# Patient Record
Sex: FEMALE | Race: BLACK OR AFRICAN AMERICAN | Employment: OTHER | URBAN - NONMETROPOLITAN AREA
[De-identification: names, ages, dates, MRNs, and addresses within clinical notes are randomized per-mention and may not be internally consistent; named-entity substitution may affect disease eponyms.]

---

## 2017-02-09 ENCOUNTER — OFFICE VISIT (OUTPATIENT)
Dept: NEPHROLOGY | Age: 62
End: 2017-02-09

## 2017-02-09 VITALS
HEART RATE: 71 BPM | DIASTOLIC BLOOD PRESSURE: 72 MMHG | WEIGHT: 215 LBS | BODY MASS INDEX: 35.82 KG/M2 | SYSTOLIC BLOOD PRESSURE: 158 MMHG | OXYGEN SATURATION: 93 %

## 2017-02-09 DIAGNOSIS — E11.29 PERSISTENT PROTEINURIA ASSOCIATED WITH TYPE 2 DIABETES MELLITUS (HCC): ICD-10-CM

## 2017-02-09 DIAGNOSIS — E11.22 TYPE 2 DIABETES MELLITUS WITH STAGE 3 CHRONIC KIDNEY DISEASE, WITH LONG-TERM CURRENT USE OF INSULIN (HCC): ICD-10-CM

## 2017-02-09 DIAGNOSIS — I12.9 HYPERTENSIVE NEPHROPATHY, STAGE 1-4 OR UNSPECIFIED CHRONIC KIDNEY DISEASE: ICD-10-CM

## 2017-02-09 DIAGNOSIS — D63.8 ANEMIA, CHRONIC DISEASE: ICD-10-CM

## 2017-02-09 DIAGNOSIS — N18.30 CKD (CHRONIC KIDNEY DISEASE), STAGE III (HCC): Primary | ICD-10-CM

## 2017-02-09 DIAGNOSIS — N18.30 TYPE 2 DIABETES MELLITUS WITH STAGE 3 CHRONIC KIDNEY DISEASE, WITH LONG-TERM CURRENT USE OF INSULIN (HCC): ICD-10-CM

## 2017-02-09 DIAGNOSIS — Z79.4 TYPE 2 DIABETES MELLITUS WITH STAGE 3 CHRONIC KIDNEY DISEASE, WITH LONG-TERM CURRENT USE OF INSULIN (HCC): ICD-10-CM

## 2017-02-09 DIAGNOSIS — E55.9 VITAMIN D DEFICIENCY: ICD-10-CM

## 2017-02-09 DIAGNOSIS — N25.81 SECONDARY HYPERPARATHYROIDISM OF RENAL ORIGIN (HCC): ICD-10-CM

## 2017-02-09 DIAGNOSIS — R80.9 PERSISTENT PROTEINURIA ASSOCIATED WITH TYPE 2 DIABETES MELLITUS (HCC): ICD-10-CM

## 2017-02-09 PROCEDURE — G8598 ASA/ANTIPLAT THER USED: HCPCS | Performed by: NURSE PRACTITIONER

## 2017-02-09 PROCEDURE — G8417 CALC BMI ABV UP PARAM F/U: HCPCS | Performed by: NURSE PRACTITIONER

## 2017-02-09 PROCEDURE — G8428 CUR MEDS NOT DOCUMENT: HCPCS | Performed by: NURSE PRACTITIONER

## 2017-02-09 PROCEDURE — 1036F TOBACCO NON-USER: CPT | Performed by: NURSE PRACTITIONER

## 2017-02-09 PROCEDURE — 99213 OFFICE O/P EST LOW 20 MIN: CPT | Performed by: NURSE PRACTITIONER

## 2017-02-09 PROCEDURE — 3044F HG A1C LEVEL LT 7.0%: CPT | Performed by: NURSE PRACTITIONER

## 2017-02-09 PROCEDURE — 3017F COLORECTAL CA SCREEN DOC REV: CPT | Performed by: NURSE PRACTITIONER

## 2017-02-09 PROCEDURE — G8484 FLU IMMUNIZE NO ADMIN: HCPCS | Performed by: NURSE PRACTITIONER

## 2017-02-09 PROCEDURE — 3014F SCREEN MAMMO DOC REV: CPT | Performed by: NURSE PRACTITIONER

## 2017-02-15 ENCOUNTER — OFFICE VISIT (OUTPATIENT)
Dept: INTERNAL MEDICINE | Age: 62
End: 2017-02-15

## 2017-02-15 VITALS
HEART RATE: 72 BPM | WEIGHT: 214.6 LBS | DIASTOLIC BLOOD PRESSURE: 62 MMHG | SYSTOLIC BLOOD PRESSURE: 150 MMHG | HEIGHT: 65 IN | BODY MASS INDEX: 35.75 KG/M2

## 2017-02-15 DIAGNOSIS — E11.9 TYPE 2 DIABETES MELLITUS WITHOUT COMPLICATION, WITHOUT LONG-TERM CURRENT USE OF INSULIN (HCC): Primary | ICD-10-CM

## 2017-02-15 DIAGNOSIS — N18.3 CKD (CHRONIC KIDNEY DISEASE), STAGE 3 (MODERATE): ICD-10-CM

## 2017-02-15 DIAGNOSIS — G47.33 OBSTRUCTIVE SLEEP APNEA ON CPAP: ICD-10-CM

## 2017-02-15 DIAGNOSIS — Z99.89 OBSTRUCTIVE SLEEP APNEA ON CPAP: ICD-10-CM

## 2017-02-15 DIAGNOSIS — E78.5 HYPERLIPIDEMIA, UNSPECIFIED HYPERLIPIDEMIA TYPE: ICD-10-CM

## 2017-02-15 DIAGNOSIS — N25.81 SECONDARY HYPERPARATHYROIDISM OF RENAL ORIGIN (HCC): ICD-10-CM

## 2017-02-15 DIAGNOSIS — I10 ESSENTIAL HYPERTENSION: ICD-10-CM

## 2017-02-15 DIAGNOSIS — J44.9 CHRONIC OBSTRUCTIVE PULMONARY DISEASE, UNSPECIFIED COPD TYPE (HCC): ICD-10-CM

## 2017-02-15 DIAGNOSIS — E66.9 OBESITY (BMI 30-39.9): ICD-10-CM

## 2017-02-15 DIAGNOSIS — F41.9 ANXIETY: ICD-10-CM

## 2017-02-15 LAB — HBA1C MFR BLD: 6 %

## 2017-02-15 PROCEDURE — 83036 HEMOGLOBIN GLYCOSYLATED A1C: CPT | Performed by: INTERNAL MEDICINE

## 2017-02-15 PROCEDURE — 3017F COLORECTAL CA SCREEN DOC REV: CPT | Performed by: INTERNAL MEDICINE

## 2017-02-15 PROCEDURE — G8926 SPIRO NO PERF OR DOC: HCPCS | Performed by: INTERNAL MEDICINE

## 2017-02-15 PROCEDURE — 93000 ELECTROCARDIOGRAM COMPLETE: CPT | Performed by: INTERNAL MEDICINE

## 2017-02-15 PROCEDURE — 99214 OFFICE O/P EST MOD 30 MIN: CPT | Performed by: INTERNAL MEDICINE

## 2017-02-15 PROCEDURE — G8427 DOCREV CUR MEDS BY ELIG CLIN: HCPCS | Performed by: INTERNAL MEDICINE

## 2017-02-15 PROCEDURE — G8417 CALC BMI ABV UP PARAM F/U: HCPCS | Performed by: INTERNAL MEDICINE

## 2017-02-15 PROCEDURE — 3044F HG A1C LEVEL LT 7.0%: CPT | Performed by: INTERNAL MEDICINE

## 2017-02-15 PROCEDURE — G8484 FLU IMMUNIZE NO ADMIN: HCPCS | Performed by: INTERNAL MEDICINE

## 2017-02-15 PROCEDURE — 3023F SPIROM DOC REV: CPT | Performed by: INTERNAL MEDICINE

## 2017-02-15 PROCEDURE — 1036F TOBACCO NON-USER: CPT | Performed by: INTERNAL MEDICINE

## 2017-02-15 PROCEDURE — 3014F SCREEN MAMMO DOC REV: CPT | Performed by: INTERNAL MEDICINE

## 2017-02-15 PROCEDURE — G8598 ASA/ANTIPLAT THER USED: HCPCS | Performed by: INTERNAL MEDICINE

## 2017-02-15 RX ORDER — CARVEDILOL 25 MG/1
TABLET ORAL
Qty: 180 TABLET | Refills: 3 | Status: SHIPPED | OUTPATIENT
Start: 2017-02-15 | End: 2018-01-31 | Stop reason: SDUPTHER

## 2017-02-15 RX ORDER — ALLOPURINOL 100 MG/1
TABLET ORAL
Qty: 180 TABLET | Refills: 3 | Status: SHIPPED | OUTPATIENT
Start: 2017-02-15 | End: 2017-08-02 | Stop reason: SDUPTHER

## 2017-02-15 RX ORDER — ALPRAZOLAM 1 MG/1
TABLET ORAL
Qty: 40 TABLET | Refills: 5 | Status: SHIPPED | OUTPATIENT
Start: 2017-02-15 | End: 2017-08-02 | Stop reason: SDUPTHER

## 2017-02-15 RX ORDER — NITROGLYCERIN 0.4 MG/1
0.4 TABLET SUBLINGUAL EVERY 5 MIN PRN
Qty: 25 TABLET | Refills: 5 | Status: SHIPPED | OUTPATIENT
Start: 2017-02-15 | End: 2018-01-31 | Stop reason: SDUPTHER

## 2017-02-16 ENCOUNTER — OFFICE VISIT (OUTPATIENT)
Dept: PHYSICAL MEDICINE AND REHAB | Age: 62
End: 2017-02-16

## 2017-02-16 VITALS
WEIGHT: 213 LBS | HEIGHT: 65 IN | HEART RATE: 71 BPM | DIASTOLIC BLOOD PRESSURE: 55 MMHG | SYSTOLIC BLOOD PRESSURE: 112 MMHG | BODY MASS INDEX: 35.49 KG/M2

## 2017-02-16 DIAGNOSIS — G89.29 CHRONIC PAIN OF RIGHT ANKLE: ICD-10-CM

## 2017-02-16 DIAGNOSIS — Z79.4 TYPE 2 DIABETES MELLITUS WITHOUT COMPLICATION, WITH LONG-TERM CURRENT USE OF INSULIN (HCC): ICD-10-CM

## 2017-02-16 DIAGNOSIS — E11.42 DIABETIC POLYNEUROPATHY ASSOCIATED WITH TYPE 2 DIABETES MELLITUS (HCC): Primary | ICD-10-CM

## 2017-02-16 DIAGNOSIS — E11.9 TYPE 2 DIABETES MELLITUS WITHOUT COMPLICATION, WITH LONG-TERM CURRENT USE OF INSULIN (HCC): ICD-10-CM

## 2017-02-16 DIAGNOSIS — M25.571 CHRONIC PAIN OF RIGHT ANKLE: ICD-10-CM

## 2017-02-16 PROCEDURE — G8484 FLU IMMUNIZE NO ADMIN: HCPCS | Performed by: PHYSICAL MEDICINE & REHABILITATION

## 2017-02-16 PROCEDURE — G8427 DOCREV CUR MEDS BY ELIG CLIN: HCPCS | Performed by: PHYSICAL MEDICINE & REHABILITATION

## 2017-02-16 PROCEDURE — G8417 CALC BMI ABV UP PARAM F/U: HCPCS | Performed by: PHYSICAL MEDICINE & REHABILITATION

## 2017-02-16 PROCEDURE — 3014F SCREEN MAMMO DOC REV: CPT | Performed by: PHYSICAL MEDICINE & REHABILITATION

## 2017-02-16 PROCEDURE — 1036F TOBACCO NON-USER: CPT | Performed by: PHYSICAL MEDICINE & REHABILITATION

## 2017-02-16 PROCEDURE — 3017F COLORECTAL CA SCREEN DOC REV: CPT | Performed by: PHYSICAL MEDICINE & REHABILITATION

## 2017-02-16 PROCEDURE — G8598 ASA/ANTIPLAT THER USED: HCPCS | Performed by: PHYSICAL MEDICINE & REHABILITATION

## 2017-02-16 PROCEDURE — 3044F HG A1C LEVEL LT 7.0%: CPT | Performed by: PHYSICAL MEDICINE & REHABILITATION

## 2017-02-16 PROCEDURE — 99213 OFFICE O/P EST LOW 20 MIN: CPT | Performed by: PHYSICAL MEDICINE & REHABILITATION

## 2017-02-16 RX ORDER — HYDROCODONE BITARTRATE AND ACETAMINOPHEN 5; 325 MG/1; MG/1
1 TABLET ORAL EVERY 8 HOURS PRN
Qty: 90 TABLET | Refills: 0 | Status: SHIPPED | OUTPATIENT
Start: 2017-02-16 | End: 2017-02-23

## 2017-02-16 RX ORDER — HYDROCODONE BITARTRATE AND ACETAMINOPHEN 5; 325 MG/1; MG/1
1 TABLET ORAL EVERY 8 HOURS PRN
Qty: 90 TABLET | Refills: 0 | Status: SHIPPED | OUTPATIENT
Start: 2017-02-16 | End: 2017-05-15 | Stop reason: SDUPTHER

## 2017-03-03 PROBLEM — E11.628 CELLULITIS IN DIABETIC FOOT (HCC): Status: ACTIVE | Noted: 2017-03-03

## 2017-03-03 PROBLEM — L03.119 CELLULITIS IN DIABETIC FOOT (HCC): Status: ACTIVE | Noted: 2017-03-03

## 2017-04-26 ENCOUNTER — OFFICE VISIT (OUTPATIENT)
Dept: ONCOLOGY | Age: 62
End: 2017-04-26

## 2017-04-26 VITALS
DIASTOLIC BLOOD PRESSURE: 61 MMHG | WEIGHT: 205.6 LBS | OXYGEN SATURATION: 93 % | TEMPERATURE: 97.1 F | RESPIRATION RATE: 18 BRPM | BODY MASS INDEX: 34.26 KG/M2 | HEART RATE: 66 BPM | HEIGHT: 65 IN | SYSTOLIC BLOOD PRESSURE: 120 MMHG

## 2017-04-26 DIAGNOSIS — N18.30 CKD (CHRONIC KIDNEY DISEASE) STAGE 3, GFR 30-59 ML/MIN (HCC): ICD-10-CM

## 2017-04-26 DIAGNOSIS — D64.9 NORMOCYTIC ANEMIA: Primary | ICD-10-CM

## 2017-04-26 PROCEDURE — 3014F SCREEN MAMMO DOC REV: CPT | Performed by: INTERNAL MEDICINE

## 2017-04-26 PROCEDURE — G8427 DOCREV CUR MEDS BY ELIG CLIN: HCPCS | Performed by: INTERNAL MEDICINE

## 2017-04-26 PROCEDURE — 1036F TOBACCO NON-USER: CPT | Performed by: INTERNAL MEDICINE

## 2017-04-26 PROCEDURE — G8417 CALC BMI ABV UP PARAM F/U: HCPCS | Performed by: INTERNAL MEDICINE

## 2017-04-26 PROCEDURE — G8598 ASA/ANTIPLAT THER USED: HCPCS | Performed by: INTERNAL MEDICINE

## 2017-04-26 PROCEDURE — 99203 OFFICE O/P NEW LOW 30 MIN: CPT | Performed by: INTERNAL MEDICINE

## 2017-04-26 PROCEDURE — 3017F COLORECTAL CA SCREEN DOC REV: CPT | Performed by: INTERNAL MEDICINE

## 2017-05-05 ENCOUNTER — TELEPHONE (OUTPATIENT)
Dept: INTERNAL MEDICINE | Age: 62
End: 2017-05-05

## 2017-05-08 ENCOUNTER — OFFICE VISIT (OUTPATIENT)
Dept: ONCOLOGY | Age: 62
End: 2017-05-08

## 2017-05-08 VITALS
HEART RATE: 72 BPM | TEMPERATURE: 97.9 F | BODY MASS INDEX: 34.75 KG/M2 | OXYGEN SATURATION: 98 % | RESPIRATION RATE: 16 BRPM | HEIGHT: 65 IN | WEIGHT: 208.6 LBS | DIASTOLIC BLOOD PRESSURE: 81 MMHG | SYSTOLIC BLOOD PRESSURE: 180 MMHG

## 2017-05-08 DIAGNOSIS — D63.8 ANEMIA, CHRONIC DISEASE: ICD-10-CM

## 2017-05-08 DIAGNOSIS — E11.9 TYPE 2 DIABETES MELLITUS WITHOUT COMPLICATION, WITH LONG-TERM CURRENT USE OF INSULIN (HCC): ICD-10-CM

## 2017-05-08 DIAGNOSIS — D64.9 NORMOCYTIC ANEMIA: Primary | ICD-10-CM

## 2017-05-08 DIAGNOSIS — N18.30 CKD (CHRONIC KIDNEY DISEASE) STAGE 3, GFR 30-59 ML/MIN (HCC): ICD-10-CM

## 2017-05-08 DIAGNOSIS — Z79.4 TYPE 2 DIABETES MELLITUS WITHOUT COMPLICATION, WITH LONG-TERM CURRENT USE OF INSULIN (HCC): ICD-10-CM

## 2017-05-08 PROCEDURE — G8598 ASA/ANTIPLAT THER USED: HCPCS | Performed by: INTERNAL MEDICINE

## 2017-05-08 PROCEDURE — G8417 CALC BMI ABV UP PARAM F/U: HCPCS | Performed by: INTERNAL MEDICINE

## 2017-05-08 PROCEDURE — 1036F TOBACCO NON-USER: CPT | Performed by: INTERNAL MEDICINE

## 2017-05-08 PROCEDURE — 99213 OFFICE O/P EST LOW 20 MIN: CPT | Performed by: INTERNAL MEDICINE

## 2017-05-08 PROCEDURE — G8427 DOCREV CUR MEDS BY ELIG CLIN: HCPCS | Performed by: INTERNAL MEDICINE

## 2017-05-08 PROCEDURE — 3017F COLORECTAL CA SCREEN DOC REV: CPT | Performed by: INTERNAL MEDICINE

## 2017-05-08 PROCEDURE — 3014F SCREEN MAMMO DOC REV: CPT | Performed by: INTERNAL MEDICINE

## 2017-05-08 ASSESSMENT — ENCOUNTER SYMPTOMS
COLOR CHANGE: 0
TROUBLE SWALLOWING: 0
RECTAL PAIN: 0
EYE DISCHARGE: 0
BLOOD IN STOOL: 0
ABDOMINAL DISTENTION: 0
BLOOD IN STOOL: 0
BACK PAIN: 0
SORE THROAT: 0
VOMITING: 0
SHORTNESS OF BREATH: 0
WHEEZING: 0
VOMITING: 0
CHEST TIGHTNESS: 0
ABDOMINAL PAIN: 0
FACIAL SWELLING: 0
CHEST TIGHTNESS: 0
NAUSEA: 0
WHEEZING: 0
SHORTNESS OF BREATH: 0
TROUBLE SWALLOWING: 0
CONSTIPATION: 0
ABDOMINAL DISTENTION: 0
ABDOMINAL PAIN: 0
EYE DISCHARGE: 0
COLOR CHANGE: 0
NAUSEA: 0
CONSTIPATION: 0
RECTAL PAIN: 0
DIARRHEA: 0
BACK PAIN: 0
COUGH: 0
DIARRHEA: 0
FACIAL SWELLING: 0
COUGH: 0
SORE THROAT: 0

## 2017-05-15 ENCOUNTER — OFFICE VISIT (OUTPATIENT)
Dept: PHYSICAL MEDICINE AND REHAB | Age: 62
End: 2017-05-15

## 2017-05-15 VITALS
BODY MASS INDEX: 35.17 KG/M2 | SYSTOLIC BLOOD PRESSURE: 142 MMHG | HEIGHT: 64 IN | WEIGHT: 206 LBS | DIASTOLIC BLOOD PRESSURE: 68 MMHG | HEART RATE: 69 BPM

## 2017-05-15 DIAGNOSIS — E11.42 DIABETIC POLYNEUROPATHY ASSOCIATED WITH TYPE 2 DIABETES MELLITUS (HCC): Primary | ICD-10-CM

## 2017-05-15 DIAGNOSIS — G89.29 CHRONIC PAIN OF RIGHT ANKLE: ICD-10-CM

## 2017-05-15 DIAGNOSIS — M25.571 CHRONIC PAIN OF RIGHT ANKLE: ICD-10-CM

## 2017-05-15 PROCEDURE — G8598 ASA/ANTIPLAT THER USED: HCPCS | Performed by: NURSE PRACTITIONER

## 2017-05-15 PROCEDURE — G8427 DOCREV CUR MEDS BY ELIG CLIN: HCPCS | Performed by: NURSE PRACTITIONER

## 2017-05-15 PROCEDURE — 1036F TOBACCO NON-USER: CPT | Performed by: NURSE PRACTITIONER

## 2017-05-15 PROCEDURE — G8417 CALC BMI ABV UP PARAM F/U: HCPCS | Performed by: NURSE PRACTITIONER

## 2017-05-15 PROCEDURE — 3014F SCREEN MAMMO DOC REV: CPT | Performed by: NURSE PRACTITIONER

## 2017-05-15 PROCEDURE — 3017F COLORECTAL CA SCREEN DOC REV: CPT | Performed by: NURSE PRACTITIONER

## 2017-05-15 PROCEDURE — 3044F HG A1C LEVEL LT 7.0%: CPT | Performed by: NURSE PRACTITIONER

## 2017-05-15 PROCEDURE — 99213 OFFICE O/P EST LOW 20 MIN: CPT | Performed by: NURSE PRACTITIONER

## 2017-05-15 RX ORDER — HYDROCODONE BITARTRATE AND ACETAMINOPHEN 5; 325 MG/1; MG/1
1 TABLET ORAL EVERY 8 HOURS PRN
Qty: 90 TABLET | Refills: 0 | Status: SHIPPED | OUTPATIENT
Start: 2017-05-15 | End: 2017-08-21 | Stop reason: SDUPTHER

## 2017-05-25 DIAGNOSIS — I10 ESSENTIAL HYPERTENSION: ICD-10-CM

## 2017-05-26 RX ORDER — CYANOCOBALAMIN/FOLIC AC/VIT B6 1-2.5-25MG
TABLET ORAL
Qty: 30 TABLET | Refills: 5 | Status: SHIPPED | OUTPATIENT
Start: 2017-05-26 | End: 2017-11-24 | Stop reason: SDUPTHER

## 2017-06-28 ENCOUNTER — OFFICE VISIT (OUTPATIENT)
Dept: PULMONOLOGY | Age: 62
End: 2017-06-28

## 2017-06-28 VITALS
BODY MASS INDEX: 34.59 KG/M2 | HEIGHT: 65 IN | WEIGHT: 207.6 LBS | DIASTOLIC BLOOD PRESSURE: 64 MMHG | SYSTOLIC BLOOD PRESSURE: 148 MMHG | OXYGEN SATURATION: 96 % | HEART RATE: 72 BPM

## 2017-06-28 DIAGNOSIS — G47.33 OBSTRUCTIVE SLEEP APNEA ON CPAP: Primary | ICD-10-CM

## 2017-06-28 DIAGNOSIS — Z99.89 OBSTRUCTIVE SLEEP APNEA ON CPAP: Primary | ICD-10-CM

## 2017-06-28 PROCEDURE — G8598 ASA/ANTIPLAT THER USED: HCPCS | Performed by: PHYSICIAN ASSISTANT

## 2017-06-28 PROCEDURE — 1036F TOBACCO NON-USER: CPT | Performed by: PHYSICIAN ASSISTANT

## 2017-06-28 PROCEDURE — 3017F COLORECTAL CA SCREEN DOC REV: CPT | Performed by: PHYSICIAN ASSISTANT

## 2017-06-28 PROCEDURE — G8427 DOCREV CUR MEDS BY ELIG CLIN: HCPCS | Performed by: PHYSICIAN ASSISTANT

## 2017-06-28 PROCEDURE — 99213 OFFICE O/P EST LOW 20 MIN: CPT | Performed by: PHYSICIAN ASSISTANT

## 2017-06-28 PROCEDURE — 3014F SCREEN MAMMO DOC REV: CPT | Performed by: PHYSICIAN ASSISTANT

## 2017-06-28 PROCEDURE — G8417 CALC BMI ABV UP PARAM F/U: HCPCS | Performed by: PHYSICIAN ASSISTANT

## 2017-07-20 ENCOUNTER — HOSPITAL ENCOUNTER (OUTPATIENT)
Dept: ENDOCRINOLOGY | Age: 62
Discharge: HOME | End: 2017-07-20
Payer: MEDICARE

## 2017-07-20 VITALS
BODY MASS INDEX: 33.78 KG/M2 | SYSTOLIC BLOOD PRESSURE: 140 MMHG | RESPIRATION RATE: 16 BRPM | TEMPERATURE: 98.3 F | DIASTOLIC BLOOD PRESSURE: 60 MMHG | HEART RATE: 80 BPM | WEIGHT: 203 LBS

## 2017-07-20 DIAGNOSIS — N18.30 CHRONIC KIDNEY DISEASE, STAGE III (MODERATE) (HCC): ICD-10-CM

## 2017-07-20 DIAGNOSIS — N18.30 ANEMIA OF CHRONIC RENAL FAILURE, STAGE 3 (MODERATE) (HCC): Primary | ICD-10-CM

## 2017-07-20 DIAGNOSIS — D63.1 ANEMIA OF CHRONIC RENAL FAILURE, STAGE 3 (MODERATE) (HCC): Primary | ICD-10-CM

## 2017-07-20 PROCEDURE — 6360000002 HC RX W HCPCS: Performed by: NURSE PRACTITIONER

## 2017-07-20 PROCEDURE — 96372 THER/PROPH/DIAG INJ SC/IM: CPT

## 2017-07-20 RX ADMIN — DARBEPOETIN ALFA 40 MCG: 40 INJECTION, SOLUTION INTRAVENOUS; SUBCUTANEOUS at 12:10

## 2017-07-20 ASSESSMENT — PAIN DESCRIPTION - DESCRIPTORS: DESCRIPTORS: ACHING

## 2017-07-20 ASSESSMENT — PAIN DESCRIPTION - PAIN TYPE: TYPE: CHRONIC PAIN

## 2017-07-20 ASSESSMENT — PAIN DESCRIPTION - FREQUENCY: FREQUENCY: CONTINUOUS

## 2017-07-20 ASSESSMENT — PAIN DESCRIPTION - LOCATION: LOCATION: BACK;LEG

## 2017-07-20 ASSESSMENT — PAIN SCALES - GENERAL: PAINLEVEL_OUTOF10: 6

## 2017-08-01 ENCOUNTER — HOSPITAL ENCOUNTER (OUTPATIENT)
Age: 62
Discharge: HOME | End: 2017-08-01
Payer: MEDICARE

## 2017-08-01 LAB
ALBUMIN SERPL-MCNC: 3.7 G/DL (ref 3.5–5.1)
CALCIUM SERPL-MCNC: 9.6 MG/DL (ref 8.5–10.5)
HCT VFR BLD CALC: 29 % (ref 37–47)
HEMOGLOBIN: 9.5 GM/DL (ref 12–16)
PHOSPHORUS: 3.6 MG/DL (ref 2.4–4.7)

## 2017-08-01 PROCEDURE — 82040 ASSAY OF SERUM ALBUMIN: CPT

## 2017-08-01 PROCEDURE — 36415 COLL VENOUS BLD VENIPUNCTURE: CPT

## 2017-08-01 PROCEDURE — 85018 HEMOGLOBIN: CPT

## 2017-08-01 PROCEDURE — 85014 HEMATOCRIT: CPT

## 2017-08-01 PROCEDURE — 82310 ASSAY OF CALCIUM: CPT

## 2017-08-01 PROCEDURE — 84100 ASSAY OF PHOSPHORUS: CPT

## 2017-08-02 ENCOUNTER — OFFICE VISIT (OUTPATIENT)
Dept: INTERNAL MEDICINE CLINIC | Age: 62
End: 2017-08-02
Payer: MEDICARE

## 2017-08-02 VITALS
WEIGHT: 201 LBS | HEART RATE: 64 BPM | BODY MASS INDEX: 33.49 KG/M2 | DIASTOLIC BLOOD PRESSURE: 90 MMHG | SYSTOLIC BLOOD PRESSURE: 180 MMHG | HEIGHT: 65 IN

## 2017-08-02 DIAGNOSIS — E66.09 NON MORBID OBESITY DUE TO EXCESS CALORIES: ICD-10-CM

## 2017-08-02 DIAGNOSIS — I10 ESSENTIAL HYPERTENSION: Primary | ICD-10-CM

## 2017-08-02 DIAGNOSIS — E01.0 THYROMEGALY: ICD-10-CM

## 2017-08-02 DIAGNOSIS — F41.9 ANXIETY: ICD-10-CM

## 2017-08-02 DIAGNOSIS — N18.3 CKD (CHRONIC KIDNEY DISEASE), STAGE 3 (MODERATE): ICD-10-CM

## 2017-08-02 DIAGNOSIS — Z79.4 TYPE 2 DIABETES MELLITUS WITHOUT COMPLICATION, WITH LONG-TERM CURRENT USE OF INSULIN (HCC): ICD-10-CM

## 2017-08-02 DIAGNOSIS — G47.33 OBSTRUCTIVE SLEEP APNEA ON CPAP: ICD-10-CM

## 2017-08-02 DIAGNOSIS — E78.5 HYPERLIPIDEMIA, UNSPECIFIED HYPERLIPIDEMIA TYPE: ICD-10-CM

## 2017-08-02 DIAGNOSIS — Z99.89 OBSTRUCTIVE SLEEP APNEA ON CPAP: ICD-10-CM

## 2017-08-02 DIAGNOSIS — E11.9 TYPE 2 DIABETES MELLITUS WITHOUT COMPLICATION, WITH LONG-TERM CURRENT USE OF INSULIN (HCC): ICD-10-CM

## 2017-08-02 PROCEDURE — G8598 ASA/ANTIPLAT THER USED: HCPCS | Performed by: INTERNAL MEDICINE

## 2017-08-02 PROCEDURE — 3014F SCREEN MAMMO DOC REV: CPT | Performed by: INTERNAL MEDICINE

## 2017-08-02 PROCEDURE — 1036F TOBACCO NON-USER: CPT | Performed by: INTERNAL MEDICINE

## 2017-08-02 PROCEDURE — 3046F HEMOGLOBIN A1C LEVEL >9.0%: CPT | Performed by: INTERNAL MEDICINE

## 2017-08-02 PROCEDURE — 99214 OFFICE O/P EST MOD 30 MIN: CPT | Performed by: INTERNAL MEDICINE

## 2017-08-02 PROCEDURE — G8427 DOCREV CUR MEDS BY ELIG CLIN: HCPCS | Performed by: INTERNAL MEDICINE

## 2017-08-02 PROCEDURE — 3017F COLORECTAL CA SCREEN DOC REV: CPT | Performed by: INTERNAL MEDICINE

## 2017-08-02 PROCEDURE — G8417 CALC BMI ABV UP PARAM F/U: HCPCS | Performed by: INTERNAL MEDICINE

## 2017-08-02 PROCEDURE — 93000 ELECTROCARDIOGRAM COMPLETE: CPT | Performed by: INTERNAL MEDICINE

## 2017-08-02 RX ORDER — VALSARTAN AND HYDROCHLOROTHIAZIDE 320; 12.5 MG/1; MG/1
1 TABLET, FILM COATED ORAL DAILY
Qty: 90 TABLET | Refills: 3 | Status: SHIPPED | OUTPATIENT
Start: 2017-08-02 | End: 2017-11-06 | Stop reason: ALTCHOICE

## 2017-08-02 RX ORDER — CLONIDINE HYDROCHLORIDE 0.3 MG/1
0.3 TABLET ORAL EVERY 8 HOURS
Qty: 270 TABLET | Refills: 4 | Status: SHIPPED | OUTPATIENT
Start: 2017-08-02 | End: 2018-08-08 | Stop reason: SDUPTHER

## 2017-08-02 RX ORDER — ALLOPURINOL 100 MG/1
TABLET ORAL
Qty: 180 TABLET | Refills: 3 | Status: SHIPPED | OUTPATIENT
Start: 2017-08-02 | End: 2018-02-09 | Stop reason: SDUPTHER

## 2017-08-02 RX ORDER — ALPRAZOLAM 1 MG/1
TABLET ORAL
Qty: 40 TABLET | Refills: 5 | Status: SHIPPED | OUTPATIENT
Start: 2017-08-02 | End: 2018-01-31 | Stop reason: SDUPTHER

## 2017-08-02 RX ORDER — AMLODIPINE BESYLATE AND ATORVASTATIN CALCIUM 10; 80 MG/1; MG/1
TABLET, FILM COATED ORAL
Qty: 90 TABLET | Refills: 3 | Status: ON HOLD | OUTPATIENT
Start: 2017-08-02 | End: 2018-08-01 | Stop reason: HOSPADM

## 2017-08-02 ASSESSMENT — PATIENT HEALTH QUESTIONNAIRE - PHQ9
SUM OF ALL RESPONSES TO PHQ9 QUESTIONS 1 & 2: 0
1. LITTLE INTEREST OR PLEASURE IN DOING THINGS: 0
2. FEELING DOWN, DEPRESSED OR HOPELESS: 0
SUM OF ALL RESPONSES TO PHQ QUESTIONS 1-9: 0

## 2017-08-03 ENCOUNTER — HOSPITAL ENCOUNTER (OUTPATIENT)
Dept: ENDOCRINOLOGY | Age: 62
Discharge: HOME | End: 2017-08-03
Payer: MEDICARE

## 2017-08-03 VITALS
TEMPERATURE: 98.2 F | SYSTOLIC BLOOD PRESSURE: 160 MMHG | WEIGHT: 201.2 LBS | DIASTOLIC BLOOD PRESSURE: 60 MMHG | BODY MASS INDEX: 33.52 KG/M2 | RESPIRATION RATE: 16 BRPM | HEART RATE: 60 BPM

## 2017-08-03 DIAGNOSIS — D63.1 ANEMIA OF CHRONIC RENAL FAILURE, STAGE 3 (MODERATE) (HCC): Primary | ICD-10-CM

## 2017-08-03 DIAGNOSIS — N18.30 ANEMIA OF CHRONIC RENAL FAILURE, STAGE 3 (MODERATE) (HCC): Primary | ICD-10-CM

## 2017-08-03 DIAGNOSIS — N18.30 CKD (CHRONIC KIDNEY DISEASE) STAGE 3, GFR 30-59 ML/MIN (HCC): ICD-10-CM

## 2017-08-03 DIAGNOSIS — N18.30 CHRONIC KIDNEY DISEASE, STAGE III (MODERATE) (HCC): ICD-10-CM

## 2017-08-03 PROCEDURE — 96372 THER/PROPH/DIAG INJ SC/IM: CPT

## 2017-08-03 PROCEDURE — 6360000002 HC RX W HCPCS: Performed by: NURSE PRACTITIONER

## 2017-08-03 RX ADMIN — DARBEPOETIN ALFA 60 MCG: 60 INJECTION, SOLUTION INTRAVENOUS; SUBCUTANEOUS at 13:40

## 2017-08-08 ENCOUNTER — HOSPITAL ENCOUNTER (OUTPATIENT)
Age: 62
Discharge: HOME | End: 2017-08-08
Payer: MEDICARE

## 2017-08-08 ENCOUNTER — HOSPITAL ENCOUNTER (OUTPATIENT)
Dept: ULTRASOUND IMAGING | Age: 62
Discharge: HOME | End: 2017-08-08
Payer: MEDICARE

## 2017-08-08 DIAGNOSIS — Z79.4 TYPE 2 DIABETES MELLITUS WITHOUT COMPLICATION, WITH LONG-TERM CURRENT USE OF INSULIN (HCC): ICD-10-CM

## 2017-08-08 DIAGNOSIS — E11.9 TYPE 2 DIABETES MELLITUS WITHOUT COMPLICATION, WITH LONG-TERM CURRENT USE OF INSULIN (HCC): ICD-10-CM

## 2017-08-08 DIAGNOSIS — E01.0 THYROMEGALY: ICD-10-CM

## 2017-08-08 LAB
AVERAGE GLUCOSE: 120 MG/DL (ref 70–126)
HBA1C MFR BLD: 6 % (ref 4.4–6.4)

## 2017-08-08 PROCEDURE — 36415 COLL VENOUS BLD VENIPUNCTURE: CPT

## 2017-08-08 PROCEDURE — 83036 HEMOGLOBIN GLYCOSYLATED A1C: CPT

## 2017-08-08 PROCEDURE — 76536 US EXAM OF HEAD AND NECK: CPT

## 2017-08-10 ENCOUNTER — OFFICE VISIT (OUTPATIENT)
Dept: NEPHROLOGY | Age: 62
End: 2017-08-10
Payer: MEDICARE

## 2017-08-10 VITALS
BODY MASS INDEX: 33.49 KG/M2 | DIASTOLIC BLOOD PRESSURE: 82 MMHG | SYSTOLIC BLOOD PRESSURE: 142 MMHG | WEIGHT: 201 LBS | HEART RATE: 68 BPM | RESPIRATION RATE: 18 BRPM

## 2017-08-10 DIAGNOSIS — N18.4 CKD (CHRONIC KIDNEY DISEASE), STAGE IV (HCC): Primary | ICD-10-CM

## 2017-08-10 DIAGNOSIS — E11.22 TYPE 2 DIABETES MELLITUS WITH STAGE 4 CHRONIC KIDNEY DISEASE, UNSPECIFIED LONG TERM INSULIN USE STATUS: ICD-10-CM

## 2017-08-10 DIAGNOSIS — I10 BENIGN ESSENTIAL HTN: ICD-10-CM

## 2017-08-10 DIAGNOSIS — N18.4 TYPE 2 DIABETES MELLITUS WITH STAGE 4 CHRONIC KIDNEY DISEASE, UNSPECIFIED LONG TERM INSULIN USE STATUS: ICD-10-CM

## 2017-08-10 PROCEDURE — 3046F HEMOGLOBIN A1C LEVEL >9.0%: CPT | Performed by: INTERNAL MEDICINE

## 2017-08-10 PROCEDURE — 99214 OFFICE O/P EST MOD 30 MIN: CPT | Performed by: INTERNAL MEDICINE

## 2017-08-10 PROCEDURE — G8417 CALC BMI ABV UP PARAM F/U: HCPCS | Performed by: INTERNAL MEDICINE

## 2017-08-10 PROCEDURE — 3014F SCREEN MAMMO DOC REV: CPT | Performed by: INTERNAL MEDICINE

## 2017-08-10 PROCEDURE — G8427 DOCREV CUR MEDS BY ELIG CLIN: HCPCS | Performed by: INTERNAL MEDICINE

## 2017-08-10 PROCEDURE — G8598 ASA/ANTIPLAT THER USED: HCPCS | Performed by: INTERNAL MEDICINE

## 2017-08-10 PROCEDURE — 1036F TOBACCO NON-USER: CPT | Performed by: INTERNAL MEDICINE

## 2017-08-10 PROCEDURE — 3017F COLORECTAL CA SCREEN DOC REV: CPT | Performed by: INTERNAL MEDICINE

## 2017-08-16 DIAGNOSIS — E11.9 TYPE 2 DIABETES MELLITUS WITHOUT COMPLICATION, WITH LONG-TERM CURRENT USE OF INSULIN (HCC): ICD-10-CM

## 2017-08-16 DIAGNOSIS — Z79.4 TYPE 2 DIABETES MELLITUS WITHOUT COMPLICATION, WITH LONG-TERM CURRENT USE OF INSULIN (HCC): ICD-10-CM

## 2017-08-17 ENCOUNTER — HOSPITAL ENCOUNTER (OUTPATIENT)
Dept: ENDOCRINOLOGY | Age: 62
Discharge: HOME | End: 2017-08-17
Payer: MEDICARE

## 2017-08-17 ENCOUNTER — TELEPHONE (OUTPATIENT)
Dept: ENDOCRINOLOGY | Age: 62
End: 2017-08-17

## 2017-08-17 VITALS
TEMPERATURE: 98.3 F | DIASTOLIC BLOOD PRESSURE: 60 MMHG | HEART RATE: 80 BPM | SYSTOLIC BLOOD PRESSURE: 160 MMHG | BODY MASS INDEX: 33.85 KG/M2 | WEIGHT: 203.2 LBS | RESPIRATION RATE: 16 BRPM

## 2017-08-17 DIAGNOSIS — N18.30 ANEMIA OF CHRONIC RENAL FAILURE, STAGE 3 (MODERATE) (HCC): ICD-10-CM

## 2017-08-17 DIAGNOSIS — D63.1 ANEMIA OF CHRONIC RENAL FAILURE, STAGE 3 (MODERATE) (HCC): ICD-10-CM

## 2017-08-17 DIAGNOSIS — N18.30 CHRONIC KIDNEY DISEASE, STAGE III (MODERATE) (HCC): Primary | ICD-10-CM

## 2017-08-17 PROCEDURE — 99211 OFF/OP EST MAY X REQ PHY/QHP: CPT

## 2017-08-17 RX ORDER — HYDRALAZINE HYDROCHLORIDE 50 MG/1
50 TABLET, FILM COATED ORAL 3 TIMES DAILY
Qty: 90 TABLET | Refills: 3 | Status: SHIPPED | OUTPATIENT
Start: 2017-08-17 | End: 2017-10-05 | Stop reason: ALTCHOICE

## 2017-08-21 ENCOUNTER — OFFICE VISIT (OUTPATIENT)
Dept: PHYSICAL MEDICINE AND REHAB | Age: 62
End: 2017-08-21
Payer: MEDICARE

## 2017-08-21 VITALS
BODY MASS INDEX: 33.49 KG/M2 | SYSTOLIC BLOOD PRESSURE: 121 MMHG | DIASTOLIC BLOOD PRESSURE: 61 MMHG | HEART RATE: 66 BPM | HEIGHT: 65 IN | WEIGHT: 201 LBS

## 2017-08-21 DIAGNOSIS — Z79.4 TYPE 2 DIABETES MELLITUS WITHOUT COMPLICATION, WITH LONG-TERM CURRENT USE OF INSULIN (HCC): ICD-10-CM

## 2017-08-21 DIAGNOSIS — E11.42 DIABETIC POLYNEUROPATHY ASSOCIATED WITH TYPE 2 DIABETES MELLITUS (HCC): Primary | ICD-10-CM

## 2017-08-21 DIAGNOSIS — G89.29 CHRONIC PAIN OF RIGHT ANKLE: ICD-10-CM

## 2017-08-21 DIAGNOSIS — E11.9 TYPE 2 DIABETES MELLITUS WITHOUT COMPLICATION, WITH LONG-TERM CURRENT USE OF INSULIN (HCC): ICD-10-CM

## 2017-08-21 DIAGNOSIS — M25.571 CHRONIC PAIN OF RIGHT ANKLE: ICD-10-CM

## 2017-08-21 DIAGNOSIS — M47.816 OSTEOARTHRITIS OF LUMBAR SPINE, UNSPECIFIED SPINAL OSTEOARTHRITIS COMPLICATION STATUS: ICD-10-CM

## 2017-08-21 PROCEDURE — G8427 DOCREV CUR MEDS BY ELIG CLIN: HCPCS | Performed by: PHYSICAL MEDICINE & REHABILITATION

## 2017-08-21 PROCEDURE — G8417 CALC BMI ABV UP PARAM F/U: HCPCS | Performed by: PHYSICAL MEDICINE & REHABILITATION

## 2017-08-21 PROCEDURE — 99213 OFFICE O/P EST LOW 20 MIN: CPT | Performed by: PHYSICAL MEDICINE & REHABILITATION

## 2017-08-21 PROCEDURE — 1036F TOBACCO NON-USER: CPT | Performed by: PHYSICAL MEDICINE & REHABILITATION

## 2017-08-21 PROCEDURE — G8598 ASA/ANTIPLAT THER USED: HCPCS | Performed by: PHYSICAL MEDICINE & REHABILITATION

## 2017-08-21 PROCEDURE — 3046F HEMOGLOBIN A1C LEVEL >9.0%: CPT | Performed by: PHYSICAL MEDICINE & REHABILITATION

## 2017-08-21 PROCEDURE — 3014F SCREEN MAMMO DOC REV: CPT | Performed by: PHYSICAL MEDICINE & REHABILITATION

## 2017-08-21 PROCEDURE — 3017F COLORECTAL CA SCREEN DOC REV: CPT | Performed by: PHYSICAL MEDICINE & REHABILITATION

## 2017-08-21 RX ORDER — HYDROCODONE BITARTRATE AND ACETAMINOPHEN 5; 325 MG/1; MG/1
1 TABLET ORAL EVERY 8 HOURS PRN
Qty: 90 TABLET | Refills: 0 | Status: SHIPPED | OUTPATIENT
Start: 2017-08-21 | End: 2017-11-22 | Stop reason: SDUPTHER

## 2017-08-21 RX ORDER — HYDROCODONE BITARTRATE AND ACETAMINOPHEN 5; 325 MG/1; MG/1
1 TABLET ORAL EVERY 8 HOURS PRN
Qty: 90 TABLET | Refills: 0 | Status: SHIPPED | OUTPATIENT
Start: 2017-08-21 | End: 2017-11-06 | Stop reason: SDUPTHER

## 2017-08-24 ENCOUNTER — HOSPITAL ENCOUNTER (OUTPATIENT)
Dept: ENDOCRINOLOGY | Age: 62
Discharge: HOME | End: 2017-08-24
Payer: MEDICARE

## 2017-08-24 VITALS
BODY MASS INDEX: 33.78 KG/M2 | HEART RATE: 80 BPM | RESPIRATION RATE: 16 BRPM | DIASTOLIC BLOOD PRESSURE: 80 MMHG | WEIGHT: 203 LBS | SYSTOLIC BLOOD PRESSURE: 180 MMHG | TEMPERATURE: 98 F

## 2017-08-24 DIAGNOSIS — D63.1 ANEMIA OF CHRONIC RENAL FAILURE, STAGE 3 (MODERATE) (HCC): ICD-10-CM

## 2017-08-24 DIAGNOSIS — N18.30 CHRONIC KIDNEY DISEASE, STAGE III (MODERATE) (HCC): Primary | ICD-10-CM

## 2017-08-24 DIAGNOSIS — N18.30 ANEMIA OF CHRONIC RENAL FAILURE, STAGE 3 (MODERATE) (HCC): ICD-10-CM

## 2017-08-24 PROCEDURE — 6360000002 HC RX W HCPCS: Performed by: NURSE PRACTITIONER

## 2017-08-24 PROCEDURE — 96372 THER/PROPH/DIAG INJ SC/IM: CPT

## 2017-08-24 RX ORDER — HYDRALAZINE HYDROCHLORIDE 100 MG/1
100 TABLET, FILM COATED ORAL 3 TIMES DAILY
Qty: 90 TABLET | Refills: 0 | Status: ON HOLD | OUTPATIENT
Start: 2017-08-24 | End: 2018-08-01 | Stop reason: HOSPADM

## 2017-08-24 RX ADMIN — DARBEPOETIN ALFA 60 MCG: 60 INJECTION, SOLUTION INTRAVENOUS; SUBCUTANEOUS at 12:01

## 2017-08-24 ASSESSMENT — PAIN DESCRIPTION - PROGRESSION: CLINICAL_PROGRESSION: NOT CHANGED

## 2017-08-24 ASSESSMENT — PAIN DESCRIPTION - FREQUENCY: FREQUENCY: CONTINUOUS

## 2017-08-24 ASSESSMENT — PAIN DESCRIPTION - DESCRIPTORS: DESCRIPTORS: ACHING

## 2017-08-24 ASSESSMENT — PAIN DESCRIPTION - LOCATION: LOCATION: LEG

## 2017-08-24 ASSESSMENT — PAIN DESCRIPTION - PAIN TYPE: TYPE: CHRONIC PAIN

## 2017-08-24 ASSESSMENT — PAIN SCALES - GENERAL: PAINLEVEL_OUTOF10: 4

## 2017-09-06 ENCOUNTER — HOSPITAL ENCOUNTER (OUTPATIENT)
Age: 62
Discharge: HOME | End: 2017-09-06
Payer: MEDICARE

## 2017-09-06 LAB
ALBUMIN SERPL-MCNC: 4 G/DL (ref 3.5–5.1)
CALCIUM SERPL-MCNC: 10.3 MG/DL (ref 8.5–10.5)
HCT VFR BLD CALC: 28.3 % (ref 37–47)
HEMOGLOBIN: 9.3 GM/DL (ref 12–16)
PHOSPHORUS: 3.7 MG/DL (ref 2.4–4.7)

## 2017-09-06 PROCEDURE — 85014 HEMATOCRIT: CPT

## 2017-09-06 PROCEDURE — 82040 ASSAY OF SERUM ALBUMIN: CPT

## 2017-09-06 PROCEDURE — 85018 HEMOGLOBIN: CPT

## 2017-09-06 PROCEDURE — 84100 ASSAY OF PHOSPHORUS: CPT

## 2017-09-06 PROCEDURE — 82310 ASSAY OF CALCIUM: CPT

## 2017-09-06 PROCEDURE — 36415 COLL VENOUS BLD VENIPUNCTURE: CPT

## 2017-09-07 ENCOUNTER — HOSPITAL ENCOUNTER (OUTPATIENT)
Dept: ENDOCRINOLOGY | Age: 62
Discharge: HOME | End: 2017-09-07
Payer: MEDICARE

## 2017-09-07 VITALS
SYSTOLIC BLOOD PRESSURE: 162 MMHG | WEIGHT: 203.4 LBS | TEMPERATURE: 97.7 F | RESPIRATION RATE: 16 BRPM | BODY MASS INDEX: 33.85 KG/M2 | HEART RATE: 80 BPM | DIASTOLIC BLOOD PRESSURE: 64 MMHG

## 2017-09-07 DIAGNOSIS — N18.30 ANEMIA OF CHRONIC RENAL FAILURE, STAGE 3 (MODERATE) (HCC): ICD-10-CM

## 2017-09-07 DIAGNOSIS — D63.1 ANEMIA OF CHRONIC RENAL FAILURE, STAGE 3 (MODERATE) (HCC): ICD-10-CM

## 2017-09-07 DIAGNOSIS — N18.30 CHRONIC KIDNEY DISEASE, STAGE III (MODERATE) (HCC): Primary | ICD-10-CM

## 2017-09-07 PROCEDURE — 96372 THER/PROPH/DIAG INJ SC/IM: CPT

## 2017-09-07 PROCEDURE — 6360000002 HC RX W HCPCS: Performed by: NURSE PRACTITIONER

## 2017-09-07 RX ADMIN — DARBEPOETIN ALFA 100 MCG: 100 INJECTION, SOLUTION INTRAVENOUS; SUBCUTANEOUS at 14:14

## 2017-09-07 ASSESSMENT — PAIN DESCRIPTION - LOCATION: LOCATION: HIP

## 2017-09-07 ASSESSMENT — PAIN DESCRIPTION - DESCRIPTORS: DESCRIPTORS: ACHING

## 2017-09-07 ASSESSMENT — PAIN DESCRIPTION - PROGRESSION: CLINICAL_PROGRESSION: NOT CHANGED

## 2017-09-07 ASSESSMENT — PAIN DESCRIPTION - FREQUENCY: FREQUENCY: CONTINUOUS

## 2017-09-07 ASSESSMENT — PAIN SCALES - GENERAL: PAINLEVEL_OUTOF10: 7

## 2017-09-07 ASSESSMENT — PAIN DESCRIPTION - PAIN TYPE: TYPE: CHRONIC PAIN

## 2017-09-20 RX ORDER — BISACODYL 5 MG
TABLET, DELAYED RELEASE (ENTERIC COATED) ORAL
Qty: 30 TABLET | Refills: 4 | Status: ON HOLD | OUTPATIENT
Start: 2017-09-20 | End: 2018-08-28 | Stop reason: HOSPADM

## 2017-09-22 ENCOUNTER — HOSPITAL ENCOUNTER (OUTPATIENT)
Dept: ENDOCRINOLOGY | Age: 62
Discharge: HOME | End: 2017-09-22
Payer: MEDICARE

## 2017-09-22 VITALS
WEIGHT: 201.2 LBS | SYSTOLIC BLOOD PRESSURE: 160 MMHG | TEMPERATURE: 97.6 F | RESPIRATION RATE: 16 BRPM | HEART RATE: 60 BPM | DIASTOLIC BLOOD PRESSURE: 60 MMHG | BODY MASS INDEX: 33.48 KG/M2

## 2017-09-22 DIAGNOSIS — N18.30 ANEMIA OF CHRONIC RENAL FAILURE, STAGE 3 (MODERATE) (HCC): ICD-10-CM

## 2017-09-22 DIAGNOSIS — D63.1 ANEMIA OF CHRONIC RENAL FAILURE, STAGE 3 (MODERATE) (HCC): ICD-10-CM

## 2017-09-22 DIAGNOSIS — N18.30 CHRONIC KIDNEY DISEASE, STAGE III (MODERATE) (HCC): Primary | ICD-10-CM

## 2017-09-22 PROCEDURE — 6360000002 HC RX W HCPCS: Performed by: NURSE PRACTITIONER

## 2017-09-22 PROCEDURE — 96372 THER/PROPH/DIAG INJ SC/IM: CPT

## 2017-09-22 RX ADMIN — DARBEPOETIN ALFA 100 MCG: 100 INJECTION, SOLUTION INTRAVENOUS; SUBCUTANEOUS at 11:30

## 2017-09-22 ASSESSMENT — PAIN DESCRIPTION - PAIN TYPE: TYPE: CHRONIC PAIN

## 2017-09-22 ASSESSMENT — PAIN DESCRIPTION - LOCATION: LOCATION: LEG

## 2017-09-22 ASSESSMENT — PAIN DESCRIPTION - FREQUENCY: FREQUENCY: CONTINUOUS

## 2017-09-22 ASSESSMENT — PAIN DESCRIPTION - DESCRIPTORS: DESCRIPTORS: ACHING

## 2017-09-22 ASSESSMENT — PAIN SCALES - GENERAL: PAINLEVEL_OUTOF10: 5

## 2017-10-02 ENCOUNTER — HOSPITAL ENCOUNTER (OUTPATIENT)
Dept: PULMONOLOGY | Age: 62
Discharge: HOME | End: 2017-10-02
Payer: MEDICARE

## 2017-10-02 ENCOUNTER — HOSPITAL ENCOUNTER (OUTPATIENT)
Dept: GENERAL RADIOLOGY | Age: 62
Discharge: HOME | End: 2017-10-02
Payer: MEDICARE

## 2017-10-02 ENCOUNTER — HOSPITAL ENCOUNTER (OUTPATIENT)
Age: 62
Discharge: HOME | End: 2017-10-02
Payer: MEDICARE

## 2017-10-02 DIAGNOSIS — R93.89 ABNORMAL CHEST X-RAY: ICD-10-CM

## 2017-10-02 DIAGNOSIS — J30.9 ALLERGIC RHINITIS, UNSPECIFIED: ICD-10-CM

## 2017-10-02 DIAGNOSIS — J44.9 COPD, MILD (HCC): ICD-10-CM

## 2017-10-02 LAB
ALBUMIN SERPL-MCNC: 4 G/DL (ref 3.5–5.1)
AVERAGE GLUCOSE: 120 MG/DL (ref 70–126)
CALCIUM SERPL-MCNC: 9.7 MG/DL (ref 8.5–10.5)
FERRITIN: 911 NG/ML (ref 10–291)
HBA1C MFR BLD: 6 % (ref 4.4–6.4)
HCT VFR BLD CALC: 26.5 % (ref 37–47)
HEMOGLOBIN: 8.8 GM/DL (ref 12–16)
IRON SATURATION: 25 % (ref 20–50)
IRON: 45 UG/DL (ref 50–170)
PHOSPHORUS: 3.5 MG/DL (ref 2.4–4.7)
PTH INTACT: 180.4 PG/ML (ref 15–65)
RETICULOCYTE ABSOLUTE COUNT: 2.5 % (ref 0.5–2)
TOTAL IRON BINDING CAPACITY: 182 UG/DL (ref 171–450)
VITAMIN D 25-HYDROXY: 33 NG/ML (ref 30–100)

## 2017-10-02 PROCEDURE — 85045 AUTOMATED RETICULOCYTE COUNT: CPT

## 2017-10-02 PROCEDURE — 82040 ASSAY OF SERUM ALBUMIN: CPT

## 2017-10-02 PROCEDURE — 84100 ASSAY OF PHOSPHORUS: CPT

## 2017-10-02 PROCEDURE — 82310 ASSAY OF CALCIUM: CPT

## 2017-10-02 PROCEDURE — 36415 COLL VENOUS BLD VENIPUNCTURE: CPT

## 2017-10-02 PROCEDURE — 71020 XR CHEST STANDARD TWO VW: CPT

## 2017-10-02 PROCEDURE — 82306 VITAMIN D 25 HYDROXY: CPT

## 2017-10-02 PROCEDURE — 82728 ASSAY OF FERRITIN: CPT

## 2017-10-02 PROCEDURE — 83970 ASSAY OF PARATHORMONE: CPT

## 2017-10-02 PROCEDURE — 85018 HEMOGLOBIN: CPT

## 2017-10-02 PROCEDURE — 83036 HEMOGLOBIN GLYCOSYLATED A1C: CPT

## 2017-10-02 PROCEDURE — 83540 ASSAY OF IRON: CPT

## 2017-10-02 PROCEDURE — 85014 HEMATOCRIT: CPT

## 2017-10-02 PROCEDURE — 83550 IRON BINDING TEST: CPT

## 2017-10-02 PROCEDURE — 94060 EVALUATION OF WHEEZING: CPT

## 2017-10-04 ENCOUNTER — TELEPHONE (OUTPATIENT)
Dept: ENDOCRINOLOGY | Age: 62
End: 2017-10-04

## 2017-10-04 DIAGNOSIS — N18.30 ANEMIA OF CHRONIC RENAL FAILURE, STAGE 3 (MODERATE) (HCC): ICD-10-CM

## 2017-10-04 DIAGNOSIS — D63.1 ANEMIA OF CHRONIC RENAL FAILURE, STAGE 3 (MODERATE) (HCC): ICD-10-CM

## 2017-10-04 DIAGNOSIS — N18.30 CHRONIC KIDNEY DISEASE, STAGE III (MODERATE) (HCC): Primary | ICD-10-CM

## 2017-10-05 ENCOUNTER — HOSPITAL ENCOUNTER (OUTPATIENT)
Dept: ENDOCRINOLOGY | Age: 62
Discharge: HOME | End: 2017-10-05
Payer: MEDICARE

## 2017-10-05 VITALS
RESPIRATION RATE: 16 BRPM | WEIGHT: 206.2 LBS | SYSTOLIC BLOOD PRESSURE: 160 MMHG | HEART RATE: 80 BPM | TEMPERATURE: 98 F | DIASTOLIC BLOOD PRESSURE: 80 MMHG | BODY MASS INDEX: 34.31 KG/M2

## 2017-10-05 DIAGNOSIS — N18.30 ANEMIA OF CHRONIC RENAL FAILURE, STAGE 3 (MODERATE) (HCC): ICD-10-CM

## 2017-10-05 DIAGNOSIS — N18.30 CHRONIC KIDNEY DISEASE, STAGE III (MODERATE) (HCC): Primary | ICD-10-CM

## 2017-10-05 DIAGNOSIS — D63.1 ANEMIA OF CHRONIC RENAL FAILURE, STAGE 3 (MODERATE) (HCC): ICD-10-CM

## 2017-10-05 PROCEDURE — 96372 THER/PROPH/DIAG INJ SC/IM: CPT

## 2017-10-05 PROCEDURE — 6360000002 HC RX W HCPCS: Performed by: NURSE PRACTITIONER

## 2017-10-05 RX ADMIN — DARBEPOETIN ALFA 150 MCG: 150 INJECTION, SOLUTION INTRAVENOUS; SUBCUTANEOUS at 14:01

## 2017-10-05 ASSESSMENT — PAIN DESCRIPTION - PAIN TYPE: TYPE: CHRONIC PAIN

## 2017-10-05 ASSESSMENT — PAIN DESCRIPTION - DESCRIPTORS: DESCRIPTORS: ACHING

## 2017-10-05 ASSESSMENT — PAIN DESCRIPTION - FREQUENCY: FREQUENCY: CONTINUOUS

## 2017-10-05 ASSESSMENT — PAIN DESCRIPTION - LOCATION: LOCATION: LEG

## 2017-10-05 ASSESSMENT — PAIN SCALES - GENERAL: PAINLEVEL_OUTOF10: 7

## 2017-10-05 NOTE — IP AVS SNAPSHOT
After Visit Summary  (Discharge Instructions)    Medication List for Home    Based on the information you provided to us as well as any changes during this visit, the following is your updated medication list.  Compare this with your prescription bottles at home. If you have any questions or concerns, contact your primary care physician's office. Daily Medication List (This medication list can be shared with any healthcare provider who is helping you manage your medications)      ASK your doctor about these medications if you have questions        Last Dose    Next Dose Due AM NOON PM NIGHT    albuterol sulfate  (90 Base) MCG/ACT inhaler   Commonly known as:  PROAIR HFA   Inhale 2 puffs into the lungs every 6 hours as needed for Wheezing                                         allopurinol 100 MG tablet   Commonly known as:  ZYLOPRIM   TAKE 2 TABLETS DAILY                                         ALPRAZolam 1 MG tablet   Commonly known as:  [place]   1/2 tab every 8 hours as needed                                         amLODIPine-atorvastatatin 10-80 MG per tablet   Commonly known as:  CADUET   TAKE 1 TABLET nightly                                         aspirin 81 MG EC tablet   Take 81 mg by mouth daily.                                          bumetanide 1 MG tablet   Commonly known as:  BUMEX   Take 1 tablet by mouth daily                                         calcitRIOL 0.25 MCG capsule   Commonly known as:  ROCALTROL   Take 1 capsule by mouth Twice a Week Monday, Wednesday and Friday                                         carvedilol 25 MG tablet   Commonly known as:  COREG   TAKE 1 TABLET TWICE A DAY                                         cloNIDine 0.3 MG tablet   Commonly known as:  CATAPRES   Take 1 tablet by mouth every 8 hours One tablet three times per day                                         [employer] GENTLE LAXATIVE 5 MG EC tablet   Generic drug:  bisacodyl metolazone 5 MG tablet   Commonly known as:  ZAROXOLYN   Take 1 tablet by mouth as needed (3 lbs wt gain, increased edema, Shortness of breath) for 3 lbs wt gain, increased edema and/or Shortness of breath                                         MIRALAX PO   Take  by mouth as needed.                                          nitroGLYCERIN 0.4 MG SL tablet   Commonly known as:  NITROSTAT   Place 1 tablet under the tongue every 5 minutes as needed for Chest pain                                         potassium chloride 10 MEQ extended release tablet   Commonly known as:  KLOR-CON M   Take 1 tablet by mouth daily (takes only with Bumex)                                         valsartan-hydrochlorothiazide 320-12.5 MG per tablet   Commonly known as:  DIOVAN-HCT   Take 1 tablet by mouth daily                                         vitamin D 5000 units Caps capsule   Commonly known as:  CHOLECALCIFEROL   Take 5,000 Units by mouth daily noon                                                 Allergies as of 10/5/2017        Reactions    Actos [Pioglitazone Hydrochloride] Swelling    Cymbalta [Duloxetine Hcl] Other (See Comments)    Anxiety and lethargic    Gabapentin Anxiety      Immunizations as of 10/5/2017  Reviewed on 10/5/2017    Name Date Dose VIS Date Route    Influenza Vaccine, unspecified formulation 11/10/2016 -- -- --    External: Patient reported    Influenza Vaccine, unspecified formulation 11/4/2016 -- -- --    Comment: [facility]    Influenza Virus Vaccine 12/9/2015 -- -- --    Influenza Virus Vaccine 12/21/2014 -- -- --    Comment: [facility]    Influenza Virus Vaccine 10/15/2012 0.5 mL 7/2/2012 Intramuscular    Influenza Virus Vaccine 10/19/2011 0.5 mL 7/26/2011 Intramuscular    Influenza Whole 11/10/2010 -- -- --    Pneumococcal Conjugate 7-valent 1/1/2010 -- -- --    Tdap (Boostrix, Adacel) 5/21/2012 0.5 mL 1/24/2012 Intramuscular      Last Vitals          Most Recent Value Temp  98 °F (36.7 °C)    Pulse  80    Resp  16    BP  (!)  160/80         After Visit Summary    This summary was created for you. Thank you for entrusting your care to us. The following information includes details about your hospital/visit stay along with steps you should take to help with your recovery once you leave the hospital.  In this packet, you will find information about the topics listed below:    · Instructions about your medications including a list of your home medications  · A summary of your hospital visit  · Follow-up appointments once you have left the hospital  · Your care plan at home      You may receive a survey regarding the care you received during your stay. Your input is valuable to us. We encourage you to complete and return your survey in the envelope provided. We hope you will choose us in the future for your healthcare needs. Patient Information     Patient Name MICHAEL Palma 1955      Care Provided at:     Name Address Phone       [address] [phone]            Your Visit    Here you will find information about your visit, including the reason for your visit. Please take this sheet with you when you visit your doctor or other health care provider in the future. It will help determine the best possible medical care for you at that time. If you have any questions once you leave the hospital, please call the department phone number listed below. Why you were here     Your primary diagnosis was:  Not on File      Visit Information     Date & Time Department Dept. Phone    10/5/2017 [facility] [phone]       Follow-up Appointments    Below is a list of your follow-up and future appointments. This may not be a complete list as you may have made appointments directly with providers that we are not aware of or your providers may have made some for you. Hepatitis C screening is recommended for all adults regardless of risk factors born between [facility] at least once (lifetime) who have never been tested. 1955    Diabetic Foot Exam 12/13/1963    Eye Exam By An Eye Doctor 12/13/1963    HIV screening is recommended for all people regardless of risk factors  aged 15-65 years at least once (lifetime) who have never been HIV tested. 12/13/1968    Pneumococcal Vaccines (two) for adults aged 19-64  years who are immunocompromised or whose spleen is missing or not working  (1 of 3 - PCV13) 12/13/1972    Pap Smear 12/13/1974    Zoster Vaccine 12/13/2013    Colonoscopy 4/30/2017    Yearly Flu Vaccine (1) 9/1/2017    Cholesterol Screening 12/28/2017    Hemoglobin A1C (Test For Long-Term Glucose Control) 10/2/2018    Mammograms are recommended every 2 years for low/average risk patients aged 48 - 69, and every year for high risk patients per updated national guidelines. However these guidelines can be individualized by your provider. 12/29/2018    Tetanus Combination Vaccine (2 - Td) 5/21/2022                 Care Plan Once You Return Home    This section includes instructions you will need to follow once you leave the hospital.  Your care team will discuss these with you, so you and those caring for you know how to best care for your health needs at home. This section may also include educational information about certain health topics that may be of help to you. Discharge Instructions       PRE RENAL CLINIC DISCHARGE INSTRUCTIONS      Medication Changes: none        Next Lab Draw Scheduled:  Not due        Additional Instructions or Appointments:  10/19/2017 @ 11:30am and 11/2/2017 @ 11:30 am with [address] [place] [address] [phone]      Important information for a smoker       SMOKING: QUIT SMOKING. THIS IS THE MOST IMPORTANT ACTION YOU CAN TAKE TO IMPROVE YOUR CURRENT AND FUTURE HEALTH.

## 2017-10-10 ENCOUNTER — OFFICE VISIT (OUTPATIENT)
Dept: PULMONOLOGY | Age: 62
End: 2017-10-10
Payer: MEDICARE

## 2017-10-10 VITALS
SYSTOLIC BLOOD PRESSURE: 152 MMHG | BODY MASS INDEX: 34.49 KG/M2 | TEMPERATURE: 98.3 F | DIASTOLIC BLOOD PRESSURE: 86 MMHG | HEART RATE: 71 BPM | HEIGHT: 65 IN | OXYGEN SATURATION: 98 % | WEIGHT: 207 LBS

## 2017-10-10 DIAGNOSIS — J44.9 STAGE 1 MILD COPD BY GOLD CLASSIFICATION (HCC): Primary | ICD-10-CM

## 2017-10-10 PROCEDURE — 3023F SPIROM DOC REV: CPT | Performed by: INTERNAL MEDICINE

## 2017-10-10 PROCEDURE — G8598 ASA/ANTIPLAT THER USED: HCPCS | Performed by: INTERNAL MEDICINE

## 2017-10-10 PROCEDURE — 1036F TOBACCO NON-USER: CPT | Performed by: INTERNAL MEDICINE

## 2017-10-10 PROCEDURE — 99214 OFFICE O/P EST MOD 30 MIN: CPT | Performed by: INTERNAL MEDICINE

## 2017-10-10 PROCEDURE — G8484 FLU IMMUNIZE NO ADMIN: HCPCS | Performed by: INTERNAL MEDICINE

## 2017-10-10 PROCEDURE — 3014F SCREEN MAMMO DOC REV: CPT | Performed by: INTERNAL MEDICINE

## 2017-10-10 PROCEDURE — G8926 SPIRO NO PERF OR DOC: HCPCS | Performed by: INTERNAL MEDICINE

## 2017-10-10 PROCEDURE — G8427 DOCREV CUR MEDS BY ELIG CLIN: HCPCS | Performed by: INTERNAL MEDICINE

## 2017-10-10 PROCEDURE — 3017F COLORECTAL CA SCREEN DOC REV: CPT | Performed by: INTERNAL MEDICINE

## 2017-10-10 PROCEDURE — G8417 CALC BMI ABV UP PARAM F/U: HCPCS | Performed by: INTERNAL MEDICINE

## 2017-10-10 NOTE — PATIENT INSTRUCTIONS
Health Maintenance Due   Topic Date Due    Hepatitis C screen  1955    Diabetic foot exam  12/13/1963    Diabetic retinal exam  12/13/1963    HIV screen  12/13/1968    Pneumococcal highest risk (1 of 3 - PCV13) 12/13/1972    Cervical cancer screen  12/13/1974    Zostavax vaccine  12/13/2013    Colon cancer screen colonoscopy  04/30/2017    Flu vaccine (1) 09/01/2017       Plan:   -Continue current Albuterol HFA 2puffs  Q6Hprn.  -She was instructed call her family physician or Nephrologist as soon as possible for optimization of her anti HTN medications. She verbalizes understanding.  -Continue Flonase 50mcg 2 sprays daily. Rosalba Valenzuela Refills given on Flonase for 1year in mail in format.  -At the request of patient, his inhalers were refilled for 3 months with 3 refills for 1year supply to pharmacy ( Print). -She was advised to go to ER for further evaluation left leg and hip pain. - Patient educated to update his pneumococcal vaccine with family physician and take influenza vaccine in coming season with out fail. Patient verbalizes understanding.  -She was advised to keep appt with [facility] sleep clinic as scheduled. - Schedule patient for follow up with my clinic in 1year with Spirometry. Patient advised to make early appointment if needed.  -Emily Newton verbalizes understanding and agrees with the plan of care.

## 2017-10-10 NOTE — PROGRESS NOTES
EC tablet TAKE 1 TABLET BY MOUTH DAILY AS NEEDED FOR CONSTIPATION 30 tablet 4    hydrALAZINE (APRESOLINE) 100 MG tablet Take 1 tablet by mouth 3 times daily 90 tablet 0    HYDROcodone-acetaminophen (NORCO) 5-325 MG per tablet Take 1 tablet by mouth every 8 hours as needed for Pain  Fill on/after 8/21/17. 90 tablet 0    HYDROcodone-acetaminophen (NORCO) 5-325 MG per tablet Take 1 tablet by mouth every 8 hours as needed for Pain  Fill on/after 10/21/17. 90 tablet 0    HYDROcodone-acetaminophen (NORCO) 5-325 MG per tablet Take 1 tablet by mouth every 8 hours as needed for Pain  Fill on/after 9/21/2017. 90 tablet 0    insulin aspart (NOVOLOG FLEXPEN) 100 UNIT/ML injection pen 12 to 24 units 3 times daily per sliding scale  Dx:E11.9  Z79.4 20 Pen 3    insulin glargine (LANTUS SOLOSTAR) 100 UNIT/ML injection pen Inject 35 Units into the skin 2 times daily 25 Pen 5    ALPRAZolam (XANAX) 1 MG tablet 1/2 tab every 8 hours as needed 40 tablet 5    amLODIPine-atorvastatatin (CADUET) 10-80 MG per tablet TAKE 1 TABLET nightly 90 tablet 3    allopurinol (ZYLOPRIM) 100 MG tablet TAKE 2 TABLETS DAILY 180 tablet 3    cloNIDine (CATAPRES) 0.3 MG tablet Take 1 tablet by mouth every 8 hours One tablet three times per day 270 tablet 4    valsartan-hydrochlorothiazide (DIOVAN-HCT) 320-12.5 MG per tablet Take 1 tablet by mouth daily (Patient taking differently: Take 1 tablet by mouth daily Takes at noon) 90 tablet 3    FOLBEE 2.5-25-1 MG TABS tablet TAKE 1 TABLET BY MOUTH DAILY (Patient taking differently: TAKE 1 TABLET BY MOUTH DAILY  noon) 30 tablet 5    metolazone (ZAROXOLYN) 5 MG tablet Take 1 tablet by mouth as needed (3 lbs wt gain, increased edema, Shortness of breath) for 3 lbs wt gain, increased edema and/or Shortness of breath 30 tablet 1    Insulin Pen Needle (B-D ULTRAFINE III SHORT PEN) 31G X 8 MM MISC Use three times daily Diagnosis Code E11.9 200 each 3    carvedilol (COREG) 25 MG tablet TAKE 1 TABLET TWICE A  tablet 3    nitroGLYCERIN (NITROSTAT) 0.4 MG SL tablet Place 1 tablet under the tongue every 5 minutes as needed for Chest pain 25 tablet 5    bumetanide (BUMEX) 1 MG tablet Take 1 tablet by mouth daily (Patient taking differently: Take 1 mg by mouth daily noon) 30 tablet 11    potassium chloride (KLOR-CON M) 10 MEQ extended release tablet Take 1 tablet by mouth daily (takes only with Bumex) (Patient taking differently: Take 10 mEq by mouth daily (takes only with Bumex) noon) 30 tablet 11    lidocaine (XYLOCAINE) 5 % ointment Apply topically as needed to low back and lower limbs three times a day as needed. 3 Tube 5    vitamin D (CHOLECALCIFEROL) 5000 UNITS CAPS capsule Take 5,000 Units by mouth daily noon      calcitRIOL (ROCALTROL) 0.25 MCG capsule Take 1 capsule by mouth Twice a Week Monday, Wednesday and Friday (Patient taking differently: Take 0.25 mcg by mouth once a week Monday) 30 capsule 2    Insulin Pen Needle 31G X 8 MM MISC 1 each by Does not apply route daily.  Polyethylene Glycol 3350 (MIRALAX PO) Take  by mouth as needed.  aspirin 81 MG EC tablet Take 81 mg by mouth daily.  fluticasone (FLONASE) 50 MCG/ACT nasal spray 2 sprays by Nasal route daily 3 Bottle 3    albuterol sulfate HFA (PROAIR HFA) 108 (90 BASE) MCG/ACT inhaler Inhale 2 puffs into the lungs every 6 hours as needed for Wheezing 3 Inhaler 3     No current facility-administered medications for this visit.         Family History   Problem Relation Age of Onset    Diabetes Sister     Diabetes Brother     Diabetes Sister     Diabetes Sister     Cancer Sister     Early Death Sister     Heart Disease Sister     Diabetes Sister     Heart Disease Sister     Diabetes Sister     Diabetes Brother     Arthritis Mother     Heart Disease Mother     High Blood Pressure Mother     Kidney Disease Mother     Mental Illness Mother     Stroke Mother     Heart Disease Father     Diabetes Father     Obesity make early appointment if needed.  -Stefania Call verbalizes understanding and agrees with the plan of care. - She was requested to see an Ear, Nose and Throat specialist consultation for further evaluation for chronic nasal stuffiness to R/o nasal obstruciton- She refused. She verbalizes understanding of the consequences of her decision.  -Patient was offered low dose CT chest screening as recommended by the  U.S. Preventive Services Task Force and the NCCN for lung Cancer Screening. She refused to go for low dose CT chest screening. She verbalizes understanding consequences of her decision including increased risk for lung cancer and missing lung cancer diagnosis.

## 2017-10-11 ENCOUNTER — APPOINTMENT (OUTPATIENT)
Dept: GENERAL RADIOLOGY | Age: 62
End: 2017-10-11
Payer: MEDICARE

## 2017-10-11 ENCOUNTER — HOSPITAL ENCOUNTER (EMERGENCY)
Age: 62
Discharge: HOME | End: 2017-10-11
Attending: EMERGENCY MEDICINE
Payer: MEDICARE

## 2017-10-11 VITALS
WEIGHT: 207 LBS | HEART RATE: 80 BPM | RESPIRATION RATE: 18 BRPM | TEMPERATURE: 98.6 F | HEIGHT: 65 IN | OXYGEN SATURATION: 100 % | DIASTOLIC BLOOD PRESSURE: 80 MMHG | SYSTOLIC BLOOD PRESSURE: 179 MMHG | BODY MASS INDEX: 34.49 KG/M2

## 2017-10-11 DIAGNOSIS — M79.605 LEFT LEG PAIN: ICD-10-CM

## 2017-10-11 DIAGNOSIS — M54.32 SCIATICA OF LEFT SIDE: Primary | ICD-10-CM

## 2017-10-11 PROCEDURE — 6360000002 HC RX W HCPCS: Performed by: EMERGENCY MEDICINE

## 2017-10-11 PROCEDURE — 99283 EMERGENCY DEPT VISIT LOW MDM: CPT

## 2017-10-11 PROCEDURE — 72100 X-RAY EXAM L-S SPINE 2/3 VWS: CPT

## 2017-10-11 PROCEDURE — 73502 X-RAY EXAM HIP UNI 2-3 VIEWS: CPT

## 2017-10-11 PROCEDURE — 96372 THER/PROPH/DIAG INJ SC/IM: CPT

## 2017-10-11 RX ORDER — PREDNISONE 10 MG/1
TABLET ORAL
Qty: 6 TABLET | Refills: 0 | Status: SHIPPED | OUTPATIENT
Start: 2017-10-11 | End: 2017-10-21

## 2017-10-11 RX ORDER — CYCLOBENZAPRINE HCL 10 MG
10 TABLET ORAL 3 TIMES DAILY PRN
Qty: 15 TABLET | Refills: 0 | Status: SHIPPED | OUTPATIENT
Start: 2017-10-11 | End: 2017-10-21

## 2017-10-11 RX ORDER — ORPHENADRINE CITRATE 30 MG/ML
60 INJECTION INTRAMUSCULAR; INTRAVENOUS ONCE
Status: COMPLETED | OUTPATIENT
Start: 2017-10-11 | End: 2017-10-11

## 2017-10-11 RX ORDER — MORPHINE SULFATE 4 MG/ML
4 INJECTION, SOLUTION INTRAMUSCULAR; INTRAVENOUS ONCE
Status: COMPLETED | OUTPATIENT
Start: 2017-10-11 | End: 2017-10-11

## 2017-10-11 RX ADMIN — ORPHENADRINE CITRATE 60 MG: 30 INJECTION INTRAMUSCULAR; INTRAVENOUS at 13:50

## 2017-10-11 RX ADMIN — MORPHINE SULFATE 4 MG: 4 INJECTION INTRAVENOUS at 13:49

## 2017-10-11 ASSESSMENT — ENCOUNTER SYMPTOMS
EYE REDNESS: 0
EYE DISCHARGE: 0
VOMITING: 0
PHOTOPHOBIA: 0
FACIAL SWELLING: 0
COUGH: 0
SHORTNESS OF BREATH: 0
NAUSEA: 0
STRIDOR: 0
RHINORRHEA: 0
ABDOMINAL DISTENTION: 0
DIARRHEA: 0
COLOR CHANGE: 0

## 2017-10-11 ASSESSMENT — PAIN DESCRIPTION - FREQUENCY: FREQUENCY: CONTINUOUS

## 2017-10-11 ASSESSMENT — PAIN DESCRIPTION - PAIN TYPE: TYPE: ACUTE PAIN

## 2017-10-11 ASSESSMENT — PAIN DESCRIPTION - ORIENTATION: ORIENTATION: LEFT

## 2017-10-11 ASSESSMENT — PAIN DESCRIPTION - LOCATION: LOCATION: LEG

## 2017-10-11 ASSESSMENT — PAIN SCALES - GENERAL
PAINLEVEL_OUTOF10: 7
PAINLEVEL_OUTOF10: 9

## 2017-10-11 ASSESSMENT — PAIN DESCRIPTION - ONSET: ONSET: ON-GOING

## 2017-10-11 ASSESSMENT — PAIN DESCRIPTION - PROGRESSION: CLINICAL_PROGRESSION: GRADUALLY WORSENING

## 2017-10-11 ASSESSMENT — PAIN DESCRIPTION - DESCRIPTORS: DESCRIPTORS: SHARP;OTHER (COMMENT)

## 2017-10-11 NOTE — ED PROVIDER NOTES
[facility]  eMERGENCY dEPARTMENT eNCOUnter          CHIEF COMPLAINT       Chief Complaint   Patient presents with    Leg Pain     Left (Concerned for Clot)       Nurses Notes reviewed and I agree except as noted in the HPI. HISTORY OF PRESENT ILLNESS    Deniz Pichardo is a 59 y.o. female who presents to the Emergency Department for the evaluation of left hip and leg pain. The patient states she developed this pain two days ago. She rates it as a 9/10 in severity and describes it as sharp in character. The pain seems to radiate down the back of the leg to the knee, as well as having pain that radiates down the side of the thigh on the left. Worse with range of motion and walking. The patient states this pain has been constant and has not been relieved with Norco. The patient denies any weakness. She reports history of lower extremity edema, but states the right leg is a little worse. The patient denies shortness of breath, chest pain, nausea or vomiting. She denies any injury. The patient denies history of DVT. The patient has a history of stage 3 kidney disease. The HPI was provided by the patient. REVIEW OF SYSTEMS     Review of Systems   Constitutional: Negative for chills, diaphoresis and fever. HENT: Negative for ear discharge, facial swelling and rhinorrhea. Eyes: Negative for photophobia, discharge and redness. Respiratory: Negative for cough, shortness of breath and stridor. Cardiovascular: Negative for palpitations and leg swelling. Gastrointestinal: Negative for abdominal distention, diarrhea, nausea and vomiting. Musculoskeletal: Positive for arthralgias and myalgias. Negative for gait problem and joint swelling. Skin: Negative for color change and rash (on exposed body surfaces). Neurological: Negative for tremors and syncope. Psychiatric/Behavioral: Negative for agitation and confusion. The patient is not nervous/anxious.         PAST MEDICAL HISTORY FLUTICASONE (FLONASE) 50 MCG/ACT NASAL SPRAY    2 sprays by Nasal route daily    FOLBEE 2.5-25-1 MG TABS TABLET    TAKE 1 TABLET BY MOUTH DAILY    HYDRALAZINE (APRESOLINE) 100 MG TABLET    Take 1 tablet by mouth 3 times daily    HYDROCODONE-ACETAMINOPHEN (NORCO) 5-325 MG PER TABLET    Take 1 tablet by mouth every 8 hours as needed for Pain  Fill on/after 8/21/17. HYDROCODONE-ACETAMINOPHEN (NORCO) 5-325 MG PER TABLET    Take 1 tablet by mouth every 8 hours as needed for Pain  Fill on/after 10/21/17. HYDROCODONE-ACETAMINOPHEN (NORCO) 5-325 MG PER TABLET    Take 1 tablet by mouth every 8 hours as needed for Pain  Fill on/after 9/21/2017. INSULIN ASPART (NOVOLOG FLEXPEN) 100 UNIT/ML INJECTION PEN    12 to 24 units 3 times daily per sliding scale  Dx:E11.9  Z79.4    INSULIN GLARGINE (LANTUS SOLOSTAR) 100 UNIT/ML INJECTION PEN    Inject 35 Units into the skin 2 times daily    INSULIN PEN NEEDLE (B-D ULTRAFINE III SHORT PEN) 31G X 8 MM MISC    Use three times daily Diagnosis Code E11.9    INSULIN PEN NEEDLE 31G X 8 MM MISC    1 each by Does not apply route daily. LIDOCAINE (XYLOCAINE) 5 % OINTMENT    Apply topically as needed to low back and lower limbs three times a day as needed. METOLAZONE (ZAROXOLYN) 5 MG TABLET    Take 1 tablet by mouth as needed (3 lbs wt gain, increased edema, Shortness of breath) for 3 lbs wt gain, increased edema and/or Shortness of breath    NITROGLYCERIN (NITROSTAT) 0.4 MG SL TABLET    Place 1 tablet under the tongue every 5 minutes as needed for Chest pain    POLYETHYLENE GLYCOL 3350 (MIRALAX PO)    Take  by mouth as needed.     POTASSIUM CHLORIDE (KLOR-CON M) 10 MEQ EXTENDED RELEASE TABLET    Take 1 tablet by mouth daily (takes only with Bumex)    VALSARTAN-HYDROCHLOROTHIAZIDE (DIOVAN-HCT) 320-12.5 MG PER TABLET    Take 1 tablet by mouth daily    VITAMIN D (CHOLECALCIFEROL) 5000 UNITS CAPS CAPSULE    Take 5,000 Units by mouth daily noon       ALLERGIES     is allergic to actos [pioglitazone hydrochloride]; cymbalta [duloxetine hcl]; and gabapentin. FAMILY HISTORY     indicated that her mother is . She indicated that her father is . She indicated that two of her five sisters are alive. She indicated that all of her four brothers are alive. She indicated that her maternal grandmother is . She indicated that her maternal grandfather is . She indicated that her paternal grandmother is . She indicated that her paternal grandfather is . family history includes Alcohol Abuse in her father; Arthritis in her mother; Cancer in her sister; Diabetes in her brother, brother, father, sister, sister, sister, sister, and sister; Early Death in her sister; Heart Disease in her father, mother, sister, and sister; High Blood Pressure in her mother; Kidney Disease in her mother; Mental Illness in her mother; Obesity in her father; Stroke in her mother. SOCIAL HISTORY      reports that she quit smoking about 8 years ago. Her smoking use included Cigarettes. She started smoking about 35 years ago. She has a 45.00 pack-year smoking history. She has never used smokeless tobacco. She reports that she does not drink alcohol or use drugs. PHYSICAL EXAM     INITIAL VITALS:  height is 5' 5\" (1.651 m) and weight is 207 lb (93.9 kg). Her oral temperature is 98.6 °F (37 °C). Her blood pressure is 182/79 (abnormal) and her pulse is 81. Her respiration is 18 and oxygen saturation is 100%. Physical Exam   Constitutional: She is oriented to person, place, and time. She appears well-developed and well-nourished. HENT:   Head: Normocephalic and atraumatic. Eyes: Conjunctivae are normal. Right eye exhibits no discharge. Left eye exhibits no discharge. No scleral icterus. Neck: Normal range of motion. No JVD present. Cardiovascular: Normal rate, regular rhythm and normal heart sounds. Exam reveals no gallop and no friction rub.     No murmur heard.  Pulmonary/Chest: Effort normal and breath sounds normal. No stridor. She has no decreased breath sounds. She has no wheezes. She has no rhonchi. She has no rales. Abdominal: Soft. There is no tenderness. There is no rebound and no guarding. Musculoskeletal: Normal range of motion. She exhibits edema. Pain with adduction and flexion of the left leg, tenderness to the left greater trochanter, tenderness to the left sacroiliac joint    Neurological: She is alert and oriented to person, place, and time. GCS eye subscore is 4. GCS verbal subscore is 5. GCS motor subscore is 6. Skin: Skin is warm and dry. No rash (on exposed body surfaces) noted. She is not diaphoretic. Psychiatric: She has a normal mood and affect. Her behavior is normal. Thought content normal.       DIFFERENTIAL DIAGNOSIS:   Sciatica, strain, musculoskeletal pain, bursitis    DIAGNOSTIC RESULTS     RADIOLOGY: non-plain film images(s) such as CT, Ultrasound and MRI are read by the radiologist.    XR HIP 2-3 VW W PELVIS LEFT   Final Result    IMPRESSION:   1. On the AP pelvis there is mild the degenerative change in the right left hip joints. This is shown to to better advantage on the AP and lateral views of the left hip joint. 2.  There is no acute fracture and there are no dislocations. 3.  There are no suspicious bone lesions. 4.  There are dense vascular calcifications. **This report has been created using voice recognition software. It may contain minor errors which are inherent in voice recognition technology. **      Final report electronically signed by Dr. Aramis Lange on 10/11/2017 1:59 PM      XR LUMBAR SPINE (2-3 VIEWS)   Final Result   1. No acute fracture or acute subluxation. 2.  The disc space heights are unchanged since prior study and are well maintained. **This report has been created using voice recognition software.  It may contain minor errors which are inherent in voice

## 2017-10-11 NOTE — ED TRIAGE NOTES
Arrived to  ER for the evaluation of possible clot in the left leg. Started having pain on Monday morning. Today had a long car ride back and [facility] [place] [facility] [place] [facility] Does not recall an injury to the left leg. Denies history of clots. Was instructed by Dr Lillian Chinchilla to be seen yesterday because of the leg pain and had to lift left leg in to the car. Denies SOB, chest pain, Nausea, vomiting, headache, diarrhea, numbness and tingling to extremities. + for constipation with last BM being 4 days ago. Has been ambulating with cane since Monday due to left leg pain. BP elevated at this time but states is due to take her medication. Waiting provider to assess for further orders.

## 2017-10-13 ENCOUNTER — CARE COORDINATION (OUTPATIENT)
Dept: FAMILY MEDICINE CLINIC | Age: 62
End: 2017-10-13

## 2017-10-13 NOTE — CARE COORDINATION
2/8/2018 2:40 PM Sarahi Quevedo, CNP [facility] [place]   10/10/2018 2:30 PM STR PULMONARY FUNCTION ROOM 1 STRZ PFT None   10/16/2018 11:40 AM Jr Ruiz MD Pulm Med [facility] Maintenance Due   Topic Date Due    Hepatitis C screen  1955    Diabetic foot exam  12/13/1963    Diabetic retinal exam  12/13/1963    HIV screen  12/13/1968    Pneumococcal highest risk (1 of 3 - PCV13) 12/13/1972    Cervical cancer screen  12/13/1974    Zostavax vaccine  12/13/2013    Colon cancer screen colonoscopy  04/30/2017    Flu vaccine (1) 09/01/2017     Patient advised to contact PCP office to have HM items/records faxed to PCP Office directly?   yes

## 2017-10-16 ENCOUNTER — HOSPITAL ENCOUNTER (OUTPATIENT)
Age: 62
Discharge: HOME | End: 2017-10-16
Payer: MEDICARE

## 2017-10-16 LAB — HEMOCCULT STL QL: NEGATIVE

## 2017-10-16 PROCEDURE — 82272 OCCULT BLD FECES 1-3 TESTS: CPT

## 2017-10-17 ENCOUNTER — TELEPHONE (OUTPATIENT)
Dept: NEPHROLOGY | Age: 62
End: 2017-10-17

## 2017-10-19 ENCOUNTER — HOSPITAL ENCOUNTER (OUTPATIENT)
Dept: ENDOCRINOLOGY | Age: 62
Discharge: HOME | End: 2017-10-19
Payer: MEDICARE

## 2017-10-19 ENCOUNTER — TELEPHONE (OUTPATIENT)
Dept: NEPHROLOGY | Age: 62
End: 2017-10-19

## 2017-10-19 VITALS
BODY MASS INDEX: 34.11 KG/M2 | OXYGEN SATURATION: 100 % | SYSTOLIC BLOOD PRESSURE: 162 MMHG | TEMPERATURE: 98.2 F | HEART RATE: 77 BPM | DIASTOLIC BLOOD PRESSURE: 80 MMHG | RESPIRATION RATE: 16 BRPM | WEIGHT: 205 LBS

## 2017-10-19 DIAGNOSIS — N18.4 CKD (CHRONIC KIDNEY DISEASE), STAGE IV (HCC): ICD-10-CM

## 2017-10-19 DIAGNOSIS — D63.8 ANEMIA, CHRONIC DISEASE: Primary | ICD-10-CM

## 2017-10-19 PROCEDURE — 6360000002 HC RX W HCPCS: Performed by: NURSE PRACTITIONER

## 2017-10-19 PROCEDURE — 96372 THER/PROPH/DIAG INJ SC/IM: CPT

## 2017-10-19 RX ADMIN — DARBEPOETIN ALFA 150 MCG: 150 INJECTION, SOLUTION INTRAVENOUS; SUBCUTANEOUS at 12:10

## 2017-10-19 NOTE — PROGRESS NOTES
1150 pt arrives to [facility] for Aranesp injection. Pt denies questions and needs at this time  [facility] PT TO READ.   12:08 PM   ___m_ Safety:       (Environmental)   [place] to environment   Ensure ID band is correct and in place/ allergy band as needed   Assess for fall risk   Initiate fall precautions as applicable (fall band, side rails, etc.)   Call light within reach   Bed in low position/ wheels locked    __m__ Pain:        Assess pain level and characteristics   Administer analgesics as ordered   Assess effectiveness of pain management and report to MD as needed    _m___ Knowledge Deficit:   Assess baseline knowledge   Provide teaching at level of understanding   Provide teaching via preferred learning method   Evaluate teaching effectiveness    __m__ Hemodynamic/Respiratory Status:       (Pre and Post Procedure Monitoring)   Assess/Monitor vital signs and LOC   Assess Baseline SpO2 prior to any sedation   Obtain weight/height   Assess vital signs/ LOC until patient meets discharge criteria   Monitor procedure site and notify MD of any issues    1210 WRITTEN DISCHARGE INSTRUCTIONS GIVEN TO PT-VERBALIZES UNDERSTANDING    12:13 PM PT DISCHARGED AMBULATORY IN SATISFACTORY CONDITION

## 2017-10-24 ENCOUNTER — HOSPITAL ENCOUNTER (OUTPATIENT)
Age: 62
Discharge: HOME | End: 2017-10-24
Payer: MEDICARE

## 2017-10-24 PROCEDURE — 82272 OCCULT BLD FECES 1-3 TESTS: CPT

## 2017-10-24 NOTE — TELEPHONE ENCOUNTER
I printed the signed lab order and mailed it to the patient today. I wrote on the top of the lab order to have it done every 4 weeks and hi-lighted it in yellow.

## 2017-10-25 DIAGNOSIS — D63.1 ANEMIA OF CHRONIC RENAL FAILURE, STAGE 3 (MODERATE) (HCC): ICD-10-CM

## 2017-10-25 DIAGNOSIS — N18.30 ANEMIA OF CHRONIC RENAL FAILURE, STAGE 3 (MODERATE) (HCC): ICD-10-CM

## 2017-10-25 DIAGNOSIS — N18.30 CHRONIC KIDNEY DISEASE, STAGE III (MODERATE) (HCC): ICD-10-CM

## 2017-10-25 LAB — HEMOCCULT STL QL: NEGATIVE

## 2017-11-02 ENCOUNTER — TELEPHONE (OUTPATIENT)
Dept: NEPHROLOGY | Age: 62
End: 2017-11-02

## 2017-11-02 ENCOUNTER — HOSPITAL ENCOUNTER (OUTPATIENT)
Dept: NURSING | Age: 62
Discharge: HOME | End: 2017-11-02
Payer: MEDICARE

## 2017-11-02 ENCOUNTER — HOSPITAL ENCOUNTER (OUTPATIENT)
Age: 62
Discharge: HOME | End: 2017-11-02
Payer: MEDICARE

## 2017-11-02 VITALS
TEMPERATURE: 98.3 F | DIASTOLIC BLOOD PRESSURE: 80 MMHG | HEART RATE: 65 BPM | RESPIRATION RATE: 16 BRPM | SYSTOLIC BLOOD PRESSURE: 176 MMHG

## 2017-11-02 LAB
ALBUMIN SERPL-MCNC: 4 G/DL (ref 3.5–5.1)
CALCIUM SERPL-MCNC: 9.6 MG/DL (ref 8.5–10.5)
HCT VFR BLD CALC: 28.1 % (ref 37–47)
HEMOGLOBIN: 9.3 GM/DL (ref 12–16)
PHOSPHORUS: 3.3 MG/DL (ref 2.4–4.7)

## 2017-11-02 PROCEDURE — 36415 COLL VENOUS BLD VENIPUNCTURE: CPT

## 2017-11-02 PROCEDURE — 82040 ASSAY OF SERUM ALBUMIN: CPT

## 2017-11-02 PROCEDURE — 84100 ASSAY OF PHOSPHORUS: CPT

## 2017-11-02 PROCEDURE — 6360000002 HC RX W HCPCS: Performed by: NURSE PRACTITIONER

## 2017-11-02 PROCEDURE — 85018 HEMOGLOBIN: CPT

## 2017-11-02 PROCEDURE — 96372 THER/PROPH/DIAG INJ SC/IM: CPT

## 2017-11-02 PROCEDURE — 82310 ASSAY OF CALCIUM: CPT

## 2017-11-02 PROCEDURE — 85014 HEMATOCRIT: CPT

## 2017-11-02 RX ADMIN — DARBEPOETIN ALFA 150 MCG: 150 INJECTION, SOLUTION INTRAVENOUS; SUBCUTANEOUS at 12:53

## 2017-11-02 ASSESSMENT — PAIN - FUNCTIONAL ASSESSMENT: PAIN_FUNCTIONAL_ASSESSMENT: 0-10

## 2017-11-02 ASSESSMENT — PAIN DESCRIPTION - DESCRIPTORS: DESCRIPTORS: ACHING;CONSTANT;BURNING

## 2017-11-02 NOTE — PROGRESS NOTES
1145 Pt arrives ambulatory for aranesp injection. Pt informed that Maty Uribe will not be in office to sign order until 12:30. Pt verbalized understanding and stated she was going to get lab work done and come back.

## 2017-11-02 NOTE — PROGRESS NOTES
12:46 PM PT ADMITTED TO [facility] FOR ARANESP INJECTION  12:47 PM PATIENT RIGHTS AND RESPONSIBILITIES SHEET OFFERED TO PT TO READ. 12:57 PM PT TOLERATED INJECTION WELL. 12:58 PM WRITTEN DISCHARGE INSTRUCTIONS GIVEN TO PT-VERBALIZES UNDERSTANDING. 12:58 PM PT DISCHARGED AMBULATORY IN SATISFACTORY CONDITION.     _M___ Safety:       (Environmental)   [place] to environment   Ensure ID band is correct and in place/ allergy band as needed   Assess for fall risk   Initiate fall precautions as applicable (fall band, side rails, etc.)   Call light within reach   Bed in low position/ wheels locked    M____ Pain:        Assess pain level and characteristics   Administer analgesics as ordered   Assess effectiveness of pain management and report to MD as needed    _M___ Knowledge Deficit:   Assess baseline knowledge   Provide teaching at level of understanding   Provide teaching via preferred learning method   Evaluate teaching effectiveness

## 2017-11-06 ENCOUNTER — OFFICE VISIT (OUTPATIENT)
Dept: NEPHROLOGY | Age: 62
End: 2017-11-06
Payer: MEDICARE

## 2017-11-06 VITALS
SYSTOLIC BLOOD PRESSURE: 164 MMHG | OXYGEN SATURATION: 99 % | DIASTOLIC BLOOD PRESSURE: 92 MMHG | HEART RATE: 67 BPM | WEIGHT: 205.5 LBS | BODY MASS INDEX: 34.2 KG/M2

## 2017-11-06 DIAGNOSIS — N25.81 SECONDARY HYPERPARATHYROIDISM OF RENAL ORIGIN (HCC): ICD-10-CM

## 2017-11-06 DIAGNOSIS — I12.9 HYPERTENSIVE NEPHROPATHY: ICD-10-CM

## 2017-11-06 DIAGNOSIS — D63.8 ANEMIA, CHRONIC DISEASE: Primary | ICD-10-CM

## 2017-11-06 DIAGNOSIS — N18.4 CKD (CHRONIC KIDNEY DISEASE), STAGE IV (HCC): ICD-10-CM

## 2017-11-06 PROCEDURE — G8484 FLU IMMUNIZE NO ADMIN: HCPCS | Performed by: NURSE PRACTITIONER

## 2017-11-06 PROCEDURE — G8598 ASA/ANTIPLAT THER USED: HCPCS | Performed by: NURSE PRACTITIONER

## 2017-11-06 PROCEDURE — 1036F TOBACCO NON-USER: CPT | Performed by: NURSE PRACTITIONER

## 2017-11-06 PROCEDURE — 3017F COLORECTAL CA SCREEN DOC REV: CPT | Performed by: NURSE PRACTITIONER

## 2017-11-06 PROCEDURE — G8417 CALC BMI ABV UP PARAM F/U: HCPCS | Performed by: NURSE PRACTITIONER

## 2017-11-06 PROCEDURE — 3014F SCREEN MAMMO DOC REV: CPT | Performed by: NURSE PRACTITIONER

## 2017-11-06 PROCEDURE — 99213 OFFICE O/P EST LOW 20 MIN: CPT | Performed by: NURSE PRACTITIONER

## 2017-11-06 PROCEDURE — G8427 DOCREV CUR MEDS BY ELIG CLIN: HCPCS | Performed by: NURSE PRACTITIONER

## 2017-11-06 RX ORDER — CALCITRIOL 0.25 UG/1
0.25 CAPSULE, LIQUID FILLED ORAL WEEKLY
Qty: 30 CAPSULE | Refills: 1 | Status: ON HOLD | OUTPATIENT
Start: 2017-11-06 | End: 2018-08-01 | Stop reason: HOSPADM

## 2017-11-06 RX ORDER — VALSARTAN 320 MG/1
320 TABLET ORAL DAILY
Qty: 90 TABLET | Refills: 2 | Status: SHIPPED | OUTPATIENT
Start: 2017-11-06 | End: 2017-11-07 | Stop reason: SDUPTHER

## 2017-11-06 ASSESSMENT — ENCOUNTER SYMPTOMS
RESPIRATORY NEGATIVE: 1
GASTROINTESTINAL NEGATIVE: 1

## 2017-11-06 NOTE — PATIENT INSTRUCTIONS
Assessment:   1. Anemia, chronic disease   Aranesp 150 mcg every other week, repeat Hgb in one month   2. CKD (chronic kidney disease), stage IV ([facility] [address]     3. Hypertensive nephropathy   Change Valsartan/HCTZ to Valsartan 320 mg daily   4. Secondary hyperparathyroidism of renal origin ([facility] [address]   Renew Rocaltrol once weekly       Discussed with the patient and questioned answered. Patient advised to make early appointment if needed and to call office for questions, concerns, persistent, changing or worsening symptoms. Next appointment in this department:  Follow Up at [facility] in 6months    DAVE Edward    Chronic Kidney Disease: What you Need to Know    Know your kidney numbers. Your kidney numbers including GFR (glomerular filtration rate). GFR is a measure of kidney function and is performed through a blood test. Your GFR will determine what stage of kidney disease you have - there are 5 stages. Know your stage. ACR is a urine test to see how much albumin (a type of protein) is in your urine. Too much albumin in your urine is an early sign of kidney damage. Get your blood pressure checked. Blood pressure checks are important since high blood pressure can damage the kidneys. Know what numbers are considered acceptable for your condition and work with your health care professionals to take steps that will keep you in that range. Talk to your doctor about medication dosage and imaging tests. Many prescription and over-the-counter medications are filtered by the kidneys. This means that normal kidneys remove medications from the body. When your kidneys aren't working properly, medications can build up and cause you harm. Its important to let ALL your healthcare providers know if you have kidney disease. You doctors should review all medications your taking - both over the counter and ones prescribed by a doctor.  Your healthcare providers may need to make changes to your medications to make sure they are safe for your kidneys and prevent further kidney damage. Exposure to intravenous contrast dyes used in imaging (MRI, CT or angiograms) can cause kidney damage, so be sure to speak with your doctor before scheduling one of these tests. Talk to a dietitian. Eating a proper diet is essential for those with any stage of kidney disease. Across the board, cutting down on sodium is an important recommendation, but the kidney diet is very individualized. Be sure to make an appointment with a renal dietitian (one who specializes in kidney disease) immediately to receive your personalized diet plan. Medicare covers dietitian services for those with eGFR less than 50 as well as for those with diabetes. Plan to see the kidney specialist.   Most experts agree that you should see a kidney specialist, called a nephrologist, when your eGFR (estimated glomerular filtration rate--a measure of kidney function) is less than 30. Understand the kidney-heart connection. The kidney and heart are connected as kidney disease is a risk factor for heart disease and vice versa. Once you have a kidney disease diagnosis, ask the clinician thats treating you what you can do to lower your risk of heart disease or treat it if you already have it. 7. Have your blood cholesterol levels checked regularly. Stop smoking. In addition to causing lung cancer and lung disease, smoking is also associated with kidney disease, kidney cancer and bladder cancer. Smoking slows the blood flow to vital organs like the kidneys, causing damage. Think of smoking as stepping on the accelerator for any disease that you may have. So if you have kidney disease, smoking can make it even worse. Exercise. Exercise helps you maintain a healthy weight and stay active. So keep moving! Do what you are able to do and talk with your doctor about any health-related limitations you may have.      If you are diagnosed with stage 4 CKD then its important to also:   Learn about treatment options for kidney failure. If youre in the late stages of kidney disease (stage 4 or 5), ask your kidney doctor or advanced practitioner about the different types of treatment options for kidney failure so you can choose the one that best suits your health and lifestyle. Options include: transplant, dialysis, and no treatment. There are different types of dialysis treatments to learn about as well including, in-center hemodialysis, peritoneal dialysis, home hemodialysis, and nocturnal dialysis. Create an access to your bloodstream.   If youre planning to start hemodialysis, youll need to prepare by having surgery to create an access to your veins. Be sure to avoid needle sticks in that arm. Once you have your access placed there will be additional precautions you should take to keep your access working well. Do you have more questions? Call [employer] our patient information help line toll free at [phone]. iWatt ([phone]) or email [email or web address]. We can also send you a free copy of any of our patient brochures on these topics and more. HEMODIALYSIS    Healthy kidneys clean your blood and remove extra fluid in the form of urine. They also make substances that keep your body healthy. Dialysis replaces some of these functions when your kidneys no longer work. There are two different types of dialysis - hemodialysis and peritoneal dialysis. The following is about hemodialysis. When is dialysis needed? You need dialysis if your kidneys no longer remove enough wastes and fluid from your blood to keep you healthy. This usually happens when you have only 10 to 15 percent of your kidney function left. You may have symptoms such as nausea, vomiting, swelling and fatigue. However, even if you don't have these symptoms yet, you can still have a high level of wastes in your blood that may be toxic to your body. medication to help prevent low blood pressure during dialysis. You can help yourself by following your diet and fluid allowances. The need to remove too much fluid during dialysis is one of the things that may make you feel uncomfortable during your treatment. How will I pay for my dialysis? Dialysis is expensive. However, the federal government's Medicare program pays 80 percent of all dialysis costs for most patients. Private health insurance or state medical aid may also help with the costs. I have heard I might have to reuse my dialyzer each treatment. Is this safe? Before you reuse your dialyzer, your dialysis center cleans it according to careful guidelines. If done properly, reuse is generally safe. Before each treatment, your dialyzer must be tested to make sure it is still working well. If your dialyzer no longer works well, it should be discarded and you should be given a new one. Ask your dialysis care team if they have tested your dialyzer and if it still works well. If you do not wish to reuse your dialyzer, your center may be willing to provide you with a new dialyzer for each treatment. Ask about the center's policy on reuse. Can dialysis patients travel? Yes. Dialysis centers are located in every part of the [place] and in many foreign countries. Before you travel, you must make an appointment for dialysis treatments at another center. The staff at your center may be able to help you arrange this appointment. Can dialysis patients continue to work? Yes. Many dialysis patients continue to work or return to work after they have gotten used to dialysis. If your job has a lot of physical labor (heavy lifting, digging, etc.), you may need to change your duties. PERITONEAL DIALYSIS    Before dialysis was available, total kidney failure meant death. Today, people with kidney failure can live because of treatments such as dialysis and kidney transplant.     What is dialysis? Dialysis is a way of cleaning your blood when your kidneys can no longer do the job. It gets rid of your body's wastes, extra salt and water, and helps to control your blood pressure. Are there different types of dialysis? There are two kinds of dialysis. In hemodialysis, blood is pumped out of your body to an artificial kidney machine, and returned to your body by tubes that connect you to the machine. In peritoneal dialysis, the inside lining of your own belly acts as a natural filter. Wastes are taken out by means of a cleansing fluid called dialysate, which is washed in and out of your belly in cycles. How does peritoneal dialysis work? A soft plastic tube (catheter) is placed in your belly by surgery. A sterile cleansing fluid is put into your belly through this catheter. After the filtering process is finished, the fluid leaves your body through the catheter. There are two kinds of peritoneal dialysis:  Continuous Ambulatory Peritoneal Dialysis (CAPD)  Automated Peritoneal Dialysis (APD)    The basic treatment is the same for each. However, the number of treatments and the way the treatments are done make each method different. CAPD is \"continuous,\" machine-free and done while you go about your normal activities such as work or school. You do the treatment by placing about two quarts of cleansing fluid into your belly and later draining it. This is done by hooking up a plastic bag of cleansing fluid to the tube in your belly. Raising the plastic bag to shoulder level causes gravity to pull the fluid into your belly. When empty, the plastic bag is removed and thrown away. When an exchange (putting in and taking out the fluid) is finished, the fluid (which now has wastes removed from your blood) is drained from your belly and thrown away. This process usually is done three, four or five times in a 24-hour period while you are awake during normal activities.  Each exchange takes about 30 to pre-emptive or early transplant, with little or no time spent on dialysis, can lead to better long-term health. It may also allow you to keep working, save time and money, and have a better quality of life. Who can get a kidney transplant? Kidney patients of all ages--from children to seniors--can get a transplant. You must be healthy enough to have the operation. You must also be free from cancer and infection. Every person being considered for transplant will get a full medical and psychosocial evaluation to make sure they are a good candidate for transplant. The evaluation helps find any problems, so they can be corrected before transplant. For most people, getting a transplant can be a good treatment choice. What if Im older or have other health problems? In many cases, people who are older or have other health conditions like diabetes can still have successful kidney transplants. Careful evaluation is needed to understand and deal with any special risks. You may be asked to do some things that can lessen certain risks and improve the chances of a successful transplant. For example, you may be asked to lose weight or quit smoking. If you have diabetes, you may also be able to have a pancreas transplant. Ask your healthcare professional about getting a pancreas transplant along with a kidney transplant. How will I pay for a transplant? Medicare covers about 80% of the costs associated with an evaluation, transplant operation, follow-up care, and anti-rejection medicines. Private insurers and state programs may cover some costs as well. However, your post-transplant expenses may only be covered for a limited number of years. Its important to discuss coverage with your , who can answer your questions or direct you to others who can help. Getting a Transplant    How do I start the process of getting a kidney transplant?   Ask your healthcare provider to refer you to a transplant new kidney in your abdomen also makes it easier to take care of any problems that might come up. The operation takes about four hours. Youll be sore at first, but you should be out of bed in a day or so, and home within a week. If the kidney came from a living donor, it should start to work very quickly. A kidney from a  donor can take longer to start working--two to four weeks or more. If that happens, you may need dialysis until the kidney begins to work. After surgery, youll be taught about the medicines youll have to take and their side effects. [place] also learn about diet. If youve been on dialysis, youll find that there are fewer restrictions on what you can eat and drink, which is one of the benefits of a transplant. What are anti-rejection medicines? Normally, your body fights off anything that isnt part of itself, like germs and viruses. That system of protection is called your immune system. To stop your body from attacking or rejecting the donated kidney, you will have to take medicines to keep your immune system less active (called anti-rejection medicines or immunosuppressant medicines). Youll need to take them as long as your new kidney is working. Without them, your immune system would see the donated kidney as foreign, and would attack and destroy it. Anti-rejection medicines can have some side effects. It is important to talk to your healthcare provider about them, so that you know what to expect. Fortunately, for most people, side effects are usually manageable. Changing the dose or type of medicine can often ease some of the side effects. Besides the immunosuppressive medicines, you will take other medicines as well. You will take medicines to protect you from infection, too. Most people find taking medicines a small trade for the freedom and quality of life that a successful transplant can provide. After Your Transplant    What happens after I go home?   Once you return to work? How soon you can return to work depends on your recovery, the kind of work you do, and your other medical conditions. Many people can return to work eight weeks or more after their transplant. Your transplant team will help you decide when you can go back to work. Will my sex life be affected? People who have not had satisfactory sexual relations due to kidney disease may notice an improvement as they begin to feel better. In addition, fertility (the ability to conceive children) tends to increase. Men who have had a kidney transplant have fathered healthy children, and women with kidney transplants have had successful pregnancies. Its best to talk to your healthcare practitioner when considering having a child. Women should avoid becoming pregnant too soon after a transplant. Most centers want women to wait a year or more. All pregnancies must be planned. Certain medications that can harm a developing baby must be stopped six weeks before trying to get pregnant. Birth control counseling may be helpful. Its important to protect yourself against sexually transmitted diseases (STDs). Be sure to use protection during sexual activity. Will I need to follow a special diet? In general, transplant recipients should eat a heart-healthy diet (low fat, low salt) and drink plenty of fluids. If you have diabetes or other health problems, you may still have some dietary restrictions. A dietitian can help you plan meals that are right for you. Finding a Kidney    Where do donated kidneys come from? A donated kidney may come from someone who  and donated a healthy kidney. A person who has  and donated a kidney is called a  donor. Donated kidneys also can come from a living donor. This person may be a blood relative (like a brother or sister) or non-blood relative (like a  or wife). They can also come from a friend or even a stranger.    When a kidney is donated by a measures to make you more comfortable and lessen your anxiety. The fluid build-up can make it more difficult for you to breathe. Your doctor can prescribe medication or a treatment called ultrafiltration to remove fluid and make breathing easier for you. Your doctor may also recommend that you limit your intake of salt and fluids to reduce fluid weight gain. Is deciding not to start dialysis considered suicide? Many religions believe people have the right to refuse medical treatment, including dialysis, if they feel it will not help them and will be burdensome. You may wish to speak with your Gnosticism or  if you have concerns about this. What type of food and drink could I have? Typically, there is no reason for you to continue to follow your kidney diet once you choose end-of-life care. In fact, favorite foods and beverages are usually encouraged. Your doctor, nurse, and dietitian can answer other specific questions you may have about food and fluid intake. If I make this decision, will my doctor continue to help me? Yes. Your doctor should be available to you and your family to discuss your concerns and advise you about the type of care you might need. Your doctor should work with members of your healthcare team to arrange the necessary referrals or consultations for your circumstances. Other professionals, such as the  or nurse who has been part of your healthcare team should also remain available to you. Do I have a choice of where I die? Your wishes about where you want to die will be honored as much as possible. Many people choose to die at home, where they feel more comfortable in familiar surroundings. If you choose this option, a hospice or home health agency may assist you and your loved ones in making any special arrangements for your care at home. A nursing home may be an option for some people.  Admission to the hospital may not be a consideration, depending on your insurance coverage and overall medical condition. Could I get hospice care? If you choose not to start dialysis, you require end-of-life care and you are eligible for hospice services. The type of hospice care available may be either a home hospice program or a hospice facility. A , a visiting nurse service or home care agency may be able to help you and your loved ones make arrangements for hospice care. Hospice services usually include some visits from a nurse, a home health aide, and a . For more information about hospice programs in your community, speak with your doctor, nurse, or . You may also be able to get information from your [employer] community, or from friends and neighbors who have had experience with hospice care. If you are a senior citizen, you can contact a local senior center or the local or state Office on Aging. If I choose to die at home, can I get a home healthcare worker to help my family care for me? The types of services covered at home will depend on your insurance. If you are in a home hospice program, a home health aide may be available to assist. If your insurance does not cover a home health aide, and you and your loved ones wish to pay privately for these services, you can do so. There may also be government programs to help with costs. Ask your doctor, , and community or government agencies about available and affordable home services. Will I still be covered by Medicare and/or my private medical insurance if I stop treatment? Your Medicare coverage will not end, even if you decide to stop dialysis. It is important that you and your family speak with your doctor about the type of care you will need. Once this is decided, you can check on whether or not Medicare and/or private insurance will cover this care. If I change my mind, can I go back on dialysis?   You may start or return to dialysis if

## 2017-11-06 NOTE — PROGRESS NOTES
Chronic Care Management Initial Visit    Patient Consent for Chronic Care Management Signed: Date: 11/6/2017    Primary Nephrologist: Dr Katherine German is a 59 y. o.oldfemale came for follow up for Chronic Care Management for kidney disease,   Marlenishira Prasad most recent   Lab Results   Component Value Date    CREATININE 2.3 07/05/2017    LABGLOM 21 07/05/2017   . VITALS:  BP (!) 164/92 (Site: Left Arm, Position: Sitting, Cuff Size: Medium Adult)   Pulse 67   Wt 205 lb 8 oz (93.2 kg)   SpO2 99%   BMI 34.20 kg/m²   Body mass index is 34.2 kg/m². REVIEW OF SYSTEMS:   Review of Systems   Constitutional: Negative for weight loss. HENT: Negative. Respiratory: Negative. Cardiovascular: Negative. Gastrointestinal: Negative. Genitourinary: Negative. Musculoskeletal: Positive for myalgias. Skin: Negative. Neurological: Negative. Psychiatric/Behavioral: Negative. Physical Exam:  Physical Exam   Constitutional: She is oriented to person, place, and time. She appears well-developed and well-nourished. HENT:   Head: Normocephalic and atraumatic. Eyes: EOM are normal.   Neck: Neck supple. Cardiovascular: Normal rate. Pulmonary/Chest: Effort normal and breath sounds normal.   Abdominal: Soft. Musculoskeletal: She exhibits no edema or tenderness. Neurological: She is alert and oriented to person, place, and time. Skin: Skin is warm and dry. Psychiatric: She has a normal mood and affect. Home Medication reviewed    Diagnostic Data:    Labs reviewed and discussed with pt  BMP:  Lab Results   Component Value Date     07/05/2017    K 3.6 07/05/2017    CL 99 07/05/2017    CO2 25 07/05/2017    BUN 46 07/05/2017    CREATININE 2.3 07/05/2017    LABGLOM 21 07/05/2017    GLUCOSE 103 01/18/2017    GLUCOSE 252 03/14/2012       Anemia: Goal hgb 10 g/dL  Education provided regarding the mechanism behind anemia associated with Chronic Kidney Disease.    Lab symptoms.     Next appointment in this department:  Follow Up [facility] in 6 months    Randolph Cloud, Caridad Lyle.

## 2017-11-07 RX ORDER — VALSARTAN 320 MG/1
320 TABLET ORAL DAILY
Qty: 90 TABLET | Refills: 2 | Status: SHIPPED | OUTPATIENT
Start: 2017-11-07 | End: 2018-02-08 | Stop reason: SINTOL

## 2017-11-07 NOTE — TELEPHONE ENCOUNTER
Kiera Luciano called stating that this medication needs to [facility] to [facility] and that she cancelled the one that was sent to [facility] on 11/06/2017  Please approve or refuse Rx request:  Requested Prescriptions     Pending Prescriptions Disp Refills    valsartan (DIOVAN) 320 MG tablet 90 tablet 2     Sig: Take 1 tablet by mouth daily       Next appointment:  2/8/2018

## 2017-11-16 ENCOUNTER — HOSPITAL ENCOUNTER (OUTPATIENT)
Dept: NURSING | Age: 62
Discharge: HOME | End: 2017-11-16
Payer: MEDICARE

## 2017-11-16 VITALS
SYSTOLIC BLOOD PRESSURE: 154 MMHG | HEART RATE: 72 BPM | DIASTOLIC BLOOD PRESSURE: 80 MMHG | RESPIRATION RATE: 16 BRPM | TEMPERATURE: 97.2 F

## 2017-11-16 PROCEDURE — 96372 THER/PROPH/DIAG INJ SC/IM: CPT

## 2017-11-16 PROCEDURE — 6360000002 HC RX W HCPCS: Performed by: NURSE PRACTITIONER

## 2017-11-16 RX ADMIN — DARBEPOETIN ALFA 150 MCG: 150 INJECTION, SOLUTION INTRAVENOUS; SUBCUTANEOUS at 11:36

## 2017-11-16 ASSESSMENT — PAIN DESCRIPTION - DESCRIPTORS: DESCRIPTORS: ACHING;CONSTANT

## 2017-11-16 ASSESSMENT — PAIN - FUNCTIONAL ASSESSMENT: PAIN_FUNCTIONAL_ASSESSMENT: 0-10

## 2017-11-16 NOTE — PROGRESS NOTES
11:17 AM PT ADMITTED TO [facility] FOR ARANESP INJECTION  11:18 AM PATIENT RIGHTS AND RESPONSIBILITIES SHEET OFFERED TO PT TO READ.  11:38 AM PT TOLERATED INJECTION WELL  11:38 AM WRITTEN DISCHARGE INSTRUCTIONS GIVEN TO PT-VERBALIZES UNDERSTANDING  11:38 AM PT DISCHARGED AMBULATORY IN SATISFACTORY CONDITION.    __M__ Safety:       (Environmental)   [place] to environment   Ensure ID band is correct and in place/ allergy band as needed   Assess for fall risk   Initiate fall precautions as applicable (fall band, side rails, etc.)   Call light within reach   Bed in low position/ wheels locked    _M___ Pain:        Assess pain level and characteristics   Administer analgesics as ordered   Assess effectiveness of pain management and report to MD as needed    _M___ Knowledge Deficit:   Assess baseline knowledge   Provide teaching at level of understanding   Provide teaching via preferred learning method   Evaluate teaching effectiveness

## 2017-11-22 ENCOUNTER — OFFICE VISIT (OUTPATIENT)
Dept: PHYSICAL MEDICINE AND REHAB | Age: 62
End: 2017-11-22
Payer: MEDICARE

## 2017-11-22 VITALS
HEART RATE: 67 BPM | BODY MASS INDEX: 34.49 KG/M2 | HEIGHT: 65 IN | DIASTOLIC BLOOD PRESSURE: 77 MMHG | WEIGHT: 207 LBS | SYSTOLIC BLOOD PRESSURE: 154 MMHG

## 2017-11-22 DIAGNOSIS — E11.42 DIABETIC POLYNEUROPATHY ASSOCIATED WITH TYPE 2 DIABETES MELLITUS (HCC): Primary | ICD-10-CM

## 2017-11-22 DIAGNOSIS — M47.816 OSTEOARTHRITIS OF LUMBAR SPINE, UNSPECIFIED SPINAL OSTEOARTHRITIS COMPLICATION STATUS: ICD-10-CM

## 2017-11-22 DIAGNOSIS — M25.571 CHRONIC PAIN OF RIGHT ANKLE: ICD-10-CM

## 2017-11-22 DIAGNOSIS — G89.29 CHRONIC PAIN OF RIGHT ANKLE: ICD-10-CM

## 2017-11-22 PROCEDURE — 3014F SCREEN MAMMO DOC REV: CPT | Performed by: NURSE PRACTITIONER

## 2017-11-22 PROCEDURE — 99213 OFFICE O/P EST LOW 20 MIN: CPT | Performed by: NURSE PRACTITIONER

## 2017-11-22 PROCEDURE — G8598 ASA/ANTIPLAT THER USED: HCPCS | Performed by: NURSE PRACTITIONER

## 2017-11-22 PROCEDURE — 1036F TOBACCO NON-USER: CPT | Performed by: NURSE PRACTITIONER

## 2017-11-22 PROCEDURE — 3017F COLORECTAL CA SCREEN DOC REV: CPT | Performed by: NURSE PRACTITIONER

## 2017-11-22 PROCEDURE — 3044F HG A1C LEVEL LT 7.0%: CPT | Performed by: NURSE PRACTITIONER

## 2017-11-22 PROCEDURE — G8417 CALC BMI ABV UP PARAM F/U: HCPCS | Performed by: NURSE PRACTITIONER

## 2017-11-22 PROCEDURE — G8427 DOCREV CUR MEDS BY ELIG CLIN: HCPCS | Performed by: NURSE PRACTITIONER

## 2017-11-22 PROCEDURE — G8484 FLU IMMUNIZE NO ADMIN: HCPCS | Performed by: NURSE PRACTITIONER

## 2017-11-22 RX ORDER — HYDROCODONE BITARTRATE AND ACETAMINOPHEN 5; 325 MG/1; MG/1
1 TABLET ORAL EVERY 8 HOURS PRN
Qty: 90 TABLET | Refills: 0 | Status: SHIPPED | OUTPATIENT
Start: 2017-11-22 | End: 2018-01-31 | Stop reason: SDUPTHER

## 2017-11-22 RX ORDER — HYDROCODONE BITARTRATE AND ACETAMINOPHEN 5; 325 MG/1; MG/1
1 TABLET ORAL EVERY 8 HOURS PRN
Qty: 90 TABLET | Refills: 0 | Status: SHIPPED | OUTPATIENT
Start: 2017-11-22 | End: 2018-02-22 | Stop reason: SDUPTHER

## 2017-11-24 DIAGNOSIS — I10 ESSENTIAL HYPERTENSION: ICD-10-CM

## 2017-11-29 RX ORDER — CYANOCOBALAMIN/FOLIC AC/VIT B6 1-2.5-25MG
TABLET ORAL
Qty: 30 TABLET | Refills: 5 | Status: SHIPPED | OUTPATIENT
Start: 2017-11-29 | End: 2018-05-27 | Stop reason: SDUPTHER

## 2017-12-02 ENCOUNTER — HOSPITAL ENCOUNTER (OUTPATIENT)
Age: 62
Discharge: HOME | End: 2017-12-02
Payer: MEDICARE

## 2017-12-02 LAB
HCT VFR BLD CALC: 29.8 % (ref 37–47)
HEMOGLOBIN: 9.4 GM/DL (ref 12–16)

## 2017-12-02 PROCEDURE — 85014 HEMATOCRIT: CPT

## 2017-12-02 PROCEDURE — 36415 COLL VENOUS BLD VENIPUNCTURE: CPT

## 2017-12-02 PROCEDURE — 85018 HEMOGLOBIN: CPT

## 2017-12-03 ENCOUNTER — TELEPHONE (OUTPATIENT)
Dept: NEPHROLOGY | Age: 62
End: 2017-12-03

## 2017-12-03 DIAGNOSIS — D63.8 ANEMIA, CHRONIC DISEASE: Primary | ICD-10-CM

## 2017-12-03 DIAGNOSIS — D64.9 ANEMIA, UNSPECIFIED TYPE: ICD-10-CM

## 2017-12-03 DIAGNOSIS — N18.30 CKD (CHRONIC KIDNEY DISEASE), STAGE III (HCC): ICD-10-CM

## 2017-12-05 ENCOUNTER — HOSPITAL ENCOUNTER (OUTPATIENT)
Dept: NURSING | Age: 62
Discharge: HOME | End: 2017-12-05
Payer: MEDICARE

## 2017-12-05 VITALS
HEART RATE: 63 BPM | DIASTOLIC BLOOD PRESSURE: 84 MMHG | BODY MASS INDEX: 34.61 KG/M2 | RESPIRATION RATE: 16 BRPM | OXYGEN SATURATION: 99 % | TEMPERATURE: 97.9 F | SYSTOLIC BLOOD PRESSURE: 179 MMHG | WEIGHT: 208 LBS

## 2017-12-05 PROCEDURE — 96372 THER/PROPH/DIAG INJ SC/IM: CPT

## 2017-12-05 PROCEDURE — 6360000002 HC RX W HCPCS: Performed by: NURSE PRACTITIONER

## 2017-12-05 RX ADMIN — DARBEPOETIN ALFA 150 MCG: 150 INJECTION, SOLUTION INTRAVENOUS; SUBCUTANEOUS at 10:43

## 2017-12-05 NOTE — PROGRESS NOTES
[id number] Alert female admitted for aranesp injection procedure reviewed with pt verbalized understanding. Pt rights and responsibilities offered to pt to read. 1045 Tolerated injection well. Home instructions to pt verbalized understanding. Gait steady. 1050 Discharged ambulatory stable home.         ___met_ Safety:       (Environmental)   [place] to environment   Ensure ID band is correct and in place/ allergy band as needed   Assess for fall risk   Initiate fall precautions as applicable (fall band, side rails, etc.)   Call light within reach   Bed in low position/ wheels locked    _met___ Pain:        Assess pain level and characteristics   Administer analgesics as ordered   Assess effectiveness of pain management and report to MD as needed    ___met_ Knowledge Deficit:   Assess baseline knowledge   Provide teaching at level of understanding   Provide teaching via preferred learning method   Evaluate teaching effectiveness    __

## 2017-12-19 ENCOUNTER — HOSPITAL ENCOUNTER (OUTPATIENT)
Dept: NURSING | Age: 62
Discharge: HOME | End: 2017-12-19
Payer: MEDICARE

## 2017-12-19 VITALS
OXYGEN SATURATION: 100 % | DIASTOLIC BLOOD PRESSURE: 84 MMHG | SYSTOLIC BLOOD PRESSURE: 174 MMHG | HEART RATE: 65 BPM | TEMPERATURE: 97.6 F | RESPIRATION RATE: 20 BRPM

## 2017-12-19 PROCEDURE — 6360000002 HC RX W HCPCS: Performed by: NURSE PRACTITIONER

## 2017-12-19 PROCEDURE — 96372 THER/PROPH/DIAG INJ SC/IM: CPT

## 2017-12-19 RX ADMIN — DARBEPOETIN ALFA 150 MCG: 150 INJECTION, SOLUTION INTRAVENOUS; SUBCUTANEOUS at 11:53

## 2017-12-19 NOTE — PROGRESS NOTES
1140:  ARRIVES TO [facility] FOR ARANESP INJECTION. PROCESS REVIEWED AND PT RIGHTS AND RESPONSIBILITIES OFFERED TO PT.  1155:  PT TOLERATED INJECTION WELL.   PT DISCHARGED AMBULATORY WITH INSTRUCTIONS.    _M___ Safety:       (Environmental)   [place] to environment   Ensure ID band is correct and in place/ allergy band as needed   Assess for fall risk   Initiate fall precautions as applicable (fall band, side rails, etc.)   Call light within reach   Bed in low position/ wheels locked    _M___ Pain:        Assess pain level and characteristics   Administer analgesics as ordered   Assess effectiveness of pain management and report to MD as needed    _M___ Knowledge Deficit:   Assess baseline knowledge   Provide teaching at level of understanding   Provide teaching via preferred learning method   Evaluate teaching effectiveness    M____ Hemodynamic/Respiratory Status:       (Pre and Post Procedure Monitoring)   Assess/Monitor vital signs and LOC   Assess Baseline SpO2 prior to any sedation   Obtain weight/height   Assess vital signs/ LOC until patient meets discharge criteria   Monitor procedure site and notify MD of any issues    _

## 2017-12-27 ENCOUNTER — OFFICE VISIT (OUTPATIENT)
Dept: PULMONOLOGY | Age: 62
End: 2017-12-27
Payer: MEDICARE

## 2017-12-27 VITALS
SYSTOLIC BLOOD PRESSURE: 138 MMHG | WEIGHT: 202 LBS | OXYGEN SATURATION: 95 % | HEART RATE: 67 BPM | RESPIRATION RATE: 14 BRPM | HEIGHT: 65 IN | DIASTOLIC BLOOD PRESSURE: 76 MMHG | BODY MASS INDEX: 33.66 KG/M2

## 2017-12-27 DIAGNOSIS — G47.33 OBSTRUCTIVE SLEEP APNEA ON CPAP: Primary | ICD-10-CM

## 2017-12-27 DIAGNOSIS — R93.89 ABNORMAL CHEST X-RAY: ICD-10-CM

## 2017-12-27 DIAGNOSIS — J30.89 OTHER ALLERGIC RHINITIS: ICD-10-CM

## 2017-12-27 DIAGNOSIS — J44.9 COPD, MILD (HCC): ICD-10-CM

## 2017-12-27 DIAGNOSIS — Z99.89 OBSTRUCTIVE SLEEP APNEA ON CPAP: Primary | ICD-10-CM

## 2017-12-27 PROCEDURE — 99213 OFFICE O/P EST LOW 20 MIN: CPT | Performed by: PHYSICIAN ASSISTANT

## 2017-12-27 PROCEDURE — G8427 DOCREV CUR MEDS BY ELIG CLIN: HCPCS | Performed by: PHYSICIAN ASSISTANT

## 2017-12-27 PROCEDURE — 3014F SCREEN MAMMO DOC REV: CPT | Performed by: PHYSICIAN ASSISTANT

## 2017-12-27 PROCEDURE — 1036F TOBACCO NON-USER: CPT | Performed by: PHYSICIAN ASSISTANT

## 2017-12-27 PROCEDURE — G8926 SPIRO NO PERF OR DOC: HCPCS | Performed by: PHYSICIAN ASSISTANT

## 2017-12-27 PROCEDURE — G8598 ASA/ANTIPLAT THER USED: HCPCS | Performed by: PHYSICIAN ASSISTANT

## 2017-12-27 PROCEDURE — 3017F COLORECTAL CA SCREEN DOC REV: CPT | Performed by: PHYSICIAN ASSISTANT

## 2017-12-27 PROCEDURE — 3023F SPIROM DOC REV: CPT | Performed by: PHYSICIAN ASSISTANT

## 2017-12-27 PROCEDURE — G8417 CALC BMI ABV UP PARAM F/U: HCPCS | Performed by: PHYSICIAN ASSISTANT

## 2017-12-27 PROCEDURE — G8484 FLU IMMUNIZE NO ADMIN: HCPCS | Performed by: PHYSICIAN ASSISTANT

## 2017-12-27 RX ORDER — ALBUTEROL SULFATE 90 UG/1
2 AEROSOL, METERED RESPIRATORY (INHALATION) EVERY 6 HOURS PRN
Qty: 3 INHALER | Refills: 3 | Status: SHIPPED | OUTPATIENT
Start: 2017-12-27 | End: 2019-08-14

## 2017-12-27 NOTE — PROGRESS NOTES
[facility]      Jaylan Sri                                         [id number]  12/27/2017   Chief Complaint   Patient presents with    Sleep Apnea     CONTRERAS on CPAP- 18 mth f/u with D/L. Needs refill on Albuterol HFA, script was never sent at last office visit. Pt of Dr. Luz Clancy    PAP Download:   Original or initial  AHI: 6.0     Date of initial study: 9/28/09    [] Compliant  36.7%   [x]  Noncompliant 63.3 %     PAP Type CPAP   Level  7.0 cmH2O   Avg Hrs/Day 4:43:5  AHI: 0.6   Recorded compliance dates , 11/27/17 to 12/26/17  Machine/Mfg: Respironics Interface: FFM    Provider:  [x]-NAS  []Cecilio  []Ladarius  []Guerrero         [][facility] []Other:   Neck Size: 16.25  Mallampati 4  ESS:  9    Here is a scan of the most recent download:            Presentation:   Derrek Duran presents for sleep medicine follow up for obstructive sleep apnea  Since the last visit, Derrek Duran is doing reasonably well with their sleep machine. Other comments: She tends to fall asleep in the recliner and gets to bed only for a few hours and does not get her 4 hours on PAP. She is having increased dry mouth. She is also having LE edema    Equipment issues: The pressure is  acceptable, the mask is acceptable and she  is  using the humidity. Sleep issues:  Do you feel better? No  More rested? No   Better concentration? no    Progress History:   Since last visit any new medical issues? Yes CHF, CKD  New ER or hospitlal visits? No  Any new or changes in medicines? No  Any new sleep medicines?  No        Past Medical History:   Diagnosis Date    Anemia associated with chronic renal failure     Aranesp 150 micrograms every other week given at [facility]    Anxiety     Arthritis     Backache     Blood transfusion     CAD (coronary artery disease)     Cellulitis in diabetic foot ([facility] [address] 03/03/2017    4th and 5th toes right foot    Chest pain     History of    CHF (FOLBEE) 2.5-25-1 MG TABS tablet Take 1 tablet by mouth daily 90 tablet 1    HYDROcodone-acetaminophen (NORCO) 5-325 MG per tablet Take 1 tablet by mouth every 8 hours as needed for Pain .  90 tablet 0    HYDROcodone-acetaminophen (NORCO) 5-325 MG per tablet Take 1 tablet by mouth every 8 hours as needed for Pain  Fill on/after 12/22/2017. 90 tablet 0    HYDROcodone-acetaminophen (NORCO) 5-325 MG per tablet Take 1 tablet by mouth every 8 hours as needed for Pain  Fill on/after 1/21/2017. 90 tablet 0    valsartan (DIOVAN) 320 MG tablet Take 1 tablet by mouth daily 90 tablet 2    calcitRIOL (ROCALTROL) 0.25 MCG capsule Take 1 capsule by mouth once a week Monday 30 capsule 1    [facility] GENTLE LAXATIVE 5 MG EC tablet TAKE 1 TABLET BY MOUTH DAILY AS NEEDED FOR CONSTIPATION 30 tablet 4    hydrALAZINE (APRESOLINE) 100 MG tablet Take 1 tablet by mouth 3 times daily 90 tablet 0    insulin aspart (NOVOLOG FLEXPEN) 100 UNIT/ML injection pen 12 to 24 units 3 times daily per sliding scale  Dx:E11.9  Z79.4 20 Pen 3    insulin glargine (LANTUS SOLOSTAR) 100 UNIT/ML injection pen Inject 35 Units into the skin 2 times daily 25 Pen 5    ALPRAZolam (XANAX) 1 MG tablet 1/2 tab every 8 hours as needed 40 tablet 5    amLODIPine-atorvastatatin (CADUET) 10-80 MG per tablet TAKE 1 TABLET nightly 90 tablet 3    allopurinol (ZYLOPRIM) 100 MG tablet TAKE 2 TABLETS DAILY 180 tablet 3    cloNIDine (CATAPRES) 0.3 MG tablet Take 1 tablet by mouth every 8 hours One tablet three times per day 270 tablet 4    metolazone (ZAROXOLYN) 5 MG tablet Take 1 tablet by mouth as needed (3 lbs wt gain, increased edema, Shortness of breath) for 3 lbs wt gain, increased edema and/or Shortness of breath 30 tablet 1    Insulin Pen Needle (B-D ULTRAFINE III SHORT PEN) 31G X 8 MM MISC Use three times daily Diagnosis Code E11.9 200 each 3    carvedilol (COREG) 25 MG tablet TAKE 1 TABLET TWICE A  tablet 3    nitroGLYCERIN (NITROSTAT) 0.4 MG SL tablet Place 1 tablet under the tongue every 5 minutes as needed for Chest pain 25 tablet 5    bumetanide (BUMEX) 1 MG tablet Take 1 tablet by mouth daily (Patient taking differently: Take 1 mg by mouth daily noon) 30 tablet 11    potassium chloride (KLOR-CON M) 10 MEQ extended release tablet Take 1 tablet by mouth daily (takes only with Bumex) (Patient taking differently: Take 10 mEq by mouth daily (takes only with Bumex) noon) 30 tablet 11    lidocaine (XYLOCAINE) 5 % ointment Apply topically as needed to low back and lower limbs three times a day as needed. 3 Tube 5    vitamin D (CHOLECALCIFEROL) 5000 UNITS CAPS capsule Take 5,000 Units by mouth daily noon      fluticasone (FLONASE) 50 MCG/ACT nasal spray 2 sprays by Nasal route daily 3 Bottle 3    albuterol sulfate HFA (PROAIR HFA) 108 (90 BASE) MCG/ACT inhaler Inhale 2 puffs into the lungs every 6 hours as needed for Wheezing 3 Inhaler 3    Insulin Pen Needle 31G X 8 MM MISC 1 each by Does not apply route daily.  Polyethylene Glycol 3350 (MIRALAX PO) Take  by mouth as needed.  aspirin 81 MG EC tablet Take 81 mg by mouth daily. No current facility-administered medications for this visit.         Family History   Problem Relation Age of Onset    Diabetes Sister     Diabetes Brother     Diabetes Sister     Diabetes Sister     Cancer Sister     Early Death Sister     Heart Disease Sister     Diabetes Sister     Heart Disease Sister     Diabetes Sister     Diabetes Brother     Arthritis Mother     Heart Disease Mother     High Blood Pressure Mother     Kidney Disease Mother     Mental Illness Mother     Stroke Mother     Heart Disease Father     Diabetes Father     Obesity Father     Alcohol Abuse Father         Review of Systems -   General ROS: lost 5 lbs over 1 month  ENT ROS: negative for - nasal congestion, oral lesions or sore throat  Hematological and Lymphatic ROS: negative  Endocrine ROS: negative  Respiratory ROS: no

## 2018-01-02 RX ORDER — HYDRALAZINE HYDROCHLORIDE 100 MG/1
TABLET, FILM COATED ORAL
Qty: 270 TABLET | Refills: 3 | Status: SHIPPED | OUTPATIENT
Start: 2018-01-02 | End: 2018-01-31 | Stop reason: SDUPTHER

## 2018-01-04 ENCOUNTER — HOSPITAL ENCOUNTER (OUTPATIENT)
Age: 63
Discharge: HOME | End: 2018-01-04
Payer: MEDICARE

## 2018-01-04 ENCOUNTER — TELEPHONE (OUTPATIENT)
Dept: NEPHROLOGY | Age: 63
End: 2018-01-04
Payer: MEDICARE

## 2018-01-04 DIAGNOSIS — D63.8 ANEMIA, CHRONIC DISEASE: Primary | ICD-10-CM

## 2018-01-04 DIAGNOSIS — D50.8 IRON DEFICIENCY ANEMIA SECONDARY TO INADEQUATE DIETARY IRON INTAKE: ICD-10-CM

## 2018-01-04 LAB
ALBUMIN SERPL-MCNC: 3.9 G/DL (ref 3.5–5.1)
ANION GAP SERPL CALCULATED.3IONS-SCNC: 15 MEQ/L (ref 8–16)
AVERAGE GLUCOSE: 129 MG/DL (ref 70–126)
BUN BLDV-MCNC: 58 MG/DL (ref 7–22)
CALCIUM SERPL-MCNC: 9.7 MG/DL (ref 8.5–10.5)
CHLORIDE BLD-SCNC: 99 MEQ/L (ref 98–111)
CHOLESTEROL, TOTAL: 124 MG/DL (ref 100–199)
CO2: 29 MEQ/L (ref 23–33)
CREAT SERPL-MCNC: 3.4 MG/DL (ref 0.4–1.2)
CREATININE, URINE: 79.3 MG/DL
FERRITIN: 861 NG/ML (ref 10–291)
GFR SERPL CREATININE-BSD FRML MDRD: 16 ML/MIN/1.73M2
GLUCOSE BLD-MCNC: 182 MG/DL (ref 70–108)
HBA1C MFR BLD: 6.3 % (ref 4.4–6.4)
HCT VFR BLD CALC: 30.7 % (ref 37–47)
HDLC SERPL-MCNC: 38 MG/DL
HEMOGLOBIN: 9.9 GM/DL (ref 12–16)
IRON SATURATION: 19 % (ref 20–50)
IRON: 31 UG/DL (ref 50–170)
LDL CHOLESTEROL CALCULATED: 53 MG/DL
MICROALBUMIN UR-MCNC: 20.95 MG/DL
MICROALBUMIN/CREAT UR-RTO: 264 MG/G (ref 0–30)
PHOSPHORUS: 3.2 MG/DL (ref 2.4–4.7)
POTASSIUM SERPL-SCNC: 3.3 MEQ/L (ref 3.5–5.2)
PROTEIN, URINE: 463.3 MG/DL
PTH INTACT: 272.5 PG/ML (ref 15–65)
RETICULOCYTE ABSOLUTE COUNT: 1.3 % (ref 0.5–2)
SODIUM BLD-SCNC: 143 MEQ/L (ref 135–145)
TOTAL IRON BINDING CAPACITY: 164 UG/DL (ref 171–450)
TRIGL SERPL-MCNC: 164 MG/DL (ref 0–199)
VITAMIN D 25-HYDROXY: 26 NG/ML (ref 30–100)

## 2018-01-04 PROCEDURE — 83036 HEMOGLOBIN GLYCOSYLATED A1C: CPT

## 2018-01-04 PROCEDURE — 82306 VITAMIN D 25 HYDROXY: CPT

## 2018-01-04 PROCEDURE — 36415 COLL VENOUS BLD VENIPUNCTURE: CPT

## 2018-01-04 PROCEDURE — 99490 CHRNC CARE MGMT STAFF 1ST 20: CPT | Performed by: NURSE PRACTITIONER

## 2018-01-04 PROCEDURE — 84156 ASSAY OF PROTEIN URINE: CPT

## 2018-01-04 PROCEDURE — 82043 UR ALBUMIN QUANTITATIVE: CPT

## 2018-01-04 PROCEDURE — 80048 BASIC METABOLIC PNL TOTAL CA: CPT

## 2018-01-04 PROCEDURE — 85018 HEMOGLOBIN: CPT

## 2018-01-04 PROCEDURE — 83970 ASSAY OF PARATHORMONE: CPT

## 2018-01-04 PROCEDURE — 80061 LIPID PANEL: CPT

## 2018-01-04 PROCEDURE — 85045 AUTOMATED RETICULOCYTE COUNT: CPT

## 2018-01-04 PROCEDURE — 82728 ASSAY OF FERRITIN: CPT

## 2018-01-04 PROCEDURE — 83540 ASSAY OF IRON: CPT

## 2018-01-04 PROCEDURE — 82040 ASSAY OF SERUM ALBUMIN: CPT

## 2018-01-04 PROCEDURE — 84100 ASSAY OF PHOSPHORUS: CPT

## 2018-01-04 PROCEDURE — 83550 IRON BINDING TEST: CPT

## 2018-01-04 PROCEDURE — 85014 HEMATOCRIT: CPT

## 2018-01-04 RX ORDER — LANOLIN ALCOHOL/MO/W.PET/CERES
325 CREAM (GRAM) TOPICAL
Qty: 90 TABLET | Refills: 3 | Status: SHIPPED | OUTPATIENT
Start: 2018-01-04 | End: 2019-02-13 | Stop reason: ALTCHOICE

## 2018-01-05 NOTE — TELEPHONE ENCOUNTER
Patient scheduled for Aranesp 100 mcg at [facility] for Tuesday J1/9/18 at 11:30 AM.  Order faxed to [facility]. LM on patient VM to call office to verify that this message was received.     Total time spent 8 minutes

## 2018-01-09 ENCOUNTER — HOSPITAL ENCOUNTER (OUTPATIENT)
Dept: WOMENS IMAGING | Age: 63
Discharge: HOME | End: 2018-01-09
Payer: MEDICARE

## 2018-01-09 ENCOUNTER — HOSPITAL ENCOUNTER (OUTPATIENT)
Dept: NURSING | Age: 63
Discharge: HOME | End: 2018-01-09
Payer: MEDICARE

## 2018-01-09 VITALS
HEART RATE: 64 BPM | DIASTOLIC BLOOD PRESSURE: 77 MMHG | WEIGHT: 199.6 LBS | BODY MASS INDEX: 33.22 KG/M2 | RESPIRATION RATE: 18 BRPM | TEMPERATURE: 98.3 F | SYSTOLIC BLOOD PRESSURE: 166 MMHG | OXYGEN SATURATION: 94 %

## 2018-01-09 DIAGNOSIS — Z12.31 VISIT FOR SCREENING MAMMOGRAM: ICD-10-CM

## 2018-01-09 PROCEDURE — 96372 THER/PROPH/DIAG INJ SC/IM: CPT

## 2018-01-09 PROCEDURE — 6360000002 HC RX W HCPCS: Performed by: NURSE PRACTITIONER

## 2018-01-09 PROCEDURE — 77063 BREAST TOMOSYNTHESIS BI: CPT

## 2018-01-09 RX ADMIN — DARBEPOETIN ALFA 150 MCG: 150 INJECTION, SOLUTION INTRAVENOUS; SUBCUTANEOUS at 12:13

## 2018-01-09 ASSESSMENT — PAIN DESCRIPTION - DESCRIPTORS: DESCRIPTORS: NUMBNESS;TINGLING

## 2018-01-09 ASSESSMENT — PAIN - FUNCTIONAL ASSESSMENT: PAIN_FUNCTIONAL_ASSESSMENT: 0-10

## 2018-01-09 NOTE — PROGRESS NOTES
1140 Patient arrived ambulatory for aranesp injeciton. PT RIGHTS AND RESPONSIBILITIES OFFERED TO PT.  __m__ Safety:       (Environmental)   [place] to environment   Ensure ID band is correct and in place/ allergy band as needed   Assess for fall risk   Initiate fall precautions as applicable (fall band, side rails, etc.)   Call light within reach   Bed in low position/ wheels locked    __m__ Pain:        Assess pain level and characteristics   Administer analgesics as ordered   Assess effectiveness of pain management and report to MD as needed    __m__ Knowledge Deficit:   Assess baseline knowledge   Provide teaching at level of understanding   Provide teaching via preferred learning method   Evaluate teaching effectiveness    ___m_ Hemodynamic/Respiratory Status:       (Pre and Post Procedure Monitoring)   Assess/Monitor vital signs and LOC   Assess Baseline SpO2 prior to any sedation   Obtain weight/height   Assess vital signs/ LOC until patient meets discharge criteria   Monitor procedure site and notify MD of any issues    1215 Patient tolerated injection without difficulty discharged ambulatory.

## 2018-01-23 ENCOUNTER — TELEPHONE (OUTPATIENT)
Dept: PHARMACY | Facility: CLINIC | Age: 63
End: 2018-01-23

## 2018-01-24 RX ORDER — POTASSIUM CHLORIDE 750 MG/1
TABLET, FILM COATED, EXTENDED RELEASE ORAL
Qty: 30 TABLET | Refills: 11 | Status: ON HOLD | OUTPATIENT
Start: 2018-01-24 | End: 2018-08-01 | Stop reason: HOSPADM

## 2018-01-24 RX ORDER — BUMETANIDE 1 MG/1
1 TABLET ORAL DAILY
Qty: 30 TABLET | Refills: 11 | Status: ON HOLD | OUTPATIENT
Start: 2018-01-24 | End: 2018-08-01 | Stop reason: HOSPADM

## 2018-01-31 ENCOUNTER — OFFICE VISIT (OUTPATIENT)
Dept: INTERNAL MEDICINE CLINIC | Age: 63
End: 2018-01-31
Payer: MEDICARE

## 2018-01-31 VITALS
WEIGHT: 203.4 LBS | DIASTOLIC BLOOD PRESSURE: 60 MMHG | HEART RATE: 62 BPM | SYSTOLIC BLOOD PRESSURE: 200 MMHG | HEIGHT: 65 IN | BODY MASS INDEX: 33.89 KG/M2

## 2018-01-31 DIAGNOSIS — E66.9 OBESITY (BMI 30-39.9): ICD-10-CM

## 2018-01-31 DIAGNOSIS — I10 ESSENTIAL HYPERTENSION: ICD-10-CM

## 2018-01-31 DIAGNOSIS — F41.9 ANXIETY: ICD-10-CM

## 2018-01-31 DIAGNOSIS — N18.30 CKD (CHRONIC KIDNEY DISEASE) STAGE 3, GFR 30-59 ML/MIN (HCC): ICD-10-CM

## 2018-01-31 DIAGNOSIS — E04.1 THYROID NODULE: ICD-10-CM

## 2018-01-31 DIAGNOSIS — Z79.4 TYPE 2 DIABETES MELLITUS WITHOUT COMPLICATION, WITH LONG-TERM CURRENT USE OF INSULIN (HCC): Primary | ICD-10-CM

## 2018-01-31 DIAGNOSIS — E11.9 TYPE 2 DIABETES MELLITUS WITHOUT COMPLICATION, WITH LONG-TERM CURRENT USE OF INSULIN (HCC): Primary | ICD-10-CM

## 2018-01-31 DIAGNOSIS — I10 WHITE COAT SYNDROME WITH DIAGNOSIS OF HYPERTENSION: ICD-10-CM

## 2018-01-31 PROCEDURE — 1036F TOBACCO NON-USER: CPT | Performed by: INTERNAL MEDICINE

## 2018-01-31 PROCEDURE — G8417 CALC BMI ABV UP PARAM F/U: HCPCS | Performed by: INTERNAL MEDICINE

## 2018-01-31 PROCEDURE — G8427 DOCREV CUR MEDS BY ELIG CLIN: HCPCS | Performed by: INTERNAL MEDICINE

## 2018-01-31 PROCEDURE — 3017F COLORECTAL CA SCREEN DOC REV: CPT | Performed by: INTERNAL MEDICINE

## 2018-01-31 PROCEDURE — G8598 ASA/ANTIPLAT THER USED: HCPCS | Performed by: INTERNAL MEDICINE

## 2018-01-31 PROCEDURE — G8482 FLU IMMUNIZE ORDER/ADMIN: HCPCS | Performed by: INTERNAL MEDICINE

## 2018-01-31 PROCEDURE — 99214 OFFICE O/P EST MOD 30 MIN: CPT | Performed by: INTERNAL MEDICINE

## 2018-01-31 PROCEDURE — 3044F HG A1C LEVEL LT 7.0%: CPT | Performed by: INTERNAL MEDICINE

## 2018-01-31 PROCEDURE — 3014F SCREEN MAMMO DOC REV: CPT | Performed by: INTERNAL MEDICINE

## 2018-01-31 PROCEDURE — 93000 ELECTROCARDIOGRAM COMPLETE: CPT | Performed by: INTERNAL MEDICINE

## 2018-01-31 RX ORDER — ALPRAZOLAM 1 MG/1
TABLET ORAL
Qty: 40 TABLET | Refills: 5 | Status: SHIPPED | OUTPATIENT
Start: 2018-01-31 | End: 2018-02-28

## 2018-01-31 RX ORDER — CARVEDILOL 25 MG/1
TABLET ORAL
Qty: 180 TABLET | Refills: 3 | Status: ON HOLD | OUTPATIENT
Start: 2018-01-31 | End: 2018-08-01

## 2018-01-31 RX ORDER — NITROGLYCERIN 0.4 MG/1
0.4 TABLET SUBLINGUAL EVERY 5 MIN PRN
Qty: 25 TABLET | Refills: 5 | Status: SHIPPED | OUTPATIENT
Start: 2018-01-31 | End: 2019-02-13 | Stop reason: SDUPTHER

## 2018-01-31 RX ORDER — FLUTICASONE PROPIONATE 50 MCG
2 SPRAY, SUSPENSION (ML) NASAL DAILY PRN
COMMUNITY
End: 2020-03-09 | Stop reason: SDUPTHER

## 2018-02-03 ENCOUNTER — HOSPITAL ENCOUNTER (OUTPATIENT)
Age: 63
Discharge: HOME | End: 2018-02-03
Payer: MEDICARE

## 2018-02-03 DIAGNOSIS — N18.4 CKD (CHRONIC KIDNEY DISEASE), STAGE IV (HCC): ICD-10-CM

## 2018-02-03 LAB
ANION GAP SERPL CALCULATED.3IONS-SCNC: 16 MEQ/L (ref 8–16)
BUN BLDV-MCNC: 53 MG/DL (ref 7–22)
CALCIUM SERPL-MCNC: 9.5 MG/DL (ref 8.5–10.5)
CHLORIDE BLD-SCNC: 100 MEQ/L (ref 98–111)
CO2: 26 MEQ/L (ref 23–33)
CREAT SERPL-MCNC: 3.2 MG/DL (ref 0.4–1.2)
GFR SERPL CREATININE-BSD FRML MDRD: 18 ML/MIN/1.73M2
GLUCOSE BLD-MCNC: 175 MG/DL (ref 70–108)
PHOSPHORUS: 3.9 MG/DL (ref 2.4–4.7)
POTASSIUM SERPL-SCNC: 3.6 MEQ/L (ref 3.5–5.2)
PTH INTACT: 183.7 PG/ML (ref 15–65)
SODIUM BLD-SCNC: 142 MEQ/L (ref 135–145)
VITAMIN D 25-HYDROXY: 27 NG/ML (ref 30–100)

## 2018-02-03 PROCEDURE — 36415 COLL VENOUS BLD VENIPUNCTURE: CPT

## 2018-02-03 PROCEDURE — 80048 BASIC METABOLIC PNL TOTAL CA: CPT

## 2018-02-03 PROCEDURE — 84100 ASSAY OF PHOSPHORUS: CPT

## 2018-02-03 PROCEDURE — 82306 VITAMIN D 25 HYDROXY: CPT

## 2018-02-03 PROCEDURE — 83970 ASSAY OF PARATHORMONE: CPT

## 2018-02-05 ENCOUNTER — TELEPHONE (OUTPATIENT)
Dept: NEPHROLOGY | Age: 63
End: 2018-02-05

## 2018-02-05 NOTE — TELEPHONE ENCOUNTER
----- Message from Kian Brewer CNP sent at 2/5/2018  3:04 PM EST -----  Pt got BMP drawn in Feb, but not H&H??   Please investigate

## 2018-02-08 ENCOUNTER — HOSPITAL ENCOUNTER (OUTPATIENT)
Age: 63
Discharge: HOME | End: 2018-02-08
Payer: MEDICARE

## 2018-02-08 ENCOUNTER — OFFICE VISIT (OUTPATIENT)
Dept: NEPHROLOGY | Age: 63
End: 2018-02-08
Payer: MEDICARE

## 2018-02-08 ENCOUNTER — TELEPHONE (OUTPATIENT)
Dept: NEPHROLOGY | Age: 63
End: 2018-02-08

## 2018-02-08 VITALS
HEART RATE: 64 BPM | WEIGHT: 198 LBS | OXYGEN SATURATION: 96 % | DIASTOLIC BLOOD PRESSURE: 78 MMHG | SYSTOLIC BLOOD PRESSURE: 144 MMHG | BODY MASS INDEX: 32.95 KG/M2

## 2018-02-08 DIAGNOSIS — N25.81 SECONDARY HYPERPARATHYROIDISM OF RENAL ORIGIN (HCC): ICD-10-CM

## 2018-02-08 DIAGNOSIS — E11.29 PERSISTENT PROTEINURIA ASSOCIATED WITH TYPE 2 DIABETES MELLITUS (HCC): ICD-10-CM

## 2018-02-08 DIAGNOSIS — E11.22 TYPE 2 DIABETES MELLITUS WITH STAGE 3 CHRONIC KIDNEY DISEASE, WITH LONG-TERM CURRENT USE OF INSULIN (HCC): ICD-10-CM

## 2018-02-08 DIAGNOSIS — I12.9 HYPERTENSIVE NEPHROPATHY: ICD-10-CM

## 2018-02-08 DIAGNOSIS — D50.8 IRON DEFICIENCY ANEMIA DUE TO DIETARY CAUSES: ICD-10-CM

## 2018-02-08 DIAGNOSIS — Z79.4 TYPE 2 DIABETES MELLITUS WITH STAGE 3 CHRONIC KIDNEY DISEASE, WITH LONG-TERM CURRENT USE OF INSULIN (HCC): ICD-10-CM

## 2018-02-08 DIAGNOSIS — N18.4 CHRONIC KIDNEY DISEASE (CKD), STAGE IV (SEVERE) (HCC): ICD-10-CM

## 2018-02-08 DIAGNOSIS — D63.8 ANEMIA, CHRONIC DISEASE: ICD-10-CM

## 2018-02-08 DIAGNOSIS — N18.30 CKD (CHRONIC KIDNEY DISEASE), STAGE III (HCC): Primary | ICD-10-CM

## 2018-02-08 DIAGNOSIS — R80.9 PERSISTENT PROTEINURIA ASSOCIATED WITH TYPE 2 DIABETES MELLITUS (HCC): ICD-10-CM

## 2018-02-08 DIAGNOSIS — E55.9 VITAMIN D DEFICIENCY: ICD-10-CM

## 2018-02-08 DIAGNOSIS — N18.30 TYPE 2 DIABETES MELLITUS WITH STAGE 3 CHRONIC KIDNEY DISEASE, WITH LONG-TERM CURRENT USE OF INSULIN (HCC): ICD-10-CM

## 2018-02-08 LAB
HCT VFR BLD CALC: 26.5 % (ref 37–47)
HEMOGLOBIN: 8.4 GM/DL (ref 12–16)

## 2018-02-08 PROCEDURE — 99213 OFFICE O/P EST LOW 20 MIN: CPT | Performed by: NURSE PRACTITIONER

## 2018-02-08 PROCEDURE — 3017F COLORECTAL CA SCREEN DOC REV: CPT | Performed by: NURSE PRACTITIONER

## 2018-02-08 PROCEDURE — 3044F HG A1C LEVEL LT 7.0%: CPT | Performed by: NURSE PRACTITIONER

## 2018-02-08 PROCEDURE — 85014 HEMATOCRIT: CPT

## 2018-02-08 PROCEDURE — 1036F TOBACCO NON-USER: CPT | Performed by: NURSE PRACTITIONER

## 2018-02-08 PROCEDURE — 3014F SCREEN MAMMO DOC REV: CPT | Performed by: NURSE PRACTITIONER

## 2018-02-08 PROCEDURE — 36415 COLL VENOUS BLD VENIPUNCTURE: CPT

## 2018-02-08 PROCEDURE — G8417 CALC BMI ABV UP PARAM F/U: HCPCS | Performed by: NURSE PRACTITIONER

## 2018-02-08 PROCEDURE — 85018 HEMOGLOBIN: CPT

## 2018-02-08 PROCEDURE — G8598 ASA/ANTIPLAT THER USED: HCPCS | Performed by: NURSE PRACTITIONER

## 2018-02-08 PROCEDURE — G8482 FLU IMMUNIZE ORDER/ADMIN: HCPCS | Performed by: NURSE PRACTITIONER

## 2018-02-08 PROCEDURE — G8428 CUR MEDS NOT DOCUMENT: HCPCS | Performed by: NURSE PRACTITIONER

## 2018-02-08 RX ORDER — DOXAZOSIN 2 MG/1
2 TABLET ORAL NIGHTLY
Qty: 30 TABLET | Refills: 3 | Status: SHIPPED | OUTPATIENT
Start: 2018-02-08 | End: 2018-05-24 | Stop reason: SDUPTHER

## 2018-02-09 ENCOUNTER — TELEPHONE (OUTPATIENT)
Dept: INTERNAL MEDICINE CLINIC | Age: 63
End: 2018-02-09

## 2018-02-09 DIAGNOSIS — I10 ESSENTIAL HYPERTENSION: ICD-10-CM

## 2018-02-09 RX ORDER — ALLOPURINOL 100 MG/1
TABLET ORAL
Qty: 180 TABLET | Refills: 3 | Status: ON HOLD | OUTPATIENT
Start: 2018-02-09 | End: 2018-08-01 | Stop reason: HOSPADM

## 2018-02-09 NOTE — IP AVS SNAPSHOT
Patient Information     Patient Name MICHAEL Sumner 1955         This is your updated medication list to keep with you all times      ASK your doctor about these medications     albuterol sulfate  (90 Base) MCG/ACT inhaler   Commonly known as:  PROAIR HFA   Inhale 2 puffs into the lungs every 6 hours as needed for Wheezing       allopurinol 100 MG tablet   Commonly known as:  ZYLOPRIM   TAKE 2 TABLETS DAILY       ALPRAZolam 1 MG tablet   Commonly known as:  Joon Poke   1/2 tab every 8 hours as needed       amLODIPine-atorvastatatin 10-80 MG per tablet   Commonly known as:  CADUET   TAKE 1 TABLET nightly       aspirin 81 MG EC tablet       bumetanide 1 MG tablet   Commonly known as:  BUMEX   Take 1 tablet by mouth daily       calcitRIOL 0.25 MCG capsule   Commonly known as:  ROCALTROL   Take 1 capsule by mouth Twice a Week Monday, Wednesday and Friday       carvedilol 25 MG tablet   Commonly known as:  COREG   TAKE 1 TABLET TWICE A DAY       cloNIDine 0.3 MG tablet   Commonly known as:  CATAPRES   Take 1 tablet by mouth every 8 hours One tablet three times per day       [facility] GENTLE LAXATIVE 5 MG EC tablet   Generic drug:  bisacodyl   TAKE 1 TABLET BY MOUTH DAILY AS NEEDED FOR CONSTIPATION       fluticasone 50 MCG/ACT nasal spray   Commonly known as:  FLONASE   2 sprays by Nasal route daily       FOLBEE 2.5-25-1 MG Tabs tablet   Generic drug:  folic acid-pyridoxine-cyanocobalamine   TAKE 1 TABLET BY MOUTH DAILY       hydrALAZINE 100 MG tablet   Commonly known as:  APRESOLINE   Take 1 tablet by mouth 3 times daily       * HYDROcodone-acetaminophen 5-325 MG per tablet   Commonly known as:  NORCO   Take 1 tablet by mouth every 8 hours as needed for Pain  Fill on/after 17.       * HYDROcodone-acetaminophen 5-325 MG per tablet   Commonly known as:  NORCO   Take 1 tablet by mouth every 8 hours as needed for Pain  Fill on/after 10/21/17.       * HYDROcodone-acetaminophen 5-325 MG per tablet - - -

## 2018-02-09 NOTE — TELEPHONE ENCOUNTER
Pt called in saying [facility] Dr. Vonda Ren sent in a new script but pt states when she was here on 1/31/18 she was not told of a new script. She states it is called foltex 2.2 mg.  I advised pt I see no scripts sent in from Dr. Vonda Ren for that. On her visit he refilled some of her maintenance meds. She states she is not going to take it.   She thinks the pharmacy made a mistake

## 2018-02-13 ENCOUNTER — HOSPITAL ENCOUNTER (OUTPATIENT)
Dept: NURSING | Age: 63
Discharge: HOME | End: 2018-02-13
Payer: MEDICARE

## 2018-02-13 VITALS
TEMPERATURE: 98.6 F | HEART RATE: 80 BPM | DIASTOLIC BLOOD PRESSURE: 88 MMHG | SYSTOLIC BLOOD PRESSURE: 148 MMHG | RESPIRATION RATE: 18 BRPM

## 2018-02-13 PROCEDURE — 6360000002 HC RX W HCPCS: Performed by: NURSE PRACTITIONER

## 2018-02-13 PROCEDURE — 96372 THER/PROPH/DIAG INJ SC/IM: CPT

## 2018-02-13 RX ADMIN — DARBEPOETIN ALFA 150 MCG: 150 INJECTION, SOLUTION INTRAVENOUS; SUBCUTANEOUS at 12:20

## 2018-02-13 NOTE — PROGRESS NOTES
1210 pt arrives to OPN for aranesp injection. All questions and concerns addressed.   1211PATIENT RIGHTS AND RESPONSIBILITIES SHEET OFFERED TO PT TO READ.  9446 __m__ Safety:       (Environmental)   [place] to environment   Ensure ID band is correct and in place/ allergy band as needed   Assess for fall risk   Initiate fall precautions as applicable (fall band, side rails, etc.)   Call light within reach   Bed in low position/ wheels locked    __m__ Pain:        Assess pain level and characteristics   Administer analgesics as ordered   Assess effectiveness of pain management and report to MD as needed    __m__ Knowledge Deficit:   Assess baseline knowledge   Provide teaching at level of understanding   Provide teaching via preferred learning method   Evaluate teaching effectiveness    _m___ Hemodynamic/Respiratory Status:       (Pre and Post Procedure Monitoring)   Assess/Monitor vital signs and LOC   Assess Baseline SpO2 prior to any sedation   Obtain weight/height   Assess vital signs/ LOC until patient meets discharge criteria   Monitor procedure site and notify MD of any issues      1228 WRITTEN DISCHARGE INSTRUCTIONS GIVEN TO PT-VERBALIZES UNDERSTANDING    1230 PT DISCHARGED AMBULATORY IN SATISFACTORY CONDITION

## 2018-02-22 ENCOUNTER — OFFICE VISIT (OUTPATIENT)
Dept: PHYSICAL MEDICINE AND REHAB | Age: 63
End: 2018-02-22
Payer: MEDICARE

## 2018-02-22 VITALS
HEIGHT: 65 IN | WEIGHT: 204.6 LBS | DIASTOLIC BLOOD PRESSURE: 68 MMHG | SYSTOLIC BLOOD PRESSURE: 146 MMHG | HEART RATE: 78 BPM | BODY MASS INDEX: 34.09 KG/M2

## 2018-02-22 DIAGNOSIS — M47.816 OSTEOARTHRITIS OF LUMBAR SPINE, UNSPECIFIED SPINAL OSTEOARTHRITIS COMPLICATION STATUS: ICD-10-CM

## 2018-02-22 DIAGNOSIS — G89.29 CHRONIC PAIN OF RIGHT ANKLE: ICD-10-CM

## 2018-02-22 DIAGNOSIS — M25.571 CHRONIC PAIN OF RIGHT ANKLE: ICD-10-CM

## 2018-02-22 DIAGNOSIS — E11.42 DIABETIC POLYNEUROPATHY ASSOCIATED WITH TYPE 2 DIABETES MELLITUS (HCC): Primary | ICD-10-CM

## 2018-02-22 PROCEDURE — 3044F HG A1C LEVEL LT 7.0%: CPT | Performed by: NURSE PRACTITIONER

## 2018-02-22 PROCEDURE — G8598 ASA/ANTIPLAT THER USED: HCPCS | Performed by: NURSE PRACTITIONER

## 2018-02-22 PROCEDURE — G8482 FLU IMMUNIZE ORDER/ADMIN: HCPCS | Performed by: NURSE PRACTITIONER

## 2018-02-22 PROCEDURE — 3017F COLORECTAL CA SCREEN DOC REV: CPT | Performed by: NURSE PRACTITIONER

## 2018-02-22 PROCEDURE — G8427 DOCREV CUR MEDS BY ELIG CLIN: HCPCS | Performed by: NURSE PRACTITIONER

## 2018-02-22 PROCEDURE — 3014F SCREEN MAMMO DOC REV: CPT | Performed by: NURSE PRACTITIONER

## 2018-02-22 PROCEDURE — G8417 CALC BMI ABV UP PARAM F/U: HCPCS | Performed by: NURSE PRACTITIONER

## 2018-02-22 PROCEDURE — 99213 OFFICE O/P EST LOW 20 MIN: CPT | Performed by: NURSE PRACTITIONER

## 2018-02-22 PROCEDURE — 1036F TOBACCO NON-USER: CPT | Performed by: NURSE PRACTITIONER

## 2018-02-22 RX ORDER — HYDROCODONE BITARTRATE AND ACETAMINOPHEN 5; 325 MG/1; MG/1
1 TABLET ORAL EVERY 8 HOURS PRN
Qty: 90 TABLET | Refills: 0 | Status: SHIPPED | OUTPATIENT
Start: 2018-02-22 | End: 2018-03-24

## 2018-02-22 RX ORDER — LIDOCAINE 50 MG/G
OINTMENT TOPICAL
Qty: 3 TUBE | Refills: 0 | Status: SHIPPED | OUTPATIENT
Start: 2018-02-22 | End: 2018-04-26 | Stop reason: ALTCHOICE

## 2018-02-22 NOTE — PROGRESS NOTES
[address] [facility]  Outpatient progress note    Chief Complaint:   Chief Complaint   Patient presents with    3 Month Follow-Up     Diabetic polyneuropathy associated with type 2 diabetes mellitus (        Subjective: Arsenio Gilmore is a 57 y.o. female who returns to the office today for further follow up. She continues to have low back pain and tenderness. Feels that he low back pain which is referred into bilateral legs is getting a little worse. She reports she had another episode of sciatica recently, was not as bad as first time. She took some prednisone which helped. Patient with multiple questions about lower back pain as well as sciatica. Discussed possibly completing some PT for low back pain as she has not done any PT since 2015. Discussed pain management referral for possible injections- patient would not like to pursue this at this time. Her right ankle pain is stable, wearing brace. Continues to take Norco PRN and lidocaine gel for back and ankle. Review of Systems:  CONSTITUTIONAL: negative  RESPIRATORY: negative  CARDIOVASCULAR: positive for peripheral edema  GASTROINTESTINAL: negative  GENITOURINARY: negative  MUSCULOSKELETAL: positive for pain  NEUROLOGICAL: positive for numbness/tingling  BEHAVIOR/PSYCH: negative  10 point system review otherwise negative    Physical Exam:  BP (!) 146/68 (Site: Left Arm, Position: Sitting)   Pulse 78   Ht 5' 5\" (1.651 m)   Wt 204 lb 9.6 oz (92.8 kg)   BMI 34.05 kg/m²     awake  Orientation:   person, place, time  Mood: within normal limits  Affect: calm  General appearance: Patient is well nourished, well developed, well groomed and in no acute distress    ROM:  abnormal - limited in right ankle and lumbar spine. Tenderness with palpation around right ankle.  Tenderness of low back right paraspinals referred into right gluteal area  Motor Exam:  Motor exam is symmetrical 5 out of 5 all extremities bilaterally  Tone:  normal  Muscle bulk: within normal limits  Sensory:  Diminished to light touch bilateral lower extremities  Coordination:   normal    Skin: warm and dry, no rash or erythema  Peripheral vascular: Pulses: Normal upper and lower extremity pulses; Edema: 2+ right ankle, 1+ left ankle      Impression:  · Right ankle fracture with ongoing ankle pain  · Peripheral neuropathy with neuralgia and numbness/tingling related to diabetes  · Lumbar spondylosis with worsening low back pain  · Gait disturbance  · Sciatica    Plan:  · Ankle stabilizing orthosis right ankle, continue as needed  · Lidocaine ointment as needed- refills given  · Continue with Norco as needed for pain- refills given  · Patient to call us if she would like to do PT for her lower back  · Discussed possible injections for low back pain- patient would like to hold off at this time    Will continue to monitor any benefits vs side effects of the medications as prescribed. The patient has been warned about the risk of operating machinery including driving if impaired in any way by these medications. The patient also accepts the risks of tolerance, dependency, or addiction related to the prescribed medications. All questions were answered. Reevaluation as planned, or sooner if requested. Controlled Substances Monitoring: Attestation: The Prescription Monitoring Report for this patient was reviewed today. Jemma Lorenzana NP)  Documentation: Possible medication side effects, risk of tolerance/dependence & alternative treatments discussed., No signs of potential drug abuse or diversion identified. Jemma Lorenzana NP)    Return in about 3 months (around 5/22/2018). It was my pleasure to evaluate Arsenio Gilmore today. Please call with any concerns or questions.   15 minutes spent in evaluation efforts    Jemma Lorenzana NP

## 2018-02-26 ENCOUNTER — TELEPHONE (OUTPATIENT)
Dept: PHYSICAL MEDICINE AND REHAB | Age: 63
End: 2018-02-26

## 2018-03-03 ENCOUNTER — HOSPITAL ENCOUNTER (OUTPATIENT)
Age: 63
Discharge: HOME | End: 2018-03-03
Payer: MEDICARE

## 2018-03-03 DIAGNOSIS — D63.8 ANEMIA, CHRONIC DISEASE: ICD-10-CM

## 2018-03-03 DIAGNOSIS — N18.30 CKD (CHRONIC KIDNEY DISEASE), STAGE III (HCC): ICD-10-CM

## 2018-03-03 LAB
ANION GAP SERPL CALCULATED.3IONS-SCNC: 12 MEQ/L (ref 8–16)
BUN BLDV-MCNC: 52 MG/DL (ref 7–22)
CALCIUM SERPL-MCNC: 9.6 MG/DL (ref 8.5–10.5)
CHLORIDE BLD-SCNC: 102 MEQ/L (ref 98–111)
CO2: 30 MEQ/L (ref 23–33)
CREAT SERPL-MCNC: 3.7 MG/DL (ref 0.4–1.2)
GFR SERPL CREATININE-BSD FRML MDRD: 15 ML/MIN/1.73M2
GLUCOSE BLD-MCNC: 123 MG/DL (ref 70–108)
HCT VFR BLD CALC: 26.7 % (ref 37–47)
HEMOGLOBIN: 8.7 GM/DL (ref 12–16)
POTASSIUM SERPL-SCNC: 4 MEQ/L (ref 3.5–5.2)
SODIUM BLD-SCNC: 144 MEQ/L (ref 135–145)

## 2018-03-03 PROCEDURE — 36415 COLL VENOUS BLD VENIPUNCTURE: CPT

## 2018-03-03 PROCEDURE — 80048 BASIC METABOLIC PNL TOTAL CA: CPT

## 2018-03-03 PROCEDURE — 85018 HEMOGLOBIN: CPT

## 2018-03-03 PROCEDURE — 85014 HEMATOCRIT: CPT

## 2018-03-05 ENCOUNTER — HOSPITAL ENCOUNTER (OUTPATIENT)
Dept: NURSING | Age: 63
Discharge: HOME | End: 2018-03-05
Payer: MEDICARE

## 2018-03-05 ENCOUNTER — TELEPHONE (OUTPATIENT)
Dept: NEPHROLOGY | Age: 63
End: 2018-03-05

## 2018-03-05 VITALS
SYSTOLIC BLOOD PRESSURE: 169 MMHG | HEART RATE: 63 BPM | TEMPERATURE: 98 F | RESPIRATION RATE: 20 BRPM | DIASTOLIC BLOOD PRESSURE: 78 MMHG

## 2018-03-05 DIAGNOSIS — N18.4 CKD (CHRONIC KIDNEY DISEASE), STAGE IV (HCC): ICD-10-CM

## 2018-03-05 DIAGNOSIS — D63.8 ANEMIA, CHRONIC DISEASE: Primary | ICD-10-CM

## 2018-03-05 DIAGNOSIS — D50.8 IRON DEFICIENCY ANEMIA DUE TO DIETARY CAUSES: ICD-10-CM

## 2018-03-05 PROCEDURE — 96372 THER/PROPH/DIAG INJ SC/IM: CPT

## 2018-03-05 PROCEDURE — 6360000002 HC RX W HCPCS: Performed by: NURSE PRACTITIONER

## 2018-03-05 RX ADMIN — DARBEPOETIN ALFA 150 MCG: 150 INJECTION, SOLUTION INTRAVENOUS; SUBCUTANEOUS at 12:37

## 2018-03-05 NOTE — PROGRESS NOTES
1155:  ARRIVES AMBULATORY FOR ARANESP. PROCESS REVIEWED AND PT RIGHTS AND RESPONSIBILITIES OFFERED TO PT.  1200:  PT RESTING IN CHAIR. 1240:  PT TOLERATED INJECTION WELL.   DISCHARGED AMBULATORY WITH INSTRUCTIONS.    _M___ Safety:       (Environmental)   [place] to environment   Ensure ID band is correct and in place/ allergy band as needed   Assess for fall risk   Initiate fall precautions as applicable (fall band, side rails, etc.)   Call light within reach   Bed in low position/ wheels locked    _M___ Pain:        Assess pain level and characteristics   Administer analgesics as ordered   Assess effectiveness of pain management and report to MD as needed    _M___ Knowledge Deficit:   Assess baseline knowledge   Provide teaching at level of understanding   Provide teaching via preferred learning method   Evaluate teaching effectiveness    _M___ Hemodynamic/Respiratory Status:       (Pre and Post Procedure Monitoring)   Assess/Monitor vital signs and LOC   Assess Baseline SpO2 prior to any sedation   Obtain weight/height   Assess vital signs/ LOC until patient meets discharge criteria   Monitor procedure site and notify MD of any issues

## 2018-03-05 NOTE — TELEPHONE ENCOUNTER
Irvin Messer'  Chronic Kidney Disease Chronic Care Management: Anemia    Lab Results   Component Value Date    CREATININE 3.7 03/03/2018    LABGLOM 15 03/03/2018   . Hgb and Hct trending reviewed  Lab Results   Component Value Date    HGB 8.7 (L) 03/03/2018    HGB 8.4 (L) 02/08/2018    HGB 9.9 (L) 01/04/2018    HCT 26.7 (L) 03/03/2018    HCT 26.5 (L) 02/08/2018    HCT 30.7 (L) 01/04/2018     Lab Results   Component Value Date    FERRITIN 861 01/04/2018    IRON 31 01/04/2018    LABIRON 19 01/04/2018    PGOBCBOP05 > 2000 04/26/2017    FOLATE > 20.0 04/26/2017     Lab Results   Component Value Date    RETICABS 1.3 01/04/2018        10/2/17 11/2/17  12/2/17 1/4/18 2/8/18 3/3/18     Hgb 8.8 9.3  9.4 9.9 8.4 8.7     Aranesp 150 mcg 10/19 150 mcg 11/2 150 mcg 11/16  150 mcg 1/9/18 150 mcg 2/13/18                  Retic Count 2.5    1.3       T sat 25    19       Iron 45    31       Ferritan 911    861       Venofer - -          PO Iron - -   325 mg daily. Pt intolerant  Due to constipation, Pt does not want to take PO iron. Will do IV iron in future                       1. Anemia, chronic disease   Increase Aranesp 150 mcg every 2 weeks x 2 doses, Repeat Hgb in one month (pt has order for Labs in one month)   2. Anemia, unspecified type  Intolerant to PO iron, Consider IV iron in future           3.  CKD (chronic kidney disease), stage IV      Goal Hgb 10 KDOQI guidelines  WIll adjust per pt response    Time spent- Non face to face: [address] Guadalupe Arroyo, DAVE APRN

## 2018-03-19 ENCOUNTER — HOSPITAL ENCOUNTER (OUTPATIENT)
Dept: NURSING | Age: 63
Discharge: HOME | End: 2018-03-19
Payer: MEDICARE

## 2018-03-19 VITALS
HEART RATE: 65 BPM | OXYGEN SATURATION: 96 % | TEMPERATURE: 96.8 F | DIASTOLIC BLOOD PRESSURE: 84 MMHG | SYSTOLIC BLOOD PRESSURE: 175 MMHG | RESPIRATION RATE: 18 BRPM

## 2018-03-19 PROCEDURE — 96372 THER/PROPH/DIAG INJ SC/IM: CPT

## 2018-03-19 PROCEDURE — 6360000002 HC RX W HCPCS: Performed by: NURSE PRACTITIONER

## 2018-03-19 RX ADMIN — DARBEPOETIN ALFA 150 MCG: 150 INJECTION, SOLUTION INTRAVENOUS; SUBCUTANEOUS at 12:17

## 2018-03-19 ASSESSMENT — PAIN - FUNCTIONAL ASSESSMENT: PAIN_FUNCTIONAL_ASSESSMENT: 0-10

## 2018-03-19 ASSESSMENT — PAIN DESCRIPTION - DESCRIPTORS: DESCRIPTORS: ACHING

## 2018-03-19 NOTE — PROGRESS NOTES
1205:  ARRIVES AMBULATORY FOR INJECTION. PT FAMILIAR WITH PROCESS AND PT RIGHTS AND RESPONSIBILITIES OFFERED TO PT.  1220:  TOLERATED INJECTION WELL. BAND AID TO SITE.   PT DISCHARGED AMBULATORY WITH INSTRUCTIONS.      _M___ Safety:       (Environmental)   [place] to environment   Ensure ID band is correct and in place/ allergy band as needed   Assess for fall risk   Initiate fall precautions as applicable (fall band, side rails, etc.)   Call light within reach   Bed in low position/ wheels locked    _M___ Pain:        Assess pain level and characteristics   Administer analgesics as ordered   Assess effectiveness of pain management and report to MD as needed    _M___ Knowledge Deficit:   Assess baseline knowledge   Provide teaching at level of understanding   Provide teaching via preferred learning method   Evaluate teaching effectiveness    _M___ Hemodynamic/Respiratory Status:       (Pre and Post Procedure Monitoring)   Assess/Monitor vital signs and LOC   Assess Baseline SpO2 prior to any sedation   Obtain weight/height   Assess vital signs/ LOC until patient meets discharge criteria   Monitor procedure site and notify MD of any issues    ___

## 2018-04-02 ENCOUNTER — HOSPITAL ENCOUNTER (OUTPATIENT)
Dept: NURSING | Age: 63
Discharge: HOME | End: 2018-04-02
Payer: MEDICARE

## 2018-04-02 VITALS
SYSTOLIC BLOOD PRESSURE: 179 MMHG | RESPIRATION RATE: 16 BRPM | DIASTOLIC BLOOD PRESSURE: 78 MMHG | TEMPERATURE: 97.8 F | HEART RATE: 65 BPM

## 2018-04-02 PROCEDURE — 6360000002 HC RX W HCPCS: Performed by: NURSE PRACTITIONER

## 2018-04-02 PROCEDURE — 96372 THER/PROPH/DIAG INJ SC/IM: CPT

## 2018-04-02 RX ADMIN — DARBEPOETIN ALFA 150 MCG: 150 INJECTION, SOLUTION INTRAVENOUS; SUBCUTANEOUS at 12:34

## 2018-04-02 ASSESSMENT — PAIN DESCRIPTION - DESCRIPTORS: DESCRIPTORS: ACHING

## 2018-04-02 ASSESSMENT — PAIN - FUNCTIONAL ASSESSMENT: PAIN_FUNCTIONAL_ASSESSMENT: 0-10

## 2018-04-02 NOTE — PROGRESS NOTES
Pt here for injection. Rights and responsibilities reviewed with patient    [phone]: Injection given    1240: Discharge instructions given. Pt verbalizes understanding. Pt discharged ambulatory in stable condition.

## 2018-04-02 NOTE — PROGRESS NOTES
_M___ Safety:       (Environmental)   [place] to environment   Ensure ID band is correct and in place/ allergy band as needed   Assess for fall risk   Initiate fall precautions as applicable (fall band, side rails, etc.)   Call light within reach   Bed in low position/ wheels locked    __M__ Pain:        Assess pain level and characteristics   Administer analgesics as ordered   Assess effectiveness of pain management and report to MD as needed    _M___ Knowledge Deficit:   Assess baseline knowledge   Provide teaching at level of understanding   Provide teaching via preferred learning method   Evaluate teaching effectiveness    _M___ Hemodynamic/Respiratory Status:      Assess/Monitor vital signs and LOC             notify MD of any issues    

## 2018-04-07 ENCOUNTER — HOSPITAL ENCOUNTER (OUTPATIENT)
Age: 63
Discharge: HOME | End: 2018-04-07
Payer: MEDICARE

## 2018-04-07 LAB
ANION GAP SERPL CALCULATED.3IONS-SCNC: 14 MEQ/L (ref 8–16)
BUN BLDV-MCNC: 65 MG/DL (ref 7–22)
CALCIUM SERPL-MCNC: 9.7 MG/DL (ref 8.5–10.5)
CHLORIDE BLD-SCNC: 101 MEQ/L (ref 98–111)
CO2: 27 MEQ/L (ref 23–33)
CREAT SERPL-MCNC: 4.9 MG/DL (ref 0.4–1.2)
GFR SERPL CREATININE-BSD FRML MDRD: 11 ML/MIN/1.73M2
GLUCOSE BLD-MCNC: 126 MG/DL (ref 70–108)
HCT VFR BLD CALC: 27.2 % (ref 37–47)
HEMOGLOBIN: 8.6 GM/DL (ref 12–16)
POTASSIUM SERPL-SCNC: 3.5 MEQ/L (ref 3.5–5.2)
SODIUM BLD-SCNC: 142 MEQ/L (ref 135–145)

## 2018-04-07 PROCEDURE — 80048 BASIC METABOLIC PNL TOTAL CA: CPT

## 2018-04-07 PROCEDURE — 36415 COLL VENOUS BLD VENIPUNCTURE: CPT

## 2018-04-07 PROCEDURE — 85018 HEMOGLOBIN: CPT

## 2018-04-07 PROCEDURE — 85014 HEMATOCRIT: CPT

## 2018-04-09 ENCOUNTER — TELEPHONE (OUTPATIENT)
Dept: NEPHROLOGY | Age: 63
End: 2018-04-09
Payer: MEDICARE

## 2018-04-09 DIAGNOSIS — D50.8 IRON DEFICIENCY ANEMIA DUE TO DIETARY CAUSES: ICD-10-CM

## 2018-04-09 DIAGNOSIS — N18.4 CKD (CHRONIC KIDNEY DISEASE), STAGE IV (HCC): ICD-10-CM

## 2018-04-09 DIAGNOSIS — N17.9 AKI (ACUTE KIDNEY INJURY) (HCC): Primary | ICD-10-CM

## 2018-04-09 DIAGNOSIS — D63.8 ANEMIA, CHRONIC DISEASE: ICD-10-CM

## 2018-04-09 PROCEDURE — 99490 CHRNC CARE MGMT STAFF 1ST 20: CPT | Performed by: NURSE PRACTITIONER

## 2018-04-11 ENCOUNTER — HOSPITAL ENCOUNTER (OUTPATIENT)
Dept: NURSING | Age: 63
Discharge: HOME | End: 2018-04-11
Payer: MEDICARE

## 2018-04-11 VITALS
HEART RATE: 68 BPM | OXYGEN SATURATION: 100 % | TEMPERATURE: 95.8 F | SYSTOLIC BLOOD PRESSURE: 163 MMHG | DIASTOLIC BLOOD PRESSURE: 79 MMHG

## 2018-04-11 PROCEDURE — 6360000002 HC RX W HCPCS: Performed by: NURSE PRACTITIONER

## 2018-04-11 PROCEDURE — 96372 THER/PROPH/DIAG INJ SC/IM: CPT

## 2018-04-11 RX ADMIN — DARBEPOETIN ALFA 150 MCG: 150 INJECTION, SOLUTION INTRAVENOUS; SUBCUTANEOUS at 13:30

## 2018-04-11 NOTE — PROGRESS NOTES
_M___ Safety:       (Environmental)   [place] to environment   Ensure ID band is correct and in place/ allergy band as needed   Assess for fall risk   Initiate fall precautions as applicable (fall band, side rails, etc.)   Call light within reach   Bed in low position/ wheels locked    __M__ Pain:        Assess pain level and characteristics   Administer analgesics as ordered   Assess effectiveness of pain management and report to MD as needed    _M___ Knowledge Deficit:   Assess baseline knowledge   Provide teaching at level of understanding   Provide teaching via preferred learning method   Evaluate teaching effectiveness    _M___ Hemodynamic/Respiratory Status:       (Pre and Post Procedure Monitoring)   Assess/Monitor vital signs and LOC   Assess Baseline SpO2 prior to any sedation       notify MD of any issues     _\

## 2018-04-11 NOTE — PROGRESS NOTES
1315: Pt here for injection. Rights and responsibilities reviewed with patient    [phone]: Injection given. 1335: Discharge instructions given. Pt verbalizes understanding. Pt discharged ambulatory in stable condition.

## 2018-04-12 ENCOUNTER — OFFICE VISIT (OUTPATIENT)
Dept: NEPHROLOGY | Age: 63
End: 2018-04-12
Payer: MEDICARE

## 2018-04-12 VITALS — WEIGHT: 197.4 LBS | DIASTOLIC BLOOD PRESSURE: 84 MMHG | SYSTOLIC BLOOD PRESSURE: 162 MMHG | BODY MASS INDEX: 32.85 KG/M2

## 2018-04-12 DIAGNOSIS — E11.29 PERSISTENT PROTEINURIA ASSOCIATED WITH TYPE 2 DIABETES MELLITUS (HCC): ICD-10-CM

## 2018-04-12 DIAGNOSIS — N18.3 TYPE 2 DIABETES MELLITUS WITH STAGE 3 CHRONIC KIDNEY DISEASE, UNSPECIFIED LONG TERM INSULIN USE STATUS: ICD-10-CM

## 2018-04-12 DIAGNOSIS — R80.1 PERSISTENT PROTEINURIA: ICD-10-CM

## 2018-04-12 DIAGNOSIS — N18.30 CKD (CHRONIC KIDNEY DISEASE) STAGE 3, GFR 30-59 ML/MIN (HCC): Primary | ICD-10-CM

## 2018-04-12 DIAGNOSIS — R80.9 PERSISTENT PROTEINURIA ASSOCIATED WITH TYPE 2 DIABETES MELLITUS (HCC): ICD-10-CM

## 2018-04-12 DIAGNOSIS — N17.9 AKI (ACUTE KIDNEY INJURY) (HCC): ICD-10-CM

## 2018-04-12 DIAGNOSIS — I10 BENIGN ESSENTIAL HTN: ICD-10-CM

## 2018-04-12 DIAGNOSIS — E11.22 TYPE 2 DIABETES MELLITUS WITH STAGE 3 CHRONIC KIDNEY DISEASE, UNSPECIFIED LONG TERM INSULIN USE STATUS: ICD-10-CM

## 2018-04-12 DIAGNOSIS — E03.9 ACQUIRED HYPOTHYROIDISM: ICD-10-CM

## 2018-04-12 PROCEDURE — 99214 OFFICE O/P EST MOD 30 MIN: CPT | Performed by: INTERNAL MEDICINE

## 2018-04-12 PROCEDURE — 2022F DILAT RTA XM EVC RTNOPTHY: CPT | Performed by: INTERNAL MEDICINE

## 2018-04-12 PROCEDURE — 3014F SCREEN MAMMO DOC REV: CPT | Performed by: INTERNAL MEDICINE

## 2018-04-12 PROCEDURE — 3044F HG A1C LEVEL LT 7.0%: CPT | Performed by: INTERNAL MEDICINE

## 2018-04-12 PROCEDURE — 1036F TOBACCO NON-USER: CPT | Performed by: INTERNAL MEDICINE

## 2018-04-12 PROCEDURE — G8417 CALC BMI ABV UP PARAM F/U: HCPCS | Performed by: INTERNAL MEDICINE

## 2018-04-12 PROCEDURE — G8598 ASA/ANTIPLAT THER USED: HCPCS | Performed by: INTERNAL MEDICINE

## 2018-04-12 PROCEDURE — 3017F COLORECTAL CA SCREEN DOC REV: CPT | Performed by: INTERNAL MEDICINE

## 2018-04-12 PROCEDURE — G8427 DOCREV CUR MEDS BY ELIG CLIN: HCPCS | Performed by: INTERNAL MEDICINE

## 2018-04-18 ENCOUNTER — HOSPITAL ENCOUNTER (OUTPATIENT)
Dept: NURSING | Age: 63
Discharge: HOME | End: 2018-04-18
Payer: MEDICARE

## 2018-04-18 VITALS
RESPIRATION RATE: 18 BRPM | SYSTOLIC BLOOD PRESSURE: 175 MMHG | OXYGEN SATURATION: 99 % | DIASTOLIC BLOOD PRESSURE: 78 MMHG | HEART RATE: 65 BPM | TEMPERATURE: 98 F

## 2018-04-18 PROCEDURE — 96372 THER/PROPH/DIAG INJ SC/IM: CPT

## 2018-04-18 PROCEDURE — 6360000002 HC RX W HCPCS: Performed by: NURSE PRACTITIONER

## 2018-04-18 RX ADMIN — DARBEPOETIN ALFA 150 MCG: 150 INJECTION, SOLUTION INTRAVENOUS; SUBCUTANEOUS at 13:26

## 2018-04-18 ASSESSMENT — PAIN DESCRIPTION - DESCRIPTORS: DESCRIPTORS: CRAMPING

## 2018-04-18 ASSESSMENT — PAIN - FUNCTIONAL ASSESSMENT: PAIN_FUNCTIONAL_ASSESSMENT: 0-10

## 2018-04-18 NOTE — PROGRESS NOTES
1305:  ARRIVES AMBULATORY FOR ARANESP INJECTION. PROCESS REVIEWED AND PT RIGHTS AND RESPONSIBILITIES OFFERED TO PT.  1330:  TOLERATED INJECTION WELL.   PT DISCHARGED AMBULATORY WITH INSTRUCTIONS.    __M__ Safety:       (Environmental)   [place] to environment   Ensure ID band is correct and in place/ allergy band as needed   Assess for fall risk   Initiate fall precautions as applicable (fall band, side rails, etc.)   Call light within reach   Bed in low position/ wheels locked    _M___ Pain:        Assess pain level and characteristics   Administer analgesics as ordered   Assess effectiveness of pain management and report to MD as needed    _M___ Knowledge Deficit:   Assess baseline knowledge   Provide teaching at level of understanding   Provide teaching via preferred learning method   Evaluate teaching effectiveness    __M__ Hemodynamic/Respiratory Status:       (Pre and Post Procedure Monitoring)   Assess/Monitor vital signs and LOC   Assess Baseline SpO2 prior to any sedation   Obtain weight/height   Assess vital signs/ LOC until patient meets discharge criteria   Monitor procedure site and notify MD of any issues    ___

## 2018-04-19 ENCOUNTER — HOSPITAL ENCOUNTER (OUTPATIENT)
Age: 63
Discharge: HOME | End: 2018-04-19
Payer: MEDICARE

## 2018-04-19 DIAGNOSIS — R80.1 PERSISTENT PROTEINURIA: ICD-10-CM

## 2018-04-19 DIAGNOSIS — E03.9 ACQUIRED HYPOTHYROIDISM: ICD-10-CM

## 2018-04-19 DIAGNOSIS — N17.9 AKI (ACUTE KIDNEY INJURY) (HCC): ICD-10-CM

## 2018-04-19 LAB
ANION GAP SERPL CALCULATED.3IONS-SCNC: 16 MEQ/L (ref 8–16)
BUN BLDV-MCNC: 51 MG/DL (ref 7–22)
CALCIUM SERPL-MCNC: 9.6 MG/DL (ref 8.5–10.5)
CHLORIDE BLD-SCNC: 103 MEQ/L (ref 98–111)
CO2: 25 MEQ/L (ref 23–33)
CREAT SERPL-MCNC: 4.6 MG/DL (ref 0.4–1.2)
CREATININE URINE: 64.4 MG/DL
GFR SERPL CREATININE-BSD FRML MDRD: 12 ML/MIN/1.73M2
GLUCOSE BLD-MCNC: 108 MG/DL (ref 70–108)
POTASSIUM SERPL-SCNC: 3.5 MEQ/L (ref 3.5–5.2)
PROT/CREAT RATIO, UR: 6.65
PROTEIN, URINE: 428.1 MG/DL
SODIUM BLD-SCNC: 144 MEQ/L (ref 135–145)
TSH SERPL DL<=0.05 MIU/L-ACNC: 2.27 UIU/ML (ref 0.4–4.2)

## 2018-04-19 PROCEDURE — 84443 ASSAY THYROID STIM HORMONE: CPT

## 2018-04-19 PROCEDURE — 36415 COLL VENOUS BLD VENIPUNCTURE: CPT

## 2018-04-19 PROCEDURE — 84156 ASSAY OF PROTEIN URINE: CPT

## 2018-04-19 PROCEDURE — 83883 ASSAY NEPHELOMETRY NOT SPEC: CPT

## 2018-04-19 PROCEDURE — 80048 BASIC METABOLIC PNL TOTAL CA: CPT

## 2018-04-19 PROCEDURE — 82570 ASSAY OF URINE CREATININE: CPT

## 2018-04-19 PROCEDURE — 86038 ANTINUCLEAR ANTIBODIES: CPT

## 2018-04-19 PROCEDURE — 86160 COMPLEMENT ANTIGEN: CPT

## 2018-04-20 ENCOUNTER — HOSPITAL ENCOUNTER (OUTPATIENT)
Dept: ULTRASOUND IMAGING | Age: 63
Discharge: HOME | End: 2018-04-20
Payer: MEDICARE

## 2018-04-20 DIAGNOSIS — N17.9 AKI (ACUTE KIDNEY INJURY) (HCC): ICD-10-CM

## 2018-04-20 PROCEDURE — 76770 US EXAM ABDO BACK WALL COMP: CPT

## 2018-04-22 LAB
ANA SCREEN: NORMAL
C3 COMPLEMENT: 118 MG/DL (ref 88–201)
C4 COMPLEMENT: 36 MG/DL (ref 10–40)
KAPPA/LAMBDA FREE LIGHT CHAINS: NORMAL

## 2018-04-23 ENCOUNTER — TELEPHONE (OUTPATIENT)
Dept: NEPHROLOGY | Age: 63
End: 2018-04-23

## 2018-04-25 ENCOUNTER — HOSPITAL ENCOUNTER (OUTPATIENT)
Dept: NURSING | Age: 63
Discharge: HOME | End: 2018-04-25
Payer: MEDICARE

## 2018-04-25 VITALS
DIASTOLIC BLOOD PRESSURE: 76 MMHG | RESPIRATION RATE: 20 BRPM | SYSTOLIC BLOOD PRESSURE: 164 MMHG | TEMPERATURE: 97.5 F | OXYGEN SATURATION: 96 % | HEART RATE: 67 BPM

## 2018-04-25 PROCEDURE — 6360000002 HC RX W HCPCS: Performed by: NURSE PRACTITIONER

## 2018-04-25 PROCEDURE — 96372 THER/PROPH/DIAG INJ SC/IM: CPT

## 2018-04-25 RX ADMIN — DARBEPOETIN ALFA 150 MCG: 150 INJECTION, SOLUTION INTRAVENOUS; SUBCUTANEOUS at 13:27

## 2018-04-25 ASSESSMENT — PAIN - FUNCTIONAL ASSESSMENT: PAIN_FUNCTIONAL_ASSESSMENT: 0-10

## 2018-04-25 ASSESSMENT — PAIN DESCRIPTION - DESCRIPTORS: DESCRIPTORS: ACHING

## 2018-04-25 NOTE — PROGRESS NOTES
1315:  ARRIVES AMBULATORY FOR ARANESP INJECTION.   PROCESS REVIEWED AND PT RIGHTS AND RESPONSIBILITIES OFFERED TO PT.  1330:  PT TOLERATED INJECTION AND DISCHARGED AMBULATORY WITH INSTRUCTIONS.      _M___ Safety:       (Environmental)   [place] to environment   Ensure ID band is correct and in place/ allergy band as needed   Assess for fall risk   Initiate fall precautions as applicable (fall band, side rails, etc.)   Call light within reach   Bed in low position/ wheels locked    _M___ Pain:        Assess pain level and characteristics   Administer analgesics as ordered   Assess effectiveness of pain management and report to MD as needed    _M___ Knowledge Deficit:   Assess baseline knowledge   Provide teaching at level of understanding   Provide teaching via preferred learning method   Evaluate teaching effectiveness    __M__ Hemodynamic/Respiratory Status:       (Pre and Post Procedure Monitoring)   Assess/Monitor vital signs and LOC   Assess Baseline SpO2 prior to any sedation   Obtain weight/height   Assess vital signs/ LOC until patient meets discharge criteria   Monitor procedure site and notify MD of any issues

## 2018-04-26 ENCOUNTER — OFFICE VISIT (OUTPATIENT)
Dept: NEPHROLOGY | Age: 63
End: 2018-04-26
Payer: MEDICARE

## 2018-04-26 VITALS
BODY MASS INDEX: 34.18 KG/M2 | SYSTOLIC BLOOD PRESSURE: 178 MMHG | WEIGHT: 205.4 LBS | DIASTOLIC BLOOD PRESSURE: 84 MMHG | HEART RATE: 70 BPM | OXYGEN SATURATION: 99 %

## 2018-04-26 DIAGNOSIS — N25.81 SECONDARY HYPERPARATHYROIDISM OF RENAL ORIGIN (HCC): ICD-10-CM

## 2018-04-26 DIAGNOSIS — I10 ESSENTIAL HYPERTENSION: ICD-10-CM

## 2018-04-26 DIAGNOSIS — N18.4 CKD (CHRONIC KIDNEY DISEASE), STAGE IV (HCC): ICD-10-CM

## 2018-04-26 DIAGNOSIS — N18.4 TYPE 2 DIABETES MELLITUS WITH STAGE 4 CHRONIC KIDNEY DISEASE, UNSPECIFIED LONG TERM INSULIN USE STATUS: ICD-10-CM

## 2018-04-26 DIAGNOSIS — E11.22 TYPE 2 DIABETES MELLITUS WITH STAGE 4 CHRONIC KIDNEY DISEASE, UNSPECIFIED LONG TERM INSULIN USE STATUS: ICD-10-CM

## 2018-04-26 DIAGNOSIS — N04.9 NEPHROTIC SYNDROME: ICD-10-CM

## 2018-04-26 DIAGNOSIS — N17.9 AKI (ACUTE KIDNEY INJURY) (HCC): Primary | ICD-10-CM

## 2018-04-26 PROBLEM — E11.9 TYPE 2 DIABETES MELLITUS (HCC): Status: ACTIVE | Noted: 2018-04-26

## 2018-04-26 PROBLEM — E11.9 TYPE 2 DIABETES MELLITUS (HCC): Status: ACTIVE | Noted: 2017-02-09

## 2018-04-26 PROCEDURE — 1036F TOBACCO NON-USER: CPT | Performed by: INTERNAL MEDICINE

## 2018-04-26 PROCEDURE — 99214 OFFICE O/P EST MOD 30 MIN: CPT | Performed by: INTERNAL MEDICINE

## 2018-04-26 PROCEDURE — G8598 ASA/ANTIPLAT THER USED: HCPCS | Performed by: INTERNAL MEDICINE

## 2018-04-26 PROCEDURE — 2022F DILAT RTA XM EVC RTNOPTHY: CPT | Performed by: INTERNAL MEDICINE

## 2018-04-26 PROCEDURE — 3044F HG A1C LEVEL LT 7.0%: CPT | Performed by: INTERNAL MEDICINE

## 2018-04-26 PROCEDURE — G8427 DOCREV CUR MEDS BY ELIG CLIN: HCPCS | Performed by: INTERNAL MEDICINE

## 2018-04-26 PROCEDURE — G8417 CALC BMI ABV UP PARAM F/U: HCPCS | Performed by: INTERNAL MEDICINE

## 2018-04-26 PROCEDURE — 3017F COLORECTAL CA SCREEN DOC REV: CPT | Performed by: INTERNAL MEDICINE

## 2018-04-26 RX ORDER — ALPRAZOLAM 1 MG/1
TABLET ORAL
Refills: 5 | COMMUNITY
Start: 2018-04-21 | End: 2018-08-08 | Stop reason: SDUPTHER

## 2018-04-26 RX ORDER — DOXAZOSIN 2 MG/1
2 TABLET ORAL DAILY
Qty: 90 TABLET | Refills: 3 | Status: SHIPPED | OUTPATIENT
Start: 2018-04-26 | End: 2018-05-24 | Stop reason: SDUPTHER

## 2018-04-26 RX ORDER — HYDROCODONE BITARTRATE AND ACETAMINOPHEN 5; 325 MG/1; MG/1
TABLET ORAL
Refills: 0 | COMMUNITY
Start: 2018-04-22 | End: 2018-08-21 | Stop reason: SDUPTHER

## 2018-05-01 RX ORDER — DOXAZOSIN 2 MG/1
2 TABLET ORAL DAILY
Qty: 90 TABLET | Refills: 3 | Status: SHIPPED | OUTPATIENT
Start: 2018-05-01 | End: 2018-05-24 | Stop reason: DRUGHIGH

## 2018-05-02 ENCOUNTER — HOSPITAL ENCOUNTER (OUTPATIENT)
Dept: NURSING | Age: 63
Discharge: HOME | End: 2018-05-02
Payer: MEDICARE

## 2018-05-02 VITALS
RESPIRATION RATE: 16 BRPM | DIASTOLIC BLOOD PRESSURE: 66 MMHG | TEMPERATURE: 97.7 F | SYSTOLIC BLOOD PRESSURE: 167 MMHG | OXYGEN SATURATION: 99 % | HEART RATE: 78 BPM

## 2018-05-02 PROCEDURE — 6360000002 HC RX W HCPCS: Performed by: NURSE PRACTITIONER

## 2018-05-02 PROCEDURE — 96372 THER/PROPH/DIAG INJ SC/IM: CPT

## 2018-05-02 RX ADMIN — DARBEPOETIN ALFA 150 MCG: 150 INJECTION, SOLUTION INTRAVENOUS; SUBCUTANEOUS at 13:34

## 2018-05-02 NOTE — PROGRESS NOTES
1:19 PM pt arrives to OPN for Aranesp injection. All questions and concerns addressed  1320 PATIENT RIGHTS AND RESPONSIBILITIES SHEET OFFERED TO PT TO READ.   1330 _m___ Safety:       (Environmental)   [place] to environment   Ensure ID band is correct and in place/ allergy band as needed   Assess for fall risk   Initiate fall precautions as applicable (fall band, side rails, etc.)   Call light within reach   Bed in low position/ wheels locked    __m__ Pain:        Assess pain level and characteristics   Administer analgesics as ordered   Assess effectiveness of pain management and report to MD as needed    _m___ Knowledge Deficit:   Assess baseline knowledge   Provide teaching at level of understanding   Provide teaching via preferred learning method   Evaluate teaching effectiveness    ___m_ Hemodynamic/Respiratory Status:       (Pre and Post Procedure Monitoring)   Assess/Monitor vital signs and LOC   Assess Baseline SpO2 prior to any sedation   Obtain weight/height   Assess vital signs/ LOC until patient meets discharge criteria   Monitor procedure site and notify MD of any issues     1340 WRITTEN DISCHARGE INSTRUCTIONS GIVEN TO PT-VERBALIZES UNDERSTANDING    1345 PT DISCHARGED AMBULATORY IN SATISFACTORY CONDITION

## 2018-05-19 ENCOUNTER — HOSPITAL ENCOUNTER (OUTPATIENT)
Age: 63
Discharge: HOME | End: 2018-05-19
Payer: MEDICARE

## 2018-05-19 DIAGNOSIS — N17.9 AKI (ACUTE KIDNEY INJURY) (HCC): ICD-10-CM

## 2018-05-19 DIAGNOSIS — N25.81 SECONDARY HYPERPARATHYROIDISM OF RENAL ORIGIN (HCC): ICD-10-CM

## 2018-05-19 DIAGNOSIS — N18.4 CKD (CHRONIC KIDNEY DISEASE), STAGE IV (HCC): ICD-10-CM

## 2018-05-19 LAB
ANION GAP SERPL CALCULATED.3IONS-SCNC: 12 MEQ/L (ref 8–16)
BUN BLDV-MCNC: 79 MG/DL (ref 7–22)
CALCIUM SERPL-MCNC: 9.1 MG/DL (ref 8.5–10.5)
CHLORIDE BLD-SCNC: 106 MEQ/L (ref 98–111)
CO2: 26 MEQ/L (ref 23–33)
CREAT SERPL-MCNC: 4.5 MG/DL (ref 0.4–1.2)
GFR SERPL CREATININE-BSD FRML MDRD: 12 ML/MIN/1.73M2
GLUCOSE BLD-MCNC: 119 MG/DL (ref 70–108)
PHOSPHORUS: 3.9 MG/DL (ref 2.4–4.7)
POTASSIUM SERPL-SCNC: 3.6 MEQ/L (ref 3.5–5.2)
SODIUM BLD-SCNC: 144 MEQ/L (ref 135–145)

## 2018-05-19 PROCEDURE — 36415 COLL VENOUS BLD VENIPUNCTURE: CPT

## 2018-05-19 PROCEDURE — 84100 ASSAY OF PHOSPHORUS: CPT

## 2018-05-19 PROCEDURE — 80048 BASIC METABOLIC PNL TOTAL CA: CPT

## 2018-05-22 ENCOUNTER — OFFICE VISIT (OUTPATIENT)
Dept: PHYSICAL MEDICINE AND REHAB | Age: 63
End: 2018-05-22
Payer: MEDICARE

## 2018-05-22 VITALS — SYSTOLIC BLOOD PRESSURE: 190 MMHG | DIASTOLIC BLOOD PRESSURE: 79 MMHG | HEIGHT: 65 IN | HEART RATE: 67 BPM

## 2018-05-22 DIAGNOSIS — M54.32 SCIATICA OF LEFT SIDE: Primary | ICD-10-CM

## 2018-05-22 DIAGNOSIS — M25.571 CHRONIC PAIN OF RIGHT ANKLE: ICD-10-CM

## 2018-05-22 DIAGNOSIS — G89.29 CHRONIC PAIN OF RIGHT ANKLE: ICD-10-CM

## 2018-05-22 DIAGNOSIS — E11.42 DIABETIC POLYNEUROPATHY ASSOCIATED WITH TYPE 2 DIABETES MELLITUS (HCC): ICD-10-CM

## 2018-05-22 PROCEDURE — G8417 CALC BMI ABV UP PARAM F/U: HCPCS | Performed by: NURSE PRACTITIONER

## 2018-05-22 PROCEDURE — 3044F HG A1C LEVEL LT 7.0%: CPT | Performed by: NURSE PRACTITIONER

## 2018-05-22 PROCEDURE — 99213 OFFICE O/P EST LOW 20 MIN: CPT | Performed by: NURSE PRACTITIONER

## 2018-05-22 PROCEDURE — 2022F DILAT RTA XM EVC RTNOPTHY: CPT | Performed by: NURSE PRACTITIONER

## 2018-05-22 PROCEDURE — 1036F TOBACCO NON-USER: CPT | Performed by: NURSE PRACTITIONER

## 2018-05-22 PROCEDURE — 3017F COLORECTAL CA SCREEN DOC REV: CPT | Performed by: NURSE PRACTITIONER

## 2018-05-22 PROCEDURE — G8598 ASA/ANTIPLAT THER USED: HCPCS | Performed by: NURSE PRACTITIONER

## 2018-05-22 PROCEDURE — G8427 DOCREV CUR MEDS BY ELIG CLIN: HCPCS | Performed by: NURSE PRACTITIONER

## 2018-05-22 RX ORDER — HYDROCODONE BITARTRATE AND ACETAMINOPHEN 5; 325 MG/1; MG/1
1 TABLET ORAL EVERY 8 HOURS PRN
Qty: 90 TABLET | Refills: 0 | Status: SHIPPED | OUTPATIENT
Start: 2018-05-22 | End: 2018-05-24 | Stop reason: SDUPTHER

## 2018-05-22 RX ORDER — PREDNISONE 10 MG/1
TABLET ORAL
Qty: 6 TABLET | Refills: 0 | Status: SHIPPED | OUTPATIENT
Start: 2018-05-22 | End: 2018-07-24

## 2018-05-24 ENCOUNTER — HOSPITAL ENCOUNTER (OUTPATIENT)
Age: 63
Discharge: HOME | End: 2018-05-24
Payer: MEDICARE

## 2018-05-24 ENCOUNTER — OFFICE VISIT (OUTPATIENT)
Dept: NEPHROLOGY | Age: 63
End: 2018-05-24
Payer: MEDICARE

## 2018-05-24 VITALS
HEART RATE: 69 BPM | BODY MASS INDEX: 32.92 KG/M2 | DIASTOLIC BLOOD PRESSURE: 77 MMHG | WEIGHT: 197.8 LBS | SYSTOLIC BLOOD PRESSURE: 169 MMHG | OXYGEN SATURATION: 99 %

## 2018-05-24 DIAGNOSIS — N18.4 CKD (CHRONIC KIDNEY DISEASE), STAGE IV (HCC): Primary | ICD-10-CM

## 2018-05-24 DIAGNOSIS — D50.8 IRON DEFICIENCY ANEMIA DUE TO DIETARY CAUSES: ICD-10-CM

## 2018-05-24 DIAGNOSIS — N18.4 CKD (CHRONIC KIDNEY DISEASE), STAGE IV (HCC): ICD-10-CM

## 2018-05-24 LAB
FERRITIN: 754 NG/ML (ref 10–291)
HCT VFR BLD CALC: 28 % (ref 37–47)
HEMOGLOBIN: 9 GM/DL (ref 12–16)
IRON SATURATION: 27 % (ref 20–50)
IRON: 43 UG/DL (ref 50–170)
TOTAL IRON BINDING CAPACITY: 159 UG/DL (ref 171–450)

## 2018-05-24 PROCEDURE — 36415 COLL VENOUS BLD VENIPUNCTURE: CPT

## 2018-05-24 PROCEDURE — 85018 HEMOGLOBIN: CPT

## 2018-05-24 PROCEDURE — 99213 OFFICE O/P EST LOW 20 MIN: CPT | Performed by: NURSE PRACTITIONER

## 2018-05-24 PROCEDURE — 1036F TOBACCO NON-USER: CPT | Performed by: NURSE PRACTITIONER

## 2018-05-24 PROCEDURE — 85014 HEMATOCRIT: CPT

## 2018-05-24 PROCEDURE — G8427 DOCREV CUR MEDS BY ELIG CLIN: HCPCS | Performed by: NURSE PRACTITIONER

## 2018-05-24 PROCEDURE — 83540 ASSAY OF IRON: CPT

## 2018-05-24 PROCEDURE — 82728 ASSAY OF FERRITIN: CPT

## 2018-05-24 PROCEDURE — 83550 IRON BINDING TEST: CPT

## 2018-05-24 PROCEDURE — G8598 ASA/ANTIPLAT THER USED: HCPCS | Performed by: NURSE PRACTITIONER

## 2018-05-24 PROCEDURE — 3017F COLORECTAL CA SCREEN DOC REV: CPT | Performed by: NURSE PRACTITIONER

## 2018-05-24 PROCEDURE — G8417 CALC BMI ABV UP PARAM F/U: HCPCS | Performed by: NURSE PRACTITIONER

## 2018-05-24 RX ORDER — DOXAZOSIN 2 MG/1
4 TABLET ORAL DAILY
Qty: 90 TABLET | Refills: 3 | Status: SHIPPED | OUTPATIENT
Start: 2018-05-24 | End: 2018-06-21 | Stop reason: SDUPTHER

## 2018-05-25 ENCOUNTER — TELEPHONE (OUTPATIENT)
Dept: NEPHROLOGY | Age: 63
End: 2018-05-25

## 2018-05-27 DIAGNOSIS — I10 ESSENTIAL HYPERTENSION: ICD-10-CM

## 2018-05-29 RX ORDER — CYANOCOBALAMIN/FOLIC AC/VIT B6 1-2.5-25MG
TABLET ORAL
Qty: 30 TABLET | Refills: 5 | Status: SHIPPED | OUTPATIENT
Start: 2018-05-29 | End: 2018-11-28 | Stop reason: SDUPTHER

## 2018-05-30 ENCOUNTER — HOSPITAL ENCOUNTER (OUTPATIENT)
Dept: NURSING | Age: 63
Discharge: HOME | End: 2018-05-30
Payer: MEDICARE

## 2018-05-30 ENCOUNTER — TELEPHONE (OUTPATIENT)
Dept: NEPHROLOGY | Age: 63
End: 2018-05-30

## 2018-05-30 VITALS
TEMPERATURE: 98.5 F | SYSTOLIC BLOOD PRESSURE: 176 MMHG | DIASTOLIC BLOOD PRESSURE: 89 MMHG | OXYGEN SATURATION: 97 % | HEART RATE: 67 BPM | RESPIRATION RATE: 18 BRPM

## 2018-05-30 DIAGNOSIS — N18.4 CKD (CHRONIC KIDNEY DISEASE), STAGE IV (HCC): ICD-10-CM

## 2018-05-30 DIAGNOSIS — D63.8 ANEMIA, CHRONIC DISEASE: ICD-10-CM

## 2018-05-30 PROCEDURE — 6360000002 HC RX W HCPCS: Performed by: NURSE PRACTITIONER

## 2018-05-30 PROCEDURE — 96372 THER/PROPH/DIAG INJ SC/IM: CPT

## 2018-05-30 RX ADMIN — DARBEPOETIN ALFA 150 MCG: 150 INJECTION, SOLUTION INTRAVENOUS; SUBCUTANEOUS at 14:05

## 2018-05-30 ASSESSMENT — PAIN - FUNCTIONAL ASSESSMENT: PAIN_FUNCTIONAL_ASSESSMENT: 0-10

## 2018-05-30 ASSESSMENT — PAIN DESCRIPTION - DESCRIPTORS: DESCRIPTORS: ACHING;TINGLING;NUMBNESS

## 2018-05-30 NOTE — PROGRESS NOTES
1303 Patient arrived to [facility] ambulatory for aranesp injection. PT RIGHTS AND RESPONSIBILITIES OFFERED TO PT.  1320 Patient instructed on delay due to medication order verbalized understanding  [id number] discharge instructions given to patient verbalized understanding.   [id number] Patient discharged to home in stable condition ambulatory    _m___ Safety:       (Environmental)  [place] to environment   Ensure ID band is correct and in place/ allergy band as needed   Assess for fall risk   Initiate fall precautions as applicable (fall band, side rails, etc.)   Call light within reach   Bed in low position/ wheels locked    m____ Pain:        Assess pain level and characteristics   Administer analgesics as ordered   Assess effectiveness of pain management and report to MD as needed    m___ Knowledge Deficit:   Assess baseline knowledge   Provide teaching at level of understanding   Provide teaching via preferred learning method   Evaluate teaching effectiveness    __m__ Hemodynamic/Respiratory Status:       (Pre and Post Procedure Monitoring)   Assess/Monitor vital signs and LOC   Assess Baseline SpO2 prior to any sedation   Obtain weight/height   Assess vital signs/ LOC until patient meets discharge criteria   Monitor procedure site and notify MD of any issues

## 2018-05-31 ENCOUNTER — HOSPITAL ENCOUNTER (OUTPATIENT)
Dept: PHYSICAL THERAPY | Age: 63
Setting detail: THERAPIES SERIES
Discharge: HOME | End: 2018-05-31
Payer: MEDICARE

## 2018-05-31 PROCEDURE — G8978 MOBILITY CURRENT STATUS: HCPCS

## 2018-05-31 PROCEDURE — G8979 MOBILITY GOAL STATUS: HCPCS

## 2018-05-31 PROCEDURE — 97162 PT EVAL MOD COMPLEX 30 MIN: CPT

## 2018-05-31 PROCEDURE — 97110 THERAPEUTIC EXERCISES: CPT

## 2018-05-31 ASSESSMENT — PAIN DESCRIPTION - ORIENTATION: ORIENTATION: LEFT;LOWER

## 2018-05-31 ASSESSMENT — PAIN SCALES - GENERAL: PAINLEVEL_OUTOF10: 5

## 2018-05-31 ASSESSMENT — PAIN DESCRIPTION - LOCATION: LOCATION: BACK;LEG

## 2018-05-31 ASSESSMENT — PAIN DESCRIPTION - PAIN TYPE: TYPE: CHRONIC PAIN

## 2018-06-04 ENCOUNTER — HOSPITAL ENCOUNTER (OUTPATIENT)
Dept: PHYSICAL THERAPY | Age: 63
Setting detail: THERAPIES SERIES
Discharge: HOME | End: 2018-06-04
Payer: MEDICARE

## 2018-06-04 PROCEDURE — 97110 THERAPEUTIC EXERCISES: CPT

## 2018-06-04 ASSESSMENT — PAIN SCALES - GENERAL: PAINLEVEL_OUTOF10: 7

## 2018-06-06 ENCOUNTER — HOSPITAL ENCOUNTER (OUTPATIENT)
Dept: NURSING | Age: 63
Discharge: HOME | End: 2018-06-06
Payer: MEDICARE

## 2018-06-06 VITALS
BODY MASS INDEX: 32.65 KG/M2 | HEART RATE: 65 BPM | SYSTOLIC BLOOD PRESSURE: 174 MMHG | TEMPERATURE: 96.4 F | OXYGEN SATURATION: 98 % | DIASTOLIC BLOOD PRESSURE: 84 MMHG | RESPIRATION RATE: 18 BRPM | HEIGHT: 65 IN | WEIGHT: 196 LBS

## 2018-06-06 DIAGNOSIS — D63.8 ANEMIA, CHRONIC DISEASE: ICD-10-CM

## 2018-06-06 DIAGNOSIS — N18.4 CKD (CHRONIC KIDNEY DISEASE), STAGE IV (HCC): ICD-10-CM

## 2018-06-06 PROCEDURE — 96372 THER/PROPH/DIAG INJ SC/IM: CPT

## 2018-06-06 PROCEDURE — 6360000002 HC RX W HCPCS: Performed by: NURSE PRACTITIONER

## 2018-06-06 RX ADMIN — DARBEPOETIN ALFA 150 MCG: 150 INJECTION, SOLUTION INTRAVENOUS; SUBCUTANEOUS at 13:26

## 2018-06-07 ENCOUNTER — HOSPITAL ENCOUNTER (OUTPATIENT)
Dept: PHYSICAL THERAPY | Age: 63
Setting detail: THERAPIES SERIES
Discharge: HOME | End: 2018-06-07
Payer: MEDICARE

## 2018-06-07 PROCEDURE — 97110 THERAPEUTIC EXERCISES: CPT

## 2018-06-07 ASSESSMENT — PAIN DESCRIPTION - PAIN TYPE: TYPE: CHRONIC PAIN

## 2018-06-07 ASSESSMENT — PAIN DESCRIPTION - ORIENTATION: ORIENTATION: LEFT

## 2018-06-07 ASSESSMENT — PAIN DESCRIPTION - LOCATION: LOCATION: HIP;LEG

## 2018-06-07 ASSESSMENT — PAIN SCALES - GENERAL: PAINLEVEL_OUTOF10: 3

## 2018-06-11 ENCOUNTER — HOSPITAL ENCOUNTER (OUTPATIENT)
Dept: PHYSICAL THERAPY | Age: 63
Setting detail: THERAPIES SERIES
Discharge: HOME | End: 2018-06-11
Payer: MEDICARE

## 2018-06-11 PROCEDURE — 97110 THERAPEUTIC EXERCISES: CPT

## 2018-06-11 ASSESSMENT — PAIN DESCRIPTION - ORIENTATION: ORIENTATION: LEFT

## 2018-06-11 ASSESSMENT — PAIN SCALES - GENERAL: PAINLEVEL_OUTOF10: 5

## 2018-06-11 ASSESSMENT — PAIN DESCRIPTION - PAIN TYPE: TYPE: CHRONIC PAIN

## 2018-06-11 ASSESSMENT — PAIN DESCRIPTION - LOCATION: LOCATION: HIP;LEG

## 2018-06-13 ENCOUNTER — HOSPITAL ENCOUNTER (OUTPATIENT)
Dept: NURSING | Age: 63
Discharge: HOME | End: 2018-06-13
Payer: MEDICARE

## 2018-06-13 VITALS
OXYGEN SATURATION: 99 % | TEMPERATURE: 98.4 F | RESPIRATION RATE: 18 BRPM | SYSTOLIC BLOOD PRESSURE: 176 MMHG | DIASTOLIC BLOOD PRESSURE: 80 MMHG | HEART RATE: 78 BPM

## 2018-06-13 DIAGNOSIS — D63.8 ANEMIA, CHRONIC DISEASE: ICD-10-CM

## 2018-06-13 DIAGNOSIS — N18.4 CKD (CHRONIC KIDNEY DISEASE), STAGE IV (HCC): ICD-10-CM

## 2018-06-13 PROCEDURE — 6360000002 HC RX W HCPCS: Performed by: NURSE PRACTITIONER

## 2018-06-13 PROCEDURE — 96372 THER/PROPH/DIAG INJ SC/IM: CPT

## 2018-06-13 RX ADMIN — DARBEPOETIN ALFA 150 MCG: 150 INJECTION, SOLUTION INTRAVENOUS; SUBCUTANEOUS at 13:17

## 2018-06-13 NOTE — PROGRESS NOTES
1:14 PM pt arrives to OPN for Aranesp injection  1315 PATIENT RIGHTS AND RESPONSIBILITIES SHEET OFFERED TO PT TO READ.   1320 __m__ Safety:       (Environmental)   [place] to environment   Ensure ID band is correct and in place/ allergy band as needed   Assess for fall risk   Initiate fall precautions as applicable (fall band, side rails, etc.)   Call light within reach   Bed in low position/ wheels locked    __m__ Pain:        Assess pain level and characteristics   Administer analgesics as ordered   Assess effectiveness of pain management and report to MD as needed    _m___ Knowledge Deficit:   Assess baseline knowledge   Provide teaching at level of understanding   Provide teaching via preferred learning method   Evaluate teaching effectiveness    __m__ Hemodynamic/Respiratory Status:       (Pre and Post Procedure Monitoring)   Assess/Monitor vital signs and LOC   Assess Baseline SpO2 prior to any sedation   Obtain weight/height   Assess vital signs/ LOC until patient meets discharge criteria   Monitor procedure site and notify MD of any issues       1325 WRITTEN DISCHARGE INSTRUCTIONS GIVEN TO PT-VERBALIZES UNDERSTANDING    1330 PT DISCHARGED AMBULATORY IN SATISFACTORY CONDITION

## 2018-06-14 ENCOUNTER — HOSPITAL ENCOUNTER (OUTPATIENT)
Dept: PHYSICAL THERAPY | Age: 63
Setting detail: THERAPIES SERIES
Discharge: HOME | End: 2018-06-14
Payer: MEDICARE

## 2018-06-14 PROCEDURE — 97110 THERAPEUTIC EXERCISES: CPT

## 2018-06-14 ASSESSMENT — PAIN DESCRIPTION - PAIN TYPE: TYPE: ACUTE PAIN

## 2018-06-14 ASSESSMENT — PAIN DESCRIPTION - LOCATION: LOCATION: GENERALIZED

## 2018-06-14 ASSESSMENT — PAIN SCALES - GENERAL: PAINLEVEL_OUTOF10: 8

## 2018-06-14 ASSESSMENT — PAIN DESCRIPTION - ORIENTATION: ORIENTATION: RIGHT;LEFT

## 2018-06-18 ENCOUNTER — HOSPITAL ENCOUNTER (OUTPATIENT)
Dept: PHYSICAL THERAPY | Age: 63
Setting detail: THERAPIES SERIES
Discharge: HOME | End: 2018-06-18
Payer: MEDICARE

## 2018-06-18 PROCEDURE — 97012 MECHANICAL TRACTION THERAPY: CPT

## 2018-06-18 PROCEDURE — 97110 THERAPEUTIC EXERCISES: CPT

## 2018-06-18 ASSESSMENT — PAIN SCALES - GENERAL: PAINLEVEL_OUTOF10: 4

## 2018-06-18 ASSESSMENT — PAIN DESCRIPTION - PAIN TYPE: TYPE: CHRONIC PAIN

## 2018-06-20 ENCOUNTER — HOSPITAL ENCOUNTER (OUTPATIENT)
Dept: NURSING | Age: 63
Discharge: HOME | End: 2018-06-20
Payer: MEDICARE

## 2018-06-20 ENCOUNTER — HOSPITAL ENCOUNTER (OUTPATIENT)
Age: 63
Discharge: HOME | End: 2018-06-20
Payer: MEDICARE

## 2018-06-20 VITALS
TEMPERATURE: 97.8 F | RESPIRATION RATE: 20 BRPM | OXYGEN SATURATION: 98 % | DIASTOLIC BLOOD PRESSURE: 84 MMHG | SYSTOLIC BLOOD PRESSURE: 168 MMHG | HEART RATE: 80 BPM

## 2018-06-20 DIAGNOSIS — N18.4 CKD (CHRONIC KIDNEY DISEASE), STAGE IV (HCC): ICD-10-CM

## 2018-06-20 DIAGNOSIS — D63.8 ANEMIA, CHRONIC DISEASE: ICD-10-CM

## 2018-06-20 LAB
ANION GAP SERPL CALCULATED.3IONS-SCNC: 16 MEQ/L (ref 8–16)
BUN BLDV-MCNC: 55 MG/DL (ref 7–22)
CALCIUM SERPL-MCNC: 9.4 MG/DL (ref 8.5–10.5)
CHLORIDE BLD-SCNC: 106 MEQ/L (ref 98–111)
CO2: 23 MEQ/L (ref 23–33)
CREAT SERPL-MCNC: 4.7 MG/DL (ref 0.4–1.2)
GFR SERPL CREATININE-BSD FRML MDRD: 11 ML/MIN/1.73M2
GLUCOSE BLD-MCNC: 69 MG/DL (ref 70–108)
HCT VFR BLD CALC: 28.1 % (ref 37–47)
HEMOGLOBIN: 8.9 GM/DL (ref 12–16)
PHOSPHORUS: 4 MG/DL (ref 2.4–4.7)
POTASSIUM SERPL-SCNC: 3.7 MEQ/L (ref 3.5–5.2)
PTH INTACT: 344 PG/ML (ref 15–65)
SODIUM BLD-SCNC: 145 MEQ/L (ref 135–145)
VITAMIN D 25-HYDROXY: 26 NG/ML (ref 30–100)

## 2018-06-20 PROCEDURE — 6360000002 HC RX W HCPCS: Performed by: NURSE PRACTITIONER

## 2018-06-20 PROCEDURE — 84100 ASSAY OF PHOSPHORUS: CPT

## 2018-06-20 PROCEDURE — 80048 BASIC METABOLIC PNL TOTAL CA: CPT

## 2018-06-20 PROCEDURE — 96372 THER/PROPH/DIAG INJ SC/IM: CPT

## 2018-06-20 PROCEDURE — 85018 HEMOGLOBIN: CPT

## 2018-06-20 PROCEDURE — 36415 COLL VENOUS BLD VENIPUNCTURE: CPT

## 2018-06-20 PROCEDURE — 85014 HEMATOCRIT: CPT

## 2018-06-20 PROCEDURE — 82306 VITAMIN D 25 HYDROXY: CPT

## 2018-06-20 PROCEDURE — 83970 ASSAY OF PARATHORMONE: CPT

## 2018-06-20 PROCEDURE — 82652 VIT D 1 25-DIHYDROXY: CPT

## 2018-06-20 RX ADMIN — DARBEPOETIN ALFA 150 MCG: 150 INJECTION, SOLUTION INTRAVENOUS; SUBCUTANEOUS at 13:42

## 2018-06-20 ASSESSMENT — PAIN DESCRIPTION - DESCRIPTORS: DESCRIPTORS: ACHING

## 2018-06-20 ASSESSMENT — PAIN - FUNCTIONAL ASSESSMENT: PAIN_FUNCTIONAL_ASSESSMENT: 0-10

## 2018-06-20 NOTE — PROGRESS NOTES
1320:  ARRIVES AMBULATORY FOR INJECTION. PROCESS REVIEWED AND PT RIGHTS AND RESPONSIBILITIES OFFERED TO PT.    5721:  PT TOLERATED INJECTION WELL.   DISCHARGED AMBULATORY WITH INSTRUCTIONS.    M____ Safety:       (Environmental)   [place] to environment   Ensure ID band is correct and in place/ allergy band as needed   Assess for fall risk   Initiate fall precautions as applicable (fall band, side rails, etc.)   Call light within reach   Bed in low position/ wheels locked    _M___ Pain:        Assess pain level and characteristics   Administer analgesics as ordered   Assess effectiveness of pain management and report to MD as needed    __M__ Knowledge Deficit:   Assess baseline knowledge   Provide teaching at level of understanding   Provide teaching via preferred learning method   Evaluate teaching effectiveness    __M__ Hemodynamic/Respiratory Status:       (Pre and Post Procedure Monitoring)   Assess/Monitor vital signs and LOC   Assess Baseline SpO2 prior to any sedation   Obtain weight/height   Assess vital signs/ LOC until patient meets discharge criteria   Monitor procedure site and notify MD of any issues

## 2018-06-21 ENCOUNTER — HOSPITAL ENCOUNTER (OUTPATIENT)
Dept: PHYSICAL THERAPY | Age: 63
Setting detail: THERAPIES SERIES
End: 2018-06-21
Payer: MEDICARE

## 2018-06-21 ENCOUNTER — OFFICE VISIT (OUTPATIENT)
Dept: NEPHROLOGY | Age: 63
End: 2018-06-21
Payer: MEDICARE

## 2018-06-21 ENCOUNTER — TELEPHONE (OUTPATIENT)
Dept: NEPHROLOGY | Age: 63
End: 2018-06-21

## 2018-06-21 VITALS
RESPIRATION RATE: 18 BRPM | HEART RATE: 60 BPM | WEIGHT: 204 LBS | DIASTOLIC BLOOD PRESSURE: 90 MMHG | SYSTOLIC BLOOD PRESSURE: 192 MMHG | BODY MASS INDEX: 33.95 KG/M2

## 2018-06-21 DIAGNOSIS — N18.5 STAGE 5 CHRONIC KIDNEY DISEASE NOT ON CHRONIC DIALYSIS (HCC): Primary | ICD-10-CM

## 2018-06-21 DIAGNOSIS — E11.22 TYPE 2 DIABETES MELLITUS WITH STAGE 5 CHRONIC KIDNEY DISEASE NOT ON CHRONIC DIALYSIS, WITHOUT LONG-TERM CURRENT USE OF INSULIN (HCC): ICD-10-CM

## 2018-06-21 DIAGNOSIS — D50.8 IRON DEFICIENCY ANEMIA DUE TO DIETARY CAUSES: Primary | Chronic | ICD-10-CM

## 2018-06-21 DIAGNOSIS — E55.9 VITAMIN D DEFICIENCY: ICD-10-CM

## 2018-06-21 DIAGNOSIS — N04.9 NEPHROTIC SYNDROME: ICD-10-CM

## 2018-06-21 DIAGNOSIS — N18.5 TYPE 2 DIABETES MELLITUS WITH STAGE 5 CHRONIC KIDNEY DISEASE NOT ON CHRONIC DIALYSIS, WITHOUT LONG-TERM CURRENT USE OF INSULIN (HCC): ICD-10-CM

## 2018-06-21 DIAGNOSIS — N25.81 SECONDARY HYPERPARATHYROIDISM OF RENAL ORIGIN (HCC): ICD-10-CM

## 2018-06-21 DIAGNOSIS — I10 BENIGN ESSENTIAL HTN: ICD-10-CM

## 2018-06-21 DIAGNOSIS — R80.1 PERSISTENT PROTEINURIA: ICD-10-CM

## 2018-06-21 DIAGNOSIS — D63.8 ANEMIA OF CHRONIC DISEASE: ICD-10-CM

## 2018-06-21 PROBLEM — E11.9 TYPE 2 DIABETES MELLITUS (HCC): Status: ACTIVE | Noted: 2018-06-21

## 2018-06-21 PROCEDURE — 3044F HG A1C LEVEL LT 7.0%: CPT | Performed by: INTERNAL MEDICINE

## 2018-06-21 PROCEDURE — 99213 OFFICE O/P EST LOW 20 MIN: CPT | Performed by: INTERNAL MEDICINE

## 2018-06-21 PROCEDURE — 1036F TOBACCO NON-USER: CPT | Performed by: INTERNAL MEDICINE

## 2018-06-21 PROCEDURE — 3017F COLORECTAL CA SCREEN DOC REV: CPT | Performed by: INTERNAL MEDICINE

## 2018-06-21 PROCEDURE — G8427 DOCREV CUR MEDS BY ELIG CLIN: HCPCS | Performed by: INTERNAL MEDICINE

## 2018-06-21 PROCEDURE — G8417 CALC BMI ABV UP PARAM F/U: HCPCS | Performed by: INTERNAL MEDICINE

## 2018-06-21 PROCEDURE — 2022F DILAT RTA XM EVC RTNOPTHY: CPT | Performed by: INTERNAL MEDICINE

## 2018-06-21 PROCEDURE — G8598 ASA/ANTIPLAT THER USED: HCPCS | Performed by: INTERNAL MEDICINE

## 2018-06-21 RX ORDER — HEPARIN SODIUM (PORCINE) LOCK FLUSH IV SOLN 100 UNIT/ML 100 UNIT/ML
500 SOLUTION INTRAVENOUS PRN
Status: CANCELLED | OUTPATIENT
Start: 2018-06-22

## 2018-06-21 RX ORDER — SODIUM CHLORIDE 9 MG/ML
INJECTION, SOLUTION INTRAVENOUS CONTINUOUS
Status: CANCELLED | OUTPATIENT
Start: 2018-06-21

## 2018-06-21 RX ORDER — HEPARIN SODIUM (PORCINE) LOCK FLUSH IV SOLN 100 UNIT/ML 100 UNIT/ML
500 SOLUTION INTRAVENOUS PRN
Status: CANCELLED | OUTPATIENT
Start: 2018-06-21

## 2018-06-21 RX ORDER — SODIUM CHLORIDE 9 MG/ML
INJECTION, SOLUTION INTRAVENOUS ONCE
Status: CANCELLED | OUTPATIENT
Start: 2018-06-21 | End: 2018-06-21

## 2018-06-21 RX ORDER — SODIUM CHLORIDE 0.9 % (FLUSH) 0.9 %
5 SYRINGE (ML) INJECTION PRN
Status: CANCELLED | OUTPATIENT
Start: 2018-06-21

## 2018-06-21 RX ORDER — SODIUM CHLORIDE 0.9 % (FLUSH) 0.9 %
10 SYRINGE (ML) INJECTION PRN
Status: CANCELLED | OUTPATIENT
Start: 2018-06-21

## 2018-06-21 RX ORDER — DOXAZOSIN 2 MG/1
4 TABLET ORAL DAILY
Qty: 90 TABLET | Refills: 3 | Status: SHIPPED | OUTPATIENT
Start: 2018-06-21 | End: 2018-06-27 | Stop reason: SDUPTHER

## 2018-06-21 RX ORDER — DIPHENHYDRAMINE HYDROCHLORIDE 50 MG/ML
50 INJECTION INTRAMUSCULAR; INTRAVENOUS ONCE
Status: CANCELLED | OUTPATIENT
Start: 2018-06-22 | End: 2018-06-22

## 2018-06-21 RX ORDER — 0.9 % SODIUM CHLORIDE 0.9 %
10 VIAL (ML) INJECTION ONCE
Status: CANCELLED | OUTPATIENT
Start: 2018-06-21 | End: 2018-06-21

## 2018-06-21 RX ORDER — SODIUM CHLORIDE 9 MG/ML
INJECTION, SOLUTION INTRAVENOUS ONCE
Status: CANCELLED | OUTPATIENT
Start: 2018-06-22 | End: 2018-06-22

## 2018-06-21 RX ORDER — METHYLPREDNISOLONE SODIUM SUCCINATE 125 MG/2ML
125 INJECTION, POWDER, LYOPHILIZED, FOR SOLUTION INTRAMUSCULAR; INTRAVENOUS ONCE
Status: CANCELLED | OUTPATIENT
Start: 2018-06-22 | End: 2018-06-22

## 2018-06-21 RX ORDER — METHYLPREDNISOLONE SODIUM SUCCINATE 125 MG/2ML
125 INJECTION, POWDER, LYOPHILIZED, FOR SOLUTION INTRAMUSCULAR; INTRAVENOUS ONCE
Status: CANCELLED | OUTPATIENT
Start: 2018-06-21 | End: 2018-06-21

## 2018-06-21 RX ORDER — 0.9 % SODIUM CHLORIDE 0.9 %
10 VIAL (ML) INJECTION ONCE
Status: CANCELLED | OUTPATIENT
Start: 2018-06-22 | End: 2018-06-22

## 2018-06-21 RX ORDER — SODIUM CHLORIDE 0.9 % (FLUSH) 0.9 %
5 SYRINGE (ML) INJECTION PRN
Status: CANCELLED | OUTPATIENT
Start: 2018-06-22

## 2018-06-21 RX ORDER — SODIUM CHLORIDE 0.9 % (FLUSH) 0.9 %
10 SYRINGE (ML) INJECTION PRN
Status: CANCELLED | OUTPATIENT
Start: 2018-06-22

## 2018-06-21 RX ORDER — SODIUM CHLORIDE 9 MG/ML
INJECTION, SOLUTION INTRAVENOUS CONTINUOUS
Status: CANCELLED | OUTPATIENT
Start: 2018-06-22

## 2018-06-21 RX ORDER — CALCITRIOL 0.25 UG/1
0.25 CAPSULE, LIQUID FILLED ORAL DAILY
Qty: 90 CAPSULE | Refills: 3 | Status: SHIPPED | OUTPATIENT
Start: 2018-06-21 | End: 2018-08-09 | Stop reason: DRUGHIGH

## 2018-06-21 RX ORDER — DIPHENHYDRAMINE HYDROCHLORIDE 50 MG/ML
50 INJECTION INTRAMUSCULAR; INTRAVENOUS ONCE
Status: CANCELLED | OUTPATIENT
Start: 2018-06-21 | End: 2018-06-21

## 2018-06-23 LAB — VITAMIN D 1,25-DIHYDROXY: 27.2 PG/ML (ref 19.9–79.3)

## 2018-06-25 ENCOUNTER — HOSPITAL ENCOUNTER (OUTPATIENT)
Dept: PHYSICAL THERAPY | Age: 63
Setting detail: THERAPIES SERIES
Discharge: HOME | End: 2018-06-25
Payer: MEDICARE

## 2018-06-25 PROCEDURE — 97110 THERAPEUTIC EXERCISES: CPT

## 2018-06-25 ASSESSMENT — PAIN DESCRIPTION - PAIN TYPE: TYPE: CHRONIC PAIN

## 2018-06-25 ASSESSMENT — PAIN DESCRIPTION - ORIENTATION: ORIENTATION: LEFT

## 2018-06-25 ASSESSMENT — PAIN DESCRIPTION - LOCATION: LOCATION: BACK

## 2018-06-25 ASSESSMENT — PAIN SCALES - GENERAL: PAINLEVEL_OUTOF10: 6

## 2018-06-27 ENCOUNTER — HOSPITAL ENCOUNTER (OUTPATIENT)
Dept: NURSING | Age: 63
Discharge: HOME | End: 2018-06-27
Payer: MEDICARE

## 2018-06-27 VITALS
SYSTOLIC BLOOD PRESSURE: 148 MMHG | RESPIRATION RATE: 16 BRPM | HEIGHT: 65 IN | HEART RATE: 66 BPM | OXYGEN SATURATION: 99 % | TEMPERATURE: 98.5 F | WEIGHT: 204 LBS | BODY MASS INDEX: 33.99 KG/M2 | DIASTOLIC BLOOD PRESSURE: 62 MMHG

## 2018-06-27 DIAGNOSIS — D63.8 ANEMIA, CHRONIC DISEASE: ICD-10-CM

## 2018-06-27 DIAGNOSIS — N18.4 CKD (CHRONIC KIDNEY DISEASE), STAGE IV (HCC): ICD-10-CM

## 2018-06-27 DIAGNOSIS — D50.8 IRON DEFICIENCY ANEMIA DUE TO DIETARY CAUSES: ICD-10-CM

## 2018-06-27 PROCEDURE — 2580000003 HC RX 258: Performed by: NURSE PRACTITIONER

## 2018-06-27 PROCEDURE — 6360000002 HC RX W HCPCS: Performed by: NURSE PRACTITIONER

## 2018-06-27 PROCEDURE — 96365 THER/PROPH/DIAG IV INF INIT: CPT

## 2018-06-27 PROCEDURE — 96372 THER/PROPH/DIAG INJ SC/IM: CPT

## 2018-06-27 RX ORDER — SODIUM CHLORIDE 9 MG/ML
INJECTION, SOLUTION INTRAVENOUS CONTINUOUS
Status: CANCELLED | OUTPATIENT
Start: 2018-06-27

## 2018-06-27 RX ORDER — HEPARIN SODIUM (PORCINE) LOCK FLUSH IV SOLN 100 UNIT/ML 100 UNIT/ML
500 SOLUTION INTRAVENOUS PRN
Status: CANCELLED | OUTPATIENT
Start: 2018-06-27

## 2018-06-27 RX ORDER — EPINEPHRINE 1 MG/ML
0.3 INJECTION, SOLUTION, CONCENTRATE INTRAVENOUS PRN
Status: CANCELLED | OUTPATIENT
Start: 2018-06-27

## 2018-06-27 RX ORDER — SODIUM CHLORIDE 0.9 % (FLUSH) 0.9 %
10 SYRINGE (ML) INJECTION PRN
Status: DISCONTINUED | OUTPATIENT
Start: 2018-06-27 | End: 2018-06-28 | Stop reason: HOSPADM

## 2018-06-27 RX ORDER — SODIUM CHLORIDE 9 MG/ML
INJECTION, SOLUTION INTRAVENOUS ONCE
Status: CANCELLED | OUTPATIENT
Start: 2018-06-27 | End: 2018-06-27

## 2018-06-27 RX ORDER — SODIUM CHLORIDE 9 MG/ML
INJECTION, SOLUTION INTRAVENOUS ONCE
Status: DISCONTINUED | OUTPATIENT
Start: 2018-06-27 | End: 2018-06-28 | Stop reason: HOSPADM

## 2018-06-27 RX ORDER — SODIUM CHLORIDE 0.9 % (FLUSH) 0.9 %
10 SYRINGE (ML) INJECTION PRN
Status: CANCELLED | OUTPATIENT
Start: 2018-06-27

## 2018-06-27 RX ORDER — DIPHENHYDRAMINE HYDROCHLORIDE 50 MG/ML
50 INJECTION INTRAMUSCULAR; INTRAVENOUS ONCE
Status: CANCELLED | OUTPATIENT
Start: 2018-06-27 | End: 2018-06-27

## 2018-06-27 RX ORDER — SODIUM CHLORIDE 0.9 % (FLUSH) 0.9 %
5 SYRINGE (ML) INJECTION PRN
Status: CANCELLED | OUTPATIENT
Start: 2018-06-27

## 2018-06-27 RX ORDER — 0.9 % SODIUM CHLORIDE 0.9 %
10 VIAL (ML) INJECTION ONCE
Status: CANCELLED | OUTPATIENT
Start: 2018-06-27 | End: 2018-06-27

## 2018-06-27 RX ORDER — DOXAZOSIN MESYLATE 4 MG/1
4 TABLET ORAL DAILY
Qty: 90 TABLET | Refills: 3 | Status: SHIPPED | OUTPATIENT
Start: 2018-06-27 | End: 2018-07-02 | Stop reason: SDUPTHER

## 2018-06-27 RX ORDER — METHYLPREDNISOLONE SODIUM SUCCINATE 125 MG/2ML
125 INJECTION, POWDER, LYOPHILIZED, FOR SOLUTION INTRAMUSCULAR; INTRAVENOUS ONCE
Status: CANCELLED | OUTPATIENT
Start: 2018-06-27 | End: 2018-06-27

## 2018-06-27 RX ADMIN — DARBEPOETIN ALFA 150 MCG: 150 INJECTION, SOLUTION INTRAVENOUS; SUBCUTANEOUS at 13:41

## 2018-06-27 RX ADMIN — FERRIC CARBOXYMALTOSE INJECTION 750 MG: 50 INJECTION, SOLUTION INTRAVENOUS at 13:45

## 2018-06-27 NOTE — PROGRESS NOTES
1330 pt arrives to [facility] for IV Injectofer and Aranesp injection. All questions and concerns addressed  1335 PATIENT RIGHTS AND RESPONSIBILITIES SHEET OFFERED TO PT TO READ.   5 denies needs, refreshments given  1400 __m__ Safety:       (Environmental)   [place] to environment   Ensure ID band is correct and in place/ allergy band as needed   Assess for fall risk   Initiate fall precautions as applicable (fall band, side rails, etc.)   Call light within reach   Bed in low position/ wheels locked    _m___ Pain:        Assess pain level and characteristics   Administer analgesics as ordered   Assess effectiveness of pain management and report to MD as needed    _m___ Knowledge Deficit:   Assess baseline knowledge   Provide teaching at level of understanding   Provide teaching via preferred learning method   Evaluate teaching effectiveness    __m__ Hemodynamic/Respiratory Status:       (Pre and Post Procedure Monitoring)   Assess/Monitor vital signs and LOC   Assess Baseline SpO2 prior to any sedation   Obtain weight/height   Assess vital signs/ LOC until patient meets discharge criteria   Monitor procedure site and notify MD of any issues         1420 WRITTEN DISCHARGE INSTRUCTIONS GIVEN TO PT-VERBALIZES UNDERSTANDING    1430 PT DISCHARGED AMBULATORY IN SATISFACTORY CONDITION

## 2018-06-28 ENCOUNTER — HOSPITAL ENCOUNTER (OUTPATIENT)
Dept: PHYSICAL THERAPY | Age: 63
Setting detail: THERAPIES SERIES
Discharge: HOME | End: 2018-06-28
Payer: MEDICARE

## 2018-06-28 PROCEDURE — G8979 MOBILITY GOAL STATUS: HCPCS

## 2018-06-28 PROCEDURE — G8978 MOBILITY CURRENT STATUS: HCPCS

## 2018-06-28 PROCEDURE — 97110 THERAPEUTIC EXERCISES: CPT

## 2018-06-28 ASSESSMENT — PAIN DESCRIPTION - LOCATION: LOCATION: BACK;LEG

## 2018-06-28 ASSESSMENT — PAIN DESCRIPTION - ORIENTATION: ORIENTATION: LOWER;LEFT;RIGHT

## 2018-06-28 ASSESSMENT — PAIN DESCRIPTION - PAIN TYPE: TYPE: CHRONIC PAIN

## 2018-06-28 ASSESSMENT — PAIN SCALES - GENERAL: PAINLEVEL_OUTOF10: 7

## 2018-07-02 RX ORDER — DOXAZOSIN MESYLATE 4 MG/1
4 TABLET ORAL DAILY
Qty: 90 TABLET | Refills: 3 | Status: ON HOLD | OUTPATIENT
Start: 2018-07-02 | End: 2018-08-01

## 2018-07-03 ENCOUNTER — HOSPITAL ENCOUNTER (OUTPATIENT)
Dept: NURSING | Age: 63
Discharge: HOME | End: 2018-07-03
Payer: MEDICARE

## 2018-07-03 VITALS
TEMPERATURE: 98 F | BODY MASS INDEX: 34.38 KG/M2 | WEIGHT: 206.6 LBS | SYSTOLIC BLOOD PRESSURE: 176 MMHG | HEART RATE: 63 BPM | DIASTOLIC BLOOD PRESSURE: 80 MMHG | OXYGEN SATURATION: 95 %

## 2018-07-03 DIAGNOSIS — D50.8 IRON DEFICIENCY ANEMIA DUE TO DIETARY CAUSES: ICD-10-CM

## 2018-07-03 DIAGNOSIS — D63.8 ANEMIA, CHRONIC DISEASE: ICD-10-CM

## 2018-07-03 DIAGNOSIS — N18.4 CKD (CHRONIC KIDNEY DISEASE), STAGE IV (HCC): ICD-10-CM

## 2018-07-03 PROCEDURE — 96372 THER/PROPH/DIAG INJ SC/IM: CPT

## 2018-07-03 PROCEDURE — 2580000003 HC RX 258: Performed by: NURSE PRACTITIONER

## 2018-07-03 PROCEDURE — 96365 THER/PROPH/DIAG IV INF INIT: CPT

## 2018-07-03 PROCEDURE — 6360000002 HC RX W HCPCS: Performed by: NURSE PRACTITIONER

## 2018-07-03 RX ORDER — HEPARIN SODIUM (PORCINE) LOCK FLUSH IV SOLN 100 UNIT/ML 100 UNIT/ML
500 SOLUTION INTRAVENOUS PRN
Status: CANCELLED | OUTPATIENT
Start: 2018-07-04

## 2018-07-03 RX ORDER — EPINEPHRINE 1 MG/ML
0.3 INJECTION, SOLUTION, CONCENTRATE INTRAVENOUS PRN
Status: CANCELLED | OUTPATIENT
Start: 2018-07-04

## 2018-07-03 RX ORDER — SODIUM CHLORIDE 0.9 % (FLUSH) 0.9 %
5 SYRINGE (ML) INJECTION PRN
Status: CANCELLED | OUTPATIENT
Start: 2018-07-04

## 2018-07-03 RX ORDER — DIPHENHYDRAMINE HYDROCHLORIDE 50 MG/ML
50 INJECTION INTRAMUSCULAR; INTRAVENOUS ONCE
Status: CANCELLED | OUTPATIENT
Start: 2018-07-04 | End: 2018-07-04

## 2018-07-03 RX ORDER — SODIUM CHLORIDE 0.9 % (FLUSH) 0.9 %
10 SYRINGE (ML) INJECTION PRN
Status: DISCONTINUED | OUTPATIENT
Start: 2018-07-03 | End: 2018-07-04 | Stop reason: HOSPADM

## 2018-07-03 RX ORDER — SODIUM CHLORIDE 0.9 % (FLUSH) 0.9 %
10 SYRINGE (ML) INJECTION PRN
Status: CANCELLED | OUTPATIENT
Start: 2018-07-04

## 2018-07-03 RX ORDER — 0.9 % SODIUM CHLORIDE 0.9 %
10 VIAL (ML) INJECTION ONCE
Status: CANCELLED | OUTPATIENT
Start: 2018-07-04 | End: 2018-07-04

## 2018-07-03 RX ORDER — SODIUM CHLORIDE 9 MG/ML
INJECTION, SOLUTION INTRAVENOUS CONTINUOUS
Status: CANCELLED | OUTPATIENT
Start: 2018-07-04

## 2018-07-03 RX ORDER — SODIUM CHLORIDE 9 MG/ML
INJECTION, SOLUTION INTRAVENOUS ONCE
Status: CANCELLED | OUTPATIENT
Start: 2018-07-04 | End: 2018-07-04

## 2018-07-03 RX ORDER — METHYLPREDNISOLONE SODIUM SUCCINATE 125 MG/2ML
125 INJECTION, POWDER, LYOPHILIZED, FOR SOLUTION INTRAMUSCULAR; INTRAVENOUS ONCE
Status: CANCELLED | OUTPATIENT
Start: 2018-07-04 | End: 2018-07-04

## 2018-07-03 RX ADMIN — FERRIC CARBOXYMALTOSE INJECTION 750 MG: 50 INJECTION, SOLUTION INTRAVENOUS at 13:40

## 2018-07-03 RX ADMIN — DARBEPOETIN ALFA 150 MCG: 150 INJECTION, SOLUTION INTRAVENOUS; SUBCUTANEOUS at 13:37

## 2018-07-03 ASSESSMENT — PAIN - FUNCTIONAL ASSESSMENT: PAIN_FUNCTIONAL_ASSESSMENT: 0-10

## 2018-07-03 ASSESSMENT — PAIN DESCRIPTION - DESCRIPTORS: DESCRIPTORS: ACHING

## 2018-07-03 NOTE — PROGRESS NOTES
[id number] Alert female admitted for iv iron and arenesp procedure reviewed pt verbalized understanding. Pt rights and responsibilities offered to pt to read. 1406 Tolerated injection well. Home instructions to pt verbalized understanding. Gait steady. 1407 Discharged ambulatory stable home.     ___met_ Safety:       (Environmental)   [place] to environment   Ensure ID band is correct and in place/ allergy band as needed   Assess for fall risk   Initiate fall precautions as applicable (fall band, side rails, etc.)   Call light within reach   Bed in low position/ wheels locked    ___nm_ Pain:        Assess pain level and characteristics   Administer analgesics as ordered   Assess effectiveness of pain management and report to MD as needed    __met__ Knowledge Deficit:   Assess baseline knowledge   Provide teaching at level of understanding   Provide teaching via preferred learning method   Evaluate teaching effectiveness    _

## 2018-07-11 ENCOUNTER — HOSPITAL ENCOUNTER (OUTPATIENT)
Dept: NURSING | Age: 63
Discharge: HOME | End: 2018-07-11
Payer: MEDICARE

## 2018-07-11 VITALS
OXYGEN SATURATION: 99 % | TEMPERATURE: 98.9 F | DIASTOLIC BLOOD PRESSURE: 79 MMHG | HEART RATE: 68 BPM | SYSTOLIC BLOOD PRESSURE: 165 MMHG | RESPIRATION RATE: 16 BRPM

## 2018-07-11 DIAGNOSIS — N18.4 CKD (CHRONIC KIDNEY DISEASE), STAGE IV (HCC): ICD-10-CM

## 2018-07-11 DIAGNOSIS — D63.8 ANEMIA, CHRONIC DISEASE: ICD-10-CM

## 2018-07-11 DIAGNOSIS — D50.8 IRON DEFICIENCY ANEMIA DUE TO DIETARY CAUSES: ICD-10-CM

## 2018-07-11 PROCEDURE — 6360000002 HC RX W HCPCS: Performed by: NURSE PRACTITIONER

## 2018-07-11 PROCEDURE — 96372 THER/PROPH/DIAG INJ SC/IM: CPT

## 2018-07-11 RX ORDER — EPINEPHRINE 1 MG/ML
0.3 INJECTION, SOLUTION, CONCENTRATE INTRAVENOUS PRN
Status: CANCELLED | OUTPATIENT
Start: 2018-07-11

## 2018-07-11 RX ORDER — SODIUM CHLORIDE 0.9 % (FLUSH) 0.9 %
10 SYRINGE (ML) INJECTION PRN
Status: CANCELLED | OUTPATIENT
Start: 2018-07-11

## 2018-07-11 RX ORDER — METHYLPREDNISOLONE SODIUM SUCCINATE 125 MG/2ML
125 INJECTION, POWDER, LYOPHILIZED, FOR SOLUTION INTRAMUSCULAR; INTRAVENOUS ONCE
Status: CANCELLED | OUTPATIENT
Start: 2018-07-11 | End: 2018-07-11

## 2018-07-11 RX ORDER — HEPARIN SODIUM (PORCINE) LOCK FLUSH IV SOLN 100 UNIT/ML 100 UNIT/ML
500 SOLUTION INTRAVENOUS PRN
Status: CANCELLED | OUTPATIENT
Start: 2018-07-11

## 2018-07-11 RX ORDER — SODIUM CHLORIDE 0.9 % (FLUSH) 0.9 %
5 SYRINGE (ML) INJECTION PRN
Status: CANCELLED | OUTPATIENT
Start: 2018-07-11

## 2018-07-11 RX ORDER — SODIUM CHLORIDE 9 MG/ML
INJECTION, SOLUTION INTRAVENOUS CONTINUOUS
Status: CANCELLED | OUTPATIENT
Start: 2018-07-11

## 2018-07-11 RX ORDER — DIPHENHYDRAMINE HYDROCHLORIDE 50 MG/ML
50 INJECTION INTRAMUSCULAR; INTRAVENOUS ONCE
Status: CANCELLED | OUTPATIENT
Start: 2018-07-11 | End: 2018-07-11

## 2018-07-11 RX ORDER — SODIUM CHLORIDE 9 MG/ML
INJECTION, SOLUTION INTRAVENOUS ONCE
Status: CANCELLED | OUTPATIENT
Start: 2018-07-11 | End: 2018-07-11

## 2018-07-11 RX ORDER — 0.9 % SODIUM CHLORIDE 0.9 %
10 VIAL (ML) INJECTION ONCE
Status: CANCELLED | OUTPATIENT
Start: 2018-07-11 | End: 2018-07-11

## 2018-07-11 RX ADMIN — DARBEPOETIN ALFA 150 MCG: 150 INJECTION, SOLUTION INTRAVENOUS; SUBCUTANEOUS at 13:15

## 2018-07-11 NOTE — PROGRESS NOTES
1304 Pt arrives to OPN for Aranesp injection. All questions and concerns addressed  1305 PATIENT RIGHTS AND RESPONSIBILITIES SHEET OFFERED TO PT TO READ. 1310 __m__ Safety:       (Environmental)   [place] to environment   Ensure ID band is correct and in place/ allergy band as needed   Assess for fall risk   Initiate fall precautions as applicable (fall band, side rails, etc.)   Call light within reach   Bed in low position/ wheels locked    _m___ Pain:        Assess pain level and characteristics   Administer analgesics as ordered   Assess effectiveness of pain management and report to MD as needed    __m__ Knowledge Deficit:   Assess baseline knowledge   Provide teaching at level of understanding   Provide teaching via preferred learning method   Evaluate teaching effectiveness    __m__ Hemodynamic/Respiratory Status:       (Pre and Post Procedure Monitoring)   Assess/Monitor vital signs and LOC   Assess Baseline SpO2 prior to any sedation   Obtain weight/height   Assess vital signs/ LOC until patient meets discharge criteria   Monitor procedure site and notify MD of any issues         1316 WRITTEN DISCHARGE INSTRUCTIONS GIVEN TO PT-VERBALIZES UNDERSTANDING  1320 pt shows area of concern on left breast to RN. On breast around 1:00 there is a 1.5 cm x 1.0 cm blister and a round blister under that area. Pt denies injury or bug bites. Advised to follow up with PCP or report to urgent care.   [address] SATISFACTORY CONDITION

## 2018-07-18 ENCOUNTER — HOSPITAL ENCOUNTER (OUTPATIENT)
Dept: NURSING | Age: 63
Discharge: HOME | End: 2018-07-18
Payer: MEDICARE

## 2018-07-18 VITALS
DIASTOLIC BLOOD PRESSURE: 82 MMHG | OXYGEN SATURATION: 96 % | RESPIRATION RATE: 18 BRPM | SYSTOLIC BLOOD PRESSURE: 173 MMHG | HEART RATE: 66 BPM

## 2018-07-18 DIAGNOSIS — D63.8 ANEMIA, CHRONIC DISEASE: ICD-10-CM

## 2018-07-18 DIAGNOSIS — N18.4 CKD (CHRONIC KIDNEY DISEASE), STAGE IV (HCC): ICD-10-CM

## 2018-07-18 DIAGNOSIS — D50.8 IRON DEFICIENCY ANEMIA DUE TO DIETARY CAUSES: ICD-10-CM

## 2018-07-18 PROCEDURE — 6360000002 HC RX W HCPCS: Performed by: NURSE PRACTITIONER

## 2018-07-18 PROCEDURE — 96372 THER/PROPH/DIAG INJ SC/IM: CPT

## 2018-07-18 RX ORDER — SODIUM CHLORIDE 0.9 % (FLUSH) 0.9 %
5 SYRINGE (ML) INJECTION PRN
Status: CANCELLED | OUTPATIENT
Start: 2018-07-18

## 2018-07-18 RX ORDER — SODIUM CHLORIDE 9 MG/ML
INJECTION, SOLUTION INTRAVENOUS CONTINUOUS
Status: CANCELLED | OUTPATIENT
Start: 2018-07-18

## 2018-07-18 RX ORDER — 0.9 % SODIUM CHLORIDE 0.9 %
10 VIAL (ML) INJECTION ONCE
Status: CANCELLED | OUTPATIENT
Start: 2018-07-18 | End: 2018-07-18

## 2018-07-18 RX ORDER — SODIUM CHLORIDE 9 MG/ML
INJECTION, SOLUTION INTRAVENOUS ONCE
Status: CANCELLED | OUTPATIENT
Start: 2018-07-18 | End: 2018-07-18

## 2018-07-18 RX ORDER — SODIUM CHLORIDE 0.9 % (FLUSH) 0.9 %
10 SYRINGE (ML) INJECTION PRN
Status: CANCELLED | OUTPATIENT
Start: 2018-07-18

## 2018-07-18 RX ORDER — EPINEPHRINE 1 MG/ML
0.3 INJECTION, SOLUTION, CONCENTRATE INTRAVENOUS PRN
Status: CANCELLED | OUTPATIENT
Start: 2018-07-18

## 2018-07-18 RX ORDER — HEPARIN SODIUM (PORCINE) LOCK FLUSH IV SOLN 100 UNIT/ML 100 UNIT/ML
500 SOLUTION INTRAVENOUS PRN
Status: CANCELLED | OUTPATIENT
Start: 2018-07-18

## 2018-07-18 RX ORDER — DIPHENHYDRAMINE HYDROCHLORIDE 50 MG/ML
50 INJECTION INTRAMUSCULAR; INTRAVENOUS ONCE
Status: CANCELLED | OUTPATIENT
Start: 2018-07-18 | End: 2018-07-18

## 2018-07-18 RX ORDER — METHYLPREDNISOLONE SODIUM SUCCINATE 125 MG/2ML
125 INJECTION, POWDER, LYOPHILIZED, FOR SOLUTION INTRAMUSCULAR; INTRAVENOUS ONCE
Status: CANCELLED | OUTPATIENT
Start: 2018-07-18 | End: 2018-07-18

## 2018-07-18 RX ADMIN — DARBEPOETIN ALFA 150 MCG: 150 INJECTION, SOLUTION INTRAVENOUS; SUBCUTANEOUS at 13:32

## 2018-07-18 ASSESSMENT — PAIN - FUNCTIONAL ASSESSMENT: PAIN_FUNCTIONAL_ASSESSMENT: 0-10

## 2018-07-18 ASSESSMENT — PAIN DESCRIPTION - DESCRIPTORS: DESCRIPTORS: ACHING;HEAVINESS

## 2018-07-18 NOTE — PROGRESS NOTES
1310:  Arrives ambulatory for aranesp.   Process reviewed and PT RIGHTS AND RESPONSIBILITIES OFFERED TO PT.    1345:  PT TOLERATED INJECTION WELL AND DISCHARGED AMBULATORY WITH INSTRUCTIONS.    _M___ Safety:       (Environmental)   [place] to environment   Ensure ID band is correct and in place/ allergy band as needed   Assess for fall risk   Initiate fall precautions as applicable (fall band, side rails, etc.)   Call light within reach   Bed in low position/ wheels locked    _M___ Pain:        Assess pain level and characteristics   Administer analgesics as ordered   Assess effectiveness of pain management and report to MD as needed    _M___ Knowledge Deficit:   Assess baseline knowledge   Provide teaching at level of understanding   Provide teaching via preferred learning method   Evaluate teaching effectiveness    __M__ Hemodynamic/Respiratory Status:       (Pre and Post Procedure Monitoring)   Assess/Monitor vital signs and LOC   Assess Baseline SpO2 prior to any sedation   Obtain weight/height   Assess vital signs/ LOC until patient meets discharge criteria   Monitor procedure site and notify MD of any issues

## 2018-07-19 ENCOUNTER — HOSPITAL ENCOUNTER (OUTPATIENT)
Dept: PHYSICAL THERAPY | Age: 63
Setting detail: THERAPIES SERIES
Discharge: HOME | End: 2018-07-19
Payer: MEDICARE

## 2018-07-19 PROCEDURE — G8979 MOBILITY GOAL STATUS: HCPCS

## 2018-07-19 PROCEDURE — G8980 MOBILITY D/C STATUS: HCPCS

## 2018-07-19 PROCEDURE — 97110 THERAPEUTIC EXERCISES: CPT

## 2018-07-19 ASSESSMENT — PAIN DESCRIPTION - PAIN TYPE: TYPE: CHRONIC PAIN

## 2018-07-19 ASSESSMENT — PAIN SCALES - GENERAL: PAINLEVEL_OUTOF10: 7

## 2018-07-19 ASSESSMENT — PAIN DESCRIPTION - ORIENTATION: ORIENTATION: LEFT;RIGHT

## 2018-07-19 ASSESSMENT — PAIN DESCRIPTION - LOCATION: LOCATION: LEG

## 2018-07-19 NOTE — PROGRESS NOTES
bracing 4 directions  x10  Exercise 15: Discussed HEP and educated on what to continue with - has all pictures needed for home. Activity Tolerance:  Activity Tolerance: Patient Tolerated treatment well    Assessment:  Assessment: Patient has met all goals for therapy for her sciatic nerve pain. Patient having swelling in bilateral legs that is causing her skin to be very taut/tight. Patient needs to follow up with doctor about the swelling. No more therapy warranted and patient can be independent with home program.       Patient Education:  Patient Education: Continue with [facility] for strengthening. Follow up with doctor about dialysis. Plan:  Plan Comment: Discharge to [facility] for strengthening    Goals:  Patient goals : To have left leg work again and stop her pain. Short term goals  Time Frame for Short term goals: 4 weeks  Short term goal 1: See LTG  Short term goal 2:    Short term goal 3:    Short term goal 4:    Short term goal 5:      Long term goals  Time Frame for Long term goals : 4 weeks  Long term goal 1: Patient to be independent with home program to be able to perform all daily activity with minimal to no pain. - MET  Long term goal 2: Patient to be able to consistently decrease left leg symptoms to tolerate sitting and standing for longer periods of time - MET  Long term goal 3: Patient to ambulate with normal gait pattern to do all shopping - MET Patient not having the sciatic nerve pain causing gait problems but is having trouble walking due to the swelling in her legs. Long term goal 4: Patient to demonstrate 4+-5/5 strength in left leg for ease with squatting to sit and pick things up - MET Weakest motion is hip flexion. PT G-Codes  Functional Assessment Tool Used: Patient pain scale  Score: 0/10  Functional Limitation: Mobility: Walking and moving around  Mobility: Walking and Moving Around Goal Status ():  At least 80 percent but less than 100 percent impaired, limited or restricted  Mobility: Walking and Moving Around Discharge Status (): 0 percent impaired, limited or restricted    [address], [address]

## 2018-07-24 ENCOUNTER — TELEPHONE (OUTPATIENT)
Dept: NEPHROLOGY | Age: 63
End: 2018-07-24

## 2018-07-24 ENCOUNTER — APPOINTMENT (OUTPATIENT)
Dept: GENERAL RADIOLOGY | Age: 63
DRG: 189 | End: 2018-07-24
Payer: MEDICARE

## 2018-07-24 ENCOUNTER — HOSPITAL ENCOUNTER (INPATIENT)
Age: 63
LOS: 7 days | Discharge: HOME HEALTH | DRG: 189 | End: 2018-08-01
Attending: EMERGENCY MEDICINE | Admitting: HOSPITALIST
Payer: MEDICARE

## 2018-07-24 DIAGNOSIS — N18.5 CKD (CHRONIC KIDNEY DISEASE) STAGE 5, GFR LESS THAN 15 ML/MIN (HCC): ICD-10-CM

## 2018-07-24 DIAGNOSIS — E87.70 HYPERVOLEMIA, UNSPECIFIED HYPERVOLEMIA TYPE: ICD-10-CM

## 2018-07-24 DIAGNOSIS — E11.9 TYPE 2 DIABETES MELLITUS WITHOUT COMPLICATION, WITH LONG-TERM CURRENT USE OF INSULIN (HCC): ICD-10-CM

## 2018-07-24 DIAGNOSIS — N18.9 CHRONIC KIDNEY DISEASE, UNSPECIFIED CKD STAGE: Primary | ICD-10-CM

## 2018-07-24 DIAGNOSIS — I10 ESSENTIAL HYPERTENSION: ICD-10-CM

## 2018-07-24 DIAGNOSIS — Z79.4 TYPE 2 DIABETES MELLITUS WITHOUT COMPLICATION, WITH LONG-TERM CURRENT USE OF INSULIN (HCC): ICD-10-CM

## 2018-07-24 PROBLEM — N18.6 ESRD (END STAGE RENAL DISEASE) (HCC): Status: ACTIVE | Noted: 2018-07-24

## 2018-07-24 LAB
ALBUMIN SERPL-MCNC: 3.7 G/DL (ref 3.5–5.1)
ALP BLD-CCNC: 103 U/L (ref 38–126)
ALT SERPL-CCNC: 18 U/L (ref 11–66)
ANION GAP SERPL CALCULATED.3IONS-SCNC: 18 MEQ/L (ref 8–16)
AST SERPL-CCNC: 21 U/L (ref 5–40)
BASOPHILS # BLD: 0.2 %
BASOPHILS ABSOLUTE: 0 THOU/MM3 (ref 0–0.1)
BILIRUB SERPL-MCNC: 0.3 MG/DL (ref 0.3–1.2)
BUN BLDV-MCNC: 48 MG/DL (ref 7–22)
CALCIUM SERPL-MCNC: 10.2 MG/DL (ref 8.5–10.5)
CHLORIDE BLD-SCNC: 105 MEQ/L (ref 98–111)
CO2: 24 MEQ/L (ref 23–33)
CREAT SERPL-MCNC: 5 MG/DL (ref 0.4–1.2)
EOSINOPHIL # BLD: 2.2 %
EOSINOPHILS ABSOLUTE: 0.2 THOU/MM3 (ref 0–0.4)
ERYTHROCYTE [DISTWIDTH] IN BLOOD BY AUTOMATED COUNT: 19.5 % (ref 11.5–14.5)
ERYTHROCYTE [DISTWIDTH] IN BLOOD BY AUTOMATED COUNT: 57.7 FL (ref 35–45)
GFR SERPL CREATININE-BSD FRML MDRD: 11 ML/MIN/1.73M2
GLUCOSE BLD-MCNC: 89 MG/DL (ref 70–108)
HCT VFR BLD CALC: 37.1 % (ref 37–47)
HEMOGLOBIN: 11 GM/DL (ref 12–16)
IMMATURE GRANS (ABS): 0.03 THOU/MM3 (ref 0–0.07)
IMMATURE GRANULOCYTES: 0.3 %
LYMPHOCYTES # BLD: 10.8 %
LYMPHOCYTES ABSOLUTE: 1 THOU/MM3 (ref 1–4.8)
MCH RBC QN AUTO: 24.4 PG (ref 26–33)
MCHC RBC AUTO-ENTMCNC: 29.6 GM/DL (ref 32.2–35.5)
MCV RBC AUTO: 82.4 FL (ref 81–99)
MONOCYTES # BLD: 7.2 %
MONOCYTES ABSOLUTE: 0.7 THOU/MM3 (ref 0.4–1.3)
NUCLEATED RED BLOOD CELLS: 0 /100 WBC
OSMOLALITY CALCULATION: 304.5 MOSMOL/KG (ref 275–300)
PLATELET # BLD: 265 THOU/MM3 (ref 130–400)
PMV BLD AUTO: 9.7 FL (ref 9.4–12.4)
POTASSIUM SERPL-SCNC: 3.6 MEQ/L (ref 3.5–5.2)
PRO-BNP: 5571 PG/ML (ref 0–900)
RBC # BLD: 4.5 MILL/MM3 (ref 4.2–5.4)
SEG NEUTROPHILS: 79.3 %
SEGMENTED NEUTROPHILS ABSOLUTE COUNT: 7.5 THOU/MM3 (ref 1.8–7.7)
SODIUM BLD-SCNC: 147 MEQ/L (ref 135–145)
TOTAL PROTEIN: 6.4 G/DL (ref 6.1–8)
TROPONIN T: 0.09 NG/ML
WBC # BLD: 9.4 THOU/MM3 (ref 4.8–10.8)

## 2018-07-24 PROCEDURE — 99220 PR INITIAL OBSERVATION CARE/DAY 70 MINUTES: CPT | Performed by: HOSPITALIST

## 2018-07-24 PROCEDURE — 6370000000 HC RX 637 (ALT 250 FOR IP): Performed by: HOSPITALIST

## 2018-07-24 PROCEDURE — 99285 EMERGENCY DEPT VISIT HI MDM: CPT

## 2018-07-24 PROCEDURE — 85025 COMPLETE CBC W/AUTO DIFF WBC: CPT

## 2018-07-24 PROCEDURE — 80053 COMPREHEN METABOLIC PANEL: CPT

## 2018-07-24 PROCEDURE — 2500000003 HC RX 250 WO HCPCS: Performed by: EMERGENCY MEDICINE

## 2018-07-24 PROCEDURE — 96374 THER/PROPH/DIAG INJ IV PUSH: CPT

## 2018-07-24 PROCEDURE — 36415 COLL VENOUS BLD VENIPUNCTURE: CPT

## 2018-07-24 PROCEDURE — G0378 HOSPITAL OBSERVATION PER HR: HCPCS

## 2018-07-24 PROCEDURE — 71045 X-RAY EXAM CHEST 1 VIEW: CPT

## 2018-07-24 PROCEDURE — 6360000002 HC RX W HCPCS: Performed by: EMERGENCY MEDICINE

## 2018-07-24 PROCEDURE — 2709999900 HC NON-CHARGEABLE SUPPLY

## 2018-07-24 PROCEDURE — 96375 TX/PRO/DX INJ NEW DRUG ADDON: CPT

## 2018-07-24 PROCEDURE — 83880 ASSAY OF NATRIURETIC PEPTIDE: CPT

## 2018-07-24 PROCEDURE — 84484 ASSAY OF TROPONIN QUANT: CPT

## 2018-07-24 RX ORDER — CALCITRIOL 0.25 UG/1
0.25 CAPSULE, LIQUID FILLED ORAL DAILY
Status: DISCONTINUED | OUTPATIENT
Start: 2018-07-25 | End: 2018-08-01 | Stop reason: HOSPADM

## 2018-07-24 RX ORDER — ALBUTEROL SULFATE 90 UG/1
2 AEROSOL, METERED RESPIRATORY (INHALATION) EVERY 6 HOURS PRN
Status: DISCONTINUED | OUTPATIENT
Start: 2018-07-24 | End: 2018-08-01 | Stop reason: HOSPADM

## 2018-07-24 RX ORDER — FERROUS SULFATE 325(65) MG
325 TABLET ORAL
Status: DISCONTINUED | OUTPATIENT
Start: 2018-07-25 | End: 2018-08-01 | Stop reason: HOSPADM

## 2018-07-24 RX ORDER — POTASSIUM CHLORIDE 7.45 MG/ML
10 INJECTION INTRAVENOUS PRN
Status: DISCONTINUED | OUTPATIENT
Start: 2018-07-24 | End: 2018-08-01 | Stop reason: HOSPADM

## 2018-07-24 RX ORDER — ONDANSETRON 2 MG/ML
4 INJECTION INTRAMUSCULAR; INTRAVENOUS EVERY 6 HOURS PRN
Status: DISCONTINUED | OUTPATIENT
Start: 2018-07-24 | End: 2018-07-25 | Stop reason: RX

## 2018-07-24 RX ORDER — AMLODIPINE BESYLATE 10 MG/1
10 TABLET ORAL DAILY
Status: DISCONTINUED | OUTPATIENT
Start: 2018-07-25 | End: 2018-07-25

## 2018-07-24 RX ORDER — POTASSIUM CHLORIDE 20MEQ/15ML
40 LIQUID (ML) ORAL PRN
Status: DISCONTINUED | OUTPATIENT
Start: 2018-07-24 | End: 2018-08-01 | Stop reason: HOSPADM

## 2018-07-24 RX ORDER — FLUTICASONE PROPIONATE 50 MCG
1 SPRAY, SUSPENSION (ML) NASAL DAILY
Status: DISCONTINUED | OUTPATIENT
Start: 2018-07-25 | End: 2018-08-01 | Stop reason: HOSPADM

## 2018-07-24 RX ORDER — POLYETHYLENE GLYCOL 3350 17 G/17G
17 POWDER, FOR SOLUTION ORAL DAILY PRN
Status: DISCONTINUED | OUTPATIENT
Start: 2018-07-24 | End: 2018-08-01 | Stop reason: HOSPADM

## 2018-07-24 RX ORDER — AMLODIPINE BESYLATE AND ATORVASTATIN CALCIUM 10; 80 MG/1; MG/1
1 TABLET, FILM COATED ORAL DAILY
Status: DISCONTINUED | OUTPATIENT
Start: 2018-07-25 | End: 2018-07-24 | Stop reason: CLARIF

## 2018-07-24 RX ORDER — SODIUM CHLORIDE 0.9 % (FLUSH) 0.9 %
10 SYRINGE (ML) INJECTION PRN
Status: DISCONTINUED | OUTPATIENT
Start: 2018-07-24 | End: 2018-08-01 | Stop reason: HOSPADM

## 2018-07-24 RX ORDER — INSULIN GLARGINE 100 [IU]/ML
25 INJECTION, SOLUTION SUBCUTANEOUS 2 TIMES DAILY
Status: DISCONTINUED | OUTPATIENT
Start: 2018-07-25 | End: 2018-07-25

## 2018-07-24 RX ORDER — CALCITRIOL 0.25 UG/1
0.25 CAPSULE, LIQUID FILLED ORAL WEEKLY
Status: DISCONTINUED | OUTPATIENT
Start: 2018-07-30 | End: 2018-07-27

## 2018-07-24 RX ORDER — HYDRALAZINE HYDROCHLORIDE 50 MG/1
100 TABLET, FILM COATED ORAL 3 TIMES DAILY
Status: DISCONTINUED | OUTPATIENT
Start: 2018-07-25 | End: 2018-07-28

## 2018-07-24 RX ORDER — ALPRAZOLAM 0.5 MG/1
0.5 TABLET ORAL 3 TIMES DAILY PRN
Status: DISCONTINUED | OUTPATIENT
Start: 2018-07-24 | End: 2018-07-30

## 2018-07-24 RX ORDER — CARVEDILOL 25 MG/1
25 TABLET ORAL 2 TIMES DAILY WITH MEALS
Status: DISCONTINUED | OUTPATIENT
Start: 2018-07-25 | End: 2018-07-27

## 2018-07-24 RX ORDER — ALLOPURINOL 100 MG/1
200 TABLET ORAL DAILY
Status: DISCONTINUED | OUTPATIENT
Start: 2018-07-25 | End: 2018-07-25

## 2018-07-24 RX ORDER — ATORVASTATIN CALCIUM 80 MG/1
80 TABLET, FILM COATED ORAL DAILY
Status: DISCONTINUED | OUTPATIENT
Start: 2018-07-25 | End: 2018-07-25

## 2018-07-24 RX ORDER — POTASSIUM CHLORIDE 20 MEQ/1
40 TABLET, EXTENDED RELEASE ORAL PRN
Status: DISCONTINUED | OUTPATIENT
Start: 2018-07-24 | End: 2018-08-01 | Stop reason: HOSPADM

## 2018-07-24 RX ORDER — FOLIC ACID/VIT B COMPLEX AND C 5 MG
1 TABLET ORAL DAILY
Status: DISCONTINUED | OUTPATIENT
Start: 2018-07-25 | End: 2018-08-01 | Stop reason: HOSPADM

## 2018-07-24 RX ORDER — NITROGLYCERIN 0.4 MG/1
0.4 TABLET SUBLINGUAL EVERY 5 MIN PRN
Status: DISCONTINUED | OUTPATIENT
Start: 2018-07-24 | End: 2018-08-01 | Stop reason: HOSPADM

## 2018-07-24 RX ORDER — HYDROCODONE BITARTRATE AND ACETAMINOPHEN 5; 325 MG/1; MG/1
1 TABLET ORAL EVERY 6 HOURS PRN
Status: DISCONTINUED | OUTPATIENT
Start: 2018-07-24 | End: 2018-08-01 | Stop reason: HOSPADM

## 2018-07-24 RX ORDER — HEPARIN SODIUM 5000 [USP'U]/ML
5000 INJECTION, SOLUTION INTRAVENOUS; SUBCUTANEOUS EVERY 8 HOURS SCHEDULED
Status: DISCONTINUED | OUTPATIENT
Start: 2018-07-24 | End: 2018-08-01 | Stop reason: HOSPADM

## 2018-07-24 RX ORDER — ASPIRIN 81 MG/1
81 TABLET ORAL DAILY
Status: DISCONTINUED | OUTPATIENT
Start: 2018-07-25 | End: 2018-08-01 | Stop reason: HOSPADM

## 2018-07-24 RX ORDER — BUMETANIDE 0.25 MG/ML
3 INJECTION, SOLUTION INTRAMUSCULAR; INTRAVENOUS ONCE
Status: COMPLETED | OUTPATIENT
Start: 2018-07-24 | End: 2018-07-24

## 2018-07-24 RX ORDER — SODIUM CHLORIDE 0.9 % (FLUSH) 0.9 %
10 SYRINGE (ML) INJECTION EVERY 12 HOURS SCHEDULED
Status: DISCONTINUED | OUTPATIENT
Start: 2018-07-25 | End: 2018-08-01 | Stop reason: HOSPADM

## 2018-07-24 RX ORDER — HYDRALAZINE HYDROCHLORIDE 20 MG/ML
10 INJECTION INTRAMUSCULAR; INTRAVENOUS ONCE
Status: COMPLETED | OUTPATIENT
Start: 2018-07-24 | End: 2018-07-24

## 2018-07-24 RX ORDER — DOXAZOSIN MESYLATE 4 MG/1
4 TABLET ORAL DAILY
Status: DISCONTINUED | OUTPATIENT
Start: 2018-07-25 | End: 2018-07-26

## 2018-07-24 RX ORDER — BUMETANIDE 1 MG/1
2 TABLET ORAL DAILY
Status: DISCONTINUED | OUTPATIENT
Start: 2018-07-25 | End: 2018-07-25

## 2018-07-24 RX ADMIN — BUMETANIDE 3 MG: 0.25 INJECTION INTRAMUSCULAR; INTRAVENOUS at 22:54

## 2018-07-24 RX ADMIN — HYDRALAZINE HYDROCHLORIDE 10 MG: 20 INJECTION INTRAMUSCULAR; INTRAVENOUS at 22:55

## 2018-07-24 ASSESSMENT — PAIN DESCRIPTION - FREQUENCY: FREQUENCY: CONTINUOUS

## 2018-07-24 ASSESSMENT — ENCOUNTER SYMPTOMS
WHEEZING: 0
SORE THROAT: 0
ABDOMINAL PAIN: 0
SHORTNESS OF BREATH: 0
VOMITING: 0
BLOOD IN STOOL: 0
BACK PAIN: 0
NAUSEA: 0
DIARRHEA: 0
COUGH: 0

## 2018-07-24 ASSESSMENT — PAIN DESCRIPTION - DESCRIPTORS: DESCRIPTORS: ACHING;HEAVINESS;DISCOMFORT

## 2018-07-24 ASSESSMENT — PAIN DESCRIPTION - LOCATION
LOCATION: LEG
LOCATION: LEG

## 2018-07-24 ASSESSMENT — PAIN DESCRIPTION - ORIENTATION
ORIENTATION: RIGHT;LEFT
ORIENTATION: LEFT;RIGHT

## 2018-07-24 ASSESSMENT — PAIN SCALES - GENERAL
PAINLEVEL_OUTOF10: 7

## 2018-07-24 ASSESSMENT — PAIN DESCRIPTION - ONSET: ONSET: ON-GOING

## 2018-07-24 ASSESSMENT — PAIN DESCRIPTION - PAIN TYPE: TYPE: CHRONIC PAIN

## 2018-07-24 ASSESSMENT — PAIN DESCRIPTION - PROGRESSION: CLINICAL_PROGRESSION: NOT CHANGED

## 2018-07-24 NOTE — Clinical Note
Patient Class: Observation [104]   REQUIRED: Diagnosis: Fluid overload [025417]   Estimated Length of Stay: Estimated stay of less than 2 midnights   Future Attending Provider: Cesar Read [[id number]]   Admitting Provider: Cesar Read [[id number]]   Telemetry Bed Required?: Yes

## 2018-07-25 PROBLEM — J81.1 PULMONARY EDEMA, NONCARDIAC: Status: ACTIVE | Noted: 2018-07-25

## 2018-07-25 LAB
ANION GAP SERPL CALCULATED.3IONS-SCNC: 15 MEQ/L (ref 8–16)
BUN BLDV-MCNC: 45 MG/DL (ref 7–22)
CALCIUM SERPL-MCNC: 9.6 MG/DL (ref 8.5–10.5)
CHLORIDE BLD-SCNC: 103 MEQ/L (ref 98–111)
CO2: 25 MEQ/L (ref 23–33)
CREAT SERPL-MCNC: 4.7 MG/DL (ref 0.4–1.2)
ERYTHROCYTE [DISTWIDTH] IN BLOOD BY AUTOMATED COUNT: 19.6 % (ref 11.5–14.5)
ERYTHROCYTE [DISTWIDTH] IN BLOOD BY AUTOMATED COUNT: 60.5 FL (ref 35–45)
GFR SERPL CREATININE-BSD FRML MDRD: 11 ML/MIN/1.73M2
GLUCOSE BLD-MCNC: 112 MG/DL (ref 70–108)
GLUCOSE BLD-MCNC: 114 MG/DL (ref 70–108)
GLUCOSE BLD-MCNC: 124 MG/DL (ref 70–108)
GLUCOSE BLD-MCNC: 128 MG/DL (ref 70–108)
GLUCOSE BLD-MCNC: 137 MG/DL (ref 70–108)
GLUCOSE BLD-MCNC: 55 MG/DL (ref 70–108)
GLUCOSE BLD-MCNC: 82 MG/DL (ref 70–108)
HCT VFR BLD CALC: 34.9 % (ref 37–47)
HEMOGLOBIN: 10.2 GM/DL (ref 12–16)
LACTIC ACID: 0.9 MMOL/L (ref 0.5–2.2)
LV EF: 50 %
LVEF MODALITY: NORMAL
MAGNESIUM: 1.9 MG/DL (ref 1.6–2.4)
MCH RBC QN AUTO: 24.8 PG (ref 26–33)
MCHC RBC AUTO-ENTMCNC: 29.2 GM/DL (ref 32.2–35.5)
MCV RBC AUTO: 84.9 FL (ref 81–99)
PLATELET # BLD: 240 THOU/MM3 (ref 130–400)
PMV BLD AUTO: 10 FL (ref 9.4–12.4)
POTASSIUM SERPL-SCNC: 3.3 MEQ/L (ref 3.5–5.2)
RBC # BLD: 4.11 MILL/MM3 (ref 4.2–5.4)
SODIUM BLD-SCNC: 143 MEQ/L (ref 135–145)
WBC # BLD: 8.6 THOU/MM3 (ref 4.8–10.8)

## 2018-07-25 PROCEDURE — 2709999900 HC NON-CHARGEABLE SUPPLY

## 2018-07-25 PROCEDURE — 93306 TTE W/DOPPLER COMPLETE: CPT

## 2018-07-25 PROCEDURE — 1200000003 HC TELEMETRY R&B

## 2018-07-25 PROCEDURE — 99233 SBSQ HOSP IP/OBS HIGH 50: CPT | Performed by: INTERNAL MEDICINE

## 2018-07-25 PROCEDURE — 6370000000 HC RX 637 (ALT 250 FOR IP): Performed by: HOSPITALIST

## 2018-07-25 PROCEDURE — 6360000002 HC RX W HCPCS: Performed by: HOSPITALIST

## 2018-07-25 PROCEDURE — 6370000000 HC RX 637 (ALT 250 FOR IP): Performed by: INTERNAL MEDICINE

## 2018-07-25 PROCEDURE — 96372 THER/PROPH/DIAG INJ SC/IM: CPT

## 2018-07-25 PROCEDURE — 85027 COMPLETE CBC AUTOMATED: CPT

## 2018-07-25 PROCEDURE — 99221 1ST HOSP IP/OBS SF/LOW 40: CPT | Performed by: INTERNAL MEDICINE

## 2018-07-25 PROCEDURE — 51702 INSERT TEMP BLADDER CATH: CPT

## 2018-07-25 PROCEDURE — 83735 ASSAY OF MAGNESIUM: CPT

## 2018-07-25 PROCEDURE — 2580000003 HC RX 258: Performed by: INTERNAL MEDICINE

## 2018-07-25 PROCEDURE — 2580000003 HC RX 258: Performed by: HOSPITALIST

## 2018-07-25 PROCEDURE — 82948 REAGENT STRIP/BLOOD GLUCOSE: CPT

## 2018-07-25 PROCEDURE — 6360000002 HC RX W HCPCS: Performed by: INTERNAL MEDICINE

## 2018-07-25 PROCEDURE — 36415 COLL VENOUS BLD VENIPUNCTURE: CPT

## 2018-07-25 PROCEDURE — 83605 ASSAY OF LACTIC ACID: CPT

## 2018-07-25 PROCEDURE — 80048 BASIC METABOLIC PNL TOTAL CA: CPT

## 2018-07-25 RX ORDER — NICOTINE POLACRILEX 4 MG
15 LOZENGE BUCCAL PRN
Status: DISCONTINUED | OUTPATIENT
Start: 2018-07-25 | End: 2018-08-01 | Stop reason: HOSPADM

## 2018-07-25 RX ORDER — POTASSIUM CHLORIDE 20 MEQ/1
40 TABLET, EXTENDED RELEASE ORAL ONCE
Status: COMPLETED | OUTPATIENT
Start: 2018-07-25 | End: 2018-07-25

## 2018-07-25 RX ORDER — DEXTROSE MONOHYDRATE 25 G/50ML
12.5 INJECTION, SOLUTION INTRAVENOUS PRN
Status: DISCONTINUED | OUTPATIENT
Start: 2018-07-25 | End: 2018-08-01 | Stop reason: HOSPADM

## 2018-07-25 RX ORDER — AMLODIPINE BESYLATE 10 MG/1
10 TABLET ORAL DAILY
Status: DISCONTINUED | OUTPATIENT
Start: 2018-07-26 | End: 2018-08-01 | Stop reason: HOSPADM

## 2018-07-25 RX ORDER — ONDANSETRON 4 MG/1
4 TABLET, FILM COATED ORAL EVERY 6 HOURS PRN
Status: DISCONTINUED | OUTPATIENT
Start: 2018-07-25 | End: 2018-08-01 | Stop reason: HOSPADM

## 2018-07-25 RX ORDER — ATORVASTATIN CALCIUM 40 MG/1
40 TABLET, FILM COATED ORAL DAILY
Status: DISCONTINUED | OUTPATIENT
Start: 2018-07-26 | End: 2018-08-01 | Stop reason: HOSPADM

## 2018-07-25 RX ORDER — INSULIN GLARGINE 100 [IU]/ML
25 INJECTION, SOLUTION SUBCUTANEOUS EVERY MORNING
Status: DISCONTINUED | OUTPATIENT
Start: 2018-07-26 | End: 2018-08-01 | Stop reason: HOSPADM

## 2018-07-25 RX ORDER — DEXTROSE MONOHYDRATE 50 MG/ML
100 INJECTION, SOLUTION INTRAVENOUS PRN
Status: DISCONTINUED | OUTPATIENT
Start: 2018-07-25 | End: 2018-08-01 | Stop reason: HOSPADM

## 2018-07-25 RX ORDER — POTASSIUM CHLORIDE 20 MEQ/1
20 TABLET, EXTENDED RELEASE ORAL 2 TIMES DAILY WITH MEALS
Status: DISCONTINUED | OUTPATIENT
Start: 2018-07-26 | End: 2018-08-01 | Stop reason: HOSPADM

## 2018-07-25 RX ORDER — ALLOPURINOL 100 MG/1
100 TABLET ORAL DAILY
Status: DISCONTINUED | OUTPATIENT
Start: 2018-07-25 | End: 2018-08-01 | Stop reason: HOSPADM

## 2018-07-25 RX ADMIN — Medication 10 ML: at 08:46

## 2018-07-25 RX ADMIN — DOXAZOSIN MESYLATE 4 MG: 4 TABLET ORAL at 20:57

## 2018-07-25 RX ADMIN — FERROUS SULFATE TAB 325 MG (65 MG ELEMENTAL FE) 325 MG: 325 (65 FE) TAB at 08:45

## 2018-07-25 RX ADMIN — CARVEDILOL 25 MG: 25 TABLET, FILM COATED ORAL at 16:22

## 2018-07-25 RX ADMIN — CLONIDINE HYDROCHLORIDE 0.3 MG: 0.2 TABLET ORAL at 23:48

## 2018-07-25 RX ADMIN — Medication 10 ML: at 21:06

## 2018-07-25 RX ADMIN — Medication 1 TABLET: at 08:45

## 2018-07-25 RX ADMIN — HYDRALAZINE HYDROCHLORIDE 100 MG: 50 TABLET ORAL at 16:22

## 2018-07-25 RX ADMIN — ALLOPURINOL 100 MG: 100 TABLET ORAL at 08:43

## 2018-07-25 RX ADMIN — CARVEDILOL 25 MG: 25 TABLET, FILM COATED ORAL at 08:44

## 2018-07-25 RX ADMIN — HYDRALAZINE HYDROCHLORIDE 100 MG: 50 TABLET ORAL at 20:58

## 2018-07-25 RX ADMIN — FLUTICASONE PROPIONATE 1 SPRAY: 50 SPRAY, METERED NASAL at 08:45

## 2018-07-25 RX ADMIN — CLONIDINE HYDROCHLORIDE 0.3 MG: 0.2 TABLET ORAL at 16:22

## 2018-07-25 RX ADMIN — HYDRALAZINE HYDROCHLORIDE 100 MG: 50 TABLET ORAL at 08:45

## 2018-07-25 RX ADMIN — FUROSEMIDE 10 MG/HR: 10 INJECTION, SOLUTION INTRAMUSCULAR; INTRAVENOUS at 10:26

## 2018-07-25 RX ADMIN — POTASSIUM CHLORIDE 40 MEQ: 1500 TABLET, EXTENDED RELEASE ORAL at 21:05

## 2018-07-25 RX ADMIN — NITROGLYCERIN 1 INCH: 20 OINTMENT TOPICAL at 22:25

## 2018-07-25 RX ADMIN — HEPARIN SODIUM 5000 UNITS: 5000 INJECTION, SOLUTION INTRAVENOUS; SUBCUTANEOUS at 02:55

## 2018-07-25 RX ADMIN — DOXAZOSIN MESYLATE 4 MG: 4 TABLET ORAL at 08:46

## 2018-07-25 RX ADMIN — CHLOROTHIAZIDE SODIUM 500 MG: 500 INJECTION, POWDER, LYOPHILIZED, FOR SOLUTION INTRAVENOUS at 10:26

## 2018-07-25 RX ADMIN — CLONIDINE HYDROCHLORIDE 0.3 MG: 0.2 TABLET ORAL at 08:44

## 2018-07-25 RX ADMIN — INSULIN GLARGINE 25 UNITS: 100 INJECTION, SOLUTION SUBCUTANEOUS at 09:26

## 2018-07-25 RX ADMIN — VITAMIN D, TAB 1000IU (100/BT) 5000 UNITS: 25 TAB at 08:46

## 2018-07-25 RX ADMIN — HEPARIN SODIUM 5000 UNITS: 5000 INJECTION, SOLUTION INTRAVENOUS; SUBCUTANEOUS at 16:20

## 2018-07-25 RX ADMIN — HEPARIN SODIUM 5000 UNITS: 5000 INJECTION, SOLUTION INTRAVENOUS; SUBCUTANEOUS at 22:28

## 2018-07-25 RX ADMIN — CALCITRIOL 0.25 MCG: 0.25 CAPSULE ORAL at 08:44

## 2018-07-25 RX ADMIN — HYDROCODONE BITARTRATE AND ACETAMINOPHEN 1 TABLET: 5; 325 TABLET ORAL at 21:04

## 2018-07-25 RX ADMIN — ASPIRIN 81 MG: 81 TABLET ORAL at 08:44

## 2018-07-25 ASSESSMENT — PAIN DESCRIPTION - PAIN TYPE
TYPE: CHRONIC PAIN
TYPE: CHRONIC PAIN

## 2018-07-25 ASSESSMENT — PAIN DESCRIPTION - LOCATION
LOCATION: LEG
LOCATION: LEG

## 2018-07-25 ASSESSMENT — PAIN DESCRIPTION - FREQUENCY
FREQUENCY: CONTINUOUS
FREQUENCY: CONTINUOUS

## 2018-07-25 ASSESSMENT — PAIN DESCRIPTION - ORIENTATION
ORIENTATION: RIGHT;LEFT
ORIENTATION: RIGHT;LEFT

## 2018-07-25 ASSESSMENT — PAIN SCALES - GENERAL
PAINLEVEL_OUTOF10: 6
PAINLEVEL_OUTOF10: 6
PAINLEVEL_OUTOF10: 0
PAINLEVEL_OUTOF10: 6

## 2018-07-25 ASSESSMENT — PAIN DESCRIPTION - DESCRIPTORS
DESCRIPTORS: ACHING
DESCRIPTORS: ACHING

## 2018-07-25 ASSESSMENT — PAIN DESCRIPTION - ONSET: ONSET: ON-GOING

## 2018-07-25 ASSESSMENT — PAIN DESCRIPTION - PROGRESSION: CLINICAL_PROGRESSION: NOT CHANGED

## 2018-07-25 NOTE — ED PROVIDER NOTES
[facility]  eMERGENCY dEPARTMENT eNCOUnter          [address] [facility]       Chief Complaint   Patient presents with    Leg Swelling     bilateral       Nurses Notes reviewed and I agree except as noted in the HPI. HISTORY OF PRESENT ILLNESS    Rayray Del Castillo is a 57 y.o. female who presents to the Emergency Department for the evaluation of leg swelling. Patient has a history of CKD, COPD, CAD, Diabetes, Hypertension, CHF. Patient states leg swelling, tightness, and pain began 2 weeks ago and gradually has gotten worse. She denies any chest pain or shortness of breath. She states her legs feel heavy. She describes pain as being constant and aching and rates it a 7/10 in severity. She states she took a water pill 4 days ago. She states her nephrologist is Dr. Suzi Leger. Patient states she sleeps with her legs elevated. Patient has no other complaints at this time. The HPI was provided by the patient. REVIEW OF SYSTEMS     Review of Systems   Constitutional: Negative for chills, fever and unexpected weight change. HENT: Negative for congestion, ear pain and sore throat. Eyes: Negative for visual disturbance. Respiratory: Negative for cough, shortness of breath and wheezing. Cardiovascular: Positive for leg swelling. Negative for chest pain and palpitations. Gastrointestinal: Negative for abdominal pain, blood in stool, diarrhea, nausea and vomiting. Genitourinary: Negative for dysuria and hematuria. Musculoskeletal: Positive for arthralgias (leg pain and tightness). Negative for back pain. Skin: Negative for rash. Allergic/Immunologic: Negative for environmental allergies. Neurological: Negative for dizziness, syncope, weakness, numbness and headaches. Hematological: Does not bruise/bleed easily. Psychiatric/Behavioral: Negative for dysphoric mood. The patient is not nervous/anxious. All other systems reviewed and are negative.       PAST MEDICAL HISTORY    has a past medical history of Anemia associated with chronic renal failure; Anxiety; Arthritis; Backache; Blood circulation, collateral; Blood transfusion; CAD (coronary artery disease); Cellulitis in diabetic foot ([facility] Chest pain; CHF (congestive heart failure) ([address] Chronic anemia; Chronic kidney disease; Chronic kidney disease, stage III (moderate); Chronic renal insufficiency; COPD (chronic obstructive pulmonary disease) ([facility] Coronary disease; Depression; Diabetes mellitus, type 2 ([facility] Disease of blood and blood forming organ; GERD (gastroesophageal reflux disease); Hemoglobin disease ([address] History of granulomatous disease ([facility] [address] HTN (hypertension); Hyperlipemia; Iron deficiency anemia due to dietary causes; Kidney stones; Kidney trouble; MRSA infection; Neuromuscular disorder ([address] Neuropathy ([facility] Obesity; CONTRERAS on CPAP; Pneumonia; and Seizures ([facility] SURGICAL HISTORY      has a past surgical history that includes eye surgery (March 30th, 2012); Hysterectomy (1980 and 1985); Carpal tunnel release (1988); Foot surgery (1990); Colonoscopy (2009); Endoscopy, colon, diagnostic (2007); Appendectomy (1980s); back surgery (1990's); Cosmetic surgery (3/30/2012); Ankle surgery (Right, 02-10-14); liver biopsy (6/2015); Lung biopsy; joint replacement; fracture surgery; and Cardiac surgery.     CURRENT MEDICATIONS       Current Discharge Medication List      CONTINUE these medications which have NOT CHANGED    Details   doxazosin (CARDURA) 4 MG tablet Take 1 tablet by mouth daily  Qty: 90 tablet, Refills: 3      !! calcitRIOL (ROCALTROL) 0.25 MCG capsule Take 1 capsule by mouth daily  Qty: 90 capsule, Refills: 3      FOLBEE 2.5-25-1 MG TABS tablet TAKE 1 TABLET BY MOUTH DAILY  Qty: 30 tablet, Refills: 5    Associated Diagnoses: Essential hypertension      ALPRAZolam (XANAX) 1 MG tablet TAKE 1/2 TABLET BY MOUTH EVERY 8 HOURS AS NEEDED  Refills: 5      HYDROcodone-acetaminophen (NORCO) 5-325 MG per tablet TAKE 1 the services described in the documentation, reviewed and edited the documentation which was dictated to the scribe in my presence, and it accurately records my words and actions.     Bryan Varela,  7/24/18 7:04 AM        Bryan Varela,   07/27/18 [id number]

## 2018-07-25 NOTE — H&P
History & Physical        Patient:  Magi Lucas  YOB: 1955    MRN: [id number]     Acct: [de-identified]    PCP: Juan Luis Floyd MD    Date of Admission: 7/24/2018    Date of Service: Pt seen/examined on 7/24/2018 and Placed in Observation. Chief Complaint:  swollen legs      History Of Present Illness:      57 y.o. female presented to [address] [place] [address] with swollen legs. Patient states that her legs is painful due to too much swelling. Patient has CKD and has been discussion possible dialysis in future with her Nephrologist.    Patient usually urinates some amount in morning but not much more after that for most days. Patient is not sure about the amount. Patient has no other acute symptom besides leg swelling and pain. Patient's BP was in 200's for SBP. Patient is asymptomatic for blood pressure. Patient also states that her BP has been running high lately. Patient was given IV Bumex and IV hydralazine in ER.        Past Medical History:          Diagnosis Date    Anemia associated with chronic renal failure     Aranesp 150 micrograms every other week given at [facility] [address] [facility]    Anxiety     Arthritis     Backache     Blood circulation, collateral     Blood transfusion     CAD (coronary artery disease)     Cellulitis in diabetic foot ([address] 03/03/2017    4th and 5th toes right foot    Chest pain     History of    CHF (congestive heart failure) ([address] 1998    Dx'ed by Dr. Jasmina Linares Chronic anemia     Chronic kidney disease     Chronic kidney disease, stage III (moderate)     Chronic renal insufficiency 2010    COPD (chronic obstructive pulmonary disease) ([address] 2012    Dr. 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 Mingo Chino HTN (hypertension) 1970's    Hyperlipemia 1998    Iron deficiency anemia due to dietary causes 6/21/2018    Kidney stones 3/2014    Kidney trouble          MRSA infection 03/2017    right foot-Dr. Alonso Caldwell (podiatrist)    [facility]rom[facility]     Neuropathy ([facility] 1989    diabetic neuropathy    Obesity since childhoood    CONTRERAS on CPAP 2010    Dr. Fulton Pals Pneumonia     Seizures [facility])        Past Surgical History:          Procedure Laterality Date    ANKLE SURGERY Right 02-10-14    Dr. Morales Score at [address]  1990's    removal of benign tumor   [address]  2009    2 polyps, not precanceorus    COSMETIC SURGERY  3/30/2012    eye lid lift    ENDOSCOPY, COLON, DIAGNOSTIC  2007   [facility] EYE SURGERY  March 30th, 2012    left sided ptosis    FOOT SURGERY  1990    Tarsal tunnel surgery    FRACTURE SURGERY      HYSTERECTOMY  1980 and 1985    first partial in 1980's, then total in [address]  6/2015    LUNG BIOPSY         Medications Prior to Admission:      Prior to Admission medications    Medication Sig Start Date End Date Taking?  Authorizing Provider   doxazosin (CARDURA) 4 MG tablet Take 1 tablet by mouth daily 7/2/18  Yes Zayda Estrada MD   calcitRIOL (ROCALTROL) 0.25 MCG capsule Take 1 capsule by mouth daily 6/21/18  Yes Zayda Estrada MD   FOLBEE 2.5-25-1 MG TABS tablet TAKE 1 TABLET BY MOUTH DAILY 5/29/18  Yes Desiree Cardenas MD   ALPRAZolam [employer]) 1 MG tablet TAKE 1/2 TABLET BY MOUTH EVERY 8 HOURS AS NEEDED 4/21/18  Yes Historical Provider, MD   HYDROcodone-acetaminophen (NORCO) 5-325 MG per tablet TAKE 1 TABLET BY MOUTH EVERY 8 HOURS AS NEEDED FOR PAIN FOR UP TO 30 DAYS FILL ON/AFTER 4/22/2018. 4/22/18  Yes Historical Provider, MD   allopurinol (ZYLOPRIM) 100 MG tablet TAKE 2 TABLETS DAILY 2/9/18  Yes Desiree Cardenas MD fluticasone (FLONASE) 50 MCG/ACT nasal spray 1 spray by Nasal route daily   Yes Historical Provider, MD   insulin glargine (LANTUS SOLOSTAR) 100 UNIT/ML injection pen Inject 35 Units into the skin 2 times daily 1/31/18  Yes Rosio Cooper MD   insulin aspart (NOVOLOG FLEXPEN) 100 UNIT/ML injection pen 12 to 24 units 3 times daily per sliding scale  Dx:E11.9  Z79.4 1/31/18  Yes Rosio Cooper MD   Insulin Pen Needle (B-D ULTRAFINE III SHORT PEN) 31G X 8 MM MISC Use three times daily Diagnosis Code E11.9 1/31/18  Yes Rosio Cooper MD   carvedilol (COREG) 25 MG tablet TAKE 1 TABLET TWICE A DAY 1/31/18  Yes Rosio Cooper MD   nitroGLYCERIN (NITROSTAT) 0.4 MG SL tablet Place 1 tablet under the tongue every 5 minutes as needed for Chest pain 1/31/18  Yes Rosio Cooper MD   bumetanide (BUMEX) 1 MG tablet TAKE 1 TABLET BY MOUTH DAILY  Patient taking differently: Take 2 mg by mouth daily  1/24/18  Yes DONOVAN Reyes CNP   KLOR-CON 10 10 MEQ extended release tablet TAKE 1 TABLET BY MOUTH ONCE DAILY, ONLY WITH BUMEX 1/24/18  Yes DONOVAN Reyes CNP   darbepoetin quintin-polysorbate (ARANESP) 150 MCG/0.3ML SOSY injection Inject 150 mcg as directed once   Yes Historical Provider, MD   ferrous sulfate (FE TABS) 325 (65 Fe) MG EC tablet Take 1 tablet by mouth daily (with breakfast) 1/4/18  Yes DONOVAN Reyes CNP   albuterol sulfate HFA (PROAIR HFA) 108 (90 Base) MCG/ACT inhaler Inhale 2 puffs into the lungs every 6 hours as needed for Wheezing 12/27/17 12/27/18 Yes Kaitlin Gant PA-C   calcitRIOL (ROCALTROL) 0.25 MCG capsule Take 1 capsule by mouth once a week Monday 11/6/17  Yes DONOVAN Reyes CNP   [employer] GENTLE LAXATIVE 5 MG EC tablet TAKE 1 TABLET BY MOUTH DAILY AS NEEDED FOR CONSTIPATION 9/20/17  Yes Reese Thompson MD   hydrALAZINE (APRESOLINE) 100 MG tablet Take 1 tablet by mouth 3 times daily 8/24/17  Yes Reese Thompson MD   amLODIPine-atorvastatatin (CADUET) 10-80 MG per tablet interpretation: pulmonary vascular congestion noted. EKG:  I have reviewed the EKG with the following interpretation: NSR. No acute ischemic changes    XR CHEST PORTABLE   Final Result      Cardiomegaly with pulmonary edema consistent with mild to moderate CHF, less likely viral pneumonia. Mid left lung atelectasis. Small right pleural effusion. **This report has been created using voice recognition software. It may contain minor errors which are inherent in voice recognition technology. **      Final report electronically signed by Dr. Cheyanne Young on 7/24/2018 11:29 PM               DVT prophylaxis: [] Lovenox                                 [] SCDs                                 [x] SQ Heparin                                 [] Encourage ambulation           [] Already on Anticoagulation    Code Status: Full Code      Disposition:    [x] Home       [] TCU       [] Rehab       [] Psych       [] SNF       [] [place]       [] Other-    ASSESSMENT:    Active Hospital Problems    Diagnosis Date Noted    Obesity (BMI 30-39. 9) [E66.9] 05/21/2012     Priority: Medium    ESRD (end stage renal disease) ([facility] [N18.6] 07/24/2018    Fluid overload [E87.70] 07/24/2018    Gout [M10.9] 12/19/2013    COPD, mild ([facility] [J44.9] 10/01/2012    History of tobacco use [Z87.891] 10/01/2012    Uncontrolled hypertension [I10]     Diabetes mellitus, type 2 ([facility] [E11.9]     Hyperlipemia [E78.5]        PLAN:    1. Fluid overload - IV bumex given. Re-assess in AM.  Consult nephrology for further planning. No need for emergent dialysis at this time. 2. Uncontrolled hypertension - secondary to fluid overload. Was given hydralazine in ER. Will order BP meds as needed. 3. Obesity  4. COPD - stable  5. Thank you Kaylan Polanco MD for the opportunity to be involved in this patient's care.     Electronically signed by Galnia Smith DO on 7/25/2018 at 7:34 AM

## 2018-07-25 NOTE — ED TRIAGE NOTES
Pt comes to the ED with c/o bilateral leg swelling that started two weeks ago. Pt states that she had blisters that popped and that's why she decided to be seen today.

## 2018-07-25 NOTE — CONSULTS
shortness of breath or chest pain    Past Medical History:   Diagnosis Date    Anemia associated with chronic renal failure     Aranesp 150 micrograms every other week given at [facility]    Anxiety     Arthritis     Backache     Blood circulation, collateral     Blood transfusion     CAD (coronary artery disease)     Cellulitis in diabetic foot ([address] 03/03/2017    4th and 5th toes right foot    Chest pain     History of    CHF (congestive heart failure) ([address] 1998    Dx'ed by Dr. Menchaca Friday Chronic anemia     Chronic kidney disease     Chronic kidney disease, stage III (moderate)     Chronic renal insufficiency 2010    COPD (chronic obstructive pulmonary disease) ([address] 2012    Dr. 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 Lakhwinder Hernández    HTN (hypertension) 1970's    Hyperlipemia 1998    Iron deficiency anemia due to dietary causes 6/21/2018    Kidney stones 3/2014    Kidney trouble          MRSA infection 03/2017    right foot-Dr. Mihir Parikh (podiatrist)    Neuromuscular disorder ([address]     Neuropathy ([address] 1989    diabetic neuropathy    Obesity since childhoood    CONTRERAS on CPAP 2010    Dr. Booker Kirkpatrick    Pneumonia     Seizures [facility])        Past Surgical History:   Procedure Laterality Date    ANKLE SURGERY Right 02-10-14    Dr. Degroot Senior at [place] [address]  1990's    removal of benign tumor   [address]  2009    2 polyps, not precanceorus    COSMETIC SURGERY  3/30/2012    eye lid lift    ENDOSCOPY, COLON, DIAGNOSTIC  2007   [employer] EYE SURGERY  March 30th, 2012    left sided ptosis    FOOT SURGERY  1990    Tarsal tunnel surgery    FRACTURE folbee plus tablet 1 tablet Daily   hydrALAZINE (APRESOLINE) tablet 100 mg TID   HYDROcodone-acetaminophen (NORCO) 5-325 MG per tablet 1 tablet Q6H PRN   insulin glargine (LANTUS) injection vial 25 Units BID   nitroGLYCERIN (NITROSTAT) SL tablet 0.4 mg Q5 Min PRN   polyethylene glycol (GLYCOLAX) packet 17 g Daily PRN   vitamin D (CHOLECALCIFEROL) tablet 5,000 Units Daily   sodium chloride flush 0.9 % injection 10 mL 2 times per day   sodium chloride flush 0.9 % injection 10 mL PRN   magnesium hydroxide (MILK OF MAGNESIA) 400 MG/5ML suspension 30 mL Daily PRN   potassium chloride (KLOR-CON M) extended release tablet 40 mEq PRN   Or    potassium chloride 20 MEQ/15ML (10%) oral solution 40 mEq PRN   Or    potassium chloride 10 mEq/100 mL IVPB (Peripheral Line) PRN   magnesium sulfate 1 g in dextrose 5 % 100 mL IVPB PRN   heparin (porcine) injection 5,000 Units 3 times per day   amLODIPine (NORVASC) tablet 10 mg Daily   And    atorvastatin (LIPITOR) tablet 80 mg Daily       Review of Systems:   Pertinent positives stated above in HPI. All other systems were reviewed and were negative. ROS:Constitutional: negative  Eyes: negative  Ears, nose, mouth, throat, and face: negative  Respiratory: negative  Cardiovascular: positive for lower extremity edema  Gastrointestinal: negative  Genitourinary:negative  Integument/breast: negative  Hematologic/lymphatic: negative  Musculoskeletal:positive for arthralgias and stiff joints  Neurological: positive for coordination problems and gait problems  Behavioral/Psych: negative  Endocrine: negative  Allergic/Immunologic: negative    Physical exam:   Constitutional:  Well-developed middle-aged lady in no distress. Vitals:   Vitals:    07/25/18 1314   BP: (!) 188/86   Pulse: 68   Resp:    Temp: 97.8 °F (36.6 °C)   SpO2:        Skin: no rash, turgor wnl  Heent:  Pupils are reactive to light and accommodation. Throat is clear.   Oral mucosa is moist.  Neck: no bruits or jvd

## 2018-07-25 NOTE — FLOWSHEET NOTE
07/25/18 5565   Provider Notification   Reason for Communication Evaluate   Provider Name Dr. Makenzie Parks   Provider Notification Physician   Method of Communication Call   Response No new orders   Notification Time [phone]   Will see patient today

## 2018-07-26 LAB
ANION GAP SERPL CALCULATED.3IONS-SCNC: 15 MEQ/L (ref 8–16)
BUN BLDV-MCNC: 47 MG/DL (ref 7–22)
CALCIUM SERPL-MCNC: 9.7 MG/DL (ref 8.5–10.5)
CHLORIDE BLD-SCNC: 101 MEQ/L (ref 98–111)
CO2: 27 MEQ/L (ref 23–33)
CREAT SERPL-MCNC: 5.1 MG/DL (ref 0.4–1.2)
GFR SERPL CREATININE-BSD FRML MDRD: 10 ML/MIN/1.73M2
GLUCOSE BLD-MCNC: 102 MG/DL (ref 70–108)
GLUCOSE BLD-MCNC: 111 MG/DL (ref 70–108)
GLUCOSE BLD-MCNC: 138 MG/DL (ref 70–108)
GLUCOSE BLD-MCNC: 187 MG/DL (ref 70–108)
MAGNESIUM: 1.9 MG/DL (ref 1.6–2.4)
PHOSPHORUS: 4.3 MG/DL (ref 2.4–4.7)
POTASSIUM SERPL-SCNC: 3.4 MEQ/L (ref 3.5–5.2)
SODIUM BLD-SCNC: 143 MEQ/L (ref 135–145)

## 2018-07-26 PROCEDURE — 6370000000 HC RX 637 (ALT 250 FOR IP): Performed by: HOSPITALIST

## 2018-07-26 PROCEDURE — 1200000003 HC TELEMETRY R&B

## 2018-07-26 PROCEDURE — 6370000000 HC RX 637 (ALT 250 FOR IP): Performed by: INTERNAL MEDICINE

## 2018-07-26 PROCEDURE — 99232 SBSQ HOSP IP/OBS MODERATE 35: CPT | Performed by: INTERNAL MEDICINE

## 2018-07-26 PROCEDURE — 82948 REAGENT STRIP/BLOOD GLUCOSE: CPT

## 2018-07-26 PROCEDURE — 83735 ASSAY OF MAGNESIUM: CPT

## 2018-07-26 PROCEDURE — 84100 ASSAY OF PHOSPHORUS: CPT

## 2018-07-26 PROCEDURE — 6360000002 HC RX W HCPCS: Performed by: HOSPITALIST

## 2018-07-26 PROCEDURE — 2709999900 HC NON-CHARGEABLE SUPPLY

## 2018-07-26 PROCEDURE — 36415 COLL VENOUS BLD VENIPUNCTURE: CPT

## 2018-07-26 PROCEDURE — 80048 BASIC METABOLIC PNL TOTAL CA: CPT

## 2018-07-26 PROCEDURE — 2580000003 HC RX 258: Performed by: HOSPITALIST

## 2018-07-26 RX ORDER — POTASSIUM CHLORIDE 20 MEQ/1
20 TABLET, EXTENDED RELEASE ORAL 2 TIMES DAILY WITH MEALS
Status: DISCONTINUED | OUTPATIENT
Start: 2018-07-27 | End: 2018-07-27

## 2018-07-26 RX ORDER — POTASSIUM CHLORIDE 20 MEQ/1
40 TABLET, EXTENDED RELEASE ORAL ONCE
Status: COMPLETED | OUTPATIENT
Start: 2018-07-26 | End: 2018-07-26

## 2018-07-26 RX ORDER — DOXAZOSIN MESYLATE 4 MG/1
8 TABLET ORAL DAILY
Status: DISCONTINUED | OUTPATIENT
Start: 2018-07-26 | End: 2018-07-29

## 2018-07-26 RX ADMIN — ASPIRIN 81 MG: 81 TABLET ORAL at 08:32

## 2018-07-26 RX ADMIN — FLUTICASONE PROPIONATE 1 SPRAY: 50 SPRAY, METERED NASAL at 08:32

## 2018-07-26 RX ADMIN — INSULIN LISPRO 1 UNITS: 100 INJECTION, SOLUTION INTRAVENOUS; SUBCUTANEOUS at 13:16

## 2018-07-26 RX ADMIN — POTASSIUM CHLORIDE 40 MEQ: 1500 TABLET, EXTENDED RELEASE ORAL at 08:31

## 2018-07-26 RX ADMIN — Medication 10 ML: at 08:35

## 2018-07-26 RX ADMIN — CLONIDINE HYDROCHLORIDE 0.3 MG: 0.2 TABLET ORAL at 17:29

## 2018-07-26 RX ADMIN — POTASSIUM CHLORIDE 20 MEQ: 20 TABLET, EXTENDED RELEASE ORAL at 08:33

## 2018-07-26 RX ADMIN — Medication 1 TABLET: at 08:32

## 2018-07-26 RX ADMIN — HYDRALAZINE HYDROCHLORIDE 100 MG: 50 TABLET ORAL at 20:27

## 2018-07-26 RX ADMIN — VITAMIN D, TAB 1000IU (100/BT) 5000 UNITS: 25 TAB at 08:32

## 2018-07-26 RX ADMIN — ATORVASTATIN CALCIUM 40 MG: 80 TABLET, FILM COATED ORAL at 20:27

## 2018-07-26 RX ADMIN — FERROUS SULFATE TAB 325 MG (65 MG ELEMENTAL FE) 325 MG: 325 (65 FE) TAB at 08:32

## 2018-07-26 RX ADMIN — HYDRALAZINE HYDROCHLORIDE 100 MG: 50 TABLET ORAL at 08:32

## 2018-07-26 RX ADMIN — CARVEDILOL 25 MG: 25 TABLET, FILM COATED ORAL at 08:32

## 2018-07-26 RX ADMIN — POTASSIUM CHLORIDE 20 MEQ: 20 TABLET, EXTENDED RELEASE ORAL at 17:30

## 2018-07-26 RX ADMIN — AMLODIPINE BESYLATE 10 MG: 10 TABLET ORAL at 08:32

## 2018-07-26 RX ADMIN — CLONIDINE HYDROCHLORIDE 0.3 MG: 0.2 TABLET ORAL at 08:32

## 2018-07-26 RX ADMIN — HEPARIN SODIUM 5000 UNITS: 5000 INJECTION, SOLUTION INTRAVENOUS; SUBCUTANEOUS at 06:16

## 2018-07-26 RX ADMIN — DOXAZOSIN 8 MG: 4 TABLET ORAL at 20:27

## 2018-07-26 RX ADMIN — ALLOPURINOL 100 MG: 100 TABLET ORAL at 08:32

## 2018-07-26 RX ADMIN — INSULIN GLARGINE 25 UNITS: 100 INJECTION, SOLUTION SUBCUTANEOUS at 09:15

## 2018-07-26 RX ADMIN — HYDRALAZINE HYDROCHLORIDE 100 MG: 50 TABLET ORAL at 14:33

## 2018-07-26 RX ADMIN — HEPARIN SODIUM 5000 UNITS: 5000 INJECTION, SOLUTION INTRAVENOUS; SUBCUTANEOUS at 14:33

## 2018-07-26 RX ADMIN — CALCITRIOL 0.25 MCG: 0.25 CAPSULE ORAL at 08:32

## 2018-07-26 RX ADMIN — CARVEDILOL 25 MG: 25 TABLET, FILM COATED ORAL at 17:29

## 2018-07-26 RX ADMIN — HEPARIN SODIUM 5000 UNITS: 5000 INJECTION, SOLUTION INTRAVENOUS; SUBCUTANEOUS at 22:26

## 2018-07-26 ASSESSMENT — PAIN SCALES - GENERAL
PAINLEVEL_OUTOF10: 5
PAINLEVEL_OUTOF10: 0
PAINLEVEL_OUTOF10: 2

## 2018-07-26 ASSESSMENT — PAIN DESCRIPTION - LOCATION: LOCATION: BACK;LEG

## 2018-07-26 ASSESSMENT — PAIN DESCRIPTION - ORIENTATION: ORIENTATION: LOWER;RIGHT;LEFT

## 2018-07-26 ASSESSMENT — PAIN DESCRIPTION - PAIN TYPE: TYPE: ACUTE PAIN

## 2018-07-26 ASSESSMENT — PAIN DESCRIPTION - DESCRIPTORS: DESCRIPTORS: ACHING

## 2018-07-26 NOTE — PLAN OF CARE
Problem: Falls - Risk of:  Goal: Will remain free from falls  Will remain free from falls   Outcome: Ongoing  Patient free from falls this shift. Up as needed, gait steady. Goal: Absence of physical injury  Absence of physical injury   Outcome: Completed Date Met: 07/26/18  Patient free from injuries. No falls noted. Problem: Fluid Volume:  Goal: Hemodynamic stability will improve  Hemodynamic stability will improve   Outcome: Ongoing  Vital signs stable this AM. Telemetry monitoring continues. Peripheral pulses present. Goal: Ability to maintain a balanced intake and output will improve  Ability to maintain a balanced intake and output will improve   Outcome: Ongoing  Patient with increased output due to Lasix drip. Problem: Tissue Perfusion - Renal, Altered:  Goal: Electrolytes within specified parameters  Electrolytes within specified parameters   Outcome: Ongoing  Potassium level low this AM. 40 meq given per order from Dr. Vinny Bradshaw, Repeat labs in AM.   Goal: Urine creatinine clearance will be within specified parameters  Urine creatinine clearance will be within specified parameters   Outcome: Ongoing  CrCl decreased. Nephrology on case and following. Goal: Serum creatinine will be within specified parameters  Serum creatinine will be within specified parameters   Outcome: Ongoing  BUN and creatinine elevated this AM. Nephrology on case and following. Goal: Ability to achieve a balanced intake and output will improve  Ability to achieve a balanced intake and output will improve   Outcome: Completed Date Met: 23/70/66  duplicate    Problem: Pain:  Goal: Pain level will decrease  Pain level will decrease   Outcome: Met This Shift  Patient denies pain when asked. Goal: Control of acute pain  Control of acute pain   Outcome: Completed Date Met: 07/26/18  Patient denies pain.    Goal: Control of chronic pain  Control of chronic pain   Outcome: Completed Date Met: 07/26/18  Patient denies chronic pain.    Problem: DISCHARGE BARRIERS  Goal: Patient's continuum of care needs are met  Outcome: Ongoing  Patient planning to be discharged home when medically ready. Patient denies need for further discharge planning. Problem: Risk for Impaired Skin Integrity  Goal: Tissue integrity - skin and mucous membranes  Structural intactness and normal physiological function of skin and  mucous membranes. Outcome: Met This Shift  Skin assessed every 4 hours. No issues noted. Patient able to turn and reposition for pressure ulcer prevention. Comments: Care plan reviewed with patient. Patient verbalizes understanding of the plan of care and contributes to goal setting.

## 2018-07-26 NOTE — PROGRESS NOTES
Hospitalist Progress Note    Patient:  Flores Frias      Unit/Bed:8B-27/027-A    YOB: 1955    MRN: [id number]       Acct: [de-identified]     PCP: Africa Tabor MD    Date of Admission: 7/24/2018    Chief Complaint:  Swelling in legs with heaviness since last 2 weeks    Patient presented to the emergency room as she is been feeling heaviness in the legs and have been hurting along with swelling that has been worsening since the last 2 weeks. Patient also admits to having some dyspnea on exertion. patient denies any chest pain. Cough or fever. Patient usually urinates some amount in the morning but not much after that for most days. States that she's been following with a nephrologist for worsening renal failure. Her blood pressure has been running high lately. Apparently blood sugars at home were running low and was not needing any pre-meal insulin  On Bumex and Zaroxolyn at home   -multiple blood pressure medications at home,cardiogram, Coreg, hydralazine, clonidine, Norvasc    Her pressure on admission 203/91 was given IV Bumex 3 mg along with IV hydralazine in the ER. Chest x-ray showed cardiomegaly with pulmonary edema consistent with mild to moderate CHF and small right pleural effusion    Hospital Course:   Admitted to the hospital and nephrology was consulted. Patient started on Lasix drip , chlorothiazide 500 mg IV 1 dose and her Bumex was discontinued. Home blood pressure medications were resumed.   -change to Lantus to once daily, stop fixed dose pre-meal insulin and placed on sliding scale. - 2-D echo shows EF on the low side of 50% but pericardial effusion present moderate no tamponade, most likely from uremia.     7/26- negative balance of 3 L, creatinine stable at 5.1 today ,  mild improvement in edema, feeling better with breathing, still on Bumex drip, blood pressure high, Cardura increased to 8 mg, potassium being replaced as it is 3.4, C. diff ordered for diarrhea      Subjective:  Patient had watery diarrhea yesterday, overall feeling a lot better with the breathing and edema in the legs improved, denies any chest pain      Medications:  Reviewed    Infusion Medications    dextrose      furosemide (LASIX) 5mg/ml infusion 10 mg/hr (07/25/18 1026)     Scheduled Medications    doxazosin  8 mg Oral Daily    [START ON 7/27/2018] potassium chloride  20 mEq Oral BID WC    allopurinol  100 mg Oral Daily    insulin lispro  0-6 Units Subcutaneous TID WC    insulin glargine  25 Units Subcutaneous QAM    atorvastatin  40 mg Oral Daily    And    amLODIPine  10 mg Oral Daily    potassium chloride  20 mEq Oral BID WC    aspirin  81 mg Oral Daily    [START ON 7/30/2018] calcitRIOL  0.25 mcg Oral Weekly    calcitRIOL  0.25 mcg Oral Daily    carvedilol  25 mg Oral BID WC    cloNIDine  0.3 mg Oral Q8H    ferrous sulfate  325 mg Oral Daily with breakfast    fluticasone  1 spray Nasal Daily    folbee plus  1 tablet Oral Daily    hydrALAZINE  100 mg Oral TID    vitamin D  5,000 Units Oral Daily    sodium chloride flush  10 mL Intravenous 2 times per day    heparin (porcine)  5,000 Units Subcutaneous 3 times per day     PRN Meds: ondansetron, glucose, dextrose, glucagon (rDNA), dextrose, albuterol sulfate HFA, ALPRAZolam, HYDROcodone-acetaminophen, nitroGLYCERIN, polyethylene glycol, sodium chloride flush, magnesium hydroxide, potassium chloride **OR** potassium chloride **OR** potassium chloride, magnesium sulfate      Intake/Output Summary (Last 24 hours) at 07/26/18 1010  Last data filed at 07/26/18 0835   Gross per 24 hour   Intake           834.23 ml   Output             3400 ml   Net         -2565.77 ml       Diet:  DIET RENAL; Carb Control: 4 carb choices (60 gms)/meal; No Added Salt (3-4 GM);  Daily Fluid Restriction: 1500 ml    Exam:  BP (!) 192/87   Pulse 62   Temp 98.4 °F (36.9 °C) (Oral)   Resp 16   Ht 5' 5\" (1.651 m)   Wt 208 lb 6 oz (94.5 kg) SpO2 95%   BMI 34.68 kg/m²     Physical Examination:   General appearance - alert, awake  and in no distress at rest, obese  HEENT: Normocephalic, Atraumatic, pupils reactive, mucous membranes moist  Chest - moving equally to respiration,Decreased air entry, clear to auscultation  Heart - normal rate, regular rhythm, normal S1, S2, no murmurs,  Abdomen - soft, nontender, distended, no masses or organomegaly, BS+  Neurological - alert, oriented, normal speech, sensations intact and able to move all extremities   Extremities - peripheral pulses normal, 3+ b/l pedal edema,  Capillary refill less than 3 sec  Skin - normal coloration and turgor, no rashes      Labs:   Recent Labs      07/24/18 2045 07/25/18   0616   WBC  9.4  8.6   HGB  11.0*  10.2*   HCT  37.1  34.9*   PLT  265  240     Recent Labs      07/24/18 2045 07/25/18   1548  07/26/18   0644   NA  147*  143  143   K  3.6  3.3*  3.4*   CL  105  103  101   CO2  24  25  27   BUN  48*  45*  47*   CREATININE  5.0*  4.7*  5.1*   CALCIUM  10.2  9.6  9.7   PHOS   --    --   4.3     Recent Labs      07/24/18 2045   AST  21   ALT  18   BILITOT  0.3   ALKPHOS  103     No results for input(s): INR in the last 72 hours. No results for input(s): Russo Nichols in the last 72 hours. Urinalysis:      Lab Results   Component Value Date    NITRU NEGATIVE 03/17/2014    WBCUA 5-10 03/17/2014    BACTERIA MANY 03/17/2014    RBCUA >200 03/17/2014    BLOODU Moderate 03/19/2014    BLOODU LARGE 03/17/2014    SPECGRAV 1.015 03/04/2013    GLUCOSEU NEGATIVE 03/17/2014       Radiology:  XR CHEST PORTABLE   Final Result      Cardiomegaly with pulmonary edema consistent with mild to moderate CHF, less likely viral pneumonia. Mid left lung atelectasis. Small right pleural effusion. **This report has been created using voice recognition software. It may contain minor errors which are inherent in voice recognition technology. **      Final report electronically signed

## 2018-07-26 NOTE — PROGRESS NOTES
Iron deficiency anemia due to dietary causes     Anemia of chronic disease     Stage 5 chronic kidney disease not on chronic dialysis (HCC)     Type 2 diabetes mellitus (HCC)     ESRD (end stage renal disease) ([facility])     Fluid overload     Pulmonary edema, noncardiac     CKD (chronic kidney disease) stage 5, GFR less than 15 ml/min ([facility])      Subjective:   Admit Date: 7/24/2018    Interval History:   Seeing for acute kidney injury and fluid overload  Awake and alert   Feels better   Has no complaint except developed watery diarrhea ,non bloody this morning   Blood pressure remains high  Very good urine output   Updated by the staff        Medications:   Scheduled Meds:   doxazosin  8 mg Oral Daily    allopurinol  100 mg Oral Daily    insulin lispro  0-6 Units Subcutaneous TID WC    insulin glargine  25 Units Subcutaneous QAM    atorvastatin  40 mg Oral Daily    And    amLODIPine  10 mg Oral Daily    potassium chloride  20 mEq Oral BID WC    aspirin  81 mg Oral Daily    [START ON 7/30/2018] calcitRIOL  0.25 mcg Oral Weekly    calcitRIOL  0.25 mcg Oral Daily    carvedilol  25 mg Oral BID WC    cloNIDine  0.3 mg Oral Q8H    ferrous sulfate  325 mg Oral Daily with breakfast    fluticasone  1 spray Nasal Daily    folbee plus  1 tablet Oral Daily    hydrALAZINE  100 mg Oral TID    vitamin D  5,000 Units Oral Daily    sodium chloride flush  10 mL Intravenous 2 times per day    heparin (porcine)  5,000 Units Subcutaneous 3 times per day     Continuous Infusions:   dextrose      furosemide (LASIX) 5mg/ml infusion 10 mg/hr (07/25/18 1026)       CBC:   Recent Labs      07/24/18   2045  07/25/18   0616   WBC  9.4  8.6   HGB  11.0*  10.2*   PLT  265  240     CMP:  Recent Labs      07/24/18   2045  07/25/18   1548  07/26/18   0644   NA  147*  143  143   K  3.6  3.3*  3.4*   CL  105  103  101   CO2  24  25  27   BUN  48*  45*  47*   CREATININE  5.0*  4.7*  5.1*   GLUCOSE  89  128*  111*   CALCIUM  10.2  9.6 9. 7   LABGLOM  11*  11*  10*     Troponin: No results for input(s): TROPONINI in the last 72 hours. BNP: No results for input(s): BNP in the last 72 hours. INR: No results for input(s): INR in the last 72 hours. Lipids: No results for input(s): CHOL, LDLDIRECT, TRIG, HDL, AMYLASE, LIPASE in the last 72 hours. Liver: Recent Labs      07/24/18 2045   AST  21   ALT  18   ALKPHOS  103   PROT  6.4   LABALBU  3.7   BILITOT  0.3     Iron:  No results for input(s): IRONS, FERRITIN in the last 72 hours. Invalid input(s): LABIRONS    Objective:   Vitals: BP (!) 181/82   Pulse 63   Temp 98.1 °F (36.7 °C) (Oral)   Resp 16   Ht 5' 5\" (1.651 m)   Wt 208 lb 6 oz (94.5 kg)   SpO2 94%   BMI 34.68 kg/m²    Wt Readings from Last 3 Encounters:   07/26/18 208 lb 6 oz (94.5 kg)   07/03/18 206 lb 9.6 oz (93.7 kg)   06/27/18 204 lb (92.5 kg)      24HR INTAKE/OUTPUT:    Intake/Output Summary (Last 24 hours) at 07/26/18 0747  Last data filed at 07/26/18 0440   Gross per 24 hour   Intake           824.23 ml   Output             3400 ml   Net         -2575.77 ml       Constitutional:  Alert, awake, no apparent distress   Skin:normal   HEENT:Pupils are reactive . Throat is clear   Neck:supple with no thyromegaly  Cardiovascular:  S1, S2 without murmur   Respiratory: Bibasilar crackles   Abdomen: +bs, soft, non tender   Ext: +++ LE edema  Musculoskeletal:Intact  Neuro:Alert ,awake and oriented with no deficit      Electronically signed by Sergo Edwards MD on 7/26/2018 at 7:47 AM

## 2018-07-27 LAB
ANION GAP SERPL CALCULATED.3IONS-SCNC: 15 MEQ/L (ref 8–16)
BUN BLDV-MCNC: 49 MG/DL (ref 7–22)
CALCIUM SERPL-MCNC: 9.8 MG/DL (ref 8.5–10.5)
CHLORIDE BLD-SCNC: 100 MEQ/L (ref 98–111)
CLOSTRIDIUM DIFFICILE, PCR: NEGATIVE
CO2: 26 MEQ/L (ref 23–33)
CREAT SERPL-MCNC: 5.2 MG/DL (ref 0.4–1.2)
GFR SERPL CREATININE-BSD FRML MDRD: 10 ML/MIN/1.73M2
GLUCOSE BLD-MCNC: 108 MG/DL (ref 70–108)
GLUCOSE BLD-MCNC: 109 MG/DL (ref 70–108)
GLUCOSE BLD-MCNC: 141 MG/DL (ref 70–108)
GLUCOSE BLD-MCNC: 161 MG/DL (ref 70–108)
GLUCOSE BLD-MCNC: 236 MG/DL (ref 70–108)
GLUCOSE BLD-MCNC: 252 MG/DL (ref 70–108)
POTASSIUM SERPL-SCNC: 3.5 MEQ/L (ref 3.5–5.2)
SODIUM BLD-SCNC: 141 MEQ/L (ref 135–145)

## 2018-07-27 PROCEDURE — 6370000000 HC RX 637 (ALT 250 FOR IP): Performed by: HOSPITALIST

## 2018-07-27 PROCEDURE — 6360000002 HC RX W HCPCS: Performed by: HOSPITALIST

## 2018-07-27 PROCEDURE — 99232 SBSQ HOSP IP/OBS MODERATE 35: CPT | Performed by: INTERNAL MEDICINE

## 2018-07-27 PROCEDURE — 2580000003 HC RX 258: Performed by: HOSPITALIST

## 2018-07-27 PROCEDURE — 2500000003 HC RX 250 WO HCPCS: Performed by: INTERNAL MEDICINE

## 2018-07-27 PROCEDURE — 87493 C DIFF AMPLIFIED PROBE: CPT

## 2018-07-27 PROCEDURE — 6370000000 HC RX 637 (ALT 250 FOR IP): Performed by: INTERNAL MEDICINE

## 2018-07-27 PROCEDURE — 80048 BASIC METABOLIC PNL TOTAL CA: CPT

## 2018-07-27 PROCEDURE — 36415 COLL VENOUS BLD VENIPUNCTURE: CPT

## 2018-07-27 PROCEDURE — 1200000003 HC TELEMETRY R&B

## 2018-07-27 PROCEDURE — 82948 REAGENT STRIP/BLOOD GLUCOSE: CPT

## 2018-07-27 PROCEDURE — 87081 CULTURE SCREEN ONLY: CPT

## 2018-07-27 RX ORDER — BUMETANIDE 0.25 MG/ML
2 INJECTION, SOLUTION INTRAMUSCULAR; INTRAVENOUS EVERY 8 HOURS
Status: DISCONTINUED | OUTPATIENT
Start: 2018-07-27 | End: 2018-07-30

## 2018-07-27 RX ADMIN — HYDRALAZINE HYDROCHLORIDE 100 MG: 50 TABLET ORAL at 08:30

## 2018-07-27 RX ADMIN — POTASSIUM CHLORIDE 20 MEQ: 20 TABLET, EXTENDED RELEASE ORAL at 18:02

## 2018-07-27 RX ADMIN — FERROUS SULFATE TAB 325 MG (65 MG ELEMENTAL FE) 325 MG: 325 (65 FE) TAB at 08:30

## 2018-07-27 RX ADMIN — Medication 1 TABLET: at 08:30

## 2018-07-27 RX ADMIN — ASPIRIN 81 MG: 81 TABLET ORAL at 08:30

## 2018-07-27 RX ADMIN — BUMETANIDE 2 MG: 0.25 INJECTION INTRAMUSCULAR; INTRAVENOUS at 16:15

## 2018-07-27 RX ADMIN — CLONIDINE HYDROCHLORIDE 0.3 MG: 0.2 TABLET ORAL at 08:30

## 2018-07-27 RX ADMIN — HEPARIN SODIUM 5000 UNITS: 5000 INJECTION, SOLUTION INTRAVENOUS; SUBCUTANEOUS at 13:25

## 2018-07-27 RX ADMIN — AMLODIPINE BESYLATE 10 MG: 10 TABLET ORAL at 08:30

## 2018-07-27 RX ADMIN — CARVEDILOL 25 MG: 25 TABLET, FILM COATED ORAL at 08:30

## 2018-07-27 RX ADMIN — ATORVASTATIN CALCIUM 40 MG: 80 TABLET, FILM COATED ORAL at 23:03

## 2018-07-27 RX ADMIN — HYDRALAZINE HYDROCHLORIDE 100 MG: 50 TABLET ORAL at 13:24

## 2018-07-27 RX ADMIN — CARVEDILOL 37.5 MG: 25 TABLET, FILM COATED ORAL at 18:02

## 2018-07-27 RX ADMIN — INSULIN LISPRO 2 UNITS: 100 INJECTION, SOLUTION INTRAVENOUS; SUBCUTANEOUS at 23:25

## 2018-07-27 RX ADMIN — HYDRALAZINE HYDROCHLORIDE 100 MG: 50 TABLET ORAL at 23:03

## 2018-07-27 RX ADMIN — VITAMIN D, TAB 1000IU (100/BT) 5000 UNITS: 25 TAB at 08:30

## 2018-07-27 RX ADMIN — HEPARIN SODIUM 5000 UNITS: 5000 INJECTION, SOLUTION INTRAVENOUS; SUBCUTANEOUS at 23:24

## 2018-07-27 RX ADMIN — CLONIDINE HYDROCHLORIDE 0.3 MG: 0.2 TABLET ORAL at 00:23

## 2018-07-27 RX ADMIN — CLONIDINE HYDROCHLORIDE 0.3 MG: 0.2 TABLET ORAL at 23:02

## 2018-07-27 RX ADMIN — INSULIN GLARGINE 25 UNITS: 100 INJECTION, SOLUTION SUBCUTANEOUS at 09:23

## 2018-07-27 RX ADMIN — CALCITRIOL 0.25 MCG: 0.25 CAPSULE ORAL at 08:30

## 2018-07-27 RX ADMIN — INSULIN LISPRO 1 UNITS: 100 INJECTION, SOLUTION INTRAVENOUS; SUBCUTANEOUS at 18:03

## 2018-07-27 RX ADMIN — HEPARIN SODIUM 5000 UNITS: 5000 INJECTION, SOLUTION INTRAVENOUS; SUBCUTANEOUS at 06:07

## 2018-07-27 RX ADMIN — DOXAZOSIN 8 MG: 4 TABLET ORAL at 23:03

## 2018-07-27 RX ADMIN — CLONIDINE HYDROCHLORIDE 0.3 MG: 0.2 TABLET ORAL at 16:16

## 2018-07-27 RX ADMIN — Medication 10 ML: at 08:30

## 2018-07-27 RX ADMIN — FLUTICASONE PROPIONATE 1 SPRAY: 50 SPRAY, METERED NASAL at 08:27

## 2018-07-27 RX ADMIN — ALLOPURINOL 100 MG: 100 TABLET ORAL at 08:30

## 2018-07-27 RX ADMIN — Medication 10 ML: at 23:08

## 2018-07-27 RX ADMIN — INSULIN LISPRO 1 UNITS: 100 INJECTION, SOLUTION INTRAVENOUS; SUBCUTANEOUS at 13:25

## 2018-07-27 RX ADMIN — BUMETANIDE 2 MG: 0.25 INJECTION INTRAMUSCULAR; INTRAVENOUS at 08:27

## 2018-07-27 RX ADMIN — HYDROCODONE BITARTRATE AND ACETAMINOPHEN 1 TABLET: 5; 325 TABLET ORAL at 00:23

## 2018-07-27 RX ADMIN — HYDROCODONE BITARTRATE AND ACETAMINOPHEN 1 TABLET: 5; 325 TABLET ORAL at 23:37

## 2018-07-27 RX ADMIN — POTASSIUM CHLORIDE 20 MEQ: 20 TABLET, EXTENDED RELEASE ORAL at 08:30

## 2018-07-27 ASSESSMENT — PAIN SCALES - GENERAL
PAINLEVEL_OUTOF10: 0
PAINLEVEL_OUTOF10: 0
PAINLEVEL_OUTOF10: 6
PAINLEVEL_OUTOF10: 7

## 2018-07-27 NOTE — PLAN OF CARE
Problem: Falls - Risk of:  Goal: Will remain free from falls  Will remain free from falls   Outcome: Met This Shift  Patient utilizes call light. Bed alarm REFUSED and nonskid socks on. Personal belongings in reach. Hourly rounding. Problem: Fluid Volume:  Goal: Hemodynamic stability will improve  Hemodynamic stability will improve   Outcome: Met This Shift    Goal: Ability to maintain a balanced intake and output will improve  Ability to maintain a balanced intake and output will improve   Outcome: Met This Shift      Problem: Tissue Perfusion - Renal, Altered:  Goal: Electrolytes within specified parameters  Electrolytes within specified parameters   Outcome: Ongoing      Problem: Pain:  Goal: Pain level will decrease  Pain level will decrease   Outcome: Met This Shift  Denies pain    Problem: DISCHARGE BARRIERS  Goal: Patient's continuum of care needs are met  Outcome: Ongoing  Patient able to verbalize needs    Problem: Risk for Impaired Skin Integrity  Goal: Tissue integrity - skin and mucous membranes  Structural intactness and normal physiological function of skin and  mucous membranes.    Outcome: Met This Shift

## 2018-07-27 NOTE — PROGRESS NOTES
Renal Progress Note    Assessment and Plan: 1. Acute kidney injury stable for the most part  2. Stage 4 chronic kidney disease   3. Hypertension relatively uncontrolled   4. Hypokalemia better  5. DM 2  6. Volume overload much improved  7. Diarrhea resolving  8. Moderate pericardial effusion without tamponade. PLAN:  Reviewed labs   Serum creatinine is stable  Reviewed medications   Potasium supplement 20 mEq twice a day  Discontinue  furosemide infusion  Bumetanide 2 mg IV every 8 hours  Increase carvedilol from 25 mg twice a day to 37.5 mg twice a day  AM labs   Discussed with staff  Will follow     Patient Active Problem List:     Diabetes mellitus, type 2 (HCC)     Uncontrolled hypertension     Chronic anemia     Coronary disease     Hyperlipemia     Arthritis     Neuropathy, diabetic (HCC)     CKD (chronic kidney disease), stage III     Leg pain     Obesity (BMI 30-39. 9)     Low HDL (under 40)     Need for prophylactic vaccination against diphtheria-tetanus-pertussis (DTP)     Well adult exam     Screening for breast cancer     History of tobacco use     Allergic rhinitis     COPD, mild ([address]     Lung mass     Anemia, chronic disease     Gout     Urolithiasis     Ankle fracture, right     Secondary hyperparathyroidism of renal origin ([facility] [address]     Diabetes mellitus type 2, uncomplicated (HCC)     Obstructive sleep apnea on CPAP     Vitamin D deficiency     CKD (chronic kidney disease) stage 3, GFR 30-59 ml/min     Type 2 diabetes mellitus with stage 3 chronic kidney disease (HCC)     Hypertensive nephropathy     Benign essential HTN     Type 2 diabetes mellitus (HCC)     Hypertensive nephropathy     Persistent proteinuria associated with type 2 diabetes mellitus (HCC)     Cellulitis in diabetic foot (HCC)     CKD (chronic kidney disease), stage IV (HCC)     VIDA (acute kidney injury) ([address]     Persistent proteinuria     Acquired hypothyroidism     CKD (chronic kidney disease), stage IV (HCC)     Nephrotic syndrome

## 2018-07-27 NOTE — PROGRESS NOTES
Daily Fluid Restriction: 1500 ml    Exam:  BP (!) 186/89   Pulse 67   Temp 98.8 °F (37.1 °C) (Oral)   Resp 16   Ht 5' 5\" (1.651 m)   Wt 208 lb 6 oz (94.5 kg)   SpO2 94%   BMI 34.68 kg/m²     Physical Examination:   General appearance - alert, awake  and in no distress at rest, obese  HEENT: Normocephalic, Atraumatic, pupils reactive, mucous membranes moist  Chest - moving equally to respiration,Decreased air entry, clear to auscultation  Heart - normal rate, regular rhythm, normal S1, S2, no murmurs,  Abdomen - soft, nontender, distended, no masses or organomegaly, BS+  Neurological - alert, oriented, normal speech, sensations intact and able to move all extremities   Extremities - peripheral pulses normal, 2-3+ b/l pedal edema,  Capillary refill less than 3 sec  Skin - normal coloration and turgor, no rashes      Labs:   Recent Labs      07/24/18 2045 07/25/18   0616   WBC  9.4  8.6   HGB  11.0*  10.2*   HCT  37.1  34.9*   PLT  265  240     Recent Labs      07/25/18   1548  07/26/18   0644  07/27/18   0711   NA  143  143  141   K  3.3*  3.4*  3.5   CL  103  101  100   CO2  25  27  26   BUN  45*  47*  49*   CREATININE  4.7*  5.1*  5.2*   CALCIUM  9.6  9.7  9.8   PHOS   --   4.3   --      Recent Labs      07/24/18   2045   AST  21   ALT  18   BILITOT  0.3   ALKPHOS  103     No results for input(s): INR in the last 72 hours. No results for input(s): Paola Sneddon in the last 72 hours. Urinalysis:      Lab Results   Component Value Date    NITRU NEGATIVE 03/17/2014    WBCUA 5-10 03/17/2014    BACTERIA MANY 03/17/2014    RBCUA >200 03/17/2014    BLOODU Moderate 03/19/2014    BLOODU LARGE 03/17/2014    SPECGRAV 1.015 03/04/2013    GLUCOSEU NEGATIVE 03/17/2014       Radiology:  XR CHEST PORTABLE   Final Result      Cardiomegaly with pulmonary edema consistent with mild to moderate CHF, less likely viral pneumonia. Mid left lung atelectasis. Small right pleural effusion.          **This report has been created using voice recognition software. It may contain minor errors which are inherent in voice recognition technology. **      Final report electronically signed by Dr. Gricelda Patel on 7/24/2018 11:29 PM          Diet: DIET RENAL; Carb Control: 4 carb choices (60 gms)/meal; No Added Salt (3-4 GM); Daily Fluid Restriction: 1500 ml    DVT prophylaxis: [] Lovenox                                 [] SCDs                                 [x] SQ Heparin                                 [] Encourage ambulation           [] Already on Anticoagulation     Disposition:    [x] Home       [] TCU       [] Rehab       [] Psych       [] SNF       [] [place]       [] Other-    Code Status: Full Code    PT/OT Eval Status: none    Assessment/Plan:    Anticipated Discharge in : 4-5 days     Progressive CKD stage 5 secondary to diabetes mellitus type 2-   Creatinine 5 BUN 48, currently on Lasix drip, Diuril 500 mg IV 1 dose   Lasix drip discontinued on 7/27 and started on Bumex 2 mg IV every 8 hourly  -Negative balance of 5 L, creatinine 5.2,    Hypokalemia replaced    Pericardial effusion- most likely from renal failure, no tamponade noted, monitor     Fluid overload -IV Lasix     Hypertensive emergency - resume home medications along with IV hydralazine when necessary, will hopefully improve with improvement of the hypovolemia, meds being adjusted     Pulmonary edema - noncardaic etiology from renal failure- underlying    Diabetes mellitus type 2, insulin-dependent with chronic kidney disease 4, diabetic neuropathy:  Lab Results   Component Value Date    LABA1C 6.3 01/04/2018     No results found for: EAG  decrease Lantus dose to 25 units daily, sliding scale insulin    Hyperparathyroidism renal origin- calcitriol     Hyperlipidemia-statin     COPD without exacerbation-bronchodilators     Hx of gout    Depression      Obesity Body mass index is 34.68 kg/m². History of renal stones  ?  History of seizures

## 2018-07-28 LAB
ANION GAP SERPL CALCULATED.3IONS-SCNC: 12 MEQ/L (ref 8–16)
AVERAGE GLUCOSE: 96 MG/DL (ref 70–126)
BUN BLDV-MCNC: 50 MG/DL (ref 7–22)
CALCIUM SERPL-MCNC: 10 MG/DL (ref 8.5–10.5)
CHLORIDE BLD-SCNC: 100 MEQ/L (ref 98–111)
CO2: 27 MEQ/L (ref 23–33)
CREAT SERPL-MCNC: 5.1 MG/DL (ref 0.4–1.2)
GFR SERPL CREATININE-BSD FRML MDRD: 10 ML/MIN/1.73M2
GLUCOSE BLD-MCNC: 130 MG/DL (ref 70–108)
GLUCOSE BLD-MCNC: 136 MG/DL (ref 70–108)
GLUCOSE BLD-MCNC: 137 MG/DL (ref 70–108)
GLUCOSE BLD-MCNC: 140 MG/DL (ref 70–108)
HBA1C MFR BLD: 5.2 % (ref 4.4–6.4)
MAGNESIUM: 1.9 MG/DL (ref 1.6–2.4)
POTASSIUM SERPL-SCNC: 3.6 MEQ/L (ref 3.5–5.2)
SODIUM BLD-SCNC: 139 MEQ/L (ref 135–145)

## 2018-07-28 PROCEDURE — 36415 COLL VENOUS BLD VENIPUNCTURE: CPT

## 2018-07-28 PROCEDURE — 6370000000 HC RX 637 (ALT 250 FOR IP): Performed by: NURSE PRACTITIONER

## 2018-07-28 PROCEDURE — 82948 REAGENT STRIP/BLOOD GLUCOSE: CPT

## 2018-07-28 PROCEDURE — 99232 SBSQ HOSP IP/OBS MODERATE 35: CPT | Performed by: INTERNAL MEDICINE

## 2018-07-28 PROCEDURE — 6370000000 HC RX 637 (ALT 250 FOR IP): Performed by: INTERNAL MEDICINE

## 2018-07-28 PROCEDURE — 99232 SBSQ HOSP IP/OBS MODERATE 35: CPT | Performed by: NURSE PRACTITIONER

## 2018-07-28 PROCEDURE — 6360000002 HC RX W HCPCS: Performed by: HOSPITALIST

## 2018-07-28 PROCEDURE — 80048 BASIC METABOLIC PNL TOTAL CA: CPT

## 2018-07-28 PROCEDURE — 83735 ASSAY OF MAGNESIUM: CPT

## 2018-07-28 PROCEDURE — 2709999900 HC NON-CHARGEABLE SUPPLY

## 2018-07-28 PROCEDURE — 83036 HEMOGLOBIN GLYCOSYLATED A1C: CPT

## 2018-07-28 PROCEDURE — 2580000003 HC RX 258: Performed by: HOSPITALIST

## 2018-07-28 PROCEDURE — 1200000003 HC TELEMETRY R&B

## 2018-07-28 PROCEDURE — 6370000000 HC RX 637 (ALT 250 FOR IP): Performed by: HOSPITALIST

## 2018-07-28 PROCEDURE — 2500000003 HC RX 250 WO HCPCS: Performed by: INTERNAL MEDICINE

## 2018-07-28 RX ORDER — HYDRALAZINE HYDROCHLORIDE 50 MG/1
100 TABLET, FILM COATED ORAL EVERY 8 HOURS SCHEDULED
Status: DISCONTINUED | OUTPATIENT
Start: 2018-07-28 | End: 2018-07-30

## 2018-07-28 RX ADMIN — HYDRALAZINE HYDROCHLORIDE 100 MG: 50 TABLET ORAL at 23:49

## 2018-07-28 RX ADMIN — INSULIN LISPRO 1 UNITS: 100 INJECTION, SOLUTION INTRAVENOUS; SUBCUTANEOUS at 13:07

## 2018-07-28 RX ADMIN — DOXAZOSIN 8 MG: 4 TABLET ORAL at 23:52

## 2018-07-28 RX ADMIN — Medication 10 ML: at 01:21

## 2018-07-28 RX ADMIN — CARVEDILOL 37.5 MG: 25 TABLET, FILM COATED ORAL at 17:01

## 2018-07-28 RX ADMIN — FERROUS SULFATE TAB 325 MG (65 MG ELEMENTAL FE) 325 MG: 325 (65 FE) TAB at 09:14

## 2018-07-28 RX ADMIN — INSULIN GLARGINE 25 UNITS: 100 INJECTION, SOLUTION SUBCUTANEOUS at 09:16

## 2018-07-28 RX ADMIN — CLONIDINE HYDROCHLORIDE 0.3 MG: 0.2 TABLET ORAL at 09:09

## 2018-07-28 RX ADMIN — POTASSIUM CHLORIDE 20 MEQ: 20 TABLET, EXTENDED RELEASE ORAL at 15:44

## 2018-07-28 RX ADMIN — HYDROCODONE BITARTRATE AND ACETAMINOPHEN 1 TABLET: 5; 325 TABLET ORAL at 23:58

## 2018-07-28 RX ADMIN — ASPIRIN 81 MG: 81 TABLET ORAL at 09:08

## 2018-07-28 RX ADMIN — HYDRALAZINE HYDROCHLORIDE 100 MG: 50 TABLET ORAL at 13:12

## 2018-07-28 RX ADMIN — Medication 10 ML: at 23:52

## 2018-07-28 RX ADMIN — HEPARIN SODIUM 5000 UNITS: 5000 INJECTION, SOLUTION INTRAVENOUS; SUBCUTANEOUS at 13:10

## 2018-07-28 RX ADMIN — CLONIDINE HYDROCHLORIDE 0.3 MG: 0.2 TABLET ORAL at 23:50

## 2018-07-28 RX ADMIN — CARVEDILOL 37.5 MG: 25 TABLET, FILM COATED ORAL at 09:12

## 2018-07-28 RX ADMIN — HYDROCODONE BITARTRATE AND ACETAMINOPHEN 1 TABLET: 5; 325 TABLET ORAL at 09:19

## 2018-07-28 RX ADMIN — FLUTICASONE PROPIONATE 1 SPRAY: 50 SPRAY, METERED NASAL at 09:13

## 2018-07-28 RX ADMIN — CALCITRIOL 0.25 MCG: 0.25 CAPSULE ORAL at 09:10

## 2018-07-28 RX ADMIN — BUMETANIDE 2 MG: 0.25 INJECTION INTRAMUSCULAR; INTRAVENOUS at 09:11

## 2018-07-28 RX ADMIN — ATORVASTATIN CALCIUM 40 MG: 80 TABLET, FILM COATED ORAL at 23:49

## 2018-07-28 RX ADMIN — HEPARIN SODIUM 5000 UNITS: 5000 INJECTION, SOLUTION INTRAVENOUS; SUBCUTANEOUS at 09:15

## 2018-07-28 RX ADMIN — ALLOPURINOL 100 MG: 100 TABLET ORAL at 09:10

## 2018-07-28 RX ADMIN — VITAMIN D, TAB 1000IU (100/BT) 5000 UNITS: 25 TAB at 09:10

## 2018-07-28 RX ADMIN — BUMETANIDE 2 MG: 0.25 INJECTION INTRAMUSCULAR; INTRAVENOUS at 15:44

## 2018-07-28 RX ADMIN — HYDRALAZINE HYDROCHLORIDE 100 MG: 50 TABLET ORAL at 09:09

## 2018-07-28 RX ADMIN — BUMETANIDE 2 MG: 0.25 INJECTION INTRAMUSCULAR; INTRAVENOUS at 01:18

## 2018-07-28 RX ADMIN — HEPARIN SODIUM 5000 UNITS: 5000 INJECTION, SOLUTION INTRAVENOUS; SUBCUTANEOUS at 23:58

## 2018-07-28 RX ADMIN — POTASSIUM CHLORIDE 20 MEQ: 20 TABLET, EXTENDED RELEASE ORAL at 09:08

## 2018-07-28 RX ADMIN — Medication 10 ML: at 09:13

## 2018-07-28 RX ADMIN — AMLODIPINE BESYLATE 10 MG: 10 TABLET ORAL at 09:10

## 2018-07-28 RX ADMIN — CLONIDINE HYDROCHLORIDE 0.3 MG: 0.2 TABLET ORAL at 15:44

## 2018-07-28 RX ADMIN — Medication 1 TABLET: at 09:14

## 2018-07-28 ASSESSMENT — PAIN DESCRIPTION - FREQUENCY
FREQUENCY: CONTINUOUS
FREQUENCY: CONTINUOUS

## 2018-07-28 ASSESSMENT — PAIN DESCRIPTION - PROGRESSION
CLINICAL_PROGRESSION: NOT CHANGED

## 2018-07-28 ASSESSMENT — PAIN DESCRIPTION - PAIN TYPE
TYPE: ACUTE PAIN
TYPE: ACUTE PAIN

## 2018-07-28 ASSESSMENT — PAIN SCALES - GENERAL
PAINLEVEL_OUTOF10: 7
PAINLEVEL_OUTOF10: 8
PAINLEVEL_OUTOF10: 8

## 2018-07-28 ASSESSMENT — PAIN DESCRIPTION - ONSET
ONSET: ON-GOING
ONSET: ON-GOING

## 2018-07-28 ASSESSMENT — PAIN DESCRIPTION - LOCATION
LOCATION: LEG;FOOT
LOCATION: LEG;FOOT;BACK

## 2018-07-28 ASSESSMENT — PAIN DESCRIPTION - DESCRIPTORS
DESCRIPTORS: PINS AND NEEDLES;TINGLING
DESCRIPTORS: ACHING

## 2018-07-28 ASSESSMENT — PAIN DESCRIPTION - ORIENTATION: ORIENTATION: LOWER

## 2018-07-28 NOTE — PROGRESS NOTES
Hospitalist Progress Note    Patient:  Angelic Roblero      Unit/Bed:8B-27/027-A    YOB: 1955    MRN: [id number]       Acct: [de-identified]     PCP: Leonor Echeverria MD    Date of Admission: 7/24/2018    Chief Complaint:  Swelling in legs with heaviness since last 2 weeks    Patient presented to the emergency room as she is been feeling heaviness in the legs and have been hurting along with swelling that has been worsening since the last 2 weeks. Patient also admits to having some dyspnea on exertion. patient denies any chest pain. Cough or fever. Patient usually urinates some amount in the morning but not much after that for most days. States that she's been following with a nephrologist for worsening renal failure. Her blood pressure has been running high lately. Apparently blood sugars at home were running low and was not needing any pre-meal insulin  On Bumex and Zaroxolyn at home   -multiple blood pressure medications at home,cardiogram, Coreg, hydralazine, clonidine, Norvasc    Her pressure on admission 203/91 was given IV Bumex 3 mg along with IV hydralazine in the ER. Chest x-ray showed cardiomegaly with pulmonary edema consistent with mild to moderate CHF and small right pleural effusion    Hospital Course:   Admitted to the hospital and nephrology was consulted. Patient started on Lasix drip , chlorothiazide 500 mg IV 1 dose and her Bumex was discontinued. Home blood pressure medications were resumed.   -change to Lantus to once daily, stop fixed dose pre-meal insulin and placed on sliding scale. - 2-D echo shows EF on the low side of 50% but pericardial effusion present moderate no tamponade, most likely from uremia.     7/26- negative balance of 3 L, creatinine stable at 5.1 today ,  mild improvement in edema, feeling better with breathing, still on Bumex drip, blood pressure high, Cardura increased to 8 mg, potassium being replaced as it is 3.4, C. diff ordered for diarrhea    7/27- C. carb choices (60 gms)/meal; No Added Salt (3-4 GM); Daily Fluid Restriction: 1500 ml    Exam:  BP (!) 194/79   Pulse 65   Temp 97.5 °F (36.4 °C) (Axillary)   Resp 18   Ht 5' 5\" (1.651 m)   Wt 207 lb 8 oz (94.1 kg)   SpO2 100%   BMI 34.53 kg/m²     Physical Examination:   General appearance - alert, awake  and in no distress at rest, obese  HEENT: Normocephalic, Atraumatic, pupils reactive, mucous membranes moist  Chest - moving equally to respiration,Decreased air entry, clear to auscultation  Heart - normal rate, regular rhythm, normal S1, S2, no murmurs,  Abdomen - soft, nontender, distended, no masses or organomegaly, BS+  Neurological - alert, oriented, normal speech, sensations intact and able to move all extremities   Extremities - peripheral pulses normal, 2-3+ b/l pedal edema,  Capillary refill less than 3 sec  Skin - normal coloration and turgor, no rashes      Labs:   No results for input(s): WBC, HGB, HCT, PLT in the last 72 hours. Recent Labs      07/25/18   1548  07/26/18   0644  07/27/18   0711   NA  143  143  141   K  3.3*  3.4*  3.5   CL  103  101  100   CO2  25  27  26   BUN  45*  47*  49*   CREATININE  4.7*  5.1*  5.2*   CALCIUM  9.6  9.7  9.8   PHOS   --   4.3   --      No results for input(s): AST, ALT, BILIDIR, BILITOT, ALKPHOS in the last 72 hours. No results for input(s): INR in the last 72 hours. No results for input(s): Rubina Golds in the last 72 hours. Urinalysis:      Lab Results   Component Value Date    NITRU NEGATIVE 03/17/2014    WBCUA 5-10 03/17/2014    BACTERIA MANY 03/17/2014    RBCUA >200 03/17/2014    BLOODU Moderate 03/19/2014    BLOODU LARGE 03/17/2014    SPECGRAV 1.015 03/04/2013    GLUCOSEU NEGATIVE 03/17/2014       Radiology:  XR CHEST PORTABLE   Final Result      Cardiomegaly with pulmonary edema consistent with mild to moderate CHF, less likely viral pneumonia. Mid left lung atelectasis. Small right pleural effusion.          **This report has been of smoking        Electronically signed by Luis Vergara MD on 7/28/2018 at 7:21 AM

## 2018-07-28 NOTE — PROGRESS NOTES
[facility]         Renal Inpatient Follow-Up note         7/28/2018 2:45 PM    Pt Name:   German Johnson  YOB: 1955  Attending:   Carl Mason MD    Chief Complaint : German Johnson is a 57 y.o. female being followed by nephrology for VIDA/CKD    Interval History :   Patient seen and examined by me. No distress  She states she does not have SOB, but is fatigued and did have a dizzy episode when standing up from toilet today. She is on Bumex 2 mg every 8 hours and KCL 20 meq BID. -195  Wt down 1# from yesterday. UOP 2900 yesterday and 900 so far today.       Scheduled Medications :    bumetanide  2 mg Intravenous Q8H    carvedilol  37.5 mg Oral BID WC    doxazosin  8 mg Oral Daily    allopurinol  100 mg Oral Daily    insulin lispro  0-6 Units Subcutaneous TID WC    insulin glargine  25 Units Subcutaneous QAM    atorvastatin  40 mg Oral Daily    And    amLODIPine  10 mg Oral Daily    potassium chloride  20 mEq Oral BID WC    aspirin  81 mg Oral Daily    calcitRIOL  0.25 mcg Oral Daily    cloNIDine  0.3 mg Oral Q8H    ferrous sulfate  325 mg Oral Daily with breakfast    fluticasone  1 spray Nasal Daily    folbee plus  1 tablet Oral Daily    hydrALAZINE  100 mg Oral TID    vitamin D  5,000 Units Oral Daily    sodium chloride flush  10 mL Intravenous 2 times per day    heparin (porcine)  5,000 Units Subcutaneous 3 times per day      dextrose         Vitals :  BP (!) 166/79   Pulse 64   Temp 97.4 °F (36.3 °C) (Oral)   Resp 18   Ht 5' 5\" (1.651 m)   Wt 207 lb 8 oz (94.1 kg)   SpO2 96%   BMI 34.53 kg/m²     24HR INTAKE/OUTPUT:      Intake/Output Summary (Last 24 hours) at 07/28/18 1445  Last data filed at 07/28/18 1420   Gross per 24 hour   Intake             1190 ml   Output             2450 ml   Net            -1260 ml       Last 3 Weights  Wt Readings from Last 3 Encounters:   07/28/18 207 lb 8 oz (94.1 kg)   07/03/18 206 lb 9.6 oz (93.7

## 2018-07-29 LAB
ANION GAP SERPL CALCULATED.3IONS-SCNC: 13 MEQ/L (ref 8–16)
BUN BLDV-MCNC: 53 MG/DL (ref 7–22)
CALCIUM SERPL-MCNC: 10 MG/DL (ref 8.5–10.5)
CHLORIDE BLD-SCNC: 99 MEQ/L (ref 98–111)
CO2: 29 MEQ/L (ref 23–33)
CREAT SERPL-MCNC: 5.2 MG/DL (ref 0.4–1.2)
GFR SERPL CREATININE-BSD FRML MDRD: 10 ML/MIN/1.73M2
GLUCOSE BLD-MCNC: 108 MG/DL (ref 70–108)
GLUCOSE BLD-MCNC: 134 MG/DL (ref 70–108)
GLUCOSE BLD-MCNC: 137 MG/DL (ref 70–108)
GLUCOSE BLD-MCNC: 138 MG/DL (ref 70–108)
GLUCOSE BLD-MCNC: 154 MG/DL (ref 70–108)
GLUCOSE BLD-MCNC: 164 MG/DL (ref 70–108)
POTASSIUM SERPL-SCNC: 4.5 MEQ/L (ref 3.5–5.2)
SODIUM BLD-SCNC: 141 MEQ/L (ref 135–145)
VRE CULTURE: NORMAL

## 2018-07-29 PROCEDURE — 6370000000 HC RX 637 (ALT 250 FOR IP): Performed by: INTERNAL MEDICINE

## 2018-07-29 PROCEDURE — 6370000000 HC RX 637 (ALT 250 FOR IP): Performed by: NURSE PRACTITIONER

## 2018-07-29 PROCEDURE — 6370000000 HC RX 637 (ALT 250 FOR IP): Performed by: HOSPITALIST

## 2018-07-29 PROCEDURE — 2580000003 HC RX 258: Performed by: HOSPITALIST

## 2018-07-29 PROCEDURE — 99232 SBSQ HOSP IP/OBS MODERATE 35: CPT | Performed by: NURSE PRACTITIONER

## 2018-07-29 PROCEDURE — 2500000003 HC RX 250 WO HCPCS: Performed by: INTERNAL MEDICINE

## 2018-07-29 PROCEDURE — 1200000003 HC TELEMETRY R&B

## 2018-07-29 PROCEDURE — 36415 COLL VENOUS BLD VENIPUNCTURE: CPT

## 2018-07-29 PROCEDURE — 80048 BASIC METABOLIC PNL TOTAL CA: CPT

## 2018-07-29 PROCEDURE — 6360000002 HC RX W HCPCS: Performed by: HOSPITALIST

## 2018-07-29 PROCEDURE — 99232 SBSQ HOSP IP/OBS MODERATE 35: CPT | Performed by: INTERNAL MEDICINE

## 2018-07-29 PROCEDURE — 82948 REAGENT STRIP/BLOOD GLUCOSE: CPT

## 2018-07-29 RX ORDER — DOXAZOSIN MESYLATE 4 MG/1
12 TABLET ORAL DAILY
Status: DISCONTINUED | OUTPATIENT
Start: 2018-07-29 | End: 2018-08-01 | Stop reason: HOSPADM

## 2018-07-29 RX ORDER — BUMETANIDE 0.25 MG/ML
2 INJECTION, SOLUTION INTRAMUSCULAR; INTRAVENOUS 2 TIMES DAILY
Status: CANCELLED | OUTPATIENT
Start: 2018-07-29

## 2018-07-29 RX ADMIN — INSULIN LISPRO 1 UNITS: 100 INJECTION, SOLUTION INTRAVENOUS; SUBCUTANEOUS at 18:03

## 2018-07-29 RX ADMIN — CLONIDINE HYDROCHLORIDE 0.3 MG: 0.2 TABLET ORAL at 22:30

## 2018-07-29 RX ADMIN — DOXAZOSIN 12 MG: 4 TABLET ORAL at 22:30

## 2018-07-29 RX ADMIN — Medication 10 ML: at 21:08

## 2018-07-29 RX ADMIN — HEPARIN SODIUM 5000 UNITS: 5000 INJECTION, SOLUTION INTRAVENOUS; SUBCUTANEOUS at 14:26

## 2018-07-29 RX ADMIN — CARVEDILOL 37.5 MG: 25 TABLET, FILM COATED ORAL at 17:54

## 2018-07-29 RX ADMIN — CLONIDINE HYDROCHLORIDE 0.3 MG: 0.2 TABLET ORAL at 16:35

## 2018-07-29 RX ADMIN — CLONIDINE HYDROCHLORIDE 0.3 MG: 0.2 TABLET ORAL at 08:16

## 2018-07-29 RX ADMIN — POTASSIUM CHLORIDE 20 MEQ: 20 TABLET, EXTENDED RELEASE ORAL at 08:16

## 2018-07-29 RX ADMIN — POTASSIUM CHLORIDE 20 MEQ: 20 TABLET, EXTENDED RELEASE ORAL at 18:01

## 2018-07-29 RX ADMIN — ALLOPURINOL 100 MG: 100 TABLET ORAL at 08:17

## 2018-07-29 RX ADMIN — Medication 10 ML: at 09:14

## 2018-07-29 RX ADMIN — HYDRALAZINE HYDROCHLORIDE 100 MG: 50 TABLET ORAL at 21:07

## 2018-07-29 RX ADMIN — HYDRALAZINE HYDROCHLORIDE 100 MG: 50 TABLET ORAL at 05:30

## 2018-07-29 RX ADMIN — ASPIRIN 81 MG: 81 TABLET ORAL at 08:16

## 2018-07-29 RX ADMIN — CARVEDILOL 37.5 MG: 25 TABLET, FILM COATED ORAL at 08:16

## 2018-07-29 RX ADMIN — Medication 1 TABLET: at 08:16

## 2018-07-29 RX ADMIN — BUMETANIDE 2 MG: 0.25 INJECTION INTRAMUSCULAR; INTRAVENOUS at 17:54

## 2018-07-29 RX ADMIN — AMLODIPINE BESYLATE 10 MG: 10 TABLET ORAL at 08:17

## 2018-07-29 RX ADMIN — INSULIN GLARGINE 25 UNITS: 100 INJECTION, SOLUTION SUBCUTANEOUS at 09:07

## 2018-07-29 RX ADMIN — HEPARIN SODIUM 5000 UNITS: 5000 INJECTION, SOLUTION INTRAVENOUS; SUBCUTANEOUS at 05:30

## 2018-07-29 RX ADMIN — HEPARIN SODIUM 5000 UNITS: 5000 INJECTION, SOLUTION INTRAVENOUS; SUBCUTANEOUS at 21:08

## 2018-07-29 RX ADMIN — VITAMIN D, TAB 1000IU (100/BT) 5000 UNITS: 25 TAB at 08:17

## 2018-07-29 RX ADMIN — ATORVASTATIN CALCIUM 40 MG: 80 TABLET, FILM COATED ORAL at 21:07

## 2018-07-29 RX ADMIN — BUMETANIDE 2 MG: 0.25 INJECTION INTRAMUSCULAR; INTRAVENOUS at 09:07

## 2018-07-29 RX ADMIN — HYDRALAZINE HYDROCHLORIDE 100 MG: 50 TABLET ORAL at 14:26

## 2018-07-29 RX ADMIN — BUMETANIDE 2 MG: 0.25 INJECTION INTRAMUSCULAR; INTRAVENOUS at 00:01

## 2018-07-29 RX ADMIN — FLUTICASONE PROPIONATE 1 SPRAY: 50 SPRAY, METERED NASAL at 08:17

## 2018-07-29 RX ADMIN — Medication 10 ML: at 00:02

## 2018-07-29 RX ADMIN — FERROUS SULFATE TAB 325 MG (65 MG ELEMENTAL FE) 325 MG: 325 (65 FE) TAB at 08:16

## 2018-07-29 RX ADMIN — BUMETANIDE 2 MG: 0.25 INJECTION INTRAMUSCULAR; INTRAVENOUS at 22:50

## 2018-07-29 RX ADMIN — CALCITRIOL 0.25 MCG: 0.25 CAPSULE ORAL at 08:16

## 2018-07-29 RX ADMIN — HYDROCODONE BITARTRATE AND ACETAMINOPHEN 1 TABLET: 5; 325 TABLET ORAL at 21:07

## 2018-07-29 ASSESSMENT — PAIN DESCRIPTION - FREQUENCY: FREQUENCY: CONTINUOUS

## 2018-07-29 ASSESSMENT — PAIN SCALES - GENERAL
PAINLEVEL_OUTOF10: 0
PAINLEVEL_OUTOF10: 7
PAINLEVEL_OUTOF10: 0

## 2018-07-29 ASSESSMENT — PAIN DESCRIPTION - LOCATION: LOCATION: LEG

## 2018-07-29 ASSESSMENT — PAIN DESCRIPTION - DESCRIPTORS: DESCRIPTORS: PINS AND NEEDLES;ACHING

## 2018-07-29 ASSESSMENT — PAIN DESCRIPTION - ORIENTATION: ORIENTATION: RIGHT;LEFT

## 2018-07-29 ASSESSMENT — PAIN DESCRIPTION - PAIN TYPE: TYPE: CHRONIC PAIN

## 2018-07-29 NOTE — PLAN OF CARE
Problem: Falls - Risk of:  Goal: Will remain free from falls  Will remain free from falls   Outcome: Ongoing  Patient fall risk. Patient had no falls this shift. RN educated on fall precautions/preventions. Patient's call light and possessions with in reach. Refusing heating pad/ pain pils. Problem: Fluid Volume:  Goal: Hemodynamic stability will improve  Hemodynamic stability will improve   Outcome: Ongoing  Vitals within normal limits and assessed every four hours and as needed. Adequate I&Os performed. Labs being monitored. Will continue to monitor. Goal: Ability to maintain a balanced intake and output will improve  Ability to maintain a balanced intake and output will improve   Outcome: Ongoing  Patient having adequate output at this time. Output being measured by hat. Daily weights being performed. Standing weight obtained when applicable. Problem: Tissue Perfusion - Renal, Altered:  Goal: Electrolytes within specified parameters  Electrolytes within specified parameters   Outcome: Ongoing  Labs being monitored  Goal: Urine creatinine clearance will be within specified parameters  Urine creatinine clearance will be within specified parameters   Outcome: Ongoing  Labs being monitored  Goal: Serum creatinine will be within specified parameters  Serum creatinine will be within specified parameters   Outcome: Ongoing   Labs being monitored    Problem: Pain:  Goal: Pain level will decrease  Pain level will decrease   Outcome: Ongoing  Patient rating pain 7. Patient's pain goal is 4. Pain medications given per order and pain is reassessed within 1 hour of administration. Patient tolerating pain medication well at this time. Pain assessed as needed and during hourly rounding. Education given on purpose and side effects of pain medications. Education provided on handout and whiteboard.     Problem: DISCHARGE BARRIERS  Goal: Patient's continuum of care needs are met  Outcome: Ongoing  Patient plans to go home at discharge. Plan for discharge monday. Problem: Risk for Impaired Skin Integrity  Goal: Tissue integrity - skin and mucous membranes  Structural intactness and normal physiological function of skin and  mucous membranes. Outcome: Ongoing  Patient shows no signs/symtpoms of new skin breakdown. Patient encouraged to change positions frequently. Foot of the bed elevated. Comments: Care plan reviewed with patient. Patient verbalize understanding of the plan of care and contribute to goal setting.

## 2018-07-29 NOTE — PROGRESS NOTES
[facility]         Renal Inpatient Follow-Up note         7/29/2018 12:38 PM    Pt Name:   Diana Blandon  YOB: 1955  Attending:   Renee Robles MD    Chief Complaint : Diana Blandon is a 57 y.o. female being followed by nephrology for VIDA/CKD    Interval History :   Patient seen and examined by me. No distress  She states her energy level is improved and she does not have SOB, NVD. She is on Bumex 2 mg every 8 hours and KCL 20 meq TID  SBP continues to run high, 167-187. Meds adjustments noted. Wt Not completed today. UOP 2300 yesterday and 450 so far today.       Scheduled Medications :    doxazosin  12 mg Oral Daily    hydrALAZINE  100 mg Oral 3 times per day    bumetanide  2 mg Intravenous Q8H    carvedilol  37.5 mg Oral BID WC    allopurinol  100 mg Oral Daily    insulin lispro  0-6 Units Subcutaneous TID WC    insulin glargine  25 Units Subcutaneous QAM    atorvastatin  40 mg Oral Daily    And    amLODIPine  10 mg Oral Daily    potassium chloride  20 mEq Oral BID WC    aspirin  81 mg Oral Daily    calcitRIOL  0.25 mcg Oral Daily    cloNIDine  0.3 mg Oral Q8H    ferrous sulfate  325 mg Oral Daily with breakfast    fluticasone  1 spray Nasal Daily    folbee plus  1 tablet Oral Daily    vitamin D  5,000 Units Oral Daily    sodium chloride flush  10 mL Intravenous 2 times per day    heparin (porcine)  5,000 Units Subcutaneous 3 times per day      dextrose         Vitals :  BP (!) 179/86   Pulse 70   Temp 98.3 °F (36.8 °C) (Oral)   Resp 16   Ht 5' 5\" (1.651 m)   Wt 207 lb 8 oz (94.1 kg)   SpO2 94%   BMI 34.53 kg/m²     24HR INTAKE/OUTPUT:      Intake/Output Summary (Last 24 hours) at 07/29/18 1238  Last data filed at 07/29/18 1045   Gross per 24 hour   Intake             1328 ml   Output             2750 ml   Net            -1422 ml       Last 3 Weights  Wt Readings from Last 3 Encounters:   07/28/18 207 lb 8 oz (94.1 kg)   07/03/18 206 lb

## 2018-07-29 NOTE — PROGRESS NOTES
Hospitalist Progress Note    Patient:  Sylvie Santos      Unit/Bed:8B-27/027-A    YOB: 1955    MRN: [id number]       Acct: [de-identified]     PCP: Luis Cornejo MD    Date of Admission: 7/24/2018    Chief Complaint:  Swelling in legs with heaviness since last 2 weeks    Patient presented to the emergency room as she is been feeling heaviness in the legs and have been hurting along with swelling that has been worsening since the last 2 weeks. Patient also admits to having some dyspnea on exertion. patient denies any chest pain. Cough or fever. Patient usually urinates some amount in the morning but not much after that for most days. States that she's been following with a nephrologist for worsening renal failure. Her blood pressure has been running high lately. Apparently blood sugars at home were running low and was not needing any pre-meal insulin  On Bumex and Zaroxolyn at home   -multiple blood pressure medications at home,cardiogram, Coreg, hydralazine, clonidine, Norvasc    Her pressure on admission 203/91 was given IV Bumex 3 mg along with IV hydralazine in the ER. Chest x-ray showed cardiomegaly with pulmonary edema consistent with mild to moderate CHF and small right pleural effusion    Hospital Course:   Admitted to the hospital and nephrology was consulted. Patient started on Lasix drip , chlorothiazide 500 mg IV 1 dose and her Bumex was discontinued. Home blood pressure medications were resumed.   -change to Lantus to once daily, stop fixed dose pre-meal insulin and placed on sliding scale. - 2-D echo shows EF on the low side of 50% but pericardial effusion present moderate no tamponade, most likely from uremia.     7/26- negative balance of 3 L, creatinine stable at 5.1 today ,  mild improvement in edema, feeling better with breathing, still on Bumex drip, blood pressure high, Cardura increased to 8 mg, potassium being replaced as it is 3.4, C. diff ordered for diarrhea    7/27- C. exacerbation-bronchodilators     Hx of gout    Depression      Obesity Body mass index is 34.53 kg/m². History of renal stones  ?  History of seizures     hx of smoking        Electronically signed by Darline Maier MD on 7/29/2018 at 7:13 AM

## 2018-07-30 ENCOUNTER — APPOINTMENT (OUTPATIENT)
Dept: INTERVENTIONAL RADIOLOGY/VASCULAR | Age: 63
DRG: 189 | End: 2018-07-30
Payer: MEDICARE

## 2018-07-30 LAB
ANION GAP SERPL CALCULATED.3IONS-SCNC: 13 MEQ/L (ref 8–16)
BUN BLDV-MCNC: 57 MG/DL (ref 7–22)
CALCIUM SERPL-MCNC: 9.9 MG/DL (ref 8.5–10.5)
CHLORIDE BLD-SCNC: 99 MEQ/L (ref 98–111)
CO2: 28 MEQ/L (ref 23–33)
CREAT SERPL-MCNC: 5.3 MG/DL (ref 0.4–1.2)
GFR SERPL CREATININE-BSD FRML MDRD: 10 ML/MIN/1.73M2
GLUCOSE BLD-MCNC: 112 MG/DL (ref 70–108)
GLUCOSE BLD-MCNC: 127 MG/DL (ref 70–108)
GLUCOSE BLD-MCNC: 128 MG/DL (ref 70–108)
GLUCOSE BLD-MCNC: 156 MG/DL (ref 70–108)
GLUCOSE BLD-MCNC: 157 MG/DL (ref 70–108)
MAGNESIUM: 1.9 MG/DL (ref 1.6–2.4)
POTASSIUM SERPL-SCNC: 4 MEQ/L (ref 3.5–5.2)
SODIUM BLD-SCNC: 140 MEQ/L (ref 135–145)

## 2018-07-30 PROCEDURE — 1200000003 HC TELEMETRY R&B

## 2018-07-30 PROCEDURE — 82948 REAGENT STRIP/BLOOD GLUCOSE: CPT

## 2018-07-30 PROCEDURE — 2580000003 HC RX 258: Performed by: HOSPITALIST

## 2018-07-30 PROCEDURE — 6370000000 HC RX 637 (ALT 250 FOR IP): Performed by: INTERNAL MEDICINE

## 2018-07-30 PROCEDURE — 83735 ASSAY OF MAGNESIUM: CPT

## 2018-07-30 PROCEDURE — 99232 SBSQ HOSP IP/OBS MODERATE 35: CPT | Performed by: INTERNAL MEDICINE

## 2018-07-30 PROCEDURE — 93970 EXTREMITY STUDY: CPT

## 2018-07-30 PROCEDURE — 6360000002 HC RX W HCPCS: Performed by: HOSPITALIST

## 2018-07-30 PROCEDURE — 80048 BASIC METABOLIC PNL TOTAL CA: CPT

## 2018-07-30 PROCEDURE — 6370000000 HC RX 637 (ALT 250 FOR IP): Performed by: HOSPITALIST

## 2018-07-30 PROCEDURE — 36415 COLL VENOUS BLD VENIPUNCTURE: CPT

## 2018-07-30 PROCEDURE — 2500000003 HC RX 250 WO HCPCS: Performed by: INTERNAL MEDICINE

## 2018-07-30 RX ORDER — DOCUSATE SODIUM 100 MG/1
100 CAPSULE, LIQUID FILLED ORAL 2 TIMES DAILY
Status: DISCONTINUED | OUTPATIENT
Start: 2018-07-30 | End: 2018-08-01 | Stop reason: HOSPADM

## 2018-07-30 RX ORDER — ALPRAZOLAM 0.25 MG/1
0.25 TABLET ORAL 3 TIMES DAILY PRN
Status: DISCONTINUED | OUTPATIENT
Start: 2018-07-30 | End: 2018-08-01 | Stop reason: HOSPADM

## 2018-07-30 RX ORDER — SENNA PLUS 8.6 MG/1
1 TABLET ORAL NIGHTLY
Status: DISCONTINUED | OUTPATIENT
Start: 2018-07-30 | End: 2018-08-01 | Stop reason: HOSPADM

## 2018-07-30 RX ORDER — BUMETANIDE 1 MG/1
2 TABLET ORAL 2 TIMES DAILY
Status: DISCONTINUED | OUTPATIENT
Start: 2018-07-30 | End: 2018-07-31

## 2018-07-30 RX ORDER — MINOXIDIL 2.5 MG/1
5 TABLET ORAL 2 TIMES DAILY
Status: DISCONTINUED | OUTPATIENT
Start: 2018-07-30 | End: 2018-07-31

## 2018-07-30 RX ADMIN — Medication 10 ML: at 08:49

## 2018-07-30 RX ADMIN — POTASSIUM CHLORIDE 20 MEQ: 20 TABLET, EXTENDED RELEASE ORAL at 17:26

## 2018-07-30 RX ADMIN — CARVEDILOL 37.5 MG: 25 TABLET, FILM COATED ORAL at 17:23

## 2018-07-30 RX ADMIN — INSULIN GLARGINE 25 UNITS: 100 INJECTION, SOLUTION SUBCUTANEOUS at 08:49

## 2018-07-30 RX ADMIN — DOCUSATE SODIUM 100 MG: 100 CAPSULE, LIQUID FILLED ORAL at 13:15

## 2018-07-30 RX ADMIN — CLONIDINE HYDROCHLORIDE 0.3 MG: 0.2 TABLET ORAL at 08:45

## 2018-07-30 RX ADMIN — HEPARIN SODIUM 5000 UNITS: 5000 INJECTION, SOLUTION INTRAVENOUS; SUBCUTANEOUS at 04:35

## 2018-07-30 RX ADMIN — CALCITRIOL 0.25 MCG: 0.25 CAPSULE ORAL at 08:45

## 2018-07-30 RX ADMIN — CLONIDINE HYDROCHLORIDE 0.3 MG: 0.2 TABLET ORAL at 17:23

## 2018-07-30 RX ADMIN — FLUTICASONE PROPIONATE 1 SPRAY: 50 SPRAY, METERED NASAL at 08:48

## 2018-07-30 RX ADMIN — MINOXIDIL 5 MG: 2.5 TABLET ORAL at 11:10

## 2018-07-30 RX ADMIN — HYDRALAZINE HYDROCHLORIDE 100 MG: 50 TABLET ORAL at 04:35

## 2018-07-30 RX ADMIN — ATORVASTATIN CALCIUM 40 MG: 80 TABLET, FILM COATED ORAL at 20:58

## 2018-07-30 RX ADMIN — POTASSIUM CHLORIDE 20 MEQ: 20 TABLET, EXTENDED RELEASE ORAL at 08:46

## 2018-07-30 RX ADMIN — Medication 1 TABLET: at 08:45

## 2018-07-30 RX ADMIN — ALLOPURINOL 100 MG: 100 TABLET ORAL at 08:45

## 2018-07-30 RX ADMIN — MINOXIDIL 5 MG: 2.5 TABLET ORAL at 20:58

## 2018-07-30 RX ADMIN — VITAMIN D, TAB 1000IU (100/BT) 5000 UNITS: 25 TAB at 08:46

## 2018-07-30 RX ADMIN — INSULIN LISPRO 1 UNITS: 100 INJECTION, SOLUTION INTRAVENOUS; SUBCUTANEOUS at 13:37

## 2018-07-30 RX ADMIN — DOXAZOSIN 12 MG: 4 TABLET ORAL at 20:57

## 2018-07-30 RX ADMIN — HEPARIN SODIUM 5000 UNITS: 5000 INJECTION, SOLUTION INTRAVENOUS; SUBCUTANEOUS at 13:37

## 2018-07-30 RX ADMIN — BUMETANIDE 2 MG: 1 TABLET ORAL at 20:57

## 2018-07-30 RX ADMIN — AMLODIPINE BESYLATE 10 MG: 10 TABLET ORAL at 08:45

## 2018-07-30 RX ADMIN — BUMETANIDE 2 MG: 0.25 INJECTION INTRAMUSCULAR; INTRAVENOUS at 10:00

## 2018-07-30 RX ADMIN — DOCUSATE SODIUM 100 MG: 100 CAPSULE, LIQUID FILLED ORAL at 20:58

## 2018-07-30 RX ADMIN — HEPARIN SODIUM 5000 UNITS: 5000 INJECTION, SOLUTION INTRAVENOUS; SUBCUTANEOUS at 21:01

## 2018-07-30 RX ADMIN — CARVEDILOL 37.5 MG: 25 TABLET, FILM COATED ORAL at 08:45

## 2018-07-30 RX ADMIN — SENNOSIDES 8.6 MG: 8.6 TABLET, FILM COATED ORAL at 20:58

## 2018-07-30 RX ADMIN — Medication 10 ML: at 21:00

## 2018-07-30 RX ADMIN — ALPRAZOLAM 0.25 MG: 0.25 TABLET ORAL at 20:58

## 2018-07-30 RX ADMIN — FERROUS SULFATE TAB 325 MG (65 MG ELEMENTAL FE) 325 MG: 325 (65 FE) TAB at 08:45

## 2018-07-30 RX ADMIN — ASPIRIN 81 MG: 81 TABLET ORAL at 08:45

## 2018-07-30 RX ADMIN — HYDROCODONE BITARTRATE AND ACETAMINOPHEN 1 TABLET: 5; 325 TABLET ORAL at 08:46

## 2018-07-30 ASSESSMENT — PAIN DESCRIPTION - LOCATION: LOCATION: LEG

## 2018-07-30 ASSESSMENT — PAIN SCALES - GENERAL
PAINLEVEL_OUTOF10: 0
PAINLEVEL_OUTOF10: 6
PAINLEVEL_OUTOF10: 0

## 2018-07-30 ASSESSMENT — PAIN DESCRIPTION - DESCRIPTORS: DESCRIPTORS: PINS AND NEEDLES

## 2018-07-30 ASSESSMENT — PAIN DESCRIPTION - PAIN TYPE: TYPE: CHRONIC PAIN

## 2018-07-30 ASSESSMENT — PAIN DESCRIPTION - ORIENTATION: ORIENTATION: RIGHT

## 2018-07-30 ASSESSMENT — PAIN DESCRIPTION - FREQUENCY: FREQUENCY: INTERMITTENT

## 2018-07-30 NOTE — PROGRESS NOTES
Hospitalist Progress Note    Patient:  Daisy Clemons      Unit/Bed:8B-27/027-A    YOB: 1955    MRN: [id number]       Acct: [de-identified]     PCP: Luci Zhong MD    Date of Admission: 7/24/2018    Chief Complaint:  Swelling in legs with heaviness since last 2 weeks    Patient presented to the emergency room as she is been feeling heaviness in the legs and have been hurting along with swelling that has been worsening since the last 2 weeks. Patient also admits to having some dyspnea on exertion. patient denies any chest pain. Cough or fever. Patient usually urinates some amount in the morning but not much after that for most days. States that she's been following with a nephrologist for worsening renal failure. Her blood pressure has been running high lately. Apparently blood sugars at home were running low and was not needing any pre-meal insulin  On Bumex and Zaroxolyn at home   -multiple blood pressure medications at home,cardiogram, Coreg, hydralazine, clonidine, Norvasc    Her pressure on admission 203/91 was given IV Bumex 3 mg along with IV hydralazine in the ER. Chest x-ray showed cardiomegaly with pulmonary edema consistent with mild to moderate CHF and small right pleural effusion    Hospital Course:   Admitted to the hospital and nephrology was consulted. Patient started on Lasix drip , chlorothiazide 500 mg IV 1 dose and her Bumex was discontinued. Home blood pressure medications were resumed.   -change to Lantus to once daily, stop fixed dose pre-meal insulin and placed on sliding scale. - 2-D echo shows EF on the low side of 50% but pericardial effusion present moderate no tamponade, most likely from uremia.     7/26- negative balance of 3 L, creatinine stable at 5.1 today ,  mild improvement in edema, feeling better with breathing, still on Bumex drip, blood pressure high, Cardura increased to 8 mg, potassium being replaced as it is 3.4, C. diff ordered for diarrhea    7/27- C. Diff negative, no more diarrhea, total negative balance of  5 L so far, blood pressure still high Coreg dose increased to 37.5 mg, Lasix drip stopped and changed to Bumex 2 mg IV every 8 hourly, blood glucose GERD, continue current regimen, creatinine 5.2    7/28- negative balance of 6.7 L, blood pressure trending down to 160s, still has edema, continue Bumex, follow with nephrology  7/29- negative balance of 8.1 L, Cardura increased to 12 mg, creatinine stable at 5.2  7/30-negative balance of 8.5 L, minoxidil 5 mg twice a day added as blood pressure is still in 180s, right leg more edematous than the left, venous Dopplers negative for DVT, stool softeners added, nephrology talking to patient about dialysis and patient does not want as of now    Subjective:      Patient is still having edema in the legs but better, right leg is more swollen than the left, history of ankle fracture on the right, passing gas but no bowel movements since the last 3 days, takes stool softeners at home    Medications:  Reviewed    Infusion Medications    dextrose       Scheduled Medications    minoxidil  5 mg Oral BID    doxazosin  12 mg Oral Daily    bumetanide  2 mg Intravenous Q8H    carvedilol  37.5 mg Oral BID WC    allopurinol  100 mg Oral Daily    insulin lispro  0-6 Units Subcutaneous TID WC    insulin glargine  25 Units Subcutaneous QAM    atorvastatin  40 mg Oral Daily    And    amLODIPine  10 mg Oral Daily    potassium chloride  20 mEq Oral BID WC    aspirin  81 mg Oral Daily    calcitRIOL  0.25 mcg Oral Daily    cloNIDine  0.3 mg Oral Q8H    ferrous sulfate  325 mg Oral Daily with breakfast    fluticasone  1 spray Nasal Daily    folbee plus  1 tablet Oral Daily    vitamin D  5,000 Units Oral Daily    sodium chloride flush  10 mL Intravenous 2 times per day    heparin (porcine)  5,000 Units Subcutaneous 3 times per day     PRN Meds: ALPRAZolam, ondansetron, glucose, dextrose, glucagon (rDNA), dextrose, underlying    Diabetes mellitus type 2, insulin-dependent with chronic kidney disease 4, diabetic neuropathy:  Lab Results   Component Value Date    LABA1C 5.2 07/28/2018     No results found for: EAG  decrease Lantus dose to 25 units daily, sliding scale insulin    Hyperparathyroidism renal origin- calcitriol     Hyperlipidemia-statin     COPD without exacerbation-bronchodilators     Hx of gout    Depression      Obesity Body mass index is 34.53 kg/m². History of renal stones  ?  History of seizures     hx of smoking        Electronically signed by Carl Mason MD on 7/30/2018 at 8:24 AM

## 2018-07-30 NOTE — PROGRESS NOTES
Discussed at length options for renal replacement therapy, Hemodialysis/ Peritoneal dialysis. Pt undecided which options is appropriate for her. Wants to think about it overnight. Pt education pamphlet provided. [facility]

## 2018-07-30 NOTE — PLAN OF CARE
Problem: Falls - Risk of:  Goal: Will remain free from falls  Will remain free from falls   Outcome: Ongoing  Patient on fall precautions. Falling star magnet on door. Bed in lowest position. Fall band intact. Call light within reach at all times. Patient educated to not get up per self to reduce falls. Problem: Fluid Volume:  Goal: Hemodynamic stability will improve  Hemodynamic stability will improve   Outcome: Not Met This Shift  Blood pressure within normal limits this shift. Patient displays no bleeding abnormalities. Monitoring labs frequently for any abnormalities. Goal: Ability to maintain a balanced intake and output will improve  Ability to maintain a balanced intake and output will improve   Outcome: Ongoing  Patient free of signs and symptoms of imbalanced fluid throughout shift. Intake and output equal.      Problem: Tissue Perfusion - Renal, Altered:  Goal: Electrolytes within specified parameters  Electrolytes within specified parameters   Outcome: Ongoing  Monitoring electrolytes every shift and checking labs frequently using replacement protocols per orders. Goal: Serum creatinine will be within specified parameters  Serum creatinine will be within specified parameters   Outcome: Ongoing  Monitoring serum creatinine daily for procedures ordered. Problem: Pain:  Goal: Pain level will decrease  Pain level will decrease   Outcome: Ongoing  Patient complaining of pain in legs. Monitoring pain level frequently. Pain medication given as needed. Problem: DISCHARGE BARRIERS  Goal: Patient's continuum of care needs are met  Outcome: Ongoing  Ensuring patient's continuum of care needs are met throughout shift. Problem: Risk for Impaired Skin Integrity  Goal: Tissue integrity - skin and mucous membranes  Structural intactness and normal physiological function of skin and  mucous membranes. Outcome: Ongoing  Skin integrity intact.  Patient turns frequently to reduce pressure sores. Comments: Care plan reviewed with patient and family. Patient and family verbalize understanding of the plan of care and contribute to goal setting.

## 2018-07-31 ENCOUNTER — APPOINTMENT (OUTPATIENT)
Dept: INTERVENTIONAL RADIOLOGY/VASCULAR | Age: 63
DRG: 189 | End: 2018-07-31
Payer: MEDICARE

## 2018-07-31 LAB
ANION GAP SERPL CALCULATED.3IONS-SCNC: 15 MEQ/L (ref 8–16)
BUN BLDV-MCNC: 62 MG/DL (ref 7–22)
CALCIUM SERPL-MCNC: 9.9 MG/DL (ref 8.5–10.5)
CHLORIDE BLD-SCNC: 98 MEQ/L (ref 98–111)
CO2: 26 MEQ/L (ref 23–33)
CREAT SERPL-MCNC: 6 MG/DL (ref 0.4–1.2)
GFR SERPL CREATININE-BSD FRML MDRD: 9 ML/MIN/1.73M2
GLUCOSE BLD-MCNC: 161 MG/DL (ref 70–108)
GLUCOSE BLD-MCNC: 191 MG/DL (ref 70–108)
GLUCOSE BLD-MCNC: 225 MG/DL (ref 70–108)
GLUCOSE BLD-MCNC: 350 MG/DL (ref 70–108)
GLUCOSE BLD-MCNC: 99 MG/DL (ref 70–108)
POTASSIUM SERPL-SCNC: 4 MEQ/L (ref 3.5–5.2)
SODIUM BLD-SCNC: 139 MEQ/L (ref 135–145)

## 2018-07-31 PROCEDURE — 6370000000 HC RX 637 (ALT 250 FOR IP): Performed by: HOSPITALIST

## 2018-07-31 PROCEDURE — 6370000000 HC RX 637 (ALT 250 FOR IP): Performed by: INTERNAL MEDICINE

## 2018-07-31 PROCEDURE — 2580000003 HC RX 258: Performed by: HOSPITALIST

## 2018-07-31 PROCEDURE — 6360000002 HC RX W HCPCS: Performed by: HOSPITALIST

## 2018-07-31 PROCEDURE — 82948 REAGENT STRIP/BLOOD GLUCOSE: CPT

## 2018-07-31 PROCEDURE — 36415 COLL VENOUS BLD VENIPUNCTURE: CPT

## 2018-07-31 PROCEDURE — 99232 SBSQ HOSP IP/OBS MODERATE 35: CPT | Performed by: INTERNAL MEDICINE

## 2018-07-31 PROCEDURE — 93971 EXTREMITY STUDY: CPT

## 2018-07-31 PROCEDURE — 1200000003 HC TELEMETRY R&B

## 2018-07-31 PROCEDURE — 80048 BASIC METABOLIC PNL TOTAL CA: CPT

## 2018-07-31 RX ORDER — BUMETANIDE 1 MG/1
1 TABLET ORAL 2 TIMES DAILY
Status: DISCONTINUED | OUTPATIENT
Start: 2018-07-31 | End: 2018-08-01 | Stop reason: HOSPADM

## 2018-07-31 RX ORDER — POLYETHYLENE GLYCOL 3350 17 G/17G
17 POWDER, FOR SOLUTION ORAL ONCE
Status: COMPLETED | OUTPATIENT
Start: 2018-07-31 | End: 2018-07-31

## 2018-07-31 RX ORDER — MINOXIDIL 2.5 MG/1
5 TABLET ORAL
Status: DISCONTINUED | OUTPATIENT
Start: 2018-07-31 | End: 2018-08-01

## 2018-07-31 RX ADMIN — POTASSIUM CHLORIDE 20 MEQ: 20 TABLET, EXTENDED RELEASE ORAL at 16:52

## 2018-07-31 RX ADMIN — CARVEDILOL 31.25 MG: 25 TABLET, FILM COATED ORAL at 16:52

## 2018-07-31 RX ADMIN — FERROUS SULFATE TAB 325 MG (65 MG ELEMENTAL FE) 325 MG: 325 (65 FE) TAB at 08:29

## 2018-07-31 RX ADMIN — HEPARIN SODIUM 5000 UNITS: 5000 INJECTION, SOLUTION INTRAVENOUS; SUBCUTANEOUS at 13:31

## 2018-07-31 RX ADMIN — POLYETHYLENE GLYCOL 3350 17 G: 17 POWDER, FOR SOLUTION ORAL at 13:39

## 2018-07-31 RX ADMIN — CALCITRIOL 0.25 MCG: 0.25 CAPSULE ORAL at 08:29

## 2018-07-31 RX ADMIN — HEPARIN SODIUM 5000 UNITS: 5000 INJECTION, SOLUTION INTRAVENOUS; SUBCUTANEOUS at 06:08

## 2018-07-31 RX ADMIN — ALLOPURINOL 100 MG: 100 TABLET ORAL at 08:29

## 2018-07-31 RX ADMIN — SENNOSIDES 8.6 MG: 8.6 TABLET, FILM COATED ORAL at 21:32

## 2018-07-31 RX ADMIN — HEPARIN SODIUM 5000 UNITS: 5000 INJECTION, SOLUTION INTRAVENOUS; SUBCUTANEOUS at 21:32

## 2018-07-31 RX ADMIN — CLONIDINE HYDROCHLORIDE 0.3 MG: 0.2 TABLET ORAL at 16:52

## 2018-07-31 RX ADMIN — MINOXIDIL 5 MG: 2.5 TABLET ORAL at 13:31

## 2018-07-31 RX ADMIN — ATORVASTATIN CALCIUM 40 MG: 80 TABLET, FILM COATED ORAL at 21:32

## 2018-07-31 RX ADMIN — POTASSIUM CHLORIDE 20 MEQ: 20 TABLET, EXTENDED RELEASE ORAL at 08:29

## 2018-07-31 RX ADMIN — CLONIDINE HYDROCHLORIDE 0.3 MG: 0.2 TABLET ORAL at 23:55

## 2018-07-31 RX ADMIN — Medication 10 ML: at 21:31

## 2018-07-31 RX ADMIN — Medication 1 TABLET: at 08:29

## 2018-07-31 RX ADMIN — INSULIN LISPRO 1 UNITS: 100 INJECTION, SOLUTION INTRAVENOUS; SUBCUTANEOUS at 08:30

## 2018-07-31 RX ADMIN — CARVEDILOL 37.5 MG: 25 TABLET, FILM COATED ORAL at 08:29

## 2018-07-31 RX ADMIN — DOXAZOSIN 12 MG: 4 TABLET ORAL at 21:31

## 2018-07-31 RX ADMIN — BUMETANIDE 1 MG: 1 TABLET ORAL at 08:29

## 2018-07-31 RX ADMIN — INSULIN GLARGINE 25 UNITS: 100 INJECTION, SOLUTION SUBCUTANEOUS at 08:33

## 2018-07-31 RX ADMIN — ASPIRIN 81 MG: 81 TABLET ORAL at 08:29

## 2018-07-31 RX ADMIN — HYDROCODONE BITARTRATE AND ACETAMINOPHEN 1 TABLET: 5; 325 TABLET ORAL at 23:55

## 2018-07-31 RX ADMIN — INSULIN LISPRO 2 UNITS: 100 INJECTION, SOLUTION INTRAVENOUS; SUBCUTANEOUS at 13:31

## 2018-07-31 RX ADMIN — CLONIDINE HYDROCHLORIDE 0.3 MG: 0.2 TABLET ORAL at 00:44

## 2018-07-31 RX ADMIN — DOCUSATE SODIUM 100 MG: 100 CAPSULE, LIQUID FILLED ORAL at 08:29

## 2018-07-31 RX ADMIN — Medication 10 ML: at 08:35

## 2018-07-31 RX ADMIN — FLUTICASONE PROPIONATE 1 SPRAY: 50 SPRAY, METERED NASAL at 08:28

## 2018-07-31 RX ADMIN — CLONIDINE HYDROCHLORIDE 0.3 MG: 0.2 TABLET ORAL at 08:29

## 2018-07-31 RX ADMIN — VITAMIN D, TAB 1000IU (100/BT) 5000 UNITS: 25 TAB at 08:28

## 2018-07-31 RX ADMIN — MINOXIDIL 5 MG: 2.5 TABLET ORAL at 08:28

## 2018-07-31 RX ADMIN — AMLODIPINE BESYLATE 10 MG: 10 TABLET ORAL at 08:29

## 2018-07-31 RX ADMIN — BUMETANIDE 1 MG: 1 TABLET ORAL at 16:54

## 2018-07-31 RX ADMIN — DOCUSATE SODIUM 100 MG: 100 CAPSULE, LIQUID FILLED ORAL at 21:32

## 2018-07-31 ASSESSMENT — PAIN SCALES - GENERAL
PAINLEVEL_OUTOF10: 0
PAINLEVEL_OUTOF10: 4
PAINLEVEL_OUTOF10: 7
PAINLEVEL_OUTOF10: 0
PAINLEVEL_OUTOF10: 7

## 2018-07-31 ASSESSMENT — PAIN DESCRIPTION - DESCRIPTORS
DESCRIPTORS: ACHING
DESCRIPTORS: ACHING

## 2018-07-31 ASSESSMENT — PAIN DESCRIPTION - ORIENTATION
ORIENTATION: RIGHT;LEFT
ORIENTATION: RIGHT;LEFT

## 2018-07-31 ASSESSMENT — PAIN DESCRIPTION - LOCATION
LOCATION: LEG
LOCATION: LEG

## 2018-07-31 ASSESSMENT — PAIN DESCRIPTION - PAIN TYPE
TYPE: CHRONIC PAIN
TYPE: CHRONIC PAIN

## 2018-07-31 NOTE — PROGRESS NOTES
Hospitalist Progress Note    Patient:  Ifeoma Gupta      Unit/Bed:8B-27/027-A    YOB: 1955    MRN: [id number]       Acct: [de-identified]     PCP: Walter Jones MD    Date of Admission: 7/24/2018    Chief Complaint:  Swelling in legs with heaviness since last 2 weeks    Patient presented to the emergency room as she is been feeling heaviness in the legs and have been hurting along with swelling that has been worsening since the last 2 weeks. Patient also admits to having some dyspnea on exertion. patient denies any chest pain. Cough or fever. Patient usually urinates some amount in the morning but not much after that for most days. States that she's been following with a nephrologist for worsening renal failure. Her blood pressure has been running high lately. Apparently blood sugars at home were running low and was not needing any pre-meal insulin  On Bumex and Zaroxolyn at home   -multiple blood pressure medications at home,cardiogram, Coreg, hydralazine, clonidine, Norvasc    Her pressure on admission 203/91 was given IV Bumex 3 mg along with IV hydralazine in the ER. Chest x-ray showed cardiomegaly with pulmonary edema consistent with mild to moderate CHF and small right pleural effusion    Hospital Course:   Admitted to the hospital and nephrology was consulted. Patient started on Lasix drip , chlorothiazide 500 mg IV 1 dose and her Bumex was discontinued. Home blood pressure medications were resumed.   -change to Lantus to once daily, stop fixed dose pre-meal insulin and placed on sliding scale. - 2-D echo shows EF on the low side of 50% but pericardial effusion present moderate no tamponade, most likely from uremia.     7/26- negative balance of 3 L, creatinine stable at 5.1 today ,  mild improvement in edema, feeling better with breathing, still on Bumex drip, blood pressure high, Cardura increased to 8 mg, potassium being replaced as it is 3.4, C. diff ordered for diarrhea    7/27- C. chloride flush  10 mL Intravenous 2 times per day    heparin (porcine)  5,000 Units Subcutaneous 3 times per day     PRN Meds: ALPRAZolam, ondansetron, glucose, dextrose, glucagon (rDNA), dextrose, albuterol sulfate HFA, HYDROcodone-acetaminophen, nitroGLYCERIN, polyethylene glycol, sodium chloride flush, magnesium hydroxide, potassium chloride **OR** potassium chloride **OR** potassium chloride, magnesium sulfate      Intake/Output Summary (Last 24 hours) at 07/31/18 1152  Last data filed at 07/31/18 1006   Gross per 24 hour   Intake             1410 ml   Output             1800 ml   Net             -390 ml       Diet:  DIET RENAL; Carb Control: 4 carb choices (60 gms)/meal; No Added Salt (3-4 GM); Daily Fluid Restriction: 1500 ml    Exam:  BP (!) 171/77   Pulse 68   Temp 98.2 °F (36.8 °C) (Oral)   Resp 18   Ht 5' 5\" (1.651 m)   Wt 206 lb 14.4 oz (93.8 kg)   SpO2 95%   BMI 34.43 kg/m²     Physical Examination:   General appearance - alert, awake  and in no distress at rest, obese  HEENT: Normocephalic, Atraumatic, pupils reactive, mucous membranes moist  Chest - moving equally to respiration,Decreased air entry, clear on auscultation today  Heart - normal rate, regular rhythm, normal S1, S2, no murmurs,  Abdomen - soft, nontender, distended, no masses or organomegaly, BS+  Neurological - alert, oriented, normal speech, sensations intact and able to move all extremities   Extremities - peripheral pulses normal, 2+ b/l pedal edema,more on the right- improving  Capillary refill less than 3 sec  Skin - normal coloration and turgor, no rashes      Labs:   No results for input(s): WBC, HGB, HCT, PLT in the last 72 hours.   Recent Labs      07/29/18   0613  07/30/18   0637  07/31/18   0448   NA  141  140  139   K  4.5  4.0  4.0   CL  99  99  98   CO2  29  28  26   BUN  53*  57*  62*   CREATININE  5.2*  5.3*  6.0*   CALCIUM  10.0  9.9  9.9     No results for input(s): AST, ALT, BILIDIR, BILITOT, ALKPHOS in the last

## 2018-07-31 NOTE — PROGRESS NOTES
disease     Stage 5 chronic kidney disease not on chronic dialysis (HCC)     Type 2 diabetes mellitus (HCC)     ESRD (end stage renal disease) ([facility])     Fluid overload     Pulmonary edema, noncardiac     CKD (chronic kidney disease) stage 5, GFR less than 15 ml/min ([facility])     Chronic progressive renal failure, stage 5 ([facility]     Hypertensive emergency, no CHF     Diabetic peripheral neuropathy associated with type 2 diabetes mellitus (HCC)     Pericardial effusion     Hypokalemia      Subjective:   Admit Date: 7/24/2018    Interval History:   Seeing for acute kidney injury on chronic kidney disease and fluid overload. Very sleepy this morning. Updated by the staff  Blood pressure is better. Urine output is good. Medications:   Scheduled Meds:   minoxidil  5 mg Oral TID WC    bumetanide  1 mg Oral BID    docusate sodium  100 mg Oral BID    senna  1 tablet Oral Nightly    doxazosin  12 mg Oral Daily    carvedilol  37.5 mg Oral BID WC    allopurinol  100 mg Oral Daily    insulin lispro  0-6 Units Subcutaneous TID WC    insulin glargine  25 Units Subcutaneous QAM    atorvastatin  40 mg Oral Daily    And    amLODIPine  10 mg Oral Daily    potassium chloride  20 mEq Oral BID WC    aspirin  81 mg Oral Daily    calcitRIOL  0.25 mcg Oral Daily    cloNIDine  0.3 mg Oral Q8H    ferrous sulfate  325 mg Oral Daily with breakfast    fluticasone  1 spray Nasal Daily    folbee plus  1 tablet Oral Daily    vitamin D  5,000 Units Oral Daily    sodium chloride flush  10 mL Intravenous 2 times per day    heparin (porcine)  5,000 Units Subcutaneous 3 times per day     Continuous Infusions:   dextrose         CBC: No results for input(s): WBC, HGB, PLT in the last 72 hours.   CMP:  Recent Labs      07/29/18   0613  07/30/18   0637  07/31/18   0448   NA  141  140  139   K  4.5  4.0  4.0   CL  99  99  98   CO2  29  28  26   BUN  53*  57*  62*   CREATININE  5.2*  5.3*  6.0*   GLUCOSE  154*  156*  191*   CALCIUM 10.0  9.9  9.9   LABGLOM  10*  10*  9*     Troponin: No results for input(s): TROPONINI in the last 72 hours. BNP: No results for input(s): BNP in the last 72 hours. INR: No results for input(s): INR in the last 72 hours. Lipids: No results for input(s): CHOL, LDLDIRECT, TRIG, HDL, AMYLASE, LIPASE in the last 72 hours. Liver: No results for input(s): AST, ALT, ALKPHOS, PROT, LABALBU, BILITOT in the last 72 hours. Invalid input(s): BILDIR  Iron:  No results for input(s): IRONS, FERRITIN in the last 72 hours. Invalid input(s): LABIRONS    Objective:   Vitals: BP (!) 173/72   Pulse 77   Temp 98.1 °F (36.7 °C) (Oral)   Resp 18   Ht 5' 5\" (1.651 m)   Wt 206 lb 14.4 oz (93.8 kg)   SpO2 95%   BMI 34.43 kg/m²    Wt Readings from Last 3 Encounters:   07/31/18 206 lb 14.4 oz (93.8 kg)   07/03/18 206 lb 9.6 oz (93.7 kg)   06/27/18 204 lb (92.5 kg)      24HR INTAKE/OUTPUT:    Intake/Output Summary (Last 24 hours) at 07/31/18 0933  Last data filed at 07/31/18 0447   Gross per 24 hour   Intake             1410 ml   Output             1800 ml   Net             -390 ml       Constitutional:  Sleeping comfortably  Skin:normal   HEENT:Pupils are reactive . Throat is clear   Neck:supple with no thyromegaly  Cardiovascular:  S1, S2 without murmur  Respiratory:  Clear to auscultation in the anterior  Abdomen: +bs, soft,   Ext: Trace LE edema  Musculoskeletal:Intact  Neuro: Deferred       Electronically signed by Zayda Estrada MD on 7/31/2018 at 9:33 AM

## 2018-07-31 NOTE — FLOWSHEET NOTE
07/31/18 1314   Provider Notification   Reason for Communication Review case   Provider Name Dr. Kati Pineda   Provider Notification Physician   Method of Communication Page   Response Waiting for response   Notification Time [phone]       RN called the office and left a message with the . Dr. Kati Pineda to call back.

## 2018-07-31 NOTE — CARE COORDINATION
7/27/18, 10:08 AM    DISCHARGE BARRIERS      Spoke with patient regarding HH at discharge. Patient agreed to have [facility], declined [facility] provider list has used [facility] in past.  Patient agreed to [facility] for one week, does not want to be home bound longer than that. Spoke with Jay Soares at [facility], referral made for [facility] services, nursing. Advised patient stated she only want [facility] for one week, possibly education on signs to look for with edema.
7/27/18, 9:10 AM      Max Bo day: 2  Location: [facility]27/027 Reason for admit: Fluid overload [E87.70]  Pulmonary edema, noncardiac [J81.1]   Procedure: No  Treatment Plan of Care: IV Bumex 2 mg q 8/hrs. Nephrology continues to follow. Monitoring labs closely, GFR 10, BUN 49, Creat 5.2 today. Diarrhea noted, C-diff ordered, resulted negative. Hypertensive, hydralazine prn. PCP: Walter Jones MD  Readmission Risk Score: 26%  Discharge Plan: Home alone. Will consider HH. IMM updated today.
7/30/18, 9:41 AM      Kilo James day: 5  Location: [facility] Reason for admit: Fluid overload [E87.70]  Pulmonary edema, noncardiac [J81.1]      Treatment Plan of Care:   Monitor B/P 172/83, adjusting meds, creatinine 5.3, daily weights 207 (down 1#), Bumex IV conts every 8 hour now oral 2x a day, strict I/O    PCP: Leonor Echeverria MD  Readmission Risk Score: 22%  Discharge Plan: Plans home with family support and new SR-HH. SW following.    7/30/18, 9:42 AM    Discharge plan discussed by  and . Discharge plan reviewed with patient/ family. Patient/ family verbalize understanding of discharge plan and are in agreement with plan. Understanding was demonstrated using the teach back method.        IMM Letter  IMM Letter given to Patient/Family/Significant other/Guardian/POA/by[de-identified] CM  IMM Letter date given[de-identified] 07/30/18  IMM Letter time given[de-identified] 1297
[facility] consult placed for vein mapping for AV fistula placement for hemodialysis. Dr. Vinny Bradshaw following. Probable transfer to . BUN 62, Creatinine is 6.0 today.   Electronically signed by Dallas Sesay RN on 7/31/2018 at 11:54 AM
DISCHARGE BARRIERS  7/25/18, 11:28 AM    Reason for Referral: \"advanced directives\"  Mental Status: Patient is alert and oriented  Decision Making: Patient makes own decisions  Family/Social/Home Environment:  Spoke with patient, assessment completed. Patient lives alone. Patient states she is independent with ADL's, however, she has had difficulty getting in and out of the tub. Patient has a tub shower with not seat/bench, no grab bars and a standard toilet. Patient will have family assist with shower if they are at the house. Patient does her own laundry and drives limited, she uses tickets from [employer] on Aging to eat, cooks simple breakfasts or her kids bring food. Discussed HH needs, patient will think about HH. SW will follow up closer to discharge. Current Services: none  Current Equipment:cane, has a walker but does not use. Payment Source:Medicare and [facility]  Concerns or Barriers to Discharge: Thinking about HH at discharge  Collabrative List of ECF/HH were provided: n/a    Teach Back Method used with patient regarding care plan and discharge planning. Patient  verbalize understanding of the plan of care and contribute to goal setting. Anticipated Needs/Discharge Plan: Patient plans to return home alone, will think about [place] at discharge. Will follow up at discharge.     Electronically signed by DEBBY Garay on 7/25/2018 at 11:28 AM
heparin (porcine)  5,000 Units Subcutaneous 3 times per day    amLODIPine  10 mg Oral Daily    And    atorvastatin  80 mg Oral Daily     Continuous Infusions:   dextrose        Pertinent Info/Orders/Treatment Plan:  Creatinine 5.0, BUN 48, Troponin 0.094, BNP 5571.0, Nephrology consult, telemetry, diabetes management. Diet: DIET RENAL; Carb Control: 4 carb choices (60 gms)/meal; No Added Salt (3-4 GM); Daily Fluid Restriction: 1500 ml   DVT Prophylaxis: Heparin  Smoking status:  reports that she quit smoking about 9 years ago. Her smoking use included Cigarettes. She started smoking about 36 years ago. She quit after 30.00 years of use. She has never used smokeless tobacco.   Influenza Vaccination Screening Completed: yes  Pneumonia Vaccination Screening Completed: yes  PCP: Guerda Mercado MD  Readmission: no  Readmission Risk Score: 11%    Discharge Planning  Current Residence:  Private Residence  Living Arrangements:  Alone   Support Systems:  Family Members, Children  Current Services PTA:     Potential Assistance Needed:  N/A  Potential Assistance Purchasing Medications:  No  Does patient want to participate in local refill/ meds to beds program?  No  Type of Home Care Services:  None  Patient expects to be discharged to:  home  Expected Discharge date:  07/26/18  Follow Up Appointment: Best Day/ Time: Monday AM    Discharge Plan: Met with Kev Rashid. She currently lives at home alone. States she has grandchildren visit frequently. Plan is to return home at discharge. Denies need for DME or HH.      Evaluation: no

## 2018-08-01 ENCOUNTER — TELEPHONE (OUTPATIENT)
Dept: FAMILY MEDICINE CLINIC | Age: 63
End: 2018-08-01

## 2018-08-01 VITALS
WEIGHT: 193 LBS | DIASTOLIC BLOOD PRESSURE: 67 MMHG | BODY MASS INDEX: 32.15 KG/M2 | TEMPERATURE: 98.5 F | HEART RATE: 72 BPM | SYSTOLIC BLOOD PRESSURE: 141 MMHG | OXYGEN SATURATION: 93 % | RESPIRATION RATE: 17 BRPM | HEIGHT: 65 IN

## 2018-08-01 LAB
ANION GAP SERPL CALCULATED.3IONS-SCNC: 14 MEQ/L (ref 8–16)
BUN BLDV-MCNC: 68 MG/DL (ref 7–22)
CALCIUM SERPL-MCNC: 9.7 MG/DL (ref 8.5–10.5)
CHLORIDE BLD-SCNC: 100 MEQ/L (ref 98–111)
CO2: 26 MEQ/L (ref 23–33)
CREAT SERPL-MCNC: 6 MG/DL (ref 0.4–1.2)
GFR SERPL CREATININE-BSD FRML MDRD: 9 ML/MIN/1.73M2
GLUCOSE BLD-MCNC: 122 MG/DL (ref 70–108)
GLUCOSE BLD-MCNC: 123 MG/DL (ref 70–108)
GLUCOSE BLD-MCNC: 198 MG/DL (ref 70–108)
GLUCOSE BLD-MCNC: 209 MG/DL (ref 70–108)
POTASSIUM SERPL-SCNC: 4.4 MEQ/L (ref 3.5–5.2)
SODIUM BLD-SCNC: 140 MEQ/L (ref 135–145)

## 2018-08-01 PROCEDURE — 36415 COLL VENOUS BLD VENIPUNCTURE: CPT

## 2018-08-01 PROCEDURE — 80048 BASIC METABOLIC PNL TOTAL CA: CPT

## 2018-08-01 PROCEDURE — 99239 HOSP IP/OBS DSCHRG MGMT >30: CPT | Performed by: INTERNAL MEDICINE

## 2018-08-01 PROCEDURE — 82948 REAGENT STRIP/BLOOD GLUCOSE: CPT

## 2018-08-01 PROCEDURE — 6370000000 HC RX 637 (ALT 250 FOR IP): Performed by: INTERNAL MEDICINE

## 2018-08-01 PROCEDURE — 6360000002 HC RX W HCPCS: Performed by: HOSPITALIST

## 2018-08-01 PROCEDURE — 6370000000 HC RX 637 (ALT 250 FOR IP): Performed by: HOSPITALIST

## 2018-08-01 PROCEDURE — 99999 PR OFFICE/OUTPT VISIT,PROCEDURE ONLY: CPT | Performed by: NURSE PRACTITIONER

## 2018-08-01 PROCEDURE — 2580000003 HC RX 258: Performed by: HOSPITALIST

## 2018-08-01 PROCEDURE — 99231 SBSQ HOSP IP/OBS SF/LOW 25: CPT | Performed by: INTERNAL MEDICINE

## 2018-08-01 RX ORDER — MINOXIDIL 2.5 MG/1
2.5 TABLET ORAL 2 TIMES DAILY
Status: DISCONTINUED | OUTPATIENT
Start: 2018-08-01 | End: 2018-08-01 | Stop reason: HOSPADM

## 2018-08-01 RX ORDER — MINOXIDIL 2.5 MG/1
2.5 TABLET ORAL 2 TIMES DAILY
Qty: 60 TABLET | Refills: 3 | Status: SHIPPED | OUTPATIENT
Start: 2018-08-01 | End: 2018-08-09 | Stop reason: SDUPTHER

## 2018-08-01 RX ORDER — DOXAZOSIN MESYLATE 4 MG/1
12 TABLET ORAL DAILY
Qty: 90 TABLET | Refills: 3 | Status: SHIPPED | OUTPATIENT
Start: 2018-08-01 | End: 2020-04-27

## 2018-08-01 RX ORDER — AMLODIPINE BESYLATE 10 MG/1
10 TABLET ORAL DAILY
Qty: 30 TABLET | Refills: 3 | Status: SHIPPED | OUTPATIENT
Start: 2018-08-02 | End: 2018-12-04 | Stop reason: SDUPTHER

## 2018-08-01 RX ORDER — ALLOPURINOL 100 MG/1
100 TABLET ORAL DAILY
Qty: 30 TABLET | Refills: 3 | Status: SHIPPED | OUTPATIENT
Start: 2018-08-02 | End: 2018-08-08 | Stop reason: SDUPTHER

## 2018-08-01 RX ORDER — POTASSIUM CHLORIDE 20 MEQ/1
20 TABLET, EXTENDED RELEASE ORAL 2 TIMES DAILY WITH MEALS
Qty: 60 TABLET | Refills: 3 | Status: ON HOLD | OUTPATIENT
Start: 2018-08-01 | End: 2018-08-28

## 2018-08-01 RX ORDER — MINOXIDIL 2.5 MG/1
2.5 TABLET ORAL
Status: DISCONTINUED | OUTPATIENT
Start: 2018-08-01 | End: 2018-08-01

## 2018-08-01 RX ORDER — BUMETANIDE 1 MG/1
1 TABLET ORAL 2 TIMES DAILY
Qty: 60 TABLET | Refills: 3 | Status: ON HOLD | OUTPATIENT
Start: 2018-08-01 | End: 2018-11-02 | Stop reason: ALTCHOICE

## 2018-08-01 RX ORDER — ATORVASTATIN CALCIUM 40 MG/1
40 TABLET, FILM COATED ORAL DAILY
Qty: 30 TABLET | Refills: 3 | Status: SHIPPED | OUTPATIENT
Start: 2018-08-01 | End: 2018-12-04 | Stop reason: SDUPTHER

## 2018-08-01 RX ORDER — CARVEDILOL 25 MG/1
37.5 TABLET ORAL 2 TIMES DAILY
Qty: 180 TABLET | Refills: 3 | Status: SHIPPED | OUTPATIENT
Start: 2018-08-01 | End: 2018-08-08 | Stop reason: SDUPTHER

## 2018-08-01 RX ADMIN — INSULIN GLARGINE 25 UNITS: 100 INJECTION, SOLUTION SUBCUTANEOUS at 08:23

## 2018-08-01 RX ADMIN — Medication 1 TABLET: at 08:17

## 2018-08-01 RX ADMIN — CLONIDINE HYDROCHLORIDE 0.3 MG: 0.2 TABLET ORAL at 16:39

## 2018-08-01 RX ADMIN — BUMETANIDE 1 MG: 1 TABLET ORAL at 16:39

## 2018-08-01 RX ADMIN — MINOXIDIL 5 MG: 2.5 TABLET ORAL at 08:17

## 2018-08-01 RX ADMIN — BUMETANIDE 1 MG: 1 TABLET ORAL at 08:17

## 2018-08-01 RX ADMIN — POTASSIUM CHLORIDE 20 MEQ: 20 TABLET, EXTENDED RELEASE ORAL at 08:17

## 2018-08-01 RX ADMIN — POTASSIUM CHLORIDE 20 MEQ: 20 TABLET, EXTENDED RELEASE ORAL at 16:39

## 2018-08-01 RX ADMIN — CARVEDILOL 31.25 MG: 25 TABLET, FILM COATED ORAL at 08:17

## 2018-08-01 RX ADMIN — ALLOPURINOL 100 MG: 100 TABLET ORAL at 08:18

## 2018-08-01 RX ADMIN — INSULIN LISPRO 2 UNITS: 100 INJECTION, SOLUTION INTRAVENOUS; SUBCUTANEOUS at 16:39

## 2018-08-01 RX ADMIN — CARVEDILOL 31.25 MG: 25 TABLET, FILM COATED ORAL at 16:39

## 2018-08-01 RX ADMIN — CLONIDINE HYDROCHLORIDE 0.3 MG: 0.2 TABLET ORAL at 08:17

## 2018-08-01 RX ADMIN — MINOXIDIL 2.5 MG: 2.5 TABLET ORAL at 12:09

## 2018-08-01 RX ADMIN — ASPIRIN 81 MG: 81 TABLET ORAL at 08:18

## 2018-08-01 RX ADMIN — HEPARIN SODIUM 5000 UNITS: 5000 INJECTION, SOLUTION INTRAVENOUS; SUBCUTANEOUS at 05:48

## 2018-08-01 RX ADMIN — DOCUSATE SODIUM 100 MG: 100 CAPSULE, LIQUID FILLED ORAL at 08:17

## 2018-08-01 RX ADMIN — Medication 10 ML: at 08:16

## 2018-08-01 RX ADMIN — INSULIN LISPRO 1 UNITS: 100 INJECTION, SOLUTION INTRAVENOUS; SUBCUTANEOUS at 12:09

## 2018-08-01 RX ADMIN — HYDROCODONE BITARTRATE AND ACETAMINOPHEN 1 TABLET: 5; 325 TABLET ORAL at 08:17

## 2018-08-01 RX ADMIN — AMLODIPINE BESYLATE 10 MG: 10 TABLET ORAL at 08:18

## 2018-08-01 RX ADMIN — VITAMIN D, TAB 1000IU (100/BT) 5000 UNITS: 25 TAB at 08:17

## 2018-08-01 RX ADMIN — FERROUS SULFATE TAB 325 MG (65 MG ELEMENTAL FE) 325 MG: 325 (65 FE) TAB at 08:17

## 2018-08-01 RX ADMIN — CALCITRIOL 0.25 MCG: 0.25 CAPSULE ORAL at 08:18

## 2018-08-01 RX ADMIN — FLUTICASONE PROPIONATE 1 SPRAY: 50 SPRAY, METERED NASAL at 08:16

## 2018-08-01 ASSESSMENT — PAIN SCALES - GENERAL
PAINLEVEL_OUTOF10: 8
PAINLEVEL_OUTOF10: 0

## 2018-08-01 ASSESSMENT — ENCOUNTER SYMPTOMS
HEMOPTYSIS: 0
BLURRED VISION: 0
SORE THROAT: 0
NAUSEA: 0
PHOTOPHOBIA: 0
SINUS PAIN: 0
HEARTBURN: 0
SHORTNESS OF BREATH: 0
BLOOD IN STOOL: 0
ABDOMINAL PAIN: 0
EYE DISCHARGE: 0
SPUTUM PRODUCTION: 0
CONSTIPATION: 0
STRIDOR: 0
WHEEZING: 0
EYE PAIN: 0
DOUBLE VISION: 0
EYE REDNESS: 0
DIARRHEA: 0
BACK PAIN: 0
ORTHOPNEA: 0
VOMITING: 0
COUGH: 0

## 2018-08-01 ASSESSMENT — PAIN DESCRIPTION - FREQUENCY: FREQUENCY: CONTINUOUS

## 2018-08-01 ASSESSMENT — PAIN DESCRIPTION - DESCRIPTORS: DESCRIPTORS: ACHING

## 2018-08-01 ASSESSMENT — PAIN DESCRIPTION - PAIN TYPE: TYPE: CHRONIC PAIN

## 2018-08-01 ASSESSMENT — PAIN DESCRIPTION - ORIENTATION: ORIENTATION: RIGHT;LEFT

## 2018-08-01 ASSESSMENT — PAIN DESCRIPTION - LOCATION: LOCATION: LEG

## 2018-08-01 NOTE — PROGRESS NOTES
Discharge teaching and instructions for diagnosis/procedure of fluid overload/ pulm edema completed with patient using teachback method. AVS reviewed. Printed prescriptions given to patient. Patient voiced understanding regarding prescriptions, follow up appointments, and care of self at home.  Discharged in a wheelchair to  home with support per family

## 2018-08-01 NOTE — PROGRESS NOTES
Nutrition Assessment    Type and Reason for Visit: Initial (LOS)    Nutrition Recommendations: Recommend outpt follow up with renal RD. Recommend regular weight checks. Malnutrition Assessment:  · Malnutrition Status: No malnutrition    Nutrition Diagnosis:   · Problem: Inadequate oral intake  · Etiology: related to Insufficient energy/nutrient consumption     Signs and symptoms:  as evidenced by  (per pt decreased po before fluid taken off)    Nutrition Assessment:  · Subjective Assessment: Pt with hx DM, CKD admitted with fluid overload. Pt seen & reports good intake now that they got the fluid off, because before that her appetite was down & nothing tasted right. Per pt they are planning to put her fistula in in the next 2 weeks. Renal noted pt OK with hemodialysis. Pt reports has been DB for 15 years & has been limiting sodium. Discussed basics of renal diet including CHO control & recommend outpt follow up by renal RD. Hgb 10.2, BUN 68, Cr 6, glucose 123  · Wound Type: None (none reported)  · Current Nutrition Therapies:  · Oral Diet Orders: Carb Control 4 Carbs/Meal, Renal (1500 ml FR)   · Oral Diet intake:  (per pt po improved now with fluid off)  · Anthropometric Measures:  · Ht: 5' 5\" (165.1 cm)   · Current Body Wt: 192 lb 14.4 oz (87.5 kg) (8/1 + 2 + 3 edema)  · Admission Body Wt: 206 lb (93.4 kg) (7/24 edema)  · Usual Body Wt: 208 lb (94.3 kg) (12/5/17 from EMR)  · BMI Classification: BMI 30.0 - 34.9 Obese Class I (32.2)  · Comparative Standards (Estimated Nutrition Needs):  · Estimated Daily Total Kcal: ~1705 kcals  · Estimated Daily Protein (g): ~68 grams. adjust as needed when dialysis started    Estimated Intake vs Estimated Needs: Intake Improving    Nutrition Risk Level: Moderate    Nutrition Interventions:    (diet per Dr)  Continued Inpatient Monitoring, Education Initiated [facility] Renal CHO control 1500 ml diet.  Recommend outpt follow up. )    Nutrition Evaluation:   · Evaluation:

## 2018-08-01 NOTE — TELEPHONE ENCOUNTER
Hospital is calling for 1 week silver appt for patient admitted with fluid overload/pulmonary edema, rr 23%. She is being discharged to home today. Please contact patient with appt. Thanks!

## 2018-08-01 NOTE — CONSULTS
---->9.5 mm    BASILIC WRIST ----------------->6.9 mm    CEPHALIC PROX --------------->4.2 mm    CEPHALIC MID ------------------>5.1 mm    CEPHALIC DISTAL ------------->9.5 mm    CEPHALIC ABV ELB -----------> 7.0 mm   CEPHALIC BEL ELB ----------->2.2 mm    CEPHALIC MID FOREARM --->8.4 mm   CEPHALIC DIST FOREARM -> 1.9 mm   CEPHALIC WRIST -------------->1.7 mm    MEDIAL ANTECUBITAL------->7.3 mm    BRACHIAL PROX VN ---------->3.8 mm    BRACHIAL MID VN ------------->2.7 mm    BRACHIAL DISTAL VN -------->3.7 mm            BRACHIAL PROX ART --------> 5.0 mm   BRACHIAL MID ART -----------> 5.0 mm   BRACHIAL DIST ART ----------> 5.3 mm   RADIAL A -------------------------->2.6 mm    ULNAR A -------------------------->3.3 mm                Intake/Output Summary (Last 24 hours) at 08/01/18 1526  Last data filed at 08/01/18 0816   Gross per 24 hour   Intake              770 ml   Output             1450 ml   Net             -680 ml       Scheduled Meds:    minoxidil  2.5 mg Oral BID    bumetanide  1 mg Oral BID    docusate sodium  100 mg Oral BID    senna  1 tablet Oral Nightly    doxazosin  12 mg Oral Daily    carvedilol  37.5 mg Oral BID WC    allopurinol  100 mg Oral Daily    insulin lispro  0-6 Units Subcutaneous TID     insulin glargine  25 Units Subcutaneous QAM    atorvastatin  40 mg Oral Daily    And    amLODIPine  10 mg Oral Daily    potassium chloride  20 mEq Oral BID WC    aspirin  81 mg Oral Daily    calcitRIOL  0.25 mcg Oral Daily    cloNIDine  0.3 mg Oral Q8H    ferrous sulfate  325 mg Oral Daily with breakfast    fluticasone  1 spray Nasal Daily    folbee plus  1 tablet Oral Daily    vitamin D  5,000 Units Oral Daily    sodium chloride flush  10 mL Intravenous 2 times per day    heparin (porcine)  5,000 Units Subcutaneous 3 times per day       Review of Systems   Constitutional: Negative for chills, diaphoresis, fever, malaise/fatigue and weight loss.    HENT: Negative for congestion, ear discharge, ear pain, hearing loss, nosebleeds, sinus pain, sore throat and tinnitus. Eyes: Negative for blurred vision, double vision, photophobia, pain, discharge and redness. Respiratory: Negative for cough, hemoptysis, sputum production, shortness of breath, wheezing and stridor. Cardiovascular: Negative for chest pain, palpitations, orthopnea, claudication, leg swelling and PND. Gastrointestinal: Negative for abdominal pain, blood in stool, constipation, diarrhea, heartburn, melena, nausea and vomiting. Genitourinary: Negative for dysuria, flank pain, frequency, hematuria and urgency. Musculoskeletal: Negative for back pain, falls, joint pain, myalgias and neck pain. Skin: Negative for itching and rash. Neurological: Negative for dizziness, tingling, tremors, sensory change, speech change, focal weakness, seizures, loss of consciousness, weakness and headaches. Endo/Heme/Allergies: Negative for environmental allergies and polydipsia. Does not bruise/bleed easily. Psychiatric/Behavioral: Negative for depression, hallucinations, memory loss, substance abuse and suicidal ideas. The patient is not nervous/anxious and does not have insomnia. Exam:   General Appearance: alert ,conversing, in no acute distress  Cardiovascular: normal rate, regular rhythm, normal S1 and S2, no murmurs, rubs, clicks, or gallops  Pulmonary/Chest: clear to auscultation bilaterally- no wheezes, rales or rhonchi, normal air movement, no respiratory distress  Abdomen: soft, non-tender, non-distended, normal bowel sounds, no bruits. Extremities: Trace edema. Pulses: Radial, DP, and PT pulses intact.   Skin: warm and dry, no rash or erythema  Head: normocephalic and atraumatic  Eyes: pupils equal, round, and reactive to light  Neck: supple and non-tender without mass, no thyromegaly, no JVD   Musculoskeletal: normal range of motion, no joint swelling, deformity or tenderness  Neurological: alert, oriented,

## 2018-08-01 NOTE — PROGRESS NOTES
Renal Progress Note    Patient - Grant Garcia   MRN -  [id number]   Acct # - [de-identified]      - 1955    57 y.o. Admit Date: 2018  Hospital Day: 7  Location: [address] [place] [address]  Date of evaluation -  2018    Subjective:   CC: edema  Denies sob or cough. BP improved  Feels good  Ok with decision for Hemodialysis when appropriate  Good appetite    Objective:   VITALS:  /65   Pulse 65   Temp 98 °F (36.7 °C) (Oral)   Resp 18   Ht 5' 5\" (1.651 m)   Wt 193 lb (87.5 kg)   SpO2 96%   BMI 32.12 kg/m²    Patient Vitals for the past 24 hrs:   BP Temp Temp src Pulse Resp SpO2 Weight   18 0809 136/65 98 °F (36.7 °C) Oral 65 18 96 % -   18 0358 (!) 153/75 98.1 °F (36.7 °C) Axillary 64 18 95 % 193 lb (87.5 kg)   18 2350 (!) 158/75 - - 71 18 91 % -   18 2124 126/68 98.9 °F (37.2 °C) Oral 73 18 94 % -   18 1542 124/65 - Oral 64 18 96 % -   18 1140 (!) 171/77 98.2 °F (36.8 °C) Oral 68 18 95 % -        Patient Vitals for the past 96 hrs (Last 3 readings):   Weight   18 0358 193 lb (87.5 kg)   18 0447 206 lb 14.4 oz (93.8 kg)       Intake/Output Summary (Last 24 hours) at 18 0822  Last data filed at 18 0816   Gross per 24 hour   Intake             1010 ml   Output             1450 ml   Net             -440 ml       EXAM:  CONSTITUTIONAL:  No acute distress. Lying comfortable in bed. HEENT:  Head is normocephalic, Extraocular movement intact. Neck is supple. Voice is clear. CARDIOVASCULAR:  S1, S2  regular rate and rhythm. RESPIRATORY: Clear to ausculation bilaterally. Equal breath sounds. No wheezes. No shortness of breath noted at rest.  ABDOMEN: soft, non tender  NEUROLOGICAL: Patient is alert and oriented to person, place, and time. Recent and remote memory intact. Thought is coherant. SKIN: no rash, No significant bruises on exposed surfaces  MUSCULOSKELETAL: Movement is coordinated.  Moves all extremities   EXTREMITIES: Distal lower extremity temp is warm, trace lower extremity edema. PSYCHIATRIC: mood and affect appropriate. Medications:   Med reviewed  Scheduled Meds:   minoxidil  5 mg Oral TID WC    bumetanide  1 mg Oral BID    docusate sodium  100 mg Oral BID    senna  1 tablet Oral Nightly    doxazosin  12 mg Oral Daily    carvedilol  37.5 mg Oral BID WC    allopurinol  100 mg Oral Daily    insulin lispro  0-6 Units Subcutaneous TID WC    insulin glargine  25 Units Subcutaneous QAM    atorvastatin  40 mg Oral Daily    And    amLODIPine  10 mg Oral Daily    potassium chloride  20 mEq Oral BID WC    aspirin  81 mg Oral Daily    calcitRIOL  0.25 mcg Oral Daily    cloNIDine  0.3 mg Oral Q8H    ferrous sulfate  325 mg Oral Daily with breakfast    fluticasone  1 spray Nasal Daily    folbee plus  1 tablet Oral Daily    vitamin D  5,000 Units Oral Daily    sodium chloride flush  10 mL Intravenous 2 times per day    heparin (porcine)  5,000 Units Subcutaneous 3 times per day     Continuous Infusions:   dextrose       Labs:   Labs reviewed  Recent Labs      07/30/18   0637  07/31/18   0448  08/01/18   0553   NA  140  139  140   K  4.0  4.0  4.4   CL  99  98  100   CO2  28  26  26   BUN  57*  62*  68*   CREATININE  5.3*  6.0*  6.0*   LABGLOM  10*  9*  9*   GLUCOSE  156*  191*  123*   MG  1.9   --    --    CALCIUM  9.9  9.9  9.7     Summary 7/25/18   Ejection fraction is visually estimated at 50%.   There was mild global hypokinesis of the left ventricle.   Mildly dilated left atrium.   Aortic valve appears tricuspid.   Aortic valve leaflets are somewhat thickened.   Aortic valve leaflets are Mildly calcified.   Moderate circumferential pericardial effusion with no tamponade   physiology. ASSESSMENT:  1. Acute Kidney Injury   2. Chronic Kidney Disease Stage IV. Vein mapping completed. [facility] consult pending. Hopefully AV fistula can be placed during this admission. If not, ok for discharge from renal aspect.  FU in 10 days

## 2018-08-01 NOTE — DISCHARGE INSTR - DIET
 Good nutrition is important when healing from an illness, injury, or surgery. Follow any nutrition recommendations given to you during your hospital stay.  If you were given an oral nutrition supplement while in the hospital, continue to take this supplement at home. You can take it with meals, in-between meals, and/or before bedtime. These supplements can be purchased at most local grocery stores, pharmacies, and chain super-stores.  If you have any questions about your diet or nutrition, call the hospital and ask for the dietitian. You are recommended to follow a renal (kidney) diet    What foods are good to eat? Eat food rich in calories and protein. Meats and proteins like:  Beef  Fish  Poultry  Pork  Eggs  Breads and grains like:  Cereals  Bread  Milk products like: Yogurt  Cream cheese  Ricotta cheese  Butter  Margarine  Heavy cream  Sherbet  Fruits like:  Apples  Berries  Cherries  Grapes  Peaches  Pears  Pineapples  Plums  Veggies like:  Broccoli  Cabbage  Carrots  Cauliflower  Celery  Cucumber  Eggplant  Lettuce  Peppers  Radish  Zucchini  Yellow squash  Talk to your doctor about these foods if you need to control your blood sugar. What foods should be limited or avoided? Stay away from food high in water, potassium, phosphorus, and sodium or salt. If potassium builds up in your blood, it may cause heart problems. Phosphorus, in large amount in your blood, can take away the calcium in your blood. This may weaken your bones. Salt or sodium can trap fluids in your body and cause high blood pressure.  Limit or stay away from eating these foods:  Breads and grains like:  Whole wheat bread  Brown rice  Fruits like:  Oranges  Kiwis  Prunes  Raisins  Bananas  Melons  Veggies like:  Potatoes  Tomatoes  Winter squash  Pumpkin  Asparagus  Avocados  Beets  Spinach  Parsnips  Seasonings like:  Soy sauce  Teriyaki sauce  Other foods and drinks like:  Canned foods  Chips  Restaurant foods  Coffee  Tea  Soda      You are being placed on a diabetic carb counting diet. Eating healthy is the first step in controlling diabetes    Here's how to get started. ... Eat 3 meals a day. Eat your meals at the same time each day and do not skip meals. Eat about the same amount of food each day. Limit sugar and sweets. Eat less candy, desserts, pastries and jelly. Limit intake of regular pop, sugary beverages and fruit juice. Drink sugar free beverages such as diet pop, water, Crystal Light, and unsweetened tea instead. Use Equal or Sweet-n-Low in place of sugar. Lose weight if you are overweight. Even a small amount of weight loss may help improve your blood sugar control. To help lose weight, reduce your portion sizes. Control your intake of carbohydrates. Carbohydrate is the main  nutrient that affects blood sugar levels. All the carbohydrate you eat is turned  into sugar by your body. Therefore, it is important to control  the amount  of carbohydrate that you eat a day. You should eat about 60-75  grams of  carbohydrate at each meal.      Common sources of carbohydrates:     ? Fruit and fruit juice  ? Bread, cereal, pasta, rice  ? Starchy vegetables (potatoes, corn, peas)  ? Milk, yogurt, pudding  ? Beans and lentils  ? Desserts, candy, ice cream, doughnuts and pastries    Eat more fiber. Fiber can help slow down the rise in blood sugar following a meal.  To get more fiber in your diet, eat at least 5 servings of fruits and vegetables a day, choose whole grain bread/cereal and eat more beans or legumes. Reduce your intake of high fat foods. Cutting back on your intake of high fat food can help reduce body weight and cholesterol levels. Reduce intake of fried food, parker, sausage, luncheon meat, gravy, sour cream, cheese, egg yolks and margarine/butter. Limit your intake of alcohol. Drink alcohol only with permission of your doctor.   Never drink alcohol on an empty

## 2018-08-01 NOTE — DISCHARGE SUMMARY
Hospital Medicine Discharge Summary      Patient Identification:   Sixto Dahl   : 1955  MRN: [id number]   Account: [de-identified]      Patient's PCP: Patricia Chaparro MD    Admit Date: 2018     Discharge Date:   2018     Admitting Physician: Daysi Perez DO     Discharge Physician: Dorris Buerger, MD     Discharge Diagnoses:  Progressive chronic kidney disease stage IV secondary to diabetes mellitus type 2  Hypokalemia  Pericardial effusion from renal failure, no tamponade  Fluid overload  Hypertensive emergency  Pulmonary edema, noncardiac etiology from renal failure  Diabetes mellitus type 2 insulin-dependent with chronic kidney disease stage IV, diabetic neuropathy  Secondary hyperparathyroidism of renal origin  COPD without exacerbation  History of gout  Depression  Obesity  History of renal stones  Seizure disorder  History of smoking    The patient was seen and examined on day of discharge and this discharge summary is in conjunction with any daily progress note from day of discharge. Hospital Course:   Sixto Dahl is a 57 y.o. female admitted to [facility] on 2018 for Swelling in legs with heaviness since last 2 weeks     Patient presented to the emergency room as she is been feeling heaviness in the legs and have been hurting along with swelling that has been worsening since the last 2 weeks. Patient also admits to having some dyspnea on exertion. patient denies any chest pain. Cough or fever. Patient usually urinates some amount in the morning but not much after that for most days. States that she's been following with a nephrologist for worsening renal failure. Her blood pressure has been running high lately.  Apparently blood sugars at home were running low and was not needing any pre-meal insulin  On Bumex and Zaroxolyn at home   -multiple blood pressure medications at home,cardiogram, Coreg, hydralazine, clonidine, Norvasc     Her pressure on admission

## 2018-08-02 ENCOUNTER — CARE COORDINATION (OUTPATIENT)
Dept: OTHER | Facility: CLINIC | Age: 63
End: 2018-08-02

## 2018-08-02 DIAGNOSIS — E87.70 HYPERVOLEMIA, UNSPECIFIED HYPERVOLEMIA TYPE: Primary | ICD-10-CM

## 2018-08-02 PROCEDURE — 1111F DSCHRG MED/CURRENT MED MERGE: CPT | Performed by: INTERNAL MEDICINE

## 2018-08-02 NOTE — CARE COORDINATION
Spoke with Dagoberto Gifford at [facility] who didn;t know patient was discharged from hospital. Confirmed receitp of referral and Dagoberto Gifford advises she will call patient to set up first visit to open case. CTC will call patient back to update.

## 2018-08-02 NOTE — CARE COORDINATION
[facility] Transitions Initial Follow Up Call    Call within 2 business days of discharge: Yes    Patient: Lotus Carney Patient : 1955   MRN: [id number]  Reason for Admission: There are no discharge diagnoses documented for the most recent discharge. Discharge Date: 18 RARS: Readmission Risk Score: 23 CMRS: 7.0     Spoke with: Ninoska GRIER Pascualmason Goyoclaudia (patient)    Facility: [facility]    Non-face-to-face services provided:  Scheduled appointment with PCP-Radha confiremd with patient she is scheduled to follow up with Dr. Elton Maynard (PCP) on 18 to discuss clearnace for fistula placement which was previously scheduled before hospital admission. Scheduled appointment with Specialist-RADHA confirmed with patient she is scheduled to follow up with Camila Higgins at [facility] on 18 and is awaiting a call from Dr. Nhan Schwarz ([facility] sx) regarding date for AV fistula placement  Obtained and reviewed discharge summary and/or continuity of care documents  Communication with home health agencies or other community services the patient is currently using-CTC spoke with [facility] who verbalizes not knowing patient was discharged. Confirmed with [place] she has referral and will be contacting patient to set up visit to open case. Assessment and support for treatment adherence and medication management-Confirmed with patient she has print prescription for Minoxidil which she will be mailing to [facility]. States she obtained other new medicaitons ordered on dsicharge such as Amlodipine, Lipitor, and Potassium Chloride (Klor Con). Also confiremd with patient she is to stop taking Caduet (Amlodipine- Atorvastatine) as she is taking these two medication seperaelley chloe she acknoweldges. Advised to monitor blood sugar three times daily d/t taking Novolog via s/s before meals as she admits only checking BS twice daily. .    Care Transitions 24 Hour Call    Do you have any ongoing symptoms?:  Yes  Patient-reported scheduled to follow up with Katherine Zafar at [facility] on 8/9/18. States she is awaiting a call from Dr. Karma Nassar ([facility] s) regarding scheduled date to have fistula placement. States she hasn't heard from [facility] regarding first visit. CTC advised she will call [facility] to confirm receipt of referral and call her back. Denies having any transportation issues as she verbalizes she was independent in driving before going into hospital but will have one of her grandkids take her to follow up appointments. Denies any other complaints or concerns at this time. Patient is receptive for [facility] to continue following for Care Transition.      Follow Up  Future Appointments  Date Time Provider Franklin Chin   8/8/2018 11:15 AM Jamal Claudio MD [facility] Physic [address]   8/9/2018 3:00 PM DONOVAN Ruiz CNP [facility] [place]   8/21/2018 12:30 PM DONOVAN De Oliveira CNP [facility] Pain [facility] [place]   9/27/2018 2:40 PM DONOVAN Ruiz CNP KIDNEY [facility] [place]   10/4/2018 2:45 PM Irwin Wiseman PA-C Pulm Med [address]   10/10/2018 2:30 PM STR PULMONARY FUNCTION ROOM 1 [facility] PFT None   10/16/2018 11:40 AM Socorro Freeman MD Pulm Med P - [place], APRN

## 2018-08-02 NOTE — TELEPHONE ENCOUNTER
Called and spoke with patient. Patient has an appt with Dr. Jha Prior on 8-8-18. Patient declined hospital follow up call and says she is fine, she was sleeping.

## 2018-08-06 ENCOUNTER — CARE COORDINATION (OUTPATIENT)
Dept: CASE MANAGEMENT | Age: 63
End: 2018-08-06

## 2018-08-06 NOTE — CARE COORDINATION
[facility] @ [phone].

## 2018-08-07 ENCOUNTER — HOSPITAL ENCOUNTER (OUTPATIENT)
Age: 63
Setting detail: SPECIMEN
Discharge: HOME | End: 2018-08-07
Payer: MEDICARE

## 2018-08-07 ENCOUNTER — CARE COORDINATION (OUTPATIENT)
Dept: CASE MANAGEMENT | Age: 63
End: 2018-08-07

## 2018-08-07 DIAGNOSIS — N18.5 CKD (CHRONIC KIDNEY DISEASE) STAGE 5, GFR LESS THAN 15 ML/MIN (HCC): ICD-10-CM

## 2018-08-07 LAB
ANION GAP SERPL CALCULATED.3IONS-SCNC: 17 MEQ/L (ref 8–16)
BUN BLDV-MCNC: 79 MG/DL (ref 7–22)
CALCIUM SERPL-MCNC: 9.7 MG/DL (ref 8.5–10.5)
CHLORIDE BLD-SCNC: 102 MEQ/L (ref 98–111)
CO2: 22 MEQ/L (ref 23–33)
CREAT SERPL-MCNC: 5.9 MG/DL (ref 0.4–1.2)
GFR SERPL CREATININE-BSD FRML MDRD: 9 ML/MIN/1.73M2
GLUCOSE BLD-MCNC: 58 MG/DL (ref 70–108)
HCT VFR BLD CALC: 35.7 % (ref 37–47)
HEMOGLOBIN: 10.9 GM/DL (ref 12–16)
POTASSIUM SERPL-SCNC: 4.2 MEQ/L (ref 3.5–5.2)
PTH INTACT: 283.8 PG/ML (ref 15–65)
SODIUM BLD-SCNC: 141 MEQ/L (ref 135–145)
VITAMIN D 25-HYDROXY: 34 NG/ML (ref 30–100)

## 2018-08-07 PROCEDURE — 80048 BASIC METABOLIC PNL TOTAL CA: CPT

## 2018-08-07 PROCEDURE — 82306 VITAMIN D 25 HYDROXY: CPT

## 2018-08-07 PROCEDURE — 83970 ASSAY OF PARATHORMONE: CPT

## 2018-08-07 PROCEDURE — 85018 HEMOGLOBIN: CPT

## 2018-08-07 PROCEDURE — 85014 HEMATOCRIT: CPT

## 2018-08-08 ENCOUNTER — OFFICE VISIT (OUTPATIENT)
Dept: INTERNAL MEDICINE CLINIC | Age: 63
End: 2018-08-08
Payer: MEDICARE

## 2018-08-08 VITALS
HEART RATE: 63 BPM | RESPIRATION RATE: 18 BRPM | OXYGEN SATURATION: 98 % | SYSTOLIC BLOOD PRESSURE: 162 MMHG | DIASTOLIC BLOOD PRESSURE: 80 MMHG | WEIGHT: 188 LBS | BODY MASS INDEX: 30.22 KG/M2 | HEIGHT: 66 IN

## 2018-08-08 DIAGNOSIS — I31.39 PERICARDIAL EFFUSION: ICD-10-CM

## 2018-08-08 DIAGNOSIS — N17.9 AKI (ACUTE KIDNEY INJURY) (HCC): Primary | ICD-10-CM

## 2018-08-08 DIAGNOSIS — E78.5 HYPERLIPIDEMIA, UNSPECIFIED HYPERLIPIDEMIA TYPE: ICD-10-CM

## 2018-08-08 DIAGNOSIS — E11.9 TYPE 2 DIABETES MELLITUS WITHOUT COMPLICATION, UNSPECIFIED WHETHER LONG TERM INSULIN USE (HCC): ICD-10-CM

## 2018-08-08 DIAGNOSIS — N18.4 STAGE 4 CHRONIC KIDNEY DISEASE (HCC): ICD-10-CM

## 2018-08-08 DIAGNOSIS — N25.81 SECONDARY HYPERPARATHYROIDISM OF RENAL ORIGIN (HCC): ICD-10-CM

## 2018-08-08 DIAGNOSIS — Z79.4 TYPE 2 DIABETES MELLITUS WITHOUT COMPLICATION, WITH LONG-TERM CURRENT USE OF INSULIN (HCC): ICD-10-CM

## 2018-08-08 DIAGNOSIS — E11.9 TYPE 2 DIABETES MELLITUS WITHOUT COMPLICATION, WITH LONG-TERM CURRENT USE OF INSULIN (HCC): ICD-10-CM

## 2018-08-08 DIAGNOSIS — I10 BENIGN ESSENTIAL HTN: ICD-10-CM

## 2018-08-08 DIAGNOSIS — I10 ESSENTIAL HYPERTENSION: ICD-10-CM

## 2018-08-08 DIAGNOSIS — F41.9 ANXIETY: ICD-10-CM

## 2018-08-08 PROCEDURE — 93000 ELECTROCARDIOGRAM COMPLETE: CPT | Performed by: INTERNAL MEDICINE

## 2018-08-08 PROCEDURE — 99496 TRANSJ CARE MGMT HIGH F2F 7D: CPT | Performed by: INTERNAL MEDICINE

## 2018-08-08 RX ORDER — CLONIDINE HYDROCHLORIDE 0.3 MG/1
0.3 TABLET ORAL EVERY 8 HOURS
Qty: 270 TABLET | Refills: 4 | Status: SHIPPED | OUTPATIENT
Start: 2018-08-08 | End: 2019-10-09 | Stop reason: SDUPTHER

## 2018-08-08 RX ORDER — ALPRAZOLAM 1 MG/1
TABLET ORAL
Qty: 45 TABLET | Refills: 0 | Status: SHIPPED | OUTPATIENT
Start: 2018-08-08 | End: 2018-09-08

## 2018-08-08 RX ORDER — CARVEDILOL 25 MG/1
37.5 TABLET ORAL 2 TIMES DAILY
Qty: 270 TABLET | Refills: 3 | Status: SHIPPED | OUTPATIENT
Start: 2018-08-08 | End: 2019-08-14 | Stop reason: SDUPTHER

## 2018-08-08 RX ORDER — ALLOPURINOL 100 MG/1
100 TABLET ORAL DAILY
Qty: 90 TABLET | Refills: 3 | Status: ON HOLD | OUTPATIENT
Start: 2018-08-08 | End: 2018-08-28 | Stop reason: HOSPADM

## 2018-08-08 ASSESSMENT — PATIENT HEALTH QUESTIONNAIRE - PHQ9
SUM OF ALL RESPONSES TO PHQ9 QUESTIONS 1 & 2: 2
1. LITTLE INTEREST OR PLEASURE IN DOING THINGS: 1
2. FEELING DOWN, DEPRESSED OR HOPELESS: 1
SUM OF ALL RESPONSES TO PHQ QUESTIONS 1-9: 2
SUM OF ALL RESPONSES TO PHQ QUESTIONS 1-9: 2

## 2018-08-09 ENCOUNTER — OFFICE VISIT (OUTPATIENT)
Dept: NEPHROLOGY | Age: 63
End: 2018-08-09
Payer: MEDICARE

## 2018-08-09 ENCOUNTER — TELEPHONE (OUTPATIENT)
Dept: CARDIOTHORACIC SURGERY | Age: 63
End: 2018-08-09

## 2018-08-09 VITALS
HEART RATE: 67 BPM | DIASTOLIC BLOOD PRESSURE: 83 MMHG | BODY MASS INDEX: 30.67 KG/M2 | SYSTOLIC BLOOD PRESSURE: 180 MMHG | WEIGHT: 188.6 LBS | OXYGEN SATURATION: 96 %

## 2018-08-09 DIAGNOSIS — I50.32 CHRONIC DIASTOLIC (CONGESTIVE) HEART FAILURE (HCC): ICD-10-CM

## 2018-08-09 DIAGNOSIS — N18.5 CKD (CHRONIC KIDNEY DISEASE) STAGE 5, GFR LESS THAN 15 ML/MIN (HCC): Primary | ICD-10-CM

## 2018-08-09 DIAGNOSIS — R80.9 PERSISTENT PROTEINURIA ASSOCIATED WITH TYPE 2 DIABETES MELLITUS (HCC): ICD-10-CM

## 2018-08-09 DIAGNOSIS — E11.29 PERSISTENT PROTEINURIA ASSOCIATED WITH TYPE 2 DIABETES MELLITUS (HCC): ICD-10-CM

## 2018-08-09 DIAGNOSIS — E55.9 VITAMIN D DEFICIENCY: ICD-10-CM

## 2018-08-09 DIAGNOSIS — N18.6 ESRD (END STAGE RENAL DISEASE) (HCC): ICD-10-CM

## 2018-08-09 DIAGNOSIS — D63.8 ANEMIA, CHRONIC DISEASE: ICD-10-CM

## 2018-08-09 DIAGNOSIS — N18.5: Primary | ICD-10-CM

## 2018-08-09 DIAGNOSIS — E11.22 TYPE II DIABETES MELLITUS WITH STAGE 5 CHRONIC KIDNEY DISEASE (HCC): ICD-10-CM

## 2018-08-09 DIAGNOSIS — I12.9 HYPERTENSIVE NEPHROPATHY: ICD-10-CM

## 2018-08-09 DIAGNOSIS — D50.8 IRON DEFICIENCY ANEMIA DUE TO DIETARY CAUSES: ICD-10-CM

## 2018-08-09 DIAGNOSIS — N18.5 TYPE II DIABETES MELLITUS WITH STAGE 5 CHRONIC KIDNEY DISEASE (HCC): ICD-10-CM

## 2018-08-09 PROCEDURE — G8417 CALC BMI ABV UP PARAM F/U: HCPCS | Performed by: NURSE PRACTITIONER

## 2018-08-09 PROCEDURE — 99214 OFFICE O/P EST MOD 30 MIN: CPT | Performed by: NURSE PRACTITIONER

## 2018-08-09 PROCEDURE — 2022F DILAT RTA XM EVC RTNOPTHY: CPT | Performed by: NURSE PRACTITIONER

## 2018-08-09 PROCEDURE — 1036F TOBACCO NON-USER: CPT | Performed by: NURSE PRACTITIONER

## 2018-08-09 PROCEDURE — 3017F COLORECTAL CA SCREEN DOC REV: CPT | Performed by: NURSE PRACTITIONER

## 2018-08-09 PROCEDURE — 1111F DSCHRG MED/CURRENT MED MERGE: CPT | Performed by: NURSE PRACTITIONER

## 2018-08-09 PROCEDURE — G8598 ASA/ANTIPLAT THER USED: HCPCS | Performed by: NURSE PRACTITIONER

## 2018-08-09 PROCEDURE — 3044F HG A1C LEVEL LT 7.0%: CPT | Performed by: NURSE PRACTITIONER

## 2018-08-09 PROCEDURE — G8427 DOCREV CUR MEDS BY ELIG CLIN: HCPCS | Performed by: NURSE PRACTITIONER

## 2018-08-09 RX ORDER — MINOXIDIL 2.5 MG/1
5 TABLET ORAL 2 TIMES DAILY
Qty: 120 TABLET | Refills: 3 | Status: ON HOLD | OUTPATIENT
Start: 2018-08-09 | End: 2018-11-02 | Stop reason: ALTCHOICE

## 2018-08-09 RX ORDER — CALCITRIOL 0.25 UG/1
0.25 CAPSULE, LIQUID FILLED ORAL
Qty: 90 CAPSULE | Refills: 3 | Status: SHIPPED
Start: 2018-08-10 | End: 2019-01-08 | Stop reason: ALTCHOICE

## 2018-08-09 NOTE — PROGRESS NOTES
Angelic Roblero is a 57 y. o.oldfemale came for follow up regarding her gradually progressive chronic kidney disease, Stage V. She was seen by [facility] for AV fistula placement. Caryl Harris most recent creatinine is 5.9 which is likely her baseline. . States doing well. State compliance with meds and denies adverse effects. The pt does check home BP with home health. Her BP was 160/80 at home this morning. BP was 162/80 at Dr Angely Jo office. Denies dysuria. Reports much less edema. Wt loss approx 18 lbs since hospital admission for CHF 2 weeks ago. Discharge 8//1. Denies new issues with Diabetes. The pt denies use of NSAIDs. Has a good appetite and is remaining active.     PAST MEDICAL HISTORY:       Diagnosis Date    Anemia associated with chronic renal failure     Aranesp 150 micrograms every other week given at [facility]. [facility]    Anxiety     Arthritis     Backache     Blood circulation, collateral     Blood transfusion     CAD (coronary artery disease)     Cellulitis in diabetic foot ([address] 03/03/2017    4th and 5th toes right foot    Chest pain     History of    CHF (congestive heart failure) ([address] 1998    Dx'ed by Dr. Juliette Elliott Chronic anemia     Chronic kidney disease     Chronic kidney disease, stage III (moderate)     Chronic renal insufficiency 2010    COPD (chronic obstructive pulmonary disease) ([facility] 2012    Dr. 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 Lyle Grimm    HTN (hypertension) 1970's    Hyperlipemia 1998    Iron deficiency anemia due to dietary causes 6/21/2018    Kidney stones 3/2014    Kidney trouble          MRSA infection 03/2017    right foot-Dr. Alonso Caldwell (podiatrist)    Neuromuscular disorder ([address] FOLATE > 20.0 04/26/2017     Lab Results   Component Value Date    RETICABS 1.3 01/04/2018     Meds reviewed and discussed with pt  Current Outpatient Prescriptions   Medication Sig Dispense Refill    ALPRAZolam (XANAX) 1 MG tablet TAKE 1/2 TABLET BY MOUTH EVERY 8 HOURS AS NEEDED. 45 tablet 0    cloNIDine (CATAPRES) 0.3 MG tablet Take 1 tablet by mouth every 8 hours One tablet three times per day 270 tablet 4    carvedilol (COREG) 25 MG tablet Take 1.5 tablets by mouth 2 times daily . hold if SBP less than 100 mm hg or HR less than 60 270 tablet 3    insulin glargine (LANTUS SOLOSTAR) 100 UNIT/ML injection pen Inject 25 Units into the skin nightly 10 pen 3    allopurinol (ZYLOPRIM) 100 MG tablet Take 1 tablet by mouth daily 90 tablet 3    insulin aspart (NOVOLOG FLEXPEN) 100 UNIT/ML injection pen . Sliding scale insulin coverage  Glucose:Dose: If Less [id number] =No Insulin/ 140-199= 2 Units/ 200-249=4 Units/ 250-299= 6 Units/  300-349=8 Units/  350-400=10 Units/ Above 400 = 12 Units 20 pen 3    doxazosin (CARDURA) 4 MG tablet Take 3 tablets by mouth daily . hold if SBP less than 100 mm hg 90 tablet 3    minoxidil (LONITEN) 2.5 MG tablet Take 1 tablet by mouth 2 times daily . hold if SBP less than 100 mm hg 60 tablet 3    amLODIPine (NORVASC) 10 MG tablet Take 1 tablet by mouth daily 30 tablet 3    atorvastatin (LIPITOR) 40 MG tablet Take 1 tablet by mouth daily 30 tablet 3    bumetanide (BUMEX) 1 MG tablet Take 1 tablet by mouth 2 times daily 60 tablet 3    potassium chloride (KLOR-CON M) 20 MEQ extended release tablet Take 1 tablet by mouth 2 times daily (with meals) 60 tablet 3    calcitRIOL (ROCALTROL) 0.25 MCG capsule Take 1 capsule by mouth daily 90 capsule 3    FOLBEE 2.5-25-1 MG TABS tablet TAKE 1 TABLET BY MOUTH DAILY 30 tablet 5    HYDROcodone-acetaminophen (NORCO) 5-325 MG per tablet TAKE 1 TABLET BY MOUTH EVERY 8 HOURS AS NEEDED FOR PAIN FOR UP TO 30 DAYS FILL ON/AFTER 4/22/2018.  0    fluticasone (FLONASE) 50 MCG/ACT nasal spray 1 spray by Nasal route daily      Insulin Pen Needle (B-D ULTRAFINE III SHORT PEN) 31G X 8 MM MISC Use three times daily Diagnosis Code E11.9 200 each 3    nitroGLYCERIN (NITROSTAT) 0.4 MG SL tablet Place 1 tablet under the tongue every 5 minutes as needed for Chest pain 25 tablet 5    darbepoetin quintin-polysorbate (ARANESP) 150 MCG/0.3ML SOSY injection Inject 150 mcg as directed once      ferrous sulfate (FE TABS) 325 (65 Fe) MG EC tablet Take 1 tablet by mouth daily (with breakfast) 90 tablet 3    albuterol sulfate HFA (PROAIR HFA) 108 (90 Base) MCG/ACT inhaler Inhale 2 puffs into the lungs every 6 hours as needed for Wheezing 3 Inhaler 3    [facility] GENTLE LAXATIVE 5 MG EC tablet TAKE 1 TABLET BY MOUTH DAILY AS NEEDED FOR CONSTIPATION 30 tablet 4    vitamin D (CHOLECALCIFEROL) 5000 UNITS CAPS capsule Take 5,000 Units by mouth daily noon      Insulin Pen Needle 31G X 8 MM MISC 1 each by Does not apply route daily.  Polyethylene Glycol 3350 (MIRALAX PO) Take  by mouth as needed.  aspirin 81 MG EC tablet Take 81 mg by mouth daily. No current facility-administered medications for this visit. Assessment:    Landy Lopez was seen today for follow-up from hospital and chronic kidney disease. Diagnoses and all orders for this visit:    CKD (chronic kidney disease) stage 5, GFR less than 15 ml/min ([facility] Appt with Dr Jaime Jarrell for cardiac clearance 8/8/18. Will ask [facility] to place AV fistula within next week. -     Basic Metabolic Panel; Future  -     Hemoglobin and hematocrit, blood;  Future    Type II diabetes mellitus with stage 5 chronic kidney disease (HCC)    Vitamin D deficiency, Continue Vit D supplement    Persistent proteinuria associated with type 2 diabetes mellitus (HCC)    Anemia, chronic disease, Hold Aranesp at hgb at goal    Iron deficiency anemia due to dietary causes, continue iron supplement    Chronic diastolic (congestive) heart

## 2018-08-10 ENCOUNTER — CARE COORDINATION (OUTPATIENT)
Dept: CASE MANAGEMENT | Age: 63
End: 2018-08-10

## 2018-08-10 NOTE — CARE COORDINATION
[facility] Transitions Follow Up Call    8/10/2018    Patient: Lebron Gerardo  Patient : 1955   MRN: [id number]  Reason for Admission: There are no discharge diagnoses documented for the most recent discharge. Discharge Date: 18 RARS: Readmission Risk Score: 21       Spoke with: [facility] Transitions Subsequent and Final Call    Subsequent and Final Calls  Do you have any ongoing symptoms?:  Yes  Onset of Patient-reported symptoms:  Other  Have your medications changed?:  Yes  Patient Reports:  xanax was decreased to 0.5 tab, minoxidil 2.5 increased to 5 mg BID , calcitriol was changed to 3 x week  Do you have any questions related to your medications?:  No  Do you currently have any active services?:  Yes  Are you currently active with any services?:  Home Health  Do you have any needs or concerns that I can assist you with?:  No  Identified Barriers:  None  Care Transitions Interventions  No Identified Needs  Other Interventions:        Called pt for the sub transition call. Pt stated the leg swelling is about the same as the last call. Pt denied any chest pain, SOB, or cough. Pt stated her weight today was 188#, she understands to call the PCP for gain of 3 #/day or 5#/ week . Pt stated her FBS 71 & she felt shaky. Pt stated she drank orange juice & felt better. Pt stated she thinks her BP today was 168/78, the nurse was out today to check. She says she is scheduled to see the cardiovascular surgeon in several weeks to get a fistula for chronic dialysis  Pt denied any needs or concerns. CTC will continue to follow.     Follow Up  Future Appointments  Date Time Provider Franklin Chin   2018 10:00 AM STR NUC MED HC  INJECT  RM2 STRZ STRESS None   2018 10:30 AM STR NUCLEAR MEDICINE HEART CENTER ROOM 1 STRZ STRESS None   2018 11:00 AM STR NM STRESS RM1 STRZ STRESS None   2018 11:30 AM STR NUCLEAR MEDICINE HEART CENTER ROOM 1 STRZ STRESS None   2018 12:30 PM Rosa Elena Rod, APRN - CNP SRPX Pain [facility] [place]   9/6/2018 3:00 PM [facility] Ruth [facility]  [place]   10/4/2018 2:45 PM OSCAR Posada   10/10/2018 2:30 PM STR PULMONARY FUNCTION ROOM 1 [facility] PFT None   10/16/2018 11:40 AM Liz Rojo MD Pulm Med [facility] - Breanna Roach RN  Care Transition Coordinator  [phone]

## 2018-08-13 ENCOUNTER — HOSPITAL ENCOUNTER (OUTPATIENT)
Dept: NON INVASIVE DIAGNOSTICS | Age: 63
Discharge: HOME | End: 2018-08-13
Payer: MEDICARE

## 2018-08-13 VITALS — WEIGHT: 188 LBS | HEIGHT: 65 IN | BODY MASS INDEX: 31.32 KG/M2

## 2018-08-13 DIAGNOSIS — N18.5: ICD-10-CM

## 2018-08-13 DIAGNOSIS — I12.9 HYPERTENSIVE NEPHROPATHY: ICD-10-CM

## 2018-08-13 DIAGNOSIS — N18.6 ESRD (END STAGE RENAL DISEASE) (HCC): ICD-10-CM

## 2018-08-13 PROCEDURE — 6360000002 HC RX W HCPCS

## 2018-08-13 PROCEDURE — 2709999900 HC NON-CHARGEABLE SUPPLY

## 2018-08-13 PROCEDURE — 93017 CV STRESS TEST TRACING ONLY: CPT

## 2018-08-13 PROCEDURE — 3430000000 HC RX DIAGNOSTIC RADIOPHARMACEUTICAL: Performed by: NURSE PRACTITIONER

## 2018-08-13 PROCEDURE — A9500 TC99M SESTAMIBI: HCPCS | Performed by: NURSE PRACTITIONER

## 2018-08-13 PROCEDURE — 78452 HT MUSCLE IMAGE SPECT MULT: CPT

## 2018-08-13 RX ADMIN — Medication 10 MILLICURIE: at 10:45

## 2018-08-13 RX ADMIN — Medication 32.4 MILLICURIE: at 11:40

## 2018-08-14 ENCOUNTER — PREP FOR PROCEDURE (OUTPATIENT)
Dept: CARDIOTHORACIC SURGERY | Age: 63
End: 2018-08-14

## 2018-08-14 RX ORDER — SODIUM CHLORIDE 0.9 % (FLUSH) 0.9 %
10 SYRINGE (ML) INJECTION EVERY 12 HOURS SCHEDULED
Status: CANCELLED | OUTPATIENT
Start: 2018-08-14 | End: 2019-08-14

## 2018-08-14 RX ORDER — SODIUM CHLORIDE 0.9 % (FLUSH) 0.9 %
10 SYRINGE (ML) INJECTION PRN
Status: CANCELLED | OUTPATIENT
Start: 2018-08-14 | End: 2019-08-14

## 2018-08-16 ENCOUNTER — TELEPHONE (OUTPATIENT)
Dept: NEPHROLOGY | Age: 63
End: 2018-08-16

## 2018-08-16 ENCOUNTER — CARE COORDINATION (OUTPATIENT)
Dept: CASE MANAGEMENT | Age: 63
End: 2018-08-16

## 2018-08-16 NOTE — CARE COORDINATION
[facility] Transitions Follow Up Call    2018    Patient: Kiet Hopkins  Patient : 1955   MRN: [id number]  Reason for Admission: There are no discharge diagnoses documented for the most recent discharge. Discharge Date: 18 RARS: Readmission Risk Score: 21       Spoke with: [facility] Transitions Subsequent and Final Call    Subsequent and Final Calls  Do you have any ongoing symptoms?:  Yes  Onset of Patient-reported symptoms:  Today  Patient-reported symptoms:  Weight Gain  Interventions for patient-reported symptoms:  Other  Have your medications changed?:  No  Do you have any questions related to your medications?:  No  Do you currently have any active services?:  Yes  Are you currently active with any services?:  Home Health  Do you have any needs or concerns that I can assist you with?:  No  Identified Barriers:  None  Care Transitions Interventions  No Identified Needs  Other Interventions:        Called pt for the final transition call. Pt stated she gained 3 # today, she called the [facility] nurse. [facility] nurse was out to check on the pt. Pt stated he nurse said the leg swelling \"was not too bad, she has a call to Dr Anna Guadarrama"  Pt stated the [facility] nurse will call pt back today after the [facility] nurse hears back from Dr Ainsley Kayser. Pt denied she has any chest pain, or palpitations. Pt stated she will have the surgery for the fistula next week 18  Pt informed this is the final transition of care call. Instructed to call the primary care provider for any concerns. Please call during the regular office hours if possible, for urgent problems,  there is a provider on call. Call the office & follow the prompts. Call 911 for emergencies.      Follow Up  Future Appointments  Date Time Provider Franklin Chin   2018 12:30 PM DONOVAN De Oliveira - CNP SRPX Pain P - [place]   2018 3:00 PM Cailin Killian MD [facility] [place]   10/4/2018 2:45 PM Bell Santamaria PA-C Pulm Med [facility]   10/10/2018 2:30 PM STR PULMONARY FUNCTION ROOM 1 STRZ PFT None   10/16/2018 11:40 AM Dany Morales MD Pulm Med P - [address] [facility] Transition Coordinator  [phone]

## 2018-08-16 NOTE — TELEPHONE ENCOUNTER
Kylie Jane from [address] called stating that they received a call from the patient with concerns of swelling in both legs more right then left but no more then when she was in the hospital  Patient weighed 188 on 8.14.2018 and weighs 192.5 today    Please advise

## 2018-08-21 ENCOUNTER — OFFICE VISIT (OUTPATIENT)
Dept: PHYSICAL MEDICINE AND REHAB | Age: 63
End: 2018-08-21
Payer: MEDICARE

## 2018-08-21 VITALS
WEIGHT: 188 LBS | HEIGHT: 65 IN | DIASTOLIC BLOOD PRESSURE: 67 MMHG | BODY MASS INDEX: 31.32 KG/M2 | SYSTOLIC BLOOD PRESSURE: 138 MMHG | HEART RATE: 63 BPM

## 2018-08-21 DIAGNOSIS — E11.42 DIABETIC POLYNEUROPATHY ASSOCIATED WITH TYPE 2 DIABETES MELLITUS (HCC): Primary | ICD-10-CM

## 2018-08-21 DIAGNOSIS — G89.29 OTHER CHRONIC PAIN: ICD-10-CM

## 2018-08-21 PROCEDURE — 3044F HG A1C LEVEL LT 7.0%: CPT | Performed by: NURSE PRACTITIONER

## 2018-08-21 PROCEDURE — G8598 ASA/ANTIPLAT THER USED: HCPCS | Performed by: NURSE PRACTITIONER

## 2018-08-21 PROCEDURE — G8417 CALC BMI ABV UP PARAM F/U: HCPCS | Performed by: NURSE PRACTITIONER

## 2018-08-21 PROCEDURE — 1036F TOBACCO NON-USER: CPT | Performed by: NURSE PRACTITIONER

## 2018-08-21 PROCEDURE — G8427 DOCREV CUR MEDS BY ELIG CLIN: HCPCS | Performed by: NURSE PRACTITIONER

## 2018-08-21 PROCEDURE — 99213 OFFICE O/P EST LOW 20 MIN: CPT | Performed by: NURSE PRACTITIONER

## 2018-08-21 PROCEDURE — 2022F DILAT RTA XM EVC RTNOPTHY: CPT | Performed by: NURSE PRACTITIONER

## 2018-08-21 PROCEDURE — 3017F COLORECTAL CA SCREEN DOC REV: CPT | Performed by: NURSE PRACTITIONER

## 2018-08-21 PROCEDURE — 1111F DSCHRG MED/CURRENT MED MERGE: CPT | Performed by: NURSE PRACTITIONER

## 2018-08-21 RX ORDER — HYDROCODONE BITARTRATE AND ACETAMINOPHEN 5; 325 MG/1; MG/1
1 TABLET ORAL EVERY 8 HOURS PRN
Qty: 90 TABLET | Refills: 0 | Status: SHIPPED | OUTPATIENT
Start: 2018-08-21 | End: 2018-09-20

## 2018-08-21 RX ORDER — HYDROCODONE BITARTRATE AND ACETAMINOPHEN 5; 325 MG/1; MG/1
1 TABLET ORAL EVERY 8 HOURS PRN
Qty: 90 TABLET | Refills: 0 | Status: ON HOLD | OUTPATIENT
Start: 2018-08-21 | End: 2018-08-23 | Stop reason: SDUPTHER

## 2018-08-21 NOTE — PROGRESS NOTES
[address] [facility]  Outpatient progress note    Chief Complaint:   Chief Complaint   Patient presents with    Follow-up     Sciatica of left side         Subjective: Peyton Lopez is a 57 y.o. female who returns to the office today for further follow up. She continues to have low back pain and tenderness. Symptoms are stable. Her episode of sciatica has resolved. [place] that the PT helped, she has a couple more sessions with them. Most recently she was in the hospital for fluid overload due to chronic kidney disease. Is having fistula placed for possible dialysis needs in the future. Her right ankle pain is stable, wearing brace. Continues to take Norco PRN and lidocaine gel for back and ankle. Review of Systems:  CONSTITUTIONAL: negative  RESPIRATORY: negative  CARDIOVASCULAR: positive for peripheral edema  GASTROINTESTINAL: negative  GENITOURINARY: negative  MUSCULOSKELETAL: positive for pain  NEUROLOGICAL: positive for numbness/tingling  BEHAVIOR/PSYCH: negative  10 point system review otherwise negative    Physical Exam:  /67   Pulse 63   Ht 5' 5\" (1.651 m)   Wt 188 lb (85.3 kg)   BMI 31.28 kg/m²     awake  Orientation:   person, place, time  Mood: within normal limits  Affect: calm  General appearance: Patient is well nourished, well developed, well groomed and in no acute distress    ROM:  abnormal - limited in right ankle and lumbar spine. Tenderness with palpation around right ankle.  Tenderness of low back right paraspinals referred into right gluteal area  Motor Exam:  Motor exam is symmetrical 5 out of 5 all extremities bilaterally  Tone:  normal  Muscle bulk: within normal limits  Sensory:  Diminished to light touch bilateral lower extremities  Coordination:   normal    Skin: warm and dry, no rash or erythema  Peripheral vascular: Pulses: Normal upper and lower extremity pulses; Edema: 2+ right ankle, 1+ left

## 2018-08-23 ENCOUNTER — ANESTHESIA EVENT (OUTPATIENT)
Dept: OPERATING ROOM | Age: 63
DRG: 981 | End: 2018-08-23
Payer: MEDICARE

## 2018-08-23 ENCOUNTER — ANESTHESIA (OUTPATIENT)
Dept: OPERATING ROOM | Age: 63
DRG: 981 | End: 2018-08-23
Payer: MEDICARE

## 2018-08-23 ENCOUNTER — HOSPITAL ENCOUNTER (INPATIENT)
Age: 63
LOS: 3 days | Discharge: HOME | DRG: 981 | End: 2018-08-28
Attending: THORACIC SURGERY (CARDIOTHORACIC VASCULAR SURGERY) | Admitting: INTERNAL MEDICINE
Payer: MEDICARE

## 2018-08-23 VITALS
RESPIRATION RATE: 2 BRPM | TEMPERATURE: 96.6 F | OXYGEN SATURATION: 99 % | SYSTOLIC BLOOD PRESSURE: 127 MMHG | DIASTOLIC BLOOD PRESSURE: 64 MMHG

## 2018-08-23 PROBLEM — N18.6 END STAGE RENAL DISEASE DUE TO BENIGN HYPERTENSION (HCC): Status: ACTIVE | Noted: 2018-08-23

## 2018-08-23 PROBLEM — N18.6 END STAGE RENAL DISEASE (HCC): Status: ACTIVE | Noted: 2018-08-23

## 2018-08-23 PROBLEM — I12.0 END STAGE RENAL DISEASE DUE TO BENIGN HYPERTENSION (HCC): Status: ACTIVE | Noted: 2018-08-23

## 2018-08-23 LAB
ABO: NORMAL
ANION GAP SERPL CALCULATED.3IONS-SCNC: 15 MEQ/L (ref 8–16)
ANTIBODY SCREEN: NORMAL
APTT: 36.3 SECONDS (ref 22–38)
BUN BLDV-MCNC: 76 MG/DL (ref 7–22)
CALCIUM SERPL-MCNC: 9.8 MG/DL (ref 8.5–10.5)
CHLORIDE BLD-SCNC: 104 MEQ/L (ref 98–111)
CO2: 22 MEQ/L (ref 23–33)
CREAT SERPL-MCNC: 6.3 MG/DL (ref 0.4–1.2)
ERYTHROCYTE [DISTWIDTH] IN BLOOD BY AUTOMATED COUNT: 19.6 % (ref 11.5–14.5)
ERYTHROCYTE [DISTWIDTH] IN BLOOD BY AUTOMATED COUNT: 57.1 FL (ref 35–45)
GFR SERPL CREATININE-BSD FRML MDRD: 8 ML/MIN/1.73M2
GLUCOSE BLD-MCNC: 123 MG/DL (ref 70–108)
GLUCOSE BLD-MCNC: 125 MG/DL (ref 70–108)
GLUCOSE BLD-MCNC: 126 MG/DL (ref 70–108)
GLUCOSE BLD-MCNC: 133 MG/DL (ref 70–108)
GLUCOSE BLD-MCNC: 150 MG/DL (ref 70–108)
HCT VFR BLD CALC: 31.8 % (ref 37–47)
HEMOGLOBIN: 10.1 GM/DL (ref 12–16)
INR BLD: 1.13 (ref 0.85–1.13)
MCH RBC QN AUTO: 25.4 PG (ref 26–33)
MCHC RBC AUTO-ENTMCNC: 31.8 GM/DL (ref 32.2–35.5)
MCV RBC AUTO: 80.1 FL (ref 81–99)
PLATELET # BLD: 180 THOU/MM3 (ref 130–400)
PMV BLD AUTO: 10.1 FL (ref 9.4–12.4)
POTASSIUM REFLEX MAGNESIUM: 4.9 MEQ/L (ref 3.5–5.2)
RBC # BLD: 3.97 MILL/MM3 (ref 4.2–5.4)
RH FACTOR: NORMAL
SODIUM BLD-SCNC: 141 MEQ/L (ref 135–145)
WBC # BLD: 6.5 THOU/MM3 (ref 4.8–10.8)

## 2018-08-23 PROCEDURE — 82948 REAGENT STRIP/BLOOD GLUCOSE: CPT

## 2018-08-23 PROCEDURE — 3700000001 HC ADD 15 MINUTES (ANESTHESIA): Performed by: THORACIC SURGERY (CARDIOTHORACIC VASCULAR SURGERY)

## 2018-08-23 PROCEDURE — 36415 COLL VENOUS BLD VENIPUNCTURE: CPT

## 2018-08-23 PROCEDURE — 2580000003 HC RX 258: Performed by: THORACIC SURGERY (CARDIOTHORACIC VASCULAR SURGERY)

## 2018-08-23 PROCEDURE — 7100000010 HC PHASE II RECOVERY - FIRST 15 MIN: Performed by: THORACIC SURGERY (CARDIOTHORACIC VASCULAR SURGERY)

## 2018-08-23 PROCEDURE — 7100000000 HC PACU RECOVERY - FIRST 15 MIN: Performed by: THORACIC SURGERY (CARDIOTHORACIC VASCULAR SURGERY)

## 2018-08-23 PROCEDURE — 6360000002 HC RX W HCPCS: Performed by: NURSE PRACTITIONER

## 2018-08-23 PROCEDURE — 7100000011 HC PHASE II RECOVERY - ADDTL 15 MIN: Performed by: THORACIC SURGERY (CARDIOTHORACIC VASCULAR SURGERY)

## 2018-08-23 PROCEDURE — 80048 BASIC METABOLIC PNL TOTAL CA: CPT

## 2018-08-23 PROCEDURE — 6370000000 HC RX 637 (ALT 250 FOR IP): Performed by: NURSE PRACTITIONER

## 2018-08-23 PROCEDURE — 86901 BLOOD TYPING SEROLOGIC RH(D): CPT

## 2018-08-23 PROCEDURE — 85730 THROMBOPLASTIN TIME PARTIAL: CPT

## 2018-08-23 PROCEDURE — 3600000003 HC SURGERY LEVEL 3 BASE: Performed by: THORACIC SURGERY (CARDIOTHORACIC VASCULAR SURGERY)

## 2018-08-23 PROCEDURE — 86850 RBC ANTIBODY SCREEN: CPT

## 2018-08-23 PROCEDURE — 2500000003 HC RX 250 WO HCPCS: Performed by: NURSE ANESTHETIST, CERTIFIED REGISTERED

## 2018-08-23 PROCEDURE — 6360000002 HC RX W HCPCS: Performed by: NURSE ANESTHETIST, CERTIFIED REGISTERED

## 2018-08-23 PROCEDURE — 85027 COMPLETE CBC AUTOMATED: CPT

## 2018-08-23 PROCEDURE — 7100000001 HC PACU RECOVERY - ADDTL 15 MIN: Performed by: THORACIC SURGERY (CARDIOTHORACIC VASCULAR SURGERY)

## 2018-08-23 PROCEDURE — 85610 PROTHROMBIN TIME: CPT

## 2018-08-23 PROCEDURE — 6360000002 HC RX W HCPCS: Performed by: THORACIC SURGERY (CARDIOTHORACIC VASCULAR SURGERY)

## 2018-08-23 PROCEDURE — 2709999900 HC NON-CHARGEABLE SUPPLY: Performed by: THORACIC SURGERY (CARDIOTHORACIC VASCULAR SURGERY)

## 2018-08-23 PROCEDURE — 2580000003 HC RX 258: Performed by: NURSE PRACTITIONER

## 2018-08-23 PROCEDURE — 2500000003 HC RX 250 WO HCPCS: Performed by: THORACIC SURGERY (CARDIOTHORACIC VASCULAR SURGERY)

## 2018-08-23 PROCEDURE — 6360000002 HC RX W HCPCS

## 2018-08-23 PROCEDURE — 031809D BYPASS LEFT BRACHIAL ARTERY TO UPPER ARM VEIN WITH AUTOLOGOUS VENOUS TISSUE, OPEN APPROACH: ICD-10-PCS | Performed by: THORACIC SURGERY (CARDIOTHORACIC VASCULAR SURGERY)

## 2018-08-23 PROCEDURE — 3600000013 HC SURGERY LEVEL 3 ADDTL 15MIN: Performed by: THORACIC SURGERY (CARDIOTHORACIC VASCULAR SURGERY)

## 2018-08-23 PROCEDURE — 86900 BLOOD TYPING SEROLOGIC ABO: CPT

## 2018-08-23 PROCEDURE — 2720000010 HC SURG SUPPLY STERILE: Performed by: THORACIC SURGERY (CARDIOTHORACIC VASCULAR SURGERY)

## 2018-08-23 PROCEDURE — 05BF0ZZ EXCISION OF LEFT CEPHALIC VEIN, OPEN APPROACH: ICD-10-PCS | Performed by: THORACIC SURGERY (CARDIOTHORACIC VASCULAR SURGERY)

## 2018-08-23 PROCEDURE — 3700000000 HC ANESTHESIA ATTENDED CARE: Performed by: THORACIC SURGERY (CARDIOTHORACIC VASCULAR SURGERY)

## 2018-08-23 PROCEDURE — 6370000000 HC RX 637 (ALT 250 FOR IP): Performed by: THORACIC SURGERY (CARDIOTHORACIC VASCULAR SURGERY)

## 2018-08-23 RX ORDER — MINOXIDIL 2.5 MG/1
5 TABLET ORAL 2 TIMES DAILY
Status: DISCONTINUED | OUTPATIENT
Start: 2018-08-23 | End: 2018-08-28 | Stop reason: HOSPADM

## 2018-08-23 RX ORDER — POTASSIUM CHLORIDE 20 MEQ/1
20 TABLET, EXTENDED RELEASE ORAL 2 TIMES DAILY WITH MEALS
Status: DISCONTINUED | OUTPATIENT
Start: 2018-08-23 | End: 2018-08-24

## 2018-08-23 RX ORDER — INSULIN GLARGINE 100 [IU]/ML
25 INJECTION, SOLUTION SUBCUTANEOUS NIGHTLY
Status: DISCONTINUED | OUTPATIENT
Start: 2018-08-23 | End: 2018-08-28 | Stop reason: HOSPADM

## 2018-08-23 RX ORDER — ACETAMINOPHEN 325 MG/1
650 TABLET ORAL EVERY 4 HOURS PRN
Status: DISCONTINUED | OUTPATIENT
Start: 2018-08-23 | End: 2018-08-28 | Stop reason: HOSPADM

## 2018-08-23 RX ORDER — ASPIRIN 81 MG/1
81 TABLET ORAL DAILY
Status: DISCONTINUED | OUTPATIENT
Start: 2018-08-24 | End: 2018-08-28 | Stop reason: HOSPADM

## 2018-08-23 RX ORDER — OXYCODONE HYDROCHLORIDE AND ACETAMINOPHEN 5; 325 MG/1; MG/1
1 TABLET ORAL EVERY 6 HOURS PRN
Status: DISCONTINUED | OUTPATIENT
Start: 2018-08-24 | End: 2018-08-28 | Stop reason: HOSPADM

## 2018-08-23 RX ORDER — GLYCOPYRROLATE 1 MG/5 ML
SYRINGE (ML) INTRAVENOUS PRN
Status: DISCONTINUED | OUTPATIENT
Start: 2018-08-23 | End: 2018-08-23 | Stop reason: SDUPTHER

## 2018-08-23 RX ORDER — ONDANSETRON 2 MG/ML
4 INJECTION INTRAMUSCULAR; INTRAVENOUS EVERY 6 HOURS PRN
Status: DISCONTINUED | OUTPATIENT
Start: 2018-08-23 | End: 2018-08-28 | Stop reason: HOSPADM

## 2018-08-23 RX ORDER — HEPARIN SODIUM 5000 [USP'U]/ML
5000 INJECTION, SOLUTION INTRAVENOUS; SUBCUTANEOUS 2 TIMES DAILY
Status: DISCONTINUED | OUTPATIENT
Start: 2018-08-23 | End: 2018-08-28 | Stop reason: HOSPADM

## 2018-08-23 RX ORDER — DEXTROSE MONOHYDRATE 50 MG/ML
100 INJECTION, SOLUTION INTRAVENOUS PRN
Status: DISCONTINUED | OUTPATIENT
Start: 2018-08-23 | End: 2018-08-28 | Stop reason: HOSPADM

## 2018-08-23 RX ORDER — HEPARIN SODIUM 1000 [USP'U]/ML
INJECTION, SOLUTION INTRAVENOUS; SUBCUTANEOUS PRN
Status: DISCONTINUED | OUTPATIENT
Start: 2018-08-23 | End: 2018-08-23 | Stop reason: SDUPTHER

## 2018-08-23 RX ORDER — FERROUS SULFATE 325(65) MG
325 TABLET ORAL
Status: DISCONTINUED | OUTPATIENT
Start: 2018-08-24 | End: 2018-08-28 | Stop reason: HOSPADM

## 2018-08-23 RX ORDER — DOXAZOSIN MESYLATE 4 MG/1
12 TABLET ORAL DAILY
Status: DISCONTINUED | OUTPATIENT
Start: 2018-08-23 | End: 2018-08-28 | Stop reason: HOSPADM

## 2018-08-23 RX ORDER — MORPHINE SULFATE 2 MG/ML
2 INJECTION, SOLUTION INTRAMUSCULAR; INTRAVENOUS ONCE
Status: COMPLETED | OUTPATIENT
Start: 2018-08-23 | End: 2018-08-23

## 2018-08-23 RX ORDER — SODIUM CHLORIDE 0.9 % (FLUSH) 0.9 %
10 SYRINGE (ML) INJECTION EVERY 12 HOURS SCHEDULED
Status: DISCONTINUED | OUTPATIENT
Start: 2018-08-23 | End: 2018-08-23 | Stop reason: SDUPTHER

## 2018-08-23 RX ORDER — NEOSTIGMINE METHYLSULFATE 1 MG/ML
INJECTION, SOLUTION INTRAVENOUS PRN
Status: DISCONTINUED | OUTPATIENT
Start: 2018-08-23 | End: 2018-08-23 | Stop reason: SDUPTHER

## 2018-08-23 RX ORDER — ALBUTEROL SULFATE 90 UG/1
2 AEROSOL, METERED RESPIRATORY (INHALATION) EVERY 6 HOURS PRN
Status: DISCONTINUED | OUTPATIENT
Start: 2018-08-23 | End: 2018-08-28 | Stop reason: HOSPADM

## 2018-08-23 RX ORDER — DEXTROSE MONOHYDRATE 25 G/50ML
12.5 INJECTION, SOLUTION INTRAVENOUS PRN
Status: CANCELLED | OUTPATIENT
Start: 2018-08-23

## 2018-08-23 RX ORDER — HYDRALAZINE HYDROCHLORIDE 20 MG/ML
10 INJECTION INTRAMUSCULAR; INTRAVENOUS
Status: DISCONTINUED | OUTPATIENT
Start: 2018-08-23 | End: 2018-08-23 | Stop reason: HOSPADM

## 2018-08-23 RX ORDER — ALLOPURINOL 100 MG/1
100 TABLET ORAL DAILY
Status: DISCONTINUED | OUTPATIENT
Start: 2018-08-24 | End: 2018-08-27

## 2018-08-23 RX ORDER — HYDRALAZINE HYDROCHLORIDE 20 MG/ML
INJECTION INTRAMUSCULAR; INTRAVENOUS
Status: COMPLETED
Start: 2018-08-23 | End: 2018-08-23

## 2018-08-23 RX ORDER — SODIUM CHLORIDE 0.9 % (FLUSH) 0.9 %
10 SYRINGE (ML) INJECTION EVERY 12 HOURS SCHEDULED
Status: DISCONTINUED | OUTPATIENT
Start: 2018-08-23 | End: 2018-08-23 | Stop reason: HOSPADM

## 2018-08-23 RX ORDER — DEXAMETHASONE SODIUM PHOSPHATE 4 MG/ML
INJECTION, SOLUTION INTRA-ARTICULAR; INTRALESIONAL; INTRAMUSCULAR; INTRAVENOUS; SOFT TISSUE PRN
Status: DISCONTINUED | OUTPATIENT
Start: 2018-08-23 | End: 2018-08-23 | Stop reason: SDUPTHER

## 2018-08-23 RX ORDER — FLUTICASONE PROPIONATE 50 MCG
1 SPRAY, SUSPENSION (ML) NASAL DAILY
Status: DISCONTINUED | OUTPATIENT
Start: 2018-08-23 | End: 2018-08-28 | Stop reason: HOSPADM

## 2018-08-23 RX ORDER — DEXTROSE MONOHYDRATE 50 MG/ML
100 INJECTION, SOLUTION INTRAVENOUS PRN
Status: CANCELLED | OUTPATIENT
Start: 2018-08-23

## 2018-08-23 RX ORDER — DEXTROSE MONOHYDRATE 50 MG/ML
INJECTION, SOLUTION INTRAVENOUS
Status: DISPENSED
Start: 2018-08-23 | End: 2018-08-24

## 2018-08-23 RX ORDER — NITROGLYCERIN 0.4 MG/1
0.4 TABLET SUBLINGUAL EVERY 5 MIN PRN
Status: DISCONTINUED | OUTPATIENT
Start: 2018-08-23 | End: 2018-08-28 | Stop reason: HOSPADM

## 2018-08-23 RX ORDER — DEXTROSE MONOHYDRATE 25 G/50ML
12.5 INJECTION, SOLUTION INTRAVENOUS PRN
Status: DISCONTINUED | OUTPATIENT
Start: 2018-08-23 | End: 2018-08-28 | Stop reason: HOSPADM

## 2018-08-23 RX ORDER — SODIUM CHLORIDE 0.9 % (FLUSH) 0.9 %
10 SYRINGE (ML) INJECTION EVERY 12 HOURS SCHEDULED
Status: DISCONTINUED | OUTPATIENT
Start: 2018-08-23 | End: 2018-08-28 | Stop reason: HOSPADM

## 2018-08-23 RX ORDER — NICOTINE POLACRILEX 4 MG
15 LOZENGE BUCCAL PRN
Status: CANCELLED | OUTPATIENT
Start: 2018-08-23

## 2018-08-23 RX ORDER — FENTANYL CITRATE 50 UG/ML
INJECTION, SOLUTION INTRAMUSCULAR; INTRAVENOUS PRN
Status: DISCONTINUED | OUTPATIENT
Start: 2018-08-23 | End: 2018-08-23 | Stop reason: SDUPTHER

## 2018-08-23 RX ORDER — NICOTINE POLACRILEX 4 MG
15 LOZENGE BUCCAL PRN
Status: DISCONTINUED | OUTPATIENT
Start: 2018-08-23 | End: 2018-08-28 | Stop reason: HOSPADM

## 2018-08-23 RX ORDER — SODIUM CHLORIDE 0.9 % (FLUSH) 0.9 %
10 SYRINGE (ML) INJECTION PRN
Status: DISCONTINUED | OUTPATIENT
Start: 2018-08-23 | End: 2018-08-23 | Stop reason: HOSPADM

## 2018-08-23 RX ORDER — SODIUM CHLORIDE 0.9 % (FLUSH) 0.9 %
10 SYRINGE (ML) INJECTION PRN
Status: DISCONTINUED | OUTPATIENT
Start: 2018-08-23 | End: 2018-08-23 | Stop reason: SDUPTHER

## 2018-08-23 RX ORDER — SODIUM CHLORIDE 0.9 % (FLUSH) 0.9 %
10 SYRINGE (ML) INJECTION PRN
Status: DISCONTINUED | OUTPATIENT
Start: 2018-08-23 | End: 2018-08-28 | Stop reason: HOSPADM

## 2018-08-23 RX ORDER — PROPOFOL 10 MG/ML
INJECTION, EMULSION INTRAVENOUS PRN
Status: DISCONTINUED | OUTPATIENT
Start: 2018-08-23 | End: 2018-08-23 | Stop reason: SDUPTHER

## 2018-08-23 RX ORDER — ATORVASTATIN CALCIUM 40 MG/1
40 TABLET, FILM COATED ORAL DAILY
Status: DISCONTINUED | OUTPATIENT
Start: 2018-08-23 | End: 2018-08-28 | Stop reason: HOSPADM

## 2018-08-23 RX ORDER — LIDOCAINE HYDROCHLORIDE 20 MG/ML
INJECTION, SOLUTION INFILTRATION; PERINEURAL PRN
Status: DISCONTINUED | OUTPATIENT
Start: 2018-08-23 | End: 2018-08-23 | Stop reason: SDUPTHER

## 2018-08-23 RX ORDER — AMLODIPINE BESYLATE 10 MG/1
10 TABLET ORAL DAILY
Status: DISCONTINUED | OUTPATIENT
Start: 2018-08-24 | End: 2018-08-28 | Stop reason: HOSPADM

## 2018-08-23 RX ORDER — PAPAVERINE HYDROCHLORIDE 30 MG/ML
INJECTION INTRAMUSCULAR; INTRAVENOUS PRN
Status: DISCONTINUED | OUTPATIENT
Start: 2018-08-23 | End: 2018-08-23 | Stop reason: HOSPADM

## 2018-08-23 RX ORDER — ROCURONIUM BROMIDE 10 MG/ML
INJECTION, SOLUTION INTRAVENOUS PRN
Status: DISCONTINUED | OUTPATIENT
Start: 2018-08-23 | End: 2018-08-23 | Stop reason: SDUPTHER

## 2018-08-23 RX ORDER — SODIUM CHLORIDE 9 MG/ML
INJECTION, SOLUTION INTRAVENOUS CONTINUOUS
Status: DISCONTINUED | OUTPATIENT
Start: 2018-08-23 | End: 2018-08-23

## 2018-08-23 RX ORDER — MORPHINE SULFATE 2 MG/ML
INJECTION, SOLUTION INTRAMUSCULAR; INTRAVENOUS
Status: COMPLETED
Start: 2018-08-23 | End: 2018-08-23

## 2018-08-23 RX ADMIN — HEPARIN SODIUM 9000 UNITS: 1000 INJECTION, SOLUTION INTRAVENOUS; SUBCUTANEOUS at 14:16

## 2018-08-23 RX ADMIN — DEXAMETHASONE SODIUM PHOSPHATE 8 MG: 4 INJECTION, SOLUTION INTRAMUSCULAR; INTRAVENOUS at 14:56

## 2018-08-23 RX ADMIN — SODIUM CHLORIDE: 9 INJECTION, SOLUTION INTRAVENOUS at 13:47

## 2018-08-23 RX ADMIN — ATORVASTATIN CALCIUM 40 MG: 40 TABLET, FILM COATED ORAL at 21:04

## 2018-08-23 RX ADMIN — MORPHINE SULFATE 2 MG: 2 INJECTION, SOLUTION INTRAMUSCULAR; INTRAVENOUS at 17:36

## 2018-08-23 RX ADMIN — ROCURONIUM BROMIDE 10 MG: 10 INJECTION INTRAVENOUS at 14:38

## 2018-08-23 RX ADMIN — PROPOFOL 150 MG: 10 INJECTION, EMULSION INTRAVENOUS at 13:53

## 2018-08-23 RX ADMIN — ONDANSETRON 4 MG: 2 INJECTION INTRAMUSCULAR; INTRAVENOUS at 19:52

## 2018-08-23 RX ADMIN — POTASSIUM CHLORIDE 20 MEQ: 20 TABLET, EXTENDED RELEASE ORAL at 21:02

## 2018-08-23 RX ADMIN — Medication 0.6 MG: at 15:51

## 2018-08-23 RX ADMIN — CLONIDINE HYDROCHLORIDE 0.3 MG: 0.2 TABLET ORAL at 21:05

## 2018-08-23 RX ADMIN — LIDOCAINE HYDROCHLORIDE 60 MG: 20 INJECTION, SOLUTION INFILTRATION; PERINEURAL at 13:53

## 2018-08-23 RX ADMIN — OXYCODONE HYDROCHLORIDE AND ACETAMINOPHEN 1 TABLET: 5; 325 TABLET ORAL at 22:19

## 2018-08-23 RX ADMIN — ROCURONIUM BROMIDE 50 MG: 10 INJECTION INTRAVENOUS at 13:53

## 2018-08-23 RX ADMIN — FENTANYL CITRATE 50 MCG: 50 INJECTION INTRAMUSCULAR; INTRAVENOUS at 13:48

## 2018-08-23 RX ADMIN — HYDRALAZINE HYDROCHLORIDE 10 MG: 20 INJECTION INTRAMUSCULAR; INTRAVENOUS at 16:20

## 2018-08-23 RX ADMIN — SODIUM CHLORIDE: 9 INJECTION, SOLUTION INTRAVENOUS at 13:26

## 2018-08-23 RX ADMIN — NEOSTIGMINE METHYLSULFATE 4 MG: 1 INJECTION, SOLUTION INTRAVENOUS at 15:50

## 2018-08-23 RX ADMIN — MINOXIDIL 5 MG: 2.5 TABLET ORAL at 21:06

## 2018-08-23 RX ADMIN — FENTANYL CITRATE 50 MCG: 50 INJECTION INTRAMUSCULAR; INTRAVENOUS at 14:55

## 2018-08-23 RX ADMIN — DOXAZOSIN MESYLATE 12 MG: 4 TABLET ORAL at 21:04

## 2018-08-23 RX ADMIN — CEFAZOLIN 2 G: 1 INJECTION, POWDER, FOR SOLUTION INTRAMUSCULAR; INTRAVENOUS; PARENTERAL at 13:56

## 2018-08-23 ASSESSMENT — PULMONARY FUNCTION TESTS
PIF_VALUE: 25
PIF_VALUE: 26
PIF_VALUE: 25
PIF_VALUE: 27
PIF_VALUE: 28
PIF_VALUE: 25
PIF_VALUE: 26
PIF_VALUE: 25
PIF_VALUE: 27
PIF_VALUE: 25
PIF_VALUE: 30
PIF_VALUE: 29
PIF_VALUE: 25
PIF_VALUE: 27
PIF_VALUE: 19
PIF_VALUE: 26
PIF_VALUE: 29
PIF_VALUE: 28
PIF_VALUE: 25
PIF_VALUE: 28
PIF_VALUE: 26
PIF_VALUE: 26
PIF_VALUE: 28
PIF_VALUE: 31
PIF_VALUE: 25
PIF_VALUE: 25
PIF_VALUE: 29
PIF_VALUE: 29
PIF_VALUE: 19
PIF_VALUE: 2
PIF_VALUE: 30
PIF_VALUE: 26
PIF_VALUE: 28
PIF_VALUE: 27
PIF_VALUE: 26
PIF_VALUE: 27
PIF_VALUE: 25
PIF_VALUE: 2
PIF_VALUE: 25
PIF_VALUE: 25
PIF_VALUE: 29
PIF_VALUE: 26
PIF_VALUE: 25
PIF_VALUE: 26
PIF_VALUE: 26
PIF_VALUE: 20
PIF_VALUE: 27
PIF_VALUE: 26
PIF_VALUE: 30
PIF_VALUE: 3
PIF_VALUE: 25
PIF_VALUE: 29
PIF_VALUE: 28
PIF_VALUE: 26
PIF_VALUE: 29
PIF_VALUE: 26
PIF_VALUE: 25
PIF_VALUE: 25
PIF_VALUE: 27
PIF_VALUE: 12
PIF_VALUE: 27
PIF_VALUE: 25
PIF_VALUE: 26
PIF_VALUE: 27
PIF_VALUE: 26
PIF_VALUE: 2
PIF_VALUE: 25
PIF_VALUE: 4
PIF_VALUE: 1
PIF_VALUE: 31
PIF_VALUE: 28
PIF_VALUE: 16
PIF_VALUE: 26
PIF_VALUE: 30
PIF_VALUE: 28
PIF_VALUE: 28
PIF_VALUE: 27
PIF_VALUE: 30
PIF_VALUE: 26
PIF_VALUE: 21
PIF_VALUE: 25
PIF_VALUE: 26
PIF_VALUE: 31
PIF_VALUE: 26
PIF_VALUE: 28
PIF_VALUE: 27
PIF_VALUE: 26
PIF_VALUE: 2
PIF_VALUE: 28
PIF_VALUE: 29
PIF_VALUE: 27
PIF_VALUE: 26
PIF_VALUE: 27
PIF_VALUE: 27
PIF_VALUE: 28
PIF_VALUE: 27
PIF_VALUE: 25
PIF_VALUE: 26
PIF_VALUE: 28
PIF_VALUE: 25
PIF_VALUE: 28
PIF_VALUE: 27
PIF_VALUE: 29
PIF_VALUE: 26
PIF_VALUE: 27
PIF_VALUE: 13
PIF_VALUE: 25
PIF_VALUE: 25
PIF_VALUE: 28
PIF_VALUE: 27
PIF_VALUE: 26
PIF_VALUE: 26
PIF_VALUE: 28
PIF_VALUE: 31
PIF_VALUE: 28
PIF_VALUE: 28
PIF_VALUE: 25
PIF_VALUE: 26
PIF_VALUE: 2
PIF_VALUE: 28
PIF_VALUE: 26
PIF_VALUE: 28
PIF_VALUE: 26
PIF_VALUE: 25
PIF_VALUE: 1
PIF_VALUE: 26

## 2018-08-23 ASSESSMENT — PAIN SCALES - GENERAL
PAINLEVEL_OUTOF10: 0
PAINLEVEL_OUTOF10: 6
PAINLEVEL_OUTOF10: 7
PAINLEVEL_OUTOF10: 8
PAINLEVEL_OUTOF10: 8
PAINLEVEL_OUTOF10: 6
PAINLEVEL_OUTOF10: 4
PAINLEVEL_OUTOF10: 8

## 2018-08-23 ASSESSMENT — PAIN DESCRIPTION - PAIN TYPE
TYPE: SURGICAL PAIN
TYPE: SURGICAL PAIN

## 2018-08-23 ASSESSMENT — PAIN DESCRIPTION - FREQUENCY: FREQUENCY: CONTINUOUS

## 2018-08-23 ASSESSMENT — PAIN DESCRIPTION - DESCRIPTORS
DESCRIPTORS: ACHING;NUMBNESS
DESCRIPTORS: ACHING
DESCRIPTORS: ACHING

## 2018-08-23 ASSESSMENT — PAIN DESCRIPTION - ORIENTATION
ORIENTATION: LEFT
ORIENTATION: LEFT

## 2018-08-23 ASSESSMENT — PAIN DESCRIPTION - LOCATION
LOCATION: ARM
LOCATION: ARM

## 2018-08-23 ASSESSMENT — PAIN - FUNCTIONAL ASSESSMENT: PAIN_FUNCTIONAL_ASSESSMENT: 0-10

## 2018-08-23 NOTE — H&P
1. Inpatient consult to Cardiothoracic Surgery [[id number]] ordered by DONOVAN Blood CNP at 07/31/18 1136   Attestation signed by Samira Melendrez MD at 8/1/2018 7:48 PM   Vein mapping reviewed. Has a suitable left cephalic vein on arm, patient is right handed. Plan on creation of a left cephalic vein to brachial artery transposition primary AV fistula as an outpatient. Mrs Elizabeth Wheeler was instructed about the advisability for her to undergo attempt at creation of a left upper extremity arteriovenous fistula for her future hemodialysis. Potential risks, benefits and alternatives to the procedure were explained. Mrs Elizabeth Wheeler very well appeared to understand, all of her questions were answered to her satisfaction and she is agreeable. Expand All Collapse All    []Manual[]Template  []Copied    CT/CV Surgery Progress Note     8/1/2018 3:23 PM  Surgeon:  Dr. Walton Canavan      Reason for consult: Fistula placement     HPI:  Ms Elizabeth Wheeler is a 56 yo female with an extensive PMH including seizures, pneumonia, iron deficiency anemia, HN, DMII, CAD, CHF, and CKD. The pt follows Dr. Kalia Alamo for chronic kidney disease stage IV due to combination of diabetic nephropathy and hypertensive nephrosclerosis The pt presented to the ED on 07/24/18 for evaluation of leg swelling. Patient stated leg swelling, tightness, and pain began 2 weeks ago and had gradually worsened. Baseline serum creatinine is around 4 mg/dL. Cr climbing (up to 6 mg/dL). We were then consulted for AV fistula placement in anticipation for hemodialysis.     Subjective:  Ms. Elizabeth Wheeler  Is resting comfortably in bed on RA, alert, and in no acute distress. Bounding radial pulse. Vein mapping ordered and reviewed by Dr. Walton Canavan. The pt is right hand dominant.  Pt denies chest pressure, SOB, fever, chills, N/V/D.      Vital Signs: BP (!) 141/67   Pulse 72   Temp 98.5 °F (36.9 °C) (Oral)   Resp 17   Ht 5' 5\" (1.651 m)   Wt 193 lb (87.5 kg)   SpO2 93%   BMI 32.12 Nightly    doxazosin  12 mg Oral Daily    carvedilol  37.5 mg Oral BID     allopurinol  100 mg Oral Daily    insulin lispro  0-6 Units Subcutaneous TID     insulin glargine  25 Units Subcutaneous QAM    atorvastatin  40 mg Oral Daily     And    amLODIPine  10 mg Oral Daily    potassium chloride  20 mEq Oral BID     aspirin  81 mg Oral Daily    calcitRIOL  0.25 mcg Oral Daily    cloNIDine  0.3 mg Oral Q8H    ferrous sulfate  325 mg Oral Daily with breakfast    fluticasone  1 spray Nasal Daily    folbee plus  1 tablet Oral Daily    vitamin D  5,000 Units Oral Daily    sodium chloride flush  10 mL Intravenous 2 times per day    heparin (porcine)  5,000 Units Subcutaneous 3 times per day            Review of Systems   Constitutional: Negative for chills, diaphoresis, fever, malaise/fatigue and weight loss. HENT: Negative for congestion, ear discharge, ear pain, hearing loss, nosebleeds, sinus pain, sore throat and tinnitus. Eyes: Negative for blurred vision, double vision, photophobia, pain, discharge and redness. Respiratory: Negative for cough, hemoptysis, sputum production, shortness of breath, wheezing and stridor. Cardiovascular: Negative for chest pain, palpitations, orthopnea, claudication, leg swelling and PND. Gastrointestinal: Negative for abdominal pain, blood in stool, constipation, diarrhea, heartburn, melena, nausea and vomiting. Genitourinary: Negative for dysuria, flank pain, frequency, hematuria and urgency. Musculoskeletal: Negative for back pain, falls, joint pain, myalgias and neck pain. Skin: Negative for itching and rash. Neurological: Negative for dizziness, tingling, tremors, sensory change, speech change, focal weakness, seizures, loss of consciousness, weakness and headaches. Endo/Heme/Allergies: Negative for environmental allergies and polydipsia. Does not bruise/bleed easily.    Psychiatric/Behavioral: Negative for depression, hallucinations, memory

## 2018-08-23 NOTE — FLOWSHEET NOTE
08/23/18 1844   Provider Notification   Reason for Communication Review case   Provider Name Dr Fitz Bryson   Provider Notification Physician   Method of Communication Secure Message   Notification Time 1844   Dr Fitz Bryson covering for Dr Violeta Jacob, he will review patient chart regarding if Bumex and Zaroloxyn.

## 2018-08-23 NOTE — TELEPHONE ENCOUNTER
LM for Bryn Vidales has been informed and understands and states that she already has some metolazone that she can take  Lorena Cortez states that she is down a pound today and she is having surgery today as well

## 2018-08-23 NOTE — ANESTHESIA PRE PROCEDURE
ULTRAFINE III SHORT PEN) 31G X 8 MM MISC Use three times daily Diagnosis Code E11.9 1/31/18  Yes Rosio Cooper MD   ferrous sulfate (FE TABS) 325 (65 Fe) MG EC tablet Take 1 tablet by mouth daily (with breakfast) 1/4/18  Yes DONOVAN Reyes CNP   [employer] GENTLE LAXATIVE 5 MG EC tablet TAKE 1 TABLET BY MOUTH DAILY AS NEEDED FOR CONSTIPATION 9/20/17  Yes Reese Thompson MD   vitamin D (CHOLECALCIFEROL) 5000 UNITS CAPS capsule Take 5,000 Units by mouth daily noon   Yes Historical Provider, MD   Insulin Pen Needle 31G X 8 MM MISC 1 each by Does not apply route daily. Yes Historical Provider, MD   Polyethylene Glycol 3350 (MIRALAX PO) Take  by mouth as needed. Yes Historical Provider, MD   aspirin 81 MG EC tablet Take 81 mg by mouth daily. Yes Historical Provider, MD   HYDROcodone-acetaminophen (NORCO) 5-325 MG per tablet Take 1 tablet by mouth every 8 hours as needed for Pain for up to 30 days. Fill on/after 9/20/2018. 8/21/18 9/20/18  DONOVAN Elliott CNP   HYDROcodone-acetaminophen (NORCO) 5-325 MG per tablet Take 1 tablet by mouth every 8 hours as needed for Pain for up to 30 days. Fill on/after 10/20/2018. 8/21/18 9/20/18  DONOVAN Elliott CNP   ALPRAZolam Marcia Sanders) 1 MG tablet TAKE 1/2 TABLET BY MOUTH EVERY 8 HOURS AS NEEDED. 8/8/18 9/8/18  Rosio Cooper MD   insulin aspart (NOVOLOG FLEXPEN) 100 UNIT/ML injection pen . Sliding scale insulin coverage  Glucose:Dose: If Less [id number] =No Insulin/ 140-199= 2 Units/ 200-249=4 Units/ 250-299= 6 Units/  300-349=8 Units/  350-400=10 Units/ Above 400 = 12 Units 8/8/18   Rosio Cooper MD   fluticasone [facility]) 50 MCG/ACT nasal spray 1 spray by Nasal route daily    Historical Provider, MD   nitroGLYCERIN (NITROSTAT) 0.4 MG SL tablet Place 1 tablet under the tongue every 5 minutes as needed for Chest pain 1/31/18   Rosio Cooper MD   darbepoetin quintin-polysorbate (ARANESP) 150 MCG/0.3ML SOSY injection Inject 150 mcg as directed once Historical Provider, MD   albuterol sulfate HFA (PROAIR HFA) 108 (90 Base) MCG/ACT inhaler Inhale 2 puffs into the lungs every 6 hours as needed for Wheezing 12/27/17 12/27/18  Wood Rogel PA-C       Current medications:    Current Facility-Administered Medications   Medication Dose Route Frequency Provider Last Rate Last Dose    ceFAZolin (ANCEF) 2 g in dextrose 5 % 50 mL IVPB  2 g Intravenous On Call to [address], APRN - CNP        sodium chloride flush 0.9 % injection 10 mL  10 mL Intravenous 2 times per day DONOVAN Paul CNP        sodium chloride flush 0.9 % injection 10 mL  10 mL Intravenous PRN DONOVAN Paul CNP        0.9 % sodium chloride infusion   Intravenous Continuous Feliciano Dejesus  mL/hr at 08/23/18 1326      dextrose 5 % solution                Allergies: Allergies   Allergen Reactions    Actos [Pioglitazone Hydrochloride] Swelling    Cymbalta [Duloxetine Hcl] Other (See Comments)     Anxiety and lethargic    Gabapentin Anxiety       Problem List:    Patient Active Problem List   Diagnosis Code    Diabetes mellitus, type 2 ([facility] [address] E11.9    Uncontrolled hypertension I10    Chronic anemia D64.9    Coronary disease I25.10    Hyperlipemia E78.5    Arthritis M19.90    Neuropathy, diabetic ([facility] [address] E11.40    CKD (chronic kidney disease), stage III N18.3    Leg pain M79.606    Obesity (BMI 30-39. 9) E66.9    Low HDL (under 40) E78.6    Need for prophylactic vaccination against diphtheria-tetanus-pertussis (DTP) Z22    Well adult exam Z00.00    Screening for breast cancer Z12.31    History of tobacco use Z87.891    Allergic rhinitis J30.9    COPD, mild (HCC) J44.9    Lung mass R91.8    Anemia, chronic disease D63.8    Gout M10.9    Urolithiasis N20.9    Ankle fracture, right S82.891A    Secondary hyperparathyroidism of renal origin ([facility] [address] N25.81    Diabetes mellitus type 2, uncomplicated ([facility] [address] E29.4    Obstructive sleep apnea on CPAP G47.33, Z99.89    Vitamin D deficiency E55.9    CKD (chronic kidney disease) stage 3, GFR 30-59 ml/min N18.3    Type 2 diabetes mellitus with stage 3 chronic kidney disease (HCC) E11.22, N18.3    Hypertensive nephropathy I12.9    Benign essential HTN I10    Type 2 diabetes mellitus (HCC) E11.9    Hypertensive nephropathy I12.9    Persistent proteinuria associated with type 2 diabetes mellitus (HCC) E11.29, R80.9    Cellulitis in diabetic foot (HCC) E11.628, L03.119    CKD (chronic kidney disease), stage IV (HCC) N18.4    VIDA (acute kidney injury) ([facility] [address] N17.9    Persistent proteinuria R80.1    Acquired hypothyroidism E03.9    CKD (chronic kidney disease), stage IV (HCC) N18.4    Nephrotic syndrome N04.9    Type 2 diabetes mellitus (HCC) E11.9    Iron deficiency anemia due to dietary causes D50.8    Anemia of chronic disease D63.8    Stage 5 chronic kidney disease not on chronic dialysis (HCC) N18.5    Type 2 diabetes mellitus (HCC) E11.9    ESRD (end stage renal disease) (HCC) N18.6    Fluid overload E87.70    Pulmonary edema, noncardiac J81.1    CKD (chronic kidney disease) stage 5, GFR less than 15 ml/min (HCC) N18.5    Chronic progressive renal failure, stage 5 (HCC) N18.5    Hypertensive emergency, no CHF I16.1    Diabetic peripheral neuropathy associated with type 2 diabetes mellitus (HCC) E11.42    Pericardial effusion I31.3    Hypokalemia E87.6    Type II diabetes mellitus with stage 5 chronic kidney disease (HCC) E11.22, N18.5    Chronic diastolic (congestive) heart failure (HCC) I50.32       Past Medical History:        Diagnosis Date    Anemia associated with chronic renal failure     Aranesp 150 micrograms every other week [facility] at [facility]    Anxiety     Arthritis     Backache     Blood circulation, collateral     Blood transfusion     CAD (coronary artery disease)     Cellulitis in diabetic foot ([facility] [address] 03/03/2017    4th and 5th toes right foot    Chest pain     History of    CHF (congestive heart failure) ([facility] [address] 1998    Dx'ed by Dr. Neha Hernandez Chronic anemia     Chronic kidney disease     Chronic kidney disease, stage III (moderate)     Chronic renal insufficiency 2010    COPD (chronic obstructive pulmonary disease) ([facility] [address] 2012    Dr. 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 Debi Klein    HTN (hypertension) 1970's    Hyperlipemia 1998    Iron deficiency anemia due to dietary causes 6/21/2018    Kidney stones 3/2014    Kidney trouble          MRSA infection 03/2017    right foot-Dr. Jackson Shoulder (podiatrist)    Neuromuscular disorder ([facility] [address]     Neuropathy 1989    diabetic neuropathy    Obesity since childhoood    CONTRERAS on CPAP 2010    Dr. Shamika Huber    Pneumonia     PONV (postoperative nausea and vomiting)     Seizures ([facility] [address]        Past Surgical History:        Procedure Laterality Date    ANKLE SURGERY Right 02-10-14    Dr. Toby Silva at [facility]  1990's    removal of benign tumor   [address]  2008   [address]    COLONOSCOPY  2009    2 polyps, not precanceorus    COSMETIC SURGERY  3/30/2012    eye lid lift    ENDOSCOPY, COLON, DIAGNOSTIC  2007   [facility] EYE SURGERY  March 30th, 2012    left sided ptosis    FOOT SURGERY  1990    Tarsal tunnel surgery    FRACTURE SURGERY  2015   [address] and [address]    first partial in 1980's, then total in [address] [facility]  2015   [facility] Red LIVER BIOPSY  6/2015    LUNG BIOPSY  2009       Social History:    Social History   Substance Use Topics    Smoking status: Former Smoker     Years: 30.00     Types: Cigarettes     Start date: 12/28/1981     Quit date: 3/13/2009    Smokeless tobacco: Never Used      Comment: quit 2009    Alcohol use No                                Counseling given: Not Answered      Vital Signs (Current):   Vitals:    08/23/18 1248   BP: (!) 164/75   Pulse: 67   Resp: 16   Temp: 99.6 °F (37.6 °C)   TempSrc: Temporal   SpO2: 97%   Weight: 196 lb (88.9 kg)   Height: 5' 5\" (1.651 m)                                              BP Readings from Last 3 Encounters:   08/23/18 (!) 164/75   08/23/18 (!) 191/88   08/21/18 138/67       NPO Status: Time of last liquid consumption: 1030 (water with meds)                        Time of last solid consumption: 2030                        Date of last liquid consumption: 08/23/18                        Date of last solid food consumption: 08/22/18    BMI:   Wt Readings from Last 3 Encounters:   08/23/18 196 lb (88.9 kg)   08/21/18 188 lb (85.3 kg)   08/13/18 188 lb (85.3 kg)     Body mass index is 32.62 kg/m².     CBC:   Lab Results   Component Value Date    WBC 6.5 08/23/2018    RBC 3.97 08/23/2018    RBC 3-5 09/06/2011    HGB 10.1 08/23/2018    HCT 31.8 08/23/2018    MCV 80.1 08/23/2018    RDW 15.2 04/26/2017     08/23/2018       CMP:   Lab Results   Component Value Date     08/23/2018    K 4.9 08/23/2018     08/23/2018    CO2 22 08/23/2018    BUN 76 08/23/2018    CREATININE 6.3 08/23/2018    LABGLOM 8 08/23/2018    GLUCOSE 133 08/23/2018    GLUCOSE 252 03/14/2012    PROT 6.4 07/24/2018    CALCIUM 9.8 08/23/2018    BILITOT 0.3 07/24/2018    ALKPHOS 103 07/24/2018    AST 21 07/24/2018    ALT 18 07/24/2018       POC Tests:   Recent Labs      08/23/18   1326   POCGLU  125*       Coags:   Lab Results   Component Value Date    INR 1.13 08/23/2018    APTT 36.3 08/23/2018       HCG (If Applicable): No results found for: PREGTESTUR, PREGSERUM, HCG, HCGQUANT     ABGs: No results found for: PHART, PO2ART, RBE7KVO, BQX7IVF, BEART, A3KSQZMF     Type & Screen (If Applicable):  No results found for: LABABO,

## 2018-08-24 ENCOUNTER — APPOINTMENT (OUTPATIENT)
Dept: GENERAL RADIOLOGY | Age: 63
DRG: 981 | End: 2018-08-24
Attending: THORACIC SURGERY (CARDIOTHORACIC VASCULAR SURGERY)
Payer: MEDICARE

## 2018-08-24 ENCOUNTER — TELEPHONE (OUTPATIENT)
Dept: INTERNAL MEDICINE | Age: 63
End: 2018-08-24

## 2018-08-24 LAB
ANION GAP SERPL CALCULATED.3IONS-SCNC: 18 MEQ/L (ref 8–16)
BUN BLDV-MCNC: 83 MG/DL (ref 7–22)
CALCIUM SERPL-MCNC: 9.4 MG/DL (ref 8.5–10.5)
CHLORIDE BLD-SCNC: 103 MEQ/L (ref 98–111)
CO2: 18 MEQ/L (ref 23–33)
CREAT SERPL-MCNC: 6.7 MG/DL (ref 0.4–1.2)
ERYTHROCYTE [DISTWIDTH] IN BLOOD BY AUTOMATED COUNT: 20 % (ref 11.5–14.5)
ERYTHROCYTE [DISTWIDTH] IN BLOOD BY AUTOMATED COUNT: 60.1 FL (ref 35–45)
GFR SERPL CREATININE-BSD FRML MDRD: 8 ML/MIN/1.73M2
GLUCOSE BLD-MCNC: 134 MG/DL (ref 70–108)
GLUCOSE BLD-MCNC: 139 MG/DL (ref 70–108)
GLUCOSE BLD-MCNC: 140 MG/DL (ref 70–108)
GLUCOSE BLD-MCNC: 157 MG/DL (ref 70–108)
GLUCOSE BLD-MCNC: 165 MG/DL (ref 70–108)
HCT VFR BLD CALC: 32.5 % (ref 37–47)
HEMOGLOBIN: 9.8 GM/DL (ref 12–16)
MCH RBC QN AUTO: 24.9 PG (ref 26–33)
MCHC RBC AUTO-ENTMCNC: 30.2 GM/DL (ref 32.2–35.5)
MCV RBC AUTO: 82.5 FL (ref 81–99)
PLATELET # BLD: 180 THOU/MM3 (ref 130–400)
PMV BLD AUTO: 10.8 FL (ref 9.4–12.4)
POTASSIUM REFLEX MAGNESIUM: 5.4 MEQ/L (ref 3.5–5.2)
RBC # BLD: 3.94 MILL/MM3 (ref 4.2–5.4)
SODIUM BLD-SCNC: 139 MEQ/L (ref 135–145)
WBC # BLD: 8.3 THOU/MM3 (ref 4.8–10.8)

## 2018-08-24 PROCEDURE — 6370000000 HC RX 637 (ALT 250 FOR IP): Performed by: FAMILY MEDICINE

## 2018-08-24 PROCEDURE — 6370000000 HC RX 637 (ALT 250 FOR IP): Performed by: THORACIC SURGERY (CARDIOTHORACIC VASCULAR SURGERY)

## 2018-08-24 PROCEDURE — 36415 COLL VENOUS BLD VENIPUNCTURE: CPT

## 2018-08-24 PROCEDURE — 96374 THER/PROPH/DIAG INJ IV PUSH: CPT

## 2018-08-24 PROCEDURE — 99222 1ST HOSP IP/OBS MODERATE 55: CPT | Performed by: INTERNAL MEDICINE

## 2018-08-24 PROCEDURE — 2580000003 HC RX 258: Performed by: NURSE PRACTITIONER

## 2018-08-24 PROCEDURE — 6360000002 HC RX W HCPCS: Performed by: NURSE PRACTITIONER

## 2018-08-24 PROCEDURE — 6370000000 HC RX 637 (ALT 250 FOR IP): Performed by: NURSE PRACTITIONER

## 2018-08-24 PROCEDURE — 74018 RADEX ABDOMEN 1 VIEW: CPT

## 2018-08-24 PROCEDURE — 82948 REAGENT STRIP/BLOOD GLUCOSE: CPT

## 2018-08-24 PROCEDURE — 80048 BASIC METABOLIC PNL TOTAL CA: CPT

## 2018-08-24 PROCEDURE — 85027 COMPLETE CBC AUTOMATED: CPT

## 2018-08-24 PROCEDURE — 96376 TX/PRO/DX INJ SAME DRUG ADON: CPT

## 2018-08-24 PROCEDURE — 2500000003 HC RX 250 WO HCPCS: Performed by: INTERNAL MEDICINE

## 2018-08-24 PROCEDURE — 6360000002 HC RX W HCPCS: Performed by: PHYSICIAN ASSISTANT

## 2018-08-24 PROCEDURE — 6360000002 HC RX W HCPCS: Performed by: INTERNAL MEDICINE

## 2018-08-24 PROCEDURE — S0028 INJECTION, FAMOTIDINE, 20 MG: HCPCS | Performed by: INTERNAL MEDICINE

## 2018-08-24 PROCEDURE — 2709999900 HC NON-CHARGEABLE SUPPLY

## 2018-08-24 PROCEDURE — 6370000000 HC RX 637 (ALT 250 FOR IP): Performed by: INTERNAL MEDICINE

## 2018-08-24 RX ORDER — LACTULOSE 10 G/15ML
20 SOLUTION ORAL ONCE
Status: DISCONTINUED | OUTPATIENT
Start: 2018-08-24 | End: 2018-08-24

## 2018-08-24 RX ORDER — BUMETANIDE 0.25 MG/ML
2 INJECTION, SOLUTION INTRAMUSCULAR; INTRAVENOUS ONCE
Status: DISCONTINUED | OUTPATIENT
Start: 2018-08-24 | End: 2018-08-24 | Stop reason: SDUPTHER

## 2018-08-24 RX ORDER — SODIUM BICARBONATE 650 MG/1
1300 TABLET ORAL 2 TIMES DAILY
Qty: 120 TABLET | Refills: 3 | Status: ON HOLD | OUTPATIENT
Start: 2018-08-24 | End: 2018-11-20 | Stop reason: HOSPADM

## 2018-08-24 RX ORDER — ACETAMINOPHEN 325 MG/1
650 TABLET ORAL EVERY 6 HOURS PRN
Qty: 120 TABLET | Refills: 3
Start: 2018-08-24 | End: 2020-01-10 | Stop reason: ALTCHOICE

## 2018-08-24 RX ORDER — BUMETANIDE 0.25 MG/ML
2 INJECTION, SOLUTION INTRAMUSCULAR; INTRAVENOUS EVERY 8 HOURS
Status: DISCONTINUED | OUTPATIENT
Start: 2018-08-24 | End: 2018-08-27

## 2018-08-24 RX ORDER — POLYETHYLENE GLYCOL 3350 17 G/17G
17 POWDER, FOR SOLUTION ORAL DAILY
Status: DISCONTINUED | OUTPATIENT
Start: 2018-08-24 | End: 2018-08-24

## 2018-08-24 RX ORDER — DEXTROSE MONOHYDRATE 100 MG/ML
INJECTION, SOLUTION INTRAVENOUS CONTINUOUS PRN
Status: DISCONTINUED | OUTPATIENT
Start: 2018-08-24 | End: 2018-08-28 | Stop reason: HOSPADM

## 2018-08-24 RX ORDER — CALCIUM POLYCARBOPHIL 625 MG 625 MG/1
625 TABLET ORAL DAILY
Status: DISCONTINUED | OUTPATIENT
Start: 2018-08-24 | End: 2018-08-24

## 2018-08-24 RX ORDER — HYDRALAZINE HYDROCHLORIDE 20 MG/ML
10 INJECTION INTRAMUSCULAR; INTRAVENOUS EVERY 6 HOURS PRN
Status: DISCONTINUED | OUTPATIENT
Start: 2018-08-24 | End: 2018-08-28 | Stop reason: HOSPADM

## 2018-08-24 RX ORDER — PROMETHAZINE HYDROCHLORIDE 25 MG/ML
12.5 INJECTION, SOLUTION INTRAMUSCULAR; INTRAVENOUS ONCE
Status: COMPLETED | OUTPATIENT
Start: 2018-08-24 | End: 2018-08-24

## 2018-08-24 RX ORDER — CALCIUM POLYCARBOPHIL 625 MG 625 MG/1
625 TABLET ORAL DAILY
Status: DISCONTINUED | OUTPATIENT
Start: 2018-08-25 | End: 2018-08-28 | Stop reason: HOSPADM

## 2018-08-24 RX ORDER — METOLAZONE 5 MG/1
5 TABLET ORAL ONCE
Status: DISCONTINUED | OUTPATIENT
Start: 2018-08-24 | End: 2018-08-28 | Stop reason: HOSPADM

## 2018-08-24 RX ORDER — BISACODYL 10 MG
10 SUPPOSITORY, RECTAL RECTAL DAILY PRN
Status: DISCONTINUED | OUTPATIENT
Start: 2018-08-24 | End: 2018-08-25

## 2018-08-24 RX ORDER — METOCLOPRAMIDE HYDROCHLORIDE 5 MG/ML
5 INJECTION INTRAMUSCULAR; INTRAVENOUS EVERY 6 HOURS
Status: DISCONTINUED | OUTPATIENT
Start: 2018-08-24 | End: 2018-08-28 | Stop reason: HOSPADM

## 2018-08-24 RX ORDER — SODIUM BICARBONATE 650 MG/1
1300 TABLET ORAL 2 TIMES DAILY
Status: DISCONTINUED | OUTPATIENT
Start: 2018-08-24 | End: 2018-08-28 | Stop reason: HOSPADM

## 2018-08-24 RX ORDER — PROMETHAZINE HYDROCHLORIDE 25 MG/ML
12.5 INJECTION, SOLUTION INTRAMUSCULAR; INTRAVENOUS EVERY 6 HOURS PRN
Status: DISCONTINUED | OUTPATIENT
Start: 2018-08-24 | End: 2018-08-28 | Stop reason: HOSPADM

## 2018-08-24 RX ORDER — POLYETHYLENE GLYCOL 3350 17 G/17G
17 POWDER, FOR SOLUTION ORAL DAILY
Status: DISCONTINUED | OUTPATIENT
Start: 2018-08-25 | End: 2018-08-28 | Stop reason: HOSPADM

## 2018-08-24 RX ADMIN — BISACODYL 10 MG: 10 SUPPOSITORY RECTAL at 21:52

## 2018-08-24 RX ADMIN — Medication 10 ML: at 19:36

## 2018-08-24 RX ADMIN — PROMETHAZINE HYDROCHLORIDE 12.5 MG: 25 INJECTION INTRAMUSCULAR; INTRAVENOUS at 23:36

## 2018-08-24 RX ADMIN — Medication 10 ML: at 14:03

## 2018-08-24 RX ADMIN — Medication 10 ML: at 19:48

## 2018-08-24 RX ADMIN — Medication 10 ML: at 23:36

## 2018-08-24 RX ADMIN — BUMETANIDE 2 MG: 0.25 INJECTION INTRAMUSCULAR; INTRAVENOUS at 23:36

## 2018-08-24 RX ADMIN — ONDANSETRON 4 MG: 2 INJECTION INTRAMUSCULAR; INTRAVENOUS at 15:20

## 2018-08-24 RX ADMIN — Medication 10 ML: at 16:12

## 2018-08-24 RX ADMIN — ONDANSETRON 4 MG: 2 INJECTION INTRAMUSCULAR; INTRAVENOUS at 21:40

## 2018-08-24 RX ADMIN — ONDANSETRON 4 MG: 2 INJECTION INTRAMUSCULAR; INTRAVENOUS at 09:12

## 2018-08-24 RX ADMIN — METOCLOPRAMIDE 5 MG: 5 INJECTION, SOLUTION INTRAMUSCULAR; INTRAVENOUS at 14:02

## 2018-08-24 RX ADMIN — Medication 10 ML: at 10:22

## 2018-08-24 RX ADMIN — HYDRALAZINE HYDROCHLORIDE 10 MG: 20 INJECTION INTRAMUSCULAR; INTRAVENOUS at 19:48

## 2018-08-24 RX ADMIN — Medication 10 ML: at 15:20

## 2018-08-24 RX ADMIN — Medication 10 ML: at 09:14

## 2018-08-24 RX ADMIN — PROMETHAZINE HYDROCHLORIDE 12.5 MG: 25 INJECTION INTRAMUSCULAR; INTRAVENOUS at 18:23

## 2018-08-24 RX ADMIN — Medication 10 ML: at 21:40

## 2018-08-24 RX ADMIN — BUMETANIDE 2 MG: 0.25 INJECTION INTRAMUSCULAR; INTRAVENOUS at 10:22

## 2018-08-24 RX ADMIN — METOCLOPRAMIDE 5 MG: 5 INJECTION, SOLUTION INTRAMUSCULAR; INTRAVENOUS at 19:35

## 2018-08-24 RX ADMIN — BUMETANIDE 2 MG: 0.25 INJECTION INTRAMUSCULAR; INTRAVENOUS at 16:26

## 2018-08-24 RX ADMIN — Medication 10 ML: at 16:25

## 2018-08-24 RX ADMIN — Medication 10 ML: at 23:45

## 2018-08-24 RX ADMIN — Medication 10 ML: at 16:27

## 2018-08-24 RX ADMIN — FAMOTIDINE 20 MG: 10 INJECTION, SOLUTION INTRAVENOUS at 16:11

## 2018-08-24 RX ADMIN — PROMETHAZINE HYDROCHLORIDE 12.5 MG: 25 INJECTION INTRAMUSCULAR; INTRAVENOUS at 16:15

## 2018-08-24 RX ADMIN — OXYCODONE HYDROCHLORIDE AND ACETAMINOPHEN 1 TABLET: 5; 325 TABLET ORAL at 05:20

## 2018-08-24 RX ADMIN — Medication 10 ML: at 16:16

## 2018-08-24 ASSESSMENT — PAIN DESCRIPTION - PAIN TYPE
TYPE: SURGICAL PAIN
TYPE: ACUTE PAIN
TYPE: ACUTE PAIN

## 2018-08-24 ASSESSMENT — PAIN DESCRIPTION - DESCRIPTORS
DESCRIPTORS: SHARP
DESCRIPTORS: CRAMPING
DESCRIPTORS: ACHING

## 2018-08-24 ASSESSMENT — PAIN DESCRIPTION - LOCATION
LOCATION: ARM
LOCATION: ABDOMEN
LOCATION: ABDOMEN

## 2018-08-24 ASSESSMENT — PAIN SCALES - GENERAL
PAINLEVEL_OUTOF10: 10
PAINLEVEL_OUTOF10: 8
PAINLEVEL_OUTOF10: 0
PAINLEVEL_OUTOF10: 8
PAINLEVEL_OUTOF10: 7
PAINLEVEL_OUTOF10: 0

## 2018-08-24 ASSESSMENT — PAIN DESCRIPTION - FREQUENCY
FREQUENCY: CONTINUOUS

## 2018-08-24 ASSESSMENT — ENCOUNTER SYMPTOMS
EYE PAIN: 0
BACK PAIN: 0
ABDOMINAL PAIN: 0
HEMOPTYSIS: 0
SORE THROAT: 0
VOMITING: 0
NAUSEA: 0
HEARTBURN: 0
SHORTNESS OF BREATH: 1
COUGH: 0

## 2018-08-24 ASSESSMENT — PAIN DESCRIPTION - PROGRESSION
CLINICAL_PROGRESSION: NOT CHANGED

## 2018-08-24 ASSESSMENT — PAIN DESCRIPTION - ONSET
ONSET: ON-GOING

## 2018-08-24 ASSESSMENT — PAIN DESCRIPTION - ORIENTATION
ORIENTATION: RIGHT
ORIENTATION: RIGHT;UPPER
ORIENTATION: LEFT;UPPER

## 2018-08-24 NOTE — PROGRESS NOTES
Pharmacy Renal Adjustment Consult    Corine Mason is a 57 y.o. female. Pharmacy has been consulted to renally adjust the following medications: metoclopramide    Recent Labs      08/23/18   1311  08/24/18   0543   BUN  76*  83*       Recent Labs      08/23/18   1311  08/24/18   0543   CREATININE  6.3*  6.7*       Estimated Creatinine Clearance: 9 mL/min (A) (based on SCr of 6.7 mg/dL [facility])).       Height:   Ht Readings from Last 1 Encounters:   08/23/18 5' 5\" (1.651 m)     Weight:  Wt Readings from Last 1 Encounters:   08/24/18 195 lb 8 oz (88.7 kg)     Plan: Adjustments based on renal function:          Decrease metoclopramide IV 10 mg to metoclopramide IV 5 mg    Ava Cline, PharmD, BCPS, [facility] 8/24/2018 1:28 PM

## 2018-08-24 NOTE — CONSULTS
(congestive heart failure) ([facility] 1998    Dx'ed by Dr. Mishel Ren Chronic anemia     Chronic kidney disease     Chronic kidney disease, stage III (moderate)     Chronic renal insufficiency 2010    COPD (chronic obstructive pulmonary disease) ([facility] 2012    Dr. Kenzie Mcpherson    Coronary disease     Moderate    Depression     Diabetes mellitus, type 2 ([facility] 1988    Disease of blood and blood forming organ     GERD (gastroesophageal reflux disease)     Hemoglobin disease ([facility]     hemoglobin hope    History of granulomatous disease ([facility] 2009    followed by Dr. Hubert Witt    HTN (hypertension) 1970's    Hyperlipemia 1998    Iron deficiency anemia due to dietary causes 6/21/2018    Kidney stones 3/2014    Kidney trouble          MRSA infection 03/2017    right foot-Dr. Alonso Signs (podiatrist)    Neuromuscular disorder ([facility]     Neuropathy 1989    diabetic neuropathy    Obesity since childhoood    CONTRERAS on CPAP 2010    Dr. Kenzie Mcpherson    Pneumonia     PONV (postoperative nausea and vomiting)     Seizures ([facility]      Past Surgical History:   Procedure Laterality Date    ANKLE SURGERY Right 02-10-14    Dr. Jose Rafael Akbar at [facility] 15  1990's    removal of benign tumor   [address]  2008   [address]    COLONOSCOPY  2009    2 polyps, not precanceorus    COSMETIC SURGERY  3/30/2012    eye lid lift    ENDOSCOPY, COLON, DIAGNOSTIC  2007   [place] [facility] EYE SURGERY  March 30th, 2012    left sided ptosis    FOOT SURGERY  1990    Tarsal tunnel surgery    FRACTURE SURGERY  2015   [address] and [address]    first partial in 1980's, then total in [address] [facility]  2015   [place] Bence LIVER BIOPSY  6/2015    LUNG BIOPSY  2009     Social History     Social History    Marital status:      Spouse name: N/A    Number of children: 1    Years of education: 10     Occupational History    Not on file.      Social History Main Topics    Smoking status: Former Smoker

## 2018-08-24 NOTE — OP NOTE
[address] [place] [address]                                 OPERATIVE REPORT    PATIENT NAME: Denis RING                   :        1955  MED REC NO:   [id number]                           ROOM:       0010  ACCOUNT NO:   [de-identified]                           ADMIT DATE: 2018  PROVIDER:     Juan A Appiah MD    DATE OF PROCEDURE:  2018    PREOPERATIVE DIAGNOSIS:  Stage 5 chronic kidney disease. POSTOPERATIVE DIAGNOSIS:  Stage 5 chronic kidney disease. OPERATION PERFORMED:  Creation of left cephalic vein to brachial artery  arteriovenous fistula. SURGEON:  Juan A Appiah MD.    COMPLICATIONS:  None were noted. ANESTHESIA:  General.    ESTIMATED BLOOD LOSS:  Approximately 15 mL. FINDINGS:  The cephalic vein in the antecubital fossa admitted a 5-mm  probe. The vein had strong back flow suggesting venous reflux in the left  cephalic vein. The left brachial artery admitted at 3-mm probe. There was  palpable thrill postoperatively on the AV fistula. Because of her swollen  legs, Dr. Marlo Siemens recommended overnight stay for postanesthesia recovery. I  talked to Dr. Faye Arana who started her on Zaroxolyn yesterday and she is also  on Bumex and he is waiting to see if those medications have any effect. OPERATIVE NOTE:  After adequate monitoring lines were placed, the patient  supine, general endotracheal anesthesia was accomplished. Time-out  procedures were completed. The left upper extremity was then prepped and  draped in a standard fashion for creation of left upper extremity AV  fistula. Venous duplex imaging of the cephalic vein was done and appeared  to be of suitable size. Transverse incision was done in the antecubital  fossa and dissection was carried out along the cephalic vein, cephalad and  distally.   The distal cephalic vein in the upper forearm was controlled  with a hemoclip and 6-0

## 2018-08-24 NOTE — FLOWSHEET NOTE
08/24/18 1748   Provider Notification   Reason for Communication Review case   Provider Name [facility]   Provider Notification Physician   Method of Communication Secure Message   Response See orders   Notification Time [phone]   Sent message I gave new medication ordered at [phone], patient c/o abdominal pain, dry heaves, and small amount of emesis. He ordered phenergan 12.5 mg IM.

## 2018-08-24 NOTE — FLOWSHEET NOTE
08/24/18 1603   Provider Notification   Reason for Communication Review case   Provider Name [facility]   Provider Notification Physician   Method of Communication Secure Message   Response No new orders   Notification Time 1604   Dr Mays sent message regarding patient will be staying due to nausea, vomiting and abdominal pain. Patient unable to take oral medications today. Dr Mays called floor, he does not want IV fluids unless for low glucose level. He ordered prn hydralazine for SBP > 160.

## 2018-08-24 NOTE — FLOWSHEET NOTE
08/24/18 1220   Provider Notification   Reason for Communication Review case   Provider Name [facility]   Provider Notification Physician Assistant   Method of Communication Call   Response See orders   Notification Time [phone]   I called [facility] regarding patient continues to have nausea and vomiting, no narcotics given, Zofran not helping, already has 700 ml emesis. He will speak with Dr Eugene Yarbrough.

## 2018-08-25 PROBLEM — R11.2 NAUSEA & VOMITING: Status: ACTIVE | Noted: 2018-08-25

## 2018-08-25 PROBLEM — Z98.890 POST-OPERATIVE NAUSEA AND VOMITING: Status: ACTIVE | Noted: 2018-08-25

## 2018-08-25 PROBLEM — N28.9 HYPERVOLEMIA ASSOCIATED WITH RENAL INSUFFICIENCY: Status: ACTIVE | Noted: 2018-07-24

## 2018-08-25 LAB
ANION GAP SERPL CALCULATED.3IONS-SCNC: 20 MEQ/L (ref 8–16)
BUN BLDV-MCNC: 84 MG/DL (ref 7–22)
CALCIUM SERPL-MCNC: 10 MG/DL (ref 8.5–10.5)
CHLORIDE BLD-SCNC: 105 MEQ/L (ref 98–111)
CO2: 20 MEQ/L (ref 23–33)
CREAT SERPL-MCNC: 6.6 MG/DL (ref 0.4–1.2)
GFR SERPL CREATININE-BSD FRML MDRD: 8 ML/MIN/1.73M2
GLUCOSE BLD-MCNC: 113 MG/DL (ref 70–108)
GLUCOSE BLD-MCNC: 117 MG/DL (ref 70–108)
GLUCOSE BLD-MCNC: 118 MG/DL (ref 70–108)
GLUCOSE BLD-MCNC: 133 MG/DL (ref 70–108)
GLUCOSE BLD-MCNC: 138 MG/DL (ref 70–108)
GLUCOSE BLD-MCNC: 142 MG/DL (ref 70–108)
MAGNESIUM: 2.1 MG/DL (ref 1.6–2.4)
POTASSIUM SERPL-SCNC: 4.6 MEQ/L (ref 3.5–5.2)
SODIUM BLD-SCNC: 145 MEQ/L (ref 135–145)

## 2018-08-25 PROCEDURE — 83735 ASSAY OF MAGNESIUM: CPT

## 2018-08-25 PROCEDURE — 6360000002 HC RX W HCPCS: Performed by: PHYSICIAN ASSISTANT

## 2018-08-25 PROCEDURE — 96376 TX/PRO/DX INJ SAME DRUG ADON: CPT

## 2018-08-25 PROCEDURE — S0028 INJECTION, FAMOTIDINE, 20 MG: HCPCS | Performed by: INTERNAL MEDICINE

## 2018-08-25 PROCEDURE — 6370000000 HC RX 637 (ALT 250 FOR IP): Performed by: INTERNAL MEDICINE

## 2018-08-25 PROCEDURE — 6360000002 HC RX W HCPCS: Performed by: NURSE PRACTITIONER

## 2018-08-25 PROCEDURE — 96374 THER/PROPH/DIAG INJ IV PUSH: CPT

## 2018-08-25 PROCEDURE — 80048 BASIC METABOLIC PNL TOTAL CA: CPT

## 2018-08-25 PROCEDURE — 2500000003 HC RX 250 WO HCPCS: Performed by: INTERNAL MEDICINE

## 2018-08-25 PROCEDURE — 2709999900 HC NON-CHARGEABLE SUPPLY

## 2018-08-25 PROCEDURE — 94640 AIRWAY INHALATION TREATMENT: CPT

## 2018-08-25 PROCEDURE — 82948 REAGENT STRIP/BLOOD GLUCOSE: CPT

## 2018-08-25 PROCEDURE — 99232 SBSQ HOSP IP/OBS MODERATE 35: CPT | Performed by: INTERNAL MEDICINE

## 2018-08-25 PROCEDURE — 6370000000 HC RX 637 (ALT 250 FOR IP): Performed by: NURSE PRACTITIONER

## 2018-08-25 PROCEDURE — 2580000003 HC RX 258: Performed by: NURSE PRACTITIONER

## 2018-08-25 PROCEDURE — 36415 COLL VENOUS BLD VENIPUNCTURE: CPT

## 2018-08-25 PROCEDURE — 2060000000 HC ICU INTERMEDIATE R&B

## 2018-08-25 PROCEDURE — 6360000002 HC RX W HCPCS: Performed by: INTERNAL MEDICINE

## 2018-08-25 RX ORDER — SENNA PLUS 8.6 MG/1
2 TABLET ORAL NIGHTLY
Status: DISCONTINUED | OUTPATIENT
Start: 2018-08-25 | End: 2018-08-28 | Stop reason: HOSPADM

## 2018-08-25 RX ORDER — BISACODYL 10 MG
10 SUPPOSITORY, RECTAL RECTAL DAILY PRN
Status: DISCONTINUED | OUTPATIENT
Start: 2018-08-25 | End: 2018-08-28 | Stop reason: HOSPADM

## 2018-08-25 RX ORDER — DOCUSATE SODIUM 100 MG/1
100 CAPSULE, LIQUID FILLED ORAL 2 TIMES DAILY
Status: DISCONTINUED | OUTPATIENT
Start: 2018-08-25 | End: 2018-08-28 | Stop reason: HOSPADM

## 2018-08-25 RX ADMIN — Medication 10 ML: at 03:17

## 2018-08-25 RX ADMIN — FLUTICASONE PROPIONATE 1 SPRAY: 50 SPRAY, METERED NASAL at 12:47

## 2018-08-25 RX ADMIN — BUMETANIDE 2 MG: 0.25 INJECTION INTRAMUSCULAR; INTRAVENOUS at 14:58

## 2018-08-25 RX ADMIN — HYDRALAZINE HYDROCHLORIDE 10 MG: 20 INJECTION INTRAMUSCULAR; INTRAVENOUS at 14:58

## 2018-08-25 RX ADMIN — Medication 10 ML: at 04:26

## 2018-08-25 RX ADMIN — FAMOTIDINE 20 MG: 10 INJECTION, SOLUTION INTRAVENOUS at 12:39

## 2018-08-25 RX ADMIN — MINOXIDIL 5 MG: 2.5 TABLET ORAL at 20:22

## 2018-08-25 RX ADMIN — MAGESIUM CITRATE 296 ML: 1.75 LIQUID ORAL at 13:51

## 2018-08-25 RX ADMIN — Medication 10 ML: at 20:22

## 2018-08-25 RX ADMIN — BUMETANIDE 2 MG: 0.25 INJECTION INTRAMUSCULAR; INTRAVENOUS at 23:17

## 2018-08-25 RX ADMIN — METOCLOPRAMIDE 5 MG: 5 INJECTION, SOLUTION INTRAMUSCULAR; INTRAVENOUS at 03:17

## 2018-08-25 RX ADMIN — ACETAMINOPHEN 650 MG: 325 TABLET ORAL at 20:24

## 2018-08-25 RX ADMIN — SODIUM BICARBONATE 1300 MG: 650 TABLET ORAL at 20:22

## 2018-08-25 RX ADMIN — ONDANSETRON 4 MG: 2 INJECTION INTRAMUSCULAR; INTRAVENOUS at 04:26

## 2018-08-25 RX ADMIN — ONDANSETRON 4 MG: 2 INJECTION INTRAMUSCULAR; INTRAVENOUS at 14:58

## 2018-08-25 RX ADMIN — Medication 2 PUFF: at 07:31

## 2018-08-25 RX ADMIN — DOCUSATE SODIUM 100 MG: 100 CAPSULE, LIQUID FILLED ORAL at 13:53

## 2018-08-25 RX ADMIN — METOCLOPRAMIDE 5 MG: 5 INJECTION, SOLUTION INTRAMUSCULAR; INTRAVENOUS at 20:23

## 2018-08-25 RX ADMIN — CLONIDINE HYDROCHLORIDE 0.3 MG: 0.2 TABLET ORAL at 16:31

## 2018-08-25 RX ADMIN — Medication 10 ML: at 12:46

## 2018-08-25 ASSESSMENT — PAIN SCALES - GENERAL
PAINLEVEL_OUTOF10: 7
PAINLEVEL_OUTOF10: 7
PAINLEVEL_OUTOF10: 0
PAINLEVEL_OUTOF10: 7

## 2018-08-25 ASSESSMENT — PAIN DESCRIPTION - ONSET: ONSET: ON-GOING

## 2018-08-25 ASSESSMENT — PAIN DESCRIPTION - ORIENTATION: ORIENTATION: RIGHT

## 2018-08-25 ASSESSMENT — PAIN DESCRIPTION - LOCATION: LOCATION: ABDOMEN

## 2018-08-25 ASSESSMENT — PAIN DESCRIPTION - PAIN TYPE: TYPE: ACUTE PAIN

## 2018-08-25 ASSESSMENT — PAIN DESCRIPTION - PROGRESSION: CLINICAL_PROGRESSION: NOT CHANGED

## 2018-08-25 ASSESSMENT — PAIN DESCRIPTION - FREQUENCY: FREQUENCY: CONTINUOUS

## 2018-08-25 NOTE — PROGRESS NOTES
0.3 mg Oral Q8H    doxazosin  12 mg Oral Daily    ferrous sulfate  325 mg Oral Daily with breakfast    fluticasone  1 spray Nasal Daily    vitamin D  5,000 Units Oral Daily    minoxidil  5 mg Oral BID    insulin glargine  25 Units Subcutaneous Nightly     PRN Meds: bisacodyl, promethazine, hydrALAZINE, dextrose, sodium chloride flush, acetaminophen, magnesium hydroxide, ondansetron, albuterol sulfate HFA, nitroGLYCERIN, oxyCODONE-acetaminophen, glucose, dextrose, glucagon (rDNA), dextrose      Intake/Output Summary (Last 24 hours) at 08/25/18 1424  Last data filed at 08/25/18 1242   Gross per 24 hour   Intake              370 ml   Output             2275 ml   Net            -1905 ml       Diet:  DIET CARB CONTROL; Carb Control: 4 carb choices (60 gms)/meal    Exam:  BP (!) 178/78   Pulse 89   Temp 98.5 °F (36.9 °C) (Oral)   Resp 18   Ht 5' 5\" (1.651 m)   Wt 191 lb 6.4 oz (86.8 kg)   SpO2 90%   BMI 31.85 kg/m²     Physical Examination:   General appearance - alert, awake  and in mild discomfort form nausea  HEENT: Normocephalic, Atraumatic, pupils reactive, mucous membranes moist  Chest - moving equally to respiration,symmetric air entry, clear to auscultation  Heart - normal rate, regular rhythm, normal S1, S2, no murmurs,  Abdomen - soft, nontender, distended, no masses or organomegaly, BS+ sluggish  Neurological - alert, oriented, normal speech, sensations intact and able to move all extremities   Extremities - peripheral pulses normal,trace  pedal edema,  Capillary refill less than 3 sec  Skin - normal coloration and turgor, no rashes   left arm AV fistula done- ecchymosis+      Labs:   Recent Labs      08/23/18   1311  08/24/18   0543   WBC  6.5  8.3   HGB  10.1*  9.8*   HCT  31.8*  32.5*   PLT  180  180     Recent Labs      08/23/18   1311  08/24/18   0543  08/25/18   0614   NA  141  139  145   K  4.9  5.4*  4.6   CL  104  103  105   CO2  22*  18*  20*   BUN  76*  83*  84*   CREATININE  6.3*  6.7* 6.6*   CALCIUM  9.8  9.4  10.0     No results for input(s): AST, ALT, BILIDIR, BILITOT, ALKPHOS in the last 72 hours. Recent Labs      08/23/18   1311   INR  1.13     No results for input(s): Moisemelisa Mccoy in the last 72 hours. Urinalysis:    Lab Results   Component Value Date    NITRU NEGATIVE 03/17/2014    WBCUA 5-10 03/17/2014    BACTERIA MANY 03/17/2014    RBCUA >200 03/17/2014    BLOODU Moderate 03/19/2014    BLOODU LARGE 03/17/2014    SPECGRAV 1.015 03/04/2013    GLUCOSEU NEGATIVE 03/17/2014       Radiology:  XR ABDOMEN (KUB) (SINGLE AP VIEW)   Final Result   1. There is gas and a moderate to moderately large amount of stool within the nondistended colon and gas within several nondistended loops of small bowel. The bowel gas pattern is nonobstructive. **This report has been created using voice recognition software. It may contain minor errors which are inherent in voice recognition technology. **      Final report electronically signed by Dr. Rochelle Sprague on 8/24/2018 2:00 PM          Diet: DIET CARB CONTROL; Carb Control: 4 carb choices (60 gms)/meal    DVT prophylaxis: [] Lovenox                                 [x] SCDs                                 [] SQ Heparin                                 [] Encourage ambulation           [] Already on Anticoagulation     Disposition:    [x] Home       [] TCU       [] Rehab       [] Psych       [] SNF       [facility]       [] Other-    Code Status: Full Code    PT/OT Eval Status: none    Assessment/Plan:    Anticipated Discharge in : tomorrow    Hypervolemia from renal failure on IV diuretics 2 mg Bumex every 8 hrly    progressive CKD stage 5- AV fistula, on IV diuretics currently     Post operative nausea and vomiting - symptomatic treatment, not improving with Zofran, on phenergan, not throwing up today, advance diet     constipation - solace and senna and mag citrate one dose.       Essential hypertension uncontrolled- Continue

## 2018-08-25 NOTE — PROGRESS NOTES
eGFR    Admit eGFR    Current eGFR          Objective:    Diet: renal diabetic    VITALS:  BP (!) 178/78   Pulse 89   Temp 98.5 °F (36.9 °C) (Oral)   Resp 18   Ht 5' 5\" (1.651 m)   Wt 191 lb 6.4 oz (86.8 kg)   SpO2 90%   BMI 31.85 kg/m²   24HR INTAKE/OUTPUT:    Intake/Output Summary (Last 24 hours) at 08/25/18 1204  Last data filed at 08/25/18 1022   Gross per 24 hour   Intake              200 ml   Output             2775 ml   Net            -2575 ml     Admission weight: 196 lb (88.9 kg)  Wt Readings from Last 3 Encounters:   08/25/18 191 lb 6.4 oz (86.8 kg)   08/21/18 188 lb (85.3 kg)   08/13/18 188 lb (85.3 kg)     Body mass index is 31.85 kg/m². Physical examination    General:  Alert and cooperative with exam  HEENT:  Head: Normocephalic, no lesions, without obvious abnormality. Neck:   no adenopathy, no carotid bruit and no JVD  Lungs:  clear to auscultation bilaterally  Heart:  regular rate and rhythm, S1, S2 normal, no murmur, click, rub or gallop  Abdomen:  soft, non-tender; bowel sounds normal; no masses,  no organomegaly  Extremities:  extremities normal, atraumatic, no cyanosis or edema  Neurologic:  Mental status: Alert, oriented, thought content appropriate  Psy:                 No evidence of depression. Mood is stable. Skin:                Warm and dry. No lesions or rashes noted. Muscles:         Hand  and leg strength are equal and strong bilaterally. Lab Data  CBC:   Recent Labs      08/23/18   1311  08/24/18   0543   WBC  6.5  8.3   HGB  10.1*  9.8*   PLT  180  180     BMP:  Recent Labs      08/23/18   1311  08/24/18   0543  08/25/18   0614   NA  141  139  145   K  4.9  5.4*  4.6   CL  104  103  105   CO2  22*  18*  20*   BUN  76*  83*  84*   CREATININE  6.3*  6.7*  6.6*   GLUCOSE  133*  165*  142*   CALCIUM  9.8  9.4  10.0   MG   --    --   2.1     TSH: No results for input(s): TSH in the last 72 hours.   HgBa1c: No results for input(s): LABA1C in the last 72

## 2018-08-25 NOTE — PLAN OF CARE
Problem: Pain:  Goal: Pain level will decrease  Pain level will decrease   Outcome: Ongoing  Patient having pain of 7/10. Patient having nausea and vomiting this shift so not wanting to take oral pain medicine. Patient offered to reposition and given warm blankets throughout shift. Problem: Cardiovascular  Goal: No DVT, peripheral vascular complications  Outcome: Met This Shift  No complications this shift. Will continue to monitor. Patient heparin being held due to bleeding during surgery. Problem: Respiratory  Goal: O2 Sat > 90%  Outcome: Met This Shift  Patient oxygen saturation greater than 90% this shift. Problem: GI  Goal: No bowel complications  Outcome: Ongoing  Patient given mag citrate to help with bowel movement. Problem: Skin Integrity/Risk  Goal: No skin breakdown during hospitalization  Outcome: Met This Shift  No new skin breakdown this shift. Patient skin clean, dry, intact. Patient incision open to air and tender to touch. No signs of infection. Problem: Discharge Planning:  Goal: Discharged to appropriate level of care  Discharged to appropriate level of care   Outcome: Ongoing  Patient plans to return home at discharge. Problem: Falls - Risk of:  Goal: Will remain free from falls  Will remain free from falls   Outcome: Met This Shift  No falls this shift. Alarms on patient. Problem: Nausea/Vomiting:  Goal: Absence of nausea/vomiting  Absence of nausea/vomiting   Outcome: Ongoing  Patient still having nausea and vomited twice during shift. Will continue to monitor. Comments: Care plan reviewed with patient. Patient verbalize understanding of the plan of care and contribute to goal setting.

## 2018-08-26 LAB
ANION GAP SERPL CALCULATED.3IONS-SCNC: 20 MEQ/L (ref 8–16)
BACTERIA: ABNORMAL /HPF
BILIRUBIN URINE: NEGATIVE
BLOOD, URINE: ABNORMAL
BUN BLDV-MCNC: 88 MG/DL (ref 7–22)
CALCIUM SERPL-MCNC: 9.9 MG/DL (ref 8.5–10.5)
CASTS UA: ABNORMAL /LPF
CHARACTER, URINE: ABNORMAL
CHLORIDE BLD-SCNC: 106 MEQ/L (ref 98–111)
CO2: 20 MEQ/L (ref 23–33)
COLOR: YELLOW
CREAT SERPL-MCNC: 6.9 MG/DL (ref 0.4–1.2)
CRYSTALS, UA: ABNORMAL
EPITHELIAL CELLS, UA: ABNORMAL /HPF
GFR SERPL CREATININE-BSD FRML MDRD: 7 ML/MIN/1.73M2
GLUCOSE BLD-MCNC: 109 MG/DL (ref 70–108)
GLUCOSE BLD-MCNC: 118 MG/DL (ref 70–108)
GLUCOSE BLD-MCNC: 120 MG/DL (ref 70–108)
GLUCOSE BLD-MCNC: 120 MG/DL (ref 70–108)
GLUCOSE BLD-MCNC: 96 MG/DL (ref 70–108)
GLUCOSE URINE: NEGATIVE MG/DL
KETONES, URINE: NEGATIVE
LEUKOCYTE ESTERASE, URINE: NEGATIVE
NITRITE, URINE: NEGATIVE
PH UA: 5
POTASSIUM SERPL-SCNC: 4 MEQ/L (ref 3.5–5.2)
PROTEIN UA: 300
RBC URINE: ABNORMAL /HPF
RENAL EPITHELIAL, UA: ABNORMAL
SODIUM BLD-SCNC: 146 MEQ/L (ref 135–145)
SPECIFIC GRAVITY, URINE: 1.01 (ref 1–1.03)
UROBILINOGEN, URINE: 0.2 EU/DL
WBC UA: ABNORMAL /HPF
YEAST: ABNORMAL

## 2018-08-26 PROCEDURE — 6370000000 HC RX 637 (ALT 250 FOR IP): Performed by: INTERNAL MEDICINE

## 2018-08-26 PROCEDURE — 6370000000 HC RX 637 (ALT 250 FOR IP): Performed by: NURSE PRACTITIONER

## 2018-08-26 PROCEDURE — 80048 BASIC METABOLIC PNL TOTAL CA: CPT

## 2018-08-26 PROCEDURE — 87106 FUNGI IDENTIFICATION YEAST: CPT

## 2018-08-26 PROCEDURE — 87086 URINE CULTURE/COLONY COUNT: CPT

## 2018-08-26 PROCEDURE — 99232 SBSQ HOSP IP/OBS MODERATE 35: CPT | Performed by: INTERNAL MEDICINE

## 2018-08-26 PROCEDURE — 6360000002 HC RX W HCPCS: Performed by: INTERNAL MEDICINE

## 2018-08-26 PROCEDURE — S0028 INJECTION, FAMOTIDINE, 20 MG: HCPCS | Performed by: INTERNAL MEDICINE

## 2018-08-26 PROCEDURE — 6370000000 HC RX 637 (ALT 250 FOR IP): Performed by: FAMILY MEDICINE

## 2018-08-26 PROCEDURE — 36415 COLL VENOUS BLD VENIPUNCTURE: CPT

## 2018-08-26 PROCEDURE — 2500000003 HC RX 250 WO HCPCS: Performed by: INTERNAL MEDICINE

## 2018-08-26 PROCEDURE — 6360000002 HC RX W HCPCS: Performed by: NURSE PRACTITIONER

## 2018-08-26 PROCEDURE — 2580000003 HC RX 258: Performed by: NURSE PRACTITIONER

## 2018-08-26 PROCEDURE — 82948 REAGENT STRIP/BLOOD GLUCOSE: CPT

## 2018-08-26 PROCEDURE — 2060000000 HC ICU INTERMEDIATE R&B

## 2018-08-26 PROCEDURE — 81001 URINALYSIS AUTO W/SCOPE: CPT

## 2018-08-26 RX ORDER — LACTULOSE 10 G/15ML
20 SOLUTION ORAL 3 TIMES DAILY
Status: DISCONTINUED | OUTPATIENT
Start: 2018-08-26 | End: 2018-08-28 | Stop reason: HOSPADM

## 2018-08-26 RX ORDER — BISACODYL 10 MG
10 SUPPOSITORY, RECTAL RECTAL DAILY
Status: DISCONTINUED | OUTPATIENT
Start: 2018-08-26 | End: 2018-08-28 | Stop reason: HOSPADM

## 2018-08-26 RX ORDER — FLUCONAZOLE 100 MG/1
100 TABLET ORAL DAILY
Status: DISCONTINUED | OUTPATIENT
Start: 2018-08-27 | End: 2018-08-28 | Stop reason: HOSPADM

## 2018-08-26 RX ORDER — FLUCONAZOLE 200 MG/1
200 TABLET ORAL ONCE
Status: COMPLETED | OUTPATIENT
Start: 2018-08-26 | End: 2018-08-26

## 2018-08-26 RX ADMIN — CLONIDINE HYDROCHLORIDE 0.3 MG: 0.2 TABLET ORAL at 15:34

## 2018-08-26 RX ADMIN — Medication 10 ML: at 09:13

## 2018-08-26 RX ADMIN — LACTULOSE 20 G: 20 SOLUTION ORAL at 15:31

## 2018-08-26 RX ADMIN — BUMETANIDE 2 MG: 0.25 INJECTION INTRAMUSCULAR; INTRAVENOUS at 08:59

## 2018-08-26 RX ADMIN — HYDRALAZINE HYDROCHLORIDE 10 MG: 20 INJECTION INTRAMUSCULAR; INTRAVENOUS at 03:27

## 2018-08-26 RX ADMIN — ACETAMINOPHEN 650 MG: 325 TABLET ORAL at 03:27

## 2018-08-26 RX ADMIN — FAMOTIDINE 20 MG: 10 INJECTION, SOLUTION INTRAVENOUS at 08:58

## 2018-08-26 RX ADMIN — FLUCONAZOLE 200 MG: 200 TABLET ORAL at 13:15

## 2018-08-26 RX ADMIN — ACETAMINOPHEN 650 MG: 325 TABLET ORAL at 20:56

## 2018-08-26 RX ADMIN — ONDANSETRON 4 MG: 2 INJECTION INTRAMUSCULAR; INTRAVENOUS at 03:27

## 2018-08-26 RX ADMIN — POLYETHYLENE GLYCOL 3350 17 G: 17 POWDER, FOR SOLUTION ORAL at 08:58

## 2018-08-26 RX ADMIN — BISACODYL 10 MG: 10 SUPPOSITORY RECTAL at 10:57

## 2018-08-26 RX ADMIN — DOCUSATE SODIUM 100 MG: 100 CAPSULE, LIQUID FILLED ORAL at 20:57

## 2018-08-26 RX ADMIN — Medication 10 ML: at 15:35

## 2018-08-26 RX ADMIN — PROMETHAZINE HYDROCHLORIDE 12.5 MG: 25 INJECTION INTRAMUSCULAR; INTRAVENOUS at 22:47

## 2018-08-26 RX ADMIN — SENNOSIDES 17.2 MG: 8.6 TABLET, FILM COATED ORAL at 20:56

## 2018-08-26 RX ADMIN — FLUTICASONE PROPIONATE 1 SPRAY: 50 SPRAY, METERED NASAL at 08:59

## 2018-08-26 RX ADMIN — MINOXIDIL 5 MG: 2.5 TABLET ORAL at 20:56

## 2018-08-26 RX ADMIN — CLONIDINE HYDROCHLORIDE 0.3 MG: 0.2 TABLET ORAL at 23:23

## 2018-08-26 RX ADMIN — LACTULOSE 20 G: 20 SOLUTION ORAL at 10:57

## 2018-08-26 RX ADMIN — LIDOCAINE HYDROCHLORIDE: 20 SOLUTION ORAL; TOPICAL at 10:56

## 2018-08-26 RX ADMIN — PROMETHAZINE HYDROCHLORIDE 12.5 MG: 25 INJECTION INTRAMUSCULAR; INTRAVENOUS at 03:57

## 2018-08-26 RX ADMIN — LACTULOSE 20 G: 20 SOLUTION ORAL at 20:57

## 2018-08-26 RX ADMIN — BUMETANIDE 2 MG: 0.25 INJECTION INTRAMUSCULAR; INTRAVENOUS at 15:34

## 2018-08-26 RX ADMIN — BUMETANIDE 2 MG: 0.25 INJECTION INTRAMUSCULAR; INTRAVENOUS at 23:22

## 2018-08-26 RX ADMIN — HYDRALAZINE HYDROCHLORIDE 10 MG: 20 INJECTION INTRAMUSCULAR; INTRAVENOUS at 20:56

## 2018-08-26 RX ADMIN — Medication 10 ML: at 09:19

## 2018-08-26 RX ADMIN — SODIUM BICARBONATE 1300 MG: 650 TABLET ORAL at 20:57

## 2018-08-26 RX ADMIN — Medication 10 ML: at 20:57

## 2018-08-26 ASSESSMENT — PAIN DESCRIPTION - PAIN TYPE
TYPE: ACUTE PAIN
TYPE: ACUTE PAIN

## 2018-08-26 ASSESSMENT — PAIN DESCRIPTION - DESCRIPTORS
DESCRIPTORS: STABBING
DESCRIPTORS: STABBING

## 2018-08-26 ASSESSMENT — PAIN DESCRIPTION - LOCATION
LOCATION: ABDOMEN
LOCATION: ABDOMEN

## 2018-08-26 ASSESSMENT — PAIN DESCRIPTION - FREQUENCY: FREQUENCY: CONTINUOUS

## 2018-08-26 ASSESSMENT — PAIN SCALES - GENERAL
PAINLEVEL_OUTOF10: 6
PAINLEVEL_OUTOF10: 7
PAINLEVEL_OUTOF10: 8
PAINLEVEL_OUTOF10: 7

## 2018-08-26 ASSESSMENT — PAIN DESCRIPTION - ORIENTATION
ORIENTATION: RIGHT;LOWER
ORIENTATION: LOWER

## 2018-08-26 ASSESSMENT — PAIN DESCRIPTION - ONSET: ONSET: ON-GOING

## 2018-08-26 NOTE — PLAN OF CARE
Problem: Pain:  Goal: Pain level will decrease  Pain level will decrease   Outcome: Ongoing  Patient having a pain of 8/10. Patient declined intervention. Patient pain is stabbing pain in the stomach. Problem: Cardiovascular  Goal: No DVT, peripheral vascular complications  Outcome: Met This Shift  No complications this shift. Patient has SCDs on while in bed. Problem: Respiratory  Goal: O2 Sat > 90%  Outcome: Met This Shift  Patient oxygen saturation has been greater than 92% this shift. Problem: GI  Goal: No bowel complications  Outcome: Ongoing  Patient feeling constipated this morning. Given GI cocktail and lactulose. Patient tiny bit of BM this afternoon but still feeling constipated. Will continue to monitor. Problem: Nutrition  Goal: Optimal nutrition therapy  Outcome: Ongoing  Patient not eating much for meals. Patient has much nausea. Patient drinking sips throughout the day. Problem: Skin Integrity/Risk  Goal: No skin breakdown during hospitalization  Outcome: Met This Shift  No new skin breakdown this shift. Patient has fistula incision in the left upper arm. Incision is clean, dry, intact, Thrill and bruit present. Problem: Discharge Planning:  Goal: Discharged to appropriate level of care  Discharged to appropriate level of care   Outcome: Ongoing  Patient plans to return home with family support at discharge. Problem: Falls - Risk of:  Goal: Will remain free from falls  Will remain free from falls   Outcome: Met This Shift  No falls this shift. Telesitter for patient. Patient impulsive at times. Alarms on patient. Teaching done with patient about safety and calling for help when getting up. Problem: Nausea/Vomiting:  Goal: Absence of nausea/vomiting  Absence of nausea/vomiting   Outcome: Ongoing  Patient still having nausea. Refuses the reglan. Zofran and phenergan on board patient did not want this shift. No vomits this shift. Comments: Care plan reviewed with patient.

## 2018-08-27 LAB
ALBUMIN SERPL-MCNC: 3.8 G/DL (ref 3.5–5.1)
ALP BLD-CCNC: 62 U/L (ref 38–126)
ALT SERPL-CCNC: 6 U/L (ref 11–66)
ANION GAP SERPL CALCULATED.3IONS-SCNC: 20 MEQ/L (ref 8–16)
AST SERPL-CCNC: 34 U/L (ref 5–40)
BILIRUB SERPL-MCNC: 0.5 MG/DL (ref 0.3–1.2)
BUN BLDV-MCNC: 91 MG/DL (ref 7–22)
CALCIUM SERPL-MCNC: 9.6 MG/DL (ref 8.5–10.5)
CHLORIDE BLD-SCNC: 105 MEQ/L (ref 98–111)
CO2: 20 MEQ/L (ref 23–33)
CREAT SERPL-MCNC: 7.1 MG/DL (ref 0.4–1.2)
ERYTHROCYTE [DISTWIDTH] IN BLOOD BY AUTOMATED COUNT: 20.8 % (ref 11.5–14.5)
ERYTHROCYTE [DISTWIDTH] IN BLOOD BY AUTOMATED COUNT: 62.9 FL (ref 35–45)
GFR SERPL CREATININE-BSD FRML MDRD: 7 ML/MIN/1.73M2
GLUCOSE BLD-MCNC: 152 MG/DL (ref 70–108)
GLUCOSE BLD-MCNC: 161 MG/DL (ref 70–108)
GLUCOSE BLD-MCNC: 174 MG/DL (ref 70–108)
GLUCOSE BLD-MCNC: 175 MG/DL (ref 70–108)
GLUCOSE BLD-MCNC: 244 MG/DL (ref 70–108)
GLUCOSE BLD-MCNC: 298 MG/DL (ref 70–108)
HCT VFR BLD CALC: 32.5 % (ref 37–47)
HEMOGLOBIN: 9.9 GM/DL (ref 12–16)
MCH RBC QN AUTO: 25.4 PG (ref 26–33)
MCHC RBC AUTO-ENTMCNC: 30.5 GM/DL (ref 32.2–35.5)
MCV RBC AUTO: 83.3 FL (ref 81–99)
PLATELET # BLD: 173 THOU/MM3 (ref 130–400)
PMV BLD AUTO: 11.1 FL (ref 9.4–12.4)
POTASSIUM SERPL-SCNC: 3.9 MEQ/L (ref 3.5–5.2)
RBC # BLD: 3.9 MILL/MM3 (ref 4.2–5.4)
SODIUM BLD-SCNC: 145 MEQ/L (ref 135–145)
TOTAL PROTEIN: 6.5 G/DL (ref 6.1–8)
WBC # BLD: 11.4 THOU/MM3 (ref 4.8–10.8)

## 2018-08-27 PROCEDURE — 2500000003 HC RX 250 WO HCPCS: Performed by: INTERNAL MEDICINE

## 2018-08-27 PROCEDURE — 80053 COMPREHEN METABOLIC PANEL: CPT

## 2018-08-27 PROCEDURE — 36415 COLL VENOUS BLD VENIPUNCTURE: CPT

## 2018-08-27 PROCEDURE — 6370000000 HC RX 637 (ALT 250 FOR IP): Performed by: INTERNAL MEDICINE

## 2018-08-27 PROCEDURE — 6360000002 HC RX W HCPCS: Performed by: NURSE PRACTITIONER

## 2018-08-27 PROCEDURE — 6360000002 HC RX W HCPCS: Performed by: INTERNAL MEDICINE

## 2018-08-27 PROCEDURE — 2709999900 HC NON-CHARGEABLE SUPPLY

## 2018-08-27 PROCEDURE — 82948 REAGENT STRIP/BLOOD GLUCOSE: CPT

## 2018-08-27 PROCEDURE — S0028 INJECTION, FAMOTIDINE, 20 MG: HCPCS | Performed by: INTERNAL MEDICINE

## 2018-08-27 PROCEDURE — 99232 SBSQ HOSP IP/OBS MODERATE 35: CPT | Performed by: INTERNAL MEDICINE

## 2018-08-27 PROCEDURE — 6370000000 HC RX 637 (ALT 250 FOR IP): Performed by: NURSE PRACTITIONER

## 2018-08-27 PROCEDURE — 6370000000 HC RX 637 (ALT 250 FOR IP): Performed by: FAMILY MEDICINE

## 2018-08-27 PROCEDURE — 85027 COMPLETE CBC AUTOMATED: CPT

## 2018-08-27 PROCEDURE — 2060000000 HC ICU INTERMEDIATE R&B

## 2018-08-27 PROCEDURE — 2580000003 HC RX 258: Performed by: NURSE PRACTITIONER

## 2018-08-27 RX ADMIN — SODIUM BICARBONATE 1300 MG: 650 TABLET ORAL at 10:04

## 2018-08-27 RX ADMIN — INSULIN GLARGINE 25 UNITS: 100 INJECTION, SOLUTION SUBCUTANEOUS at 22:16

## 2018-08-27 RX ADMIN — SENNOSIDES 17.2 MG: 8.6 TABLET, FILM COATED ORAL at 22:13

## 2018-08-27 RX ADMIN — FLUCONAZOLE 100 MG: 100 TABLET ORAL at 10:06

## 2018-08-27 RX ADMIN — Medication 10 ML: at 10:15

## 2018-08-27 RX ADMIN — AMLODIPINE BESYLATE 10 MG: 10 TABLET ORAL at 10:10

## 2018-08-27 RX ADMIN — ATORVASTATIN CALCIUM 40 MG: 40 TABLET, FILM COATED ORAL at 22:19

## 2018-08-27 RX ADMIN — POLYETHYLENE GLYCOL 3350 17 G: 17 POWDER, FOR SOLUTION ORAL at 10:04

## 2018-08-27 RX ADMIN — CALCIUM POLYCARBOPHIL 625 MG: 625 TABLET, FILM COATED ORAL at 10:06

## 2018-08-27 RX ADMIN — ASPIRIN 81 MG: 81 TABLET ORAL at 10:11

## 2018-08-27 RX ADMIN — CLONIDINE HYDROCHLORIDE 0.3 MG: 0.2 TABLET ORAL at 10:05

## 2018-08-27 RX ADMIN — Medication 10 ML: at 22:21

## 2018-08-27 RX ADMIN — DOCUSATE SODIUM 100 MG: 100 CAPSULE, LIQUID FILLED ORAL at 10:04

## 2018-08-27 RX ADMIN — ACETAMINOPHEN 650 MG: 325 TABLET ORAL at 22:13

## 2018-08-27 RX ADMIN — Medication 2 UNITS: at 18:35

## 2018-08-27 RX ADMIN — MINOXIDIL 5 MG: 2.5 TABLET ORAL at 10:05

## 2018-08-27 RX ADMIN — CLONIDINE HYDROCHLORIDE 0.3 MG: 0.2 TABLET ORAL at 17:06

## 2018-08-27 RX ADMIN — HEPARIN SODIUM 5000 UNITS: 5000 INJECTION INTRAVENOUS; SUBCUTANEOUS at 22:13

## 2018-08-27 RX ADMIN — FLUTICASONE PROPIONATE 1 SPRAY: 50 SPRAY, METERED NASAL at 10:15

## 2018-08-27 RX ADMIN — LACTULOSE 20 G: 20 SOLUTION ORAL at 22:11

## 2018-08-27 RX ADMIN — HEPARIN SODIUM 5000 UNITS: 5000 INJECTION INTRAVENOUS; SUBCUTANEOUS at 10:14

## 2018-08-27 RX ADMIN — LACTULOSE 20 G: 20 SOLUTION ORAL at 15:55

## 2018-08-27 RX ADMIN — HYDRALAZINE HYDROCHLORIDE 10 MG: 20 INJECTION INTRAMUSCULAR; INTRAVENOUS at 04:25

## 2018-08-27 RX ADMIN — DOCUSATE SODIUM 100 MG: 100 CAPSULE, LIQUID FILLED ORAL at 22:12

## 2018-08-27 RX ADMIN — FAMOTIDINE 20 MG: 10 INJECTION, SOLUTION INTRAVENOUS at 10:04

## 2018-08-27 RX ADMIN — SODIUM BICARBONATE 1300 MG: 650 TABLET ORAL at 22:12

## 2018-08-27 ASSESSMENT — PAIN SCALES - GENERAL
PAINLEVEL_OUTOF10: 6
PAINLEVEL_OUTOF10: 6
PAINLEVEL_OUTOF10: 4
PAINLEVEL_OUTOF10: 0

## 2018-08-27 ASSESSMENT — PAIN DESCRIPTION - DESCRIPTORS
DESCRIPTORS: STABBING
DESCRIPTORS: ACHING

## 2018-08-27 ASSESSMENT — PAIN DESCRIPTION - PROGRESSION
CLINICAL_PROGRESSION: NOT CHANGED
CLINICAL_PROGRESSION: NOT CHANGED

## 2018-08-27 ASSESSMENT — PAIN DESCRIPTION - LOCATION
LOCATION: ABDOMEN
LOCATION: BACK

## 2018-08-27 ASSESSMENT — PAIN DESCRIPTION - ONSET
ONSET: GRADUAL
ONSET: ON-GOING

## 2018-08-27 ASSESSMENT — PAIN DESCRIPTION - FREQUENCY
FREQUENCY: INTERMITTENT
FREQUENCY: INTERMITTENT

## 2018-08-27 ASSESSMENT — PAIN DESCRIPTION - ORIENTATION
ORIENTATION: LOWER
ORIENTATION: LOWER;RIGHT;LEFT

## 2018-08-27 ASSESSMENT — PAIN DESCRIPTION - PAIN TYPE
TYPE: ACUTE PAIN
TYPE: ACUTE PAIN

## 2018-08-27 NOTE — PROGRESS NOTES
her, does not feel good to go home, still has nausea but better, no vomiting, Mild discomfort in the lower abdomen      Medications:  Reviewed    Infusion Medications    dextrose      dextrose       Scheduled Medications    cloNIDine  1 patch Transdermal Weekly    lactulose  20 g Oral TID    bisacodyl  10 mg Rectal Daily    fluconazole  100 mg Oral Daily    docusate sodium  100 mg Oral BID    senna  2 tablet Oral Nightly    metolazone  5 mg Oral Once    sodium bicarbonate  1,300 mg Oral BID    metoclopramide  5 mg Intravenous Q6H    famotidine (PEPCID) injection  20 mg Intravenous Daily    polyethylene glycol  17 g Oral Daily    polycarbophil  625 mg Oral Daily    sodium chloride flush  10 mL Intravenous 2 times per day    heparin (porcine)  5,000 Units Subcutaneous BID    amLODIPine  10 mg Oral Daily    aspirin  81 mg Oral Daily    atorvastatin  40 mg Oral Daily    carvedilol  37.5 mg Oral BID WC    doxazosin  12 mg Oral Daily    ferrous sulfate  325 mg Oral Daily with breakfast    fluticasone  1 spray Nasal Daily    vitamin D  5,000 Units Oral Daily    minoxidil  5 mg Oral BID    insulin glargine  25 Units Subcutaneous Nightly     PRN Meds: bisacodyl, promethazine, hydrALAZINE, dextrose, sodium chloride flush, acetaminophen, magnesium hydroxide, ondansetron, albuterol sulfate HFA, nitroGLYCERIN, oxyCODONE-acetaminophen, glucose, dextrose, glucagon (rDNA), dextrose      Intake/Output Summary (Last 24 hours) at 08/27/18 0718  Last data filed at 08/27/18 0403   Gross per 24 hour   Intake              570 ml   Output             1500 ml   Net             -930 ml       Diet:  DIET CARB CONTROL; Carb Control: 4 carb choices (60 gms)/meal    Exam:  BP (!) 169/77   Pulse 90   Temp 99.1 °F (37.3 °C) (Oral)   Resp 16   Ht 5' 5\" (1.651 m)   Wt 185 lb 14.4 oz (84.3 kg)   SpO2 93%   BMI 30.94 kg/m²     Physical Examination:   General appearance - alert, awake  and in No distress at rest  HEENT: Normocephalic, Atraumatic, pupils reactive, mucous membranes moist  Chest - moving equally to respiration,symmetric air entry,fine rales at base on left on auscultation  Heart - normal rate, regular rhythm, normal S1, S2, no murmurs,  Abdomen - soft, nontender, distended, no masses or organomegaly, BS+ Present  Neurological - alert, oriented, normal speech, sensations intact and able to move all extremities   Extremities - peripheral pulses normal, no pedal edema,  Capillary refill less than 3 sec  Skin - normal coloration and turgor, no rashes   left arm AV fistula done- ecchymosis+      Labs:   Recent Labs      08/27/18   0632   WBC  11.4*   HGB  9.9*   HCT  32.5*   PLT  173     Recent Labs      08/25/18   0614  08/26/18   0538   NA  145  146*   K  4.6  4.0   CL  105  106   CO2  20*  20*   BUN  84*  88*   CREATININE  6.6*  6.9*   CALCIUM  10.0  9.9     No results for input(s): AST, ALT, BILIDIR, BILITOT, ALKPHOS in the last 72 hours. No results for input(s): INR in the last 72 hours. No results for input(s): Lord Keke in the last 72 hours. Urinalysis:      Lab Results   Component Value Date    NITRU NEGATIVE 08/26/2018    WBCUA 15-25 08/26/2018    BACTERIA NONE 08/26/2018    RBCUA 3-5 08/26/2018    BLOODU MODERATE 08/26/2018    SPECGRAV 1.015 03/04/2013    GLUCOSEU NEGATIVE 08/26/2018       Radiology:  XR ABDOMEN (KUB) (SINGLE AP VIEW)   Final Result   1. There is gas and a moderate to moderately large amount of stool within the nondistended colon and gas within several nondistended loops of small bowel. The bowel gas pattern is nonobstructive. **This report has been created using voice recognition software. It may contain minor errors which are inherent in voice recognition technology. **      Final report electronically signed by Dr. Rabia Ceja on 8/24/2018 2:00 PM          Diet: DIET CARB CONTROL; Carb Control: 4 carb choices (60 gms)/meal    DVT prophylaxis: [] Lovenox

## 2018-08-28 VITALS
TEMPERATURE: 98.4 F | HEIGHT: 65 IN | SYSTOLIC BLOOD PRESSURE: 159 MMHG | OXYGEN SATURATION: 96 % | HEART RATE: 73 BPM | WEIGHT: 192 LBS | RESPIRATION RATE: 16 BRPM | BODY MASS INDEX: 31.99 KG/M2 | DIASTOLIC BLOOD PRESSURE: 77 MMHG

## 2018-08-28 LAB
ANION GAP SERPL CALCULATED.3IONS-SCNC: 18 MEQ/L (ref 8–16)
BUN BLDV-MCNC: 88 MG/DL (ref 7–22)
CALCIUM SERPL-MCNC: 8.8 MG/DL (ref 8.5–10.5)
CHLORIDE BLD-SCNC: 102 MEQ/L (ref 98–111)
CO2: 23 MEQ/L (ref 23–33)
CREAT SERPL-MCNC: 6.6 MG/DL (ref 0.4–1.2)
GFR SERPL CREATININE-BSD FRML MDRD: 8 ML/MIN/1.73M2
GLUCOSE BLD-MCNC: 135 MG/DL (ref 70–108)
GLUCOSE BLD-MCNC: 140 MG/DL (ref 70–108)
GLUCOSE BLD-MCNC: 160 MG/DL (ref 70–108)
GLUCOSE BLD-MCNC: 161 MG/DL (ref 70–108)
POTASSIUM SERPL-SCNC: 3.7 MEQ/L (ref 3.5–5.2)
SODIUM BLD-SCNC: 143 MEQ/L (ref 135–145)

## 2018-08-28 PROCEDURE — 6370000000 HC RX 637 (ALT 250 FOR IP): Performed by: NURSE PRACTITIONER

## 2018-08-28 PROCEDURE — 6370000000 HC RX 637 (ALT 250 FOR IP): Performed by: INTERNAL MEDICINE

## 2018-08-28 PROCEDURE — 80048 BASIC METABOLIC PNL TOTAL CA: CPT

## 2018-08-28 PROCEDURE — 6370000000 HC RX 637 (ALT 250 FOR IP): Performed by: FAMILY MEDICINE

## 2018-08-28 PROCEDURE — 2709999900 HC NON-CHARGEABLE SUPPLY

## 2018-08-28 PROCEDURE — S0028 INJECTION, FAMOTIDINE, 20 MG: HCPCS | Performed by: INTERNAL MEDICINE

## 2018-08-28 PROCEDURE — 99232 SBSQ HOSP IP/OBS MODERATE 35: CPT | Performed by: INTERNAL MEDICINE

## 2018-08-28 PROCEDURE — 82948 REAGENT STRIP/BLOOD GLUCOSE: CPT

## 2018-08-28 PROCEDURE — 99239 HOSP IP/OBS DSCHRG MGMT >30: CPT | Performed by: INTERNAL MEDICINE

## 2018-08-28 PROCEDURE — 2500000003 HC RX 250 WO HCPCS: Performed by: INTERNAL MEDICINE

## 2018-08-28 PROCEDURE — 36415 COLL VENOUS BLD VENIPUNCTURE: CPT

## 2018-08-28 PROCEDURE — 2580000003 HC RX 258: Performed by: NURSE PRACTITIONER

## 2018-08-28 RX ORDER — LACTULOSE 10 G/15ML
20 SOLUTION ORAL 2 TIMES DAILY PRN
Qty: 473 ML | Refills: 1 | Status: SHIPPED | OUTPATIENT
Start: 2018-08-28 | End: 2019-01-08 | Stop reason: ALTCHOICE

## 2018-08-28 RX ORDER — PSEUDOEPHEDRINE HCL 30 MG
100 TABLET ORAL 2 TIMES DAILY
Qty: 30 CAPSULE | Refills: 0 | Status: SHIPPED | OUTPATIENT
Start: 2018-08-28 | End: 2019-01-08 | Stop reason: ALTCHOICE

## 2018-08-28 RX ORDER — SENNA PLUS 8.6 MG/1
2 TABLET ORAL NIGHTLY
Qty: 60 TABLET | Refills: 0 | Status: SHIPPED | OUTPATIENT
Start: 2018-08-28 | End: 2018-09-27

## 2018-08-28 RX ORDER — FLUCONAZOLE 100 MG/1
100 TABLET ORAL DAILY
Qty: 5 TABLET | Refills: 0 | Status: SHIPPED | OUTPATIENT
Start: 2018-08-29 | End: 2018-09-03

## 2018-08-28 RX ORDER — CALCIUM POLYCARBOPHIL 625 MG 625 MG/1
625 TABLET ORAL DAILY
Qty: 30 TABLET | Refills: 0 | Status: SHIPPED | OUTPATIENT
Start: 2018-08-29 | End: 2018-09-28

## 2018-08-28 RX ORDER — POTASSIUM CHLORIDE 20 MEQ/1
20 TABLET, EXTENDED RELEASE ORAL DAILY
Qty: 60 TABLET | Refills: 3 | Status: ON HOLD
Start: 2018-08-28 | End: 2018-11-20 | Stop reason: HOSPADM

## 2018-08-28 RX ADMIN — Medication 10 ML: at 09:49

## 2018-08-28 RX ADMIN — SODIUM BICARBONATE 1300 MG: 650 TABLET ORAL at 09:50

## 2018-08-28 RX ADMIN — MINOXIDIL 5 MG: 2.5 TABLET ORAL at 09:49

## 2018-08-28 RX ADMIN — CLONIDINE HYDROCHLORIDE 0.3 MG: 0.2 TABLET ORAL at 09:49

## 2018-08-28 RX ADMIN — FLUTICASONE PROPIONATE 1 SPRAY: 50 SPRAY, METERED NASAL at 09:51

## 2018-08-28 RX ADMIN — Medication 1 UNITS: at 09:57

## 2018-08-28 RX ADMIN — FAMOTIDINE 20 MG: 10 INJECTION, SOLUTION INTRAVENOUS at 09:49

## 2018-08-28 RX ADMIN — ASPIRIN 81 MG: 81 TABLET ORAL at 09:54

## 2018-08-28 RX ADMIN — FLUCONAZOLE 100 MG: 100 TABLET ORAL at 09:48

## 2018-08-28 RX ADMIN — CALCIUM POLYCARBOPHIL 625 MG: 625 TABLET, FILM COATED ORAL at 09:52

## 2018-08-28 RX ADMIN — AMLODIPINE BESYLATE 10 MG: 10 TABLET ORAL at 14:19

## 2018-08-28 RX ADMIN — DOCUSATE SODIUM 100 MG: 100 CAPSULE, LIQUID FILLED ORAL at 09:48

## 2018-08-28 ASSESSMENT — PAIN SCALES - GENERAL
PAINLEVEL_OUTOF10: 0
PAINLEVEL_OUTOF10: 7
PAINLEVEL_OUTOF10: 4
PAINLEVEL_OUTOF10: 0
PAINLEVEL_OUTOF10: 0

## 2018-08-28 ASSESSMENT — PAIN DESCRIPTION - ORIENTATION
ORIENTATION: RIGHT;LEFT;LOWER
ORIENTATION: LEFT;RIGHT;LOWER

## 2018-08-28 ASSESSMENT — PAIN DESCRIPTION - ONSET
ONSET: ON-GOING
ONSET: ON-GOING

## 2018-08-28 ASSESSMENT — PAIN DESCRIPTION - LOCATION
LOCATION: BACK
LOCATION: BACK

## 2018-08-28 ASSESSMENT — PAIN DESCRIPTION - FREQUENCY
FREQUENCY: CONTINUOUS
FREQUENCY: CONTINUOUS

## 2018-08-28 ASSESSMENT — PAIN DESCRIPTION - PAIN TYPE
TYPE: CHRONIC PAIN
TYPE: CHRONIC PAIN

## 2018-08-28 ASSESSMENT — PAIN DESCRIPTION - DESCRIPTORS
DESCRIPTORS: ACHING
DESCRIPTORS: ACHING

## 2018-08-28 ASSESSMENT — PAIN DESCRIPTION - PROGRESSION
CLINICAL_PROGRESSION: NOT CHANGED
CLINICAL_PROGRESSION: NOT CHANGED

## 2018-08-28 NOTE — PROGRESS NOTES
minoxidil  5 mg Oral BID    insulin glargine  25 Units Subcutaneous Nightly     Continuous Infusions:   dextrose      dextrose         CBC:   Recent Labs      08/27/18   0632   WBC  11.4*   HGB  9.9*   PLT  173     CMP:  Recent Labs      08/26/18   0538  08/27/18   0632  08/28/18   0509   NA  146*  145  143   K  4.0  3.9  3.7   CL  106  105  102   CO2  20*  20*  23   BUN  88*  91*  88*   CREATININE  6.9*  7.1*  6.6*   GLUCOSE  120*  152*  160*   CALCIUM  9.9  9.6  8.8   LABGLOM  7*  7*  8*     Troponin: No results for input(s): TROPONINI in the last 72 hours. BNP: No results for input(s): BNP in the last 72 hours. INR: No results for input(s): INR in the last 72 hours. Lipids: No results for input(s): CHOL, LDLDIRECT, TRIG, HDL, AMYLASE, LIPASE in the last 72 hours. Liver: Recent Labs      08/27/18   0632   AST  34   ALT  6*   ALKPHOS  62   PROT  6.5   LABALBU  3.8   BILITOT  0.5     Iron:  No results for input(s): IRONS, FERRITIN in the last 72 hours. Invalid input(s): LABIRONS    Objective:   Vitals: BP (!) 163/72   Pulse 72   Temp 98.1 °F (36.7 °C) (Oral)   Resp 18   Ht 5' 5\" (1.651 m)   Wt 185 lb 14.4 oz (84.3 kg)   SpO2 92%   BMI 30.94 kg/m²    Wt Readings from Last 3 Encounters:   08/27/18 185 lb 14.4 oz (84.3 kg)   08/21/18 188 lb (85.3 kg)   08/13/18 188 lb (85.3 kg)      24HR INTAKE/OUTPUT:    Intake/Output Summary (Last 24 hours) at 08/28/18 1001  Last data filed at 08/28/18 0231   Gross per 24 hour   Intake             1470 ml   Output              600 ml   Net              870 ml       Constitutional:  Alert, awake, no apparent distress   Skin:normal   HEENT:Pupils are reactive . Throat is clear   Neck:supple with no thyromegaly  Cardiovascular:  S1, S2 without murmur  Respiratory:  Clear to auscultation. Abdomen: +bs, soft, nontender.   Ext: No LE edema  Musculoskeletal:Intact  Neuro:Alert and oriented with no deficit      Electronically signed by Serenity Mcconnell MD on 8/28/2018 at 10:01 AM

## 2018-08-28 NOTE — PROGRESS NOTES
CLINICAL PHARMACY: DISCHARGE MED RECONCILIATION/REVIEW    [address] [place] Patient?: Yes  Total # of Interventions Recommended: 0   - Updated Order #: 2  Total # Interventions Accepted: 1  Intervention Severity:   - Level 1 Intervention Present?: No   - Level 2 #: 0   - Level 3 #: 0   Time Spent (min): 45    Additional Documentation:  Patient to be discharged to home today. Explained that she is to decrease her potassium to daily and continue the Bumex BID. Patient has follow-up and home health that keep track of her labs. Discussed her new orders for laxative and told her to hold them if diarrhea occurs. She also is to take fluconazole x 5 days only and sodium bicarb - 2 BID (script sent to [facility]). Gave her scripts for other new meds. Gave the patient the medication pages of the AVS with next doses highlighted and information sheets on each new medication. Placed the above AVS pages on the chart.     Harriet Mcdowell [facility], BCPS, BCGP  8/28/2018     4:53 PM

## 2018-08-28 NOTE — DISCHARGE SUMMARY
Hospital Medicine Discharge Summary      Patient Identification:   Nena Sommers   : 1955  MRN: [id number]   Account: [de-identified]      Patient's PCP: Karin Bhakta MD    Admit Date: 2018     Discharge Date:   2018     Admitting Physician: Fred Pereira DO     Discharge Physician: Oswaldo Carey MD     Discharge Diagnoses:  Postoperative nausea and vomiting Status post AV fistula  Hypervolemia from renal failure  Progressive chronic kidney disease stage V not on dialysis  Constipation  Essential hypertension uncontrolled  Diabetes mellitus type 2, insulin-dependent with chronic kidney disease stage V, diabetic neuropathy  Secondary hyperparathyroidism of renal origin  COPD without exacerbation  History of gout  Depression   Obesity Body mass index is 31.95 kg/m². History of renal stones  History of smoking      The patient was seen and examined on day of discharge and this discharge summary is in conjunction with any daily progress note from day of discharge. Hospital Course:   Nena Sommers is a 57 y.o. female admitted to [address] [place] [address] on 2018 for Came in for Elective AV fistula and post surgery had nausea and vomiting persistent and bleeding from AV fistula site. Patient had AV fistula done on the left cephalic vein to brachial artery on  And subsequently had nausea and vomiting  And There is some bleeding at the fistula site. Patient was initially being planned for discharge but her nausea and vomiting would not get better and was admitted. Patient was also having mild shortness of breath and nephrology was consulted. GI cocktail did not help. Patient was noticed to be mildly volume overloaded and had crackles in the lungs along with edema and was started on Bumex 2 mg IV every 8 hourly and diuresed and the shortness of breath improved. Patient's nausea and vomiting persisted for a good 2 days in spite of being given Phenergan.   It could be from the anesthesia itself but she is also had constipation with a lot of stool on the abdominal x-ray and I'm not sure if there is some contribution from that. She was started on aggressive stool softeners and bowel regimen with MiraLAX and still did not have a bowel movement and subsequently a dose of mag citrate was given and there was no bowel movement yet. Lactulose was started and she had a lot of soft bowel movements and had stool all over. She is currently being discharged home and apparently ran out of some medications and were filled at the bedside. She was complaining of mild discomfort on micturition and urine analysis suggested Candida in the urine and was empirically started on fluconazole. Symptoms improved. A nice thrill is present and recommended her to exercise and she needs to be reminded about exercise for the development of the AV fistula.         Exam:     Vitals:  Vitals:    08/28/18 0231 08/28/18 0810 08/28/18 0942 08/28/18 1030   BP: (!) 113/59 (!) 154/71 (!) 163/72    Pulse: 70 70 72    Resp: 16 18     Temp: 98.1 °F (36.7 °C) 98.1 °F (36.7 °C)     TempSrc: Oral Oral     SpO2: 96% 92%     Weight:    192 lb (87.1 kg)   Height:         Weight: Weight: 192 lb (87.1 kg)     24 hour intake/output:    Intake/Output Summary (Last 24 hours) at 08/28/18 1054  Last data filed at 08/28/18 1019   Gross per 24 hour   Intake             1830 ml   Output              600 ml   Net             1230 ml       Physical exam: please see progress notes      Labs:  For convenience and continuity at follow-up the following most recent labs are provided:      CBC:    Lab Results   Component Value Date    WBC 11.4 08/27/2018    HGB 9.9 08/27/2018    HCT 32.5 08/27/2018     08/27/2018       Renal:    Lab Results   Component Value Date     08/28/2018    K 3.7 08/28/2018    K 5.4 08/24/2018     08/28/2018    CO2 23 08/28/2018    BUN 88 08/28/2018    CREATININE 6.6 08/28/2018    CALCIUM 8.8 08/28/2018    PHOS 4.3 07/26/2018         Significant Diagnostic Studies    Radiology:   XR ABDOMEN (KUB) (SINGLE AP VIEW)   Final Result   1. There is gas and a moderate to moderately large amount of stool within the nondistended colon and gas within several nondistended loops of small bowel. The bowel gas pattern is nonobstructive. **This report has been created using voice recognition software. It may contain minor errors which are inherent in voice recognition technology. **      Final report electronically signed by Dr. Tena Venegas on 8/24/2018 2:00 PM             Consults:     Hollie Asencio 761 TO HOSPITALIST  IP CONSULT TO SOCIAL WORK    Disposition:    [] Home       [] TCU       [] Rehab       [] Psych       [] SNF       [] [place]       [] Other-    Condition at Discharge: Stable    Code Status:  Full Code     Patient Instructions:    Discharge lab work:    Activity: activity as tolerated  Diet: DIET CARB CONTROL; Carb Control: 4 carb choices (60 gms)/meal      Follow-up visits:   Lalo Benitez MD  [address]  [phone]    On 9/13/2018  at 10:00 am with Julia Holbrook MD  [place] [address]    On 8/29/2018  at 2:15 pm     Alton Allen MD  [facility], [address] [facility]  [facility]  [phone]               Discharge Medications:      Alla Cui   Home Medication Instructions [id number]    Printed on:08/28/18 1054   Medication Information                      acetaminophen (TYLENOL) 325 MG tablet  Take 2 tablets by mouth every 6 hours as needed for Pain             albuterol sulfate HFA (PROAIR HFA) 108 (90 Base) MCG/ACT inhaler  Inhale 2 puffs into the lungs every 6 hours as needed for Wheezing             ALPRAZolam (XANAX) 1 MG tablet  TAKE 1/2 TABLET BY MOUTH EVERY 8 HOURS AS NEEDED.             amLODIPine (NORVASC) 10 MG tablet  Take 1 tablet by mouth daily             aspirin 81 MG EC tablet  Take 81 mg by mouth daily. atorvastatin (LIPITOR) 40 MG tablet  Take 1 tablet by mouth daily             bumetanide (BUMEX) 1 MG tablet  Take 1 tablet by mouth 2 times daily             calcitRIOL (ROCALTROL) 0.25 MCG capsule  Take 1 capsule by mouth three times a week             carvedilol (COREG) 25 MG tablet  Take 1.5 tablets by mouth 2 times daily . hold if SBP less than 100 mm hg or HR less than 60             cloNIDine (CATAPRES) 0.3 MG tablet  Take 1 tablet by mouth every 8 hours One tablet three times per day             darbepoetin quintin-polysorbate (ARANESP) 150 MCG/0.3ML SOSY injection  Inject 150 mcg as directed once             docusate sodium (COLACE, DULCOLAX) 100 MG CAPS  Take 100 mg by mouth 2 times daily             doxazosin (CARDURA) 4 MG tablet  Take 3 tablets by mouth daily . hold if SBP less than 100 mm hg             ferrous sulfate (FE TABS) 325 (65 Fe) MG EC tablet  Take 1 tablet by mouth daily (with breakfast)             fluconazole (DIFLUCAN) 100 MG tablet  Take 1 tablet by mouth daily for 5 days             fluticasone (FLONASE) 50 MCG/ACT nasal spray  1 spray by Nasal route daily             FOLBEE 2.5-25-1 MG TABS tablet  TAKE 1 TABLET BY MOUTH DAILY             HYDROcodone-acetaminophen (NORCO) 5-325 MG per tablet  Take 1 tablet by mouth every 8 hours as needed for Pain for up to 30 days. Fill on/after 9/20/2018. HYDROcodone-acetaminophen (NORCO) 5-325 MG per tablet  Take 1 tablet by mouth every 8 hours as needed for Pain for up to 30 days. Fill on/after 10/20/2018. insulin aspart (NOVOLOG FLEXPEN) 100 UNIT/ML injection pen  . Sliding scale insulin coverage  Glucose:Dose: If Less [id number] =No Insulin/ 140-199= 2 Units/ 200-249=4 Units/ 250-299= 6 Units/  300-349=8 Units/  350-400=10 Units/ Above 400 = 12 Units insulin glargine (LANTUS SOLOSTAR) 100 UNIT/ML injection pen  Inject 25 Units into the skin nightly             Insulin Pen Needle (B-D ULTRAFINE III SHORT PEN) 31G X 8 MM MISC  Use three times daily Diagnosis Code E11.9             Insulin Pen Needle 31G X 8 MM MISC  1 each by Does not apply route daily. lactulose (CHRONULAC) 10 GM/15ML solution  Take 30 mLs by mouth 2 times daily as needed (constipation, aif not having BM with other stool softners)             minoxidil (LONITEN) 2.5 MG tablet  Take 2 tablets by mouth 2 times daily . hold if SBP less than 100 mm hg             nitroGLYCERIN (NITROSTAT) 0.4 MG SL tablet  Place 1 tablet under the tongue every 5 minutes as needed for Chest pain             polycarbophil (FIBERCON) 625 MG tablet  Take 1 tablet by mouth daily             Polyethylene Glycol 3350 (MIRALAX PO)  Take  by mouth as needed. potassium chloride (KLOR-CON M) 20 MEQ extended release tablet  Take 1 tablet by mouth daily             senna (SENOKOT) 8.6 MG tablet  Take 2 tablets by mouth nightly             sodium bicarbonate 650 MG tablet  Take 2 tablets by mouth 2 times daily             vitamin D (CHOLECALCIFEROL) 5000 UNITS CAPS capsule  Take 5,000 Units by mouth daily noon                 Time Spent on discharge is more than 35 min in the examination, evaluation, counseling and review of medications and discharge plan. Signed: Thank you Sharita Swanson MD for the opportunity to be involved in this patient's care.     Electronically signed by Serafin Gaspar MD on 8/28/2018 at 10:54 AM

## 2018-08-28 NOTE — PROGRESS NOTES
Hospitalist Progress Note    Patient:  Paz Mantilla      Unit/Bed:4B-10/010-A    YOB: 1955    MRN: [id number]       Acct: [de-identified]     PCP: Joie Brittle, MD    Date of Admission: 8/23/2018    Chief Complaint:  Came in for Elective AV fistula and post surgery had nausea and vomiting persitent    Hospital Course:   Patient had AV fistula done on the left cephalic vein to brachial artery on 8/24 And subsequently had nausea and vomiting  And There is some bleeding at the fistula site. Patient was also having mild shortness of breath and nephrology was consulted. Patient was started on Bumex 2 g IV every 8 hourly And is diuresing well and shortness of breath resolved. has negative balance of 2.3 L. Symptomatically being managed for nausea and vomiting, was not improving with Zofran and Phenergan was added. Did not have bowel movement since 4 days and abdominal x-ray showed constipation.   Started on stool softeners    Blood pressure little high, has bruising at the AV fistula site, hold heparin    8/26 - still nauseous and throwing up, does not fee like taking pills, afraid of throwing up, Discussed with nephrology- Dr. David Olvera, if persistent nausea and vomiting could be from uremia, and he felt that her BUN has always been in the 80s and doubt if it is and recommended to be aggressive with management of constipation and see how she does,, she did not have a bowel movement still, start lactulose 20 g 3 times daily, Dulcolax rectal suppository, continue Colace and senna, blood pressure still high but in distress, monitor, will give her trial of GI cocktail, Sodium little high, continue diuretics and monitor    8/27 - candida in urine, treat for fungal UTI, nausea better, no vomiting, tolerating food, had 2 small bowel movements, continue lactulose, Most likely discharge tomorrow, Diuretics have been held  Creatinine trended up to 7.1, BUN 91, Sodium stable 145    8/28- Had 2-3 bowel

## 2018-08-29 ENCOUNTER — CARE COORDINATION (OUTPATIENT)
Dept: OTHER | Facility: CLINIC | Age: 63
End: 2018-08-29

## 2018-08-29 DIAGNOSIS — N18.6 END STAGE RENAL DISEASE (HCC): Primary | ICD-10-CM

## 2018-08-29 PROCEDURE — 1111F DSCHRG MED/CURRENT MED MERGE: CPT | Performed by: FAMILY MEDICINE

## 2018-08-29 NOTE — CARE COORDINATION
[facility] Transitions Initial Follow Up Call    Call within 2 business days of discharge: Yes    Patient: Yu Memory Patient : 1955   MRN: [id number]  Reason for Admission: There are no discharge diagnoses documented for the most recent discharge. Discharge Date: 18 RARS: Readmission Risk Score: 27 CMRS: 8.0     Spoke with: Emeka Peraza (patient)    Facility: [facility]    Non-face-to-face services provided:  Scheduled appointment with PCP-CTC confirmed with patient she cancelled her [place] visit with PCP that was scheduled for today as she verbalizes \" I was just there and I don;t want to go out today\". CTC emphasized the importacne to f/u with PCP within 7 days after hosp discharge whcih she acknowledges. Declines CTC assistance in rescheduling at this time. Scheduled appointment with Specialist-CTC confirmed with patient she is scheduled to follow up with Dr. Franklin Cali (nephrology) on 18 and with Harpreet MAN with cardiothoracic and vascular surgery on 18. Obtained and reviewed discharge summary and/or continuity of care documents  Assessment and support for treatment adherence and medication management-CTC confirmed with patient she is resuming servcies with [facility] whom she was active with PTA. Establishment or re-establishment of referrals-CTC emphasized to take Lantus at night as directed instead of in morning which she admits she was doing. Explained that she is on Novolog insulin three times daily for meal coverage and taking Lantus in am in addition to Novolog can lead to low blood sugar which she verbalizes understanding.      Care Transitions 24 Hour Call    Schedule Follow Up Appointment with PCP:  Declined  Do you have any ongoing symptoms?:  Yes  Patient-reported symptoms:  Weakness, Nausea, Other, Abdominal Pain (Comment: poor appetite)  Do you have all of your prescriptions and are they filled?:  No  Have you been contacted by a [facility] Pharmacist?:  No  Have you scheduled your follow up appointment?:  Yes  How are you going to get to your appointment?:  Car - drive self  Were you discharged with any Home Care or Post Acute Services:  Yes  Post Acute Services:  Home Health (Comment: resuming services with [facility])  Do you feel like you have everything you need to keep you well at home?:  No  Care Transitions Interventions       Spoke with patient today 8/29/18 for initial TCM/hospital discharge for s/p elective AV fistula and post op persistent nausea/vomtiing and bleeding from AV fistula site. Confirmed with patient she had AV fistula to left arm on 8/24/18. States having residual \"soreness\" to left arm and admits having clear dressing covering fistula site that is dry. Denies any more nausea or vomiting but admits to having poor appetite. States she ate some salad last night which she seemed to tolerate. States having pain to \"lower half\" of abdomen that comes and goes but denies any at this time. Describes abdominal pain as \"stabbing\" and \"burning\" in nature. States she is passing gas and moving bowels. States having 2-3 bowel movements thus far today. Denies any shortness of breath, chest pain, nausea, vomiting, chills or fever. Reviewed COPD/HF zones and knowing when to seek medical attention. States she didn't check weight as she is just getting up. Denies any LE swelling and admits to being compliant with taking Bumex. Advised to monitor for 3 lb/day or 5 lb/week and call doctor if she notices rapid weight gain. States having all over weakness but verbalizes she is walking independently. States she has both a cane and walker to use if needed. States she is monitoring BS three times daily and took BS while this CTC is on phone. States FBS today is 159. As discussed above, CTC emphasized to take Lantus at night as she was taking it in am. CTC explained potential risk for low blood sugra if she takes Lantus with Novolog in am which she verbalizes understanding. Confirmed with patient she lives alone but has assistance from family including grandson. States she independent in driving and denies any transportation issues. Confirmed follow up appointments with nephrology and vascualr surgery as noted above. Patient cancelled OV with PCP scheduled for today as noted and declines CTC assistance in rescheduling despite being advised to follow up with PCP within 7 days after hosp discharge. Denies any other complaints or concerns at this time. Patient is receptive for CTC to follow for Care Transition.      Follow Up  Future Appointments  Date Time Provider Franklin Chin   9/6/2018 3:00 PM Brenda Lucero MD [facility]   9/13/2018 10:00 AM DONOVAN Castro - CNP SRPX CT/CV [facility]   10/4/2018 2:45 PM OSCAR Martinez   10/10/2018 2:30 PM STR PULMONARY FUNCTION ROOM 1 STRZ PFT None   10/16/2018 11:40 AM Chau Anna MD Pulm Med P - [address]   11/27/2018 11:00 AM DONOVAN Iverson - CNP SRPX Pain P - DONOVAN Sanchez

## 2018-08-30 LAB
ORGANISM: ABNORMAL
URINE CULTURE REFLEX: ABNORMAL

## 2018-08-31 ENCOUNTER — CARE COORDINATION (OUTPATIENT)
Dept: CASE MANAGEMENT | Age: 63
End: 2018-08-31

## 2018-08-31 NOTE — CARE COORDINATION
[facility] Transitions Follow Up Call    2018    Patient: Barrie Sheets  Patient : 1955   MRN: [id number]  Reason for Admission: There are no discharge diagnoses documented for the most recent discharge. Discharge Date: 18 RARS: Readmission Risk Score: 32       Spoke with: [facility] Transitions Subsequent and Final Call    Subsequent and Final Calls  Do you have any ongoing symptoms?:  Yes  Onset of Patient-reported symptoms:  Other  Patient-reported symptoms:  Other  Have your medications changed?:  No  Do you have any questions related to your medications?:  No  Do you currently have any active services?:  Yes  Are you currently active with any services?:  Home Health  Do you have any needs or concerns that I can assist you with?:  No  Identified Barriers:  None  Care Transitions Interventions  No Identified Needs  Other Interventions:        Called pt for the sub transition call. Pt stated she still has the dressing on the fistula, pt stated she thinks she can feel the \"vibration\"  Pt stated the leg swelling is about the same, \"not much better\"  Pt is not C/O SOB. Pt stated she has appt with Dr Rosette Hugo on  & would like the [facility] nurse to draw the blood a few days before appt    Pt denied she has an order for all the blood work, [facility] called Dr Rosette Hugo office to verify he wants all the lab drawn as the pt recently had the Vit D, BMP, H&H. Spoke with Hui at the office, she verified the pt is to have the lab work that was ordered on 18.   [facility] called [place] to verify they will draw the lab on Mon    Follow Up  Future Appointments  Date Time Provider Franklin Chin   2018 3:00 PM MD HERBIE Pizarro KIDNEY P - [address]   2018 10:00 AM DONOVAN Castro - CNP SRPX CT/CV [facility]   10/4/2018 2:45 PM Ghanshyam Carey PA-C Pulm Med [address]   10/10/2018 2:30 PM STR PULMONARY FUNCTION ROOM 1 STRZ PFT None   10/16/2018 11:40 AM Chau Anna MD Pulm Med [facility]

## 2018-08-31 NOTE — CARE COORDINATION
Spoke with Jay Soares @ [facility], they will draw the lab on 9/4/18. Called pt back & informed HH will see her & draw her blood work on [employer].

## 2018-09-04 ENCOUNTER — HOSPITAL ENCOUNTER (OUTPATIENT)
Age: 63
Setting detail: SPECIMEN
Discharge: HOME | End: 2018-09-04
Payer: MEDICARE

## 2018-09-04 LAB
ANION GAP SERPL CALCULATED.3IONS-SCNC: 14 MEQ/L (ref 8–16)
BUN BLDV-MCNC: 81 MG/DL (ref 7–22)
CALCIUM SERPL-MCNC: 9.4 MG/DL (ref 8.5–10.5)
CHLORIDE BLD-SCNC: 99 MEQ/L (ref 98–111)
CO2: 27 MEQ/L (ref 23–33)
CREAT SERPL-MCNC: 5.9 MG/DL (ref 0.4–1.2)
FERRITIN: 1673 NG/ML (ref 10–291)
GLUCOSE BLD-MCNC: 146 MG/DL (ref 70–108)
HCT VFR BLD CALC: 26.9 % (ref 37–47)
HEMOGLOBIN: 8.2 GM/DL (ref 12–16)
IRON SATURATION: 42 % (ref 20–50)
IRON: 63 UG/DL (ref 50–170)
PHOSPHORUS: 4.4 MG/DL (ref 2.4–4.7)
POTASSIUM SERPL-SCNC: 4.7 MEQ/L (ref 3.5–5.2)
PTH INTACT: 351.2 PG/ML (ref 15–65)
SODIUM BLD-SCNC: 140 MEQ/L (ref 135–145)
TOTAL IRON BINDING CAPACITY: 151 UG/DL (ref 171–450)

## 2018-09-04 PROCEDURE — 85014 HEMATOCRIT: CPT

## 2018-09-04 PROCEDURE — 82652 VIT D 1 25-DIHYDROXY: CPT

## 2018-09-04 PROCEDURE — 83540 ASSAY OF IRON: CPT

## 2018-09-04 PROCEDURE — 83970 ASSAY OF PARATHORMONE: CPT

## 2018-09-04 PROCEDURE — 84100 ASSAY OF PHOSPHORUS: CPT

## 2018-09-04 PROCEDURE — 80048 BASIC METABOLIC PNL TOTAL CA: CPT

## 2018-09-04 PROCEDURE — 85018 HEMOGLOBIN: CPT

## 2018-09-04 PROCEDURE — 82728 ASSAY OF FERRITIN: CPT

## 2018-09-04 PROCEDURE — 83550 IRON BINDING TEST: CPT

## 2018-09-05 ENCOUNTER — CARE COORDINATION (OUTPATIENT)
Dept: CASE MANAGEMENT | Age: 63
End: 2018-09-05

## 2018-09-06 ENCOUNTER — OFFICE VISIT (OUTPATIENT)
Dept: NEPHROLOGY | Age: 63
End: 2018-09-06
Payer: MEDICARE

## 2018-09-06 VITALS
SYSTOLIC BLOOD PRESSURE: 162 MMHG | HEART RATE: 68 BPM | BODY MASS INDEX: 33.08 KG/M2 | WEIGHT: 198.8 LBS | RESPIRATION RATE: 18 BRPM | DIASTOLIC BLOOD PRESSURE: 62 MMHG

## 2018-09-06 DIAGNOSIS — N25.81 SECONDARY HYPERPARATHYROIDISM OF RENAL ORIGIN (HCC): ICD-10-CM

## 2018-09-06 DIAGNOSIS — E11.29 PERSISTENT PROTEINURIA ASSOCIATED WITH TYPE 2 DIABETES MELLITUS (HCC): ICD-10-CM

## 2018-09-06 DIAGNOSIS — R80.9 PERSISTENT PROTEINURIA ASSOCIATED WITH TYPE 2 DIABETES MELLITUS (HCC): ICD-10-CM

## 2018-09-06 DIAGNOSIS — N18.5 TYPE 2 DIABETES MELLITUS WITH STAGE 5 CHRONIC KIDNEY DISEASE NOT ON CHRONIC DIALYSIS, UNSPECIFIED WHETHER LONG TERM INSULIN USE (HCC): ICD-10-CM

## 2018-09-06 DIAGNOSIS — I10 BENIGN ESSENTIAL HTN: ICD-10-CM

## 2018-09-06 DIAGNOSIS — D63.8 ANEMIA OF CHRONIC DISEASE: ICD-10-CM

## 2018-09-06 DIAGNOSIS — E11.22 TYPE 2 DIABETES MELLITUS WITH STAGE 5 CHRONIC KIDNEY DISEASE NOT ON CHRONIC DIALYSIS, UNSPECIFIED WHETHER LONG TERM INSULIN USE (HCC): ICD-10-CM

## 2018-09-06 DIAGNOSIS — N18.5 STAGE 5 CHRONIC KIDNEY DISEASE NOT ON CHRONIC DIALYSIS (HCC): Primary | ICD-10-CM

## 2018-09-06 LAB
GFR SERPL CREATININE-BSD FRML MDRD: 7 ML/MIN/1.73M2
VITAMIN D 1,25-DIHYDROXY: 15.2 PG/ML (ref 19.9–79.3)

## 2018-09-06 PROCEDURE — 3044F HG A1C LEVEL LT 7.0%: CPT | Performed by: INTERNAL MEDICINE

## 2018-09-06 PROCEDURE — 1111F DSCHRG MED/CURRENT MED MERGE: CPT | Performed by: INTERNAL MEDICINE

## 2018-09-06 PROCEDURE — 3017F COLORECTAL CA SCREEN DOC REV: CPT | Performed by: INTERNAL MEDICINE

## 2018-09-06 PROCEDURE — 99214 OFFICE O/P EST MOD 30 MIN: CPT | Performed by: INTERNAL MEDICINE

## 2018-09-06 PROCEDURE — 2022F DILAT RTA XM EVC RTNOPTHY: CPT | Performed by: INTERNAL MEDICINE

## 2018-09-06 PROCEDURE — G8598 ASA/ANTIPLAT THER USED: HCPCS | Performed by: INTERNAL MEDICINE

## 2018-09-06 PROCEDURE — 1036F TOBACCO NON-USER: CPT | Performed by: INTERNAL MEDICINE

## 2018-09-06 PROCEDURE — G8417 CALC BMI ABV UP PARAM F/U: HCPCS | Performed by: INTERNAL MEDICINE

## 2018-09-06 PROCEDURE — G8427 DOCREV CUR MEDS BY ELIG CLIN: HCPCS | Performed by: INTERNAL MEDICINE

## 2018-09-06 NOTE — PROGRESS NOTES
Renal Progress Note    Assessment and Plan:      Diagnosis Orders   1. Stage 5 chronic kidney disease not on chronic dialysis ([facility]     2. Type 2 diabetes mellitus with stage 5 chronic kidney disease not on chronic dialysis, unspecified whether long term insulin use ([facility]     3. Anemia of chronic disease     4. Secondary hyperparathyroidism of renal origin ([facility] [address] [facility]     5. Benign essential HTN     6. Persistent proteinuria associated with type 2 diabetes mellitus (HCC)       PLAN:  Reviewed labs With the patient and she understood. Serum creatinine is decreased to 5.9 mg/dL from a peak of 7.1 mg per hospital.  Medications reviewed. Increase bumetanide from 2 mg twice a day to 3 times a day for 3 days only. Thereafter she will go back to twice a day. Printed instruction given to the patient. We'll refer her to [facility] for transplant evaluation   She spoke to [place] in the past but now wants [place]   Return visit in 4 weeks with labs      Patient Active Problem List   Diagnosis    Diabetes mellitus, type 2 ([facility] [facility]    Uncontrolled hypertension    Chronic anemia    Coronary disease    Hyperlipemia    Arthritis    Neuropathy, diabetic ([facility]    CKD (chronic kidney disease), stage III    Leg pain    Obesity (BMI 30-39. 9)    Low HDL (under 40)    Need for prophylactic vaccination against diphtheria-tetanus-pertussis (DTP)    Well adult exam    Screening for breast cancer    History of tobacco use    Allergic rhinitis    COPD, mild (HCC)    Lung mass    Anemia, chronic disease    Gout    Urolithiasis    Ankle fracture, right    Secondary hyperparathyroidism of renal origin ([facility]    Diabetes mellitus type 2, uncomplicated ([facility]    Obstructive sleep apnea on CPAP    Vitamin D deficiency    CKD (chronic kidney disease) stage 3, GFR 30-59 ml/min    Type 2 diabetes mellitus with stage 3 chronic kidney disease (HCC)    Hypertensive nephropathy    Benign essential HTN    Type 2 diabetes mellitus ([facility]    Hypertensive nephropathy    Persistent proteinuria associated with type 2 diabetes mellitus (HCC)    Cellulitis in diabetic foot (HCC)    CKD (chronic kidney disease), stage IV (HCC)    VIDA (acute kidney injury) ([address]    Persistent proteinuria    Acquired hypothyroidism    CKD (chronic kidney disease), stage IV (HCC)    Nephrotic syndrome    Type 2 diabetes mellitus (HCC)    Iron deficiency anemia due to dietary causes    Anemia of chronic disease    Stage 5 chronic kidney disease not on chronic dialysis (HCC)    Type 2 diabetes mellitus (HCC)    ESRD (end stage renal disease) ([facility] [facility]    Hypervolemia associated with renal insufficiency    Pulmonary edema, noncardiac    CKD (chronic kidney disease), stage V (HCC)    Chronic progressive renal failure, stage 5 ([address]    Hypertensive emergency, no CHF    Diabetic peripheral neuropathy associated with type 2 diabetes mellitus (HCC)    Pericardial effusion    Hypokalemia    Type II diabetes mellitus with stage 5 chronic kidney disease (HCC)    Chronic diastolic (congestive) heart failure (HCC)    End stage renal disease due to benign hypertension (HCC)    End stage renal disease (HCC)    Post-operative nausea and vomiting    Constipation    Candida UTI       Subjective:   Chief complaint:  Chief Complaint   Patient presents with    1 Month Follow-Up    Chronic Kidney Disease     Stage 3      HPI:This is a follow up visit for Mrs Bran Trimble who is here today for follow-up appointment. I see her for chronic kidney disease. She was last seen in June 2018. Serum creatinine was 4.7 mg/dL. She had a fistula placement done about 2 weeks ago. She had a postoperative bleed with hospitalization at the [address] [place] [address]. She also had volume overload, given intravenous diuretics. Serum creatinine peaked at 7.1 mg per deciliter. Her postoperative course was also complicated by nausea and vomiting. She is doing well now with no complaint. ROS:Constitutional: negative  Eyes: negative  Ears, nose, mouth, throat, and face: negative  Respiratory: negative  Cardiovascular: negative  Gastrointestinal: negative  Genitourinary:negative  Integument/breast: negative  Hematologic/lymphatic: negative  Musculoskeletal:negative  Neurological: negative  Behavioral/Psych: negative  Endocrine: negative  Allergic/Immunologic: negative  Medications:     Current Outpatient Prescriptions   Medication Sig Dispense Refill    lactulose (CHRONULAC) 10 GM/15ML solution Take 30 mLs by mouth 2 times daily as needed (constipation, aif not having BM with other stool softners) 473 mL 1    polycarbophil (FIBERCON) 625 MG tablet Take 1 tablet by mouth daily 30 tablet 0    senna (SENOKOT) 8.6 MG tablet Take 2 tablets by mouth nightly 60 tablet 0    docusate sodium (COLACE, DULCOLAX) 100 MG CAPS Take 100 mg by mouth 2 times daily 30 capsule 0    potassium chloride (KLOR-CON M) 20 MEQ extended release tablet Take 1 tablet by mouth daily 60 tablet 3    acetaminophen (TYLENOL) 325 MG tablet Take 2 tablets by mouth every 6 hours as needed for Pain 120 tablet 3    sodium bicarbonate 650 MG tablet Take 2 tablets by mouth 2 times daily 120 tablet 3    HYDROcodone-acetaminophen (NORCO) 5-325 MG per tablet Take 1 tablet by mouth every 8 hours as needed for Pain for up to 30 days. Fill on/after 9/20/2018. 90 tablet 0    HYDROcodone-acetaminophen (NORCO) 5-325 MG per tablet Take 1 tablet by mouth every 8 hours as needed for Pain for up to 30 days. Fill on/after 10/20/2018. 90 tablet 0    minoxidil (LONITEN) 2.5 MG tablet Take 2 tablets by mouth 2 times daily . hold if SBP less than 100 mm hg 120 tablet 3    calcitRIOL (ROCALTROL) 0.25 MCG capsule Take 1 capsule by mouth three times a week 90 capsule 3    ALPRAZolam (XANAX) 1 MG tablet TAKE 1/2 TABLET BY MOUTH EVERY 8 HOURS AS NEEDED.  45 tablet 0    cloNIDine (CATAPRES) 0.3 MG tablet Take 1 tablet by

## 2018-09-11 ENCOUNTER — CARE COORDINATION (OUTPATIENT)
Dept: CASE MANAGEMENT | Age: 63
End: 2018-09-11

## 2018-09-17 ENCOUNTER — TELEPHONE (OUTPATIENT)
Dept: NEPHROLOGY | Age: 63
End: 2018-09-17
Payer: MEDICARE

## 2018-09-17 DIAGNOSIS — D63.8 ANEMIA, CHRONIC DISEASE: Primary | ICD-10-CM

## 2018-09-17 DIAGNOSIS — N18.5 CKD (CHRONIC KIDNEY DISEASE) STAGE 5, GFR LESS THAN 15 ML/MIN (HCC): ICD-10-CM

## 2018-09-17 PROCEDURE — 99490 CHRNC CARE MGMT STAFF 1ST 20: CPT | Performed by: NURSE PRACTITIONER

## 2018-09-17 NOTE — TELEPHONE ENCOUNTER
5  MINUTES    Patient has been informed and understands  Injections scheduled for 9.19.2018, 9.26.2018, 10.3.2018 & 10.10.2018 @ 1300

## 2018-09-17 NOTE — TELEPHONE ENCOUNTER
Sixto DahlGuero  Chronic Kidney Disease Chronic Care Management: Anemia    Lab Results   Component Value Date    CREATININE 5.9 09/04/2018    LABGLOM 7 09/04/2018   . Hgb and Hct trending reviewed    Lab Results   Component Value Date    HGB 8.2 (L) 09/04/2018    HCT 26.9 (L) 09/04/2018     Lab Results   Component Value Date    FERRITIN 1,673 09/04/2018    IRON 63 09/04/2018    LABIRON 42 09/04/2018    [id number] > 2000 04/26/2017    FOLATE > 20.0 04/26/2017        10/2/17 11/2/17  12/2/17 1/4/18 2/8/18 3/3/18 4/7/18 5/24/18   Hgb 8.8 9.3  9.4 9.9 8.4 8.7 8.6 9.0   Aranesp 150 mcg 10/19 150 mcg 11/2 150 mcg 11/16  150 mcg 1/9/18 150 mcg 2/13/18 150 mcg 3/19 and 4/2/18 150 mcg weekly. 4/11,4/18, 4/25, 5/2 150 mcg weekly x 4               Retic Count 2.5    1.3       T sat 25    19    27   Iron 45    31    43   Ferritan 911    861    754   Venofer - -          PO Iron - -   325 mg daily. Pt intolerant  Due to constipation, Pt does not want to take PO iron. Will do IV iron in future                            6/20/18 8/7/18 8/27/18 9/4/18        Hgb 8.9 10.9 9.9 8.2        Aranesp                        Retic Count            T sat    42        Iron    63        Ferritan    1673        Venofer            PO Iron                                 1. Anemia, chronic disease  Resume Aranesp 150 mcg weekly x 4 doses. Repeat Hgb in one month   2. Anemia, iron deficiency S/P Injectafer           3.  CKD (chronic kidney disease), stage V        Goal Hgb 10 KDOQI guidelines  WIll adjust per pt response    Time spent- Non face to face: 49 Jose Guadalupe Arroyo, APRN - CNP APRN

## 2018-09-18 ENCOUNTER — TELEPHONE (OUTPATIENT)
Dept: NEPHROLOGY | Age: 63
End: 2018-09-18

## 2018-09-19 ENCOUNTER — HOSPITAL ENCOUNTER (OUTPATIENT)
Dept: NURSING | Age: 63
Discharge: HOME | End: 2018-09-19
Payer: MEDICARE

## 2018-09-19 VITALS
DIASTOLIC BLOOD PRESSURE: 68 MMHG | TEMPERATURE: 97.5 F | SYSTOLIC BLOOD PRESSURE: 170 MMHG | RESPIRATION RATE: 16 BRPM | HEART RATE: 73 BPM

## 2018-09-19 DIAGNOSIS — N18.4 CKD (CHRONIC KIDNEY DISEASE), STAGE IV (HCC): ICD-10-CM

## 2018-09-19 DIAGNOSIS — D63.8 ANEMIA, CHRONIC DISEASE: ICD-10-CM

## 2018-09-19 DIAGNOSIS — D63.8 ANEMIA OF CHRONIC DISEASE: ICD-10-CM

## 2018-09-19 PROCEDURE — 6360000002 HC RX W HCPCS: Performed by: NURSE PRACTITIONER

## 2018-09-19 PROCEDURE — 96372 THER/PROPH/DIAG INJ SC/IM: CPT

## 2018-09-19 RX ADMIN — DARBEPOETIN ALFA 150 MCG: 150 INJECTION, SOLUTION INTRAVENOUS; SUBCUTANEOUS at 13:32

## 2018-09-19 NOTE — PROGRESS NOTES
1300 Patient arrived to OPN for aranesp injection.   PT RIGHTS AND RESPONSIBILITIES OFFERED TO PT.  1331 Discharge instructions reviewed with patient verbalized understanding  [phone] Patient discharged to home in stable condition    ___m_ Safety:       (Environmental)  [place] to environment   Ensure ID band is correct and in place/ allergy band as needed   Assess for fall risk   Initiate fall precautions as applicable (fall band, side rails, etc.)   Call light within reach   Bed in low position/ wheels locked    _m___ Pain:        Assess pain level and characteristics   Administer analgesics as ordered   Assess effectiveness of pain management and report to MD as needed    _m__ Knowledge Deficit:   Assess baseline knowledge   Provide teaching at level of understanding   Provide teaching via preferred learning method   Evaluate teaching effectiveness    _m___ Hemodynamic/Respiratory Status:       (Pre and Post Procedure Monitoring)   Assess/Monitor vital signs and LOC   Assess Baseline SpO2 prior to any sedation   Obtain weight/height   Assess vital signs/ LOC until patient meets discharge criteria   Monitor procedure site and notify MD of any issues

## 2018-09-20 ENCOUNTER — OFFICE VISIT (OUTPATIENT)
Dept: CARDIOTHORACIC SURGERY | Age: 63
End: 2018-09-20
Payer: MEDICARE

## 2018-09-20 VITALS
DIASTOLIC BLOOD PRESSURE: 71 MMHG | HEIGHT: 65 IN | HEART RATE: 65 BPM | SYSTOLIC BLOOD PRESSURE: 154 MMHG | WEIGHT: 199.8 LBS | BODY MASS INDEX: 33.29 KG/M2

## 2018-09-20 DIAGNOSIS — N18.5 STAGE 5 CHRONIC KIDNEY DISEASE NOT ON CHRONIC DIALYSIS (HCC): Primary | ICD-10-CM

## 2018-09-20 PROCEDURE — G8417 CALC BMI ABV UP PARAM F/U: HCPCS | Performed by: NURSE PRACTITIONER

## 2018-09-20 PROCEDURE — 99212 OFFICE O/P EST SF 10 MIN: CPT | Performed by: NURSE PRACTITIONER

## 2018-09-20 PROCEDURE — 1036F TOBACCO NON-USER: CPT | Performed by: NURSE PRACTITIONER

## 2018-09-20 PROCEDURE — 1111F DSCHRG MED/CURRENT MED MERGE: CPT | Performed by: NURSE PRACTITIONER

## 2018-09-20 PROCEDURE — G8427 DOCREV CUR MEDS BY ELIG CLIN: HCPCS | Performed by: NURSE PRACTITIONER

## 2018-09-20 PROCEDURE — G8598 ASA/ANTIPLAT THER USED: HCPCS | Performed by: NURSE PRACTITIONER

## 2018-09-20 PROCEDURE — 3017F COLORECTAL CA SCREEN DOC REV: CPT | Performed by: NURSE PRACTITIONER

## 2018-09-20 NOTE — PROGRESS NOTES
(ROCALTROL) 0.25 MCG capsule, Take 1 capsule by mouth three times a week, Disp: 90 capsule, Rfl: 3    cloNIDine (CATAPRES) 0.3 MG tablet, Take 1 tablet by mouth every 8 hours One tablet three times per day, Disp: 270 tablet, Rfl: 4    carvedilol (COREG) 25 MG tablet, Take 1.5 tablets by mouth 2 times daily . hold if SBP less than 100 mm hg or HR less than 60, Disp: 270 tablet, Rfl: 3    insulin glargine (LANTUS SOLOSTAR) 100 UNIT/ML injection pen, Inject 25 Units into the skin nightly, Disp: 10 pen, Rfl: 3    insulin aspart (NOVOLOG FLEXPEN) 100 UNIT/ML injection pen, . Sliding scale insulin coverage Glucose:Dose: If Less [id number] =No Insulin/ 140-199= 2 Units/ 200-249=4 Units/ 250-299= 6 Units/  300-349=8 Units/  350-400=10 Units/ Above 400 = 12 Units, Disp: 20 pen, Rfl: 3    doxazosin (CARDURA) 4 MG tablet, Take 3 tablets by mouth daily . hold if SBP less than 100 mm hg, Disp: 90 tablet, Rfl: 3    amLODIPine (NORVASC) 10 MG tablet, Take 1 tablet by mouth daily, Disp: 30 tablet, Rfl: 3    atorvastatin (LIPITOR) 40 MG tablet, Take 1 tablet by mouth daily, Disp: 30 tablet, Rfl: 3    bumetanide (BUMEX) 1 MG tablet, Take 1 tablet by mouth 2 times daily, Disp: 60 tablet, Rfl: 3    FOLBEE 2.5-25-1 MG TABS tablet, TAKE 1 TABLET BY MOUTH DAILY, Disp: 30 tablet, Rfl: 5    fluticasone (FLONASE) 50 MCG/ACT nasal spray, 1 spray by Nasal route daily, Disp: , Rfl:     Insulin Pen Needle (B-D ULTRAFINE III SHORT PEN) 31G X 8 MM MISC, Use three times daily Diagnosis Code E11.9, Disp: 200 each, Rfl: 3    nitroGLYCERIN (NITROSTAT) 0.4 MG SL tablet, Place 1 tablet under the tongue every 5 minutes as needed for Chest pain, Disp: 25 tablet, Rfl: 5    darbepoetin quintin-polysorbate (ARANESP) 150 MCG/0.3ML SOSY injection, Inject 150 mcg as directed once, Disp: , Rfl:     ferrous sulfate (FE TABS) 325 (65 Fe) MG EC tablet, Take 1 tablet by mouth daily (with breakfast), Disp: 90 tablet, Rfl: 3    albuterol sulfate HFA (PROAIR HFA) 108 (90 Base) MCG/ACT inhaler, Inhale 2 puffs into the lungs every 6 hours as needed for Wheezing, Disp: 3 Inhaler, Rfl: 3    vitamin D (CHOLECALCIFEROL) 5000 UNITS CAPS capsule, Take 5,000 Units by mouth daily noon, Disp: , Rfl:     Insulin Pen Needle 31G X 8 MM MISC, 1 each by Does not apply route daily. , Disp: , Rfl:     Polyethylene Glycol 3350 (MIRALAX PO), Take  by mouth as needed. , Disp: , Rfl:     aspirin 81 MG EC tablet, Take 81 mg by mouth daily. , Disp: , Rfl:     Family History: This patient's family history includes Alcohol Abuse in her father; Arthritis in her mother; Cancer in her sister; Diabetes in her brother, brother, father, sister, sister, sister, sister, and sister; Early Death in her sister; Heart Disease in her father, mother, sister, and sister; High Blood Pressure in her mother; Kidney Disease in her mother; Mental Illness in her mother; Obesity in her father; Stroke in her mother. Social History:  Patricia  reports that she quit smoking about 9 years ago. Her smoking use included Cigarettes. She started smoking about 36 years ago. She quit after 30.00 years of use. She has never used smokeless tobacco. She reports that she does not drink alcohol or use drugs.     ROS:   ROS    Physical Exam:  BP (!) 154/71 (Site: Right Upper Arm, Position: Sitting, Cuff Size: Medium Adult)   Pulse 65   Ht 5' 5\" (1.651 m)   Wt 199 lb 12.8 oz (90.6 kg)   BMI 33.25 kg/m²     Labs:      CBC:   Lab Results   Component Value Date    WBC 11.4 08/27/2018    HGB 8.2 09/04/2018    HCT 26.9 09/04/2018    MCV 83.3 08/27/2018     08/27/2018     BMP:   Lab Results   Component Value Date     09/04/2018    K 4.7 09/04/2018    K 5.4 08/24/2018    CL 99 09/04/2018    CO2 27 09/04/2018    PHOS 4.4 09/04/2018    BUN 81 09/04/2018    CREATININE 5.9 09/04/2018    MG 2.1 08/25/2018     PT/INR:   Lab Results   Component Value Date    INR 1.13 08/23/2018       Physical Exam        Plan 9/20/18:  Her

## 2018-09-24 NOTE — TELEPHONE ENCOUNTER
Pt called in w/BP readings:  153/73  119/60  162/79  141/67  170/67  132/66  164/64  154/70  160/84  153/73  133/67  160/73    What do you want to do?

## 2018-09-26 ENCOUNTER — OFFICE VISIT (OUTPATIENT)
Dept: INTERNAL MEDICINE CLINIC | Age: 63
End: 2018-09-26
Payer: MEDICARE

## 2018-09-26 ENCOUNTER — HOSPITAL ENCOUNTER (OUTPATIENT)
Dept: GENERAL RADIOLOGY | Age: 63
Discharge: HOME | End: 2018-09-26
Payer: MEDICARE

## 2018-09-26 ENCOUNTER — HOSPITAL ENCOUNTER (OUTPATIENT)
Dept: NURSING | Age: 63
Discharge: HOME | End: 2018-09-26
Payer: MEDICARE

## 2018-09-26 ENCOUNTER — HOSPITAL ENCOUNTER (OUTPATIENT)
Age: 63
Discharge: HOME | End: 2018-09-26
Payer: MEDICARE

## 2018-09-26 ENCOUNTER — TELEPHONE (OUTPATIENT)
Dept: NEPHROLOGY | Age: 63
End: 2018-09-26

## 2018-09-26 VITALS
RESPIRATION RATE: 18 BRPM | BODY MASS INDEX: 33.2 KG/M2 | HEART RATE: 69 BPM | OXYGEN SATURATION: 93 % | SYSTOLIC BLOOD PRESSURE: 134 MMHG | WEIGHT: 199.5 LBS | DIASTOLIC BLOOD PRESSURE: 63 MMHG | TEMPERATURE: 98.5 F

## 2018-09-26 VITALS — DIASTOLIC BLOOD PRESSURE: 77 MMHG | RESPIRATION RATE: 18 BRPM | HEART RATE: 75 BPM | SYSTOLIC BLOOD PRESSURE: 165 MMHG

## 2018-09-26 DIAGNOSIS — R06.09 DOE (DYSPNEA ON EXERTION): Primary | ICD-10-CM

## 2018-09-26 DIAGNOSIS — I50.32 CHRONIC DIASTOLIC (CONGESTIVE) HEART FAILURE (HCC): ICD-10-CM

## 2018-09-26 DIAGNOSIS — M79.89 LEG SWELLING: ICD-10-CM

## 2018-09-26 DIAGNOSIS — R06.09 DOE (DYSPNEA ON EXERTION): ICD-10-CM

## 2018-09-26 DIAGNOSIS — R53.83 FATIGUE, UNSPECIFIED TYPE: ICD-10-CM

## 2018-09-26 DIAGNOSIS — D63.8 ANEMIA OF CHRONIC DISEASE: ICD-10-CM

## 2018-09-26 DIAGNOSIS — N18.4 CKD (CHRONIC KIDNEY DISEASE), STAGE IV (HCC): ICD-10-CM

## 2018-09-26 DIAGNOSIS — D63.8 ANEMIA, CHRONIC DISEASE: ICD-10-CM

## 2018-09-26 DIAGNOSIS — N18.5 CKD (CHRONIC KIDNEY DISEASE), STAGE V (HCC): ICD-10-CM

## 2018-09-26 DIAGNOSIS — J44.9 COPD, MILD (HCC): ICD-10-CM

## 2018-09-26 LAB
ANION GAP SERPL CALCULATED.3IONS-SCNC: 14 MEQ/L (ref 8–16)
BASOPHILS # BLD: 0.2 %
BASOPHILS ABSOLUTE: 0 THOU/MM3 (ref 0–0.1)
BUN BLDV-MCNC: 65 MG/DL (ref 7–22)
CALCIUM SERPL-MCNC: 9.7 MG/DL (ref 8.5–10.5)
CHLORIDE BLD-SCNC: 101 MEQ/L (ref 98–111)
CO2: 31 MEQ/L (ref 23–33)
CREAT SERPL-MCNC: 6.1 MG/DL (ref 0.4–1.2)
EOSINOPHIL # BLD: 1.2 %
EOSINOPHILS ABSOLUTE: 0.1 THOU/MM3 (ref 0–0.4)
ERYTHROCYTE [DISTWIDTH] IN BLOOD BY AUTOMATED COUNT: 19.9 % (ref 11.5–14.5)
ERYTHROCYTE [DISTWIDTH] IN BLOOD BY AUTOMATED COUNT: 61.7 FL (ref 35–45)
GFR SERPL CREATININE-BSD FRML MDRD: 8 ML/MIN/1.73M2
GLUCOSE BLD-MCNC: 139 MG/DL (ref 70–108)
HCT VFR BLD CALC: 25.7 % (ref 37–47)
HEMOGLOBIN: 7.9 GM/DL (ref 12–16)
IMMATURE GRANS (ABS): 0.03 THOU/MM3 (ref 0–0.07)
IMMATURE GRANULOCYTES: 0.5 %
LYMPHOCYTES # BLD: 13.4 %
LYMPHOCYTES ABSOLUTE: 0.9 THOU/MM3 (ref 1–4.8)
MCH RBC QN AUTO: 26 PG (ref 26–33)
MCHC RBC AUTO-ENTMCNC: 30.7 GM/DL (ref 32.2–35.5)
MCV RBC AUTO: 84.5 FL (ref 81–99)
MONOCYTES # BLD: 6 %
MONOCYTES ABSOLUTE: 0.4 THOU/MM3 (ref 0.4–1.3)
NUCLEATED RED BLOOD CELLS: 0 /100 WBC
PLATELET # BLD: 201 THOU/MM3 (ref 130–400)
PMV BLD AUTO: 10.6 FL (ref 9.4–12.4)
POTASSIUM SERPL-SCNC: 3.8 MEQ/L (ref 3.5–5.2)
PRO-BNP: 5913 PG/ML (ref 0–900)
RBC # BLD: 3.04 MILL/MM3 (ref 4.2–5.4)
SEG NEUTROPHILS: 78.7 %
SEGMENTED NEUTROPHILS ABSOLUTE COUNT: 5.1 THOU/MM3 (ref 1.8–7.7)
SODIUM BLD-SCNC: 146 MEQ/L (ref 135–145)
TSH SERPL DL<=0.05 MIU/L-ACNC: 2.64 UIU/ML (ref 0.4–4.2)
WBC # BLD: 6.5 THOU/MM3 (ref 4.8–10.8)

## 2018-09-26 PROCEDURE — 36415 COLL VENOUS BLD VENIPUNCTURE: CPT

## 2018-09-26 PROCEDURE — 83880 ASSAY OF NATRIURETIC PEPTIDE: CPT

## 2018-09-26 PROCEDURE — 99214 OFFICE O/P EST MOD 30 MIN: CPT | Performed by: NURSE PRACTITIONER

## 2018-09-26 PROCEDURE — 80048 BASIC METABOLIC PNL TOTAL CA: CPT

## 2018-09-26 PROCEDURE — 1036F TOBACCO NON-USER: CPT | Performed by: NURSE PRACTITIONER

## 2018-09-26 PROCEDURE — 3017F COLORECTAL CA SCREEN DOC REV: CPT | Performed by: NURSE PRACTITIONER

## 2018-09-26 PROCEDURE — 84443 ASSAY THYROID STIM HORMONE: CPT

## 2018-09-26 PROCEDURE — 96372 THER/PROPH/DIAG INJ SC/IM: CPT

## 2018-09-26 PROCEDURE — 85025 COMPLETE CBC W/AUTO DIFF WBC: CPT

## 2018-09-26 PROCEDURE — G8427 DOCREV CUR MEDS BY ELIG CLIN: HCPCS | Performed by: NURSE PRACTITIONER

## 2018-09-26 PROCEDURE — 6360000002 HC RX W HCPCS: Performed by: NURSE PRACTITIONER

## 2018-09-26 PROCEDURE — G8417 CALC BMI ABV UP PARAM F/U: HCPCS | Performed by: NURSE PRACTITIONER

## 2018-09-26 PROCEDURE — 71046 X-RAY EXAM CHEST 2 VIEWS: CPT

## 2018-09-26 PROCEDURE — 1111F DSCHRG MED/CURRENT MED MERGE: CPT | Performed by: NURSE PRACTITIONER

## 2018-09-26 PROCEDURE — G8598 ASA/ANTIPLAT THER USED: HCPCS | Performed by: NURSE PRACTITIONER

## 2018-09-26 RX ADMIN — DARBEPOETIN ALFA 150 MCG: 150 INJECTION, SOLUTION INTRAVENOUS; SUBCUTANEOUS at 13:22

## 2018-09-26 ASSESSMENT — ENCOUNTER SYMPTOMS
SORE THROAT: 0
CONSTIPATION: 0
EYE PAIN: 0
COLOR CHANGE: 0
ABDOMINAL PAIN: 0
NAUSEA: 0
ABDOMINAL DISTENTION: 0
SHORTNESS OF BREATH: 1
DIARRHEA: 0
COUGH: 0
BACK PAIN: 0
PHOTOPHOBIA: 0
SINUS PRESSURE: 0
CHEST TIGHTNESS: 0
BLOOD IN STOOL: 0
VOMITING: 0

## 2018-09-26 NOTE — PROGRESS NOTES
1305 pt arrives ambulatory for aranesp injection. Injection explained and questions answered. PT RIGHTS AND RESPONSIBILITIES OFFERED TO PT.  1322 injection given. Pt tolerated it well with no complaints. 1325 pt discharged ambulatory with instructions with no complaints.                    _m___ Safety:       (Environmental)   [place] to environment   Ensure ID band is correct and in place/ allergy band as needed   Assess for fall risk   Initiate fall precautions as applicable (fall band, side rails, etc.)   Call light within reach   Bed in low position/ wheels locked    _m___ Pain:        Assess pain level and characteristics   Administer analgesics as ordered   Assess effectiveness of pain management and report to MD as needed    _m___ Knowledge Deficit:   Assess baseline knowledge   Provide teaching at level of understanding   Provide teaching via preferred learning method   Evaluate teaching effectiveness    _m___ Hemodynamic/Respiratory Status:       (Pre and Post Procedure Monitoring)   Assess/Monitor vital signs and LOC   Assess Baseline SpO2 prior to any sedation   Obtain weight/height   Assess vital signs/ LOC until patient meets discharge criteria   Monitor procedure site and notify MD of any issues

## 2018-09-26 NOTE — PROGRESS NOTES
[address] [facility] INTERNAL MEDICINE  [address] [place] [address]  [address]  [address]  Dept: [phone]  Dept Fax: [phone] : [phone]    Visit Date: 9/26/2018    Ifeoma Gupta is a 57 y.o. female who presents today for:  Chief Complaint   Patient presents with   [facility] nurse called requesting appt for possible pneumonia    Other     c/o SOB       Assessment/Plan:   1. BANUELOS (dyspnea on exertion)    Symptoms likely due to progressive CKD V.  Cr 6.1, BUN 65  Hgb 7.9, Hct 25.7 - she received Aranesp today. proBNP elevated 5913. WBC and TSH WNL. CXR showing marked cardiomegaly, mildly prominent interstitial markings. Small right pleural effusion and minimal haziness within the lung bases suggesting atelectasis, edema or pleural fluid. Dr. Abhishek Hernández contacted, and he recommended patient f/u next week with him as scheduled. I do anticipate she'll need to start dialysis in near future. Patient was advised to go to ED if she develops new or worsening symptoms. 2. Leg swelling    Wear compression stockings during daytime. Elevate feet. Low salt diet. Continue daily weights. fluid restriction of 2 Liters per day, limit sodium in diet to less than 2 grams per day. 3. Fatigue, unspecified type    4. CKD (chronic kidney disease), stage V, not on dialysis [facility])    Patient has appt with Dr. Abhishek Hernández in 8 days. 5. Chronic diastolic (congestive) heart failure ([facility]    Recommend annual Influenza vaccine. Patient prefers to go to pharmacy for this.     Future Appointments  Date Time Provider Franklin Chin   10/3/2018 1:00 PM STR EXAM ROOM 3 [facility] OP NURS None   10/4/2018 10:00 AM DONOVAN Blanco - CNP SRPX FM RES [facility] - [place] [address]   10/4/2018 3:00 PM MD HERBIE Mars KIDNEY [facility] - [place] [address]   10/10/2018 1:00 PM STR EXAM ROOM 1 [facility] OP NURS None   10/10/2018 2:30 PM STR PULMONARY FUNCTION ROOM 1 [facility] PFT None   10/16/2018 11:40 AM Neo Lee, labs:  Component      Latest Ref Rng & Units 9/4/2018           3:37 PM   Sodium      135 - 145 meq/L 140   Potassium      3.5 - 5.2 meq/L 4.7   Chloride      98 - 111 meq/L 99   CO2      23 - 33 meq/L 27   Glucose      70 - 108 mg/dL 146 (H)   BUN      7 - 22 mg/dL 81 (H)   Creatinine      0.4 - 1.2 mg/dL 5.9 (HH)   Calcium      8.5 - 10.5 mg/dL 9.4   eGFR 7    Component      Latest Ref Rng & Units 9/4/2018           3:37 PM   Hemoglobin Quant      12.0 - 16.0 gm/dl 8.2 (L)   Hematocrit      37.0 - 47.0 % 26.9 (L)       Component      Latest Ref Rng & Units 8/27/2018           6:32 AM   WBC      4.8 - 10.8 thou/mm3 11.4 (H)   RBC      4.20 - 5.40 mill/mm3 3.90 (L)   Hemoglobin Quant      12.0 - 16.0 gm/dl 9.9 (L)   Hematocrit      37.0 - 47.0 % 32.5 (L)   MCV      81.0 - 99.0 fL 83.3   MCH      26.0 - 33.0 pg 25.4 (L)   MCHC      32.2 - 35.5 gm/dl 30.5 (L)   RDW-CV      11.5 - 14.5 % 20.8 (H)   RDW-SD      35.0 - 45.0 fL 62.9 (H)   Platelet Count      057 - 400 thou/mm3 173   MPV      9.4 - 12.4 fL 11.1     7/24/18 proBNP 5571    Last ECHO 7/25/18  Summary   Ejection fraction is visually estimated at 50%.   There was mild global hypokinesis of the left ventricle.   Mildly dilated left atrium.   Aortic valve appears tricuspid.   Aortic valve leaflets are somewhat thickened.   Aortic valve leaflets are Mildly calcified.   Moderate circumferential pericardial effusion with no tamponade   physiology.      Signature      ----------------------------------------------------------------   Electronically signed by Domitila Johnson MD (Interpreting   physician) on 07/25/2018 at 05:30 PM    Past Medical History:   Diagnosis Date    Anemia associated with chronic renal failure     Aranesp 150 micrograms every other week [facility] at [facility]. [facility]    Anxiety     Arthritis     Backache     Blood circulation, collateral     Blood transfusion     CAD (coronary artery disease)     Cellulitis in diabetic foot ([facility] [address]  aspirin 81 MG EC tablet Take 81 mg by mouth daily. No current facility-administered medications for this visit. Allergies   Allergen Reactions    Actos [Pioglitazone Hydrochloride] Swelling    Cymbalta [Duloxetine Hcl] Other (See Comments)     Anxiety and lethargic    Gabapentin Anxiety       Subjective:      Review of Systems   Constitutional: Positive for fatigue (with activity). Negative for activity change, appetite change, chills, fever and unexpected weight change. HENT: Negative for ear pain, sinus pressure and sore throat. Eyes: Negative for photophobia, pain and visual disturbance. Respiratory: Positive for shortness of breath (BANUELOS). Negative for cough and chest tightness. Cardiovascular: Positive for palpitations (chronic, unchanged) and leg swelling. Negative for chest pain. Gastrointestinal: Negative for abdominal distention, abdominal pain, blood in stool, constipation, diarrhea, nausea and vomiting. Abdominal bloating   Endocrine: Positive for cold intolerance (\"for months\"). Negative for heat intolerance, polydipsia, polyphagia and polyuria. Genitourinary: Negative for dysuria and hematuria. Musculoskeletal: Negative for arthralgias, back pain, joint swelling and myalgias. Skin: Negative for color change and rash. Neurological: Positive for dizziness (intermittent, on rare occasions, not currently). Negative for weakness and headaches. Hematological: Negative for adenopathy. Psychiatric/Behavioral: Negative for agitation and confusion. The patient is not nervous/anxious. Objective:     Vitals:    09/26/18 1059 09/26/18 1130   BP: 134/63    Pulse: 69    Resp: 18    Temp: 98.5 °F (36.9 °C)    SpO2: (!) 89% 93%   Weight: 199 lb 8 oz (90.5 kg)      Body mass index is 33.2 kg/m².   Wt Readings from Last 3 Encounters:   09/26/18 199 lb 8 oz (90.5 kg)   09/20/18 199 lb 12.8 oz (90.6 kg)   09/13/18 199 lb 12.8 oz (90.6 kg)     Physical Exam

## 2018-09-28 NOTE — TELEPHONE ENCOUNTER
Pt called w/BP readings:  151/72  143/70  131/64  133/63  179/80  152/76  134/63  163/72  145/72  146/68  140/67  131/64  139/68  128/56    I told pt to keep track of her readings. She has appt next week to see you. She will call if her BP is continuously above 140.

## 2018-10-02 ENCOUNTER — TELEPHONE (OUTPATIENT)
Dept: NEPHROLOGY | Age: 63
End: 2018-10-02
Payer: MEDICARE

## 2018-10-02 ENCOUNTER — CARE COORDINATION (OUTPATIENT)
Dept: CARE COORDINATION | Age: 63
End: 2018-10-02

## 2018-10-02 DIAGNOSIS — D50.8 IRON DEFICIENCY ANEMIA DUE TO DIETARY CAUSES: ICD-10-CM

## 2018-10-02 DIAGNOSIS — D63.8 ANEMIA, CHRONIC DISEASE: Primary | ICD-10-CM

## 2018-10-02 DIAGNOSIS — N18.5 CKD (CHRONIC KIDNEY DISEASE) STAGE 5, GFR LESS THAN 15 ML/MIN (HCC): ICD-10-CM

## 2018-10-02 PROCEDURE — 99490 CHRNC CARE MGMT STAFF 1ST 20: CPT | Performed by: NURSE PRACTITIONER

## 2018-10-03 ENCOUNTER — HOSPITAL ENCOUNTER (OUTPATIENT)
Dept: NURSING | Age: 63
Discharge: HOME | End: 2018-10-03
Payer: MEDICARE

## 2018-10-03 VITALS
RESPIRATION RATE: 16 BRPM | SYSTOLIC BLOOD PRESSURE: 167 MMHG | HEART RATE: 88 BPM | DIASTOLIC BLOOD PRESSURE: 77 MMHG | TEMPERATURE: 98.6 F | OXYGEN SATURATION: 97 %

## 2018-10-03 DIAGNOSIS — D63.8 ANEMIA, CHRONIC DISEASE: ICD-10-CM

## 2018-10-03 DIAGNOSIS — D50.8 IRON DEFICIENCY ANEMIA DUE TO DIETARY CAUSES: ICD-10-CM

## 2018-10-03 DIAGNOSIS — N18.4 CKD (CHRONIC KIDNEY DISEASE), STAGE IV (HCC): ICD-10-CM

## 2018-10-03 PROCEDURE — 6360000002 HC RX W HCPCS: Performed by: NURSE PRACTITIONER

## 2018-10-03 PROCEDURE — 96372 THER/PROPH/DIAG INJ SC/IM: CPT

## 2018-10-03 RX ADMIN — DARBEPOETIN ALFA 200 MCG: 200 INJECTION, SOLUTION INTRAVENOUS; SUBCUTANEOUS at 13:36

## 2018-10-03 NOTE — PROGRESS NOTES
1311 pt arrives to opn for aranesp injection. Pt in wheelchair due to increased shortness of breath. Pedal edema noted bilateral. All questions and concerns addressed  1315 PATIENT RIGHTS AND RESPONSIBILITIES SHEET OFFERED TO PT TO READ.   Thomas Pizarro RN

## 2018-10-03 NOTE — PROGRESS NOTES
1340 Tolerated injection well. Home instructions to pt verbalized understanding. [phone] Discharged per wheelchair stable home with family.       ___met_ Safety:       (Environmental)   [place] to environment   Ensure ID band is correct and in place/ allergy band as needed   Assess for fall risk   Initiate fall precautions as applicable (fall band, side rails, etc.)   Call light within reach   Bed in low position/ wheels locked    _met___ Pain:        Assess pain level and characteristics   Administer analgesics as ordered   Assess effectiveness of pain management and report to MD as needed    _met___ Knowledge Deficit:   Assess baseline knowledge   Provide teaching at level of understanding   Provide teaching via preferred learning method   Evaluate teaching effectiveness    __

## 2018-10-04 ENCOUNTER — OFFICE VISIT (OUTPATIENT)
Dept: NEPHROLOGY | Age: 63
End: 2018-10-04
Payer: MEDICARE

## 2018-10-04 ENCOUNTER — OFFICE VISIT (OUTPATIENT)
Dept: FAMILY MEDICINE CLINIC | Age: 63
End: 2018-10-04
Payer: MEDICARE

## 2018-10-04 VITALS
BODY MASS INDEX: 33.95 KG/M2 | HEART RATE: 64 BPM | DIASTOLIC BLOOD PRESSURE: 42 MMHG | SYSTOLIC BLOOD PRESSURE: 138 MMHG | RESPIRATION RATE: 18 BRPM | WEIGHT: 204 LBS

## 2018-10-04 VITALS
HEART RATE: 70 BPM | TEMPERATURE: 98.5 F | HEIGHT: 65 IN | RESPIRATION RATE: 10 BRPM | DIASTOLIC BLOOD PRESSURE: 90 MMHG | WEIGHT: 199.4 LBS | SYSTOLIC BLOOD PRESSURE: 142 MMHG | OXYGEN SATURATION: 92 % | BODY MASS INDEX: 33.22 KG/M2

## 2018-10-04 DIAGNOSIS — I51.7 CARDIOMEGALY: ICD-10-CM

## 2018-10-04 DIAGNOSIS — N18.5 STAGE 5 CHRONIC KIDNEY DISEASE NOT ON CHRONIC DIALYSIS (HCC): Primary | ICD-10-CM

## 2018-10-04 DIAGNOSIS — R60.0 BILATERAL LEG EDEMA: ICD-10-CM

## 2018-10-04 DIAGNOSIS — N18.4 CKD (CHRONIC KIDNEY DISEASE), STAGE IV (HCC): ICD-10-CM

## 2018-10-04 DIAGNOSIS — N18.5 TYPE II DIABETES MELLITUS WITH STAGE 5 CHRONIC KIDNEY DISEASE (HCC): ICD-10-CM

## 2018-10-04 DIAGNOSIS — Z12.11 SCREEN FOR COLON CANCER: Primary | ICD-10-CM

## 2018-10-04 DIAGNOSIS — E55.9 VITAMIN D DEFICIENCY: ICD-10-CM

## 2018-10-04 DIAGNOSIS — N25.81 SECONDARY HYPERPARATHYROIDISM OF RENAL ORIGIN (HCC): ICD-10-CM

## 2018-10-04 DIAGNOSIS — G47.33 OBSTRUCTIVE SLEEP APNEA SYNDROME: ICD-10-CM

## 2018-10-04 DIAGNOSIS — R06.02 SHORTNESS OF BREATH: ICD-10-CM

## 2018-10-04 DIAGNOSIS — N04.9 NEPHROTIC SYNDROME: ICD-10-CM

## 2018-10-04 DIAGNOSIS — Z11.4 SCREENING FOR HIV (HUMAN IMMUNODEFICIENCY VIRUS): ICD-10-CM

## 2018-10-04 DIAGNOSIS — I10 BENIGN ESSENTIAL HTN: ICD-10-CM

## 2018-10-04 DIAGNOSIS — J44.9 COPD, MILD (HCC): ICD-10-CM

## 2018-10-04 DIAGNOSIS — Z23 NEED FOR SHINGLES VACCINE: ICD-10-CM

## 2018-10-04 DIAGNOSIS — E11.22 TYPE II DIABETES MELLITUS WITH STAGE 5 CHRONIC KIDNEY DISEASE (HCC): ICD-10-CM

## 2018-10-04 DIAGNOSIS — E11.22 TYPE 2 DIABETES MELLITUS WITH STAGE 4 CHRONIC KIDNEY DISEASE, UNSPECIFIED WHETHER LONG TERM INSULIN USE (HCC): ICD-10-CM

## 2018-10-04 DIAGNOSIS — D63.8 ANEMIA, CHRONIC DISEASE: ICD-10-CM

## 2018-10-04 DIAGNOSIS — N18.4 TYPE 2 DIABETES MELLITUS WITH STAGE 4 CHRONIC KIDNEY DISEASE, UNSPECIFIED WHETHER LONG TERM INSULIN USE (HCC): ICD-10-CM

## 2018-10-04 DIAGNOSIS — Z11.59 ENCOUNTER FOR HEPATITIS C SCREENING TEST FOR LOW RISK PATIENT: ICD-10-CM

## 2018-10-04 DIAGNOSIS — Z23 NEED FOR VACCINATION WITH 13-POLYVALENT PNEUMOCOCCAL CONJUGATE VACCINE: ICD-10-CM

## 2018-10-04 PROCEDURE — 99214 OFFICE O/P EST MOD 30 MIN: CPT | Performed by: NURSE PRACTITIONER

## 2018-10-04 PROCEDURE — G8417 CALC BMI ABV UP PARAM F/U: HCPCS | Performed by: INTERNAL MEDICINE

## 2018-10-04 PROCEDURE — G8598 ASA/ANTIPLAT THER USED: HCPCS | Performed by: INTERNAL MEDICINE

## 2018-10-04 PROCEDURE — 3017F COLORECTAL CA SCREEN DOC REV: CPT | Performed by: INTERNAL MEDICINE

## 2018-10-04 PROCEDURE — 3023F SPIROM DOC REV: CPT | Performed by: NURSE PRACTITIONER

## 2018-10-04 PROCEDURE — 3044F HG A1C LEVEL LT 7.0%: CPT | Performed by: INTERNAL MEDICINE

## 2018-10-04 PROCEDURE — G8484 FLU IMMUNIZE NO ADMIN: HCPCS | Performed by: NURSE PRACTITIONER

## 2018-10-04 PROCEDURE — G8427 DOCREV CUR MEDS BY ELIG CLIN: HCPCS | Performed by: NURSE PRACTITIONER

## 2018-10-04 PROCEDURE — G8427 DOCREV CUR MEDS BY ELIG CLIN: HCPCS | Performed by: INTERNAL MEDICINE

## 2018-10-04 PROCEDURE — 1036F TOBACCO NON-USER: CPT | Performed by: NURSE PRACTITIONER

## 2018-10-04 PROCEDURE — G8417 CALC BMI ABV UP PARAM F/U: HCPCS | Performed by: NURSE PRACTITIONER

## 2018-10-04 PROCEDURE — 3044F HG A1C LEVEL LT 7.0%: CPT | Performed by: NURSE PRACTITIONER

## 2018-10-04 PROCEDURE — 2022F DILAT RTA XM EVC RTNOPTHY: CPT | Performed by: INTERNAL MEDICINE

## 2018-10-04 PROCEDURE — 1036F TOBACCO NON-USER: CPT | Performed by: INTERNAL MEDICINE

## 2018-10-04 PROCEDURE — 2022F DILAT RTA XM EVC RTNOPTHY: CPT | Performed by: NURSE PRACTITIONER

## 2018-10-04 PROCEDURE — 99214 OFFICE O/P EST MOD 30 MIN: CPT | Performed by: INTERNAL MEDICINE

## 2018-10-04 PROCEDURE — G8598 ASA/ANTIPLAT THER USED: HCPCS | Performed by: NURSE PRACTITIONER

## 2018-10-04 PROCEDURE — G8926 SPIRO NO PERF OR DOC: HCPCS | Performed by: NURSE PRACTITIONER

## 2018-10-04 PROCEDURE — 3017F COLORECTAL CA SCREEN DOC REV: CPT | Performed by: NURSE PRACTITIONER

## 2018-10-04 PROCEDURE — G8484 FLU IMMUNIZE NO ADMIN: HCPCS | Performed by: INTERNAL MEDICINE

## 2018-10-04 RX ORDER — ERGOCALCIFEROL 1.25 MG/1
50000 CAPSULE ORAL WEEKLY
Qty: 4 CAPSULE | Refills: 5 | Status: SHIPPED | OUTPATIENT
Start: 2018-10-04 | End: 2019-03-24 | Stop reason: SDUPTHER

## 2018-10-04 ASSESSMENT — ENCOUNTER SYMPTOMS
ANAL BLEEDING: 0
SORE THROAT: 0
BLOOD IN STOOL: 0
COLOR CHANGE: 0
NAUSEA: 0
SHORTNESS OF BREATH: 1
CONSTIPATION: 0
ABDOMINAL PAIN: 0
EYE DISCHARGE: 0
RHINORRHEA: 0
DIARRHEA: 0
COUGH: 1
EYE REDNESS: 0
ABDOMINAL DISTENTION: 0

## 2018-10-04 NOTE — PROGRESS NOTES
mouth, throat, and face: negative  Respiratory: positive for dyspnea on exertion and shortness of breath  Cardiovascular: positive for lower extremity edema  Gastrointestinal: negative  Genitourinary:negative  Integument/breast: negative  Hematologic/lymphatic: negative  Musculoskeletal:positive for arthralgias and stiff joints  Neurological: positive for coordination problems  Behavioral/Psych: negative  Endocrine: negative  Allergic/Immunologic: negative  Medications:     Current Outpatient Prescriptions   Medication Sig Dispense Refill    vitamin D (ERGOCALCIFEROL) 45273 units CAPS capsule Take 1 capsule by mouth once a week 4 capsule 5    lactulose (CHRONULAC) 10 GM/15ML solution Take 30 mLs by mouth 2 times daily as needed (constipation, aif not having BM with other stool softners) 473 mL 1    docusate sodium (COLACE, DULCOLAX) 100 MG CAPS Take 100 mg by mouth 2 times daily 30 capsule 0    potassium chloride (KLOR-CON M) 20 MEQ extended release tablet Take 1 tablet by mouth daily 60 tablet 3    acetaminophen (TYLENOL) 325 MG tablet Take 2 tablets by mouth every 6 hours as needed for Pain 120 tablet 3    sodium bicarbonate 650 MG tablet Take 2 tablets by mouth 2 times daily 120 tablet 3    minoxidil (LONITEN) 2.5 MG tablet Take 2 tablets by mouth 2 times daily . hold if SBP less than 100 mm hg (Patient taking differently: Take 5 mg by mouth 3 times daily . hold if SBP less than 100 mm hg) 120 tablet 3    calcitRIOL (ROCALTROL) 0.25 MCG capsule Take 1 capsule by mouth three times a week 90 capsule 3    cloNIDine (CATAPRES) 0.3 MG tablet Take 1 tablet by mouth every 8 hours One tablet three times per day 270 tablet 4    carvedilol (COREG) 25 MG tablet Take 1.5 tablets by mouth 2 times daily . hold if SBP less than 100 mm hg or HR less than 60 270 tablet 3    insulin glargine (LANTUS SOLOSTAR) 100 UNIT/ML injection pen Inject 25 Units into the skin nightly 10 pen 3    insulin aspart (NOVOLOG FLEXPEN) 100

## 2018-10-04 NOTE — PROGRESS NOTES
vaccine (2 - Td) 05/21/2022       Past Medical History:   Diagnosis Date    Anemia associated with chronic renal failure     Aranesp 150 micrograms every other week given at [facility]    Anxiety     Arthritis     Backache     Blood circulation, collateral     Blood transfusion     CAD (coronary artery disease)     Cellulitis in diabetic foot ([address] 03/03/2017    4th and 5th toes right foot    Chest pain     History of    CHF (congestive heart failure) ([address] 1998    Dx'ed by Dr. John Garnett Chronic anemia     Chronic kidney disease     Chronic kidney disease, stage III (moderate) ([address]     Chronic renal insufficiency 2010    COPD (chronic obstructive pulmonary disease) ([address] 2012    Dr. 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 Taylor Floyd    HTN (hypertension) 1970's    Hyperlipemia 1998    Iron deficiency anemia due to dietary causes 6/21/2018    Kidney stones 3/2014    Kidney trouble          MRSA infection 03/2017    right foot-Dr. Parish Lange (podiatrist)    Neuromuscular disorder ([facility]     Neuropathy 1989    diabetic neuropathy    Obesity since childhoood    CONTRERAS on CPAP 2010    Dr. Alexia Triana    Pneumonia     PONV (postoperative nausea and vomiting)     Seizures ([address]       Past Surgical History:   Procedure Laterality Date    ANKLE SURGERY Right 02-10-14    Dr. Ana Luisa Hurt at [facility]  1990's    removal of benign tumor   [address]  2008    CARPAL TUNNEL RELEASE  1988    COLONOSCOPY  2009    2 polyps, not precanceorus    COSMETIC SURGERY  3/30/2012    eye lid lift    ENDOSCOPY, COLON, DIAGNOSTIC  2007   [facility] EYE SURGERY  March 30th, 2012    left sided ptosis    FOOT SURGERY [facility]) MCG/ACT inhaler Inhale 2 puffs into the lungs every 6 hours as needed for Wheezing 3 Inhaler 3    Polyethylene Glycol 3350 (MIRALAX PO) Take  by mouth as needed.  aspirin 81 MG EC tablet Take 81 mg by mouth daily. No current facility-administered medications for this visit. Allergies   Allergen Reactions    Actos [Pioglitazone Hydrochloride] Swelling    Cymbalta [Duloxetine Hcl] Other (See Comments)     Anxiety and lethargic    Gabapentin Anxiety       Subjective:    Review of Systems   Constitutional: Positive for fatigue (with activity). Negative for chills and fever. HENT: Negative for congestion, ear pain, postnasal drip, rhinorrhea and sore throat. Eyes: Negative for discharge and redness. Respiratory: Positive for cough and shortness of breath. Cardiovascular: Positive for leg swelling. Negative for chest pain. Gastrointestinal: Negative for abdominal distention, abdominal pain, anal bleeding, blood in stool, constipation, diarrhea and nausea. Skin: Negative for color change and rash. Neurological: Negative for facial asymmetry, speech difficulty and weakness. Hematological: Does not bruise/bleed easily. Psychiatric/Behavioral: Negative for agitation and confusion. Objective:     Vitals:    10/04/18 1024 10/04/18 1136   BP: (!) 144/90 (!) 142/90   Site: Right Upper Arm Right Upper Arm   Position: Sitting Sitting   Cuff Size: Large Adult Large Adult   Pulse: 70    Resp: 10    Temp: 98.5 °F (36.9 °C)    TempSrc: Oral    SpO2: 92%    Weight: 199 lb 6.4 oz (90.4 kg)    Height: 5' 5\" (1.651 m)      Body mass index is 33.18 kg/m². Wt Readings from Last 3 Encounters:   10/04/18 199 lb 6.4 oz (90.4 kg)   09/26/18 199 lb 8 oz (90.5 kg)   09/20/18 199 lb 12.8 oz (90.6 kg)     BP Readings from Last 3 Encounters:   10/04/18 (!) 142/90   10/03/18 (!) 167/77   09/26/18 (!) 165/77       Physical Exam   Constitutional: She is oriented to person, place, and time.  She appears well-developed and well-nourished. No distress. HENT:   Head: Normocephalic and atraumatic. Right Ear: Hearing and external ear normal. No swelling. Left Ear: Hearing and external ear normal. No swelling. Nose: No mucosal edema or rhinorrhea. Right sinus exhibits no maxillary sinus tenderness and no frontal sinus tenderness. Left sinus exhibits no maxillary sinus tenderness and no frontal sinus tenderness. Mouth/Throat: Oropharynx is clear and moist. No oropharyngeal exudate or posterior oropharyngeal erythema. Eyes: Pupils are equal, round, and reactive to light. Conjunctivae are normal. Right eye exhibits no discharge. Left eye exhibits no discharge. Neck: Normal range of motion. Cardiovascular:   Bilateral lower leg edema    Pulmonary/Chest: Effort normal. No respiratory distress. She has decreased breath sounds (lower fields). Musculoskeletal: She exhibits no tenderness or deformity. Full ROM of upper and lower extremities    Lymphadenopathy:     She has no cervical adenopathy. Neurological: She is alert and oriented to person, place, and time. Coordination and gait normal.   Skin: Skin is warm and dry. No rash noted. She is not diaphoretic. No cyanosis. Nails show no clubbing. Psychiatric: She has a normal mood and affect.  Her speech is normal and behavior is normal. Judgment and thought content normal. Cognition and memory are normal.       Lab Results   Component Value Date    WBC 6.5 09/26/2018    HGB 7.9 (L) 09/26/2018    HCT 25.7 (L) 09/26/2018     09/26/2018    CHOL 124 01/04/2018    TRIG 164 01/04/2018    HDL 38 01/04/2018    LDLDIRECT 69.68 12/28/2016    LDLCALC 53 01/04/2018    AST 34 08/27/2018     (H) 09/26/2018    K 3.8 09/26/2018     09/26/2018    CREATININE 6.1 (HH) 09/26/2018    BUN 65 (H) 09/26/2018    CO2 31 09/26/2018    TSH 2.640 09/26/2018    INR 1.13 08/23/2018    LABA1C 5.2 07/28/2018    LABMICR 20.95 01/04/2018    LABGLOM 8 (A) 09/26/2018

## 2018-10-04 NOTE — PATIENT INSTRUCTIONS
not just when you have symptoms. Controller medicine is usually an inhaled corticosteroid. The goal is to prevent problems before they occur. Do not use your controller medicine to try to treat an attack that has already started. It does not work fast enough to help. ¨ If your doctor prescribed corticosteroid pills to use during an attack, take them as directed. They may take hours to work, but they may shorten the attack and help you breathe better. ¨ Keep your quick-relief medicine with you at all times. · Talk to your doctor before using other medicines. Some medicines, such as aspirin, can cause asthma attacks in some people. · Check yourself for asthma symptoms to know which step to follow in your action plan. Watch for things like being short of breath, having chest tightness, coughing, and wheezing. Also notice if symptoms wake you up at night or if you get tired quickly when you exercise. · If you have a peak flow meter, use it to check how well you are breathing. This can help you predict when an asthma attack is going to occur. Then you can take medicine to prevent the asthma attack or make it less severe. · See your doctor regularly. These visits will help you learn more about asthma and what you can do to control it. Your doctor will monitor your treatment to make sure the medicine is helping you. · Keep track of your asthma attacks and your treatment. After you have had an attack, write down what triggered it, what helped end it, and any concerns you have about your asthma action plan. Take your diary when you see your doctor. You can then review your asthma action plan and decide if it is working. · Do not smoke or allow others to smoke around you. Avoid smoky places. Smoking makes asthma worse. If you need help quitting, talk to your doctor about stop-smoking programs and medicines. These can increase your chances of quitting for good.   · Learn what triggers an asthma attack for you, and avoid the triggers when you can. Common triggers include colds, smoke, air pollution, dust, pollen, mold, pets, cockroaches, stress, and cold air. · Avoid colds and the flu. Get a pneumococcal vaccine shot. If you have had one before, ask your doctor whether you need a second dose. Get a flu vaccine every fall. If you must be around people with colds or the flu, wash your hands often. When should you call for help? Call 911 anytime you think you may need emergency care. For example, call if:    · You have severe trouble breathing.    Call your doctor now or seek immediate medical care if:    · Your symptoms do not get better after you have followed your asthma action plan.     · You cough up yellow, dark brown, or bloody mucus (sputum).    Watch closely for changes in your health, and be sure to contact your doctor if:    · Your coughing and wheezing get worse.     · You need to use quick-relief medicine on more than 2 days a week (unless it is just for exercise).     · You need help figuring out what is triggering your asthma attacks. Where can you learn more? Go to https://[employer].MonoLibre. org and sign in to your [email or web address] account. Enter P597 in the Satiety box to learn more about \"Asthma in Adults: Care Instructions. \"     If you do not have an account, please click on the \"Sign Up Now\" link. Current as of: December 6, 2017  Content Version: 11.7  © 7690-5439 [employer]. Care instructions adapted under license by [facility] [address] If you have questions about a medical condition or this instruction, always ask your healthcare professional. [place] [address] any warranty or liability for your use of this information.        Patient Education        Chronic Obstructive Pulmonary Disease (COPD): Care Instructions  Your Care Instructions    Chronic obstructive pulmonary disease (COPD) is a general term for a group of lung diseases, including emphysema and chronic inflamed and make a lot of mucus. This can narrow or block the airways, making it hard for you to breathe. In emphysema, the air sacs in your lungs are damaged and lose their stretch. Less air gets in and out of your lungs, which makes you feel short of breath. What can you expect when you have COPD? COPD gets worse over time. You cannot undo the damage to your lungs. Over time, you may find that:  · You get short of breath even when you do simple things like get dressed or fix a meal.  · It is hard to eat or exercise. · You lose weight and feel weaker. But there are things you can do to prevent more damage and feel better. What are the symptoms? The main symptoms are:  · A cough that will not go away. · Mucus that comes up when you cough. · Shortness of breath that gets worse with activity. At times, your symptoms may suddenly flare up and get much worse. This is a called a COPD exacerbation (say \"egg-ROOPA--[facility]\"). When this happens, your usual symptoms quickly get worse and stay bad. This can be dangerous. You may have to go to the hospital.  How can you keep COPD from getting worse? Don't smoke. That is the best way to keep COPD from getting worse. If you already smoke, it is never too late to stop. If you need help quitting, talk to your doctor about stop-smoking programs and medicines. These can increase your chances of quitting for good. You can do other things to keep COPD from getting worse:  · Avoid bad air. Air pollution, chemical fumes, and dust also can make COPD worse. · Get a flu shot every year. A shot may keep the flu from turning into something more serious, like pneumonia. A flu shot also may lower your chances of having a COPD flare-up. · Get a pneumococcal vaccine shot. If you have had one before, ask your doctor if you need another dose. How is COPD treated? COPD is treated with medicines and oxygen.  You also can take steps at home to stay healthy and keep your COPD from sessions. You have a better chance of a longer and healthier life by getting your full treatment. · Your doctor or health care team will show you the steps you need to go through each day before, during, and after dialysis. Be sure to follow these steps. If you do not understand a step, talk to your team.  · Your doctor and dietitian will help you design menus that follow your diet. Be sure to follow your diet guidelines. ¨ You will need to limit fluids and certain foods that contain salt (sodium), potassium, and phosphorus. ¨ You may need to follow a heart-healthy diet to keep the fat (cholesterol) in your blood under control. ¨ You may need higher levels of protein in your diet. · Your doctor may recommend certain vitamins. But do not take any other medicine, including over-the-counter medicines, vitamins, and herbal products, without talking to your doctor first.  · Do not smoke. Smoking raises your risk of many health problems, including more kidney damage. If you need help quitting, talk to your doctor about stop-smoking programs and medicines. These can increase your chances of quitting for good. · Do not take ibuprofen (Advil, Motrin), naproxen (Aleve), or similar medicines, unless your doctor tells you to. These medicines may make kidney problems worse. When should you call for help? Call your doctor now or seek immediate medical care if:    · You have a fever.     · You are dizzy or lightheaded, or you feel like you may faint.     · You are confused or cannot think clearly.     · You have new or worse nausea or vomiting.     · You have new or more blood in your urine.     · You have new swelling.    Watch closely for changes in your health, and be sure to contact your doctor if:    · You do not get better as expected. Where can you learn more? Go to https://chrosa.healthRevionics. org and sign in to your Brandtree account.  Enter E361 in the Wedivite box to learn more about failure, infection, blood clots, or liver or kidney disease. Follow-up care is a key part of your treatment and safety. Be sure to make and go to all appointments, and call your doctor if you are having problems. It's also a good idea to know your test results and keep a list of the medicines you take. How can you care for yourself at home? · If your doctor gave you medicine, take it as prescribed. Call your doctor if you think you are having a problem with your medicine. · Whenever you are resting, raise your legs up. Try to keep the swollen area higher than the level of your heart. · Take breaks from standing or sitting in one position. ¨ Walk around to increase the blood flow in your lower legs. ¨ Move your feet and ankles often while you stand, or tighten and relax your leg muscles. · Wear support stockings. Put them on in the morning, before swelling gets worse. · Eat a balanced diet. Lose weight if you need to. · Limit the amount of salt (sodium) in your diet. Salt holds fluid in the body and may increase swelling. When should you call for help? Call 911 anytime you think you may need emergency care. For example, call if:    · You have symptoms of a blood clot in your lung (called a pulmonary embolism). These may include:  ¨ Sudden chest pain. ¨ Trouble breathing. ¨ Coughing up blood.    Call your doctor now or seek immediate medical care if:    · You have signs of a blood clot, such as:  ¨ Pain in your calf, back of the knee, thigh, or groin. ¨ Redness and swelling in your leg or groin.     · You have symptoms of infection, such as:  ¨ Increased pain, swelling, warmth, or redness. ¨ Red streaks or pus. ¨ A fever.    Watch closely for changes in your health, and be sure to contact your doctor if:    · Your swelling is getting worse.     · You have new or worsening pain in your legs.     · You do not get better as expected. Where can you learn more?   Go to https://chpepiceweb.healthEdsby. org and sign in to your InstantQuestt account. Enter O374 in the [place] box to learn more about \"Leg and Ankle Edema: Care Instructions. \"     If you do not have an account, please click on the \"Sign Up Now\" link. Current as of: November 20, 2017  Content Version: 11.7  © 7914-2238 [employer]. Care instructions adapted under license by [facility] ([facility]). If you have questions about a medical condition or this instruction, always ask your healthcare professional. [address] any warranty or liability for your use of this information.

## 2018-10-10 ENCOUNTER — HOSPITAL ENCOUNTER (OUTPATIENT)
Dept: PULMONOLOGY | Age: 63
Discharge: HOME | End: 2018-10-10
Payer: MEDICARE

## 2018-10-10 ENCOUNTER — HOSPITAL ENCOUNTER (OUTPATIENT)
Dept: NURSING | Age: 63
Discharge: HOME | End: 2018-10-10
Payer: MEDICARE

## 2018-10-10 VITALS
SYSTOLIC BLOOD PRESSURE: 160 MMHG | OXYGEN SATURATION: 94 % | DIASTOLIC BLOOD PRESSURE: 72 MMHG | HEART RATE: 66 BPM | RESPIRATION RATE: 16 BRPM | TEMPERATURE: 98.3 F

## 2018-10-10 DIAGNOSIS — J44.9 STAGE 1 MILD COPD BY GOLD CLASSIFICATION (HCC): ICD-10-CM

## 2018-10-10 PROCEDURE — 6360000002 HC RX W HCPCS: Performed by: INTERNAL MEDICINE

## 2018-10-10 PROCEDURE — 96372 THER/PROPH/DIAG INJ SC/IM: CPT

## 2018-10-10 PROCEDURE — 2709999900 HC NON-CHARGEABLE SUPPLY

## 2018-10-10 PROCEDURE — 94060 EVALUATION OF WHEEZING: CPT

## 2018-10-10 RX ADMIN — DARBEPOETIN ALFA 200 MCG: 200 INJECTION, SOLUTION INTRAVENOUS; SUBCUTANEOUS at 13:48

## 2018-10-10 NOTE — PROGRESS NOTES
micrograms every other week given at [facility]    Anxiety     Arthritis     Backache     Blood circulation, collateral     Blood transfusion     CAD (coronary artery disease)     Cellulitis in diabetic foot ([address] 03/03/2017    4th and 5th toes right foot    Chest pain     History of    CHF (congestive heart failure) ([address] 1998    Dx'ed by Dr. Tamara Douglass Chronic anemia     Chronic kidney disease     Chronic kidney disease, stage III (moderate) ([address]     Chronic renal insufficiency 2010    COPD (chronic obstructive pulmonary disease) ([address] 2012    Dr. 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 Christopher Molina    HTN (hypertension) 1970's    Hyperlipemia 1998    Iron deficiency anemia due to dietary causes 6/21/2018    Kidney stones 3/2014    Kidney trouble          MRSA infection 03/2017    right foot-Dr. Bridget Muse (podiatrist)    Neuromuscular disorder ([address]     Neuropathy 1989    diabetic neuropathy    Obesity since childhoood    CONTRERAS on CPAP 2010    Dr. Manjinder Cisneros    Pneumonia     PONV (postoperative nausea and vomiting)     Seizures ([address]        Past Surgical History:   Procedure Laterality Date    ANKLE SURGERY Right 02-10-14    Dr. Colonel Sweeney at [facility]  1990's    removal of benign tumor   [address]  2008   [address]    COLONOSCOPY  2009    2 polyps, not precanceorus    COSMETIC SURGERY  3/30/2012    eye lid lift    ENDOSCOPY, COLON, DIAGNOSTIC  2007   [facility]GERY  March 30th, 2012    left sided ptosis    FOOT SURGERY  1990    Tarsal tunnel surgery    FRACTURE SURGERY  2015    HYSTERECTOMY  1980 and 1985    first partial in 1980's, then total in 1985

## 2018-10-10 NOTE — PROGRESS NOTES
1322 pt arrives to [facility] for aranesp injection. All questions and concerns addressed. 12 PATIENT RIGHTS AND RESPONSIBILITIES SHEET OFFERED TO PT TO READ. 1340 unable to release therapy plan orders due to not being signed by provider. Charu García office notified, CNP out of town.  Dr Abelardo Mccarthy on call and paged  [phone] Telephone order received from Dr Abelardo Mccarthy  [phone] __m_ Safety:       (Environmental)   [place] to environment   Ensure ID band is correct and in place/ allergy band as needed   Assess for fall risk   Initiate fall precautions as applicable (fall band, side rails, etc.)   Call light within reach   Bed in low position/ wheels locked    _m__ Pain:        Assess pain level and characteristics   Administer analgesics as ordered   Assess effectiveness of pain management and report to MD as needed    _m__ Knowledge Deficit:   Assess baseline knowledge   Provide teaching at level of understanding   Provide teaching via preferred learning method   Evaluate teaching effectiveness    _m_ Hemodynamic/Respiratory Status:       (Pre and Post Procedure Monitoring)   Assess/Monitor vital signs and LOC   Assess Baseline SpO2 prior to any sedation   Obtain weight/height   Assess vital signs/ LOC until patient meets discharge criteria   Monitor procedure site and notify MD of any issues      1350 WRITTEN DISCHARGE INSTRUCTIONS GIVEN TO PT-VERBALIZES UNDERSTANDING    1355   PT DISCHARGED PER [address]

## 2018-10-16 ENCOUNTER — OFFICE VISIT (OUTPATIENT)
Dept: PULMONOLOGY | Age: 63
End: 2018-10-16
Payer: MEDICARE

## 2018-10-16 VITALS
OXYGEN SATURATION: 94 % | SYSTOLIC BLOOD PRESSURE: 136 MMHG | DIASTOLIC BLOOD PRESSURE: 70 MMHG | HEART RATE: 67 BPM | WEIGHT: 207 LBS | BODY MASS INDEX: 34.45 KG/M2 | TEMPERATURE: 97.7 F

## 2018-10-16 DIAGNOSIS — Z87.891 PERSONAL HISTORY OF TOBACCO USE: Primary | ICD-10-CM

## 2018-10-16 DIAGNOSIS — J44.9 STAGE 3 SEVERE COPD BY GOLD CLASSIFICATION (HCC): ICD-10-CM

## 2018-10-16 PROCEDURE — G8427 DOCREV CUR MEDS BY ELIG CLIN: HCPCS | Performed by: INTERNAL MEDICINE

## 2018-10-16 PROCEDURE — 3023F SPIROM DOC REV: CPT | Performed by: INTERNAL MEDICINE

## 2018-10-16 PROCEDURE — 3017F COLORECTAL CA SCREEN DOC REV: CPT | Performed by: INTERNAL MEDICINE

## 2018-10-16 PROCEDURE — G0296 VISIT TO DETERM LDCT ELIG: HCPCS | Performed by: INTERNAL MEDICINE

## 2018-10-16 PROCEDURE — G8598 ASA/ANTIPLAT THER USED: HCPCS | Performed by: INTERNAL MEDICINE

## 2018-10-16 PROCEDURE — G8484 FLU IMMUNIZE NO ADMIN: HCPCS | Performed by: INTERNAL MEDICINE

## 2018-10-16 PROCEDURE — 1036F TOBACCO NON-USER: CPT | Performed by: INTERNAL MEDICINE

## 2018-10-16 PROCEDURE — G8926 SPIRO NO PERF OR DOC: HCPCS | Performed by: INTERNAL MEDICINE

## 2018-10-16 PROCEDURE — 99214 OFFICE O/P EST MOD 30 MIN: CPT | Performed by: INTERNAL MEDICINE

## 2018-10-16 PROCEDURE — G8417 CALC BMI ABV UP PARAM F/U: HCPCS | Performed by: INTERNAL MEDICINE

## 2018-10-16 NOTE — PATIENT INSTRUCTIONS
What is lung cancer screening? Lung cancer screening is a way in which doctors check the lungs for early signs of cancer in people who have no symptoms of lung cancer. A low-dose CT scan uses much less radiation than a normal CT scan and shows a more detailed image of the lungs than a standard X-ray. The goal of lung cancer screening is to find cancer early, before it has a chance to grow, spread, or cause problems. One large study found that smokers who were screened with low-dose CT scans were less likely to die of lung cancer than those who were screened with standard X-ray. Below is a summary of the things you need to know regarding screening for lung cancer with low-dose computed tomography (LDCT). This is a screening program that involves routine annual screening with LDCT studies of the lung. The LDCTs are done using low-dose radiation that is not thought to increase your cancer risk. If you have other serious medical conditions (other cancers, congestive heart failure) that limit your life expectancy to less than 10 years, you should not undergo lung cancer screening with LDCT. The chance is 20%-60% that the LDCT result will show abnormalities. This would require additional testing which could include repeat imaging or even invasive procedures. Most (about 95%) of \"abnormal\" LDCT results are false in the sense that no lung cancer is ultimately found. Additionally, some (about 10%) of the cancers found would not affect your life expectancy, even if undetected and untreated. If you are still smoking, the single most important thing that you can do to reduce your risk of dying of lung cancer is to quit. For this screening to be covered by Medicare and most other insurers, strict criteria must be met. If you do not meet these criteria, but still wish to undergo LDCT testing, you will be required to sign a waiver indicating your willingness to pay for the scan.     Plan:   -Continue current Albuterol HFA 2puffs  Q6Hprn. - Start patient on Spiriva 18mcg/Cap DPI. 1Cap via inhalation daily. She  was informed about adverse effects of Spiriva. She verbalizes understanding.  -Continue Flonase 50mcg 2 sprays daily. Ivan Carlos Refills given on Flonase for 1year in mail in format.  -Patient educated to update his pneumococcal vaccine with family physician and take influenza vaccine in coming season with out fail. Patient verbalizes understanding.  -She was advised to keep appt with [facility] as scheduled.  -Schedule patient for low dose CT scan of chest with out IV contrast as recommended by the  U.S. Preventive Services Task Force and the NCCN for lung Cancer Screening in 1 to 2months   -Corine Mason was advised and instructed to come for follow up with my clinic in 1 to 2 months with low dose CT chest to go over the above test results and management.  -Corine Mason verbalizes understanding and agrees with the plan of care. -Patient advised to continue current inhalers and keep good compliance. Patient verbalizes understanding.

## 2018-10-17 ENCOUNTER — HOSPITAL ENCOUNTER (OUTPATIENT)
Dept: NURSING | Age: 63
Discharge: HOME | End: 2018-10-17
Payer: MEDICARE

## 2018-10-17 VITALS
DIASTOLIC BLOOD PRESSURE: 72 MMHG | WEIGHT: 206 LBS | RESPIRATION RATE: 16 BRPM | HEART RATE: 88 BPM | SYSTOLIC BLOOD PRESSURE: 172 MMHG | BODY MASS INDEX: 34.28 KG/M2 | TEMPERATURE: 97.8 F | OXYGEN SATURATION: 98 %

## 2018-10-17 DIAGNOSIS — N18.4 CKD (CHRONIC KIDNEY DISEASE), STAGE IV (HCC): ICD-10-CM

## 2018-10-17 DIAGNOSIS — D63.8 ANEMIA, CHRONIC DISEASE: ICD-10-CM

## 2018-10-17 DIAGNOSIS — D50.8 IRON DEFICIENCY ANEMIA DUE TO DIETARY CAUSES: ICD-10-CM

## 2018-10-17 PROCEDURE — 6360000002 HC RX W HCPCS: Performed by: NURSE PRACTITIONER

## 2018-10-17 PROCEDURE — 96372 THER/PROPH/DIAG INJ SC/IM: CPT

## 2018-10-17 RX ORDER — METOLAZONE 5 MG/1
5 TABLET ORAL DAILY
Qty: 3 TABLET | Refills: 0 | Status: ON HOLD | OUTPATIENT
Start: 2018-10-17 | End: 2018-11-02 | Stop reason: ALTCHOICE

## 2018-10-17 RX ADMIN — DARBEPOETIN ALFA 200 MCG: 200 INJECTION, SOLUTION INTRAVENOUS; SUBCUTANEOUS at 13:59

## 2018-10-17 NOTE — PROGRESS NOTES
1350 pt arrives to opn for Aranesp injection. All questions and concerns addressed  1352 PATIENT RIGHTS AND RESPONSIBILITIES SHEET OFFERED TO PT TO READ.   1355 __m__ Safety:       (Environmental)   [place] to environment   Ensure ID band is correct and in place/ allergy band as needed   Assess for fall risk   Initiate fall precautions as applicable (fall band, side rails, etc.)   Call light within reach   Bed in low position/ wheels locked    __m__ Pain:        Assess pain level and characteristics   Administer analgesics as ordered   Assess effectiveness of pain management and report to MD as needed    _m___ Knowledge Deficit:   Assess baseline knowledge   Provide teaching at level of understanding   Provide teaching via preferred learning method   Evaluate teaching effectiveness    __m__ Hemodynamic/Respiratory Status:       (Pre and Post Procedure Monitoring)   Assess/Monitor vital signs and LOC   Assess Baseline SpO2 prior to any sedation   Obtain weight/height   Assess vital signs/ LOC until patient meets discharge criteria   Monitor procedure site and notify MD of any issues       1400 WRITTEN DISCHARGE INSTRUCTIONS GIVEN TO PT-VERBALIZES UNDERSTANDING  1410   PT DISCHARGED PER WHEEL CHAIR IN SATISFACTORY CONDITION

## 2018-10-24 ENCOUNTER — HOSPITAL ENCOUNTER (OUTPATIENT)
Dept: NURSING | Age: 63
Discharge: HOME | End: 2018-10-24
Payer: MEDICARE

## 2018-10-24 VITALS
RESPIRATION RATE: 16 BRPM | SYSTOLIC BLOOD PRESSURE: 158 MMHG | HEART RATE: 70 BPM | DIASTOLIC BLOOD PRESSURE: 75 MMHG | TEMPERATURE: 96 F

## 2018-10-24 PROCEDURE — 6360000002 HC RX W HCPCS: Performed by: INTERNAL MEDICINE

## 2018-10-24 PROCEDURE — 96372 THER/PROPH/DIAG INJ SC/IM: CPT

## 2018-10-24 RX ADMIN — DARBEPOETIN ALFA 200 MCG: 200 INJECTION, SOLUTION INTRAVENOUS; SUBCUTANEOUS at 14:35

## 2018-10-24 ASSESSMENT — PAIN - FUNCTIONAL ASSESSMENT: PAIN_FUNCTIONAL_ASSESSMENT: 0-10

## 2018-10-24 ASSESSMENT — PAIN DESCRIPTION - DESCRIPTORS: DESCRIPTORS: ACHING;HEAVINESS

## 2018-10-24 NOTE — PROGRESS NOTES
1430: Pt her for injection. Rights and responsibilities reviewed with patient    : injection given. 1500: Discharge instructions given. Pt verbalizes understanding. Pt assisted to wheelchair. Pt taken to car per wheelchair in stable condition.

## 2018-10-24 NOTE — PROGRESS NOTES
_M___ Safety:       (Environmental)   [place] to environment   Ensure ID band is correct and in place/ allergy band as needed   Assess for fall risk   Initiate fall precautions as applicable (fall band, side rails, etc.)   Call light within reach   Bed in low position/ wheels locked    _M___ Pain:        Assess pain level and characteristics   Administer analgesics as ordered   Assess effectiveness of pain management and report to MD as needed    _M___ Knowledge Deficit:   Assess baseline knowledge   Provide teaching at level of understanding   Provide teaching via preferred learning method   Evaluate teaching effectiveness    _M___ Hemodynamic/Respiratory Status:       (Pre and Post Procedure Monitoring)   Assess/Monitor vital signs and LOC       notify MD of any issues  

## 2018-10-30 ENCOUNTER — HOSPITAL ENCOUNTER (OUTPATIENT)
Age: 63
Setting detail: SPECIMEN
Discharge: HOME | DRG: 682 | End: 2018-10-30
Payer: MEDICARE

## 2018-10-30 ENCOUNTER — TELEPHONE (OUTPATIENT)
Dept: NEPHROLOGY | Age: 63
End: 2018-10-30

## 2018-10-30 ENCOUNTER — TELEPHONE (OUTPATIENT)
Dept: NEPHROLOGY | Age: 63
End: 2018-10-30
Payer: MEDICARE

## 2018-10-30 DIAGNOSIS — D50.8 IRON DEFICIENCY ANEMIA DUE TO DIETARY CAUSES: ICD-10-CM

## 2018-10-30 DIAGNOSIS — N18.5 CKD (CHRONIC KIDNEY DISEASE) STAGE 5, GFR LESS THAN 15 ML/MIN (HCC): Primary | ICD-10-CM

## 2018-10-30 DIAGNOSIS — D63.8 ANEMIA, CHRONIC DISEASE: ICD-10-CM

## 2018-10-30 LAB
HCT VFR BLD CALC: 31 % (ref 37–47)
HEMOGLOBIN: 9.5 GM/DL (ref 12–16)

## 2018-10-30 PROCEDURE — 99490 CHRNC CARE MGMT STAFF 1ST 20: CPT | Performed by: NURSE PRACTITIONER

## 2018-10-30 PROCEDURE — 85014 HEMATOCRIT: CPT

## 2018-10-30 PROCEDURE — 85018 HEMOGLOBIN: CPT

## 2018-10-30 NOTE — TELEPHONE ENCOUNTER
Increase bumetanide to 2 mg 2 times a day from 1 mg twice a day. May need to go to the emergency room if she gets worse.

## 2018-10-30 NOTE — TELEPHONE ENCOUNTER
Kiesha HurleyGuero  Chronic Kidney Disease Chronic Care Management: Anemia    Lab Results   Component Value Date    CREATININE 6.1 09/26/2018    LABGLOM 8 09/26/2018   . Hgb and Hct trending reviewed    Lab Results   Component Value Date    HGB 9.5 (L) 10/30/2018    HCT 31.0 (L) 10/30/2018     Lab Results   Component Value Date    FERRITIN 1,673 09/04/2018    IRON 63 09/04/2018    LABIRON 42 09/04/2018    UTHKPEIG30 > 2000 04/26/2017    FOLATE > 20.0 04/26/2017        10/2/17 11/2/17  12/2/17 1/4/18 2/8/18 3/3/18 4/7/18 5/24/18   Hgb 8.8 9.3  9.4 9.9 8.4 8.7 8.6 9.0   Aranesp 150 mcg 10/19 150 mcg 11/2 150 mcg 11/16  150 mcg 1/9/18 150 mcg 2/13/18 150 mcg 3/19 and 4/2/18 150 mcg weekly. 4/11,4/18, 4/25, 5/2 150 mcg weekly x 4               Retic Count 2.5    1.3       T sat 25    19    27   Iron 45    31    43   Ferritan 911    861    754   Venofer - -          PO Iron - -   325 mg daily. Pt intolerant  Due to constipation, Pt does not want to take PO iron. Will do IV iron in future                            6/20/18 8/7/18 8/27/18 9/4/18 9/26/18 10/30/18      Hgb 8.9 10.9 9.9 8.2 7.9 9.5      Aranesp    150 mcg weekly 200 mcg weekly x 4 dose                   Retic Count            T sat    42        Iron    63        Ferritan    1673        Venofer            PO Iron                               1. Anemia, chronic disease  Decrease aranesp to 200 mcg [id number]   2. Anemia, iron deficiency S/P Injectafer 6/27/18 and 7/3/18    H&H in one month       3.  CKD (chronic kidney disease), stage V   Schedule pt with me for Pre ESRD appt prior to appt with Dr Nanette Omalley, Can schedule on same day as appt for Aranesp injection  Keep FU appt with Dr Nanette Omalley 11/15/18     Goal Hgb 10 KDOQI guidelines  WIll adjust per pt response    Time spent- Non face to face: 49 Jose Guadalupe Arroyo, APRN - CNP APRN

## 2018-11-01 ENCOUNTER — APPOINTMENT (OUTPATIENT)
Dept: GENERAL RADIOLOGY | Age: 63
DRG: 682 | End: 2018-11-01
Payer: MEDICARE

## 2018-11-01 ENCOUNTER — HOSPITAL ENCOUNTER (INPATIENT)
Age: 63
LOS: 8 days | Discharge: NURSING, REHAB OR LONG-TERM CARE | DRG: 682 | End: 2018-11-09
Attending: HOSPITALIST | Admitting: HOSPITALIST
Payer: MEDICARE

## 2018-11-01 DIAGNOSIS — N18.5 STAGE 5 CHRONIC KIDNEY DISEASE NOT ON CHRONIC DIALYSIS (HCC): ICD-10-CM

## 2018-11-01 DIAGNOSIS — I50.33 ACUTE ON CHRONIC DIASTOLIC CONGESTIVE HEART FAILURE (HCC): Primary | ICD-10-CM

## 2018-11-01 DIAGNOSIS — J90 PLEURAL EFFUSION: ICD-10-CM

## 2018-11-01 PROBLEM — E87.70 FLUID OVERLOAD: Status: ACTIVE | Noted: 2018-11-01

## 2018-11-01 LAB
ALBUMIN SERPL-MCNC: 4 G/DL (ref 3.5–5.1)
ALP BLD-CCNC: 64 U/L (ref 38–126)
ALT SERPL-CCNC: 12 U/L (ref 11–66)
ANION GAP SERPL CALCULATED.3IONS-SCNC: 18 MEQ/L (ref 8–16)
AST SERPL-CCNC: 32 U/L (ref 5–40)
BASOPHILIA: ABNORMAL
BASOPHILS # BLD: 0.3 %
BASOPHILS ABSOLUTE: 0 THOU/MM3 (ref 0–0.1)
BILIRUB SERPL-MCNC: 0.5 MG/DL (ref 0.3–1.2)
BILIRUBIN DIRECT: < 0.2 MG/DL (ref 0–0.3)
BUN BLDV-MCNC: 70 MG/DL (ref 7–22)
CALCIUM SERPL-MCNC: 9.8 MG/DL (ref 8.5–10.5)
CHLORIDE BLD-SCNC: 99 MEQ/L (ref 98–111)
CO2: 29 MEQ/L (ref 23–33)
CREAT SERPL-MCNC: 6.6 MG/DL (ref 0.4–1.2)
EOSINOPHIL # BLD: 1.3 %
EOSINOPHILS ABSOLUTE: 0.1 THOU/MM3 (ref 0–0.4)
ERYTHROCYTE [DISTWIDTH] IN BLOOD BY AUTOMATED COUNT: 19.9 % (ref 11.5–14.5)
ERYTHROCYTE [DISTWIDTH] IN BLOOD BY AUTOMATED COUNT: 59.5 FL (ref 35–45)
GFR SERPL CREATININE-BSD FRML MDRD: 8 ML/MIN/1.73M2
GLUCOSE BLD-MCNC: 101 MG/DL (ref 70–108)
GLUCOSE BLD-MCNC: 92 MG/DL (ref 70–108)
HCT VFR BLD CALC: 30.3 % (ref 37–47)
HEMOGLOBIN: 9.4 GM/DL (ref 12–16)
IMMATURE GRANS (ABS): 0.01 THOU/MM3 (ref 0–0.07)
IMMATURE GRANULOCYTES: 0.1 %
LYMPHOCYTES # BLD: 12.7 %
LYMPHOCYTES ABSOLUTE: 0.9 THOU/MM3 (ref 1–4.8)
MAGNESIUM: 2.3 MG/DL (ref 1.6–2.4)
MCH RBC QN AUTO: 25.5 PG (ref 26–33)
MCHC RBC AUTO-ENTMCNC: 31 GM/DL (ref 32.2–35.5)
MCV RBC AUTO: 82.3 FL (ref 81–99)
MONOCYTES # BLD: 7.5 %
MONOCYTES ABSOLUTE: 0.5 THOU/MM3 (ref 0.4–1.3)
NUCLEATED RED BLOOD CELLS: 0 /100 WBC
OSMOLALITY CALCULATION: 310.7 MOSMOL/KG (ref 275–300)
PLATELET # BLD: 171 THOU/MM3 (ref 130–400)
PLATELET ESTIMATE: ADEQUATE
PMV BLD AUTO: 9.8 FL (ref 9.4–12.4)
POIKILOCYTES: ABNORMAL
POTASSIUM SERPL-SCNC: 4.5 MEQ/L (ref 3.5–5.2)
PRO-BNP: 5695 PG/ML (ref 0–900)
RBC # BLD: 3.68 MILL/MM3 (ref 4.2–5.4)
SCAN OF BLOOD SMEAR: NORMAL
SEG NEUTROPHILS: 78.1 %
SEGMENTED NEUTROPHILS ABSOLUTE COUNT: 5.2 THOU/MM3 (ref 1.8–7.7)
SODIUM BLD-SCNC: 146 MEQ/L (ref 135–145)
TOTAL PROTEIN: 6.8 G/DL (ref 6.1–8)
TROPONIN T: 0.2 NG/ML
WBC # BLD: 6.7 THOU/MM3 (ref 4.8–10.8)

## 2018-11-01 PROCEDURE — 85025 COMPLETE CBC W/AUTO DIFF WBC: CPT

## 2018-11-01 PROCEDURE — 36415 COLL VENOUS BLD VENIPUNCTURE: CPT

## 2018-11-01 PROCEDURE — 82248 BILIRUBIN DIRECT: CPT

## 2018-11-01 PROCEDURE — 71046 X-RAY EXAM CHEST 2 VIEWS: CPT

## 2018-11-01 PROCEDURE — 84484 ASSAY OF TROPONIN QUANT: CPT

## 2018-11-01 PROCEDURE — 99223 1ST HOSP IP/OBS HIGH 75: CPT | Performed by: HOSPITALIST

## 2018-11-01 PROCEDURE — 83735 ASSAY OF MAGNESIUM: CPT

## 2018-11-01 PROCEDURE — 6370000000 HC RX 637 (ALT 250 FOR IP): Performed by: HOSPITALIST

## 2018-11-01 PROCEDURE — 93005 ELECTROCARDIOGRAM TRACING: CPT | Performed by: NURSE PRACTITIONER

## 2018-11-01 PROCEDURE — 99285 EMERGENCY DEPT VISIT HI MDM: CPT

## 2018-11-01 PROCEDURE — 83880 ASSAY OF NATRIURETIC PEPTIDE: CPT

## 2018-11-01 PROCEDURE — 80053 COMPREHEN METABOLIC PANEL: CPT

## 2018-11-01 PROCEDURE — 82948 REAGENT STRIP/BLOOD GLUCOSE: CPT

## 2018-11-01 PROCEDURE — 1200000003 HC TELEMETRY R&B

## 2018-11-01 RX ORDER — NITROGLYCERIN 0.4 MG/1
0.4 TABLET SUBLINGUAL EVERY 5 MIN PRN
Status: DISCONTINUED | OUTPATIENT
Start: 2018-11-01 | End: 2018-11-09 | Stop reason: HOSPADM

## 2018-11-01 RX ORDER — ATORVASTATIN CALCIUM 40 MG/1
40 TABLET, FILM COATED ORAL DAILY
Status: DISCONTINUED | OUTPATIENT
Start: 2018-11-02 | End: 2018-11-09 | Stop reason: HOSPADM

## 2018-11-01 RX ORDER — ONDANSETRON 2 MG/ML
4 INJECTION INTRAMUSCULAR; INTRAVENOUS EVERY 6 HOURS PRN
Status: DISCONTINUED | OUTPATIENT
Start: 2018-11-01 | End: 2018-11-09 | Stop reason: HOSPADM

## 2018-11-01 RX ORDER — CALCITRIOL 0.25 UG/1
0.25 CAPSULE, LIQUID FILLED ORAL
Status: DISCONTINUED | OUTPATIENT
Start: 2018-11-02 | End: 2018-11-09 | Stop reason: HOSPADM

## 2018-11-01 RX ORDER — FLUTICASONE PROPIONATE 50 MCG
1 SPRAY, SUSPENSION (ML) NASAL DAILY
Status: DISCONTINUED | OUTPATIENT
Start: 2018-11-02 | End: 2018-11-09 | Stop reason: HOSPADM

## 2018-11-01 RX ORDER — ACETAMINOPHEN 325 MG/1
650 TABLET ORAL EVERY 6 HOURS PRN
Status: DISCONTINUED | OUTPATIENT
Start: 2018-11-01 | End: 2018-11-09 | Stop reason: HOSPADM

## 2018-11-01 RX ORDER — INSULIN GLARGINE 100 [IU]/ML
15 INJECTION, SOLUTION SUBCUTANEOUS NIGHTLY
Status: DISCONTINUED | OUTPATIENT
Start: 2018-11-02 | End: 2018-11-04

## 2018-11-01 RX ORDER — DOCUSATE SODIUM 100 MG/1
100 CAPSULE, LIQUID FILLED ORAL 2 TIMES DAILY
Status: DISCONTINUED | OUTPATIENT
Start: 2018-11-01 | End: 2018-11-09 | Stop reason: HOSPADM

## 2018-11-01 RX ORDER — SODIUM CHLORIDE 0.9 % (FLUSH) 0.9 %
10 SYRINGE (ML) INJECTION EVERY 12 HOURS SCHEDULED
Status: DISCONTINUED | OUTPATIENT
Start: 2018-11-01 | End: 2018-11-09 | Stop reason: HOSPADM

## 2018-11-01 RX ORDER — ALBUTEROL SULFATE 90 UG/1
2 AEROSOL, METERED RESPIRATORY (INHALATION) EVERY 6 HOURS PRN
Status: DISCONTINUED | OUTPATIENT
Start: 2018-11-01 | End: 2018-11-09 | Stop reason: HOSPADM

## 2018-11-01 RX ORDER — DOXAZOSIN MESYLATE 4 MG/1
12 TABLET ORAL DAILY
Status: DISCONTINUED | OUTPATIENT
Start: 2018-11-02 | End: 2018-11-09 | Stop reason: HOSPADM

## 2018-11-01 RX ORDER — AMLODIPINE BESYLATE 10 MG/1
10 TABLET ORAL DAILY
Status: DISCONTINUED | OUTPATIENT
Start: 2018-11-02 | End: 2018-11-09 | Stop reason: HOSPADM

## 2018-11-01 RX ORDER — BUMETANIDE 1 MG/1
2 TABLET ORAL 2 TIMES DAILY
Status: DISCONTINUED | OUTPATIENT
Start: 2018-11-01 | End: 2018-11-09 | Stop reason: HOSPADM

## 2018-11-01 RX ORDER — MINOXIDIL 2.5 MG/1
5 TABLET ORAL 3 TIMES DAILY
Status: DISCONTINUED | OUTPATIENT
Start: 2018-11-01 | End: 2018-11-09 | Stop reason: HOSPADM

## 2018-11-01 RX ORDER — METOLAZONE 5 MG/1
5 TABLET ORAL DAILY
Status: DISCONTINUED | OUTPATIENT
Start: 2018-11-02 | End: 2018-11-02

## 2018-11-01 RX ORDER — LACTULOSE 10 G/15ML
20 SOLUTION ORAL 2 TIMES DAILY PRN
Status: DISCONTINUED | OUTPATIENT
Start: 2018-11-01 | End: 2018-11-09 | Stop reason: HOSPADM

## 2018-11-01 RX ORDER — FERROUS SULFATE 325(65) MG
325 TABLET ORAL
Status: DISCONTINUED | OUTPATIENT
Start: 2018-11-02 | End: 2018-11-09 | Stop reason: HOSPADM

## 2018-11-01 RX ORDER — SODIUM BICARBONATE 650 MG/1
1300 TABLET ORAL 2 TIMES DAILY
Status: DISCONTINUED | OUTPATIENT
Start: 2018-11-01 | End: 2018-11-05

## 2018-11-01 RX ORDER — IPRATROPIUM BROMIDE AND ALBUTEROL SULFATE 2.5; .5 MG/3ML; MG/3ML
1 SOLUTION RESPIRATORY (INHALATION) EVERY 4 HOURS PRN
Status: DISCONTINUED | OUTPATIENT
Start: 2018-11-01 | End: 2018-11-08

## 2018-11-01 RX ORDER — ASPIRIN 81 MG/1
81 TABLET ORAL DAILY
Status: DISCONTINUED | OUTPATIENT
Start: 2018-11-02 | End: 2018-11-09 | Stop reason: HOSPADM

## 2018-11-01 RX ORDER — FOLIC ACID/VIT B COMPLEX AND C 5 MG
1 TABLET ORAL DAILY
Status: DISCONTINUED | OUTPATIENT
Start: 2018-11-02 | End: 2018-11-09 | Stop reason: HOSPADM

## 2018-11-01 RX ORDER — SODIUM CHLORIDE 0.9 % (FLUSH) 0.9 %
10 SYRINGE (ML) INJECTION PRN
Status: DISCONTINUED | OUTPATIENT
Start: 2018-11-01 | End: 2018-11-09 | Stop reason: HOSPADM

## 2018-11-01 ASSESSMENT — PAIN DESCRIPTION - FREQUENCY: FREQUENCY: CONTINUOUS

## 2018-11-01 ASSESSMENT — PAIN SCALES - GENERAL
PAINLEVEL_OUTOF10: 6
PAINLEVEL_OUTOF10: 0

## 2018-11-01 ASSESSMENT — PAIN DESCRIPTION - DESCRIPTORS: DESCRIPTORS: DISCOMFORT

## 2018-11-01 ASSESSMENT — PAIN DESCRIPTION - PAIN TYPE: TYPE: ACUTE PAIN

## 2018-11-01 ASSESSMENT — PAIN DESCRIPTION - LOCATION: LOCATION: BACK

## 2018-11-01 ASSESSMENT — PAIN DESCRIPTION - ORIENTATION: ORIENTATION: LOWER

## 2018-11-01 NOTE — ED NOTES
Pt arrived to ED room 30 c/c of being SOB with activity for x2 days. Pt has a hx of renal failure and heart failure. Pt denies chest pain and states she has had a decrease in appetite. Pt is trying to stay hydrated with fluids. Pt states her urine and BM's for her normal. Pt was on room air at saturation was at 88%, put her on oxygen per protocol. Pt on nasal canula 3.5 L and oxygen saturation came up to 96%. Amber Steiner NP at bedside to assess.        Reyna Lezama RN  11/01/18 6448

## 2018-11-02 LAB
ALBUMIN SERPL-MCNC: 3.8 G/DL (ref 3.5–5.1)
ALP BLD-CCNC: 62 U/L (ref 38–126)
ALT SERPL-CCNC: 9 U/L (ref 11–66)
ANION GAP SERPL CALCULATED.3IONS-SCNC: 15 MEQ/L (ref 8–16)
AST SERPL-CCNC: 15 U/L (ref 5–40)
BILIRUB SERPL-MCNC: 0.4 MG/DL (ref 0.3–1.2)
BUN BLDV-MCNC: 68 MG/DL (ref 7–22)
CALCIUM SERPL-MCNC: 9.3 MG/DL (ref 8.5–10.5)
CHLORIDE BLD-SCNC: 99 MEQ/L (ref 98–111)
CO2: 29 MEQ/L (ref 23–33)
CREAT SERPL-MCNC: 6.6 MG/DL (ref 0.4–1.2)
EKG ATRIAL RATE: 71 BPM
EKG P AXIS: 62 DEGREES
EKG P-R INTERVAL: 154 MS
EKG Q-T INTERVAL: 410 MS
EKG QRS DURATION: 68 MS
EKG QTC CALCULATION (BAZETT): 445 MS
EKG R AXIS: 28 DEGREES
EKG T AXIS: 30 DEGREES
EKG VENTRICULAR RATE: 71 BPM
ERYTHROCYTE [DISTWIDTH] IN BLOOD BY AUTOMATED COUNT: 19.6 % (ref 11.5–14.5)
ERYTHROCYTE [DISTWIDTH] IN BLOOD BY AUTOMATED COUNT: 59.1 FL (ref 35–45)
GFR SERPL CREATININE-BSD FRML MDRD: 8 ML/MIN/1.73M2
GLUCOSE BLD-MCNC: 124 MG/DL (ref 70–108)
GLUCOSE BLD-MCNC: 126 MG/DL (ref 70–108)
GLUCOSE BLD-MCNC: 130 MG/DL (ref 70–108)
GLUCOSE BLD-MCNC: 140 MG/DL (ref 70–108)
GLUCOSE BLD-MCNC: 143 MG/DL (ref 70–108)
HBV SURFACE AB TITR SER: NEGATIVE {TITER}
HCT VFR BLD CALC: 28.2 % (ref 37–47)
HEMOGLOBIN: 8.6 GM/DL (ref 12–16)
HEPATITIS B CORE IGM ANTIBODY: NEGATIVE
HEPATITIS B SURFACE ANTIGEN: NEGATIVE
MAGNESIUM: 2.2 MG/DL (ref 1.6–2.4)
MCH RBC QN AUTO: 25.3 PG (ref 26–33)
MCHC RBC AUTO-ENTMCNC: 30.5 GM/DL (ref 32.2–35.5)
MCV RBC AUTO: 82.9 FL (ref 81–99)
PLATELET # BLD: 208 THOU/MM3 (ref 130–400)
PMV BLD AUTO: 10.2 FL (ref 9.4–12.4)
POTASSIUM REFLEX MAGNESIUM: 3.4 MEQ/L (ref 3.5–5.2)
RBC # BLD: 3.4 MILL/MM3 (ref 4.2–5.4)
SODIUM BLD-SCNC: 143 MEQ/L (ref 135–145)
TOTAL PROTEIN: 6.1 G/DL (ref 6.1–8)
WBC # BLD: 6 THOU/MM3 (ref 4.8–10.8)

## 2018-11-02 PROCEDURE — 36415 COLL VENOUS BLD VENIPUNCTURE: CPT

## 2018-11-02 PROCEDURE — 83735 ASSAY OF MAGNESIUM: CPT

## 2018-11-02 PROCEDURE — 90935 HEMODIALYSIS ONE EVALUATION: CPT

## 2018-11-02 PROCEDURE — 2580000003 HC RX 258: Performed by: HOSPITALIST

## 2018-11-02 PROCEDURE — 94640 AIRWAY INHALATION TREATMENT: CPT

## 2018-11-02 PROCEDURE — 6370000000 HC RX 637 (ALT 250 FOR IP): Performed by: HOSPITALIST

## 2018-11-02 PROCEDURE — 6360000002 HC RX W HCPCS: Performed by: INTERNAL MEDICINE

## 2018-11-02 PROCEDURE — 93010 ELECTROCARDIOGRAM REPORT: CPT | Performed by: INTERNAL MEDICINE

## 2018-11-02 PROCEDURE — 85027 COMPLETE CBC AUTOMATED: CPT

## 2018-11-02 PROCEDURE — 1200000003 HC TELEMETRY R&B

## 2018-11-02 PROCEDURE — 86706 HEP B SURFACE ANTIBODY: CPT

## 2018-11-02 PROCEDURE — 6360000002 HC RX W HCPCS: Performed by: HOSPITALIST

## 2018-11-02 PROCEDURE — 99221 1ST HOSP IP/OBS SF/LOW 40: CPT | Performed by: INTERNAL MEDICINE

## 2018-11-02 PROCEDURE — 2580000003 HC RX 258: Performed by: INTERNAL MEDICINE

## 2018-11-02 PROCEDURE — 5A1D70Z PERFORMANCE OF URINARY FILTRATION, INTERMITTENT, LESS THAN 6 HOURS PER DAY: ICD-10-PCS | Performed by: INTERNAL MEDICINE

## 2018-11-02 PROCEDURE — 82948 REAGENT STRIP/BLOOD GLUCOSE: CPT

## 2018-11-02 PROCEDURE — 6360000002 HC RX W HCPCS: Performed by: NURSE PRACTITIONER

## 2018-11-02 PROCEDURE — 87340 HEPATITIS B SURFACE AG IA: CPT

## 2018-11-02 PROCEDURE — 99233 SBSQ HOSP IP/OBS HIGH 50: CPT | Performed by: FAMILY MEDICINE

## 2018-11-02 PROCEDURE — 86705 HEP B CORE ANTIBODY IGM: CPT

## 2018-11-02 PROCEDURE — 94761 N-INVAS EAR/PLS OXIMETRY MLT: CPT

## 2018-11-02 PROCEDURE — 80053 COMPREHEN METABOLIC PANEL: CPT

## 2018-11-02 PROCEDURE — 6370000000 HC RX 637 (ALT 250 FOR IP): Performed by: INTERNAL MEDICINE

## 2018-11-02 PROCEDURE — 2700000000 HC OXYGEN THERAPY PER DAY

## 2018-11-02 RX ORDER — SENNA PLUS 8.6 MG/1
1 TABLET ORAL DAILY
COMMUNITY
End: 2019-01-08 | Stop reason: ALTCHOICE

## 2018-11-02 RX ORDER — POTASSIUM CHLORIDE 20 MEQ/1
40 TABLET, EXTENDED RELEASE ORAL ONCE
Status: COMPLETED | OUTPATIENT
Start: 2018-11-02 | End: 2018-11-02

## 2018-11-02 RX ORDER — HEPARIN SODIUM 5000 [USP'U]/ML
5000 INJECTION, SOLUTION INTRAVENOUS; SUBCUTANEOUS EVERY 8 HOURS SCHEDULED
Status: DISCONTINUED | OUTPATIENT
Start: 2018-11-02 | End: 2018-11-09 | Stop reason: HOSPADM

## 2018-11-02 RX ORDER — MINOXIDIL 2.5 MG/1
5 TABLET ORAL 3 TIMES DAILY
COMMUNITY
End: 2020-04-27

## 2018-11-02 RX ORDER — BUMETANIDE 1 MG/1
2 TABLET ORAL DAILY
COMMUNITY
End: 2020-01-10

## 2018-11-02 RX ADMIN — POTASSIUM CHLORIDE 40 MEQ: 20 TABLET, EXTENDED RELEASE ORAL at 16:52

## 2018-11-02 RX ADMIN — CARVEDILOL 37.5 MG: 25 TABLET, FILM COATED ORAL at 16:52

## 2018-11-02 RX ADMIN — Medication 2 UNITS: at 13:16

## 2018-11-02 RX ADMIN — TIOTROPIUM BROMIDE 18 MCG: 18 CAPSULE ORAL; RESPIRATORY (INHALATION) at 08:11

## 2018-11-02 RX ADMIN — HEPARIN SODIUM 5000 UNITS: 5000 INJECTION INTRAVENOUS; SUBCUTANEOUS at 06:09

## 2018-11-02 RX ADMIN — SODIUM BICARBONATE 1300 MG: 650 TABLET ORAL at 10:38

## 2018-11-02 RX ADMIN — FERROUS SULFATE TAB 325 MG (65 MG ELEMENTAL FE) 325 MG: 325 (65 FE) TAB at 10:38

## 2018-11-02 RX ADMIN — ATORVASTATIN CALCIUM 40 MG: 40 TABLET, FILM COATED ORAL at 21:18

## 2018-11-02 RX ADMIN — Medication 10 ML: at 21:18

## 2018-11-02 RX ADMIN — ASPIRIN 81 MG: 81 TABLET, COATED ORAL at 10:41

## 2018-11-02 RX ADMIN — DOCUSATE SODIUM 100 MG: 100 CAPSULE, LIQUID FILLED ORAL at 10:41

## 2018-11-02 RX ADMIN — BUMETANIDE 2 MG: 1 TABLET ORAL at 21:18

## 2018-11-02 RX ADMIN — SODIUM BICARBONATE 1300 MG: 650 TABLET ORAL at 01:21

## 2018-11-02 RX ADMIN — Medication 10 ML: at 16:54

## 2018-11-02 RX ADMIN — CLONIDINE HYDROCHLORIDE 0.3 MG: 0.2 TABLET ORAL at 22:32

## 2018-11-02 RX ADMIN — FLUTICASONE PROPIONATE 1 SPRAY: 50 SPRAY, METERED NASAL at 10:39

## 2018-11-02 RX ADMIN — BUMETANIDE 2 MG: 1 TABLET ORAL at 01:20

## 2018-11-02 RX ADMIN — MINOXIDIL 5 MG: 2.5 TABLET ORAL at 10:38

## 2018-11-02 RX ADMIN — CLONIDINE HYDROCHLORIDE 0.3 MG: 0.2 TABLET ORAL at 01:20

## 2018-11-02 RX ADMIN — DARBEPOETIN ALFA 200 MCG: 200 INJECTION, SOLUTION INTRAVENOUS; SUBCUTANEOUS at 22:30

## 2018-11-02 RX ADMIN — HEPARIN SODIUM 5000 UNITS: 5000 INJECTION INTRAVENOUS; SUBCUTANEOUS at 16:54

## 2018-11-02 RX ADMIN — AMLODIPINE BESYLATE 10 MG: 10 TABLET ORAL at 13:16

## 2018-11-02 RX ADMIN — SODIUM BICARBONATE 1300 MG: 650 TABLET ORAL at 21:18

## 2018-11-02 RX ADMIN — CALCITRIOL 0.25 MCG: 0.25 CAPSULE ORAL at 10:38

## 2018-11-02 RX ADMIN — DOXAZOSIN 12 MG: 4 TABLET ORAL at 13:16

## 2018-11-02 RX ADMIN — CARVEDILOL 37.5 MG: 25 TABLET, FILM COATED ORAL at 10:38

## 2018-11-02 RX ADMIN — Medication 10 ML: at 01:21

## 2018-11-02 RX ADMIN — ACETAMINOPHEN 650 MG: 325 TABLET ORAL at 21:20

## 2018-11-02 RX ADMIN — MINOXIDIL 5 MG: 2.5 TABLET ORAL at 21:18

## 2018-11-02 RX ADMIN — MINOXIDIL 5 MG: 2.5 TABLET ORAL at 16:52

## 2018-11-02 RX ADMIN — MINOXIDIL 5 MG: 2.5 TABLET ORAL at 01:21

## 2018-11-02 RX ADMIN — BUMETANIDE 2 MG: 1 TABLET ORAL at 10:39

## 2018-11-02 RX ADMIN — CHLOROTHIAZIDE SODIUM 500 MG: 500 INJECTION, POWDER, LYOPHILIZED, FOR SOLUTION INTRAVENOUS at 13:16

## 2018-11-02 RX ADMIN — HEPARIN SODIUM 5000 UNITS: 5000 INJECTION INTRAVENOUS; SUBCUTANEOUS at 22:28

## 2018-11-02 RX ADMIN — CLONIDINE HYDROCHLORIDE 0.3 MG: 0.2 TABLET ORAL at 10:38

## 2018-11-02 RX ADMIN — DOCUSATE SODIUM 100 MG: 100 CAPSULE, LIQUID FILLED ORAL at 01:20

## 2018-11-02 RX ADMIN — CLONIDINE HYDROCHLORIDE 0.3 MG: 0.2 TABLET ORAL at 16:52

## 2018-11-02 ASSESSMENT — PAIN DESCRIPTION - LOCATION
LOCATION: ANKLE
LOCATION: ANKLE

## 2018-11-02 ASSESSMENT — PAIN SCALES - GENERAL
PAINLEVEL_OUTOF10: 6
PAINLEVEL_OUTOF10: 4
PAINLEVEL_OUTOF10: 4
PAINLEVEL_OUTOF10: 6

## 2018-11-02 ASSESSMENT — PAIN DESCRIPTION - PAIN TYPE
TYPE: ACUTE PAIN
TYPE: ACUTE PAIN

## 2018-11-02 ASSESSMENT — PAIN DESCRIPTION - ORIENTATION
ORIENTATION: LEFT
ORIENTATION: LEFT

## 2018-11-02 ASSESSMENT — PAIN DESCRIPTION - FREQUENCY: FREQUENCY: INTERMITTENT

## 2018-11-02 NOTE — H&P
History & Physical        Patient:  Boo Ledezma  YOB: 1955    MRN: [id number]     Acct: [de-identified]    PCP: DONOVAN Herr CNP    Date of Admission: 11/1/2018    Date of Service: Pt seen/examined on 11/02/18  and Admitted to Inpatient with expected LOS greater than two midnights due to medical therapy. Chief Complaint:  Shortness of breath      History Of Present Illness:      57 y.o. female presented to [address] [place] [address] with shortness of breath. Patient was hypoxic on arrival with 88% saturation on room air. Patient has history of ESRD and fluid overload. Last Echo showed EF of 50%. Patient's O2 saturation improved with nasal canula. Patient was given a dose of Bumex in ER. Patient not making much urine. Patient recently had AV fistula placement (2.5 months ago). Patient was instructed to come to hospital if she becomes short of breath. Patient feels improved with oxygen supplement and has no other acute complaint.       Past Medical History:          Diagnosis Date    Anemia associated with chronic renal failure     Aranesp 150 micrograms every other week given at [facility]. [facility]    Anxiety     Arthritis     Backache     Blood circulation, collateral     Blood transfusion     CAD (coronary artery disease)     Cellulitis in diabetic foot ([address] 03/03/2017    4th and 5th toes right foot    Chest pain     History of    CHF (congestive heart failure) ([address] 1998    Dx'ed by Dr. Chastity Willis Chronic anemia     Chronic kidney disease     Chronic kidney disease, stage III (moderate) (HCC)     Chronic renal insufficiency 2010    COPD (chronic obstructive pulmonary disease) ([address] 2012    Dr. Alena Amaya    Coronary disease     Moderate    Depression     Diabetes mellitus, type 2 ([address] 1988    Disease of blood and blood forming organ     GERD (gastroesophageal reflux disease)     Hemoglobin disease (HCC)     hemoglobin hope    History of granulomatous disease ([facility] 2009    followed by Dr. Radha Deras    HTN (hypertension) 1970's    Hyperlipemia 1998    Iron deficiency anemia due to dietary causes 6/21/2018    Kidney stones 3/2014    Kidney trouble          MRSA infection 03/2017    right foot-Dr. Pradeep Vanegas (podiatrist)    Neuromuscular disorder ([facility]     Neuropathy 1989    diabetic neuropathy    Obesity since childhoood    CONTRERAS on CPAP 2010    Dr. Ruben Browne    Pneumonia     PONV (postoperative nausea and vomiting)     Seizures ([facility]        Past Surgical History:          Procedure Laterality Date    ANKLE SURGERY Right 02-10-14    Dr. Shima Mathis at [facility] 15  1990's    removal of benign tumor   [address]  2008   [address]  2009    2 polyps, not precanceorus    COSMETIC SURGERY  3/30/2012    eye lid lift    ENDOSCOPY, COLON, DIAGNOSTIC  2007   [facility] EYE SURGERY  March 30th, 2012    left sided ptosis    FOOT SURGERY  1990    Tarsal tunnel surgery    FRACTURE SURGERY  2015   [address] and [address]    first partial in 1980's, then total in [address] [facility]  2015   Marylu Hamman LIVER BIOPSY  6/2015    LUNG BIOPSY  2009    PA OFFICE/OUTPT VISIT,PROCEDURE ONLY Left 8/23/2018    LEFT UPPER EXTREMENTY AV FISTULA CREATION performed by Daisy Richard MD at [facility]       Medications Prior to Admission:      Prior to Admission medications    Medication Sig Start Date End Date Taking? Authorizing Provider   metolazone (ZAROXOLYN) 5 MG tablet Take 1 tablet by mouth daily for 3 days 10/17/18 10/20/18  Betty Ang MD   darbepoetin quintin-polysorbate (ARANESP) 200 MCG/0.4ML SOSY injection Inject 200 mcg into the skin once    Historical Provider, MD   tiotropium (SPIRIVA HANDIHALER) 18 MCG inhalation capsule Inhale 1 capsule into the lungs daily 1 capsule via inhalation daily.  10/16/18 10/16/19  Kapil Loya MD   vitamin D (ERGOCALCIFEROL) 17799 units CAPS

## 2018-11-02 NOTE — ED NOTES
Updated pt on her room placement, just waiting on it to be cleaned and then we will get her transported to the floor. Pt denies pain at this time. Pt is on 3 L of oxygen, at sats are 99%. Respirations are easy and unlabored and pt does not show signs of SOB unless she moves around.       Héctor Buckley RN  11/01/18 3060

## 2018-11-02 NOTE — PROGRESS NOTES
Spoke with dialysis staff. State to hold giving any oral BP meds prior to leaving the floor, as this morning will be the patient's first hemodialysis run. Ask for this nurse to send her BP meds along to her treatment and they will administer during hemodialysis if needed.

## 2018-11-02 NOTE — FLOWSHEET NOTE
11/02/18 0011   Provider Notification   Reason for Communication Evaluate   Provider Name Dr. Christine Barber   Provider Notification Physician   Method of Communication Secure Message   Response Waiting for response   Notification Time 0111     12:10 am  [phone] From: [facility] [address] RE: Chelseyleanne Taveras [id number]. New admit. Pt states she takes 25 units of lantus at night. Would you like the order changed from 15 units to 25 units? Pt also states that she took metolazone for 3 days last week and med was then [facility]. Would you like to DC this medication? Thank you.   Unread

## 2018-11-02 NOTE — FLOWSHEET NOTE
11/02/18 0930 11/02/18 0955   Vital Signs   BP (!) 175/80 (!) 154/77   Temp 97.8 °F (36.6 °C) --    Pulse 78 72   Weight 206 lb 9.1 oz (93.7 kg) 206 lb 9.1 oz (93.7 kg)   Weight Method Bed scale Bed scale   Percent Weight Change -1.26 0   Post-Hemodialysis Assessment   Post-Treatment Procedures --  Blood returned; Access bleeding time < 10 minutes   Machine Disinfection Process --  Bleach; Exterior Machine Disinfection   Rinseback Volume (ml) --  400 ml   Total Liters Processed (l/min) --  3.9 l/min   Dialyzer Clearance --  Clear   Duration of Treatment (minutes) --  1 minutes   Heparin amount administered during treatment (units) --  0 units   Hemodialysis Intake (ml) --  400 ml   Hemodialysis Output (ml) --  0 ml   NET Removed (ml) --  0 ml   unable to do treatment today. Positive bruit and thrill, cannulated venous as soon as tourniquet removed there was no push or pull, asked for assistance Meseret CRAIG RN came and could not access fistula, needle pulled, clot noted. Meseret CRAIG Rn then cannulated venous and arterial with good push and pull as soon as treatment was initiated. High pressures and above arterial needle showed signs of infiltration, patient denied pain, and there was still push and pull of venous and arterial needles but the access was clearly infiltrated with edema over arterial needle. Dr. Mindi Starks notified of this and recommends letting arm rest today and try again tomorrow.

## 2018-11-02 NOTE — FLOWSHEET NOTE
11/02/18 0012   Provider Notification   Reason for Communication Evaluate   Provider Name Dr. Bear Olivier   Provider Notification Physician   Method of Communication Call   Response See orders   Notification Time 0012     Dr. Bear Olivier gave orders to DC metolazone and keep lantus at 15 units because \"she will eat much better here. \"

## 2018-11-02 NOTE — CONSULTS
Renal Clinic    Anxiety     Arthritis     Backache     Blood circulation, collateral     Blood transfusion     CAD (coronary artery disease)     Cellulitis in diabetic foot ([address] 03/03/2017    4th and 5th toes right foot    Chest pain     History of    CHF (congestive heart failure) ([address] 1998    Dx'ed by Dr. Yoel Singh Chronic anemia     Chronic kidney disease     Chronic kidney disease, stage III (moderate) ([address]     Chronic renal insufficiency 2010    COPD (chronic obstructive pulmonary disease) ([address] 2012    Dr. Fredi Green    Coronary disease     Moderate    Depression     Diabetes mellitus, type 2 ([address] 1988    Disease of blood and blood forming organ     GERD (gastroesophageal reflux disease)     Hemoglobin disease ([address]     hemoglobin hope    History of granulomatous disease ([address] 2009    followed by Dr. Crystal Harp    HTN (hypertension) 1970's    Hyperlipemia 1998    Iron deficiency anemia due to dietary causes 6/21/2018    Kidney stones 3/2014    Kidney trouble          MRSA infection 03/2017    right foot-Dr. Krystal Gutierrez (podiatrist)    Neuromuscular disorder ([address]     Neuropathy 1989    diabetic neuropathy    Obesity since childhoood    CONTRERAS on CPAP 2010    Dr. Fredi Green    Pneumonia     PONV (postoperative nausea and vomiting)     Seizures ([address]        Past Surgical History:   Procedure Laterality Date    ANKLE SURGERY Right 02-10-14    [facility]. [facility]ia Carrizales at [facility] 15  1990's    removal of benign tumor   [address]  2008   [address]    COLONOSCOPY  2009    2 polyps, not precanceorus    COSMETIC SURGERY  3/30/2012    eye lid lift    ENDOSCOPY, COLON, DIAGNOSTIC  2007   [facility] EYE SURGERY  March 30th, 2012    left sided ptosis    FOOT SURGERY  1990    Tarsal tunnel surgery    FRACTURE SURGERY  2015    HYSTERECTOMY  1980 and 1985    first partial in 1980's, then total in [address] [facility]  2015    LIVER BIOPSY tablet Daily   fluticasone (FLONASE) 50 MCG/ACT nasal spray 1 spray Daily   insulin glargine (LANTUS) injection vial 15 Units Nightly   lactulose (CHRONULAC) 10 GM/15ML solution 20 g BID PRN   minoxidil (LONITEN) tablet 5 mg TID   nitroGLYCERIN (NITROSTAT) SL tablet 0.4 mg Q5 Min PRN   sodium bicarbonate tablet 1,300 mg BID   tiotropium (SPIRIVA) inhalation capsule 18 mcg Daily   sodium chloride flush 0.9 % injection 10 mL 2 times per day   sodium chloride flush 0.9 % injection 10 mL PRN   ondansetron (ZOFRAN) injection 4 mg Q6H PRN   ipratropium-albuterol (DUONEB) nebulizer solution 1 ampule Q4H PRN   insulin lispro (HUMALOG) injection vial 0-12 Units TID WC   insulin lispro (HUMALOG) injection vial 0-6 Units Nightly       Review of Systems:   Pertinent positives stated above in HPI. All other systems were reviewed and were negative. ROS:Constitutional: negative  Eyes: negative  Ears, nose, mouth, throat, and face: negative  Respiratory: positive for dyspnea on exertion and shortness of breath  Cardiovascular: positive for dyspnea and lower extremity edema  Gastrointestinal: negative  Genitourinary:negative  Integument/breast: negative  Hematologic/lymphatic: negative  Musculoskeletal:positive for arthralgias and stiff joints  Neurological: negative  Behavioral/Psych: negative  Endocrine: negative  Allergic/Immunologic: negative    Physical exam:   Constitutional:  Well-developed middle-aged lady in no distress. Vitals:   Vitals:    11/02/18 1650   BP: (!) 151/67   Pulse: 72   Resp: 18   Temp: 98.6 °F (37 °C)   SpO2: 94%       Skin: no rash, turgor normal  Heent:  Pupils are reactive to light and accommodation. Neck: no bruits or jvd noted  Cardiovascular:  S1, S2 without murmur or rubs  Respiratory: Bibasilar crackles  Abdomen:  Soft. Good bowel sounds. No palpable mass. No tenderness with palpation. Ext: + lower extremity edema. Left brachiocephalic fistula is noted.   It has  good bruit and good

## 2018-11-03 PROBLEM — E11.9 TYPE 2 DIABETES MELLITUS WITH INSULIN THERAPY (HCC): Status: ACTIVE | Noted: 2018-08-09

## 2018-11-03 PROBLEM — Z79.4 TYPE 2 DIABETES MELLITUS WITH INSULIN THERAPY (HCC): Status: ACTIVE | Noted: 2018-08-09

## 2018-11-03 LAB
GLUCOSE BLD-MCNC: 111 MG/DL (ref 70–108)
GLUCOSE BLD-MCNC: 140 MG/DL (ref 70–108)
GLUCOSE BLD-MCNC: 162 MG/DL (ref 70–108)

## 2018-11-03 PROCEDURE — 82948 REAGENT STRIP/BLOOD GLUCOSE: CPT

## 2018-11-03 PROCEDURE — 2700000000 HC OXYGEN THERAPY PER DAY

## 2018-11-03 PROCEDURE — 90935 HEMODIALYSIS ONE EVALUATION: CPT | Performed by: INTERNAL MEDICINE

## 2018-11-03 PROCEDURE — 99233 SBSQ HOSP IP/OBS HIGH 50: CPT | Performed by: FAMILY MEDICINE

## 2018-11-03 PROCEDURE — 94640 AIRWAY INHALATION TREATMENT: CPT

## 2018-11-03 PROCEDURE — 94760 N-INVAS EAR/PLS OXIMETRY 1: CPT

## 2018-11-03 PROCEDURE — 90935 HEMODIALYSIS ONE EVALUATION: CPT

## 2018-11-03 PROCEDURE — 6370000000 HC RX 637 (ALT 250 FOR IP): Performed by: HOSPITALIST

## 2018-11-03 PROCEDURE — 6360000002 HC RX W HCPCS: Performed by: HOSPITALIST

## 2018-11-03 PROCEDURE — 1200000003 HC TELEMETRY R&B

## 2018-11-03 PROCEDURE — 2580000003 HC RX 258: Performed by: HOSPITALIST

## 2018-11-03 RX ADMIN — HEPARIN SODIUM 5000 UNITS: 5000 INJECTION INTRAVENOUS; SUBCUTANEOUS at 16:05

## 2018-11-03 RX ADMIN — Medication 1 TABLET: at 15:59

## 2018-11-03 RX ADMIN — CLONIDINE HYDROCHLORIDE 0.3 MG: 0.2 TABLET ORAL at 23:41

## 2018-11-03 RX ADMIN — BUMETANIDE 2 MG: 1 TABLET ORAL at 20:56

## 2018-11-03 RX ADMIN — FERROUS SULFATE TAB 325 MG (65 MG ELEMENTAL FE) 325 MG: 325 (65 FE) TAB at 15:59

## 2018-11-03 RX ADMIN — AMLODIPINE BESYLATE 10 MG: 10 TABLET ORAL at 15:57

## 2018-11-03 RX ADMIN — CLONIDINE HYDROCHLORIDE 0.3 MG: 0.2 TABLET ORAL at 15:58

## 2018-11-03 RX ADMIN — Medication 10 ML: at 16:04

## 2018-11-03 RX ADMIN — HEPARIN SODIUM 5000 UNITS: 5000 INJECTION INTRAVENOUS; SUBCUTANEOUS at 21:30

## 2018-11-03 RX ADMIN — BUMETANIDE 2 MG: 1 TABLET ORAL at 15:59

## 2018-11-03 RX ADMIN — INSULIN GLARGINE 15 UNITS: 100 INJECTION, SOLUTION SUBCUTANEOUS at 20:55

## 2018-11-03 RX ADMIN — ATORVASTATIN CALCIUM 40 MG: 40 TABLET, FILM COATED ORAL at 20:56

## 2018-11-03 RX ADMIN — HEPARIN SODIUM 5000 UNITS: 5000 INJECTION INTRAVENOUS; SUBCUTANEOUS at 06:37

## 2018-11-03 RX ADMIN — Medication 10 ML: at 20:56

## 2018-11-03 RX ADMIN — SODIUM BICARBONATE 1300 MG: 650 TABLET ORAL at 20:56

## 2018-11-03 RX ADMIN — ASPIRIN 81 MG: 81 TABLET, COATED ORAL at 16:03

## 2018-11-03 RX ADMIN — TIOTROPIUM BROMIDE 18 MCG: 18 CAPSULE ORAL; RESPIRATORY (INHALATION) at 09:10

## 2018-11-03 RX ADMIN — FLUTICASONE PROPIONATE 1 SPRAY: 50 SPRAY, METERED NASAL at 16:03

## 2018-11-03 RX ADMIN — SODIUM BICARBONATE 1300 MG: 650 TABLET ORAL at 15:59

## 2018-11-03 RX ADMIN — MINOXIDIL 5 MG: 2.5 TABLET ORAL at 20:56

## 2018-11-03 RX ADMIN — DOXAZOSIN 12 MG: 4 TABLET ORAL at 15:59

## 2018-11-03 RX ADMIN — MINOXIDIL 5 MG: 2.5 TABLET ORAL at 15:57

## 2018-11-03 RX ADMIN — CARVEDILOL 37.5 MG: 25 TABLET, FILM COATED ORAL at 15:57

## 2018-11-03 RX ADMIN — DOCUSATE SODIUM 100 MG: 100 CAPSULE, LIQUID FILLED ORAL at 16:02

## 2018-11-03 ASSESSMENT — PAIN SCALES - GENERAL
PAINLEVEL_OUTOF10: 7
PAINLEVEL_OUTOF10: 5

## 2018-11-03 ASSESSMENT — PAIN DESCRIPTION - PAIN TYPE
TYPE: CHRONIC PAIN
TYPE: CHRONIC PAIN

## 2018-11-03 ASSESSMENT — PAIN DESCRIPTION - LOCATION
LOCATION: LEG
LOCATION: LEG

## 2018-11-03 ASSESSMENT — PAIN DESCRIPTION - ORIENTATION
ORIENTATION: RIGHT;LEFT
ORIENTATION: RIGHT;LEFT

## 2018-11-03 NOTE — PROGRESS NOTES
[facility]         Renal Inpatient Follow-Up note         11/3/2018 12:28 PM    Pt Name:   Gayle Fairbanks  YOB: 1955  Attending:   Hiral Douglass MD    Chief Complaint : Gayle Fairbanks is a 57 y.o. female being followed by nephrology for End-stage renal disease    Interval History :   Patient seen and examined by me.  No distress  No significant chest pain or shortness of breath at this time  Complains of lower extremity edema     Scheduled Medications :    heparin (porcine)  5,000 Units Subcutaneous 3 times per day    influenza virus vaccine  0.5 mL Intramuscular Once    amLODIPine  10 mg Oral Daily    aspirin  81 mg Oral Daily    atorvastatin  40 mg Oral Daily    bumetanide  2 mg Oral BID    calcitRIOL  0.25 mcg Oral Once per day on Mon Wed Fri    carvedilol  37.5 mg Oral BID    cloNIDine  0.3 mg Oral Q8H    docusate sodium  100 mg Oral BID    doxazosin  12 mg Oral Daily    ferrous sulfate  325 mg Oral Daily with breakfast    folbee plus  1 tablet Oral Daily    fluticasone  1 spray Nasal Daily    insulin glargine  15 Units Subcutaneous Nightly    minoxidil  5 mg Oral TID    sodium bicarbonate  1,300 mg Oral BID    tiotropium  18 mcg Inhalation Daily    sodium chloride flush  10 mL Intravenous 2 times per day    insulin lispro  0-12 Units Subcutaneous TID WC    insulin lispro  0-6 Units Subcutaneous Nightly         Vitals :  BP (!) 188/80   Pulse 66   Temp 97.9 °F (36.6 °C)   Resp 20   Ht 5' 5\" (1.651 m)   Wt 212 lb 1.3 oz (96.2 kg)   SpO2 93%   BMI 35.29 kg/m²     24HR INTAKE/OUTPUT:      Intake/Output Summary (Last 24 hours) at 11/03/18 1228  Last data filed at 11/03/18 0943   Gross per 24 hour   Intake              640 ml   Output             1850 ml   Net            -1210 ml     Last 3 weights  Wt Readings from Last 3 Encounters:   11/03/18 212 lb 1.3 oz (96.2 kg)   10/17/18 206 lb (93.4 kg)   10/16/18 207 lb (93.9 kg)           Physical Exam :  General Appearance:  Well developed. No distress  Mouth/Throat:  Oral mucosa moist  Neck:  Supple, no JVD  Lungs:  Breath sounds: clear  Heart[de-identified]  S1,S2 heard  Abdomen:  Soft, non - tender  Musculoskeletal:  Edema - Bilateral edema noted with chronic skin changes         Last 3 CBC   Recent Labs      11/01/18 1940 11/02/18   0544   WBC  6.7  6.0   RBC  3.68*  3.40*   HGB  9.4*  8.6*   HCT  30.3*  28.2*   PLT  171  208     Last 3 CMP  Recent Labs      11/01/18 1940 11/02/18   0544   NA  146*  143   K  4.5  3.4*   CL  99  99   CO2  29  29   BUN  70*  68*   CREATININE  6.6*  6.6*   CALCIUM  9.8  9.3   LABALBU  4.0  3.8   BILITOT  0.5  0.4             Assessment / Plan   Renal - ESRD on hemodialysis, volume status appears to be high  - Seen during hemodialysis tolerated okay blood flow rate of 250 UF 3.4 L and blood pressure 188    Mild hypokalemia mostly secondary to diuretic currently being dialyzed on a 3K bath we S/P replacement yesterday    Fluid overload continue diuretics being aimed for almost 3 L of ultrafiltration  Anemia of end-stage renal disease  Essential hypertension fluctuating running slightly higher in the ultrafiltration should help follow  Diabetes mellitus  D/W patient, nurse. meds reviewed    MIKE Macdonald D.  [facility].

## 2018-11-03 NOTE — PROGRESS NOTES
Hospitalist Progress Note      Patient:  Bipin Montemayor      Unit/Bed:6K-19/019-A    YOB: 1955    MRN: [id number]       Acct: [de-identified]     PCP: DONOVAN Garcia CNP    Date of Admission: 11/1/2018    Assessment/Plan:    1. Volume overload/hypervolemia  -- not CHF - due to renal dysfunction -- fluid mgmt per renal -- to start HD 11/2 but unable due to fistula issues thus to retry 11/3  -- bumex 2 mg po bid resumed on admission -??cont or change to IV -> await renal recs - home zaroxolyn held 11/2 per renal  -- chlorothiazide ordered x 1 11/2 per renal with ++u/o   -- BNP has been chronically elevated in 5000's since 7/2018  -- echo 7/25/18 = EF 50%, mild global hypokinesis of LV, mildly dilated LA, moderate pericardial effusion without tamponade --??repeat echo  -- repeat CXR 11/4  2. CKD stage V progressed to ESRD -- per renal -- has LUE fistula placed 8/23/18 -- creat at baseline but unable to get rid of all her fluid -- was to start HD 11/2 but unable due to fistula dysfunction -- to retry HD 11/3 -- still with +u/o  3. Acute hypoxic respiratory failure -- POA -- sat in ER 88% on RA - O2 and wean as able -- due to pulm edema and right pleural effusion -- ??need to repeat echo with hx of pericardial effusion  -- ?need R/o PE if no improvement after fluid removed -> check BLE dopplers 11/4 -- ?need cardiac eval with elevated trop  4.  Elevated troponin -- doubt MI --  up to 0.199 on admission 11/1 -- had been 0.094 7/24/18 -- no cp but c/o sob -- ??up due to volume overload and renal dysfunction --  ??need cardiac eval with 40-60% diffuse dz per cath in [address] [place] [address] but had (-) stress 8/2018  -- cont ASA, statin, coreg  -- stress 8/2018 (-) ischemia  -- echo 7/25/18 = EF 50%, mild global hypokinesis of LV, mildly dilated LA, moderate pericardial effusion without tamponade  -- per cath in 1999 with 50% proximal RCA, 60% distal RCA, nonobstructive long LM, 40% proximal left circumflex, 60% mid diuretics. -- CXR with pulm edema, sat 88% on RA in ER --> started on O2 and admitted for further tx - renal consulted and plan to start HD 11/2 however has fistula LUE and unable to run 11/2 due to fistula issues - to try again 11/3  -- trop elevated 0.199 on admission --> ?due to ESRD and fluid overload - last stress 8/2018 (-). Subjective:   -- 11/3/2018  --> pt waiting to go to HD -  Still c/o sob - still on 3L O2. Still with leg swelling. Not much improvement in either. Denies cp. Denies abd pain, n/v.  Denies f/c.  Kelly po. Afebrile. Ambulating without difficulty - moving slow. Last BM - none since admission. +1.6L u/o last 24 hrs.         Medications:  Reviewed    Infusion Medications   Scheduled Medications    heparin (porcine)  5,000 Units Subcutaneous 3 times per day    influenza virus vaccine  0.5 mL Intramuscular Once    amLODIPine  10 mg Oral Daily    aspirin  81 mg Oral Daily    atorvastatin  40 mg Oral Daily    bumetanide  2 mg Oral BID    calcitRIOL  0.25 mcg Oral Once per day on Mon Wed Fri    carvedilol  37.5 mg Oral BID    cloNIDine  0.3 mg Oral Q8H    docusate sodium  100 mg Oral BID    doxazosin  12 mg Oral Daily    ferrous sulfate  325 mg Oral Daily with breakfast    folbee plus  1 tablet Oral Daily    fluticasone  1 spray Nasal Daily    insulin glargine  15 Units Subcutaneous Nightly    minoxidil  5 mg Oral TID    sodium bicarbonate  1,300 mg Oral BID    tiotropium  18 mcg Inhalation Daily    sodium chloride flush  10 mL Intravenous 2 times per day    insulin lispro  0-12 Units Subcutaneous TID WC    insulin lispro  0-6 Units Subcutaneous Nightly     PRN Meds: acetaminophen, albuterol sulfate HFA, lactulose, nitroGLYCERIN, sodium chloride flush, ondansetron, ipratropium-albuterol      Intake/Output Summary (Last 24 hours) at 11/03/18 1050  Last data filed at 11/03/18 0943   Gross per 24 hour   Intake              760 ml   Output             1850 ml   Net

## 2018-11-04 ENCOUNTER — APPOINTMENT (OUTPATIENT)
Dept: INTERVENTIONAL RADIOLOGY/VASCULAR | Age: 63
DRG: 682 | End: 2018-11-04
Payer: MEDICARE

## 2018-11-04 ENCOUNTER — APPOINTMENT (OUTPATIENT)
Dept: GENERAL RADIOLOGY | Age: 63
DRG: 682 | End: 2018-11-04
Payer: MEDICARE

## 2018-11-04 LAB
ANION GAP SERPL CALCULATED.3IONS-SCNC: 11 MEQ/L (ref 8–16)
BUN BLDV-MCNC: 46 MG/DL (ref 7–22)
CALCIUM SERPL-MCNC: 9.2 MG/DL (ref 8.5–10.5)
CHLORIDE BLD-SCNC: 99 MEQ/L (ref 98–111)
CO2: 31 MEQ/L (ref 23–33)
CREAT SERPL-MCNC: 5.3 MG/DL (ref 0.4–1.2)
EKG ATRIAL RATE: 62 BPM
EKG P AXIS: 30 DEGREES
EKG P-R INTERVAL: 166 MS
EKG Q-T INTERVAL: 442 MS
EKG QRS DURATION: 80 MS
EKG QTC CALCULATION (BAZETT): 448 MS
EKG R AXIS: 47 DEGREES
EKG T AXIS: 32 DEGREES
EKG VENTRICULAR RATE: 62 BPM
ERYTHROCYTE [DISTWIDTH] IN BLOOD BY AUTOMATED COUNT: 18.8 % (ref 11.5–14.5)
ERYTHROCYTE [DISTWIDTH] IN BLOOD BY AUTOMATED COUNT: 56.2 FL (ref 35–45)
GLUCOSE BLD-MCNC: 120 MG/DL (ref 70–108)
GLUCOSE BLD-MCNC: 142 MG/DL (ref 70–108)
GLUCOSE BLD-MCNC: 166 MG/DL (ref 70–108)
GLUCOSE BLD-MCNC: 49 MG/DL (ref 70–108)
GLUCOSE BLD-MCNC: 70 MG/DL (ref 70–108)
GLUCOSE BLD-MCNC: 82 MG/DL (ref 70–108)
GLUCOSE BLD-MCNC: 95 MG/DL (ref 70–108)
HCT VFR BLD CALC: 25.9 % (ref 37–47)
HEMOGLOBIN: 8 GM/DL (ref 12–16)
MCH RBC QN AUTO: 25.4 PG (ref 26–33)
MCHC RBC AUTO-ENTMCNC: 30.9 GM/DL (ref 32.2–35.5)
MCV RBC AUTO: 82.2 FL (ref 81–99)
PLATELET # BLD: 182 THOU/MM3 (ref 130–400)
PMV BLD AUTO: 10 FL (ref 9.4–12.4)
POTASSIUM SERPL-SCNC: 3.1 MEQ/L (ref 3.5–5.2)
RBC # BLD: 3.15 MILL/MM3 (ref 4.2–5.4)
SODIUM BLD-SCNC: 141 MEQ/L (ref 135–145)
TROPONIN T: 0.16 NG/ML
WBC # BLD: 5.7 THOU/MM3 (ref 4.8–10.8)

## 2018-11-04 PROCEDURE — 99232 SBSQ HOSP IP/OBS MODERATE 35: CPT | Performed by: INTERNAL MEDICINE

## 2018-11-04 PROCEDURE — 93010 ELECTROCARDIOGRAM REPORT: CPT | Performed by: INTERNAL MEDICINE

## 2018-11-04 PROCEDURE — 2580000003 HC RX 258: Performed by: HOSPITALIST

## 2018-11-04 PROCEDURE — 85027 COMPLETE CBC AUTOMATED: CPT

## 2018-11-04 PROCEDURE — 1200000003 HC TELEMETRY R&B

## 2018-11-04 PROCEDURE — 6360000002 HC RX W HCPCS: Performed by: HOSPITALIST

## 2018-11-04 PROCEDURE — 94640 AIRWAY INHALATION TREATMENT: CPT

## 2018-11-04 PROCEDURE — 90686 IIV4 VACC NO PRSV 0.5 ML IM: CPT | Performed by: HOSPITALIST

## 2018-11-04 PROCEDURE — 82948 REAGENT STRIP/BLOOD GLUCOSE: CPT

## 2018-11-04 PROCEDURE — 6370000000 HC RX 637 (ALT 250 FOR IP): Performed by: HOSPITALIST

## 2018-11-04 PROCEDURE — 71046 X-RAY EXAM CHEST 2 VIEWS: CPT

## 2018-11-04 PROCEDURE — 93005 ELECTROCARDIOGRAM TRACING: CPT | Performed by: FAMILY MEDICINE

## 2018-11-04 PROCEDURE — 94760 N-INVAS EAR/PLS OXIMETRY 1: CPT

## 2018-11-04 PROCEDURE — 2700000000 HC OXYGEN THERAPY PER DAY

## 2018-11-04 PROCEDURE — 93970 EXTREMITY STUDY: CPT

## 2018-11-04 PROCEDURE — 36415 COLL VENOUS BLD VENIPUNCTURE: CPT

## 2018-11-04 PROCEDURE — 99232 SBSQ HOSP IP/OBS MODERATE 35: CPT | Performed by: FAMILY MEDICINE

## 2018-11-04 PROCEDURE — 2580000003 HC RX 258

## 2018-11-04 PROCEDURE — 84484 ASSAY OF TROPONIN QUANT: CPT

## 2018-11-04 PROCEDURE — 6370000000 HC RX 637 (ALT 250 FOR IP): Performed by: FAMILY MEDICINE

## 2018-11-04 PROCEDURE — 80048 BASIC METABOLIC PNL TOTAL CA: CPT

## 2018-11-04 PROCEDURE — G0008 ADMIN INFLUENZA VIRUS VAC: HCPCS | Performed by: HOSPITALIST

## 2018-11-04 RX ORDER — INSULIN GLARGINE 100 [IU]/ML
8 INJECTION, SOLUTION SUBCUTANEOUS NIGHTLY
Status: DISCONTINUED | OUTPATIENT
Start: 2018-11-04 | End: 2018-11-09

## 2018-11-04 RX ORDER — DEXTROSE MONOHYDRATE 25 G/50ML
25 INJECTION, SOLUTION INTRAVENOUS PRN
Status: DISCONTINUED | OUTPATIENT
Start: 2018-11-04 | End: 2018-11-04 | Stop reason: DRUGHIGH

## 2018-11-04 RX ORDER — NICOTINE POLACRILEX 4 MG
15 LOZENGE BUCCAL PRN
Status: DISCONTINUED | OUTPATIENT
Start: 2018-11-04 | End: 2018-11-09 | Stop reason: HOSPADM

## 2018-11-04 RX ORDER — DEXTROSE MONOHYDRATE 25 G/50ML
INJECTION, SOLUTION INTRAVENOUS
Status: COMPLETED
Start: 2018-11-04 | End: 2018-11-04

## 2018-11-04 RX ORDER — DEXTROSE MONOHYDRATE 25 G/50ML
12.5 INJECTION, SOLUTION INTRAVENOUS PRN
Status: DISCONTINUED | OUTPATIENT
Start: 2018-11-04 | End: 2018-11-09 | Stop reason: HOSPADM

## 2018-11-04 RX ORDER — DEXTROSE MONOHYDRATE 50 MG/ML
100 INJECTION, SOLUTION INTRAVENOUS PRN
Status: DISCONTINUED | OUTPATIENT
Start: 2018-11-04 | End: 2018-11-09 | Stop reason: HOSPADM

## 2018-11-04 RX ORDER — POTASSIUM CHLORIDE 20 MEQ/1
40 TABLET, EXTENDED RELEASE ORAL ONCE
Status: COMPLETED | OUTPATIENT
Start: 2018-11-04 | End: 2018-11-04

## 2018-11-04 RX ADMIN — CARVEDILOL 37.5 MG: 25 TABLET, FILM COATED ORAL at 16:31

## 2018-11-04 RX ADMIN — DEXTROSE MONOHYDRATE 25 G: 500 INJECTION PARENTERAL at 04:01

## 2018-11-04 RX ADMIN — DOCUSATE SODIUM 100 MG: 100 CAPSULE, LIQUID FILLED ORAL at 08:07

## 2018-11-04 RX ADMIN — DEXTROSE MONOHYDRATE 25 G: 25 INJECTION, SOLUTION INTRAVENOUS at 04:01

## 2018-11-04 RX ADMIN — FERROUS SULFATE TAB 325 MG (65 MG ELEMENTAL FE) 325 MG: 325 (65 FE) TAB at 07:34

## 2018-11-04 RX ADMIN — INFLUENZA A VIRUS A/MICHIGAN/45/2015 X-275 (H1N1) ANTIGEN (FORMALDEHYDE INACTIVATED), INFLUENZA A VIRUS A/SINGAPORE/INFIMH-16-0019/2016 IVR-186 (H3N2) ANTIGEN (FORMALDEHYDE INACTIVATED), INFLUENZA B VIRUS B/PHUKET/3073/2013 ANTIGEN (FORMALDEHYDE INACTIVATED), AND INFLUENZA B VIRUS B/MARYLAND/15/2016 BX-69A ANTIGEN (FORMALDEHYDE INACTIVATED) 0.5 ML: 15; 15; 15; 15 INJECTION, SUSPENSION INTRAMUSCULAR at 10:58

## 2018-11-04 RX ADMIN — SODIUM BICARBONATE 1300 MG: 650 TABLET ORAL at 07:35

## 2018-11-04 RX ADMIN — FLUTICASONE PROPIONATE 1 SPRAY: 50 SPRAY, METERED NASAL at 07:33

## 2018-11-04 RX ADMIN — Medication 1 TABLET: at 07:38

## 2018-11-04 RX ADMIN — TIOTROPIUM BROMIDE 18 MCG: 18 CAPSULE ORAL; RESPIRATORY (INHALATION) at 08:58

## 2018-11-04 RX ADMIN — LACTULOSE 20 G: 20 SOLUTION ORAL at 11:12

## 2018-11-04 RX ADMIN — Medication 10 ML: at 21:16

## 2018-11-04 RX ADMIN — AMLODIPINE BESYLATE 10 MG: 10 TABLET ORAL at 07:34

## 2018-11-04 RX ADMIN — CLONIDINE HYDROCHLORIDE 0.3 MG: 0.2 TABLET ORAL at 23:13

## 2018-11-04 RX ADMIN — CARVEDILOL 37.5 MG: 25 TABLET, FILM COATED ORAL at 07:34

## 2018-11-04 RX ADMIN — Medication 10 ML: at 11:00

## 2018-11-04 RX ADMIN — MINOXIDIL 5 MG: 2.5 TABLET ORAL at 14:03

## 2018-11-04 RX ADMIN — POTASSIUM CHLORIDE 40 MEQ: 20 TABLET, EXTENDED RELEASE ORAL at 14:03

## 2018-11-04 RX ADMIN — SODIUM BICARBONATE 1300 MG: 650 TABLET ORAL at 21:11

## 2018-11-04 RX ADMIN — HEPARIN SODIUM 5000 UNITS: 5000 INJECTION INTRAVENOUS; SUBCUTANEOUS at 14:03

## 2018-11-04 RX ADMIN — BUMETANIDE 2 MG: 1 TABLET ORAL at 07:33

## 2018-11-04 RX ADMIN — ASPIRIN 81 MG: 81 TABLET, COATED ORAL at 07:41

## 2018-11-04 RX ADMIN — HEPARIN SODIUM 5000 UNITS: 5000 INJECTION INTRAVENOUS; SUBCUTANEOUS at 06:05

## 2018-11-04 RX ADMIN — HEPARIN SODIUM 5000 UNITS: 5000 INJECTION INTRAVENOUS; SUBCUTANEOUS at 21:11

## 2018-11-04 RX ADMIN — DOXAZOSIN 12 MG: 4 TABLET ORAL at 07:34

## 2018-11-04 RX ADMIN — ATORVASTATIN CALCIUM 40 MG: 40 TABLET, FILM COATED ORAL at 21:11

## 2018-11-04 RX ADMIN — CLONIDINE HYDROCHLORIDE 0.3 MG: 0.2 TABLET ORAL at 16:31

## 2018-11-04 RX ADMIN — BUMETANIDE 2 MG: 1 TABLET ORAL at 21:11

## 2018-11-04 RX ADMIN — MINOXIDIL 5 MG: 2.5 TABLET ORAL at 21:11

## 2018-11-04 ASSESSMENT — PAIN DESCRIPTION - ORIENTATION: ORIENTATION: RIGHT;LEFT

## 2018-11-04 ASSESSMENT — PAIN DESCRIPTION - PAIN TYPE: TYPE: CHRONIC PAIN

## 2018-11-04 ASSESSMENT — PAIN DESCRIPTION - LOCATION: LOCATION: LEG

## 2018-11-04 ASSESSMENT — PAIN SCALES - GENERAL: PAINLEVEL_OUTOF10: 4

## 2018-11-04 ASSESSMENT — PAIN DESCRIPTION - DESCRIPTORS: DESCRIPTORS: DISCOMFORT

## 2018-11-04 ASSESSMENT — PAIN DESCRIPTION - FREQUENCY: FREQUENCY: INTERMITTENT

## 2018-11-04 NOTE — PLAN OF CARE
Problem: Discharge Planning:  Goal: Participates in care planning  Participates in care planning   Outcome: Ongoing  Pt is active in plan of care, continue to update as needed. Goal: Discharged to appropriate level of care  Discharged to appropriate level of care   Outcome: Ongoing  Pt plans to return home upon discharge. Problem: Cardiac Output - Decreased:  Goal: Hemodynamic stability will improve  Hemodynamic stability will improve   Outcome: Ongoing  Pt remains hemodynamically stable at this time, BP was slightly elevated, medication given per [facility] Problem: Fluid Volume - Imbalance:  Goal: Absence of imbalanced fluid volume signs and symptoms  Absence of imbalanced fluid volume signs and symptoms   Outcome: Ongoing  Pt had 3L fluid pulled from HD today, lung sounds clear throughout. Will continue to assess for increased fluid imbalance. Problem: Pain:  Goal: Pain level will decrease  Pain level will decrease    Outcome: Ongoing  Pt continues to have chronic pain in legs, will continue to assess using 0-10 pain scale. Problem: Skin Integrity - Impaired:  Goal: Will show no infection signs and symptoms  Will show no infection signs and symptoms   Outcome: Ongoing  No increased signs of infection at this time, will continue to assess. Problem: Falls - Risk of:  Goal: Will remain free from falls  Will remain free from falls   Outcome: Ongoing  Pt remains free from falls at this time, call light and bedside table within reach. Bed in lowest position and alarm on, nonskid socks on bilaterally. Problem: Pain:  Goal: Pain level will decrease  Pain level will decrease    Outcome: Ongoing  Pt continues to have chronic pain in legs, will continue to assess using 0-10 pain scale. Comments: Care plan reviewed with patient and family. Patient and family verbalize understanding of the plan of care and contribute to goal setting.

## 2018-11-04 NOTE — PROGRESS NOTES
Hospitalist Progress Note      Patient:  Lata Tamayo      Unit/Bed:6K-19/019-A    YOB: 1955    MRN: [id number]       Acct: [de-identified]     PCP: DONOVAN Magana CNP    Date of Admission: 11/1/2018    Assessment/Plan:    1. Volume overload/hypervolemia  -- not CHF - due to renal dysfunction -- fluid mgmt per renal -- to start HD 11/2 but unable due to fistula issues -> able to do 11/3 with 3L removed - plan again 11/5  -- bumex 2 mg po bid resumed on admission -??cont or change to IV -> await renal recs - home zaroxolyn held 11/2 per renal  -- chlorothiazide ordered x 1 11/2 per renal with ++u/o   -- BNP has been chronically elevated in 5000's since 7/2018  -- echo 7/25/18 = EF 50%, mild global hypokinesis of LV, mildly dilated LA, moderate pericardial effusion without tamponade --??repeat echo  -- repeat CXR 11/4 cont with effusions R>L -> await another HD run and then ?need to tap  2. CKD stage V progressed to ESRD -- per renal -- has LUE fistula placed 8/23/18 -- creat at baseline but unable to get rid of all her fluid -- was to start HD 11/2 but unable due to fistula dysfunction but able to run 11/3 with 3L off --> plan again 11/5 and further schedule per renal  3. Acute hypoxic respiratory failure -- POA -- sat in ER 88% on RA - O2 and wean as able -- due to pulm edema and right pleural effusion  -- repeat CXR 11/4 s/p 3L removed 11/3 still with mod right effusion --> ?tap if still persistent after HD 11/5    -- ??need to repeat echo with hx of pericardial effusion    -- doubt PE with (-) BLE dopplers 11/4   -- ?need cardiac eval with elevated trop  4.  Elevated troponin -- doubt MI --  up to 0.199 on admission 11/1 -- had been 0.094 7/24/18 -- no cp but c/o sob -- likely up due to volume overload and renal dysfunction --  ??need cardiac eval with 40-60% diffuse dz per cath in [address] but had (-) stress 8/2018  -- cont ASA, statin, coreg  -- stress 8/2018 (-) ischemia  -- echo 7/25/18 working correctly and to retry tomorrow per Dr. Zuleyka Olson. Chlorothiazide to be given today per renal - ?cont po bumex. Cont monitor resp status - re-eval effusion after able to remove some fluid and see if needs thoracentesis if cont to have sob and no improvement with HD. Cont monitor BP as up and down as HD may alter BP as well. Cont monitor BS. ??need to repeat trop - ?need cardio eval given hx of CAD in 1999 per cath. ?need to r/o PE if no improvement in O2/dyspnea with HD - ?check BLE dopplers. Chief Complaint: sob, leg swelling    Hospital Course: Freda Garcia is a 57 y.o. female with CKD stage V/ESRD, COPD, CAD, chronic anemia, DM2, HTN, GERD, chronic right ankle swelling due to prior fracture/surgery presenting with persistent sob and leg swelling despite outpt diuretics. -- CXR with pulm edema, sat 88% on RA in ER --> started on O2 and admitted for further tx - renal consulted and plan to start HD 11/2 however has fistula LUE and unable to run 11/2 due to fistula issues - able to remove 3L 11/3 with HD  -- trop elevated 0.199 on admission --> ?due to ESRD and fluid overload - last stress 8/2018 (-). Subjective:   -- 11/4/2018  --> Pt w/o new c/o -  Still c/o sob - still on 2L O2. Still with leg swelling but improving after HD 11/3. Denies cp. Denies abd pain, n/v.  Denies f/c.  Kelly po. Afebrile. Ambulating without difficulty - moving slow. Last BM - none since admission. 750 u/o last 24 hrs. 3.0 L removed with HD 11/3.         Medications:  Reviewed    Infusion Medications    dextrose       Scheduled Medications    heparin (porcine)  5,000 Units Subcutaneous 3 times per day    influenza virus vaccine  0.5 mL Intramuscular Once    amLODIPine  10 mg Oral Daily    aspirin  81 mg Oral Daily    atorvastatin  40 mg Oral Daily    bumetanide  2 mg Oral BID    calcitRIOL  0.25 mcg Oral Once per day on Mon Wed Fri    carvedilol  37.5 mg Oral BID    cloNIDine  0.3 mg Oral Q8H

## 2018-11-05 ENCOUNTER — APPOINTMENT (OUTPATIENT)
Dept: INTERVENTIONAL RADIOLOGY/VASCULAR | Age: 63
DRG: 682 | End: 2018-11-05
Payer: MEDICARE

## 2018-11-05 LAB
ANION GAP SERPL CALCULATED.3IONS-SCNC: 13 MEQ/L (ref 8–16)
BUN BLDV-MCNC: 47 MG/DL (ref 7–22)
CALCIUM SERPL-MCNC: 9.5 MG/DL (ref 8.5–10.5)
CHLORIDE BLD-SCNC: 97 MEQ/L (ref 98–111)
CO2: 31 MEQ/L (ref 23–33)
CREAT SERPL-MCNC: 5.3 MG/DL (ref 0.4–1.2)
GLUCOSE BLD-MCNC: 133 MG/DL (ref 70–108)
GLUCOSE BLD-MCNC: 146 MG/DL (ref 70–108)
GLUCOSE BLD-MCNC: 158 MG/DL (ref 70–108)
GLUCOSE BLD-MCNC: 96 MG/DL (ref 70–108)
HCT VFR BLD CALC: 28.6 % (ref 37–47)
HEMOGLOBIN: 8.6 GM/DL (ref 12–16)
MAGNESIUM: 2.1 MG/DL (ref 1.6–2.4)
POTASSIUM SERPL-SCNC: 3.6 MEQ/L (ref 3.5–5.2)
SODIUM BLD-SCNC: 141 MEQ/L (ref 135–145)

## 2018-11-05 PROCEDURE — 2709999900 HC NON-CHARGEABLE SUPPLY

## 2018-11-05 PROCEDURE — 97165 OT EVAL LOW COMPLEX 30 MIN: CPT

## 2018-11-05 PROCEDURE — 85014 HEMATOCRIT: CPT

## 2018-11-05 PROCEDURE — G8988 SELF CARE GOAL STATUS: HCPCS

## 2018-11-05 PROCEDURE — 2700000000 HC OXYGEN THERAPY PER DAY

## 2018-11-05 PROCEDURE — 6370000000 HC RX 637 (ALT 250 FOR IP): Performed by: FAMILY MEDICINE

## 2018-11-05 PROCEDURE — 90935 HEMODIALYSIS ONE EVALUATION: CPT | Performed by: NURSE PRACTITIONER

## 2018-11-05 PROCEDURE — B50W1ZZ PLAIN RADIOGRAPHY OF DIALYSIS SHUNT/FISTULA USING LOW OSMOLAR CONTRAST: ICD-10-PCS | Performed by: RADIOLOGY

## 2018-11-05 PROCEDURE — C1769 GUIDE WIRE: HCPCS

## 2018-11-05 PROCEDURE — 99233 SBSQ HOSP IP/OBS HIGH 50: CPT | Performed by: FAMILY MEDICINE

## 2018-11-05 PROCEDURE — 83735 ASSAY OF MAGNESIUM: CPT

## 2018-11-05 PROCEDURE — 6360000004 HC RX CONTRAST MEDICATION: Performed by: RADIOLOGY

## 2018-11-05 PROCEDURE — 6360000002 HC RX W HCPCS

## 2018-11-05 PROCEDURE — 94760 N-INVAS EAR/PLS OXIMETRY 1: CPT

## 2018-11-05 PROCEDURE — 6370000000 HC RX 637 (ALT 250 FOR IP): Performed by: HOSPITALIST

## 2018-11-05 PROCEDURE — G8978 MOBILITY CURRENT STATUS: HCPCS

## 2018-11-05 PROCEDURE — G8987 SELF CARE CURRENT STATUS: HCPCS

## 2018-11-05 PROCEDURE — G8979 MOBILITY GOAL STATUS: HCPCS

## 2018-11-05 PROCEDURE — 80048 BASIC METABOLIC PNL TOTAL CA: CPT

## 2018-11-05 PROCEDURE — 36415 COLL VENOUS BLD VENIPUNCTURE: CPT

## 2018-11-05 PROCEDURE — 6360000002 HC RX W HCPCS: Performed by: HOSPITALIST

## 2018-11-05 PROCEDURE — C1894 INTRO/SHEATH, NON-LASER: HCPCS

## 2018-11-05 PROCEDURE — 36901 INTRO CATH DIALYSIS CIRCUIT: CPT | Performed by: RADIOLOGY

## 2018-11-05 PROCEDURE — 85018 HEMOGLOBIN: CPT

## 2018-11-05 PROCEDURE — 97530 THERAPEUTIC ACTIVITIES: CPT

## 2018-11-05 PROCEDURE — 2580000003 HC RX 258: Performed by: HOSPITALIST

## 2018-11-05 PROCEDURE — 1200000003 HC TELEMETRY R&B

## 2018-11-05 PROCEDURE — 82948 REAGENT STRIP/BLOOD GLUCOSE: CPT

## 2018-11-05 PROCEDURE — 2500000003 HC RX 250 WO HCPCS

## 2018-11-05 PROCEDURE — 6360000002 HC RX W HCPCS: Performed by: RADIOLOGY

## 2018-11-05 PROCEDURE — 97161 PT EVAL LOW COMPLEX 20 MIN: CPT

## 2018-11-05 PROCEDURE — 94640 AIRWAY INHALATION TREATMENT: CPT

## 2018-11-05 RX ORDER — HEPARIN SODIUM (PORCINE) LOCK FLUSH IV SOLN 100 UNIT/ML 100 UNIT/ML
300 SOLUTION INTRAVENOUS ONCE
Status: COMPLETED | OUTPATIENT
Start: 2018-11-05 | End: 2018-11-05

## 2018-11-05 RX ORDER — HEPARIN SODIUM 1000 [USP'U]/ML
2000 INJECTION, SOLUTION INTRAVENOUS; SUBCUTANEOUS ONCE
Status: COMPLETED | OUTPATIENT
Start: 2018-11-05 | End: 2018-11-05

## 2018-11-05 RX ORDER — MIDAZOLAM HYDROCHLORIDE 1 MG/ML
0.5 INJECTION INTRAMUSCULAR; INTRAVENOUS ONCE
Status: COMPLETED | OUTPATIENT
Start: 2018-11-05 | End: 2018-11-05

## 2018-11-05 RX ADMIN — ASPIRIN 81 MG: 81 TABLET, COATED ORAL at 16:38

## 2018-11-05 RX ADMIN — HEPARIN SODIUM 5000 UNITS: 5000 INJECTION INTRAVENOUS; SUBCUTANEOUS at 16:39

## 2018-11-05 RX ADMIN — ATORVASTATIN CALCIUM 40 MG: 40 TABLET, FILM COATED ORAL at 21:01

## 2018-11-05 RX ADMIN — HYDROMORPHONE HYDROCHLORIDE 0.5 MG: 1 INJECTION, SOLUTION INTRAMUSCULAR; INTRAVENOUS; SUBCUTANEOUS at 10:31

## 2018-11-05 RX ADMIN — HEPARIN SODIUM 5000 UNITS: 5000 INJECTION INTRAVENOUS; SUBCUTANEOUS at 21:01

## 2018-11-05 RX ADMIN — INSULIN GLARGINE 8 UNITS: 100 INJECTION, SOLUTION SUBCUTANEOUS at 21:01

## 2018-11-05 RX ADMIN — CARVEDILOL 37.5 MG: 25 TABLET, FILM COATED ORAL at 16:38

## 2018-11-05 RX ADMIN — CLONIDINE HYDROCHLORIDE 0.3 MG: 0.2 TABLET ORAL at 23:41

## 2018-11-05 RX ADMIN — MINOXIDIL 5 MG: 2.5 TABLET ORAL at 16:37

## 2018-11-05 RX ADMIN — FLUTICASONE PROPIONATE 1 SPRAY: 50 SPRAY, METERED NASAL at 16:39

## 2018-11-05 RX ADMIN — DOXAZOSIN 12 MG: 4 TABLET ORAL at 16:36

## 2018-11-05 RX ADMIN — DOCUSATE SODIUM 100 MG: 100 CAPSULE, LIQUID FILLED ORAL at 16:38

## 2018-11-05 RX ADMIN — Medication 1 TABLET: at 16:36

## 2018-11-05 RX ADMIN — INSULIN LISPRO 1 UNITS: 100 INJECTION, SOLUTION INTRAVENOUS; SUBCUTANEOUS at 21:02

## 2018-11-05 RX ADMIN — BUMETANIDE 2 MG: 1 TABLET ORAL at 16:37

## 2018-11-05 RX ADMIN — Medication 10 ML: at 23:42

## 2018-11-05 RX ADMIN — TIOTROPIUM BROMIDE 18 MCG: 18 CAPSULE ORAL; RESPIRATORY (INHALATION) at 07:58

## 2018-11-05 RX ADMIN — HEPARIN 300 UNITS: 100 SYRINGE at 11:07

## 2018-11-05 RX ADMIN — IOPAMIDOL 37 ML: 510 INJECTION, SOLUTION INTRAVASCULAR at 11:08

## 2018-11-05 RX ADMIN — HEPARIN SODIUM 2000 UNITS: 1000 INJECTION INTRAVENOUS; SUBCUTANEOUS at 10:51

## 2018-11-05 RX ADMIN — BUMETANIDE 2 MG: 1 TABLET ORAL at 21:01

## 2018-11-05 RX ADMIN — AMLODIPINE BESYLATE 10 MG: 10 TABLET ORAL at 16:38

## 2018-11-05 RX ADMIN — DOCUSATE SODIUM 100 MG: 100 CAPSULE, LIQUID FILLED ORAL at 21:02

## 2018-11-05 RX ADMIN — MIDAZOLAM HYDROCHLORIDE 0.5 MG: 2 INJECTION, SOLUTION INTRAMUSCULAR; INTRAVENOUS at 10:31

## 2018-11-05 RX ADMIN — CALCITRIOL 0.25 MCG: 0.25 CAPSULE ORAL at 16:37

## 2018-11-05 RX ADMIN — HEPARIN 300 UNITS: 100 SYRINGE at 11:06

## 2018-11-05 RX ADMIN — FERROUS SULFATE TAB 325 MG (65 MG ELEMENTAL FE) 325 MG: 325 (65 FE) TAB at 16:37

## 2018-11-05 RX ADMIN — CLONIDINE HYDROCHLORIDE 0.3 MG: 0.2 TABLET ORAL at 16:38

## 2018-11-05 RX ADMIN — MINOXIDIL 5 MG: 2.5 TABLET ORAL at 21:01

## 2018-11-05 RX ADMIN — HEPARIN SODIUM 5000 UNITS: 5000 INJECTION INTRAVENOUS; SUBCUTANEOUS at 06:26

## 2018-11-05 RX ADMIN — Medication 10 ML: at 16:39

## 2018-11-05 ASSESSMENT — PAIN DESCRIPTION - FREQUENCY: FREQUENCY: CONTINUOUS

## 2018-11-05 ASSESSMENT — PAIN SCALES - WONG BAKER: WONGBAKER_NUMERICALRESPONSE: 4

## 2018-11-05 ASSESSMENT — PAIN DESCRIPTION - DESCRIPTORS: DESCRIPTORS: ACHING

## 2018-11-05 ASSESSMENT — PAIN SCALES - GENERAL
PAINLEVEL_OUTOF10: 0

## 2018-11-05 ASSESSMENT — PAIN DESCRIPTION - PAIN TYPE: TYPE: CHRONIC PAIN

## 2018-11-05 ASSESSMENT — PAIN DESCRIPTION - PROGRESSION: CLINICAL_PROGRESSION: NOT CHANGED

## 2018-11-05 ASSESSMENT — PAIN DESCRIPTION - LOCATION: LOCATION: FOOT

## 2018-11-05 ASSESSMENT — PAIN DESCRIPTION - ORIENTATION: ORIENTATION: RIGHT;LEFT

## 2018-11-05 NOTE — PROGRESS NOTES
[facility]  INPATIENT PHYSICAL THERAPY  EVALUATION  STR RENAL TELEMETRY 6K - 8A-53/050-N    Time In: 0803  Time Out: 0014  Timed Code Treatment Minutes: 0 Minutes  Minutes: 8          Date: 2018  Patient Name: Narinder Beckham,  Gender:  female        MRN: [id number]  : 1955  (57 y.o.)  Referral Date : 18   Referring Practitioner: Radhika Sofia MD  Diagnosis: Fluid overload  Additional Pertinent Hx: 57 y.o. female presented to [facility] with shortness of breath. Patient was hypoxic on arrival with 88% saturation on room air. She has a history of ESRD with fluid overload.      Past Medical History:   Diagnosis Date    Anemia associated with chronic renal failure     Aranesp 150 micrograms every other week given at [facility]    Anxiety     Arthritis     Backache     Blood circulation, collateral     Blood transfusion     CAD (coronary artery disease)     Cellulitis in diabetic foot ([address] 2017    4th and 5th toes right foot    Chest pain     History of    CHF (congestive heart failure) ([address] 1998    Dx'ed by Dr. Edith Knight Chronic anemia     Chronic kidney disease     Chronic kidney disease, stage III (moderate) (HCC)     Chronic renal insufficiency     COPD (chronic obstructive pulmonary disease) ([address]     Dr. 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 Regla Villanueva    HTN (hypertension)     Hyperlipemia     Iron deficiency anemia due to dietary causes 2018    Kidney stones 3/2014    Kidney trouble          MRSA infection 2017    right foot-Dr. Margie Mendoza (podiatrist)    Neuromuscular disorder ([address]     Neuropathy     diabetic neuropathy    Obesity since childhoood    CONTRERAS on CPAP 2010    Dr. Coleen Somers    Pneumonia     PONV (postoperative nausea and vomiting)     Seizures ([facility]      Past Surgical History:   Procedure Laterality Date    ANKLE SURGERY Right 02-10-14    Dr. Desi Means at [facility] 15  1990's    removal of benign tumor   [address]  2008   [address]    COLONOSCOPY  2009    2 polyps, not precanceorus    COSMETIC SURGERY  3/30/2012    eye lid lift    ENDOSCOPY, COLON, DIAGNOSTIC  2007   [facility] EYE SURGERY  March 30th, 2012    left sided ptosis    FOOT SURGERY  1990    Tarsal tunnel surgery    FRACTURE SURGERY  2015   [address]    first partial in 1980's, then total in [address]  2015   [facility] LIVER BIOPSY  6/2015    LUNG BIOPSY  2009    OR OFFICE/OUTPT VISIT,PROCEDURE ONLY Left 8/23/2018    LEFT UPPER EXTREMENTY AV FISTULA CREATION performed by Lalo Benitez MD at [facility]       Restrictions/Precautions:  Fall Risk                            Subjective:  Chart Reviewed: Yes  Patient assessed for rehabilitation services?: Yes  Family / Caregiver Present: No  Subjective: RN approved PT session. Patient sitting EOB upon PT arrival, agreeable to therapy. She states she is supposed to be going to dialysis soon. General:  Overall Orientation Status: Within Normal Limits  Follows Commands: Within Functional Limits    Vision: Impaired  Vision Exceptions: Wears glasses at all times    Hearing: Within functional limits         Pain:  Denies.           Social/Functional History:    Lives With: Alone (sometimes she states her grandson is there, but otherwise mainly she is by herself)  Type of Home:  (condo)  Home Layout: One level  Home Access: Stairs to enter without rails  Entrance Stairs - Number of Steps: 1  Home Equipment: [employer] (she only uses it depending on the swelling in her legs)     Bathroom Shower/Tub: Tub/Shower unit  Bathroom Equipment: analysis of data from a problem focused assessment, a consideration of a limited number of treatment options, no significant comorbidities affecting the POC and no modifications or assistance needed to complete the eval.    Decision Making: Low Complexitybased on patient assessment and decision making process of determining plan of care and establishing reasonable expectations for measurable functional outcomes    REQUIRES PT FOLLOW UP: Yes    Discharge Recommendations:  Discharge Recommendations: Continue to assess pending progress, Home with assist PRN    Patient Education:  Patient Education: Role of PT, POC    Equipment Recommendations:  Equipment Needed: No    Safety:  Type of devices: Left in bed (with transportation being taken off the floor to dialysis)    Plan:  Times per week: 3-5x GM  Times per day: Daily  Current Treatment Recommendations: Strengthening, Balance Training, Functional Mobility Training, Transfer Training, Gait Training, Equipment Evaluation, Education, & procurement, Endurance Training, Patient/Caregiver Education & Training, Safety Education & Training    Goals:  Patient goals : Go home  Short term goals  Time Frame for Short term goals: 2 weeks  Short term goal 1: Patient will transfer supine <--> sit with mod I in order to get into/out of bed. Short term goal 2: Patient will transfer sit <--> stand with mod I in order to get up to ambulate. Short term goal 3: Patient will ambulate 76' mod I using cane for household navigation. Long term goals  Time Frame for Long term goals : No LTGs due to short ELOS    Evaluation Complexity: Based on the findings of patient history, examination, clinical presentation, and decision making during this evaluation, the evaluation of Gertrude Moya  is of low complexity. PT G-Codes  Functional Limitation: Mobility: Walking and moving around  Mobility: Walking and Moving Around Current Status ():  At least 40 percent but less than 60 percent

## 2018-11-05 NOTE — PROGRESS NOTES
dietary causes 6/21/2018    Kidney stones 3/2014    Kidney trouble          MRSA infection 03/2017    right foot-Dr. Yusuf Wallace (podiatrist)    Neuromuscular disorder ([facility]     Neuropathy 1989    diabetic neuropathy    Obesity since childhoood    CONTRERAS on CPAP 2010    Dr. Ray Room    Pneumonia     PONV (postoperative nausea and vomiting)     Seizures ([facility]      Past Surgical History:   Procedure Laterality Date    ANKLE SURGERY Right 02-10-14    Dr. Mack Jiang at [facility] 15  1990's    removal of benign tumor   [address]  2008   [address]  2009    2 polyps, not precanceorus    COSMETIC SURGERY  3/30/2012    eye lid lift    ENDOSCOPY, COLON, DIAGNOSTIC  2007   [facility]  March 30th, 2012    left sided ptosis   [facility]    Tarsal tunnel surgery    FRACTURE SURGERY  2015   [address]    first partial in 1980's, then total in [facility]  2015   [facility] LIVER BIOPSY  6/2015    LUNG BIOPSY  2009    [facility] OFFICE/OUTPT VISIT,PROCEDURE ONLY Left 8/23/2018    LEFT UPPER EXTREMENTY AV FISTULA CREATION performed by Samira Melendrez MD at [facility] 8  Chart Reviewed: Yes (Internal medicine note; PT evaluation; order review)  Patient assessed for rehabilitation services?: Yes  Response to previous treatment: Patient with no complaints from previous session  Family / Caregiver Present: No    Subjective: Pleasant and cooperative  Comments: RN approved session. Pt agreed to go for a walk. She had dialysis earlier today. Pt reported having achiness in her feet from neuropathy.      General:  Overall Orientation Status: Within Normal Limits    Vision: Impaired  Vision Exceptions: Wears glasses at all times    Hearing: Within functional limits         Pain:  Pain Assessment  Patient Currently in Pain: Yes  Pain Assessment: Faces  Juarez-Baker Pain Rating: Hurts little more  Pain Type: Chronic required to identify errors made, Assistance required to correct errors made  Insights: Decreased awareness of deficits  Cognition Comment: Cues needed to locate her room after having walked in the hallway. Cues needed for having help with getting into bed or for new learning. Sensation  Overall Sensation Status: Impaired (numbness and tingling in her feet due to neuropathy)  Light Touch: Partial deficits in the RLE, Partial deficits in the LLE    Posture: Good  Observation: Pt has a dialysis fistula in her LUE and a long scar on her posterior LUE proximal to her elbow from an old injury. Observation/Palpation  Posture: Good  Observation: Pt has a dialysis fistula in her LUE and a long scar on her posterior LUE proximal to her elbow from an old injury. Hand Dominance: Right    LUE PROM (degrees)  LUE PROM: WNL       LUE AROM (degrees)  LUE AROM : WNL  Left Hand PROM (degrees)  Left Hand PROM: WNL  Left Hand AROM (degrees)  Left Hand AROM: WNL    RUE PROM (degrees)  RUE PROM: WNL  RUE AROM (degrees)  RUE AROM : WNL  Right Hand PROM (degrees)  Right Hand PROM: WNL  Right Hand AROM (degrees)  Right Hand AROM: WNL    LUE Strength  L Hand Grasp: 4/5  LUE Strength Comment: 3+/5 deltoid and pectoral; 4/5 biceps/triceps                RUE Strength  R Hand Grasp: 4/5  RUE Strength Comment: 3+/5 deltoid; 4-/5 pectoral; 4/5 biceps/triceps                             Movements Are Fluid And Coordinated: Yes     Fine Motor Skills  Fine Motor Comment: No difficulty noted with doing any of her self care such as closing her housecoat or finishing eating a cookie from her supper tray using B hands.           ADL  UE Dressing: Minimal assistance (don/doffing her housecoat)     Bed mobility  Rolling to Right: Stand by assistance (using a bedrail)  Supine to Sit: Contact guard assistance (using the bedrail with the head of bed elevated)    Transfers  Sit to stand: Contact guard assistance (from the edge of bed)  Stand to Pt will compete sinkside grooming with Supervision while in standing or over 4 minute duration to increase her endurance for ease of cooking or dressing. Short term goal 4: Pt will complete BUE ROM/light resistance exercises while following demonstration with verbal cues for pursed lip breathing to increase her endurance for ease of doing light homemaking. Long term goals  Time Frame for Long term goals : None secondary to short estimated length of stay. Evaluation Complexity: Based on the findings of patient history, examination, clinical presentation, and decision making during this evaluation, this patient is of low complexity. OT G-codes  Functional Limitation: Self care  Self Care Current Status ():  At least 1 percent but less than 20 percent impaired, limited or restricted  Self Care Goal Status (): 0 percent impaired, limited or restricted  AM-PAC Inpatient Daily Activity Raw Score: 18  AM-PAC Inpatient ADL T-Scale Score : 38.66  ADL Inpatient CMS 0-100% Score: 46.65  ADL Inpatient CMS G-Code Modifier : CK

## 2018-11-05 NOTE — PROGRESS NOTES
1000 Pt arrives to IR for fistulogram. Pt on oxygen at 2L/min NC.  1002 Pt identified, procedure explained, consent signed. 1015 Pt transferred to procedure table and attached to monitor. 1 Dr. Willi Magdaleno in  1034 Procedure started  9896 Venous runoff cannulated with 6Fr. Sheath  1047 Arterial outflow cannulated with 6Fr. Sheath  1100 Procedure completed. Dressings applied to secure sheaths. Each sheath locked with 300 units heparin in 3ml. 1105 Pt transferred back into bed  1108 Pt transported to inpatient dialysis per bed.

## 2018-11-05 NOTE — PROGRESS NOTES
Nephrology Progress Note    Patient - aCtherine Jaffe   MRN -  [id number]   Acct # - [de-identified]      - 1955    57 y.o. Admit Date: 2018  Hospital Day: 4  [facility]  Date of evaluation -  2018    Subjective:   CC: edema  Denies sob. 2 lpm  BP ok  Eating well  Pt seen during hemodialysis, Hemodynamically stable, 3 K bath, Difficult with cannulation of AV fistula. Objective:   VITALS:  BP (!) 158/71   Pulse 72   Temp 98.6 °F (37 °C) (Oral)   Resp 16   Ht 5' 5\" (1.651 m)   Wt 202 lb 9.6 oz (91.9 kg)   SpO2 99%   BMI 33.71 kg/m²    Patient Vitals for the past 24 hrs:   BP Temp Temp src Pulse Resp SpO2 Weight   18 0758 - - - - - 99 % -   18 0601 - - - - - - 202 lb 9.6 oz (91.9 kg)   18 0354 (!) 158/71 98.6 °F (37 °C) Oral 72 16 95 % -   18 2105 134/60 98.8 °F (37.1 °C) Oral 72 16 93 % -   18 1551 (!) 161/70 98.5 °F (36.9 °C) Oral 67 16 96 % -   18 1407 (!) 182/78 - - 68 - - -   18 1056 (!) 141/65 97.8 °F (36.6 °C) Oral 78 18 92 % -   18 0858 - - - - - 95 % -     2 L/min (decreased to 1lpm)  Patient Vitals for the past 96 hrs (Last 3 readings):   Weight   18 0601 202 lb 9.6 oz (91.9 kg)   18 1529 205 lb 7.5 oz (93.2 kg)   18 1138 212 lb 1.3 oz (96.2 kg)       Intake/Output Summary (Last 24 hours) at 18 0806  Last data filed at 18 0354   Gross per 24 hour   Intake              740 ml   Output              600 ml   Net              140 ml       EXAM:  CONSTITUTIONAL:  No acute distress. Lying comfortable in bed. Pleasant  HEENT:  Head is normocephalic, Extraocular movement intact. Neck is supple. Voice is clear. CARDIOVASCULAR:  S1, S2  regular rate and rhythm. RESPIRATORY: Clear to ausculation bilaterally. Equal breath sounds. No wheezes. No shortness of breath noted at rest.  ABDOMEN: soft, non tender  NEUROLOGICAL: Patient is alert and oriented to person, place, and time.  Recent and remote memory intact. Thought is coherant. SKIN: no rash, No significant bruises on exposed surfaces  MUSCULOSKELETAL: Movement is coordinated. Moves all extremities   EXTREMITIES: Distal lower extremity temp is warm, 2+ tight lower extremity edema. Upper extremity AV Fistula   PSYCHIATRIC: mood and affect appropriate. Medications:   Med reviewed  Scheduled Meds:   insulin glargine  8 Units Subcutaneous Nightly    heparin (porcine)  5,000 Units Subcutaneous 3 times per day    amLODIPine  10 mg Oral Daily    aspirin  81 mg Oral Daily    atorvastatin  40 mg Oral Daily    bumetanide  2 mg Oral BID    calcitRIOL  0.25 mcg Oral Once per day on Mon Wed Fri    carvedilol  37.5 mg Oral BID    cloNIDine  0.3 mg Oral Q8H    docusate sodium  100 mg Oral BID    doxazosin  12 mg Oral Daily    ferrous sulfate  325 mg Oral Daily with breakfast    folbee plus  1 tablet Oral Daily    fluticasone  1 spray Nasal Daily    minoxidil  5 mg Oral TID    sodium bicarbonate  1,300 mg Oral BID    tiotropium  18 mcg Inhalation Daily    sodium chloride flush  10 mL Intravenous 2 times per day    insulin lispro  0-12 Units Subcutaneous TID WC    insulin lispro  0-6 Units Subcutaneous Nightly     Continuous Infusions:   dextrose       Labs:   Labs reviewed  Recent Labs      11/04/18   0556  11/05/18   0632   NA  141  141   K  3.1*  3.6   CL  99  97*   CO2  31  31   BUN  46*  47*   CREATININE  5.3*  5.3*   LABGLOM  10*  10*   GLUCOSE  70  133*   CALCIUM  9.2  9.5     Recent Labs      11/04/18   0556  11/05/18   0632   WBC  5.7   --    RBC  3.15*   --    HGB  8.0*  8.6*   HCT  25.9*  28.6*   PLT  182   --         ASSESSMENT:  1. End Stage Renal Disease 2nd to diabetic and hypertensive nephrosclerosis on Hemodialysis, AV fistula, with volume overload. /24h repeat Hemodialysis today with Ultrafiltration. Planned out pt Hemodialysis at [facility]. Schedule fistulagram due to difficulty accessing AV fistula  2.  Essential Hypertension with

## 2018-11-05 NOTE — PROGRESS NOTES
fracture and surgery of right ankle -- ?add compression wraps  10. Hypernatremia -- slight on admission 146 11/1 -- per renal with #1,2 -- improving 11/2 -- monitor  11. Hypokalemia -- replaced per renal and monitor  12. Hx Metabolic acidosis -- due to renal dysfxn -- cont sodium bicarb tabs -- per renal  13. Essential HTN -- cont norvasc 10 mg daily, coreg 37.5 bid, catapres 0.3 tid, cardura 12 mg daily, minoxidil 5 mg tid, bumex 2 mg bid   -- HD hopefully to help BP -- cont monitor closely and adjust prn -- ?add prn hydralazine  14. CAD -- per cath in 1999 with 50% proximal RCA, 60% distal RCA, nonobstructive long LM, 40% proximal left circumflex, 60% mid LAD after first diagonal, 60% ostial stenosis of first diagonal branch, moderate pulmonary hypertension   -- c/s cardio 11/5 as new NSVT 11/5 and with ++ CAD  -- last stress 8/2018 (-) ischemia  -- cont ASA, coreg, statin  15. Hx moderate pericardial effusion -- noted per echo 7/2018 -- ?repeat echo - no tamponade then and no signs now - monitor  16. Type 2 DM -- cont lantus, SSI -- decreased lantus to 8 units from 15 on 11/4 as fasting am BS trending down with HD -- ?need pre-meal bolusing -- cont monitor and adjust prn -- last A1C 7/2018 = 5.2  17. COPD without exac -- follows with Dr. Osmar Coreas -- cont BD tx's -- O2 and wean as able. 18. Anemia chronic disease -- aranesp per renal - cont iron -- stable 8-9's which baseline 9-10's in past -- iron WNL 9/4/18  19. Secondary hyperparathyroidism -- per renal -- cont calcitriol  20. Hx gout  21. Hx seizures -- no current tx  22. Depression/anxiety  -- no current tx  23. GERD -- asx - no current tx  24. CONTRERAS -- cont cpap  25. HLP -- cont statin  26. Chronic constipation -- cont bowel meds - monitor stools  27. 3.1 cm LN in left groin -- noted per doppler 11/4/18 -- ??due to edema - ??need CT -- ?surgical eval for removal/bx   28. Hx renal cysts -- per renal u/s 4/2018  29.  Bilateral thyroid nodules -- per thyroid u/s

## 2018-11-05 NOTE — CONSULTS
[facility]    Patient's Name/Date of Birth: Kandis Miller / 1955 (66 y.o.)    Date: November 5, 2018     Referring Provider: Dameon Mc MD    CHIEF COMPLAINT:  NSVT, pericardial effusion      HPI: This is a pleasant 57 y.o. female presents with volume overload in the setting of ESRD on dialysis. Also with sob and hypoxia. Mild trop elevation that is chronic. 1999 with 50% proximal RCA, 60% distal RCA, nonobstructive long LM, 40% proximal left circumflex, 60% mid LAD after first diagonal, 60% ostial stenosis of first diagonal branch, moderate pulmonary hypertension. 8/2018 Lexiscan negative for ischemia. EF 50% with moderate pericardial effusion without tamponade physiology. She had a run of NSVT x 14 beats without hemodynamic instability prior to dialysis. K 3.6, Mg 2.1  Trop 0.199--0.163. She has had issues with dialysis access thus she is getting a fistulogram and then to undergo dialysis. Last dialysis session 2 days ago. ECG with SR, PACs, anterior infarct, low voltage.        Echo:   Results for orders placed during the hospital encounter of 07/24/18   ECHO Complete 2D W Doppler W Color    Narrative Transthoracic Echocardiography Report (TTE)     Demographics      Patient Name    Timi Enrique  Gender               Female                   A      MR #            [id number]       Race                 Black                                      Ethnicity      Account #       [de-identified]       Room Number          8380      Accession       [id number]       Date of Study        07/25/2018   Number      Date of Birth   1955      Referring Physician  Prateek Marion      Age             57 year(s)      Sonographer          Hola Canales RDCS                                      Interpreting         Echo reader of the                                   Physician            week normal systolic function and   wall thickness. Pericardial Effusion   Moderate circumferential pericardial effusion with no tamponade   physiology. Pleural Effusion   No evidence of pleural effusion. Aorta / Great Vessels   -Aortic root dimension within normal limits.   -The Pulmonary artery is within normal limits. -IVC size is within normal limits with normal respiratory phasic changes.      M-Mode/2D Measurements & Calculations      LV Diastolic   LV Systolic Dimension:    AV Cusp Separation: 1.6 cmLA   Dimension: 5.3 3.9 cm                    Dimension: 4.1 cmAO Root   cm             LV Volume Diastolic: 233  Dimension: 2.6 cmLA Area: 23.8   LV FS:26.4 %   ml                        cm^2   LV PW          LV Volume Systolic: 03.2   Diastolic: 1.3 ml   cm             LV EDV/LV EDV Index: 135   Septum         ml/67 m^2LV ESV/LV ESV    RV Diastolic Dimension: 2.9 cm   Diastolic: 1.3 Index: 81.3 ml/33 m^2   cm             EF Calculated: 51.2 %     LA/Aorta: 1.58                                               LA volume/Index: 79.6 ml /40m^2     Doppler Measurements & Calculations      MV Peak E-Wave: 123 cm/s  AV Peak Velocity: 169  LVOT Peak Velocity: 103   MV Peak A-Wave: 123 cm/s  cm/s                   cm/s   MV E/A Ratio: 1           AV Peak Gradient:      LVOT Peak Gradient: 4   MV Peak Gradient: 6.05    11.42 mmHg             mmHg   mmHg                                                    TV Peak E-Wave: 60.6 cm/s   MV Deceleration Time: 215                        TV Peak A-Wave: 47.7 cm/s   msec                             IVRT: 60 msec          TV Peak Gradient: 1.47                                                    mmHg   MV E' Septal Velocity:                           TR Velocity:204 cm/s   7.2 cm/s                  AV DVI (Vmax):0.61     TR Gradient:16.65 mmHg   MV A' Septal Velocity:                           PV Peak Velocity: 78.1   7.7 cm/s                                         cm/s Units at 11/02/18 1316    insulin lispro (HUMALOG) injection vial 0-6 Units  0-6 Units Subcutaneous Nightly Josef Cortez, DO         Prior to Admission medications    Medication Sig Start Date End Date Taking? Authorizing Provider   bumetanide (BUMEX) 1 MG tablet Take 2 mg by mouth 2 times daily   Yes Historical Provider, MD   minoxidil (LONITEN) 2.5 MG tablet Take 5 mg by mouth 3 times daily   Yes Historical Provider, MD   senna (SENOKOT) 8.6 MG tablet Take 1 tablet by mouth daily   Yes Historical Provider, MD   darbepoetin quintin-polysorbate (ARANESP) 200 MCG/0.4ML SOSY injection Inject 200 mcg into the skin once a week    Yes Historical Provider, MD   tiotropium (SPIRIVA HANDIHALER) 18 MCG inhalation capsule Inhale 1 capsule into the lungs daily 1 capsule via inhalation daily.  10/16/18 10/16/19 Yes Germain Barber MD   vitamin D (ERGOCALCIFEROL) 23228 units CAPS capsule Take 1 capsule by mouth once a week 10/4/18  Yes DONOVAN Montes CNP   lactulose (CHRONULAC) 10 GM/15ML solution Take 30 mLs by mouth 2 times daily as needed (constipation, aif not having BM with other stool softners) 8/28/18  Yes Oswaldo Carey MD   docusate sodium (COLACE, DULCOLAX) 100 MG CAPS Take 100 mg by mouth 2 times daily 8/28/18  Yes Oswaldo Carey MD   potassium chloride (KLOR-CON M) 20 MEQ extended release tablet Take 1 tablet by mouth daily 8/28/18  Yes Oswaldo Carey MD   acetaminophen (TYLENOL) 325 MG tablet Take 2 tablets by mouth every 6 hours as needed for Pain 8/24/18  Yes DONOVAN Kramer CNP   sodium bicarbonate 650 MG tablet Take 2 tablets by mouth 2 times daily 8/24/18  Yes DONOVAN Kramer CNP   calcitRIOL (ROCALTROL) 0.25 MCG capsule Take 1 capsule by mouth three times a week 8/10/18  Yes DONOVAN Carranza CNP   cloNIDine (CATAPRES) 0.3 MG tablet Take 1 tablet by mouth every 8 hours One tablet three times per day 8/8/18  Yes Agustin Lainez MD   carvedilol (COREG) 25 MG tablet 1.50     Years: 30.00     Types: Cigarettes     Start date: 12/28/1981     Quit date: 3/13/2009    Smokeless tobacco: Never Used      Comment: quit 2009    Alcohol use No    Drug use: No    Sexual activity: Yes     Partners: Male     Other Topics Concern    Not on file     Social History Narrative    No narrative on file     ROS:   Constitutional: Denies any recent wt change. Eyes:  Denies any blurring or double vision, no glaucoma  Ears/Nose/Mouth/Throat:  Denies any chronic sinus/rhinitis, bleeding gums  Cardiovascular:  As described above. Respiratory:  Denies any frequent cough, wheezing or coughing up blood  Genitourinary:  Denies difficulty with urination and kidney stones  Gastrointestinal:  Denies any chronic problems with abdominal pain, nausea, vomiting or diarrhea  Musculoskeletal:  Denies any joint pain, back pain, or difficulty walking  Integumentary:  Denies any rash  Neurological:  No numbness or tingling  Endocrine:  Denies any polydipsia. Hematologic/Lymphatic:  Denies any hemorrhage or lymphatic drainage problems. Labs:  CBC: Recent Labs      11/04/18   0556  11/05/18   0632   WBC  5.7   --    HGB  8.0*  8.6*   HCT  25.9*  28.6*   MCV  82.2   --    PLT  182   --      BMP: Recent Labs      11/04/18   0556  11/05/18   0632   NA  141  141   K  3.1*  3.6   CL  99  97*   CO2  31  31   BUN  46*  47*   CREATININE  5.3*  5.3*     Accucheck Glucoses:   Recent Labs      11/04/18   0606  11/04/18   1114  11/04/18   1620  11/04/18   2114  11/05/18   0626   POCGLU  82  142*  120*  166*  158*     Cardiac Enzymes: No results for input(s): CKTOTAL, CKMB, CKMBINDEX, TROPONINI in the last 72 hours. PT/INR: No results for input(s): PROTIME, INR in the last 72 hours. APTT: No results for input(s): APTT in the last 72 hours.   Liver Profile:  Lab Results   Component Value Date    AST 15 11/02/2018    ALT 9 11/02/2018    BILIDIR <0.2 11/01/2018    BILITOT 0.4 11/02/2018    ALKPHOS 62 11/02/2018     Lab and pericardial effusion assessment  4. Renal following for dialysis  5. Add Hydralazine/Imdur   6. Can uptitrate BB as tolerated to suppress NSVT  7. K>4, Mg>2  8. No indication for pericardiocentesis  9. Further recommendations based on results and clinical course    Thank you for allowing us to participate in the care of this patient. Please do not hesitate to call us with questions.     Electronically signed by Cierra Bundy MD on 11/5/2018 at 10:18 AM    Interventional Cardiology - [facility]

## 2018-11-06 PROBLEM — Z99.2 ESRD (END STAGE RENAL DISEASE) ON DIALYSIS (HCC): Status: ACTIVE | Noted: 2018-08-23

## 2018-11-06 PROBLEM — I50.33 ACUTE ON CHRONIC DIASTOLIC CONGESTIVE HEART FAILURE (HCC): Status: ACTIVE | Noted: 2018-08-09

## 2018-11-06 LAB
ANION GAP SERPL CALCULATED.3IONS-SCNC: 13 MEQ/L (ref 8–16)
BUN BLDV-MCNC: 33 MG/DL (ref 7–22)
CALCIUM SERPL-MCNC: 9.4 MG/DL (ref 8.5–10.5)
CHLORIDE BLD-SCNC: 98 MEQ/L (ref 98–111)
CO2: 30 MEQ/L (ref 23–33)
CREAT SERPL-MCNC: 4.5 MG/DL (ref 0.4–1.2)
GLUCOSE BLD-MCNC: 112 MG/DL (ref 70–108)
GLUCOSE BLD-MCNC: 154 MG/DL (ref 70–108)
GLUCOSE BLD-MCNC: 158 MG/DL (ref 70–108)
GLUCOSE BLD-MCNC: 164 MG/DL (ref 70–108)
GLUCOSE BLD-MCNC: 208 MG/DL (ref 70–108)
MAGNESIUM: 1.9 MG/DL (ref 1.6–2.4)
POTASSIUM SERPL-SCNC: 3.7 MEQ/L (ref 3.5–5.2)
SODIUM BLD-SCNC: 141 MEQ/L (ref 135–145)

## 2018-11-06 PROCEDURE — 99223 1ST HOSP IP/OBS HIGH 75: CPT | Performed by: INTERNAL MEDICINE

## 2018-11-06 PROCEDURE — 6370000000 HC RX 637 (ALT 250 FOR IP): Performed by: HOSPITALIST

## 2018-11-06 PROCEDURE — 80048 BASIC METABOLIC PNL TOTAL CA: CPT

## 2018-11-06 PROCEDURE — 2580000003 HC RX 258: Performed by: HOSPITALIST

## 2018-11-06 PROCEDURE — 6370000000 HC RX 637 (ALT 250 FOR IP): Performed by: FAMILY MEDICINE

## 2018-11-06 PROCEDURE — 99999 PR OFFICE/OUTPT VISIT,PROCEDURE ONLY: CPT | Performed by: NURSE PRACTITIONER

## 2018-11-06 PROCEDURE — 2580000003 HC RX 258: Performed by: INTERNAL MEDICINE

## 2018-11-06 PROCEDURE — 93307 TTE W/O DOPPLER COMPLETE: CPT

## 2018-11-06 PROCEDURE — 36415 COLL VENOUS BLD VENIPUNCTURE: CPT

## 2018-11-06 PROCEDURE — 90935 HEMODIALYSIS ONE EVALUATION: CPT

## 2018-11-06 PROCEDURE — 6360000002 HC RX W HCPCS: Performed by: INTERNAL MEDICINE

## 2018-11-06 PROCEDURE — 1200000003 HC TELEMETRY R&B

## 2018-11-06 PROCEDURE — 82948 REAGENT STRIP/BLOOD GLUCOSE: CPT

## 2018-11-06 PROCEDURE — 6360000002 HC RX W HCPCS: Performed by: HOSPITALIST

## 2018-11-06 PROCEDURE — APPSS30 APP SPLIT SHARED TIME 16-30 MINUTES: Performed by: NURSE PRACTITIONER

## 2018-11-06 PROCEDURE — 99232 SBSQ HOSP IP/OBS MODERATE 35: CPT | Performed by: INTERNAL MEDICINE

## 2018-11-06 PROCEDURE — 83735 ASSAY OF MAGNESIUM: CPT

## 2018-11-06 RX ADMIN — Medication 4 UNITS: at 17:09

## 2018-11-06 RX ADMIN — ATORVASTATIN CALCIUM 40 MG: 40 TABLET, FILM COATED ORAL at 20:57

## 2018-11-06 RX ADMIN — Medication 10 ML: at 21:23

## 2018-11-06 RX ADMIN — HEPARIN SODIUM 5000 UNITS: 5000 INJECTION INTRAVENOUS; SUBCUTANEOUS at 21:24

## 2018-11-06 RX ADMIN — AMLODIPINE BESYLATE 10 MG: 10 TABLET ORAL at 08:08

## 2018-11-06 RX ADMIN — HEPARIN SODIUM 5000 UNITS: 5000 INJECTION INTRAVENOUS; SUBCUTANEOUS at 13:21

## 2018-11-06 RX ADMIN — CLONIDINE HYDROCHLORIDE 0.3 MG: 0.2 TABLET ORAL at 15:28

## 2018-11-06 RX ADMIN — ACETAMINOPHEN 650 MG: 325 TABLET ORAL at 14:59

## 2018-11-06 RX ADMIN — CLONIDINE HYDROCHLORIDE 0.3 MG: 0.2 TABLET ORAL at 23:34

## 2018-11-06 RX ADMIN — INSULIN LISPRO 1 UNITS: 100 INJECTION, SOLUTION INTRAVENOUS; SUBCUTANEOUS at 21:23

## 2018-11-06 RX ADMIN — HEPARIN SODIUM 5000 UNITS: 5000 INJECTION INTRAVENOUS; SUBCUTANEOUS at 05:59

## 2018-11-06 RX ADMIN — BUMETANIDE 2 MG: 1 TABLET ORAL at 08:08

## 2018-11-06 RX ADMIN — Medication 10 ML: at 18:07

## 2018-11-06 RX ADMIN — MINOXIDIL 5 MG: 2.5 TABLET ORAL at 20:57

## 2018-11-06 RX ADMIN — DOXAZOSIN 12 MG: 4 TABLET ORAL at 08:08

## 2018-11-06 RX ADMIN — Medication 10 ML: at 08:09

## 2018-11-06 RX ADMIN — ASPIRIN 81 MG: 81 TABLET, COATED ORAL at 08:08

## 2018-11-06 RX ADMIN — MINOXIDIL 5 MG: 2.5 TABLET ORAL at 13:27

## 2018-11-06 RX ADMIN — DOCUSATE SODIUM 100 MG: 100 CAPSULE, LIQUID FILLED ORAL at 08:08

## 2018-11-06 RX ADMIN — FERROUS SULFATE TAB 325 MG (65 MG ELEMENTAL FE) 325 MG: 325 (65 FE) TAB at 08:08

## 2018-11-06 RX ADMIN — CARVEDILOL 37.5 MG: 25 TABLET, FILM COATED ORAL at 17:05

## 2018-11-06 RX ADMIN — MINOXIDIL 5 MG: 2.5 TABLET ORAL at 08:08

## 2018-11-06 RX ADMIN — BUMETANIDE 2 MG: 1 TABLET ORAL at 20:57

## 2018-11-06 RX ADMIN — ACETAMINOPHEN 650 MG: 325 TABLET ORAL at 21:55

## 2018-11-06 RX ADMIN — Medication 2 UNITS: at 08:09

## 2018-11-06 RX ADMIN — FLUTICASONE PROPIONATE 1 SPRAY: 50 SPRAY, METERED NASAL at 08:09

## 2018-11-06 RX ADMIN — CARVEDILOL 37.5 MG: 25 TABLET, FILM COATED ORAL at 08:08

## 2018-11-06 RX ADMIN — CLONIDINE HYDROCHLORIDE 0.3 MG: 0.2 TABLET ORAL at 08:09

## 2018-11-06 RX ADMIN — INSULIN GLARGINE 8 UNITS: 100 INJECTION, SOLUTION SUBCUTANEOUS at 21:22

## 2018-11-06 RX ADMIN — DOCUSATE SODIUM 100 MG: 100 CAPSULE, LIQUID FILLED ORAL at 20:57

## 2018-11-06 RX ADMIN — Medication 1 TABLET: at 08:08

## 2018-11-06 RX ADMIN — CHLOROTHIAZIDE SODIUM 500 MG: 500 INJECTION, POWDER, LYOPHILIZED, FOR SOLUTION INTRAVENOUS at 18:06

## 2018-11-06 ASSESSMENT — PAIN SCALES - GENERAL
PAINLEVEL_OUTOF10: 8
PAINLEVEL_OUTOF10: 4
PAINLEVEL_OUTOF10: 0
PAINLEVEL_OUTOF10: 5

## 2018-11-06 ASSESSMENT — PAIN DESCRIPTION - LOCATION: LOCATION: FOOT

## 2018-11-06 NOTE — PROGRESS NOTES
was rounding in the unit during his shift. Patient was talking on her cell phone when rounding at her room (15:30). Patient was sleeping when rounding at her room later in the shift. Chaplains remain available for spiritual care when rounding in the unit, or referred for emotional and spiritual support as needed.

## 2018-11-06 NOTE — FLOWSHEET NOTE
11/06/18 1517   Provider Notification   Reason for Communication Evaluate   Provider Name [address]   Provider Notification Physician   Method of Communication Secure Message   Response Waiting for response   Notification Time 1517     Informed Dr. Lizett Hutchins of Plan for patient , Urology stand point.

## 2018-11-06 NOTE — PROGRESS NOTES
breakfast    folbee plus  1 tablet Oral Daily    fluticasone  1 spray Nasal Daily    minoxidil  5 mg Oral TID    tiotropium  18 mcg Inhalation Daily    sodium chloride flush  10 mL Intravenous 2 times per day    insulin lispro  0-12 Units Subcutaneous TID WC    insulin lispro  0-6 Units Subcutaneous Nightly     PRN Meds: glucose, dextrose, glucagon (rDNA), dextrose, acetaminophen, albuterol sulfate HFA, lactulose, nitroGLYCERIN, sodium chloride flush, ondansetron, ipratropium-albuterol      Intake/Output Summary (Last 24 hours) at 11/06/18 1326  Last data filed at 11/06/18 1245   Gross per 24 hour   Intake             2262 ml   Output             5500 ml   Net            -3238 ml       Diet:  DIET GENERAL; Low Sodium (2 GM); Daily Fluid Restriction: 1500 ml    Exam:  /60   Pulse 88   Temp 98.6 °F (37 °C) (Oral)   Resp 16   Ht 5' 5\" (1.651 m)   Wt 195 lb 8.8 oz (88.7 kg)   SpO2 94%   BMI 32.54 kg/m²     General appearance: No apparent distress, appears stated age and cooperative. HEENT: Pupils equal, round, and reactive to light. Conjunctivae/corneas clear. Neck: Supple, with full range of motion. No jugular venous distention. Trachea midline. Respiratory:  Normal respiratory effort. (+) crackles, left lower lung field> rt lower lung field, also (+) fine crackles on rt mid lung field. Cardiovascular: Regular rate and rhythm with normal S1/S2 without murmurs, rubs or gallops. Abdomen: Soft, non-tender, non-distended with normal bowel sounds. Musculoskeletal: passive and active ROM x 4 extremities. Psychiatric: Alert and oriented, thought content appropriate, normal insight  Exam of extremities: peripheral pulses normal, (+) grade 2+ bipedal pitting edema, (+) grade 3 pitting edema on both legs, no clubbing or cyanosis. (+) fistula on left arm.        Labs:   Recent Labs      11/04/18   0556  11/05/18   0632   WBC  5.7   --    HGB  8.0*  8.6*   HCT  25.9*  28.6*   PLT  182   --      Recent

## 2018-11-06 NOTE — PLAN OF CARE
Problem: Discharge Planning:  Goal: Participates in care planning  Participates in care planning   Outcome: Ongoing  Pt participates in care planning to the best of ability. Will continue to include in care planning. Goal: Discharged to appropriate level of care  Discharged to appropriate level of care   Outcome: Ongoing  Pt plans to be discharged to home with family. Will continue to monitor for further discharge needs. Problem: Cardiac Output - Decreased:  Goal: Hemodynamic stability will improve  Hemodynamic stability will improve   Outcome: Ongoing  Vitals:    11/05/18 2049   BP: (!) 172/74   Pulse: 74   Resp: 18   Temp: 98.4 °F (36.9 °C)   SpO2: 90%     Pt is on clonidine for BP. Will continue to monitor. Problem: Fluid Volume - Imbalance:  Goal: Absence of imbalanced fluid volume signs and symptoms  Absence of imbalanced fluid volume signs and symptoms   Outcome: Ongoing  Pt has lower extremity edema. Will continue to monitor. Problem: Pain:  Goal: Recognizes and communicates pain  Recognizes and communicates pain   Outcome: Ongoing  Pt rated chronic pain 5 on scale 0-10 this shift. Will continue to monitor and manage pain appropriately. Problem: Skin Integrity - Impaired:  Goal: Will show no infection signs and symptoms  Will show no infection signs and symptoms   Outcome: Ongoing  No signs or symptoms of infection noted this shift. Will continue to monitor. Goal: Absence of new skin breakdown  Absence of new skin breakdown   Outcome: Ongoing  No new signs of skin breakdown noted this shift. Will continue to monitor. Problem: Falls - Risk of:  Goal: Will remain free from falls  Will remain free from falls   Outcome: Ongoing  Pt free from falls this shift. Bed alarm on, 2/4 side rails up, call light in reach, fall band on, nonskid socks on, clear pathway, bed in locked and lowest position. Will continue to monitor.   Goal: Absence of physical injury  Absence of physical injury   Outcome:

## 2018-11-07 LAB
GLUCOSE BLD-MCNC: 114 MG/DL (ref 70–108)
GLUCOSE BLD-MCNC: 140 MG/DL (ref 70–108)
GLUCOSE BLD-MCNC: 158 MG/DL (ref 70–108)
GLUCOSE BLD-MCNC: 99 MG/DL (ref 70–108)

## 2018-11-07 PROCEDURE — 97116 GAIT TRAINING THERAPY: CPT

## 2018-11-07 PROCEDURE — 82948 REAGENT STRIP/BLOOD GLUCOSE: CPT

## 2018-11-07 PROCEDURE — 97110 THERAPEUTIC EXERCISES: CPT

## 2018-11-07 PROCEDURE — 6360000002 HC RX W HCPCS: Performed by: HOSPITALIST

## 2018-11-07 PROCEDURE — 1200000003 HC TELEMETRY R&B

## 2018-11-07 PROCEDURE — 99233 SBSQ HOSP IP/OBS HIGH 50: CPT | Performed by: INTERNAL MEDICINE

## 2018-11-07 PROCEDURE — 90935 HEMODIALYSIS ONE EVALUATION: CPT

## 2018-11-07 PROCEDURE — 6370000000 HC RX 637 (ALT 250 FOR IP): Performed by: FAMILY MEDICINE

## 2018-11-07 PROCEDURE — 6370000000 HC RX 637 (ALT 250 FOR IP): Performed by: HOSPITALIST

## 2018-11-07 PROCEDURE — 2580000003 HC RX 258: Performed by: HOSPITALIST

## 2018-11-07 PROCEDURE — 90935 HEMODIALYSIS ONE EVALUATION: CPT | Performed by: NURSE PRACTITIONER

## 2018-11-07 PROCEDURE — 6370000000 HC RX 637 (ALT 250 FOR IP): Performed by: NURSE PRACTITIONER

## 2018-11-07 RX ADMIN — DOXAZOSIN 12 MG: 4 TABLET ORAL at 06:32

## 2018-11-07 RX ADMIN — BUMETANIDE 2 MG: 1 TABLET ORAL at 22:32

## 2018-11-07 RX ADMIN — ATORVASTATIN CALCIUM 40 MG: 40 TABLET, FILM COATED ORAL at 22:33

## 2018-11-07 RX ADMIN — LACTULOSE 20 G: 20 SOLUTION ORAL at 22:33

## 2018-11-07 RX ADMIN — INSULIN GLARGINE 8 UNITS: 100 INJECTION, SOLUTION SUBCUTANEOUS at 22:33

## 2018-11-07 RX ADMIN — Medication 2 UNITS: at 16:59

## 2018-11-07 RX ADMIN — HEPARIN SODIUM 5000 UNITS: 5000 INJECTION INTRAVENOUS; SUBCUTANEOUS at 22:33

## 2018-11-07 RX ADMIN — Medication 10 ML: at 13:30

## 2018-11-07 RX ADMIN — FERROUS SULFATE TAB 325 MG (65 MG ELEMENTAL FE) 325 MG: 325 (65 FE) TAB at 13:29

## 2018-11-07 RX ADMIN — CLONIDINE HYDROCHLORIDE 0.3 MG: 0.2 TABLET ORAL at 14:05

## 2018-11-07 RX ADMIN — CARVEDILOL 37.5 MG: 25 TABLET, FILM COATED ORAL at 06:33

## 2018-11-07 RX ADMIN — MINOXIDIL 5 MG: 2.5 TABLET ORAL at 06:32

## 2018-11-07 RX ADMIN — FLUTICASONE PROPIONATE 1 SPRAY: 50 SPRAY, METERED NASAL at 06:31

## 2018-11-07 RX ADMIN — CLONIDINE HYDROCHLORIDE 0.3 MG: 0.2 TABLET ORAL at 06:32

## 2018-11-07 RX ADMIN — CARVEDILOL 37.5 MG: 25 TABLET, FILM COATED ORAL at 16:59

## 2018-11-07 RX ADMIN — AMLODIPINE BESYLATE 10 MG: 10 TABLET ORAL at 06:32

## 2018-11-07 RX ADMIN — CALCITRIOL 0.25 MCG: 0.25 CAPSULE ORAL at 13:29

## 2018-11-07 RX ADMIN — HEPARIN SODIUM 5000 UNITS: 5000 INJECTION INTRAVENOUS; SUBCUTANEOUS at 14:06

## 2018-11-07 RX ADMIN — ASPIRIN 81 MG: 81 TABLET, COATED ORAL at 13:30

## 2018-11-07 RX ADMIN — Medication 10 ML: at 22:33

## 2018-11-07 RX ADMIN — Medication 1 TABLET: at 13:29

## 2018-11-07 RX ADMIN — MINOXIDIL 5 MG: 2.5 TABLET ORAL at 14:05

## 2018-11-07 RX ADMIN — HEPARIN SODIUM 5000 UNITS: 5000 INJECTION INTRAVENOUS; SUBCUTANEOUS at 06:32

## 2018-11-07 RX ADMIN — BUMETANIDE 2 MG: 1 TABLET ORAL at 06:32

## 2018-11-07 RX ADMIN — DOCUSATE SODIUM 100 MG: 100 CAPSULE, LIQUID FILLED ORAL at 13:30

## 2018-11-07 ASSESSMENT — PAIN SCALES - GENERAL
PAINLEVEL_OUTOF10: 0
PAINLEVEL_OUTOF10: 0
PAINLEVEL_OUTOF10: 6

## 2018-11-07 NOTE — CARE COORDINATION
11/7/18, 3:04 PM      Vern Fleischer day: 6  Location: [facility]19/019- Reason for admit: Fluid overload [E87.70]   Procedure:hemo today  Treatment Plan of Care: PT and OT sees, patient feels she needs more therapy, oxygen at 1L/min, no issues today with AV fistula durig hemodialysis  PCP: DONOVAN Bundy CNP  Readmission Risk Score: 28%  Discharge Plan: TCU consult placed; called and notified Miquel Whelan at ext. [phone].

## 2018-11-07 NOTE — PROGRESS NOTES
with no evidence large   circumferential pericardial effusion (~2.0 cm).   The pericardial effusion does not appear to be hemodynamically significant   and does not meet tamponade physiology criteria; please clinically   correlate.   Respiratory mitral E-wave variation ~ 10%.   There is no RA or RV collapse. No interventricular septal dependence   noted.   Aortic root dimension = 3.2 cm.  IVC size dilated with normal respiratory phasic changes (CVP~11-15 mmHg).  Recommend serial echocardiography to monitor pericardial effusion.   Left pleural effusion.       ASSESSMENT:  1. End Stage Renal Disease 2nd to diabetic and hypertensive nephrosclerosis on Hemodialysis, AV fistula, with volume overload. Planned out pt Hemodialysis T,T,S at [facility]  2. Essential Hypertension with nephrosclerosis, expect improvement with improved volume status, BP near goal. Hold Loniten if SBP < 130/  3. Metabolic acidosis 2nd to ESRD. Resolved with Hemodialysis   4. Coronary artery disease   5. Diabetes Mellitus Type II with nephrosclerosis with long term use of insulin   6. Anemia of chronic disease, Aranesp 200 mcg weekly. 7. Secondary hyperparathyroidism, calcitriol  8.  Deconditioning, PT/OT eval and treat    Principal Problem:    Hypervolemia associated with renal insufficiency  Active Problems:    Uncontrolled hypertension    History of tobacco use    COPD, mild (HCC)    Acquired hypothyroidism    CKD (chronic kidney disease), stage V (HCC)    Diabetic peripheral neuropathy associated with type 2 diabetes mellitus (HCC)    Type II diabetes mellitus with stage 5 chronic kidney disease (HCC)    Fluid overload    Pleural effusion    Acute respiratory failure with hypoxia (HCC)    Elevated troponin    Dyspnea    Bilateral leg edema    Hypernatremia    Metabolic acidosis    Generalized weakness    Lymphadenopathy, inguinal    Atelectasis of left lung    Thyroid nodule    ESRD needing dialysis ([address]  Resolved Problems:    * No resolved hospital problems.  DONOVAN Ho - CNP 8:29 AM 11/7/2018

## 2018-11-08 LAB
ERYTHROCYTE [DISTWIDTH] IN BLOOD BY AUTOMATED COUNT: 18.6 % (ref 11.5–14.5)
ERYTHROCYTE [DISTWIDTH] IN BLOOD BY AUTOMATED COUNT: 55.7 FL (ref 35–45)
GLUCOSE BLD-MCNC: 135 MG/DL (ref 70–108)
GLUCOSE BLD-MCNC: 159 MG/DL (ref 70–108)
GLUCOSE BLD-MCNC: 164 MG/DL (ref 70–108)
GLUCOSE BLD-MCNC: 167 MG/DL (ref 70–108)
HCT VFR BLD CALC: 27 % (ref 37–47)
HEMOGLOBIN: 8.3 GM/DL (ref 12–16)
MCH RBC QN AUTO: 25.5 PG (ref 26–33)
MCHC RBC AUTO-ENTMCNC: 30.7 GM/DL (ref 32.2–35.5)
MCV RBC AUTO: 82.8 FL (ref 81–99)
PLATELET # BLD: 182 THOU/MM3 (ref 130–400)
PMV BLD AUTO: 10.2 FL (ref 9.4–12.4)
RBC # BLD: 3.26 MILL/MM3 (ref 4.2–5.4)
WBC # BLD: 6.1 THOU/MM3 (ref 4.8–10.8)

## 2018-11-08 PROCEDURE — 6370000000 HC RX 637 (ALT 250 FOR IP): Performed by: FAMILY MEDICINE

## 2018-11-08 PROCEDURE — 85027 COMPLETE CBC AUTOMATED: CPT

## 2018-11-08 PROCEDURE — 82948 REAGENT STRIP/BLOOD GLUCOSE: CPT

## 2018-11-08 PROCEDURE — 94640 AIRWAY INHALATION TREATMENT: CPT

## 2018-11-08 PROCEDURE — 94760 N-INVAS EAR/PLS OXIMETRY 1: CPT

## 2018-11-08 PROCEDURE — 6370000000 HC RX 637 (ALT 250 FOR IP): Performed by: NURSE PRACTITIONER

## 2018-11-08 PROCEDURE — 97116 GAIT TRAINING THERAPY: CPT

## 2018-11-08 PROCEDURE — 6370000000 HC RX 637 (ALT 250 FOR IP): Performed by: HOSPITALIST

## 2018-11-08 PROCEDURE — 99231 SBSQ HOSP IP/OBS SF/LOW 25: CPT | Performed by: INTERNAL MEDICINE

## 2018-11-08 PROCEDURE — 36415 COLL VENOUS BLD VENIPUNCTURE: CPT

## 2018-11-08 PROCEDURE — 6360000002 HC RX W HCPCS: Performed by: HOSPITALIST

## 2018-11-08 PROCEDURE — 97110 THERAPEUTIC EXERCISES: CPT

## 2018-11-08 PROCEDURE — 99232 SBSQ HOSP IP/OBS MODERATE 35: CPT | Performed by: FAMILY MEDICINE

## 2018-11-08 PROCEDURE — 97530 THERAPEUTIC ACTIVITIES: CPT

## 2018-11-08 PROCEDURE — 2580000003 HC RX 258: Performed by: HOSPITALIST

## 2018-11-08 PROCEDURE — 1200000003 HC TELEMETRY R&B

## 2018-11-08 RX ORDER — ALBUTEROL SULFATE 2.5 MG/3ML
2.5 SOLUTION RESPIRATORY (INHALATION) EVERY 6 HOURS PRN
Status: DISCONTINUED | OUTPATIENT
Start: 2018-11-08 | End: 2018-11-09 | Stop reason: HOSPADM

## 2018-11-08 RX ORDER — BISACODYL 10 MG
10 SUPPOSITORY, RECTAL RECTAL DAILY PRN
Status: DISCONTINUED | OUTPATIENT
Start: 2018-11-08 | End: 2018-11-09 | Stop reason: HOSPADM

## 2018-11-08 RX ADMIN — TIOTROPIUM BROMIDE 18 MCG: 18 CAPSULE ORAL; RESPIRATORY (INHALATION) at 10:44

## 2018-11-08 RX ADMIN — Medication 2 UNITS: at 08:11

## 2018-11-08 RX ADMIN — AMLODIPINE BESYLATE 10 MG: 10 TABLET ORAL at 09:30

## 2018-11-08 RX ADMIN — CLONIDINE HYDROCHLORIDE 0.3 MG: 0.2 TABLET ORAL at 00:10

## 2018-11-08 RX ADMIN — MINOXIDIL 5 MG: 2.5 TABLET ORAL at 14:53

## 2018-11-08 RX ADMIN — HEPARIN SODIUM 5000 UNITS: 5000 INJECTION INTRAVENOUS; SUBCUTANEOUS at 14:32

## 2018-11-08 RX ADMIN — DOCUSATE SODIUM 100 MG: 100 CAPSULE, LIQUID FILLED ORAL at 09:30

## 2018-11-08 RX ADMIN — MINOXIDIL 5 MG: 2.5 TABLET ORAL at 21:20

## 2018-11-08 RX ADMIN — CARVEDILOL 37.5 MG: 25 TABLET, FILM COATED ORAL at 09:30

## 2018-11-08 RX ADMIN — CLONIDINE HYDROCHLORIDE 0.3 MG: 0.2 TABLET ORAL at 17:01

## 2018-11-08 RX ADMIN — ATORVASTATIN CALCIUM 40 MG: 40 TABLET, FILM COATED ORAL at 21:20

## 2018-11-08 RX ADMIN — Medication 10 ML: at 09:30

## 2018-11-08 RX ADMIN — HEPARIN SODIUM 5000 UNITS: 5000 INJECTION INTRAVENOUS; SUBCUTANEOUS at 06:20

## 2018-11-08 RX ADMIN — HEPARIN SODIUM 5000 UNITS: 5000 INJECTION INTRAVENOUS; SUBCUTANEOUS at 21:20

## 2018-11-08 RX ADMIN — CLONIDINE HYDROCHLORIDE 0.3 MG: 0.2 TABLET ORAL at 09:30

## 2018-11-08 RX ADMIN — MINOXIDIL 5 MG: 2.5 TABLET ORAL at 09:30

## 2018-11-08 RX ADMIN — BUMETANIDE 2 MG: 1 TABLET ORAL at 09:30

## 2018-11-08 RX ADMIN — ASPIRIN 81 MG: 81 TABLET, COATED ORAL at 09:30

## 2018-11-08 RX ADMIN — LACTULOSE 20 G: 20 SOLUTION ORAL at 21:22

## 2018-11-08 RX ADMIN — Medication 10 ML: at 21:27

## 2018-11-08 RX ADMIN — ACETAMINOPHEN 650 MG: 325 TABLET ORAL at 00:39

## 2018-11-08 RX ADMIN — BUMETANIDE 2 MG: 1 TABLET ORAL at 21:20

## 2018-11-08 RX ADMIN — INSULIN LISPRO 3 UNITS: 100 INJECTION, SOLUTION INTRAVENOUS; SUBCUTANEOUS at 16:56

## 2018-11-08 RX ADMIN — DOCUSATE SODIUM 100 MG: 100 CAPSULE, LIQUID FILLED ORAL at 21:22

## 2018-11-08 RX ADMIN — DOXAZOSIN 12 MG: 4 TABLET ORAL at 09:30

## 2018-11-08 RX ADMIN — Medication 1 TABLET: at 09:30

## 2018-11-08 RX ADMIN — Medication 2 UNITS: at 12:04

## 2018-11-08 RX ADMIN — CARVEDILOL 37.5 MG: 25 TABLET, FILM COATED ORAL at 18:02

## 2018-11-08 RX ADMIN — FERROUS SULFATE TAB 325 MG (65 MG ELEMENTAL FE) 325 MG: 325 (65 FE) TAB at 09:30

## 2018-11-08 ASSESSMENT — PAIN DESCRIPTION - PAIN TYPE: TYPE: ACUTE PAIN

## 2018-11-08 ASSESSMENT — PAIN SCALES - GENERAL
PAINLEVEL_OUTOF10: 0
PAINLEVEL_OUTOF10: 0
PAINLEVEL_OUTOF10: 8
PAINLEVEL_OUTOF10: 0
PAINLEVEL_OUTOF10: 0
PAINLEVEL_OUTOF10: 8
PAINLEVEL_OUTOF10: 0

## 2018-11-08 ASSESSMENT — PAIN DESCRIPTION - LOCATION: LOCATION: OTHER (COMMENT)

## 2018-11-08 NOTE — PROGRESS NOTES
to stand: Contact guard assistance  Stand to sit: Stand by assistance     Balance  Sitting Balance: Supervision  Standing Balance: Contact guard assistance     Time: 45 seconds  Activity: preparing to walk     Functional Mobility  Functional - Mobility Device: Cane  Activity: Other  Assist Level: Contact guard assistance  Functional Mobility Comments: Pt ambulated in hallway with cane, using hand rail to rest on x1 trial d/t fatigue, no LOB and slow pace. Fatigue noted               Type of ROM/Therapeutic Exercise: AROM  Comment: Vended pt yellow theraband and demo's and vc's to complete exercises in all joints/planes x12 reps x1 set while sitting in bedside chair. Pt completed exercsies to increase UB strength and activity tolerance     Activity Tolerance:  Activity Tolerance: Patient limited by fatigue;Patient Tolerated treatment well    Assessment:     Performance deficits / Impairments: Decreased endurance, Decreased functional mobility , Decreased ADL status, Decreased safe awareness  Prognosis: Good    Discharge Recommendations:  Discharge Recommendations: Continue to assess pending progress, Patient would benefit from continued therapy after discharge    Patient Education:  Patient Education: OT vs PT, TCU, role of therapy     Equipment Recommendations:  Equipment Needed: Yes  Mobility Devices: ADL Assistive Devices  ADL Assistive Devices: Sock-Aid Soft, Reacher, Long-handled Shoe Horn, Long-handled Sponge    Safety:  Safety Devices in place: Yes  Type of devices: All fall risk precautions in place, Call light within reach, Chair alarm in place, Left in chair, Patient at risk for falls, Gait belt    Plan:  Times per week: 3-5x  Current Treatment Recommendations: Endurance Training, Functional Mobility Training, Self-Care / ADL, Safety Education & Training  Plan Comment: Pt would benefit from continued OT when medically stable and discharged from Acute.   Specific instructions for Next Treatment: Misha Ramirez

## 2018-11-08 NOTE — PROGRESS NOTES
into long sitting and then used UEs to bring LEs over edge of bed )  Sit to Supine: Minimal assistance (she did try to use UE to assist LEs into bed but had difficulty and needed assist )    Transfers  Sit to Stand: Contact guard assistance (from bed and wide base of support )  Stand to sit: Contact guard assistance       Ambulation 1  Device: Single point cane (and hand rail )  Assistance: Contact guard assistance  Quality of Gait: slow paulette and reached for rail for support noted wide base of support she did c/o of fatigue at end of walk   Distance: 90x1       Exercises:  Exercises  Comments: pt completed ankle pumps, glut sets, quad sets, heelslides, hip abd/add, short arc quads, hip abd/add, straight leg raises with min to mod assist, seated long arc qauds, hip flexion x 10 rpes each        Activity Tolerance:  Activity Tolerance: Patient limited by fatigue;Patient limited by endurance    Assessment:   Body structures, Functions, Activity limitations: Decreased functional mobility , Decreased strength, Decreased endurance, Decreased balance  Assessment: pt tolerated session fair, she did better than previous day however still demonstrated generalized weakness in LEs needing assist to get into bed and still fatigued easy and slgihtly unsteady with limited walking distance she would benefit from cont skilled therapy to work on strength, balance, endurance and increased independence with functional mobility prior to discharge home   Prognosis: Good     REQUIRES PT FOLLOW UP: Yes    Discharge Recommendations:  Discharge Recommendations:  (pending progress, pt may benefit from an inpatient therapy stay prior to home alone to work on strength, balance, endurance and independence with functional mobility )    Patient Education:  Patient Education: plan of care, ther ex to work on during the day, safety concerns with home going alone as she needed assist for activity this date    Equipment Recommendations:  Equipment

## 2018-11-08 NOTE — PLAN OF CARE
Problem: Discharge Planning:  Goal: Participates in care planning  Participates in care planning   Outcome: Ongoing  Pt participates in care planning to the best of ability. Will continue to include in care planning. Goal: Discharged to appropriate level of care  Discharged to appropriate level of care   Outcome: Ongoing  Pt plans to be discharged to home. Will continue to monitor for further discharge needs. Problem: Cardiac Output - Decreased:  Goal: Hemodynamic stability will improve  Hemodynamic stability will improve   Outcome: Ongoing  Vitals:    11/08/18 0009   BP: (!) 126/59   Pulse: 70   Resp:    Temp:    SpO2:      VSS this shift. Will continue to monitor and manage appropriately. Problem: Fluid Volume - Imbalance:  Goal: Absence of imbalanced fluid volume signs and symptoms  Absence of imbalanced fluid volume signs and symptoms   Outcome: Ongoing  Pt has some edema in lower extremities and has decreased since hemodialysis. Will continue to monitor. Problem: Pain:  Goal: Pain level will decrease  Pain level will decrease    Outcome: Ongoing  Pt rated acute pain in feet 8 on scale 0-10. Will continue to monitor and manage appropriately. Problem: Skin Integrity - Impaired:  Goal: Will show no infection signs and symptoms  Will show no infection signs and symptoms   Outcome: Ongoing  No signs or symptoms of infection noted this shift. Will continue to monitor. Goal: Absence of new skin breakdown  Absence of new skin breakdown   Outcome: Ongoing  No new signs of skin breakdown noted this shift. Will continue to monitor and encourage turning from side to side in bed. Problem: Falls - Risk of:  Goal: Will remain free from falls  Will remain free from falls   Outcome: Ongoing  Pt free from falls this shift. Bed alarm on, 2/4 side rails up, call light in reach, fall band on, nonskid socks on, clear pathway, bed in locked and lowest position. Will continue to monitor.   Goal: Absence of physical

## 2018-11-08 NOTE — CARE COORDINATION
11/8/18, 3:18 PM      Alvin Syed day: 7  Location: [facility]19/019-A Reason for admit: Fluid overload [E87.70]      Treatment Plan of Care: nephrology following, monitor labs, plans IP hemodialysis Friday, dietitian following with ONS, PT and OT following    PCP: DONOVAN Sultana CNP  Readmission Risk Score: 29%  Discharge Plan:  Planning TCU Friday/[place]

## 2018-11-09 ENCOUNTER — HOSPITAL ENCOUNTER (INPATIENT)
Age: 63
LOS: 12 days | Discharge: HOME | DRG: 291 | End: 2018-11-21
Attending: FAMILY MEDICINE | Admitting: FAMILY MEDICINE
Payer: MEDICARE

## 2018-11-09 VITALS
OXYGEN SATURATION: 91 % | TEMPERATURE: 98.5 F | WEIGHT: 178.57 LBS | HEART RATE: 72 BPM | RESPIRATION RATE: 18 BRPM | SYSTOLIC BLOOD PRESSURE: 161 MMHG | HEIGHT: 65 IN | DIASTOLIC BLOOD PRESSURE: 74 MMHG | BODY MASS INDEX: 29.75 KG/M2

## 2018-11-09 DIAGNOSIS — Z79.4 TYPE 2 DIABETES MELLITUS WITHOUT COMPLICATION, WITH LONG-TERM CURRENT USE OF INSULIN (HCC): ICD-10-CM

## 2018-11-09 DIAGNOSIS — Z99.2 ESRD NEEDING DIALYSIS (HCC): Primary | ICD-10-CM

## 2018-11-09 DIAGNOSIS — J96.01 ACUTE RESPIRATORY FAILURE WITH HYPOXIA (HCC): ICD-10-CM

## 2018-11-09 DIAGNOSIS — N18.6 ESRD NEEDING DIALYSIS (HCC): Primary | ICD-10-CM

## 2018-11-09 DIAGNOSIS — E11.9 TYPE 2 DIABETES MELLITUS WITHOUT COMPLICATION, WITH LONG-TERM CURRENT USE OF INSULIN (HCC): ICD-10-CM

## 2018-11-09 PROBLEM — R53.81 DEBILITY: Status: ACTIVE | Noted: 2018-11-09

## 2018-11-09 LAB
ERYTHROCYTE [DISTWIDTH] IN BLOOD BY AUTOMATED COUNT: 18.9 % (ref 11.5–14.5)
ERYTHROCYTE [DISTWIDTH] IN BLOOD BY AUTOMATED COUNT: 57.3 FL (ref 35–45)
GLUCOSE BLD-MCNC: 108 MG/DL (ref 70–108)
GLUCOSE BLD-MCNC: 122 MG/DL (ref 70–108)
GLUCOSE BLD-MCNC: 155 MG/DL (ref 70–108)
GLUCOSE BLD-MCNC: 167 MG/DL (ref 70–108)
HCT VFR BLD CALC: 27.2 % (ref 37–47)
HEMOGLOBIN: 8.2 GM/DL (ref 12–16)
MCH RBC QN AUTO: 25.5 PG (ref 26–33)
MCHC RBC AUTO-ENTMCNC: 30.1 GM/DL (ref 32.2–35.5)
MCV RBC AUTO: 84.5 FL (ref 81–99)
PLATELET # BLD: 208 THOU/MM3 (ref 130–400)
PMV BLD AUTO: 11 FL (ref 9.4–12.4)
RBC # BLD: 3.22 MILL/MM3 (ref 4.2–5.4)
WBC # BLD: 6.7 THOU/MM3 (ref 4.8–10.8)

## 2018-11-09 PROCEDURE — 90935 HEMODIALYSIS ONE EVALUATION: CPT

## 2018-11-09 PROCEDURE — 1290000000 HC SEMI PRIVATE OTHER R&B

## 2018-11-09 PROCEDURE — 90935 HEMODIALYSIS ONE EVALUATION: CPT | Performed by: NURSE PRACTITIONER

## 2018-11-09 PROCEDURE — 6370000000 HC RX 637 (ALT 250 FOR IP): Performed by: FAMILY MEDICINE

## 2018-11-09 PROCEDURE — 36415 COLL VENOUS BLD VENIPUNCTURE: CPT

## 2018-11-09 PROCEDURE — 82948 REAGENT STRIP/BLOOD GLUCOSE: CPT

## 2018-11-09 PROCEDURE — 0220000000 HC SKILLED NURSING FACILITY

## 2018-11-09 PROCEDURE — 99232 SBSQ HOSP IP/OBS MODERATE 35: CPT | Performed by: FAMILY MEDICINE

## 2018-11-09 PROCEDURE — 6360000002 HC RX W HCPCS: Performed by: NURSE PRACTITIONER

## 2018-11-09 PROCEDURE — 6370000000 HC RX 637 (ALT 250 FOR IP): Performed by: HOSPITALIST

## 2018-11-09 PROCEDURE — 85027 COMPLETE CBC AUTOMATED: CPT

## 2018-11-09 PROCEDURE — 99999 PR OFFICE/OUTPT VISIT,PROCEDURE ONLY: CPT | Performed by: NURSE PRACTITIONER

## 2018-11-09 PROCEDURE — 6360000002 HC RX W HCPCS: Performed by: HOSPITALIST

## 2018-11-09 RX ORDER — BUMETANIDE 1 MG/1
2 TABLET ORAL 2 TIMES DAILY
Status: CANCELLED | OUTPATIENT
Start: 2018-11-09

## 2018-11-09 RX ORDER — ATORVASTATIN CALCIUM 40 MG/1
40 TABLET, FILM COATED ORAL DAILY
Status: CANCELLED | OUTPATIENT
Start: 2018-11-09

## 2018-11-09 RX ORDER — ALBUTEROL SULFATE 90 UG/1
2 AEROSOL, METERED RESPIRATORY (INHALATION) EVERY 6 HOURS PRN
Status: CANCELLED | OUTPATIENT
Start: 2018-11-09

## 2018-11-09 RX ORDER — DEXTROSE MONOHYDRATE 50 MG/ML
100 INJECTION, SOLUTION INTRAVENOUS PRN
Status: DISCONTINUED | OUTPATIENT
Start: 2018-11-09 | End: 2018-11-21 | Stop reason: HOSPADM

## 2018-11-09 RX ORDER — ATORVASTATIN CALCIUM 40 MG/1
40 TABLET, FILM COATED ORAL DAILY
Status: DISCONTINUED | OUTPATIENT
Start: 2018-11-09 | End: 2018-11-21 | Stop reason: HOSPADM

## 2018-11-09 RX ORDER — ASPIRIN 81 MG/1
81 TABLET ORAL DAILY
Status: DISCONTINUED | OUTPATIENT
Start: 2018-11-10 | End: 2018-11-21 | Stop reason: HOSPADM

## 2018-11-09 RX ORDER — ASPIRIN 81 MG/1
81 TABLET ORAL DAILY
Status: CANCELLED | OUTPATIENT
Start: 2018-11-10

## 2018-11-09 RX ORDER — FOLIC ACID/VIT B COMPLEX AND C 5 MG
1 TABLET ORAL DAILY
Status: CANCELLED | OUTPATIENT
Start: 2018-11-10

## 2018-11-09 RX ORDER — LACTULOSE 10 G/15ML
20 SOLUTION ORAL 2 TIMES DAILY PRN
Status: DISCONTINUED | OUTPATIENT
Start: 2018-11-09 | End: 2018-11-21 | Stop reason: HOSPADM

## 2018-11-09 RX ORDER — FOLIC ACID/VIT B COMPLEX AND C 5 MG
1 TABLET ORAL DAILY
Status: DISCONTINUED | OUTPATIENT
Start: 2018-11-10 | End: 2018-11-21 | Stop reason: HOSPADM

## 2018-11-09 RX ORDER — ALBUTEROL SULFATE 2.5 MG/3ML
2.5 SOLUTION RESPIRATORY (INHALATION) EVERY 6 HOURS PRN
Status: DISCONTINUED | OUTPATIENT
Start: 2018-11-09 | End: 2018-11-21 | Stop reason: HOSPADM

## 2018-11-09 RX ORDER — LACTULOSE 10 G/15ML
20 SOLUTION ORAL 2 TIMES DAILY PRN
Status: CANCELLED | OUTPATIENT
Start: 2018-11-09

## 2018-11-09 RX ORDER — ACETAMINOPHEN 325 MG/1
650 TABLET ORAL EVERY 6 HOURS PRN
Status: DISCONTINUED | OUTPATIENT
Start: 2018-11-09 | End: 2018-11-21 | Stop reason: HOSPADM

## 2018-11-09 RX ORDER — MINOXIDIL 2.5 MG/1
5 TABLET ORAL 3 TIMES DAILY
Status: DISCONTINUED | OUTPATIENT
Start: 2018-11-09 | End: 2018-11-21 | Stop reason: HOSPADM

## 2018-11-09 RX ORDER — NITROGLYCERIN 0.4 MG/1
0.4 TABLET SUBLINGUAL EVERY 5 MIN PRN
Status: DISCONTINUED | OUTPATIENT
Start: 2018-11-09 | End: 2018-11-21 | Stop reason: HOSPADM

## 2018-11-09 RX ORDER — DOCUSATE SODIUM 100 MG/1
100 CAPSULE, LIQUID FILLED ORAL 2 TIMES DAILY
Status: DISCONTINUED | OUTPATIENT
Start: 2018-11-09 | End: 2018-11-21 | Stop reason: HOSPADM

## 2018-11-09 RX ORDER — DEXTROSE MONOHYDRATE 50 MG/ML
100 INJECTION, SOLUTION INTRAVENOUS PRN
Status: CANCELLED | OUTPATIENT
Start: 2018-11-09

## 2018-11-09 RX ORDER — DEXTROSE MONOHYDRATE 25 G/50ML
12.5 INJECTION, SOLUTION INTRAVENOUS PRN
Status: CANCELLED | OUTPATIENT
Start: 2018-11-09

## 2018-11-09 RX ORDER — FERROUS SULFATE 325(65) MG
325 TABLET ORAL
Status: CANCELLED | OUTPATIENT
Start: 2018-11-10

## 2018-11-09 RX ORDER — INSULIN GLARGINE 100 [IU]/ML
10 INJECTION, SOLUTION SUBCUTANEOUS NIGHTLY
Status: CANCELLED | OUTPATIENT
Start: 2018-11-09

## 2018-11-09 RX ORDER — DOXAZOSIN MESYLATE 4 MG/1
12 TABLET ORAL DAILY
Status: CANCELLED | OUTPATIENT
Start: 2018-11-10

## 2018-11-09 RX ORDER — NICOTINE POLACRILEX 4 MG
15 LOZENGE BUCCAL PRN
Status: CANCELLED | OUTPATIENT
Start: 2018-11-09

## 2018-11-09 RX ORDER — DEXTROSE MONOHYDRATE 25 G/50ML
12.5 INJECTION, SOLUTION INTRAVENOUS PRN
Status: DISCONTINUED | OUTPATIENT
Start: 2018-11-09 | End: 2018-11-21 | Stop reason: HOSPADM

## 2018-11-09 RX ORDER — BUMETANIDE 1 MG/1
2 TABLET ORAL 2 TIMES DAILY
Status: DISCONTINUED | OUTPATIENT
Start: 2018-11-09 | End: 2018-11-21 | Stop reason: HOSPADM

## 2018-11-09 RX ORDER — FERROUS SULFATE 325(65) MG
325 TABLET ORAL
Status: DISCONTINUED | OUTPATIENT
Start: 2018-11-10 | End: 2018-11-21 | Stop reason: HOSPADM

## 2018-11-09 RX ORDER — ACETAMINOPHEN 325 MG/1
650 TABLET ORAL EVERY 6 HOURS PRN
Status: CANCELLED | OUTPATIENT
Start: 2018-11-09

## 2018-11-09 RX ORDER — DOCUSATE SODIUM 100 MG/1
100 CAPSULE, LIQUID FILLED ORAL 2 TIMES DAILY
Status: CANCELLED | OUTPATIENT
Start: 2018-11-09

## 2018-11-09 RX ORDER — ALBUTEROL SULFATE 90 UG/1
2 AEROSOL, METERED RESPIRATORY (INHALATION) EVERY 6 HOURS PRN
Status: DISCONTINUED | OUTPATIENT
Start: 2018-11-09 | End: 2018-11-21 | Stop reason: HOSPADM

## 2018-11-09 RX ORDER — CALCITRIOL 0.25 UG/1
0.25 CAPSULE, LIQUID FILLED ORAL
Status: DISCONTINUED | OUTPATIENT
Start: 2018-11-12 | End: 2018-11-21 | Stop reason: HOSPADM

## 2018-11-09 RX ORDER — INSULIN GLARGINE 100 [IU]/ML
10 INJECTION, SOLUTION SUBCUTANEOUS NIGHTLY
Status: DISCONTINUED | OUTPATIENT
Start: 2018-11-09 | End: 2018-11-09 | Stop reason: HOSPADM

## 2018-11-09 RX ORDER — MINOXIDIL 2.5 MG/1
5 TABLET ORAL 3 TIMES DAILY
Status: CANCELLED | OUTPATIENT
Start: 2018-11-09

## 2018-11-09 RX ORDER — BISACODYL 10 MG
10 SUPPOSITORY, RECTAL RECTAL DAILY PRN
Status: CANCELLED | OUTPATIENT
Start: 2018-11-09

## 2018-11-09 RX ORDER — FLUTICASONE PROPIONATE 50 MCG
1 SPRAY, SUSPENSION (ML) NASAL DAILY
Status: DISCONTINUED | OUTPATIENT
Start: 2018-11-10 | End: 2018-11-21 | Stop reason: HOSPADM

## 2018-11-09 RX ORDER — AMLODIPINE BESYLATE 10 MG/1
10 TABLET ORAL DAILY
Status: CANCELLED | OUTPATIENT
Start: 2018-11-10

## 2018-11-09 RX ORDER — ALBUTEROL SULFATE 2.5 MG/3ML
2.5 SOLUTION RESPIRATORY (INHALATION) EVERY 6 HOURS PRN
Status: CANCELLED | OUTPATIENT
Start: 2018-11-09

## 2018-11-09 RX ORDER — CALCITRIOL 0.25 UG/1
0.25 CAPSULE, LIQUID FILLED ORAL
Status: CANCELLED | OUTPATIENT
Start: 2018-11-12

## 2018-11-09 RX ORDER — NICOTINE POLACRILEX 4 MG
15 LOZENGE BUCCAL PRN
Status: DISCONTINUED | OUTPATIENT
Start: 2018-11-09 | End: 2018-11-21 | Stop reason: HOSPADM

## 2018-11-09 RX ORDER — DOXAZOSIN MESYLATE 4 MG/1
12 TABLET ORAL DAILY
Status: DISCONTINUED | OUTPATIENT
Start: 2018-11-10 | End: 2018-11-21 | Stop reason: HOSPADM

## 2018-11-09 RX ORDER — BISACODYL 10 MG
10 SUPPOSITORY, RECTAL RECTAL DAILY PRN
Status: DISCONTINUED | OUTPATIENT
Start: 2018-11-09 | End: 2018-11-21 | Stop reason: HOSPADM

## 2018-11-09 RX ORDER — FLUTICASONE PROPIONATE 50 MCG
1 SPRAY, SUSPENSION (ML) NASAL DAILY
Status: CANCELLED | OUTPATIENT
Start: 2018-11-10

## 2018-11-09 RX ORDER — NITROGLYCERIN 0.4 MG/1
0.4 TABLET SUBLINGUAL EVERY 5 MIN PRN
Status: CANCELLED | OUTPATIENT
Start: 2018-11-09

## 2018-11-09 RX ORDER — AMLODIPINE BESYLATE 10 MG/1
10 TABLET ORAL DAILY
Status: DISCONTINUED | OUTPATIENT
Start: 2018-11-10 | End: 2018-11-21 | Stop reason: HOSPADM

## 2018-11-09 RX ORDER — INSULIN GLARGINE 100 [IU]/ML
10 INJECTION, SOLUTION SUBCUTANEOUS NIGHTLY
Status: DISCONTINUED | OUTPATIENT
Start: 2018-11-09 | End: 2018-11-17

## 2018-11-09 RX ADMIN — HEPARIN SODIUM 5000 UNITS: 5000 INJECTION INTRAVENOUS; SUBCUTANEOUS at 13:14

## 2018-11-09 RX ADMIN — DARBEPOETIN ALFA 200 MCG: 200 INJECTION, SOLUTION INTRAVENOUS; SUBCUTANEOUS at 13:18

## 2018-11-09 RX ADMIN — ASPIRIN 81 MG: 81 TABLET, COATED ORAL at 13:13

## 2018-11-09 RX ADMIN — BUMETANIDE 2 MG: 1 TABLET ORAL at 13:13

## 2018-11-09 RX ADMIN — DOCUSATE SODIUM 100 MG: 100 CAPSULE, LIQUID FILLED ORAL at 13:13

## 2018-11-09 RX ADMIN — CALCITRIOL 0.25 MCG: 0.25 CAPSULE ORAL at 13:13

## 2018-11-09 RX ADMIN — Medication 1 TABLET: at 13:13

## 2018-11-09 RX ADMIN — BUMETANIDE 2 MG: 1 TABLET ORAL at 21:41

## 2018-11-09 RX ADMIN — INSULIN LISPRO 3 UNITS: 100 INJECTION, SOLUTION INTRAVENOUS; SUBCUTANEOUS at 06:55

## 2018-11-09 RX ADMIN — MINOXIDIL 5 MG: 2.5 TABLET ORAL at 21:41

## 2018-11-09 RX ADMIN — BISACODYL 10 MG: 10 SUPPOSITORY RECTAL at 13:13

## 2018-11-09 RX ADMIN — Medication 2 UNITS: at 21:50

## 2018-11-09 RX ADMIN — DOCUSATE SODIUM 100 MG: 100 CAPSULE, LIQUID FILLED ORAL at 21:41

## 2018-11-09 RX ADMIN — ATORVASTATIN CALCIUM 40 MG: 40 TABLET, FILM COATED ORAL at 21:41

## 2018-11-09 RX ADMIN — INSULIN GLARGINE 10 UNITS: 100 INJECTION, SOLUTION SUBCUTANEOUS at 21:49

## 2018-11-09 RX ADMIN — CLONIDINE HYDROCHLORIDE 0.3 MG: 0.2 TABLET ORAL at 13:14

## 2018-11-09 RX ADMIN — HEPARIN SODIUM 5000 UNITS: 5000 INJECTION INTRAVENOUS; SUBCUTANEOUS at 06:41

## 2018-11-09 RX ADMIN — CLONIDINE HYDROCHLORIDE 0.3 MG: 0.2 TABLET ORAL at 16:41

## 2018-11-09 RX ADMIN — CLONIDINE HYDROCHLORIDE 0.3 MG: 0.2 TABLET ORAL at 00:00

## 2018-11-09 RX ADMIN — FERROUS SULFATE TAB 325 MG (65 MG ELEMENTAL FE) 325 MG: 325 (65 FE) TAB at 13:13

## 2018-11-09 RX ADMIN — CARVEDILOL 37.5 MG: 25 TABLET, FILM COATED ORAL at 16:42

## 2018-11-09 RX ADMIN — CARVEDILOL 37.5 MG: 25 TABLET, FILM COATED ORAL at 13:14

## 2018-11-09 ASSESSMENT — PAIN SCALES - GENERAL
PAINLEVEL_OUTOF10: 5
PAINLEVEL_OUTOF10: 5

## 2018-11-09 ASSESSMENT — PAIN DESCRIPTION - ONSET: ONSET: ON-GOING

## 2018-11-09 ASSESSMENT — PAIN DESCRIPTION - FREQUENCY: FREQUENCY: CONTINUOUS

## 2018-11-09 ASSESSMENT — PAIN DESCRIPTION - LOCATION
LOCATION: FOOT
LOCATION: ANKLE;FOOT;LEG

## 2018-11-09 ASSESSMENT — PAIN DESCRIPTION - ORIENTATION
ORIENTATION: RIGHT;LEFT
ORIENTATION: RIGHT;LEFT

## 2018-11-09 ASSESSMENT — PAIN DESCRIPTION - PAIN TYPE: TYPE: NEUROPATHIC PAIN

## 2018-11-09 ASSESSMENT — PAIN DESCRIPTION - DESCRIPTORS: DESCRIPTORS: ACHING

## 2018-11-09 ASSESSMENT — PAIN DESCRIPTION - PROGRESSION: CLINICAL_PROGRESSION: NOT CHANGED

## 2018-11-09 ASSESSMENT — ACTIVITIES OF DAILY LIVING (ADL): EFFECT OF PAIN ON DAILY ACTIVITIES: DECREASED ACTIVITY

## 2018-11-09 NOTE — PROGRESS NOTES
Patient sitting in chair, breathing unlabored, call light within reach.    Electronically signed by Liam Pineda on 11/8/18 at 7:13 PM

## 2018-11-09 NOTE — DISCHARGE SUMMARY
Hospital Medicine Discharge Summary      Patient Identification:   Annemarie Guerrero   : 1955  MRN: [id number]   Account: [de-identified]      Patient's PCP: DONOVAN Uribe CNP    Admit Date: 2018     Discharge Date:   2018    Admitting Physician: Caro Galeazzi, DO     Discharge Physician: Chon Singh MD     Discharge Diagnoses:    1. Volume overload due to renal dysfunction, improving  2. ESRD secondary to diabetic and hypertensive nephrosclerosis on Hemodialysis, AV fistula, with improved volume status. 3. Acute hypoxic respiratory failure, likely secondary to pulmonary edema, improved  4. Elevated Trop  5. Peripheral edema, improving  6. HTN, fairly controlled  7. DM type 2, fairly controlled  8. COPD, stable  9. Anemia of chronic disease, due to ESRD  10. Hyperlipidemia  11. Constipation, resolved   12. Hypernatremia, resolved  13. Hypokalemia, resolved           The patient was seen and examined on day of discharge and this discharge summary is in conjunction with any daily progress note from day of discharge. Hospital Course:     Please see H/P for details. In summary, this is a 57 y.o. Female, with CKD stage V/ESRD, COPD, CAD, chronic anemia, DM2, HTN, GERD, chronic right ankle swelling due to prior fracture/surgery presenting with persistent sob and leg swelling despite outpatient diuretics. Patient was admitted to [address] [place] [address] on 2018. CXR in ER 18 showed pulmonary edema, pleural effusion, oxygen saturation 88% on RA in ER. Pt was started on O2 and admitted for further treatment. Nephrology consulted and plan to start HD 18 however has fistula LUE and unable to run 18 due to fistula issues. Able to remove 3L 11/3/18 with HD and plan HD 18. Trop elevated 0.199 on admission, repeat Troponin on 18 was 0.163, pt also had elevated troponin on 18, it was 0.094.  Patient was seen by Cardiologist, Dr. Isac Mac, who recommend (TYLENOL) 325 MG tablet  Take 2 tablets by mouth every 6 hours as needed for Pain             albuterol sulfate HFA (PROAIR HFA) 108 (90 Base) MCG/ACT inhaler  Inhale 2 puffs into the lungs every 6 hours as needed for Wheezing             amLODIPine (NORVASC) 10 MG tablet  Take 1 tablet by mouth daily             aspirin 81 MG EC tablet  Take 81 mg by mouth daily. atorvastatin (LIPITOR) 40 MG tablet  Take 1 tablet by mouth daily             bumetanide (BUMEX) 1 MG tablet  Take 2 mg by mouth 2 times daily             calcitRIOL (ROCALTROL) 0.25 MCG capsule  Take 1 capsule by mouth three times a week             carvedilol (COREG) 25 MG tablet  Take 1.5 tablets by mouth 2 times daily . hold if SBP less than 100 mm hg or HR less than 60             cloNIDine (CATAPRES) 0.3 MG tablet  Take 1 tablet by mouth every 8 hours One tablet three times per day             darbepoetin quintin-polysorbate (ARANESP) 200 MCG/0.4ML SOSY injection  Inject 200 mcg into the skin once a week              docusate sodium (COLACE, DULCOLAX) 100 MG CAPS  Take 100 mg by mouth 2 times daily             doxazosin (CARDURA) 4 MG tablet  Take 3 tablets by mouth daily . hold if SBP less than 100 mm hg             ferrous sulfate (FE TABS) 325 (65 Fe) MG EC tablet  Take 1 tablet by mouth daily (with breakfast)             fluticasone (FLONASE) 50 MCG/ACT nasal spray  2 sprays by Nasal route daily as needed              FOLBEE 2.5-25-1 MG TABS tablet  TAKE 1 TABLET BY MOUTH DAILY             insulin aspart (NOVOLOG FLEXPEN) 100 UNIT/ML injection pen  . Sliding scale insulin coverage  Glucose:Dose: If Less [id number] =No Insulin/ 140-199= 2 Units/ 200-249=4 Units/ 250-299= 6 Units/  300-349=8 Units/  350-400=10 Units/ Above 400 = 12 Units             insulin glargine (LANTUS SOLOSTAR) 100 UNIT/ML injection pen  Inject 25 Units into the skin nightly             Insulin Pen Needle (B-D ULTRAFINE III SHORT PEN) 31G X 8 MM MISC  Use three times daily Diagnosis Code E11.9             lactulose (CHRONULAC) 10 GM/15ML solution  Take 30 mLs by mouth 2 times daily as needed (constipation, aif not having BM with other stool softners)             minoxidil (LONITEN) 2.5 MG tablet  Take 5 mg by mouth 3 times daily             nitroGLYCERIN (NITROSTAT) 0.4 MG SL tablet  Place 1 tablet under the tongue every 5 minutes as needed for Chest pain             Polyethylene Glycol 3350 (MIRALAX PO)  Take  by mouth as needed. potassium chloride (KLOR-CON M) 20 MEQ extended release tablet  Take 1 tablet by mouth daily             senna (SENOKOT) 8.6 MG tablet  Take 1 tablet by mouth daily             sodium bicarbonate 650 MG tablet  Take 2 tablets by mouth 2 times daily             tiotropium (SPIRIVA HANDIHALER) 18 MCG inhalation capsule  Inhale 1 capsule into the lungs daily 1 capsule via inhalation daily. vitamin D (ERGOCALCIFEROL) 01997 units CAPS capsule  Take 1 capsule by mouth once a week                 Time Spent on discharge is more than 30 minutes in the examination, evaluation, counseling and review of medications and discharge plan. Signed: Thank you DONOVAN Morales - DAVE for the opportunity to be involved in this patient's care.     Electronically signed by Nigel Flynn MD on 11/9/2018 at 3:33 PM

## 2018-11-09 NOTE — PROGRESS NOTES
Hospitalist Progress Note    Patient:  Nallely Leung      Unit/Bed:6K-19/019-A    YOB: 1955    MRN: [id number]       Acct: [de-identified]     PCP: DONOVAN Cordova CNP    Date of Admission: 11/1/2018    Chief Complaint: sob and leg swelling    Hospital Course:   Please see H/P for details. In summary, this is a 57 y.o. Female, with CKD stage V/ESRD, COPD, CAD, chronic anemia, DM2, HTN, GERD, chronic right ankle swelling due to prior fracture/surgery presenting with persistent sob and leg swelling despite outpt diuretics.  CXR in ER 11/1/18 showed pulm edema, pleural effusion, sat 88% on RA in ER. Pt was started on O2 and admitted for further treatment. Nephrology consulted and plan to start HD 11/2/18 however has fistula LUE and unable to run 11/2/18 due to fistula issues. Able to remove 3L 11/3/18 with HD and plan HD 11/5/18. trop elevated 0.199 on admission --> ?due to ESRD and fluid overload - last stress 8/2018 (-). Pt had limited echo on 11/6/18, which showed EF 60-65%, pericardium normal, pericardial effusion does not appear to be hemodynamically significant and does not meet tamponade physiology criteria. Repeat CXR 11/4/18 still with mod right pleural effusion.  Pt had HD 11/6/18, removed <300 cc per nurse. Pt had HD on 11/7/18, per pt, removed 3L. Pt had HD 11/9/2018. Subjective:     Pt seen and examined. Pt denies chest pain, SOB, fever, chills, abd pain, N/V. Last BM 4 days ago. Passing flatus. Leg swelling improving per pt.         Medications:  Reviewed    Infusion Medications    dextrose       Scheduled Medications    insulin glargine  10 Units Subcutaneous Nightly    insulin lispro  0-18 Units Subcutaneous TID WC    insulin lispro  0-9 Units Subcutaneous Nightly    darbepoetin quintin-polysorbate  200 mcg Subcutaneous Q7 Days    heparin (porcine)  5,000 Units Subcutaneous 3 times per day    amLODIPine  10 mg Oral Daily    aspirin  81 mg Oral Daily    atorvastatin

## 2018-11-09 NOTE — DISCHARGE INSTR - COC
LIVER BIOPSY  6/2015    LUNG BIOPSY  2009    [place] OFFICE/OUTPT VISIT,PROCEDURE ONLY Left 8/23/2018    LEFT UPPER EXTREMENTY AV FISTULA CREATION performed by Aamnda Avila MD at [place] [facility]       Immunization History:   Immunization History   Administered Date(s) Administered    Influenza Vaccine, unspecified formulation 11/04/2016, 11/10/2016    Influenza Virus Vaccine 10/19/2011, 10/15/2012, 12/21/2014, 12/09/2015, 11/16/2017    Influenza Whole 11/10/2010    Influenza, Quadv, 6 mo and older, IM, PF (Flulaval, Fluarix) 11/04/2018    Pneumococcal Conjugate 7-valent 01/01/2010    Tdap (Boostrix, Adacel) 05/21/2012       Active Problems:  Patient Active Problem List   Diagnosis Code    Diabetes mellitus, type 2 ([facility] E11.9    Uncontrolled hypertension I10    Chronic anemia D64.9    Coronary disease I25.10    Hyperlipemia E78.5    Arthritis M19.90    Neuropathy, diabetic ([facility] E11.40    CKD (chronic kidney disease), stage III ([facility] N18.3    Leg pain M79.606    Obesity (BMI 30-39. 9) E66.9    Low HDL (under 40) E78.6    Need for prophylactic vaccination against diphtheria-tetanus-pertussis (DTP) Z23    History of tobacco use Z87.891    Allergic rhinitis J30.9    COPD, mild (HCC) J44.9    Lung mass R91.8    Anemia, chronic disease D63.8    Gout M10.9    Urolithiasis N20.9    Ankle fracture, right S82.891A    Secondary hyperparathyroidism of renal origin ([facility] N25.81    Diabetes mellitus type 2, uncomplicated ([facility] C66.7    Obstructive sleep apnea on CPAP G47.33, Z99.89    Vitamin D deficiency E55.9    CKD (chronic kidney disease) stage 3, GFR 30-59 ml/min (HCC) N18.3    Type 2 diabetes mellitus with stage 3 chronic kidney disease (HCC) E11.22, N18.3    Hypertensive nephropathy I12.9    Benign essential HTN I10    Type 2 diabetes mellitus (HCC) E11.9    Hypertensive nephropathy I12.9    Persistent proteinuria associated with type 2 diabetes mellitus (HCC) E11.29, R80.9    Cellulitis in

## 2018-11-09 NOTE — PROGRESS NOTES
rash, No significant bruises on exposed surfaces  MUSCULOSKELETAL: Movement is coordinated. Moves all extremities   EXTREMITIES: Distal lower extremity temp is warm, trace lower extremity edema. Upper extremity AV Fistula   PSYCHIATRIC: mood and affect appropriate. Medications:   Med reviewed  Scheduled Meds:   insulin glargine  10 Units Subcutaneous Nightly    insulin lispro  0-18 Units Subcutaneous TID WC    insulin lispro  0-9 Units Subcutaneous Nightly    darbepoetin quintin-polysorbate  200 mcg Subcutaneous Q7 Days    heparin (porcine)  5,000 Units Subcutaneous 3 times per day    amLODIPine  10 mg Oral Daily    aspirin  81 mg Oral Daily    atorvastatin  40 mg Oral Daily    bumetanide  2 mg Oral BID    calcitRIOL  0.25 mcg Oral Once per day on Mon Wed Fri    carvedilol  37.5 mg Oral BID    cloNIDine  0.3 mg Oral Q8H    docusate sodium  100 mg Oral BID    doxazosin  12 mg Oral Daily    ferrous sulfate  325 mg Oral Daily with breakfast    folbee plus  1 tablet Oral Daily    fluticasone  1 spray Nasal Daily    minoxidil  5 mg Oral TID    tiotropium  18 mcg Inhalation Daily    sodium chloride flush  10 mL Intravenous 2 times per day     Labs:   Labs reviewed  No results for input(s): NA, K, CL, CO2, BUN, CREATININE, LABGLOM, GLUCOSE, MG, CALCIUM, CAION, MG in the last 72 hours. Recent Labs      11/08/18   0854  11/09/18   0520   WBC  6.1  6.7   RBC  3.26*  3.22*   HGB  8.3*  8.2*   HCT  27.0*  27.2*   PLT  182  208      ASSESSMENT:  1. End Stage Renal Disease 2nd to diabetic and hypertensive nephrosclerosis on Hemodialysis, AV fistula, with improved volume status. Planned out pt Hemodialysis T,T,S at [facility], starting Tues. Continue bumex. 2. Essential Hypertension with nephrosclerosis,   3. Coronary artery disease   4. Diabetes Mellitus Type II with nephrosclerosis with long term use of insulin   5. Anemia of chronic disease, Iron stores ok. Aranesp 200 mcg weekly.   6. Secondary

## 2018-11-10 LAB
GLUCOSE BLD-MCNC: 129 MG/DL (ref 70–108)
GLUCOSE BLD-MCNC: 148 MG/DL (ref 70–108)
GLUCOSE BLD-MCNC: 165 MG/DL (ref 70–108)
GLUCOSE BLD-MCNC: 175 MG/DL (ref 70–108)

## 2018-11-10 PROCEDURE — 2709999900 HC NON-CHARGEABLE SUPPLY

## 2018-11-10 PROCEDURE — 6370000000 HC RX 637 (ALT 250 FOR IP): Performed by: FAMILY MEDICINE

## 2018-11-10 PROCEDURE — 94760 N-INVAS EAR/PLS OXIMETRY 1: CPT

## 2018-11-10 PROCEDURE — 1290000000 HC SEMI PRIVATE OTHER R&B

## 2018-11-10 PROCEDURE — 82948 REAGENT STRIP/BLOOD GLUCOSE: CPT

## 2018-11-10 PROCEDURE — 94640 AIRWAY INHALATION TREATMENT: CPT

## 2018-11-10 PROCEDURE — 97161 PT EVAL LOW COMPLEX 20 MIN: CPT

## 2018-11-10 PROCEDURE — 97530 THERAPEUTIC ACTIVITIES: CPT

## 2018-11-10 RX ORDER — FAMOTIDINE 20 MG/1
20 TABLET, FILM COATED ORAL DAILY
Status: DISCONTINUED | OUTPATIENT
Start: 2018-11-10 | End: 2018-11-21 | Stop reason: HOSPADM

## 2018-11-10 RX ADMIN — DOXAZOSIN 12 MG: 4 TABLET ORAL at 08:43

## 2018-11-10 RX ADMIN — Medication 1 TABLET: at 08:43

## 2018-11-10 RX ADMIN — AMLODIPINE BESYLATE 10 MG: 10 TABLET ORAL at 08:44

## 2018-11-10 RX ADMIN — MINOXIDIL 5 MG: 2.5 TABLET ORAL at 20:58

## 2018-11-10 RX ADMIN — TIOTROPIUM BROMIDE 18 MCG: 18 CAPSULE ORAL; RESPIRATORY (INHALATION) at 05:40

## 2018-11-10 RX ADMIN — MINOXIDIL 5 MG: 2.5 TABLET ORAL at 13:33

## 2018-11-10 RX ADMIN — DOCUSATE SODIUM 100 MG: 100 CAPSULE, LIQUID FILLED ORAL at 20:58

## 2018-11-10 RX ADMIN — INSULIN LISPRO 3 UNITS: 100 INJECTION, SOLUTION INTRAVENOUS; SUBCUTANEOUS at 17:30

## 2018-11-10 RX ADMIN — FERROUS SULFATE TAB 325 MG (65 MG ELEMENTAL FE) 325 MG: 325 (65 FE) TAB at 08:43

## 2018-11-10 RX ADMIN — ATORVASTATIN CALCIUM 40 MG: 40 TABLET, FILM COATED ORAL at 20:58

## 2018-11-10 RX ADMIN — CLONIDINE HYDROCHLORIDE 0.3 MG: 0.2 TABLET ORAL at 01:36

## 2018-11-10 RX ADMIN — BUMETANIDE 2 MG: 1 TABLET ORAL at 08:43

## 2018-11-10 RX ADMIN — INSULIN LISPRO 3 UNITS: 100 INJECTION, SOLUTION INTRAVENOUS; SUBCUTANEOUS at 08:48

## 2018-11-10 RX ADMIN — CLONIDINE HYDROCHLORIDE 0.3 MG: 0.2 TABLET ORAL at 08:45

## 2018-11-10 RX ADMIN — FAMOTIDINE 20 MG: 20 TABLET ORAL at 17:30

## 2018-11-10 RX ADMIN — ASPIRIN 81 MG: 81 TABLET, COATED ORAL at 08:44

## 2018-11-10 RX ADMIN — DOCUSATE SODIUM 100 MG: 100 CAPSULE, LIQUID FILLED ORAL at 08:43

## 2018-11-10 RX ADMIN — CARVEDILOL 37.5 MG: 25 TABLET, FILM COATED ORAL at 08:43

## 2018-11-10 RX ADMIN — CLONIDINE HYDROCHLORIDE 0.3 MG: 0.2 TABLET ORAL at 17:30

## 2018-11-10 RX ADMIN — INSULIN LISPRO 3 UNITS: 100 INJECTION, SOLUTION INTRAVENOUS; SUBCUTANEOUS at 13:32

## 2018-11-10 RX ADMIN — FLUTICASONE PROPIONATE 1 SPRAY: 50 SPRAY, METERED NASAL at 08:43

## 2018-11-10 RX ADMIN — BUMETANIDE 2 MG: 1 TABLET ORAL at 20:58

## 2018-11-10 RX ADMIN — INSULIN GLARGINE 10 UNITS: 100 INJECTION, SOLUTION SUBCUTANEOUS at 20:59

## 2018-11-10 RX ADMIN — MINOXIDIL 5 MG: 2.5 TABLET ORAL at 08:43

## 2018-11-10 RX ADMIN — CARVEDILOL 37.5 MG: 25 TABLET, FILM COATED ORAL at 17:30

## 2018-11-10 ASSESSMENT — PAIN SCALES - GENERAL
PAINLEVEL_OUTOF10: 0
PAINLEVEL_OUTOF10: 0

## 2018-11-10 NOTE — FLOWSHEET NOTE
11/09/18 2048   Encounter Summary   Services provided to: Patient and family together   Referral/Consult From: Nurse   Support System Children;Family members   Place of [id number]  [place] Completed   Continue Visiting Yes  (11/9/18 continue support)   Complexity of Encounter Moderate   Length of Encounter 15 minutes   Spiritual/Yazidism   Type Spiritual support     S: Edgar Oliveros is a 57year old  female. She is admitted for kidney issues and to remove fluid on the heart. During this visit Edgar Oliveros' grand daughter was in the room providing support. Edgar Oliveros told this  that she has been in the hospital for few days to receive help in removing fluid from her lung and to work on helping swollen in her leg to go down. She is a member of [employer] in [facility]. Giovanni Hinton is engaged in conversation, approachable and calm. She is hopeful.  provided emotional and spiritual support and active listening. SC will follow up with continue emotional support as needed.

## 2018-11-10 NOTE — PROGRESS NOTES
Patient admitted to [address] 18 02 19 from 10E69, Granddaughter at bedside. Oriented to call light, room, and staff. Bed functions explained and demonstrated for patient. Patient requesting to have 2 siderails at the head of bed in the upright position to facilitate use of bed functions. Admission packet and patient rights provided, questions answered. No needs expressed at this time. Admitting medication orders compared with acute stay medications; home medication list reviewed with patient/family.   Medication issues identified: NO

## 2018-11-11 LAB
GLUCOSE BLD-MCNC: 124 MG/DL (ref 70–108)
GLUCOSE BLD-MCNC: 147 MG/DL (ref 70–108)
GLUCOSE BLD-MCNC: 169 MG/DL (ref 70–108)
GLUCOSE BLD-MCNC: 193 MG/DL (ref 70–108)

## 2018-11-11 PROCEDURE — 6370000000 HC RX 637 (ALT 250 FOR IP): Performed by: FAMILY MEDICINE

## 2018-11-11 PROCEDURE — 97535 SELF CARE MNGMENT TRAINING: CPT

## 2018-11-11 PROCEDURE — 1290000000 HC SEMI PRIVATE OTHER R&B

## 2018-11-11 PROCEDURE — 97166 OT EVAL MOD COMPLEX 45 MIN: CPT

## 2018-11-11 PROCEDURE — 94640 AIRWAY INHALATION TREATMENT: CPT

## 2018-11-11 PROCEDURE — 82948 REAGENT STRIP/BLOOD GLUCOSE: CPT

## 2018-11-11 RX ORDER — HYDROCODONE BITARTRATE AND ACETAMINOPHEN 5; 325 MG/1; MG/1
1 TABLET ORAL EVERY 4 HOURS PRN
Status: DISCONTINUED | OUTPATIENT
Start: 2018-11-11 | End: 2018-11-21 | Stop reason: HOSPADM

## 2018-11-11 RX ADMIN — ASPIRIN 81 MG: 81 TABLET, COATED ORAL at 08:43

## 2018-11-11 RX ADMIN — ATORVASTATIN CALCIUM 40 MG: 40 TABLET, FILM COATED ORAL at 21:32

## 2018-11-11 RX ADMIN — FERROUS SULFATE TAB 325 MG (65 MG ELEMENTAL FE) 325 MG: 325 (65 FE) TAB at 08:43

## 2018-11-11 RX ADMIN — DOXAZOSIN 12 MG: 4 TABLET ORAL at 08:43

## 2018-11-11 RX ADMIN — HYDROCODONE BITARTRATE AND ACETAMINOPHEN 1 TABLET: 5; 325 TABLET ORAL at 17:56

## 2018-11-11 RX ADMIN — MINOXIDIL 5 MG: 2.5 TABLET ORAL at 21:32

## 2018-11-11 RX ADMIN — Medication 1 TABLET: at 08:42

## 2018-11-11 RX ADMIN — ACETAMINOPHEN 650 MG: 325 TABLET ORAL at 08:42

## 2018-11-11 RX ADMIN — CLONIDINE HYDROCHLORIDE 0.3 MG: 0.2 TABLET ORAL at 01:59

## 2018-11-11 RX ADMIN — AMLODIPINE BESYLATE 10 MG: 10 TABLET ORAL at 08:43

## 2018-11-11 RX ADMIN — DOCUSATE SODIUM 100 MG: 100 CAPSULE, LIQUID FILLED ORAL at 21:32

## 2018-11-11 RX ADMIN — FAMOTIDINE 20 MG: 20 TABLET ORAL at 08:42

## 2018-11-11 RX ADMIN — Medication 2 UNITS: at 21:33

## 2018-11-11 RX ADMIN — BUMETANIDE 2 MG: 1 TABLET ORAL at 08:43

## 2018-11-11 RX ADMIN — LACTULOSE 20 G: 20 SOLUTION ORAL at 17:55

## 2018-11-11 RX ADMIN — TIOTROPIUM BROMIDE 18 MCG: 18 CAPSULE ORAL; RESPIRATORY (INHALATION) at 05:49

## 2018-11-11 RX ADMIN — MINOXIDIL 5 MG: 2.5 TABLET ORAL at 12:53

## 2018-11-11 RX ADMIN — MINOXIDIL 5 MG: 2.5 TABLET ORAL at 08:42

## 2018-11-11 RX ADMIN — CARVEDILOL 37.5 MG: 25 TABLET, FILM COATED ORAL at 17:42

## 2018-11-11 RX ADMIN — DOCUSATE SODIUM 100 MG: 100 CAPSULE, LIQUID FILLED ORAL at 08:43

## 2018-11-11 RX ADMIN — INSULIN LISPRO 3 UNITS: 100 INJECTION, SOLUTION INTRAVENOUS; SUBCUTANEOUS at 17:42

## 2018-11-11 RX ADMIN — BUMETANIDE 2 MG: 1 TABLET ORAL at 21:32

## 2018-11-11 RX ADMIN — FLUTICASONE PROPIONATE 1 SPRAY: 50 SPRAY, METERED NASAL at 08:43

## 2018-11-11 RX ADMIN — HYDROCODONE BITARTRATE AND ACETAMINOPHEN 1 TABLET: 5; 325 TABLET ORAL at 12:50

## 2018-11-11 RX ADMIN — CARVEDILOL 37.5 MG: 25 TABLET, FILM COATED ORAL at 08:43

## 2018-11-11 RX ADMIN — INSULIN LISPRO 3 UNITS: 100 INJECTION, SOLUTION INTRAVENOUS; SUBCUTANEOUS at 12:51

## 2018-11-11 RX ADMIN — CLONIDINE HYDROCHLORIDE 0.3 MG: 0.2 TABLET ORAL at 08:42

## 2018-11-11 RX ADMIN — CLONIDINE HYDROCHLORIDE 0.3 MG: 0.2 TABLET ORAL at 17:42

## 2018-11-11 RX ADMIN — INSULIN GLARGINE 10 UNITS: 100 INJECTION, SOLUTION SUBCUTANEOUS at 21:33

## 2018-11-11 ASSESSMENT — PAIN DESCRIPTION - LOCATION: LOCATION: LEG

## 2018-11-11 ASSESSMENT — PAIN SCALES - GENERAL
PAINLEVEL_OUTOF10: 3
PAINLEVEL_OUTOF10: 2
PAINLEVEL_OUTOF10: 3
PAINLEVEL_OUTOF10: 5
PAINLEVEL_OUTOF10: 7

## 2018-11-11 ASSESSMENT — PAIN DESCRIPTION - ONSET: ONSET: GRADUAL

## 2018-11-11 ASSESSMENT — PAIN DESCRIPTION - FREQUENCY: FREQUENCY: INTERMITTENT

## 2018-11-11 ASSESSMENT — PAIN DESCRIPTION - PROGRESSION: CLINICAL_PROGRESSION: NOT CHANGED

## 2018-11-11 ASSESSMENT — PAIN DESCRIPTION - PAIN TYPE: TYPE: NEUROPATHIC PAIN

## 2018-11-11 ASSESSMENT — PAIN DESCRIPTION - DESCRIPTORS: DESCRIPTORS: ACHING;DISCOMFORT

## 2018-11-11 ASSESSMENT — PAIN DESCRIPTION - ORIENTATION: ORIENTATION: RIGHT;LEFT;LOWER

## 2018-11-12 LAB
GLUCOSE BLD-MCNC: 100 MG/DL (ref 70–108)
GLUCOSE BLD-MCNC: 140 MG/DL (ref 70–108)
GLUCOSE BLD-MCNC: 155 MG/DL (ref 70–108)
GLUCOSE BLD-MCNC: 78 MG/DL (ref 70–108)

## 2018-11-12 PROCEDURE — 1290000000 HC SEMI PRIVATE OTHER R&B

## 2018-11-12 PROCEDURE — 97530 THERAPEUTIC ACTIVITIES: CPT

## 2018-11-12 PROCEDURE — 97110 THERAPEUTIC EXERCISES: CPT

## 2018-11-12 PROCEDURE — 97116 GAIT TRAINING THERAPY: CPT

## 2018-11-12 PROCEDURE — 99232 SBSQ HOSP IP/OBS MODERATE 35: CPT | Performed by: NURSE PRACTITIONER

## 2018-11-12 PROCEDURE — 82948 REAGENT STRIP/BLOOD GLUCOSE: CPT

## 2018-11-12 PROCEDURE — 6370000000 HC RX 637 (ALT 250 FOR IP): Performed by: FAMILY MEDICINE

## 2018-11-12 PROCEDURE — 94640 AIRWAY INHALATION TREATMENT: CPT

## 2018-11-12 PROCEDURE — 2709999900 HC NON-CHARGEABLE SUPPLY

## 2018-11-12 PROCEDURE — 97535 SELF CARE MNGMENT TRAINING: CPT

## 2018-11-12 RX ADMIN — DOCUSATE SODIUM 100 MG: 100 CAPSULE, LIQUID FILLED ORAL at 08:05

## 2018-11-12 RX ADMIN — BUMETANIDE 2 MG: 1 TABLET ORAL at 21:11

## 2018-11-12 RX ADMIN — DOXAZOSIN 12 MG: 4 TABLET ORAL at 08:05

## 2018-11-12 RX ADMIN — TIOTROPIUM BROMIDE 18 MCG: 18 CAPSULE ORAL; RESPIRATORY (INHALATION) at 06:49

## 2018-11-12 RX ADMIN — CLONIDINE HYDROCHLORIDE 0.3 MG: 0.2 TABLET ORAL at 08:05

## 2018-11-12 RX ADMIN — FAMOTIDINE 20 MG: 20 TABLET ORAL at 08:06

## 2018-11-12 RX ADMIN — INSULIN LISPRO 3 UNITS: 100 INJECTION, SOLUTION INTRAVENOUS; SUBCUTANEOUS at 17:36

## 2018-11-12 RX ADMIN — ATORVASTATIN CALCIUM 40 MG: 40 TABLET, FILM COATED ORAL at 21:11

## 2018-11-12 RX ADMIN — ASPIRIN 81 MG: 81 TABLET, COATED ORAL at 08:06

## 2018-11-12 RX ADMIN — FERROUS SULFATE TAB 325 MG (65 MG ELEMENTAL FE) 325 MG: 325 (65 FE) TAB at 08:05

## 2018-11-12 RX ADMIN — CALCITRIOL 0.25 MCG: 0.25 CAPSULE ORAL at 08:04

## 2018-11-12 RX ADMIN — CLONIDINE HYDROCHLORIDE 0.3 MG: 0.2 TABLET ORAL at 02:04

## 2018-11-12 RX ADMIN — INSULIN LISPRO 3 UNITS: 100 INJECTION, SOLUTION INTRAVENOUS; SUBCUTANEOUS at 12:32

## 2018-11-12 RX ADMIN — AMLODIPINE BESYLATE 10 MG: 10 TABLET ORAL at 08:06

## 2018-11-12 RX ADMIN — BUMETANIDE 2 MG: 1 TABLET ORAL at 08:06

## 2018-11-12 RX ADMIN — DOCUSATE SODIUM 100 MG: 100 CAPSULE, LIQUID FILLED ORAL at 21:10

## 2018-11-12 RX ADMIN — MINOXIDIL 5 MG: 2.5 TABLET ORAL at 21:11

## 2018-11-12 RX ADMIN — MINOXIDIL 5 MG: 2.5 TABLET ORAL at 08:04

## 2018-11-12 RX ADMIN — CLONIDINE HYDROCHLORIDE 0.3 MG: 0.2 TABLET ORAL at 17:35

## 2018-11-12 RX ADMIN — MINOXIDIL 5 MG: 2.5 TABLET ORAL at 14:54

## 2018-11-12 RX ADMIN — CARVEDILOL 37.5 MG: 25 TABLET, FILM COATED ORAL at 17:35

## 2018-11-12 RX ADMIN — CARVEDILOL 37.5 MG: 25 TABLET, FILM COATED ORAL at 08:06

## 2018-11-12 RX ADMIN — HYDROCODONE BITARTRATE AND ACETAMINOPHEN 1 TABLET: 5; 325 TABLET ORAL at 21:16

## 2018-11-12 RX ADMIN — Medication 1 TABLET: at 08:08

## 2018-11-12 ASSESSMENT — PAIN SCALES - GENERAL
PAINLEVEL_OUTOF10: 8
PAINLEVEL_OUTOF10: 8
PAINLEVEL_OUTOF10: 0

## 2018-11-12 NOTE — PROGRESS NOTES
[facility]  INPATIENT PHYSICAL THERAPY  DAILY NOTE  [facility] T[facility] 8E - 8E-72/072-A    Time In: 1000  Time Out: 1055  Timed Code Treatment Minutes: 54 Minutes  Minutes: 55          Date: 2018  Patient Name: Corine Mason,  Gender:  female        MRN: [id number]  : 1955  (57 y.o.)     Referring Practitioner: Dr. Ariana Chen (ordering),   Dr. Garrison Lewis (attending)  Diagnosis: Debility  Treatment Diagnosis: Debility  Additional Pertinent Hx: 57 y.o. female presented to [facility] with shortness of breath. Patient was hypoxic on arrival with 88% saturation on room air. She has a history of ESRD with fluid overload. Currently undergoing dialysis 3x/ wk.      Past Medical History:   Diagnosis Date    Anemia associated with chronic renal failure     Aranesp 150 micrograms every other week [facility] at [facility] [address] [facility]    Anxiety     Arthritis     Backache     Blood circulation, collateral     Blood transfusion     CAD (coronary artery disease)     Cellulitis in diabetic foot ([address] 2017    4th and 5th toes right foot    Chest pain     History of    CHF (congestive heart failure) ([address]     Dx'ed by Dr. Kayren Fabry Chronic anemia     Chronic kidney disease     Chronic kidney disease, stage III (moderate) (HCC)     Chronic renal insufficiency     COPD (chronic obstructive pulmonary disease) ([address]     Dr. 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 Hakeem Loyd    HTN (hypertension)     Hyperlipemia     Iron deficiency anemia due to dietary causes 2018    Kidney stones 3/2014    Kidney trouble          MRSA infection 2017    right foot-Dr. Jenna Bansal (podiatrist)   [facility] Neuromuscular disorder ([facility]     Neuropathy 1989    diabetic neuropathy    Obesity since childhoood    CONTRERAS on CPAP 2010    Dr. Nabila Miller    Pneumonia     PONV (postoperative nausea and vomiting)     Seizures ([facility]      Past Surgical History:   Procedure Laterality Date    ANKLE SURGERY Right 02-10-14    Dr. Porfirio Quiroz at [facility]  1990's    removal of benign tumor   [address]  2008   [address] [place] [address]  2009    2 polyps, not precanceorus    COSMETIC SURGERY  3/30/2012    eye lid lift    ENDOSCOPY, COLON, DIAGNOSTIC  2007   [facility] EYE SURGERY  March 30th, 2012    left sided ptosis    FOOT SURGERY  1990    Tarsal tunnel surgery    FRACTURE SURGERY  2015   [address] and [address]    first partial in 1980's, then total in [address] [facility]  2015   [facility] LIVER BIOPSY  6/2015    LUNG BIOPSY  2009    [place] OFFICE/OUTPT VISIT,PROCEDURE ONLY Left 8/23/2018    LEFT UPPER EXTREMENTY AV FISTULA CREATION performed by Tani Mirza MD at [place] [facility]       Restrictions/Precautions:  Fall Risk, General Precautions        Other position/activity restrictions: fistula in LUE placed 8/2018 but still uncomfortable       Prior Level of Function:  ADL Assistance: Independent  Homemaking Assistance: Independent  Ambulation Assistance: Independent  Transfer Assistance: Independent  Additional Comments: Pt uses a cane (couple weeks prior to admission) when she feels her legs are swollen. She also had a 4 wheeled walker which she will use when she has to go very far-just obtained. Subjective:     Subjective: Patient sitting in bedside chair upon arrival. Patient agreeable to therapy. Pain:  Denies.           Social/Functional:  Lives With: Alone  Type of Home:  (condo)  Home Layout: One level  Home Access: Stairs to enter without rails  Entrance Stairs - Number of Steps: 1 step through garage, level entry at front door  Home Equipment: 4 wheeled

## 2018-11-12 NOTE — PROGRESS NOTES
to stand: Stand by assistance (bedside chair and arm chair)  Stand to sit: Stand by assistance       Balance  Standing Balance: Stand by assistance     Time: greater than 2 minutes with 0 UE support     Functional Mobility  Functional - Mobility Device: Cane  Assist Level: Stand by assistance  Functional Mobility Comments: to/from therapy gym         Comment: Completed B UE exercise with #2 weight 1 set x 10 repetitions all joints in all planes sitting in chair. Rest break between each section of exercise. Exercises completed to increase endurance for ADLs        Activity Tolerance:  Activity Tolerance: Patient limited by fatigue    Assessment:     Performance deficits / Impairments: Decreased functional mobility , Decreased high-level IADLs, Decreased ADL status, Decreased endurance, Decreased balance  Prognosis: Good    Discharge Recommendations:  Discharge Recommendations: Continue to assess pending progress, Home with Home health OT    Patient Education:  Patient Education: Building endurance and UE exercise  Barriers to Learning: none    Equipment Recommendations:  Equipment Needed: Yes  ADL Assistive Devices: Sock-Aid Hard, Reacher    Safety:  Safety Devices in place: Yes  Type of devices:  All fall risk precautions in place, Gait belt, Call light within reach, Chair alarm in place, Left in chair, Nurse notified    Plan:  Times per week: 6x  Current Treatment Recommendations: Balance Training, Functional Mobility Training, Endurance Training, Self-Care / ADL, Equipment Evaluation, Education, & procurement    Goals:  Patient goals : \"get my legs stronger\"    Short term goals  Time Frame for Short term goals: 1 week  Short term goal 1: Pt will complete various t/fs including toilet with mod I & 0 vcs for safety  Short term goal 2: Pt will complete LE dressing with min A & LH AE prn  Short term goal 3: Pt will complete 8-10 min dynamic standing task in kitchen with S for increased ease of meal prep tasks  Short

## 2018-11-12 NOTE — PROGRESS NOTES
Certification for TCU Admission    Name:  Narinder Beckham    MR#:    [id number]  Alexa: [de-identified]      :   1955    Long Term Care Facility Type: Transitional Care Unit     Dx:   ESRD     Patient Active Problem List   Diagnosis    Diabetes mellitus, type 2 [facility]    Uncontrolled hypertension    Chronic anemia    Coronary disease    Hyperlipemia    Arthritis    Neuropathy, diabetic ([address]    CKD (chronic kidney disease), stage III (HCC)    Leg pain    Obesity (BMI 30-39. 9)    Low HDL (under 40)    Need for prophylactic vaccination against diphtheria-tetanus-pertussis (DTP)    History of tobacco use    Allergic rhinitis    COPD, mild (HCC)    Lung mass    Anemia, chronic disease    Gout    Urolithiasis    Ankle fracture, right    Secondary hyperparathyroidism of renal origin ([facility]    Diabetes mellitus type 2, uncomplicated (HCC)    Obstructive sleep apnea on CPAP    Vitamin D deficiency    CKD (chronic kidney disease) stage 3, GFR 30-59 ml/min (HCC)    Type 2 diabetes mellitus with stage 3 chronic kidney disease (HCC)    Hypertensive nephropathy    Benign essential HTN    Type 2 diabetes mellitus (HCC)    Hypertensive nephropathy    Persistent proteinuria associated with type 2 diabetes mellitus (HCC)    Cellulitis in diabetic foot (HCC)    CKD (chronic kidney disease), stage IV ([address]    VIDA (acute kidney injury) ([address]    Persistent proteinuria    Acquired hypothyroidism    CKD (chronic kidney disease), stage IV (HCC)    Nephrotic syndrome    Type 2 diabetes mellitus (HCC)    Iron deficiency anemia due to dietary causes    Anemia of chronic disease    Stage 5 chronic kidney disease not on chronic dialysis (HCC)    Type 2 diabetes mellitus (HCC)    ESRD (end stage renal disease) ([address]    Hypervolemia associated with renal insufficiency    Pulmonary edema, noncardiac    CKD (chronic kidney disease), stage V (HCC)    Chronic progressive renal failure, stage 5 (HCC)

## 2018-11-12 NOTE — PROGRESS NOTES
[facility]  Physical Medicine Case Management Assessment    [] Inpatient Rehabilitation Unit  [x] Transitional Care Unit    Patient Name: Corine Mason        MRN: [id number]    : 1955  (57 y.o.)  Gender: female   Date of Admission: 2018  6:07 PM    Family/Social/Home Environment: Social/Functional History: The patient is a  [facility]. She reports being independent in all areas and doing well until recent hospitalization for shortness of breath and bilateral leg edema. Nephrology was consulted and informed patient of the need to begin hemodialysis due to ESRD. The patient reports that there is a history of kidney failure in her family, thus, she was aware of, and not surprised by this finding. She has been admitted to TCU to regain strength before returning home, where she will continue with outpt dialysis. Her support system consists of her granddaughter who is local, and a sister with whom she is close. Patient's son is currently in custodial, and the granddaughter is attending school, employed, and raising 5 children. She states that it is important for her to be as independent as possible as her family cannot provide much help. An overview of the TCU program, team meeting process, and role of the  was provided. The patient expressed understanding and is motivated to improve.       Lives With: Alone  Type of Home:  (condo)  Home Layout: One level  Home Access: Stairs to enter without rails  Entrance Stairs - Number of Steps: 1 step through garage, level entry at front door  Bathroom Shower/Tub: Tub/Shower unit  Bathroom Toilet: Standard  Bathroom Equipment: Shower chair, Grab bars in [address]: 4 wheeled walker, Cane  ADL Assistance: Independent  Homemaking Assistance: Independent  Ambulation Assistance: Independent  Transfer Assistance: Independent  Active : Yes  Mode of Transportation: Car  Occupation: Retired  Type of occupation: [employer]  Additional Comments: Pt uses a cane (couple weeks prior to admission) when she feels her legs are swollen. She also had a 4 wheeled walker which she will use when she has to go very far-just obtained. Contact/Guardian Information:  Ellsworth Harada, granddaughter  [phone]    Community Resources Utilized: [address] [place] [address] for nursing visits and therapies PTA         Anticipated Needs/Discharge Plans: The patient plans to return home alone upon discharge from [address] [place] [address]. She will need to secure transportation to get to and from outpt dialysis treatments. She would also like to resume home health services from [address] [place] [address]. Will monitor progress, maintain contact with patient regarding length of stay and recommendations, and assist with discharge plans, including referrals to helping services as identified.           Discharge Planning  Living Arrangements: Alone, Family Members  Support Systems: Children, Family Members  Potential Assistance Needed: Home Care  Potential Assistance Purchasing Medications: No  Meds-to-Beds: Does the patient want to have any new prescriptions delivered to bedside prior to discharge?: No  Type of Home Care Services: [facility], Nursing Services  Patient expects to be discharged to[de-identified] Home  Expected Discharge Date: 11/24/18  Follow Up Appointment: Best Day/Time : Monday AM (Mondays Wednesdays and Fridays preferably)      Daysi Snell 11/12/2018 1:21 PM

## 2018-11-12 NOTE — PROGRESS NOTES
predicted outcomes: Family support, Motivated, Cooperative and Pleasant  Barriers to the achievement of predicted outcomes: Decreased endurance, Upper extremity weakness and Lower extremity weakness    Readmission Risk              Risk of Unplanned Readmission:        23         High (20-31)         Team Members Present at Conference:  Physician: Dr. Oksana Sears MD  Nurse: Elba Cadena RN  :  DEBBY Cardoso  Occupational Therapist:  GEORGE Abraham  Physical Therapist:   Brayan Fermin, PT  Speech Therapist: Speech Therapist: N/A. All team members have reviewed and updated as necessary and appropriate the established interdisciplinary plan of care within the medical record of Yosef Bee. Pt's team conference attended (see above). Pt seen on rounds with LSW, to review the results with patient and family. Discussed length of stay as above. Pt's team conference attended (see above.)  Pt seen on rounds with LSW, to review the results with patient and family. Discussed length of stay as above.       Assessment:  Progressing in full rehabilitation program (see above)    Plan:  Continue Rehab            Continue current medications            Spent 36 minutes today in patient management and care  Electronically signed by Sara Slade MD on 11/13/2018 at 9:43 AM

## 2018-11-13 ENCOUNTER — TELEPHONE (OUTPATIENT)
Dept: FAMILY MEDICINE CLINIC | Age: 63
End: 2018-11-13

## 2018-11-13 LAB
GLUCOSE BLD-MCNC: 103 MG/DL (ref 70–108)
GLUCOSE BLD-MCNC: 151 MG/DL (ref 70–108)
GLUCOSE BLD-MCNC: 154 MG/DL (ref 70–108)
GLUCOSE BLD-MCNC: 210 MG/DL (ref 70–108)

## 2018-11-13 PROCEDURE — 82948 REAGENT STRIP/BLOOD GLUCOSE: CPT

## 2018-11-13 PROCEDURE — 6370000000 HC RX 637 (ALT 250 FOR IP): Performed by: FAMILY MEDICINE

## 2018-11-13 PROCEDURE — 94640 AIRWAY INHALATION TREATMENT: CPT

## 2018-11-13 PROCEDURE — 6360000002 HC RX W HCPCS: Performed by: INTERNAL MEDICINE

## 2018-11-13 PROCEDURE — 97110 THERAPEUTIC EXERCISES: CPT

## 2018-11-13 PROCEDURE — 2709999900 HC NON-CHARGEABLE SUPPLY

## 2018-11-13 PROCEDURE — 97116 GAIT TRAINING THERAPY: CPT

## 2018-11-13 PROCEDURE — 1290000000 HC SEMI PRIVATE OTHER R&B

## 2018-11-13 PROCEDURE — 97530 THERAPEUTIC ACTIVITIES: CPT

## 2018-11-13 PROCEDURE — 99232 SBSQ HOSP IP/OBS MODERATE 35: CPT | Performed by: INTERNAL MEDICINE

## 2018-11-13 RX ORDER — HEPARIN SODIUM 5000 [USP'U]/ML
5000 INJECTION, SOLUTION INTRAVENOUS; SUBCUTANEOUS EVERY 8 HOURS SCHEDULED
Status: DISCONTINUED | OUTPATIENT
Start: 2018-11-13 | End: 2018-11-21 | Stop reason: HOSPADM

## 2018-11-13 RX ADMIN — HYDROCODONE BITARTRATE AND ACETAMINOPHEN 1 TABLET: 5; 325 TABLET ORAL at 18:26

## 2018-11-13 RX ADMIN — BUMETANIDE 2 MG: 1 TABLET ORAL at 08:38

## 2018-11-13 RX ADMIN — CARVEDILOL 37.5 MG: 25 TABLET, FILM COATED ORAL at 08:38

## 2018-11-13 RX ADMIN — CLONIDINE HYDROCHLORIDE 0.3 MG: 0.2 TABLET ORAL at 05:54

## 2018-11-13 RX ADMIN — BUMETANIDE 2 MG: 1 TABLET ORAL at 21:43

## 2018-11-13 RX ADMIN — INSULIN LISPRO 3 UNITS: 100 INJECTION, SOLUTION INTRAVENOUS; SUBCUTANEOUS at 11:07

## 2018-11-13 RX ADMIN — MINOXIDIL 5 MG: 2.5 TABLET ORAL at 21:44

## 2018-11-13 RX ADMIN — CLONIDINE HYDROCHLORIDE 0.3 MG: 0.2 TABLET ORAL at 21:43

## 2018-11-13 RX ADMIN — DOCUSATE SODIUM 100 MG: 100 CAPSULE, LIQUID FILLED ORAL at 21:44

## 2018-11-13 RX ADMIN — ASPIRIN 81 MG: 81 TABLET, COATED ORAL at 18:25

## 2018-11-13 RX ADMIN — TIOTROPIUM BROMIDE 18 MCG: 18 CAPSULE ORAL; RESPIRATORY (INHALATION) at 05:59

## 2018-11-13 RX ADMIN — CARVEDILOL 37.5 MG: 25 TABLET, FILM COATED ORAL at 19:52

## 2018-11-13 RX ADMIN — LACTULOSE 20 G: 20 SOLUTION ORAL at 21:43

## 2018-11-13 RX ADMIN — DOCUSATE SODIUM 100 MG: 100 CAPSULE, LIQUID FILLED ORAL at 11:05

## 2018-11-13 RX ADMIN — AMLODIPINE BESYLATE 10 MG: 10 TABLET ORAL at 08:38

## 2018-11-13 RX ADMIN — INSULIN LISPRO 6 UNITS: 100 INJECTION, SOLUTION INTRAVENOUS; SUBCUTANEOUS at 08:25

## 2018-11-13 RX ADMIN — Medication 2 UNITS: at 21:49

## 2018-11-13 RX ADMIN — HEPARIN SODIUM 5000 UNITS: 5000 INJECTION INTRAVENOUS; SUBCUTANEOUS at 21:43

## 2018-11-13 RX ADMIN — FLUTICASONE PROPIONATE 1 SPRAY: 50 SPRAY, METERED NASAL at 08:40

## 2018-11-13 RX ADMIN — FAMOTIDINE 20 MG: 20 TABLET ORAL at 11:05

## 2018-11-13 RX ADMIN — DOXAZOSIN 12 MG: 4 TABLET ORAL at 08:38

## 2018-11-13 RX ADMIN — INSULIN GLARGINE 10 UNITS: 100 INJECTION, SOLUTION SUBCUTANEOUS at 21:48

## 2018-11-13 RX ADMIN — ATORVASTATIN CALCIUM 40 MG: 40 TABLET, FILM COATED ORAL at 21:43

## 2018-11-13 RX ADMIN — FERROUS SULFATE TAB 325 MG (65 MG ELEMENTAL FE) 325 MG: 325 (65 FE) TAB at 08:38

## 2018-11-13 RX ADMIN — MINOXIDIL 5 MG: 2.5 TABLET ORAL at 08:38

## 2018-11-13 RX ADMIN — Medication 1 TABLET: at 08:38

## 2018-11-13 ASSESSMENT — PAIN SCALES - GENERAL
PAINLEVEL_OUTOF10: 4
PAINLEVEL_OUTOF10: 3
PAINLEVEL_OUTOF10: 5
PAINLEVEL_OUTOF10: 0

## 2018-11-13 NOTE — PROGRESS NOTES
through garage, level entry at front door  Home Equipment: 4 wheeled walker, Cane     Objective:  Supine to Sit: Modified independent (mat table, elevated to home bed ~height)  Sit to Supine: Supervision (mat table)  Scooting: Modified independent    Transfers  Sit to Stand: Stand by assistance  Stand to sit: Stand by assistance       Ambulation 1  Surface: level tile  Device: Single point cane  Assistance: Stand by assistance  Quality of Gait: decreased paulette, decreased step length, lateral sway, forward flexed posture  Distance: 70ft x1, 170ft x1, 60ft x1  Comments: short standing rest break after long distance, occasional short distances without AD in gym         Exercises:  Exercises  Comments: Performed supine BLE exercises: hip abd/add, straight leg raises, heel slides, short arc quads, glute sets, quad sets, bridges with bolster, hooklying hip add ball squeezes x10 reps, seated marches, long arc quads, yellow tband hamstring curls x10 reps, NuStep level 2 ~5 min using BUE and BLE, all to increase strength for improved functional mobility. Activity Tolerance:  Activity Tolerance: Patient limited by endurance; Patient limited by fatigue;Patient Tolerated treatment well    Assessment: Body structures, Functions, Activity limitations: Decreased functional mobility , Decreased strength, Decreased endurance, Decreased balance  Assessment: Patient tolerated session well. Patient able increase ambulation distance this session. Patient would benefit from continued skilled physical therapy to improve functional mobility and independence. Prognosis: Good          Discharge Recommendations:  Discharge Recommendations: Continue to assess pending progress, Patient would benefit from continued therapy after discharge    Patient Education:  Patient Education: therex, bed mobility, transfers, gait    Equipment Recommendations:  Equipment Needed: No (Pt has 4WW and cane. )    Safety:  Type of devices:  All fall

## 2018-11-13 NOTE — PROGRESS NOTES
stand: Stand by assistance (bedside chair and arm chair)  Stand to sit: Stand by assistance       Balance  Standing Balance: Stand by assistance     Time: x 12 minutes while washing dishes     Functional Mobility  Functional - Mobility Device: Cane  Assist Level: Stand by assistance  Functional Mobility Comments: to/from therapy gym        Comment: Completed B UE exercise with #2 weight 1 set x 10 repetitions all joints in all planes sitting in chair. Rest break between each section of exercise. Exercises completed to increase endurance for ADLs     Activity Tolerance:  Activity Tolerance: Patient limited by fatigue    Assessment:     Performance deficits / Impairments: Decreased functional mobility , Decreased high-level IADLs, Decreased ADL status, Decreased endurance, Decreased balance  Prognosis: Good    Discharge Recommendations:  Discharge Recommendations: Home with Home health OT    Patient Education:  Patient Education: Standing endurance and UE exercise  Barriers to Learning: none    Equipment Recommendations:  Equipment Needed: Yes  ADL Assistive Devices: Sock-Aid Hard, Reacher    Safety:  Safety Devices in place: Yes  Type of devices:  All fall risk precautions in place, Gait belt, Call light within reach, Chair alarm in place, Left in chair, Nurse notified    Plan:  Times per week: 6x  Current Treatment Recommendations: Balance Training, Functional Mobility Training, Endurance Training, Self-Care / ADL, Equipment Evaluation, Education, & procurement    Goals:  Patient goals : \"get my legs stronger\"    Short term goals  Time Frame for Short term goals: 1 week  Short term goal 1: Pt will complete various t/fs including toilet with mod I & 0 vcs for safety  Short term goal 2: Pt will complete LE dressing with min A & LH AE prn  Short term goal 3: Pt will complete 8-10 min dynamic standing task in kitchen with S for increased ease of meal prep tasks  Short term goal 4: Pt will complete BADL in shower with

## 2018-11-13 NOTE — PROGRESS NOTES
Renal Progress Note    Assessment and Plan: 1. End stage kidney disease on hemodialysis. 2.  Primary hypertension reasonably controlled. 3.  Diabetes mellitus type 2 with renal manifestations. 4.  Volume overload much improved. 5.  Deconditioning. 6.  Anemia of chronic disease. PLAN:   Recent labs reviewed. Medications reviewed. No changes. Hemodialysis today. Discussed with the patient. We'll follow. Patient Active Problem List:     Diabetes mellitus, type 2 ([address]     Uncontrolled hypertension     Chronic anemia     Coronary disease     Hyperlipemia     Arthritis     Neuropathy, diabetic (HCC)     CKD (chronic kidney disease), stage III (HCC)     Leg pain     Obesity (BMI 30-39. 9)     Low HDL (under 40)     Need for prophylactic vaccination against diphtheria-tetanus-pertussis (DTP)     History of tobacco use     Allergic rhinitis     COPD, mild (HCC)     Lung mass     Anemia, chronic disease     Gout     Urolithiasis     Ankle fracture, right     Secondary hyperparathyroidism of renal origin ([address]     Diabetes mellitus type 2, uncomplicated (HCC)     Obstructive sleep apnea on CPAP     Vitamin D deficiency     CKD (chronic kidney disease) stage 3, GFR 30-59 ml/min (HCC)     Type 2 diabetes mellitus with stage 3 chronic kidney disease (HCC)     Hypertensive nephropathy     Benign essential HTN     Type 2 diabetes mellitus (HCC)     Hypertensive nephropathy     Persistent proteinuria associated with type 2 diabetes mellitus (HCC)     Cellulitis in diabetic foot (HCC)     CKD (chronic kidney disease), stage IV (HCC)     VIDA (acute kidney injury) ([address]     Persistent proteinuria     Acquired hypothyroidism     CKD (chronic kidney disease), stage IV (HCC)     Nephrotic syndrome     Type 2 diabetes mellitus (HCC)     Iron deficiency anemia due to dietary causes     Anemia of chronic disease     Stage 5 chronic kidney disease not on chronic dialysis ([address]     Type 2 diabetes mellitus ([address]     ESRD (end

## 2018-11-14 LAB
GLUCOSE BLD-MCNC: 113 MG/DL (ref 70–108)
GLUCOSE BLD-MCNC: 124 MG/DL (ref 70–108)
GLUCOSE BLD-MCNC: 190 MG/DL (ref 70–108)
GLUCOSE BLD-MCNC: 190 MG/DL (ref 70–108)

## 2018-11-14 PROCEDURE — 6370000000 HC RX 637 (ALT 250 FOR IP): Performed by: FAMILY MEDICINE

## 2018-11-14 PROCEDURE — 94640 AIRWAY INHALATION TREATMENT: CPT

## 2018-11-14 PROCEDURE — 97110 THERAPEUTIC EXERCISES: CPT

## 2018-11-14 PROCEDURE — 97535 SELF CARE MNGMENT TRAINING: CPT

## 2018-11-14 PROCEDURE — 82948 REAGENT STRIP/BLOOD GLUCOSE: CPT

## 2018-11-14 PROCEDURE — 97116 GAIT TRAINING THERAPY: CPT

## 2018-11-14 PROCEDURE — 97530 THERAPEUTIC ACTIVITIES: CPT

## 2018-11-14 PROCEDURE — 1290000000 HC SEMI PRIVATE OTHER R&B

## 2018-11-14 PROCEDURE — 6360000002 HC RX W HCPCS: Performed by: INTERNAL MEDICINE

## 2018-11-14 RX ADMIN — DOCUSATE SODIUM 100 MG: 100 CAPSULE, LIQUID FILLED ORAL at 08:49

## 2018-11-14 RX ADMIN — AMLODIPINE BESYLATE 10 MG: 10 TABLET ORAL at 08:49

## 2018-11-14 RX ADMIN — TIOTROPIUM BROMIDE 18 MCG: 18 CAPSULE ORAL; RESPIRATORY (INHALATION) at 05:07

## 2018-11-14 RX ADMIN — INSULIN LISPRO 3 UNITS: 100 INJECTION, SOLUTION INTRAVENOUS; SUBCUTANEOUS at 12:35

## 2018-11-14 RX ADMIN — CLONIDINE HYDROCHLORIDE 0.3 MG: 0.2 TABLET ORAL at 21:43

## 2018-11-14 RX ADMIN — ATORVASTATIN CALCIUM 40 MG: 40 TABLET, FILM COATED ORAL at 21:43

## 2018-11-14 RX ADMIN — BUMETANIDE 2 MG: 1 TABLET ORAL at 21:43

## 2018-11-14 RX ADMIN — Medication 1 TABLET: at 08:50

## 2018-11-14 RX ADMIN — MINOXIDIL 5 MG: 2.5 TABLET ORAL at 21:43

## 2018-11-14 RX ADMIN — MINOXIDIL 5 MG: 2.5 TABLET ORAL at 08:50

## 2018-11-14 RX ADMIN — CALCITRIOL 0.25 MCG: 0.25 CAPSULE ORAL at 08:50

## 2018-11-14 RX ADMIN — HYDROCODONE BITARTRATE AND ACETAMINOPHEN 1 TABLET: 5; 325 TABLET ORAL at 08:48

## 2018-11-14 RX ADMIN — CLONIDINE HYDROCHLORIDE 0.3 MG: 0.2 TABLET ORAL at 06:03

## 2018-11-14 RX ADMIN — DOXAZOSIN 12 MG: 4 TABLET ORAL at 08:50

## 2018-11-14 RX ADMIN — BUMETANIDE 2 MG: 1 TABLET ORAL at 08:49

## 2018-11-14 RX ADMIN — Medication 2 UNITS: at 21:43

## 2018-11-14 RX ADMIN — INSULIN GLARGINE 10 UNITS: 100 INJECTION, SOLUTION SUBCUTANEOUS at 21:43

## 2018-11-14 RX ADMIN — ASPIRIN 81 MG: 81 TABLET, COATED ORAL at 08:49

## 2018-11-14 RX ADMIN — CARVEDILOL 37.5 MG: 25 TABLET, FILM COATED ORAL at 16:47

## 2018-11-14 RX ADMIN — FERROUS SULFATE TAB 325 MG (65 MG ELEMENTAL FE) 325 MG: 325 (65 FE) TAB at 08:52

## 2018-11-14 RX ADMIN — HEPARIN SODIUM 5000 UNITS: 5000 INJECTION INTRAVENOUS; SUBCUTANEOUS at 21:44

## 2018-11-14 RX ADMIN — FLUTICASONE PROPIONATE 1 SPRAY: 50 SPRAY, METERED NASAL at 08:52

## 2018-11-14 RX ADMIN — HEPARIN SODIUM 5000 UNITS: 5000 INJECTION INTRAVENOUS; SUBCUTANEOUS at 06:06

## 2018-11-14 RX ADMIN — DOCUSATE SODIUM 100 MG: 100 CAPSULE, LIQUID FILLED ORAL at 21:44

## 2018-11-14 RX ADMIN — HYDROCODONE BITARTRATE AND ACETAMINOPHEN 1 TABLET: 5; 325 TABLET ORAL at 02:01

## 2018-11-14 RX ADMIN — CARVEDILOL 37.5 MG: 25 TABLET, FILM COATED ORAL at 08:49

## 2018-11-14 RX ADMIN — FAMOTIDINE 20 MG: 20 TABLET ORAL at 08:49

## 2018-11-14 RX ADMIN — CLONIDINE HYDROCHLORIDE 0.3 MG: 0.2 TABLET ORAL at 13:24

## 2018-11-14 RX ADMIN — HEPARIN SODIUM 5000 UNITS: 5000 INJECTION INTRAVENOUS; SUBCUTANEOUS at 13:24

## 2018-11-14 ASSESSMENT — PAIN SCALES - GENERAL
PAINLEVEL_OUTOF10: 6
PAINLEVEL_OUTOF10: 6
PAINLEVEL_OUTOF10: 0
PAINLEVEL_OUTOF10: 5
PAINLEVEL_OUTOF10: 7
PAINLEVEL_OUTOF10: 5

## 2018-11-14 NOTE — PROGRESS NOTES
Physical Therapy   [address] - [id number]    Time In: 0700  Time Out: 9332  Timed Code Treatment Minutes: 40 Minutes  Minutes: 44          Date: 2018  Patient Name: Charlie Rodriguez,  Gender:  female        MRN: [id number]  : 1955  (57 y.o.)     Referring Practitioner: Dr. Ana Rosa Sahu (ordering),   Dr. Judy Fuller (attending)  Diagnosis: Debility  Treatment Diagnosis: Debility  Additional Pertinent Hx: 57 y.o. female presented to [facility] with shortness of breath. Patient was hypoxic on arrival with 88% saturation on room air. She has a history of ESRD with fluid overload. Currently undergoing dialysis 3x/ wk.      Past Medical History:   Diagnosis Date    Anemia associated with chronic renal failure     Aranesp 150 micrograms every other week given at [facility]. [facility]    Anxiety     Arthritis     Backache     Blood circulation, collateral     Blood transfusion     CAD (coronary artery disease)     Cellulitis in diabetic foot ([address] 2017    4th and 5th toes right foot    Chest pain     History of    CHF (congestive heart failure) ([address]     Dx'ed by Dr. Bj Anders Chronic anemia     Chronic kidney disease     Chronic kidney disease, stage III (moderate) (HCC)     Chronic renal insufficiency     COPD (chronic obstructive pulmonary disease) ([address]     Dr. 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 Maninder Belcher    HTN (hypertension)     Hyperlipemia     Iron deficiency anemia due to dietary causes 2018    Kidney stones 3/2014    Kidney trouble          MRSA infection 2017    right foot-Dr. Yusuf Wallace

## 2018-11-14 NOTE — PROGRESS NOTES
(podiatrist)    Neuromuscular disorder ([facility]     Neuropathy 1989    diabetic neuropathy    Obesity since childhoood    CONTRERAS on CPAP 2010    Dr. Kenzie Mcpherson    Pneumonia     PONV (postoperative nausea and vomiting)     Seizures ([facility]      Past Surgical History:   Procedure Laterality Date    ANKLE SURGERY Right 02-10-14    Dr. Jose Rafael Akbar at [facility]  1990's    removal of benign tumor   [address]  2008   [address] [place] [address]  2009    2 polyps, not precanceorus    COSMETIC SURGERY  3/30/2012    eye lid lift    ENDOSCOPY, COLON, DIAGNOSTIC  2007   [facility] EYE SURGERY  March 30th, 2012    left sided ptosis    FOOT SURGERY  1990    Tarsal tunnel surgery    FRACTURE SURGERY  2015   [address] and [address]    first partial in 1980's, then total in [facility] [facility]  2015   [facility] LIVER BIOPSY  6/2015    LUNG BIOPSY  2009    NM OFFICE/OUTPT VISIT,PROCEDURE ONLY Left 8/23/2018    LEFT UPPER EXTREMENTY AV FISTULA CREATION performed by Salomón Wolf MD at [facility]       Restrictions/Precautions:  Fall Risk, General Precautions                    Other position/activity restrictions: fistula in LUE placed 8/2018 but still uncomfortable       Prior Level of Function:  ADL Assistance: Independent  Homemaking Assistance: Independent  Ambulation Assistance: Independent  Transfer Assistance: Independent  Additional Comments: Pt uses a cane (couple weeks prior to admission) when she feels her legs are swollen. She also had a 4 wheeled walker which she will use when she has to go very far-just obtained. Subjective       Subjective:  Inchair asleep upon OT arrival. pleasant and cooperative     Overall Orientation Status: Within Functional Limits         Pain:   denies       Objective  Overall Cognitive Status: WFL            ADL  LE Dressing: Minimal assistance (to don/doff sock with use of reacher and sock aide, needs reinforced )  Additional Comments: Educated and demonstrated on use of LHAE, pt completed needing min A and expressed interest in purchasing but will let therapist know before discharge. Transfers  Sit to stand: Stand by assistance  Stand to sit: Stand by assistance       Balance  Sitting Balance: Stand by assistance  Standing Balance: Stand by assistance     Time: X 11 minutes during leisure task  Activity: task incorporated 1 UE -2 UE release no LOB. but lengthy seated rest break needed after   Comment: multiple transfers completed this date      Functional Mobility  Functional - Mobility Device: Cane  Activity: Other  Assist Level: Stand by assistance  Functional Mobility Comments: around 8 E unit, min SOB noted, seated rest break needed after. vc for breathing technique with good return     Exercise:   Comment: Completed B UE exercise with #2 weight 1 set x 15 repetitions all joints in all planes sitting in chair. Rest break between each section of exercise. Exercises completed to increase endurance for ADLs         Activity Tolerance:  Activity Tolerance: Patient Tolerated treatment well    Assessment:     Performance deficits / Impairments: Decreased functional mobility , Decreased high-level IADLs, Decreased ADL status, Decreased endurance, Decreased balance  Prognosis: Good    Discharge Recommendations:  Discharge Recommendations: Home with Home health OT    Patient Education:  Patient Education: Standing endurance and UE exercise  Barriers to Learning: none    Equipment Recommendations:  Equipment Needed: Yes  ADL Assistive Devices: Sock-Aid Hard, Reacher    Safety:  Safety Devices in place: Yes  Type of devices:  All fall risk precautions in place, Gait belt, Call light within reach, Chair alarm in place, Left in chair, Nurse notified    Plan:  Times per week: 6x  Current Treatment Recommendations: Balance Training, Functional Mobility Training, Endurance Training, Self-Care / ADL, Equipment Evaluation, Education, & procurement    Goals:  Patient goals : \"get my legs stronger\"    Short term goals  Time Frame for Short term goals: 1 week  Short term goal 1: Pt will complete various t/fs including toilet with mod I & 0 vcs for safety  Short term goal 2: Pt will complete LE dressing with min A & LH AE prn  Short term goal 3: Pt will complete 8-10 min dynamic standing task in kitchen with S for increased ease of meal prep tasks  Short term goal 4: Pt will complete BADL in shower with min A & min vcs for safety  Long term goals  Time Frame for Long term goals : 2 weeks  Long term goal 1: Pt will complete BADL routine in shower with mod I & 0 vcs for safety  Long term goal 2: Pt will complete simple meal prep task with mod I & 0 vcs for safety

## 2018-11-15 LAB
GLUCOSE BLD-MCNC: 136 MG/DL (ref 70–108)
GLUCOSE BLD-MCNC: 138 MG/DL (ref 70–108)
GLUCOSE BLD-MCNC: 139 MG/DL (ref 70–108)
GLUCOSE BLD-MCNC: 151 MG/DL (ref 70–108)

## 2018-11-15 PROCEDURE — 82948 REAGENT STRIP/BLOOD GLUCOSE: CPT

## 2018-11-15 PROCEDURE — 6370000000 HC RX 637 (ALT 250 FOR IP): Performed by: FAMILY MEDICINE

## 2018-11-15 PROCEDURE — 6360000002 HC RX W HCPCS: Performed by: INTERNAL MEDICINE

## 2018-11-15 PROCEDURE — 97530 THERAPEUTIC ACTIVITIES: CPT

## 2018-11-15 PROCEDURE — 97110 THERAPEUTIC EXERCISES: CPT

## 2018-11-15 PROCEDURE — 94640 AIRWAY INHALATION TREATMENT: CPT

## 2018-11-15 PROCEDURE — 97116 GAIT TRAINING THERAPY: CPT

## 2018-11-15 PROCEDURE — 1290000000 HC SEMI PRIVATE OTHER R&B

## 2018-11-15 PROCEDURE — 2709999900 HC NON-CHARGEABLE SUPPLY

## 2018-11-15 RX ADMIN — BUMETANIDE 2 MG: 1 TABLET ORAL at 21:55

## 2018-11-15 RX ADMIN — TIOTROPIUM BROMIDE 18 MCG: 18 CAPSULE ORAL; RESPIRATORY (INHALATION) at 05:18

## 2018-11-15 RX ADMIN — HEPARIN SODIUM 5000 UNITS: 5000 INJECTION INTRAVENOUS; SUBCUTANEOUS at 21:55

## 2018-11-15 RX ADMIN — MINOXIDIL 5 MG: 2.5 TABLET ORAL at 21:55

## 2018-11-15 RX ADMIN — FAMOTIDINE 20 MG: 20 TABLET ORAL at 08:47

## 2018-11-15 RX ADMIN — DOCUSATE SODIUM 100 MG: 100 CAPSULE, LIQUID FILLED ORAL at 21:55

## 2018-11-15 RX ADMIN — CARVEDILOL 37.5 MG: 25 TABLET, FILM COATED ORAL at 08:48

## 2018-11-15 RX ADMIN — Medication 2 UNITS: at 21:56

## 2018-11-15 RX ADMIN — FERROUS SULFATE TAB 325 MG (65 MG ELEMENTAL FE) 325 MG: 325 (65 FE) TAB at 08:50

## 2018-11-15 RX ADMIN — MINOXIDIL 5 MG: 2.5 TABLET ORAL at 08:50

## 2018-11-15 RX ADMIN — DOCUSATE SODIUM 100 MG: 100 CAPSULE, LIQUID FILLED ORAL at 08:47

## 2018-11-15 RX ADMIN — INSULIN GLARGINE 10 UNITS: 100 INJECTION, SOLUTION SUBCUTANEOUS at 21:56

## 2018-11-15 RX ADMIN — FLUTICASONE PROPIONATE 1 SPRAY: 50 SPRAY, METERED NASAL at 08:51

## 2018-11-15 RX ADMIN — CLONIDINE HYDROCHLORIDE 0.3 MG: 0.2 TABLET ORAL at 21:55

## 2018-11-15 RX ADMIN — AMLODIPINE BESYLATE 10 MG: 10 TABLET ORAL at 08:50

## 2018-11-15 RX ADMIN — BUMETANIDE 2 MG: 1 TABLET ORAL at 08:49

## 2018-11-15 RX ADMIN — ATORVASTATIN CALCIUM 40 MG: 40 TABLET, FILM COATED ORAL at 21:55

## 2018-11-15 RX ADMIN — Medication 1 TABLET: at 08:48

## 2018-11-15 RX ADMIN — CLONIDINE HYDROCHLORIDE 0.3 MG: 0.2 TABLET ORAL at 05:58

## 2018-11-15 RX ADMIN — CARVEDILOL 37.5 MG: 25 TABLET, FILM COATED ORAL at 17:07

## 2018-11-15 RX ADMIN — HEPARIN SODIUM 5000 UNITS: 5000 INJECTION INTRAVENOUS; SUBCUTANEOUS at 05:58

## 2018-11-15 RX ADMIN — DOXAZOSIN 12 MG: 4 TABLET ORAL at 08:50

## 2018-11-15 RX ADMIN — ASPIRIN 81 MG: 81 TABLET, COATED ORAL at 08:51

## 2018-11-15 RX ADMIN — CLONIDINE HYDROCHLORIDE 0.3 MG: 0.2 TABLET ORAL at 17:08

## 2018-11-15 RX ADMIN — MINOXIDIL 5 MG: 2.5 TABLET ORAL at 17:09

## 2018-11-15 RX ADMIN — HEPARIN SODIUM 5000 UNITS: 5000 INJECTION INTRAVENOUS; SUBCUTANEOUS at 17:09

## 2018-11-15 ASSESSMENT — PAIN SCALES - GENERAL
PAINLEVEL_OUTOF10: 4
PAINLEVEL_OUTOF10: 4
PAINLEVEL_OUTOF10: 0
PAINLEVEL_OUTOF10: 4
PAINLEVEL_OUTOF10: 4
PAINLEVEL_OUTOF10: 2
PAINLEVEL_OUTOF10: 4

## 2018-11-15 ASSESSMENT — PAIN DESCRIPTION - LOCATION
LOCATION: FOOT
LOCATION: FOOT

## 2018-11-15 ASSESSMENT — PAIN DESCRIPTION - DESCRIPTORS
DESCRIPTORS: NUMBNESS;TINGLING
DESCRIPTORS: NUMBNESS;TINGLING

## 2018-11-15 ASSESSMENT — PAIN DESCRIPTION - ORIENTATION
ORIENTATION: RIGHT;LEFT
ORIENTATION: RIGHT;LEFT

## 2018-11-15 NOTE — PROGRESS NOTES
rails  Entrance Stairs - Number of Steps: 1 step through garage, level entry at front door  Home Equipment: 4 wheeled walker, Cane     Objective:       Transfers  Sit to Stand: Stand by assistance  Stand to sit: Stand by assistance       Ambulation 1  Surface: level tile  Device: Single point cane  Assistance: Stand by assistance  Quality of Gait: decreased paulette, decreased step length, forward flexed posture, mild lateral sway, slight LOB x1 patient able to self correct, patient notes due to her feet \"feeling different\"  Distance: ~180ft X1, 60ft x1       Exercises:  Exercises  Comments: Performed standing BLE exercises in parallel bars on green foam with light BUE support: heel/toe raises, marches, hip flex/abd kicks, mini squats, hamstring curls x10 reps to increase strength and balance. Seated rest breaks provided as needed. Patient fatigues easily. Seated calf stretch with strap 15sec hold x5 reps to increase flexibility          Activity Tolerance:  Activity Tolerance: Patient limited by endurance; Patient limited by fatigue;Patient Tolerated treatment well    Assessment: Body structures, Functions, Activity limitations: Decreased functional mobility , Decreased strength, Decreased endurance, Decreased balance  Assessment: Patient tolerated session well. Patient requires rest breaks due to fatigue. Patient would benefit from continued skilled physical therapy to improve functional mobility and independence. Prognosis: Good          Discharge Recommendations:  Discharge Recommendations: Continue to assess pending progress, Patient would benefit from continued therapy after discharge    Patient Education:  Patient Education: therex, transfers, gait    Equipment Recommendations:  Equipment Needed: No (Pt has 4WW and cane. )    Safety:  Type of devices:  All fall risk precautions in place, Call light within reach, Chair alarm in place, Gait belt, Patient at risk for falls, Left in chair    Plan:  Times per

## 2018-11-15 NOTE — PROGRESS NOTES
Currently out of room for Dialysis. All surfaces in room and restroom wiped down with Clorox Bleach wipes.

## 2018-11-15 NOTE — PROGRESS NOTES
TCU BALANCE TEST:    Balance during to/from sit to stand PT: Stand by assistance   OT: Stand by assistance    Balance during walking PT: Stand by assistance   OT: Stand by assistance    Balance during turn-around and while walking PT: Stand by assistance   OT: Stand by assistance    Balance moving on and off toilet Stand by assistance    Balance during surface to/from surface transfers     Independent = 0  Modified Independent, Supervision, SBA = 1  CGA, Min Assist, Mod Assist, Max Assist, Dependent = 2  Not Tested/No Response = 8

## 2018-11-15 NOTE — PROGRESS NOTES
Training, Functional Mobility Training, Endurance Training, Self-Care / ADL, Equipment Evaluation, Education, & procurement    Goals:  Patient goals : \"get my legs stronger\"    Short term goals  Time Frame for Short term goals: 1 week  Short term goal 1: Pt will complete various t/fs including toilet with mod I & 0 vcs for safety  Short term goal 2: Pt will complete LE dressing with min A & LH AE prn  Short term goal 3: Pt will complete 8-10 min dynamic standing task in kitchen with S for increased ease of meal prep tasks  Short term goal 4: Pt will complete BADL in shower with min A & min vcs for safety  Long term goals  Time Frame for Long term goals : 2 weeks  Long term goal 1: Pt will complete BADL routine in shower with mod I & 0 vcs for safety  Long term goal 2: Pt will complete simple meal prep task with mod I & 0 vcs for safety

## 2018-11-16 LAB
GLUCOSE BLD-MCNC: 125 MG/DL (ref 70–108)
GLUCOSE BLD-MCNC: 145 MG/DL (ref 70–108)
GLUCOSE BLD-MCNC: 147 MG/DL (ref 70–108)
GLUCOSE BLD-MCNC: 157 MG/DL (ref 70–108)

## 2018-11-16 PROCEDURE — 82948 REAGENT STRIP/BLOOD GLUCOSE: CPT

## 2018-11-16 PROCEDURE — 1290000000 HC SEMI PRIVATE OTHER R&B

## 2018-11-16 PROCEDURE — 97110 THERAPEUTIC EXERCISES: CPT

## 2018-11-16 PROCEDURE — 6360000002 HC RX W HCPCS: Performed by: INTERNAL MEDICINE

## 2018-11-16 PROCEDURE — 99232 SBSQ HOSP IP/OBS MODERATE 35: CPT | Performed by: NURSE PRACTITIONER

## 2018-11-16 PROCEDURE — 97116 GAIT TRAINING THERAPY: CPT

## 2018-11-16 PROCEDURE — 6370000000 HC RX 637 (ALT 250 FOR IP): Performed by: FAMILY MEDICINE

## 2018-11-16 PROCEDURE — 2709999900 HC NON-CHARGEABLE SUPPLY

## 2018-11-16 PROCEDURE — 0220000000 HC SKILLED NURSING FACILITY

## 2018-11-16 PROCEDURE — 94640 AIRWAY INHALATION TREATMENT: CPT

## 2018-11-16 PROCEDURE — 97530 THERAPEUTIC ACTIVITIES: CPT

## 2018-11-16 RX ADMIN — INSULIN LISPRO 3 UNITS: 100 INJECTION, SOLUTION INTRAVENOUS; SUBCUTANEOUS at 12:41

## 2018-11-16 RX ADMIN — CLONIDINE HYDROCHLORIDE 0.3 MG: 0.2 TABLET ORAL at 14:01

## 2018-11-16 RX ADMIN — ATORVASTATIN CALCIUM 40 MG: 40 TABLET, FILM COATED ORAL at 22:42

## 2018-11-16 RX ADMIN — CLONIDINE HYDROCHLORIDE 0.3 MG: 0.2 TABLET ORAL at 22:43

## 2018-11-16 RX ADMIN — HEPARIN SODIUM 5000 UNITS: 5000 INJECTION INTRAVENOUS; SUBCUTANEOUS at 14:02

## 2018-11-16 RX ADMIN — ASPIRIN 81 MG: 81 TABLET, COATED ORAL at 09:37

## 2018-11-16 RX ADMIN — CLONIDINE HYDROCHLORIDE 0.3 MG: 0.2 TABLET ORAL at 05:50

## 2018-11-16 RX ADMIN — TIOTROPIUM BROMIDE 18 MCG: 18 CAPSULE ORAL; RESPIRATORY (INHALATION) at 06:16

## 2018-11-16 RX ADMIN — Medication 1 TABLET: at 09:36

## 2018-11-16 RX ADMIN — AMLODIPINE BESYLATE 10 MG: 10 TABLET ORAL at 09:36

## 2018-11-16 RX ADMIN — DOCUSATE SODIUM 100 MG: 100 CAPSULE, LIQUID FILLED ORAL at 22:36

## 2018-11-16 RX ADMIN — CALCITRIOL 0.25 MCG: 0.25 CAPSULE ORAL at 09:36

## 2018-11-16 RX ADMIN — HEPARIN SODIUM 5000 UNITS: 5000 INJECTION INTRAVENOUS; SUBCUTANEOUS at 22:39

## 2018-11-16 RX ADMIN — BUMETANIDE 2 MG: 1 TABLET ORAL at 09:36

## 2018-11-16 RX ADMIN — MINOXIDIL 5 MG: 2.5 TABLET ORAL at 14:01

## 2018-11-16 RX ADMIN — CARVEDILOL 37.5 MG: 25 TABLET, FILM COATED ORAL at 09:36

## 2018-11-16 RX ADMIN — CARVEDILOL 37.5 MG: 25 TABLET, FILM COATED ORAL at 18:20

## 2018-11-16 RX ADMIN — DOXAZOSIN 12 MG: 4 TABLET ORAL at 09:36

## 2018-11-16 RX ADMIN — FAMOTIDINE 20 MG: 20 TABLET ORAL at 09:37

## 2018-11-16 RX ADMIN — HYDROCODONE BITARTRATE AND ACETAMINOPHEN 1 TABLET: 5; 325 TABLET ORAL at 02:47

## 2018-11-16 RX ADMIN — FLUTICASONE PROPIONATE 1 SPRAY: 50 SPRAY, METERED NASAL at 09:36

## 2018-11-16 RX ADMIN — HEPARIN SODIUM 5000 UNITS: 5000 INJECTION INTRAVENOUS; SUBCUTANEOUS at 05:50

## 2018-11-16 RX ADMIN — MINOXIDIL 5 MG: 2.5 TABLET ORAL at 09:35

## 2018-11-16 RX ADMIN — HYDROCODONE BITARTRATE AND ACETAMINOPHEN 1 TABLET: 5; 325 TABLET ORAL at 22:37

## 2018-11-16 RX ADMIN — INSULIN LISPRO 3 UNITS: 100 INJECTION, SOLUTION INTRAVENOUS; SUBCUTANEOUS at 18:21

## 2018-11-16 RX ADMIN — DOCUSATE SODIUM 100 MG: 100 CAPSULE, LIQUID FILLED ORAL at 09:37

## 2018-11-16 RX ADMIN — BUMETANIDE 2 MG: 1 TABLET ORAL at 22:44

## 2018-11-16 RX ADMIN — FERROUS SULFATE TAB 325 MG (65 MG ELEMENTAL FE) 325 MG: 325 (65 FE) TAB at 09:36

## 2018-11-16 RX ADMIN — INSULIN GLARGINE 10 UNITS: 100 INJECTION, SOLUTION SUBCUTANEOUS at 22:48

## 2018-11-16 RX ADMIN — INSULIN LISPRO 3 UNITS: 100 INJECTION, SOLUTION INTRAVENOUS; SUBCUTANEOUS at 09:32

## 2018-11-16 ASSESSMENT — PAIN SCALES - GENERAL
PAINLEVEL_OUTOF10: 4
PAINLEVEL_OUTOF10: 5
PAINLEVEL_OUTOF10: 0
PAINLEVEL_OUTOF10: 0
PAINLEVEL_OUTOF10: 4
PAINLEVEL_OUTOF10: 0
PAINLEVEL_OUTOF10: 4
PAINLEVEL_OUTOF10: 7
PAINLEVEL_OUTOF10: 0
PAINLEVEL_OUTOF10: 0

## 2018-11-16 ASSESSMENT — PAIN DESCRIPTION - LOCATION
LOCATION: FOOT
LOCATION: FOOT;LEG

## 2018-11-16 ASSESSMENT — PAIN DESCRIPTION - DESCRIPTORS
DESCRIPTORS: ACHING
DESCRIPTORS: CRAMPING;NUMBNESS;TINGLING
DESCRIPTORS: CRAMPING;NUMBNESS;TINGLING

## 2018-11-16 ASSESSMENT — PAIN DESCRIPTION - PAIN TYPE
TYPE: NEUROPATHIC PAIN
TYPE: NEUROPATHIC PAIN

## 2018-11-16 ASSESSMENT — PAIN DESCRIPTION - ORIENTATION
ORIENTATION: RIGHT;LEFT

## 2018-11-16 NOTE — PROGRESS NOTES
In chair, feet elevated. Fistula bruit and thrill positive. Request for left over food to be warmed.

## 2018-11-16 NOTE — PROGRESS NOTES
Alert to person, place and time. Calm and participating in student data collection. Assessment of left eye not competed due to cloudiness. Right eye approximately 2mm, non - reactive. Sclera white. Mucous membranes pink and moist. Skin warm and dry. Denies difficulty swallowing. Anterior left lung clear. Mid - right anterior lung crackles heard on inspiration. Posterior bilateral lung crackles heard at bases on inspiration. Respirations unlabored and shallow. No cough noted. Pulse ox 89%. Corrected to 96% with instruction to take deep breaths. Denies shortness of breath. Bowel sounds active in all four quadrants. Abdomen round, soft and non - tender to palpation. States passing gas. Two bowel movements 11-15-18. Radial pulses strong bilateral. Hand grasp strong bilateral. Arm drift negative bilateral. Fistula bruit and thrill positive. Capillary refill less than 3 seconds. Skin turgor brisk. Edema in lower extremities bilateral. Pedal pulses weak bilateral. Pedal push and pull strong bilateral. Kary's sign negative bilateral. Leg lift strong bilateral. States numbness and tingling in both feet. Resting in chair, wheels locked. Call light in reach. Request fresh water at this time.

## 2018-11-16 NOTE — PROGRESS NOTES
therex, transfers, gait    Equipment Recommendations:  Equipment Needed: No    Safety:  Type of devices: All fall risk precautions in place, Patient at risk for falls, Call light within reach, Left in chair, Chair alarm in place, Gait belt (nursing student present)    Plan:  Times per week: 6x/wk  Specific instructions for Next Treatment: therex, bed mobility, transfers, gait, steps, car transfer  Current Treatment Recommendations: Strengthening, Balance Training, Functional Mobility Training, Transfer Training, Gait Training, Equipment Evaluation, Education, & procurement, Endurance Training, Patient/Caregiver Education & Training, Safety Education & Training, Home Exercise Program, Stair training    Goals:  Patient goals : I need to get stronger    Short term goals  Time Frame for Short term goals: 10 days  Short term goal 1: Pt to go supine <->sit, Mod I, to get in/out of bed   Short term goal 2: Pt will get up/down from various seated surfaces, SBA, to get up to walk. Short term goal 3: Pt will walk with cane, >= 100 ft, SBA to progress to home and community mobility  Short term goal 4: Pt will ascend/descend 1 step with cane support, for home entry through the garage, SBA  Short term goal 5: Pt will get in/out of car, SBA for transportation needs. Long term goals  Time Frame for Long term goals : 3 wks  Long term goal 1: Pt will get up/down from various seated surfaces, Mod I, to get up to walk. Long term goal 2: Pt will walk with cane, >= 150 ft, Mod I for home and community mobility  Long term goal 3: Pt will ascend/descend 1 step with cane support, for home entry through the garage, Mod I.  Ascend/descend 4 steps with rail and cane, MOd I for community mobility   Long term goal 4: Pt will improved Tinetti score to >= 24   for improved functional balance and gait

## 2018-11-17 LAB
GLUCOSE BLD-MCNC: 138 MG/DL (ref 70–108)
GLUCOSE BLD-MCNC: 166 MG/DL (ref 70–108)
GLUCOSE BLD-MCNC: 196 MG/DL (ref 70–108)

## 2018-11-17 PROCEDURE — 2500000003 HC RX 250 WO HCPCS: Performed by: FAMILY MEDICINE

## 2018-11-17 PROCEDURE — 6370000000 HC RX 637 (ALT 250 FOR IP): Performed by: FAMILY MEDICINE

## 2018-11-17 PROCEDURE — 6360000002 HC RX W HCPCS: Performed by: INTERNAL MEDICINE

## 2018-11-17 PROCEDURE — 1290000000 HC SEMI PRIVATE OTHER R&B

## 2018-11-17 PROCEDURE — 2709999900 HC NON-CHARGEABLE SUPPLY

## 2018-11-17 PROCEDURE — 82948 REAGENT STRIP/BLOOD GLUCOSE: CPT

## 2018-11-17 PROCEDURE — 94640 AIRWAY INHALATION TREATMENT: CPT

## 2018-11-17 RX ORDER — INSULIN GLARGINE 100 [IU]/ML
15 INJECTION, SOLUTION SUBCUTANEOUS NIGHTLY
Status: DISCONTINUED | OUTPATIENT
Start: 2018-11-17 | End: 2018-11-21 | Stop reason: HOSPADM

## 2018-11-17 RX ADMIN — BUMETANIDE 2 MG: 1 TABLET ORAL at 09:23

## 2018-11-17 RX ADMIN — AMLODIPINE BESYLATE 10 MG: 10 TABLET ORAL at 09:23

## 2018-11-17 RX ADMIN — CARVEDILOL 37.5 MG: 25 TABLET, FILM COATED ORAL at 16:48

## 2018-11-17 RX ADMIN — DOCUSATE SODIUM 100 MG: 100 CAPSULE, LIQUID FILLED ORAL at 21:09

## 2018-11-17 RX ADMIN — FAMOTIDINE 20 MG: 20 TABLET ORAL at 16:49

## 2018-11-17 RX ADMIN — HEPARIN SODIUM 5000 UNITS: 5000 INJECTION INTRAVENOUS; SUBCUTANEOUS at 21:13

## 2018-11-17 RX ADMIN — ATORVASTATIN CALCIUM 40 MG: 40 TABLET, FILM COATED ORAL at 21:09

## 2018-11-17 RX ADMIN — Medication 5 UNITS: at 16:49

## 2018-11-17 RX ADMIN — DOXAZOSIN 12 MG: 4 TABLET ORAL at 09:23

## 2018-11-17 RX ADMIN — CLONIDINE HYDROCHLORIDE 0.3 MG: 0.2 TABLET ORAL at 16:48

## 2018-11-17 RX ADMIN — MINOXIDIL 5 MG: 2.5 TABLET ORAL at 09:22

## 2018-11-17 RX ADMIN — ASPIRIN 81 MG: 81 TABLET, COATED ORAL at 16:49

## 2018-11-17 RX ADMIN — FERROUS SULFATE TAB 325 MG (65 MG ELEMENTAL FE) 325 MG: 325 (65 FE) TAB at 16:48

## 2018-11-17 RX ADMIN — INSULIN LISPRO 3 UNITS: 100 INJECTION, SOLUTION INTRAVENOUS; SUBCUTANEOUS at 06:10

## 2018-11-17 RX ADMIN — TIOTROPIUM BROMIDE 18 MCG: 18 CAPSULE ORAL; RESPIRATORY (INHALATION) at 06:00

## 2018-11-17 RX ADMIN — CARVEDILOL 37.5 MG: 25 TABLET, FILM COATED ORAL at 09:23

## 2018-11-17 RX ADMIN — CLONIDINE HYDROCHLORIDE 0.3 MG: 0.2 TABLET ORAL at 21:09

## 2018-11-17 RX ADMIN — BUMETANIDE 2 MG: 1 TABLET ORAL at 21:09

## 2018-11-17 RX ADMIN — INSULIN GLARGINE 15 UNITS: 100 INJECTION, SOLUTION SUBCUTANEOUS at 21:15

## 2018-11-17 RX ADMIN — Medication 1 TABLET: at 16:49

## 2018-11-17 RX ADMIN — CLONIDINE HYDROCHLORIDE 0.3 MG: 0.2 TABLET ORAL at 06:26

## 2018-11-17 RX ADMIN — HEPARIN SODIUM 5000 UNITS: 5000 INJECTION INTRAVENOUS; SUBCUTANEOUS at 06:14

## 2018-11-17 ASSESSMENT — PAIN SCALES - GENERAL: PAINLEVEL_OUTOF10: 0

## 2018-11-18 LAB
BASOPHILS # BLD: 0.3 %
BASOPHILS ABSOLUTE: 0 THOU/MM3 (ref 0–0.1)
EOSINOPHIL # BLD: 2.9 %
EOSINOPHILS ABSOLUTE: 0.2 THOU/MM3 (ref 0–0.4)
ERYTHROCYTE [DISTWIDTH] IN BLOOD BY AUTOMATED COUNT: 18.5 % (ref 11.5–14.5)
ERYTHROCYTE [DISTWIDTH] IN BLOOD BY AUTOMATED COUNT: 55.9 FL (ref 35–45)
GLUCOSE BLD-MCNC: 142 MG/DL (ref 70–108)
HCT VFR BLD CALC: 28.9 % (ref 37–47)
HEMOGLOBIN: 8.9 GM/DL (ref 12–16)
IMMATURE GRANS (ABS): 0.01 THOU/MM3 (ref 0–0.07)
IMMATURE GRANULOCYTES: 0.2 %
LYMPHOCYTES # BLD: 14.4 %
LYMPHOCYTES ABSOLUTE: 0.9 THOU/MM3 (ref 1–4.8)
MCH RBC QN AUTO: 25.6 PG (ref 26–33)
MCHC RBC AUTO-ENTMCNC: 30.8 GM/DL (ref 32.2–35.5)
MCV RBC AUTO: 83.3 FL (ref 81–99)
MONOCYTES # BLD: 10.5 %
MONOCYTES ABSOLUTE: 0.7 THOU/MM3 (ref 0.4–1.3)
NUCLEATED RED BLOOD CELLS: 0 /100 WBC
PLATELET # BLD: 287 THOU/MM3 (ref 130–400)
PMV BLD AUTO: 9.3 FL (ref 9.4–12.4)
RBC # BLD: 3.47 MILL/MM3 (ref 4.2–5.4)
SEG NEUTROPHILS: 71.7 %
SEGMENTED NEUTROPHILS ABSOLUTE COUNT: 4.5 THOU/MM3 (ref 1.8–7.7)
WBC # BLD: 6.3 THOU/MM3 (ref 4.8–10.8)

## 2018-11-18 PROCEDURE — 82948 REAGENT STRIP/BLOOD GLUCOSE: CPT

## 2018-11-18 PROCEDURE — 97110 THERAPEUTIC EXERCISES: CPT

## 2018-11-18 PROCEDURE — 6360000002 HC RX W HCPCS: Performed by: INTERNAL MEDICINE

## 2018-11-18 PROCEDURE — 36415 COLL VENOUS BLD VENIPUNCTURE: CPT

## 2018-11-18 PROCEDURE — 85025 COMPLETE CBC W/AUTO DIFF WBC: CPT

## 2018-11-18 PROCEDURE — 97116 GAIT TRAINING THERAPY: CPT

## 2018-11-18 PROCEDURE — 97530 THERAPEUTIC ACTIVITIES: CPT

## 2018-11-18 PROCEDURE — 94640 AIRWAY INHALATION TREATMENT: CPT

## 2018-11-18 PROCEDURE — 6370000000 HC RX 637 (ALT 250 FOR IP): Performed by: FAMILY MEDICINE

## 2018-11-18 PROCEDURE — 1290000000 HC SEMI PRIVATE OTHER R&B

## 2018-11-18 RX ADMIN — CLONIDINE HYDROCHLORIDE 0.3 MG: 0.2 TABLET ORAL at 21:15

## 2018-11-18 RX ADMIN — CLONIDINE HYDROCHLORIDE 0.3 MG: 0.2 TABLET ORAL at 06:10

## 2018-11-18 RX ADMIN — ASPIRIN 81 MG: 81 TABLET, COATED ORAL at 08:51

## 2018-11-18 RX ADMIN — TIOTROPIUM BROMIDE 18 MCG: 18 CAPSULE ORAL; RESPIRATORY (INHALATION) at 05:15

## 2018-11-18 RX ADMIN — MINOXIDIL 5 MG: 2.5 TABLET ORAL at 13:28

## 2018-11-18 RX ADMIN — INSULIN GLARGINE 15 UNITS: 100 INJECTION, SOLUTION SUBCUTANEOUS at 21:16

## 2018-11-18 RX ADMIN — ATORVASTATIN CALCIUM 40 MG: 40 TABLET, FILM COATED ORAL at 21:16

## 2018-11-18 RX ADMIN — HEPARIN SODIUM 5000 UNITS: 5000 INJECTION INTRAVENOUS; SUBCUTANEOUS at 13:29

## 2018-11-18 RX ADMIN — AMLODIPINE BESYLATE 10 MG: 10 TABLET ORAL at 08:28

## 2018-11-18 RX ADMIN — HEPARIN SODIUM 5000 UNITS: 5000 INJECTION INTRAVENOUS; SUBCUTANEOUS at 06:10

## 2018-11-18 RX ADMIN — FAMOTIDINE 20 MG: 20 TABLET ORAL at 08:51

## 2018-11-18 RX ADMIN — BUMETANIDE 2 MG: 1 TABLET ORAL at 08:28

## 2018-11-18 RX ADMIN — HYDROCODONE BITARTRATE AND ACETAMINOPHEN 1 TABLET: 5; 325 TABLET ORAL at 21:17

## 2018-11-18 RX ADMIN — DOCUSATE SODIUM 100 MG: 100 CAPSULE, LIQUID FILLED ORAL at 21:20

## 2018-11-18 RX ADMIN — CLONIDINE HYDROCHLORIDE 0.3 MG: 0.2 TABLET ORAL at 13:28

## 2018-11-18 RX ADMIN — CARVEDILOL 37.5 MG: 25 TABLET, FILM COATED ORAL at 17:01

## 2018-11-18 RX ADMIN — HEPARIN SODIUM 5000 UNITS: 5000 INJECTION INTRAVENOUS; SUBCUTANEOUS at 21:16

## 2018-11-18 RX ADMIN — MINOXIDIL 5 MG: 2.5 TABLET ORAL at 08:28

## 2018-11-18 RX ADMIN — DOCUSATE SODIUM 100 MG: 100 CAPSULE, LIQUID FILLED ORAL at 08:51

## 2018-11-18 RX ADMIN — BUMETANIDE 2 MG: 1 TABLET ORAL at 17:02

## 2018-11-18 RX ADMIN — MINOXIDIL 5 MG: 2.5 TABLET ORAL at 21:15

## 2018-11-18 RX ADMIN — FERROUS SULFATE TAB 325 MG (65 MG ELEMENTAL FE) 325 MG: 325 (65 FE) TAB at 08:28

## 2018-11-18 RX ADMIN — DOXAZOSIN 12 MG: 4 TABLET ORAL at 08:28

## 2018-11-18 RX ADMIN — Medication 1 TABLET: at 08:28

## 2018-11-18 RX ADMIN — CARVEDILOL 37.5 MG: 25 TABLET, FILM COATED ORAL at 08:28

## 2018-11-18 ASSESSMENT — PAIN SCALES - GENERAL
PAINLEVEL_OUTOF10: 4
PAINLEVEL_OUTOF10: 3
PAINLEVEL_OUTOF10: 6

## 2018-11-18 ASSESSMENT — PAIN DESCRIPTION - LOCATION: LOCATION: LEG

## 2018-11-18 ASSESSMENT — PAIN DESCRIPTION - ORIENTATION: ORIENTATION: RIGHT;LEFT

## 2018-11-18 NOTE — PROGRESS NOTES
(podiatrist)    [facility]rom[facility]     Neuropathy 1989    diabetic neuropathy    Obesity since childhoood    CONTRERAS on CPAP 2010    Dr. Ruben Browne    Pneumonia     PONV (postoperative nausea and vomiting)     Seizures ([facility]      Past Surgical History:   Procedure Laterality Date    ANKLE SURGERY Right 02-10-14    Dr. Shima Mathis at [facility]  1990's    removal of benign tumor   [address] 41  2008   [address]  2009    2 polyps, not precanceorus    COSMETIC SURGERY  3/30/2012    eye lid lift    ENDOSCOPY, COLON, DIAGNOSTIC  2007   [facility] EYE SURGERY  March 30th, 2012    left sided ptosis    FOOT SURGERY  1990    Tarsal tunnel surgery    FRACTURE SURGERY  2015   [address] and [address]    first partial in 1980's, then total in [address] [facility]  2015   Marylu Hamman LIVER BIOPSY  6/2015    LUNG BIOPSY  2009    NE OFFICE/OUTPT VISIT,PROCEDURE ONLY Left 8/23/2018    LEFT UPPER EXTREMENTY AV FISTULA CREATION performed by Daisy Richard MD at [facility]       Restrictions/Precautions:  Fall Risk, General Precautions        Other position/activity restrictions: fistula in LUE placed 8/2018 but still uncomfortable       Prior Level of Function:  ADL Assistance: Independent  Homemaking Assistance: Independent  Ambulation Assistance: Independent  Transfer Assistance: Independent  Additional Comments: Pt uses a cane (couple weeks prior to admission) when she feels her legs are swollen. She also had a 4 wheeled walker which she will use when she has to go very far-just obtained. Subjective:  Chart Reviewed: Yes  Response To Previous Treatment: Patient with no complaints from previous session. Family / Caregiver Present: No  Subjective: RN approved session, pt resting in bedside chair upon arrival, pleasant and agreeable to therapy. Pain:  Denies.           Social/Functional:  Lives With: Alone  Type of Home:  (condo)  Home Layout: One level  Home Access: Stairs to enter without rails  Entrance Stairs - Number of Steps: 1 step through garage, level entry at front door  Home Equipment: 4 wheeled walker, Cane     Objective:       Transfers  Sit to Stand: Supervision  Stand to sit: Supervision       Ambulation 1  Surface: level tile  Device: Single point cane  Assistance: Stand by assistance  Quality of Gait: Slightly decreased paulette. Decreased B step lengths. Min forward flexed posture. Steady with no LOB  Distance: 150 feet x1 and 100 feet x1        Balance  Comments: Pt completed dynamic reaching activity while standing on green foam pad to further challange balance. Pt was able to reach outside THANH to both R and L sides and up above head all without UE support. Contact Guard to Stand by Assist provided by therapist for safety. Activity completed for improved stability and decreased fall risk at home. Exercises:  Exercises  Comments: Pt completed nustep bike x5 min L5 and then standing there ex including BLE heel raises, toe raises, hip flex, hip abd, HS curl, marches, mini squats all x10 reps. All completed to increase strength and endurance for improved functional mobility. Activity Tolerance:  Activity Tolerance: Patient limited by endurance; Patient Tolerated treatment well    Assessment: Body structures, Functions, Activity limitations: Decreased functional mobility , Decreased strength, Decreased endurance, Decreased balance  Assessment: Pt tolerated session well. Limited by decreased endurance and decreased strength. Would benefit from continued therapy at this time. Prognosis: Good          Discharge Recommendations:  Discharge Recommendations: Continue to assess pending progress    Patient Education:  Patient Education: POC    Equipment Recommendations:  Equipment Needed: No    Safety:  Type of devices:  All fall risk precautions in place, Gait belt, Nurse notified, Call light within reach (Pt left in bathroom with

## 2018-11-18 NOTE — PROGRESS NOTES
independent    Transfers  Sit to stand: Stand by assistance (from various surfaces)  Stand to sit: Stand by assistance     Balance  Sitting Balance: Supervision  Standing Balance: Stand by assistance     Time: x 2 mins     Functional Mobility  Functional - Mobility Device: Cane  Assist Level: Stand by assistance  Functional Mobility Comments: Pt ambualted to/from therapy gym at slow pace with no LOB. Pt fatigues easily with mobility and required sitting restbreak upon entering gym and room     Additional Activities: Discussed home environment and educated pt on ECTs for improved tolerance of activities upon return home due to pt's fatigue. Pt did report increased ease with completing LB ADLs  Since starting dialysis    Comment: Completed BUE exercises x20 reps x1 set in all joints/planes using yellow tband seated in chair. Required rest breaks after each exercise. Completed to increase strength and activity tolerance required for ADLs and toilet transfers.      Activity Tolerance:  Activity Tolerance: Patient Tolerated treatment well;Patient limited by fatigue  Activity Tolerance: Pt requires frequent rest breaks during exs    Assessment:     Performance deficits / Impairments: Decreased functional mobility , Decreased high-level IADLs, Decreased ADL status, Decreased endurance, Decreased balance  Prognosis: Good    Discharge Recommendations:  Discharge Recommendations: Home with Home health OT    Patient Education:  Patient Education: safety with transfers and ambulation, HEP, role of OT, ECTs  Barriers to Learning: none    Equipment Recommendations:  Equipment Needed: Yes  ADL Assistive Devices: Sock-Aid Hard, Reacher    Safety:  Safety Devices in place: Yes  Type of devices: Call light within reach, Chair alarm in place, Gait belt, Left in chair, Nurse notified, All fall risk precautions in place    Plan:  Times per week: 6x  Current Treatment Recommendations: Balance Training, Functional Mobility Training,

## 2018-11-19 LAB
GLUCOSE BLD-MCNC: 119 MG/DL (ref 70–108)
GLUCOSE BLD-MCNC: 120 MG/DL (ref 70–108)
GLUCOSE BLD-MCNC: 130 MG/DL (ref 70–108)

## 2018-11-19 PROCEDURE — 6370000000 HC RX 637 (ALT 250 FOR IP): Performed by: FAMILY MEDICINE

## 2018-11-19 PROCEDURE — 82948 REAGENT STRIP/BLOOD GLUCOSE: CPT

## 2018-11-19 PROCEDURE — 6360000002 HC RX W HCPCS: Performed by: INTERNAL MEDICINE

## 2018-11-19 PROCEDURE — 99232 SBSQ HOSP IP/OBS MODERATE 35: CPT | Performed by: INTERNAL MEDICINE

## 2018-11-19 PROCEDURE — 1290000000 HC SEMI PRIVATE OTHER R&B

## 2018-11-19 PROCEDURE — 97110 THERAPEUTIC EXERCISES: CPT

## 2018-11-19 PROCEDURE — 2709999900 HC NON-CHARGEABLE SUPPLY

## 2018-11-19 PROCEDURE — 97530 THERAPEUTIC ACTIVITIES: CPT

## 2018-11-19 PROCEDURE — 94640 AIRWAY INHALATION TREATMENT: CPT

## 2018-11-19 RX ADMIN — CARVEDILOL 37.5 MG: 25 TABLET, FILM COATED ORAL at 09:28

## 2018-11-19 RX ADMIN — HYDROCODONE BITARTRATE AND ACETAMINOPHEN 1 TABLET: 5; 325 TABLET ORAL at 22:11

## 2018-11-19 RX ADMIN — Medication 1 TABLET: at 17:18

## 2018-11-19 RX ADMIN — MINOXIDIL 5 MG: 2.5 TABLET ORAL at 22:10

## 2018-11-19 RX ADMIN — DOCUSATE SODIUM 100 MG: 100 CAPSULE, LIQUID FILLED ORAL at 17:19

## 2018-11-19 RX ADMIN — ASPIRIN 81 MG: 81 TABLET, COATED ORAL at 17:19

## 2018-11-19 RX ADMIN — CLONIDINE HYDROCHLORIDE 0.3 MG: 0.2 TABLET ORAL at 05:26

## 2018-11-19 RX ADMIN — CARVEDILOL 37.5 MG: 25 TABLET, FILM COATED ORAL at 17:17

## 2018-11-19 RX ADMIN — HEPARIN SODIUM 5000 UNITS: 5000 INJECTION INTRAVENOUS; SUBCUTANEOUS at 05:25

## 2018-11-19 RX ADMIN — ATORVASTATIN CALCIUM 40 MG: 40 TABLET, FILM COATED ORAL at 22:10

## 2018-11-19 RX ADMIN — BUMETANIDE 2 MG: 1 TABLET ORAL at 09:28

## 2018-11-19 RX ADMIN — HEPARIN SODIUM 5000 UNITS: 5000 INJECTION INTRAVENOUS; SUBCUTANEOUS at 17:22

## 2018-11-19 RX ADMIN — CLONIDINE HYDROCHLORIDE 0.3 MG: 0.2 TABLET ORAL at 22:10

## 2018-11-19 RX ADMIN — DOXAZOSIN 12 MG: 4 TABLET ORAL at 09:28

## 2018-11-19 RX ADMIN — CALCITRIOL 0.25 MCG: 0.25 CAPSULE ORAL at 17:18

## 2018-11-19 RX ADMIN — FERROUS SULFATE TAB 325 MG (65 MG ELEMENTAL FE) 325 MG: 325 (65 FE) TAB at 17:18

## 2018-11-19 RX ADMIN — INSULIN GLARGINE 15 UNITS: 100 INJECTION, SOLUTION SUBCUTANEOUS at 22:10

## 2018-11-19 RX ADMIN — MINOXIDIL 5 MG: 2.5 TABLET ORAL at 17:18

## 2018-11-19 RX ADMIN — CLONIDINE HYDROCHLORIDE 0.3 MG: 0.2 TABLET ORAL at 17:19

## 2018-11-19 RX ADMIN — TIOTROPIUM BROMIDE 18 MCG: 18 CAPSULE ORAL; RESPIRATORY (INHALATION) at 05:51

## 2018-11-19 RX ADMIN — BUMETANIDE 2 MG: 1 TABLET ORAL at 17:18

## 2018-11-19 RX ADMIN — AMLODIPINE BESYLATE 10 MG: 10 TABLET ORAL at 09:28

## 2018-11-19 RX ADMIN — DOCUSATE SODIUM 100 MG: 100 CAPSULE, LIQUID FILLED ORAL at 22:10

## 2018-11-19 RX ADMIN — FAMOTIDINE 20 MG: 20 TABLET ORAL at 17:18

## 2018-11-19 RX ADMIN — HEPARIN SODIUM 5000 UNITS: 5000 INJECTION INTRAVENOUS; SUBCUTANEOUS at 22:10

## 2018-11-19 ASSESSMENT — PAIN SCALES - GENERAL
PAINLEVEL_OUTOF10: 4
PAINLEVEL_OUTOF10: 6

## 2018-11-19 NOTE — PROGRESS NOTES
Stand by assistance  Quality of Gait: Slightly decreased paulette. Decreased B step lengths. Min forward flexed posture. Steady with no LOB  Distance: 150 feet x1 and 100 feet x1   Comments: pt. reported fatigue upon approaching to sit after 180' distance and then reported dizziness after sitting-> (blood sugar check-> 150's)  Stairs  # Steps : 2  Stairs Height: 6\"  Rails: Left ascending  Curbs: 2\"  Device: No Device  Assistance: Contact guard assistance  Comment: good technique, slight unsteady, completed platform step x2 trials  PT Equipment Recommendations  Equipment Needed: No    Occupational  Therapy:          Speech Therapy:       Nutrition:    Weight: 181 lb 3 oz (82.2 kg) / Body mass index is 30.15 kg/m². Please see nutrition note for details. Family Education: No family available for education  Fall Risk:  Falling star program initiated    Goal Achievement:   Patient is meeting goals    Discharge Plan   Estimated Length of Stay: 11/21/2018  Destination: home health  Services at Discharge: [address] [place], [place] [address], Occupational Therapy and aide 3x week  Equipment at Discharge: Has all necessary equipment  Factors facilitating achievement of predicted outcomes: Family support, Motivated, Cooperative, Pleasant and Good insight into deficits  Barriers to the achievement of predicted outcomes: Decreased endurance, Upper extremity weakness and Lower extremity weakness    Readmission Risk              Risk of Unplanned Readmission:        29         High (20-31)     Moderate (10-19)     Low (0-9)    Team Members Present at Conference:  Physician:  Nurse: Sukhdev Rangel RN, Kaleigh RN  :  Abdirashid [place]  Occupational Therapist:  GEORGE Pearl  Physical Therapist:   Good Nevarez PT  Speech Therapist: Speech Therapist: N/A.     All team members have reviewed and updated as necessary and appropriate the established interdisciplinary plan of care within the medical record of Tessdennis Sharmaine Ceballos

## 2018-11-19 NOTE — PROGRESS NOTES
Renal Progress Note    Assessment and Plan:    1. ESRD on hemodialysis   2. Hypertension primary  3. Anemia of chronic disease  4. Deconditioning  5. DM 2  PLAN:  Reviewed recent labs   Reviewed medications   Darbepoetin 60 µg subcu once  today  Hemodialysis today  We'll follow    Patient Active Problem List:     Diabetes mellitus, type 2 ([address]     Uncontrolled hypertension     Chronic anemia     Coronary disease     Hyperlipemia     Arthritis     Neuropathy, diabetic ([address]     CKD (chronic kidney disease), stage III (HCC)     Leg pain     Obesity (BMI 30-39. 9)     Low HDL (under 40)     Need for prophylactic vaccination against diphtheria-tetanus-pertussis (DTP)     History of tobacco use     Allergic rhinitis     COPD, mild (HCC)     Lung mass     Anemia, chronic disease     Gout     Urolithiasis     Ankle fracture, right     Secondary hyperparathyroidism of renal origin ([address]     Diabetes mellitus type 2, uncomplicated (HCC)     Obstructive sleep apnea on CPAP     Vitamin D deficiency     CKD (chronic kidney disease) stage 3, GFR 30-59 ml/min (HCC)     Type 2 diabetes mellitus with stage 3 chronic kidney disease (HCC)     Hypertensive nephropathy     Benign essential HTN     Type 2 diabetes mellitus (HCC)     Hypertensive nephropathy     Persistent proteinuria associated with type 2 diabetes mellitus (HCC)     Cellulitis in diabetic foot (HCC)     CKD (chronic kidney disease), stage IV (HCC)     VIDA (acute kidney injury) ([address]     Persistent proteinuria     Acquired hypothyroidism     CKD (chronic kidney disease), stage IV (HCC)     Nephrotic syndrome     Type 2 diabetes mellitus (HCC)     Iron deficiency anemia due to dietary causes     Anemia of chronic disease     Stage 5 chronic kidney disease not on chronic dialysis ([address]     Type 2 diabetes mellitus ([address]     ESRD (end stage renal disease) ([address]     Hypervolemia associated with renal insufficiency     Pulmonary edema, noncardiac     CKD (chronic kidney

## 2018-11-20 ENCOUNTER — TELEPHONE (OUTPATIENT)
Dept: FAMILY MEDICINE CLINIC | Age: 63
End: 2018-11-20

## 2018-11-20 LAB
GLUCOSE BLD-MCNC: 128 MG/DL (ref 70–108)
GLUCOSE BLD-MCNC: 96 MG/DL (ref 70–108)

## 2018-11-20 PROCEDURE — 97110 THERAPEUTIC EXERCISES: CPT

## 2018-11-20 PROCEDURE — 97530 THERAPEUTIC ACTIVITIES: CPT

## 2018-11-20 PROCEDURE — 6360000002 HC RX W HCPCS: Performed by: INTERNAL MEDICINE

## 2018-11-20 PROCEDURE — 1290000000 HC SEMI PRIVATE OTHER R&B

## 2018-11-20 PROCEDURE — 6370000000 HC RX 637 (ALT 250 FOR IP): Performed by: FAMILY MEDICINE

## 2018-11-20 PROCEDURE — 94640 AIRWAY INHALATION TREATMENT: CPT

## 2018-11-20 PROCEDURE — 82948 REAGENT STRIP/BLOOD GLUCOSE: CPT

## 2018-11-20 RX ORDER — INSULIN GLARGINE 100 [IU]/ML
15 INJECTION, SOLUTION SUBCUTANEOUS NIGHTLY
COMMUNITY
Start: 2018-11-21 | End: 2018-11-21 | Stop reason: HOSPADM

## 2018-11-20 RX ADMIN — CLONIDINE HYDROCHLORIDE 0.3 MG: 0.2 TABLET ORAL at 06:08

## 2018-11-20 RX ADMIN — CLONIDINE HYDROCHLORIDE 0.3 MG: 0.2 TABLET ORAL at 21:35

## 2018-11-20 RX ADMIN — MINOXIDIL 5 MG: 2.5 TABLET ORAL at 20:16

## 2018-11-20 RX ADMIN — HEPARIN SODIUM 5000 UNITS: 5000 INJECTION INTRAVENOUS; SUBCUTANEOUS at 06:09

## 2018-11-20 RX ADMIN — BUMETANIDE 2 MG: 1 TABLET ORAL at 17:29

## 2018-11-20 RX ADMIN — TIOTROPIUM BROMIDE 18 MCG: 18 CAPSULE ORAL; RESPIRATORY (INHALATION) at 06:09

## 2018-11-20 RX ADMIN — HEPARIN SODIUM 5000 UNITS: 5000 INJECTION INTRAVENOUS; SUBCUTANEOUS at 21:36

## 2018-11-20 RX ADMIN — CARVEDILOL 37.5 MG: 25 TABLET, FILM COATED ORAL at 17:29

## 2018-11-20 RX ADMIN — AMLODIPINE BESYLATE 10 MG: 10 TABLET ORAL at 09:35

## 2018-11-20 RX ADMIN — Medication 1 TABLET: at 09:35

## 2018-11-20 RX ADMIN — FERROUS SULFATE TAB 325 MG (65 MG ELEMENTAL FE) 325 MG: 325 (65 FE) TAB at 09:35

## 2018-11-20 RX ADMIN — BUMETANIDE 2 MG: 1 TABLET ORAL at 09:35

## 2018-11-20 RX ADMIN — ASPIRIN 81 MG: 81 TABLET, COATED ORAL at 09:35

## 2018-11-20 RX ADMIN — DOXAZOSIN 12 MG: 4 TABLET ORAL at 09:35

## 2018-11-20 RX ADMIN — MINOXIDIL 5 MG: 2.5 TABLET ORAL at 14:08

## 2018-11-20 RX ADMIN — FLUTICASONE PROPIONATE 1 SPRAY: 50 SPRAY, METERED NASAL at 09:37

## 2018-11-20 RX ADMIN — ATORVASTATIN CALCIUM 40 MG: 40 TABLET, FILM COATED ORAL at 20:16

## 2018-11-20 RX ADMIN — DOCUSATE SODIUM 100 MG: 100 CAPSULE, LIQUID FILLED ORAL at 09:35

## 2018-11-20 RX ADMIN — CLONIDINE HYDROCHLORIDE 0.3 MG: 0.2 TABLET ORAL at 14:08

## 2018-11-20 RX ADMIN — CARVEDILOL 37.5 MG: 25 TABLET, FILM COATED ORAL at 09:36

## 2018-11-20 RX ADMIN — LACTULOSE 20 G: 20 SOLUTION ORAL at 14:18

## 2018-11-20 RX ADMIN — HEPARIN SODIUM 5000 UNITS: 5000 INJECTION INTRAVENOUS; SUBCUTANEOUS at 14:08

## 2018-11-20 RX ADMIN — MINOXIDIL 5 MG: 2.5 TABLET ORAL at 09:35

## 2018-11-20 RX ADMIN — INSULIN GLARGINE 15 UNITS: 100 INJECTION, SOLUTION SUBCUTANEOUS at 21:28

## 2018-11-20 RX ADMIN — FAMOTIDINE 20 MG: 20 TABLET ORAL at 09:35

## 2018-11-20 ASSESSMENT — PAIN DESCRIPTION - FREQUENCY: FREQUENCY: CONTINUOUS

## 2018-11-20 ASSESSMENT — PAIN DESCRIPTION - ORIENTATION: ORIENTATION: RIGHT;LEFT

## 2018-11-20 ASSESSMENT — PAIN DESCRIPTION - DESCRIPTORS: DESCRIPTORS: ACHING

## 2018-11-20 ASSESSMENT — PAIN DESCRIPTION - LOCATION: LOCATION: FOOT

## 2018-11-20 ASSESSMENT — PAIN SCALES - GENERAL
PAINLEVEL_OUTOF10: 4
PAINLEVEL_OUTOF10: 4

## 2018-11-20 ASSESSMENT — PAIN DESCRIPTION - PAIN TYPE: TYPE: CHRONIC PAIN

## 2018-11-20 NOTE — PLAN OF CARE
General Blood  [id number]    This document includes baseline careplan information as well as current active orders for this admission to Transitional Care Unit (8E). A copy was given to the patient and/or patient representative after team conference, 11/13/2018. Current Active Orders:  Orders Placed This Encounter   Procedures    DIET GENERAL; Carb Control: 3 carb choices (45 gms)/meal; Low Sodium (2 GM);  Daily Fluid Restriction: 1500 ml; Renal    Notify physician    Up with assistance    Vital signs per unit routine    Daily weights    HYPOGLYCEMIA TREATMENT: blood glucose less than 50 mg/dL and patient  ALERT and TOLERATING PO    HYPOGLYCEMIA TREATMENT: blood glucose less than 70 mg/dL and patient ALERT and TOLERATING PO    HYPOGLYCEMIA TREATMENT: blood glucose less than 70 mg/dL and patient NOT ALERT or NPO    Incentive spirometry nursing    Place PPD    Waffle cushion in chair when up    Full Code    Consult to Recreation Therapy    Consult to Social Work    Inpatient consult to Dietitian    Dietary Nutrition Supplements: Diabetic Oral Supplement    OT eval and treat    PT eval and treat    HHN Treatment    MDI Treatment    POCT Glucose       Current Medications:  Current Facility-Administered Medications   Medication Dose Route Frequency Provider Last Rate Last Dose    HYDROcodone-acetaminophen (NORCO) 5-325 MG per tablet 1 tablet  1 tablet Oral Q4H PRN Violeta Martinez MD   1 tablet at 11/12/18 2116    famotidine (PEPCID) tablet 20 mg  20 mg Oral Daily Violeta Martinez MD   20 mg at 11/12/18 0806    acetaminophen (TYLENOL) tablet 650 mg  650 mg Oral Q6H PRN Cherry Ac MD   650 mg at 11/11/18 0842    albuterol (PROVENTIL) nebulizer solution 2.5 mg  2.5 mg Nebulization Q6H PRN Cherry Ac MD        albuterol sulfate  (90 Base) MCG/ACT inhaler 2 puff  2 puff Inhalation Q6H PRN Cherry Ac MD        amLODIPine (NORVASC) tablet 10 mg  10 mg Oral Daily
Problem: Nutrition  Goal: Optimal nutrition therapy  Outcome: Ongoing  Nutrition Problem: Increased nutrient needs  Intervention: Food and/or Nutrient Delivery: Continue current diet, Continue current ONS  Nutritional Goals: Pt will consume 75% or greater of most meals and ONS during LOS
Problem: Nutrition  Goal: Optimal nutrition therapy  Outcome: Ongoing  Nutrition Problem: Increased nutrient needs  Intervention: Food and/or Nutrient Delivery: Continue current diet, Continue current ONS  Nutritional Goals: Pt will consume 75% or greater of most meals and ONS during LOS
Problem: Pain:  Goal: Pain level will decrease  Pain level will decrease   Outcome: Ongoing  Pt always has pins and needles feeling in her BL feet. She does not take any prn medication    Problem: Bleeding:  Goal: Will show no signs and symptoms of excessive bleeding  Will show no signs and symptoms of excessive bleeding   Outcome: Ongoing  Pt is on ASA. Excessive bleeding and bruising monitored by pt and staff. Problem: Activity:  Goal: Fatigue will decrease  Fatigue will decrease   Outcome: Ongoing  Pt walks with therapy with no device. Does have fatigue after her dialysis    Problem: Fluid Volume:  Goal: Will show no signs or symptoms of fluid imbalance  Will show no signs or symptoms of fluid imbalance   Outcome: Ongoing  Pt is on a fluid restriction. She does not have a high intake of fluid    Problem: Falls - Risk of:  Goal: Will remain free from falls  Will remain free from falls   Outcome: Ongoing  She uses her call light appropriately. Gait belt with ambulation    Problem: Skin Integrity:  Goal: Will show no infection signs and symptoms  Will show no infection signs and symptoms   Outcome: Ongoing  No fever. No signs of infection. Will continue to monitor    Problem: Serum Glucose Level - Abnormal:  Goal: Ability to maintain appropriate glucose levels will improve  Ability to maintain appropriate glucose levels will improve   Outcome: Ongoing  She checks her BS every morning.     Problem: DISCHARGE BARRIERS  Goal: Patient's continuum of care needs are met  Pt will be DC on Wednesday
glucose levels improving since admission. Problem: Respiratory:  Goal: Respiratory status will improve  Respiratory status will improve   Outcome: Ongoing  Respiratory rate has improved since admission. Problem: Nutrition  Goal: Optimal nutrition therapy  Outcome: Ongoing  Tolerating current diet and po fluids well.

## 2018-11-20 NOTE — PROGRESS NOTES
items. Patient was able to tolerate standing with 2 seated rest breaks during task, standing a total of 10+ minutes, demo'ing good balance with BUE release off of RW and no LOB noted. Transfers  Sit to stand: Modified independent  Stand to sit: Modified independent       Balance  Sitting Balance: Modified independent   Standing Balance: Modified independent      Time: x10 min x 2 trials  Activity: IADL task and leisure task   Pt completed dynamic standing task this date standing x10 min while requiring Timi for balance - task was graded to involve reaching outside THANH. Completed in order to challenge dynamic standing balance and standing endurance for the completion of sinkside ADLs and various homemaking tasks. Functional - Mobility Device: Cane  Activity: Other  Assist Level: Modified independent   Functional Mobility Comments: Pt ambulated to/from therapy gym and within rehab therapy kitchen at slow pace with no LOB. RB required after mobility trials                                   Type of ROM/Therapeutic Exercise: AROM  Comment: Completed BUE exercises x20 reps x1 set in all joints/planes using yellow tband seated in chair. Required rest breaks after each exercise. Completed to increase strength and activity tolerance required for ADLs and toilet transfers. Activity Tolerance:  Activity Tolerance: Patient Tolerated treatment well;Patient limited by fatigue  Activity Tolerance: Multiple RBs, but overall good performance. Assessment:  Performance deficits / Impairments: Decreased functional mobility , Decreased high-level IADLs, Decreased ADL status, Decreased endurance, Decreased balance  Assessment: Pt has made good progress towards goals. Pt has met 3/4 STGs and 1/2 LTGs. Pt has exhibited improvement in all skill areas with pt demonstrating Timi this date. Pt not meeting goals for ADLs secondary to pt not demonstrating with OT.  Pt reports she has been able to complete on own. Pt continues to exhibit decreased endurance causing barriers to meeting pt's goals. Pt continues to require skilled [facility] OT intervention to improve ADL/IADL performance required for pt to return [facility].   Prognosis: Good  Discharge Recommendations: Home with Home health OT    Patient Education:  Patient Education: home safety  Barriers to Learning: none    Equipment Recommendations:  Equipment Needed: Yes  ADL Assistive Devices: Sock-Aid Hard, Reacher    Safety:  Safety Devices in place: Yes  Type of devices: Call light within reach, Chair alarm in place, Left in chair, Patient at risk for falls    Plan:  Times per week: discharge 11/21  Current Treatment Recommendations: Balance Training, Functional Mobility Training, Endurance Training, Self-Care / ADL, Equipment Evaluation, Education, & procurement    Goals:  Patient goals : \"get my legs stronger\"    Short term goals  Time Frame for Short term goals: 1 week  Short term goal 1: Pt will complete various t/fs including toilet with mod I & 0 vcs for safety MET  Short term goal 2: Pt will complete LE dressing with min A & LH AE prn MET  Short term goal 3: Pt will complete 8-10 min dynamic standing task in kitchen with S for increased ease of meal prep tasksMET  Short term goal 4: Pt will complete BADL in shower with min A & min vcs for safety NOT MET  Long term goals  Time Frame for Long term goals : 2 weeks  Long term goal 1: Pt will complete BADL routine in shower with mod I & 0 vcs for safety NOT MET  Long term goal 2: Pt will complete simple meal prep task with mod I & 0 vcs for safety MET

## 2018-11-20 NOTE — PROGRESS NOTES
Overweight    Nutrition Interventions:   Continue current diet, Continue current ONS  Continued Inpatient Monitoring, Education Completed (renal diet education completed (11/7/18), Pt. declines carb controlled low sodium  1500ml fluid restriction diet review 11/20)    Nutrition Evaluation:   · Evaluation: Progressing toward goals   · Goals: Pt will consume 75% or greater of most meals and ONS during LOS    · Monitoring: Nutrition Progression, Meal Intake, Supplement Intake, Diet Tolerance, Skin Integrity, I&O, Weight, Pertinent Labs, Monitor Bowel Function      Electronically signed by Zelda Arevalo RD, LD on 11/20/18 at 3:29 PM    Contact [phone]

## 2018-11-21 VITALS
WEIGHT: 179.1 LBS | BODY MASS INDEX: 29.84 KG/M2 | DIASTOLIC BLOOD PRESSURE: 61 MMHG | HEART RATE: 68 BPM | RESPIRATION RATE: 18 BRPM | SYSTOLIC BLOOD PRESSURE: 133 MMHG | TEMPERATURE: 98 F | OXYGEN SATURATION: 92 % | HEIGHT: 65 IN

## 2018-11-21 LAB — GLUCOSE BLD-MCNC: 169 MG/DL (ref 70–108)

## 2018-11-21 PROCEDURE — 82948 REAGENT STRIP/BLOOD GLUCOSE: CPT

## 2018-11-21 PROCEDURE — 6360000002 HC RX W HCPCS: Performed by: INTERNAL MEDICINE

## 2018-11-21 PROCEDURE — 6370000000 HC RX 637 (ALT 250 FOR IP): Performed by: FAMILY MEDICINE

## 2018-11-21 PROCEDURE — 94640 AIRWAY INHALATION TREATMENT: CPT

## 2018-11-21 PROCEDURE — 99232 SBSQ HOSP IP/OBS MODERATE 35: CPT | Performed by: NURSE PRACTITIONER

## 2018-11-21 RX ADMIN — TIOTROPIUM BROMIDE 18 MCG: 18 CAPSULE ORAL; RESPIRATORY (INHALATION) at 05:42

## 2018-11-21 RX ADMIN — CLONIDINE HYDROCHLORIDE 0.3 MG: 0.2 TABLET ORAL at 06:19

## 2018-11-21 RX ADMIN — AMLODIPINE BESYLATE 10 MG: 10 TABLET ORAL at 08:27

## 2018-11-21 RX ADMIN — HYDROCODONE BITARTRATE AND ACETAMINOPHEN 1 TABLET: 5; 325 TABLET ORAL at 08:27

## 2018-11-21 RX ADMIN — CARVEDILOL 37.5 MG: 25 TABLET, FILM COATED ORAL at 08:27

## 2018-11-21 RX ADMIN — DOXAZOSIN 12 MG: 4 TABLET ORAL at 08:27

## 2018-11-21 RX ADMIN — MINOXIDIL 5 MG: 2.5 TABLET ORAL at 08:26

## 2018-11-21 RX ADMIN — HEPARIN SODIUM 5000 UNITS: 5000 INJECTION INTRAVENOUS; SUBCUTANEOUS at 06:15

## 2018-11-21 ASSESSMENT — PAIN DESCRIPTION - DESCRIPTORS: DESCRIPTORS: ACHING

## 2018-11-21 ASSESSMENT — PAIN DESCRIPTION - ONSET: ONSET: ON-GOING

## 2018-11-21 ASSESSMENT — PAIN DESCRIPTION - PROGRESSION
CLINICAL_PROGRESSION: NOT CHANGED

## 2018-11-21 ASSESSMENT — PAIN SCALES - GENERAL
PAINLEVEL_OUTOF10: 5
PAINLEVEL_OUTOF10: 2
PAINLEVEL_OUTOF10: 5

## 2018-11-21 ASSESSMENT — PAIN DESCRIPTION - FREQUENCY: FREQUENCY: CONTINUOUS

## 2018-11-21 ASSESSMENT — PAIN DESCRIPTION - ORIENTATION: ORIENTATION: RIGHT;LEFT

## 2018-11-21 ASSESSMENT — PAIN DESCRIPTION - PAIN TYPE: TYPE: CHRONIC PAIN

## 2018-11-21 ASSESSMENT — PAIN DESCRIPTION - LOCATION: LOCATION: LEG

## 2018-11-21 NOTE — PROGRESS NOTES
Nephrology Progress Note    Patient - Belgica Mckeon   MRN -  [id number]   Acct # - [de-identified]      - 1955    57 y.o. Admit Date: 2018  Hospital Day: 12  Location: [address] [place] [address]  Date of evaluation -  2018    Subjective:   CC: edema  Denies sob. Room air   BP varied, but ok  Eating well  Feels good  Objective:   VITALS:  BP (!) 140/66   Pulse 69   Temp 97.8 °F (36.6 °C) (Oral)   Resp 16   Ht 5' 5\" (1.651 m)   Wt 179 lb 1.6 oz (81.2 kg)   SpO2 96%   BMI 29.80 kg/m²    Patient Vitals for the past 24 hrs:   BP Temp Temp src Pulse Resp SpO2 Weight   18 0616 (!) 140/66 - - 69 - - -   18 0600 - - - - - - 179 lb 1.6 oz (81.2 kg)   18 2004 (!) 181/77 97.8 °F (36.6 °C) Oral 69 16 96 % -   18 1729 (!) 156/70 - - 68 - - -   18 1406 (!) 151/67 - - 70 - - -   18 0826 (!) 156/58 98.2 °F (36.8 °C) Oral 66 17 96 % -     0 L/min  Patient Vitals for the past 96 hrs (Last 3 readings):   Weight   18 0600 179 lb 1.6 oz (81.2 kg)   18 0608 178 lb 6.4 oz (80.9 kg)   18 0200 181 lb 3 oz (82.2 kg)       Intake/Output Summary (Last 24 hours) at 18 0811  Last data filed at 18 0600   Gross per 24 hour   Intake             1075 ml   Output                0 ml   Net             1075 ml       EXAM:  CONSTITUTIONAL:  No acute distress. Lying comfortable in bed. Pleasant  HEENT:  Head is normocephalic, Extraocular movement intact. Neck is supple. Voice is clear. CARDIOVASCULAR:  S1, S2  regular rate and rhythm. RESPIRATORY: Clear to ausculation bilaterally. Equal breath sounds. No wheezes. No shortness of breath noted at rest.  ABDOMEN: soft, non tender  NEUROLOGICAL: Patient is alert and oriented to person, place, and time. Recent and remote memory intact. Thought is coherant. SKIN: no rash, No significant bruises on exposed surfaces  MUSCULOSKELETAL: Movement is coordinated.  Moves all extremities   EXTREMITIES: Distal lower extremity temp is warm, No

## 2018-11-28 DIAGNOSIS — I10 ESSENTIAL HYPERTENSION: ICD-10-CM

## 2018-11-28 RX ORDER — CYANOCOBALAMIN/FOLIC AC/VIT B6 1-2.5-25MG
TABLET ORAL
Qty: 30 TABLET | Refills: 5 | Status: SHIPPED | OUTPATIENT
Start: 2018-11-28 | End: 2019-05-24 | Stop reason: SDUPTHER

## 2018-11-30 ENCOUNTER — HOSPITAL ENCOUNTER (OUTPATIENT)
Dept: CT IMAGING | Age: 63
Discharge: HOME | End: 2018-11-30
Payer: MEDICARE

## 2018-11-30 DIAGNOSIS — Z87.891 PERSONAL HISTORY OF TOBACCO USE: ICD-10-CM

## 2018-11-30 DIAGNOSIS — J44.9 STAGE 3 SEVERE COPD BY GOLD CLASSIFICATION (HCC): ICD-10-CM

## 2018-11-30 PROCEDURE — G0297 LDCT FOR LUNG CA SCREEN: HCPCS

## 2018-12-03 ENCOUNTER — TELEPHONE (OUTPATIENT)
Dept: FAMILY MEDICINE CLINIC | Age: 63
End: 2018-12-03

## 2018-12-03 DIAGNOSIS — R30.9 URINARY PAIN: Primary | ICD-10-CM

## 2018-12-03 DIAGNOSIS — N89.8 VAGINAL ITCHING: ICD-10-CM

## 2018-12-03 NOTE — TELEPHONE ENCOUNTER
Received a call from Aditi Palafox that was there to see Virgilio Day and she said the Virgilio Day has been complaining of dizzy spells, lower BP and not feeling well over all. She is having some itching and burning with urination. Pended urinalysis with micro and a urine culture. Please approve or deny.

## 2018-12-04 ENCOUNTER — CARE COORDINATION (OUTPATIENT)
Dept: CASE MANAGEMENT | Age: 63
End: 2018-12-04

## 2018-12-04 ENCOUNTER — OFFICE VISIT (OUTPATIENT)
Dept: FAMILY MEDICINE CLINIC | Age: 63
End: 2018-12-04
Payer: MEDICARE

## 2018-12-04 VITALS
OXYGEN SATURATION: 99 % | WEIGHT: 174.2 LBS | TEMPERATURE: 97.9 F | SYSTOLIC BLOOD PRESSURE: 110 MMHG | HEIGHT: 65 IN | DIASTOLIC BLOOD PRESSURE: 62 MMHG | HEART RATE: 96 BPM | BODY MASS INDEX: 29.02 KG/M2

## 2018-12-04 DIAGNOSIS — I95.9 HYPOTENSION, UNSPECIFIED HYPOTENSION TYPE: ICD-10-CM

## 2018-12-04 DIAGNOSIS — I10 HYPERTENSION, UNSPECIFIED TYPE: ICD-10-CM

## 2018-12-04 DIAGNOSIS — E78.5 HYPERLIPIDEMIA, UNSPECIFIED HYPERLIPIDEMIA TYPE: ICD-10-CM

## 2018-12-04 DIAGNOSIS — N39.0 URINARY TRACT INFECTION IN FEMALE: Primary | ICD-10-CM

## 2018-12-04 DIAGNOSIS — B37.49 CANDIDA UTI: Primary | ICD-10-CM

## 2018-12-04 DIAGNOSIS — R42 DIZZINESS: ICD-10-CM

## 2018-12-04 LAB
BILIRUBIN URINE: NEGATIVE
BLOOD URINE, POC: ABNORMAL
CHARACTER, URINE: ABNORMAL
COLOR, URINE: ABNORMAL
GFR SERPL CREATININE-BSD FRML MDRD: 10 ML/MIN/1.73M2
GFR SERPL CREATININE-BSD FRML MDRD: 8 ML/MIN/1.73M2
GFR SERPL CREATININE-BSD FRML MDRD: 8 ML/MIN/1.73M2
GLUCOSE URINE: NEGATIVE MG/DL
KETONES, URINE: ABNORMAL
LEUKOCYTE CLUMPS, URINE: ABNORMAL
NITRITE, URINE: NEGATIVE
PH, URINE: 5.5
PROTEIN, URINE: >= 300 MG/DL
SPECIFIC GRAVITY, URINE: 1.02 (ref 1–1.03)
UROBILINOGEN, URINE: 0.2 EU/DL

## 2018-12-04 PROCEDURE — 99214 OFFICE O/P EST MOD 30 MIN: CPT | Performed by: NURSE PRACTITIONER

## 2018-12-04 PROCEDURE — 1111F DSCHRG MED/CURRENT MED MERGE: CPT | Performed by: NURSE PRACTITIONER

## 2018-12-04 RX ORDER — ATORVASTATIN CALCIUM 20 MG/1
20 TABLET, FILM COATED ORAL DAILY
Qty: 60 TABLET | Refills: 0 | Status: SHIPPED | OUTPATIENT
Start: 2018-12-04 | End: 2019-01-30 | Stop reason: SDUPTHER

## 2018-12-04 RX ORDER — AMLODIPINE BESYLATE 10 MG/1
10 TABLET ORAL DAILY
Qty: 30 TABLET | Refills: 3 | Status: SHIPPED | OUTPATIENT
Start: 2018-12-04 | End: 2019-04-14 | Stop reason: SDUPTHER

## 2018-12-04 RX ORDER — CIPROFLOXACIN 250 MG/1
250 TABLET, FILM COATED ORAL DAILY
Qty: 3 TABLET | Refills: 0 | Status: SHIPPED | OUTPATIENT
Start: 2018-12-04 | End: 2019-03-13 | Stop reason: SDUPTHER

## 2018-12-04 ASSESSMENT — ENCOUNTER SYMPTOMS
ABDOMINAL PAIN: 0
SHORTNESS OF BREATH: 0
PHOTOPHOBIA: 0

## 2018-12-06 ENCOUNTER — HOSPITAL ENCOUNTER (OUTPATIENT)
Age: 63
Discharge: HOME | End: 2018-12-06
Payer: MEDICARE

## 2018-12-06 LAB
CHOLESTEROL, TOTAL: 115 MG/DL (ref 100–199)
HDLC SERPL-MCNC: 60 MG/DL
LDL CHOLESTEROL CALCULATED: 36 MG/DL
TRIGL SERPL-MCNC: 96 MG/DL (ref 0–199)

## 2018-12-06 PROCEDURE — 36415 COLL VENOUS BLD VENIPUNCTURE: CPT

## 2018-12-06 PROCEDURE — 80061 LIPID PANEL: CPT

## 2018-12-07 ENCOUNTER — TELEPHONE (OUTPATIENT)
Dept: FAMILY MEDICINE CLINIC | Age: 63
End: 2018-12-07

## 2018-12-07 LAB
ORGANISM: ABNORMAL
URINE CULTURE, ROUTINE: ABNORMAL

## 2018-12-13 ENCOUNTER — HOSPITAL ENCOUNTER (OUTPATIENT)
Age: 63
Discharge: HOME | End: 2018-12-13
Payer: MEDICARE

## 2018-12-13 ENCOUNTER — TELEPHONE (OUTPATIENT)
Dept: FAMILY MEDICINE CLINIC | Age: 63
End: 2018-12-13

## 2018-12-13 DIAGNOSIS — N39.0 URINARY TRACT INFECTION IN FEMALE: ICD-10-CM

## 2018-12-13 DIAGNOSIS — R31.9 HEMATURIA, UNSPECIFIED TYPE: Primary | ICD-10-CM

## 2018-12-13 LAB
BACTERIA: ABNORMAL
BILIRUBIN URINE: NEGATIVE
BLOOD, URINE: ABNORMAL
CASTS: ABNORMAL /LPF
CASTS: ABNORMAL /LPF
CHARACTER, URINE: ABNORMAL
COLOR: YELLOW
CRYSTALS: ABNORMAL
EPITHELIAL CELLS, UA: ABNORMAL /HPF
GLUCOSE, URINE: NEGATIVE MG/DL
KETONES, URINE: NEGATIVE
LEUKOCYTE ESTERASE, URINE: NEGATIVE
MISCELLANEOUS LAB TEST RESULT: ABNORMAL
NITRITE, URINE: NEGATIVE
PH UA: 7.5
PROTEIN UA: 100 MG/DL
RBC URINE: ABNORMAL /HPF
RENAL EPITHELIAL, UA: ABNORMAL
SPECIFIC GRAVITY UA: 1.01 (ref 1–1.03)
UROBILINOGEN, URINE: 0.2 EU/DL
WBC UA: ABNORMAL /HPF
YEAST: ABNORMAL

## 2018-12-13 PROCEDURE — 81001 URINALYSIS AUTO W/SCOPE: CPT

## 2018-12-14 ENCOUNTER — TELEPHONE (OUTPATIENT)
Dept: FAMILY MEDICINE CLINIC | Age: 63
End: 2018-12-14

## 2018-12-14 LAB — URINE CULTURE, ROUTINE: NORMAL

## 2018-12-17 ENCOUNTER — OFFICE VISIT (OUTPATIENT)
Dept: PHYSICAL MEDICINE AND REHAB | Age: 63
End: 2018-12-17
Payer: MEDICARE

## 2018-12-17 VITALS
WEIGHT: 174 LBS | HEIGHT: 65 IN | DIASTOLIC BLOOD PRESSURE: 74 MMHG | BODY MASS INDEX: 28.99 KG/M2 | HEART RATE: 60 BPM | SYSTOLIC BLOOD PRESSURE: 139 MMHG

## 2018-12-17 DIAGNOSIS — G89.29 OTHER CHRONIC PAIN: ICD-10-CM

## 2018-12-17 DIAGNOSIS — E11.42 DIABETIC POLYNEUROPATHY ASSOCIATED WITH TYPE 2 DIABETES MELLITUS (HCC): Primary | ICD-10-CM

## 2018-12-17 PROCEDURE — G8598 ASA/ANTIPLAT THER USED: HCPCS | Performed by: NURSE PRACTITIONER

## 2018-12-17 PROCEDURE — 1123F ACP DISCUSS/DSCN MKR DOCD: CPT | Performed by: NURSE PRACTITIONER

## 2018-12-17 PROCEDURE — G8482 FLU IMMUNIZE ORDER/ADMIN: HCPCS | Performed by: NURSE PRACTITIONER

## 2018-12-17 PROCEDURE — 3044F HG A1C LEVEL LT 7.0%: CPT | Performed by: NURSE PRACTITIONER

## 2018-12-17 PROCEDURE — G8400 PT W/DXA NO RESULTS DOC: HCPCS | Performed by: NURSE PRACTITIONER

## 2018-12-17 PROCEDURE — G8427 DOCREV CUR MEDS BY ELIG CLIN: HCPCS | Performed by: NURSE PRACTITIONER

## 2018-12-17 PROCEDURE — 99213 OFFICE O/P EST LOW 20 MIN: CPT | Performed by: NURSE PRACTITIONER

## 2018-12-17 PROCEDURE — G8417 CALC BMI ABV UP PARAM F/U: HCPCS | Performed by: NURSE PRACTITIONER

## 2018-12-17 PROCEDURE — 1101F PT FALLS ASSESS-DOCD LE1/YR: CPT | Performed by: NURSE PRACTITIONER

## 2018-12-17 PROCEDURE — 1090F PRES/ABSN URINE INCON ASSESS: CPT | Performed by: NURSE PRACTITIONER

## 2018-12-17 PROCEDURE — 1036F TOBACCO NON-USER: CPT | Performed by: NURSE PRACTITIONER

## 2018-12-17 PROCEDURE — 4040F PNEUMOC VAC/ADMIN/RCVD: CPT | Performed by: NURSE PRACTITIONER

## 2018-12-17 PROCEDURE — 2022F DILAT RTA XM EVC RTNOPTHY: CPT | Performed by: NURSE PRACTITIONER

## 2018-12-17 PROCEDURE — 3017F COLORECTAL CA SCREEN DOC REV: CPT | Performed by: NURSE PRACTITIONER

## 2018-12-17 PROCEDURE — 1111F DSCHRG MED/CURRENT MED MERGE: CPT | Performed by: NURSE PRACTITIONER

## 2018-12-17 RX ORDER — HYDROCODONE BITARTRATE AND ACETAMINOPHEN 5; 325 MG/1; MG/1
1 TABLET ORAL EVERY 8 HOURS PRN
Qty: 90 TABLET | Refills: 0 | Status: SHIPPED | OUTPATIENT
Start: 2018-12-17 | End: 2019-01-16

## 2018-12-17 RX ORDER — HYDROCODONE BITARTRATE AND ACETAMINOPHEN 5; 325 MG/1; MG/1
1 TABLET ORAL EVERY 8 HOURS PRN
Qty: 90 TABLET | Refills: 0 | Status: SHIPPED | OUTPATIENT
Start: 2018-12-17 | End: 2019-01-08 | Stop reason: SDUPTHER

## 2018-12-17 NOTE — PROGRESS NOTES
Edema: 2+ right ankle, 1+ left ankle      Impression:  · Right ankle fracture with ongoing ankle pain  · Peripheral neuropathy with neuralgia and numbness/tingling related to diabetes  · Lumbar spondylosis with worsening low back pain  · Gait disturbance  · Sciatica    Plan:  · Ankle stabilizing orthosis right ankle, continue as needed  · Lidocaine ointment as needed- refills given  · Continue with Norco as needed for pain- refills given    Will continue to monitor any benefits vs side effects of the medications as prescribed. The patient has been warned about the risk of operating machinery including driving if impaired in any way by these medications. The patient also accepts the risks of tolerance, dependency, or addiction related to the prescribed medications. All questions were answered. Reevaluation as planned, or sooner if requested. Controlled Substances Monitoring: Attestation: The Prescription Monitoring Report for this patient was reviewed today. DONOVAN Vega - DAVE)  Documentation: Possible medication side effects, risk of tolerance/dependence & alternative treatments discussed., No signs of potential drug abuse or diversion identified. DONOVAN Vega - DAVE)    Return in about 3 months (around 3/17/2019). It was my pleasure to evaluate Mullen Pleas today. Please call with any concerns or questions.   15 minutes spent in evaluation efforts    DONOVAN Griffith - DAVE

## 2018-12-21 ENCOUNTER — OFFICE VISIT (OUTPATIENT)
Dept: PULMONOLOGY | Age: 63
End: 2018-12-21
Payer: MEDICARE

## 2018-12-21 VITALS
SYSTOLIC BLOOD PRESSURE: 130 MMHG | TEMPERATURE: 97.4 F | OXYGEN SATURATION: 98 % | DIASTOLIC BLOOD PRESSURE: 82 MMHG | HEART RATE: 70 BPM | BODY MASS INDEX: 29.49 KG/M2 | RESPIRATION RATE: 18 BRPM | HEIGHT: 65 IN | WEIGHT: 177 LBS

## 2018-12-21 DIAGNOSIS — J44.9 STAGE 3 SEVERE COPD BY GOLD CLASSIFICATION (HCC): ICD-10-CM

## 2018-12-21 DIAGNOSIS — J90 PLEURAL EFFUSION, RIGHT: Primary | ICD-10-CM

## 2018-12-21 DIAGNOSIS — Z87.891 PERSONAL HISTORY OF TOBACCO USE, PRESENTING HAZARDS TO HEALTH: ICD-10-CM

## 2018-12-21 PROCEDURE — 1101F PT FALLS ASSESS-DOCD LE1/YR: CPT | Performed by: INTERNAL MEDICINE

## 2018-12-21 PROCEDURE — 1036F TOBACCO NON-USER: CPT | Performed by: INTERNAL MEDICINE

## 2018-12-21 PROCEDURE — 3023F SPIROM DOC REV: CPT | Performed by: INTERNAL MEDICINE

## 2018-12-21 PROCEDURE — 1123F ACP DISCUSS/DSCN MKR DOCD: CPT | Performed by: INTERNAL MEDICINE

## 2018-12-21 PROCEDURE — G8400 PT W/DXA NO RESULTS DOC: HCPCS | Performed by: INTERNAL MEDICINE

## 2018-12-21 PROCEDURE — G8417 CALC BMI ABV UP PARAM F/U: HCPCS | Performed by: INTERNAL MEDICINE

## 2018-12-21 PROCEDURE — 3017F COLORECTAL CA SCREEN DOC REV: CPT | Performed by: INTERNAL MEDICINE

## 2018-12-21 PROCEDURE — 99214 OFFICE O/P EST MOD 30 MIN: CPT | Performed by: INTERNAL MEDICINE

## 2018-12-21 PROCEDURE — G8598 ASA/ANTIPLAT THER USED: HCPCS | Performed by: INTERNAL MEDICINE

## 2018-12-21 PROCEDURE — 1090F PRES/ABSN URINE INCON ASSESS: CPT | Performed by: INTERNAL MEDICINE

## 2018-12-21 PROCEDURE — 4040F PNEUMOC VAC/ADMIN/RCVD: CPT | Performed by: INTERNAL MEDICINE

## 2018-12-21 PROCEDURE — 1111F DSCHRG MED/CURRENT MED MERGE: CPT | Performed by: INTERNAL MEDICINE

## 2018-12-21 PROCEDURE — G8926 SPIRO NO PERF OR DOC: HCPCS | Performed by: INTERNAL MEDICINE

## 2018-12-21 PROCEDURE — G8482 FLU IMMUNIZE ORDER/ADMIN: HCPCS | Performed by: INTERNAL MEDICINE

## 2018-12-21 PROCEDURE — G8427 DOCREV CUR MEDS BY ELIG CLIN: HCPCS | Performed by: INTERNAL MEDICINE

## 2018-12-21 NOTE — PATIENT INSTRUCTIONS
-Continue current Albuterol HFA 2puffs  Q6Hprn.  -Continue Spiriva 18mcg/Cap DPI. 1Cap via inhalation daily.  -Continue Flonase 50mcg 2 sprays daily. [place] Refills given on Flonase for 1year in mail in format.  -Patient educated to update his pneumococcal vaccine with family physician and take influenza vaccine in coming season with out fail. Patient verbalizes understanding.  -Schedule patient for chest X-ray PA Lateral and right decubiti views in 2 to 3 months to follow abnormal chest CT with right side pleural effusion . Chest Xray need to be done 2days before clinic visit.  -Please schedule follow up appointment with Dr. Chris Shields cardiology to follow and manage pericardial effusion noted on the previous Echocardiogram and latest CT chest.  -She was advised to keep appt with [facility] sleep clinic as scheduled.  -Schedule patient for low dose CT scan of chest with out IV contrast as recommended by the  U.S. Preventive Services Task Force and the NCCN for lung Cancer Screening in 1year.   -Freda Garcia verbalizes understanding and agrees with the plan of care. - Schedule patient for follow up with my clinic in 3months with chest Xray and 1 year with low dose CT chest along with spirometry before clinic visit Patient advised to make early appointment if needed.  -Patient advised to continue current inhalers and keep good compliance. Patient verbalizes understanding.

## 2019-01-08 ENCOUNTER — OFFICE VISIT (OUTPATIENT)
Dept: FAMILY MEDICINE CLINIC | Age: 64
End: 2019-01-08
Payer: MEDICARE

## 2019-01-08 VITALS
SYSTOLIC BLOOD PRESSURE: 150 MMHG | HEART RATE: 66 BPM | OXYGEN SATURATION: 100 % | DIASTOLIC BLOOD PRESSURE: 50 MMHG | RESPIRATION RATE: 12 BRPM | HEIGHT: 65 IN | TEMPERATURE: 98.4 F | BODY MASS INDEX: 28.69 KG/M2 | WEIGHT: 172.2 LBS

## 2019-01-08 DIAGNOSIS — E11.42 DIABETIC POLYNEUROPATHY ASSOCIATED WITH TYPE 2 DIABETES MELLITUS (HCC): ICD-10-CM

## 2019-01-08 DIAGNOSIS — N18.5 TYPE II DIABETES MELLITUS WITH STAGE 5 CHRONIC KIDNEY DISEASE (HCC): ICD-10-CM

## 2019-01-08 DIAGNOSIS — Z78.0 POST-MENOPAUSAL: Primary | ICD-10-CM

## 2019-01-08 DIAGNOSIS — Z12.11 SCREEN FOR COLON CANCER: ICD-10-CM

## 2019-01-08 DIAGNOSIS — E11.22 TYPE II DIABETES MELLITUS WITH STAGE 5 CHRONIC KIDNEY DISEASE (HCC): ICD-10-CM

## 2019-01-08 DIAGNOSIS — J44.9 COPD, MILD (HCC): ICD-10-CM

## 2019-01-08 PROCEDURE — G8417 CALC BMI ABV UP PARAM F/U: HCPCS | Performed by: FAMILY MEDICINE

## 2019-01-08 PROCEDURE — 1036F TOBACCO NON-USER: CPT | Performed by: FAMILY MEDICINE

## 2019-01-08 PROCEDURE — G8598 ASA/ANTIPLAT THER USED: HCPCS | Performed by: FAMILY MEDICINE

## 2019-01-08 PROCEDURE — 2022F DILAT RTA XM EVC RTNOPTHY: CPT | Performed by: FAMILY MEDICINE

## 2019-01-08 PROCEDURE — 99214 OFFICE O/P EST MOD 30 MIN: CPT | Performed by: FAMILY MEDICINE

## 2019-01-08 PROCEDURE — 1090F PRES/ABSN URINE INCON ASSESS: CPT | Performed by: FAMILY MEDICINE

## 2019-01-08 PROCEDURE — 3046F HEMOGLOBIN A1C LEVEL >9.0%: CPT | Performed by: FAMILY MEDICINE

## 2019-01-08 PROCEDURE — 3017F COLORECTAL CA SCREEN DOC REV: CPT | Performed by: FAMILY MEDICINE

## 2019-01-08 PROCEDURE — G8428 CUR MEDS NOT DOCUMENT: HCPCS | Performed by: FAMILY MEDICINE

## 2019-01-08 PROCEDURE — 1123F ACP DISCUSS/DSCN MKR DOCD: CPT | Performed by: FAMILY MEDICINE

## 2019-01-08 PROCEDURE — 4040F PNEUMOC VAC/ADMIN/RCVD: CPT | Performed by: FAMILY MEDICINE

## 2019-01-08 PROCEDURE — 1101F PT FALLS ASSESS-DOCD LE1/YR: CPT | Performed by: FAMILY MEDICINE

## 2019-01-08 PROCEDURE — G8926 SPIRO NO PERF OR DOC: HCPCS | Performed by: FAMILY MEDICINE

## 2019-01-08 PROCEDURE — G8482 FLU IMMUNIZE ORDER/ADMIN: HCPCS | Performed by: FAMILY MEDICINE

## 2019-01-08 PROCEDURE — 3023F SPIROM DOC REV: CPT | Performed by: FAMILY MEDICINE

## 2019-01-08 PROCEDURE — G8400 PT W/DXA NO RESULTS DOC: HCPCS | Performed by: FAMILY MEDICINE

## 2019-01-08 ASSESSMENT — PATIENT HEALTH QUESTIONNAIRE - PHQ9
2. FEELING DOWN, DEPRESSED OR HOPELESS: 1
SUM OF ALL RESPONSES TO PHQ QUESTIONS 1-9: 2
1. LITTLE INTEREST OR PLEASURE IN DOING THINGS: 1
SUM OF ALL RESPONSES TO PHQ QUESTIONS 1-9: 2
SUM OF ALL RESPONSES TO PHQ9 QUESTIONS 1 & 2: 2

## 2019-01-08 ASSESSMENT — ENCOUNTER SYMPTOMS
BLOOD IN STOOL: 0
DIARRHEA: 0
SHORTNESS OF BREATH: 0
ABDOMINAL PAIN: 0
TROUBLE SWALLOWING: 0
EYE PAIN: 0
CONSTIPATION: 0
VOMITING: 0
COUGH: 0
NAUSEA: 0

## 2019-01-09 ENCOUNTER — OFFICE VISIT (OUTPATIENT)
Dept: PULMONOLOGY | Age: 64
End: 2019-01-09
Payer: MEDICARE

## 2019-01-09 VITALS
BODY MASS INDEX: 29.59 KG/M2 | SYSTOLIC BLOOD PRESSURE: 126 MMHG | WEIGHT: 177.6 LBS | HEIGHT: 65 IN | OXYGEN SATURATION: 94 % | HEART RATE: 67 BPM | DIASTOLIC BLOOD PRESSURE: 72 MMHG

## 2019-01-09 DIAGNOSIS — N18.5 CKD (CHRONIC KIDNEY DISEASE), STAGE V (HCC): ICD-10-CM

## 2019-01-09 DIAGNOSIS — G47.33 OBSTRUCTIVE SLEEP APNEA ON CPAP: Primary | ICD-10-CM

## 2019-01-09 DIAGNOSIS — J44.9 COPD, MILD (HCC): ICD-10-CM

## 2019-01-09 DIAGNOSIS — Z99.89 OBSTRUCTIVE SLEEP APNEA ON CPAP: Primary | ICD-10-CM

## 2019-01-09 PROBLEM — E11.9 TYPE 2 DIABETES MELLITUS (HCC): Status: RESOLVED | Noted: 2018-06-21 | Resolved: 2019-01-09

## 2019-01-09 PROBLEM — N18.4 CKD (CHRONIC KIDNEY DISEASE), STAGE IV (HCC): Status: RESOLVED | Noted: 2017-08-10 | Resolved: 2019-01-09

## 2019-01-09 PROBLEM — E11.9 TYPE 2 DIABETES MELLITUS (HCC): Status: RESOLVED | Noted: 2017-02-09 | Resolved: 2019-01-09

## 2019-01-09 PROCEDURE — 1036F TOBACCO NON-USER: CPT | Performed by: PHYSICIAN ASSISTANT

## 2019-01-09 PROCEDURE — G8400 PT W/DXA NO RESULTS DOC: HCPCS | Performed by: PHYSICIAN ASSISTANT

## 2019-01-09 PROCEDURE — G8482 FLU IMMUNIZE ORDER/ADMIN: HCPCS | Performed by: PHYSICIAN ASSISTANT

## 2019-01-09 PROCEDURE — G8427 DOCREV CUR MEDS BY ELIG CLIN: HCPCS | Performed by: PHYSICIAN ASSISTANT

## 2019-01-09 PROCEDURE — 4040F PNEUMOC VAC/ADMIN/RCVD: CPT | Performed by: PHYSICIAN ASSISTANT

## 2019-01-09 PROCEDURE — 3023F SPIROM DOC REV: CPT | Performed by: PHYSICIAN ASSISTANT

## 2019-01-09 PROCEDURE — 1123F ACP DISCUSS/DSCN MKR DOCD: CPT | Performed by: PHYSICIAN ASSISTANT

## 2019-01-09 PROCEDURE — 1090F PRES/ABSN URINE INCON ASSESS: CPT | Performed by: PHYSICIAN ASSISTANT

## 2019-01-09 PROCEDURE — 3017F COLORECTAL CA SCREEN DOC REV: CPT | Performed by: PHYSICIAN ASSISTANT

## 2019-01-09 PROCEDURE — 99214 OFFICE O/P EST MOD 30 MIN: CPT | Performed by: PHYSICIAN ASSISTANT

## 2019-01-09 PROCEDURE — G8598 ASA/ANTIPLAT THER USED: HCPCS | Performed by: PHYSICIAN ASSISTANT

## 2019-01-09 PROCEDURE — 1101F PT FALLS ASSESS-DOCD LE1/YR: CPT | Performed by: PHYSICIAN ASSISTANT

## 2019-01-09 PROCEDURE — G8417 CALC BMI ABV UP PARAM F/U: HCPCS | Performed by: PHYSICIAN ASSISTANT

## 2019-01-09 PROCEDURE — G8926 SPIRO NO PERF OR DOC: HCPCS | Performed by: PHYSICIAN ASSISTANT

## 2019-01-09 ASSESSMENT — ENCOUNTER SYMPTOMS
NAUSEA: 0
DIARRHEA: 0
CHEST TIGHTNESS: 0
ALLERGIC/IMMUNOLOGIC NEGATIVE: 1
WHEEZING: 0
STRIDOR: 0
SHORTNESS OF BREATH: 0
BACK PAIN: 0
EYES NEGATIVE: 1
COUGH: 0

## 2019-01-16 ENCOUNTER — HOSPITAL ENCOUNTER (OUTPATIENT)
Dept: WOMENS IMAGING | Age: 64
Discharge: HOME | End: 2019-01-16
Payer: MEDICARE

## 2019-01-16 DIAGNOSIS — Z78.0 POST-MENOPAUSAL: ICD-10-CM

## 2019-01-16 DIAGNOSIS — Z12.31 VISIT FOR SCREENING MAMMOGRAM: ICD-10-CM

## 2019-01-16 PROCEDURE — 77080 DXA BONE DENSITY AXIAL: CPT

## 2019-01-16 PROCEDURE — 77063 BREAST TOMOSYNTHESIS BI: CPT

## 2019-01-17 ENCOUNTER — TELEPHONE (OUTPATIENT)
Dept: FAMILY MEDICINE CLINIC | Age: 64
End: 2019-01-17

## 2019-01-28 ENCOUNTER — TELEPHONE (OUTPATIENT)
Dept: FAMILY MEDICINE CLINIC | Age: 64
End: 2019-01-28

## 2019-01-28 DIAGNOSIS — Z12.11 SCREEN FOR COLON CANCER: ICD-10-CM

## 2019-01-28 LAB
CONTROL: NORMAL
HEMOCCULT STL QL: NORMAL

## 2019-01-28 PROCEDURE — 82274 ASSAY TEST FOR BLOOD FECAL: CPT | Performed by: FAMILY MEDICINE

## 2019-01-30 RX ORDER — ATORVASTATIN CALCIUM 20 MG/1
TABLET, FILM COATED ORAL
Qty: 60 TABLET | Refills: 1 | Status: SHIPPED | OUTPATIENT
Start: 2019-01-30 | End: 2019-07-29 | Stop reason: SDUPTHER

## 2019-02-13 ENCOUNTER — OFFICE VISIT (OUTPATIENT)
Dept: INTERNAL MEDICINE CLINIC | Age: 64
End: 2019-02-13
Payer: MEDICARE

## 2019-02-13 VITALS
RESPIRATION RATE: 16 BRPM | WEIGHT: 174 LBS | HEART RATE: 70 BPM | DIASTOLIC BLOOD PRESSURE: 70 MMHG | HEIGHT: 65 IN | SYSTOLIC BLOOD PRESSURE: 120 MMHG | BODY MASS INDEX: 28.99 KG/M2

## 2019-02-13 DIAGNOSIS — E11.9 TYPE 2 DIABETES MELLITUS WITHOUT COMPLICATION, WITH LONG-TERM CURRENT USE OF INSULIN (HCC): ICD-10-CM

## 2019-02-13 DIAGNOSIS — I10 ESSENTIAL HYPERTENSION: ICD-10-CM

## 2019-02-13 DIAGNOSIS — Z79.4 TYPE 2 DIABETES MELLITUS WITHOUT COMPLICATION, WITH LONG-TERM CURRENT USE OF INSULIN (HCC): ICD-10-CM

## 2019-02-13 LAB — HBA1C MFR BLD: 6.4 % (ref 4.3–5.7)

## 2019-02-13 PROCEDURE — 83036 HEMOGLOBIN GLYCOSYLATED A1C: CPT | Performed by: NURSE PRACTITIONER

## 2019-02-13 PROCEDURE — 99213 OFFICE O/P EST LOW 20 MIN: CPT | Performed by: NURSE PRACTITIONER

## 2019-02-13 RX ORDER — NITROGLYCERIN 0.4 MG/1
0.4 TABLET SUBLINGUAL EVERY 5 MIN PRN
Qty: 25 TABLET | Refills: 5 | Status: SHIPPED | OUTPATIENT
Start: 2019-02-13 | End: 2021-02-03 | Stop reason: SDUPTHER

## 2019-03-01 ENCOUNTER — CARE COORDINATION (OUTPATIENT)
Dept: CARE COORDINATION | Age: 64
End: 2019-03-01

## 2019-03-13 ENCOUNTER — NURSE ONLY (OUTPATIENT)
Dept: FAMILY MEDICINE CLINIC | Age: 64
End: 2019-03-13

## 2019-03-13 DIAGNOSIS — R30.9 URINARY PAIN: ICD-10-CM

## 2019-03-13 DIAGNOSIS — N30.90 CYSTITIS: Primary | ICD-10-CM

## 2019-03-13 DIAGNOSIS — N39.0 URINARY TRACT INFECTION IN FEMALE: Primary | ICD-10-CM

## 2019-03-13 DIAGNOSIS — R31.9 HEMATURIA, UNSPECIFIED TYPE: ICD-10-CM

## 2019-03-13 LAB
BILIRUBIN URINE: NEGATIVE
BLOOD URINE, POC: ABNORMAL
CHARACTER, URINE: ABNORMAL
COLOR, URINE: YELLOW
GLUCOSE URINE: NEGATIVE MG/DL
KETONES, URINE: NEGATIVE
LEUKOCYTE CLUMPS, URINE: ABNORMAL
NITRITE, URINE: NEGATIVE
PH, URINE: 7 (ref 5–9)
PROTEIN, URINE: >= 300 MG/DL
SPECIFIC GRAVITY, URINE: 1.02 (ref 1–1.03)
UROBILINOGEN, URINE: 0.2 EU/DL (ref 0–1)

## 2019-03-13 RX ORDER — CIPROFLOXACIN 250 MG/1
250 TABLET, FILM COATED ORAL 2 TIMES DAILY
Qty: 10 TABLET | Refills: 0 | Status: SHIPPED | OUTPATIENT
Start: 2019-03-13 | End: 2019-03-18

## 2019-03-13 RX ORDER — LACTULOSE 10 G/15ML
SOLUTION ORAL
Qty: 473 ML | Refills: 0 | Status: SHIPPED | OUTPATIENT
Start: 2019-03-13 | End: 2019-04-10 | Stop reason: SDUPTHER

## 2019-03-15 ENCOUNTER — HOSPITAL ENCOUNTER (OUTPATIENT)
Age: 64
Discharge: HOME | End: 2019-03-15
Payer: MEDICARE

## 2019-03-15 ENCOUNTER — HOSPITAL ENCOUNTER (OUTPATIENT)
Dept: GENERAL RADIOLOGY | Age: 64
Discharge: HOME | End: 2019-03-15
Payer: MEDICARE

## 2019-03-15 ENCOUNTER — TELEPHONE (OUTPATIENT)
Dept: FAMILY MEDICINE CLINIC | Age: 64
End: 2019-03-15

## 2019-03-15 DIAGNOSIS — J90 PLEURAL EFFUSION, RIGHT: ICD-10-CM

## 2019-03-15 DIAGNOSIS — Z87.891 PERSONAL HISTORY OF TOBACCO USE, PRESENTING HAZARDS TO HEALTH: ICD-10-CM

## 2019-03-15 DIAGNOSIS — J44.9 STAGE 3 SEVERE COPD BY GOLD CLASSIFICATION (HCC): ICD-10-CM

## 2019-03-15 LAB
ORGANISM: ABNORMAL
URINE CULTURE, ROUTINE: ABNORMAL

## 2019-03-15 PROCEDURE — 71045 X-RAY EXAM CHEST 1 VIEW: CPT

## 2019-03-15 PROCEDURE — 71046 X-RAY EXAM CHEST 2 VIEWS: CPT

## 2019-03-18 ENCOUNTER — OFFICE VISIT (OUTPATIENT)
Dept: PHYSICAL MEDICINE AND REHAB | Age: 64
End: 2019-03-18
Payer: MEDICARE

## 2019-03-18 VITALS
HEIGHT: 65 IN | DIASTOLIC BLOOD PRESSURE: 71 MMHG | BODY MASS INDEX: 28.98 KG/M2 | WEIGHT: 173.94 LBS | HEART RATE: 64 BPM | SYSTOLIC BLOOD PRESSURE: 178 MMHG

## 2019-03-18 DIAGNOSIS — G89.29 OTHER CHRONIC PAIN: ICD-10-CM

## 2019-03-18 DIAGNOSIS — E11.42 DIABETIC POLYNEUROPATHY ASSOCIATED WITH TYPE 2 DIABETES MELLITUS (HCC): Primary | ICD-10-CM

## 2019-03-18 PROCEDURE — 3044F HG A1C LEVEL LT 7.0%: CPT | Performed by: NURSE PRACTITIONER

## 2019-03-18 PROCEDURE — 3017F COLORECTAL CA SCREEN DOC REV: CPT | Performed by: NURSE PRACTITIONER

## 2019-03-18 PROCEDURE — 2022F DILAT RTA XM EVC RTNOPTHY: CPT | Performed by: NURSE PRACTITIONER

## 2019-03-18 PROCEDURE — 4040F PNEUMOC VAC/ADMIN/RCVD: CPT | Performed by: NURSE PRACTITIONER

## 2019-03-18 PROCEDURE — G8598 ASA/ANTIPLAT THER USED: HCPCS | Performed by: NURSE PRACTITIONER

## 2019-03-18 PROCEDURE — 1123F ACP DISCUSS/DSCN MKR DOCD: CPT | Performed by: NURSE PRACTITIONER

## 2019-03-18 PROCEDURE — G8417 CALC BMI ABV UP PARAM F/U: HCPCS | Performed by: NURSE PRACTITIONER

## 2019-03-18 PROCEDURE — G8399 PT W/DXA RESULTS DOCUMENT: HCPCS | Performed by: NURSE PRACTITIONER

## 2019-03-18 PROCEDURE — G8427 DOCREV CUR MEDS BY ELIG CLIN: HCPCS | Performed by: NURSE PRACTITIONER

## 2019-03-18 PROCEDURE — G8482 FLU IMMUNIZE ORDER/ADMIN: HCPCS | Performed by: NURSE PRACTITIONER

## 2019-03-18 PROCEDURE — 1090F PRES/ABSN URINE INCON ASSESS: CPT | Performed by: NURSE PRACTITIONER

## 2019-03-18 PROCEDURE — 1036F TOBACCO NON-USER: CPT | Performed by: NURSE PRACTITIONER

## 2019-03-18 PROCEDURE — 99213 OFFICE O/P EST LOW 20 MIN: CPT | Performed by: NURSE PRACTITIONER

## 2019-03-18 PROCEDURE — 1101F PT FALLS ASSESS-DOCD LE1/YR: CPT | Performed by: NURSE PRACTITIONER

## 2019-03-18 RX ORDER — HYDROCODONE BITARTRATE AND ACETAMINOPHEN 5; 325 MG/1; MG/1
1 TABLET ORAL EVERY 8 HOURS PRN
Qty: 90 TABLET | Refills: 0 | Status: SHIPPED | OUTPATIENT
Start: 2019-03-18 | End: 2019-04-17

## 2019-03-20 ENCOUNTER — OFFICE VISIT (OUTPATIENT)
Dept: CARDIOLOGY CLINIC | Age: 64
End: 2019-03-20
Payer: MEDICARE

## 2019-03-20 VITALS
SYSTOLIC BLOOD PRESSURE: 130 MMHG | BODY MASS INDEX: 29.49 KG/M2 | HEART RATE: 66 BPM | WEIGHT: 177 LBS | HEIGHT: 65 IN | DIASTOLIC BLOOD PRESSURE: 60 MMHG

## 2019-03-20 DIAGNOSIS — I25.10 CORONARY ARTERY DISEASE INVOLVING NATIVE CORONARY ARTERY OF NATIVE HEART WITHOUT ANGINA PECTORIS: ICD-10-CM

## 2019-03-20 DIAGNOSIS — I31.39 PERICARDIAL EFFUSION: Primary | ICD-10-CM

## 2019-03-20 PROCEDURE — 1036F TOBACCO NON-USER: CPT | Performed by: INTERNAL MEDICINE

## 2019-03-20 PROCEDURE — 3017F COLORECTAL CA SCREEN DOC REV: CPT | Performed by: INTERNAL MEDICINE

## 2019-03-20 PROCEDURE — 1101F PT FALLS ASSESS-DOCD LE1/YR: CPT | Performed by: INTERNAL MEDICINE

## 2019-03-20 PROCEDURE — 1123F ACP DISCUSS/DSCN MKR DOCD: CPT | Performed by: INTERNAL MEDICINE

## 2019-03-20 PROCEDURE — G8417 CALC BMI ABV UP PARAM F/U: HCPCS | Performed by: INTERNAL MEDICINE

## 2019-03-20 PROCEDURE — 4040F PNEUMOC VAC/ADMIN/RCVD: CPT | Performed by: INTERNAL MEDICINE

## 2019-03-20 PROCEDURE — G8427 DOCREV CUR MEDS BY ELIG CLIN: HCPCS | Performed by: INTERNAL MEDICINE

## 2019-03-20 PROCEDURE — G8598 ASA/ANTIPLAT THER USED: HCPCS | Performed by: INTERNAL MEDICINE

## 2019-03-20 PROCEDURE — G8399 PT W/DXA RESULTS DOCUMENT: HCPCS | Performed by: INTERNAL MEDICINE

## 2019-03-20 PROCEDURE — 99214 OFFICE O/P EST MOD 30 MIN: CPT | Performed by: INTERNAL MEDICINE

## 2019-03-20 PROCEDURE — 1090F PRES/ABSN URINE INCON ASSESS: CPT | Performed by: INTERNAL MEDICINE

## 2019-03-20 PROCEDURE — G8482 FLU IMMUNIZE ORDER/ADMIN: HCPCS | Performed by: INTERNAL MEDICINE

## 2019-03-24 DIAGNOSIS — E55.9 VITAMIN D DEFICIENCY: ICD-10-CM

## 2019-03-25 ENCOUNTER — OFFICE VISIT (OUTPATIENT)
Dept: PULMONOLOGY | Age: 64
End: 2019-03-25
Payer: MEDICARE

## 2019-03-25 VITALS
BODY MASS INDEX: 28.96 KG/M2 | HEART RATE: 67 BPM | OXYGEN SATURATION: 98 % | HEIGHT: 65 IN | WEIGHT: 173.8 LBS | RESPIRATION RATE: 18 BRPM | TEMPERATURE: 98.5 F | SYSTOLIC BLOOD PRESSURE: 128 MMHG | DIASTOLIC BLOOD PRESSURE: 78 MMHG

## 2019-03-25 DIAGNOSIS — J44.9 STAGE 3 SEVERE COPD BY GOLD CLASSIFICATION (HCC): ICD-10-CM

## 2019-03-25 DIAGNOSIS — R93.89 ABNORMAL CXR: ICD-10-CM

## 2019-03-25 DIAGNOSIS — J90 PLEURAL EFFUSION: Primary | ICD-10-CM

## 2019-03-25 PROCEDURE — 1123F ACP DISCUSS/DSCN MKR DOCD: CPT | Performed by: INTERNAL MEDICINE

## 2019-03-25 PROCEDURE — G8598 ASA/ANTIPLAT THER USED: HCPCS | Performed by: INTERNAL MEDICINE

## 2019-03-25 PROCEDURE — G8417 CALC BMI ABV UP PARAM F/U: HCPCS | Performed by: INTERNAL MEDICINE

## 2019-03-25 PROCEDURE — 1101F PT FALLS ASSESS-DOCD LE1/YR: CPT | Performed by: INTERNAL MEDICINE

## 2019-03-25 PROCEDURE — 4040F PNEUMOC VAC/ADMIN/RCVD: CPT | Performed by: INTERNAL MEDICINE

## 2019-03-25 PROCEDURE — 3023F SPIROM DOC REV: CPT | Performed by: INTERNAL MEDICINE

## 2019-03-25 PROCEDURE — 3017F COLORECTAL CA SCREEN DOC REV: CPT | Performed by: INTERNAL MEDICINE

## 2019-03-25 PROCEDURE — 1090F PRES/ABSN URINE INCON ASSESS: CPT | Performed by: INTERNAL MEDICINE

## 2019-03-25 PROCEDURE — 99214 OFFICE O/P EST MOD 30 MIN: CPT | Performed by: INTERNAL MEDICINE

## 2019-03-25 PROCEDURE — G8482 FLU IMMUNIZE ORDER/ADMIN: HCPCS | Performed by: INTERNAL MEDICINE

## 2019-03-25 PROCEDURE — G8926 SPIRO NO PERF OR DOC: HCPCS | Performed by: INTERNAL MEDICINE

## 2019-03-25 PROCEDURE — G8399 PT W/DXA RESULTS DOCUMENT: HCPCS | Performed by: INTERNAL MEDICINE

## 2019-03-25 PROCEDURE — 1036F TOBACCO NON-USER: CPT | Performed by: INTERNAL MEDICINE

## 2019-03-25 PROCEDURE — G8427 DOCREV CUR MEDS BY ELIG CLIN: HCPCS | Performed by: INTERNAL MEDICINE

## 2019-03-25 RX ORDER — ERGOCALCIFEROL 1.25 MG/1
CAPSULE ORAL
Qty: 4 CAPSULE | Refills: 5 | Status: SHIPPED | OUTPATIENT
Start: 2019-03-25 | End: 2019-12-30

## 2019-03-27 ENCOUNTER — HOSPITAL ENCOUNTER (OUTPATIENT)
Dept: NON INVASIVE DIAGNOSTICS | Age: 64
Discharge: HOME | End: 2019-03-27
Payer: MEDICARE

## 2019-03-27 DIAGNOSIS — I31.39 PERICARDIAL EFFUSION: ICD-10-CM

## 2019-03-27 PROCEDURE — 93307 TTE W/O DOPPLER COMPLETE: CPT

## 2019-04-10 RX ORDER — LACTULOSE 10 G/15ML
SOLUTION ORAL
Qty: 473 ML | Refills: 0 | Status: SHIPPED | OUTPATIENT
Start: 2019-04-10 | End: 2019-09-13 | Stop reason: SDUPTHER

## 2019-04-15 NOTE — TELEPHONE ENCOUNTER
Last visit- 1/8/2019  Next visit- 5/8/2019    Requested Prescriptions     Pending Prescriptions Disp Refills    amLODIPine (NORVASC) 10 MG tablet [Pharmacy Med Name: AMLODIPINE BESYLATE 10 MG TAB] 30 tablet 3     Sig: TAKE 1 TABLET BY MOUTH EVERY DAY

## 2019-04-16 RX ORDER — AMLODIPINE BESYLATE 10 MG/1
TABLET ORAL
Qty: 30 TABLET | Refills: 3 | Status: SHIPPED | OUTPATIENT
Start: 2019-04-16 | End: 2019-08-28 | Stop reason: SDUPTHER

## 2019-04-24 ENCOUNTER — TELEPHONE (OUTPATIENT)
Dept: FAMILY MEDICINE CLINIC | Age: 64
End: 2019-04-24

## 2019-04-24 NOTE — TELEPHONE ENCOUNTER
Shara Hobbs requesting fit test to be fax to her Dr. Rosy Dandy [phone] fax. Patient states no future questions or concerns at this time.

## 2019-05-08 ENCOUNTER — OFFICE VISIT (OUTPATIENT)
Dept: FAMILY MEDICINE CLINIC | Age: 64
End: 2019-05-08
Payer: MEDICARE

## 2019-05-08 VITALS
BODY MASS INDEX: 28.69 KG/M2 | HEART RATE: 52 BPM | SYSTOLIC BLOOD PRESSURE: 158 MMHG | HEIGHT: 65 IN | RESPIRATION RATE: 16 BRPM | OXYGEN SATURATION: 99 % | WEIGHT: 172.2 LBS | DIASTOLIC BLOOD PRESSURE: 80 MMHG

## 2019-05-08 DIAGNOSIS — J44.9 COPD, MILD (HCC): ICD-10-CM

## 2019-05-08 DIAGNOSIS — N18.6 END STAGE RENAL DISEASE DUE TO BENIGN HYPERTENSION (HCC): ICD-10-CM

## 2019-05-08 DIAGNOSIS — Z99.2 ESRD NEEDING DIALYSIS (HCC): ICD-10-CM

## 2019-05-08 DIAGNOSIS — N18.5 CKD (CHRONIC KIDNEY DISEASE), STAGE V (HCC): ICD-10-CM

## 2019-05-08 DIAGNOSIS — N18.6 ESRD NEEDING DIALYSIS (HCC): ICD-10-CM

## 2019-05-08 DIAGNOSIS — E11.42 DIABETIC POLYNEUROPATHY ASSOCIATED WITH TYPE 2 DIABETES MELLITUS (HCC): Primary | ICD-10-CM

## 2019-05-08 DIAGNOSIS — N25.81 SECONDARY HYPERPARATHYROIDISM OF RENAL ORIGIN (HCC): ICD-10-CM

## 2019-05-08 DIAGNOSIS — I12.0 END STAGE RENAL DISEASE DUE TO BENIGN HYPERTENSION (HCC): ICD-10-CM

## 2019-05-08 PROCEDURE — 1090F PRES/ABSN URINE INCON ASSESS: CPT | Performed by: NURSE PRACTITIONER

## 2019-05-08 PROCEDURE — G8598 ASA/ANTIPLAT THER USED: HCPCS | Performed by: NURSE PRACTITIONER

## 2019-05-08 PROCEDURE — 1036F TOBACCO NON-USER: CPT | Performed by: NURSE PRACTITIONER

## 2019-05-08 PROCEDURE — 4040F PNEUMOC VAC/ADMIN/RCVD: CPT | Performed by: NURSE PRACTITIONER

## 2019-05-08 PROCEDURE — G8427 DOCREV CUR MEDS BY ELIG CLIN: HCPCS | Performed by: NURSE PRACTITIONER

## 2019-05-08 PROCEDURE — G8399 PT W/DXA RESULTS DOCUMENT: HCPCS | Performed by: NURSE PRACTITIONER

## 2019-05-08 PROCEDURE — 3017F COLORECTAL CA SCREEN DOC REV: CPT | Performed by: NURSE PRACTITIONER

## 2019-05-08 PROCEDURE — 99213 OFFICE O/P EST LOW 20 MIN: CPT | Performed by: NURSE PRACTITIONER

## 2019-05-08 PROCEDURE — G8926 SPIRO NO PERF OR DOC: HCPCS | Performed by: NURSE PRACTITIONER

## 2019-05-08 PROCEDURE — G8417 CALC BMI ABV UP PARAM F/U: HCPCS | Performed by: NURSE PRACTITIONER

## 2019-05-08 PROCEDURE — 3044F HG A1C LEVEL LT 7.0%: CPT | Performed by: NURSE PRACTITIONER

## 2019-05-08 PROCEDURE — 3023F SPIROM DOC REV: CPT | Performed by: NURSE PRACTITIONER

## 2019-05-08 PROCEDURE — 2022F DILAT RTA XM EVC RTNOPTHY: CPT | Performed by: NURSE PRACTITIONER

## 2019-05-08 PROCEDURE — 1123F ACP DISCUSS/DSCN MKR DOCD: CPT | Performed by: NURSE PRACTITIONER

## 2019-05-08 RX ORDER — CINACALCET 90 MG/1
90 TABLET, FILM COATED ORAL DAILY
COMMUNITY
End: 2019-09-04 | Stop reason: ALTCHOICE

## 2019-05-08 ASSESSMENT — ENCOUNTER SYMPTOMS
NAUSEA: 0
EYE REDNESS: 0
EYE DISCHARGE: 0
CONSTIPATION: 0
SHORTNESS OF BREATH: 0
COLOR CHANGE: 0
ABDOMINAL PAIN: 0
RHINORRHEA: 0
SORE THROAT: 0
DIARRHEA: 0
COUGH: 0
ABDOMINAL DISTENTION: 0
BLOOD IN STOOL: 0
ANAL BLEEDING: 0

## 2019-05-08 NOTE — PROGRESS NOTES
[address] [facility]. [address] [facility]  [address]  Dept: [phone]  Dept Fax: [phone]: [phone]    Visit Date: 5/8/2019    Kerrie La is a 70 y.o. female who presents today for:  Chief Complaint   Patient presents with    Follow-up     4 Month Follow-up Diabetes and COPD     HPI:     States everything is basically the same as last visit     She is trying to get on the kidney transplant list - has to complete testing and a colonoscopy before determining if she is able to get on the list. Has to have cardiac CT angiogram with coronary arteries and PFT at [facility] - having done 5/31    Has appointment with Dr. Augustina Myrick next Wednesday to discuss colonoscopy     Gets heparin at dialysis - T -TH - Sa - those days are really hard - very fatigued after    No more UTI symptoms today    [facility] - for sleep apnea and Dr. Taye Sandhu 12/30/19    Really has no complaints today.      Last diabetic eye exam has been a long time ago - she has not had time     Goes to Podiatrist every 3 months - Dr. Luis Bustamante     Does not get paps due to total hyster    HPI  Health Maintenance   Topic Date Due    Hepatitis C screen  1955    Diabetic foot exam  12/13/1963    Diabetic retinal exam  12/13/1963    HIV screen  12/13/1968    Hepatitis B Vaccine (1 of 3 - Risk Recombivax 3-dose series) 12/13/1972    Cervical cancer screen  12/13/1974    Shingles Vaccine (1 of 2) 12/13/2003    Pneumococcal 65+ years Vaccine (1 of 2 - PCV13) 12/13/2018    TSH testing  09/26/2019    Potassium monitoring  11/06/2019    Creatinine monitoring  11/06/2019    Low dose CT lung screening  11/30/2019    Lipid screen  12/06/2019    Colon Cancer Screen FIT/FOBT  01/28/2020    A1C test (Diabetic or Prediabetic)  02/13/2020    Breast cancer screen  01/16/2021    DTaP/Tdap/Td vaccine (2 - Td) 05/21/2022    DEXA (modify frequency per FRAX score)  Completed    Flu vaccine  Completed    HPV vaccine  Aged Out       Past Medical History:   Diagnosis Date    Anemia associated with chronic renal failure     Aranesp 150 micrograms every other week given at [facility]    Anxiety     Arthritis     Backache     Blood circulation, collateral     Blood transfusion     CAD (coronary artery disease)     Cellulitis in diabetic foot ([address] 03/03/2017    4th and 5th toes right foot    Chest pain     History of    CHF (congestive heart failure) ([address] 1998    Dx'ed by Dr. Hira Vasquez Chronic anemia     Chronic kidney disease     Chronic kidney disease, stage III (moderate) ([address]     Chronic renal insufficiency 2010    COPD (chronic obstructive pulmonary disease) ([address] 2012    Dr. 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 Ivory Hurtado    HTN (hypertension) 1970's    Hyperlipemia 1998    Iron deficiency anemia due to dietary causes 6/21/2018    Kidney stones 3/2014    Kidney trouble          MRSA infection 03/2017    right foot-Dr. Jeffery Rangel (podiatrist)    Neuromuscular disorder ([address]     Neuropathy 1989    diabetic neuropathy    Obesity since childhoood    CONTRERAS on CPAP 2010    Dr. Isabella Shaw    Pneumonia     PONV (postoperative nausea and vomiting)     Seizures ([address]       Past Surgical History:   Procedure Laterality Date    ANKLE SURGERY Right 02-10-14    Dr. Nica Dior at [place] [address]  1990's    removal of benign tumor   [address]  [address]   [address]    COLONOSCOPY  2009    2 polyps, not precanceorus    COSMETIC SURGERY  3/30/2012    eye lid lift    ENDOSCOPY, COLON, DIAGNOSTIC  2007   Shirley Lighter EYE SURGERY  March 30th, 2012    left sided ptosis    FOOT SURGERY  1990    Tarsal tunnel surgery    FRACTURE SURGERY  2015    HYSTERECTOMY  1980 and 1985    [facility] 1980's[facility]    LIVER BIOPSY  6/2015    LUNG BIOPSY  2009    NC OFFICE/OUTPT VISIT,PROCEDURE ONLY Left 8/23/2018    LEFT UPPER EXTREMENTY AV FISTULA CREATION performed by Jasmina Allen MD at [address] [facility] History   Problem Relation Age of Onset    Diabetes Sister     Diabetes Brother     Diabetes Sister     Diabetes Sister     Cancer Sister     Early Death Sister     Heart Disease Sister     Diabetes Sister     Heart Disease Sister     Diabetes Sister     Diabetes Brother     Arthritis Mother     Heart Disease Mother     High Blood Pressure Mother     Kidney Disease Mother     Mental Illness Mother     Stroke Mother     Heart Disease Father     Diabetes Father     Obesity Father     Alcohol Abuse Father      Social History     Tobacco Use    Smoking status: Former Smoker     Packs/day: 1.50     Years: 30.00     Pack years: 45.00     Types: Cigarettes     Start date: 12/28/1981     Last attempt to quit: 3/13/2009     Years since quitting: 10.1    Smokeless tobacco: Never Used    Tobacco comment: quit 2009   Substance Use Topics    Alcohol use: No     Alcohol/week: 0.0 oz      Current Outpatient Medications   Medication Sig Dispense Refill    cinacalcet (SENSIPAR) 90 MG tablet Take 90 mg by mouth daily Tuesday, Thursday, and Saturday after dialysis      Lanthanum Carbonate 1000 MG PACK Take by mouth 2 times daily      amLODIPine (NORVASC) 10 MG tablet TAKE 1 TABLET BY MOUTH EVERY DAY 30 tablet 3    lactulose (CHRONULAC) 10 GM/15ML solution TAKE 30ML BY MOUTH 2 TIMES DAILY AS NEEDED CONSTIPATION.  USE IF NO BM WITH OTHER SOFTNERS 473 mL 0    vitamin D (ERGOCALCIFEROL) 96740 units CAPS capsule TAKE ONE CAPSULE BY MOUTH ONE TIME PER WEEK 4 capsule 5    Sucroferric Oxyhydroxide (VELPHORO) 500 MG CHEW Take 500 mg by mouth Take with meals      insulin aspart (NOVOLOG FLEXPEN) 100 UNIT/ML injection pen Inject into the skin as needed Sliding scale insulin coverage  Glucose:Dose: If Less [id number] =No Insulin/ 140-199= 2 Units/ 200-249=4 Units/ 250-299= 6 Units/  300-349=8 Units/  350-400=10 Units/ Above 400 = 12 Units      Insulin Pen Needle (B-D ULTRAFINE III SHORT PEN) 31G X 8 MM MISC Use three times daily Diagnosis Code E11.9 200 each 3    nitroGLYCERIN (NITROSTAT) 0.4 MG SL tablet Place 1 tablet under the tongue every 5 minutes as needed for Chest pain 25 tablet 5    atorvastatin (LIPITOR) 20 MG tablet TAKE 1 TABLET BY MOUTH EVERY DAY 60 tablet 1    FOLBEE 2.5-25-1 MG TABS tablet TAKE 1 TABLET BY MOUTH DAILY 30 tablet 5    insulin glargine (LANTUS SOLOSTAR) 100 UNIT/ML injection pen Inject 15 Units into the skin nightly 10 pen 3    bumetanide (BUMEX) 1 MG tablet Take 2 mg by mouth 2 times daily      minoxidil (LONITEN) 2.5 MG tablet Take 5 mg by mouth 3 times daily      tiotropium (SPIRIVA HANDIHALER) 18 MCG inhalation capsule Inhale 1 capsule into the lungs daily 1 capsule via inhalation daily. 90 capsule 3    acetaminophen (TYLENOL) 325 MG tablet Take 2 tablets by mouth every 6 hours as needed for Pain 120 tablet 3    cloNIDine (CATAPRES) 0.3 MG tablet Take 1 tablet by mouth every 8 hours One tablet three times per day 270 tablet 4    carvedilol (COREG) 25 MG tablet Take 1.5 tablets by mouth 2 times daily . hold if SBP less than 100 mm hg or HR less than 60 270 tablet 3    doxazosin (CARDURA) 4 MG tablet Take 3 tablets by mouth daily . hold if SBP less than 100 mm hg (Patient taking differently: Take 8 mg by mouth daily . hold if SBP less than 100 mm hg) 90 tablet 3    fluticasone (FLONASE) 50 MCG/ACT nasal spray 2 sprays by Nasal route daily as needed       albuterol sulfate HFA (PROAIR HFA) 108 (90 Base) MCG/ACT inhaler Inhale 2 puffs into the lungs every 6 hours as needed for Wheezing 3 Inhaler 3    aspirin 81 MG EC tablet Take 81 mg by mouth daily. No current facility-administered medications for this visit. Allergies   Allergen Reactions    Actos [Pioglitazone Hydrochloride] Swelling    Cymbalta [Duloxetine Hcl] Other (See Comments)     Anxiety and lethargic    Gabapentin Anxiety       Subjective:    Review of Systems   Constitutional: Negative for chills, fatigue and fever. HENT: Negative for congestion, ear pain, postnasal drip, rhinorrhea and sore throat. Eyes: Negative for discharge and redness. Respiratory: Negative for cough and shortness of breath. Cardiovascular: Negative for chest pain and leg swelling. Gastrointestinal: Negative for abdominal distention, abdominal pain, anal bleeding, blood in stool, constipation, diarrhea and nausea. Genitourinary: Positive for dysuria. Skin: Negative for color change and rash. Neurological: Negative for facial asymmetry, speech difficulty and weakness. Hematological: Does not bruise/bleed easily. Psychiatric/Behavioral: Negative for agitation and confusion. Objective:      Vitals:    05/08/19 1338 05/08/19 1348   BP: (!) 164/72 (!) 158/80   Site: Right Upper Arm Right Upper Arm   Position: Sitting Sitting   Cuff Size: Medium Adult Medium Adult   Pulse: 52    Resp: 16    SpO2: 99%    Weight: 172 lb 3.2 oz (78.1 kg)    Height: 5' 5\" (1.651 m)        Body mass index is 28.66 kg/m². Wt Readings from Last 3 Encounters:   05/08/19 172 lb 3.2 oz (78.1 kg)   03/25/19 173 lb 12.8 oz (78.8 kg)   03/20/19 177 lb (80.3 kg)     BP Readings from Last 3 Encounters:   05/08/19 (!) 158/80   03/25/19 128/78   03/20/19 130/60       Physical Exam   Constitutional: Vital signs are normal. She appears well-developed and well-nourished. She does not appear ill. No distress. HENT:   Head: Normocephalic and atraumatic. Right Ear: Hearing and external ear normal. No decreased hearing is noted.    Left Ear: Hearing and external ear normal. No decreased hearing is noted. Nose: Nose normal. No nasal deformity. Eyes: Conjunctivae are normal. Right eye exhibits no discharge. Left eye exhibits no discharge. Neck: Normal range of motion. Neck supple. Cardiovascular: Normal rate. Pulmonary/Chest: Effort normal. No respiratory distress. She has no wheezes. Abdominal: She exhibits no distension. There is no guarding. Musculoskeletal: Normal range of motion. She exhibits no tenderness or deformity. Neurological: She is alert. Gait normal.   Skin: No rash (On exposed areas) noted. She is not diaphoretic. No erythema. No pallor. Psychiatric: She has a normal mood and affect. Her speech is normal and behavior is normal. Judgment and thought content normal. Cognition and memory are normal.       Lab Results   Component Value Date    WBC 6.3 11/18/2018    HGB 8.9 (L) 11/18/2018    HCT 28.9 (L) 11/18/2018     11/18/2018    CHOL 115 12/06/2018    TRIG 96 12/06/2018    HDL 60 12/06/2018    LDLDIRECT 69.68 12/28/2016    LDLCALC 36 12/06/2018    AST 15 11/02/2018     11/06/2018    K 3.7 11/06/2018    CL 98 11/06/2018    CREATININE 4.5 (HH) 11/06/2018    BUN 33 (H) 11/06/2018    CO2 30 11/06/2018    TSH 2.640 09/26/2018    INR 1.13 08/23/2018    LABA1C 6.4 (H) 02/13/2019    LABMICR 20.95 01/04/2018    LABGLOM 10 (A) 11/06/2018    MG 1.9 11/06/2018    CALCIUM 9.4 11/06/2018    VITD25 34 08/07/2018       Assessment:       Diagnosis Orders   1. Diabetic polyneuropathy associated with type 2 diabetes mellitus ([address]     2. ESRD needing dialysis ([address]     3. End stage renal disease due to benign hypertension ([address]     4. CKD (chronic kidney disease), stage V ([address]     5.  COPD, mild ([address]         Plan:   · Will get the testing for the transplant  · Will see your specialist as scheduled  · Please let us know if you need anything from us  · Will see you back in 6 months for a wellness    Return in about 6 months

## 2019-05-08 NOTE — PATIENT INSTRUCTIONS
· Will get the testing for the transplant  · Will see your specialist as scheduled  · Please let us know if you need anything from us  · Will see you back in 6 months for a wellness      Patient Education        Anemia: Care Instructions  Your Care Instructions    Anemia is a low level of red blood cells, which carry oxygen throughout your body. Many things can cause anemia. Lack of iron is one of the most common causes. Your body needs iron to make hemoglobin, a substance in red blood cells that carries oxygen from the lungs to your body's cells. Without enough iron, the body produces fewer and smaller red blood cells. As a result, your body's cells do not get enough oxygen, and you feel tired and weak. And you may have trouble concentrating. Bleeding is the most common cause of a lack of iron. You may have heavy menstrual bleeding or bleeding caused by conditions such as ulcers, hemorrhoids, or cancer. Regular use of aspirin or other anti-inflammatory medicines (such as ibuprofen) also can cause bleeding in some people. A lack of iron in your diet also can cause anemia, especially at times when the body needs more iron, such as during pregnancy, infancy, and the teen years. Your doctor may have prescribed iron pills. It may take several months of treatment for your iron levels to return to normal. Your doctor also may suggest that you eat foods that are rich in iron, such as meat and beans. There are many other causes of anemia. It is not always due to a lack of iron. Finding the specific cause of your anemia will help your doctor find the right treatment for you. Follow-up care is a key part of your treatment and safety. Be sure to make and go to all appointments, and call your doctor if you are having problems. It's also a good idea to know your test results and keep a list of the medicines you take. How can you care for yourself at home? · Take your medicines exactly as prescribed.  Call your doctor if you think you are having a problem with your medicine. · If your doctor recommends iron pills, take them as directed:  ? Try to take the pills on an empty stomach about 1 hour before or 2 hours after meals. But you may need to take iron with food to avoid an upset stomach. ? Do not take antacids or drink milk or caffeine drinks (such as coffee, tea, or cola) at the same time or within 2 hours of the time that you take your iron. They can make it hard for your body to absorb the iron. ? Vitamin C (from food or supplements) helps your body absorb iron. Try taking iron pills with a glass of orange juice or some other food that is high in vitamin C, such as citrus fruits. ? Iron pills may cause stomach problems, such as heartburn, nausea, diarrhea, constipation, and cramps. Be sure to drink plenty of fluids, and include fruits, vegetables, and fiber in your diet each day. Iron pills often make your bowel movements dark or green. ? If you forget to take an iron pill, do not take a double dose of iron the next time you take a pill. ? Keep iron pills out of the reach of small children. An overdose of iron can be very dangerous. · Follow your doctor's advice about eating iron-rich foods. These include red meat, shellfish, poultry, eggs, beans, raisins, whole-grain bread, and leafy green vegetables. · Steam vegetables to help them keep their iron content. When should you call for help? Call 911 anytime you think you may need emergency care. For example, call if:    · You have symptoms of a heart attack. These may include:  ? Chest pain or pressure, or a strange feeling in the chest.  ? Sweating. ? Shortness of breath. ? Nausea or vomiting. ? Pain, pressure, or a strange feeling in the back, neck, jaw, or upper belly or in one or both shoulders or arms. ? Lightheadedness or sudden weakness. ? A fast or irregular heartbeat.   After you call 911, the  may tell you to chew 1 adult-strength or 2 to 4 low-dose aspirin. Wait for an ambulance. Do not try to drive yourself.     · You passed out (lost consciousness).    Call your doctor now or seek immediate medical care if:    · You have new or increased shortness of breath.     · You are dizzy or lightheaded, or you feel like you may faint.     · Your fatigue and weakness continue or get worse.     · You have any abnormal bleeding, such as:  ? Nosebleeds. ? Vaginal bleeding that is different (heavier, more frequent, at a different time of the month) than what you are used to.  ? Bloody or black stools, or rectal bleeding. ? Bloody or pink urine.    Watch closely for changes in your health, and be sure to contact your doctor if:    · You do not get better as expected. Where can you learn more? Go to https://[facility].NanoLumens. org and sign in to your Proteros biostructures account. Enter R301 in the [facility] box to learn more about \"Anemia: Care Instructions. \"     If you do not have an account, please click on the \"Sign Up Now\" link. Current as of: May 6, 2018  Content Version: 12.0  © 4067-5627 Marketecture. Care instructions adapted under license by [facility] ([facility]). If you have questions about a medical condition or this instruction, always ask your healthcare professional. [place] [address] any warranty or liability for your use of this information. Patient Education        Arthritis: Care Instructions  Your Care Instructions  Arthritis, also called osteoarthritis, is a breakdown of the cartilage that cushions your joints. When the cartilage wears down, your bones rub against each other. This causes pain and stiffness. Many people have some arthritis as they age. Arthritis most often affects the joints of the spine, hands, hips, knees, or feet. You can take simple measures to protect your joints, ease your pain, and help you stay active. Follow-up care is a key part of your treatment and safety.  Be sure to make and go to seek immediate medical care if:    · You have sudden swelling, warmth, or pain in any joint.     · You have joint pain and a fever or rash.     · You have such bad pain that you cannot use a joint.    Watch closely for changes in your health, and be sure to contact your doctor if:    · You have mild joint symptoms that continue even with more than 6 weeks of care at home.     · You have stomach pain or other problems with your medicine. Where can you learn more? Go to https://InSphero.Adatao. org and sign in to your Smashrun account. Enter A488 in the Jibestream box to learn more about \"Arthritis: Care Instructions. \"     If you do not have an account, please click on the \"Sign Up Now\" link. Current as of: Rosalina 10, 2018  Content Version: 12.0  © 5517-7299 Healthwise, Incorporated. Care instructions adapted under license by [facility] ([facility]). If you have questions about a medical condition or this instruction, always ask your healthcare professional. [place] [address] any warranty or liability for your use of this information. Patient Education        Constipation: Care Instructions  Your Care Instructions    Constipation means that you have a hard time passing stools (bowel movements). People pass stools from 3 times a day to once every 3 days. What is normal for you may be different. Constipation may occur with pain in the rectum and cramping. The pain may get worse when you try to pass stools. Sometimes there are small amounts of bright red blood on toilet paper or the surface of stools. This is because of enlarged veins near the rectum (hemorrhoids). A few changes in your diet and lifestyle may help you avoid ongoing constipation. Your doctor may also prescribe medicine to help loosen your stool. Some medicines can cause constipation. These include pain medicines and antidepressants. Tell your doctor about all the medicines you take.  Your doctor may want to make a Karen account. Enter 21  in the [facility] box to learn more about \"Constipation: Care Instructions. \"     If you do not have an account, please click on the \"Sign Up Now\" link. Current as of: September 23, 2018  Content Version: 12.0  © 7947-1652 Applimation. Care instructions adapted under license by [facility] ([facility]). If you have questions about a medical condition or this instruction, always ask your healthcare professional. [place] [address] any warranty or liability for your use of this information. Patient Education        Learning About Diabetes Food Guidelines  Your Care Instructions    Meal planning is important to manage diabetes. It helps keep your blood sugar at a target level (which you set with your doctor). You don't have to eat special foods. You can eat what your family eats, including sweets once in a while. But you do have to pay attention to how often you eat and how much you eat of certain foods. You may want to work with a dietitian or a certified diabetes educator (CDE) to help you plan meals and snacks. A dietitian or CDE can also help you lose weight if that is one of your goals. What should you know about eating carbs? Managing the amount of carbohydrate (carbs) you eat is an important part of healthy meals when you have diabetes. Carbohydrate is found in many foods. · Learn which foods have carbs. And learn the amounts of carbs in different foods. ? Bread, cereal, pasta, and rice have about 15 grams of carbs in a serving. A serving is 1 slice of bread (1 ounce), ½ cup of cooked cereal, or 1/3 cup of cooked pasta or rice. ? Fruits have 15 grams of carbs in a serving. A serving is 1 small fresh fruit, such as an apple or orange; ½ of a banana; ½ cup of cooked or canned fruit; ½ cup of fruit juice; 1 cup of melon or raspberries; or 2 tablespoons of dried fruit. ? Milk and no-sugar-added yogurt have 15 grams of carbs in a serving.  A serving is 1 cup of milk or 2/3 cup of no-sugar-added yogurt. ? Starchy vegetables have 15 grams of carbs in a serving. A serving is ½ cup of mashed potatoes or sweet potato; 1 cup winter squash; ½ of a small baked potato; ½ cup of cooked beans; or ½ cup cooked corn or green peas. · Learn how much carbs to eat each day and at each meal. A dietitian or CDE can teach you how to keep track of the amount of carbs you eat. This is called carbohydrate counting. · If you are not sure how to count carbohydrate grams, use the Plate Method to plan meals. It is a good, quick way to make sure that you have a balanced meal. It also helps you spread carbs throughout the day. ? Divide your plate by types of foods. Put non-starchy vegetables on half the plate, meat or other protein food on one-quarter of the plate, and a grain or starchy vegetable in the final quarter of the plate. To this you can add a small piece of fruit and 1 cup of milk or yogurt, depending on how many carbs you are supposed to eat at a meal.  · Try to eat about the same amount of carbs at each meal. Do not \"save up\" your daily allowance of carbs to eat at one meal.  · Proteins have very little or no carbs per serving. Examples of proteins are beef, chicken, turkey, fish, eggs, tofu, cheese, cottage cheese, and peanut butter. A serving size of meat is 3 ounces, which is about the size of a deck of cards. Examples of meat substitute serving sizes (equal to 1 ounce of meat) are 1/4 cup of cottage cheese, 1 egg, 1 tablespoon of peanut butter, and ½ cup of tofu. How can you eat out and still eat healthy? · Learn to estimate the serving sizes of foods that have carbohydrate. If you measure food at home, it will be easier to estimate the amount in a serving of restaurant food. · If the meal you order has too much carbohydrate (such as potatoes, corn, or baked beans), ask to have a low-carbohydrate food instead. Ask for a salad or green vegetables.   · If you use insulin, check your blood sugar before and after eating out to help you plan how much to eat in the future. · If you eat more carbohydrate at a meal than you had planned, take a walk or do other exercise. This will help lower your blood sugar. What else should you know? · Limit saturated fat, such as the fat from meat and dairy products. This is a healthy choice because people who have diabetes are at higher risk of heart disease. So choose lean cuts of meat and nonfat or low-fat dairy products. Use olive or canola oil instead of butter or shortening when cooking. · Don't skip meals. Your blood sugar may drop too low if you skip meals and take insulin or certain medicines for diabetes. · Check with your doctor before you drink alcohol. Alcohol can cause your blood sugar to drop too low. Alcohol can also cause a bad reaction if you take certain diabetes medicines. Follow-up care is a key part of your treatment and safety. Be sure to make and go to all appointments, and call your doctor if you are having problems. It's also a good idea to know your test results and keep a list of the medicines you take. Where can you learn more? Go to https://FlyClippepiceweb.AMDL. org and sign in to your Share Some Style account. Enter L506 in the Spark Authors box to learn more about \"Learning About Diabetes Food Guidelines. \"     If you do not have an account, please click on the \"Sign Up Now\" link. Current as of: July 25, 2018  Content Version: 12.0  © 5351-6165 Healthwise, Incorporated. Care instructions adapted under license by [facility] ([facility]). If you have questions about a medical condition or this instruction, always ask your healthcare professional. [place] [address] any warranty or liability for your use of this information. Patient Education        High Cholesterol: Care Instructions  Your Care Instructions    Cholesterol is a type of fat in your blood.  It is needed for many body functions, such as making new cells. Cholesterol is made by your body. It also comes from food you eat. High cholesterol means that you have too much of the fat in your blood. This raises your risk of a heart attack and stroke. LDL and HDL are part of your total cholesterol. LDL is the \"bad\" cholesterol. High LDL can raise your risk for heart disease, heart attack, and stroke. HDL is the \"good\" cholesterol. It helps clear bad cholesterol from the body. High HDL is linked with a lower risk of heart disease, heart attack, and stroke. Your cholesterol levels help your doctor find out your risk for having a heart attack or stroke. You and your doctor can talk about whether you need to lower your risk and what treatment is best for you. A heart-healthy lifestyle along with medicines can help lower your cholesterol and your risk. The way you choose to lower your risk will depend on how high your risk is for heart attack and stroke. It will also depend on how you feel about taking medicines. Follow-up care is a key part of your treatment and safety. Be sure to make and go to all appointments, and call your doctor if you are having problems. It's also a good idea to know your test results and keep a list of the medicines you take. How can you care for yourself at home? · Eat a variety of foods every day. Good choices include fruits, vegetables, whole grains (like oatmeal), dried beans and peas, nuts and seeds, soy products (like tofu), and fat-free or low-fat dairy products. · Replace butter, margarine, and hydrogenated or partially hydrogenated oils with olive and canola oils. (Canola oil margarine without trans fat is fine.)  · Replace red meat with fish, poultry, and soy protein (like tofu). · Limit processed and packaged foods like chips, crackers, and cookies. · Bake, broil, or steam foods. Don't somers them. · Be physically active. Get at least 30 minutes of exercise on most days of the week.  Walking is a good choice. You also may want to do other activities, such as running, swimming, cycling, or playing tennis or team sports. · Stay at a healthy weight or lose weight by making the changes in eating and physical activity listed above. Losing just a small amount of weight, even 5 to 10 pounds, can reduce your risk for having a heart attack or stroke. · Do not smoke. When should you call for help? Watch closely for changes in your health, and be sure to contact your doctor if:    · You need help making lifestyle changes.     · You have questions about your medicine. Where can you learn more? Go to https://Arigami Semicon[employer] Systems Privatepepiceweb.Shots. org and sign in to your Shoozy account. Enter A199 in the feedPack box to learn more about \"High Cholesterol: Care Instructions. \"     If you do not have an account, please click on the \"Sign Up Now\" link. Current as of: July 22, 2018  Content Version: 12.0  © 8596-0282 BidRazor. Care instructions adapted under license by [facility] ([facility]). If you have questions about a medical condition or this instruction, always ask your healthcare professional. [place] [address] any warranty or liability for your use of this information. Patient Education        Learning About High Blood Pressure  What is high blood pressure? Blood pressure is a measure of how hard the blood pushes against the walls of your arteries. It's normal for blood pressure to go up and down throughout the day, but if it stays up, you have high blood pressure. Another name for high blood pressure is hypertension. Two numbers tell you your blood pressure. The first number is the systolic pressure. It shows how hard the blood pushes when your heart is pumping. The second number is the diastolic pressure. It shows how hard the blood pushes between heartbeats, when your heart is relaxed and filling with blood. Your doctor will give you a goal for your blood pressure.  Your and 1 drink a day for women. · Eat plenty of fruits, vegetables, and low-fat dairy products. Eat less saturated and total fats. How is high blood pressure treated? · Your doctor will suggest making lifestyle changes to help your heart. For example, your doctor may ask you to eat healthy foods, quit smoking, lose extra weight, and be more active. · If lifestyle changes don't help enough, your doctor may recommend that you take medicine. · When blood pressure is very high, medicines are needed to lower it. Follow-up care is a key part of your treatment and safety. Be sure to make and go to all appointments, and call your doctor if you are having problems. It's also a good idea to know your test results and keep a list of the medicines you take. Where can you learn more? Go to https://NeRRe Therapeuticsjagdisheb.Onfan. org and sign in to your UXArmy account. Enter P501 in the Emotify box to learn more about \"Learning About High Blood Pressure. \"     If you do not have an account, please click on the \"Sign Up Now\" link. Current as of: July 22, 2018  Content Version: 12.0  © 6082-7301 Bluefin Labs. Care instructions adapted under license by [facility] ([facility]). If you have questions about a medical condition or this instruction, always ask your healthcare professional. [address] any warranty or liability for your use of this information. Patient Education        Iron-Rich Diet: Care Instructions  Your Care Instructions    Your body needs iron to make hemoglobin. Hemoglobin is a substance in red blood cells that carries oxygen from the lungs to cells all through your body. If you do not get enough iron, your body makes fewer and smaller red blood cells. As a result, your body's cells may not get enough oxygen. Adult men need 8 milligrams of iron a day; adult women need 18 milligrams of iron a day. After menopause, women need 8 milligrams of iron a day.  A pregnant woman needs 27 milligrams of iron a day. Infants and young children have higher iron needs relative to their size than other age groups. People who have lost blood because of ulcers or heavy menstrual periods may become very low in iron and may develop anemia. Most people can get the iron their bodies need by eating enough of certain iron-rich foods. Your doctor may recommend that you take an iron supplement along with eating an iron-rich diet. Follow-up care is a key part of your treatment and safety. Be sure to make and go to all appointments, and call your doctor if you are having problems. It's also a good idea to know your test results and keep a list of the medicines you take. How can you care for yourself at home? · Make iron-rich foods a part of your daily diet. Iron-rich foods include:  ? All meats, such as chicken, beef, lamb, pork, fish, and shellfish. Liver is especially high in iron. ? Leafy green vegetables. ? Raisins, peas, beans, lentils, barley, and eggs. ? Iron-fortified breakfast cereals. · Eat foods with vitamin C along with iron-rich foods. Vitamin C helps you absorb more iron from food. Drink a glass of orange juice or another citrus juice with your food. · Eat meat and vegetables or grains together. The iron in meat helps your body absorb the iron in other foods. Where can you learn more? Go to https://chjagdisheb.healthOrthoFi. org and sign in to your Bioclones account. Enter [id number] in the [facility] box to learn more about \"Iron-Rich Diet: Care Instructions. \"     If you do not have an account, please click on the \"Sign Up Now\" link. Current as of: November 7, 2018  Content Version: 12.0  © 9508-6784 Healthwise, Incorporated. Care instructions adapted under license by [facility] ([facility]).  If you have questions about a medical condition or this instruction, always ask your healthcare professional. [place] any warranty or liability for your use of this information.

## 2019-05-09 NOTE — TELEPHONE ENCOUNTER
Last visit- 5/8/2019  Next visit- 11/13/2019    Requested Prescriptions     Pending Prescriptions Disp Refills    lactulose (theScore) 10 GM/15ML solution [Pharmacy Med Name: LACTULOSE 10 GM/15 ML SOLUTION] 473 mL 0     Sig: TAKE 30ML BY MOUTH 2 TIMES DAILY AS NEEDED CONSTIPATION.  USE IF NO BM WITH OTHER SOFTNERS

## 2019-05-10 RX ORDER — LACTULOSE 10 G/15ML
SOLUTION ORAL
Qty: 473 ML | Refills: 0 | OUTPATIENT
Start: 2019-05-10

## 2019-05-24 DIAGNOSIS — I10 ESSENTIAL HYPERTENSION: ICD-10-CM

## 2019-05-29 ENCOUNTER — OFFICE VISIT (OUTPATIENT)
Dept: PULMONOLOGY | Age: 64
End: 2019-05-29
Payer: MEDICARE

## 2019-05-29 VITALS
SYSTOLIC BLOOD PRESSURE: 130 MMHG | BODY MASS INDEX: 29.29 KG/M2 | HEIGHT: 65 IN | HEART RATE: 90 BPM | OXYGEN SATURATION: 97 % | DIASTOLIC BLOOD PRESSURE: 62 MMHG | WEIGHT: 175.8 LBS

## 2019-05-29 DIAGNOSIS — J44.9 COPD, MILD (HCC): ICD-10-CM

## 2019-05-29 DIAGNOSIS — Z99.89 OBSTRUCTIVE SLEEP APNEA ON CPAP: Primary | ICD-10-CM

## 2019-05-29 DIAGNOSIS — G47.33 OBSTRUCTIVE SLEEP APNEA ON CPAP: Primary | ICD-10-CM

## 2019-05-29 PROCEDURE — 99214 OFFICE O/P EST MOD 30 MIN: CPT | Performed by: PHYSICIAN ASSISTANT

## 2019-05-29 PROCEDURE — G8399 PT W/DXA RESULTS DOCUMENT: HCPCS | Performed by: PHYSICIAN ASSISTANT

## 2019-05-29 PROCEDURE — G8926 SPIRO NO PERF OR DOC: HCPCS | Performed by: PHYSICIAN ASSISTANT

## 2019-05-29 PROCEDURE — 1123F ACP DISCUSS/DSCN MKR DOCD: CPT | Performed by: PHYSICIAN ASSISTANT

## 2019-05-29 PROCEDURE — 4040F PNEUMOC VAC/ADMIN/RCVD: CPT | Performed by: PHYSICIAN ASSISTANT

## 2019-05-29 PROCEDURE — 3023F SPIROM DOC REV: CPT | Performed by: PHYSICIAN ASSISTANT

## 2019-05-29 PROCEDURE — G8417 CALC BMI ABV UP PARAM F/U: HCPCS | Performed by: PHYSICIAN ASSISTANT

## 2019-05-29 PROCEDURE — 1036F TOBACCO NON-USER: CPT | Performed by: PHYSICIAN ASSISTANT

## 2019-05-29 PROCEDURE — G8427 DOCREV CUR MEDS BY ELIG CLIN: HCPCS | Performed by: PHYSICIAN ASSISTANT

## 2019-05-29 PROCEDURE — 3017F COLORECTAL CA SCREEN DOC REV: CPT | Performed by: PHYSICIAN ASSISTANT

## 2019-05-29 PROCEDURE — 1090F PRES/ABSN URINE INCON ASSESS: CPT | Performed by: PHYSICIAN ASSISTANT

## 2019-05-29 PROCEDURE — G8598 ASA/ANTIPLAT THER USED: HCPCS | Performed by: PHYSICIAN ASSISTANT

## 2019-05-29 RX ORDER — CYANOCOBALAMIN/FOLIC AC/VIT B6 1-2.5-25MG
TABLET ORAL
Qty: 30 TABLET | Refills: 5 | Status: SHIPPED | OUTPATIENT
Start: 2019-05-29 | End: 2019-08-14 | Stop reason: SDUPTHER

## 2019-05-29 ASSESSMENT — ENCOUNTER SYMPTOMS
EYES NEGATIVE: 1
ALLERGIC/IMMUNOLOGIC NEGATIVE: 1
SHORTNESS OF BREATH: 0
BACK PAIN: 0
STRIDOR: 0
DIARRHEA: 0
CHEST TIGHTNESS: 0
WHEEZING: 0
NAUSEA: 0
COUGH: 0

## 2019-05-29 NOTE — PROGRESS NOTES
[facility]      Ibrahima Wall         [id number]  5/29/2019   Chief Complaint   Patient presents with    Follow-up     CONTRERAS 3 month follow up with a download. Pt of Dr. Isabella Shaw     PAP Download:   Original or initialAHI: 6     Date of initial study: 9/28/09      Compliant  36.7%     Noncompliant 63.3 %     PAP Type CPAP Level  7 cmH20   Avg Hrs/Day 4:34  AHI: 0.3   Recorded compliance dates , 4/21/19  to 5/20/19   Machine/Mfg: [employer] Interface: FFM    Provider:    Aniceto Mahoney        __ [facility]    __ [address]            __[facility]   __[place]:       __Other    Neck Size: 16.25  Mallampati Mallampati 4      Here is a scan of the most recent download:              Presentation:   Da Wisdom presents for sleep medicine follow up for obstructive sleep apnea  Since the last visit, Da Wisdom is doing better with PAP but still not wearing it every night. She falls asleep without PAP on sometimes secondary to playing candy crush. She feels better when wearing it. She is getting evaluated for kidney transplant. Equipment issues: The pressure is  acceptable, the mask is acceptable and she  is  using the humidity. Sleep issues:  Do you feel better? Yes and No  More rested? Yes   Better concentration? yes    Progress History:   Since last visit any new medical issues? No  New ER or hospitlal visits? No  Any new or changes in medicines? No  Any new sleep medicines?  No        Past Medical History:   Diagnosis Date    Anemia associated with chronic renal failure     Aranesp 150 micrograms every other week given at [address] [place] [address]. [facility]    Anxiety     Arthritis     Backache     Blood circulation, collateral     Blood transfusion     CAD (coronary artery disease)     Cellulitis in diabetic foot ([facility] 03/03/2017    4th and 5th toes right foot    Chest pain     History of    CHF (congestive heart failure) ([facility] 1998    Dx'ed by Dr. Trip Kerr  Chronic anemia     Chronic kidney disease     Chronic kidney disease, stage III (moderate) (HCC)     Chronic renal insufficiency 2010    COPD (chronic obstructive pulmonary disease) ([facility] 2012    Dr. 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 Brissa Farley    HTN (hypertension) 1970's    Hyperlipemia 1998    Iron deficiency anemia due to dietary causes 6/21/2018    Kidney stones 3/2014    Kidney trouble          MRSA infection 03/2017    right foot-Dr. Jeovanny Watts (podiatrist)    Neuromuscular disorder ([facility]     Neuropathy 1989    diabetic neuropathy    Obesity since childhoood    CONTRERAS on CPAP 2010    Dr. Noris Wing    Pneumonia     PONV (postoperative nausea and vomiting)     Seizures ([facility] [address] [facility]        Past Surgical History:   Procedure Laterality Date    ANKLE SURGERY Right 02-10-14    Dr. Pierre Beckett at [place] [address]  1990's    removal of benign tumor   [address]  2008   [address]  2009    2 polyps, not precanceorus    COSMETIC SURGERY  3/30/2012    eye lid lift    ENDOSCOPY, COLON, DIAGNOSTIC  2007   [facility] EYE SURGERY  March 30th, 2012    left sided ptosis    FOOT SURGERY  1990    Tarsal tunnel surgery    FRACTURE SURGERY  2015   [address] and [address]    first partial in 1980's, then total in [facility]  2015   [facility] LIVER BIOPSY  6/2015    LUNG BIOPSY  2009    MT OFFICE/OUTPT VISIT,PROCEDURE ONLY Left 8/23/2018    LEFT UPPER EXTREMENTY AV FISTULA CREATION performed by Dallin Silverman MD at [facility] History     Tobacco Use    Smoking status: Former Smoker     Packs/day: 1.50     Years: 30.00     Pack years: 45.00     Types: Cigarettes chills and fever. HENT: Negative for congestion and postnasal drip. Eyes: Negative. Respiratory: Negative for cough, chest tightness, shortness of breath, wheezing and stridor. Cardiovascular: Negative for chest pain and leg swelling. Gastrointestinal: Negative for diarrhea and nausea. Endocrine: Negative. Genitourinary: Negative. Musculoskeletal: Negative. Negative for arthralgias and back pain. Skin: Negative. Allergic/Immunologic: Negative. Neurological: Negative. Negative for dizziness and light-headedness. Psychiatric/Behavioral: Negative. All other systems reviewed and are negative. Physical Exam:    BMI:  Body mass index is 29.25 kg/m². Wt Readings from Last 3 Encounters:   05/29/19 175 lb 12.8 oz (79.7 kg)   05/08/19 172 lb 3.2 oz (78.1 kg)   03/25/19 173 lb 12.8 oz (78.8 kg)     Weight stable / unchanged  Vitals: /62 (Site: Right Upper Arm, Position: Sitting, Cuff Size: Medium Adult)   Pulse 90   Ht 5' 5\" (1.651 m)   Wt 175 lb 12.8 oz (79.7 kg)   SpO2 97% Comment: on RA  BMI 29.25 kg/m²       Physical Exam   Constitutional: She is oriented to person, place, and time. She appears well-developed and well-nourished. HENT:   Head: Normocephalic and atraumatic. Right Ear: External ear normal.   Left Ear: External ear normal.   Mouth/Throat: Oropharynx is clear and moist.   Eyes: Pupils are equal, round, and reactive to light. Conjunctivae and EOM are normal.   Neck: Normal range of motion. Neck supple. Cardiovascular: Normal rate, regular rhythm and normal heart sounds. Pulmonary/Chest: Effort normal and breath sounds normal.   Abdominal: Soft. Musculoskeletal: Normal range of motion. Neurological: She is alert and oriented to person, place, and time. Skin: Skin is warm and dry. Psychiatric: She has a normal mood and affect. Her behavior is normal. Judgment and thought content normal.         ASSESSMENT/DIAGNOSIS     Diagnosis Orders   1. Obstructive sleep apnea on CPAP     2. COPD, mild ([address]              Plan   Do you need any equipment today? No  - Discussed ways to improve compliance  - She is more tired when not using PAP, needs to wear more  - She  was advised to continue current positive airway pressure therapy with above described pressure. - She  advised to keep goodcompliance with current recommended pressure to get optimal results and clinical improvement  - Recommend 7-9 hours of sleep with PAP  - She was advised to call [employer] company regarding supplies if needed.   -She call my office for earlier appointment if needed for worsening of sleep symptoms.   - She was instructed on weight loss  - Amy Stock was educated about my impression and plan. Patient verbalizesunderstanding.   We will see Heidy Nagy back in: 4 months with download    Information added by my medical assistant/LPN was reviewed today         Mika Faulkner PA-C, MPAS  5/29/2019

## 2019-06-10 ENCOUNTER — HOSPITAL ENCOUNTER (OUTPATIENT)
Age: 64
Setting detail: OUTPATIENT SURGERY
Discharge: HOME | End: 2019-06-10
Attending: INTERNAL MEDICINE | Admitting: INTERNAL MEDICINE
Payer: MEDICARE

## 2019-06-10 ENCOUNTER — ANESTHESIA (OUTPATIENT)
Dept: ENDOSCOPY | Age: 64
End: 2019-06-10
Payer: MEDICARE

## 2019-06-10 ENCOUNTER — ANESTHESIA EVENT (OUTPATIENT)
Dept: ENDOSCOPY | Age: 64
End: 2019-06-10
Payer: MEDICARE

## 2019-06-10 VITALS
SYSTOLIC BLOOD PRESSURE: 151 MMHG | DIASTOLIC BLOOD PRESSURE: 66 MMHG | OXYGEN SATURATION: 100 % | RESPIRATION RATE: 14 BRPM

## 2019-06-10 VITALS
TEMPERATURE: 97.2 F | RESPIRATION RATE: 16 BRPM | SYSTOLIC BLOOD PRESSURE: 149 MMHG | BODY MASS INDEX: 29.12 KG/M2 | HEIGHT: 65 IN | DIASTOLIC BLOOD PRESSURE: 67 MMHG | OXYGEN SATURATION: 98 % | WEIGHT: 174.8 LBS | HEART RATE: 63 BPM

## 2019-06-10 LAB — GLUCOSE BLD-MCNC: 118 MG/DL (ref 70–108)

## 2019-06-10 PROCEDURE — 7100000001 HC PACU RECOVERY - ADDTL 15 MIN: Performed by: INTERNAL MEDICINE

## 2019-06-10 PROCEDURE — 3609009500 HC COLONOSCOPY DIAGNOSTIC OR SCREENING: Performed by: INTERNAL MEDICINE

## 2019-06-10 PROCEDURE — 2500000003 HC RX 250 WO HCPCS: Performed by: NURSE ANESTHETIST, CERTIFIED REGISTERED

## 2019-06-10 PROCEDURE — 3700000000 HC ANESTHESIA ATTENDED CARE: Performed by: INTERNAL MEDICINE

## 2019-06-10 PROCEDURE — 2709999900 HC NON-CHARGEABLE SUPPLY: Performed by: INTERNAL MEDICINE

## 2019-06-10 PROCEDURE — 2580000003 HC RX 258: Performed by: INTERNAL MEDICINE

## 2019-06-10 PROCEDURE — 7100000000 HC PACU RECOVERY - FIRST 15 MIN: Performed by: INTERNAL MEDICINE

## 2019-06-10 PROCEDURE — 6360000002 HC RX W HCPCS: Performed by: NURSE ANESTHETIST, CERTIFIED REGISTERED

## 2019-06-10 PROCEDURE — 3700000001 HC ADD 15 MINUTES (ANESTHESIA): Performed by: INTERNAL MEDICINE

## 2019-06-10 PROCEDURE — 82948 REAGENT STRIP/BLOOD GLUCOSE: CPT

## 2019-06-10 RX ORDER — PROPOFOL 10 MG/ML
INJECTION, EMULSION INTRAVENOUS PRN
Status: DISCONTINUED | OUTPATIENT
Start: 2019-06-10 | End: 2019-06-10 | Stop reason: SDUPTHER

## 2019-06-10 RX ORDER — LIDOCAINE HYDROCHLORIDE 20 MG/ML
INJECTION, SOLUTION INFILTRATION; PERINEURAL PRN
Status: DISCONTINUED | OUTPATIENT
Start: 2019-06-10 | End: 2019-06-10 | Stop reason: SDUPTHER

## 2019-06-10 RX ORDER — SODIUM CHLORIDE 450 MG/100ML
INJECTION, SOLUTION INTRAVENOUS CONTINUOUS
Status: DISCONTINUED | OUTPATIENT
Start: 2019-06-10 | End: 2019-06-10 | Stop reason: HOSPADM

## 2019-06-10 RX ADMIN — PROPOFOL 50 MG: 10 INJECTION, EMULSION INTRAVENOUS at 08:44

## 2019-06-10 RX ADMIN — PROPOFOL 50 MG: 10 INJECTION, EMULSION INTRAVENOUS at 08:52

## 2019-06-10 RX ADMIN — PROPOFOL 50 MG: 10 INJECTION, EMULSION INTRAVENOUS at 08:47

## 2019-06-10 RX ADMIN — PROPOFOL 50 MG: 10 INJECTION, EMULSION INTRAVENOUS at 08:58

## 2019-06-10 RX ADMIN — SODIUM CHLORIDE: 4.5 INJECTION, SOLUTION INTRAVENOUS at 08:11

## 2019-06-10 RX ADMIN — LIDOCAINE HYDROCHLORIDE 20 MG: 20 INJECTION, SOLUTION INFILTRATION; PERINEURAL at 08:44

## 2019-06-10 RX ADMIN — PROPOFOL 50 MG: 10 INJECTION, EMULSION INTRAVENOUS at 09:05

## 2019-06-10 ASSESSMENT — PAIN SCALES - GENERAL
PAINLEVEL_OUTOF10: 0

## 2019-06-10 ASSESSMENT — ENCOUNTER SYMPTOMS: SHORTNESS OF BREATH: 1

## 2019-06-10 ASSESSMENT — PAIN - FUNCTIONAL ASSESSMENT: PAIN_FUNCTIONAL_ASSESSMENT: 0-10

## 2019-06-10 NOTE — BRIEF OP NOTE
Brief Postoperative Note  ______________________________________________________________    Patient: Dariel Mustafa  YOB: 1955  MRN: [id number]  Date of Procedure: 6/10/2019    Pre-Op Diagnosis: CONSTIPATION    Post-Op Diagnosis: Same       Procedure(s):  COLONOSCOPY DIAGNOSTIC    Anesthesia: Monitor Anesthesia Care    Surgeon(s):  Kandace Neal MD    Assistant: none    Estimated Blood Loss (mL): less than 50     Complications: None    Specimens:   * No specimens in log *    Implants:  * No implants in log *      Drains: * No LDAs found *    Findings: normal    Kandace Neal MD  Date: 6/10/2019  Time: 9:21 AM

## 2019-06-10 NOTE — PROCEDURES
[address] [place] [address]                                 PROCEDURE NOTE    PATIENT NAME: Layla Casey                   :        1955  MED REC NO:   [id number]                           ROOM:  ACCOUNT NO:   [de-identified]                           ADMIT DATE: 06/10/2019  PROVIDER:     Estela Burkett. Esperanza Osborn M.D.    Malika Peralta:  06/10/2019    HISTORY:  This 68-year-old black female who presents today for  colonoscopy because of history of constipation as well as a prerenal  transplant evaluation. The patient has chronic kidney disease, attributed diabetes and  hypertension. She has been on dialysis since 2018. She is being  evaluated by [facility] for renal transplant and was told she  needs a colonoscopy as part of that evaluation. The patient has a history of constipation. She now goes three days  without a bowel movement. She had a small sigmoid polyp on 1991  by Dr. Leopoldo Lao. She had hyperplastic polyps on colonoscopy by Dr. Leopoldo Lao on 1992 and hyperplastic polyp on colonoscopy that I  performed on 2007. The patient notes that she does not have rectal bleeding. She does not  have painful bowel movements and there has been no change in her bowel  habits. She had been taking Pepcid for heartburn and notes that she is  unable to tolerate spicy food. I placed patient on Linzess 145 mcg daily. This has resulted in modest  improvement in the constipation. Before the exam today, the patient states that for the last several  weeks, she has been experiencing burning epigastric pain, especially at  dialysis. It is sometimes made better by eating solid foods. She  wondered if she should have further evaluation for this. SEDATION:  Please see the sedation record from the Anesthesiology  Department.     DESCRIPTION OF PROCEDURE:  The patient was placed in the left lateral  decubitus position, and the Olympus CHF-190AL adjustable video  colonoscope was introduced. The instrument was advanced to the cecum  with marked difficulty. Difficulty was encountered because of very  angulated and redundant colon. Quality of preparation was fair, made  good by extensive irrigation. The examination of the colon on  withdrawal was entirely within normal limits. In particular, there were  no neoplastic, vascular, or inflammatory lesion seen. Retroflexion view  of the rectum on withdrawal was within normal limits. Cecal withdrawal  time was 12 minutes and 45 seconds. ASSESSMENT:  1. Colonoscopy to cecum. 2.  Normal examination. COMMENT AND RECOMMENDATION:  A follow on colonoscopy is planned in 10  years. This should be done on or after 06/10/2029. The patient may  continue on Linzess 145 mcg daily. Because of the complaints of burning epigastric pain while taking the  Pepcid, I will schedule the patient for an EGD. Because she is on  dialysis, this will need to be done at the hospital.        Jonny Perez M.D.    D: 06/10/2019 9:20:47       T: 06/10/2019 9:52:13     [email or web address]  Job#: [id number]     Doc#: [id number]    CC:  CARLINE Bedoya.

## 2019-06-10 NOTE — ANESTHESIA POSTPROCEDURE EVALUATION
Department of Anesthesiology  Postprocedure Note    Patient: Altamease Kanner  MRN: [id number]  YOB: 1955  Date of evaluation: 6/10/2019  Time:  9:14 AM     Procedure Summary     Date:  06/10/19 Room / Location:  [address] ENDO 11 / [address] Endoscopy    Anesthesia Start:  0827 Anesthesia Stop:  2890    Procedure:  COLONOSCOPY DIAGNOSTIC (Left ) Diagnosis:  (CONSTIPATION)    Surgeon:  Yolanda Almanzar MD Responsible Provider:  Cherre Ormond, MD    Anesthesia Type:  MAC ASA Status:  4          Anesthesia Type: MAC    Shelly Phase I: Shelly Score: 10    Shelly Phase II:      Last vitals: Reviewed and per EMR flowsheets.        Anesthesia Post Evaluation    Patient location during evaluation: bedside  Patient participation: complete - patient participated  Level of consciousness: awake and alert  Airway patency: patent  Nausea & Vomiting: no nausea and no vomiting  Complications: no  Cardiovascular status: hemodynamically stable  Respiratory status: acceptable, spontaneous ventilation and room air  Hydration status: euvolemic

## 2019-06-10 NOTE — ANESTHESIA PRE PROCEDURE
Department of Anesthesiology  Preprocedure Note       Name:  Margarita Anderson   Age:  70 y.o.  :  1955                                          MRN:  [id number]         Date:  6/10/2019      Surgeon: Ne Riojas):  Carl Cline MD    Procedure: COLONOSCOPY DIAGNOSTIC (Left )    Medications prior to admission:   Prior to Admission medications    Medication Sig Start Date End Date Taking? Authorizing Provider   FOLBEE 2.5-25-1 MG TABS tablet TAKE 1 TABLET BY MOUTH DAILY 19   DONOVAN Mustafa - CNP   linaclotide [facility]) 145 MCG capsule Take 145 mcg by mouth every morning (before breakfast)    Historical Provider, MD   cinacalcet (SENSIPAR) 90 MG tablet Take 90 mg by mouth daily Tuesday, Thursday, and Saturday after dialysis    Historical Provider, MD   Lanthanum Carbonate 1000 MG PACK Take by mouth 2 times daily    Historical Provider, MD   amLODIPine (NORVASC) 10 MG tablet TAKE 1 TABLET BY MOUTH EVERY DAY 19   Neda Solomon, DO   lactulose (CHRONULAC) 10 GM/15ML solution TAKE 30ML BY MOUTH 2 TIMES DAILY AS NEEDED CONSTIPATION.  USE IF NO BM WITH OTHER SOFTNERS 4/10/19   DONOVAN Odonnell - CNP   vitamin D (ERGOCALCIFEROL) 39617 units CAPS capsule TAKE ONE CAPSULE BY MOUTH ONE TIME PER WEEK 3/25/19   Neda Solomon, DO   Sucroferric Oxyhydroxide (VELPHORO) 500 MG CHEW Take 500 mg by mouth Take with meals    Historical Provider, MD   insulin aspart (NOVOLOG FLEXPEN) 100 UNIT/ML injection pen Inject into the skin as needed Sliding scale insulin coverage  Glucose:Dose: If Less [id number] =No Insulin/ 140-199= 2 Units/ 200-249=4 Units/ 250-299= 6 Units/  300-349=8 Units/  350-400=10 Units/ Above 400 = 12 Units    Historical Provider, MD   Insulin Pen Needle (B-D ULTRAFINE III SHORT PEN) 31G X 8 MM MISC Use three times daily Diagnosis Code E11.9 19   DONOVAN Mustafa - CNP   nitroGLYCERIN (NITROSTAT) 0.4 MG SL tablet Place 1 tablet under the tongue every 5 minutes as needed for Chest pain 2/13/19   DONOVAN Lozano CNP   atorvastatin (LIPITOR) 20 MG tablet TAKE 1 TABLET BY MOUTH EVERY DAY 1/30/19   Phyllis Mathew DO   insulin glargine (LANTUS SOLOSTAR) 100 UNIT/ML injection pen Inject 15 Units into the skin nightly 11/21/18   Yahaira Pastrana MD   bumetanide (BUMEX) 1 MG tablet Take 2 mg by mouth 2 times daily    Historical Provider, MD   minoxidil (LONITEN) 2.5 MG tablet Take 5 mg by mouth 3 times daily    Historical Provider, MD   tiotropium (SPIRIVA HANDIHALER) 18 MCG inhalation capsule Inhale 1 capsule into the lungs daily 1 capsule via inhalation daily. 10/16/18 10/16/19  iNi Silva MD   acetaminophen (TYLENOL) 325 MG tablet Take 2 tablets by mouth every 6 hours as needed for Pain 8/24/18   DONOVAN See CNP   cloNIDine (CATAPRES) 0.3 MG tablet Take 1 tablet by mouth every 8 hours One tablet three times per day 8/8/18   Alma Spaulding MD   carvedilol (COREG) 25 MG tablet Take 1.5 tablets by mouth 2 times daily . hold if SBP less than 100 mm hg or HR less than 60 8/8/18   Alma Spaulding MD   doxazosin (CARDURA) 4 MG tablet Take 3 tablets by mouth daily . hold if SBP less than 100 mm hg  Patient taking differently: Take 8 mg by mouth daily . hold if SBP less than 100 mm hg 8/1/18   Zachary Joy MD   fluticasone (FLONASE) 50 MCG/ACT nasal spray 2 sprays by Nasal route daily as needed     Historical Provider, MD   albuterol sulfate HFA (PROAIR HFA) 108 (90 Base) MCG/ACT inhaler Inhale 2 puffs into the lungs every 6 hours as needed for Wheezing 12/27/17 5/8/19  Charles Olson PA-C   aspirin 81 MG EC tablet Take 81 mg by mouth daily. Historical Provider, MD       Current medications:    No current facility-administered medications for this encounter. Allergies:     Allergies   Allergen Reactions    Actos [Pioglitazone Hydrochloride] Swelling    Cymbalta [Duloxetine Hcl] Other (See Comments)     Anxiety and lethargic    Gabapentin Anxiety       Problem List:    Patient Active Problem List   Diagnosis Code    Diabetes mellitus, type 2 ([facility] E11.9    Uncontrolled hypertension I10    Chronic anemia D64.9    Coronary disease I25.10    Hyperlipemia E78.5    Arthritis M19.90    Neuropathy, diabetic ([facility] E11.40    CKD (chronic kidney disease), stage III ([facility] N18.3    Leg pain M79.606    Obesity (BMI 30-39. 9) E66.9    Low HDL (under 40) E78.6    Need for prophylactic vaccination against diphtheria-tetanus-pertussis (DTP) Z23    History of tobacco use Z87.891    Allergic rhinitis J30.9    COPD, mild ([facility]) J44.9    Lung mass R91.8    Anemia, chronic disease D63.8    Gout M10.9    Urolithiasis N20.9    Ankle fracture, right S82.891A    Secondary hyperparathyroidism of renal origin ([facility] N25.81    Obstructive sleep apnea on CPAP G47.33, Z99.89    Vitamin D deficiency E55.9    CKD (chronic kidney disease) stage 3, GFR 30-59 ml/min ([facility]) N18.3    Hypertensive nephropathy I12.9    Benign essential HTN I10    Hypertensive nephropathy I12.9    Persistent proteinuria associated with type 2 diabetes mellitus ([facility]) E11.29, R80.9    Cellulitis in diabetic foot ([facility]) E11.628, L03.119    VIDA (acute kidney injury) ([facility] N17.9    Persistent proteinuria R80.1    Acquired hypothyroidism E03.9    CKD (chronic kidney disease), stage IV ([facility]) N18.4    Nephrotic syndrome N04.9    Type 2 diabetes mellitus (HCC) E11.9    Iron deficiency anemia due to dietary causes D50.8    Anemia of chronic disease D63.8    Stage 5 chronic kidney disease not on chronic dialysis (HCC) N18.5    ESRD (end stage renal disease) ([facility]) N18.6    Hypervolemia associated with renal insufficiency E87.70, N28.9    Pulmonary edema, noncardiac J81.1    CKD (chronic kidney disease), stage V ([facility]) N18.5    Chronic progressive renal failure, stage 5 ([facility]) N18.5    Hypertensive emergency, no CHF I16.1    Diabetic peripheral neuropathy associated with type 2 diabetes mellitus (HCC) E11.42    Pericardial effusion I31.3    Hypokalemia E87.6    Type II diabetes mellitus with stage 5 chronic kidney disease (HCC) E11.22, N18.5    Acute on chronic diastolic congestive heart failure ([facility]) I50.33    End stage renal disease due to benign hypertension ([facility]) I12.0, N18.6    ESRD (end stage renal disease) on dialysis ([facility]) N18.6, Z99.2    Post-operative nausea and vomiting R11.2, Z98.890    Chronic constipation K59.09    Candida UTI B37.49    Fluid overload E87.70    Pleural effusion J90    Acute respiratory failure with hypoxia ([facility]) J96.01    Dyspnea R06.00    Bilateral leg edema R60.0    Hypernatremia L67.8    Metabolic acidosis A05.4    Generalized weakness R53.1    Lymphadenopathy, inguinal R59.0    Atelectasis of left lung J98.11    Thyroid nodule E04.1    ESRD needing dialysis ([facility] N18.6, Z99.2    Debility R53.81       Past Medical History:        Diagnosis Date    Anemia associated with chronic renal failure     Aranesp 150 micrograms every other week [facility]    Anxiety     Arthritis     Backache     Blood circulation, collateral     Blood transfusion     CAD (coronary artery disease)     Cellulitis in diabetic foot ([address] 03/03/2017    4th and 5th toes right foot    Chest pain     History of    CHF (congestive heart failure) ([facility] 1998    Dx'ed by Dr. Humphrey Vicente Chronic anemia     Chronic kidney disease     Chronic kidney disease, stage III (moderate) ([facility])     Chronic renal insufficiency 2010    COPD (chronic obstructive pulmonary disease) ([facility]) 2012    Dr. Leeann Novak    Coronary disease     Moderate    Depression     Diabetes mellitus, type 2 ([address] 1988    Disease of blood and blood forming organ     GERD (gastroesophageal reflux disease)     Hemoglobin disease ([facility])     hemoglobin hope    History of granulomatous disease ([facility] 2009    followed by Dr. Agapito Medellin    HTN (hypertension) 1970's    Hyperlipemia 1998    Iron deficiency anemia due to dietary causes 6/21/2018    Kidney stones 3/2014    Kidney trouble          MRSA infection 03/2017    right foot-Dr. Nugent Class (podiatrist)    Neuromuscular disorder ([facility]     Neuropathy 1989    diabetic neuropathy    Obesity since childhoood    CONTRERAS on CPAP 2010    Dr. Calixto Poag    Pneumonia     PONV (postoperative nausea and vomiting)     Seizures ([facility]        Past Surgical History:        Procedure Laterality Date    ANKLE SURGERY Right 02-10-14    Dr. Dmitry Jolly at [facility]  1990's    removal of benign tumor   [address]  2008   [address]  2009    2 polyps, not precanceorus    COSMETIC SURGERY  3/30/2012    eye lid lift    ENDOSCOPY, COLON, DIAGNOSTIC  2007 [facility] EYE SURGERY  March 30th, 2012    left sided ptosis    FOOT SURGERY  1990    Tarsal tunnel surgery    FRACTURE SURGERY  2015   [address] and [address]    first partial in 1980's, then total in [address] [facility]  2015   [facility] LIVER BIOPSY  6/2015    LUNG BIOPSY  2009    [place] OFFICE/OUTPT VISIT,PROCEDURE ONLY Left 8/23/2018    LEFT UPPER EXTREMENTY AV FISTULA CREATION performed by Gabrielle Cain MD at [address] [place] [address] History:    Social History     Tobacco Use    Smoking status: Former Smoker     Packs/day: 1.50     Years: 30.00     Pack years: 45.00     Types: Cigarettes     Start date: 12/28/1981     Last attempt to quit: 3/13/2009     Years since quitting: 10.2    Smokeless tobacco: Never Used    Tobacco comment: quit 2009   Substance Use Topics    Alcohol use: No     Alcohol/week: 0.0 oz                                Counseling given: Not Answered  Comment: quit 2009      Vital Signs (Current): There were no vitals filed for this visit.                                            BP Readings from Last 3 Encounters:   05/29/19 130/62   05/08/19 (!) 158/80   03/25/19 128/78       NPO Status: BMI:   Wt Readings from Last 3 Encounters:   05/29/19 175 lb 12.8 oz (79.7 kg)   05/08/19 172 lb 3.2 oz (78.1 kg)   03/25/19 173 lb 12.8 oz (78.8 kg)     There is no height or weight on file to calculate BMI.    CBC:   Lab Results   Component Value Date    WBC 6.3 11/18/2018    RBC 3.47 11/18/2018    RBC 3-5 09/06/2011    HGB 8.9 11/18/2018    HCT 28.9 11/18/2018    MCV 83.3 11/18/2018    RDW 15.2 04/26/2017     11/18/2018       CMP:   Lab Results   Component Value Date     11/06/2018    K 3.7 11/06/2018    K 3.4 11/02/2018    CL 98 11/06/2018    CO2 30 11/06/2018    BUN 33 11/06/2018    CREATININE 4.5 11/06/2018    LABGLOM 10 11/06/2018    GLUCOSE 158 11/06/2018    GLUCOSE 252 03/14/2012    PROT 6.1 11/02/2018    CALCIUM 9.4 11/06/2018    BILITOT 0.4 11/02/2018    ALKPHOS 62 11/02/2018    AST 15 11/02/2018    ALT 9 11/02/2018       POC Tests: No results for input(s): POCGLU, POCNA, POCK, POCCL, POCBUN, POCHEMO, POCHCT in the last 72 hours. Coags:   Lab Results   Component Value Date    INR 1.13 08/23/2018    APTT 36.3 08/23/2018       HCG (If Applicable): No results found for: PREGTESTUR, PREGSERUM, HCG, HCGQUANT     ABGs: No results found for: PHART, PO2ART, CQW8EWL, EWI4VKB, BEART, E8LEAAEC     Type & Screen (If Applicable):  Lab Results   Component Value Date    [address] POS 08/23/2018       Anesthesia Evaluation  Patient summary reviewed and Nursing notes reviewed   history of anesthetic complications: PONV.   Airway: Mallampati: II  TM distance: >3 FB   Neck ROM: full  Mouth opening: > = 3 FB Dental:    (+) upper dentures      Pulmonary:normal exam  breath sounds clear to auscultation  (+) COPD:  shortness of breath:  sleep apnea:                             Cardiovascular:  Exercise tolerance: poor (<4 METS),   (+) hypertension:, CAD:, CHF:,       ECG reviewed  Rhythm: regular  Rate: normal  Echocardiogram reviewed Neuro/Psych:   (+) psychiatric history:depression/anxiety             GI/Hepatic/Renal:   (+) GERD:, renal disease: ESRD,           Endo/Other:    (+) DiabetesType II DM, , hypothyroidism::., .                 Abdominal:       Abdomen: soft. Vascular:                                      Anesthesia Plan      MAC     ASA 4       Induction: intravenous. Anesthetic plan and risks discussed with patient. Plan discussed with attending.                   DONOVAN Campos CRNA   6/10/2019

## 2019-06-14 ENCOUNTER — HOSPITAL ENCOUNTER (OUTPATIENT)
Age: 64
Setting detail: OUTPATIENT SURGERY
Discharge: HOME | End: 2019-06-14
Attending: INTERNAL MEDICINE | Admitting: INTERNAL MEDICINE
Payer: MEDICARE

## 2019-06-14 ENCOUNTER — ANESTHESIA (OUTPATIENT)
Dept: ENDOSCOPY | Age: 64
End: 2019-06-14
Payer: MEDICARE

## 2019-06-14 ENCOUNTER — ANESTHESIA EVENT (OUTPATIENT)
Dept: ENDOSCOPY | Age: 64
End: 2019-06-14
Payer: MEDICARE

## 2019-06-14 VITALS
BODY MASS INDEX: 29.02 KG/M2 | SYSTOLIC BLOOD PRESSURE: 191 MMHG | TEMPERATURE: 98.9 F | WEIGHT: 174.2 LBS | HEIGHT: 65 IN | HEART RATE: 66 BPM | DIASTOLIC BLOOD PRESSURE: 86 MMHG | RESPIRATION RATE: 16 BRPM | OXYGEN SATURATION: 97 %

## 2019-06-14 VITALS
OXYGEN SATURATION: 98 % | RESPIRATION RATE: 13 BRPM | DIASTOLIC BLOOD PRESSURE: 93 MMHG | SYSTOLIC BLOOD PRESSURE: 208 MMHG

## 2019-06-14 PROCEDURE — 7100000001 HC PACU RECOVERY - ADDTL 15 MIN: Performed by: INTERNAL MEDICINE

## 2019-06-14 PROCEDURE — 2500000003 HC RX 250 WO HCPCS: Performed by: REGISTERED NURSE

## 2019-06-14 PROCEDURE — 6360000002 HC RX W HCPCS: Performed by: REGISTERED NURSE

## 2019-06-14 PROCEDURE — 2709999900 HC NON-CHARGEABLE SUPPLY: Performed by: INTERNAL MEDICINE

## 2019-06-14 PROCEDURE — 3609012400 HC EGD TRANSORAL BIOPSY SINGLE/MULTIPLE: Performed by: INTERNAL MEDICINE

## 2019-06-14 PROCEDURE — 7100000000 HC PACU RECOVERY - FIRST 15 MIN: Performed by: INTERNAL MEDICINE

## 2019-06-14 PROCEDURE — 3700000000 HC ANESTHESIA ATTENDED CARE: Performed by: INTERNAL MEDICINE

## 2019-06-14 PROCEDURE — 88312 SPECIAL STAINS GROUP 1: CPT

## 2019-06-14 PROCEDURE — 2580000003 HC RX 258: Performed by: INTERNAL MEDICINE

## 2019-06-14 PROCEDURE — 3700000001 HC ADD 15 MINUTES (ANESTHESIA): Performed by: INTERNAL MEDICINE

## 2019-06-14 PROCEDURE — 88305 TISSUE EXAM BY PATHOLOGIST: CPT

## 2019-06-14 RX ORDER — SODIUM CHLORIDE 450 MG/100ML
INJECTION, SOLUTION INTRAVENOUS CONTINUOUS
Status: DISCONTINUED | OUTPATIENT
Start: 2019-06-14 | End: 2019-06-14 | Stop reason: HOSPADM

## 2019-06-14 RX ORDER — LIDOCAINE HYDROCHLORIDE 20 MG/ML
INJECTION, SOLUTION EPIDURAL; INFILTRATION; INTRACAUDAL; PERINEURAL PRN
Status: DISCONTINUED | OUTPATIENT
Start: 2019-06-14 | End: 2019-06-14 | Stop reason: SDUPTHER

## 2019-06-14 RX ORDER — PROPOFOL 10 MG/ML
INJECTION, EMULSION INTRAVENOUS PRN
Status: DISCONTINUED | OUTPATIENT
Start: 2019-06-14 | End: 2019-06-14 | Stop reason: SDUPTHER

## 2019-06-14 RX ADMIN — LIDOCAINE HYDROCHLORIDE 100 MG: 20 INJECTION, SOLUTION EPIDURAL; INFILTRATION; INTRACAUDAL; PERINEURAL at 12:24

## 2019-06-14 RX ADMIN — PROPOFOL 140 MG: 10 INJECTION, EMULSION INTRAVENOUS at 12:24

## 2019-06-14 RX ADMIN — SODIUM CHLORIDE: 4.5 INJECTION, SOLUTION INTRAVENOUS at 11:13

## 2019-06-14 ASSESSMENT — PAIN - FUNCTIONAL ASSESSMENT: PAIN_FUNCTIONAL_ASSESSMENT: 0-10

## 2019-06-14 ASSESSMENT — PAIN SCALES - GENERAL
PAINLEVEL_OUTOF10: 0
PAINLEVEL_OUTOF10: 0

## 2019-06-14 ASSESSMENT — ENCOUNTER SYMPTOMS: SHORTNESS OF BREATH: 1

## 2019-06-14 NOTE — PROGRESS NOTES
Drowsy, arouses to verbal stimuli. Dr Quinn Boot in to speak with pt and friend regarding findings and plan of care.

## 2019-06-14 NOTE — ANESTHESIA POSTPROCEDURE EVALUATION
Department of Anesthesiology  Postprocedure Note    Patient: Margarita Anderson  MRN: [id number]  YOB: 1955  Date of evaluation: 6/14/2019  Time:  12:31 PM     Procedure Summary     Date:  06/14/19 Room / Location:  [facility] / [facility]    Anesthesia Start:  1218 Anesthesia Stop:  1231    Procedure:  EGD BIOPSY (Left ) Diagnosis:  (EPI PAIN)    Surgeon:  Carl Cline MD Responsible Provider:  Micaela Delatorre MD    Anesthesia Type:  MAC ASA Status:  4          Anesthesia Type: MAC    Shelly Phase I: Shelly Score: 10    Shelly Phase II:      Last vitals: Reviewed and per EMR flowsheets.        Anesthesia Post Evaluation    Patient location during evaluation: bedside  Patient participation: complete - patient participated  Level of consciousness: sleepy but conscious  Airway patency: patent  Nausea & Vomiting: no nausea and no vomiting  Complications: no  Cardiovascular status: hemodynamically stable  Respiratory status: acceptable, spontaneous ventilation and nasal cannula  Hydration status: stable

## 2019-06-14 NOTE — PROGRESS NOTES
EGD complete. Photos taken. Biopsies taken by cold forceps sent to lab in 1 specimen jar. Patient tolerated well.

## 2019-06-14 NOTE — PROCEDURES
[address] [place] [address]                                 PROCEDURE NOTE    PATIENT NAME: Bhavesh Pichardo                   :        1955  MED REC NO:   [id number]                           ROOM:  ACCOUNT NO:   [de-identified]                           ADMIT DATE: 2019  PROVIDER:     Becky Gleason. Mary Patricia M.D.    Nikos Lee:  2019    EGD NOTE    HISTORY:  A 25-year-old black female who presents today for EGD because  of burning epigastric pain. The patient has chronic kidney disease attributed to diabetes and  hypertension. She has been on dialysis since 2018. She is being  evaluated by [facility] for renal [facility] and underwent a  colonoscopy on 06/10/2019 as part of the evaluation of this problem. That examination was normal, and a repeat colonoscopy was planned in 10  years. Because the patient complained of constipation, she was started  on Linzess 145 mcg. The patient complained of burning epigastric pain present for the last  several weeks especially following dialysis. It was made better by  eating solid foods. The patient did have an unremarkable EGD when she  was having similar symptoms on 2017. She has been taking Pepcid  without apparent benefit. SEDATION:  Please see the sedation record of the Anesthesiology  Department. PROCEDURE IN DETAIL:  The patient was placed in the left lateral  decubitus position and the Olympus GIF-H190 video endoscope was  introduced. The esophagus was normal.  The esophagogastric junction was  seen at 40 cm. It was examined with standard light narrow band imaging. It was considered normal.  The proximal stomach was normal.  The gastric  body was normal.  The gastric antrum showed mild-to-moderate changes of  diffuse erosion. The pylorus was normal.  The duodenal bulb showed  moderate changes of erosion as well.   This involved most of the

## 2019-06-14 NOTE — BRIEF OP NOTE
Brief Postoperative Note  ______________________________________________________________    Patient: Willian Stewart  YOB: 1955  MRN: [id number]  Date of Procedure: 6/14/2019    Pre-Op Diagnosis: EPI PAIN    Post-Op Diagnosis: Same       Procedure(s):  EGD BIOPSY    Anesthesia: Monitor Anesthesia Care    Surgeon(s):  Micah Lobato MD    Assistant: none    Estimated Blood Loss (mL): less than 50     Complications: None    Specimens:   ID Type Source Tests Collected by Time Destination   A : GASTRITIS, DUODENITIS.   Tissue Stomach SURGICAL PATHOLOGY Micah Lobato MD 6/14/2019 1228        Implants:  * No implants in log *      Drains: * No LDAs found *    Findings: erosive antritis and duodenitis    Micah Lobato MD  Date: 6/14/2019  Time: 12:37 PM

## 2019-06-14 NOTE — ANESTHESIA PRE PROCEDURE
at 06/14/19 1113         Allergies: Allergies   Allergen Reactions    Actos [Pioglitazone Hydrochloride] Swelling    Cymbalta [Duloxetine Hcl] Other (See Comments)     Anxiety and lethargic    Gabapentin Anxiety       Problem List:    Patient Active Problem List   Diagnosis Code    Diabetes mellitus, type 2 ([facility] E11.9    Uncontrolled hypertension I10    Chronic anemia D64.9    Coronary disease I25.10    Hyperlipemia E78.5    Arthritis M19.90    Neuropathy, diabetic ([facility] E11.40    CKD (chronic kidney disease), stage III ([facility] N18.3    Leg pain M79.606    Obesity (BMI 30-39. 9) E66.9    Low HDL (under 40) E78.6    Need for prophylactic vaccination against diphtheria-tetanus-pertussis (DTP) Z23    History of tobacco use Z87.891    Allergic rhinitis J30.9    COPD, mild ([facility]) J44.9    Lung mass R91.8    Anemia, chronic disease D63.8    Gout M10.9    Urolithiasis N20.9    Ankle fracture, right S82.891A    Secondary hyperparathyroidism of renal origin ([facility] N25.81    Obstructive sleep apnea on CPAP G47.33, Z99.89    Vitamin D deficiency E55.9    CKD (chronic kidney disease) stage 3, GFR 30-59 ml/min ([facility]) N18.3    Hypertensive nephropathy I12.9    Benign essential HTN I10    Hypertensive nephropathy I12.9    Persistent proteinuria associated with type 2 diabetes mellitus ([facility]) E11.29, R80.9    Cellulitis in diabetic foot ([facility]) E11.628, L03.119    VIDA (acute kidney injury) ([facility] N17.9    Persistent proteinuria R80.1    Acquired hypothyroidism E03.9    CKD (chronic kidney disease), stage IV ([facility]) N18.4    Nephrotic syndrome N04.9    Type 2 diabetes mellitus ([facility]) E11.9    Iron deficiency anemia due to dietary causes D50.8    Anemia of chronic disease D63.8    Stage 5 chronic kidney disease not on chronic dialysis ([facility]) N18.5    ESRD (end stage renal disease) ([facility]) N18.6    Hypervolemia associated with renal insufficiency E87.70, N28.9    Pulmonary edema, noncardiac J81.1    CKD (chronic kidney disease), stage V ([facility]) N18.5    Chronic progressive renal failure, stage 5 ([facility]) N18.5    Hypertensive emergency, no CHF I16.1    Diabetic peripheral neuropathy associated with type 2 diabetes mellitus (HCC) E11.42    Pericardial effusion I31.3    Hypokalemia E87.6    Type II diabetes mellitus with stage 5 chronic kidney disease (HCC) E11.22, N18.5    Acute on chronic diastolic congestive heart failure ([facility]) I50.33    End stage renal disease due to benign hypertension (HCC) I12.0, N18.6    ESRD (end stage renal disease) on dialysis ([facility]) N18.6, Z99.2    Post-operative nausea and vomiting R11.2, Z98.890    Chronic constipation K59.09    Candida UTI B37.49    Fluid overload E87.70    Pleural effusion J90    Acute respiratory failure with hypoxia ([facility]) J96.01    Dyspnea R06.00    Bilateral leg edema R60.0    Hypernatremia W92.4    Metabolic acidosis O65.9    Generalized weakness R53.1    Lymphadenopathy, inguinal R59.0    Atelectasis of left lung J98.11    Thyroid nodule E04.1    ESRD needing dialysis ([facility] N18.6, Z99.2    Debility R53.81       Past Medical History:        Diagnosis Date    Anemia associated with chronic renal failure     Aranesp 150 micrograms every other week [facility] at [facility]    Anxiety     Arthritis     Backache     Blood circulation, collateral     Blood transfusion     CAD (coronary artery disease)     Cellulitis in diabetic foot ([facility] 03/03/2017    4th and 5th toes right foot    Chest pain     History of    CHF (congestive heart failure) ([facility] 1998    Dx'ed by Dr. Perez Failing Chronic anemia     Chronic kidney disease     Chronic kidney disease, stage III (moderate) ([facility])     Chronic renal insufficiency 2010    COPD (chronic obstructive pulmonary disease) ([facility]) 2012    Dr. Rievr Gardner    Coronary disease     Moderate    Depression     Diabetes mellitus, type 2 ([facility] 1988    Disease of blood and blood forming organ     GERD (gastroesophageal reflux disease)     Hemoglobin disease (HCC)     hemoglobin hope    History of granulomatous disease ([facility] 2009    followed by Dr. Younger Heart    HTN (hypertension) 1970's    Hyperlipemia 1998    Iron deficiency anemia due to dietary causes 6/21/2018    Kidney stones 3/2014    Kidney trouble          MRSA infection 03/2017    right foot-Dr. Murriel Spatz (podiatrist)    Neuromuscular disorder ([facility]     Neuropathy 1989    diabetic neuropathy    Obesity since childhoood    CONTRERAS on CPAP 2010    Dr. Maude Urrutia    Pneumonia     PONV (postoperative nausea and vomiting)     Seizures ([facility]        Past Surgical History:        Procedure Laterality Date    ANKLE SURGERY Right 02-10-14    [facility]    removal of benign tumor   [address]  2008   [address]  2009    2 polyps, not precanceorus    COLONOSCOPY Left 6/10/2019    COLONOSCOPY DIAGNOSTIC performed by Kevin Persaud MD at [id number] [place] [address]  3/30/2012    eye lid lift    ENDOSCOPY, COLON, DIAGNOSTIC  2007   Hiral Mckeon EYE SURGERY  March 30th, 2012    left sided ptosis    FOOT SURGERY  1990    Tarsal tunnel surgery    FRACTURE SURGERY  2015   [address] and [address]    first partial in 1980's, then total in [address]  2015   [facility] LIVER BIOPSY  6/2015    LUNG BIOPSY  2009    MD OFFICE/OUTPT VISIT,PROCEDURE ONLY Left 8/23/2018    LEFT UPPER EXTREMENTY AV FISTULA CREATION performed by Danuta Prieto MD at [address] [place] [address] History:    Social History     Tobacco Use    Smoking status: Former Smoker     Packs/day: 1.50     Years: 30.00     Pack years: 45.00     Types: Cigarettes     Start date: 12/28/1981     Last attempt to quit: 3/13/2009     Years since quitting: 10.2    Smokeless tobacco: Never Used    Tobacco comment: quit 2009   Substance Use Topics    Alcohol use: No     Alcohol/week: 0.0 oz Counseling given: Not Answered  Comment: quit 2009      Vital Signs (Current):   Vitals:    06/14/19 1105   BP: (!) 203/98   Pulse: 66   Resp: 18   Temp: 37.4 °C (99.4 °F)   TempSrc: Temporal   SpO2: 100%   Weight: 174 lb 3.2 oz (79 kg)   Height: 5' 5\" (1.651 m)                                              BP Readings from Last 3 Encounters:   06/14/19 (!) 203/98   06/10/19 (!) 149/67   06/10/19 (!) 151/66       NPO Status: Time of last liquid consumption: 2200                        Time of last solid consumption: 2230                        Date of last liquid consumption: 06/13/19                        Date of last solid food consumption: 06/13/19    BMI:   Wt Readings from Last 3 Encounters:   06/14/19 174 lb 3.2 oz (79 kg)   06/10/19 174 lb 12.8 oz (79.3 kg)   05/29/19 175 lb 12.8 oz (79.7 kg)     Body mass index is 28.99 kg/m². CBC:   Lab Results   Component Value Date    WBC 6.3 11/18/2018    RBC 3.47 11/18/2018    RBC 3-5 09/06/2011    HGB 8.9 11/18/2018    HCT 28.9 11/18/2018    MCV 83.3 11/18/2018    RDW 15.2 04/26/2017     11/18/2018       CMP:   Lab Results   Component Value Date     11/06/2018    K 3.7 11/06/2018    K 3.4 11/02/2018    CL 98 11/06/2018    CO2 30 11/06/2018    BUN 33 11/06/2018    CREATININE 4.5 11/06/2018    LABGLOM 10 11/06/2018    GLUCOSE 158 11/06/2018    GLUCOSE 252 03/14/2012    PROT 6.1 11/02/2018    CALCIUM 9.4 11/06/2018    BILITOT 0.4 11/02/2018    ALKPHOS 62 11/02/2018    AST 15 11/02/2018    ALT 9 11/02/2018       POC Tests: No results for input(s): POCGLU, POCNA, POCK, POCCL, POCBUN, POCHEMO, POCHCT in the last 72 hours.     Coags:   Lab Results   Component Value Date    INR 1.13 08/23/2018    APTT 36.3 08/23/2018       HCG (If Applicable): No results found for: PREGTESTUR, PREGSERUM, HCG, HCGQUANT     ABGs: No results found for: PHART, PO2ART, LZT3THX, AWR1AQV, BEART, E0PYOUQR     Type & Screen (If Applicable):  Lab Results   Component Value Date    [address] POS 08/23/2018       Anesthesia Evaluation  Patient summary reviewed  Airway: Mallampati: III       Mouth opening: > = 3 FB Dental:    (+) upper dentures and lower dentures      Pulmonary:   (+) pneumonia:  COPD:  shortness of breath:  sleep apnea: on CPAP,                             Cardiovascular:    (+) hypertension:, CAD:, CHF:,                   Neuro/Psych:   (+) neuromuscular disease:, psychiatric history:            GI/Hepatic/Renal:   (+) GERD:,           Endo/Other:    (+) Diabetes, hypothyroidism::., .                 Abdominal:   (+) obese,         Vascular:                                        Anesthesia Plan      MAC     ASA 4       Induction: intravenous. Anesthetic plan and risks discussed with patient. Plan discussed with attending.                   DONOVAN Verde - CRNA   6/14/2019

## 2019-06-24 ENCOUNTER — OFFICE VISIT (OUTPATIENT)
Dept: PHYSICAL MEDICINE AND REHAB | Age: 64
End: 2019-06-24
Payer: MEDICARE

## 2019-06-24 VITALS
WEIGHT: 174.16 LBS | DIASTOLIC BLOOD PRESSURE: 67 MMHG | HEART RATE: 64 BPM | HEIGHT: 65 IN | SYSTOLIC BLOOD PRESSURE: 137 MMHG | BODY MASS INDEX: 29.02 KG/M2

## 2019-06-24 DIAGNOSIS — E11.42 DIABETIC POLYNEUROPATHY ASSOCIATED WITH TYPE 2 DIABETES MELLITUS (HCC): Primary | ICD-10-CM

## 2019-06-24 DIAGNOSIS — M25.571 CHRONIC PAIN OF RIGHT ANKLE: ICD-10-CM

## 2019-06-24 DIAGNOSIS — G89.29 CHRONIC PAIN OF RIGHT ANKLE: ICD-10-CM

## 2019-06-24 DIAGNOSIS — G89.29 OTHER CHRONIC PAIN: ICD-10-CM

## 2019-06-24 PROCEDURE — 1036F TOBACCO NON-USER: CPT | Performed by: NURSE PRACTITIONER

## 2019-06-24 PROCEDURE — G8417 CALC BMI ABV UP PARAM F/U: HCPCS | Performed by: NURSE PRACTITIONER

## 2019-06-24 PROCEDURE — 1123F ACP DISCUSS/DSCN MKR DOCD: CPT | Performed by: NURSE PRACTITIONER

## 2019-06-24 PROCEDURE — G8399 PT W/DXA RESULTS DOCUMENT: HCPCS | Performed by: NURSE PRACTITIONER

## 2019-06-24 PROCEDURE — 4040F PNEUMOC VAC/ADMIN/RCVD: CPT | Performed by: NURSE PRACTITIONER

## 2019-06-24 PROCEDURE — 3044F HG A1C LEVEL LT 7.0%: CPT | Performed by: NURSE PRACTITIONER

## 2019-06-24 PROCEDURE — 2022F DILAT RTA XM EVC RTNOPTHY: CPT | Performed by: NURSE PRACTITIONER

## 2019-06-24 PROCEDURE — 99213 OFFICE O/P EST LOW 20 MIN: CPT | Performed by: NURSE PRACTITIONER

## 2019-06-24 PROCEDURE — G8598 ASA/ANTIPLAT THER USED: HCPCS | Performed by: NURSE PRACTITIONER

## 2019-06-24 PROCEDURE — 3017F COLORECTAL CA SCREEN DOC REV: CPT | Performed by: NURSE PRACTITIONER

## 2019-06-24 PROCEDURE — G8427 DOCREV CUR MEDS BY ELIG CLIN: HCPCS | Performed by: NURSE PRACTITIONER

## 2019-06-24 PROCEDURE — 1090F PRES/ABSN URINE INCON ASSESS: CPT | Performed by: NURSE PRACTITIONER

## 2019-06-24 RX ORDER — LIDOCAINE AND PRILOCAINE 25; 25 MG/G; MG/G
CREAM TOPICAL
Refills: 11 | Status: ON HOLD | COMMUNITY
Start: 2019-05-29 | End: 2020-08-03

## 2019-06-24 RX ORDER — HYDROCODONE BITARTRATE AND ACETAMINOPHEN 5; 325 MG/1; MG/1
TABLET ORAL
Refills: 0 | COMMUNITY
Start: 2019-05-20 | End: 2019-06-24 | Stop reason: SDUPTHER

## 2019-06-24 RX ORDER — HYDROCODONE BITARTRATE AND ACETAMINOPHEN 5; 325 MG/1; MG/1
1 TABLET ORAL EVERY 8 HOURS PRN
Qty: 90 TABLET | Refills: 0 | Status: SHIPPED | OUTPATIENT
Start: 2019-06-24 | End: 2019-07-24

## 2019-06-24 RX ORDER — OMEPRAZOLE 20 MG/1
CAPSULE, DELAYED RELEASE ORAL
Refills: 0 | COMMUNITY
Start: 2019-06-14 | End: 2020-04-27

## 2019-06-24 NOTE — PROGRESS NOTES
[address] [facility]  Outpatient progress note    Chief Complaint:   Chief Complaint   Patient presents with    Follow-up     3 month f/up         Subjective: Ross Gabriel is a 70 y.o. female who returns to the office today for further follow up. Low back pain and neuropathy symptoms are stable. Remains on Fo[place]land with good benefits. No recent hospitalizations. Has had a couple episodes where she felt like her sciatic pain was coming back, however she was able to use a heating pad during these times and got relief. She is awaiting a kidney transplant. Going through testing to make sure she is a candidate. Edwin Pineda will be donor if testing comes out okay. Medications reviewed. Patient denies side effects with medications. Patient states she is taking medications as prescribed. She denies receiving pain medications from other sources. She denies any ER visits since last visit. Pain scale with out pain medications or at its worst is 6/10. Pain scale with pain medications or at its best is 2/10. Last dose of Foosland was today  Drug screen reviewed from last visit and was appropriate  Patient does not have naloxone available at home. Patient has not required use of naloxone at home since last office visit.      Review of Systems:  CONSTITUTIONAL: negative  RESPIRATORY: negative  CARDIOVASCULAR: positive for peripheral edema  GASTROINTESTINAL: negative  GENITOURINARY: negative  MUSCULOSKELETAL: positive for pain  NEUROLOGICAL: positive for numbness/tingling  BEHAVIOR/PSYCH: negative  10 point system review otherwise negative    Physical Exam:  /67 (Site: Right Upper Arm, Position: Sitting, Cuff Size: Medium Adult)   Pulse 64   Ht 5' 5\" (1.651 m)   Wt 174 lb 2.6 oz (79 kg)   BMI 28.98 kg/m²     awake  Orientation:   person, place, time  Mood: within normal limits  Affect: calm  General appearance: Patient is well nourished, well developed, well groomed and in no acute distress    ROM:  abnormal - limited in right ankle and lumbar spine. Tenderness with palpation around right ankle. Tenderness of low back right paraspinals referred into right gluteal area  Motor Exam:  Motor exam is symmetrical 5 out of 5 all extremities bilaterally  Tone:  normal  Muscle bulk: within normal limits  Sensory:  Diminished to light touch bilateral lower extremities  Coordination:   normal    Skin: warm and dry, no rash or erythema  Peripheral vascular: Pulses: Normal upper and lower extremity pulses; Edema: 2+ right ankle, 1+ left ankle      Impression:  · Right ankle fracture with ongoing ankle pain  · Peripheral neuropathy with neuralgia and numbness/tingling related to diabetes  · Lumbar spondylosis low back pain  · Gait disturbance  · Sciatica    Plan:  · Ankle stabilizing orthosis right ankle, continue as needed  · Continue with Norco as needed for pain- refills given  · OARRS reviewed. Current MED: 15  · Patient was not offered naloxone for home. · Discussed long term side effects of medications, tolerance, dependency and addiction. · Previous UDS reviewed  · UDS preformed today for compliance. · Patient told can not receive any pain medications from any other source. · No evidence of abuse, diversion or aberrant behavior.  Medications and/or procedures to improve function and quality of life- patient understanding with this and that may not be pain free   Discussed with patient about safe storage of medications at home   Discussed possible weaning of medication dosing dependent on treatment/procedure results. Will continue to monitor any benefits vs side effects of the medications as prescribed. The patient has been warned about the risk of operating machinery including driving if impaired in any way by these medications. The patient also accepts the risks of tolerance, dependency, or addiction related to the prescribed medications.   All

## 2019-07-03 ENCOUNTER — OFFICE VISIT (OUTPATIENT)
Dept: FAMILY MEDICINE CLINIC | Age: 64
End: 2019-07-03
Payer: MEDICARE

## 2019-07-03 VITALS
HEART RATE: 65 BPM | BODY MASS INDEX: 28.69 KG/M2 | DIASTOLIC BLOOD PRESSURE: 81 MMHG | SYSTOLIC BLOOD PRESSURE: 178 MMHG | OXYGEN SATURATION: 100 % | WEIGHT: 172.4 LBS | TEMPERATURE: 98.5 F

## 2019-07-03 DIAGNOSIS — N18.5 TYPE II DIABETES MELLITUS WITH STAGE 5 CHRONIC KIDNEY DISEASE (HCC): ICD-10-CM

## 2019-07-03 DIAGNOSIS — N39.0 URINARY TRACT INFECTION IN FEMALE: Primary | ICD-10-CM

## 2019-07-03 DIAGNOSIS — E11.22 TYPE II DIABETES MELLITUS WITH STAGE 5 CHRONIC KIDNEY DISEASE (HCC): ICD-10-CM

## 2019-07-03 LAB
BILIRUBIN URINE: NEGATIVE
BLOOD URINE, POC: ABNORMAL
CHARACTER, URINE: ABNORMAL
COLOR, URINE: YELLOW
GLUCOSE URINE: NEGATIVE MG/DL
KETONES, URINE: NEGATIVE
LEUKOCYTE CLUMPS, URINE: ABNORMAL
NITRITE, URINE: NEGATIVE
PH, URINE: 6 (ref 5–9)
PROTEIN, URINE: >= 300 MG/DL
SPECIFIC GRAVITY, URINE: 1.02 (ref 1–1.03)
UROBILINOGEN, URINE: 0.2 EU/DL (ref 0–1)

## 2019-07-03 PROCEDURE — 1123F ACP DISCUSS/DSCN MKR DOCD: CPT | Performed by: NURSE PRACTITIONER

## 2019-07-03 PROCEDURE — 3044F HG A1C LEVEL LT 7.0%: CPT | Performed by: NURSE PRACTITIONER

## 2019-07-03 PROCEDURE — G8417 CALC BMI ABV UP PARAM F/U: HCPCS | Performed by: NURSE PRACTITIONER

## 2019-07-03 PROCEDURE — 3017F COLORECTAL CA SCREEN DOC REV: CPT | Performed by: NURSE PRACTITIONER

## 2019-07-03 PROCEDURE — G8427 DOCREV CUR MEDS BY ELIG CLIN: HCPCS | Performed by: NURSE PRACTITIONER

## 2019-07-03 PROCEDURE — 99214 OFFICE O/P EST MOD 30 MIN: CPT | Performed by: NURSE PRACTITIONER

## 2019-07-03 PROCEDURE — 4040F PNEUMOC VAC/ADMIN/RCVD: CPT | Performed by: NURSE PRACTITIONER

## 2019-07-03 PROCEDURE — 1090F PRES/ABSN URINE INCON ASSESS: CPT | Performed by: NURSE PRACTITIONER

## 2019-07-03 PROCEDURE — 81003 URINALYSIS AUTO W/O SCOPE: CPT | Performed by: NURSE PRACTITIONER

## 2019-07-03 PROCEDURE — G8598 ASA/ANTIPLAT THER USED: HCPCS | Performed by: NURSE PRACTITIONER

## 2019-07-03 PROCEDURE — G8399 PT W/DXA RESULTS DOCUMENT: HCPCS | Performed by: NURSE PRACTITIONER

## 2019-07-03 PROCEDURE — 2022F DILAT RTA XM EVC RTNOPTHY: CPT | Performed by: NURSE PRACTITIONER

## 2019-07-03 PROCEDURE — 1036F TOBACCO NON-USER: CPT | Performed by: NURSE PRACTITIONER

## 2019-07-03 RX ORDER — CIPROFLOXACIN 250 MG/1
250 TABLET, FILM COATED ORAL DAILY
Qty: 3 TABLET | Refills: 0 | Status: SHIPPED | OUTPATIENT
Start: 2019-07-03 | End: 2019-07-06

## 2019-07-03 ASSESSMENT — ENCOUNTER SYMPTOMS
VOMITING: 0
CONSTIPATION: 0
RHINORRHEA: 0
SHORTNESS OF BREATH: 0
DIARRHEA: 0
COUGH: 0
SORE THROAT: 0
TROUBLE SWALLOWING: 0
ABDOMINAL PAIN: 0

## 2019-07-03 NOTE — PROGRESS NOTES
[address] [facility] [place] [address]  Dept: [phone]  Dept Fax: [phone]  Loc: [address] [facility]       Chief Complaint   Patient presents with    Urinary Tract Infection       Nurses Notes reviewed and I agree except as noted in the HPI. HISTORY OF PRESENT ILLNESS   Jessika Estrella is a 70 y.o. female who presents for possible UTI. History of recurrent UTI. States Big stone gap 3.\"  Onset of s/s 1 week ago. C/o dysuria and pruritis. No discharge. No better with OTC treatment. PMH of DM w/ insulin. States levels have been good. ESRD on HD T, R, S. No increased in swelling. No chest pain/SOB. Going through transplant testing at [address] [place] [address] for possible transplant. No additional complaints. REVIEW OF SYSTEMS     Review of Systems   Constitutional: Negative for chills, diaphoresis, fatigue and fever. HENT: Negative for congestion, ear pain, rhinorrhea, sore throat and trouble swallowing. Eyes: Negative for visual disturbance. Respiratory: Negative for cough and shortness of breath. Cardiovascular: Negative for chest pain, palpitations and leg swelling. Gastrointestinal: Negative for abdominal pain, constipation, diarrhea and vomiting. Genitourinary: Positive for dysuria. Negative for difficulty urinating, flank pain, frequency, genital sores, hematuria, menstrual problem, pelvic pain, urgency, vaginal bleeding, vaginal discharge and vaginal pain. Musculoskeletal: Negative for neck pain and neck stiffness. Skin: Negative for rash. Neurological: Negative for dizziness and headaches. Hematological: Negative for adenopathy. Psychiatric/Behavioral: Negative for sleep disturbance.        PAST MEDICAL HISTORY         Diagnosis Date    Anemia associated with chronic renal failure     Aranesp 150 micrograms every other week given at [facility]    Anxiety     Use three times daily Diagnosis Code E11.9 200 each 3    nitroGLYCERIN (NITROSTAT) 0.4 MG SL tablet Place 1 tablet under the tongue every 5 minutes as needed for Chest pain 25 tablet 5    atorvastatin (LIPITOR) 20 MG tablet TAKE 1 TABLET BY MOUTH EVERY DAY 60 tablet 1    insulin glargine (LANTUS SOLOSTAR) 100 UNIT/ML injection pen Inject 15 Units into the skin nightly 10 pen 3    bumetanide (BUMEX) 1 MG tablet Take 2 mg by mouth 2 times daily      minoxidil (LONITEN) 2.5 MG tablet Take 5 mg by mouth 3 times daily      tiotropium (SPIRIVA HANDIHALER) 18 MCG inhalation capsule Inhale 1 capsule into the lungs daily 1 capsule via inhalation daily. 90 capsule 3    acetaminophen (TYLENOL) 325 MG tablet Take 2 tablets by mouth every 6 hours as needed for Pain 120 tablet 3    cloNIDine (CATAPRES) 0.3 MG tablet Take 1 tablet by mouth every 8 hours One tablet three times per day 270 tablet 4    carvedilol (COREG) 25 MG tablet Take 1.5 tablets by mouth 2 times daily . hold if SBP less than 100 mm hg or HR less than 60 270 tablet 3    doxazosin (CARDURA) 4 MG tablet Take 3 tablets by mouth daily . hold if SBP less than 100 mm hg (Patient taking differently: Take 8 mg by mouth daily . hold if SBP less than 100 mm hg) 90 tablet 3    fluticasone (FLONASE) 50 MCG/ACT nasal spray 2 sprays by Nasal route daily as needed       albuterol sulfate HFA (PROAIR HFA) 108 (90 Base) MCG/ACT inhaler Inhale 2 puffs into the lungs every 6 hours as needed for Wheezing 3 Inhaler 3    aspirin 81 MG EC tablet Take 81 mg by mouth daily. No facility-administered medications prior to visit. ALLERGIES     Patient is is allergic to pioglitazone; actos [pioglitazone hydrochloride]; cymbalta [duloxetine hcl]; and gabapentin.     FAMILY HISTORY     Patient's family history includes Alcohol Abuse in her father; Arthritis in her mother; Cancer in her sister; Diabetes in her brother, brother, father, sister, sister, sister, sister, and sister; Early Death in her sister; Heart Disease in her father, mother, sister, and sister; High Blood Pressure in her mother; Kidney Disease in her mother; Mental Illness in her mother; Obesity in her father; Stroke in her mother. SOCIAL HISTORY     Patient  reports that she quit smoking about 10 years ago. Her smoking use included cigarettes. She started smoking about 37 years ago. She has a 45.00 pack-year smoking history. She has never used smokeless tobacco. She reports that she does not drink alcohol or use drugs. PHYSICAL EXAM     VITALS  BP: (!) 178/81, Temp: 98.5 °F (36.9 °C), Pulse: 65,  , SpO2: 100 %  Physical Exam   Constitutional: She is oriented to person, place, and time. She appears well-developed and well-nourished. She is cooperative. Non-toxic appearance. She does not have a sickly appearance. She does not appear ill. No distress. HENT:   Head: Normocephalic and atraumatic. Right Ear: Hearing, tympanic membrane, external ear and ear canal normal.   Left Ear: Hearing, tympanic membrane, external ear and ear canal normal.   Nose: Nose normal. No mucosal edema or rhinorrhea. Right sinus exhibits no maxillary sinus tenderness and no frontal sinus tenderness. Left sinus exhibits no maxillary sinus tenderness and no frontal sinus tenderness. Mouth/Throat: Uvula is midline, oropharynx is clear and moist and mucous membranes are normal. No trismus in the jaw. No uvula swelling. No oropharyngeal exudate, posterior oropharyngeal edema, posterior oropharyngeal erythema or tonsillar abscesses. Eyes: Pupils are equal, round, and reactive to light. Conjunctivae, EOM and lids are normal. Right conjunctiva is not injected. Right conjunctiva has no hemorrhage. Left conjunctiva is not injected. Left conjunctiva has no hemorrhage. No scleral icterus. Neck: Trachea normal and normal range of motion. Neck supple. No thyromegaly present.    Cardiovascular: Normal rate, regular rhythm and normal heart

## 2019-07-03 NOTE — PROGRESS NOTES
Health Maintenance Due   Topic Date Due    Hepatitis C screen  1955 ordered on 10/04/18     Diabetic foot exam  12/13/1963    Clemente Soliz DPM, [facility]   Address: [facility] [address], [place] [address], [address] [facility]   Phone: [phone]      Diabetic retinal exam  12/13/1963  Has not been seen in years  Dialysis has been keeping her busy     HIV screen  12/13/1968 ordered on 10/04/18     Hepatitis B Vaccine (1 of 3 - Risk Recombivax 3-dose series) 12/13/1972    [facility] [address] [address]   Address: [place] [address] [address], [place] [address], [address]   Phone: [phone]      Cervical cancer screen  12/13/1974    Shingles Vaccine (1 of 2) 12/13/2003    Annual Wellness Visit (AWV)  12/13/2016    Pneumococcal 65+ years Vaccine (1 of 2 - PCV13) 12/13/2018

## 2019-07-06 LAB
ORGANISM: ABNORMAL
ORGANISM: ABNORMAL
URINE CULTURE, ROUTINE: ABNORMAL
URINE CULTURE, ROUTINE: ABNORMAL

## 2019-07-08 ENCOUNTER — TELEPHONE (OUTPATIENT)
Dept: FAMILY MEDICINE CLINIC | Age: 64
End: 2019-07-08

## 2019-07-08 NOTE — TELEPHONE ENCOUNTER
----- Message from DONOVAN Dickey CNP sent at 7/8/2019  8:58 AM EDT -----  Cipro sensitive, how is she doing?

## 2019-07-29 RX ORDER — ATORVASTATIN CALCIUM 20 MG/1
TABLET, FILM COATED ORAL
Qty: 60 TABLET | Refills: 1 | Status: SHIPPED | OUTPATIENT
Start: 2019-07-29 | End: 2020-01-10

## 2019-08-14 ENCOUNTER — OFFICE VISIT (OUTPATIENT)
Dept: INTERNAL MEDICINE CLINIC | Age: 64
End: 2019-08-14
Payer: MEDICARE

## 2019-08-14 VITALS
DIASTOLIC BLOOD PRESSURE: 69 MMHG | HEART RATE: 70 BPM | HEIGHT: 65 IN | BODY MASS INDEX: 29.49 KG/M2 | WEIGHT: 177 LBS | SYSTOLIC BLOOD PRESSURE: 156 MMHG

## 2019-08-14 DIAGNOSIS — E11.9 TYPE 2 DIABETES MELLITUS WITHOUT COMPLICATION, WITH LONG-TERM CURRENT USE OF INSULIN (HCC): Primary | ICD-10-CM

## 2019-08-14 DIAGNOSIS — I10 ESSENTIAL HYPERTENSION: ICD-10-CM

## 2019-08-14 DIAGNOSIS — Z79.4 TYPE 2 DIABETES MELLITUS WITHOUT COMPLICATION, WITH LONG-TERM CURRENT USE OF INSULIN (HCC): Primary | ICD-10-CM

## 2019-08-14 LAB — HBA1C MFR BLD: 5.7 % (ref 4.3–5.7)

## 2019-08-14 PROCEDURE — 1123F ACP DISCUSS/DSCN MKR DOCD: CPT | Performed by: NURSE PRACTITIONER

## 2019-08-14 PROCEDURE — 1036F TOBACCO NON-USER: CPT | Performed by: NURSE PRACTITIONER

## 2019-08-14 PROCEDURE — 83036 HEMOGLOBIN GLYCOSYLATED A1C: CPT | Performed by: NURSE PRACTITIONER

## 2019-08-14 PROCEDURE — G8417 CALC BMI ABV UP PARAM F/U: HCPCS | Performed by: NURSE PRACTITIONER

## 2019-08-14 PROCEDURE — G8399 PT W/DXA RESULTS DOCUMENT: HCPCS | Performed by: NURSE PRACTITIONER

## 2019-08-14 PROCEDURE — 2022F DILAT RTA XM EVC RTNOPTHY: CPT | Performed by: NURSE PRACTITIONER

## 2019-08-14 PROCEDURE — G8598 ASA/ANTIPLAT THER USED: HCPCS | Performed by: NURSE PRACTITIONER

## 2019-08-14 PROCEDURE — 4040F PNEUMOC VAC/ADMIN/RCVD: CPT | Performed by: NURSE PRACTITIONER

## 2019-08-14 PROCEDURE — 3017F COLORECTAL CA SCREEN DOC REV: CPT | Performed by: NURSE PRACTITIONER

## 2019-08-14 PROCEDURE — 99214 OFFICE O/P EST MOD 30 MIN: CPT | Performed by: NURSE PRACTITIONER

## 2019-08-14 PROCEDURE — 3044F HG A1C LEVEL LT 7.0%: CPT | Performed by: NURSE PRACTITIONER

## 2019-08-14 PROCEDURE — G8427 DOCREV CUR MEDS BY ELIG CLIN: HCPCS | Performed by: NURSE PRACTITIONER

## 2019-08-14 PROCEDURE — 1090F PRES/ABSN URINE INCON ASSESS: CPT | Performed by: NURSE PRACTITIONER

## 2019-08-14 RX ORDER — CARVEDILOL 25 MG/1
37.5 TABLET ORAL 2 TIMES DAILY
Qty: 270 TABLET | Refills: 3 | Status: ON HOLD | OUTPATIENT
Start: 2019-08-14 | End: 2020-08-03

## 2019-08-30 RX ORDER — AMLODIPINE BESYLATE 10 MG/1
TABLET ORAL
Qty: 30 TABLET | Refills: 5 | Status: SHIPPED | OUTPATIENT
Start: 2019-08-30 | End: 2020-04-16 | Stop reason: SDUPTHER

## 2019-09-04 ENCOUNTER — CARE COORDINATION (OUTPATIENT)
Dept: CARE COORDINATION | Age: 64
End: 2019-09-04

## 2019-09-04 NOTE — CARE COORDINATION
Take 1 mg by mouth daily    Yes Historical Provider, MD   amLODIPine (NORVASC) 10 MG tablet TAKE 1 TABLET BY MOUTH EVERY DAY 8/30/19   DONOVAN Lamb - CNP   folic acid-pyridoxine-cyanocobalamine (FOLBEE) 2.5-25-1 MG TABS tablet TAKE 1 TABLET BY MOUTH DAILY 8/14/19   DONOVAN Wooten CNP   Insulin Pen Needle 31G X 8 MM MISC 1 each by Does not apply route 3 times daily 8/14/19   DONOVAN Wooten CNP   carvedilol (COREG) 25 MG tablet Take 1.5 tablets by mouth 2 times daily . hold if SBP less than 100 mm hg or HR less than 60 8/14/19   DONOVAN Wooten CNP   insulin glargine (LANTUS SOLOSTAR) 100 UNIT/ML injection pen Inject 15 Units into the skin nightly 8/14/19   DONOVAN Wooten CNP   insulin aspart (NOVOLOG FLEXPEN) 100 UNIT/ML injection pen Sliding scale insulin coverage  Glucose:Dose: If Less [id number] =No Insulin/ 140-199= 2 Units/ 200-249=4 Units/ 250-299= 6 Units/  300-349=8 Units/  350-400=10 Units/ Above 400 = 12 Units 8/14/19   DONOVAN Wooten CNP   atorvastatin (LIPITOR) 20 MG tablet TAKE 1 TABLET BY MOUTH EVERY DAY 7/29/19   Karan Nagy DO   omeprazole (PRILOSEC) 20 MG delayed release capsule TAKE 1 CAPSULE BY MOUTH DAILY 30 MINUTES PRIOR TO MEAL. 6/14/19   Historical Provider, MD   lidocaine-prilocaine (EMLA) 2.5-2.5 % cream APPLY SMALL AMOUNT TO ACCESS SITE (AVF) 1 TO 2 HOURS BEFORE DIALYSIS. COVER WITH OCCLUSIVE DRESSING (SARAN WRAP) 5/29/19   Historical Provider, MD   linaclotide (LINZESS) 145 MCG capsule Take 145 mcg by mouth every morning (before breakfast)    Historical Provider, MD   Lanthanum Carbonate 1000 MG PACK Take by mouth 2 times daily    Historical Provider, MD   lactulose (CHRONULAC) 10 GM/15ML solution TAKE 30ML BY MOUTH 2 TIMES DAILY AS NEEDED CONSTIPATION.  USE IF NO BM WITH OTHER SOFTNERS 4/10/19   Houston Rahman APRN - CNP   vitamin D (ERGOCALCIFEROL) 06048 units CAPS capsule TAKE ONE CAPSULE BY MOUTH ONE TIME PER WEEK you test your blood sugar?:  Meals   Do you have barriers with adherence to non-pharmacologic self-management interventions?  (Nutrition/Exercise/Self-Monitoring):  No   Have you ever had to go to the ED for symptoms of low blood sugar?:  No       No patient-reported symptoms   Do you have hyperglycemia symptoms?:  No   Do you have hypoglycemia symptoms?:  No   Blood Sugar Monitoring Regimen:  Before Meals, At Bedtime, Morning Fasting   Blood Sugar Trends:  No Change      ,   Congestive Heart Failure Assessment    Do you understand a low sodium diet?:  Yes  Do you understand how to read food labels?:  Yes  How many restaurant meals do you eat per week?:  1-2  Do you salt your food before tasting it?:  No     No patient-reported symptoms      Symptoms:   None:  Yes      Symptom course:  stable  Weight trend:  stable  Salt intake watch compared to last visit:  stable      and   COPD Assessment    Is the patient able to verbalize Rescue vs. Long Acting medications?:  Yes  Does the patient have a nebulizer?:  Yes     No patient-reported symptoms         Symptoms:   None:  Yes      Symptom course:  stable  Breathlessness:  none  Increase use of rapid acting/rescue inhaled medications?:  No  Change in chronic cough?:  No/At Baseline  Change in sputum?:  No/At Baseline  Have you had a recent diagnosis of pneumonia either by PCP or at a hospital?:  No

## 2019-09-12 ENCOUNTER — CARE COORDINATION (OUTPATIENT)
Dept: CARE COORDINATION | Age: 64
End: 2019-09-12

## 2019-09-12 NOTE — CARE COORDINATION
Left message to return phone call to ambulatory care manager at [phone]. Ambulatory Care Coordination Note  9/13/2019  CM Risk Score: 12  Charlson 10 Year Mortality Risk Score: 100%   ACC: Lynnette Thomas, RN    Summary Note: Spoke with Marialuisa Arreola. Appointment this week at [facility]. Had cardiac CT scan. Showed calcification. Scheduled for Cardiac Cath 10/7/19 at [facility]. Feeling little depressed because she was hoping to be officially on the transplant list. Encouraged this a bump in the road. No other changes. Upcoming appointments with pain management and pulmonology. Needs refill of lactulose. Sent to PCP in separate encounter. Breathing is baseline and denies leg swelling or weight gain. Plan:  Early symptom recognition and reporting for CHF and COPD. Report any changes in breathing, weight gain, leg swelling, bloating  Pain management and pulm appointments this month  Cardiac cath 10/7/19 at [facility]. Use same day appointments instead of ED when appropriate        Care Coordination Interventions    Program Enrollment:  Complex Care  Referral from Primary Care Provider:  No  Suggested Interventions and Community Resources  Diabetes Education: In Process  Disease Specific Clinic:  Completed (Comment: [facility])  Specialty Services Referral:  Completed (Comment: [facility] kidney transplant)  Transportation Support:  Completed  Zone Management Tools: In Process  Other Services or Interventions:  Pain mgmt         Goals Addressed                 This Visit's Progress     Conditions and Symptoms   Improving     I will schedule office visits, as directed by my provider. I will keep my appointment or reschedule if I have to cancel. I will notify my provider of any barriers to my plan of care. I will follow my Zone Management tool to seek urgent or emergent care. I will notify my provider of any symptoms that indicate a worsening of my condition.     Barriers: impairment:  physical: lactulose (CHRONULAC) 10 GM/15ML solution TAKE 30ML BY MOUTH 2 TIMES DAILY AS NEEDED CONSTIPATION. USE IF NO BM WITH OTHER SOFTNERS 4/10/19   DONOVAN Lord CNP   vitamin D (ERGOCALCIFEROL) 22469 units CAPS capsule TAKE ONE CAPSULE BY MOUTH ONE TIME PER WEEK 3/25/19   Laurence Barbosa DO   Insulin Pen Needle (B-D ULTRAFINE III SHORT PEN) 31G X 8 MM MISC Use three times daily Diagnosis Code E11.9 2/13/19   DONOVAN Oconnor CNP   nitroGLYCERIN (NITROSTAT) 0.4 MG SL tablet Place 1 tablet under the tongue every 5 minutes as needed for Chest pain 2/13/19   DONOVAN Oconnor CNP   bumetanide (BUMEX) 1 MG tablet Take 1 mg by mouth daily     Historical Provider, MD   minoxidil (LONITEN) 2.5 MG tablet Take 5 mg by mouth 3 times daily    Historical Provider, MD   tiotropium (SPIRIVA HANDIHALER) 18 MCG inhalation capsule Inhale 1 capsule into the lungs daily 1 capsule via inhalation daily. 10/16/18 10/16/19  Colin Davenport MD   acetaminophen (TYLENOL) 325 MG tablet Take 2 tablets by mouth every 6 hours as needed for Pain 8/24/18   Everette Gitelman, APRN - CNP   cloNIDine (CATAPRES) 0.3 MG tablet Take 1 tablet by mouth every 8 hours One tablet three times per day 8/8/18   Ximena Zavaleta MD   doxazosin (CARDURA) 4 MG tablet Take 3 tablets by mouth daily . hold if SBP less than 100 mm hg  Patient taking differently: Take 8 mg by mouth daily . hold if SBP less than 100 mm hg 8/1/18   Palomo Dowd MD   fluticasone (FLONASE) 50 MCG/ACT nasal spray 2 sprays by Nasal route daily as needed     Historical Provider, MD   aspirin 81 MG EC tablet Take 81 mg by mouth daily.       Historical Provider, MD       Future Appointments   Date Time Provider Franklin Chin   9/18/2019  1:00 PM Mj Chavez MD SRPX Pain [facility] - [address] [facility]   9/30/2019  2:30 PM Narinder Robbins PA-C Pul Med [address]   11/13/2019  1:40 PM DONOVAN Lord CNP SRPX FM RES [facility] - [address] [facility]   12/16/2019 10:00 AM STR PULMONARY FUNCTION ROOM 1 STRZ PFT [facility] [place]   12/16/2019 11:00 AM STR CT IMAGING RM1  [facility] STRZ OUT EXP STR Radiolog   12/30/2019 11:40 AM Brianna Sauceda MD Pulm Med [facility]   2/12/2020  1:00 PM DONOVAN Aguirre - CNP SRPX Physic [facility]     ,   Diabetes Assessment    Meal Planning:  Avoidance of concentrated sweets   How often do you test your blood sugar?:  Meals   Do you have barriers with adherence to non-pharmacologic self-management interventions?  (Nutrition/Exercise/Self-Monitoring):  No   Have you ever had to go to the ED for symptoms of low blood sugar?:  No       No patient-reported symptoms   Do you have hyperglycemia symptoms?:  No   Do you have hypoglycemia symptoms?:  No   Blood Sugar Monitoring Regimen:  Before Meals   Blood Sugar Trends:  No Change      ,   Congestive Heart Failure Assessment    Do you understand a low sodium diet?:  Yes  Do you understand how to read food labels?:  Yes  How many restaurant meals do you eat per week?:  1-2  Do you salt your food before tasting it?:  No     No patient-reported symptoms      Symptoms:   None:  Yes      Symptom course:  stable  Weight trend:  stable  Salt intake watch compared to last visit:  stable      and   COPD Assessment    Is the patient able to verbalize Rescue vs. Long Acting medications?:  Yes  Does the patient have a nebulizer?:  Yes     No patient-reported symptoms         Symptoms:   None:  Yes      Symptom course:  stable  Breathlessness:  none  Increase use of rapid acting/rescue inhaled medications?:  No  Change in chronic cough?:  No/At Baseline  Change in sputum?:  No/At Baseline  Self Monitoring - SaO2:  No  Have you had a recent diagnosis of pneumonia either by PCP or at a hospital?:  No

## 2019-09-13 RX ORDER — LACTULOSE 10 G/15ML
SOLUTION ORAL
Qty: 473 ML | Refills: 0 | Status: SHIPPED | OUTPATIENT
Start: 2019-09-13 | End: 2019-10-12 | Stop reason: SDUPTHER

## 2019-09-13 ASSESSMENT — ENCOUNTER SYMPTOMS
SINUS PRESSURE: 0
EYE PAIN: 0
PHOTOPHOBIA: 0
TROUBLE SWALLOWING: 0
SHORTNESS OF BREATH: 0
CONSTIPATION: 0
ABDOMINAL DISTENTION: 0
BLOOD IN STOOL: 0
SINUS PAIN: 0
ABDOMINAL PAIN: 0
DIARRHEA: 0
NAUSEA: 0
VOMITING: 0
WHEEZING: 0
BACK PAIN: 0
COUGH: 0
SORE THROAT: 0

## 2019-09-18 ENCOUNTER — OFFICE VISIT (OUTPATIENT)
Dept: PHYSICAL MEDICINE AND REHAB | Age: 64
End: 2019-09-18
Payer: MEDICARE

## 2019-09-18 VITALS
WEIGHT: 177.03 LBS | DIASTOLIC BLOOD PRESSURE: 70 MMHG | BODY MASS INDEX: 29.49 KG/M2 | OXYGEN SATURATION: 95 % | HEART RATE: 65 BPM | SYSTOLIC BLOOD PRESSURE: 140 MMHG | HEIGHT: 65 IN

## 2019-09-18 DIAGNOSIS — E11.42 DIABETIC POLYNEUROPATHY ASSOCIATED WITH TYPE 2 DIABETES MELLITUS (HCC): Primary | ICD-10-CM

## 2019-09-18 DIAGNOSIS — M25.571 CHRONIC PAIN OF RIGHT ANKLE: ICD-10-CM

## 2019-09-18 DIAGNOSIS — G89.29 CHRONIC PAIN OF RIGHT ANKLE: ICD-10-CM

## 2019-09-18 PROCEDURE — G8417 CALC BMI ABV UP PARAM F/U: HCPCS | Performed by: PAIN MEDICINE

## 2019-09-18 PROCEDURE — G8598 ASA/ANTIPLAT THER USED: HCPCS | Performed by: PAIN MEDICINE

## 2019-09-18 PROCEDURE — G8427 DOCREV CUR MEDS BY ELIG CLIN: HCPCS | Performed by: PAIN MEDICINE

## 2019-09-18 PROCEDURE — G8399 PT W/DXA RESULTS DOCUMENT: HCPCS | Performed by: PAIN MEDICINE

## 2019-09-18 PROCEDURE — 2022F DILAT RTA XM EVC RTNOPTHY: CPT | Performed by: PAIN MEDICINE

## 2019-09-18 PROCEDURE — 3017F COLORECTAL CA SCREEN DOC REV: CPT | Performed by: PAIN MEDICINE

## 2019-09-18 PROCEDURE — 1036F TOBACCO NON-USER: CPT | Performed by: PAIN MEDICINE

## 2019-09-18 PROCEDURE — 3044F HG A1C LEVEL LT 7.0%: CPT | Performed by: PAIN MEDICINE

## 2019-09-18 PROCEDURE — 1090F PRES/ABSN URINE INCON ASSESS: CPT | Performed by: PAIN MEDICINE

## 2019-09-18 PROCEDURE — 1123F ACP DISCUSS/DSCN MKR DOCD: CPT | Performed by: PAIN MEDICINE

## 2019-09-18 PROCEDURE — 4040F PNEUMOC VAC/ADMIN/RCVD: CPT | Performed by: PAIN MEDICINE

## 2019-09-18 PROCEDURE — 99213 OFFICE O/P EST LOW 20 MIN: CPT | Performed by: PAIN MEDICINE

## 2019-09-18 RX ORDER — NALOXONE HYDROCHLORIDE 4 MG/.1ML
1 SPRAY NASAL PRN
Qty: 1 EACH | Refills: 5 | Status: SHIPPED | OUTPATIENT
Start: 2019-09-18 | End: 2019-10-30 | Stop reason: ALTCHOICE

## 2019-09-18 RX ORDER — HYDROCODONE BITARTRATE AND ACETAMINOPHEN 5; 325 MG/1; MG/1
1 TABLET ORAL EVERY 8 HOURS PRN
Qty: 90 TABLET | Refills: 0 | Status: SHIPPED | OUTPATIENT
Start: 2019-09-18 | End: 2019-10-18

## 2019-09-18 RX ORDER — HYDROCODONE BITARTRATE AND ACETAMINOPHEN 5; 325 MG/1; MG/1
TABLET ORAL
Refills: 0 | COMMUNITY
Start: 2019-08-24 | End: 2019-10-22 | Stop reason: SDUPTHER

## 2019-09-18 RX ORDER — CEPHALEXIN 500 MG/1
CAPSULE ORAL
Refills: 0 | COMMUNITY
Start: 2019-09-12 | End: 2019-10-30 | Stop reason: ALTCHOICE

## 2019-09-18 ASSESSMENT — ENCOUNTER SYMPTOMS
EYE PAIN: 0
CONSTIPATION: 1
VOMITING: 0
PHOTOPHOBIA: 0
COUGH: 0
RHINORRHEA: 0
BACK PAIN: 1
WHEEZING: 0
DIARRHEA: 0
ABDOMINAL PAIN: 0
SORE THROAT: 0
NAUSEA: 0
SHORTNESS OF BREATH: 0
SINUS PRESSURE: 0
COLOR CHANGE: 0
CHEST TIGHTNESS: 0

## 2019-09-26 ENCOUNTER — CARE COORDINATION (OUTPATIENT)
Dept: CARE COORDINATION | Age: 64
End: 2019-09-26

## 2019-09-26 NOTE — CARE COORDINATION
Left message to return phone call to ambulatory care manager at [phone]. Ambulatory Care Coordination Note  9/27/2019  CM Risk Score: 12  Charlson 10 Year Mortality Risk Score: 100%     ACC: Ambika Norris, RN    Summary Note: Call back from Vicente Brand. States she is taking abx for UTI. Increased by Dr. Narda Mcdonald. Worried its not going away. Scheduled appointment with [facility] Monday. BS are good. 123 during call. Denies highs or lows. Breathing is baseline. Denies leg swelling. HD 3 times weekly. Has cardiac cath 10/7/19 at [address] [place] [address]. Last step before getting on transplant list.      Plan:  Early symptom recognition and reporting  Appointment with [facility] for UTI  [facility] cardiac cath 10/7/19  Report any changes in breathing, leg swelling, weight gain  Low salt diet          Care Coordination Interventions    Program Enrollment:  Complex Care  Referral from Primary Care Provider:  No  Suggested Interventions and Community Resources  Diabetes Education: In Process  Disease Specific Clinic:  Completed (Comment: [facility] Mcdonald)  Specialty Services Referral:  Completed (Comment: [facility] kidney transplant)  Transportation Support:  Completed  Zone Management Tools: In Process  Other Services or Interventions:  Pain mgmt         Goals Addressed                 This Visit's Progress     Conditions and Symptoms   Improving     I will schedule office visits, as directed by my provider. I will keep my appointment or reschedule if I have to cancel. I will notify my provider of any barriers to my plan of care. I will follow my Zone Management tool to seek urgent or emergent care. I will notify my provider of any symptoms that indicate a worsening of my condition.     Barriers: impairment:  physical: Hemodialysis, lack of support and overwhelmed by complexity of regimen  Plan for overcoming my barriers: ACM support, chronic disease education, HD T-TH-S  Confidence: 8/10  Anticipated Goal Completion Date: 12/4/19              Prior to Admission medications    Medication Sig Start Date End Date Taking? Authorizing Provider   cephALEXin (KEFLEX) 500 MG capsule 1 day left 09/18/2019 9/12/19   Historical Provider, MD   HYDROcodone-acetaminophen (NORCO) 5-325 MG per tablet TAKE 1 TABLET BY MOUTH EVERY 8 HOURS AS NEEDED FOR PAIN FOR UP TO 30 DAYS. FILL ON/AFTER 8/23/2019 8/24/19   Historical Provider, MD   HYDROcodone-acetaminophen (NORCO) 5-325 MG per tablet Take 1 tablet by mouth every 8 hours as needed for Pain for up to 30 days. Fill date 09/22/2019 9/18/19 10/18/19  Nemesio Soares MD   naloxone 4 MG/0.1ML LIQD nasal spray 1 spray by Nasal route as needed for Opioid Reversal 9/18/19   Nemesio Soares MD   lactulose (CHRONULAC) 10 GM/15ML solution TAKE 30ML BY MOUTH 2 TIMES DAILY AS NEEDED CONSTIPATION. USE IF NO BM WITH OTHER SOFTNERS 9/13/19   Merritt Bilberry, DO   amLODIPine (NORVASC) 10 MG tablet TAKE 1 TABLET BY MOUTH EVERY DAY 8/30/19   DONOVAN Child CNP   folic acid-pyridoxine-cyanocobalamine (FOLBEE) 2.5-25-1 MG TABS tablet TAKE 1 TABLET BY MOUTH DAILY 8/14/19   DONOVAN Anguiano CNP   Insulin Pen Needle 31G X 8 MM MISC 1 each by Does not apply route 3 times daily 8/14/19   DONOVAN Anguiano CNP   carvedilol (COREG) 25 MG tablet Take 1.5 tablets by mouth 2 times daily . hold if SBP less than 100 mm hg or HR less than 60 8/14/19   DONOVAN Anguiano CNP   insulin glargine (LANTUS SOLOSTAR) 100 UNIT/ML injection pen Inject 15 Units into the skin nightly 8/14/19   DONOVAN Anguiano CNP   insulin aspart (NOVOLOG FLEXPEN) 100 UNIT/ML injection pen Sliding scale insulin coverage  Glucose:Dose: If Less [id number] =No Insulin/ 140-199= 2 Units/ 200-249=4 Units/ 250-299= 6 Units/  300-349=8 Units/  350-400=10 Units/ Above 400 = 12 Units 8/14/19   Roetta Jump, APRN - CNP   atorvastatin (LIPITOR) 20 MG tablet TAKE 1 TABLET BY MOUTH EVERY DAY 7/29/19   Merritt Michelle,

## 2019-09-30 ENCOUNTER — OFFICE VISIT (OUTPATIENT)
Dept: PULMONOLOGY | Age: 64
End: 2019-09-30
Payer: MEDICARE

## 2019-09-30 ENCOUNTER — OFFICE VISIT (OUTPATIENT)
Dept: FAMILY MEDICINE CLINIC | Age: 64
End: 2019-09-30
Payer: MEDICARE

## 2019-09-30 VITALS
OXYGEN SATURATION: 95 % | HEIGHT: 65 IN | HEART RATE: 67 BPM | WEIGHT: 183.5 LBS | BODY MASS INDEX: 30.57 KG/M2 | RESPIRATION RATE: 18 BRPM | SYSTOLIC BLOOD PRESSURE: 144 MMHG | DIASTOLIC BLOOD PRESSURE: 70 MMHG

## 2019-09-30 VITALS
OXYGEN SATURATION: 99 % | HEART RATE: 69 BPM | WEIGHT: 183 LBS | DIASTOLIC BLOOD PRESSURE: 72 MMHG | SYSTOLIC BLOOD PRESSURE: 120 MMHG | BODY MASS INDEX: 30.49 KG/M2 | RESPIRATION RATE: 12 BRPM | HEIGHT: 65 IN

## 2019-09-30 DIAGNOSIS — E66.9 OBESITY (BMI 30-39.9): ICD-10-CM

## 2019-09-30 DIAGNOSIS — N18.6 ESRD (END STAGE RENAL DISEASE) ON DIALYSIS (HCC): ICD-10-CM

## 2019-09-30 DIAGNOSIS — G47.33 OBSTRUCTIVE SLEEP APNEA ON CPAP: Primary | ICD-10-CM

## 2019-09-30 DIAGNOSIS — N18.5 TYPE II DIABETES MELLITUS WITH STAGE 5 CHRONIC KIDNEY DISEASE (HCC): ICD-10-CM

## 2019-09-30 DIAGNOSIS — N39.0 URINARY TRACT INFECTION IN FEMALE: Primary | ICD-10-CM

## 2019-09-30 DIAGNOSIS — I10 UNCONTROLLED HYPERTENSION: ICD-10-CM

## 2019-09-30 DIAGNOSIS — Z99.2 ESRD (END STAGE RENAL DISEASE) ON DIALYSIS (HCC): ICD-10-CM

## 2019-09-30 DIAGNOSIS — Z99.89 OBSTRUCTIVE SLEEP APNEA ON CPAP: Primary | ICD-10-CM

## 2019-09-30 DIAGNOSIS — E11.22 TYPE II DIABETES MELLITUS WITH STAGE 5 CHRONIC KIDNEY DISEASE (HCC): ICD-10-CM

## 2019-09-30 DIAGNOSIS — G47.8 POOR SLEEP PATTERN: ICD-10-CM

## 2019-09-30 LAB
BILIRUBIN URINE: NEGATIVE
BLOOD URINE, POC: ABNORMAL
CHARACTER, URINE: ABNORMAL
COLOR, URINE: ABNORMAL
GLUCOSE URINE: NEGATIVE MG/DL
KETONES, URINE: NEGATIVE
LEUKOCYTE CLUMPS, URINE: ABNORMAL
NITRITE, URINE: NEGATIVE
PH, URINE: 6.5 (ref 5–9)
PROTEIN, URINE: >= 300 MG/DL
SPECIFIC GRAVITY, URINE: 1.02 (ref 1–1.03)
UROBILINOGEN, URINE: 0.2 EU/DL (ref 0–1)

## 2019-09-30 PROCEDURE — 99214 OFFICE O/P EST MOD 30 MIN: CPT | Performed by: PHYSICIAN ASSISTANT

## 2019-09-30 PROCEDURE — 4040F PNEUMOC VAC/ADMIN/RCVD: CPT | Performed by: NURSE PRACTITIONER

## 2019-09-30 PROCEDURE — G8417 CALC BMI ABV UP PARAM F/U: HCPCS | Performed by: PHYSICIAN ASSISTANT

## 2019-09-30 PROCEDURE — G8427 DOCREV CUR MEDS BY ELIG CLIN: HCPCS | Performed by: NURSE PRACTITIONER

## 2019-09-30 PROCEDURE — 99214 OFFICE O/P EST MOD 30 MIN: CPT | Performed by: NURSE PRACTITIONER

## 2019-09-30 PROCEDURE — G8598 ASA/ANTIPLAT THER USED: HCPCS | Performed by: NURSE PRACTITIONER

## 2019-09-30 PROCEDURE — 1036F TOBACCO NON-USER: CPT | Performed by: NURSE PRACTITIONER

## 2019-09-30 PROCEDURE — G8417 CALC BMI ABV UP PARAM F/U: HCPCS | Performed by: NURSE PRACTITIONER

## 2019-09-30 PROCEDURE — 3044F HG A1C LEVEL LT 7.0%: CPT | Performed by: NURSE PRACTITIONER

## 2019-09-30 PROCEDURE — 4040F PNEUMOC VAC/ADMIN/RCVD: CPT | Performed by: PHYSICIAN ASSISTANT

## 2019-09-30 PROCEDURE — G8427 DOCREV CUR MEDS BY ELIG CLIN: HCPCS | Performed by: PHYSICIAN ASSISTANT

## 2019-09-30 PROCEDURE — 1090F PRES/ABSN URINE INCON ASSESS: CPT | Performed by: NURSE PRACTITIONER

## 2019-09-30 PROCEDURE — 1090F PRES/ABSN URINE INCON ASSESS: CPT | Performed by: PHYSICIAN ASSISTANT

## 2019-09-30 PROCEDURE — 1123F ACP DISCUSS/DSCN MKR DOCD: CPT | Performed by: PHYSICIAN ASSISTANT

## 2019-09-30 PROCEDURE — 81003 URINALYSIS AUTO W/O SCOPE: CPT | Performed by: NURSE PRACTITIONER

## 2019-09-30 PROCEDURE — 3017F COLORECTAL CA SCREEN DOC REV: CPT | Performed by: PHYSICIAN ASSISTANT

## 2019-09-30 PROCEDURE — G8399 PT W/DXA RESULTS DOCUMENT: HCPCS | Performed by: PHYSICIAN ASSISTANT

## 2019-09-30 PROCEDURE — 1036F TOBACCO NON-USER: CPT | Performed by: PHYSICIAN ASSISTANT

## 2019-09-30 PROCEDURE — 1123F ACP DISCUSS/DSCN MKR DOCD: CPT | Performed by: NURSE PRACTITIONER

## 2019-09-30 PROCEDURE — 3017F COLORECTAL CA SCREEN DOC REV: CPT | Performed by: NURSE PRACTITIONER

## 2019-09-30 PROCEDURE — G8598 ASA/ANTIPLAT THER USED: HCPCS | Performed by: PHYSICIAN ASSISTANT

## 2019-09-30 PROCEDURE — 2022F DILAT RTA XM EVC RTNOPTHY: CPT | Performed by: NURSE PRACTITIONER

## 2019-09-30 PROCEDURE — G8399 PT W/DXA RESULTS DOCUMENT: HCPCS | Performed by: NURSE PRACTITIONER

## 2019-09-30 RX ORDER — CIPROFLOXACIN 250 MG/1
250 TABLET, FILM COATED ORAL DAILY
Qty: 3 TABLET | Refills: 0 | Status: SHIPPED | OUTPATIENT
Start: 2019-09-30 | End: 2019-10-03

## 2019-09-30 ASSESSMENT — ENCOUNTER SYMPTOMS
ALLERGIC/IMMUNOLOGIC NEGATIVE: 1
NAUSEA: 0
DIARRHEA: 0
SHORTNESS OF BREATH: 0
STRIDOR: 0
COUGH: 0
WHEEZING: 0
TROUBLE SWALLOWING: 0
EYES NEGATIVE: 1
SHORTNESS OF BREATH: 0
BACK PAIN: 0
ABDOMINAL PAIN: 0
CHEST TIGHTNESS: 0

## 2019-09-30 NOTE — PROGRESS NOTES
Ernesto Adams MD at [facility]       Social History     Tobacco Use    Smoking status: Former Smoker     Packs/day: 1.50     Years: 30.00     Pack years: 45.00     Types: Cigarettes     Start date: 12/28/1981     Last attempt to quit: 3/13/2009     Years since quitting: 10.5    Smokeless tobacco: Never Used    Tobacco comment: quit 2009   Substance Use Topics    Alcohol use: No     Alcohol/week: 0.0 standard drinks    Drug use: No       Allergies   Allergen Reactions    Pioglitazone Swelling    Actos [Pioglitazone Hydrochloride] Swelling    Cymbalta [Duloxetine Hcl] Other (See Comments)     Anxiety and lethargic    Gabapentin Anxiety       Current Outpatient Medications   Medication Sig Dispense Refill    ciprofloxacin (CIPRO) 250 MG tablet Take 1 tablet by mouth daily for 3 days 3 tablet 0    lactulose (CHRONULAC) 10 GM/15ML solution TAKE 30ML BY MOUTH 2 TIMES DAILY AS NEEDED CONSTIPATION. USE IF NO BM WITH OTHER SOFTNERS 473 mL 0    amLODIPine (NORVASC) 10 MG tablet TAKE 1 TABLET BY MOUTH EVERY DAY 30 tablet 5    carvedilol (COREG) 25 MG tablet Take 1.5 tablets by mouth 2 times daily . hold if SBP less than 100 mm hg or HR less than 60 270 tablet 3    insulin glargine (LANTUS SOLOSTAR) 100 UNIT/ML injection pen Inject 15 Units into the skin nightly 15 pen 5    insulin aspart (NOVOLOG FLEXPEN) 100 UNIT/ML injection pen Sliding scale insulin coverage  Glucose:Dose: If Less [id number] =No Insulin/ 140-199= 2 Units/ 200-249=4 Units/ 250-299= 6 Units/  300-349=8 Units/  350-400=10 Units/ Above 400 = 12 Units 15 pen 5    atorvastatin (LIPITOR) 20 MG tablet TAKE 1 TABLET BY MOUTH EVERY DAY 60 tablet 1    omeprazole (PRILOSEC) 20 MG delayed release capsule TAKE 1 CAPSULE BY MOUTH DAILY 30 MINUTES PRIOR TO MEAL. 0    lidocaine-prilocaine (EMLA) 2.5-2.5 % cream APPLY SMALL AMOUNT TO ACCESS SITE (AVF) 1 TO 2 HOURS BEFORE DIALYSIS.  COVER WITH OCCLUSIVE DRESSING (SARAN WRAP)  11    linaclotide Lequita Nightingale) is oriented to person, place, and time. She appears well-developed and well-nourished. HENT:   Head: Normocephalic and atraumatic. Right Ear: External ear normal.   Left Ear: External ear normal.   Mouth/Throat: Oropharynx is clear and moist.   Eyes: Pupils are equal, round, and reactive to light. Conjunctivae and EOM are normal.   Neck: Normal range of motion. Neck supple. Cardiovascular: Normal rate, regular rhythm and normal heart sounds. Pulmonary/Chest: Effort normal and breath sounds normal.   Abdominal: Soft. Musculoskeletal: Normal range of motion. Neurological: She is alert and oriented to person, place, and time. Skin: Skin is warm and dry. Psychiatric: She has a normal mood and affect. Her behavior is normal. Judgment and thought content normal.         ASSESSMENT/DIAGNOSIS     Diagnosis Orders   1. Obstructive sleep apnea on CPAP     2. Obesity (BMI 30-39.9)     3. ESRD (end stage renal disease) on dialysis ([facility] [address]     4. Uncontrolled hypertension              Plan   Do you need any equipment today? No  - Discussed the importance of improving compliance  - Suspect once she a kidney transplant and is off dialysis she will do better. - She  was advised to continue current positive airway pressure therapy with above described pressure. - She  advised to keep goodcompliance with current recommended pressure to get optimal results and clinical improvement  - Recommend 7-9 hours of sleep with PAP  - She was advised to call [employer] regarding supplies if needed.   -She call my office for earlier appointment if needed for worsening of sleep symptoms.   - She was instructed on weight loss  - Tristen Kirkpatrick was educated about my impression and plan. Patient verbalizesunderstanding.   We will see Aurora Hugo back in: 4 months with download    Information added by my medical assistant/LPN was reviewed today         Trip Galan PA-C, [facility]  9/30/2019

## 2019-09-30 NOTE — PROGRESS NOTES
use included cigarettes. She started smoking about 37 years ago. She has a 45.00 pack-year smoking history. She has never used smokeless tobacco. She reports that she does not drink alcohol or use drugs. PHYSICAL EXAM     VITALS  BP: 120/72,  , Pulse: 69, Resp: 12, SpO2: 99 %  Physical Exam   Constitutional: She is oriented to person, place, and time. Vital signs are normal. She appears well-developed and well-nourished. She is cooperative. Non-toxic appearance. She does not have a sickly appearance. She does not appear ill. No distress. HENT:   Head: Normocephalic and atraumatic. Right Ear: Hearing, tympanic membrane, external ear and ear canal normal.   Left Ear: Hearing, tympanic membrane, external ear and ear canal normal.   Nose: Nose normal. No mucosal edema or rhinorrhea. Right sinus exhibits no maxillary sinus tenderness and no frontal sinus tenderness. Left sinus exhibits no maxillary sinus tenderness and no frontal sinus tenderness. Mouth/Throat: Uvula is midline, oropharynx is clear and moist and mucous membranes are normal. No trismus in the jaw. No uvula swelling. No oropharyngeal exudate, posterior oropharyngeal edema, posterior oropharyngeal erythema or tonsillar abscesses. Eyes: Pupils are equal, round, and reactive to light. Conjunctivae, EOM and lids are normal. Right conjunctiva is not injected. Right conjunctiva has no hemorrhage. Left conjunctiva is not injected. Left conjunctiva has no hemorrhage. No scleral icterus. Neck: Trachea normal and normal range of motion. Neck supple. No thyromegaly present. Cardiovascular: Normal rate, regular rhythm and normal heart sounds. No extrasystoles are present. PMI is not displaced. Exam reveals no gallop and no friction rub. No murmur heard. Pulses:       Radial pulses are 2+ on the right side, and 2+ on the left side. Posterior tibial pulses are 2+ on the right side, and 2+ on the left side.    Pulmonary/Chest: Effort normal and breath sounds normal. No respiratory distress. Musculoskeletal:        Right lower leg: She exhibits swelling and edema (2+). Left lower leg: She exhibits swelling and edema (2+). Lymphadenopathy:        Head (right side): No submental, no submandibular, no tonsillar, no preauricular, no posterior auricular and no occipital adenopathy present. Head (left side): No submental, no submandibular, no tonsillar, no preauricular, no posterior auricular and no occipital adenopathy present. She has no cervical adenopathy. Right: No supraclavicular adenopathy present. Left: No supraclavicular adenopathy present. Neurological: She is alert and oriented to person, place, and time. She is not disoriented. Skin: Skin is warm, dry and intact. No rash noted. She is not diaphoretic. No pallor. Skin warm and dry to touch, no rashes noted on exposed surfaces. Psychiatric: She has a normal mood and affect. Her speech is normal.   Nursing note and vitals reviewed. DIAGNOSTIC RESULTS   Labs:  Results for orders placed or performed in visit on 09/30/19   POCT Urinalysis No Micro (Auto)   Result Value Ref Range    Glucose, Ur Negative NEGATIVE mg/dl    Bilirubin Urine Negative     Ketones, Urine Negative NEGATIVE    Specific Gravity, Urine 1.020 1.002 - 1.03    Blood, UA POC Moderate (A) NEGATIVE    pH, Urine 6.50 5.0 - 9.0    Protein, Urine >= 300 (A) NEGATIVE mg/dl    Urobilinogen, Urine 0.20 0.0 - 1.0 eu/dl    Nitrite, Urine Negative NEGATIVE    Leukocyte Clumps, Urine Large (A) NEGATIVE    Color, Urine Other YELLOW-STR    Character, Urine Cloudy (A) [id number]       IMAGING:  No orders to display       No images are attached to the encounter or orders placed in the encounter. CLINICAL COURSE COURSE:     Vitals:    09/30/19 1355   BP: 120/72   Pulse: 69   Resp: 12   SpO2: 99%   Weight: 183 lb (83 kg)   Height: 5' 5\" (1.651 m)         PROCEDURES:  None  FINAL IMPRESSION      1.  Urinary

## 2019-10-02 LAB
ORGANISM: ABNORMAL
URINE CULTURE, ROUTINE: ABNORMAL

## 2019-10-09 ENCOUNTER — CARE COORDINATION (OUTPATIENT)
Dept: CARE COORDINATION | Age: 64
End: 2019-10-09

## 2019-10-09 DIAGNOSIS — I10 ESSENTIAL HYPERTENSION: ICD-10-CM

## 2019-10-10 ENCOUNTER — TELEPHONE (OUTPATIENT)
Dept: FAMILY MEDICINE CLINIC | Age: 64
End: 2019-10-10

## 2019-10-11 RX ORDER — CLONIDINE HYDROCHLORIDE 0.3 MG/1
TABLET ORAL
Qty: 270 TABLET | Refills: 4 | Status: SHIPPED | OUTPATIENT
Start: 2019-10-11 | End: 2020-02-12 | Stop reason: SDUPTHER

## 2019-10-17 RX ORDER — LACTULOSE 10 G/15ML
SOLUTION ORAL
Qty: 473 ML | Refills: 0 | Status: SHIPPED | OUTPATIENT
Start: 2019-10-17 | End: 2020-01-07

## 2019-10-22 DIAGNOSIS — G89.29 OTHER CHRONIC PAIN: Primary | ICD-10-CM

## 2019-10-23 RX ORDER — HYDROCODONE BITARTRATE AND ACETAMINOPHEN 5; 325 MG/1; MG/1
1 TABLET ORAL EVERY 8 HOURS PRN
Qty: 90 TABLET | Refills: 0 | Status: SHIPPED | OUTPATIENT
Start: 2019-10-23 | End: 2019-10-30 | Stop reason: SDUPTHER

## 2019-10-23 RX ORDER — HYDROCODONE BITARTRATE AND ACETAMINOPHEN 5; 325 MG/1; MG/1
1 TABLET ORAL EVERY 8 HOURS PRN
Qty: 90 TABLET | Refills: 0 | Status: SHIPPED | OUTPATIENT
Start: 2019-10-23 | End: 2019-11-22

## 2019-10-24 ENCOUNTER — CARE COORDINATION (OUTPATIENT)
Dept: CARE COORDINATION | Age: 64
End: 2019-10-24

## 2019-10-30 ENCOUNTER — OFFICE VISIT (OUTPATIENT)
Dept: CARDIOLOGY CLINIC | Age: 64
End: 2019-10-30
Payer: MEDICARE

## 2019-10-30 VITALS
HEART RATE: 67 BPM | DIASTOLIC BLOOD PRESSURE: 69 MMHG | SYSTOLIC BLOOD PRESSURE: 145 MMHG | BODY MASS INDEX: 30.12 KG/M2 | HEIGHT: 65 IN | WEIGHT: 180.8 LBS

## 2019-10-30 DIAGNOSIS — I31.39 PERICARDIAL EFFUSION: ICD-10-CM

## 2019-10-30 DIAGNOSIS — I25.10 CORONARY ARTERY DISEASE INVOLVING NATIVE CORONARY ARTERY OF NATIVE HEART WITHOUT ANGINA PECTORIS: Primary | ICD-10-CM

## 2019-10-30 PROCEDURE — 99212 OFFICE O/P EST SF 10 MIN: CPT | Performed by: INTERNAL MEDICINE

## 2019-10-30 PROCEDURE — 4040F PNEUMOC VAC/ADMIN/RCVD: CPT | Performed by: INTERNAL MEDICINE

## 2019-10-30 PROCEDURE — G8598 ASA/ANTIPLAT THER USED: HCPCS | Performed by: INTERNAL MEDICINE

## 2019-10-30 PROCEDURE — G8399 PT W/DXA RESULTS DOCUMENT: HCPCS | Performed by: INTERNAL MEDICINE

## 2019-10-30 PROCEDURE — G8417 CALC BMI ABV UP PARAM F/U: HCPCS | Performed by: INTERNAL MEDICINE

## 2019-10-30 PROCEDURE — 93000 ELECTROCARDIOGRAM COMPLETE: CPT | Performed by: INTERNAL MEDICINE

## 2019-10-30 PROCEDURE — G8427 DOCREV CUR MEDS BY ELIG CLIN: HCPCS | Performed by: INTERNAL MEDICINE

## 2019-10-30 PROCEDURE — 1123F ACP DISCUSS/DSCN MKR DOCD: CPT | Performed by: INTERNAL MEDICINE

## 2019-10-30 PROCEDURE — 1090F PRES/ABSN URINE INCON ASSESS: CPT | Performed by: INTERNAL MEDICINE

## 2019-10-30 PROCEDURE — 3017F COLORECTAL CA SCREEN DOC REV: CPT | Performed by: INTERNAL MEDICINE

## 2019-10-30 PROCEDURE — G8484 FLU IMMUNIZE NO ADMIN: HCPCS | Performed by: INTERNAL MEDICINE

## 2019-10-30 PROCEDURE — 1036F TOBACCO NON-USER: CPT | Performed by: INTERNAL MEDICINE

## 2019-11-04 ENCOUNTER — CARE COORDINATION (OUTPATIENT)
Dept: CARE COORDINATION | Age: 64
End: 2019-11-04

## 2019-11-11 ENCOUNTER — CARE COORDINATION (OUTPATIENT)
Dept: CARE COORDINATION | Age: 64
End: 2019-11-11

## 2019-11-13 ENCOUNTER — OFFICE VISIT (OUTPATIENT)
Dept: FAMILY MEDICINE CLINIC | Age: 64
End: 2019-11-13
Payer: MEDICARE

## 2019-11-13 ENCOUNTER — NURSE ONLY (OUTPATIENT)
Dept: LAB | Age: 64
End: 2019-11-13

## 2019-11-13 VITALS
OXYGEN SATURATION: 99 % | RESPIRATION RATE: 12 BRPM | DIASTOLIC BLOOD PRESSURE: 68 MMHG | TEMPERATURE: 97.6 F | HEIGHT: 65 IN | WEIGHT: 180 LBS | HEART RATE: 63 BPM | BODY MASS INDEX: 29.99 KG/M2 | SYSTOLIC BLOOD PRESSURE: 138 MMHG

## 2019-11-13 DIAGNOSIS — Z00.00 ENCOUNTER FOR MEDICARE ANNUAL WELLNESS EXAM: ICD-10-CM

## 2019-11-13 DIAGNOSIS — E55.9 VITAMIN D DEFICIENCY: ICD-10-CM

## 2019-11-13 DIAGNOSIS — Z00.00 ROUTINE GENERAL MEDICAL EXAMINATION AT A HEALTH CARE FACILITY: Primary | ICD-10-CM

## 2019-11-13 DIAGNOSIS — Z00.00 ENCOUNTER FOR BLOOD TEST FOR ROUTINE GENERAL PHYSICAL EXAMINATION: ICD-10-CM

## 2019-11-13 LAB
ALBUMIN SERPL-MCNC: 4.4 G/DL (ref 3.5–5.1)
ALP BLD-CCNC: 127 U/L (ref 38–126)
ALT SERPL-CCNC: 21 U/L (ref 11–66)
ANION GAP SERPL CALCULATED.3IONS-SCNC: 16 MEQ/L (ref 8–16)
AST SERPL-CCNC: 21 U/L (ref 5–40)
BASOPHILS # BLD: 0.3 %
BASOPHILS ABSOLUTE: 0 THOU/MM3 (ref 0–0.1)
BILIRUB SERPL-MCNC: 0.3 MG/DL (ref 0.3–1.2)
BILIRUBIN DIRECT: < 0.2 MG/DL (ref 0–0.3)
BUN BLDV-MCNC: 40 MG/DL (ref 7–22)
CALCIUM SERPL-MCNC: 9.2 MG/DL (ref 8.5–10.5)
CHLORIDE BLD-SCNC: 96 MEQ/L (ref 98–111)
CHOLESTEROL, TOTAL: 135 MG/DL (ref 100–199)
CO2: 29 MEQ/L (ref 23–33)
CREAT SERPL-MCNC: 5.4 MG/DL (ref 0.4–1.2)
EOSINOPHIL # BLD: 1.7 %
EOSINOPHILS ABSOLUTE: 0.1 THOU/MM3 (ref 0–0.4)
ERYTHROCYTE [DISTWIDTH] IN BLOOD BY AUTOMATED COUNT: 16.1 % (ref 11.5–14.5)
ERYTHROCYTE [DISTWIDTH] IN BLOOD BY AUTOMATED COUNT: 50.8 FL (ref 35–45)
GFR SERPL CREATININE-BSD FRML MDRD: 8 ML/MIN/1.73M2
GLUCOSE BLD-MCNC: 140 MG/DL (ref 70–108)
HCT VFR BLD CALC: 38.6 % (ref 37–47)
HDLC SERPL-MCNC: 48 MG/DL
HEMOGLOBIN: 12.5 GM/DL (ref 12–16)
HEPATITIS C ANTIBODY: NEGATIVE
IMMATURE GRANS (ABS): 0.02 THOU/MM3 (ref 0–0.07)
IMMATURE GRANULOCYTES: 0.3 %
LDL CHOLESTEROL CALCULATED: 72 MG/DL
LYMPHOCYTES # BLD: 21.8 %
LYMPHOCYTES ABSOLUTE: 1.4 THOU/MM3 (ref 1–4.8)
MAGNESIUM: 2.2 MG/DL (ref 1.6–2.4)
MCH RBC QN AUTO: 28 PG (ref 26–33)
MCHC RBC AUTO-ENTMCNC: 32.4 GM/DL (ref 32.2–35.5)
MCV RBC AUTO: 86.4 FL (ref 81–99)
MONOCYTES # BLD: 7.8 %
MONOCYTES ABSOLUTE: 0.5 THOU/MM3 (ref 0.4–1.3)
NUCLEATED RED BLOOD CELLS: 0 /100 WBC
PLATELET # BLD: 175 THOU/MM3 (ref 130–400)
PMV BLD AUTO: 10.7 FL (ref 9.4–12.4)
POTASSIUM SERPL-SCNC: 4.3 MEQ/L (ref 3.5–5.2)
RBC # BLD: 4.47 MILL/MM3 (ref 4.2–5.4)
SEG NEUTROPHILS: 68.1 %
SEGMENTED NEUTROPHILS ABSOLUTE COUNT: 4.4 THOU/MM3 (ref 1.8–7.7)
SODIUM BLD-SCNC: 141 MEQ/L (ref 135–145)
TOTAL PROTEIN: 7.3 G/DL (ref 6.1–8)
TRIGL SERPL-MCNC: 77 MG/DL (ref 0–199)
VITAMIN D 25-HYDROXY: 48 NG/ML (ref 30–100)
WBC # BLD: 6.5 THOU/MM3 (ref 4.8–10.8)

## 2019-11-13 PROCEDURE — 1123F ACP DISCUSS/DSCN MKR DOCD: CPT | Performed by: NURSE PRACTITIONER

## 2019-11-13 PROCEDURE — G0438 PPPS, INITIAL VISIT: HCPCS | Performed by: NURSE PRACTITIONER

## 2019-11-13 PROCEDURE — G8484 FLU IMMUNIZE NO ADMIN: HCPCS | Performed by: NURSE PRACTITIONER

## 2019-11-13 PROCEDURE — 4040F PNEUMOC VAC/ADMIN/RCVD: CPT | Performed by: NURSE PRACTITIONER

## 2019-11-13 PROCEDURE — G8598 ASA/ANTIPLAT THER USED: HCPCS | Performed by: NURSE PRACTITIONER

## 2019-11-13 PROCEDURE — 3017F COLORECTAL CA SCREEN DOC REV: CPT | Performed by: NURSE PRACTITIONER

## 2019-11-13 ASSESSMENT — ENCOUNTER SYMPTOMS
DIARRHEA: 0
BACK PAIN: 1
NAUSEA: 0
CONSTIPATION: 0
SORE THROAT: 0
SHORTNESS OF BREATH: 0
ABDOMINAL PAIN: 0
COUGH: 0

## 2019-11-13 ASSESSMENT — LIFESTYLE VARIABLES: HOW OFTEN DO YOU HAVE A DRINK CONTAINING ALCOHOL: 0

## 2019-11-13 ASSESSMENT — PATIENT HEALTH QUESTIONNAIRE - PHQ9
SUM OF ALL RESPONSES TO PHQ QUESTIONS 1-9: 2
SUM OF ALL RESPONSES TO PHQ QUESTIONS 1-9: 2

## 2019-11-15 LAB — HIV-2 AB: NEGATIVE

## 2019-11-18 ENCOUNTER — TELEPHONE (OUTPATIENT)
Dept: FAMILY MEDICINE CLINIC | Age: 64
End: 2019-11-18

## 2019-11-19 ENCOUNTER — TELEPHONE (OUTPATIENT)
Dept: FAMILY MEDICINE CLINIC | Age: 64
End: 2019-11-19

## 2019-12-04 ENCOUNTER — CARE COORDINATION (OUTPATIENT)
Dept: CARE COORDINATION | Age: 64
End: 2019-12-04

## 2019-12-16 ENCOUNTER — CARE COORDINATION (OUTPATIENT)
Dept: CARE COORDINATION | Age: 64
End: 2019-12-16

## 2019-12-16 ENCOUNTER — HOSPITAL ENCOUNTER (OUTPATIENT)
Dept: PULMONOLOGY | Age: 64
Discharge: HOME | End: 2019-12-16
Payer: MEDICARE

## 2019-12-16 ENCOUNTER — HOSPITAL ENCOUNTER (OUTPATIENT)
Dept: CT IMAGING | Age: 64
Discharge: HOME | End: 2019-12-16
Payer: MEDICARE

## 2019-12-16 DIAGNOSIS — J90 PLEURAL EFFUSION, RIGHT: ICD-10-CM

## 2019-12-16 DIAGNOSIS — J44.9 STAGE 3 SEVERE COPD BY GOLD CLASSIFICATION (HCC): ICD-10-CM

## 2019-12-16 DIAGNOSIS — Z87.891 PERSONAL HISTORY OF TOBACCO USE, PRESENTING HAZARDS TO HEALTH: ICD-10-CM

## 2019-12-16 PROCEDURE — 94060 EVALUATION OF WHEEZING: CPT

## 2019-12-16 PROCEDURE — G0297 LDCT FOR LUNG CA SCREEN: HCPCS

## 2019-12-23 ENCOUNTER — OFFICE VISIT (OUTPATIENT)
Dept: PHYSICAL MEDICINE AND REHAB | Age: 64
End: 2019-12-23
Payer: MEDICARE

## 2019-12-23 VITALS
OXYGEN SATURATION: 93 % | HEIGHT: 65 IN | BODY MASS INDEX: 29.75 KG/M2 | HEART RATE: 80 BPM | WEIGHT: 178.57 LBS | SYSTOLIC BLOOD PRESSURE: 128 MMHG | RESPIRATION RATE: 16 BRPM | DIASTOLIC BLOOD PRESSURE: 64 MMHG

## 2019-12-23 DIAGNOSIS — E11.42 DIABETIC POLYNEUROPATHY ASSOCIATED WITH TYPE 2 DIABETES MELLITUS (HCC): Primary | ICD-10-CM

## 2019-12-23 DIAGNOSIS — G89.29 OTHER CHRONIC PAIN: ICD-10-CM

## 2019-12-23 DIAGNOSIS — G89.29 CHRONIC PAIN OF RIGHT ANKLE: ICD-10-CM

## 2019-12-23 DIAGNOSIS — M25.571 CHRONIC PAIN OF RIGHT ANKLE: ICD-10-CM

## 2019-12-23 DIAGNOSIS — M47.816 LUMBAR SPONDYLOSIS: ICD-10-CM

## 2019-12-23 PROCEDURE — G8427 DOCREV CUR MEDS BY ELIG CLIN: HCPCS | Performed by: NURSE PRACTITIONER

## 2019-12-23 PROCEDURE — 1036F TOBACCO NON-USER: CPT | Performed by: NURSE PRACTITIONER

## 2019-12-23 PROCEDURE — G8399 PT W/DXA RESULTS DOCUMENT: HCPCS | Performed by: NURSE PRACTITIONER

## 2019-12-23 PROCEDURE — 2022F DILAT RTA XM EVC RTNOPTHY: CPT | Performed by: NURSE PRACTITIONER

## 2019-12-23 PROCEDURE — G8484 FLU IMMUNIZE NO ADMIN: HCPCS | Performed by: NURSE PRACTITIONER

## 2019-12-23 PROCEDURE — 99213 OFFICE O/P EST LOW 20 MIN: CPT | Performed by: NURSE PRACTITIONER

## 2019-12-23 PROCEDURE — 3044F HG A1C LEVEL LT 7.0%: CPT | Performed by: NURSE PRACTITIONER

## 2019-12-23 PROCEDURE — 3017F COLORECTAL CA SCREEN DOC REV: CPT | Performed by: NURSE PRACTITIONER

## 2019-12-23 PROCEDURE — G8598 ASA/ANTIPLAT THER USED: HCPCS | Performed by: NURSE PRACTITIONER

## 2019-12-23 PROCEDURE — G8417 CALC BMI ABV UP PARAM F/U: HCPCS | Performed by: NURSE PRACTITIONER

## 2019-12-23 PROCEDURE — 1123F ACP DISCUSS/DSCN MKR DOCD: CPT | Performed by: NURSE PRACTITIONER

## 2019-12-23 PROCEDURE — 4040F PNEUMOC VAC/ADMIN/RCVD: CPT | Performed by: NURSE PRACTITIONER

## 2019-12-23 PROCEDURE — 1090F PRES/ABSN URINE INCON ASSESS: CPT | Performed by: NURSE PRACTITIONER

## 2019-12-23 RX ORDER — HYDROCODONE BITARTRATE AND ACETAMINOPHEN 5; 325 MG/1; MG/1
1 TABLET ORAL EVERY 8 HOURS PRN
Qty: 90 TABLET | Refills: 0 | Status: SHIPPED | OUTPATIENT
Start: 2019-12-23 | End: 2020-01-22

## 2019-12-28 NOTE — PROGRESS NOTES
 atorvastatin  40 mg Oral Daily    And    amLODIPine  10 mg Oral Daily    potassium chloride  20 mEq Oral BID WC    aspirin  81 mg Oral Daily    calcitRIOL  0.25 mcg Oral Daily    cloNIDine  0.3 mg Oral Q8H    ferrous sulfate  325 mg Oral Daily with breakfast    fluticasone  1 spray Nasal Daily    folbee plus  1 tablet Oral Daily    vitamin D  5,000 Units Oral Daily    sodium chloride flush  10 mL Intravenous 2 times per day    heparin (porcine)  5,000 Units Subcutaneous 3 times per day     PRN Meds: ALPRAZolam, ondansetron, glucose, dextrose, glucagon (rDNA), dextrose, albuterol sulfate HFA, HYDROcodone-acetaminophen, nitroGLYCERIN, polyethylene glycol, sodium chloride flush, magnesium hydroxide, potassium chloride **OR** potassium chloride **OR** potassium chloride, magnesium sulfate      Intake/Output Summary (Last 24 hours) at 08/01/18 1006  Last data filed at 08/01/18 0816   Gross per 24 hour   Intake              770 ml   Output             1450 ml   Net             -680 ml       Diet:  DIET RENAL; Carb Control: 4 carb choices (60 gms)/meal; No Added Salt (3-4 GM);  Daily Fluid Restriction: 1500 ml    Exam:  /65   Pulse 65   Temp 98 °F (36.7 °C) (Oral)   Resp 18   Ht 5' 5\" (1.651 m)   Wt 193 lb (87.5 kg)   SpO2 96%   BMI 32.12 kg/m²     Physical Examination:   General appearance - alert, awake  and in no distress at rest, obese  HEENT: Normocephalic, Atraumatic, pupils reactive, mucous membranes moist  Chest - moving equally to respiration,Decreased air entry, clear on auscultation today  Heart - normal rate, regular rhythm, normal S1, S2, no murmurs,  Abdomen - soft, nontender, distended, no masses or organomegaly, BS+  Neurological - alert, oriented, normal speech, sensations intact and able to move all extremities   Extremities - peripheral pulses normal, 2+ b/l pedal edema,more on the right- improving  Capillary refill less than 3 sec  Skin - normal coloration and turgor, no smoking        Electronically signed by Deepak Reich MD on 8/1/2018 at 10:06 AM None

## 2019-12-29 DIAGNOSIS — E55.9 VITAMIN D DEFICIENCY: ICD-10-CM

## 2019-12-30 ENCOUNTER — HOSPITAL ENCOUNTER (EMERGENCY)
Age: 64
Discharge: HOME | End: 2019-12-30
Payer: MEDICARE

## 2019-12-30 ENCOUNTER — APPOINTMENT (OUTPATIENT)
Dept: GENERAL RADIOLOGY | Age: 64
End: 2019-12-30
Payer: MEDICARE

## 2019-12-30 VITALS
DIASTOLIC BLOOD PRESSURE: 50 MMHG | RESPIRATION RATE: 16 BRPM | BODY MASS INDEX: 29.62 KG/M2 | TEMPERATURE: 98.9 F | WEIGHT: 178 LBS | SYSTOLIC BLOOD PRESSURE: 104 MMHG | HEART RATE: 74 BPM | OXYGEN SATURATION: 95 %

## 2019-12-30 LAB
FLU A ANTIGEN: NEGATIVE
FLU B ANTIGEN: NEGATIVE
GROUP A STREP CULTURE, REFLEX: NEGATIVE
REFLEX THROAT C + S: NORMAL

## 2019-12-30 PROCEDURE — 99283 EMERGENCY DEPT VISIT LOW MDM: CPT

## 2019-12-30 PROCEDURE — 87880 STREP A ASSAY W/OPTIC: CPT

## 2019-12-30 PROCEDURE — 71046 X-RAY EXAM CHEST 2 VIEWS: CPT

## 2019-12-30 PROCEDURE — 87804 INFLUENZA ASSAY W/OPTIC: CPT

## 2019-12-30 PROCEDURE — 87070 CULTURE OTHR SPECIMN AEROBIC: CPT

## 2019-12-30 RX ORDER — AMOXICILLIN 250 MG/1
250 CAPSULE ORAL 2 TIMES DAILY
Qty: 14 CAPSULE | Refills: 0 | Status: SHIPPED | OUTPATIENT
Start: 2019-12-30 | End: 2020-01-06

## 2019-12-30 RX ORDER — ERGOCALCIFEROL 1.25 MG/1
CAPSULE ORAL
Qty: 4 CAPSULE | Refills: 5 | Status: SHIPPED | OUTPATIENT
Start: 2019-12-30 | End: 2020-08-19

## 2019-12-30 ASSESSMENT — PAIN DESCRIPTION - LOCATION: LOCATION: THROAT

## 2019-12-30 ASSESSMENT — ENCOUNTER SYMPTOMS
RHINORRHEA: 0
SHORTNESS OF BREATH: 0
ABDOMINAL PAIN: 0
DIARRHEA: 0
COUGH: 1
EYE PAIN: 0
WHEEZING: 0
BACK PAIN: 0
EYE DISCHARGE: 0
VOMITING: 0
NAUSEA: 1
SORE THROAT: 1

## 2019-12-30 ASSESSMENT — PAIN DESCRIPTION - PAIN TYPE: TYPE: ACUTE PAIN

## 2019-12-30 ASSESSMENT — PAIN SCALES - GENERAL: PAINLEVEL_OUTOF10: 8

## 2019-12-30 NOTE — ED TRIAGE NOTES
Pt presents to the ED with a sore throat and cough/congestion with yellow mucous production since Sunday. States she was supposed to go to dialysis today but had to miss d/t feeling ill. Reports loss of appetite as well. Stats this morning her temp was 102 PO, did not take tylenol too.

## 2019-12-30 NOTE — ED PROVIDER NOTES
[facility]  eMERGENCY dEPARTMENT eNCOUnter          CHIEF COMPLAINT       Chief Complaint   Patient presents with    Pharyngitis    Nasal Congestion       Nurses Notes reviewed and I agree except as noted in the HPI. HISTORY OF PRESENT ILLNESS    Izabel Bridges is a 75 y.o. female who presents to the Emergency Department for the evaluation of sore throat, nasal congestion. Patient states she woke up this morning with a sore throat. Patient states she has been coughing up yellow mucous. Patient called her dialysis nurse because she had a temperature of 100.2 and she told her to come to the ER for evaluation. Patient states she was scheduled to have dialysis today. Patient has a sore throat that is worse with coughing. Patient states she has a runny nose and used nasal spray earlier today. Patient denies worsening SOB, chest pain, headache, vomiting, leg pain or swelling, or other complaints at this time. REVIEW OF SYSTEMS     Review of Systems   Constitutional: Positive for fever. Negative for appetite change, chills and fatigue. HENT: Positive for congestion and sore throat. Negative for ear pain and rhinorrhea. Eyes: Negative for pain, discharge and visual disturbance. Respiratory: Positive for cough. Negative for shortness of breath and wheezing. Cardiovascular: Negative for chest pain, palpitations and leg swelling. Gastrointestinal: Positive for nausea. Negative for abdominal pain, diarrhea and vomiting. Genitourinary: Negative for difficulty urinating, dysuria, hematuria and vaginal discharge. Musculoskeletal: Negative for arthralgias, back pain, joint swelling and neck pain. Skin: Negative for pallor and rash. Neurological: Negative for dizziness, syncope, weakness, light-headedness, numbness and headaches. Hematological: Negative for adenopathy. Psychiatric/Behavioral: Negative for confusion and suicidal ideas. The patient is not nervous/anxious. HYDROcodone-acetaminophen (NORCO) 5-325 MG per tablet Take 1 tablet by mouth every 8 hours as needed for Pain for up to 30 days. Fill on/after 1/22/2020, Disp-90 tablet, R-0Normal      !! HYDROcodone-acetaminophen (NORCO) 5-325 MG per tablet Take 1 tablet by mouth every 8 hours as needed for Pain for up to 30 days. , Disp-90 tablet, R-0Normal      lactulose (CHRONULAC) 10 GM/15ML solution TAKE 30ML BY MOUTH 2 TIMES DAILY AS NEEDED CONSTIPATION. USE IF NO BM WITH OTHER SOFTNERS, Disp-473 mL, R-0Normal      cloNIDine (CATAPRES) 0.3 MG tablet TAKE 1 TABLET EVERY 8 HOURS (THREE TIMES A DAY), Disp-270 tablet, R-4Normal      amLODIPine (NORVASC) 10 MG tablet TAKE 1 TABLET BY MOUTH EVERY DAY, Disp-30 tablet, [id number]      folic acid-pyridoxine-cyanocobalamine (FOLBEE) 2.5-25-1 MG TABS tablet TAKE 1 TABLET BY MOUTH DAILY, Disp-30 tablet, R-5Print      !! Insulin Pen Needle 31G X 8 MM MISC 3 TIMES DAILY Starting Wed 8/14/2019, Disp-300 each, R-5, Print      carvedilol (COREG) 25 MG tablet Take 1.5 tablets by mouth 2 times daily . hold if SBP less than 100 mm hg or HR less than 60, Disp-270 tablet, R-3Print      insulin glargine (LANTUS SOLOSTAR) 100 UNIT/ML injection pen Inject 15 Units into the skin nightly, Disp-15 pen, R-5Print      insulin aspart (NOVOLOG FLEXPEN) 100 UNIT/ML injection pen Sliding scale insulin coverage  Glucose:Dose: If Less [id number] =No Insulin/ 140-199= 2 Units/ 200-249=4 Units/ 250-299= 6 Units/  300-349=8 Units/  350-400=10 Units/ Above 400 = 12 Units, Disp-15 pen, R-5Print      atorvastatin (LIPITOR) 20 MG tablet TAKE 1 TABLET BY MOUTH EVERY DAY, Disp-60 tablet, R-1Normal      omeprazole (PRILOSEC) 20 MG delayed release capsule TAKE 1 CAPSULE BY MOUTH DAILY 30 MINUTES PRIOR TO MEAL., R-0Historical Med      lidocaine-prilocaine (EMLA) 2.5-2.5 % cream APPLY SMALL AMOUNT TO ACCESS SITE (AVF) 1 TO 2 HOURS BEFORE DIALYSIS.  COVER WITH OCCLUSIVE DRESSING (SARAN WRAP), R-11, Historical Med      linaclotide in her sister; Diabetes in her brother, brother, father, sister, sister, sister, sister, and sister; Early Death in her sister; Heart Disease in her father, mother, sister, and sister; High Blood Pressure in her mother; Kidney Disease in her mother; Mental Illness in her mother; Obesity in her father; Stroke in her mother. SOCIAL HISTORY      reports that she quit smoking about 10 years ago. Her smoking use included cigarettes. She started smoking about 38 years ago. She has a 45.00 pack-year smoking history. She has never used smokeless tobacco. She reports that she does not drink alcohol or use drugs. PHYSICAL EXAM     INITIAL VITALS:  weight is 178 lb (80.7 kg). Her oral temperature is 98.9 °F (37.2 °C). Her blood pressure is 104/50 (abnormal) and her pulse is 74. Her respiration is 16 and oxygen saturation is 95%. Physical Exam  Vitals signs and nursing note reviewed. Constitutional:       Appearance: She is well-developed. She is not toxic-appearing or diaphoretic. HENT:      Head: Normocephalic and atraumatic. Right Ear: Tympanic membrane and external ear normal.      Left Ear: Tympanic membrane and external ear normal.      Nose: Nose normal.      Mouth/Throat:      Pharynx: No oropharyngeal exudate or posterior oropharyngeal erythema. Comments: Thick oral mucosa   Eyes:      Conjunctiva/sclera: Conjunctivae normal.   Neck:      Musculoskeletal: Normal range of motion and neck supple. Vascular: No JVD. Cardiovascular:      Rate and Rhythm: Normal rate and regular rhythm. Pulses: Normal pulses. Heart sounds: Normal heart sounds. No murmur. No friction rub. No gallop. Pulmonary:      Effort: Pulmonary effort is normal. No respiratory distress. Breath sounds: Normal breath sounds. No decreased breath sounds, wheezing, rhonchi or rales. Abdominal:      General: Bowel sounds are normal. There is no distension. Palpations: Abdomen is soft.       Tenderness: There is no tenderness. There is no guarding or rebound. Musculoskeletal: Normal range of motion. Skin:     General: Skin is warm and dry. Findings: No rash. Neurological:      Mental Status: She is alert and oriented to person, place, and time. Motor: No abnormal muscle tone. Coordination: Coordination normal.           DIFFERENTIAL DIAGNOSIS:   Included but not limited to: influenza, URI    DIAGNOSTIC RESULTS     EKG: All EKG's are interpreted by the Emergency Department Physician who either signs or Co-signs this chart in the absence of a cardiologist.    none    RADIOLOGY: non-plain film images(s) such as CT, Ultrasound and MRI are readby theradiologist.    XR CHEST STANDARD (2 VW)   Final Result      No acute cardiopulmonary disease. Stable mild cardiomegaly. **This report has been created using voice recognition software. It may contain minor errors which are inherent in voice recognition technology. **      Final report electronically signed by Dr. Huang Yu on 12/30/2019 4:54 PM            LABS:   Labs Reviewed   RAPID INFLUENZA A/B ANTIGENS   THROAT CULTURE    Narrative:     Source: Specimen not received       Site:           Current Antibiotics:   GROUP A STREP, REFLEX       EMERGENCYDEPARTMENTCOURSE:   Vitals:    Vitals:    12/30/19 1558   BP: (!) 104/50   Pulse: 74   Resp: 16   Temp: 98.9 °F (37.2 °C)   TempSrc: Oral   SpO2: 95%   Weight: 178 lb (80.7 kg)     Patient was seen history physical exam was performed. See disposition below    MDM:  Flu and strep are negative, CXR shows stable cardiomegaly with no acute cardiopulmonary disease. Vitals are stable. She is afebrile. Discussed patient discharge with ED attending who agrees with plan of care. Anticipatory guidance given and patient is comfortable with plan of care. They will follow up with PCP for follow up or further evaluation if symptoms continue.  They will return to the ED with worsening of symptoms and we

## 2019-12-30 NOTE — PATIENT INSTRUCTIONS
disease and have to limit fluids, talk with your doctor before you increase your fluid intake.)  · Urinate when you need to. · Urinate right after you have sex. · Change sanitary pads often. · Avoid douches, bubble baths, feminine hygiene sprays, and other feminine hygiene products that have deodorants. · After going to the bathroom, wipe from front to back. When should you call for help? Call your doctor now or seek immediate medical care if:    · Symptoms such as fever, chills, nausea, or vomiting get worse or appear for the first time.     · You have new pain in your back just below your rib cage. This is called flank pain.     · There is new blood or pus in your urine.     · You have any problems with your antibiotic medicine.    Watch closely for changes in your health, and be sure to contact your doctor if:    · You are not getting better after taking an antibiotic for 2 days.     · Your symptoms go away but then come back. Where can you learn more? Go to [email or web address]. org and sign in to your [employer] account. Enter B452 in the mention box to learn more about \"Urinary Tract Infection in Women: Care Instructions. \"     If you do not have an account, please click on the \"Sign Up Now\" link. Current as of: March 21, 2018  Content Version: 11.8  © 1762-9858 Healthwise, [employer]. Care instructions adapted under license by [facility] ([facility]). If you have questions about a medical condition or this instruction, always ask your healthcare professional. [place] [address] any warranty or liability for your use of this information. Patient Education        Low Blood Pressure: Care Instructions  Your Care Instructions    Blood pressure is a measurement of the force of the blood against the walls of the blood vessels during and after each beat of the heart. Low blood pressure is also called hypotension.  It means that your blood pressure is much lower than
Attending Attestation (For Attendings USE Only)...

## 2019-12-31 ENCOUNTER — TELEPHONE (OUTPATIENT)
Dept: FAMILY MEDICINE CLINIC | Age: 64
End: 2019-12-31

## 2020-01-01 LAB — THROAT/NOSE CULTURE: NORMAL

## 2020-01-02 ENCOUNTER — CARE COORDINATION (OUTPATIENT)
Dept: CARE COORDINATION | Age: 65
End: 2020-01-02

## 2020-01-07 ENCOUNTER — CARE COORDINATION (OUTPATIENT)
Dept: CARE COORDINATION | Age: 65
End: 2020-01-07

## 2020-01-07 RX ORDER — LACTULOSE 10 G/15ML
SOLUTION ORAL
Qty: 473 ML | Refills: 0 | Status: ON HOLD | OUTPATIENT
Start: 2020-01-07 | End: 2020-08-03

## 2020-01-08 NOTE — CARE COORDINATION
APRN - CNP   atorvastatin (LIPITOR) 20 MG tablet TAKE 1 TABLET BY MOUTH EVERY DAY  Patient taking differently: 40 mg  7/29/19   Mary Jane Cook DO   omeprazole (PRILOSEC) 20 MG delayed release capsule TAKE 1 CAPSULE BY MOUTH DAILY 30 MINUTES PRIOR TO MEAL. 6/14/19   Historical Provider, MD   lidocaine-prilocaine (EMLA) 2.5-2.5 % cream APPLY SMALL AMOUNT TO ACCESS SITE (AVF) 1 TO 2 HOURS BEFORE DIALYSIS. COVER WITH OCCLUSIVE DRESSING (SARAN WRAP) 5/29/19   Historical Provider, MD   linaclotide (LINZESS) 145 MCG capsule Take 145 mcg by mouth every morning (before breakfast)    Historical Provider, MD   Lanthanum Carbonate 1000 MG PACK Take by mouth 2 times daily    Historical Provider, MD   Insulin Pen Needle (B-D ULTRAFINE III SHORT PEN) 31G X 8 MM MISC Use three times daily Diagnosis Code E11.9 2/13/19   Lisy Russ APRN - CNP   nitroGLYCERIN (NITROSTAT) 0.4 MG SL tablet Place 1 tablet under the tongue every 5 minutes as needed for Chest pain 2/13/19   Lisy Russ APRN - CNP   bumetanide (BUMEX) 1 MG tablet Take 2 mg by mouth daily     Historical Provider, MD   minoxidil (LONITEN) 2.5 MG tablet Take 5 mg by mouth 3 times daily    Historical Provider, MD   tiotropium (SPIRIVA HANDIHALER) 18 MCG inhalation capsule Inhale 1 capsule into the lungs daily 1 capsule via inhalation daily. 10/16/18 12/23/19  Rodger Jimenez MD   acetaminophen (TYLENOL) 325 MG tablet Take 2 tablets by mouth every 6 hours as needed for Pain 8/24/18   Krysta Cisneros APRN - CNP   doxazosin (CARDURA) 4 MG tablet Take 3 tablets by mouth daily . hold if SBP less than 100 mm hg  Patient taking differently: Take 4 mg by mouth daily  8/1/18   Serenity Mendieta MD   fluticasone (FLONASE) 50 MCG/ACT nasal spray 2 sprays by Nasal route daily as needed     Historical Provider, MD   aspirin 81 MG EC tablet Take 81 mg by mouth daily.       Historical Provider, MD       Future Appointments   Date Time Provider Franklin Chin

## 2020-01-10 ENCOUNTER — OFFICE VISIT (OUTPATIENT)
Dept: FAMILY MEDICINE CLINIC | Age: 65
End: 2020-01-10
Payer: MEDICARE

## 2020-01-10 VITALS
WEIGHT: 175.2 LBS | SYSTOLIC BLOOD PRESSURE: 136 MMHG | HEIGHT: 65 IN | RESPIRATION RATE: 20 BRPM | DIASTOLIC BLOOD PRESSURE: 62 MMHG | HEART RATE: 68 BPM | BODY MASS INDEX: 29.19 KG/M2 | TEMPERATURE: 98.1 F | OXYGEN SATURATION: 97 %

## 2020-01-10 PROCEDURE — G8417 CALC BMI ABV UP PARAM F/U: HCPCS | Performed by: NURSE PRACTITIONER

## 2020-01-10 PROCEDURE — 3017F COLORECTAL CA SCREEN DOC REV: CPT | Performed by: NURSE PRACTITIONER

## 2020-01-10 PROCEDURE — 1036F TOBACCO NON-USER: CPT | Performed by: NURSE PRACTITIONER

## 2020-01-10 PROCEDURE — 1090F PRES/ABSN URINE INCON ASSESS: CPT | Performed by: NURSE PRACTITIONER

## 2020-01-10 PROCEDURE — 99213 OFFICE O/P EST LOW 20 MIN: CPT | Performed by: NURSE PRACTITIONER

## 2020-01-10 PROCEDURE — G8484 FLU IMMUNIZE NO ADMIN: HCPCS | Performed by: NURSE PRACTITIONER

## 2020-01-10 PROCEDURE — 1123F ACP DISCUSS/DSCN MKR DOCD: CPT | Performed by: NURSE PRACTITIONER

## 2020-01-10 PROCEDURE — 4040F PNEUMOC VAC/ADMIN/RCVD: CPT | Performed by: NURSE PRACTITIONER

## 2020-01-10 PROCEDURE — G8427 DOCREV CUR MEDS BY ELIG CLIN: HCPCS | Performed by: NURSE PRACTITIONER

## 2020-01-10 PROCEDURE — G8399 PT W/DXA RESULTS DOCUMENT: HCPCS | Performed by: NURSE PRACTITIONER

## 2020-01-10 RX ORDER — ATORVASTATIN CALCIUM 40 MG/1
TABLET, FILM COATED ORAL DAILY
COMMUNITY
Start: 2020-01-03 | End: 2020-06-17 | Stop reason: SDUPTHER

## 2020-01-10 RX ORDER — BUMETANIDE 2 MG/1
TABLET ORAL DAILY
COMMUNITY
Start: 2020-01-08 | End: 2020-04-27

## 2020-01-10 ASSESSMENT — ENCOUNTER SYMPTOMS
RHINORRHEA: 0
ANAL BLEEDING: 0
COLOR CHANGE: 0
BLOOD IN STOOL: 0
DIARRHEA: 0
NAUSEA: 0
COUGH: 0
EYE REDNESS: 0
SHORTNESS OF BREATH: 0
ABDOMINAL PAIN: 0
SORE THROAT: 0
ABDOMINAL DISTENTION: 0
EYE DISCHARGE: 0
CONSTIPATION: 0

## 2020-01-10 ASSESSMENT — PATIENT HEALTH QUESTIONNAIRE - PHQ9
SUM OF ALL RESPONSES TO PHQ QUESTIONS 1-9: 2
SUM OF ALL RESPONSES TO PHQ QUESTIONS 1-9: 2
2. FEELING DOWN, DEPRESSED OR HOPELESS: 1
1. LITTLE INTEREST OR PLEASURE IN DOING THINGS: 1
SUM OF ALL RESPONSES TO PHQ9 QUESTIONS 1 & 2: 2

## 2020-01-10 NOTE — PROGRESS NOTES
[address] [facility]  [phone] (phone)  [phone] (fax)    Visit Date: 1/10/2020    Izabel Bridges is a 75 y.o. female who presents today for:  Chief Complaint   Patient presents with   [facility] ED Follow-up     [facility] ED 12/30/2019 Follow-up cough,wheezing and crackles in lungs started about 1 week ago     HPI:     Went to the ER on 12/30/2019 with c/o of sore throat, nasal congestion, fever - tested negative for flu and strep. Chest Xray:  Narrative   PROCEDURE: XR CHEST (2 VW)       CLINICAL INFORMATION: cough.       COMPARISON: Chest x-ray dated 3/15/2019       TECHNIQUE: PA and lateral views of the chest.       FINDINGS:   Lungs/pleura: No pneumonia, pulmonary edema, or obvious mass. No change in the nearly 1 cm medial left upper lobe calcified granuloma. No pleural effusion. No pneumothorax. Heart: There is stable mild cardiomegaly. Mediastinum/emmie: No obvious mass or adenopathy. Skeleton: No acute/significant bone or joint abnormality. Upper abdomen: Radiopaque densities are noted in the left upper quadrant likely representing colon contents.           Impression       No acute cardiopulmonary disease. Stable mild cardiomegaly. Started Amoxicillin last Monday and took the full 7 days. She is feeling better - more energy - cough is better - just started back up eating yesterday.      HPI  Health Maintenance   Topic Date Due    Diabetic foot exam  12/13/1963    Diabetic retinal exam  12/13/1963    Hepatitis B vaccine (1 of 3 - Risk 3-dose series) 12/13/1972    Shingles Vaccine (1 of 2) 12/13/2003    Pneumococcal 65+ years Vaccine (1 of 1 - PPSV23) 12/13/2018    Flu vaccine (1) 09/01/2019    TSH testing  09/26/2019    A1C test (Diabetic or Prediabetic)  08/14/2020    Annual Wellness Visit (AWV)  11/12/2020    Lipid screen  11/13/2020    Potassium monitoring  11/13/2020    Creatinine monitoring  11/13/2020    Low dose CT lung screening  12/16/2020    Breast COLONOSCOPY  2009    2 polyps, not precanceorus    COLONOSCOPY Left 6/10/2019    COLONOSCOPY DIAGNOSTIC performed by Jovanni Dahl MD at [address] [facility]  3/30/2012    eye lid lift    DIAGNOSTIC CARDIAC CATH LAB PROCEDURE      ENDOSCOPY, COLON, DIAGNOSTIC  2007   [facility] EYE SURGERY  March 30th, 2012    left sided ptosis    FOOT SURGERY  1990    Tarsal tunnel surgery    FRACTURE SURGERY  2015    HYSTERECTOMY  1980 and 1985    first partial in 1980's, then total in [address]  2015    LIVER BIOPSY  6/2015    LUNG BIOPSY  2009    OK OFFICE/OUTPT VISIT,PROCEDURE ONLY Left 8/23/2018    LEFT UPPER EXTREMENTY AV FISTULA CREATION performed by Wiliam Claros MD at [address] Left 6/14/2019    EGD BIOPSY performed by Jovanni Dahl MD at [facility]     Family History   Problem Relation Age of Onset    Diabetes Sister     Diabetes Brother     Diabetes Sister     Diabetes Sister     Cancer Sister     Early Death Sister     Heart Disease Sister     Diabetes Sister     Heart Disease Sister     Diabetes Sister     Diabetes Brother     Arthritis Mother     Heart Disease Mother     High Blood Pressure Mother     Kidney Disease Mother     Mental Illness Mother     Stroke Mother     Heart Disease Father     Diabetes Father     Obesity Father     Alcohol Abuse Father      Social History     Tobacco Use    Smoking status: Former Smoker     Packs/day: 1.50     Years: 30.00     Pack years: 45.00     Types: Cigarettes     Start date: 12/28/1981     Last attempt to quit: 3/13/2009     Years since quitting: 10.8    Smokeless tobacco: Never Used    Tobacco comment: quit 2009   Substance Use Topics    Alcohol use: No     Alcohol/week: 0.0 standard drinks      Current Outpatient Medications   Medication Sig Dispense Refill    bumetanide (BUMEX) 2 MG tablet       atorvastatin (LIPITOR) 40 MG tablet       lactulose (CHRONULAC) 10 GM/15ML solution TAKE 30ML BY MOUTH 2 TIMES DAILY AS NEEDED CONSTIPATION. USE IF NO BM WITH OTHER SOFTNERS 473 mL 0    vitamin D (ERGOCALCIFEROL) 1.25 MG (10606 UT) CAPS capsule TAKE ONE CAPSULE BY MOUTH ONE TIME PER WEEK 4 capsule 5    HYDROcodone-acetaminophen (NORCO) 5-325 MG per tablet Take 1 tablet by mouth every 8 hours as needed for Pain for up to 30 days. 90 tablet 0    cloNIDine (CATAPRES) 0.3 MG tablet TAKE 1 TABLET EVERY 8 HOURS (THREE TIMES A DAY) 270 tablet 4    amLODIPine (NORVASC) 10 MG tablet TAKE 1 TABLET BY MOUTH EVERY DAY 30 tablet 5    folic acid-pyridoxine-cyanocobalamine (FOLBEE) 2.5-25-1 MG TABS tablet TAKE 1 TABLET BY MOUTH DAILY 30 tablet 5    Insulin Pen Needle 31G X 8 MM MISC 1 each by Does not apply route 3 times daily 300 each 5    carvedilol (COREG) 25 MG tablet Take 1.5 tablets by mouth 2 times daily . hold if SBP less than 100 mm hg or HR less than 60 270 tablet 3    insulin glargine (LANTUS SOLOSTAR) 100 UNIT/ML injection pen Inject 15 Units into the skin nightly (Patient taking differently: Inject 18 Units into the skin nightly ) 15 pen 5    insulin aspart (NOVOLOG FLEXPEN) 100 UNIT/ML injection pen Sliding scale insulin coverage  Glucose:Dose: If Less [id number] =No Insulin/ 140-199= 2 Units/ 200-249=4 Units/ 250-299= 6 Units/  300-349=8 Units/  350-400=10 Units/ Above 400 = 12 Units 15 pen 5    omeprazole (PRILOSEC) 20 MG delayed release capsule TAKE 1 CAPSULE BY MOUTH DAILY 30 MINUTES PRIOR TO MEAL. 0    lidocaine-prilocaine (EMLA) 2.5-2.5 % cream APPLY SMALL AMOUNT TO ACCESS SITE (AVF) 1 TO 2 HOURS BEFORE DIALYSIS.  COVER WITH OCCLUSIVE DRESSING (SARAN WRAP)  11    Lanthanum Carbonate 1000 MG PACK Take by mouth 2 times daily      Insulin Pen Needle (B-D ULTRAFINE III SHORT PEN) 31G X 8 MM MISC Use three times daily Diagnosis Code E11.9 200 each 3    nitroGLYCERIN (NITROSTAT) 0.4 MG SL tablet Place 1 tablet under the tongue every 5 minutes as needed for Chest pain 25 tablet 5 Speech normal.         Behavior: Behavior normal.         Thought Content: Thought content normal.         Judgment: Judgment normal.         Lab Results   Component Value Date    WBC 6.5 11/13/2019    HGB 12.5 11/13/2019    HCT 38.6 11/13/2019     11/13/2019    CHOL 135 11/13/2019    TRIG 77 11/13/2019    HDL 48 11/13/2019    LDLDIRECT 69.68 12/28/2016    LDLCALC 72 11/13/2019    AST 21 11/13/2019     11/13/2019    K 4.3 11/13/2019    CL 96 (L) 11/13/2019    CREATININE 5.4 (HH) 11/13/2019    BUN 40 (H) 11/13/2019    CO2 29 11/13/2019    TSH 2.640 09/26/2018    INR 1.13 08/23/2018    LABA1C 5.7 08/14/2019    LABMICR 20.95 01/04/2018    LABGLOM 8 (A) 11/13/2019    MG 2.2 11/13/2019    CALCIUM 9.2 11/13/2019    VITD25 48 11/13/2019     Assessment:       Diagnosis Orders   1. CKD (chronic kidney disease), stage V ([facility] [address]     2. End stage renal disease due to benign hypertension ([facility] [address]     3. At high risk for falls     4. Follow-up exam       Plan:   Continue to rest  Drink plenty of water    Please let us know if symptoms return or if you start feeling bad again    Return if symptoms worsen or fail to improve. Orders Placed:  No orders of the defined types were placed in this encounter. Medications Prescribed:  No orders of the defined types were placed in this encounter. Future Appointments   Date Time Provider Franklin Chin   1/29/2020  2:30 PM Yoselyn Holland, Benny Wang   2/12/2020  1:00 PM DONOVAN Matson CNP SRPX Physic [facility]   3/23/2020 11:30 AM DONOVAN Ash CNPX Rheum [facility]   5/13/2020  1:40 PM DONOVAN Panchal CNP SRPX FM RES [facility]   7/8/2020  2:00 PM MD ROBERTO MorelandX Heart [facility]      Patient given educational materials - see patient instructions. Discussed use, benefit, and side effects of prescribedmedications. All patient questions answered. Pt voiced understanding. Reviewed health maintenance. Instructed to continue current medications, diet and exercise. Patient agreed with treatment plan. Follow up as directed. Electronically signed by Gemma Hatchet, APRN - CNP on 1/10/2020 at 1:50 PM  On the basis of positive falls risk screening, assessment and plan is as follows: home safety tips provided.

## 2020-01-10 NOTE — PATIENT INSTRUCTIONS
Continue to rest  Drink plenty of water    Please let us know if symptoms return or if you start feeling bad again      Patient Education        Diabetes and Preventing Falls: Care Instructions  Your Care Instructions    If you are an older adult who has diabetes, you may have a higher risk of falling. Complications of diabetes--such as nerve damage, foot problems, and reduced vision--may increase your risk of a fall. Some of your medicines also may add to your risk. By making your home safer, you can lower your risk of falling. Doing things to prevent diabetes complications may also help to lower your risk. You can make your home safer with a few simple measures. Follow-up care is a key part of your treatment and safety. Be sure to make and go to all appointments, and call your doctor if you are having problems. It's also a good idea to know your test results and keep a list of the medicines you take. How can you care for yourself at home? Taking care of yourself  · Keep your blood sugar at a target level (which you set with your doctor). · Exercise regularly to improve your strength, muscle tone, and balance. Walk if you can. Swimming may be a good choice if you cannot walk easily. · Have your vision checked as often as your doctor recommends. It is usually once a year or more often if you have eye problems. · Know the side effects of the medicines you take. Ask your doctor or pharmacist whether the medicines you take can affect your balance. Sleeping pills or sedatives can affect your balance. · Limit the amount of alcohol you drink. Alcohol can impair your balance and other senses. · Have your doctor check your feet during each visit. If you have a foot problem, see your doctor. Preventing falls at home  · Remove raised doorway thresholds, throw rugs, and clutter. Repair loose carpet or raised areas in the floor. · Move furniture and electrical cords to keep them out of walking paths.   · Use nonskid floor wax, and wipe up spills right away, especially on ceramic tile floors. · If you use a walker or cane, put rubber tips on it. If you use crutches, clean the bottoms of them regularly with an abrasive pad, such as steel wool. · Keep your house well lit, especially Renee Bulls, and outside walkways. Use night-lights in areas such as hallways and bathrooms. Add extra light switches or use remote switches (such as switches that go on or off when you clap your hands) to make it easier to turn lights on if you have to get up during the night. · Install sturdy handrails on stairways. Put grab bars near your shower, bathtub, and toilet. · Store household items on low shelves so that you do not have to climb or reach high. Or use a reaching device that you can get at a medical supply store. If you have to climb for something, use a step stool with handrails, or ask someone to get it for you. · Keep a cordless phone and a flashlight with new batteries by your bed. If possible, put a phone in each of the main rooms of your house, or carry a cell phone in case you fall and cannot reach a phone. Or you can wear a device around your neck or wrist. You push a button that sends a signal for help. · Wear low-heeled shoes that fit well and give your feet good support. Use footwear with nonskid soles. Check the heels and soles of your shoes for wear. Repair or replace worn heels or soles. · Do not wear socks without shoes on wood floors. · Walk on the grass when the sidewalks are slippery. If you live in an area that gets snow and ice in the winter, sprinkle salt on slippery steps and sidewalks. Where can you learn more? Go to https://[employer] Urban[employer]. org and sign in to your [employer] account. Enter Y762 in the FusionA[employer] box to learn more about \"Diabetes and Preventing Falls: Care Instructions. \"     If you do not have an account, please click on the \"Sign Up Now\" link.   Current as of: breathing.    Call your doctor now or seek immediate medical care if:    · You cough up blood.     · You have new or worse trouble breathing.     · You have a new or higher fever.     · You have a new rash.    Watch closely for changes in your health, and be sure to contact your doctor if:    · You cough more deeply or more often, especially if you notice more mucus or a change in the color of your mucus.     · You have new symptoms, such as a sore throat, an earache, or sinus pain.     · You do not get better as expected. Where can you learn more? Go to https://[employer].Lendstar. org and sign in to your [employer] account. Enter D279 in the Shanda Games box to learn more about \"Cough: Care Instructions. \"     If you do not have an account, please click on the \"Sign Up Now\" link. Current as of: June 9, 2019  Content Version: 12.3  © 0836-2758 Healthwise, Incorporated. Care instructions adapted under license by [facility] ([facility]). If you have questions about a medical condition or this instruction, always ask your healthcare professional. [place] [address] any warranty or liability for your use of this information.

## 2020-01-15 ENCOUNTER — CARE COORDINATION (OUTPATIENT)
Dept: CARE COORDINATION | Age: 65
End: 2020-01-15

## 2020-01-15 NOTE — CARE COORDINATION
1:00 PM Jose Miguel RudolphDONOVAN - CNP SRPX [facility]   3/23/2020 11:30 AM ZachDONOVAN Starr - CNP SRPX [facility]   5/13/2020  1:40 PM Mariangel Engel, APRN - CNP SRPX  RES [facility] [address] [place] [address]   7/8/2020  2:00 PM Peace Berger MD [address] [address]     ,   Diabetes Assessment    Meal Planning:  Avoidance of concentrated sweets   How often do you test your blood sugar?:  Meals   Do you have barriers with adherence to non-pharmacologic self-management interventions?  (Nutrition/Exercise/Self-Monitoring):  No   Have you ever had to go to the ED for symptoms of low blood sugar?:  No       No patient-reported symptoms   Do you have hyperglycemia symptoms?:  No   Do you have hypoglycemia symptoms?:  No   Blood Sugar Monitoring Regimen:  Before Meals   Blood Sugar Trends:  No Change      ,   Congestive Heart Failure Assessment    Do you understand a low sodium diet?:  Yes  Do you understand how to read food labels?:  Yes  How many restaurant meals do you eat per week?:  1-2  Do you salt your food before tasting it?:  No     No patient-reported symptoms      Symptoms:   None:  Yes      Symptom course:  stable  Weight trend:  stable  Salt intake watch compared to last visit:  stable      and   COPD Assessment    Is the patient able to verbalize Rescue vs. Long Acting medications?:  Yes  Does the patient have a nebulizer?:  Yes     No patient-reported symptoms         Symptoms:   None:  Yes      Symptom course:  stable  Breathlessness:  none  Increase use of rapid acting/rescue inhaled medications?:  No  Change in chronic cough?:  No/At Baseline  Change in sputum?:  No/At Baseline  Have you had a recent diagnosis of pneumonia either by PCP or at a hospital?:  No

## 2020-02-03 ENCOUNTER — HOSPITAL ENCOUNTER (OUTPATIENT)
Dept: WOMENS IMAGING | Age: 65
Discharge: HOME | End: 2020-02-03
Payer: MEDICARE

## 2020-02-03 PROCEDURE — 77063 BREAST TOMOSYNTHESIS BI: CPT

## 2020-02-05 ENCOUNTER — TELEPHONE (OUTPATIENT)
Dept: FAMILY MEDICINE CLINIC | Age: 65
End: 2020-02-05

## 2020-02-05 ENCOUNTER — CARE COORDINATION (OUTPATIENT)
Dept: CARE COORDINATION | Age: 65
End: 2020-02-05

## 2020-02-05 NOTE — CARE COORDINATION
Sig Start Date End Date Taking? Authorizing Provider   bumetanide (BUMEX) 2 MG tablet  1/8/20   Historical Provider, MD   atorvastatin (LIPITOR) 40 MG tablet  1/3/20   Historical Provider, MD   lactulose (CHRONULAC) 10 GM/15ML solution TAKE 30ML BY MOUTH 2 TIMES DAILY AS NEEDED CONSTIPATION. USE IF NO BM WITH OTHER SOFTNERS 1/7/20   DONOVAN Montes CNP   vitamin D (ERGOCALCIFEROL) 1.25 MG (61143 UT) CAPS capsule TAKE ONE CAPSULE BY MOUTH ONE TIME PER WEEK 12/30/19   Juno Washington DO   cloNIDine (CATAPRES) 0.3 MG tablet TAKE 1 TABLET EVERY 8 HOURS (THREE TIMES A DAY) 10/11/19   J Luis Edmond MD   amLODIPine (NORVASC) 10 MG tablet TAKE 1 TABLET BY MOUTH EVERY DAY 8/30/19   DONOVAN Montes CNP   folic acid-pyridoxine-cyanocobalamine (FOLBEE) 2.5-25-1 MG TABS tablet TAKE 1 TABLET BY MOUTH DAILY 8/14/19   DONOVAN Waddell CNP   Insulin Pen Needle 31G X 8 MM MISC 1 each by Does not apply route 3 times daily 8/14/19   DONOVAN Waddell CNP   carvedilol (COREG) 25 MG tablet Take 1.5 tablets by mouth 2 times daily . hold if SBP less than 100 mm hg or HR less than 60 8/14/19   DONOVAN Waddell CNP   insulin glargine (LANTUS SOLOSTAR) 100 UNIT/ML injection pen Inject 15 Units into the skin nightly  Patient taking differently: Inject 18 Units into the skin nightly  8/14/19   DONOVAN Waddell CNP   insulin aspart (NOVOLOG FLEXPEN) 100 UNIT/ML injection pen Sliding scale insulin coverage  Glucose:Dose: If Less [id number] =No Insulin/ 140-199= 2 Units/ 200-249=4 Units/ 250-299= 6 Units/  300-349=8 Units/  350-400=10 Units/ Above 400 = 12 Units 8/14/19   DONOVAN Waddell CNP   omeprazole (PRILOSEC) 20 MG delayed release capsule TAKE 1 CAPSULE BY MOUTH DAILY 30 MINUTES PRIOR TO MEAL. 6/14/19   Historical Provider, MD   lidocaine-prilocaine (EMLA) 2.5-2.5 % cream APPLY SMALL AMOUNT TO ACCESS SITE (AVF) 1 TO 2 HOURS BEFORE DIALYSIS.  COVER WITH OCCLUSIVE DRESSING (LILYAN

## 2020-02-05 NOTE — TELEPHONE ENCOUNTER
----- Message from DONOVAN Hoff CNP sent at 2/4/2020  1:28 PM EST -----  There is no mammographic evidence of malignancy. A 1 year   screening mammogram is recommended.

## 2020-02-12 ENCOUNTER — OFFICE VISIT (OUTPATIENT)
Dept: INTERNAL MEDICINE CLINIC | Age: 65
End: 2020-02-12
Payer: MEDICARE

## 2020-02-12 VITALS
WEIGHT: 181.2 LBS | HEIGHT: 65 IN | BODY MASS INDEX: 30.19 KG/M2 | SYSTOLIC BLOOD PRESSURE: 144 MMHG | DIASTOLIC BLOOD PRESSURE: 72 MMHG | HEART RATE: 76 BPM

## 2020-02-12 LAB — HBA1C MFR BLD: 5.3 % (ref 4.3–5.7)

## 2020-02-12 PROCEDURE — 99214 OFFICE O/P EST MOD 30 MIN: CPT | Performed by: NURSE PRACTITIONER

## 2020-02-12 PROCEDURE — 4040F PNEUMOC VAC/ADMIN/RCVD: CPT | Performed by: NURSE PRACTITIONER

## 2020-02-12 PROCEDURE — 1123F ACP DISCUSS/DSCN MKR DOCD: CPT | Performed by: NURSE PRACTITIONER

## 2020-02-12 PROCEDURE — G8399 PT W/DXA RESULTS DOCUMENT: HCPCS | Performed by: NURSE PRACTITIONER

## 2020-02-12 PROCEDURE — 3017F COLORECTAL CA SCREEN DOC REV: CPT | Performed by: NURSE PRACTITIONER

## 2020-02-12 PROCEDURE — 2022F DILAT RTA XM EVC RTNOPTHY: CPT | Performed by: NURSE PRACTITIONER

## 2020-02-12 PROCEDURE — 83036 HEMOGLOBIN GLYCOSYLATED A1C: CPT | Performed by: NURSE PRACTITIONER

## 2020-02-12 PROCEDURE — 1036F TOBACCO NON-USER: CPT | Performed by: NURSE PRACTITIONER

## 2020-02-12 PROCEDURE — G8484 FLU IMMUNIZE NO ADMIN: HCPCS | Performed by: NURSE PRACTITIONER

## 2020-02-12 PROCEDURE — 1090F PRES/ABSN URINE INCON ASSESS: CPT | Performed by: NURSE PRACTITIONER

## 2020-02-12 PROCEDURE — G8427 DOCREV CUR MEDS BY ELIG CLIN: HCPCS | Performed by: NURSE PRACTITIONER

## 2020-02-12 PROCEDURE — 3044F HG A1C LEVEL LT 7.0%: CPT | Performed by: NURSE PRACTITIONER

## 2020-02-12 PROCEDURE — G8417 CALC BMI ABV UP PARAM F/U: HCPCS | Performed by: NURSE PRACTITIONER

## 2020-02-12 RX ORDER — CLONIDINE HYDROCHLORIDE 0.3 MG/1
TABLET ORAL
Qty: 270 TABLET | Refills: 1 | Status: ON HOLD | OUTPATIENT
Start: 2020-02-12 | End: 2020-08-03

## 2020-02-12 ASSESSMENT — ENCOUNTER SYMPTOMS
BLOOD IN STOOL: 0
EYE PAIN: 0
SINUS PAIN: 0
ABDOMINAL DISTENTION: 0
SINUS PRESSURE: 0
SORE THROAT: 0
WHEEZING: 0
BACK PAIN: 0
CONSTIPATION: 0
TROUBLE SWALLOWING: 0
SHORTNESS OF BREATH: 0
COUGH: 0
NAUSEA: 0
DIARRHEA: 0
VOMITING: 0
ABDOMINAL PAIN: 0
PHOTOPHOBIA: 0

## 2020-02-12 NOTE — PROGRESS NOTES
2020     Derek Nolen (:  1955) is a 75 y.o. female, here for evaluation of the following medical concerns: Diabetes and HTN     I last seen Sunday Shell 6 months ago. She is currently receiving hemodialysis and is on the list for transplant at [facility]. She has not had any hospital admissions since last visit. 1 ER visit in 2019 for acute pharyngitis.      Diabetes-   Sunday Shell reports being diabetic for over 25 years. She states her diabetes is now well controlled. A1C 5.3% today in office, was 5.7% in 2019. She sees a dietician weekly at the dialysis unit. She is on Novolog Sliding Scale group 2 before meals and at bedtime, and Lantus 15 units at night. Her appetite is poor. Is using Glucerna as a dietary supplement. States hypoglycemic events not present, last event unknown. She is able to voice signs/symptoms of hypoglycemia. Reports checking glucose 2 times per day, with range of glucose readings . She did not bring meter today for download. Dietary modification for diabetes management and/or weight loss encouraged. Does not report difficulty with obtaining medications. She voices understanding of the importance of annual eye exams. BP reccs <140/90 (<130/80 in CVD risk). On aspirin 81 mg daily and atorvastatin 40 mg daily.     Denies polyuria, polydipsia, polyphagia. Reports neuropathic symptoms including numbness, tingling. She follows routinely with podiatry every 3 months, last seen 1 week ago. She does not have any wounds to feet.      Essential HTN-  Reports taking her medications as instructed with no medication side effects. She does not perform home BP monitoring, but has it checked with hemodialysis on T, urs, Sat. Denies chest pain on exertion, dyspnea on exertion, swelling of ankles, orthostatic dizziness or lightheadedness, and/or palpitations. /72 in office today. Follows with cardiology at [facility].      Review of Systems   Constitutional: Negative for appetite change, chills, fatigue and fever. HENT: Negative for congestion, sinus pressure, sinus pain, sore throat and trouble swallowing. Eyes: Negative for photophobia, pain and visual disturbance. Respiratory: Negative for cough, shortness of breath and wheezing. Cardiovascular: Negative for chest pain, palpitations and leg swelling. Gastrointestinal: Negative for abdominal distention, abdominal pain, blood in stool, constipation, diarrhea, nausea and vomiting. Endocrine: Negative for polydipsia, polyphagia and polyuria. Genitourinary: Negative for difficulty urinating, dysuria and hematuria. Musculoskeletal: Negative for arthralgias, back pain and myalgias. Skin: Negative for rash and wound. Neurological: Negative for dizziness, light-headedness and headaches. Psychiatric/Behavioral: Negative for sleep disturbance. The patient is not nervous/anxious. Prior to Visit Medications    Medication Sig Taking? Authorizing Provider   cloNIDine (CATAPRES) 0.3 MG tablet TAKE 1 TABLET EVERY 8 HOURS (THREE TIMES A DAY) Yes DONOVAN Odonnell CNP   bumetanide (BUMEX) 2 MG tablet daily  Yes Historical Provider, MD   atorvastatin (LIPITOR) 40 MG tablet daily  Yes Historical Provider, MD   lactulose (CHRONULAC) 10 GM/15ML solution TAKE 30ML BY MOUTH 2 TIMES DAILY AS NEEDED CONSTIPATION. USE IF NO BM WITH OTHER SOFTNERS Yes DONOVAN Land CNP   vitamin D (ERGOCALCIFEROL) 1.25 MG (04619 UT) CAPS capsule TAKE ONE CAPSULE BY MOUTH ONE TIME PER WEEK Yes Roopa Grey,    amLODIPine (NORVASC) 10 MG tablet TAKE 1 TABLET BY MOUTH EVERY DAY Yes DONOVAN Land CNP   folic acid-pyridoxine-cyanocobalamine (FOLBEE) 2.5-25-1 MG TABS tablet TAKE 1 TABLET BY MOUTH DAILY Yes DONOVAN Odonnell CNP   carvedilol (COREG) 25 MG tablet Take 1.5 tablets by mouth 2 times daily . hold if SBP less than 100 mm hg or HR less than 60 Yes DONOVAN Odonnell CNP   insulin glargine (LANTUS SOLOSTAR) 100 UNIT/ML injection pen Inject 15 Units into the skin nightly  Patient taking differently: Inject 18 Units into the skin nightly  Yes DONOVAN Banegas CNP   insulin aspart (NOVOLOG FLEXPEN) 100 UNIT/ML injection pen Sliding scale insulin coverage  Glucose:Dose: If Less [id number] =No Insulin/ 140-199= 2 Units/ 200-249=4 Units/ 250-299= 6 Units/  300-349=8 Units/  350-400=10 Units/ Above 400 = 12 Units Yes DONOVAN Banegas CNP   omeprazole (PRILOSEC) 20 MG delayed release capsule TAKE 1 CAPSULE BY MOUTH DAILY 30 MINUTES PRIOR TO MEAL. Yes Historical Provider, MD   lidocaine-prilocaine (EMLA) 2.5-2.5 % cream APPLY SMALL AMOUNT TO ACCESS SITE (AVF) 1 TO 2 HOURS BEFORE DIALYSIS. COVER WITH OCCLUSIVE DRESSING (SARAN WRAP) Yes Historical Provider, MD   linaclotide (LINZESS) 145 MCG capsule Take 145 mcg by mouth every morning (before breakfast) Yes Historical Provider, MD   Lanthanum Carbonate 1000 MG PACK Take by mouth 2 times daily Yes Historical Provider, MD   Insulin Pen Needle (B-D ULTRAFINE III SHORT PEN) 31G X 8 MM MISC Use three times daily Diagnosis Code E11.9 Yes DONOVAN Banegas CNP   nitroGLYCERIN (NITROSTAT) 0.4 MG SL tablet Place 1 tablet under the tongue every 5 minutes as needed for Chest pain Yes DONOVAN Banegas CNP   minoxidil (LONITEN) 2.5 MG tablet Take 5 mg by mouth 3 times daily Yes Historical Provider, MD   doxazosin (CARDURA) 4 MG tablet Take 3 tablets by mouth daily . hold if SBP less than 100 mm hg Yes Cristy Pantoja MD   fluticasone (FLONASE) 50 MCG/ACT nasal spray 2 sprays by Nasal route daily as needed  Yes Historical Provider, MD   aspirin 81 MG EC tablet Take 81 mg by mouth daily. Yes Historical Provider, MD   tiotropium (SPIRIVA HANDIHALER) 18 MCG inhalation capsule Inhale 1 capsule into the lungs daily 1 capsule via inhalation daily.   Salomón Carlisle MD        Social History     Tobacco Use    Smoking status: Former Smoker     Packs/day: 1.50 Years: 30.00     Pack years: 45.00     Types: Cigarettes     Start date: 12/28/1981     Last attempt to quit: 3/13/2009     Years since quitting: 10.9    Smokeless tobacco: Never Used    Tobacco comment: quit 2009   Substance Use Topics    Alcohol use: No     Alcohol/week: 0.0 standard drinks        Vitals:    02/12/20 1302   BP: (!) 144/72   Site: Right Upper Arm   Cuff Size: Medium Adult   Pulse: 76   Weight: 181 lb 3.2 oz (82.2 kg)   Height: 5' 5\" (1.651 m)     Estimated body mass index is 30.15 kg/m² as calculated from the following:    Height as of this encounter: 5' 5\" (1.651 m). Weight as of this encounter: 181 lb 3.2 oz (82.2 kg). Physical Exam  Constitutional:       General: She is not in acute distress. Appearance: Normal appearance. She is well-developed. HENT:      Head: Normocephalic and atraumatic. Right Ear: Tympanic membrane, ear canal and external ear normal.      Left Ear: Tympanic membrane, ear canal and external ear normal.      Nose: Nose normal. No congestion or rhinorrhea. Mouth/Throat:      Mouth: Mucous membranes are moist.      Pharynx: Oropharynx is clear. No oropharyngeal exudate or posterior oropharyngeal erythema. Eyes:      Extraocular Movements: Extraocular movements intact. Conjunctiva/sclera: Conjunctivae normal.      Pupils: Pupils are equal, round, and reactive to light. Neck:      Musculoskeletal: Normal range of motion and neck supple. Thyroid: No thyromegaly. Vascular: No JVD. Cardiovascular:      Rate and Rhythm: Normal rate and regular rhythm. Heart sounds: Normal heart sounds. No murmur. No friction rub. No gallop. Pulmonary:      Effort: Pulmonary effort is normal. No respiratory distress. Breath sounds: Normal breath sounds. No wheezing or rales. Abdominal:      General: Bowel sounds are normal. There is no distension. Palpations: Abdomen is soft. Tenderness: There is no abdominal tenderness. Musculoskeletal: Normal range of motion. Right lower leg: No edema. Left lower leg: No edema. Lymphadenopathy:      Cervical: No cervical adenopathy. Skin:     General: Skin is warm and dry. Capillary Refill: Capillary refill takes less than 2 seconds. Neurological:      Mental Status: She is alert and oriented to person, place, and time. Motor: No weakness. Coordination: Coordination normal.      Gait: Gait normal.   Psychiatric:         Mood and Affect: Mood normal.         Behavior: Behavior normal.         Thought Content: Thought content normal.         Judgment: Judgment normal.         ASSESSMENT/PLAN:    1. Type 2 diabetes mellitus without complication, with long-term current use of insulin (HCC)    - POCT glycosylated hemoglobin (Hb A1C)  - Continue current medication regimen  - Encouraged dietary modifications and exercise regimen to promote optimal glycemic control  - Continue to monitor and record blood sugars. Bring meter to next visit  - Call if any low blood sugars, or consistent readings > 150    2. Essential hypertension    - cloNIDine (CATAPRES) 0.3 MG tablet; TAKE 1 TABLET EVERY 8 HOURS (THREE TIMES A DAY)  Dispense: 270 tablet; Refill: 1  - Follow with renal as previously scheduled    Follow up in 6 months    An electronic signature was used to authenticate this note.     --Oral DONOVAN Wallace - CNP on 2/14/2020 at 11:17 AM

## 2020-02-16 NOTE — PROGRESS NOTES
[facility] and [address] [place] [address] for pulmonary disease. Patient: Kristi Dobbins  : 1955    Lung Nodule Screening     [] Qualifies    [] Does not qualify   [x] Declined    [] Completed. Chief complaint and [place]:  Kristi Dobbins is a 75 y. o.oldfemale came for follow up regarding her severe COPD after having low dose CT chest and spirometry. No fever or chills. No recent hospitalizations or emergency room visits. She is using inhalers with good compliance. She rarely uses rescue inhaler. She denies any nocturnal symptoms. She was diagnosed with End stage kidney disease. She had AV fistula placement on her left upper extremity. She is on HD per nephrology on Tuesday/ Thursday and Saturday. She denies any worsening of exertional dyspnea or cough. She is currently using:  Albuterol HFA 2 puffs Q6h prn. Spiriva 18mcg/Cap DPI. 1Cap via inhalation daily. Flonase nasal spray 2 times 1 spray each nostril per day    She had hx of  abnormal CT chest with Rt. Upper lobe lung mass- stable for 3years. She quit smoking in . She smoked for 35 years. She denies any history of Glucoma or urinary retention in the past.    Review of Systems:   General/Constitutional: she gained 8lbs of weight from the last visit with normal appetite. No fever or chills. HENT: Negative. Eyes: Negative. Upper respiratory tract: No nasal stuffiness or post nasal drip. Lower respiratory tract/ lungs: No cough or sputum production. No hemoptysis. Cardiovascular: No palpitations or chest pain. Gastrointestinal: No nausea or vomiting. Neurological: No focal neurologiacal weakness. Extremities: No edema. Musculoskeletal: No complaints. Genitourinary: No complaints. Hematological: Negative. Psychiatric/Behavioral: Negative. Skin: No itching.       Past Medical History:   Diagnosis Date    Anemia associated with chronic renal failure     Aranesp 150 micrograms every other Tarsal tunnel surgery    FRACTURE SURGERY  2015   [address] and [address]    first partial in 1980's, [facility]   [facility] LIVER BIOPSY  6/2015    LUNG BIOPSY  2009    ND OFFICE/OUTPT VISIT,PROCEDURE ONLY Left 8/23/2018    LEFT UPPER EXTREMENTY AV FISTULA CREATION performed by Agustin Rodriguez MD at [address] Left 6/14/2019    EGD BIOPSY performed by Loni Lemons MD at [facility]       Social History     Tobacco Use    Smoking status: Former Smoker     Packs/day: 1.50     Years: 30.00     Pack years: 45.00     Types: Cigarettes     Start date: 12/28/1981     Last attempt to quit: 3/13/2009     Years since quitting: 10.9    Smokeless tobacco: Never Used    Tobacco comment: quit 2009   Substance Use Topics    Alcohol use: No     Alcohol/week: 0.0 standard drinks    Drug use: No       Allergies   Allergen Reactions    Pioglitazone Swelling    Actos [Pioglitazone Hydrochloride] Swelling    Cymbalta [Duloxetine Hcl] Other (See Comments)     Anxiety and lethargic    Gabapentin Anxiety       Current Outpatient Medications   Medication Sig Dispense Refill    cloNIDine (CATAPRES) 0.3 MG tablet TAKE 1 TABLET EVERY 8 HOURS (THREE TIMES A DAY) 270 tablet 1    bumetanide (BUMEX) 2 MG tablet daily       atorvastatin (LIPITOR) 40 MG tablet daily       lactulose (CHRONULAC) 10 GM/15ML solution TAKE 30ML BY MOUTH 2 TIMES DAILY AS NEEDED CONSTIPATION. USE IF NO BM WITH OTHER SOFTNERS 473 mL 0    vitamin D (ERGOCALCIFEROL) 1.25 MG (88492 UT) CAPS capsule TAKE ONE CAPSULE BY MOUTH ONE TIME PER WEEK 4 capsule 5    amLODIPine (NORVASC) 10 MG tablet TAKE 1 TABLET BY MOUTH EVERY DAY 30 tablet 5    folic acid-pyridoxine-cyanocobalamine (FOLBEE) 2.5-25-1 MG TABS tablet TAKE 1 TABLET BY MOUTH DAILY 30 tablet 5    carvedilol (COREG) 25 MG tablet Take 1.5 tablets by mouth 2 times daily . hold if SBP less than 100 mm hg or HR less than 60 270 tablet 3    insulin glargine (LANTUS SOLOSTAR) 100 UNIT/ML injection pen Inject 15 Units into the skin nightly (Patient taking differently: Inject 18 Units into the skin nightly ) 15 pen 5    insulin aspart (NOVOLOG FLEXPEN) 100 UNIT/ML injection pen Sliding scale insulin coverage  Glucose:Dose: If Less [id number] =No Insulin/ 140-199= 2 Units/ 200-249=4 Units/ 250-299= 6 Units/  300-349=8 Units/  350-400=10 Units/ Above 400 = 12 Units 15 pen 5    omeprazole (PRILOSEC) 20 MG delayed release capsule TAKE 1 CAPSULE BY MOUTH DAILY 30 MINUTES PRIOR TO MEAL. 0    lidocaine-prilocaine (EMLA) 2.5-2.5 % cream APPLY SMALL AMOUNT TO ACCESS SITE (AVF) 1 TO 2 HOURS BEFORE DIALYSIS. COVER WITH OCCLUSIVE DRESSING (SARAN WRAP)  11    linaclotide (LINZESS) 145 MCG capsule Take 145 mcg by mouth every morning (before breakfast)      Lanthanum Carbonate 1000 MG PACK Take by mouth 2 times daily      Insulin Pen Needle (B-D ULTRAFINE III SHORT PEN) 31G X 8 MM MISC Use three times daily Diagnosis Code E11.9 200 each 3    nitroGLYCERIN (NITROSTAT) 0.4 MG SL tablet Place 1 tablet under the tongue every 5 minutes as needed for Chest pain 25 tablet 5    minoxidil (LONITEN) 2.5 MG tablet Take 5 mg by mouth 3 times daily      doxazosin (CARDURA) 4 MG tablet Take 3 tablets by mouth daily . hold if SBP less than 100 mm hg 90 tablet 3    fluticasone (FLONASE) 50 MCG/ACT nasal spray 2 sprays by Nasal route daily as needed       aspirin 81 MG EC tablet Take 81 mg by mouth daily.  tiotropium (SPIRIVA HANDIHALER) 18 MCG inhalation capsule Inhale 1 capsule into the lungs daily 1 capsule via inhalation daily. 90 capsule 3     No current facility-administered medications for this visit.         Family History   Problem Relation Age of Onset    Diabetes Sister     Diabetes Brother     Diabetes Sister     Diabetes Sister     Cancer Sister     Early Death Sister     Heart Disease Sister     Diabetes Sister     Heart Disease Sister    Tonnyrony Egan Diabetes Sister     Diabetes Brother     Arthritis Mother     Heart Disease Mother     High Blood Pressure Mother     Kidney Disease Mother     Mental Illness Mother     Stroke Mother     Heart Disease Father     Diabetes Father     Obesity Father     Alcohol Abuse Father           Vitals:   /72 (Site: Right Upper Arm, Position: Sitting, Cuff Size: Medium Adult)   Pulse 69   Temp 97 °F (36.1 °C) (Tympanic)   Ht 5' 5\" (1.651 m)   Wt 181 lb (82.1 kg)   SpO2 95%   BMI 30.12 kg/m²       Nursing note and vitals reviewed. Constitutional: Patient appears moderately built and moderately nourished. No distress. Patient is oriented to person, place, and time. HENT:   Head: Normocephalic and atraumatic. Right Ear: External ear normal.   Left Ear: External ear normal.   Mouth/Throat: Oropharynx is clear and moist.  No oral thrush. Eyes: Conjunctivae are normal. Pupils are equal, round, and reactive to light. No scleral icterus. Neck: Neck supple. No JVD present. No tracheal deviation present. Cardiovascular: Normal rate, regular rhythm, normal heart sounds. No murmur heard. Pulmonary/Chest: Effort normal and breath sounds normal. No stridor. No respiratory distress. No wheezes. No rales. Patient exhibits no tenderness. Abdominal: Soft. Patient exhibits no distension. No tenderness. Musculoskeletal: Normal range of motion. Extremities: Patient exhibits no edema and no tenderness. Functioning AV fistula on her left arm. Lymphadenopathy:  No cervical adenopathy. Neurological: Patient is alert and oriented to person, place, and time. Skin: Skin is warm and dry. Patient is not diaphoretic. Psychiatric: Patient  has a normal mood and affect. Patient behavior is normal.       Diagnostic Data    [id number] CT Thorax Lungs WO Contr 3/5/2012 11:11:24 AM   CPT4 code   IMPRESSION:   1. STABLE EVIDENCE OF OLD GRANULOMATOUS DISEASE, STABLE SMALL GRANULOMAS   LIKELY POST INFLAMMATORY.  NO SUSPICIOUS PULMONARY MALIGNANCY. 2. STABLE LINEAR DENSITIES CONSISTENT WITH SCARRING. NO ACUTE PROCESS IN   THE CHEST. Spirometry: 10/10/18      Low dose CT chest:  11/30/2018  NONCONTRAST SCREENING CT CHEST:  1. There are a few lung nodules. Two of them have slightly increased from previous though are pleural-based without significant spiculation and probably benign. 2. Worsening pleural effusions and a large pericardial effusion. Worsened atelectasis and worsened scarring. 3. LUNGRADS ASSESSMENT VALUE: 2,      CT chest with low dose:  Dec 16, 2019   NONCONTRAST SCREENING CT CHEST:   1. All previously identified nodules are either stable, smaller, are not identified 2. There are no pathologically enlarged lymph nodes. 3. LUNGRADS ASSESSMENT VALUE: 2.             Spirometry:12/16/2019      Her FEV1 improved from previous spirometry done in the past.      Assessment:  -Severe COPD- Stable with her current inhalers  -Right side pleural effusion most likely due to her ESRD. She is on HD T/T/S-she is currently waiting kidney transplant at [facility]. She is on kidney transplant list.  -Hx of Rt. Upper lobe lung mass-stable for over 3 years.   -Obstructive sleep apnea on CPAP. -Hypertension- under control with current meds. -Diabetes mellitus.  -Allergic rhinitis on treatment with Flonase.  -Chronic hx of tobacco smoking in the past. She quit smoking several years back.  -Pericardial effusion- She follows with Dr. Chemo Vaca MD from cardiology service. Plan:  -Continue current Albuterol HFA 2puffs  Q6Hprn. Refills given on Flonase for 1year in mail in format.  -Continue Spiriva 18mcg/Cap DPI. 1Cap via inhalation daily. Refills given on Flonase for 1year in mail in format.  -Continue Flonase 50mcg 2 sprays daily. Refills given on Flonase for 1year in mail in format.  -Patient educated to update his pneumococcal vaccine with family physician and take influenza vaccine in coming season with out fail.  Patient

## 2020-03-09 ENCOUNTER — OFFICE VISIT (OUTPATIENT)
Dept: PULMONOLOGY | Age: 65
End: 2020-03-09
Payer: MEDICARE

## 2020-03-09 VITALS
DIASTOLIC BLOOD PRESSURE: 72 MMHG | HEIGHT: 65 IN | OXYGEN SATURATION: 95 % | BODY MASS INDEX: 30.16 KG/M2 | HEART RATE: 69 BPM | SYSTOLIC BLOOD PRESSURE: 138 MMHG | TEMPERATURE: 97 F | WEIGHT: 181 LBS

## 2020-03-09 PROCEDURE — 1090F PRES/ABSN URINE INCON ASSESS: CPT | Performed by: INTERNAL MEDICINE

## 2020-03-09 PROCEDURE — 3017F COLORECTAL CA SCREEN DOC REV: CPT | Performed by: INTERNAL MEDICINE

## 2020-03-09 PROCEDURE — 99214 OFFICE O/P EST MOD 30 MIN: CPT | Performed by: INTERNAL MEDICINE

## 2020-03-09 PROCEDURE — G8926 SPIRO NO PERF OR DOC: HCPCS | Performed by: INTERNAL MEDICINE

## 2020-03-09 PROCEDURE — 1036F TOBACCO NON-USER: CPT | Performed by: INTERNAL MEDICINE

## 2020-03-09 PROCEDURE — G8484 FLU IMMUNIZE NO ADMIN: HCPCS | Performed by: INTERNAL MEDICINE

## 2020-03-09 PROCEDURE — 4040F PNEUMOC VAC/ADMIN/RCVD: CPT | Performed by: INTERNAL MEDICINE

## 2020-03-09 PROCEDURE — G8399 PT W/DXA RESULTS DOCUMENT: HCPCS | Performed by: INTERNAL MEDICINE

## 2020-03-09 PROCEDURE — G0296 VISIT TO DETERM LDCT ELIG: HCPCS | Performed by: INTERNAL MEDICINE

## 2020-03-09 PROCEDURE — 1123F ACP DISCUSS/DSCN MKR DOCD: CPT | Performed by: INTERNAL MEDICINE

## 2020-03-09 PROCEDURE — 3023F SPIROM DOC REV: CPT | Performed by: INTERNAL MEDICINE

## 2020-03-09 PROCEDURE — G8427 DOCREV CUR MEDS BY ELIG CLIN: HCPCS | Performed by: INTERNAL MEDICINE

## 2020-03-09 PROCEDURE — G8417 CALC BMI ABV UP PARAM F/U: HCPCS | Performed by: INTERNAL MEDICINE

## 2020-03-09 RX ORDER — ALBUTEROL SULFATE 90 UG/1
2 AEROSOL, METERED RESPIRATORY (INHALATION) EVERY 6 HOURS PRN
Qty: 3 INHALER | Refills: 3 | Status: SHIPPED | OUTPATIENT
Start: 2020-03-09 | End: 2021-03-01 | Stop reason: SDUPTHER

## 2020-03-09 RX ORDER — FLUTICASONE PROPIONATE 50 MCG
2 SPRAY, SUSPENSION (ML) NASAL DAILY
Qty: 3 BOTTLE | Refills: 3 | Status: ON HOLD | OUTPATIENT
Start: 2020-03-09 | End: 2020-08-03

## 2020-03-09 RX ORDER — TIOTROPIUM BROMIDE 18 UG/1
18 CAPSULE ORAL; RESPIRATORY (INHALATION) DAILY
Qty: 90 CAPSULE | Refills: 3 | Status: SHIPPED | OUTPATIENT
Start: 2020-03-09 | End: 2021-03-01 | Stop reason: ALTCHOICE

## 2020-03-09 NOTE — PROGRESS NOTES
Neck Circumference -   16  Mallampati - IV    Lung Nodule Screening     [x] Qualifies    [] Does not qualify   [] Declined    [] Completed

## 2020-03-09 NOTE — PATIENT INSTRUCTIONS
X-ray.    Below is a summary of the things you need to know regarding screening for lung cancer with low-dose computed tomography (LDCT). This is a screening program that involves routine annual screening with LDCT studies of the lung. The LDCTs are done using low-dose radiation that is not thought to increase your cancer risk. If you have other serious medical conditions (other cancers, congestive heart failure) that limit your life expectancy to less than 10 years, you should not undergo lung cancer screening with LDCT. The chance is 20%-60% that the LDCT result will show abnormalities. This would require additional testing which could include repeat imaging or even invasive procedures. Most (about 95%) of \"abnormal\" LDCT results are false in the sense that no lung cancer is ultimately found. Additionally, some (about 10%) of the cancers found would not affect your life expectancy, even if undetected and untreated. If you are still smoking, the single most important thing that you can do to reduce your risk of dying of lung cancer is to quit. For this screening to be covered by Medicare and most other insurers, strict criteria must be met. If you do not meet these criteria, but still wish to undergo LDCT testing, you will be required to sign a waiver indicating your willingness to pay for the scan.

## 2020-03-19 ENCOUNTER — TELEPHONE (OUTPATIENT)
Dept: PHYSICAL MEDICINE AND REHAB | Age: 65
End: 2020-03-19

## 2020-03-19 RX ORDER — HYDROCODONE BITARTRATE AND ACETAMINOPHEN 5; 325 MG/1; MG/1
1 TABLET ORAL EVERY 8 HOURS PRN
Qty: 90 TABLET | Refills: 0 | Status: SHIPPED | OUTPATIENT
Start: 2020-03-19 | End: 2020-04-18

## 2020-04-16 ENCOUNTER — NURSE ONLY (OUTPATIENT)
Dept: LAB | Age: 65
End: 2020-04-16

## 2020-04-16 LAB
ALBUMIN SERPL-MCNC: 4.3 G/DL (ref 3.5–5.1)
ALP BLD-CCNC: 59 U/L (ref 38–126)
ALT SERPL-CCNC: 12 U/L (ref 11–66)
ANION GAP SERPL CALCULATED.3IONS-SCNC: 15 MEQ/L (ref 8–16)
AST SERPL-CCNC: 27 U/L (ref 5–40)
BASOPHILS # BLD: 0.1 %
BASOPHILS ABSOLUTE: 0 THOU/MM3 (ref 0–0.1)
BILIRUB SERPL-MCNC: 0.8 MG/DL (ref 0.3–1.2)
BUN BLDV-MCNC: 41 MG/DL (ref 7–22)
CALCIUM SERPL-MCNC: 8.9 MG/DL (ref 8.5–10.5)
CHLORIDE BLD-SCNC: 95 MEQ/L (ref 98–111)
CHOLESTEROL, TOTAL: 138 MG/DL (ref 100–199)
CO2: 27 MEQ/L (ref 23–33)
CREAT SERPL-MCNC: 1.8 MG/DL (ref 0.4–1.2)
EOSINOPHIL # BLD: 1.9 %
EOSINOPHILS ABSOLUTE: 0.1 THOU/MM3 (ref 0–0.4)
ERYTHROCYTE [DISTWIDTH] IN BLOOD BY AUTOMATED COUNT: 15.5 % (ref 11.5–14.5)
ERYTHROCYTE [DISTWIDTH] IN BLOOD BY AUTOMATED COUNT: 54 FL (ref 35–45)
FERRITIN: 2193 NG/ML (ref 10–291)
GFR SERPL CREATININE-BSD FRML MDRD: 28 ML/MIN/1.73M2
GLUCOSE BLD-MCNC: 132 MG/DL (ref 70–108)
HCT VFR BLD CALC: 28.9 % (ref 37–47)
HDLC SERPL-MCNC: 43 MG/DL
HEMOGLOBIN: 9 GM/DL (ref 12–16)
IMMATURE GRANS (ABS): 0.05 THOU/MM3 (ref 0–0.07)
IMMATURE GRANULOCYTES: 0.7 %
IRON: 71 UG/DL (ref 50–170)
LDL CHOLESTEROL CALCULATED: 52 MG/DL
LYMPHOCYTES # BLD: 3.2 %
LYMPHOCYTES ABSOLUTE: 0.2 THOU/MM3 (ref 1–4.8)
MAGNESIUM: 2.2 MG/DL (ref 1.6–2.4)
MCH RBC QN AUTO: 29.6 PG (ref 26–33)
MCHC RBC AUTO-ENTMCNC: 31.1 GM/DL (ref 32.2–35.5)
MCV RBC AUTO: 95.1 FL (ref 81–99)
MONOCYTES # BLD: 8 %
MONOCYTES ABSOLUTE: 0.6 THOU/MM3 (ref 0.4–1.3)
NUCLEATED RED BLOOD CELLS: 0 /100 WBC
PHOSPHORUS: 1.7 MG/DL (ref 2.4–4.7)
PLATELET # BLD: 192 THOU/MM3 (ref 130–400)
PMV BLD AUTO: 12.1 FL (ref 9.4–12.4)
POTASSIUM SERPL-SCNC: 3.8 MEQ/L (ref 3.5–5.2)
RBC # BLD: 3.04 MILL/MM3 (ref 4.2–5.4)
SEG NEUTROPHILS: 86.1 %
SEGMENTED NEUTROPHILS ABSOLUTE COUNT: 6.3 THOU/MM3 (ref 1.8–7.7)
SODIUM BLD-SCNC: 137 MEQ/L (ref 135–145)
TOTAL IRON BINDING CAPACITY: 139 UG/DL (ref 171–450)
TOTAL PROTEIN: 6.9 G/DL (ref 6.1–8)
TRIGL SERPL-MCNC: 216 MG/DL (ref 0–199)
URIC ACID: 5.9 MG/DL (ref 2.4–5.7)
WBC # BLD: 7.3 THOU/MM3 (ref 4.8–10.8)

## 2020-04-16 RX ORDER — AMLODIPINE BESYLATE 10 MG/1
10 TABLET ORAL DAILY
Qty: 90 TABLET | Refills: 1 | Status: SHIPPED | OUTPATIENT
Start: 2020-04-16 | End: 2020-08-12 | Stop reason: ALTCHOICE

## 2020-04-16 NOTE — TELEPHONE ENCOUNTER
Last visit- 1/10/2020  Next visit- 5/13/2020    Requested Prescriptions     Pending Prescriptions Disp Refills    amLODIPine (NORVASC) 10 MG tablet 30 tablet 5

## 2020-04-17 LAB — TRANSFERRIN: 125 MG/DL (ref 200–400)

## 2020-04-18 LAB
CMV QUANT IU/ML: < 227 IU/ML
CMV QUANT LOG IU/ML: < 2.4 LOG IU/ML
CMV QUANTITATIVE INTERPRETATION: NOT DETECTED
CMV QUANTITATIVE LOG COPY/ML: < 2.6 LOG CPY/ML
CMVQ COPY/ML: < 390 CPY/ML
TACROLIMUS BLOOD: 5.8 NG/ML

## 2020-04-19 LAB — BK VIRUS QUALITATIVE, PCR: NORMAL

## 2020-04-20 ENCOUNTER — TELEPHONE (OUTPATIENT)
Dept: FAMILY MEDICINE CLINIC | Age: 65
End: 2020-04-20

## 2020-04-20 ENCOUNTER — NURSE ONLY (OUTPATIENT)
Dept: LAB | Age: 65
End: 2020-04-20

## 2020-04-20 LAB
ANION GAP SERPL CALCULATED.3IONS-SCNC: 12 MEQ/L (ref 8–16)
BASOPHILS # BLD: 0.3 %
BASOPHILS ABSOLUTE: 0 THOU/MM3 (ref 0–0.1)
BILIRUB SERPL-MCNC: 0.5 MG/DL (ref 0.3–1.2)
BUN BLDV-MCNC: 24 MG/DL (ref 7–22)
CALCIUM SERPL-MCNC: 9.4 MG/DL (ref 8.5–10.5)
CHLORIDE BLD-SCNC: 98 MEQ/L (ref 98–111)
CO2: 28 MEQ/L (ref 23–33)
CREAT SERPL-MCNC: 1.3 MG/DL (ref 0.4–1.2)
EOSINOPHIL # BLD: 2.3 %
EOSINOPHILS ABSOLUTE: 0.2 THOU/MM3 (ref 0–0.4)
ERYTHROCYTE [DISTWIDTH] IN BLOOD BY AUTOMATED COUNT: 15.4 % (ref 11.5–14.5)
ERYTHROCYTE [DISTWIDTH] IN BLOOD BY AUTOMATED COUNT: 53.1 FL (ref 35–45)
GFR SERPL CREATININE-BSD FRML MDRD: 41 ML/MIN/1.73M2
GLUCOSE BLD-MCNC: 137 MG/DL (ref 70–108)
HCT VFR BLD CALC: 29 % (ref 37–47)
HEMOGLOBIN: 9 GM/DL (ref 12–16)
IMMATURE GRANS (ABS): 0.09 THOU/MM3 (ref 0–0.07)
IMMATURE GRANULOCYTES: 1.2 %
LYMPHOCYTES # BLD: 6.9 %
LYMPHOCYTES ABSOLUTE: 0.5 THOU/MM3 (ref 1–4.8)
MAGNESIUM: 1.6 MG/DL (ref 1.6–2.4)
MCH RBC QN AUTO: 29 PG (ref 26–33)
MCHC RBC AUTO-ENTMCNC: 31 GM/DL (ref 32.2–35.5)
MCV RBC AUTO: 93.5 FL (ref 81–99)
MONOCYTES # BLD: 9.3 %
MONOCYTES ABSOLUTE: 0.7 THOU/MM3 (ref 0.4–1.3)
NUCLEATED RED BLOOD CELLS: 0 /100 WBC
PHOSPHORUS: 2.2 MG/DL (ref 2.4–4.7)
PLATELET # BLD: 271 THOU/MM3 (ref 130–400)
PMV BLD AUTO: 11.1 FL (ref 9.4–12.4)
POTASSIUM SERPL-SCNC: 3.8 MEQ/L (ref 3.5–5.2)
RBC # BLD: 3.1 MILL/MM3 (ref 4.2–5.4)
SEG NEUTROPHILS: 80 %
SEGMENTED NEUTROPHILS ABSOLUTE COUNT: 6.2 THOU/MM3 (ref 1.8–7.7)
SODIUM BLD-SCNC: 138 MEQ/L (ref 135–145)
WBC # BLD: 7.8 THOU/MM3 (ref 4.8–10.8)

## 2020-04-20 NOTE — TELEPHONE ENCOUNTER
Called [phone], spoke with Marcin Castellon last appointment was : 1/10/2020  Patients next appointment is : 4/27/2020 =  [facility]

## 2020-04-20 NOTE — TELEPHONE ENCOUNTER
Sure! can we  her apt up from the 13th of may? Or maybe keep that one and have another one to have her see one of us to sign the papers? Sometimes we uncover the need to order PT or OT, etc on the visit as well as home health - thanks!

## 2020-04-20 NOTE — TELEPHONE ENCOUNTER
Dr. Libra Fisher DO,    Pending referrals to be fax to [phone]    Please advise.      Patients last appointment was : 1/10/2020  Patients next appointment is : 4/27/2020

## 2020-04-21 LAB — TACROLIMUS BLOOD: 6 NG/ML

## 2020-04-23 LAB
CMV QUANT IU/ML: < 227 IU/ML
CMV QUANT LOG IU/ML: < 2.4 LOG IU/ML
CMV QUANTITATIVE INTERPRETATION: NOT DETECTED
CMV QUANTITATIVE LOG COPY/ML: < 2.6 LOG CPY/ML
CMVQ COPY/ML: < 390 CPY/ML

## 2020-04-27 ENCOUNTER — VIRTUAL VISIT (OUTPATIENT)
Dept: FAMILY MEDICINE CLINIC | Age: 65
End: 2020-04-27
Payer: MEDICARE

## 2020-04-27 PROCEDURE — 99443 PR PHYS/QHP TELEPHONE EVALUATION 21-30 MIN: CPT | Performed by: FAMILY MEDICINE

## 2020-04-27 RX ORDER — PANTOPRAZOLE SODIUM 40 MG/1
40 TABLET, DELAYED RELEASE ORAL
Qty: 30 TABLET | Refills: 5 | Status: SHIPPED | OUTPATIENT
Start: 2020-04-27 | End: 2020-09-03

## 2020-04-27 RX ORDER — INSULIN GLARGINE 100 [IU]/ML
20 INJECTION, SOLUTION SUBCUTANEOUS NIGHTLY
Qty: 15 PEN | Refills: 5 | Status: SHIPPED | OUTPATIENT
Start: 2020-04-27 | End: 2020-05-12 | Stop reason: SDUPTHER

## 2020-04-27 ASSESSMENT — ENCOUNTER SYMPTOMS
COUGH: 0
ABDOMINAL PAIN: 0
NAUSEA: 0
SHORTNESS OF BREATH: 0
DIARRHEA: 0
CONSTIPATION: 1
VOMITING: 0
TROUBLE SWALLOWING: 0
BLOOD IN STOOL: 0
EYE PAIN: 0

## 2020-05-07 ENCOUNTER — NURSE ONLY (OUTPATIENT)
Dept: LAB | Age: 65
End: 2020-05-07

## 2020-05-07 LAB
ALP BLD-CCNC: 88 U/L (ref 38–126)
ANION GAP SERPL CALCULATED.3IONS-SCNC: 11 MEQ/L (ref 8–16)
AST SERPL-CCNC: 16 U/L (ref 5–40)
BASOPHILS # BLD: 0.3 %
BASOPHILS ABSOLUTE: 0 THOU/MM3 (ref 0–0.1)
BUN BLDV-MCNC: 35 MG/DL (ref 7–22)
CHLORIDE BLD-SCNC: 99 MEQ/L (ref 98–111)
CHOLESTEROL, TOTAL: 138 MG/DL (ref 100–199)
CO2: 28 MEQ/L (ref 23–33)
CREAT SERPL-MCNC: 1.7 MG/DL (ref 0.4–1.2)
EOSINOPHIL # BLD: 2.6 %
EOSINOPHILS ABSOLUTE: 0.2 THOU/MM3 (ref 0–0.4)
ERYTHROCYTE [DISTWIDTH] IN BLOOD BY AUTOMATED COUNT: 15.2 % (ref 11.5–14.5)
ERYTHROCYTE [DISTWIDTH] IN BLOOD BY AUTOMATED COUNT: 50.3 FL (ref 35–45)
GFR SERPL CREATININE-BSD FRML MDRD: 30 ML/MIN/1.73M2
GLUCOSE BLD-MCNC: 154 MG/DL (ref 70–108)
HCT VFR BLD CALC: 30.3 % (ref 37–47)
HEMOGLOBIN: 9.6 GM/DL (ref 12–16)
IMMATURE GRANS (ABS): 0.02 THOU/MM3 (ref 0–0.07)
IMMATURE GRANULOCYTES: 0.3 %
LYMPHOCYTES # BLD: 13.3 %
LYMPHOCYTES ABSOLUTE: 0.9 THOU/MM3 (ref 1–4.8)
MCH RBC QN AUTO: 28.9 PG (ref 26–33)
MCHC RBC AUTO-ENTMCNC: 31.7 GM/DL (ref 32.2–35.5)
MCV RBC AUTO: 91.3 FL (ref 81–99)
MONOCYTES # BLD: 7.2 %
MONOCYTES ABSOLUTE: 0.5 THOU/MM3 (ref 0.4–1.3)
NUCLEATED RED BLOOD CELLS: 0 /100 WBC
PLATELET # BLD: 286 THOU/MM3 (ref 130–400)
PMV BLD AUTO: 11.2 FL (ref 9.4–12.4)
POTASSIUM SERPL-SCNC: 3.7 MEQ/L (ref 3.5–5.2)
RBC # BLD: 3.32 MILL/MM3 (ref 4.2–5.4)
SEG NEUTROPHILS: 76.3 %
SEGMENTED NEUTROPHILS ABSOLUTE COUNT: 5.3 THOU/MM3 (ref 1.8–7.7)
SODIUM BLD-SCNC: 138 MEQ/L (ref 135–145)
TRIGL SERPL-MCNC: 125 MG/DL (ref 0–199)
WBC # BLD: 6.9 THOU/MM3 (ref 4.8–10.8)

## 2020-05-09 LAB — TACROLIMUS BLOOD: 8.8 NG/ML

## 2020-05-11 ENCOUNTER — TELEPHONE (OUTPATIENT)
Dept: FAMILY MEDICINE CLINIC | Age: 65
End: 2020-05-11

## 2020-05-11 ENCOUNTER — VIRTUAL VISIT (OUTPATIENT)
Dept: RHEUMATOLOGY | Age: 65
End: 2020-05-11
Payer: MEDICARE

## 2020-05-11 PROCEDURE — 99213 OFFICE O/P EST LOW 20 MIN: CPT | Performed by: NURSE PRACTITIONER

## 2020-05-11 PROCEDURE — 4040F PNEUMOC VAC/ADMIN/RCVD: CPT | Performed by: NURSE PRACTITIONER

## 2020-05-11 PROCEDURE — 3017F COLORECTAL CA SCREEN DOC REV: CPT | Performed by: NURSE PRACTITIONER

## 2020-05-11 PROCEDURE — G8427 DOCREV CUR MEDS BY ELIG CLIN: HCPCS | Performed by: NURSE PRACTITIONER

## 2020-05-11 PROCEDURE — 1123F ACP DISCUSS/DSCN MKR DOCD: CPT | Performed by: NURSE PRACTITIONER

## 2020-05-11 PROCEDURE — 1090F PRES/ABSN URINE INCON ASSESS: CPT | Performed by: NURSE PRACTITIONER

## 2020-05-11 PROCEDURE — G8399 PT W/DXA RESULTS DOCUMENT: HCPCS | Performed by: NURSE PRACTITIONER

## 2020-05-11 RX ORDER — HYDROCODONE BITARTRATE AND ACETAMINOPHEN 5; 325 MG/1; MG/1
1 TABLET ORAL EVERY 8 HOURS PRN
Qty: 90 TABLET | Refills: 0 | Status: SHIPPED | OUTPATIENT
Start: 2020-05-11 | End: 2020-06-10

## 2020-05-12 RX ORDER — INSULIN GLARGINE 100 [IU]/ML
25 INJECTION, SOLUTION SUBCUTANEOUS NIGHTLY
Qty: 15 PEN | Refills: 5 | Status: ON HOLD | OUTPATIENT
Start: 2020-05-12 | End: 2020-08-06 | Stop reason: SDUPTHER

## 2020-05-13 ENCOUNTER — TELEPHONE (OUTPATIENT)
Dept: FAMILY MEDICINE CLINIC | Age: 65
End: 2020-05-13

## 2020-06-17 ENCOUNTER — TELEMEDICINE (OUTPATIENT)
Dept: FAMILY MEDICINE CLINIC | Age: 65
End: 2020-06-17
Payer: MEDICARE

## 2020-06-17 PROCEDURE — 3017F COLORECTAL CA SCREEN DOC REV: CPT | Performed by: NURSE PRACTITIONER

## 2020-06-17 PROCEDURE — G8399 PT W/DXA RESULTS DOCUMENT: HCPCS | Performed by: NURSE PRACTITIONER

## 2020-06-17 PROCEDURE — 1123F ACP DISCUSS/DSCN MKR DOCD: CPT | Performed by: NURSE PRACTITIONER

## 2020-06-17 PROCEDURE — 1090F PRES/ABSN URINE INCON ASSESS: CPT | Performed by: NURSE PRACTITIONER

## 2020-06-17 PROCEDURE — 4040F PNEUMOC VAC/ADMIN/RCVD: CPT | Performed by: NURSE PRACTITIONER

## 2020-06-17 PROCEDURE — G8427 DOCREV CUR MEDS BY ELIG CLIN: HCPCS | Performed by: NURSE PRACTITIONER

## 2020-06-17 PROCEDURE — 99213 OFFICE O/P EST LOW 20 MIN: CPT | Performed by: NURSE PRACTITIONER

## 2020-06-17 RX ORDER — ATORVASTATIN CALCIUM 40 MG/1
40 TABLET, FILM COATED ORAL DAILY
Qty: 30 TABLET | Refills: 2 | Status: SHIPPED | OUTPATIENT
Start: 2020-06-17 | End: 2020-08-13 | Stop reason: SDUPTHER

## 2020-06-17 NOTE — PROGRESS NOTES
[facility]  [phone] (phone)  [phone] (fax)    Visit Date: 6/17/2020    Dong Molina is a 75 y.o. female who presents today for:  No chief complaint on file. HPI:     THIS VISIT WAS PERFORMED VIA A SYNCHRONOUS TELECOMMUNICATION SYSTEM. Patient gave consent for synchronous telecommunication visit during [id number] public health emergency. I was present in my home utilizing EPIC patient was in their home.   Visit was started at 1120    Needs a refill of Atorvastatin [facility]    [facility]  Health Maintenance   Topic Date Due    Diabetic foot exam  12/13/1963    Diabetic retinal exam  12/13/1963    Shingles Vaccine (1 of 2) 12/13/2003    Pneumococcal 65+ yrs at Risk Vaccine (1 of 2 - PCV13) 12/13/2018    TSH testing  09/26/2019    Flu vaccine (Season Ended) 09/01/2020    Annual Wellness Visit (AWV)  11/13/2020    Low dose CT lung screening  12/16/2020    A1C test (Diabetic or Prediabetic)  02/12/2021    Lipid screen  06/15/2021    Potassium monitoring  06/15/2021    Creatinine monitoring  06/15/2021    Breast cancer screen  02/03/2022    DTaP/Tdap/Td vaccine (2 - Td) 05/21/2022    Colon cancer screen colonoscopy  06/10/2029    DEXA (modify frequency per FRAX score)  Completed    Hepatitis C screen  Completed    Hepatitis A vaccine  Aged Out    Hib vaccine  Aged Out    Meningococcal (ACWY) vaccine  Aged Out     Past Medical History:   Diagnosis Date    Anemia associated with chronic renal failure     Aranesp 150 micrograms every other week [facility] at [facility]    Anxiety     Arthritis     Backache     Blood circulation, collateral     Blood transfusion     CAD (coronary artery disease)     Cellulitis in diabetic foot ([address] 03/03/2017    4th and 5th toes right foot    Chest pain     History of    CHF (congestive heart failure) ([facility] 1998    Dx'ed by Dr. Mei Anaya Chronic anemia     Chronic DAY) 270 tablet 1    lactulose (CHRONULAC) 10 GM/15ML solution TAKE 30ML BY MOUTH 2 TIMES DAILY AS NEEDED CONSTIPATION. USE IF NO BM WITH OTHER SOFTNERS 473 mL 0    vitamin D (ERGOCALCIFEROL) 1.25 MG (99366 UT) CAPS capsule TAKE ONE CAPSULE BY MOUTH ONE TIME PER WEEK 4 capsule 5    carvedilol (COREG) 25 MG tablet Take 1.5 tablets by mouth 2 times daily . hold if SBP less than 100 mm hg or HR less than 60 270 tablet 3    insulin aspart (NOVOLOG FLEXPEN) 100 UNIT/ML injection pen Sliding scale insulin coverage  Glucose:Dose: If Less [id number] =No Insulin/ 140-199= 2 Units/ 200-249=4 Units/ 250-299= 6 Units/  300-349=8 Units/  350-400=10 Units/ Above 400 = 12 Units 15 pen 5    lidocaine-prilocaine (EMLA) 2.5-2.5 % cream APPLY SMALL AMOUNT TO ACCESS SITE (AVF) 1 TO 2 HOURS BEFORE DIALYSIS. COVER WITH OCCLUSIVE DRESSING (SARAN WRAP)  11    linaclotide (LINZESS) 145 MCG capsule Take 145 mcg by mouth every morning (before breakfast)      Insulin Pen Needle (B-D ULTRAFINE III SHORT PEN) 31G X 8 MM MISC Use three times daily Diagnosis Code E11.9 200 each 3    nitroGLYCERIN (NITROSTAT) 0.4 MG SL tablet Place 1 tablet under the tongue every 5 minutes as needed for Chest pain 25 tablet 5    aspirin 81 MG EC tablet Take 81 mg by mouth daily. No current facility-administered medications for this visit. Allergies   Allergen Reactions    Pioglitazone Swelling    Actos [Pioglitazone Hydrochloride] Swelling    Cymbalta [Duloxetine Hcl] Other (See Comments)     Anxiety and lethargic    Gabapentin Anxiety       Subjective:    Review of Systems    Objective: There were no vitals filed for this visit. There is no height or weight on file to calculate BMI.     Wt Readings from Last 3 Encounters:   03/09/20 181 lb (82.1 kg)   02/12/20 181 lb 3.2 oz (82.2 kg)   01/10/20 175 lb 3.2 oz (79.5 kg)     BP Readings from Last 3 Encounters:   03/09/20 138/72   02/12/20 (!) 144/72   01/10/20 136/62     Physical Exam    Lab

## 2020-06-17 NOTE — PROGRESS NOTES
kidney disease     Chronic kidney disease, stage III (moderate) (HCC)     Chronic renal insufficiency 2010    COPD (chronic obstructive pulmonary disease) ([facility] 2012    Dr. 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 Hector Ritter    HTN (hypertension) 1970's    Hyperlipemia 1998    Iron deficiency anemia due to dietary causes 6/21/2018    Kidney stones 3/2014    Kidney trouble          MRSA infection 03/2017    right foot-Dr. Maria Antonia Rahman (podiatrist)    Neuromuscular disorder ([facility]     Neuropathy 1989    diabetic neuropathy    Obesity since childhoood    CONTRERAS on CPAP 2010    Dr. Kristal Oquendo    Pneumonia     PONV (postoperative nausea and vomiting)     Seizures ([facility]       Past Surgical History:   Procedure Laterality Date    ANKLE SURGERY Right 02-10-14    Dr. Andreas Varela at [place] [address]  1990's    removal of benign tumor   [address]  2008   [address]  2009    2 polyps, not precanceorus    COLONOSCOPY Left 6/10/2019    COLONOSCOPY DIAGNOSTIC performed by Dinah Arrieta MD at [id number] [place] [address]  3/30/2012    eye lid lift    DIAGNOSTIC CARDIAC CATH LAB PROCEDURE      ENDOSCOPY, COLON, DIAGNOSTIC  2007   Makayla Butt EYE SURGERY  March 30th, 2012    left sided ptosis    FOOT SURGERY  1990    Tarsal tunnel surgery    FRACTURE SURGERY  2015   [address] [facility] [address] and [address]    first partial in 1980's, then total in [facility]  2015   [facility] LIVER BIOPSY  6/2015    LUNG BIOPSY  2009    NM OFFICE/OUTPT VISIT,PROCEDURE ONLY Left 8/23/2018    LEFT UPPER EXTREMENTY AV FISTULA CREATION performed by Eric Gilliam MD at [facility] Left 2019    EGD BIOPSY performed by Marogt Spann MD at [facility]     Family History   Problem Relation Age of Onset    Diabetes Sister     Diabetes Brother     Diabetes Sister     Diabetes Sister     Cancer Sister     Early Death Sister     Heart Disease Sister     Diabetes Sister     Heart Disease Sister     Diabetes Sister     Diabetes Brother     Arthritis Mother     Heart Disease Mother     High Blood Pressure Mother     Kidney Disease Mother     Mental Illness Mother     Stroke Mother     Heart Disease Father     Diabetes Father     Obesity Father     Alcohol Abuse Father      Social History     Tobacco Use    Smoking status: Former Smoker     Packs/day: 1.50     Years: 30.00     Pack years: 45.00     Types: Cigarettes     Start date: 1981     Last attempt to quit: 3/13/2009     Years since quittin.2    Smokeless tobacco: Never Used    Tobacco comment: quit    Substance Use Topics    Alcohol use: No     Alcohol/week: 0.0 standard drinks      Current Outpatient Medications   Medication Sig Dispense Refill    atorvastatin (LIPITOR) 40 MG tablet Take 1 tablet by mouth daily 30 tablet 2    insulin glargine (LANTUS SOLOSTAR) 100 UNIT/ML injection pen Inject 25 Units into the skin nightly 15 pen 5    pantoprazole (PROTONIX) 40 MG tablet Take 1 tablet by mouth every morning (before breakfast) 30 tablet 5    amLODIPine (NORVASC) 10 MG tablet Take 1 tablet by mouth daily 90 tablet 1    tiotropium (SPIRIVA HANDIHALER) 18 MCG inhalation capsule Inhale 1 capsule into the lungs daily 1 capsule via inhalation daily.  90 capsule 3    albuterol sulfate HFA (PROAIR HFA) 108 (90 Base) MCG/ACT inhaler Inhale 2 puffs into the lungs every 6 hours as needed for Wheezing or Shortness of Breath 3 Inhaler 3    fluticasone (FLONASE) 50 MCG/ACT nasal spray 2 sprays by Nasal route daily 3 Bottle 3    cloNIDine (CATAPRES) 0.3 MG tablet TAKE 1 TABLET EVERY 8 HOURS (THREE TIMES A

## 2020-06-18 ENCOUNTER — HOSPITAL ENCOUNTER (OUTPATIENT)
Age: 65
Setting detail: SPECIMEN
Discharge: HOME | End: 2020-06-18
Payer: MEDICARE

## 2020-06-18 LAB
ANION GAP SERPL CALCULATED.3IONS-SCNC: 11 MEQ/L (ref 8–16)
BUN BLDV-MCNC: 21 MG/DL (ref 7–22)
CHLORIDE BLD-SCNC: 100 MEQ/L (ref 98–111)
CO2: 28 MEQ/L (ref 23–33)
CREAT SERPL-MCNC: 1.1 MG/DL (ref 0.4–1.2)
ERYTHROCYTE [DISTWIDTH] IN BLOOD BY AUTOMATED COUNT: 14.9 % (ref 11.5–14.5)
ERYTHROCYTE [DISTWIDTH] IN BLOOD BY AUTOMATED COUNT: 49.7 FL (ref 35–45)
GLUCOSE BLD-MCNC: 180 MG/DL (ref 70–108)
HCT VFR BLD CALC: 29.3 % (ref 37–47)
HEMOGLOBIN: 9.3 GM/DL (ref 12–16)
MCH RBC QN AUTO: 29.1 PG (ref 26–33)
MCHC RBC AUTO-ENTMCNC: 31.7 GM/DL (ref 32.2–35.5)
MCV RBC AUTO: 91.6 FL (ref 81–99)
PLATELET # BLD: 218 THOU/MM3 (ref 130–400)
PMV BLD AUTO: 11.9 FL (ref 9.4–12.4)
POTASSIUM SERPL-SCNC: 3.5 MEQ/L (ref 3.5–5.2)
RBC # BLD: 3.2 MILL/MM3 (ref 4.2–5.4)
SODIUM BLD-SCNC: 139 MEQ/L (ref 135–145)
WBC # BLD: 7.2 THOU/MM3 (ref 4.8–10.8)

## 2020-06-18 PROCEDURE — 80197 ASSAY OF TACROLIMUS: CPT

## 2020-06-18 PROCEDURE — 80051 ELECTROLYTE PANEL: CPT

## 2020-06-18 PROCEDURE — 85027 COMPLETE CBC AUTOMATED: CPT

## 2020-06-18 PROCEDURE — 82565 ASSAY OF CREATININE: CPT

## 2020-06-18 PROCEDURE — 82947 ASSAY GLUCOSE BLOOD QUANT: CPT

## 2020-06-18 PROCEDURE — 84520 ASSAY OF UREA NITROGEN: CPT

## 2020-06-19 ENCOUNTER — HOSPITAL ENCOUNTER (EMERGENCY)
Age: 65
Discharge: HOME | End: 2020-06-19
Payer: MEDICARE

## 2020-06-19 ENCOUNTER — APPOINTMENT (OUTPATIENT)
Dept: GENERAL RADIOLOGY | Age: 65
End: 2020-06-19
Payer: MEDICARE

## 2020-06-19 VITALS
WEIGHT: 177 LBS | BODY MASS INDEX: 29.49 KG/M2 | OXYGEN SATURATION: 97 % | DIASTOLIC BLOOD PRESSURE: 74 MMHG | RESPIRATION RATE: 18 BRPM | TEMPERATURE: 98 F | HEART RATE: 61 BPM | SYSTOLIC BLOOD PRESSURE: 166 MMHG | HEIGHT: 65 IN

## 2020-06-19 LAB
ALBUMIN SERPL-MCNC: 4.6 G/DL (ref 3.5–5.1)
ALP BLD-CCNC: 97 U/L (ref 38–126)
ALT SERPL-CCNC: 13 U/L (ref 11–66)
ANION GAP SERPL CALCULATED.3IONS-SCNC: 9 MEQ/L (ref 8–16)
AST SERPL-CCNC: 14 U/L (ref 5–40)
BASOPHILS # BLD: 0.1 %
BASOPHILS ABSOLUTE: 0 THOU/MM3 (ref 0–0.1)
BILIRUB SERPL-MCNC: 0.2 MG/DL (ref 0.3–1.2)
BUN BLDV-MCNC: 27 MG/DL (ref 7–22)
CALCIUM SERPL-MCNC: 9.9 MG/DL (ref 8.5–10.5)
CHLORIDE BLD-SCNC: 101 MEQ/L (ref 98–111)
CO2: 29 MEQ/L (ref 23–33)
CREAT SERPL-MCNC: 1.2 MG/DL (ref 0.4–1.2)
EKG ATRIAL RATE: 63 BPM
EKG P AXIS: 18 DEGREES
EKG P-R INTERVAL: 148 MS
EKG Q-T INTERVAL: 416 MS
EKG QRS DURATION: 78 MS
EKG QTC CALCULATION (BAZETT): 425 MS
EKG R AXIS: 24 DEGREES
EKG T AXIS: 40 DEGREES
EKG VENTRICULAR RATE: 63 BPM
EOSINOPHIL # BLD: 1.1 %
EOSINOPHILS ABSOLUTE: 0.1 THOU/MM3 (ref 0–0.4)
ERYTHROCYTE [DISTWIDTH] IN BLOOD BY AUTOMATED COUNT: 14.7 % (ref 11.5–14.5)
ERYTHROCYTE [DISTWIDTH] IN BLOOD BY AUTOMATED COUNT: 48 FL (ref 35–45)
GLUCOSE BLD-MCNC: 139 MG/DL (ref 70–108)
HCT VFR BLD CALC: 30.6 % (ref 37–47)
HEMOGLOBIN: 9.7 GM/DL (ref 12–16)
IMMATURE GRANS (ABS): 0.03 THOU/MM3 (ref 0–0.07)
IMMATURE GRANULOCYTES: 0.4 %
LYMPHOCYTES # BLD: 13.9 %
LYMPHOCYTES ABSOLUTE: 1 THOU/MM3 (ref 1–4.8)
MAGNESIUM: 1.5 MG/DL (ref 1.6–2.4)
MCH RBC QN AUTO: 28.5 PG (ref 26–33)
MCHC RBC AUTO-ENTMCNC: 31.7 GM/DL (ref 32.2–35.5)
MCV RBC AUTO: 90 FL (ref 81–99)
MONOCYTES # BLD: 8 %
MONOCYTES ABSOLUTE: 0.6 THOU/MM3 (ref 0.4–1.3)
NUCLEATED RED BLOOD CELLS: 0 /100 WBC
OSMOLALITY CALCULATION: 284.9 MOSMOL/KG (ref 275–300)
PLATELET # BLD: 221 THOU/MM3 (ref 130–400)
PMV BLD AUTO: 11.5 FL (ref 9.4–12.4)
POTASSIUM REFLEX MAGNESIUM: 3.5 MEQ/L (ref 3.5–5.2)
PRO-BNP: 482.7 PG/ML (ref 0–900)
RBC # BLD: 3.4 MILL/MM3 (ref 4.2–5.4)
SEG NEUTROPHILS: 76.5 %
SEGMENTED NEUTROPHILS ABSOLUTE COUNT: 5.7 THOU/MM3 (ref 1.8–7.7)
SODIUM BLD-SCNC: 139 MEQ/L (ref 135–145)
TOTAL PROTEIN: 6.7 G/DL (ref 6.1–8)
TROPONIN T: < 0.01 NG/ML
WBC # BLD: 7.5 THOU/MM3 (ref 4.8–10.8)

## 2020-06-19 PROCEDURE — 99283 EMERGENCY DEPT VISIT LOW MDM: CPT

## 2020-06-19 PROCEDURE — 6360000002 HC RX W HCPCS: Performed by: PHYSICIAN ASSISTANT

## 2020-06-19 PROCEDURE — 96361 HYDRATE IV INFUSION ADD-ON: CPT

## 2020-06-19 PROCEDURE — 71045 X-RAY EXAM CHEST 1 VIEW: CPT

## 2020-06-19 PROCEDURE — 36415 COLL VENOUS BLD VENIPUNCTURE: CPT

## 2020-06-19 PROCEDURE — 96374 THER/PROPH/DIAG INJ IV PUSH: CPT

## 2020-06-19 PROCEDURE — 85025 COMPLETE CBC W/AUTO DIFF WBC: CPT

## 2020-06-19 PROCEDURE — 83880 ASSAY OF NATRIURETIC PEPTIDE: CPT

## 2020-06-19 PROCEDURE — 83735 ASSAY OF MAGNESIUM: CPT

## 2020-06-19 PROCEDURE — 80053 COMPREHEN METABOLIC PANEL: CPT

## 2020-06-19 PROCEDURE — 93005 ELECTROCARDIOGRAM TRACING: CPT | Performed by: FAMILY MEDICINE

## 2020-06-19 PROCEDURE — 84484 ASSAY OF TROPONIN QUANT: CPT

## 2020-06-19 PROCEDURE — 2580000003 HC RX 258: Performed by: PHYSICIAN ASSISTANT

## 2020-06-19 RX ORDER — ONDANSETRON 4 MG/1
4 TABLET, ORALLY DISINTEGRATING ORAL EVERY 12 HOURS PRN
Qty: 14 TABLET | Refills: 0 | Status: SHIPPED | OUTPATIENT
Start: 2020-06-19 | End: 2020-06-26

## 2020-06-19 RX ORDER — 0.9 % SODIUM CHLORIDE 0.9 %
1000 INTRAVENOUS SOLUTION INTRAVENOUS ONCE
Status: COMPLETED | OUTPATIENT
Start: 2020-06-19 | End: 2020-06-19

## 2020-06-19 RX ORDER — ONDANSETRON 2 MG/ML
4 INJECTION INTRAMUSCULAR; INTRAVENOUS ONCE
Status: COMPLETED | OUTPATIENT
Start: 2020-06-19 | End: 2020-06-19

## 2020-06-19 RX ADMIN — ONDANSETRON 4 MG: 2 INJECTION INTRAMUSCULAR; INTRAVENOUS at 19:41

## 2020-06-19 RX ADMIN — SODIUM CHLORIDE 1000 ML: 9 INJECTION, SOLUTION INTRAVENOUS at 19:41

## 2020-06-19 ASSESSMENT — ENCOUNTER SYMPTOMS
EYE PAIN: 0
CONSTIPATION: 0
EYE REDNESS: 0
RHINORRHEA: 0
NAUSEA: 1
SINUS PAIN: 0
SINUS PRESSURE: 0
CHEST TIGHTNESS: 0
SORE THROAT: 0
WHEEZING: 0
COUGH: 0
BACK PAIN: 0
SHORTNESS OF BREATH: 0
VOMITING: 1
ABDOMINAL PAIN: 0
PHOTOPHOBIA: 0
DIARRHEA: 0

## 2020-06-19 NOTE — ED PROVIDER NOTES
[address] [facility] [address] [address] EMERGENCY DEPT    EMERGENCY MEDICINE     Pt Name: Johnanna Lanes  MRN: [id number]  Vincenttrongfmonserrat 1955  Date of evaluation: 6/19/2020  Provider: Kristine Wheatley, [facility]       Chief Complaint   Patient presents with    Dizziness       HISTORY OF PRESENT ILLNESS    Johnanna Lanes is a 75 y.o. female who presents to the emergency department from home with a chief complaint of dizziness on standing with associated nausea which began earlier this morning. Patient states that she has a PMH significant for a recent kidney transplant in April of this year performed at [facility] where she had a virtual visit yesterday to review blood work as well as follow-up which patient states was all reported to be normal.  Patient states today she felt like she was dehydrated when she became dizzy and nauseous and reported 1 episode of vomiting this morning. Patient denies any pain at this time. Patient states she is currently asymptomatic while laying in bed however upon standing she becomes dizzy. Pt rates her Sx as mil-moderate in severity when present. Pt denies any recent illness, fever, CP, SOB, lightheadedness, HA, syncope, diarrhea, abd pain, or any other Sx at this time. Triage notes and Nursing notes were reviewed by myself. Any discrepancies are addressed above.     PAST MEDICAL HISTORY     Past Medical History:   Diagnosis Date    Anemia associated with chronic renal failure     Aranesp 150 micrograms every other week given at [facility]. [facility]    Anxiety     Arthritis     Backache     Blood circulation, collateral     Blood transfusion     CAD (coronary artery disease)     Cellulitis in diabetic foot ([facility] 03/03/2017    4th and 5th toes right foot    Chest pain     History of    CHF (congestive heart failure) ([facility] 1998    Dx'ed by Dr. Aubrey Haile Chronic anemia     Chronic kidney disease     Chronic kidney disease, stage III (moderate) (HCC)     Chronic renal insufficiency 2010    COPD (chronic obstructive pulmonary disease) ([facility] 2012    Dr. 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 Aleena Hyde    HTN (hypertension) 1970's    Hyperlipemia 1998    Iron deficiency anemia due to dietary causes 6/21/2018    Kidney stones 3/2014    Kidney trouble          MRSA infection 03/2017    right foot-Dr. Jerrod Ruvalcaba (podiatrist)    Neuromuscular disorder ([facility]     Neuropathy 1989    diabetic neuropathy    Obesity since childhoood    CONTRERAS on CPAP 2010    Dr. Isael Carrasco    Pneumonia     PONV (postoperative nausea and vomiting)     Seizures ([facility]        SURGICAL HISTORY       Past Surgical History:   Procedure Laterality Date    ANKLE SURGERY Right 02-10-14    Dr. Maria Luisa Brown at [place] [address]  1990's    removal of benign tumor   [address]  2008   [address]  2009    2 polyps, not precanceorus    COLONOSCOPY Left 6/10/2019    COLONOSCOPY DIAGNOSTIC performed by Yann Sloan MD at [address] [facility]  3/30/2012    eye lid lift    DIAGNOSTIC CARDIAC CATH LAB PROCEDURE      ENDOSCOPY, COLON, DIAGNOSTIC  2007   [facility] EYE SURGERY  March 30th, 2012    left sided ptosis    FOOT SURGERY  1990    Tarsal tunnel surgery    FRACTURE SURGERY  2015   [address] and [address]    first partial in 1980's, then total in [facility]  2015   [facility] LIVER BIOPSY  6/2015    LUNG BIOPSY  2009    IA OFFICE/OUTPT VISIT,PROCEDURE ONLY Left 8/23/2018    LEFT UPPER EXTREMENTY AV FISTULA CREATION performed by Max Lamb MD at [facility] Left 6/14/2019    EGD BIOPSY performed by Yann Sloan MD at [facility] CURRENT MEDICATIONS       Previous Medications    ALBUTEROL SULFATE HFA (PROAIR HFA) 108 (90 BASE) MCG/ACT INHALER    Inhale 2 puffs into the lungs every 6 hours as needed for Wheezing or Shortness of Breath    AMLODIPINE (NORVASC) 10 MG TABLET    Take 1 tablet by mouth daily    ASPIRIN 81 MG EC TABLET    Take 81 mg by mouth daily. ATORVASTATIN (LIPITOR) 40 MG TABLET    Take 1 tablet by mouth daily    CARVEDILOL (COREG) 25 MG TABLET    Take 1.5 tablets by mouth 2 times daily . hold if SBP less than 100 mm hg or HR less than 60    CLONIDINE (CATAPRES) 0.3 MG TABLET    TAKE 1 TABLET EVERY 8 HOURS (THREE TIMES A DAY)    FLUTICASONE (FLONASE) 50 MCG/ACT NASAL SPRAY    2 sprays by Nasal route daily    INSULIN ASPART (NOVOLOG FLEXPEN) 100 UNIT/ML INJECTION PEN    Sliding scale insulin coverage  Glucose:Dose: If Less [id number] =No Insulin/ 140-199= 2 Units/ 200-249=4 Units/ 250-299= 6 Units/  300-349=8 Units/  350-400=10 Units/ Above 400 = 12 Units    INSULIN GLARGINE (LANTUS SOLOSTAR) 100 UNIT/ML INJECTION PEN    Inject 25 Units into the skin nightly    INSULIN PEN NEEDLE (B-D ULTRAFINE III SHORT PEN) 31G X 8 MM MISC    Use three times daily Diagnosis Code E11.9    LACTULOSE (CHRONULAC) 10 GM/15ML SOLUTION    TAKE 30ML BY MOUTH 2 TIMES DAILY AS NEEDED CONSTIPATION. USE IF NO BM WITH OTHER SOFTNERS    LIDOCAINE-PRILOCAINE (EMLA) 2.5-2.5 % CREAM    APPLY SMALL AMOUNT TO ACCESS SITE (AVF) 1 TO 2 HOURS BEFORE DIALYSIS. COVER WITH OCCLUSIVE DRESSING (SARAN WRAP)    LINACLOTIDE (LINZESS) 145 MCG CAPSULE    Take 145 mcg by mouth every morning (before breakfast)    NITROGLYCERIN (NITROSTAT) 0.4 MG SL TABLET    Place 1 tablet under the tongue every 5 minutes as needed for Chest pain    PANTOPRAZOLE (PROTONIX) 40 MG TABLET    Take 1 tablet by mouth every morning (before breakfast)    TIOTROPIUM (SPIRIVA HANDIHALER) 18 MCG INHALATION CAPSULE    Inhale 1 capsule into the lungs daily 1 capsule via inhalation daily.     VITAMIN D (ERGOCALCIFEROL) 1.25 MG (28814 UT) CAPS CAPSULE    TAKE ONE CAPSULE BY MOUTH ONE TIME PER WEEK       ALLERGIES     Pioglitazone; Actos [pioglitazone hydrochloride]; Cymbalta [duloxetine hcl]; and Gabapentin    FAMILY HISTORY       Family History   Problem Relation Age of Onset    Diabetes Sister     Diabetes Brother     Diabetes Sister     Diabetes Sister     Cancer Sister     Early Death Sister     Heart Disease Sister     Diabetes Sister     Heart Disease Sister     Diabetes Sister     Diabetes Brother     Arthritis Mother     Heart Disease Mother     High Blood Pressure Mother     Kidney Disease Mother     Mental Illness Mother     Stroke Mother     Heart Disease Father     Diabetes Father     Obesity Father     Alcohol Abuse Father         SOCIAL HISTORY       Social History     Socioeconomic History    Marital status:       Spouse name: Not on file    Number of children: 1    Years of education: 10    Highest education level: Not on file   Occupational History    Not on file   Social Needs    Financial resource strain: Not on file    Food insecurity     Worry: Not on file     Inability: Not on file    Transportation needs     Medical: Not on file     Non-medical: Not on file   Tobacco Use    Smoking status: Former Smoker     Packs/day: 1.50     Years: 30.00     Pack years: 45.00     Types: Cigarettes     Start date: 1981     Last attempt to quit: 3/13/2009     Years since quittin.2    Smokeless tobacco: Never Used    Tobacco comment: quit    Substance and Sexual Activity    Alcohol use: No     Alcohol/week: 0.0 standard drinks    Drug use: No    Sexual activity: Yes     Partners: Male   Lifestyle    Physical activity     Days per week: Not on file     Minutes per session: Not on file    Stress: Not on file   Relationships    Social connections     Talks on phone: Not on file     Gets together: Not on file     Attends Shinto service: Not on file Active member of club or organization: Not on file     Attends meetings of clubs or organizations: Not on file     Relationship status: Not on file    Intimate partner violence     Fear of current or ex partner: Not on file     Emotionally abused: Not on file     Physically abused: Not on file     Forced sexual activity: Not on file   Other Topics Concern    Not on file   Social History Narrative    Not on file       REVIEW OF SYSTEMS     Review of Systems   Constitutional: Negative for chills, diaphoresis and fever. HENT: Negative for congestion, ear pain, rhinorrhea, sinus pressure, sinus pain and sore throat. Eyes: Negative for photophobia, pain and redness. Respiratory: Negative for cough, chest tightness, shortness of breath and wheezing. Cardiovascular: Negative for chest pain, palpitations and leg swelling. Gastrointestinal: Positive for nausea and vomiting. Negative for abdominal pain, constipation and diarrhea. Genitourinary: Negative for dysuria, frequency, hematuria and urgency. Musculoskeletal: Negative for arthralgias, back pain, myalgias and neck pain. Skin: Negative for rash. Neurological: Positive for dizziness. Negative for weakness, light-headedness, numbness and headaches. Psychiatric/Behavioral: Negative for behavioral problems. The patient is not nervous/anxious. Except as noted above the remainder of the review of systems was reviewed and is. PHYSICAL EXAM    (up to 7 for level 4, 8 or more for level 5)     ED Triage Vitals [06/19/20 1852]   BP Temp Temp Source Pulse Resp SpO2 Height Weight   -- 98 °F (36.7 °C) Oral 63 18 (!) 63 % 5' 5\" (1.651 m) 177 lb (80.3 kg)       Physical Exam  Vitals signs and nursing note reviewed. Constitutional:       General: She is not in acute distress. Appearance: Normal appearance. She is not ill-appearing or diaphoretic. HENT:      Head: Normocephalic and atraumatic.       Right Ear: External ear normal.      Left Ear: External ear normal.      Nose: Nose normal.      Mouth/Throat:      Mouth: Mucous membranes are moist.   Eyes:      Extraocular Movements: Extraocular movements intact. Pupils: Pupils are equal, round, and reactive to light. Neck:      Musculoskeletal: Normal range of motion. Cardiovascular:      Rate and Rhythm: Normal rate and regular rhythm. Pulses: Normal pulses. Heart sounds: Normal heart sounds. No murmur. No friction rub. No gallop. Pulmonary:      Effort: Pulmonary effort is normal. No respiratory distress. Breath sounds: Normal breath sounds. No wheezing, rhonchi or rales. Abdominal:      General: Abdomen is flat. Bowel sounds are normal. There is no distension. Tenderness: There is no abdominal tenderness. There is no guarding or rebound. Skin:     General: Skin is warm and dry. Coloration: Skin is not jaundiced. Findings: No bruising or rash. Neurological:      General: No focal deficit present. Mental Status: She is alert and oriented to person, place, and time. Sensory: No sensory deficit. Motor: No weakness. Psychiatric:         Mood and Affect: Mood normal.         Behavior: Behavior normal.         DIAGNOSTIC RESULTS     EKG:(none if blank)  All EKG's are [facility] who either signs or Co-signs this chart in the absence of a cardiologist.  NSR. Normal ECG. No STEMI. Vent Rate: 63bpm  VT Interval: 148ms  QRS Duration: 78ms  QT/Qtc: 416/425ms  P-R-T axes: 18 24 40    RADIOLOGY: (none if blank)   Interpretation per the Radiologistbelow, if available at the time of this note:    XR CHEST PORTABLE   Final Result   Stable mild cardiomegaly. **This report has been created using voice recognition software. It may contain minor errors which are inherent in voice recognition technology. **      Final report electronically signed by Dr. James Garcia MD on 6/19/2020 7:53 PM

## 2020-06-19 NOTE — ED NOTES
PT presents to the ed with c/o dizziness for two days. PT states that she thinks that she is dehydrated. Pt states that she had emesis this morning. PT states that she had a kidney transplant in April 9573 but had no complications since then. PT ambulated to the room with out complications.       Marissa Fraser [place]  94/57/84 0650

## 2020-06-19 NOTE — ED NOTES
Pt medicated per MAR. Pt denies needs a time. Pt denies any pain. VSS. Call light in place.       Cruz Santacruz  06/19/20 1944

## 2020-06-20 ENCOUNTER — CARE COORDINATION (OUTPATIENT)
Dept: CARE COORDINATION | Age: 65
End: 2020-06-20

## 2020-06-20 LAB — TACROLIMUS BLOOD: 8.4 NG/ML

## 2020-06-20 NOTE — CARE COORDINATION
Patient contacted regarding recent discharge and COVID-19 risk. Discussed COVID-19 related testing which was not done at this time. Test results were not done. Patient informed of results, if available? n/a     Care Transition Nurse/ Ambulatory Care Manager contacted the patient by telephone to perform post discharge assessment. Verified name and  with patient as identifiers. Patient has following risk factors of: heart failure and COPD. CTN/ACM reviewed discharge instructions, medical action plan and red flags related to discharge diagnosis. Reviewed and educated them on any new and changed medications related to discharge diagnosis. Advised obtaining a 90-day supply of all daily and as-needed medications. Education provided regarding infection prevention, and signs and symptoms of COVID-19 and when to seek medical attention with patient who verbalized understanding. Discussed exposure protocols and quarantine from [address] you at higher risk for severe illness  and given an opportunity for questions and concerns. The patient agrees to contact the COVID-19 hotline 149-104-3594 or PCP office for questions related to their healthcare. CTN/ACM provided contact information for future reference. From CDC: Are you at higher risk for severe illness?  Wash your hands often.  Avoid close contact (6 feet, which is about two arm lengths) with people who are sick.  Put distance between yourself and other people if COVID-19 is spreading in your community.  Clean and disinfect frequently touched surfaces.  Avoid all cruise travel and non-essential air travel.  Call your healthcare professional if you have concerns about COVID-19 and your underlying condition or if you are sick. For more information on steps you can take to protect yourself, see CDC's How to Protect Yourself    Pt will be further monitored by COVID Loop Team based on severity of symptoms and risk factors.   Spoke

## 2020-06-20 NOTE — ED NOTES
Pt up to bathroom via wheelchair. Tolerated well. VSS. Pt reports dizziness is doing a lot better. Denies any needs. Pt denies any nausea or pain.      Norman Berkowitz  06/19/20 2285

## 2020-06-20 NOTE — ED NOTES
Pt up to bathroom via wheelchair. Tolerated well. Pt denies dizziness currently. VSS. Call light in place.       Kisha Loop  06/19/20 2009

## 2020-06-22 ENCOUNTER — NURSE ONLY (OUTPATIENT)
Dept: LAB | Age: 65
End: 2020-06-22

## 2020-06-22 ENCOUNTER — TELEPHONE (OUTPATIENT)
Dept: FAMILY MEDICINE CLINIC | Age: 65
End: 2020-06-22

## 2020-06-22 LAB
ALBUMIN SERPL-MCNC: 4.1 G/DL (ref 3.5–5.1)
ALP BLD-CCNC: 94 U/L (ref 38–126)
ALT SERPL-CCNC: 11 U/L (ref 11–66)
ANION GAP SERPL CALCULATED.3IONS-SCNC: 11 MEQ/L (ref 8–16)
AST SERPL-CCNC: 13 U/L (ref 5–40)
BILIRUB SERPL-MCNC: 0.2 MG/DL (ref 0.3–1.2)
BUN BLDV-MCNC: 27 MG/DL (ref 7–22)
CALCIUM SERPL-MCNC: 10.3 MG/DL (ref 8.5–10.5)
CHLORIDE BLD-SCNC: 100 MEQ/L (ref 98–111)
CHOLESTEROL, TOTAL: 127 MG/DL (ref 100–199)
CO2: 27 MEQ/L (ref 23–33)
CREAT SERPL-MCNC: 1.5 MG/DL (ref 0.4–1.2)
ERYTHROCYTE [DISTWIDTH] IN BLOOD BY AUTOMATED COUNT: 14.6 % (ref 11.5–14.5)
ERYTHROCYTE [DISTWIDTH] IN BLOOD BY AUTOMATED COUNT: 48.7 FL (ref 35–45)
GFR SERPL CREATININE-BSD FRML MDRD: 35 ML/MIN/1.73M2
GFR SERPL CREATININE-BSD FRML MDRD: 45 ML/MIN/1.73M2
GFR SERPL CREATININE-BSD FRML MDRD: 50 ML/MIN/1.73M2
GLUCOSE BLD-MCNC: 173 MG/DL (ref 70–108)
HCT VFR BLD CALC: 30.1 % (ref 37–47)
HEMOGLOBIN: 9.6 GM/DL (ref 12–16)
MAGNESIUM: 1.6 MG/DL (ref 1.6–2.4)
MCH RBC QN AUTO: 29 PG (ref 26–33)
MCHC RBC AUTO-ENTMCNC: 31.9 GM/DL (ref 32.2–35.5)
MCV RBC AUTO: 90.9 FL (ref 81–99)
PHOSPHORUS: 2.9 MG/DL (ref 2.4–4.7)
PLATELET # BLD: 224 THOU/MM3 (ref 130–400)
PMV BLD AUTO: 11.7 FL (ref 9.4–12.4)
POTASSIUM SERPL-SCNC: 3.7 MEQ/L (ref 3.5–5.2)
RBC # BLD: 3.31 MILL/MM3 (ref 4.2–5.4)
SODIUM BLD-SCNC: 138 MEQ/L (ref 135–145)
TRIGL SERPL-MCNC: 93 MG/DL (ref 0–199)
WBC # BLD: 6.8 THOU/MM3 (ref 4.8–10.8)

## 2020-06-22 NOTE — LETTER
[address] [facility]  [address] [address] [address], [address]  Phone: [phone]  Fax: [phone]    June 22, 2020    Amanuel Olivier  [address] [facility]    Dear Kwasi Blanc,    This letter is regarding your Emergency Department (ED) visit at [address] [place] [address] on 6/19/20. Srinivasa Singh wanted to make sure that you understand your discharge instructions and that you were able to fill any prescriptions that may have been ordered for you. Please contact the office at the above phone number if the ED advised you to follow up with Srinivasa Singh, or if you have any further questions or needs. Also did you know -   *Visiting the ED for a non-emergency could result in higher co-pays than you would normally be subject to paying? *You can call your doctor even after hours so they can direct you to the most appropriate care. [facility]) practices can often offer you an appointment on the same day that you call. *We have some [facility] offices that offer Walk-in appointments; check our website for availability in your community, [email or web address].      *Evisits are now available for patients for $36 through [employer] for certain conditions:  * Sinus, cold and or cough       * Diarrhea            * Headache  * Heartburn                                * Poison Urvashi          * Back pain     * Urinary problems                         If you do not have [employer] or [employer] and are interested, please contact the office and a staff member may assist you or go to [email or web address]."[email or web address].     Sincerely,   DO Cesilia and your [facility]

## 2020-06-24 LAB — TACROLIMUS BLOOD: 9.5 NG/ML

## 2020-06-25 ENCOUNTER — NURSE ONLY (OUTPATIENT)
Dept: LAB | Age: 65
End: 2020-06-25

## 2020-06-25 LAB
ANION GAP SERPL CALCULATED.3IONS-SCNC: 10 MEQ/L (ref 8–16)
BASOPHILS # BLD: 0.3 %
BASOPHILS ABSOLUTE: 0 THOU/MM3 (ref 0–0.1)
BUN BLDV-MCNC: 20 MG/DL (ref 7–22)
CHLORIDE BLD-SCNC: 103 MEQ/L (ref 98–111)
CO2: 29 MEQ/L (ref 23–33)
CREAT SERPL-MCNC: 1.1 MG/DL (ref 0.4–1.2)
EOSINOPHIL # BLD: 1.9 %
EOSINOPHILS ABSOLUTE: 0.1 THOU/MM3 (ref 0–0.4)
ERYTHROCYTE [DISTWIDTH] IN BLOOD BY AUTOMATED COUNT: 14.7 % (ref 11.5–14.5)
ERYTHROCYTE [DISTWIDTH] IN BLOOD BY AUTOMATED COUNT: 49.1 FL (ref 35–45)
GFR SERPL CREATININE-BSD FRML MDRD: 50 ML/MIN/1.73M2
GLUCOSE BLD-MCNC: 201 MG/DL (ref 70–108)
HCT VFR BLD CALC: 30.1 % (ref 37–47)
HEMOGLOBIN: 9.5 GM/DL (ref 12–16)
IMMATURE GRANS (ABS): 0.02 THOU/MM3 (ref 0–0.07)
IMMATURE GRANULOCYTES: 0.3 %
LYMPHOCYTES # BLD: 15.9 %
LYMPHOCYTES ABSOLUTE: 1.1 THOU/MM3 (ref 1–4.8)
MCH RBC QN AUTO: 28.7 PG (ref 26–33)
MCHC RBC AUTO-ENTMCNC: 31.6 GM/DL (ref 32.2–35.5)
MCV RBC AUTO: 90.9 FL (ref 81–99)
MONOCYTES # BLD: 7.6 %
MONOCYTES ABSOLUTE: 0.5 THOU/MM3 (ref 0.4–1.3)
NUCLEATED RED BLOOD CELLS: 0 /100 WBC
PLATELET # BLD: 211 THOU/MM3 (ref 130–400)
PMV BLD AUTO: 11.6 FL (ref 9.4–12.4)
POTASSIUM SERPL-SCNC: 3.6 MEQ/L (ref 3.5–5.2)
RBC # BLD: 3.31 MILL/MM3 (ref 4.2–5.4)
SEG NEUTROPHILS: 74 %
SEGMENTED NEUTROPHILS ABSOLUTE COUNT: 5 THOU/MM3 (ref 1.8–7.7)
SODIUM BLD-SCNC: 142 MEQ/L (ref 135–145)
WBC # BLD: 6.8 THOU/MM3 (ref 4.8–10.8)

## 2020-06-26 LAB — CMV BY PCR, QUALITATIVE BLOOD: NOT DETECTED

## 2020-06-28 LAB — TACROLIMUS BLOOD: 6.7 NG/ML

## 2020-07-02 ENCOUNTER — NURSE ONLY (OUTPATIENT)
Dept: LAB | Age: 65
End: 2020-07-02

## 2020-07-02 LAB
ANION GAP SERPL CALCULATED.3IONS-SCNC: 10 MEQ/L (ref 8–16)
BASOPHILS # BLD: 0.1 %
BASOPHILS ABSOLUTE: 0 THOU/MM3 (ref 0–0.1)
BUN BLDV-MCNC: 18 MG/DL (ref 7–22)
CHLORIDE BLD-SCNC: 101 MEQ/L (ref 98–111)
CO2: 29 MEQ/L (ref 23–33)
CREAT SERPL-MCNC: 1.1 MG/DL (ref 0.4–1.2)
EOSINOPHIL # BLD: 2.1 %
EOSINOPHILS ABSOLUTE: 0.1 THOU/MM3 (ref 0–0.4)
ERYTHROCYTE [DISTWIDTH] IN BLOOD BY AUTOMATED COUNT: 14.5 % (ref 11.5–14.5)
ERYTHROCYTE [DISTWIDTH] IN BLOOD BY AUTOMATED COUNT: 48.5 FL (ref 35–45)
GFR SERPL CREATININE-BSD FRML MDRD: 50 ML/MIN/1.73M2
GLUCOSE BLD-MCNC: 150 MG/DL (ref 70–108)
HCT VFR BLD CALC: 30.9 % (ref 37–47)
HEMOGLOBIN: 9.5 GM/DL (ref 12–16)
IMMATURE GRANS (ABS): 0.02 THOU/MM3 (ref 0–0.07)
IMMATURE GRANULOCYTES: 0.3 %
LYMPHOCYTES # BLD: 13.8 %
LYMPHOCYTES ABSOLUTE: 0.9 THOU/MM3 (ref 1–4.8)
MCH RBC QN AUTO: 28.1 PG (ref 26–33)
MCHC RBC AUTO-ENTMCNC: 30.7 GM/DL (ref 32.2–35.5)
MCV RBC AUTO: 91.4 FL (ref 81–99)
MONOCYTES # BLD: 7.7 %
MONOCYTES ABSOLUTE: 0.5 THOU/MM3 (ref 0.4–1.3)
NUCLEATED RED BLOOD CELLS: 0 /100 WBC
PLATELET # BLD: 209 THOU/MM3 (ref 130–400)
PMV BLD AUTO: 11.6 FL (ref 9.4–12.4)
POTASSIUM SERPL-SCNC: 3.7 MEQ/L (ref 3.5–5.2)
RBC # BLD: 3.38 MILL/MM3 (ref 4.2–5.4)
SEG NEUTROPHILS: 76 %
SEGMENTED NEUTROPHILS ABSOLUTE COUNT: 5.1 THOU/MM3 (ref 1.8–7.7)
SODIUM BLD-SCNC: 140 MEQ/L (ref 135–145)
WBC # BLD: 6.7 THOU/MM3 (ref 4.8–10.8)

## 2020-07-04 LAB — TACROLIMUS BLOOD: 10.2 NG/ML

## 2020-07-06 ENCOUNTER — NURSE ONLY (OUTPATIENT)
Dept: LAB | Age: 65
End: 2020-07-06

## 2020-07-06 LAB
ALBUMIN SERPL-MCNC: 4.1 G/DL (ref 3.5–5.1)
ALP BLD-CCNC: 92 U/L (ref 38–126)
ALT SERPL-CCNC: 15 U/L (ref 11–66)
ANION GAP SERPL CALCULATED.3IONS-SCNC: 13 MEQ/L (ref 8–16)
AST SERPL-CCNC: 15 U/L (ref 5–40)
BILIRUB SERPL-MCNC: 0.2 MG/DL (ref 0.3–1.2)
BUN BLDV-MCNC: 25 MG/DL (ref 7–22)
CALCIUM SERPL-MCNC: 10.6 MG/DL (ref 8.5–10.5)
CHLORIDE BLD-SCNC: 100 MEQ/L (ref 98–111)
CHOLESTEROL, TOTAL: 134 MG/DL (ref 100–199)
CO2: 28 MEQ/L (ref 23–33)
CREAT SERPL-MCNC: 1.2 MG/DL (ref 0.4–1.2)
ERYTHROCYTE [DISTWIDTH] IN BLOOD BY AUTOMATED COUNT: 14.4 % (ref 11.5–14.5)
ERYTHROCYTE [DISTWIDTH] IN BLOOD BY AUTOMATED COUNT: 48.4 FL (ref 35–45)
FERRITIN: 1750 NG/ML (ref 10–291)
GLUCOSE BLD-MCNC: 133 MG/DL (ref 70–108)
HCT VFR BLD CALC: 31.3 % (ref 37–47)
HDLC SERPL-MCNC: 40 MG/DL
HEMOGLOBIN: 9.7 GM/DL (ref 12–16)
IRON: 68 UG/DL (ref 50–170)
LDL CHOLESTEROL DIRECT: 65.27 MG/DL
MAGNESIUM: 1.5 MG/DL (ref 1.6–2.4)
MCH RBC QN AUTO: 28.5 PG (ref 26–33)
MCHC RBC AUTO-ENTMCNC: 31 GM/DL (ref 32.2–35.5)
MCV RBC AUTO: 92.1 FL (ref 81–99)
PHOSPHORUS: 2.4 MG/DL (ref 2.4–4.7)
PLATELET # BLD: 209 THOU/MM3 (ref 130–400)
PMV BLD AUTO: 11.7 FL (ref 9.4–12.4)
POTASSIUM SERPL-SCNC: 3.6 MEQ/L (ref 3.5–5.2)
RBC # BLD: 3.4 MILL/MM3 (ref 4.2–5.4)
SODIUM BLD-SCNC: 141 MEQ/L (ref 135–145)
TOTAL IRON BINDING CAPACITY: 194 UG/DL (ref 171–450)
TRIGL SERPL-MCNC: 223 MG/DL (ref 0–199)
URIC ACID: 5.3 MG/DL (ref 2.4–5.7)
WBC # BLD: 6.4 THOU/MM3 (ref 4.8–10.8)

## 2020-07-08 ENCOUNTER — OFFICE VISIT (OUTPATIENT)
Dept: CARDIOLOGY CLINIC | Age: 65
End: 2020-07-08
Payer: MEDICARE

## 2020-07-08 VITALS
DIASTOLIC BLOOD PRESSURE: 78 MMHG | HEIGHT: 65 IN | HEART RATE: 64 BPM | WEIGHT: 180 LBS | SYSTOLIC BLOOD PRESSURE: 134 MMHG | BODY MASS INDEX: 29.99 KG/M2

## 2020-07-08 LAB
TACROLIMUS BLOOD: 7.5 NG/ML
TRANSFERRIN: 155 MG/DL (ref 200–400)

## 2020-07-08 PROCEDURE — 99213 OFFICE O/P EST LOW 20 MIN: CPT | Performed by: INTERNAL MEDICINE

## 2020-07-08 PROCEDURE — G8427 DOCREV CUR MEDS BY ELIG CLIN: HCPCS | Performed by: INTERNAL MEDICINE

## 2020-07-08 PROCEDURE — 1123F ACP DISCUSS/DSCN MKR DOCD: CPT | Performed by: INTERNAL MEDICINE

## 2020-07-08 PROCEDURE — 1036F TOBACCO NON-USER: CPT | Performed by: INTERNAL MEDICINE

## 2020-07-08 PROCEDURE — G8399 PT W/DXA RESULTS DOCUMENT: HCPCS | Performed by: INTERNAL MEDICINE

## 2020-07-08 PROCEDURE — 3017F COLORECTAL CA SCREEN DOC REV: CPT | Performed by: INTERNAL MEDICINE

## 2020-07-08 PROCEDURE — 4040F PNEUMOC VAC/ADMIN/RCVD: CPT | Performed by: INTERNAL MEDICINE

## 2020-07-08 PROCEDURE — 1090F PRES/ABSN URINE INCON ASSESS: CPT | Performed by: INTERNAL MEDICINE

## 2020-07-08 PROCEDURE — G8417 CALC BMI ABV UP PARAM F/U: HCPCS | Performed by: INTERNAL MEDICINE

## 2020-07-08 RX ORDER — ONDANSETRON 4 MG/1
4 TABLET, FILM COATED ORAL EVERY 8 HOURS PRN
COMMUNITY
End: 2021-02-03 | Stop reason: ALTCHOICE

## 2020-07-08 NOTE — PROGRESS NOTES
[address] [place] [address],[place] [address]  [place] [address]  Dept: [phone]  Dept Fax: [phone]  Loc: [phone]    Visit Date: 7/8/2020    Ms. Satya Gay is a 75 y.o. female  who presented for:  Chief Complaint   Patient presents with    Follow-up       HPI:   HPI   74 yo F hx of ESRD s/p renal transplant 4/2020 who presents for follow-up. Has been steadily improving. No chest pain, angina, BANUELOS, orthopnea, PND, sob at rest, palpitations, LE edema, or syncope. Came to ED for dizziness and dehydration, no recurrence. Current Outpatient Medications:     ondansetron (ZOFRAN) 4 MG tablet, Take 4 mg by mouth every 8 hours as needed for Nausea or Vomiting, Disp: , Rfl:     atorvastatin (LIPITOR) 40 MG tablet, Take 1 tablet by mouth daily, Disp: 30 tablet, Rfl: 2    insulin glargine (LANTUS SOLOSTAR) 100 UNIT/ML injection pen, Inject 25 Units into the skin nightly, Disp: 15 pen, Rfl: 5    pantoprazole (PROTONIX) 40 MG tablet, Take 1 tablet by mouth every morning (before breakfast), Disp: 30 tablet, Rfl: 5    amLODIPine (NORVASC) 10 MG tablet, Take 1 tablet by mouth daily, Disp: 90 tablet, Rfl: 1    tiotropium (SPIRIVA HANDIHALER) 18 MCG inhalation capsule, Inhale 1 capsule into the lungs daily 1 capsule via inhalation daily. , Disp: 90 capsule, Rfl: 3    albuterol sulfate HFA (PROAIR HFA) 108 (90 Base) MCG/ACT inhaler, Inhale 2 puffs into the lungs every 6 hours as needed for Wheezing or Shortness of Breath, Disp: 3 Inhaler, Rfl: 3    fluticasone (FLONASE) 50 MCG/ACT nasal spray, 2 sprays by Nasal route daily, Disp: 3 Bottle, Rfl: 3    cloNIDine (CATAPRES) 0.3 MG tablet, TAKE 1 TABLET EVERY 8 HOURS (THREE TIMES A DAY), Disp: 270 tablet, Rfl: 1    lactulose (CHRONULAC) 10 GM/15ML solution, TAKE 30ML BY MOUTH 2 TIMES DAILY AS NEEDED CONSTIPATION.  USE IF NO BM WITH OTHER SOFTNERS, Disp: 473 mL, Rfl: 0    vitamin D (ERGOCALCIFEROL) 1.25 MG (41262 UT) CAPS capsule, TAKE ONE CAPSULE BY MOUTH ONE TIME PER WEEK, Disp: 4 capsule, Rfl: 5    carvedilol (COREG) 25 MG tablet, Take 1.5 tablets by mouth 2 times daily . hold if SBP less than 100 mm hg or HR less than 60, Disp: 270 tablet, Rfl: 3    insulin aspart (NOVOLOG FLEXPEN) 100 UNIT/ML injection pen, Sliding scale insulin coverage Glucose:Dose: If Less [id number] =No Insulin/ 140-199= 2 Units/ 200-249=4 Units/ 250-299= 6 Units/  300-349=8 Units/  350-400=10 Units/ Above 400 = 12 Units, Disp: 15 pen, Rfl: 5    lidocaine-prilocaine (EMLA) 2.5-2.5 % cream, APPLY SMALL AMOUNT TO ACCESS SITE (AVF) 1 TO 2 HOURS BEFORE DIALYSIS. COVER WITH OCCLUSIVE DRESSING (SARAN WRAP), Disp: , Rfl: 11    linaclotide (LINZESS) 145 MCG capsule, Take 145 mcg by mouth every morning (before breakfast), Disp: , Rfl:     Insulin Pen Needle (B-D ULTRAFINE III SHORT PEN) 31G X 8 MM MISC, Use three times daily Diagnosis Code E11.9, Disp: 200 each, Rfl: 3    nitroGLYCERIN (NITROSTAT) 0.4 MG SL tablet, Place 1 tablet under the tongue every 5 minutes as needed for Chest pain, Disp: 25 tablet, Rfl: 5    aspirin 81 MG EC tablet, Take 81 mg by mouth daily.   , Disp: , Rfl:     Past Medical History  Peewee Espino  has a past medical history of Anemia associated with chronic renal failure, Anxiety, Arthritis, Backache, Blood circulation, collateral, Blood transfusion, CAD (coronary artery disease), Cellulitis in diabetic foot ([address] Chest pain, CHF (congestive heart failure) ([address] Chronic anemia, Chronic kidney disease, Chronic kidney disease, stage III (moderate) ([address] Chronic renal insufficiency, COPD (chronic obstructive pulmonary disease) ([address] Coronary disease, Depression, Diabetes mellitus, type 2 ([address] Disease of blood and blood forming organ, GERD (gastroesophageal reflux disease), Hemoglobin disease ([address] History of granulomatous disease ([address] HTN (hypertension), Hyperlipemia, Iron deficiency anemia due to dietary causes, Kidney stones, Kidney trouble, MRSA infection, Neuromuscular disorder ([address] Neuropathy, Obesity, CONTRERAS on CPAP, Pneumonia, PONV (postoperative nausea and vomiting), and Seizures ([address] Social History  Jenny Martinez  reports that she quit smoking about 11 years ago. Her smoking use included cigarettes. She started smoking about 38 years ago. She has a 45.00 pack-year smoking history. She has never used smokeless tobacco. She reports that she does not drink alcohol or use drugs. Family History  Jenny Michelle family history includes Alcohol Abuse in her father; Arthritis in her mother; Cancer in her sister; Diabetes in her brother, brother, father, sister, sister, sister, sister, and sister; Early Death in her sister; Heart Disease in her father, mother, sister, and sister; High Blood Pressure in her mother; Kidney Disease in her mother; Mental Illness in her mother; Obesity in her father; Stroke in her mother. There is no family history of bicuspid aortic valve, aneurysms, heart transplant, pacemakers, defibrillators, or sudden cardiac death.       Past Surgical History   Past Surgical History:   Procedure Laterality Date    ANKLE SURGERY Right 02-10-14    Dr. David Harman at [facility]  1990's    removal of benign tumor   [address]  2008   [address]    COLONOSCOPY  2009    2 polyps, not precanceorus    COLONOSCOPY Left 6/10/2019    COLONOSCOPY DIAGNOSTIC performed by Truman Jhaveri MD at [address] [facility]  3/30/2012    eye lid lift    DIAGNOSTIC CARDIAC CATH LAB PROCEDURE      ENDOSCOPY, COLON, DIAGNOSTIC  2007   [facility] EYE SURGERY  March 30th, 2012    left sided ptosis    FOOT SURGERY  1990    Tarsal tunnel surgery    FRACTURE SURGERY  2015 [facility] and 1985    first partial in 1980's, then total in [address] [place] [address]  2015    LIVER BIOPSY  6/2015    LUNG BIOPSY  2009    VT OFFICE/OUTPT VISIT,PROCEDURE ONLY Left 8/23/2018 oriented to person, place, and time. No cranial nerve deficit. Coordination normal.   Skin: Skin is warm and dry. Psychiatric: Normal mood and affect.        No results found for: CKTOTAL, CKMB, CKMBINDEX    Lab Results   Component Value Date    WBC 6.4 07/06/2020    RBC 3.40 07/06/2020    RBC 3-5 09/06/2011    HGB 9.7 07/06/2020    HCT 31.3 07/06/2020    MCV 92.1 07/06/2020    MCH 28.5 07/06/2020    MCHC 31.0 07/06/2020    RDW 15.2 04/26/2017     07/06/2020    MPV 11.7 07/06/2020       Lab Results   Component Value Date     07/06/2020    K 3.6 07/06/2020    K 3.5 06/19/2020     07/06/2020    CO2 28 07/06/2020    BUN 25 07/06/2020    LABALBU 4.1 07/06/2020    LABALBU 4.8 01/03/2012    CREATININE 1.2 07/06/2020    CALCIUM 10.6 07/06/2020    LABGLOM 45 07/06/2020    GLUCOSE 133 07/06/2020    GLUCOSE 252 03/14/2012       Lab Results   Component Value Date    ALKPHOS 92 07/06/2020    ALT 15 07/06/2020    AST 15 07/06/2020    PROT 6.7 06/19/2020    BILITOT 0.2 07/06/2020    BILIDIR <0.2 11/13/2019    LABALBU 4.1 07/06/2020    LABALBU 4.8 01/03/2012       Lab Results   Component Value Date    MG 1.5 07/06/2020       Lab Results   Component Value Date    INR 1.13 08/23/2018    INR 0.97 06/16/2015         Lab Results   Component Value Date    LABA1C 5.3 02/12/2020    LABA1C 5.2 07/28/2018       Lab Results   Component Value Date    TRIG 223 07/06/2020    HDL 40 07/06/2020    LDLCALC 52 04/16/2020    LDLDIRECT 65.27 07/06/2020       Lab Results   Component Value Date    TSH 2.640 09/26/2018         Testing Reviewed:      I have individually reviewed the cardiac test below:    ECHO:   Results for orders placed during the hospital encounter of 07/24/18   ECHO Complete 2D W Doppler W Color    Narrative Transthoracic Echocardiography Report (TTE)     Demographics      Patient Name    Orelia Heft  Gender               Female                   A      MR #            [id number]       Race Black                                      Ethnicity      Account #       [de-identified]       Room Number          9552      Accession       [id number]       Date of Study        07/25/2018   Number      Date of Birth   1955      Referring Physician  Jeanne Randolph      Age             57 year(s)      Sonographer          Barry Moore [facility]                                      Interpreting         Echo reader of the                                   Physician            week                                                        Asha Mathur MD     Procedure    Type of Study      TTE procedure:ECHOCARDIOGRAM COMPLETE 2D W DOPPLER W COLOR. Procedure Date  Date: 07/25/2018 Start: 03:57 PM    Study Location: Bedside  Technical Quality: Adequate visualization    Indications:Pulmonary edema and Evaluate left ventricular function. Additional Medical History:Hypertension, Hyperlipidemia, Diabetes, Tobacco  use, COPD, Arthritis, Anemia, CKD    Patient Status: Routine    Height: 64.96 inches Weight: 207.24 pounds BSA: 2.01 m^2 BMI: 34.53 kg/m^2    BP: 169/78 mmHg     Conclusions      Summary   Ejection fraction is visually estimated at 50%. There was mild global hypokinesis of the left ventricle. Mildly dilated left atrium. Aortic valve appears tricuspid. Aortic valve leaflets are somewhat thickened. Aortic valve leaflets are Mildly calcified. Moderate circumferential pericardial effusion with no tamponade   physiology. Signature      ----------------------------------------------------------------   Electronically signed by Asha Mathur MD (Interpreting   physician) on 07/25/2018 at 05:30 PM   ----------------------------------------------------------------      Findings      Mitral Valve   Mild mitral regurgitation is present. Aortic Valve   Aortic valve appears tricuspid.    Aortic valve leaflets are somewhat thickened. Aortic valve leaflets are Mildly calcified. Tricuspid Valve   Tricuspid valve was not well visualized. Trivial tricuspid regurgitation visualized. Pulmonic Valve   The pulmonic valve was not well visualized . Trivial pulmonic regurgitation visualized. Left Atrium   Mildly dilated left atrium. Left Ventricle   Ejection fraction is visually estimated at 50%. There was mild global hypokinesis of the left ventricle. Right Atrium   Right atrial size was normal.      Right Ventricle   The right ventricular size was normal with normal systolic function and   wall thickness. Pericardial Effusion   Moderate circumferential pericardial effusion with no tamponade   physiology. Pleural Effusion   No evidence of pleural effusion. Aorta / Great Vessels   -Aortic root dimension within normal limits.   -The Pulmonary artery is within normal limits. -IVC size is within normal limits with normal respiratory phasic changes.      M-Mode/2D Measurements & Calculations      LV Diastolic   LV Systolic Dimension:    AV Cusp Separation: 1.6 cmLA   Dimension: 5.3 3.9 cm                    Dimension: 4.1 cmAO Root   cm             LV Volume Diastolic: 358  Dimension: 2.6 cmLA Area: 23.8   LV FS:26.4 %   ml                        cm^2   LV PW          LV Volume Systolic: 86.8   Diastolic: 1.3 ml   cm             LV EDV/LV EDV Index: 135   Septum         ml/67 m^2LV ESV/LV ESV    RV Diastolic Dimension: 2.9 cm   Diastolic: 1.3 Index: 35.3 ml/33 m^2   cm             EF Calculated: 51.2 %     LA/Aorta: 1.58                                               LA volume/Index: 79.6 ml /40m^2     Doppler Measurements & Calculations      MV Peak E-Wave: 123 cm/s  AV Peak Velocity: 169  LVOT Peak Velocity: 103   MV Peak A-Wave: 123 cm/s  cm/s                   cm/s   MV E/A Ratio: 1           AV Peak Gradient:      LVOT Peak Gradient: 4   MV Peak Gradient: 6.05    11.42 mmHg             mmHg mmHg                                                    TV Peak E-Wave: 60.6 cm/s   MV Deceleration Time: 215                        TV Peak A-Wave: 47.7 cm/s   msec                             IVRT: 60 msec          TV Peak Gradient: 1.47                                                    mmHg   MV E' Septal Velocity:                           TR Velocity:204 cm/s   7.2 cm/s                  AV DVI (Vmax):0.61     TR Gradient:16.65 mmHg   MV A' Septal Velocity:                           PV Peak Velocity: 78.1   7.7 cm/s                                         cm/s   MV E' Lateral Velocity: 5                        PV Peak Gradient: 2.44   cm/s                                             mmHg   MV A' Lateral Velocity: 6   cm/s   E/E' septal: 17.08                               [place] ED Velocity: 129 cm/s   E/E' lateral: 24.6   MR Velocity: 415 cm/s     [id number]        Assessment/Plan   Pericardial Effusion - stable symptomatically  HTN  EF 50%  Cath at OSH - non-obstructive  ESRD - s/p renal transplant  No issues, feels well, steadily improving. She is making urine. Continue current therapy. BP and HR well controlled. Discussed diet/exercise/BP/weight loss/health lifestyle choices/lipids; the patient understands the goals and will try to comply.     Disposition:  1 year         Electronically signed by Blade Singh MD   7/8/2020 at 2:51 PM EDT

## 2020-07-12 LAB
BK VIRUS QUALITATIVE, PCR: NORMAL
CMV BY PCR, QUALITATIVE BLOOD: NOT DETECTED

## 2020-07-16 ENCOUNTER — NURSE ONLY (OUTPATIENT)
Dept: LAB | Age: 65
End: 2020-07-16

## 2020-07-16 LAB
ANION GAP SERPL CALCULATED.3IONS-SCNC: 10 MEQ/L (ref 8–16)
BASOPHILS # BLD: 0.1 %
BASOPHILS ABSOLUTE: 0 THOU/MM3 (ref 0–0.1)
BUN BLDV-MCNC: 22 MG/DL (ref 7–22)
CHLORIDE BLD-SCNC: 103 MEQ/L (ref 98–111)
CO2: 29 MEQ/L (ref 23–33)
CREAT SERPL-MCNC: 1.2 MG/DL (ref 0.4–1.2)
EOSINOPHIL # BLD: 1.9 %
EOSINOPHILS ABSOLUTE: 0.1 THOU/MM3 (ref 0–0.4)
ERYTHROCYTE [DISTWIDTH] IN BLOOD BY AUTOMATED COUNT: 14.5 % (ref 11.5–14.5)
ERYTHROCYTE [DISTWIDTH] IN BLOOD BY AUTOMATED COUNT: 48.2 FL (ref 35–45)
GLUCOSE BLD-MCNC: 183 MG/DL (ref 70–108)
HCT VFR BLD CALC: 32.5 % (ref 37–47)
HEMOGLOBIN: 10.1 GM/DL (ref 12–16)
IMMATURE GRANS (ABS): 0.03 THOU/MM3 (ref 0–0.07)
IMMATURE GRANULOCYTES: 0.4 %
LYMPHOCYTES # BLD: 14.9 %
LYMPHOCYTES ABSOLUTE: 1.1 THOU/MM3 (ref 1–4.8)
MCH RBC QN AUTO: 28.6 PG (ref 26–33)
MCHC RBC AUTO-ENTMCNC: 31.1 GM/DL (ref 32.2–35.5)
MCV RBC AUTO: 92.1 FL (ref 81–99)
MONOCYTES # BLD: 7.1 %
MONOCYTES ABSOLUTE: 0.5 THOU/MM3 (ref 0.4–1.3)
NUCLEATED RED BLOOD CELLS: 0 /100 WBC
PLATELET # BLD: 260 THOU/MM3 (ref 130–400)
PMV BLD AUTO: 11.8 FL (ref 9.4–12.4)
POTASSIUM SERPL-SCNC: 3.4 MEQ/L (ref 3.5–5.2)
RBC # BLD: 3.53 MILL/MM3 (ref 4.2–5.4)
SEG NEUTROPHILS: 75.6 %
SEGMENTED NEUTROPHILS ABSOLUTE COUNT: 5.6 THOU/MM3 (ref 1.8–7.7)
SODIUM BLD-SCNC: 142 MEQ/L (ref 135–145)
WBC # BLD: 7.4 THOU/MM3 (ref 4.8–10.8)

## 2020-07-19 LAB — TACROLIMUS BLOOD: 8.5 NG/ML

## 2020-07-20 ENCOUNTER — NURSE ONLY (OUTPATIENT)
Dept: LAB | Age: 65
End: 2020-07-20

## 2020-07-20 LAB
ALBUMIN SERPL-MCNC: 4.5 G/DL (ref 3.5–5.1)
ALP BLD-CCNC: 105 U/L (ref 38–126)
ALT SERPL-CCNC: 16 U/L (ref 11–66)
ANION GAP SERPL CALCULATED.3IONS-SCNC: 11 MEQ/L (ref 8–16)
AST SERPL-CCNC: 17 U/L (ref 5–40)
BASOPHILS # BLD: 0.1 %
BASOPHILS ABSOLUTE: 0 THOU/MM3 (ref 0–0.1)
BILIRUB SERPL-MCNC: 0.3 MG/DL (ref 0.3–1.2)
BUN BLDV-MCNC: 17 MG/DL (ref 7–22)
CALCIUM SERPL-MCNC: 10.1 MG/DL (ref 8.5–10.5)
CHLORIDE BLD-SCNC: 100 MEQ/L (ref 98–111)
CHOLESTEROL, TOTAL: 134 MG/DL (ref 100–199)
CO2: 29 MEQ/L (ref 23–33)
CREAT SERPL-MCNC: 1.1 MG/DL (ref 0.4–1.2)
EOSINOPHIL # BLD: 1.7 %
EOSINOPHILS ABSOLUTE: 0.1 THOU/MM3 (ref 0–0.4)
ERYTHROCYTE [DISTWIDTH] IN BLOOD BY AUTOMATED COUNT: 14.1 % (ref 11.5–14.5)
ERYTHROCYTE [DISTWIDTH] IN BLOOD BY AUTOMATED COUNT: 46.2 FL (ref 35–45)
GLUCOSE BLD-MCNC: 165 MG/DL (ref 70–108)
HCT VFR BLD CALC: 31.3 % (ref 37–47)
HEMOGLOBIN: 10 GM/DL (ref 12–16)
IMMATURE GRANS (ABS): 0.02 THOU/MM3 (ref 0–0.07)
IMMATURE GRANULOCYTES: 0.3 %
LYMPHOCYTES # BLD: 14.5 %
LYMPHOCYTES ABSOLUTE: 1 THOU/MM3 (ref 1–4.8)
MAGNESIUM: 1.9 MG/DL (ref 1.6–2.4)
MCH RBC QN AUTO: 28.9 PG (ref 26–33)
MCHC RBC AUTO-ENTMCNC: 31.9 GM/DL (ref 32.2–35.5)
MCV RBC AUTO: 90.5 FL (ref 81–99)
MONOCYTES # BLD: 5.9 %
MONOCYTES ABSOLUTE: 0.4 THOU/MM3 (ref 0.4–1.3)
NUCLEATED RED BLOOD CELLS: 0 /100 WBC
PHOSPHORUS: 2.3 MG/DL (ref 2.4–4.7)
PLATELET # BLD: 244 THOU/MM3 (ref 130–400)
PMV BLD AUTO: 11.6 FL (ref 9.4–12.4)
POTASSIUM SERPL-SCNC: 3.1 MEQ/L (ref 3.5–5.2)
RBC # BLD: 3.46 MILL/MM3 (ref 4.2–5.4)
SEG NEUTROPHILS: 77.5 %
SEGMENTED NEUTROPHILS ABSOLUTE COUNT: 5.3 THOU/MM3 (ref 1.8–7.7)
SODIUM BLD-SCNC: 140 MEQ/L (ref 135–145)
TRIGL SERPL-MCNC: 122 MG/DL (ref 0–199)
WBC # BLD: 6.9 THOU/MM3 (ref 4.8–10.8)

## 2020-07-22 LAB — TACROLIMUS BLOOD: 8.2 NG/ML

## 2020-07-23 ENCOUNTER — NURSE ONLY (OUTPATIENT)
Dept: LAB | Age: 65
End: 2020-07-23

## 2020-07-23 LAB
ANION GAP SERPL CALCULATED.3IONS-SCNC: 12 MEQ/L (ref 8–16)
BASOPHILS # BLD: 0.1 %
BASOPHILS ABSOLUTE: 0 THOU/MM3 (ref 0–0.1)
BUN BLDV-MCNC: 23 MG/DL (ref 7–22)
CHLORIDE BLD-SCNC: 102 MEQ/L (ref 98–111)
CO2: 28 MEQ/L (ref 23–33)
CREAT SERPL-MCNC: 1.1 MG/DL (ref 0.4–1.2)
EOSINOPHIL # BLD: 2.7 %
EOSINOPHILS ABSOLUTE: 0.2 THOU/MM3 (ref 0–0.4)
ERYTHROCYTE [DISTWIDTH] IN BLOOD BY AUTOMATED COUNT: 14.1 % (ref 11.5–14.5)
ERYTHROCYTE [DISTWIDTH] IN BLOOD BY AUTOMATED COUNT: 46.2 FL (ref 35–45)
GLUCOSE BLD-MCNC: 161 MG/DL (ref 70–108)
HCT VFR BLD CALC: 30.9 % (ref 37–47)
HEMOGLOBIN: 9.8 GM/DL (ref 12–16)
IMMATURE GRANS (ABS): 0.02 THOU/MM3 (ref 0–0.07)
IMMATURE GRANULOCYTES: 0.3 %
LYMPHOCYTES # BLD: 15.8 %
LYMPHOCYTES ABSOLUTE: 1.1 THOU/MM3 (ref 1–4.8)
MCH RBC QN AUTO: 28.7 PG (ref 26–33)
MCHC RBC AUTO-ENTMCNC: 31.7 GM/DL (ref 32.2–35.5)
MCV RBC AUTO: 90.4 FL (ref 81–99)
MONOCYTES # BLD: 7.6 %
MONOCYTES ABSOLUTE: 0.5 THOU/MM3 (ref 0.4–1.3)
NUCLEATED RED BLOOD CELLS: 0 /100 WBC
PLATELET # BLD: 228 THOU/MM3 (ref 130–400)
PMV BLD AUTO: 11.3 FL (ref 9.4–12.4)
POTASSIUM SERPL-SCNC: 3.7 MEQ/L (ref 3.5–5.2)
RBC # BLD: 3.42 MILL/MM3 (ref 4.2–5.4)
SEG NEUTROPHILS: 73.5 %
SEGMENTED NEUTROPHILS ABSOLUTE COUNT: 4.9 THOU/MM3 (ref 1.8–7.7)
SODIUM BLD-SCNC: 142 MEQ/L (ref 135–145)
WBC # BLD: 6.7 THOU/MM3 (ref 4.8–10.8)

## 2020-07-25 LAB — TACROLIMUS BLOOD: 9.5 NG/ML

## 2020-07-27 ENCOUNTER — NURSE ONLY (OUTPATIENT)
Dept: LAB | Age: 65
End: 2020-07-27

## 2020-07-27 LAB
ALBUMIN SERPL-MCNC: 4.1 G/DL (ref 3.5–5.1)
ALP BLD-CCNC: 96 U/L (ref 38–126)
ALT SERPL-CCNC: 15 U/L (ref 11–66)
ANION GAP SERPL CALCULATED.3IONS-SCNC: 9 MEQ/L (ref 8–16)
AST SERPL-CCNC: 15 U/L (ref 5–40)
BASOPHILS # BLD: 0.2 %
BASOPHILS ABSOLUTE: 0 THOU/MM3 (ref 0–0.1)
BILIRUB SERPL-MCNC: 0.2 MG/DL (ref 0.3–1.2)
BUN BLDV-MCNC: 18 MG/DL (ref 7–22)
CALCIUM SERPL-MCNC: 10.1 MG/DL (ref 8.5–10.5)
CHLORIDE BLD-SCNC: 102 MEQ/L (ref 98–111)
CHOLESTEROL, TOTAL: 131 MG/DL (ref 100–199)
CO2: 28 MEQ/L (ref 23–33)
CREAT SERPL-MCNC: 1 MG/DL (ref 0.4–1.2)
EOSINOPHIL # BLD: 2.4 %
EOSINOPHILS ABSOLUTE: 0.2 THOU/MM3 (ref 0–0.4)
ERYTHROCYTE [DISTWIDTH] IN BLOOD BY AUTOMATED COUNT: 13.9 % (ref 11.5–14.5)
ERYTHROCYTE [DISTWIDTH] IN BLOOD BY AUTOMATED COUNT: 46.1 FL (ref 35–45)
FERRITIN: 1831 NG/ML (ref 10–291)
GLUCOSE BLD-MCNC: 125 MG/DL (ref 70–108)
HCT VFR BLD CALC: 30.7 % (ref 37–47)
HDLC SERPL-MCNC: 46 MG/DL
HEMOGLOBIN: 9.6 GM/DL (ref 12–16)
IMMATURE GRANS (ABS): 0.03 THOU/MM3 (ref 0–0.07)
IMMATURE GRANULOCYTES: 0.5 %
IRON: 67 UG/DL (ref 50–170)
LDL CHOLESTEROL DIRECT: 64.88 MG/DL
LYMPHOCYTES # BLD: 17 %
LYMPHOCYTES ABSOLUTE: 1.1 THOU/MM3 (ref 1–4.8)
MAGNESIUM: 1.6 MG/DL (ref 1.6–2.4)
MCH RBC QN AUTO: 28.6 PG (ref 26–33)
MCHC RBC AUTO-ENTMCNC: 31.3 GM/DL (ref 32.2–35.5)
MCV RBC AUTO: 91.4 FL (ref 81–99)
MONOCYTES # BLD: 11 %
MONOCYTES ABSOLUTE: 0.7 THOU/MM3 (ref 0.4–1.3)
NUCLEATED RED BLOOD CELLS: 0 /100 WBC
PHOSPHORUS: 2.6 MG/DL (ref 2.4–4.7)
PLATELET # BLD: 202 THOU/MM3 (ref 130–400)
PMV BLD AUTO: 11.7 FL (ref 9.4–12.4)
POTASSIUM SERPL-SCNC: 3.5 MEQ/L (ref 3.5–5.2)
RBC # BLD: 3.36 MILL/MM3 (ref 4.2–5.4)
SEG NEUTROPHILS: 68.9 %
SEGMENTED NEUTROPHILS ABSOLUTE COUNT: 4.3 THOU/MM3 (ref 1.8–7.7)
SODIUM BLD-SCNC: 139 MEQ/L (ref 135–145)
TOTAL IRON BINDING CAPACITY: 167 UG/DL (ref 171–450)
TRIGL SERPL-MCNC: 103 MG/DL (ref 0–199)
URIC ACID: 4.6 MG/DL (ref 2.4–5.7)
WBC # BLD: 6.3 THOU/MM3 (ref 4.8–10.8)

## 2020-07-29 LAB — TACROLIMUS BLOOD: 6.9 NG/ML

## 2020-08-02 ENCOUNTER — HOSPITAL ENCOUNTER (INPATIENT)
Age: 65
LOS: 4 days | Discharge: ANOTHER HOSPITAL | DRG: 872 | End: 2020-08-06
Attending: EMERGENCY MEDICINE | Admitting: FAMILY MEDICINE
Payer: MEDICARE

## 2020-08-02 ENCOUNTER — APPOINTMENT (OUTPATIENT)
Dept: GENERAL RADIOLOGY | Age: 65
DRG: 872 | End: 2020-08-02
Payer: MEDICARE

## 2020-08-02 PROBLEM — N39.0 UTI (URINARY TRACT INFECTION): Status: ACTIVE | Noted: 2020-08-02

## 2020-08-02 LAB
ALBUMIN SERPL-MCNC: 4.3 G/DL (ref 3.5–5.1)
ALP BLD-CCNC: 87 U/L (ref 38–126)
ALT SERPL-CCNC: 17 U/L (ref 11–66)
ANION GAP SERPL CALCULATED.3IONS-SCNC: 15 MEQ/L (ref 8–16)
APTT: 28.6 SECONDS (ref 22–38)
AST SERPL-CCNC: 16 U/L (ref 5–40)
BACTERIA: ABNORMAL /HPF
BASOPHILS # BLD: 0.1 %
BASOPHILS ABSOLUTE: 0 THOU/MM3 (ref 0–0.1)
BILIRUB SERPL-MCNC: 0.6 MG/DL (ref 0.3–1.2)
BILIRUBIN DIRECT: < 0.2 MG/DL (ref 0–0.3)
BILIRUBIN URINE: NEGATIVE
BLOOD, URINE: ABNORMAL
BUN BLDV-MCNC: 25 MG/DL (ref 7–22)
CALCIUM SERPL-MCNC: 9.9 MG/DL (ref 8.5–10.5)
CASTS 2: ABNORMAL /LPF
CASTS UA: ABNORMAL /LPF
CHARACTER, URINE: ABNORMAL
CHLORIDE BLD-SCNC: 98 MEQ/L (ref 98–111)
CO2: 24 MEQ/L (ref 23–33)
COLOR: YELLOW
CREAT SERPL-MCNC: 1.4 MG/DL (ref 0.4–1.2)
CRYSTALS, UA: ABNORMAL
EKG ATRIAL RATE: 73 BPM
EKG P AXIS: 1 DEGREES
EKG P-R INTERVAL: 148 MS
EKG Q-T INTERVAL: 402 MS
EKG QRS DURATION: 80 MS
EKG QTC CALCULATION (BAZETT): 442 MS
EKG R AXIS: 16 DEGREES
EKG T AXIS: 32 DEGREES
EKG VENTRICULAR RATE: 73 BPM
EOSINOPHIL # BLD: 0 %
EOSINOPHILS ABSOLUTE: 0 THOU/MM3 (ref 0–0.4)
EPITHELIAL CELLS, UA: ABNORMAL /HPF
ERYTHROCYTE [DISTWIDTH] IN BLOOD BY AUTOMATED COUNT: 13.9 % (ref 11.5–14.5)
ERYTHROCYTE [DISTWIDTH] IN BLOOD BY AUTOMATED COUNT: 45.1 FL (ref 35–45)
FLU A ANTIGEN: NEGATIVE
FLU B ANTIGEN: NEGATIVE
GFR SERPL CREATININE-BSD FRML MDRD: 54 ML/MIN/1.73M2
GLUCOSE BLD-MCNC: 176 MG/DL (ref 70–108)
GLUCOSE URINE: 100 MG/DL
HCT VFR BLD CALC: 31.7 % (ref 37–47)
HEMOGLOBIN: 10 GM/DL (ref 12–16)
IMMATURE GRANS (ABS): 0.14 THOU/MM3 (ref 0–0.07)
IMMATURE GRANULOCYTES: 0.8 %
INR BLD: 1.28 (ref 0.85–1.13)
KETONES, URINE: NEGATIVE
LEUKOCYTE ESTERASE, URINE: ABNORMAL
LIPASE: 12.8 U/L (ref 5.6–51.3)
LYMPHOCYTES # BLD: 2.8 %
LYMPHOCYTES ABSOLUTE: 0.5 THOU/MM3 (ref 1–4.8)
MAGNESIUM: 1.3 MG/DL (ref 1.6–2.4)
MCH RBC QN AUTO: 28.4 PG (ref 26–33)
MCHC RBC AUTO-ENTMCNC: 31.5 GM/DL (ref 32.2–35.5)
MCV RBC AUTO: 90.1 FL (ref 81–99)
MISCELLANEOUS 2: ABNORMAL
MONOCYTES # BLD: 8.2 %
MONOCYTES ABSOLUTE: 1.4 THOU/MM3 (ref 0.4–1.3)
NITRITE, URINE: POSITIVE
NUCLEATED RED BLOOD CELLS: 0 /100 WBC
OSMOLALITY CALCULATION: 282.5 MOSMOL/KG (ref 275–300)
PH UA: 5.5 (ref 5–9)
PLATELET # BLD: 177 THOU/MM3 (ref 130–400)
PMV BLD AUTO: 10.7 FL (ref 9.4–12.4)
POTASSIUM SERPL-SCNC: 3.4 MEQ/L (ref 3.5–5.2)
PRO-BNP: 2503 PG/ML (ref 0–900)
PROTEIN UA: 30
RBC # BLD: 3.52 MILL/MM3 (ref 4.2–5.4)
RBC URINE: ABNORMAL /HPF
RENAL EPITHELIAL, UA: ABNORMAL
SEG NEUTROPHILS: 88.1 %
SEGMENTED NEUTROPHILS ABSOLUTE COUNT: 15 THOU/MM3 (ref 1.8–7.7)
SODIUM BLD-SCNC: 137 MEQ/L (ref 135–145)
SPECIFIC GRAVITY, URINE: 1.02 (ref 1–1.03)
TOTAL PROTEIN: 6.9 G/DL (ref 6.1–8)
TROPONIN T: 0.02 NG/ML
UROBILINOGEN, URINE: 1 EU/DL (ref 0–1)
WBC # BLD: 17 THOU/MM3 (ref 4.8–10.8)
WBC UA: ABNORMAL /HPF
YEAST: ABNORMAL

## 2020-08-02 PROCEDURE — 94761 N-INVAS EAR/PLS OXIMETRY MLT: CPT

## 2020-08-02 PROCEDURE — 36415 COLL VENOUS BLD VENIPUNCTURE: CPT

## 2020-08-02 PROCEDURE — APPSS180 APP SPLIT SHARED TIME > 60 MINUTES: Performed by: PHYSICIAN ASSISTANT

## 2020-08-02 PROCEDURE — 99285 EMERGENCY DEPT VISIT HI MDM: CPT

## 2020-08-02 PROCEDURE — 6370000000 HC RX 637 (ALT 250 FOR IP): Performed by: EMERGENCY MEDICINE

## 2020-08-02 PROCEDURE — 85025 COMPLETE CBC W/AUTO DIFF WBC: CPT

## 2020-08-02 PROCEDURE — 1200000003 HC TELEMETRY R&B

## 2020-08-02 PROCEDURE — 84484 ASSAY OF TROPONIN QUANT: CPT

## 2020-08-02 PROCEDURE — 87077 CULTURE AEROBIC IDENTIFY: CPT

## 2020-08-02 PROCEDURE — 87186 SC STD MICRODIL/AGAR DIL: CPT

## 2020-08-02 PROCEDURE — U0002 COVID-19 LAB TEST NON-CDC: HCPCS

## 2020-08-02 PROCEDURE — 85610 PROTHROMBIN TIME: CPT

## 2020-08-02 PROCEDURE — 71046 X-RAY EXAM CHEST 2 VIEWS: CPT

## 2020-08-02 PROCEDURE — 87086 URINE CULTURE/COLONY COUNT: CPT

## 2020-08-02 PROCEDURE — 85730 THROMBOPLASTIN TIME PARTIAL: CPT

## 2020-08-02 PROCEDURE — 83735 ASSAY OF MAGNESIUM: CPT

## 2020-08-02 PROCEDURE — 82248 BILIRUBIN DIRECT: CPT

## 2020-08-02 PROCEDURE — 81001 URINALYSIS AUTO W/SCOPE: CPT

## 2020-08-02 PROCEDURE — U0003 INFECTIOUS AGENT DETECTION BY NUCLEIC ACID (DNA OR RNA); SEVERE ACUTE RESPIRATORY SYNDROME CORONAVIRUS 2 (SARS-COV-2) (CORONAVIRUS DISEASE [COVID-19]), AMPLIFIED PROBE TECHNIQUE, MAKING USE OF HIGH THROUGHPUT TECHNOLOGIES AS DESCRIBED BY CMS-2020-01-R: HCPCS

## 2020-08-02 PROCEDURE — 2580000003 HC RX 258: Performed by: EMERGENCY MEDICINE

## 2020-08-02 PROCEDURE — 83880 ASSAY OF NATRIURETIC PEPTIDE: CPT

## 2020-08-02 PROCEDURE — 93005 ELECTROCARDIOGRAM TRACING: CPT | Performed by: EMERGENCY MEDICINE

## 2020-08-02 PROCEDURE — 87804 INFLUENZA ASSAY W/OPTIC: CPT

## 2020-08-02 PROCEDURE — 80053 COMPREHEN METABOLIC PANEL: CPT

## 2020-08-02 PROCEDURE — 83690 ASSAY OF LIPASE: CPT

## 2020-08-02 PROCEDURE — 94640 AIRWAY INHALATION TREATMENT: CPT

## 2020-08-02 RX ORDER — SODIUM CHLORIDE 9 MG/ML
INJECTION, SOLUTION INTRAVENOUS CONTINUOUS
Status: DISCONTINUED | OUTPATIENT
Start: 2020-08-02 | End: 2020-08-05

## 2020-08-02 RX ORDER — IPRATROPIUM BROMIDE AND ALBUTEROL SULFATE 2.5; .5 MG/3ML; MG/3ML
1 SOLUTION RESPIRATORY (INHALATION) ONCE
Status: COMPLETED | OUTPATIENT
Start: 2020-08-02 | End: 2020-08-02

## 2020-08-02 RX ORDER — CLONIDINE HYDROCHLORIDE 0.2 MG/1
0.2 TABLET ORAL ONCE
Status: COMPLETED | OUTPATIENT
Start: 2020-08-02 | End: 2020-08-02

## 2020-08-02 RX ADMIN — IPRATROPIUM BROMIDE AND ALBUTEROL SULFATE 1 AMPULE: .5; 3 SOLUTION RESPIRATORY (INHALATION) at 19:00

## 2020-08-02 RX ADMIN — SODIUM CHLORIDE: 9 INJECTION, SOLUTION INTRAVENOUS at 23:17

## 2020-08-02 RX ADMIN — CLONIDINE HYDROCHLORIDE 0.2 MG: 0.2 TABLET ORAL at 21:01

## 2020-08-02 ASSESSMENT — ENCOUNTER SYMPTOMS
CONSTIPATION: 0
EYE REDNESS: 0
TROUBLE SWALLOWING: 0
NAUSEA: 0
WHEEZING: 0
COUGH: 1
ABDOMINAL DISTENTION: 0
VOICE CHANGE: 0
ABDOMINAL PAIN: 0
RHINORRHEA: 0
PHOTOPHOBIA: 0
EYE ITCHING: 0
VOMITING: 0
DIARRHEA: 0
BLOOD IN STOOL: 0
SINUS PRESSURE: 0
EYE PAIN: 0
SHORTNESS OF BREATH: 1
CHEST TIGHTNESS: 0
CHOKING: 0
EYE DISCHARGE: 0
BACK PAIN: 0
SORE THROAT: 0

## 2020-08-02 ASSESSMENT — PAIN DESCRIPTION - LOCATION: LOCATION: BACK;HEAD

## 2020-08-02 ASSESSMENT — PAIN DESCRIPTION - PAIN TYPE: TYPE: ACUTE PAIN

## 2020-08-02 ASSESSMENT — PAIN DESCRIPTION - DESCRIPTORS: DESCRIPTORS: THROBBING

## 2020-08-02 ASSESSMENT — PAIN SCALES - GENERAL: PAINLEVEL_OUTOF10: 6

## 2020-08-02 ASSESSMENT — PAIN DESCRIPTION - FREQUENCY: FREQUENCY: CONTINUOUS

## 2020-08-02 NOTE — ED NOTES
Pt reports feeling better after receiving breathing tx. Pt VSS. Pt updated on POC. Call light in reach, will continue to monitor.      Shaheen Armenta RN  08/02/20 1958

## 2020-08-02 NOTE — ED PROVIDER NOTES
[facility]  eMERGENCY dEPARTMENT eNCOUnter          [address] [facility]       Chief Complaint   Patient presents with    Fatigue    Emesis    Fever       Nurses Notes reviewed and I agree except as noted in the HPI. HISTORY OF PRESENT ILLNESS    Dasha Colby is a 75 y.o. female who presents for shortness of breath. Patient states that she is observing social distancing. She has not been anywhere. She did go to a family house, because her niece  of a heart attack. She was wearing a cloth mask at that time. Patient does have COPD she also has some problems with CHF. She denies any excessive weight gain. She is not gaining any more weight in her lower extremities. She has had a fever at home of 100.3 she states here she is afebrile. She did take some Tylenol earlier in the day. Currently the patient is resting comfortably on the cot in no apparent distress. She is not having any difficulty breathing. She has no conversational dyspnea. She has no chest pain. She is not on oxygen at home. She is 100% on room air. REVIEW OF SYSTEMS     Review of Systems   Constitutional: Negative for activity change, appetite change, diaphoresis, fatigue and unexpected weight change. HENT: Negative for congestion, ear discharge, ear pain, hearing loss, rhinorrhea, sinus pressure, sore throat, trouble swallowing and voice change. Eyes: Negative for photophobia, pain, discharge, redness and itching. Respiratory: Positive for cough and shortness of breath. Negative for choking, chest tightness and wheezing. Cardiovascular: Negative for chest pain, palpitations and leg swelling. Gastrointestinal: Negative for abdominal distention, abdominal pain, blood in stool, constipation, diarrhea, nausea and vomiting. Endocrine: Negative for polydipsia, polyphagia and polyuria.    Genitourinary: Negative for decreased urine volume, difficulty urinating, dysuria, enuresis, frequency, hematuria and urgency. Musculoskeletal: Negative for arthralgias, back pain, gait problem, myalgias, neck pain and neck stiffness. Skin: Negative for pallor and rash. Allergic/Immunologic: Negative for immunocompromised state. Neurological: Negative for dizziness, tremors, seizures, syncope, facial asymmetry, weakness, light-headedness, numbness and headaches. Hematological: Negative for adenopathy. Does not bruise/bleed easily. Psychiatric/Behavioral: Negative for agitation, hallucinations and suicidal ideas. The patient is not nervous/anxious. PAST MEDICAL HISTORY    has a past medical history of Anemia associated with chronic renal failure, Anxiety, Arthritis, Backache, Blood circulation, collateral, Blood transfusion, CAD (coronary artery disease), Cellulitis in diabetic foot ([address] Chest pain, CHF (congestive heart failure) ([address] Chronic anemia, Chronic kidney disease, Chronic kidney disease, stage III (moderate) (HCC), Chronic renal insufficiency, COPD (chronic obstructive pulmonary disease) ([address] Coronary disease, Depression, Diabetes mellitus, type 2 ([address] Disease of blood and blood forming organ, GERD (gastroesophageal reflux disease), Hemoglobin disease ([address] History of granulomatous disease ([address] HTN (hypertension), Hyperlipemia, Iron deficiency anemia due to dietary causes, Kidney stones, Kidney trouble, MRSA infection, Neuromuscular disorder ([address] Neuropathy, Obesity, CONTRERAS on CPAP, Pneumonia, PONV (postoperative nausea and vomiting), and Seizures ([address] SURGICAL HISTORY      has a past surgical history that includes eye surgery (March 30th, 2012); Hysterectomy (1980 and 1985); Carpal tunnel release (1988); Foot surgery (1990); Colonoscopy (2009); Endoscopy, colon, diagnostic (2007); Appendectomy (1980s); back surgery (1990's); Cosmetic surgery (3/30/2012); Ankle surgery (Right, 02-10-14); liver biopsy (6/2015);  Lung biopsy (2009); joint replacement (2015); fracture surgery (2015); Cardiac surgery (2008); pr office/outpt visit,procedure only (Left, 8/23/2018); Colonoscopy (Left, 6/10/2019); Upper gastrointestinal endoscopy (Left, 6/14/2019); and Diagnostic Cardiac Cath Lab Procedure. CURRENT MEDICATIONS       Previous Medications    ALBUTEROL SULFATE HFA (PROAIR HFA) 108 (90 BASE) MCG/ACT INHALER    Inhale 2 puffs into the lungs every 6 hours as needed for Wheezing or Shortness of Breath    AMLODIPINE (NORVASC) 10 MG TABLET    Take 1 tablet by mouth daily    ASPIRIN 81 MG EC TABLET    Take 81 mg by mouth daily. ATORVASTATIN (LIPITOR) 40 MG TABLET    Take 1 tablet by mouth daily    CARVEDILOL (COREG) 25 MG TABLET    Take 1.5 tablets by mouth 2 times daily . hold if SBP less than 100 mm hg or HR less than 60    CLONIDINE (CATAPRES) 0.3 MG TABLET    TAKE 1 TABLET EVERY 8 HOURS (THREE TIMES A DAY)    FLUTICASONE (FLONASE) 50 MCG/ACT NASAL SPRAY    2 sprays by Nasal route daily    INSULIN ASPART (NOVOLOG FLEXPEN) 100 UNIT/ML INJECTION PEN    Sliding scale insulin coverage  Glucose:Dose: If Less [id number] =No Insulin/ 140-199= 2 Units/ 200-249=4 Units/ 250-299= 6 Units/  300-349=8 Units/  350-400=10 Units/ Above 400 = 12 Units    INSULIN GLARGINE (LANTUS SOLOSTAR) 100 UNIT/ML INJECTION PEN    Inject 25 Units into the skin nightly    INSULIN PEN NEEDLE (B-D ULTRAFINE III SHORT PEN) 31G X 8 MM MISC    Use three times daily Diagnosis Code E11.9    LACTULOSE (CHRONULAC) 10 GM/15ML SOLUTION    TAKE 30ML BY MOUTH 2 TIMES DAILY AS NEEDED CONSTIPATION. USE IF NO BM WITH OTHER SOFTNERS    LIDOCAINE-PRILOCAINE (EMLA) 2.5-2.5 % CREAM    APPLY SMALL AMOUNT TO ACCESS SITE (AVF) 1 TO 2 HOURS BEFORE DIALYSIS.  COVER WITH OCCLUSIVE DRESSING (SARAN WRAP)    LINACLOTIDE (LINZESS) 145 MCG CAPSULE    Take 145 mcg by mouth every morning (before breakfast)    NITROGLYCERIN (NITROSTAT) 0.4 MG SL TABLET    Place 1 tablet under the tongue every 5 minutes as needed for Chest pain    ONDANSETRON (ZOFRAN) 4 MG TABLET Take 4 mg by mouth every 8 hours as needed for Nausea or Vomiting    PANTOPRAZOLE (PROTONIX) 40 MG TABLET    Take 1 tablet by mouth every morning (before breakfast)    TIOTROPIUM (SPIRIVA HANDIHALER) 18 MCG INHALATION CAPSULE    Inhale 1 capsule into the lungs daily 1 capsule via inhalation daily. VITAMIN D (ERGOCALCIFEROL) 1.25 MG (00803 UT) CAPS CAPSULE    TAKE ONE CAPSULE BY MOUTH ONE TIME PER WEEK       ALLERGIES     is allergic to pioglitazone; actos [pioglitazone hydrochloride]; cymbalta [duloxetine hcl]; and gabapentin. FAMILY HISTORY     She indicated that her mother is . She indicated that her father is . She indicated that two of her five sisters are alive. She indicated that all of her four brothers are alive. She indicated that her maternal grandmother is . She indicated that her maternal grandfather is . She indicated that her paternal grandmother is . She indicated that her paternal grandfather is . family history includes Alcohol Abuse in her father; Arthritis in her mother; Cancer in her sister; Diabetes in her brother, brother, father, sister, sister, sister, sister, and sister; Early Death in her sister; Heart Disease in her father, mother, sister, and sister; High Blood Pressure in her mother; Kidney Disease in her mother; Mental Illness in her mother; Obesity in her father; Stroke in her mother. SOCIAL HISTORY      reports that she quit smoking about 11 years ago. Her smoking use included cigarettes. She started smoking about 38 years ago. She has a 45.00 pack-year smoking history. She has never used smokeless tobacco. She reports that she does not drink alcohol or use drugs. PHYSICAL EXAM     INITIAL VITALS:  height is 5' 5\" (1.651 m) and weight is 180 lb (81.6 kg). Her oral temperature is 99.4 °F (37.4 °C). Her blood pressure is 180/69 (abnormal) and her pulse is 85. Her respiration is 19 and oxygen saturation is 97%.     Physical leg: No edema. Left lower leg: No edema. Lymphadenopathy:      Cervical: No cervical adenopathy. Skin:     General: Skin is warm and dry. Findings: No bruising, ecchymosis, lesion or rash. Neurological:      Mental Status: She is alert and oriented to person, place, and time. Cranial Nerves: No cranial nerve deficit. Coordination: Coordination normal.      Deep Tendon Reflexes: Reflexes are normal and symmetric. Psychiatric:         Speech: Speech normal.         Behavior: Behavior normal.         Thought Content: Thought content normal.         Judgment: Judgment normal.           DIFFERENTIAL DIAGNOSIS:   Pneumonia bronchitis infection    DIAGNOSTIC RESULTS     EKG: All EKG's are interpreted by the Emergency Department Physician who either signs or Co-signs this chart in the absence of a cardiologist.  Normal sinus rhythm, normal axis, ventricular rate of 73 DC interval 148 QRS duration of 80 QT interval 402 QTC of 442. No ST segment elevations appreciated. RADIOLOGY: non-plain film images(s) such as CT, Ultrasound and MRI are read by the radiologist.  XR CHEST (2 VW)   Final Result   No acute disease. **This report has been created using voice recognition software. It may contain minor errors which are inherent in voice recognition technology. **      Final report electronically signed by Dr. Tommy Rico on 8/2/2020 8:13 PM            LABS:   Labs Reviewed   PROTIME-INR - Abnormal; Notable for the following components:       Result Value    INR 1.28 (*)     All other components within normal limits   CBC WITH AUTO DIFFERENTIAL - Abnormal; Notable for the following components:    WBC 17.0 (*)     RBC 3.52 (*)     Hemoglobin 10.0 (*)     Hematocrit 31.7 (*)     MCHC 31.5 (*)     RDW-SD 45.1 (*)     Segs Absolute 15.0 (*)     Lymphocytes Absolute 0.5 (*)     Monocytes Absolute 1.4 (*)     Immature Grans (Abs) 0.14 (*)     All other components within normal limits urinary tract infection. She is started on fosfomycin. Chest x-ray here look good however she has been on renal dialysis in the past.  Her creatinine crept up to 1.5 electrolytes were within normal limits also her BNP crept up to 2000. Chest x-ray showed no fluid overload at this time. At this point she also had slightly elevated troponin. I feel that the patient needs to be admitted. I called the hospitalist and discussed the case with them. They graciously accepted the admission. I then texted and spoke with Dr. Brooklyn Spivey. He will see the patient in consult. Patient remained stable throughout course. Patient is admitted in stable condition pending further evaluation and treatment. CRITICAL CARE:   None    CONSULTS:  Dr Ramos Mast  Dr. Neno Stephens      PROCEDURES:  None    FINAL IMPRESSION      1. Urinary tract infection without hematuria, site unspecified    2. Dyspnea and respiratory abnormalities    3. Elevated brain natriuretic peptide (BNP) level    4.  Acute renal failure with acute renal cortical necrosis superimposed on stage 3 chronic kidney disease (HCC)    5. Elevated troponin          DISPOSITION/PLAN   admission    PATIENT REFERRED TO:  Lucina Garcia, DO  [address]            DISCHARGE MEDICATIONS:  New Prescriptions    No medications on file       (Please note that portions of this note were completed with a voice recognition program.  Efforts were made to edit the dictations but occasionally words are mis-transcribed.)    Horacio Oseguera, [address] [place] [address],   08/02/20 0237

## 2020-08-02 NOTE — ED TRIAGE NOTES
Presents to ER with complaints of fever, vomiting, and fatigue. Pt states last night she started to vomit and this morning she vomited. States she had a kidney transplant in April, and wants to make sure everything is normal. States she was here last month and was told she was dehydrated. Respirations unlabored and SpO2 noted to be 100% on room air. Will continue to monitor.

## 2020-08-02 NOTE — ED NOTES
Pt receiving breathing treatment at this time. Pt VSS.  Call light in reach, will continue to monitor     Deisy Bonilla RN  08/02/20 0712

## 2020-08-03 ENCOUNTER — TELEPHONE (OUTPATIENT)
Dept: FAMILY MEDICINE CLINIC | Age: 65
End: 2020-08-03

## 2020-08-03 ENCOUNTER — APPOINTMENT (OUTPATIENT)
Dept: GENERAL RADIOLOGY | Age: 65
DRG: 872 | End: 2020-08-03
Payer: MEDICARE

## 2020-08-03 LAB
ANION GAP SERPL CALCULATED.3IONS-SCNC: 14 MEQ/L (ref 8–16)
BK VIRUS QUALITATIVE, PCR: NORMAL
BUN BLDV-MCNC: 25 MG/DL (ref 7–22)
CALCIUM SERPL-MCNC: 9.5 MG/DL (ref 8.5–10.5)
CHLORIDE BLD-SCNC: 101 MEQ/L (ref 98–111)
CHOLESTEROL, TOTAL: 106 MG/DL (ref 100–199)
CO2: 22 MEQ/L (ref 23–33)
CREAT SERPL-MCNC: 1.1 MG/DL (ref 0.4–1.2)
EKG ATRIAL RATE: 70 BPM
EKG P AXIS: 1 DEGREES
EKG P-R INTERVAL: 158 MS
EKG Q-T INTERVAL: 410 MS
EKG QRS DURATION: 76 MS
EKG QTC CALCULATION (BAZETT): 442 MS
EKG R AXIS: 28 DEGREES
EKG T AXIS: 21 DEGREES
EKG VENTRICULAR RATE: 70 BPM
ERYTHROCYTE [DISTWIDTH] IN BLOOD BY AUTOMATED COUNT: 13.9 % (ref 11.5–14.5)
ERYTHROCYTE [DISTWIDTH] IN BLOOD BY AUTOMATED COUNT: 46.6 FL (ref 35–45)
GLUCOSE BLD-MCNC: 232 MG/DL (ref 70–108)
GLUCOSE BLD-MCNC: 238 MG/DL (ref 70–108)
GLUCOSE BLD-MCNC: 264 MG/DL (ref 70–108)
GLUCOSE BLD-MCNC: 294 MG/DL (ref 70–108)
GLUCOSE BLD-MCNC: 297 MG/DL (ref 70–108)
HCT VFR BLD CALC: 30.8 % (ref 37–47)
HDLC SERPL-MCNC: 47 MG/DL
HEMOGLOBIN: 9.9 GM/DL (ref 12–16)
IRON: 14 UG/DL (ref 50–170)
LDL CHOLESTEROL CALCULATED: 39 MG/DL
LV EF: 58 %
LVEF MODALITY: NORMAL
MAGNESIUM: 1.3 MG/DL (ref 1.6–2.4)
MCH RBC QN AUTO: 29.6 PG (ref 26–33)
MCHC RBC AUTO-ENTMCNC: 32.1 GM/DL (ref 32.2–35.5)
MCV RBC AUTO: 92.2 FL (ref 81–99)
OSMOLALITY CALCULATION: 285.6 MOSMOL/KG (ref 275–300)
PLATELET # BLD: 142 THOU/MM3 (ref 130–400)
PMV BLD AUTO: 11.3 FL (ref 9.4–12.4)
POTASSIUM SERPL-SCNC: 3.3 MEQ/L (ref 3.5–5.2)
PRO-BNP: 4430 PG/ML (ref 0–900)
RBC # BLD: 3.34 MILL/MM3 (ref 4.2–5.4)
SARS-COV-2, NAAT: NOT DETECTED
SODIUM BLD-SCNC: 137 MEQ/L (ref 135–145)
TOTAL IRON BINDING CAPACITY: 144 UG/DL (ref 171–450)
TRIGL SERPL-MCNC: 101 MG/DL (ref 0–199)
TROPONIN T: 0.01 NG/ML
TROPONIN T: 0.02 NG/ML
TROPONIN T: 0.03 NG/ML
WBC # BLD: 12.8 THOU/MM3 (ref 4.8–10.8)

## 2020-08-03 PROCEDURE — 80061 LIPID PANEL: CPT

## 2020-08-03 PROCEDURE — 6370000000 HC RX 637 (ALT 250 FOR IP): Performed by: INTERNAL MEDICINE

## 2020-08-03 PROCEDURE — 6370000000 HC RX 637 (ALT 250 FOR IP): Performed by: PHYSICIAN ASSISTANT

## 2020-08-03 PROCEDURE — 80180 DRUG SCRN QUAN MYCOPHENOLATE: CPT

## 2020-08-03 PROCEDURE — 2580000003 HC RX 258: Performed by: EMERGENCY MEDICINE

## 2020-08-03 PROCEDURE — 80048 BASIC METABOLIC PNL TOTAL CA: CPT

## 2020-08-03 PROCEDURE — 94760 N-INVAS EAR/PLS OXIMETRY 1: CPT

## 2020-08-03 PROCEDURE — 2580000003 HC RX 258: Performed by: INTERNAL MEDICINE

## 2020-08-03 PROCEDURE — 82948 REAGENT STRIP/BLOOD GLUCOSE: CPT

## 2020-08-03 PROCEDURE — 83735 ASSAY OF MAGNESIUM: CPT

## 2020-08-03 PROCEDURE — 87040 BLOOD CULTURE FOR BACTERIA: CPT

## 2020-08-03 PROCEDURE — 1200000003 HC TELEMETRY R&B

## 2020-08-03 PROCEDURE — 83880 ASSAY OF NATRIURETIC PEPTIDE: CPT

## 2020-08-03 PROCEDURE — 94640 AIRWAY INHALATION TREATMENT: CPT

## 2020-08-03 PROCEDURE — 84484 ASSAY OF TROPONIN QUANT: CPT

## 2020-08-03 PROCEDURE — 6360000002 HC RX W HCPCS: Performed by: FAMILY MEDICINE

## 2020-08-03 PROCEDURE — 83540 ASSAY OF IRON: CPT

## 2020-08-03 PROCEDURE — 83550 IRON BINDING TEST: CPT

## 2020-08-03 PROCEDURE — 2500000003 HC RX 250 WO HCPCS: Performed by: PHYSICIAN ASSISTANT

## 2020-08-03 PROCEDURE — 85027 COMPLETE CBC AUTOMATED: CPT

## 2020-08-03 PROCEDURE — 6360000002 HC RX W HCPCS: Performed by: PHYSICIAN ASSISTANT

## 2020-08-03 PROCEDURE — 93005 ELECTROCARDIOGRAM TRACING: CPT | Performed by: INTERNAL MEDICINE

## 2020-08-03 PROCEDURE — 99222 1ST HOSP IP/OBS MODERATE 55: CPT | Performed by: INTERNAL MEDICINE

## 2020-08-03 PROCEDURE — 99233 SBSQ HOSP IP/OBS HIGH 50: CPT | Performed by: INTERNAL MEDICINE

## 2020-08-03 PROCEDURE — 93306 TTE W/DOPPLER COMPLETE: CPT

## 2020-08-03 PROCEDURE — 6360000002 HC RX W HCPCS: Performed by: INTERNAL MEDICINE

## 2020-08-03 PROCEDURE — 36415 COLL VENOUS BLD VENIPUNCTURE: CPT

## 2020-08-03 RX ORDER — CARVEDILOL 25 MG/1
25 TABLET ORAL 2 TIMES DAILY
COMMUNITY
End: 2020-08-12 | Stop reason: SDUPTHER

## 2020-08-03 RX ORDER — ALBUTEROL SULFATE 90 UG/1
2 AEROSOL, METERED RESPIRATORY (INHALATION) EVERY 6 HOURS PRN
Status: DISCONTINUED | OUTPATIENT
Start: 2020-08-03 | End: 2020-08-06 | Stop reason: HOSPADM

## 2020-08-03 RX ORDER — SULFAMETHOXAZOLE AND TRIMETHOPRIM 800; 160 MG/1; MG/1
1 TABLET ORAL DAILY
COMMUNITY
End: 2021-06-04 | Stop reason: ALTCHOICE

## 2020-08-03 RX ORDER — MYCOPHENOLIC ACID 180 MG/1
360 TABLET, DELAYED RELEASE ORAL 2 TIMES DAILY
Status: DISCONTINUED | OUTPATIENT
Start: 2020-08-03 | End: 2020-08-03 | Stop reason: ALTCHOICE

## 2020-08-03 RX ORDER — DEXTROSE MONOHYDRATE 25 G/50ML
12.5 INJECTION, SOLUTION INTRAVENOUS PRN
Status: DISCONTINUED | OUTPATIENT
Start: 2020-08-03 | End: 2020-08-04 | Stop reason: SDUPTHER

## 2020-08-03 RX ORDER — CLONIDINE HYDROCHLORIDE 0.3 MG/1
0.3 TABLET ORAL 3 TIMES DAILY
COMMUNITY
End: 2021-05-12

## 2020-08-03 RX ORDER — MAGNESIUM OXIDE 400 MG/1
400 TABLET ORAL 2 TIMES DAILY
COMMUNITY

## 2020-08-03 RX ORDER — INSULIN GLARGINE 100 [IU]/ML
25 INJECTION, SOLUTION SUBCUTANEOUS NIGHTLY
Status: DISCONTINUED | OUTPATIENT
Start: 2020-08-03 | End: 2020-08-04

## 2020-08-03 RX ORDER — ACETAMINOPHEN 325 MG/1
650 TABLET ORAL EVERY 6 HOURS PRN
Status: DISCONTINUED | OUTPATIENT
Start: 2020-08-02 | End: 2020-08-06 | Stop reason: HOSPADM

## 2020-08-03 RX ORDER — LISINOPRIL 5 MG/1
5 TABLET ORAL DAILY
Status: DISCONTINUED | OUTPATIENT
Start: 2020-08-03 | End: 2020-08-03

## 2020-08-03 RX ORDER — HYDROCODONE BITARTRATE AND ACETAMINOPHEN 5; 325 MG/1; MG/1
1 TABLET ORAL EVERY 8 HOURS PRN
COMMUNITY
End: 2020-08-13

## 2020-08-03 RX ORDER — POLYETHYLENE GLYCOL 3350 17 G/17G
17 POWDER, FOR SOLUTION ORAL DAILY PRN
Status: DISCONTINUED | OUTPATIENT
Start: 2020-08-02 | End: 2020-08-06 | Stop reason: HOSPADM

## 2020-08-03 RX ORDER — SODIUM CHLORIDE 0.9 % (FLUSH) 0.9 %
10 SYRINGE (ML) INJECTION PRN
Status: DISCONTINUED | OUTPATIENT
Start: 2020-08-02 | End: 2020-08-06 | Stop reason: HOSPADM

## 2020-08-03 RX ORDER — HYDRALAZINE HYDROCHLORIDE 20 MG/ML
5 INJECTION INTRAMUSCULAR; INTRAVENOUS EVERY 4 HOURS PRN
Status: DISCONTINUED | OUTPATIENT
Start: 2020-08-03 | End: 2020-08-05

## 2020-08-03 RX ORDER — TACROLIMUS 1 MG/1
8 CAPSULE ORAL 2 TIMES DAILY
Status: DISCONTINUED | OUTPATIENT
Start: 2020-08-03 | End: 2020-08-06 | Stop reason: HOSPADM

## 2020-08-03 RX ORDER — MAGNESIUM SULFATE IN WATER 40 MG/ML
4 INJECTION, SOLUTION INTRAVENOUS ONCE
Status: COMPLETED | OUTPATIENT
Start: 2020-08-03 | End: 2020-08-03

## 2020-08-03 RX ORDER — POTASSIUM CHLORIDE 20 MEQ/1
40 TABLET, EXTENDED RELEASE ORAL 2 TIMES DAILY WITH MEALS
Status: DISCONTINUED | OUTPATIENT
Start: 2020-08-03 | End: 2020-08-03

## 2020-08-03 RX ORDER — MYCOPHENOLIC ACID 180 MG/1
720 TABLET, DELAYED RELEASE ORAL 2 TIMES DAILY
Status: DISCONTINUED | OUTPATIENT
Start: 2020-08-03 | End: 2020-08-06 | Stop reason: HOSPADM

## 2020-08-03 RX ORDER — SULFAMETHOXAZOLE AND TRIMETHOPRIM 800; 160 MG/1; MG/1
1 TABLET ORAL DAILY
Status: DISCONTINUED | OUTPATIENT
Start: 2020-08-03 | End: 2020-08-06 | Stop reason: HOSPADM

## 2020-08-03 RX ORDER — NICOTINE POLACRILEX 4 MG
15 LOZENGE BUCCAL PRN
Status: DISCONTINUED | OUTPATIENT
Start: 2020-08-03 | End: 2020-08-04 | Stop reason: SDUPTHER

## 2020-08-03 RX ORDER — CARVEDILOL 25 MG/1
25 TABLET ORAL 2 TIMES DAILY
Status: DISCONTINUED | OUTPATIENT
Start: 2020-08-03 | End: 2020-08-06 | Stop reason: HOSPADM

## 2020-08-03 RX ORDER — CLONIDINE HYDROCHLORIDE 0.2 MG/1
0.2 TABLET ORAL 3 TIMES DAILY
Status: DISCONTINUED | OUTPATIENT
Start: 2020-08-03 | End: 2020-08-06 | Stop reason: HOSPADM

## 2020-08-03 RX ORDER — SODIUM CHLORIDE 0.9 % (FLUSH) 0.9 %
10 SYRINGE (ML) INJECTION EVERY 12 HOURS SCHEDULED
Status: DISCONTINUED | OUTPATIENT
Start: 2020-08-03 | End: 2020-08-06 | Stop reason: HOSPADM

## 2020-08-03 RX ORDER — TACROLIMUS 1 MG/1
8 CAPSULE ORAL
COMMUNITY
End: 2020-08-12

## 2020-08-03 RX ORDER — DEXTROSE MONOHYDRATE 50 MG/ML
100 INJECTION, SOLUTION INTRAVENOUS PRN
Status: DISCONTINUED | OUTPATIENT
Start: 2020-08-03 | End: 2020-08-04 | Stop reason: SDUPTHER

## 2020-08-03 RX ORDER — ASPIRIN 81 MG/1
81 TABLET ORAL DAILY
Status: DISCONTINUED | OUTPATIENT
Start: 2020-08-03 | End: 2020-08-06 | Stop reason: HOSPADM

## 2020-08-03 RX ORDER — ONDANSETRON 2 MG/ML
4 INJECTION INTRAMUSCULAR; INTRAVENOUS EVERY 6 HOURS PRN
Status: DISCONTINUED | OUTPATIENT
Start: 2020-08-02 | End: 2020-08-06 | Stop reason: HOSPADM

## 2020-08-03 RX ORDER — NICOTINE POLACRILEX 4 MG
15 LOZENGE BUCCAL
Status: DISCONTINUED | OUTPATIENT
Start: 2020-08-03 | End: 2020-08-03 | Stop reason: SDUPTHER

## 2020-08-03 RX ORDER — FUROSEMIDE 10 MG/ML
40 INJECTION INTRAMUSCULAR; INTRAVENOUS 2 TIMES DAILY
Status: DISCONTINUED | OUTPATIENT
Start: 2020-08-03 | End: 2020-08-03

## 2020-08-03 RX ORDER — DOCUSATE SODIUM 100 MG/1
200 CAPSULE, LIQUID FILLED ORAL 2 TIMES DAILY
COMMUNITY
End: 2021-05-12

## 2020-08-03 RX ORDER — POTASSIUM CHLORIDE 20 MEQ/1
40 TABLET, EXTENDED RELEASE ORAL ONCE
Status: COMPLETED | OUTPATIENT
Start: 2020-08-03 | End: 2020-08-03

## 2020-08-03 RX ORDER — NITROGLYCERIN 0.4 MG/1
0.4 TABLET SUBLINGUAL EVERY 5 MIN PRN
Status: DISCONTINUED | OUTPATIENT
Start: 2020-08-03 | End: 2020-08-06 | Stop reason: HOSPADM

## 2020-08-03 RX ORDER — FLUTICASONE PROPIONATE 50 MCG
1 SPRAY, SUSPENSION (ML) NASAL DAILY PRN
COMMUNITY
End: 2021-03-01 | Stop reason: SDUPTHER

## 2020-08-03 RX ORDER — PROMETHAZINE HYDROCHLORIDE 25 MG/1
12.5 TABLET ORAL EVERY 6 HOURS PRN
Status: DISCONTINUED | OUTPATIENT
Start: 2020-08-02 | End: 2020-08-05

## 2020-08-03 RX ORDER — AMLODIPINE BESYLATE 10 MG/1
10 TABLET ORAL DAILY
Status: DISCONTINUED | OUTPATIENT
Start: 2020-08-03 | End: 2020-08-06 | Stop reason: HOSPADM

## 2020-08-03 RX ORDER — ACETAMINOPHEN 650 MG/1
650 SUPPOSITORY RECTAL EVERY 6 HOURS PRN
Status: DISCONTINUED | OUTPATIENT
Start: 2020-08-02 | End: 2020-08-06 | Stop reason: HOSPADM

## 2020-08-03 RX ORDER — ATORVASTATIN CALCIUM 40 MG/1
40 TABLET, FILM COATED ORAL DAILY
Status: DISCONTINUED | OUTPATIENT
Start: 2020-08-03 | End: 2020-08-06 | Stop reason: HOSPADM

## 2020-08-03 RX ORDER — MYCOPHENOLIC ACID 360 MG/1
720 TABLET, DELAYED RELEASE ORAL 2 TIMES DAILY
COMMUNITY
End: 2020-08-12 | Stop reason: SDUPTHER

## 2020-08-03 RX ADMIN — MYCOPHENOLIC ACID 720 MG: 180 TABLET, DELAYED RELEASE ORAL at 20:09

## 2020-08-03 RX ADMIN — INSULIN LISPRO 3 UNITS: 100 INJECTION, SOLUTION INTRAVENOUS; SUBCUTANEOUS at 17:07

## 2020-08-03 RX ADMIN — POTASSIUM CHLORIDE 40 MEQ: 1500 TABLET, EXTENDED RELEASE ORAL at 08:14

## 2020-08-03 RX ADMIN — SULFAMETHOXAZOLE AND TRIMETHOPRIM 1 TABLET: 800; 160 TABLET ORAL at 08:15

## 2020-08-03 RX ADMIN — ENOXAPARIN SODIUM 40 MG: 40 INJECTION SUBCUTANEOUS at 08:13

## 2020-08-03 RX ADMIN — AMLODIPINE BESYLATE 10 MG: 10 TABLET ORAL at 08:15

## 2020-08-03 RX ADMIN — SODIUM CHLORIDE: 9 INJECTION, SOLUTION INTRAVENOUS at 18:57

## 2020-08-03 RX ADMIN — MYCOPHENOLIC ACID 720 MG: 180 TABLET, DELAYED RELEASE ORAL at 08:27

## 2020-08-03 RX ADMIN — MAGNESIUM SULFATE IN WATER 4 G: 40 INJECTION, SOLUTION INTRAVENOUS at 08:13

## 2020-08-03 RX ADMIN — ONDANSETRON 4 MG: 2 INJECTION INTRAMUSCULAR; INTRAVENOUS at 03:28

## 2020-08-03 RX ADMIN — CEFTRIAXONE SODIUM 1 G: 1 INJECTION, POWDER, FOR SOLUTION INTRAMUSCULAR; INTRAVENOUS at 06:02

## 2020-08-03 RX ADMIN — ONDANSETRON 4 MG: 2 INJECTION INTRAMUSCULAR; INTRAVENOUS at 10:13

## 2020-08-03 RX ADMIN — TACROLIMUS 8 MG: 1 CAPSULE ORAL at 20:10

## 2020-08-03 RX ADMIN — CARVEDILOL 37.5 MG: 25 TABLET, FILM COATED ORAL at 08:15

## 2020-08-03 RX ADMIN — TIOTROPIUM BROMIDE INHALATION SPRAY 2 PUFF: 3.12 SPRAY, METERED RESPIRATORY (INHALATION) at 10:24

## 2020-08-03 RX ADMIN — SODIUM CHLORIDE: 9 INJECTION, SOLUTION INTRAVENOUS at 05:22

## 2020-08-03 RX ADMIN — INSULIN GLARGINE 25 UNITS: 100 INJECTION, SOLUTION SUBCUTANEOUS at 22:14

## 2020-08-03 RX ADMIN — LISINOPRIL 5 MG: 5 TABLET ORAL at 08:15

## 2020-08-03 RX ADMIN — ACETAMINOPHEN 650 MG: 325 TABLET ORAL at 03:25

## 2020-08-03 RX ADMIN — TACROLIMUS 8 MG: 1 CAPSULE ORAL at 08:26

## 2020-08-03 RX ADMIN — ONDANSETRON 4 MG: 2 INJECTION INTRAMUSCULAR; INTRAVENOUS at 17:10

## 2020-08-03 RX ADMIN — HYDRALAZINE HYDROCHLORIDE 5 MG: 20 INJECTION, SOLUTION INTRAMUSCULAR; INTRAVENOUS at 03:25

## 2020-08-03 RX ADMIN — ACETAMINOPHEN 650 MG: 325 TABLET ORAL at 20:14

## 2020-08-03 RX ADMIN — CLONIDINE HYDROCHLORIDE 0.3 MG: 0.2 TABLET ORAL at 08:15

## 2020-08-03 RX ADMIN — INSULIN LISPRO 3 UNITS: 100 INJECTION, SOLUTION INTRAVENOUS; SUBCUTANEOUS at 08:28

## 2020-08-03 RX ADMIN — ASPIRIN 81 MG: 81 TABLET ORAL at 08:14

## 2020-08-03 RX ADMIN — CARVEDILOL 25 MG: 25 TABLET, FILM COATED ORAL at 20:09

## 2020-08-03 RX ADMIN — INSULIN LISPRO 2 UNITS: 100 INJECTION, SOLUTION INTRAVENOUS; SUBCUTANEOUS at 12:34

## 2020-08-03 RX ADMIN — CLONIDINE HYDROCHLORIDE 0.2 MG: 0.2 TABLET ORAL at 20:09

## 2020-08-03 RX ADMIN — ATORVASTATIN CALCIUM 40 MG: 40 TABLET, FILM COATED ORAL at 20:10

## 2020-08-03 RX ADMIN — FAMOTIDINE 20 MG: 10 INJECTION INTRAVENOUS at 08:14

## 2020-08-03 ASSESSMENT — PAIN DESCRIPTION - PAIN TYPE: TYPE: ACUTE PAIN

## 2020-08-03 ASSESSMENT — PAIN SCALES - GENERAL
PAINLEVEL_OUTOF10: 7
PAINLEVEL_OUTOF10: 3
PAINLEVEL_OUTOF10: 0
PAINLEVEL_OUTOF10: 0
PAINLEVEL_OUTOF10: 7

## 2020-08-03 ASSESSMENT — PAIN DESCRIPTION - LOCATION: LOCATION: BACK

## 2020-08-03 NOTE — PROGRESS NOTES
Comprehensive Nutrition Assessment    Type and Reason for Visit:  Initial, Consult(heart failure diet guidelines)    Nutrition Recommendations/Plan: Recommend adding 60gram carbohydrate/ meal to diet order when intake improves, with history of DM. ONS initiated: Glucerna once daily. Nutrition Assessment:    Pt. nutritionally compromised AEB poor intake for the past 2 days due to nausea/ vomiting. At risk for further nutrition compromise r/t admit with UTI, CHF and underlying medical condition (hx DM, COPD, CHF, CKD- kidney transplant April 2020, CAD). Nutrition recommendations/interventions as per above. Malnutrition Assessment:  Malnutrition Status: At risk for malnutrition (Comment)    Context:  Acute Illness     Findings of the 6 clinical characteristics of malnutrition:  Energy Intake:  (less than 50% for past 2 days)  Weight Loss:  No significant weight loss     Body Fat Loss:  No significant body fat loss     Muscle Mass Loss:  No significant muscle mass loss    Fluid Accumulation:  No significant fluid accumulation     Strength:  Not Performed    Estimated Daily Nutrient Needs:  Energy (kcal):  0006-4962 kcals (20-23 kcal/kgm- 81kgm 8/3); Protein (g):  74-85 grams (1.3-1.5 grams protein/kgm- ideal);           Nutrition Related Findings:  Patient sleepy, reports taking only liquids today due to nausea/ vomiting which began 2 days ago, reports prior to that good appetite, recent kidney transplant April 2020; antibiotic, lantus, humalog, zofran; BUN 25, Creatinine 1.1, glucose 238      Wounds:  None       Current Nutrition Therapies:    DIET LOW SODIUM 2 GM;  Dietary Nutrition Supplements: Diabetic Oral Supplement    Anthropometric Measures:  · Height: 5' 5\" (165.1 cm)  · Current Body Weight: 179 lb 8 oz (81.4 kg)   · Admission Body Weight: 179 lb 8 oz (81.4 kg)(8/3; no edema)    · Usual Body Weight: 180 lb (81.6 kg)(per patient; per EHR: 9-18-19 177# 0.5oz, 2-12-20 181# 3.2oz)     · Ideal

## 2020-08-03 NOTE — FLOWSHEET NOTE
08/03/20 0432   Provider Notification   Reason for Communication Evaluate   Provider Name Dr. Elba Salinas   Provider Notification Physician   Method of Communication Secure Message   Response Waiting for response   Notification Time [id number]     Dr. Elba Salinas paged regarding consult for antibiotic recommendation after UTI, temp 102.5, elevated BP. Waiting for response. 7119 - new order for rocephin 1 gram IV daily.

## 2020-08-03 NOTE — ED NOTES
Pt resting in bed. Resp regular. Updated on POC. Verbalized understanding. Call light in reach. Pt given blanket.       Derek Dhaliwal RN  08/02/20 9693

## 2020-08-03 NOTE — CONSULTS
tunnel surgery    FRACTURE SURGERY  2015    HYSTERECTOMY  1980 and 1985    first partial in 1980's, then total in [address] [facility]  2015   [address] LIVER BIOPSY  6/2015    LUNG BIOPSY  2009    VT OFFICE/OUTPT VISIT,PROCEDURE ONLY Left 8/23/2018    LEFT UPPER EXTREMENTY AV FISTULA CREATION performed by Belem Gaines MD at [address] Left 6/14/2019    EGD BIOPSY performed by Florentin Rogers MD at [facility]       Family History   Problem Relation Age of Onset    Diabetes Sister     Diabetes Brother     Diabetes Sister     Diabetes Sister     Cancer Sister     Early Death Sister     Heart Disease Sister     Diabetes Sister     Heart Disease Sister     Diabetes Sister     Diabetes Brother     Arthritis Mother     Heart Disease Mother     High Blood Pressure Mother     Kidney Disease Mother     Mental Illness Mother     Stroke Mother     Heart Disease Father     Diabetes Father     Obesity Father     Alcohol Abuse Father         reports that she quit smoking about 11 years ago. Her smoking use included cigarettes. She started smoking about 38 years ago. She has a 45.00 pack-year smoking history. She has never used smokeless tobacco. She reports that she does not drink alcohol or use drugs. Allergies:  Pioglitazone; Actos [pioglitazone hydrochloride];  Cymbalta [duloxetine hcl]; and Gabapentin    Current Medications:    sodium chloride flush 0.9 % injection 10 mL, 2 times per day  sodium chloride flush 0.9 % injection 10 mL, PRN  acetaminophen (TYLENOL) tablet 650 mg, Q6H PRN    Or  acetaminophen (TYLENOL) suppository 650 mg, Q6H PRN  polyethylene glycol (GLYCOLAX) packet 17 g, Daily PRN  promethazine (PHENERGAN) tablet 12.5 mg, Q6H PRN    Or  ondansetron (ZOFRAN) injection 4 mg, Q6H PRN  enoxaparin (LOVENOX) injection 40 mg, Daily  famotidine (PEPCID) injection 20 mg, Daily  lisinopril (PRINIVIL;ZESTRIL) tablet 5 mg, Daily  hydrALAZINE (APRESOLINE) injection 5 mg, Q4H PRN  amLODIPine (NORVASC) tablet 10 mg, Daily  carvedilol (COREG) tablet 37.5 mg, BID  cloNIDine (CATAPRES) tablet 0.3 mg, TID  magnesium replacement protocol, RX Placeholder  albuterol sulfate  (90 Base) MCG/ACT inhaler 2 puff, Q6H PRN  aspirin EC tablet 81 mg, Daily  atorvastatin (LIPITOR) tablet 40 mg, Daily  insulin glargine (LANTUS) injection vial 25 Units, Nightly  nitroGLYCERIN (NITROSTAT) SL tablet 0.4 mg, Q5 Min PRN  glucose (GLUTOSE) 40 % oral gel 15 g, PRN  dextrose 50 % IV solution, PRN  glucagon (rDNA) injection 1 mg, PRN  dextrose 5 % solution, PRN  insulin lispro (HUMALOG) injection vial 0-6 Units, TID WC  insulin lispro (HUMALOG) injection vial 0-3 Units, Nightly  tiotropium (SPIRIVA RESPIMAT) 2.5 MCG/ACT inhaler 2 puff, Daily  magnesium hydroxide (MILK OF MAGNESIA) 400 MG/5ML suspension 30 mL, BID PRN  tacrolimus (PROGRAF) capsule 8 mg, BID  sulfamethoxazole-trimethoprim (BACTRIM DS;SEPTRA DS) 800-160 MG per tablet 1 tablet, Daily  cefTRIAXone (ROCEPHIN) 1 g IVPB in 50 mL D5W minibag, Q24H  potassium replacement protocol, RX Placeholder  mycophenolate (MYFORTIC) DR tablet 720 mg, BID  0.9 % sodium chloride infusion, Continuous        Review of Systems:   Pertinent positives stated above in HPI. All other systems were reviewed and were negative. ROS:Constitutional: negative  Eyes: negative  Ears, nose, mouth, throat, and face: negative  Respiratory: positive for dyspnea on exertion and shortness of breath  Cardiovascular: negative  Gastrointestinal: negative  Genitourinary:negative  Integument/breast: negative  Hematologic/lymphatic: negative  Musculoskeletal:positive for arthralgias and stiff joints  Neurological: positive for coordination problems and gait problems  Behavioral/Psych: negative  Endocrine: negative  Allergic/Immunologic: negative    Physical exam:   Constitutional: Well developed elderly lady no distress.   Vitals:   Vitals: 08/03/20 1209   BP: 133/62   Pulse: 73   Resp: 18   Temp: 99.3 °F (37.4 °C)   SpO2: 96%       Skin: no rash, turgor is normal  Heent: Pupils are reactive to light. Throat is clear. Oral mucosa is moist.  Neck: no bruits or jvd noted   Cardiovascular:  S1, S2 without murmur   Respiratory: Clear with no wheezes,rhonchi or rales   Abdomen: soft,non tender,good bowel sound and no palpable mass  Ext: No lower extremity edema  Psychiatric: mood and affect appropriate  Musculoskeletal:  Rom, muscular strength intact   CNS: Very awake and very alert. Well-oriented. Normal speech. Good motor strength. No obvious focal deficit.     Data:   Labs:  Lab Results   Component Value Date     08/03/2020     08/02/2020     07/30/2020    K 3.3 (L) 08/03/2020    K 3.4 (L) 08/02/2020    K 3.5 07/30/2020     08/03/2020    CO2 22 (L) 08/03/2020    CO2 24 08/02/2020    CO2 27 07/30/2020    CREATININE 1.1 08/03/2020    CREATININE 1.4 (H) 08/02/2020    CREATININE 1.0 07/30/2020    BUN 25 (H) 08/03/2020    BUN 25 (H) 08/02/2020    BUN 22 07/30/2020    GLUCOSE 232 (H) 08/03/2020    GLUCOSE 176 (H) 08/02/2020    GLUCOSE 208 (H) 07/30/2020    PHOS 2.6 07/27/2020    PHOS 2.3 (L) 07/20/2020    PHOS 2.9 07/13/2020    WBC 12.8 (H) 08/03/2020    WBC 17.0 (H) 08/02/2020    WBC 6.6 07/30/2020    HGB 9.9 (L) 08/03/2020    HGB 10.0 (L) 08/02/2020    HGB 9.7 (L) 07/30/2020    HCT 30.8 (L) 08/03/2020    HCT 31.7 (L) 08/02/2020    HCT 31.0 (L) 07/30/2020    MCV 92.2 08/03/2020     08/03/2020     {Labs reviewed    Imaging:  CXR results: Report reviewed  @ studies        Thank you Dr. Biswas Current, DO for allowing us to participate in care of Radha Records       Electronically signed by Jadon Dorantes MD on 8/3/2020 at 3:30 PM

## 2020-08-03 NOTE — H&P
total in [address] [facility]  2015    KIDNEY TRANSPLANT      RIGHT    LIVER BIOPSY  6/2015    LUNG BIOPSY  2009    PA OFFICE/OUTPT VISIT,PROCEDURE ONLY Left 8/23/2018    LEFT UPPER EXTREMENTY AV FISTULA CREATION performed by Camden Ortiz MD at [address] Left 6/14/2019    EGD BIOPSY performed by Frahana Love MD at [facility]     Past Medical History:   Diagnosis Date    Anemia associated with chronic renal failure     Aranesp 150 micrograms every other week given at [facility]. [facility]    Anxiety     Arthritis     Backache     Blood circulation, collateral     Blood transfusion     CAD (coronary artery disease)     Cellulitis in diabetic foot ([address] 03/03/2017    4th and 5th toes right foot    Chest pain     History of    CHF (congestive heart failure) ([address] 1998    Dx'ed by Dr. Dasha Oliver Chronic anemia     Chronic kidney disease     Chronic kidney disease, stage III (moderate) (HCC)     Chronic renal insufficiency 2010    COPD (chronic obstructive pulmonary disease) (HCC) 2012    Dr. 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 Briana Mccarthy    HTN (hypertension) 1970's    Hyperlipemia 1998    Iron deficiency anemia due to dietary causes 6/21/2018    Kidney stones 3/2014    Kidney trouble          MRSA infection 03/2017    right foot-Dr. Hugo Harris (podiatrist)    Neuromuscular disorder ([address]     Neuropathy 1989    diabetic neuropathy    Obesity since childhoood    CONTRERAS on CPAP 2010    Dr. Asif Colvin    Pneumonia     PONV (postoperative nausea and vomiting)     Seizures ([address]      Past Surgical History:   Procedure Laterality Date    ANKLE SURGERY Right 02-10-14    Dr. Ammon Jacobsen at [facility]    APPENDECTOMY  1980s    BACK SURGERY      removal of benign tumor    CARDIAC SURGERY     [address]    COLONOSCOPY  2009    2 polyps, not precanceorus    COLONOSCOPY Left 6/10/2019    COLONOSCOPY DIAGNOSTIC performed by Shanika Leung MD at [address] [facility]  3/30/2012    eye lid lift    DIAGNOSTIC CARDIAC CATH LAB PROCEDURE      ENDOSCOPY, COLON, DIAGNOSTIC     [facility] Oscar EYE SURGERY  2012    left sided ptosis    FOOT SURGERY      Tarsal tunnel surgery    FRACTURE SURGERY     [address] and [address]    first partial in , then total in [address] [facility]     [address] LIVER BIOPSY  2015    LUNG BIOPSY  2009    VT OFFICE/OUTPT VISIT,PROCEDURE ONLY Left 2018    LEFT UPPER EXTREMENTY AV FISTULA CREATION performed by Korina Hogan MD at [address] [facility] Left 2019    EGD BIOPSY performed by Shanika Leung MD at [address] [facility]     Social History     Socioeconomic History    Marital status:       Spouse name: None    Number of children: 1    Years of education: 10    Highest education level: None   Occupational History    None   Social Needs    Financial resource strain: None    Food insecurity     Worry: None     Inability: None    Transportation needs     Medical: None     Non-medical: None   Tobacco Use    Smoking status: Former Smoker     Packs/day: 1.50     Years: 30.00     Pack years: 45.00     Types: Cigarettes     Start date: 1981     Last attempt to quit: 3/13/2009     Years since quittin.4    Smokeless tobacco: Never Used    Tobacco comment: quit    Substance and Sexual Activity    Alcohol use: No     Alcohol/week: 0.0 standard drinks    Drug use: No    Sexual activity: Yes     Partners: Male   Lifestyle    Physical activity     Days per week: None     Minutes per session: None    Stress: None PRN  polyethylene glycol, 17 g, Daily PRN  promethazine, 12.5 mg, Q6H PRN    Or  ondansetron, 4 mg, Q6H PRN  hydrALAZINE, 5 mg, Q4H PRN  albuterol sulfate HFA, 2 puff, Q6H PRN  nitroGLYCERIN, 0.4 mg, Q5 Min PRN  glucose, 15 g, PRN  dextrose, 12.5 g, PRN  glucagon (rDNA), 1 mg, PRN  dextrose, 100 mL/hr, PRN  magnesium hydroxide, 30 mL, BID PRN      Labs:   CBC :   Recent Labs     08/02/20 1850   WBC 17.0*   HGB 10.0*   HCT 31.7*   MCV 90.1        BMP:   Recent Labs     08/02/20 1850      K 3.4*   CL 98   CO2 24   BUN 25*   CREATININE 1.4*     COAGS:   Recent Labs     08/02/20 1850   APTT 28.6   PROT 6.9   INR 1.28*     Pancreas/HFP:    Recent Labs     08/02/20 1850   LIPASE 12.8     Recent Labs     08/02/20 1850   AST 16   ALT 17   BILIDIR <0.2   BILITOT 0.6   ALKPHOS 87         Vital Signs:   Vitals:    08/03/20 0413   BP: (!) 176/67   Pulse: 83   Resp:    Temp: 102.5 °F (39.2 °C)   SpO2:          General:   Sitting semifowlers in bed unassisted, awake and alert. In mild discomfort, no distress. HEENT:  normocephalic and atraumatic. No scleral icterus. PEARLA, mucous membranes moist. Vomit present on mask. Lungs: clear to auscultation. No retractions, No accessory muscle use. Cardiac: RRR, No murmur, 2+ radial and DP pulses  Abdomen: soft. Nontender. Bowel sounds in all 4 quadrants  Extremities:  No clubbing, cyanosis x 4, No edema    Vasculature: capillary refill < 3 seconds. Skin:  warm and dry. No visible rashes  Psych:  Alert and oriented x3. Affect appropriate  Lymph:  No supraclavicular adenopathy. Neurologic:  CN II-XII grossly intact. No focal deficit. Data: (All radiographs, tracings, PFTs, and imaging are personally viewed and interpreted unless otherwise noted). XR CHEST (2 VW)   Final Result   No acute disease. **This report has been created using voice recognition software. It may contain minor errors which are inherent in voice recognition technology. ** Final report electronically signed by Dr. Barrie King on 8/2/2020 8:13 PM      XR CHEST PORTABLE    (Results Pending)          Electronically signed by Mariela Castellon PA-C on 8/3/2020 at 3:29 AM

## 2020-08-03 NOTE — PLAN OF CARE
Problem: Nutrition  Goal: Optimal nutrition therapy  Outcome: Ongoing   Nutrition Problem #1: Inadequate oral intake  Intervention: Food and/or Nutrient Delivery: Continue Current Diet, Start Oral Nutrition Supplement  Nutritional Goals: Patient will consume 75% or more of meals during LOS.

## 2020-08-03 NOTE — FLOWSHEET NOTE
08/03/20 0425   Provider Notification   Reason for Communication Evaluate   Provider Name YAMIL Brooks   Provider Notification Advance Practice Clinician (CNS, NP, CNM, CRNA, PA)   Method of Communication Secure Message   Response No new orders   Notification Time 0425     YAMIL Quintero paged regarding pt temp 102.5 after tylenol given and ice packs applied. /67 after hydralazine 5 mg IV prn given. He placed order to consult nephrology for antibiotic recommendation since pt has had a recent kidney transplant April 2020.

## 2020-08-03 NOTE — PROGRESS NOTES
Pharmacy Medication History Note      List of current medications patient is taking is complete. Source of information: patient, [facility] care everywhere, dispense history     Changes made to medication list:  Medications removed (include reason, ex. therapy complete or physician discontinued):  Coreg 37.5 mg BID   Clonidine 0.3 mg   Vitamin D 50,000 units once weekly- pt taking differently than prescribed- did not remove to avoid canceling maintenance Rx at retail pharmacy     Medications added/doses adjusted:  Coreg 25 mg BID  Clonidine 0.2 mg TID   Linzess 145 mcg- only takes every other day due to cost   Docusate 100 mg- 2 capsules BID   Norco 5/325 Q8H PRN- last filled #90 7/21/2020  Vitamin D 50,000 units every other week   Magnesium Oxide 400 mg BID   Bactrim DS daily   Mycophenolate 360 mg- take 2 tablets BID   Tacrolimus 1 mg- take 8 tablets BID     Other notes (ex. Recent course of antibiotics, Coumadin dosing):  Spiriva- only uses PRN due to cost   [facility] working on switching patient to Envarsus XR (tacrolimus) due to drug cost.  Pt reports still using tacrolimus 1 mg  Denies use of other OTC or herbal medications.     Electronically signed by Pedro Lopez, [address] [place] [address] on 8/3/2020 at 3:50 PM

## 2020-08-03 NOTE — ED NOTES
Patient transported to [facility]  by cart in stable condition. Patient monitored on cardiac telemetry. IV infusing and line is patent.         Jacinta Dykes, EMT-P  08/03/20 4663

## 2020-08-03 NOTE — ED NOTES
Pt resting in bed. Resp regular., Rapid covid sent per orders. Denies other needs. Call light in reach.       Donna Dale RN  08/02/20 1361

## 2020-08-03 NOTE — PROGRESS NOTES
Pt admitted to  6K06. Complaints: uti. IV normal saline infusing into the right arm at a rate of 100 mls/ hour with about 500 mls in the bag still. IV site free of s/s of infection or infiltration. Vital signs obtained. Assessment and data collection initiated. Two nurse skin assessment performed by Bruce Husain and Merline Head RN. Oriented to room. Policies and procedures for  explained. Amee Trammell RN discussed hourly rounding with patient addressing 5 P's. Fall prevention and safety brochure discussed with patient. Bed alarm on. Call light in reach. The best day to schedule a follow up Dr appointment is: Wednesday am. Explained patients right to have family, representative or physician notified of their admission. Patient has declined for physician to be notified. Patient has declined for family/representative to be notified. All questions answered with no further questions at this time. Which family member is the designated  refer to emergency contacts. Do you want them contacted on admission and updated every shift no, she is in contact with them.

## 2020-08-03 NOTE — PROGRESS NOTES
Assessment and Plan:        Sepsis due to urinary tract infection: Urinalysis suggest UTI. Urine culture is pending.  -Agree with ceftriaxone  -Check blood cultures and urine culture  -Continue maintenance IV fluids. Patient does have some crackles and elevated BNP will avoid aggressive resuscitation. History of  donor renal transplant: Performed on 2020. Per care everywhere HLA mismatch was A0, B0, DR 0. CMV D-/R+, EBV R+, KDPI 52%. Pre-transplant cPRA 26%. She underwent ATG and rapid prednisone taper. She is currently on mycophenolate and tacrolimus.  -Continue mycophenolate and tacrolimus  -Consult nephrology. Will defer to immunosuppressive levels per their discretion.  -Renal function appears to be at baseline  -Treat UTI as above    Hypertension: Continue home Coreg and clonidine    Anemia: chronic likely due to chronic kidney disease. Continue to monitor    Type 2 diabetes: Continue basal insulin and sliding scale insulin with carb controlled diet    Hypokalemia/hypomagnesemia: Replace PRN    Troponin elevation: Suspect this is likely related to her acute stress. EKG without any sign of acute infarction. Continue to monitor        CC: Vomiting  Hospital course: Ms. Merrick Holly is a 76 Y/O female presenting at [facility]'s ED complaining shortness of breath and nausea with a past medical history of CKD, kidney transplant, CHF, COPD, CAD, and other co morbidities. Patient she has been feeling short of breath recently and has been vomiting. She reports chronic cough, chronic le numbness, and nausea. Patient denies chest pain, headache, dizziness, lightheadedness, paraesthesias, weakness, chills, fevers, abdominal pain, diarrhea. Patient admitted to hospitalist services, consult to CHF clinic, and nephrology. Patient's COVID test was negative in the ED  Subjective: (12 point review of systems completed. Pertinent positives noted. Otherwise ROS is negative) : Patient appears to be ill.   She states that she feels mildly better than she did this morning. Her fevers peaked at 102 max. She vomited while is in the room. It appeared to be clearish. She denies any chest pain. Her shortness of breath is improved. PMH:  Per HPI  SHX:    Social History     Tobacco Use    Smoking status: Former Smoker     Packs/day: 1.50     Years: 30.00     Pack years: 45.00     Types: Cigarettes     Start date: 1981     Last attempt to quit: 3/13/2009     Years since quittin.4    Smokeless tobacco: Never Used    Tobacco comment: quit 2009   Substance Use Topics    Alcohol use: No     Alcohol/week: 0.0 standard drinks    Drug use: No     FHX:   Family History   Problem Relation Age of Onset    Diabetes Sister     Diabetes Brother     Diabetes Sister     Diabetes Sister     Cancer Sister     Early Death Sister     Heart Disease Sister     Diabetes Sister     Heart Disease Sister     Diabetes Sister     Diabetes Brother     Arthritis Mother     Heart Disease Mother     High Blood Pressure Mother     Kidney Disease Mother     Mental Illness Mother     Stroke Mother     Heart Disease Father     Diabetes Father     Obesity Father     Alcohol Abuse Father      Allergies:    Allergies   Allergen Reactions    Pioglitazone Swelling    Actos [Pioglitazone Hydrochloride] Swelling    Cymbalta [Duloxetine Hcl] Other (See Comments)     Anxiety and lethargic    Gabapentin Anxiety     Medications:     dextrose      sodium chloride 75 mL/hr at 20 1014      sodium chloride flush  10 mL Intravenous 2 times per day    enoxaparin  40 mg Subcutaneous Daily    famotidine (PEPCID) injection  20 mg Intravenous Daily    lisinopril  5 mg Oral Daily    amLODIPine  10 mg Oral Daily    magnesium replacement protocol   Other RX Placeholder    aspirin  81 mg Oral Daily    atorvastatin  40 mg Oral Daily    insulin glargine  25 Units Subcutaneous Nightly    insulin lispro  0-6 Units Subcutaneous TID WC    insulin lispro  0-3 Units Subcutaneous Nightly    tiotropium  2 puff Inhalation Daily    tacrolimus  8 mg Oral BID    sulfamethoxazole-trimethoprim  1 tablet Oral Daily    cefTRIAXone (ROCEPHIN) IV  1 g Intravenous Q24H    potassium replacement protocol   Other RX Placeholder    mycophenolate  720 mg Oral BID    carvedilol  25 mg Oral BID    cloNIDine  0.2 mg Oral TID       Vital Signs:   /62   Pulse 73   Temp 99.3 °F (37.4 °C) (Oral)   Resp 18   Ht 5' 5\" (1.651 m)   Wt 179 lb 8 oz (81.4 kg)   SpO2 96%   BMI 29.87 kg/m²    No intake or output data in the 24 hours ending 08/03/20 1628     General:   Resting in bed appears to be in some distress  HEENT:  normocephalic and atraumatic. No scleral icterus. PERR. Neck: supple. No JVD. No thyromegaly. Lungs: Bibasilar crackles  Cardiac: RRR without murmur. Systolic murmur grade 1 out of 6 best heard at the right upper sternal border  Abdomen: soft. Nontender. Bowel sounds positive. Extremities:  No clubbing, cyanosis, or edema x 4. Vasculature: capillary refill < 3 seconds. Palpable LE pulses bilaterally. Skin:  warm and dry. Psych:  Alert and oriented x3. Affect appropriate  Lymph:  No supraclavicular adenopathy. Neurologic:  No focal deficit. No seizures. Data: (All radiographs, tracings, PFTs, and imaging are personally viewed and interpreted unless otherwise noted).    Recent Results (from the past 24 hour(s))   Protime-INR    Collection Time: 08/02/20  6:50 PM   Result Value Ref Range    INR 1.28 (H) 0.85 - 1.13   APTT    Collection Time: 08/02/20  6:50 PM   Result Value Ref Range    aPTT 28.6 22.0 - 38.0 seconds   CBC auto differential    Collection Time: 08/02/20  6:50 PM   Result Value Ref Range    WBC 17.0 (H) 4.8 - 10.8 thou/mm3    RBC 3.52 (L) 4.20 - 5.40 mill/mm3    Hemoglobin 10.0 (L) 12.0 - 16.0 gm/dl    Hematocrit 31.7 (L) 37.0 - 47.0 %    MCV 90.1 81.0 - 99.0 fL    MCH 28.4 26.0 - 33.0 pg    MCHC 31.5 (L) 32.2 - 35.5 gm/dl    RDW-CV 13.9 11.5 - 14.5 %    RDW-SD 45.1 (H) 35.0 - 45.0 fL    Platelets 119 432 - 100 thou/mm3    MPV 10.7 9.4 - 12.4 fL    Seg Neutrophils 88.1 %    Lymphocytes 2.8 %    Monocytes 8.2 %    Eosinophils 0.0 %    Basophils 0.1 %    Immature Granulocytes 0.8 %    Segs Absolute 15.0 (H) 1.8 - 7.7 thou/mm3    Lymphocytes Absolute 0.5 (L) 1.0 - 4.8 thou/mm3    Monocytes Absolute 1.4 (H) 0.4 - 1.3 thou/mm3    Eosinophils Absolute 0.0 0.0 - 0.4 thou/mm3    Basophils Absolute 0.0 0.0 - 0.1 thou/mm3    Immature Grans (Abs) 0.14 (H) 0.00 - 0.07 thou/mm3    nRBC 0 /100 wbc   Brain Natriuretic Peptide    Collection Time: 08/02/20  6:50 PM   Result Value Ref Range    Pro-BNP 2503.0 (H) 0.0 - 900.0 pg/mL   Basic Metabolic Panel    Collection Time: 08/02/20  6:50 PM   Result Value Ref Range    Sodium 137 135 - 145 meq/L    Potassium 3.4 (L) 3.5 - 5.2 meq/L    Chloride 98 98 - 111 meq/L    CO2 24 23 - 33 meq/L    Glucose 176 (H) 70 - 108 mg/dL    BUN 25 (H) 7 - 22 mg/dL    CREATININE 1.4 (H) 0.4 - 1.2 mg/dL    Calcium 9.9 8.5 - 10.5 mg/dL   Hepatic function panel    Collection Time: 08/02/20  6:50 PM   Result Value Ref Range    Alb 4.3 3.5 - 5.1 g/dL    Total Bilirubin 0.6 0.3 - 1.2 mg/dL    Bilirubin, Direct <0.2 0.0 - 0.3 mg/dL    Alkaline Phosphatase 87 38 - 126 U/L    AST 16 5 - 40 U/L    ALT 17 11 - 66 U/L    Total Protein 6.9 6.1 - 8.0 g/dL   Lipase    Collection Time: 08/02/20  6:50 PM   Result Value Ref Range    Lipase 12.8 5.6 - 51.3 U/L   Troponin    Collection Time: 08/02/20  6:50 PM   Result Value Ref Range    Troponin T 0.019 (A) ng/ml   Magnesium    Collection Time: 08/02/20  6:50 PM   Result Value Ref Range    Magnesium 1.3 (L) 1.6 - 2.4 mg/dL   Anion Gap    Collection Time: 08/02/20  6:50 PM   Result Value Ref Range    Anion Gap 15.0 8.0 - 16.0 meq/L   Glomerular Filtration Rate, Estimated    Collection Time: 08/02/20  6:50 PM   Result Value Ref Range    Est, Glom Filt Rate 45 (A) ml/min/1.73m2 Osmolality    Collection Time: 08/02/20  6:50 PM   Result Value Ref Range    Osmolality Calc 282.5 275.0 - 300 mOsmol/kg   EKG SOB    Collection Time: 08/02/20  6:59 PM   Result Value Ref Range    Ventricular Rate 73 BPM    Atrial Rate 73 BPM    P-R Interval 148 ms    QRS Duration 80 ms    Q-T Interval 402 ms    QTc Calculation (Bazett) 442 ms    P Axis 1 degrees    R Axis 16 degrees    T Axis 32 degrees   Rapid influenza A/B antigens    Collection Time: 08/02/20  7:00 PM    Specimen: Nasopharyngeal   Result Value Ref Range    Flu A Antigen Negative NEGATIVE    Flu B Antigen Negative NEGATIVE   Urine with Reflexed Micro    Collection Time: 08/02/20  8:33 PM   Result Value Ref Range    Glucose, Ur 100 (A) NEGATIVE mg/dl    Bilirubin Urine NEGATIVE NEGATIVE    Ketones, Urine NEGATIVE NEGATIVE    Specific Gravity, Urine 1.023 1.002 - 1.03    Blood, Urine SMALL (A) NEGATIVE    pH, UA 5.5 5.0 - 9.0    Protein, UA 30 (A) NEGATIVE    Urobilinogen, Urine 1.0 0.0 - 1.0 eu/dl    Nitrite, Urine POSITIVE (A) NEGATIVE    Leukocyte Esterase, Urine MODERATE (A) NEGATIVE    Color, UA YELLOW STRAW-YELL    Character, Urine CLOUDY (A) CLEAR-SL C    RBC, UA 5-10 0-2/hpf /hpf    WBC, UA 15-25 0-4/hpf /hpf    Epithelial Cells, UA 3-5 3-5/hpf /hpf    Bacteria, UA MANY FEW/NONE S /hpf    Casts UA >15 HYALINE NONE SEEN /lpf    Crystals, UA NONE SEEN NONE SEEN    Renal Epithelial, UA NONE SEEN NONE SEEN    Yeast, UA NONE SEEN NONE SEEN    CASTS 2 NONE SEEN NONE SEEN /lpf    MISCELLANEOUS 2 NONE SEEN    Culture, Reflexed, Urine    Collection Time: 08/02/20  8:33 PM    Specimen: Urine, clean catch   Result Value Ref Range    Organism enteric gram negative bacilli (A)     Urine Culture Reflex [place] count: >100,000 CFU/mL     COVID-19    Collection Time: 08/02/20 11:15 PM   Result Value Ref Range    SARS-CoV-2, NAAT NOT DETECTED NOT DETECT   Basic Metabolic Panel    Collection Time: 08/03/20  3:20 AM   Result Value Ref Range    Sodium 137 135 - 145 meq/L    Potassium 3.3 (L) 3.5 - 5.2 meq/L    Chloride 101 98 - 111 meq/L    CO2 22 (L) 23 - 33 meq/L    Glucose 232 (H) 70 - 108 mg/dL    BUN 25 (H) 7 - 22 mg/dL    CREATININE 1.1 0.4 - 1.2 mg/dL    Calcium 9.5 8.5 - 10.5 mg/dL   Magnesium    Collection Time: 08/03/20  3:20 AM   Result Value Ref Range    Magnesium 1.3 (L) 1.6 - 2.4 mg/dL   Lipid panel - fasting    Collection Time: 08/03/20  3:20 AM   Result Value Ref Range    Cholesterol, Total 106 100 - 199 mg/dL    Triglycerides 101 0 - 199 mg/dL    HDL 47 mg/dL    LDL Calculated 39 mg/dL   Troponin    Collection Time: 08/03/20  3:20 AM   Result Value Ref Range    Troponin T 0.013 (A) ng/ml   Brain Natriuretic Peptide    Collection Time: 08/03/20  3:20 AM   Result Value Ref Range    Pro-BNP 4430.0 (H) 0.0 - 900.0 pg/mL   CBC    Collection Time: 08/03/20  3:20 AM   Result Value Ref Range    WBC 12.8 (H) 4.8 - 10.8 thou/mm3    RBC 3.34 (L) 4.20 - 5.40 mill/mm3    Hemoglobin 9.9 (L) 12.0 - 16.0 gm/dl    Hematocrit 30.8 (L) 37.0 - 47.0 %    MCV 92.2 81.0 - 99.0 fL    MCH 29.6 26.0 - 33.0 pg    MCHC 32.1 (L) 32.2 - 35.5 gm/dl    RDW-CV 13.9 11.5 - 14.5 %    RDW-SD 46.6 (H) 35.0 - 45.0 fL    Platelets 634 042 - 264 thou/mm3    MPV 11.3 9.4 - 12.4 fL   Iron and TIBC    Collection Time: 08/03/20  3:20 AM   Result Value Ref Range    Iron 14 (L) 50 - 170 ug/dL    TIBC 144 (L) 171 - 450 ug/dL   Anion Gap    Collection Time: 08/03/20  3:20 AM   Result Value Ref Range    Anion Gap 14.0 8.0 - 16.0 meq/L   Glomerular Filtration Rate, Estimated    Collection Time: 08/03/20  3:20 AM   Result Value Ref Range    Est, Glom Filt Rate 60 (A) ml/min/1.73m2   Osmolality    Collection Time: 08/03/20  3:20 AM   Result Value Ref Range    Osmolality Calc 285.6 275.0 - 300 mOsmol/kg   Troponin    Collection Time: 08/03/20  5:48 AM   Result Value Ref Range    Troponin T 0.016 (A) ng/ml   POCT Glucose    Collection Time: 08/03/20  6:27 AM   Result Value Ref Range    POC Glucose 264 (H) 70 - 108 mg/dl   POCT Glucose    Collection Time: 08/03/20 11:06 AM   Result Value Ref Range    POC Glucose 238 (H) 70 - 108 mg/dl   Troponin    Collection Time: 08/03/20  2:01 PM   Result Value Ref Range    Troponin T 0.026 (A) ng/ml   EKG 12 Lead    Collection Time: 08/03/20  3:30 PM   Result Value Ref Range    Ventricular Rate 70 BPM    Atrial Rate 70 BPM    P-R Interval 158 ms    QRS Duration 76 ms    Q-T Interval 410 ms    QTc Calculation (Bazett) 442 ms    P Axis 1 degrees    R Axis 28 degrees    T Axis 21 degrees   POCT Glucose    Collection Time: 08/03/20  4:05 PM   Result Value Ref Range    POC Glucose 294 (H) 70 - 108 mg/dl         XR CHEST (2 VW)   Final Result   No acute disease. **This report has been created using voice recognition software. It may contain minor errors which are inherent in voice recognition technology. **      Final report electronically signed by Dr. Joe Arechiga on 8/2/2020 8:13 PM      XR CHEST PORTABLE    (Results Pending)          Electronically signed by Yousif Caceres MD on 8/3/2020 at 4:28 PM

## 2020-08-04 ENCOUNTER — APPOINTMENT (OUTPATIENT)
Dept: ULTRASOUND IMAGING | Age: 65
DRG: 872 | End: 2020-08-04
Payer: MEDICARE

## 2020-08-04 ENCOUNTER — APPOINTMENT (OUTPATIENT)
Dept: CT IMAGING | Age: 65
DRG: 872 | End: 2020-08-04
Payer: MEDICARE

## 2020-08-04 ENCOUNTER — APPOINTMENT (OUTPATIENT)
Dept: GENERAL RADIOLOGY | Age: 65
DRG: 872 | End: 2020-08-04
Payer: MEDICARE

## 2020-08-04 LAB
ANION GAP SERPL CALCULATED.3IONS-SCNC: 11 MEQ/L (ref 8–16)
ANION GAP SERPL CALCULATED.3IONS-SCNC: 13 MEQ/L (ref 8–16)
AVERAGE GLUCOSE: 147 MG/DL (ref 70–126)
BUN BLDV-MCNC: 39 MG/DL (ref 7–22)
BUN BLDV-MCNC: 41 MG/DL (ref 7–22)
CALCIUM SERPL-MCNC: 9.2 MG/DL (ref 8.5–10.5)
CALCIUM SERPL-MCNC: 9.8 MG/DL (ref 8.5–10.5)
CHLORIDE BLD-SCNC: 103 MEQ/L (ref 98–111)
CHLORIDE BLD-SCNC: 99 MEQ/L (ref 98–111)
CO2: 20 MEQ/L (ref 23–33)
CO2: 21 MEQ/L (ref 23–33)
CREAT SERPL-MCNC: 1.9 MG/DL (ref 0.4–1.2)
CREAT SERPL-MCNC: 2.1 MG/DL (ref 0.4–1.2)
EKG ATRIAL RATE: 70 BPM
EKG P AXIS: 1 DEGREES
EKG P-R INTERVAL: 158 MS
EKG Q-T INTERVAL: 410 MS
EKG QRS DURATION: 76 MS
EKG QTC CALCULATION (BAZETT): 442 MS
EKG R AXIS: 28 DEGREES
EKG T AXIS: 21 DEGREES
EKG VENTRICULAR RATE: 70 BPM
EOSINOPHIL SMEAR: NORMAL
GLUCOSE BLD-MCNC: 221 MG/DL (ref 70–108)
GLUCOSE BLD-MCNC: 241 MG/DL (ref 70–108)
GLUCOSE BLD-MCNC: 242 MG/DL (ref 70–108)
GLUCOSE BLD-MCNC: 254 MG/DL (ref 70–108)
GLUCOSE BLD-MCNC: 266 MG/DL (ref 70–108)
GLUCOSE BLD-MCNC: 278 MG/DL (ref 70–108)
HBA1C MFR BLD: 6.9 % (ref 4.4–6.4)
MAGNESIUM: 2.5 MG/DL (ref 1.6–2.4)
ORGANISM: ABNORMAL
POTASSIUM REFLEX MAGNESIUM: 3.6 MEQ/L (ref 3.5–5.2)
POTASSIUM SERPL-SCNC: 3.6 MEQ/L (ref 3.5–5.2)
POTASSIUM SERPL-SCNC: 4 MEQ/L (ref 3.5–5.2)
SARS-COV-2: NOT DETECTED
SODIUM BLD-SCNC: 131 MEQ/L (ref 135–145)
SODIUM BLD-SCNC: 136 MEQ/L (ref 135–145)
SPECIMEN: NORMAL
TROPONIN T: < 0.01 NG/ML
URINE CULTURE REFLEX: ABNORMAL

## 2020-08-04 PROCEDURE — 94640 AIRWAY INHALATION TREATMENT: CPT

## 2020-08-04 PROCEDURE — 89190 NASAL SMEAR FOR EOSINOPHILS: CPT

## 2020-08-04 PROCEDURE — 36415 COLL VENOUS BLD VENIPUNCTURE: CPT

## 2020-08-04 PROCEDURE — 6370000000 HC RX 637 (ALT 250 FOR IP): Performed by: INTERNAL MEDICINE

## 2020-08-04 PROCEDURE — 99232 SBSQ HOSP IP/OBS MODERATE 35: CPT | Performed by: INTERNAL MEDICINE

## 2020-08-04 PROCEDURE — 2500000003 HC RX 250 WO HCPCS: Performed by: PHYSICIAN ASSISTANT

## 2020-08-04 PROCEDURE — 93010 ELECTROCARDIOGRAM REPORT: CPT | Performed by: INTERNAL MEDICINE

## 2020-08-04 PROCEDURE — 83036 HEMOGLOBIN GLYCOSYLATED A1C: CPT

## 2020-08-04 PROCEDURE — 76776 US EXAM K TRANSPL W/DOPPLER: CPT

## 2020-08-04 PROCEDURE — 80048 BASIC METABOLIC PNL TOTAL CA: CPT

## 2020-08-04 PROCEDURE — 80180 DRUG SCRN QUAN MYCOPHENOLATE: CPT

## 2020-08-04 PROCEDURE — 6370000000 HC RX 637 (ALT 250 FOR IP): Performed by: PHYSICIAN ASSISTANT

## 2020-08-04 PROCEDURE — 99233 SBSQ HOSP IP/OBS HIGH 50: CPT | Performed by: INTERNAL MEDICINE

## 2020-08-04 PROCEDURE — 82948 REAGENT STRIP/BLOOD GLUCOSE: CPT

## 2020-08-04 PROCEDURE — 2580000003 HC RX 258: Performed by: INTERNAL MEDICINE

## 2020-08-04 PROCEDURE — 6360000002 HC RX W HCPCS: Performed by: PHYSICIAN ASSISTANT

## 2020-08-04 PROCEDURE — 83735 ASSAY OF MAGNESIUM: CPT

## 2020-08-04 PROCEDURE — 84484 ASSAY OF TROPONIN QUANT: CPT

## 2020-08-04 PROCEDURE — 6360000002 HC RX W HCPCS: Performed by: INTERNAL MEDICINE

## 2020-08-04 PROCEDURE — 74018 RADEX ABDOMEN 1 VIEW: CPT

## 2020-08-04 PROCEDURE — 94760 N-INVAS EAR/PLS OXIMETRY 1: CPT

## 2020-08-04 PROCEDURE — 1200000003 HC TELEMETRY R&B

## 2020-08-04 PROCEDURE — 74176 CT ABD & PELVIS W/O CONTRAST: CPT

## 2020-08-04 PROCEDURE — 51798 US URINE CAPACITY MEASURE: CPT

## 2020-08-04 RX ORDER — HALOPERIDOL 5 MG/ML
1 INJECTION INTRAMUSCULAR ONCE
Status: COMPLETED | OUTPATIENT
Start: 2020-08-04 | End: 2020-08-04

## 2020-08-04 RX ORDER — NICOTINE POLACRILEX 4 MG
15 LOZENGE BUCCAL PRN
Status: DISCONTINUED | OUTPATIENT
Start: 2020-08-04 | End: 2020-08-06 | Stop reason: HOSPADM

## 2020-08-04 RX ORDER — DEXTROSE MONOHYDRATE 25 G/50ML
12.5 INJECTION, SOLUTION INTRAVENOUS PRN
Status: DISCONTINUED | OUTPATIENT
Start: 2020-08-04 | End: 2020-08-06 | Stop reason: HOSPADM

## 2020-08-04 RX ORDER — LACTULOSE 10 G/15ML
20 SOLUTION ORAL 2 TIMES DAILY
Status: DISCONTINUED | OUTPATIENT
Start: 2020-08-04 | End: 2020-08-06 | Stop reason: HOSPADM

## 2020-08-04 RX ORDER — INSULIN GLARGINE 100 [IU]/ML
28 INJECTION, SOLUTION SUBCUTANEOUS NIGHTLY
Status: DISCONTINUED | OUTPATIENT
Start: 2020-08-04 | End: 2020-08-06 | Stop reason: HOSPADM

## 2020-08-04 RX ORDER — FLUTICASONE PROPIONATE 50 MCG
1 SPRAY, SUSPENSION (ML) NASAL DAILY PRN
Status: DISCONTINUED | OUTPATIENT
Start: 2020-08-04 | End: 2020-08-06 | Stop reason: HOSPADM

## 2020-08-04 RX ORDER — DEXTROSE MONOHYDRATE 50 MG/ML
100 INJECTION, SOLUTION INTRAVENOUS PRN
Status: DISCONTINUED | OUTPATIENT
Start: 2020-08-04 | End: 2020-08-06 | Stop reason: HOSPADM

## 2020-08-04 RX ADMIN — ACETAMINOPHEN 650 MG: 325 TABLET ORAL at 03:03

## 2020-08-04 RX ADMIN — PROMETHAZINE HYDROCHLORIDE 12.5 MG: 25 TABLET ORAL at 11:18

## 2020-08-04 RX ADMIN — SODIUM CHLORIDE: 9 INJECTION, SOLUTION INTRAVENOUS at 08:46

## 2020-08-04 RX ADMIN — CARVEDILOL 25 MG: 25 TABLET, FILM COATED ORAL at 08:33

## 2020-08-04 RX ADMIN — CARVEDILOL 25 MG: 25 TABLET, FILM COATED ORAL at 22:31

## 2020-08-04 RX ADMIN — INSULIN GLARGINE 28 UNITS: 100 INJECTION, SOLUTION SUBCUTANEOUS at 20:13

## 2020-08-04 RX ADMIN — TACROLIMUS 8 MG: 1 CAPSULE ORAL at 08:33

## 2020-08-04 RX ADMIN — ONDANSETRON 4 MG: 2 INJECTION INTRAMUSCULAR; INTRAVENOUS at 08:31

## 2020-08-04 RX ADMIN — MYCOPHENOLIC ACID 720 MG: 180 TABLET, DELAYED RELEASE ORAL at 08:34

## 2020-08-04 RX ADMIN — FAMOTIDINE 20 MG: 10 INJECTION INTRAVENOUS at 08:31

## 2020-08-04 RX ADMIN — AMLODIPINE BESYLATE 10 MG: 10 TABLET ORAL at 08:33

## 2020-08-04 RX ADMIN — HYDROMORPHONE HYDROCHLORIDE 0.5 MG: 1 INJECTION, SOLUTION INTRAMUSCULAR; INTRAVENOUS; SUBCUTANEOUS at 20:25

## 2020-08-04 RX ADMIN — HYDROMORPHONE HYDROCHLORIDE 0.5 MG: 1 INJECTION, SOLUTION INTRAMUSCULAR; INTRAVENOUS; SUBCUTANEOUS at 15:38

## 2020-08-04 RX ADMIN — FLUTICASONE PROPIONATE 1 SPRAY: 50 SPRAY, METERED NASAL at 09:39

## 2020-08-04 RX ADMIN — CEFTRIAXONE SODIUM 1 G: 1 INJECTION, POWDER, FOR SOLUTION INTRAMUSCULAR; INTRAVENOUS at 06:02

## 2020-08-04 RX ADMIN — INSULIN LISPRO 3 UNITS: 100 INJECTION, SOLUTION INTRAVENOUS; SUBCUTANEOUS at 08:35

## 2020-08-04 RX ADMIN — ENOXAPARIN SODIUM 40 MG: 40 INJECTION SUBCUTANEOUS at 08:30

## 2020-08-04 RX ADMIN — HALOPERIDOL LACTATE 1 MG: 5 INJECTION INTRAMUSCULAR at 20:06

## 2020-08-04 RX ADMIN — LACTULOSE 20 G: 20 SOLUTION ORAL at 22:32

## 2020-08-04 RX ADMIN — MYCOPHENOLIC ACID 720 MG: 180 TABLET, DELAYED RELEASE ORAL at 22:31

## 2020-08-04 RX ADMIN — TIOTROPIUM BROMIDE INHALATION SPRAY 2 PUFF: 3.12 SPRAY, METERED RESPIRATORY (INHALATION) at 08:24

## 2020-08-04 RX ADMIN — SODIUM CHLORIDE: 9 INJECTION, SOLUTION INTRAVENOUS at 20:08

## 2020-08-04 RX ADMIN — ASPIRIN 81 MG: 81 TABLET ORAL at 08:30

## 2020-08-04 RX ADMIN — LACTULOSE 20 G: 20 SOLUTION ORAL at 08:30

## 2020-08-04 RX ADMIN — HYDRALAZINE HYDROCHLORIDE 5 MG: 20 INJECTION, SOLUTION INTRAMUSCULAR; INTRAVENOUS at 15:37

## 2020-08-04 RX ADMIN — HYDRALAZINE HYDROCHLORIDE 5 MG: 20 INJECTION, SOLUTION INTRAMUSCULAR; INTRAVENOUS at 20:25

## 2020-08-04 RX ADMIN — CLONIDINE HYDROCHLORIDE 0.2 MG: 0.2 TABLET ORAL at 22:31

## 2020-08-04 RX ADMIN — ONDANSETRON 4 MG: 2 INJECTION INTRAMUSCULAR; INTRAVENOUS at 14:45

## 2020-08-04 RX ADMIN — ATORVASTATIN CALCIUM 40 MG: 40 TABLET, FILM COATED ORAL at 22:31

## 2020-08-04 RX ADMIN — TACROLIMUS 8 MG: 1 CAPSULE ORAL at 22:30

## 2020-08-04 RX ADMIN — CLONIDINE HYDROCHLORIDE 0.2 MG: 0.2 TABLET ORAL at 08:33

## 2020-08-04 ASSESSMENT — PAIN DESCRIPTION - DESCRIPTORS: DESCRIPTORS: SHARP

## 2020-08-04 ASSESSMENT — PAIN DESCRIPTION - FREQUENCY: FREQUENCY: INTERMITTENT

## 2020-08-04 ASSESSMENT — PAIN SCALES - GENERAL
PAINLEVEL_OUTOF10: 9
PAINLEVEL_OUTOF10: 0
PAINLEVEL_OUTOF10: 8
PAINLEVEL_OUTOF10: 6
PAINLEVEL_OUTOF10: 8

## 2020-08-04 ASSESSMENT — PAIN DESCRIPTION - PROGRESSION: CLINICAL_PROGRESSION: NOT CHANGED

## 2020-08-04 ASSESSMENT — PAIN - FUNCTIONAL ASSESSMENT: PAIN_FUNCTIONAL_ASSESSMENT: ACTIVITIES ARE NOT PREVENTED

## 2020-08-04 ASSESSMENT — PAIN DESCRIPTION - ORIENTATION: ORIENTATION: UPPER

## 2020-08-04 ASSESSMENT — PAIN DESCRIPTION - PAIN TYPE: TYPE: ACUTE PAIN

## 2020-08-04 ASSESSMENT — PAIN DESCRIPTION - ONSET: ONSET: SUDDEN

## 2020-08-04 ASSESSMENT — PAIN DESCRIPTION - LOCATION: LOCATION: ABDOMEN;GENERALIZED

## 2020-08-04 NOTE — PROGRESS NOTES
Renal Progress Note    Assessment and Plan:    1. Status Post Kidney Transplant    2. Primary Hypertension   3. UTI   4. Deconditioning    5. Elevated Troponins   6. DM2   7. Hypokalemia   8. Mild Metabolic Acidosis   9. Leukocytosis 2/2 UTI   10. Normocytic Anemia    Plan    Labs reviewed, medications reviewed  KUB demonstrates some gas around transplanted kidney  CT WO Contrast ordered, awaiting results  VIDA likely prerenal 2/2 dehydration/vomiting  Holding Bactrim  Increasing fluids to 100 cc/hr  Continue with Rocephin  Continue with Immunosuppressants  We will continue to follow     Patient Active Problem List:     Diabetes mellitus, type 2 ([facility] [address]     Uncontrolled hypertension     Chronic anemia     Coronary disease     Hyperlipemia     Arthritis     Neuropathy, diabetic (HCC)     Leg pain     Obesity (BMI 30-39. 9)     Low HDL (under 40)     Need for prophylactic vaccination against diphtheria-tetanus-pertussis (DTP)     History of tobacco use     Allergic rhinitis     COPD, mild (HCC)     Lung mass     Anemia, chronic disease     Gout     Urolithiasis     Ankle fracture, right     Secondary hyperparathyroidism of renal origin ([facility] [address]     Obstructive sleep apnea on CPAP     Vitamin D deficiency     Benign essential HTN     Hypertensive nephropathy     Persistent proteinuria associated with type 2 diabetes mellitus (HCC)     Cellulitis in diabetic foot (HCC)     VIDA (acute kidney injury) ([address]     Persistent proteinuria     Acquired hypothyroidism     CKD (chronic kidney disease), stage IV (HCC)     Nephrotic syndrome     Type 2 diabetes mellitus (HCC)     Iron deficiency anemia due to dietary causes     Anemia of chronic disease     Stage 5 chronic kidney disease not on chronic dialysis ([address]     ESRD (end stage renal disease) ([facility])     Hypervolemia associated with renal insufficiency     Pulmonary edema, noncardiac     CKD (chronic kidney disease), stage V (HCC)     Chronic progressive renal failure, stage 5 [facility])     Hypertensive emergency, no CHF     Diabetic peripheral neuropathy associated with type 2 diabetes mellitus (HCC)     Pericardial effusion     Hypokalemia     Type II diabetes mellitus with stage 5 chronic kidney disease (HCC)     Acute on chronic diastolic congestive heart failure (HCC)     End stage renal disease due to benign hypertension (HCC)     ESRD (end stage renal disease) on dialysis (HCC)     Post-operative nausea and vomiting     Chronic constipation     Candida UTI     Fluid overload     Pleural effusion     Acute respiratory failure with hypoxia (HCC)     Dyspnea     Bilateral leg edema     Hypernatremia     Metabolic acidosis     Generalized weakness     Lymphadenopathy, inguinal     Atelectasis of left lung     Thyroid nodule     ESRD needing dialysis ([facility] [address]     Debility     UTI (urinary tract infection)      Subjective:   Admit Date: 8/2/2020    Interval History: Patient does not feel well this AM, endorses vomiting clear liquids.       Medications:   Scheduled Meds:   sodium chloride flush  10 mL Intravenous 2 times per day    enoxaparin  40 mg Subcutaneous Daily    famotidine (PEPCID) injection  20 mg Intravenous Daily    amLODIPine  10 mg Oral Daily    magnesium replacement protocol   Other RX Placeholder    aspirin  81 mg Oral Daily    atorvastatin  40 mg Oral Daily    insulin glargine  25 Units Subcutaneous Nightly    insulin lispro  0-6 Units Subcutaneous TID WC    insulin lispro  0-3 Units Subcutaneous Nightly    tiotropium  2 puff Inhalation Daily    tacrolimus  8 mg Oral BID    sulfamethoxazole-trimethoprim  1 tablet Oral Daily    cefTRIAXone (ROCEPHIN) IV  1 g Intravenous Q24H    potassium replacement protocol   Other RX Placeholder    mycophenolate  720 mg Oral BID    carvedilol  25 mg Oral BID    cloNIDine  0.2 mg Oral TID     Continuous Infusions:   dextrose      sodium chloride 75 mL/hr at 08/03/20 1857       CBC:   Recent Labs     08/02/20  1850 08/03/20  0320   WBC 17.0* 12.8*   HGB 10.0* 9.9*    142     CMP:    Recent Labs     08/02/20 1850 08/03/20 0320 08/04/20  0340    137 131*   K 3.4* 3.3* 3.6  3.6   CL 98 101 99   CO2 24 22* 21*   BUN 25* 25* 39*   CREATININE 1.4* 1.1 2.1*   GLUCOSE 176* 232* 254*   CALCIUM 9.9 9.5 9.2   LABGLOM 45* 60* 28*     Troponin: No results for input(s): TROPONINI in the last 72 hours. BNP: No results for input(s): BNP in the last 72 hours. INR:   Recent Labs     08/02/20 1850   INR 1.28*     Lipids:   Recent Labs     08/02/20 1850 08/03/20 0320   CHOL  --  106   TRIG  --  101   HDL  --  47   LIPASE 12.8  --      Liver:   Recent Labs     08/02/20 1850   AST 16   ALT 17   ALKPHOS 87   PROT 6.9   LABALBU 4.3   BILITOT 0.6     Iron:  No results for input(s): IRONS, FERRITIN in the last 72 hours. Invalid input(s): LABIRONS  XR CHEST (2 VW)   Final Result   No acute disease. **This report has been created using voice recognition software. It may contain minor errors which are inherent in voice recognition technology. **      Final report electronically signed by Dr. Trip Lujan on 8/2/2020 8:13 PM      XR CHEST PORTABLE    (Results Pending)         Objective:   Vitals: BP (!) 133/59   Pulse 70   Temp 99 °F (37.2 °C) (Oral)   Resp 18   Ht 5' 5\" (1.651 m)   Wt 182 lb 6.4 oz (82.7 kg)   SpO2 92%   BMI 30.35 kg/m²    Wt Readings from Last 3 Encounters:   08/04/20 182 lb 6.4 oz (82.7 kg)   07/08/20 180 lb (81.6 kg)   06/19/20 177 lb (80.3 kg)      24HR INTAKE/OUTPUT:      Intake/Output Summary (Last 24 hours) at 8/4/2020 0659  Last data filed at 8/4/2020 4624  Gross per 24 hour   Intake 2772.74 ml   Output 550 ml   Net 2222.74 ml       Constitutional:  Alert and awake. In some distress, leaning on left side near emesis bag  Skin:normal with no rash or lesions. HEENT:Pupils are reactive . Throat is cler . Oral mucosa is moist  Neck:supple with no thyromegaly or carotid bruit  Cardiovascular:  S1, S2 without murmur or rubs  Respiratory:  Clear to ausculation without wheezes, rhonchi or rales  Abdomen: +bs, transplanted kidney palpable on right lower side  Ext:  No LE edema  Musculoskeletal:Intact  Neuro:Alert and awake      Electronically signed by Gulshan Angulo MD on 8/4/2020 at 6:59 AM

## 2020-08-04 NOTE — CARE COORDINATION
8/4/20, 12:02 PM EDT    DISCHARGE ONGOING EVALUATION:     Reinier Wadsworth day: 2  Location: [facility]06/006-A Reason for admit: UTI (urinary tract infection) [N39.0]   Treatment Plan of Care: Na+ 131, creat 2.1, Mg+ 2.5, BS 200s. IVF, IV Rocephin, IV pepcid, DM management. CT abd ordered. Nephrology following. PT/OT. Barriers to Discharge: not medically ready   PCP: Jazmín Collins DO  Readmission Risk Score: 28%  Patient Goals/Plan/Treatment Preferences: Home alone. Children assist as needed. Has [facility] services for blood draws (unsure of agency).

## 2020-08-04 NOTE — PROGRESS NOTES
Assessment and Plan:        Sepsis due to urinary tract infection: Urinalysis suggest UTI. Urine culture Is growing Ecoli. Sensitive to ceftriaxone.   -Continue ceftriaxone  -Continue maintenance IV fluids. VIDA with History of  donor renal transplant: Performed on 2020. Per care everywhere HLA mismatch was A0, B0, DR 0. CMV D-/R+, EBV R+, KDPI 52%. Pre-transplant cPRA 26%. She underwent ATG and rapid prednisone taper. She is currently on mycophenolate and tacrolimus.  -Continue mycophenolate and tacrolimus  -Consult nephrology. Will defer to immunosuppressive levels per their discretion.  -Treat UTI as above  -Mild hydronephrosis with mild engorgement of the transplant kidney noted. Nephrology updated. We will continue to monitor. VIDA seems to be improving this afternoon. Continue IV fluids    Persistent nausea and vomiting: Suspect related to sepsis, however, would expect that this should improved here soon. We will continue PRN antiemetics. Did have a mild troponin elevation as discussed below. Will monitor very closely if still persistent and no evidence of any cardiac pathology will discuss with GI tomorrow. Hypertension: Continue home Coreg and clonidine    Anemia: chronic likely due to chronic kidney disease. Continue to monitor    Type 2 diabetes: Continue basal insulin and sliding scale insulin with carb controlled diet    Hypokalemia/hypomagnesemia: Replace PRN    Troponin elevation: Suspect this is likely related to her acute stress. EKG without any sign of acute infarction. Continue to monitor        CC: Vomiting  Hospital course: Ms. Chanell Maria is a 74 Y/O female presenting at [facility]. [facility]'s ED complaining shortness of breath and nausea with a past medical history of CKD, kidney transplant, CHF, COPD, CAD, and other co morbidities. Patient she has been feeling short of breath recently and has been vomiting. She reports chronic cough, chronic le numbness, and nausea.  Patient denies chest pain, headache, dizziness, lightheadedness, paraesthesias, weakness, chills, fevers, abdominal pain, diarrhea. Patient admitted to hospitalist services, consult to CHF clinic, and nephrology. Patient's COVID test was negative in the ED  Subjective: (12 point review of systems completed. Pertinent positives noted. Otherwise ROS is negative) : Patient appears to be more alert today, however, she is having more more issues with nausea and vomiting. She is afebrile today. She denies any chest pain. No blood noted in vomit. PMH:  Per HPI  SHX:    Social History     Tobacco Use    Smoking status: Former Smoker     Packs/day: 1.50     Years: 30.00     Pack years: 45.00     Types: Cigarettes     Start date: 1981     Last attempt to quit: 3/13/2009     Years since quittin.4    Smokeless tobacco: Never Used    Tobacco comment: quit 2009   Substance Use Topics    Alcohol use: No     Alcohol/week: 0.0 standard drinks    Drug use: No     FHX:   Family History   Problem Relation Age of Onset    Diabetes Sister     Diabetes Brother     Diabetes Sister     Diabetes Sister     Cancer Sister     Early Death Sister     Heart Disease Sister     Diabetes Sister     Heart Disease Sister     Diabetes Sister     Diabetes Brother     Arthritis Mother     Heart Disease Mother     High Blood Pressure Mother     Kidney Disease Mother     Mental Illness Mother     Stroke Mother     Heart Disease Father     Diabetes Father     Obesity Father     Alcohol Abuse Father      Allergies:    Allergies   Allergen Reactions    Pioglitazone Swelling    Actos [Pioglitazone Hydrochloride] Swelling    Cymbalta [Duloxetine Hcl] Other (See Comments)     Anxiety and lethargic    Gabapentin Anxiety     Medications:     dextrose      sodium chloride 100 mL/hr at 20 0846      lactulose  20 g Oral BID    insulin lispro  0-12 Units Subcutaneous TID WC    insulin lispro  0-6 Units Subcutaneous Nightly    haloperidol lactate  1 mg Intramuscular Once    sodium chloride flush  10 mL Intravenous 2 times per day    enoxaparin  40 mg Subcutaneous Daily    famotidine (PEPCID) injection  20 mg Intravenous Daily    amLODIPine  10 mg Oral Daily    magnesium replacement protocol   Other RX Placeholder    aspirin  81 mg Oral Daily    atorvastatin  40 mg Oral Daily    insulin glargine  25 Units Subcutaneous Nightly    tiotropium  2 puff Inhalation Daily    tacrolimus  8 mg Oral BID    [Held by provider] sulfamethoxazole-trimethoprim  1 tablet Oral Daily    cefTRIAXone (ROCEPHIN) IV  1 g Intravenous Q24H    potassium replacement protocol   Other RX Placeholder    mycophenolate  720 mg Oral BID    carvedilol  25 mg Oral BID    cloNIDine  0.2 mg Oral TID       Vital Signs:   BP (!) 177/86   Pulse 80   Temp 97.6 °F (36.4 °C) (Axillary)   Resp 16   Ht 5' 5\" (1.651 m)   Wt 182 lb 6.4 oz (82.7 kg)   SpO2 91%   BMI 30.35 kg/m²      Intake/Output Summary (Last 24 hours) at 8/4/2020 1838  Last data filed at 8/4/2020 1500  Gross per 24 hour   Intake 3580.14 ml   Output 650 ml   Net 2930.14 ml        General: Appears to be acutely ill  HEENT:  normocephalic and atraumatic. No scleral icterus. PERR. Neck: supple. No JVD. No thyromegaly. Lungs: Bibasilar crackles  Cardiac: RRR without murmur. Systolic murmur grade 1 out of 6 best heard at the right upper sternal border  Abdomen: soft. Nontender. Bowel sounds positive. Extremities:  No clubbing, cyanosis, or edema x 4. Vasculature: capillary refill < 3 seconds. Palpable LE pulses bilaterally. Skin:  warm and dry. Psych:  Alert and oriented x3. Affect appropriate  Lymph:  No supraclavicular adenopathy. Neurologic:  No focal deficit. No seizures. Data: (All radiographs, tracings, PFTs, and imaging are personally viewed and interpreted unless otherwise noted).    Recent Results (from the past 24 hour(s))   POCT Glucose    Collection Time: 08/03/20  8:04 PM   Result Value Ref Range    POC Glucose 297 (H) 70 - 108 mg/dl   Basic Metabolic Panel    Collection Time: 08/04/20  3:40 AM   Result Value Ref Range    Sodium 131 (L) 135 - 145 meq/L    Potassium 3.6 3.5 - 5.2 meq/L    Chloride 99 98 - 111 meq/L    CO2 21 (L) 23 - 33 meq/L    Glucose 254 (H) 70 - 108 mg/dL    BUN 39 (H) 7 - 22 mg/dL    CREATININE 2.1 (H) 0.4 - 1.2 mg/dL    Calcium 9.2 8.5 - 10.5 mg/dL   Magnesium    Collection Time: 08/04/20  3:40 AM   Result Value Ref Range    Magnesium 2.5 (H) 1.6 - 2.4 mg/dL   Basic Metabolic Panel w/ Reflex to MG    Collection Time: 08/04/20  3:40 AM   Result Value Ref Range    Potassium reflex Magnesium 3.6 3.5 - 5.2 meq/L   Anion Gap    Collection Time: 08/04/20  3:40 AM   Result Value Ref Range    Anion Gap 11.0 8.0 - 16.0 meq/L   Glomerular Filtration Rate, Estimated    Collection Time: 08/04/20  3:40 AM   Result Value Ref Range    Est, Glom Filt Rate 28 (A) ml/min/1.73m2   POCT Glucose    Collection Time: 08/04/20  6:12 AM   Result Value Ref Range    POC Glucose 278 (H) 70 - 108 mg/dl   POCT Glucose    Collection Time: 08/04/20 10:53 AM   Result Value Ref Range    POC Glucose 266 (H) 70 - 108 mg/dl   Eosinophil smear urine    Collection Time: 08/04/20 11:00 AM   Result Value Ref Range    Eosinophil Smear None seen NONE SEEN    Specimen urine    Basic Metabolic Panel    Collection Time: 08/04/20 11:47 AM   Result Value Ref Range    Sodium 136 135 - 145 meq/L    Potassium 4.0 3.5 - 5.2 meq/L    Chloride 103 98 - 111 meq/L    CO2 20 (L) 23 - 33 meq/L    Glucose 242 (H) 70 - 108 mg/dL    BUN 41 (H) 7 - 22 mg/dL    CREATININE 1.9 (H) 0.4 - 1.2 mg/dL    Calcium 9.8 8.5 - 10.5 mg/dL   Hemoglobin A1c    Collection Time: 08/04/20 11:47 AM   Result Value Ref Range    Hemoglobin A1C 6.9 (H) 4.4 - 6.4 %    AVERAGE GLUCOSE 147 (H) 70 - 126 mg/dL   Anion Gap    Collection Time: 08/04/20 11:47 AM   Result Value Ref Range    Anion Gap 13.0 8.0 - 16.0 meq/L   Glomerular Filtration Rate, Estimated    Collection Time: 08/04/20 11:47 AM   Result Value Ref Range    Est, Glom Filt Rate 32 (A) ml/min/1.73m2   POCT Glucose    Collection Time: 08/04/20  4:28 PM   Result Value Ref Range    POC Glucose 241 (H) 70 - 108 mg/dl        CT ABDOMEN PELVIS WO CONTRAST Additional Contrast? None   Final Result      1. There is a transplant kidney in the right side of the pelvis. It has a slightly edematous appearance and there appears to be mild hydronephrosis. Evaluation is limited as there is a lack of IV contrast. This finding was discussed by Dr. Cristian Rose with    [facility] at approximately 3:30 PM May 4, 2020 via telephone. 2. There is no evidence of emphysematous pyelonephritis involving the right kidney. The finding on the previous x-ray is likely related to the patient's large amount of retained stool in the colon which was projecting over the right renal shadow. 3. There is a hyperdense lesion arising from the inferior pole the left kidney. This measures 4.9 cm. This could represent a hemorrhagic cyst although other etiologies can't be excluded. 4. Cardiomegaly. Diffuse coronary artery calcifications. **This report has been created using voice recognition software. It may contain minor errors which are inherent in voice recognition technology. **      Final report electronically signed by Dr Mahendra Muhammad on 8/4/2020 3:38 PM      [facility]   Final Result       1. Enlarged appearance of the pelvic transplant kidney with mild hydronephrosis. This finding was discussed by Dr. Cristian Rose with [employer] at approximately 3:30 PM 8/4/2020 via telephone. 2. There is a nonvascular cyst arising from the inferior pole the left kidney. 3. Atrophic appearance of the bilateral native kidneys. **This report has been created using voice recognition software. It may contain minor errors which are inherent in voice recognition technology. ** Final report electronically signed by Dr Marixa Tejeda on 8/4/2020 3:43 PM      XR ABDOMEN (KUB) (SINGLE AP VIEW)   Final Result   1. Possible emphysematous infection involving the native right kidney. Patient may benefit from CT of the abdomen. 2. Moderate constipation. This was discussed with  directly at 8:29 AM            **This report has been created using voice recognition software. It may contain minor errors which are inherent in voice recognition technology. **      Final report electronically signed by Dr. Zoltan Martines on 8/4/2020 8:30 AM      XR CHEST (2 VW)   Final Result   No acute disease. **This report has been created using voice recognition software. It may contain minor errors which are inherent in voice recognition technology. **      Final report electronically signed by Dr. Carmine Nava on 8/2/2020 8:13 PM      XR CHEST PORTABLE    (Results Pending)         Electronically signed by Tayla Camarena MD on 8/4/2020 at 6:38 PM

## 2020-08-04 NOTE — PROCEDURES
A Bladder scan was performed at 1120 . The patient's last void was at 1100 . The residual amount was measured to be 69 ML. Report of results was given to [facility].

## 2020-08-04 NOTE — CARE COORDINATION
8/4/20, 2:52 PM EDT    DISCHARGE PLANNING EVALUATION    SW attempted twice this afternoon to see pt for assessment. First attempt pt was off unit for testing. Second attempt pt was nauseated, not feeling well and requested SW return 8/5/2020.

## 2020-08-04 NOTE — PLAN OF CARE
Problem: Falls - Risk of:  Goal: Will remain free from falls  Description: Will remain free from falls  8/4/2020 1352 by Cinthia Barrow RN  Outcome: Ongoing  Note: No falls this shift, call light in reach. Bed alarm on. Up with 1 assist and walker. Problem: Skin Integrity:  Goal: Absence of new skin breakdown  Description: Absence of new skin breakdown  8/4/2020 1352 by Cinthia Barrow RN  Outcome: Ongoing  Note: No new skin breakdown noted, pt turns and repositions self frequently     Problem: Infection:  Goal: Will remain free from infection  Description: Will remain free from infection  Outcome: Ongoing  Note: Afebrile this shift, IV rocephin being given     Problem: Daily Care:  Goal: Daily care needs are met  Description: Daily care needs are met  8/4/2020 1352 by Cinthia Barrow RN  Outcome: Ongoing  Note: Pt assisted with ADLs     Problem: Pain:  Goal: Patient's pain/discomfort is manageable  Description: Patient's pain/discomfort is manageable  8/4/2020 1352 by Cinthia Barrow RN  Outcome: Ongoing  Note: Complains of abdominal pain rated 9/10, pain goal 2-3. Refusing tylenol, physician paged for home norco to be restarted per pt request     Problem: Discharge Planning:  Goal: Patients continuum of care needs are met  Description: Patients continuum of care needs are met  8/4/2020 1352 by Cinthia Barrow RN  Outcome: Ongoing  Note: Plans on returning home at discharge     Problem: Nutrition  Goal: Optimal nutrition therapy  8/4/2020 1352 by Cinthia Barrow RN  Outcome: Ongoing  Note: Having nausea today, taking only sips     Care plan reviewed with patient. Patient verbalize understanding of the plan of care and contribute to goal setting.

## 2020-08-05 LAB
ANION GAP SERPL CALCULATED.3IONS-SCNC: 10 MEQ/L (ref 8–16)
BUN BLDV-MCNC: 32 MG/DL (ref 7–22)
CALCIUM SERPL-MCNC: 9.7 MG/DL (ref 8.5–10.5)
CHLORIDE BLD-SCNC: 104 MEQ/L (ref 98–111)
CO2: 21 MEQ/L (ref 23–33)
CREAT SERPL-MCNC: 1.3 MG/DL (ref 0.4–1.2)
GLUCOSE BLD-MCNC: 141 MG/DL (ref 70–108)
GLUCOSE BLD-MCNC: 155 MG/DL (ref 70–108)
GLUCOSE BLD-MCNC: 158 MG/DL (ref 70–108)
GLUCOSE BLD-MCNC: 166 MG/DL (ref 70–108)
GLUCOSE BLD-MCNC: 85 MG/DL (ref 70–108)
MAGNESIUM: 2.3 MG/DL (ref 1.6–2.4)
POTASSIUM SERPL-SCNC: 3.3 MEQ/L (ref 3.5–5.2)
POTASSIUM SERPL-SCNC: 3.9 MEQ/L (ref 3.5–5.2)
PRO-BNP: 6615 PG/ML (ref 0–900)
SODIUM BLD-SCNC: 135 MEQ/L (ref 135–145)
TROPONIN T: < 0.01 NG/ML
TROPONIN T: < 0.01 NG/ML

## 2020-08-05 PROCEDURE — 80180 DRUG SCRN QUAN MYCOPHENOLATE: CPT

## 2020-08-05 PROCEDURE — 93005 ELECTROCARDIOGRAM TRACING: CPT | Performed by: FAMILY MEDICINE

## 2020-08-05 PROCEDURE — 83735 ASSAY OF MAGNESIUM: CPT

## 2020-08-05 PROCEDURE — 6370000000 HC RX 637 (ALT 250 FOR IP): Performed by: INTERNAL MEDICINE

## 2020-08-05 PROCEDURE — 99232 SBSQ HOSP IP/OBS MODERATE 35: CPT | Performed by: FAMILY MEDICINE

## 2020-08-05 PROCEDURE — 83880 ASSAY OF NATRIURETIC PEPTIDE: CPT

## 2020-08-05 PROCEDURE — 6360000002 HC RX W HCPCS: Performed by: FAMILY MEDICINE

## 2020-08-05 PROCEDURE — 84132 ASSAY OF SERUM POTASSIUM: CPT

## 2020-08-05 PROCEDURE — 6370000000 HC RX 637 (ALT 250 FOR IP): Performed by: PHYSICIAN ASSISTANT

## 2020-08-05 PROCEDURE — 84484 ASSAY OF TROPONIN QUANT: CPT

## 2020-08-05 PROCEDURE — 80048 BASIC METABOLIC PNL TOTAL CA: CPT

## 2020-08-05 PROCEDURE — 36415 COLL VENOUS BLD VENIPUNCTURE: CPT

## 2020-08-05 PROCEDURE — 82948 REAGENT STRIP/BLOOD GLUCOSE: CPT

## 2020-08-05 PROCEDURE — 2580000003 HC RX 258: Performed by: INTERNAL MEDICINE

## 2020-08-05 PROCEDURE — 2580000003 HC RX 258: Performed by: PHYSICIAN ASSISTANT

## 2020-08-05 PROCEDURE — 1200000003 HC TELEMETRY R&B

## 2020-08-05 PROCEDURE — 6360000002 HC RX W HCPCS: Performed by: INTERNAL MEDICINE

## 2020-08-05 PROCEDURE — 6360000002 HC RX W HCPCS: Performed by: PHYSICIAN ASSISTANT

## 2020-08-05 PROCEDURE — 6360000002 HC RX W HCPCS: Performed by: STUDENT IN AN ORGANIZED HEALTH CARE EDUCATION/TRAINING PROGRAM

## 2020-08-05 PROCEDURE — 2500000003 HC RX 250 WO HCPCS: Performed by: PHYSICIAN ASSISTANT

## 2020-08-05 PROCEDURE — 99233 SBSQ HOSP IP/OBS HIGH 50: CPT | Performed by: INTERNAL MEDICINE

## 2020-08-05 RX ORDER — PROMETHAZINE HYDROCHLORIDE 25 MG/ML
6.25 INJECTION, SOLUTION INTRAMUSCULAR; INTRAVENOUS EVERY 6 HOURS PRN
Status: DISCONTINUED | OUTPATIENT
Start: 2020-08-05 | End: 2020-08-06 | Stop reason: HOSPADM

## 2020-08-05 RX ORDER — FUROSEMIDE 10 MG/ML
20 INJECTION INTRAMUSCULAR; INTRAVENOUS ONCE
Status: COMPLETED | OUTPATIENT
Start: 2020-08-05 | End: 2020-08-05

## 2020-08-05 RX ORDER — HALOPERIDOL 5 MG/ML
0.5 INJECTION INTRAMUSCULAR ONCE
Status: COMPLETED | OUTPATIENT
Start: 2020-08-05 | End: 2020-08-05

## 2020-08-05 RX ORDER — SODIUM CHLORIDE 9 MG/ML
INJECTION, SOLUTION INTRAVENOUS CONTINUOUS
Status: ACTIVE | OUTPATIENT
Start: 2020-08-05 | End: 2020-08-05

## 2020-08-05 RX ORDER — HYDRALAZINE HYDROCHLORIDE 20 MG/ML
10 INJECTION INTRAMUSCULAR; INTRAVENOUS EVERY 4 HOURS PRN
Status: DISCONTINUED | OUTPATIENT
Start: 2020-08-05 | End: 2020-08-06 | Stop reason: HOSPADM

## 2020-08-05 RX ORDER — POTASSIUM CHLORIDE 7.45 MG/ML
10 INJECTION INTRAVENOUS
Status: COMPLETED | OUTPATIENT
Start: 2020-08-05 | End: 2020-08-05

## 2020-08-05 RX ADMIN — ENOXAPARIN SODIUM 40 MG: 40 INJECTION SUBCUTANEOUS at 08:24

## 2020-08-05 RX ADMIN — POTASSIUM CHLORIDE 10 MEQ: 7.46 INJECTION, SOLUTION INTRAVENOUS at 12:27

## 2020-08-05 RX ADMIN — MYCOPHENOLIC ACID 720 MG: 180 TABLET, DELAYED RELEASE ORAL at 09:09

## 2020-08-05 RX ADMIN — AMLODIPINE BESYLATE 10 MG: 10 TABLET ORAL at 09:08

## 2020-08-05 RX ADMIN — HYDROMORPHONE HYDROCHLORIDE 0.5 MG: 1 INJECTION, SOLUTION INTRAMUSCULAR; INTRAVENOUS; SUBCUTANEOUS at 09:43

## 2020-08-05 RX ADMIN — CARVEDILOL 25 MG: 25 TABLET, FILM COATED ORAL at 20:43

## 2020-08-05 RX ADMIN — CLONIDINE HYDROCHLORIDE 0.2 MG: 0.2 TABLET ORAL at 09:09

## 2020-08-05 RX ADMIN — ATORVASTATIN CALCIUM 40 MG: 40 TABLET, FILM COATED ORAL at 20:44

## 2020-08-05 RX ADMIN — TACROLIMUS 8 MG: 1 CAPSULE ORAL at 09:09

## 2020-08-05 RX ADMIN — MYCOPHENOLIC ACID 720 MG: 180 TABLET, DELAYED RELEASE ORAL at 20:45

## 2020-08-05 RX ADMIN — LACTULOSE 20 G: 20 SOLUTION ORAL at 20:44

## 2020-08-05 RX ADMIN — SODIUM CHLORIDE, PRESERVATIVE FREE 10 ML: 5 INJECTION INTRAVENOUS at 20:46

## 2020-08-05 RX ADMIN — HYDROMORPHONE HYDROCHLORIDE 0.5 MG: 1 INJECTION, SOLUTION INTRAMUSCULAR; INTRAVENOUS; SUBCUTANEOUS at 04:42

## 2020-08-05 RX ADMIN — TACROLIMUS 8 MG: 1 CAPSULE ORAL at 20:44

## 2020-08-05 RX ADMIN — SODIUM CHLORIDE, PRESERVATIVE FREE 10 ML: 5 INJECTION INTRAVENOUS at 09:22

## 2020-08-05 RX ADMIN — POTASSIUM CHLORIDE 10 MEQ: 7.46 INJECTION, SOLUTION INTRAVENOUS at 09:45

## 2020-08-05 RX ADMIN — FUROSEMIDE 20 MG: 10 INJECTION, SOLUTION INTRAMUSCULAR; INTRAVENOUS at 08:24

## 2020-08-05 RX ADMIN — CARVEDILOL 25 MG: 25 TABLET, FILM COATED ORAL at 09:09

## 2020-08-05 RX ADMIN — CLONIDINE HYDROCHLORIDE 0.2 MG: 0.2 TABLET ORAL at 20:43

## 2020-08-05 RX ADMIN — POTASSIUM CHLORIDE 10 MEQ: 7.46 INJECTION, SOLUTION INTRAVENOUS at 11:24

## 2020-08-05 RX ADMIN — ONDANSETRON 4 MG: 2 INJECTION INTRAMUSCULAR; INTRAVENOUS at 08:21

## 2020-08-05 RX ADMIN — CEFTRIAXONE SODIUM 1 G: 1 INJECTION, POWDER, FOR SOLUTION INTRAMUSCULAR; INTRAVENOUS at 06:48

## 2020-08-05 RX ADMIN — HYDROMORPHONE HYDROCHLORIDE 0.5 MG: 1 INJECTION, SOLUTION INTRAMUSCULAR; INTRAVENOUS; SUBCUTANEOUS at 20:59

## 2020-08-05 RX ADMIN — HYDRALAZINE HYDROCHLORIDE 10 MG: 20 INJECTION INTRAMUSCULAR; INTRAVENOUS at 12:27

## 2020-08-05 RX ADMIN — ASPIRIN 81 MG: 81 TABLET ORAL at 09:09

## 2020-08-05 RX ADMIN — FAMOTIDINE 20 MG: 10 INJECTION INTRAVENOUS at 09:22

## 2020-08-05 RX ADMIN — PROMETHAZINE HYDROCHLORIDE 6.25 MG: 25 INJECTION INTRAMUSCULAR; INTRAVENOUS at 09:13

## 2020-08-05 RX ADMIN — HALOPERIDOL LACTATE 0.5 MG: 5 INJECTION, SOLUTION INTRAMUSCULAR at 12:40

## 2020-08-05 RX ADMIN — POTASSIUM CHLORIDE 10 MEQ: 7.46 INJECTION, SOLUTION INTRAVENOUS at 08:24

## 2020-08-05 ASSESSMENT — PAIN DESCRIPTION - DESCRIPTORS: DESCRIPTORS: ACHING

## 2020-08-05 ASSESSMENT — PAIN SCALES - GENERAL
PAINLEVEL_OUTOF10: 7
PAINLEVEL_OUTOF10: 8
PAINLEVEL_OUTOF10: 7
PAINLEVEL_OUTOF10: 5
PAINLEVEL_OUTOF10: 7

## 2020-08-05 ASSESSMENT — PAIN DESCRIPTION - LOCATION: LOCATION: GENERALIZED

## 2020-08-05 ASSESSMENT — PAIN - FUNCTIONAL ASSESSMENT: PAIN_FUNCTIONAL_ASSESSMENT: ACTIVITIES ARE NOT PREVENTED

## 2020-08-05 ASSESSMENT — PAIN DESCRIPTION - ONSET: ONSET: ON-GOING

## 2020-08-05 ASSESSMENT — PAIN DESCRIPTION - FREQUENCY: FREQUENCY: CONTINUOUS

## 2020-08-05 ASSESSMENT — PAIN DESCRIPTION - PAIN TYPE: TYPE: ACUTE PAIN

## 2020-08-05 NOTE — FLOWSHEET NOTE
[facility]  PHYSICAL THERAPY MISSED TREATMENT NOTE  [facility] RENAL TELEMETRY 6K    Date: 2020  Patient Name: Oumar Castañeda        MRN: [id number]   : 1955  (75 y.o.)  Gender: female                REASON FOR MISSED TREATMENT:  Hold treatment per nursing request.      Patient given Haldol to decrease nausea - possible transfer to [facility] as seen in notes.

## 2020-08-05 NOTE — PLAN OF CARE
Problem: Falls - Risk of:  Goal: Will remain free from falls  Description: Will remain free from falls  8/5/2020 0217 by Guru Greer RN  Outcome: Ongoing  Note: No falls noted this shift. Falling star protocol in place and call light within reach. Bed alarm active and nonskid socks on. Patient utilizes call light appropriately        Problem: Falls - Risk of:  Goal: Absence of physical injury  Description: Absence of physical injury  Outcome: Ongoing     Problem: Skin Integrity:  Goal: Will show no infection signs and symptoms  Description: Will show no infection signs and symptoms  Outcome: Ongoing     Problem: Skin Integrity:  Goal: Absence of new skin breakdown  Description: Absence of new skin breakdown  8/5/2020 0217 by Guru Greer RN  Outcome: Ongoing  Note: No new skin breakdown noted this shift. Patient encouraged to turn and make frequent positional changes. Will continue to monitor. Problem: Urinary Elimination:  Goal: Signs and symptoms of infection will decrease  Description: Signs and symptoms of infection will decrease  Outcome: Ongoing  Note: No S/S of infection. Will continue to assess      Problem: Urinary Elimination:  Goal: Complications related to the disease process, condition or treatment will be avoided or minimized  Description: Complications related to the disease process, condition or treatment will be avoided or minimized  Outcome: Ongoing     Problem: Infection:  Goal: Will remain free from infection  Description: Will remain free from infection  8/5/2020 0217 by Guru Greer RN  Outcome: Ongoing     Problem: Daily Care:  Goal: Daily care needs are met  Description: Daily care needs are met  8/5/2020 0217 by Guru Greer, RN  Outcome: Ongoing  Note: Patient's needs are met at this time. Continue to assist as needed.         Problem: Pain:  Goal: Patient's pain/discomfort is manageable  Description: Patient's pain/discomfort is manageable  8/5/2020 0217 by Guur Greer, RN  Outcome: Ongoing  Note: Pt rates pain a 8/10 in abdomen. Pain is described as sharp. PRN pain medication given per MAR. Pt encouraged to reposition. Will continue to monitor     Problem: Pain:  Goal: Pain level will decrease  Description: Pain level will decrease  Outcome: Ongoing     Problem: Pain:  Goal: Control of acute pain  Description: Control of acute pain  Outcome: Ongoing     Problem: Pain:  Goal: Control of chronic pain  Description: Control of chronic pain  Outcome: Ongoing     Problem: Skin Integrity:  Goal: Skin integrity will stabilize  Description: Skin integrity will stabilize  Outcome: Ongoing     Problem: Discharge Planning:  Goal: Patients continuum of care needs are met  Description: Patients continuum of care needs are met  8/5/2020 0217 by Joshua Potts RN  Outcome: Ongoing  Note: Patient's needs are met at this time. Continue to assist as needed. Problem: Nutrition  Goal: Optimal nutrition therapy  8/5/2020 0217 by Joshua Potts RN  Outcome: Ongoing      Care plan reviewed with patient. Patient  verbalize understanding of the plan of care and contribute to goal setting.

## 2020-08-05 NOTE — FLOWSHEET NOTE
[facility]  PHYSICAL THERAPY MISSED TREATMENT NOTE  [facility] RENAL TELEMETRY 6K    Date: 2020  Patient Name: Alberto Kwon        MRN: [id number]   : 1955  (75 y.o.)  Gender: female                REASON FOR MISSED TREATMENT:  Patient refused treatment. Patient refused at this time secondary to nausea/vomiting. States would like to be seen however does not feel good enough right now to be seen.

## 2020-08-05 NOTE — PROGRESS NOTES
Hospitalist Progress Note    Patient:  Yohana Davila      Unit/Bed:6K-06/006-A    YOB: 1955    MRN: [id number]       Acct: [de-identified]     PCP: Aly Arriaga DO    Date of Admission: 8/2/2020    Chief Complaint: sob/nausea/vomiting    Hospital Course:     Briefly, this is a 22-year-old female, with past medical history of CKD, right kidney transplant, CHF, EF 54 to 60%, COPD, CAD, who presented to [facility] on 8/2/2020 due to above chief complaint. Per H&P note, \"Patient she has been feeling short of breath recently and has been vomiting. She reports chronic cough, chronic le numbness, and nausea. Patient denies chest pain, headache, dizziness, lightheadedness, paraesthesias, weakness, chills, fevers, abdominal pain, diarrhea. \"  Patient was admitted under hospital medicine service for shortness of breath and cystitis. She is also being treated for sepsis secondary to UTI, currently on Rocephin. Of note, patient started having abdominal pain, CT abdomen/pelvis was ordered on 8/20 which showed: \"There is a transplant kidney in the right side of the pelvis. It has a slightly edematous appearance and there appears to be mild hydronephrosis. There is no evidence of emphysematous pyelonephritis involving the right kidney. The finding on the previous x-ray is likely related to the patient's large amount of retained stool in the colon which was projecting over the right renal shadow. There is a hyperdense lesion arising from the inferior pole the left kidney. This measures 4.9 cm. This could represent a hemorrhagic cyst although other etiologies can't be excluded\". 8/5: Patient is having intractable nausea and vomiting overnight per RN. Subjective:     Patient seen and examined. Patient reports epigastric pain, and N/V that started on 8/3/2020 morning. She still feel nauseous and having dry heaving and vomiting today.   She states that her epigastric pain is constant, 6 out of 10 in intensity. She also reports she started having soreness on her left-sided chest that started this morning. When asked how bad he said pain, she states that it's just \"sore\". Last bowel movement was last night. She denies cough, leg swelling, dysuria, hematuria, urgency. She does report shortness of breath on exertion that started on 8/2/2020.        Medications:  Reviewed    Infusion Medications    dextrose       Scheduled Medications    lactulose  20 g Oral BID    insulin lispro  0-12 Units Subcutaneous TID     insulin lispro  0-6 Units Subcutaneous Nightly    insulin glargine  28 Units Subcutaneous Nightly    sodium chloride flush  10 mL Intravenous 2 times per day    enoxaparin  40 mg Subcutaneous Daily    famotidine (PEPCID) injection  20 mg Intravenous Daily    amLODIPine  10 mg Oral Daily    magnesium replacement protocol   Other RX Placeholder    aspirin  81 mg Oral Daily    atorvastatin  40 mg Oral Daily    tiotropium  2 puff Inhalation Daily    tacrolimus  8 mg Oral BID    [Held by provider] sulfamethoxazole-trimethoprim  1 tablet Oral Daily    cefTRIAXone (ROCEPHIN) IV  1 g Intravenous Q24H    potassium replacement protocol   Other RX Placeholder    mycophenolate  720 mg Oral BID    carvedilol  25 mg Oral BID    cloNIDine  0.2 mg Oral TID     PRN Meds: promethazine, hydrALAZINE, fluticasone, glucose, dextrose, glucagon (rDNA), dextrose, HYDROmorphone **OR** HYDROmorphone, sodium chloride flush, acetaminophen **OR** acetaminophen, polyethylene glycol, [DISCONTINUED] promethazine **OR** ondansetron, albuterol sulfate HFA, nitroGLYCERIN, magnesium hydroxide      Intake/Output Summary (Last 24 hours) at 8/5/2020 1241  Last data filed at 8/5/2020 1128  Gross per 24 hour   Intake 3176.6 ml   Output 1250 ml   Net 1926.6 ml       Diet:  Diet NPO Effective Now    Exam:  BP (!) 186/80   Pulse 79   Temp 98.5 °F (36.9 °C) (Oral)   Resp 18   Ht 5' 5\" (1.651 m)   Wt 182 lb 14.4 oz (83 kg)   SpO2 94%   BMI 30.44 kg/m²     General appearance: alert, not in acute distress. HEENT: Pupils equal, round, and reactive to light. Conjunctivae clear. Dry oral mucosa. Neck: Supple, with full range of motion. No jugular venous distention. Trachea midline. Respiratory:  Normal respiratory effort. Clear to auscultation, bilaterally without Rales/Wheezes/Rhonchi. No chest tenderness   Cardiovascular: normal rate, regular rhythm with normal S1/S2 without murmurs, rubs or gallops. Abdomen:  non-distended with normal bowel sounds,  Soft, (+) epigastric and mid abdominal tenderness   Musculoskeletal: passive and active ROM x 4 extremities. Exam of extremities: peripheral pulses normal, no pedal edema, no clubbing or cyanosis      Labs:   Recent Labs     08/02/20  1850 08/03/20  0320   WBC 17.0* 12.8*   HGB 10.0* 9.9*   HCT 31.7* 30.8*    142     Recent Labs     08/04/20  0340 08/04/20  1147 08/05/20  0335   * 136 135   K 3.6  3.6 4.0 3.3*   CL 99 103 104   CO2 21* 20* 21*   BUN 39* 41* 32*   CREATININE 2.1* 1.9* 1.3*   CALCIUM 9.2 9.8 9.7     Recent Labs     08/02/20  1850   AST 16   ALT 17   BILIDIR <0.2   BILITOT 0.6   ALKPHOS 87     Recent Labs     08/02/20  1850   INR 1.28*     No results for input(s): CKTOTAL, TROPONINI in the last 72 hours. Urinalysis:      Lab Results   Component Value Date    NITRU POSITIVE 08/02/2020    WBCUA 15-25 08/02/2020    BACTERIA MANY 08/02/2020    RBCUA 5-10 08/02/2020    BLOODU SMALL 08/02/2020    SPECGRAV 1.007 12/13/2018    GLUCOSEU 100 08/02/2020       Radiology:  CT ABDOMEN PELVIS WO CONTRAST Additional Contrast? None   Final Result      1. There is a transplant kidney in the right side of the pelvis. It has a slightly edematous appearance and there appears to be mild hydronephrosis.  Evaluation is limited as there is a lack of IV contrast. This finding was discussed by Dr. Zahraa Nieto with    [facility] at approximately 3:30 PM May 4, 2020 via telephone. 2. There is no evidence of emphysematous pyelonephritis involving the right kidney. The finding on the previous x-ray is likely related to the patient's large amount of retained stool in the colon which was projecting over the right renal shadow. 3. There is a hyperdense lesion arising from the inferior pole the left kidney. This measures 4.9 cm. This could represent a hemorrhagic cyst although other etiologies can't be excluded. 4. Cardiomegaly. Diffuse coronary artery calcifications. **This report has been created using voice recognition software. It may contain minor errors which are inherent in voice recognition technology. **      Final report electronically signed by Dr Mckenna Lezama on 8/4/2020 3:38 PM      [address] [place] [address]   Final Result       1. Enlarged appearance of the pelvic transplant kidney with mild hydronephrosis. This finding was discussed by Dr. Kar De Leon with [address] [place] [address] at approximately 3:30 PM 8/4/2020 via telephone. 2. There is a nonvascular cyst arising from the inferior pole the left kidney. 3. Atrophic appearance of the bilateral native kidneys. **This report has been created using voice recognition software. It may contain minor errors which are inherent in voice recognition technology. **      Final report electronically signed by Dr Mckenna Lezama on 8/4/2020 3:43 PM      XR ABDOMEN (KUB) (SINGLE AP VIEW)   Final Result   1. Possible emphysematous infection involving the native right kidney. Patient may benefit from CT of the abdomen. 2. Moderate constipation. This was discussed with  directly at 8:29 AM            **This report has been created using voice recognition software. It may contain minor errors which are inherent in voice recognition technology. **      Final report electronically signed by Dr. Renetta Lr on 8/4/2020 8:30 AM      XR CHEST (2 VW)   Final Result   No acute disease. Case d/w Dr. Nuria Lou (nephrology) over the phone and he agreed with [facility] transfer. Patient is agreeable for transfer to [address] [place] [address]. [facility] transfer initiated, patient is awaiting transfer to [address] [place] [address]. Persistent nausea and vomiting, etiology unclear, possibly  related to UTI     -CT abd/pelvis on 8/4: No evidence of small bowel obstruction, large amount of retained stool in the colon, no colonic wall thickening or edema, appendix normal, urinary bladder is normal, no free intraperitoneal air or free fluid in the abdomen or pelvis. -continue PRN antiemetics. Did have a mild troponin elevation as discussed below, but trended down to normal and repeat trop today (-). Repeat EKG today Normal sinus rhythm, Normal ECG  -cont zofran. Add phenergan. Patient had some relief with Haldol yesterday. We will give another dose of Haldol today.  -Awaiting transfer to [facility]    Mild hypokalemia, due to vomiting    -Potassium replacement protocol  -BMP in a.m. Dyspnea on exertion    -Although proBNP is elevated, clinically, patient does not look like in fluid overload. Per nephrology, patient had crackles this morning, she received one-time dose of Lasix 20 mg IV. No crackles during my examination. Her EF is 55 to 60% per echocardiogram 8/3/2020.  -Chest x-ray on arrival no acute findings  -Patient denies shortness of breath at rest.     Troponin elevation suspect due to VIDA on CKD, resolved. -Troponin 0 0.019 on arrival, repeat troponin trended down and now normal.  -EKG without any sign of acute infarction.   Continue to monitor ACS symptoms    Accelerated hypertension, resolved  Essential hypertension, uncontrolled    -Per RN, patient did not take her medications due to vomiting  -Continue home Coreg, amlodipine, clonidine   -vital signs per protocol    Chronic normocytic anemia, likely due to chronic kidney disease, stable    -CBC in am      Chest discomfort    -pt reports left chest soreness, etiology unclear, possibly from ? vomiting  -repeat trop (-)  -repeat EKG on 8/5: NSR, normal      Hypermagnesemia, resolved    Hypomagnesemia, resolved    DM type II    -A1C 6.9% on 8/4/2020, however POC glucose 150s to 260s, last blood sugar when I saw the pt was 158.   -will continue current insulin regimen for now as patient has nausea and vomiting  -carb controlled diet    NAGMA, mild     -monitor for now  -BMP in am          Anticipated Discharge in : pending         Diet: Diet NPO Effective Now    DVT prophylaxis: [x] Lovenox                                 [] SCDs                                 [] SQ Heparin                                 [] Encourage ambulation           [] Already on Anticoagulation     Disposition:    [] Home       [] TCU       [] Rehab       [] Psych       [] SNF       [] [place]       [x] Other-Plan as above.   Awaiting [facility] transfer      Code Status: Full Code      Electronically signed by Sukh Corona MD on 8/5/2020 at 12:41 PM

## 2020-08-05 NOTE — FLOWSHEET NOTE
I initiated [facility] transfer and spoke w RN Livier Armenta of [facility] access transfer center. Awaiting callback from [facility]. Electronically signed by Rock Stevie MD on 8/5/2020 at 12:59 PM     Addendum:    I spoke w  [facility] and discussed case with her and reason for [facility] transfer. She states that she will reach out with transplant team in [facility] and will give us a callback.      Electronically signed by Rock Stevie MD on 8/5/2020 at 1:31 PM

## 2020-08-05 NOTE — FLOWSHEET NOTE
08/05/20 0405   Provider Notification   Reason for Communication Evaluate   Provider Name [facility]    Provider Notification Advance Practice Clinician (CNS, NP, CNM, CRNA, PA)   Method of Communication Secure Message   Response See orders   Notification Time 0405   fine crackles throughout. 02 down to 86 placed on 1L now 93%. . 0.9 running at 100.  See new orders

## 2020-08-05 NOTE — CARE COORDINATION
8/5/20, 4:11 PM EDT    DISCHARGE PLANNING EVALUATION       Spoke briefly with Kylie Allen. She shares that she is not able to discuss discharge needs at this time, as she is not feeling well. She requested  return another time. Attempted second time, and Kylie Allen is still not feeling able to participate in assessment. No family here at present. Will attempt again tomorrow.

## 2020-08-05 NOTE — PROGRESS NOTES
Renal Progress Note    Assessment and Plan:    1. Status Post Kidney Transplant    2. Primary Hypertension   3. UTI   4. Deconditioning    5. Elevated Troponins   6. DM2   7. Hypokalemia   8. Mild Metabolic Acidosis   9. Leukocytosis 2/2 UTI   10. Normocytic Anemia    Plan    Labs reviewed  CT WO Contrast revealed mild hydronephrosis, not concerning for pyelonephritis  U/S of transplanted right kidney revealed mild hydronephrosis as well  Creatinine improved to 1.3 today from 1.9 yesterday  Patient having bowel movements now   VIDA likely prerenal vs post-obstruction from retained stool seen projecting over right renal shadow  Blood pressures are at baseline  Hold fluids due to crackles heard in lungs, will give 20 mg IV Lasix and will reassess  Continue holding Bactrim  Continue with Rocephin  Continue with Immunosuppressants  We will continue to follow     Patient Active Problem List:     Diabetes mellitus, type 2 ([facility]     Uncontrolled hypertension     Chronic anemia     Coronary disease     Hyperlipemia     Arthritis     Neuropathy, diabetic (HCC)     Leg pain     Obesity (BMI 30-39. 9)     Low HDL (under 40)     Need for prophylactic vaccination against diphtheria-tetanus-pertussis (DTP)     History of tobacco use     Allergic rhinitis     COPD, mild (HCC)     Lung mass     Anemia, chronic disease     Gout     Urolithiasis     Ankle fracture, right     Secondary hyperparathyroidism of renal origin ([address]     Obstructive sleep apnea on CPAP     Vitamin D deficiency     Benign essential HTN     Hypertensive nephropathy     Persistent proteinuria associated with type 2 diabetes mellitus (HCC)     Cellulitis in diabetic foot (HCC)     VIDA (acute kidney injury) ([facility]     Persistent proteinuria     Acquired hypothyroidism     CKD (chronic kidney disease), stage IV (HCC)     Nephrotic syndrome     Type 2 diabetes mellitus (HCC)     Iron deficiency anemia due to dietary causes     Anemia of chronic disease     Stage 5 chronic kidney disease not on chronic dialysis (HCC)     ESRD (end stage renal disease) ([facility]     Hypervolemia associated with renal insufficiency     Pulmonary edema, noncardiac     CKD (chronic kidney disease), stage V (HCC)     Chronic progressive renal failure, stage 5 ([facility]     Hypertensive emergency, no CHF     Diabetic peripheral neuropathy associated with type 2 diabetes mellitus (HCC)     Pericardial effusion     Hypokalemia     Type II diabetes mellitus with stage 5 chronic kidney disease (HCC)     Acute on chronic diastolic congestive heart failure (HCC)     End stage renal disease due to benign hypertension (HCC)     ESRD (end stage renal disease) on dialysis (HCC)     Post-operative nausea and vomiting     Chronic constipation     Candida UTI     Fluid overload     Pleural effusion     Acute respiratory failure with hypoxia (HCC)     Dyspnea     Bilateral leg edema     Hypernatremia     Metabolic acidosis     Generalized weakness     Lymphadenopathy, inguinal     Atelectasis of left lung     Thyroid nodule     ESRD needing dialysis ([facility]     Debility     UTI (urinary tract infection)      Subjective:   Admit Date: 8/2/2020    Interval History: Patient reports feeling much better this AM, less nauseas than yesterday. She still endorses some nausea while eating, was given a medication last night for the nausea that helped her. She does not recall the name of the medication.       Medications:   Scheduled Meds:   lactulose  20 g Oral BID    insulin lispro  0-12 Units Subcutaneous TID WC    insulin lispro  0-6 Units Subcutaneous Nightly    insulin glargine  28 Units Subcutaneous Nightly    sodium chloride flush  10 mL Intravenous 2 times per day    enoxaparin  40 mg Subcutaneous Daily    famotidine (PEPCID) injection  20 mg Intravenous Daily    amLODIPine  10 mg Oral Daily    magnesium replacement protocol   Other RX Placeholder    aspirin  81 mg Oral Daily    atorvastatin  40 mg Oral Daily    tiotropium  2 puff Inhalation Daily    tacrolimus  8 mg Oral BID    [Held by provider] sulfamethoxazole-trimethoprim  1 tablet Oral Daily    cefTRIAXone (ROCEPHIN) IV  1 g Intravenous Q24H    potassium replacement protocol   Other RX Placeholder    mycophenolate  720 mg Oral BID    carvedilol  25 mg Oral BID    cloNIDine  0.2 mg Oral TID     Continuous Infusions:   [Held by provider] sodium chloride      dextrose         CBC:   Recent Labs     08/02/20  1850 08/03/20  0320   WBC 17.0* 12.8*   HGB 10.0* 9.9*    142     CMP:    Recent Labs     08/04/20  0340 08/04/20  1147 08/05/20  0335   * 136 135   K 3.6  3.6 4.0 3.3*   CL 99 103 104   CO2 21* 20* 21*   BUN 39* 41* 32*   CREATININE 2.1* 1.9* 1.3*   GLUCOSE 254* 242* 166*   CALCIUM 9.2 9.8 9.7   LABGLOM 28* 32* 49*     Troponin: No results for input(s): TROPONINI in the last 72 hours. BNP: No results for input(s): BNP in the last 72 hours. INR:   Recent Labs     08/02/20  1850   INR 1.28*     Lipids:   Recent Labs     08/02/20  1850 08/03/20  0320   CHOL  --  106   TRIG  --  101   HDL  --  47   LIPASE 12.8  --      Liver:   Recent Labs     08/02/20  1850   AST 16   ALT 17   ALKPHOS 87   PROT 6.9   LABALBU 4.3   BILITOT 0.6     Iron:  No results for input(s): IRONS, FERRITIN in the last 72 hours. Invalid input(s): LABIRONS  CT ABDOMEN PELVIS WO CONTRAST Additional Contrast? None   Final Result      1. There is a transplant kidney in the right side of the pelvis. It has a slightly edematous appearance and there appears to be mild hydronephrosis. Evaluation is limited as there is a lack of IV contrast. This finding was discussed by Dr. Carloz Miles with    [address] [place] [address] at approximately 3:30 PM May 4, 2020 via telephone. 2. There is no evidence of emphysematous pyelonephritis involving the right kidney.  The finding on the previous x-ray is likely related to the patient's large amount of retained stool in the colon which was projecting over the right renal shadow. 3. There is a hyperdense lesion arising from the inferior pole the left kidney. This measures 4.9 cm. This could represent a hemorrhagic cyst although other etiologies can't be excluded. 4. Cardiomegaly. Diffuse coronary artery calcifications. **This report has been created using voice recognition software. It may contain minor errors which are inherent in voice recognition technology. **      Final report electronically signed by Dr Chema Caba on 8/4/2020 3:38 PM      [address] [place] [address]   Final Result       1. Enlarged appearance of the pelvic transplant kidney with mild hydronephrosis. This finding was discussed by Dr. Sheryl Faust with [address] [place] [address] at approximately 3:30 PM 8/4/2020 via telephone. 2. There is a nonvascular cyst arising from the inferior pole the left kidney. 3. Atrophic appearance of the bilateral native kidneys. **This report has been created using voice recognition software. It may contain minor errors which are inherent in voice recognition technology. **      Final report electronically signed by Dr Chema Caba on 8/4/2020 3:43 PM      XR ABDOMEN (KUB) (SINGLE AP VIEW)   Final Result   1. Possible emphysematous infection involving the native right kidney. Patient may benefit from CT of the abdomen. 2. Moderate constipation. This was discussed with  directly at 8:29 AM            **This report has been created using voice recognition software. It may contain minor errors which are inherent in voice recognition technology. **      Final report electronically signed by Dr. Spenser Kimball on 8/4/2020 8:30 AM      XR CHEST (2 VW)   Final Result   No acute disease. **This report has been created using voice recognition software. It may contain minor errors which are inherent in voice recognition technology. **      Final report electronically signed by Dr. Tommy Rico on 8/2/2020 8:13 PM      XR

## 2020-08-05 NOTE — CARE COORDINATION
8/5/20, 8:34 AM EDT    DISCHARGE ONGOING EVALUATION:     Cloteal Anger day: 3  Location: -06/006-A Reason for admit: UTI (urinary tract infection) [N39.0]   Treatment Plan of Care: Creat 1.3. Hold IVF, 1 dose IV lasix for crackles. Nephrology following. Urology consult for hydronephrosis. Barriers to Discharge: not medically ready   PCP: Lindy Quintero DO  Readmission Risk Score: 27%  Patient Goals/Plan/Treatment Preferences: Home with [facility] services (she is unsure of which agency), and assistance from [facility] as needed.

## 2020-08-06 VITALS
BODY MASS INDEX: 30.22 KG/M2 | HEIGHT: 65 IN | RESPIRATION RATE: 16 BRPM | HEART RATE: 83 BPM | DIASTOLIC BLOOD PRESSURE: 78 MMHG | TEMPERATURE: 98.9 F | WEIGHT: 181.4 LBS | OXYGEN SATURATION: 98 % | SYSTOLIC BLOOD PRESSURE: 172 MMHG

## 2020-08-06 LAB
ANION GAP SERPL CALCULATED.3IONS-SCNC: 12 MEQ/L (ref 8–16)
BUN BLDV-MCNC: 26 MG/DL (ref 7–22)
CALCIUM SERPL-MCNC: 9.9 MG/DL (ref 8.5–10.5)
CHLORIDE BLD-SCNC: 104 MEQ/L (ref 98–111)
CO2: 25 MEQ/L (ref 23–33)
CREAT SERPL-MCNC: 1.3 MG/DL (ref 0.4–1.2)
EKG ATRIAL RATE: 79 BPM
EKG P AXIS: 51 DEGREES
EKG P-R INTERVAL: 156 MS
EKG Q-T INTERVAL: 382 MS
EKG QRS DURATION: 72 MS
EKG QTC CALCULATION (BAZETT): 438 MS
EKG R AXIS: 18 DEGREES
EKG T AXIS: 25 DEGREES
EKG VENTRICULAR RATE: 79 BPM
GLUCOSE BLD-MCNC: 106 MG/DL (ref 70–108)
GLUCOSE BLD-MCNC: 136 MG/DL (ref 70–108)
MAGNESIUM: 2 MG/DL (ref 1.6–2.4)
POTASSIUM SERPL-SCNC: 3.4 MEQ/L (ref 3.5–5.2)
SODIUM BLD-SCNC: 141 MEQ/L (ref 135–145)
TROPONIN T: < 0.01 NG/ML
TROPONIN T: < 0.01 NG/ML

## 2020-08-06 PROCEDURE — 2580000003 HC RX 258: Performed by: PHYSICIAN ASSISTANT

## 2020-08-06 PROCEDURE — 6360000002 HC RX W HCPCS: Performed by: PHYSICIAN ASSISTANT

## 2020-08-06 PROCEDURE — 94760 N-INVAS EAR/PLS OXIMETRY 1: CPT

## 2020-08-06 PROCEDURE — 6360000002 HC RX W HCPCS: Performed by: INTERNAL MEDICINE

## 2020-08-06 PROCEDURE — 6370000000 HC RX 637 (ALT 250 FOR IP): Performed by: PHYSICIAN ASSISTANT

## 2020-08-06 PROCEDURE — 99239 HOSP IP/OBS DSCHRG MGMT >30: CPT | Performed by: FAMILY MEDICINE

## 2020-08-06 PROCEDURE — 36415 COLL VENOUS BLD VENIPUNCTURE: CPT

## 2020-08-06 PROCEDURE — 83735 ASSAY OF MAGNESIUM: CPT

## 2020-08-06 PROCEDURE — 80180 DRUG SCRN QUAN MYCOPHENOLATE: CPT

## 2020-08-06 PROCEDURE — 99232 SBSQ HOSP IP/OBS MODERATE 35: CPT | Performed by: INTERNAL MEDICINE

## 2020-08-06 PROCEDURE — 6370000000 HC RX 637 (ALT 250 FOR IP): Performed by: INTERNAL MEDICINE

## 2020-08-06 PROCEDURE — 2500000003 HC RX 250 WO HCPCS: Performed by: PHYSICIAN ASSISTANT

## 2020-08-06 PROCEDURE — 93010 ELECTROCARDIOGRAM REPORT: CPT | Performed by: INTERNAL MEDICINE

## 2020-08-06 PROCEDURE — 94640 AIRWAY INHALATION TREATMENT: CPT

## 2020-08-06 PROCEDURE — 84484 ASSAY OF TROPONIN QUANT: CPT

## 2020-08-06 PROCEDURE — 2580000003 HC RX 258: Performed by: INTERNAL MEDICINE

## 2020-08-06 PROCEDURE — 82948 REAGENT STRIP/BLOOD GLUCOSE: CPT

## 2020-08-06 PROCEDURE — 80048 BASIC METABOLIC PNL TOTAL CA: CPT

## 2020-08-06 RX ORDER — INSULIN GLARGINE 100 [IU]/ML
28 INJECTION, SOLUTION SUBCUTANEOUS NIGHTLY
Qty: 15 PEN | Refills: 5
Start: 2020-08-06 | End: 2020-08-12 | Stop reason: SDUPTHER

## 2020-08-06 RX ORDER — MAGNESIUM HYDROXIDE/ALUMINUM HYDROXICE/SIMETHICONE 120; 1200; 1200 MG/30ML; MG/30ML; MG/30ML
30 SUSPENSION ORAL EVERY 6 HOURS PRN
Status: DISCONTINUED | OUTPATIENT
Start: 2020-08-06 | End: 2020-08-06 | Stop reason: HOSPADM

## 2020-08-06 RX ORDER — POTASSIUM CHLORIDE 20 MEQ/1
20 TABLET, EXTENDED RELEASE ORAL ONCE
Status: COMPLETED | OUTPATIENT
Start: 2020-08-06 | End: 2020-08-06

## 2020-08-06 RX ORDER — CEFTRIAXONE 1 G/1
1 INJECTION, POWDER, FOR SOLUTION INTRAMUSCULAR; INTRAVENOUS EVERY 24 HOURS
Qty: 1 G | Refills: 0
Start: 2020-08-06 | End: 2020-08-12 | Stop reason: ALTCHOICE

## 2020-08-06 RX ORDER — FUROSEMIDE 10 MG/ML
20 INJECTION INTRAMUSCULAR; INTRAVENOUS ONCE
Status: COMPLETED | OUTPATIENT
Start: 2020-08-06 | End: 2020-08-06

## 2020-08-06 RX ADMIN — AMLODIPINE BESYLATE 10 MG: 10 TABLET ORAL at 08:00

## 2020-08-06 RX ADMIN — HYDROMORPHONE HYDROCHLORIDE 0.5 MG: 1 INJECTION, SOLUTION INTRAMUSCULAR; INTRAVENOUS; SUBCUTANEOUS at 08:34

## 2020-08-06 RX ADMIN — POTASSIUM CHLORIDE 20 MEQ: 1500 TABLET, EXTENDED RELEASE ORAL at 03:31

## 2020-08-06 RX ADMIN — ENOXAPARIN SODIUM 40 MG: 40 INJECTION SUBCUTANEOUS at 08:00

## 2020-08-06 RX ADMIN — ALUMINUM HYDROXIDE, MAGNESIUM HYDROXIDE, AND SIMETHICONE 30 ML: 200; 200; 20 SUSPENSION ORAL at 05:47

## 2020-08-06 RX ADMIN — MYCOPHENOLIC ACID 720 MG: 180 TABLET, DELAYED RELEASE ORAL at 08:00

## 2020-08-06 RX ADMIN — TIOTROPIUM BROMIDE INHALATION SPRAY 2 PUFF: 3.12 SPRAY, METERED RESPIRATORY (INHALATION) at 08:20

## 2020-08-06 RX ADMIN — ASPIRIN 81 MG: 81 TABLET ORAL at 08:00

## 2020-08-06 RX ADMIN — CEFTRIAXONE SODIUM 1 G: 1 INJECTION, POWDER, FOR SOLUTION INTRAMUSCULAR; INTRAVENOUS at 08:34

## 2020-08-06 RX ADMIN — POTASSIUM BICARBONATE 40 MEQ: 782 TABLET, EFFERVESCENT ORAL at 06:10

## 2020-08-06 RX ADMIN — CLONIDINE HYDROCHLORIDE 0.2 MG: 0.2 TABLET ORAL at 08:00

## 2020-08-06 RX ADMIN — FAMOTIDINE 20 MG: 10 INJECTION INTRAVENOUS at 08:00

## 2020-08-06 RX ADMIN — CARVEDILOL 25 MG: 25 TABLET, FILM COATED ORAL at 08:00

## 2020-08-06 RX ADMIN — SODIUM CHLORIDE, PRESERVATIVE FREE 10 ML: 5 INJECTION INTRAVENOUS at 08:00

## 2020-08-06 RX ADMIN — FUROSEMIDE 20 MG: 10 INJECTION, SOLUTION INTRAMUSCULAR; INTRAVENOUS at 03:31

## 2020-08-06 ASSESSMENT — ENCOUNTER SYMPTOMS
COUGH: 0
VOMITING: 1
RESPIRATORY NEGATIVE: 1
ALLERGIC/IMMUNOLOGIC NEGATIVE: 1
WHEEZING: 0
EYES NEGATIVE: 1
NAUSEA: 1

## 2020-08-06 ASSESSMENT — PAIN SCALES - GENERAL
PAINLEVEL_OUTOF10: 0
PAINLEVEL_OUTOF10: 7

## 2020-08-06 NOTE — CARE COORDINATION
8/6/20, 8:08 AM EDT    Transferred to [facility]. Patient goals/plan/ treatment preferences discussed by  and . Patient goals/plan/ treatment preferences reviewed with patient/ family. Patient/ family verbalize understanding of discharge plan and are in agreement with goal/plan/treatment preferences. Understanding was demonstrated using the teach back method. AVS provided by RN at time of discharge, which includes all necessary medical information pertaining to the patients current course of illness, treatment, post-discharge goals of care, and treatment preferences.         IMM Letter  IMM Letter given to Patient/Family/Significant other/Guardian/POA/by[de-identified] staff  IMM Letter date given[de-identified] 08/02/20  IMM Letter time given[de-identified] 9124

## 2020-08-06 NOTE — PLAN OF CARE
Problem: Falls - Risk of:  Goal: Will remain free from falls  Description: Will remain free from falls  8/5/2020 2230 by Ratna Davies RN  Outcome: Ongoing  Note: No falls this shift. Patient educated on not getting up without help. Falling star protocol in place. Call light in reach. Bed in lowest position. Side rails up x2. Nonskid footwear on. Bed alarm set. Patient visually checked on hourly rounds. Problem: Falls - Risk of:  Goal: Absence of physical injury  Description: Absence of physical injury  Outcome: Ongoing     Problem: Skin Integrity:  Goal: Will show no infection signs and symptoms  Description: Will show no infection signs and symptoms  8/5/2020 2230 by Ratna Davies RN  Outcome: Ongoing  Note: Pt repositions frequently and independently. Problem: Skin Integrity:  Goal: Absence of new skin breakdown  Description: Absence of new skin breakdown  Outcome: Ongoing     Problem: Urinary Elimination:  Goal: Signs and symptoms of infection will decrease  Description: Signs and symptoms of infection will decrease  8/5/2020 2230 by Ratna Davies RN  Outcome: Ongoing  Note: IV antibiotics on MAR. Monitoring vital signs. Problem: Urinary Elimination:  Goal: Complications related to the disease process, condition or treatment will be avoided or minimized  Description: Complications related to the disease process, condition or treatment will be avoided or minimized  Outcome: Ongoing     Problem: Infection:  Goal: Will remain free from infection  Description: Will remain free from infection  Outcome: Ongoing     Problem: Daily Care:  Goal: Daily care needs are met  Description: Daily care needs are met  Outcome: Ongoing  Note: Pt able to complete ADLs with minimal assistance. Problem: Pain:  Goal: Patient's pain/discomfort is manageable  Description: Patient's pain/discomfort is manageable  Outcome: Ongoing  Note: Pt continues to have generalized pain this shift.  PRN dilaudid on MAR. Problem: Skin Integrity:  Goal: Skin integrity will stabilize  Description: Skin integrity will stabilize  8/5/2020 2230 by Juan Luis Donnelly RN  Outcome: Ongoing     Problem: Discharge Planning:  Goal: Patients continuum of care needs are met  Description: Patients continuum of care needs are met  8/5/2020 2230 by Juan Luis Donnelly RN  Outcome: Ongoing  Note: Pt is active in her plan of care. Plans on being transferred to [facility] tomorrow AM.      Problem: Nutrition  Goal: Optimal nutrition therapy  Outcome: Ongoing  Note: Pt currently NPO. Care plan reviewed with patient. Patient verbalizes understanding of the plan of care and contribute to goal setting.

## 2020-08-06 NOTE — DISCHARGE SUMMARY
involving the right kidney. The finding on the previous x-ray is likely related to the patient's large amount of retained stool in the colon which was projecting over the right renal shadow. There is a hyperdense lesion arising from the inferior pole the left kidney. This measures 4.9 cm. This could represent a hemorrhagic cyst although other etiologies can't be excluded\". Urology was consulted for the mild right hydronephrosis noted on CT abd/pelvis. Urology NP Maria G Quiros sent Dr. Pastor Moreira message that he review the case with Dr. Andrew Lozano (Urology) and Dr Andrew Lozano reviewed the CT abd/pelvis and he does not see hydronephrosis and he recommend [facility] transfer if there's question w the viability of the kidney transplant. Case was discussed with Dr. Chucho Easley (nephrology) over the phone and he agreed with [facility] transfer. Patient is agreeable for transfer to [facility]. [facility] transfer initiated and patient was transferred to [facility] on 8/6/20 and discharged from [facility].      Exam:     Vitals:  Vitals:    08/05/20 2037 08/05/20 2317 08/05/20 2325 08/06/20 0253   BP: 130/65 (!) 162/72  136/65   Pulse: 81 76  77   Resp: 18 18  18   Temp: 99.7 °F (37.6 °C) 99.6 °F (37.6 °C)  98.9 °F (37.2 °C)   TempSrc: Oral Oral  Oral   SpO2: 95% (!) 87% 94% 93%   Weight:    181 lb 6.4 oz (82.3 kg)   Height:         Weight: Weight: 181 lb 6.4 oz (82.3 kg)     24 hour intake/output:    Intake/Output Summary (Last 24 hours) at 8/6/2020 0750  Last data filed at 8/6/2020 0435  Gross per 24 hour   Intake 1063.24 ml   Output 1700 ml   Net -636.76 ml       General appearance:  Chronically ill appearing female, in no apparent distress, appears stated age and cooperative. HEENT:  Normal cephalic, atraumatic without obvious deformity. Pupils equal, round, and reactive to light. Extra ocular muscles intact. Conjunctivae/corneas clear. Mouth very dry. Neck: Supple, with full range of motion. No jugular venous distention. Trachea midline.   Respiratory:  Normal respiratory effort. Clear to auscultation, bilaterally without Rales/Wheezes/Rhonchi. Cardiovascular:  Regular rate and rhythm with normal S1/S2 without murmurs, rubs or gallops. Abdomen: Soft, tender to palpation left side of abdomen, non-distended with normal bowel sounds. Musculoskeletal:  No clubbing, cyanosis or edema bilaterally. Full range of motion without deformity. Skin: Skin color, texture, turgor normal.  No rashes or lesions. Neurologic:  Neurovascularly intact without any focal sensory/motor deficits. Cranial nerves: II-XII intact, grossly non-focal.  Psychiatric:  Alert and oriented, thought content appropriate, normal insight  Capillary Refill: Brisk,< 3 seconds   Peripheral Pulses: +2 palpable, equal bilaterally       Labs: For convenience and continuity at follow-up the following most recent labs are provided:      CBC:    Lab Results   Component Value Date    WBC 12.8 08/03/2020    HGB 9.9 08/03/2020    HCT 30.8 08/03/2020     08/03/2020       Renal:    Lab Results   Component Value Date     08/06/2020    K 3.4 08/06/2020    K 3.6 08/04/2020     08/06/2020    CO2 25 08/06/2020    BUN 26 08/06/2020    CREATININE 1.3 08/06/2020    CALCIUM 9.9 08/06/2020    PHOS 2.6 07/27/2020         Significant Diagnostic Studies    Radiology:   CT ABDOMEN PELVIS WO CONTRAST Additional Contrast? None   Final Result      1. There is a transplant kidney in the right side of the pelvis. It has a slightly edematous appearance and there appears to be mild hydronephrosis. Evaluation is limited as there is a lack of IV contrast. This finding was discussed by Dr. Zahraa Nieto with    [address] [place] [address] at approximately 3:30 PM May 4, 2020 via telephone. 2. There is no evidence of emphysematous pyelonephritis involving the right kidney. The finding on the previous x-ray is likely related to the patient's large amount of retained stool in the colon which was projecting over the right renal shadow.    3. There is a hyperdense lesion arising from the inferior pole the left kidney. This measures 4.9 cm. This could represent a hemorrhagic cyst although other etiologies can't be excluded. 4. Cardiomegaly. Diffuse coronary artery calcifications. **This report has been created using voice recognition software. It may contain minor errors which are inherent in voice recognition technology. **      Final report electronically signed by Dr Ryland Galvez on 8/4/2020 3:38 PM      [address] [place] [address]   Final Result       1. Enlarged appearance of the pelvic transplant kidney with mild hydronephrosis. This finding was discussed by Dr. Tarsha Samuel with [address] [place] [address] at approximately 3:30 PM 8/4/2020 via telephone. 2. There is a nonvascular cyst arising from the inferior pole the left kidney. 3. Atrophic appearance of the bilateral native kidneys. **This report has been created using voice recognition software. It may contain minor errors which are inherent in voice recognition technology. **      Final report electronically signed by Dr Ryland Galvez on 8/4/2020 3:43 PM      XR ABDOMEN (KUB) (SINGLE AP VIEW)   Final Result   1. Possible emphysematous infection involving the native right kidney. Patient may benefit from CT of the abdomen. 2. Moderate constipation. This was discussed with  directly at 8:29 AM            **This report has been created using voice recognition software. It may contain minor errors which are inherent in voice recognition technology. **      Final report electronically signed by Dr. Shen Vallejo on 8/4/2020 8:30 AM      XR CHEST (2 VW)   Final Result   No acute disease. **This report has been created using voice recognition software. It may contain minor errors which are inherent in voice recognition technology. **      Final report electronically signed by Dr. Roselia Riley on 8/2/2020 8:13 PM      XR CHEST PORTABLE    (Results Pending)          Consults:     STR ED TO IP CONSULT  IP CONSULT TO DIETITIAN  IP CONSULT TO HEART FAILURE NURSE/COORDINATOR  IP CONSULT TO NEPHROLOGY  IP CONSULT TO SOCIAL WORK  IP CONSULT TO UROLOGY    Disposition:    [] Home       [] TCU       [] Rehab       [] Psych       [] SNF       [] [place]       [x] Other-  [facility]    Condition at Discharge: Stable    Code Status:  Full Code     Patient Instructions:    Discharge lab work: N/A  Activity: activity as tolerated  Diet: Diet NPO Effective Now      Follow-up visits:   Christoph Cardenas DO  [address] [place] [id number]               Discharge Medications:      Eleanor Koehler   Home Medication Instructions [id number]    Printed on:08/06/20 6605   Medication Information                      albuterol sulfate HFA (PROAIR HFA) 108 (90 Base) MCG/ACT inhaler  Inhale 2 puffs into the lungs every 6 hours as needed for Wheezing or Shortness of Breath             amLODIPine (NORVASC) 10 MG tablet  Take 1 tablet by mouth daily             aspirin 81 MG EC tablet  Take 81 mg by mouth daily. atorvastatin (LIPITOR) 40 MG tablet  Take 1 tablet by mouth daily             carvedilol (COREG) 25 MG tablet  Take 25 mg by mouth 2 times daily (with meals)             cefTRIAXone (ROCEPHIN) 1 g injection  Inject 1 g into the muscle every 24 hours             cloNIDine (CATAPRES) 0.2 MG tablet  Take 0.2 mg by mouth 3 times daily             docusate sodium (COLACE) 100 MG capsule  Take 200 mg by mouth 2 times daily             fluticasone (FLONASE) 50 MCG/ACT nasal spray  1 spray by Each Nostril route daily as needed for Rhinitis             HYDROcodone-acetaminophen (NORCO) 5-325 MG per tablet  Take 1 tablet by mouth every 8 hours as needed for Pain.              insulin aspart (NOVOLOG FLEXPEN) 100 UNIT/ML injection pen  Sliding scale insulin coverage  Glucose:Dose: If Less [id number] =No Insulin/ 140-199= 2 Units/ 200-249=4 Units/ 250-299= 6 Units/  300-349=8 Units/  350-400=10 Units/ Above 400 = 12 Units             insulin glargine (LANTUS SOLOSTAR) 100 UNIT/ML injection pen  Inject 28 Units into the skin nightly             Insulin Pen Needle (B-D ULTRAFINE III SHORT PEN) 31G X 8 MM MISC  Use three times daily Diagnosis Code E11.9             linaclotide (LINZESS) 145 MCG capsule  Take 145 mcg by mouth every other day              magnesium oxide (MAG-OX) 400 MG tablet  Take 400 mg by mouth 2 times daily             Mycophenolate Sodium 360 MG TBEC  Take 720 mg by mouth 2 times daily             nitroGLYCERIN (NITROSTAT) 0.4 MG SL tablet  Place 1 tablet under the tongue every 5 minutes as needed for Chest pain             ondansetron (ZOFRAN) 4 MG tablet  Take 4 mg by mouth every 8 hours as needed for Nausea or Vomiting             pantoprazole (PROTONIX) 40 MG tablet  Take 1 tablet by mouth every morning (before breakfast)             sulfamethoxazole-trimethoprim (BACTRIM DS;SEPTRA DS) 800-160 MG per tablet  Take 1 tablet by mouth daily             tacrolimus (PROGRAF) 1 MG capsule  Take 8 mg by mouth 2 times daily             tiotropium (SPIRIVA HANDIHALER) 18 MCG inhalation capsule  Inhale 1 capsule into the lungs daily 1 capsule via inhalation daily. vitamin D (ERGOCALCIFEROL) 1.25 MG (46418 UT) CAPS capsule  TAKE ONE CAPSULE BY MOUTH ONE TIME PER WEEK                 Time Spent on discharge is more than 1 hour in the examination, evaluation, counseling and review of medications and discharge plan. Signed: Thank you Ralph Marquez DO for the opportunity to be involved in this patient's care.     Electronically signed by Tom Gamboa MD on 8/6/2020 at 7:50 AM

## 2020-08-06 NOTE — PROGRESS NOTES
Report called to [address] at [facility], transport here to pick patient up, patient leaving in stable condition.

## 2020-08-06 NOTE — PROGRESS NOTES
Renal Progress Note    Assessment and Plan:    1. Status post kidney transplant   2. Disposition to [facility]    2. Primary hypertension   3. Resolving UTI   4. Deconditioning   5. Hypokalemia    6. Leukocytosis   7. Normocytic anemia    Plan     -Patient to be transferred to [facility] today for further care of transplanted right kidney  -Labs reviewed. Potassium 3.4   -Hypertension still not adequately controlled  -Physical exam findings still show some crackles in her lungs bilaterally  -Creatinine is 2.1 today, likely secondary to acute prerenal/dehydration due to patient being kept NPO for nausea and vomiting  -IV fluids discontinued  -Case discussed with Dr. Esther Schuster, attending nephrologist     Patient Active Problem List:     Diabetes mellitus, type 2 ([address]     Uncontrolled hypertension     Chronic anemia     Coronary disease     Hyperlipemia     Arthritis     Neuropathy, diabetic ([address]     Leg pain     Obesity (BMI 30-39. 9)     Low HDL (under 40)     Need for prophylactic vaccination against diphtheria-tetanus-pertussis (DTP)     History of tobacco use     Allergic rhinitis     COPD, mild (HCC)     Lung mass     Anemia, chronic disease     Gout     Urolithiasis     Ankle fracture, right     Secondary hyperparathyroidism of renal origin ([address]     Obstructive sleep apnea on CPAP     Vitamin D deficiency     Benign essential HTN     Hypertensive nephropathy     Persistent proteinuria associated with type 2 diabetes mellitus (HCC)     Cellulitis in diabetic foot (HCC)     VIDA (acute kidney injury) ([address]     Persistent proteinuria     Acquired hypothyroidism     CKD (chronic kidney disease), stage IV (HCC)     Nephrotic syndrome     Type 2 diabetes mellitus (HCC)     Iron deficiency anemia due to dietary causes     Anemia of chronic disease     Stage 5 chronic kidney disease not on chronic dialysis ([address]     ESRD (end stage renal disease) (HCC)     Hypervolemia associated with renal insufficiency     Pulmonary edema, noncardiac     CKD (chronic kidney disease), stage V (HCC)     Chronic progressive renal failure, stage 5 (HCC)     Accelerated hypertension     Diabetic peripheral neuropathy associated with type 2 diabetes mellitus (HCC)     Pericardial effusion     Hypokalemia     Type II diabetes mellitus with stage 5 chronic kidney disease (HCC)     Acute on chronic diastolic congestive heart failure (HCC)     End stage renal disease due to benign hypertension (HCC)     ESRD (end stage renal disease) on dialysis (HCC)     Post-operative nausea and vomiting     Chronic constipation     Candida UTI     Fluid overload     Pleural effusion     Acute respiratory failure with hypoxia (HCC)     Elevated troponin     Dyspnea     Bilateral leg edema     Hypernatremia     Metabolic acidosis     Generalized weakness     Lymphadenopathy, inguinal     Atelectasis of left lung     Thyroid nodule     ESRD needing dialysis ([facility]     Debility     UTI (urinary tract infection)     Kidney transplant recipient     Sepsis ([facility]     E. coli UTI     Acute kidney injury superimposed on CKD (HCC)     Intractable nausea and vomiting     Hypomagnesemia     Hypermagnesemia     Chest discomfort      Subjective:   Admit Date: 8/2/2020    Interval History: Patient still reports nausea and vomiting. Received Haldol which apparently helped with her GI symptoms. We discussed that she will be transferring to [address] [place] [address] this morning for further management and care of the transplanted right kidney. Review of Systems   Constitutional: Positive for fatigue. HENT: Negative. Eyes: Negative. Respiratory: Negative. Negative for cough and wheezing. Cardiovascular: Negative. Negative for chest pain. Gastrointestinal: Positive for nausea and vomiting. Endocrine: Negative. Genitourinary: Negative. Musculoskeletal: Negative. Skin: Negative. Allergic/Immunologic: Negative. Neurological: Negative. Hematological: Negative. Psychiatric/Behavioral: Negative. Medications:   Scheduled Meds:   lactulose  20 g Oral BID    insulin lispro  0-12 Units Subcutaneous TID WC    insulin lispro  0-6 Units Subcutaneous Nightly    insulin glargine  28 Units Subcutaneous Nightly    sodium chloride flush  10 mL Intravenous 2 times per day    enoxaparin  40 mg Subcutaneous Daily    famotidine (PEPCID) injection  20 mg Intravenous Daily    amLODIPine  10 mg Oral Daily    magnesium replacement protocol   Other RX Placeholder    aspirin  81 mg Oral Daily    atorvastatin  40 mg Oral Daily    tiotropium  2 puff Inhalation Daily    tacrolimus  8 mg Oral BID    [Held by provider] sulfamethoxazole-trimethoprim  1 tablet Oral Daily    cefTRIAXone (ROCEPHIN) IV  1 g Intravenous Q24H    potassium replacement protocol   Other RX Placeholder    mycophenolate  720 mg Oral BID    carvedilol  25 mg Oral BID    cloNIDine  0.2 mg Oral TID     Continuous Infusions:   dextrose         CBC: No results for input(s): WBC, HGB, PLT in the last 72 hours. CMP:    Recent Labs     08/04/20  1147 08/05/20  0335 08/05/20  1441 08/06/20  0323    135  --  141   K 4.0 3.3* 3.9 3.4*    104  --  104   CO2 20* 21*  --  25   BUN 41* 32*  --  26*   CREATININE 1.9* 1.3*  --  1.3*   GLUCOSE 242* 166*  --  106   CALCIUM 9.8 9.7  --  9.9   LABGLOM 32* 49*  --  49*     Troponin: No results for input(s): TROPONINI in the last 72 hours. BNP: No results for input(s): BNP in the last 72 hours. INR: No results for input(s): INR in the last 72 hours. Lipids: No results for input(s): CHOL, LDLDIRECT, TRIG, HDL, AMYLASE, LIPASE in the last 72 hours. Liver: No results for input(s): AST, ALT, ALKPHOS, PROT, LABALBU, BILITOT in the last 72 hours. Invalid input(s): BILDIR  Iron:  No results for input(s): IRONS, FERRITIN in the last 72 hours. Invalid input(s): LABIRONS  CT ABDOMEN PELVIS WO CONTRAST Additional Contrast? None   Final Result      1.  There is a transplant kidney in the right side of the pelvis. It has a slightly edematous appearance and there appears to be mild hydronephrosis. Evaluation is limited as there is a lack of IV contrast. This finding was discussed by Dr. Dee Ahumada with    "[employer] [facility] at approximately 3:30 PM May 4, 2020 via telephone. 2. There is no evidence of emphysematous pyelonephritis involving the right kidney. The finding on the previous x-ray is likely related to the patient's large amount of retained stool in the colon which was projecting over the right renal shadow. 3. There is a hyperdense lesion arising from the inferior pole the left kidney. This measures 4.9 cm. This could represent a hemorrhagic cyst although other etiologies can't be excluded. 4. Cardiomegaly. Diffuse coronary artery calcifications. **This report has been created using voice recognition software. It may contain minor errors which are inherent in voice recognition technology. **      Final report electronically signed by Dr Tejinder Clancy on 8/4/2020 3:38 PM      [address] [place] [address]   Final Result       1. Enlarged appearance of the pelvic transplant kidney with mild hydronephrosis. This finding was discussed by Dr. Dee Ahumada with [employer] [facility] at approximately 3:30 PM 8/4/2020 via telephone. 2. There is a nonvascular cyst arising from the inferior pole the left kidney. 3. Atrophic appearance of the bilateral native kidneys. **This report has been created using voice recognition software. It may contain minor errors which are inherent in voice recognition technology. **      Final report electronically signed by Dr Tejinder Clancy on 8/4/2020 3:43 PM      XR ABDOMEN (KUB) (SINGLE AP VIEW)   Final Result   1. Possible emphysematous infection involving the native right kidney. Patient may benefit from CT of the abdomen. 2. Moderate constipation.    This was discussed with  directly at 8:29 AM

## 2020-08-08 LAB
BLOOD CULTURE, ROUTINE: NORMAL
MYCOPHENOLIC ACID: NORMAL

## 2020-08-10 NOTE — TELEPHONE ENCOUNTER
[facility] Transitions Initial Follow Up Call    Outreach made within 2 business days of discharge: Yes    Patient: Arron Cee Patient : 1955   MRN: [id number]  Reason for Admission: There are no discharge diagnoses documented for the most recent discharge. Discharge Date: 20      Patient scheduled with Patsy Martinez 2020 at 320 pm.     Spoke with: Jenifer Alva    Discharge department/facility: [facility]    TCM Interactive Patient Contact:  Was patient able to fill all prescriptions: Yes  Was patient instructed to bring all medications to the follow-up visit: Yes  Is patient taking all medications as directed in the discharge summary?  Yes  Does patient understand their discharge instructions: Yes  Does patient have questions or concerns that need addressed prior to 7-14 day follow up office visit: no    Scheduled appointment with PCP within 7-14 days    Follow Up  Future Appointments   Date Time Provider Franklin Chin   2020  1:00 PM Aminata Chang APRN - CNP [facility] Physic [address]   2020  1:30 PM Clare Terry APRN - CNP [address]   2020 11:30 AM SALMONS, [address]   2020 12:00 PM Nehal Castellon, [address]   2021 10:20 AM STR CT IMAGING RM1  [facility] STRZ OUT EXP STR Radiolog   2021 10:45 AM STR PULMONARY FUNCTION ROOM 1 STRZ PFT [address] [facility]   3/1/2021  1:00 PM Julita Mosley MD Pulm Med [facility] - [address]   2021  2:15 PM Abdirizak Reina MD [facility] Heart [facility] - [address] [place] [address]

## 2020-08-11 LAB — MYCOPHENOLIC ACID: NORMAL

## 2020-08-12 ENCOUNTER — OFFICE VISIT (OUTPATIENT)
Dept: INTERNAL MEDICINE CLINIC | Age: 65
End: 2020-08-12
Payer: MEDICARE

## 2020-08-12 VITALS
SYSTOLIC BLOOD PRESSURE: 134 MMHG | DIASTOLIC BLOOD PRESSURE: 60 MMHG | BODY MASS INDEX: 30.49 KG/M2 | HEART RATE: 80 BPM | HEIGHT: 65 IN | WEIGHT: 183 LBS | TEMPERATURE: 98.1 F

## 2020-08-12 LAB
GFR SERPL CREATININE-BSD FRML MDRD: 24 ML/MIN/1.73M2
GFR SERPL CREATININE-BSD FRML MDRD: 26 ML/MIN/1.73M2
GFR SERPL CREATININE-BSD FRML MDRD: 38 ML/MIN/1.73M2
GFR SERPL CREATININE-BSD FRML MDRD: 41 ML/MIN/1.73M2
GFR SERPL CREATININE-BSD FRML MDRD: 41 ML/MIN/1.73M2
GFR SERPL CREATININE-BSD FRML MDRD: 45 ML/MIN/1.73M2
GFR SERPL CREATININE-BSD FRML MDRD: 50 ML/MIN/1.73M2
GFR SERPL CREATININE-BSD FRML MDRD: 55 ML/MIN/1.73M2

## 2020-08-12 PROCEDURE — 1111F DSCHRG MED/CURRENT MED MERGE: CPT | Performed by: NURSE PRACTITIONER

## 2020-08-12 PROCEDURE — G8427 DOCREV CUR MEDS BY ELIG CLIN: HCPCS | Performed by: NURSE PRACTITIONER

## 2020-08-12 PROCEDURE — G8399 PT W/DXA RESULTS DOCUMENT: HCPCS | Performed by: NURSE PRACTITIONER

## 2020-08-12 PROCEDURE — 4040F PNEUMOC VAC/ADMIN/RCVD: CPT | Performed by: NURSE PRACTITIONER

## 2020-08-12 PROCEDURE — 3044F HG A1C LEVEL LT 7.0%: CPT | Performed by: NURSE PRACTITIONER

## 2020-08-12 PROCEDURE — 1090F PRES/ABSN URINE INCON ASSESS: CPT | Performed by: NURSE PRACTITIONER

## 2020-08-12 PROCEDURE — 2022F DILAT RTA XM EVC RTNOPTHY: CPT | Performed by: NURSE PRACTITIONER

## 2020-08-12 PROCEDURE — 99214 OFFICE O/P EST MOD 30 MIN: CPT | Performed by: NURSE PRACTITIONER

## 2020-08-12 PROCEDURE — 3017F COLORECTAL CA SCREEN DOC REV: CPT | Performed by: NURSE PRACTITIONER

## 2020-08-12 PROCEDURE — G8417 CALC BMI ABV UP PARAM F/U: HCPCS | Performed by: NURSE PRACTITIONER

## 2020-08-12 PROCEDURE — 1123F ACP DISCUSS/DSCN MKR DOCD: CPT | Performed by: NURSE PRACTITIONER

## 2020-08-12 PROCEDURE — 1036F TOBACCO NON-USER: CPT | Performed by: NURSE PRACTITIONER

## 2020-08-12 RX ORDER — MYCOPHENOLIC ACID 360 MG/1
720 TABLET, DELAYED RELEASE ORAL
COMMUNITY

## 2020-08-12 RX ORDER — INSULIN GLARGINE 100 [IU]/ML
25 INJECTION, SOLUTION SUBCUTANEOUS NIGHTLY
Qty: 15 PEN | Refills: 3 | Status: SHIPPED | OUTPATIENT
Start: 2020-08-12 | End: 2020-08-12 | Stop reason: SDUPTHER

## 2020-08-12 RX ORDER — INSULIN ASPART 100 [IU]/ML
INJECTION, SOLUTION INTRAVENOUS; SUBCUTANEOUS
Qty: 15 PEN | Refills: 3 | Status: SHIPPED | OUTPATIENT
Start: 2020-08-12 | End: 2020-08-12 | Stop reason: SDUPTHER

## 2020-08-12 RX ORDER — CIPROFLOXACIN 500 MG/1
500 TABLET, FILM COATED ORAL 2 TIMES DAILY
COMMUNITY
End: 2021-02-03 | Stop reason: ALTCHOICE

## 2020-08-12 RX ORDER — SALIVA STIMULANT COMB. NO.7
GEL (GRAM) MUCOUS MEMBRANE PRN
COMMUNITY
End: 2021-02-03 | Stop reason: ALTCHOICE

## 2020-08-12 RX ORDER — CARVEDILOL 25 MG/1
25 TABLET ORAL 2 TIMES DAILY
Qty: 60 TABLET | Refills: 5 | Status: SHIPPED | OUTPATIENT
Start: 2020-08-12 | End: 2020-09-11 | Stop reason: SDUPTHER

## 2020-08-12 RX ORDER — PEN NEEDLE, DIABETIC 31 GX5/16"
NEEDLE, DISPOSABLE MISCELLANEOUS
Qty: 200 EACH | Refills: 3 | Status: SHIPPED | OUTPATIENT
Start: 2020-08-12 | End: 2021-02-04 | Stop reason: SDUPTHER

## 2020-08-12 RX ORDER — INSULIN ASPART 100 [IU]/ML
INJECTION, SOLUTION INTRAVENOUS; SUBCUTANEOUS
Qty: 15 PEN | Refills: 3 | Status: SHIPPED | OUTPATIENT
Start: 2020-08-12 | End: 2021-02-03 | Stop reason: SDUPTHER

## 2020-08-12 RX ORDER — INSULIN GLARGINE 100 [IU]/ML
25 INJECTION, SOLUTION SUBCUTANEOUS NIGHTLY
Qty: 15 PEN | Refills: 3 | Status: SHIPPED | OUTPATIENT
Start: 2020-08-12 | End: 2021-02-03 | Stop reason: SDUPTHER

## 2020-08-12 RX ORDER — NIFEDIPINE 60 MG/1
60 TABLET, FILM COATED, EXTENDED RELEASE ORAL 2 TIMES DAILY
COMMUNITY
End: 2021-08-11 | Stop reason: ALTCHOICE

## 2020-08-12 ASSESSMENT — ENCOUNTER SYMPTOMS
COUGH: 0
NAUSEA: 0
EYE PAIN: 0
WHEEZING: 0
CONSTIPATION: 0
TROUBLE SWALLOWING: 0
DIARRHEA: 0
VOMITING: 0
SORE THROAT: 0
SHORTNESS OF BREATH: 0
SINUS PRESSURE: 0
ABDOMINAL PAIN: 0
PHOTOPHOBIA: 0
BACK PAIN: 0
SINUS PAIN: 0
ABDOMINAL DISTENTION: 0
BLOOD IN STOOL: 0

## 2020-08-12 NOTE — PROGRESS NOTES
Denies chest pain on exertion, dyspnea on exertion, swelling of ankles, orthostatic dizziness or lightheadedness, and/or palpitations. /58 in office today. Follows with cardiology at [facility].      Review of Systems   Constitutional: Negative for appetite change, chills, fatigue and fever. HENT: Negative for congestion, sinus pressure, sinus pain, sore throat and trouble swallowing. Eyes: Negative for photophobia, pain and visual disturbance. Respiratory: Negative for cough, shortness of breath and wheezing. Cardiovascular: Negative for chest pain, palpitations and leg swelling. Gastrointestinal: Negative for abdominal distention, abdominal pain, blood in stool, constipation, diarrhea, nausea and vomiting. Endocrine: Negative for polydipsia, polyphagia and polyuria. Genitourinary: Negative for difficulty urinating, dysuria and hematuria. Musculoskeletal: Negative for arthralgias, back pain and myalgias. Skin: Negative for rash and wound. Neurological: Positive for tremors and weakness. Negative for dizziness, light-headedness and headaches. Psychiatric/Behavioral: Negative for sleep disturbance. The patient is not nervous/anxious. Prior to Visit Medications    Medication Sig Taking?  Authorizing Provider   biotene dry mouth (ORAL BALANCE) GEL MT gel Take by mouth as needed Yes Historical Provider, MD   ciprofloxacin (CIPRO) 500 MG tablet Take 500 mg by mouth 2 times daily Yes Historical Provider, MD   NIFEdipine (ADALAT CC) 60 MG extended release tablet Take 60 mg by mouth 2 times daily Yes Historical Provider, MD   nystatin (MYCOSTATIN) 227993 UNIT/ML suspension Take 500,000 Units by mouth 4 times daily Yes Historical Provider, MD   Mycophenolate Sodium 360 MG TBEC Take 360 mg by mouth 2 tablets BID Yes Historical Provider, MD   insulin aspart (NOVOLOG FLEXPEN) 100 UNIT/ML injection pen Sliding scale insulin coverage Glucose:Dose: If Less [id number] =No Insulin/ 140-199= 2 Units/ 200-249=4 Units/ 250-299= 6 Units/  300-349=8 Units/  350-400=10 Units/ Above 400 = 12 Units Yes Alex Zuniga APRN - CNP   insulin glargine (LANTUS SOLOSTAR) 100 UNIT/ML injection pen Inject 25 Units into the skin nightly Yes Alex Zuniga, APRN - CNP   carvedilol (COREG) 25 MG tablet Take 1 tablet by mouth 2 times daily 2 tablets BID Yes Alex Zuniga, APRN - CNP   Insulin Pen Needle (B-D ULTRAFINE III SHORT PEN) 31G X 8 MM MISC Use three times daily Diagnosis Code E11.9 Yes Alex Zuniga APRN - CNP   cloNIDine (CATAPRES) 0.3 MG tablet Take 0.2 mg by mouth 3 times daily  Yes Historical Provider, MD   fluticasone (FLONASE) 50 MCG/ACT nasal spray 1 spray by Each Nostril route daily as needed for Rhinitis Yes Historical Provider, MD   docusate sodium (COLACE) 100 MG capsule Take 200 mg by mouth 2 times daily Yes Historical Provider, MD   magnesium oxide (MAG-OX) 400 MG tablet Take 400 mg by mouth 2 times daily Yes Historical Provider, MD   sulfamethoxazole-trimethoprim (BACTRIM DS;SEPTRA DS) 800-160 MG per tablet Take 1 tablet by mouth daily Yes Historical Provider, MD   ondansetron (ZOFRAN) 4 MG tablet Take 4 mg by mouth every 8 hours as needed for Nausea or Vomiting Yes Historical Provider, MD   pantoprazole (PROTONIX) 40 MG tablet Take 1 tablet by mouth every morning (before breakfast) Yes Ethan Bellamy,    tiotropium (SPIRIVA HANDIHALER) 18 MCG inhalation capsule Inhale 1 capsule into the lungs daily 1 capsule via inhalation daily. Patient taking differently: Inhale 18 mcg into the lungs daily as needed 1 capsule via inhalation daily.  Yes Sneha Morris MD   albuterol sulfate HFA (PROAIR HFA) 108 (90 Base) MCG/ACT inhaler Inhale 2 puffs into the lungs every 6 hours as needed for Wheezing or Shortness of Breath Yes Sneha Morris MD   vitamin D (ERGOCALCIFEROL) 1.25 MG (06448 UT) CAPS capsule TAKE ONE CAPSULE BY MOUTH ONE TIME PER WEEK  Patient taking differently: 50,000 Units every 14 days On  Yes Mitzi Otero DO   linaclotide [facility]) 145 MCG capsule Take 145 mcg by mouth every other day  Yes Historical Provider, MD   nitroGLYCERIN (NITROSTAT) 0.4 MG SL tablet Place 1 tablet under the tongue every 5 minutes as needed for Chest pain Yes DONOVAN Spann CNP   aspirin 81 MG EC tablet Take 81 mg by mouth daily. Yes Historical Provider, MD   HYDROcodone-acetaminophen (NORCO) 5-325 MG per tablet Take 1 tablet by mouth every 8 hours as needed for Pain for up to 30 days. Fill on/after 10/19/2020  DONOVAN Chairez CNP   HYDROcodone-acetaminophen (NORCO) 5-325 MG per tablet Take 1 tablet by mouth every 8 hours as needed for Pain for up to 30 days. Fill on/after 2020  DONOVAN Chairez CNP   HYDROcodone-acetaminophen (NORCO) 5-325 MG per tablet Take 1 tablet by mouth every 8 hours as needed for Pain for up to 30 days. Fill on/after 2020  DONOVAN Chairez CNP   lactulose (CHRONULAC) 10 GM/15ML solution Take 30 Ml 2 times daily as needed for constipation.  If no BM with other softners  Britton Duane, APRN - CNP   atorvastatin (LIPITOR) 40 MG tablet Take 1 tablet by mouth daily  Britton Duane, APRN - CNP   zoster recombinant adjuvanted vaccine [facility]) 50 MCG/0.5ML SUSR injection Inject 0.5 mLs into the muscle See Admin Instructions 1 dose now and repeat in 2-6 months  Britton Duane, APRN - CNP        Social History     Tobacco Use    Smoking status: Former Smoker     Packs/day: 1.50     Years: 30.00     Pack years: 45.00     Types: Cigarettes     Start date: 1981     Last attempt to quit: 3/13/2009     Years since quittin.4    Smokeless tobacco: Never Used    Tobacco comment: quit 2009   Substance Use Topics    Alcohol use: No     Alcohol/week: 0.0 standard drinks        Vitals:    20 1313   BP: 134/60   Site: Right Upper Arm   Cuff Size: Medium Adult   Pulse: 80   Temp: 98.1 °F (36.7 °C) TempSrc: Oral   Weight: 183 lb (83 kg)   Height: 5' 5\" (1.651 m)     Estimated body mass index is 30.45 kg/m² as calculated from the following:    Height as of this encounter: 5' 5\" (1.651 m). Weight as of this encounter: 183 lb (83 kg). Physical Exam  Constitutional:       General: She is not in acute distress. Appearance: Normal appearance. She is well-developed. HENT:      Head: Normocephalic and atraumatic. Right Ear: Tympanic membrane, ear canal and external ear normal.      Left Ear: Tympanic membrane, ear canal and external ear normal.      Nose: Nose normal. No congestion or rhinorrhea. Mouth/Throat:      Mouth: Mucous membranes are moist.      Pharynx: Oropharynx is clear. No oropharyngeal exudate or posterior oropharyngeal erythema. Eyes:      Extraocular Movements: Extraocular movements intact. Conjunctiva/sclera: Conjunctivae normal.      Pupils: Pupils are equal, round, and reactive to light. Neck:      Musculoskeletal: Normal range of motion and neck supple. Thyroid: No thyromegaly. Vascular: No JVD. Cardiovascular:      Rate and Rhythm: Normal rate and regular rhythm. Heart sounds: Normal heart sounds. No murmur. No friction rub. No gallop. Pulmonary:      Effort: Pulmonary effort is normal. No respiratory distress. Breath sounds: Normal breath sounds. No wheezing or rales. Abdominal:      General: Bowel sounds are normal. There is no distension. Palpations: Abdomen is soft. Tenderness: There is no abdominal tenderness. Musculoskeletal: Normal range of motion. Right lower leg: Edema (trace) present. Left lower leg: Edema (trace) present. Lymphadenopathy:      Cervical: No cervical adenopathy. Skin:     General: Skin is warm and dry. Capillary Refill: Capillary refill takes less than 2 seconds. Neurological:      Mental Status: She is alert and oriented to person, place, and time. Motor: No weakness. Coordination: Coordination normal.      Gait: Gait normal.   Psychiatric:         Mood and Affect: Mood normal.         Behavior: Behavior normal.         Thought Content: Thought content normal.         Judgment: Judgment normal.         ASSESSMENT/PLAN:    1. Type 2 diabetes mellitus without complication, with long-term current use of insulin (HCC)    - insulin aspart (NOVOLOG FLEXPEN) 100 UNIT/ML injection pen; Sliding scale insulin coverage Glucose:Dose: If Less [id number] =No Insulin/ 140-199= 2 Units/ 200-249=4 Units/ 250-299= 6 Units/  300-349=8 Units/  350-400=10 Units/ Above 400 = 12 Units  Dispense: 15 pen; Refill: 3  - insulin glargine (LANTUS SOLOSTAR) 100 UNIT/ML injection pen; Inject 25 Units into the skin nightly  Dispense: 15 pen; Refill: 3  - Insulin Pen Needle (B-D ULTRAFINE III SHORT PEN) 31G X 8 MM MISC; Use three times daily Diagnosis Code E11.9  Dispense: 200 each; Refill: 3  - Continue dietary modifications and exercise regimen to promote optimal glycemic control  - Diabetes counseling completed    2. Benign essential HTN    - NIFEdipine (ADALAT CC) 60 MG extended release tablet; Take 60 mg by mouth 2 times daily  - carvedilol (COREG) 25 MG tablet; Take 1 tablet by mouth 2 times daily 2 tablets BID  Dispense: 60 tablet; Refill: 5  - Follow with cardiology as previously scheduled      Return in about 6 months (around 2/12/2021). An electronic signature was used to authenticate this note.     --Manual DONOVAN Sylvester - CNP on 8/19/2020 at 10:03 AM

## 2020-08-13 ENCOUNTER — OFFICE VISIT (OUTPATIENT)
Dept: PHYSICAL MEDICINE AND REHAB | Age: 65
End: 2020-08-13
Payer: MEDICARE

## 2020-08-13 ENCOUNTER — OFFICE VISIT (OUTPATIENT)
Dept: FAMILY MEDICINE CLINIC | Age: 65
End: 2020-08-13
Payer: MEDICARE

## 2020-08-13 VITALS
BODY MASS INDEX: 30.72 KG/M2 | HEIGHT: 65 IN | SYSTOLIC BLOOD PRESSURE: 164 MMHG | TEMPERATURE: 98 F | DIASTOLIC BLOOD PRESSURE: 62 MMHG | RESPIRATION RATE: 16 BRPM | HEART RATE: 65 BPM | WEIGHT: 184.4 LBS | OXYGEN SATURATION: 95 %

## 2020-08-13 VITALS
TEMPERATURE: 97.4 F | OXYGEN SATURATION: 97 % | DIASTOLIC BLOOD PRESSURE: 62 MMHG | SYSTOLIC BLOOD PRESSURE: 118 MMHG | HEART RATE: 67 BPM | HEIGHT: 65 IN | BODY MASS INDEX: 30.49 KG/M2 | WEIGHT: 182.98 LBS

## 2020-08-13 PROCEDURE — 3044F HG A1C LEVEL LT 7.0%: CPT | Performed by: NURSE PRACTITIONER

## 2020-08-13 PROCEDURE — 1111F DSCHRG MED/CURRENT MED MERGE: CPT | Performed by: NURSE PRACTITIONER

## 2020-08-13 PROCEDURE — G8417 CALC BMI ABV UP PARAM F/U: HCPCS | Performed by: NURSE PRACTITIONER

## 2020-08-13 PROCEDURE — 1090F PRES/ABSN URINE INCON ASSESS: CPT | Performed by: NURSE PRACTITIONER

## 2020-08-13 PROCEDURE — 99213 OFFICE O/P EST LOW 20 MIN: CPT | Performed by: NURSE PRACTITIONER

## 2020-08-13 PROCEDURE — 4040F PNEUMOC VAC/ADMIN/RCVD: CPT | Performed by: NURSE PRACTITIONER

## 2020-08-13 PROCEDURE — 2022F DILAT RTA XM EVC RTNOPTHY: CPT | Performed by: NURSE PRACTITIONER

## 2020-08-13 PROCEDURE — 1123F ACP DISCUSS/DSCN MKR DOCD: CPT | Performed by: NURSE PRACTITIONER

## 2020-08-13 PROCEDURE — G8399 PT W/DXA RESULTS DOCUMENT: HCPCS | Performed by: NURSE PRACTITIONER

## 2020-08-13 PROCEDURE — 99495 TRANSJ CARE MGMT MOD F2F 14D: CPT | Performed by: NURSE PRACTITIONER

## 2020-08-13 PROCEDURE — G8427 DOCREV CUR MEDS BY ELIG CLIN: HCPCS | Performed by: NURSE PRACTITIONER

## 2020-08-13 PROCEDURE — 1036F TOBACCO NON-USER: CPT | Performed by: NURSE PRACTITIONER

## 2020-08-13 PROCEDURE — 3017F COLORECTAL CA SCREEN DOC REV: CPT | Performed by: NURSE PRACTITIONER

## 2020-08-13 RX ORDER — HYDROCODONE BITARTRATE AND ACETAMINOPHEN 5; 325 MG/1; MG/1
1 TABLET ORAL EVERY 8 HOURS PRN
Qty: 90 TABLET | Refills: 0 | Status: SHIPPED | OUTPATIENT
Start: 2020-08-13 | End: 2020-09-12

## 2020-08-13 RX ORDER — ZOSTER VACCINE RECOMBINANT, ADJUVANTED 50 MCG/0.5
0.5 KIT INTRAMUSCULAR SEE ADMIN INSTRUCTIONS
Qty: 0.5 ML | Refills: 0 | Status: SHIPPED | OUTPATIENT
Start: 2020-08-13 | End: 2021-02-03 | Stop reason: ALTCHOICE

## 2020-08-13 RX ORDER — ATORVASTATIN CALCIUM 40 MG/1
40 TABLET, FILM COATED ORAL DAILY
Qty: 90 TABLET | Refills: 1 | Status: SHIPPED | OUTPATIENT
Start: 2020-08-13 | End: 2021-02-23

## 2020-08-13 RX ORDER — LACTULOSE 10 G/15ML
SOLUTION ORAL
Qty: 473 ML | Refills: 0 | Status: SHIPPED | OUTPATIENT
Start: 2020-08-13 | End: 2020-09-14

## 2020-08-13 ASSESSMENT — ENCOUNTER SYMPTOMS
COLOR CHANGE: 0
SORE THROAT: 0
BLOOD IN STOOL: 0
EYE DISCHARGE: 0
EYE REDNESS: 0
SHORTNESS OF BREATH: 0
ANAL BLEEDING: 0
DIARRHEA: 0
CONSTIPATION: 0
ABDOMINAL DISTENTION: 0
COUGH: 0
ABDOMINAL PAIN: 0
RHINORRHEA: 0
NAUSEA: 0

## 2020-08-13 NOTE — PATIENT INSTRUCTIONS
Thank you   1. Thank you for trusting us with your healthcare needs. You may receive a survey regarding today's visit. It would help us out if you would take a few moments to provide your feedback. We value your input. 2. Please bring in ALL medications BOTTLES, including inhalers, herbal supplements, over the counter, prescribed & non-prescribed medicine. The office would like actual medication bottles and a list.   3. Please note our OFFICE POLICIES:   a. Prior to getting your labs drawn, please check with your insurance company for benefits and eligibility of lab services. Often, insurance companies cover certain tests for preventative visits only. It is patient's responsibility to see what is covered. b. We are unable to change a diagnosis after the test has been performed. c. Lab orders will not be re-printed. Please hold onto your original lab orders and take them to your lab to be completed. d. If you no show your scheduled appointment three times, you will be dismissed from this practice. e. Sima Adie must be completed 24 hours prior to your schedule appointment. 4. If the list below has been completed, PLEASE FAX RECORDS TO OUR OFFICE @ [phone].  Once the records have been received we will update your records at our office:  Health Maintenance Due   Topic Date Due    Diabetic foot exam  12/13/1965    Diabetic retinal exam  12[birth year]13/1965    Shingles Vaccine (1 of 2) 12/13/2003    Pneumococcal 65+ yrs at Risk Vaccine (1 of 2 - PCV13) 12/13/2018    TSH testing  09/26/2019         Recheck the labs  Recheck urine on MOnday    · Increase aerobic exercise to 30-40 minutes 3-4 times per week  · Increase vegetables, fruits, poultry, fish, and low-fat dairy products  · Avoid greasy, fatty, fried foods, sweets, sugar-sweetened beverages, and red meats  · Reduce sodium in the diet  · Will give info on the DASH diet    Will refill atorvastatin    Back in 3 months         Patient Education Kidney Transplant: Care Instructions  Your Care Instructions     A kidney transplant gives you a healthy kidney from another person. You may need a transplant if your kidneys work poorly because of diabetes, high blood pressure, or another illness. You need only one kidney to live. The new kidney can do the work that your own kidneys cannot. It will remove waste from your blood. It will keep your body's fluids and chemicals in balance. A new kidney can improve the quality of your life. You are likely to feel better and have more energy. The new kidney may come from someone you know, a stranger, or a person who has . Getting a new kidney can sometimes take a long time. You have to meet certain rules to be able to get a kidney. For example, your overall health (other than kidney problems) has to be good. If a relative or another living person has a kidney that is a good match, you may not have to wait long. If you need a kidney from a person who has , your name will be put on a waiting list.  Follow-up care is a key part of your treatment and safety. Be sure to make and go to all appointments, and call your doctor if you are having problems. It's also a good idea to know your test results and keep a list of the medicines you take. How is a kidney transplant done? Your doctor will put the new kidney in your body and leave your own kidneys where they are, unless there is a reason to take them out. Your doctor will connect the blood vessels of the new kidney to your blood vessels, and the ureter of the new kidney to your bladder. A ureter is the tube that carries urine from the kidney to the bladder. The new kidney may make urine right away. But in some cases it may take a while to work. If the new kidney does not work right away, you will have dialysis until it starts to work. The transplant usually takes from 3 to 6 hours. You may stay in the hospital 5 to 10 days.  After you leave the hospital, you will have follow-up visits to make sure the kidney is working well. The living person who gives a kidney to you will likely stay in the hospital for about a week. What should you think about before getting a kidney transplant? · Learn as much as you can about kidney transplant centers. Find out if the center will accept your insurance. · You will need blood tests and tissue tests, and the results will be used to match you with a donor. · Your name will be put on a waiting list. It can take months or sometimes years before you receive the new organ. When a kidney becomes available, your transplant doctor will consider whether the donor is a good match for you. The team also will look at your health and how long you have been waiting. · After the transplant, you have to take medicine every day. The medicine will help keep your body from rejecting the new organ. Sometimes these medicines don't work. If this happens, you will have dialysis again and may wait for another transplant. · Medicines to keep your body from rejecting the new kidney can cause side effects. They can make your body less able to fight infections. They may increase your risk of heart problems, diabetes, cancer, and thin bones (osteoporosis). · Having an organ transplant can bring up many emotions. You may feel grateful and happy. But you also may feel guilty or depressed. Share your feelings with your doctor and family. If you are depressed, you can get treatment. · You have to take care of yourself after the transplant. You need to go to follow-up visits with your doctor, take your medicines as directed, and eat well and get regular exercise. When should you call for help? Watch closely for changes in your health, and be sure to contact your doctor if:  · You want more information about having a kidney transplant. Where can you learn more? Go to https://alfie.Synacor. org and sign in to your IID account.  Enter F250 in the Search Health Information box to learn more about \"Kidney Transplant: Care Instructions. \"     If you do not have an account, please click on the \"Sign Up Now\" link. Current as of: 2019               Content Version: 12.5  © 7423-0712 Healthwise, Incorporated. Care instructions adapted under license by [facility] [employer] [facility] ([facility]). If you have questions about a medical condition or this instruction, always ask your healthcare professional. [address] any warranty or liability for your use of this information. Patient Education        Learning About Kidney Transplant Surgery  What is a kidney transplant? A kidney transplant is surgery to give you a healthy kidney from another person. The new kidney may come from someone you know. Or it may come from a stranger or a person who has . You may need a transplant if your kidneys don't work as they should. This can happen if you have diabetes, high blood pressure, or another illness. You need only one kidney to live. The new kidney can do the work that your own kidneys cannot. It will remove waste from your blood. And it will balance your body's fluids and chemicals. A new kidney can improve the quality of your life. You are likely to feel better and have more energy. It can take a long time to get a new kidney. You have to meet some rules first. For example, your overall health (other than kidney problems) has to be fairly good. If a relative or another living person has a kidney that is a good match, you may not have to wait long. If you need a kidney from a person who has , your name will be put on a waiting list.  How is a kidney transplant done? If a relative or another living person gives you a kidney, you will be able to plan your surgery. But if you are on a waiting list to get a kidney from a person who has , your surgery could happen very suddenly.  When a donor kidney is available, you will probably have surgery (mg) a day. This limit counts all the sodium in prepared and packaged foods and any salt you add to your food. Follow-up care is a key part of your treatment and safety. Be sure to make and go to all appointments, and call your doctor if you are having problems. It's also a good idea to know your test results and keep a list of the medicines you take. How can you care for yourself at home? Read food labels  · Read labels on cans and food packages. The labels tell you how much sodium is in each serving. Make sure that you look at the serving size. If you eat more than the serving size, you have eaten more sodium. · Food labels also tell you the Percent Daily Value for sodium. Choose products with low Percent Daily Values for sodium. · Be aware that sodium can come in forms other than salt, including monosodium glutamate (MSG), sodium citrate, and sodium bicarbonate (baking soda). MSG is often added to Asian food. When you eat out, you can sometimes ask for food without MSG or added salt. Buy low-sodium foods  · Buy foods that are labeled \"unsalted\" (no salt added), \"sodium-free\" (less than 5 mg of sodium per serving), or \"low-sodium\" (less than 140 mg of sodium per serving). Foods labeled \"reduced-sodium\" and \"light sodium\" may still have too much sodium. Be sure to read the label to see how much sodium you are getting. · Buy fresh vegetables, or frozen vegetables without added sauces. Buy low-sodium versions of canned vegetables, soups, and other canned goods. Prepare low-sodium meals  · Cut back on the amount of salt you use in cooking. This will help you adjust to the taste. Do not add salt after cooking. One teaspoon of salt has about 2,300 mg of sodium. · Take the salt shaker off the table. · Flavor your food with garlic, lemon juice, onion, vinegar, herbs, and spices. Do not use soy sauce, lite soy sauce, steak sauce, onion salt, garlic salt, celery salt, mustard, or ketchup on your food.   · Use low-sodium salad dressings, sauces, and ketchup. Or make your own salad dressings and sauces without adding salt. · Use less salt (or none) when recipes call for it. You can often use half the salt a recipe calls for without losing flavor. Other foods such as rice, pasta, and grains do not need added salt. · Rinse canned vegetables, and cook them in fresh water. This removes some--but not all--of the salt. · Avoid water that is naturally high in sodium or that has been treated with water softeners, which add sodium. Call your local water company to find out the sodium content of your water supply. If you buy bottled water, read the label and choose a sodium-free brand. Avoid high-sodium foods  · Avoid eating:  ? Smoked, cured, salted, and canned meat, fish, and poultry. ? Ham, parker, hot dogs, and luncheon meats. ? Regular, hard, and processed cheese and regular peanut butter. ? Crackers with salted tops, and other salted snack foods such as pretzels, chips, and salted popcorn. ? Frozen prepared meals, unless labeled low-sodium. ? Canned and dried soups, broths, and bouillon, unless labeled sodium-free or low-sodium. ? Canned vegetables, unless labeled sodium-free or low-sodium. ? Western Bee fries, pizza, tacos, and other fast foods. ? Pickles, olives, ketchup, and other condiments, especially soy sauce, unless labeled sodium-free or low-sodium. Where can you learn more? Go to https://The DoBand CampaignjagdishSounder.Seeonic. org and sign in to your LoyaltyLion account. Enter H781 in the [facility] box to learn more about \"Low Sodium Diet (2,000 Milligram): Care Instructions. \"     If you do not have an account, please click on the \"Sign Up Now\" link. Current as of: August 22, 2019               Content Version: 12.5  © 7489-4416 Healthwise, Incorporated. Care instructions adapted under license by [facility] [address]  If you have questions about a medical condition or this instruction, always ask your healthcare professional. [address] any warranty or liability for your use of this information. Patient Education        Urinary Tract Infection in Women: Care Instructions  Your Care Instructions     A urinary tract infection, or UTI, is a general term for an infection anywhere between the kidneys and the urethra (where urine comes out). Most UTIs are bladder infections. They often cause pain or burning when you urinate. UTIs are caused by bacteria and can be cured with antibiotics. Be sure to complete your treatment so that the infection goes away. Follow-up care is a key part of your treatment and safety. Be sure to make and go to all appointments, and call your doctor if you are having problems. It's also a good idea to know your test results and keep a list of the medicines you take. How can you care for yourself at home? · Take your antibiotics as directed. Do not stop taking them just because you feel better. You need to take the full course of antibiotics. · Drink extra water and other fluids for the next day or two. This may help wash out the bacteria that are causing the infection. (If you have kidney, heart, or liver disease and have to limit fluids, talk with your doctor before you increase your fluid intake.)  · Avoid drinks that are carbonated or have caffeine. They can irritate the bladder. · Urinate often. Try to empty your bladder each time. · To relieve pain, take a hot bath or lay a heating pad set on low over your lower belly or genital area. Never go to sleep with a heating pad in place. To prevent UTIs  · Drink plenty of water each day. This helps you urinate often, which clears bacteria from your system. (If you have kidney, heart, or liver disease and have to limit fluids, talk with your doctor before you increase your fluid intake.)  · Urinate when you need to. · Urinate right after you have sex. · Change sanitary pads often.   · Avoid douches, bubble baths, feminine hygiene sprays, and other feminine hygiene products that have deodorants. · After going to the bathroom, wipe from front to back. When should you call for help? Call your doctor now or seek immediate medical care if:  · Symptoms such as fever, chills, nausea, or vomiting get worse or appear for the first time. · You have new pain in your back just below your rib cage. This is called flank pain. · There is new blood or pus in your urine. · You have any problems with your antibiotic medicine. Watch closely for changes in your health, and be sure to contact your doctor if:  · You are not getting better after taking an antibiotic for 2 days. · Your symptoms go away but then come back. Where can you learn more? Go to https://QoL MedspeEnvivioeweb.GlassUp. org and sign in to your QualiSystems account. Enter A955 in the Instant [employer] box to learn more about \"Urinary Tract Infection in Women: Care Instructions. \"     If you do not have an account, please click on the \"Sign Up Now\" link. Current as of: August 22, 2019               Content Version: 12.5  © 6392-4355 [employer]. Care instructions adapted under license by [facility] ([facility]). If you have questions about a medical condition or this instruction, always ask your healthcare professional. [address] any warranty or liability for your use of this information. Patient Education        E. Coli Infection: Care Instructions  Your Care Instructions  E. coli is the name of a germ, or bacterium, that can live in your stomach and intestines. Some types of E. coli can cause illness and symptoms, such as bloody diarrhea and cramps. Symptoms of E. coli infection usually end in about 1 week with no further problems. But some people have severe blood and kidney problems. People in the [place] most often get an E. coli infection from eating meat that has been contaminated with E. coli.  You can also get the infection from eating raw fruits and vegetables or dairy products that are contaminated with the bacteria. And you can get it from others who are infected. Follow-up care is a key part of your treatment and safety. Be sure to make and go to all appointments, and call your doctor if you are having problems. It's also a good idea to know your test results and keep a list of the medicines you take. How can you care for yourself at home? · E. coli usually goes away on its own. You usually don't need antibiotics. · Do not use over-the-counter antidiarrheal medicine if you have diarrhea. These products include Imodium or Maalox Anti-Diarrheal.  · Begin eating small amounts of mild, low-fat foods, depending on how you feel. Try foods like rice, dry crackers, bananas, and applesauce. · To prevent dehydration, drink plenty of fluids, enough so that your urine is light yellow or clear like water. Choose water and other caffeine-free clear liquids until you feel better. If you have kidney, heart, or liver disease and have to limit fluids, talk with your doctor before you increase the amount of fluids you drink. To prevent  E. coli  infection   · Never eat raw or undercooked ground beef. Cook beef to a temperature of at least 160 F. Always use a meat thermometer. Ground beef should be cooked until all pink color is gone. · Cut open restaurant and home-cooked hamburgers to make sure that they have been completely cooked. The juices should be clear or yellowish, with no trace of pink. · When preparing food, wash your hands often with hot, soapy water, especially after handling raw meat. · Always wash cooking tools, cutting boards, dishes, countertops, and utensils with hot, soapy water right after they have come into contact with raw meat. Do not put cooked meat back onto a plate that has held raw meat unless you have thoroughly washed the plate.   · Use separate cutting boards for raw meat and for other food items.  · Keep raw meat, poultry, and seafood separate from vegetables, fruits, breads, and other foods that have already been prepared for eating. · Use only pasteurized milk, dairy, and juice products. Check product labels for the word \"pasteurized. \" Juice made from concentrate is the same as pasteurized. · Wash raw fruits and vegetables under running water before eating them. To prevent spreading  E. coli   · Wash your hands often, and always wash them after bowel movements or changing diapers. If your home has more than one bathroom, use one bathroom while you are sick and ask the rest of your family to use the other bathroom. · Dispose of soiled diapers and stools carefully. · Don't handle food or work in a day care center or other institution until E. coli can no longer be found in two separate stool samples. If you have taken any antibiotic medicine, the stool sample should be taken at least 48 hours after you took the last dose of antibiotic. When should you call for help? [id number] anytime you think you may need emergency care. For example, call if:  · You passed out (lost consciousness). Call your doctor now or seek immediate medical care if:  · You have new or worse belly pain. · You have a new or higher fever. · You are dizzy or lightheaded, or you feel like you may faint. · You have symptoms of dehydration, such as:  ? Dry eyes and a dry mouth. ? Passing only a little urine. ? Feeling thirstier than normal.  · You cannot keep down medicine or fluids. · You have new or more blood in stools. · You have new or worse vomiting or diarrhea. · You have new swelling. · You are very pale. · You have new bruises or blood spots under your skin. Watch closely for changes in your health, and be sure to contact your doctor if:  · You do not get better as expected. Where can you learn more? Go to https://alfie.Independent Space. org and sign in to your [employer] account.  Enter  in the Search diarrhea medicine or antibiotics. These medicines can slow down the digestion process. This can allow more time for your body to absorb the poisons made by the E. coli. Call your doctor instead. In some people, the infection causes serious problems with the blood and kidneys. These people may need blood transfusions or dialysis. How can you prevent an E. coli infection? To prevent intestinal tract infections from E. coli:  · Never eat raw or undercooked ground beef. Cook beef to a temperature of at least 160°F. Always use a meat thermometer. Ground beef should be cooked until all pink color is gone. · Cut open restaurant and home-cooked hamburgers to make sure that they have been completely cooked. The juices should be clear or yellowish, with no trace of pink. · Always wash cooking tools, cutting boards, dishes, countertops, and utensils with hot, soapy water right after they have come into contact with raw meat. Do not put cooked meat back onto a plate that has held raw meat unless you have thoroughly washed the plate. · Use separate cutting boards for raw meat and other food items. · Wash your hands often with hot, soapy water, especially after handling raw meat. Always wash your hands after bowel movements or changing diapers. · Dispose of soiled diapers and stools carefully. Where can you learn more? Go to https://DesignPaxpeBluePoint EnergymelindaCityOdds.healthAstaro. org and sign in to your Deenty account. Enter Y224 in the [facility] box to learn more about \"Learning About E. Coli Infections. \"     If you do not have an account, please click on the \"Sign Up Now\" link. Current as of: February 11, 2020               Content Version: 12.5  © 7714-2246 Healthwise, Incorporated. Care instructions adapted under license by [facility] ([facility]).  If you have questions about a medical condition or this instruction, always ask your healthcare professional. Hieu Armijo any warranty or liability for your use of this information.

## 2020-08-13 NOTE — PROGRESS NOTES
Monitoring: Periodic Controlled Substance Monitoring: Possible medication side effects, risk of tolerance/dependence & alternative treatments discussed., No signs of potential drug abuse or diversion identified. , Random urine drug screen sent today. DONOVAN Fisher CNP)    Return in about 3 months (around 11/13/2020). It was my pleasure to evaluate Oumar Castañeda today. Please call with any concerns or questions.   15 minutes spent in evaluation efforts    DONOVAN Snyder - CNP

## 2020-08-13 NOTE — PROGRESS NOTES
failure, stage 5 (HCC)    Accelerated hypertension    Diabetic peripheral neuropathy associated with type 2 diabetes mellitus (HCC)    Pericardial effusion    Hypokalemia    Type II diabetes mellitus with stage 5 chronic kidney disease (HCC)    Acute on chronic diastolic congestive heart failure (HCC)    End stage renal disease due to benign hypertension (HCC)    ESRD (end stage renal disease) on dialysis (HCC)    Post-operative nausea and vomiting    Chronic constipation    Candida UTI    Fluid overload    Pleural effusion    Acute respiratory failure with hypoxia (HCC)    Elevated troponin    Dyspnea    Bilateral leg edema    Hypernatremia    Metabolic acidosis    Generalized weakness    Lymphadenopathy, inguinal    Atelectasis of left lung    Thyroid nodule    ESRD needing [facility] ([facility] [address]    Debility    UTI (urinary tract infection)    Kidney transplant recipient    Sepsis ([facility] [address]    E. coli UTI    Acute kidney injury superimposed on CKD (HCC)    Intractable nausea and vomiting    Hypomagnesemia    Hypermagnesemia    Chest discomfort       Allergies   Allergen Reactions    Pioglitazone Swelling    Actos [Pioglitazone Hydrochloride] Swelling    Cymbalta [Duloxetine Hcl] Other (See Comments)     Anxiety and lethargic    Gabapentin Anxiety       Medications listed as ordered at the time of discharge from hospital   Haris RING   Home Medication Instructions ALEXEY:    Printed on:08/13/20 5688   Medication Information                      albuterol sulfate HFA (PROAIR HFA) 108 (90 Base) MCG/ACT inhaler  Inhale 2 puffs into the lungs every 6 hours as needed for Wheezing or Shortness of Breath             aspirin 81 MG EC tablet  Take 81 mg by mouth daily.                atorvastatin (LIPITOR) 40 MG tablet  Take 1 tablet by mouth daily             biotene dry mouth (ORAL BALANCE) GEL MT gel  Take by mouth as needed             carvedilol (COREG) 25 MG tablet  Take 1 tablet by mouth 2 times daily 2 tablets BID             ciprofloxacin (CIPRO) 500 MG tablet  Take 500 mg by mouth 2 times daily             cloNIDine (CATAPRES) 0.3 MG tablet  Take 0.2 mg by mouth 3 times daily              docusate sodium (COLACE) 100 MG capsule  Take 200 mg by mouth 2 times daily             fluticasone (FLONASE) 50 MCG/ACT nasal spray  1 spray by Each Nostril route daily as needed for Rhinitis             HYDROcodone-acetaminophen (NORCO) 5-325 MG per tablet  Take 1 tablet by mouth every 8 hours as needed for Pain for up to 30 days. Fill on/after 10/19/2020             HYDROcodone-acetaminophen (NORCO) 5-325 MG per tablet  Take 1 tablet by mouth every 8 hours as needed for Pain for up to 30 days. Fill on/after 9/19/2020             HYDROcodone-acetaminophen (NORCO) 5-325 MG per tablet  Take 1 tablet by mouth every 8 hours as needed for Pain for up to 30 days. Fill on/after 8/20/2020             insulin aspart (NOVOLOG FLEXPEN) 100 UNIT/ML injection pen  Sliding scale insulin coverage Glucose:Dose: If Less [id number] =No Insulin/ 140-199= 2 Units/ 200-249=4 Units/ 250-299= 6 Units/  300-349=8 Units/  350-400=10 Units/ Above 400 = 12 Units             insulin glargine (LANTUS SOLOSTAR) 100 UNIT/ML injection pen  Inject 25 Units into the skin nightly             Insulin Pen Needle (B-D ULTRAFINE III SHORT PEN) 31G X 8 MM MISC  Use three times daily Diagnosis Code E11.9             lactulose (CHRONULAC) 10 GM/15ML solution  Take 30 Ml 2 times daily as needed for constipation.  If no BM with other softners             linaclotide (LINZESS) 145 MCG capsule  Take 145 mcg by mouth every other day              magnesium oxide (MAG-OX) 400 MG tablet  Take 400 mg by mouth 2 times daily             Mycophenolate Sodium 360 MG TBEC  Take 360 mg by mouth 2 tablets BID             NIFEdipine (ADALAT CC) 60 MG extended release tablet  Take 60 mg by mouth 2 times daily             nitroGLYCERIN (NITROSTAT) 0.4 MG SL tablet  Place 1 tablet under the tongue every 5 minutes as needed for Chest pain             nystatin (MYCOSTATIN) 512474 UNIT/ML suspension  Take 500,000 Units by mouth 4 times daily             ondansetron (ZOFRAN) 4 MG tablet  Take 4 mg by mouth every 8 hours as needed for Nausea or Vomiting             pantoprazole (PROTONIX) 40 MG tablet  Take 1 tablet by mouth every morning (before breakfast)             sulfamethoxazole-trimethoprim (BACTRIM DS;SEPTRA DS) 800-160 MG per tablet  Take 1 tablet by mouth daily             tiotropium (SPIRIVA HANDIHALER) 18 MCG inhalation capsule  Inhale 1 capsule into the lungs daily 1 capsule via inhalation daily. vitamin D (ERGOCALCIFEROL) 1.25 MG (69249 UT) CAPS capsule  TAKE ONE CAPSULE BY MOUTH ONE TIME PER WEEK             zoster recombinant adjuvanted vaccine [facility]) 50 MCG/0.5ML SUSR injection  Inject 0.5 mLs into the muscle See Admin Instructions 1 dose now and repeat in 2-6 months                   Medications marked \"taking\" at this time  Outpatient Medications Marked as Taking for the 8/13/20 encounter (Office Visit) with DONOVAN Tran CNP   Medication Sig Dispense Refill    HYDROcodone-acetaminophen (NORCO) 5-325 MG per tablet Take 1 tablet by mouth every 8 hours as needed for Pain for up to 30 days. Fill on/after 10/19/2020 90 tablet 0    HYDROcodone-acetaminophen (NORCO) 5-325 MG per tablet Take 1 tablet by mouth every 8 hours as needed for Pain for up to 30 days. Fill on/after 9/19/2020 90 tablet 0    HYDROcodone-acetaminophen (NORCO) 5-325 MG per tablet Take 1 tablet by mouth every 8 hours as needed for Pain for up to 30 days. Fill on/after 8/20/2020 90 tablet 0    lactulose (CHRONULAC) 10 GM/15ML solution Take 30 Ml 2 times daily as needed for constipation.  If no BM with other softners 473 mL 0    atorvastatin (LIPITOR) 40 MG tablet Take 1 tablet by mouth daily 90 tablet 1    zoster recombinant adjuvanted vaccine (SHINGRIX) 50 MCG/0.5ML SUSR injection Inject 0.5 mLs into the muscle See Admin Instructions 1 dose now and repeat in 2-6 months 0.5 mL 0    biotene dry mouth (ORAL BALANCE) GEL MT gel Take by mouth as needed      ciprofloxacin (CIPRO) 500 MG tablet Take 500 mg by mouth 2 times daily      NIFEdipine (ADALAT CC) 60 MG extended release tablet Take 60 mg by mouth 2 times daily      nystatin (MYCOSTATIN) 711055 UNIT/ML suspension Take 500,000 Units by mouth 4 times daily      Mycophenolate Sodium 360 MG TBEC Take 360 mg by mouth 2 tablets BID      insulin aspart (NOVOLOG FLEXPEN) 100 UNIT/ML injection pen Sliding scale insulin coverage Glucose:Dose: If Less [id number] =No Insulin/ 140-199= 2 Units/ 200-249=4 Units/ 250-299= 6 Units/  300-349=8 Units/  350-400=10 Units/ Above 400 = 12 Units 15 pen 3    insulin glargine (LANTUS SOLOSTAR) 100 UNIT/ML injection pen Inject 25 Units into the skin nightly 15 pen 3    carvedilol (COREG) 25 MG tablet Take 1 tablet by mouth 2 times daily 2 tablets BID 60 tablet 5    Insulin Pen Needle (B-D ULTRAFINE III SHORT PEN) 31G X 8 MM MISC Use three times daily Diagnosis Code E11.9 200 each 3    cloNIDine (CATAPRES) 0.3 MG tablet Take 0.2 mg by mouth 3 times daily       fluticasone (FLONASE) 50 MCG/ACT nasal spray 1 spray by Each Nostril route daily as needed for Rhinitis      docusate sodium (COLACE) 100 MG capsule Take 200 mg by mouth 2 times daily      magnesium oxide (MAG-OX) 400 MG tablet Take 400 mg by mouth 2 times daily      sulfamethoxazole-trimethoprim (BACTRIM DS;SEPTRA DS) 800-160 MG per tablet Take 1 tablet by mouth daily      ondansetron (ZOFRAN) 4 MG tablet Take 4 mg by mouth every 8 hours as needed for Nausea or Vomiting      pantoprazole (PROTONIX) 40 MG tablet Take 1 tablet by mouth every morning (before breakfast) 30 tablet 5    tiotropium (SPIRIVA HANDIHALER) 18 MCG inhalation capsule Inhale 1 capsule into the lungs daily 1 capsule via inhalation daily.  (Patient taking differently: Inhale 18 mcg into the lungs daily as needed 1 capsule via inhalation daily.) 90 capsule 3    albuterol sulfate HFA (PROAIR HFA) 108 (90 Base) MCG/ACT inhaler Inhale 2 puffs into the lungs every 6 hours as needed for Wheezing or Shortness of Breath 3 Inhaler 3    vitamin D (ERGOCALCIFEROL) 1.25 MG (33257 UT) CAPS capsule TAKE ONE CAPSULE BY MOUTH ONE TIME PER WEEK (Patient taking differently: 50,000 Units every 14 days On Mondays) 4 capsule 5    linaclotide (LINZESS) 145 MCG capsule Take 145 mcg by mouth every other day       nitroGLYCERIN (NITROSTAT) 0.4 MG SL tablet Place 1 tablet under the tongue every 5 minutes as needed for Chest pain 25 tablet 5    aspirin 81 MG EC tablet Take 81 mg by mouth daily. Medications patient taking as of now reconciled against medications ordered at time of hospital discharge: Yes    Chief Complaint   Patient presents with    [place] [facility]     [address] Follow-up Sepsis due to E. Coli UTI       HPI    Woke up Sunday morning and vomited - then she passed out - then developed SOB, nausea, vomiting - had a urine test which showed UTI. She was admitted had elevated WBC 15/25 - IV rocephin 1 G IV. Per hospital note:   \"this is a 77-year-old female, with past medical history of CKD, right kidney transplant, CHF, EF 55 to 60%, COPD, CAD, who presented to [facility] on 8/2/2020 due to sob/nausea/vomiting. Chest x-ray on arrival showed no acute findings. [facility] arrival showed positive nitrite, moderate leukocyte esterase, pyuria, WBC 15-25, casts more than 15 hyaline. She was admitted under hospital medicine service for shortness of breath and cystitis, where she was started with Rocephin 1 g IV every 24 hours on 8/30/2020. Urine culture came back E. coli >100K. Sensitive to ceftriaxone.  She met criteria for sepsis due to E. coli UTI. Patient was febrile 101.1 °F on 8/3/2020 at 0522, with leukocytosis WBC 17K on 8/2/2020.  No lactic acid on file.  Leukocytosis improving on discharge. Her VIDA on CKD stage III also improved. Creatinine 1.3 today from 1.9 yesterday.  Creatinine was 1.4 on admission, went up to 2.1 on 8/4/2020.  Baseline creatinine ranges between 1.1-1.3. nephrology on-board. She had intractable N/V and abd pain on 8/4/2020, CT abd/pelvis on 8/4: No evidence of small bowel obstruction, large amount of retained stool in the colon, no colonic wall thickening or edema, appendix normal, urinary bladder is normal, no free intraperitoneal air or free fluid in the abdomen or pelvis. She was managed with Zofran and Phenergan with no relief, Haldol was tried, which controlled patient's nausea and vomiting. She also reports left-sided chest discomfort on 8/5/2020, initially her troponin was mildly elevated on arrival with a normal or negative troponin on repeat. EKG without any sign of acute infarction. She also reports dyspnea on exertion, and on 8/5/2020, per nephrology, she had crackles, she was given one-time dose of Lasix 20 mg IV, which improved her crackles. Patient denies shortness of breath at rest.  Although proBNP was elevated, clinically, patient does not look like in fluid overload. The elevated pro-BNP could be from VIDA on CKD. Her EF is 55 to 60% per echocardiogram 8/3/2020. She also had accelerated hypertension, with highest BP reading on file was 198/87, this was likely due to patient's vomiting and she did not tolerate oral intake initially, parents did not take home meds. Her home medications Coreg, amlodipine and clonidine were continued during this admission. She also had mild hypokalemia which was likely due to her vomiting and diuretics, this was replaced per potassium replacement protocol. On 8/5/2020, urology recommend [facility] transfer if there's a questions on viability of right kidney transplant. Nephrology is agreeable with plan.   This was discussed with the patient and patient agreed with transfer to [facility] for further evaluation and management for her right kidney transplant. [facility] transfer was initiated on 8/5/2020. Patient was transferred to [facility] in stable condition on 8/6/2020. \"    She was discharged from [facility] on 08/10/2020. Per [facility] note:  \"E.coli urinary tract infection - no sign of renal abscess or stones on imaging. Continue treatment with ceftriaxione (also sensitive to augmentin, and ciprofloxacin, but resistant to bactrim, and intermediate to nitrofurantoin). Her ureteral stent was previously removed. Initially started on ceftriaxione 1g IV daily. Sensitivities obtained from OSH and patient transitioned to Cipro 500 mg BID with plan to treat for 10 days with end date 8/13. Her stay was c/b hypertensive urgency with blood pressures >200s/80s. She was asymptomatic and never required a drip. Amlodipine was stopped and she was changed to Nifedipine XL 60 mg BID, clonidine increased to 0.3 mg TID and carvedilol increased to 50 mg bid. She was asked to continued to follow her blood glucoses closely as well, as hyperglycemia noted but no changed in insulin regimen at this time. Due to concerns for thrush, she was discharged on Nystatin swish and swallow as well as biotene gel for dry mouth. She was discharged on tacrolimus 7 mg bid and mycophenolate sodium 720 mg bid. She has post transplant follow up scheduled with [facility] Maryann MAN-CNP on 8/17/2020. She was discharged to resume home health care. \"    She is currently taking CIPRO BID     Urine was positive for E.Coli. temp was 101.1. Since hospital discharge she has not had a fever    She was sent to [place] via EMS -due to her renal transplant they sent her    Has a televisit with [facility] coming up - 08/17/2020 - with Nephrology transplant center    Has a nurse coming twice per week. Overall doing well    Goes to Dr. Benito Samuel for DM eye exams    Inpatient course: Discharge summary reviewed- see chart.     Interval history/Current status: STable    Review of Systems   Constitutional: Positive for fatigue. Negative for chills and fever. HENT: Negative for congestion, ear pain, postnasal drip, rhinorrhea and sore throat. Eyes: Negative for discharge and redness. Respiratory: Negative for cough and shortness of breath. Cardiovascular: Negative for chest pain and leg swelling. Gastrointestinal: Negative for abdominal distention, abdominal pain, anal bleeding, blood in stool, constipation, diarrhea and nausea. Skin: Negative for color change and rash. Neurological: Negative for facial asymmetry, speech difficulty and weakness. Hematological: Does not bruise/bleed easily. Psychiatric/Behavioral: Negative for agitation and confusion. Vitals:    08/13/20 1509 08/13/20 1531   BP: (!) 176/64 (!) 164/62   Site: Right Upper Arm Right Upper Arm   Position: Sitting Sitting   Cuff Size: Medium Adult Medium Adult   Pulse: 65    Resp: 16    Temp: 98 °F (36.7 °C)    TempSrc: Temporal    SpO2: 95%    Weight: 184 lb 6.4 oz (83.6 kg)    Height: 5' 5\" (1.651 m)      Body mass index is 30.69 kg/m². Wt Readings from Last 3 Encounters:   08/13/20 184 lb 6.4 oz (83.6 kg)   08/13/20 182 lb 15.7 oz (83 kg)   08/12/20 183 lb (83 kg)     BP Readings from Last 3 Encounters:   08/13/20 (!) 164/62   08/13/20 118/62   08/12/20 134/60       Physical Exam  Constitutional:       General: She is not in acute distress. Appearance: She is well-developed. She is not diaphoretic. HENT:      Head: Normocephalic and atraumatic. Right Ear: Hearing and external ear normal. No swelling. Left Ear: Hearing and external ear normal. No swelling. Nose: No mucosal edema or rhinorrhea. Right Sinus: No maxillary sinus tenderness or frontal sinus tenderness. Left Sinus: No maxillary sinus tenderness or frontal sinus tenderness. Mouth/Throat:      Pharynx: No oropharyngeal exudate or posterior oropharyngeal erythema.    Eyes:      General: Right eye: No discharge. Left eye: No discharge. Conjunctiva/sclera: Conjunctivae normal.      Pupils: Pupils are equal, round, and reactive to light. Neck:      Musculoskeletal: Normal range of motion. Cardiovascular:      Comments: No lower extremity edema  Pulmonary:      Effort: Pulmonary effort is normal. No respiratory distress. Breath sounds: Normal breath sounds. Musculoskeletal:         General: No tenderness or deformity. Comments: Full ROM of upper and lower extremities    Lymphadenopathy:      Cervical: No cervical adenopathy. Skin:     General: Skin is warm and dry. Findings: No rash. Nails: There is no clubbing. Neurological:      Mental Status: She is alert and oriented to person, place, and time. Coordination: Coordination normal.      Gait: Gait normal.   Psychiatric:         Speech: Speech normal.         Behavior: Behavior normal.         Thought Content: Thought content normal.         Judgment: Judgment normal.           Danette Kent was seen today for follow-up from hospital.    Diagnoses and all orders for this visit:    E. coli infect  -     Urinalysis Reflex to Culture; Future  -     NM DISCHARGE MEDS RECONCILED W/ CURRENT OUTPATIENT MED LIST    Cystitis  -     Urinalysis Reflex to Culture; Future  -     NM DISCHARGE MEDS RECONCILED W/ CURRENT OUTPATIENT MED LIST    ESRD (end stage renal disease) on dialysis ([facility] [address]  -     NM DISCHARGE MEDS RECONCILED W/ CURRENT OUTPATIENT MED LIST    Secondary hyperparathyroidism of renal origin ([facility] [address]    Constipation, unspecified constipation type  -     lactulose (CHRONULAC) 10 GM/15ML solution; Take 30 Ml 2 times daily as needed for constipation. If no BM with other softners    Other chronic pain    Screening for thyroid disorder  -     TSH with Reflex; Future    Need for shingles vaccine  -     zoster recombinant adjuvanted vaccine [facility]) 50 MCG/0.5ML SUSR injection;  Inject 0.5 mLs into the muscle See Admin Instructions 1 dose now and repeat in 2-6 months    Need for vaccination against Streptococcus pneumoniae  -     Pneumococcal polysaccharide vaccine 23-valent greater than or equal to 1yo subcutaneous/IM; Future    S/P kidney transplant  -     [address] [place] [address] discharge follow-up  -     [place] DISCHARGE MEDS RECONCILED W/ CURRENT OUTPATIENT MED LIST    Other orders  -     atorvastatin (LIPITOR) 40 MG tablet;  Take 1 tablet by mouth daily          Medical Decision Making: moderate complexity

## 2020-08-17 ENCOUNTER — TELEPHONE (OUTPATIENT)
Dept: FAMILY MEDICINE CLINIC | Age: 65
End: 2020-08-17

## 2020-08-17 ENCOUNTER — NURSE ONLY (OUTPATIENT)
Dept: LAB | Age: 65
End: 2020-08-17

## 2020-08-17 LAB — TSH SERPL DL<=0.05 MIU/L-ACNC: 1.4 UIU/ML (ref 0.4–4.2)

## 2020-08-17 NOTE — TELEPHONE ENCOUNTER
Trista Plummer at [facility] lab called stating that patient came in to have her labs drawn and the diagnosis code for the TSH with Reflex order screening for Thyroid disorder is not a passing diagnosis code. Only one that she told me that she knew would pass was E03.9 but that is diagnosis of Hypothyroidism which I did not see in patients chart. Please Advise.

## 2020-08-18 ENCOUNTER — TELEPHONE (OUTPATIENT)
Dept: FAMILY MEDICINE CLINIC | Age: 65
End: 2020-08-18

## 2020-08-18 LAB
BILIRUBIN URINE: NEGATIVE
BLOOD, URINE: NEGATIVE
CHARACTER, URINE: CLEAR
COLOR: YELLOW
GLUCOSE URINE: 100 MG/DL
KETONES, URINE: NEGATIVE
LEUKOCYTE ESTERASE, URINE: NEGATIVE
NITRITE, URINE: NEGATIVE
PH UA: 7.5 (ref 5–9)
PROTEIN UA: NEGATIVE
SPECIFIC GRAVITY, URINE: 1.01 (ref 1–1.03)
UROBILINOGEN, URINE: 0.2 EU/DL (ref 0–1)

## 2020-08-18 NOTE — TELEPHONE ENCOUNTER
Last visit- 8/13/2020  Next visit- 11/12/2020  Last Filled- 12/30/2019    Requested Prescriptions     Pending Prescriptions Disp Refills    vitamin D (ERGOCALCIFEROL) 1.25 MG (45028 UT) CAPS capsule [Pharmacy Med Name: VITAMIN D2 1.25MG(50,000 UNIT)] 4 capsule 5     Sig: TAKE 1 CAPSULE BY MOUTH ONE TIME PER WEEK

## 2020-08-18 NOTE — PROGRESS NOTES
Physician Progress Note      PATIENT:               Henrietta Bloch  CSN #:                  [id number]  :                       1955  ADMIT DATE:       2020 6:35 PM  [address] DATE:        2020 9:30 AM  RESPONDING  PROVIDER #:        NATALIA Sylvester MD          QUERY TEXT:    This patient presented with a UTI/sepsis in the setting of previous kidney   transplant. Cultures grew >100,000 E-coli. Treatment included Rocephin and   Cipro. After study, can the UTI be further described as: The medical record reflects the following:    Risk Factors: hx of kidney transplant  Clinical Indicators: urine growing ecoli, tmax 101.1, WBC 17  Treatment: IV Rocephin, continued antirejection meds, nephrology consult    Thank you!     Lyda Sever, Threasa Pinna, CRCR  RN Clinical   P: [phone]  Options provided:  -- UTI and Sepsis due to kidney transplant infection  -- UTI and Sepsis unrelated to kidney transplant infection  -- Other - I will add my own diagnosis  -- Disagree - Not applicable / Not valid  -- Disagree - Clinically unable to determine / Unknown  -- Refer to Clinical Documentation Reviewer    PROVIDER RESPONSE TEXT:    Sepsis due to E coli UTI, unclear if related to kidney transplant    Query created by: Lazaro Esparza on 2020 10:21 AM      Electronically signed by:  Teresa Vazquez MD 2020 8:09 AM

## 2020-08-19 RX ORDER — ERGOCALCIFEROL 1.25 MG/1
50000 CAPSULE ORAL
Qty: 4 CAPSULE | Refills: 3 | Status: SHIPPED | OUTPATIENT
Start: 2020-08-19 | End: 2020-09-30

## 2020-08-20 ENCOUNTER — TELEPHONE (OUTPATIENT)
Dept: FAMILY MEDICINE CLINIC | Age: 65
End: 2020-08-20

## 2020-08-20 NOTE — TELEPHONE ENCOUNTER
Patient notified          ----- Message from DONOVAN Haile CNP sent at 8/19/2020 10:22 PM EDT -----  Infection cleared from urine.

## 2020-08-21 NOTE — TELEPHONE ENCOUNTER
Signed note - clonidine and carvidilol interaction - sees cardiology also so will keep an eye on the interaction and heart rate

## 2020-09-01 PROBLEM — N39.0 UTI (URINARY TRACT INFECTION): Status: RESOLVED | Noted: 2020-08-02 | Resolved: 2020-09-01

## 2020-09-01 NOTE — TELEPHONE ENCOUNTER
Last visit- 8/13/2020 = GUERDA     CAN WE UPDATE PCP?     Next visit- 11/12/2020 = GUERDA     Requested Prescriptions     Pending Prescriptions Disp Refills    pantoprazole (PROTONIX) 40 MG tablet [Pharmacy Med Name: PANTOPRAZOLE SOD DR 40 MG TAB] 90 tablet 1     Sig: TAKE 1 TABLET BY MOUTH EVERY DAY BEFORE BREAKFAST

## 2020-09-03 RX ORDER — PANTOPRAZOLE SODIUM 40 MG/1
TABLET, DELAYED RELEASE ORAL
Qty: 90 TABLET | Refills: 1 | Status: SHIPPED | OUTPATIENT
Start: 2020-09-03 | End: 2021-03-01

## 2020-09-07 ENCOUNTER — NURSE ONLY (OUTPATIENT)
Dept: LAB | Age: 65
End: 2020-09-07

## 2020-09-07 LAB
ALBUMIN SERPL-MCNC: 4 G/DL (ref 3.5–5.1)
ALP BLD-CCNC: 101 U/L (ref 38–126)
ALT SERPL-CCNC: 14 U/L (ref 11–66)
ANION GAP SERPL CALCULATED.3IONS-SCNC: 11 MEQ/L (ref 8–16)
AST SERPL-CCNC: 15 U/L (ref 5–40)
BASOPHILS # BLD: 0.3 %
BASOPHILS ABSOLUTE: 0 THOU/MM3 (ref 0–0.1)
BILIRUB SERPL-MCNC: 0.2 MG/DL (ref 0.3–1.2)
BUN BLDV-MCNC: 25 MG/DL (ref 7–22)
CALCIUM SERPL-MCNC: 10.2 MG/DL (ref 8.5–10.5)
CHLORIDE BLD-SCNC: 100 MEQ/L (ref 98–111)
CO2: 26 MEQ/L (ref 23–33)
CREAT SERPL-MCNC: 1.4 MG/DL (ref 0.4–1.2)
EOSINOPHIL # BLD: 1.9 %
EOSINOPHILS ABSOLUTE: 0.1 THOU/MM3 (ref 0–0.4)
ERYTHROCYTE [DISTWIDTH] IN BLOOD BY AUTOMATED COUNT: 13.5 % (ref 11.5–14.5)
ERYTHROCYTE [DISTWIDTH] IN BLOOD BY AUTOMATED COUNT: 44.5 FL (ref 35–45)
GFR SERPL CREATININE-BSD FRML MDRD: 38 ML/MIN/1.73M2
GLUCOSE BLD-MCNC: 155 MG/DL (ref 70–108)
HCT VFR BLD CALC: 29.9 % (ref 37–47)
HEMOGLOBIN: 9.4 GM/DL (ref 12–16)
IMMATURE GRANS (ABS): 0.02 THOU/MM3 (ref 0–0.07)
IMMATURE GRANULOCYTES: 0.3 %
LYMPHOCYTES # BLD: 15.7 %
LYMPHOCYTES ABSOLUTE: 1 THOU/MM3 (ref 1–4.8)
MCH RBC QN AUTO: 28.3 PG (ref 26–33)
MCHC RBC AUTO-ENTMCNC: 31.4 GM/DL (ref 32.2–35.5)
MCV RBC AUTO: 90.1 FL (ref 81–99)
MONOCYTES # BLD: 8.6 %
MONOCYTES ABSOLUTE: 0.6 THOU/MM3 (ref 0.4–1.3)
NUCLEATED RED BLOOD CELLS: 0 /100 WBC
PLATELET # BLD: 193 THOU/MM3 (ref 130–400)
PMV BLD AUTO: 11 FL (ref 9.4–12.4)
POTASSIUM SERPL-SCNC: 3.3 MEQ/L (ref 3.5–5.2)
RBC # BLD: 3.32 MILL/MM3 (ref 4.2–5.4)
SEG NEUTROPHILS: 73.2 %
SEGMENTED NEUTROPHILS ABSOLUTE COUNT: 4.7 THOU/MM3 (ref 1.8–7.7)
SODIUM BLD-SCNC: 137 MEQ/L (ref 135–145)
TOTAL PROTEIN: 6.6 G/DL (ref 6.1–8)
WBC # BLD: 6.4 THOU/MM3 (ref 4.8–10.8)

## 2020-09-10 LAB — TACROLIMUS BLOOD: 9.5 NG/ML

## 2020-09-11 RX ORDER — CARVEDILOL 25 MG/1
TABLET ORAL
Qty: 360 TABLET | Refills: 3 | Status: SHIPPED | OUTPATIENT
Start: 2020-09-11 | End: 2021-02-03 | Stop reason: SDUPTHER

## 2020-09-14 RX ORDER — LACTULOSE 10 G/15ML
SOLUTION ORAL
Qty: 473 ML | Refills: 0 | Status: SHIPPED | OUTPATIENT
Start: 2020-09-14 | End: 2020-11-12 | Stop reason: SDUPTHER

## 2020-09-14 NOTE — TELEPHONE ENCOUNTER
Patient's last appointment was : 8/13/2020  Patient's next appointment is : 11/12/2020  Last refilled:8/13/2020

## 2020-09-28 ENCOUNTER — NURSE ONLY (OUTPATIENT)
Dept: LAB | Age: 65
End: 2020-09-28

## 2020-09-28 ENCOUNTER — TELEPHONE (OUTPATIENT)
Dept: FAMILY MEDICINE CLINIC | Age: 65
End: 2020-09-28

## 2020-09-28 LAB
ALBUMIN SERPL-MCNC: 4.4 G/DL (ref 3.5–5.1)
ALP BLD-CCNC: 104 U/L (ref 38–126)
ALT SERPL-CCNC: 10 U/L (ref 11–66)
ANION GAP SERPL CALCULATED.3IONS-SCNC: 11 MEQ/L (ref 8–16)
AST SERPL-CCNC: 12 U/L (ref 5–40)
BILIRUB SERPL-MCNC: 0.2 MG/DL (ref 0.3–1.2)
BILIRUBIN DIRECT: < 0.2 MG/DL (ref 0–0.3)
BUN BLDV-MCNC: 24 MG/DL (ref 7–22)
CHLORIDE BLD-SCNC: 102 MEQ/L (ref 98–111)
CO2: 27 MEQ/L (ref 23–33)
CREAT SERPL-MCNC: 1.1 MG/DL (ref 0.4–1.2)
ERYTHROCYTE [DISTWIDTH] IN BLOOD BY AUTOMATED COUNT: 13.5 % (ref 11.5–14.5)
ERYTHROCYTE [DISTWIDTH] IN BLOOD BY AUTOMATED COUNT: 44 FL (ref 35–45)
GLUCOSE BLD-MCNC: 141 MG/DL (ref 70–108)
HCT VFR BLD CALC: 30.9 % (ref 37–47)
HEMOGLOBIN: 9.7 GM/DL (ref 12–16)
MCH RBC QN AUTO: 28 PG (ref 26–33)
MCHC RBC AUTO-ENTMCNC: 31.4 GM/DL (ref 32.2–35.5)
MCV RBC AUTO: 89.3 FL (ref 81–99)
PLATELET # BLD: 246 THOU/MM3 (ref 130–400)
PMV BLD AUTO: 11.3 FL (ref 9.4–12.4)
POTASSIUM SERPL-SCNC: 3.3 MEQ/L (ref 3.5–5.2)
RBC # BLD: 3.46 MILL/MM3 (ref 4.2–5.4)
SODIUM BLD-SCNC: 140 MEQ/L (ref 135–145)
TOTAL PROTEIN: 7.2 G/DL (ref 6.1–8)
WBC # BLD: 7 THOU/MM3 (ref 4.8–10.8)

## 2020-09-28 NOTE — TELEPHONE ENCOUNTER
Patient's last appointment was : 8/13/2020  Patient's next appointment is : 11/12/2020  Last refilled:  8/19/2020

## 2020-09-28 NOTE — TELEPHONE ENCOUNTER
Emmanuel Pruitt from [address] [facility] [address] called stating that Jose E Ghosh  Has an elevated Blood Pressure of 188/86 this morning. Jose E Ghosh took her Blood Pressure medication and will recheck her Blood Pressure once med is in her system. Jose E Ghosh is not having any symptoms at this time but will call if starting to have any symptoms. Please Advise.

## 2020-09-30 LAB — TACROLIMUS BLOOD: 6.2 NG/ML

## 2020-09-30 RX ORDER — ERGOCALCIFEROL 1.25 MG/1
50000 CAPSULE ORAL
Qty: 12 CAPSULE | Refills: 1 | Status: SHIPPED | OUTPATIENT
Start: 2020-09-30 | End: 2021-06-17

## 2020-09-30 NOTE — TELEPHONE ENCOUNTER
Spoke with patient. Stated BP was taken before taking her medication. Stated she had fallen asleep the night before without taking her medication and this reading was taken early morning before she took her medication the next day. States that everything is fine now and BP is down now. Denied any other questions or concerns at this time.

## 2020-10-05 ENCOUNTER — NURSE ONLY (OUTPATIENT)
Dept: LAB | Age: 65
End: 2020-10-05

## 2020-10-05 LAB
ANION GAP SERPL CALCULATED.3IONS-SCNC: 11 MEQ/L (ref 8–16)
BUN BLDV-MCNC: 24 MG/DL (ref 7–22)
CHLORIDE BLD-SCNC: 103 MEQ/L (ref 98–111)
CO2: 28 MEQ/L (ref 23–33)
CREAT SERPL-MCNC: 1.2 MG/DL (ref 0.4–1.2)
ERYTHROCYTE [DISTWIDTH] IN BLOOD BY AUTOMATED COUNT: 13.7 % (ref 11.5–14.5)
ERYTHROCYTE [DISTWIDTH] IN BLOOD BY AUTOMATED COUNT: 44.4 FL (ref 35–45)
GLUCOSE BLD-MCNC: 174 MG/DL (ref 70–108)
HCT VFR BLD CALC: 33.7 % (ref 37–47)
HEMOGLOBIN: 10.6 GM/DL (ref 12–16)
MCH RBC QN AUTO: 28 PG (ref 26–33)
MCHC RBC AUTO-ENTMCNC: 31.5 GM/DL (ref 32.2–35.5)
MCV RBC AUTO: 88.9 FL (ref 81–99)
PLATELET # BLD: 295 THOU/MM3 (ref 130–400)
PMV BLD AUTO: 10.6 FL (ref 9.4–12.4)
POTASSIUM SERPL-SCNC: 3.7 MEQ/L (ref 3.5–5.2)
RBC # BLD: 3.79 MILL/MM3 (ref 4.2–5.4)
SODIUM BLD-SCNC: 142 MEQ/L (ref 135–145)
WBC # BLD: 8.3 THOU/MM3 (ref 4.8–10.8)

## 2020-10-07 LAB — TACROLIMUS BLOOD: 9.1 NG/ML

## 2020-10-12 ENCOUNTER — HOSPITAL ENCOUNTER (OUTPATIENT)
Age: 65
Setting detail: SPECIMEN
Discharge: HOME | End: 2020-10-12
Payer: MEDICARE

## 2020-10-12 ENCOUNTER — TELEPHONE (OUTPATIENT)
Dept: FAMILY MEDICINE CLINIC | Age: 65
End: 2020-10-12

## 2020-10-12 LAB
GFR SERPL CREATININE-BSD FRML MDRD: 45 ML/MIN/1.73M2
GFR SERPL CREATININE-BSD FRML MDRD: 50 ML/MIN/1.73M2

## 2020-10-12 NOTE — TELEPHONE ENCOUNTER
Mayra Jama, with [facility] called and left voicemail stating she went to get patient weekly labs and patient is describing symptoms of a yeast infection. Patient is having vaginal itching and discharge. Denies any pain with urinating or increase in urination. Patient is on daily bactrim since procedure. Would like prescription for yeast infection sent to [facility] on [address].

## 2020-10-13 RX ORDER — FLUCONAZOLE 200 MG/1
TABLET ORAL
Qty: 2 TABLET | Refills: 0 | Status: SHIPPED | OUTPATIENT
Start: 2020-10-13 | End: 2021-02-03 | Stop reason: ALTCHOICE

## 2020-10-13 NOTE — TELEPHONE ENCOUNTER
Left message with Boni Culver with [address] notifying her that the prescription was at the pharmacy for the patient. Called and notified patient that prescription was sent. Voiced understanding and denied any other questions or concerns at this time.

## 2020-10-19 ENCOUNTER — TELEPHONE (OUTPATIENT)
Dept: FAMILY MEDICINE CLINIC | Age: 65
End: 2020-10-19

## 2020-10-19 NOTE — TELEPHONE ENCOUNTER
Forms in DR. FARLEY 's folder to be signed. Once it is completed, it will be fax.    Received forms from : [address] last appointment was : 8/13/2020  Patients next appointment is : 11/12/2020

## 2020-10-26 ENCOUNTER — NURSE ONLY (OUTPATIENT)
Dept: LAB | Age: 65
End: 2020-10-26

## 2020-10-26 LAB
BILIRUBIN URINE: NEGATIVE
BLOOD, URINE: NEGATIVE
CHARACTER, URINE: CLEAR
COLOR: YELLOW
GLUCOSE URINE: >= 1000 MG/DL
KETONES, URINE: NEGATIVE
LEUKOCYTE ESTERASE, URINE: NEGATIVE
NITRITE, URINE: NEGATIVE
PH UA: 6 (ref 5–9)
PROTEIN UA: NEGATIVE
SPECIFIC GRAVITY, URINE: 1.02 (ref 1–1.03)
UROBILINOGEN, URINE: 0.2 EU/DL (ref 0–1)

## 2020-11-03 ENCOUNTER — TELEPHONE (OUTPATIENT)
Dept: FAMILY MEDICINE CLINIC | Age: 65
End: 2020-11-03

## 2020-11-03 PROBLEM — E11.9 DIABETES MELLITUS, TYPE 2 (HCC): Status: RESOLVED | Noted: 2020-11-03 | Resolved: 2020-11-03

## 2020-11-12 ENCOUNTER — OFFICE VISIT (OUTPATIENT)
Dept: FAMILY MEDICINE CLINIC | Age: 65
End: 2020-11-12
Payer: MEDICARE

## 2020-11-12 VITALS
SYSTOLIC BLOOD PRESSURE: 142 MMHG | TEMPERATURE: 96.8 F | RESPIRATION RATE: 16 BRPM | BODY MASS INDEX: 30.49 KG/M2 | HEART RATE: 63 BPM | WEIGHT: 183.2 LBS | DIASTOLIC BLOOD PRESSURE: 60 MMHG

## 2020-11-12 LAB
BILIRUBIN URINE: NEGATIVE
BLOOD URINE, POC: NEGATIVE
CHARACTER, URINE: ABNORMAL
COLOR, URINE: YELLOW
GLUCOSE URINE: NEGATIVE MG/DL
KETONES, URINE: NEGATIVE
LEUKOCYTE CLUMPS, URINE: ABNORMAL
NITRITE, URINE: NEGATIVE
PH, URINE: 6 (ref 5–9)
PROTEIN, URINE: ABNORMAL MG/DL
SPECIFIC GRAVITY, URINE: 1.02 (ref 1–1.03)
UROBILINOGEN, URINE: 0.2 EU/DL (ref 0–1)

## 2020-11-12 PROCEDURE — 1123F ACP DISCUSS/DSCN MKR DOCD: CPT | Performed by: NURSE PRACTITIONER

## 2020-11-12 PROCEDURE — 1036F TOBACCO NON-USER: CPT | Performed by: NURSE PRACTITIONER

## 2020-11-12 PROCEDURE — G8417 CALC BMI ABV UP PARAM F/U: HCPCS | Performed by: NURSE PRACTITIONER

## 2020-11-12 PROCEDURE — 1090F PRES/ABSN URINE INCON ASSESS: CPT | Performed by: NURSE PRACTITIONER

## 2020-11-12 PROCEDURE — 3017F COLORECTAL CA SCREEN DOC REV: CPT | Performed by: NURSE PRACTITIONER

## 2020-11-12 PROCEDURE — G8427 DOCREV CUR MEDS BY ELIG CLIN: HCPCS | Performed by: NURSE PRACTITIONER

## 2020-11-12 PROCEDURE — 99213 OFFICE O/P EST LOW 20 MIN: CPT | Performed by: NURSE PRACTITIONER

## 2020-11-12 PROCEDURE — G8399 PT W/DXA RESULTS DOCUMENT: HCPCS | Performed by: NURSE PRACTITIONER

## 2020-11-12 PROCEDURE — 4040F PNEUMOC VAC/ADMIN/RCVD: CPT | Performed by: NURSE PRACTITIONER

## 2020-11-12 PROCEDURE — G8484 FLU IMMUNIZE NO ADMIN: HCPCS | Performed by: NURSE PRACTITIONER

## 2020-11-12 RX ORDER — LACTULOSE 10 G/15ML
SOLUTION ORAL
Qty: 473 ML | Refills: 2 | Status: SHIPPED | OUTPATIENT
Start: 2020-11-12 | End: 2021-01-11 | Stop reason: SDUPTHER

## 2020-11-12 ASSESSMENT — ENCOUNTER SYMPTOMS
CONSTIPATION: 0
SHORTNESS OF BREATH: 0
ABDOMINAL DISTENTION: 0
COLOR CHANGE: 0
EYE REDNESS: 0
RHINORRHEA: 0
COUGH: 0
ANAL BLEEDING: 0
ABDOMINAL PAIN: 0
DIARRHEA: 0
EYE DISCHARGE: 0
NAUSEA: 0
SORE THROAT: 0
BLOOD IN STOOL: 0

## 2020-11-12 NOTE — PATIENT INSTRUCTIONS
Patient Education        Constipation: Care Instructions  Your Care Instructions     Constipation means that you have a hard time passing stools (bowel movements). People pass stools from 3 times a day to once every 3 days. What is normal for you may be different. Constipation may occur with pain in the rectum and cramping. The pain may get worse when you try to pass stools. Sometimes there are small amounts of bright red blood on toilet paper or the surface of stools. This is because of enlarged veins near the rectum (hemorrhoids). A few changes in your diet and lifestyle may help you avoid ongoing constipation. Your doctor may also prescribe medicine to help loosen your stool. Some medicines can cause constipation. These include pain medicines and antidepressants. Tell your doctor about all the medicines you take. Your doctor may want to make a medicine change to ease your symptoms. Follow-up care is a key part of your treatment and safety. Be sure to make and go to all appointments, and call your doctor if you are having problems. It's also a good idea to know your test results and keep a list of the medicines you take. How can you care for yourself at home? · Drink plenty of fluids, enough so that your urine is light yellow or clear like water. If you have kidney, heart, or liver disease and have to limit fluids, talk with your doctor before you increase the amount of fluids you drink. · Include high-fiber foods in your diet each day. These include fruits, vegetables, beans, and whole grains. · Get at least 30 minutes of exercise on most days of the week. Walking is a good choice. You also may want to do other activities, such as running, swimming, cycling, or playing tennis or team sports. · Take a fiber supplement, such as Citrucel or Metamucil, every day. Read and follow all instructions on the label. · Schedule time each day for a bowel movement. A daily routine may help.  Take your time having your bowel movement. · Support your feet with a small step stool when you sit on the toilet. This helps flex your hips and places your pelvis in a squatting position. · Your doctor may recommend an over-the-counter laxative to relieve your constipation. Examples are Milk of Magnesia and MiraLax. Read and follow all instructions on the label. Do not use laxatives on a long-term basis. When should you call for help? Call your doctor now or seek immediate medical care if:    · You have new or worse belly pain.     · You have new or worse nausea or vomiting.     · You have blood in your stools. Watch closely for changes in your health, and be sure to contact your doctor if:    · Your constipation is getting worse.     · You do not get better as expected. Where can you learn more? Go to https://MENA SOCIALjagdisheb.[employer]u [employer]. org and sign in to your [employer] account. Enter [phone] in the [employer] box to learn more about \"Constipation: Care Instructions. \"     If you do not have an account, please click on the \"Sign Up Now\" link. Current as of: June 26, 2019               Content Version: 12.6  © 7660-4717 [employer]. Care instructions adapted under license by [facility] ([facility]). If you have questions about a medical condition or this instruction, always ask your healthcare professional. [address] any warranty or liability for your use of this information. Patient Education        Kidney Transplant: Care Instructions  Your Care Instructions     A kidney transplant gives you a healthy kidney from another person. You may need a transplant if your kidneys work poorly because of diabetes, high blood pressure, or another illness. You need only one kidney to live. The new kidney can do the work that your own kidneys cannot. It will remove waste from your blood. It will keep your body's fluids and chemicals in balance.  A new kidney can improve the quality of your will look at your health and how long you have been waiting. · After the transplant, you have to take medicine every day. The medicine will help keep your body from rejecting the new organ. Sometimes these medicines don't work. If this happens, you will have dialysis again and may wait for another transplant. · Medicines to keep your body from rejecting the new kidney can cause side effects. They can make your body less able to fight infections. They may increase your risk of heart problems, diabetes, cancer, and thin bones (osteoporosis). · Having an organ transplant can bring up many emotions. You may feel grateful and happy. But you also may feel guilty or depressed. Share your feelings with your doctor and family. If you are depressed, you can get treatment. · You have to take care of yourself after the transplant. You need to go to follow-up visits with your doctor, take your medicines as directed, and eat well and get regular exercise. When should you call for help? Watch closely for changes in your health, and be sure to contact your doctor if:    · You want more information about having a kidney transplant. Where can you learn more? Go to https://ecoVent.[employer]. org and sign in to your [email or web address] account. Enter F250 in the [employer] box to learn more about \"Kidney Transplant: Care Instructions. \"     If you do not have an account, please click on the \"Sign Up Now\" link. Current as of: July 2, 2020               Content Version: 12.6  © 9481-5388 [employer]. Care instructions adapted under license by [facility] ([facility]). If you have questions about a medical condition or this instruction, always ask your healthcare professional. [place] [address] any warranty or liability for your use of this information. Patient Education        Learning About Kidney Transplant Surgery  What is a kidney transplant?      A kidney transplant gives you a healthy kidney from another person. You may need a transplant if your kidneys work poorly because of diabetes, high blood pressure, or another illness. You need only one kidney to live. The new kidney can do the work that your own kidneys cannot. It will remove waste from your blood. It will keep your body's fluids and chemicals in balance. You are likely to feel better and have more energy. You have to meet certain rules to be able to get a kidney. For example, your overall health (other than kidney problems) has to be good. Getting a new kidney can take a long time. If you're getting your kidney from a living donor, you may not have to wait long. But if it's from a person who has , your name is put on a waiting list.  How is a kidney transplant done? If a relative or another living person gives you a kidney, you can plan your surgery. But if you're on a waiting list to get a kidney from a person who has , your surgery could happen very suddenly. When a donor kidney is available, you will probably have surgery within 36 hours. The doctor will make a cut (incision) in your lower belly. The doctor will place the donated kidney in your lower belly. The doctor will connect the blood vessels of the new kidney to your blood vessels. Then the doctor will connect the ureter of the new kidney to your bladder. (A ureter is the tube that carries urine from the kidney to the bladder.) Your own kidneys will not be taken out unless they are causing problems. The doctor will finish the surgery by closing the cut with stitches or surgical staples. These will be removed about 1 to 3 weeks after surgery. The cut will leave a scar that will fade with time. You may stay in the hospital 5 to 10 days. What can you expect after a kidney transplant? Within a few days, you may start to feel much better than you did before. But you may have some pain or soreness in your belly or side. This can last for several weeks.   Most people go home from the hospital 5 to 10 days after surgery. It will probably take about 4 weeks before you can get back to your job or usual activities. After surgery, the new kidney will start to do the work that your own kidneys cannot. It will remove waste from your blood and balance your body's fluids and chemicals. Your new kidney may start working very soon after surgery. Or it may take a few weeks. If your kidney doesn't start to work right away, you will need to have dialysis until the new kidney can take over. After the transplant, you'll have to take medicines every day from now on. The medicines will help keep your body from rejecting the new kidney. They will also make your immune system weaker. This means you will be more likely to get an infection or become sick. To reduce your risk of infection, wash your hands often. Stay away from crowds of people, and avoid contact with people who have a cold or the flu. If your body starts to reject the kidney, your doctor may be able to stop the rejection. But if not, you will need to have dialysis again. It's possible that you can have another transplant. You may have many different emotions after your kidney transplant. You may feel grateful and happy. But you also may feel guilty or depressed. These feelings are common. It may help to talk about your feelings with your doctor and family. Follow-up care is a key part of your treatment and safety. Be sure to make and go to all appointments, and call your doctor if you are having problems. It's also a good idea to know your test results and keep a list of the medicines you take. Where can you learn more? Go to https://alfie.Ubalo. org and sign in to your [employer] account. Enter C374 in the [employer] box to learn more about \"Learning About Kidney Transplant Surgery. \"     If you do not have an account, please click on the \"Sign Up Now\" link.   Current as of: July 2, 2020               Content Version: 12.6  © 2006-2020 [employer]. Care instructions adapted under license by [facility] ([facility]). If you have questions about a medical condition or this instruction, always ask your healthcare professional. [address] any warranty or liability for your use of this information. Patient Education        Learning About Diabetes Food Guidelines  Your Care Instructions     Meal planning is important to manage diabetes. It helps keep your blood sugar at a target level (which you set with your doctor). You don't have to eat special foods. You can eat what your family eats, including sweets once in a while. But you do have to pay attention to how often you eat and how much you eat of certain foods. You may want to work with a dietitian or a certified diabetes educator (CDE) to help you plan meals and snacks. A dietitian or CDE can also help you lose weight if that is one of your goals. What should you know about eating carbs? Managing the amount of carbohydrate (carbs) you eat is an important part of healthy meals when you have diabetes. Carbohydrate is found in many foods. · Learn which foods have carbs. And learn the amounts of carbs in different foods. ? Bread, cereal, pasta, and rice have about 15 grams of carbs in a serving. A serving is 1 slice of bread (1 ounce), ½ cup of cooked cereal, or 1/3 cup of cooked pasta or rice. ? Fruits have 15 grams of carbs in a serving. A serving is 1 small fresh fruit, such as an apple or orange; ½ of a banana; ½ cup of cooked or canned fruit; ½ cup of fruit juice; 1 cup of melon or raspberries; or 2 tablespoons of dried fruit. ? Milk and no-sugar-added yogurt have 15 grams of carbs in a serving. A serving is 1 cup of milk or 2/3 cup of no-sugar-added yogurt. ? Starchy vegetables have 15 grams of carbs in a serving.  A serving is ½ cup of mashed potatoes or sweet potato; 1 cup winter squash; ½ of a small baked potato; ½ cup of cooked beans; or ½ cup cooked corn or green peas. · Learn how much carbs to eat each day and at each meal. A dietitian or CDE can teach you how to keep track of the amount of carbs you eat. This is called carbohydrate counting. · If you are not sure how to count carbohydrate grams, use the Plate Method to plan meals. It is a good, quick way to make sure that you have a balanced meal. It also helps you spread carbs throughout the day. ? Divide your plate by types of foods. Put non-starchy vegetables on half the plate, meat or other protein food on one-quarter of the plate, and a grain or starchy vegetable in the final quarter of the plate. To this you can add a small piece of fruit and 1 cup of milk or yogurt, depending on how many carbs you are supposed to eat at a meal.  · Try to eat about the same amount of carbs at each meal. Do not \"save up\" your daily allowance of carbs to eat at one meal.  · Proteins have very little or no carbs per serving. Examples of proteins are beef, chicken, turkey, fish, eggs, tofu, cheese, cottage cheese, and peanut butter. A serving size of meat is 3 ounces, which is about the size of a deck of cards. Examples of meat substitute serving sizes (equal to 1 ounce of meat) are 1/4 cup of cottage cheese, 1 egg, 1 tablespoon of peanut butter, and ½ cup of tofu. How can you eat out and still eat healthy? · Learn to estimate the serving sizes of foods that have carbohydrate. If you measure food at home, it will be easier to estimate the amount in a serving of restaurant food. · If the meal you order has too much carbohydrate (such as potatoes, corn, or baked beans), ask to have a low-carbohydrate food instead. Ask for a salad or green vegetables. · If you use insulin, check your blood sugar before and after eating out to help you plan how much to eat in the future. · If you eat more carbohydrate at a meal than you had planned, take a walk or do other exercise. This will help lower your blood sugar. What else should you know? · Limit saturated fat, such as the fat from meat and dairy products. This is a healthy choice because people who have diabetes are at higher risk of heart disease. So choose lean cuts of meat and nonfat or low-fat dairy products. Use olive or canola oil instead of butter or shortening when cooking. · Don't skip meals. Your blood sugar may drop too low if you skip meals and take insulin or certain medicines for diabetes. · Check with your doctor before you drink alcohol. Alcohol can cause your blood sugar to drop too low. Alcohol can also cause a bad reaction if you take certain diabetes medicines. Follow-up care is a key part of your treatment and safety. Be sure to make and go to all appointments, and call your doctor if you are having problems. It's also a good idea to know your test results and keep a list of the medicines you take. Where can you learn more? Go to https://[employer].Omnigy. org and sign in to your [employer] account. Enter B492 in the DEQ box to learn more about \"Learning About Diabetes Food Guidelines. \"     If you do not have an account, please click on the \"Sign Up Now\" link. Current as of: December 20, 2019               Content Version: 12.6  © 3288-7400 [employer]uma[employer]te, Incorporated. Care instructions adapted under license by [facility] ([facility]). If you have questions about a medical condition or this instruction, always ask your healthcare professional. [place] [address] any warranty or liability for your use of this information. Patient Education        DASH Diet: Care Instructions  Your Care Instructions     The DASH diet is an eating plan that can help lower your blood pressure. DASH stands for Dietary Approaches to Stop Hypertension. Hypertension is high blood pressure. The DASH diet focuses on eating foods that are high in calcium, potassium, and magnesium.  These beans, lentils, and split peas) each week. A serving is 1/3 cup of nuts, 2 tablespoons of seeds, or ½ cup of cooked beans or peas. · Limit fats and oils to 2 to 3 servings each day. A serving is 1 teaspoon of vegetable oil or 2 tablespoons of salad dressing. · Limit sweets and added sugars to 5 servings or less a week. A serving is 1 tablespoon jelly or jam, ½ cup sorbet, or 1 cup of lemonade. · Eat less than 2,300 milligrams (mg) of sodium a day. If you limit your sodium to 1,500 mg a day, you can lower your blood pressure even more. Tips for success  · Start small. Do not try to make dramatic changes to your diet all at once. You might feel that you are missing out on your favorite foods and then be more likely to not follow the plan. Make small changes, and stick with them. Once those changes become habit, add a few more changes. · Try some of the following:  ? Make it a goal to eat a fruit or vegetable at every meal and at snacks. This will make it easy to get the recommended amount of fruits and vegetables each day. ? Try yogurt topped with fruit and nuts for a snack or healthy dessert. ? Add lettuce, tomato, cucumber, and onion to sandwiches. ? Combine a ready-made pizza crust with low-fat mozzarella cheese and lots of vegetable toppings. Try using tomatoes, squash, spinach, broccoli, carrots, cauliflower, and onions. ? Have a variety of cut-up vegetables with a low-fat dip as an appetizer instead of chips and dip. ? Sprinkle sunflower seeds or chopped almonds over salads. Or try adding chopped walnuts or almonds to cooked vegetables. ? Try some vegetarian meals using beans and peas. Add garbanzo or kidney beans to salads. Make burritos and tacos with mashed ponce beans or black beans. Where can you learn more? Go to https://alfie.healthPermissionTV. org and sign in to your [employer] account. Enter T512 in the [place] box to learn more about \"DASH Diet: Care Instructions. \"     If you do not have an account, please click on the \"Sign Up Now\" link. Current as of: December 16, 2019               Content Version: 12.6  © 4983-5238 [employer]ac[employer], Incorporated. Care instructions adapted under license by [facility] ([facility]). If you have questions about a medical condition or this instruction, always ask your healthcare professional. [address] any warranty or liability for your use of this information.

## 2020-11-12 NOTE — PROGRESS NOTES
[facility]  [phone] (phone)  [phone] (fax)    Visit Date: 11/12/2020    Geovanna Mac is a 75 y.o. female who presents today for:  Chief Complaint   Patient presents with    3 Month Follow-Up     kidney transplant- cant get right     HPI:     Had left kidney transplant done 4/10/2020 - still feeling kind of bad from the anti-rejection medications - surgery went well - Dr. Coral Gant. Was just there 3 weeks ago - they are aware of her symptoms. Has a VV with them coming up. Seeing them every 2 months. Has been getting UTI. They are watching her labs closely. Finished her last round of antibiotics 10/15 x 10 days. She was just on Cipro     Did an [address] of her bladder    Blood pressure was a little high this morning - yesterday it was 112 - it is about time for her to take her Clonidine dose. That always brings her pressure back down. Got her flu and pneumonia shot. Sugars have been pretty good - had a high reading of 260 - she had a sweet potato pie. Taking Lantus 25 units nightly, novolog sliding scale.  Seeing DONOVAN Foss for diabetes management     She has not seen her eye doctor    Seen the Podiatrist 2 weeks ago - Dr. Pam Rosado    HPI  Health Maintenance   Topic Date Due    Diabetic foot exam  12/13/1963    Diabetic retinal exam  12/13/1963    Shingles Vaccine (1 of 2) 12/13/2003    Pneumococcal 65+ yrs at Risk Vaccine (1 of 2 - PCV13) 12/13/2018    Annual Wellness Visit (AWV)  11/13/2020    Low dose CT lung screening  12/16/2020    A1C test (Diabetic or Prediabetic)  08/04/2021    TSH testing  08/17/2021    Lipid screen  09/14/2021    Potassium monitoring  11/09/2021    Creatinine monitoring  11/09/2021    Breast cancer screen  02/03/2022    DTaP/Tdap/Td vaccine (2 - Td) 05/21/2022    Colon cancer screen colonoscopy  06/10/2029    DEXA (modify frequency per FRAX score)  Completed    Flu vaccine  Completed    Hepatitis C screen  Completed    Hepatitis A vaccine  Aged Out    Hib vaccine  Aged Out    Meningococcal (ACWY) vaccine  Aged Out     Past Medical History:   Diagnosis Date    Anemia associated with chronic renal failure     Aranesp 150 micrograms every other week given at [address] [place] [address] [facility]    Anxiety     Arthritis     Backache     Blood circulation, collateral     Blood transfusion     CAD (coronary artery disease)     Cellulitis in diabetic foot ([facility] 03/03/2017    4th and 5th toes right foot    Chest pain     History of    CHF (congestive heart failure) ([facility] 1998    Dx'ed by Dr. Connie Ruiz Chronic anemia     Chronic kidney disease     Chronic kidney disease, stage III (moderate)     Chronic renal insufficiency 2010    COPD (chronic obstructive pulmonary disease) (HCC) 2012    Dr. 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 Georgina Forrester    HTN (hypertension) 1970's    Hyperlipemia 1998    Iron deficiency anemia due to dietary causes 6/21/2018    Kidney stones 3/2014    Kidney trouble          MRSA infection 03/2017    right foot-Dr. Sanya Mobley (podiatrist)    Neuromuscular disorder ([facility]     Neuropathy 1989    diabetic neuropathy    Obesity since childhoood    CONTRERAS on CPAP 2010    Dr. Iqra Aldridge    Pneumonia     PONV (postoperative nausea and vomiting)     Seizures ([facility]     Sepsis due to Escherichia coli ([facility] 07/2020      Past Surgical History:   Procedure Laterality Date    ANKLE SURGERY Right 02-10-14    Dr. Iggy Villanueva at [facility]  1990's    removal of benign tumor   [address]  2008   [address] [address]    COLONOSCOPY  2009    2 polyps, not precanceorus    COLONOSCOPY Left 6/10/2019    COLONOSCOPY DIAGNOSTIC performed by Jalil Ulloa MD at [address]  3/30/2012    eye lid lift    DIAGNOSTIC CARDIAC CATH LAB PROCEDURE      ENDOSCOPY, COLON, DIAGNOSTIC     [facility] EYE SURGERY  2012    left sided ptosis    FOOT SURGERY      Tarsal tunnel surgery    FRACTURE SURGERY      HYSTERECTOMY   and     first partial in , then total in [address]  04/10/2020    RIGHT    LIVER BIOPSY  2015    LUNG BIOPSY  2009    CA OFFICE/OUTPT VISIT,PROCEDURE ONLY Left 2018    LEFT UPPER EXTREMENTY AV FISTULA CREATION performed by Tor Juarez MD at [address] Left 2019    EGD BIOPSY performed by Jalil Ulloa MD at [facility]     Family History   Problem Relation Age of Onset    Diabetes Sister     Diabetes Brother     Diabetes Sister     Diabetes Sister     Cancer Sister     Early Death Sister     Heart Disease Sister     Diabetes Sister     Heart Disease Sister     Diabetes Sister     Diabetes Brother     Arthritis Mother     Heart Disease Mother     High Blood Pressure Mother     Kidney Disease Mother     Mental Illness Mother     Stroke Mother     Heart Disease Father     Diabetes Father     Obesity Father     Alcohol Abuse Father      Social History     Tobacco Use    Smoking status: Former Smoker     Packs/day: 1.50     Years: 30.00     Pack years: 45.00     Types: Cigarettes     Start date: 1981     Last attempt to quit: 3/13/2009     Years since quittin.6    Smokeless tobacco: Never Used    Tobacco comment: quit    Substance Use Topics    Alcohol use: No     Alcohol/week: 0.0 standard drinks      Current Outpatient Medications   Medication Sig Dispense Refill    lactulose (CHRONULAC) 10 GM/15ML solution Take twice daily as needed for constipation 473 mL 2    vitamin D (ERGOCALCIFEROL) 1.25 MG (14827 UT) CAPS capsule TAKE 1 CAPSULE BY MOUTH EVERY 14 DAYS ON MONDAYS 12 capsule 1    carvedilol (COREG) 25 MG tablet 2 tablets  tablet 3    pantoprazole (PROTONIX) 40 MG tablet TAKE 1 TABLET BY MOUTH EVERY DAY BEFORE BREAKFAST 90 tablet 1    atorvastatin (LIPITOR) 40 MG tablet Take 1 tablet by mouth daily 90 tablet 1    zoster recombinant adjuvanted vaccine (SHINGRIX) 50 MCG/0.5ML SUSR injection Inject 0.5 mLs into the muscle See Admin Instructions 1 dose now and repeat in 2-6 months 0.5 mL 0    biotene dry mouth (ORAL BALANCE) GEL MT gel Take by mouth as needed      NIFEdipine (ADALAT CC) 60 MG extended release tablet Take 60 mg by mouth 2 times daily      Mycophenolate Sodium 360 MG TBEC Take 360 mg by mouth 2 tablets BID      insulin aspart (NOVOLOG FLEXPEN) 100 UNIT/ML injection pen Sliding scale insulin coverage Glucose:Dose: If Less [id number] =No Insulin/ 140-199= 2 Units/ 200-249=4 Units/ 250-299= 6 Units/  300-349=8 Units/  350-400=10 Units/ Above 400 = 12 Units 15 pen 3    insulin glargine (LANTUS SOLOSTAR) 100 UNIT/ML injection pen Inject 25 Units into the skin nightly 15 pen 3    Insulin Pen Needle (B-D ULTRAFINE III SHORT PEN) 31G X 8 MM MISC Use three times daily Diagnosis Code E11.9 200 each 3    cloNIDine (CATAPRES) 0.3 MG tablet Take 0.2 mg by mouth 3 times daily       fluticasone (FLONASE) 50 MCG/ACT nasal spray 1 spray by Each Nostril route daily as needed for Rhinitis      docusate sodium (COLACE) 100 MG capsule Take 200 mg by mouth 2 times daily      magnesium oxide (MAG-OX) 400 MG tablet Take 400 mg by mouth 2 times daily      ondansetron (ZOFRAN) 4 MG tablet Take 4 mg by mouth every 8 hours as needed for Nausea or Vomiting      tiotropium (SPIRIVA HANDIHALER) 18 MCG inhalation capsule Inhale 1 capsule into the lungs daily 1 capsule via inhalation daily.  (Patient taking differently: Inhale 18 mcg into the lungs daily as needed 1 capsule via inhalation daily.) 90 capsule 3    albuterol sulfate HFA (PROAIR HFA) 108 (90 Base) MCG/ACT inhaler Inhale 2 puffs into the lungs every 6 hours as needed for Wheezing or Shortness of Breath 3 Inhaler 3    linaclotide (LINZESS) 145 MCG capsule Take 145 mcg by mouth every other day       nitroGLYCERIN (NITROSTAT) 0.4 MG SL tablet Place 1 tablet under the tongue every 5 minutes as needed for Chest pain 25 tablet 5    aspirin 81 MG EC tablet Take 81 mg by mouth daily.  fluconazole (DIFLUCAN) 200 MG tablet Take one tablet at symptoms onset, may repeat in 1 week (Patient not taking: Reported on 11/12/2020) 2 tablet 0    ciprofloxacin (CIPRO) 500 MG tablet Take 500 mg by mouth 2 times daily      nystatin (MYCOSTATIN) 422798 UNIT/ML suspension Take 500,000 Units by mouth 4 times daily      sulfamethoxazole-trimethoprim (BACTRIM DS;SEPTRA DS) 800-160 MG per tablet Take 1 tablet by mouth daily       No current facility-administered medications for this visit. Allergies   Allergen Reactions    Pioglitazone Swelling    Actos [Pioglitazone Hydrochloride] Swelling    Cymbalta [Duloxetine Hcl] Other (See Comments)     Anxiety and lethargic    Gabapentin Anxiety       Subjective:    Review of Systems   Constitutional: Positive for fatigue. Negative for chills and fever. HENT: Negative for congestion, ear pain, postnasal drip, rhinorrhea and sore throat. Eyes: Negative for discharge and redness. Respiratory: Negative for cough and shortness of breath. Cardiovascular: Negative for chest pain and leg swelling. Gastrointestinal: Negative for abdominal distention, abdominal pain, anal bleeding, blood in stool, constipation, diarrhea and nausea. Skin: Negative for color change and rash. Neurological: Negative for facial asymmetry, speech difficulty and weakness. Hematological: Does not bruise/bleed easily. Psychiatric/Behavioral: Negative for agitation and confusion.        Objective:     Vitals:    11/12/20 1427   BP: (!) 142/60   Pulse: 63   Resp: 16 Temp: 96.8 °F (36 °C)   Weight: 183 lb 3.2 oz (83.1 kg)       Body mass index is 30.49 kg/m². Wt Readings from Last 3 Encounters:   11/12/20 183 lb 3.2 oz (83.1 kg)   08/13/20 184 lb 6.4 oz (83.6 kg)   08/13/20 182 lb 15.7 oz (83 kg)     BP Readings from Last 3 Encounters:   11/12/20 (!) 142/60   08/13/20 (!) 164/62   08/13/20 118/62     Physical Exam  Constitutional:       General: She is not in acute distress. Appearance: She is well-developed. She is not diaphoretic. HENT:      Head: Normocephalic and atraumatic. Right Ear: Hearing and external ear normal. No swelling. Left Ear: Hearing and external ear normal. No swelling. Nose: No mucosal edema or rhinorrhea. Right Sinus: No maxillary sinus tenderness or frontal sinus tenderness. Left Sinus: No maxillary sinus tenderness or frontal sinus tenderness. Mouth/Throat:      Pharynx: No oropharyngeal exudate or posterior oropharyngeal erythema. Eyes:      General:         Right eye: No discharge. Left eye: No discharge. Conjunctiva/sclera: Conjunctivae normal.      Pupils: Pupils are equal, round, and reactive to light. Neck:      Musculoskeletal: Normal range of motion. Cardiovascular:      Comments: No lower extremity edema  Pulmonary:      Effort: Pulmonary effort is normal. No respiratory distress. Breath sounds: Normal breath sounds. Musculoskeletal:         General: No tenderness or deformity. Comments: Full ROM of upper and lower extremities    Lymphadenopathy:      Cervical: No cervical adenopathy. Skin:     General: Skin is warm and dry. Findings: No rash. Nails: There is no clubbing. Neurological:      Mental Status: She is alert and oriented to person, place, and time. Coordination: Coordination normal.      Gait: Gait normal.   Psychiatric:         Speech: Speech normal.         Behavior: Behavior normal.         Thought Content:  Thought content normal. Judgment: Judgment normal.         Lab Results   Component Value Date    WBC 7.5 11/09/2020    HGB 9.8 (L) 11/09/2020    HCT 31.3 (L) 11/09/2020     11/09/2020    CHOL 127 09/14/2020    TRIG 131 09/14/2020    HDL 43 09/14/2020    LDLDIRECT 67.68 08/24/2020    LDLCALC 58 09/14/2020    AST 19 11/09/2020     11/09/2020    K 3.5 11/09/2020     11/09/2020    CREATININE 1.5 (H) 11/09/2020    BUN 24 (H) 11/09/2020    CO2 25 11/09/2020    TSH 1.400 08/17/2020    INR 1.28 (H) 08/02/2020    LABA1C 6.9 (H) 08/04/2020    LABMICR 20.95 01/04/2018    LABGLOM 35 (A) 11/09/2020    MG 1.6 11/09/2020    CALCIUM 10.3 11/09/2020    VITD25 48 11/13/2019     Assessment:       Diagnosis Orders   1. Hx of acute cystitis  POCT Urinalysis No Micro (Auto)   2. Constipation, unspecified constipation type  lactulose (CHRONULAC) 10 GM/15ML solution   3. Renal transplant recipient         Plan:   Sanya Donohue was seen today for 3 month follow-up. Diagnoses and all orders for this visit:    Hx of acute cystitis  -     POCT Urinalysis No Micro (Auto)    Constipation, unspecified constipation type  -     lactulose (CHRONULAC) 10 GM/15ML solution; Take twice daily as needed for constipation    Renal transplant recipient        Return in about 6 months (around 5/12/2021), or if symptoms worsen or fail to improve. Orders Placed:  Orders Placed This Encounter   Procedures    POCT Urinalysis No Micro (Auto)     Medications Prescribed:  Orders Placed This Encounter   Medications    lactulose (CHRONULAC) 10 GM/15ML solution     Sig: Take twice daily as needed for constipation     Dispense:  473 mL     Refill:  2     DX Code Needed  .      Future Appointments   Date Time Provider [place]   11/13/2020 11:00 AM DONOVAN Sanabria - CNP SRPX Pain [facility] [place]   11/17/2020  1:00 PM Dairl Stai, RCP STR HOME MED [place] [facility] [place]   11/17/2020  1:30 PM Nidhi Castillo, [address]   2/3/2021  1:20 PM Rom Daugehrty, APRN - CNP SRPX Physic [facility]   2/24/2021 10:20 AM STR CT IMAGING RM1  [facility] STRZ OUT EXP STR Radiolog   2/24/2021 10:45 AM STR PULMONARY FUNCTION ROOM 1 STRZ PFT [facility] HOD   3/1/2021  1:00 PM Christal Tucker MD Pulm Med [address]   5/12/2021  2:00 PM DONOVAN Noel CNP SRPX FM RES [facility]   7/12/2021  2:15 PM Abdirizak Tripathi MD [address] [facility]      Patient given educational materials - see patient instructions. Discussed use, benefit, and side effects of prescribedmedications. All patient questions answered. Pt voiced understanding. Reviewed health maintenance. Instructed to continue current medications, diet and exercise. Patient agreed with treatment plan. Follow up as directed.     Electronically signed by DONOVAN Noel CNP on 11/12/2020 at 4:17 PM

## 2020-11-13 ENCOUNTER — OFFICE VISIT (OUTPATIENT)
Dept: PHYSICAL MEDICINE AND REHAB | Age: 65
End: 2020-11-13
Payer: MEDICARE

## 2020-11-13 VITALS
WEIGHT: 183 LBS | DIASTOLIC BLOOD PRESSURE: 82 MMHG | BODY MASS INDEX: 30.49 KG/M2 | SYSTOLIC BLOOD PRESSURE: 128 MMHG | HEIGHT: 65 IN

## 2020-11-13 PROCEDURE — 3017F COLORECTAL CA SCREEN DOC REV: CPT | Performed by: NURSE PRACTITIONER

## 2020-11-13 PROCEDURE — 3044F HG A1C LEVEL LT 7.0%: CPT | Performed by: NURSE PRACTITIONER

## 2020-11-13 PROCEDURE — 1036F TOBACCO NON-USER: CPT | Performed by: NURSE PRACTITIONER

## 2020-11-13 PROCEDURE — G8417 CALC BMI ABV UP PARAM F/U: HCPCS | Performed by: NURSE PRACTITIONER

## 2020-11-13 PROCEDURE — G8484 FLU IMMUNIZE NO ADMIN: HCPCS | Performed by: NURSE PRACTITIONER

## 2020-11-13 PROCEDURE — G8427 DOCREV CUR MEDS BY ELIG CLIN: HCPCS | Performed by: NURSE PRACTITIONER

## 2020-11-13 PROCEDURE — 99213 OFFICE O/P EST LOW 20 MIN: CPT | Performed by: NURSE PRACTITIONER

## 2020-11-13 PROCEDURE — G8399 PT W/DXA RESULTS DOCUMENT: HCPCS | Performed by: NURSE PRACTITIONER

## 2020-11-13 PROCEDURE — 2022F DILAT RTA XM EVC RTNOPTHY: CPT | Performed by: NURSE PRACTITIONER

## 2020-11-13 PROCEDURE — 1123F ACP DISCUSS/DSCN MKR DOCD: CPT | Performed by: NURSE PRACTITIONER

## 2020-11-13 PROCEDURE — 1090F PRES/ABSN URINE INCON ASSESS: CPT | Performed by: NURSE PRACTITIONER

## 2020-11-13 PROCEDURE — 4040F PNEUMOC VAC/ADMIN/RCVD: CPT | Performed by: NURSE PRACTITIONER

## 2020-11-13 RX ORDER — HYDROCODONE BITARTRATE AND ACETAMINOPHEN 5; 325 MG/1; MG/1
1 TABLET ORAL EVERY 8 HOURS PRN
Qty: 90 TABLET | Refills: 0 | Status: SHIPPED | OUTPATIENT
Start: 2020-11-13 | End: 2020-12-13

## 2020-11-13 RX ORDER — TIZANIDINE 2 MG/1
2 TABLET ORAL 2 TIMES DAILY PRN
Qty: 28 TABLET | Refills: 0 | Status: SHIPPED | OUTPATIENT
Start: 2020-11-13 | End: 2020-11-27

## 2020-11-13 NOTE — PROGRESS NOTES
[address] [facility]  Outpatient progress note    Chief Complaint:   Chief Complaint   Patient presents with    Follow-up     neuropathy, low back pain        Subjective: Britt Barnett is a 75 y.o. female who returns to the office today for further follow up. Low back pain, chronic ankle pain, and neuropathy are all stable. No changes. She is on Norco with good benefit. Increased neck pain recently, thinks it is the way she was positioned during surgery. Medications reviewed. Patient denies side effects with medications. Patient states she is taking medications as prescribed. She denies receiving pain medications from other sources. She denies any ER visits since last visit. Pain scale with out pain medications or at its worst is 6/10. Pain scale with pain medications or at its best is 3/10. Last dose of Rover was today  Drug screen reviewed from last visit and was appropriate  Patient does not have naloxone available at home. Patient has not required use of naloxone at home since last office visit. Review of Systems:  CONSTITUTIONAL: negative  RESPIRATORY: negative  CARDIOVASCULAR: positive for peripheral edema  GASTROINTESTINAL: negative  GENITOURINARY: negative  MUSCULOSKELETAL: positive for pain  NEUROLOGICAL: positive for numbness/tingling  BEHAVIOR/PSYCH: negative  10 point system review otherwise negative    Physical Exam:  /82   Ht 5' 5\" (1.651 m)   Wt 183 lb (83 kg)   BMI 30.45 kg/m²     awake  Orientation:   person, place, time  Mood: within normal limits  Affect: calm  General appearance: Patient is well nourished, well developed, well groomed and in no acute distress    ROM:  abnormal - limited in right ankle and lumbar spine. Tenderness with palpation around right ankle. Tenderness of low back right paraspinals referred into right gluteal area.  Tender cervical region and cervical paraspinals going into trap region. Motor Exam:  Motor exam is symmetrical 5 out of 5 all extremities bilaterally  Tone:  normal  Muscle bulk: within normal limits  Sensory:  Diminished to light touch bilateral lower extremities  Coordination:   normal    Skin: warm and dry, no rash or erythema  Peripheral vascular: Pulses: Normal upper and lower extremity pulses; Edema: 2+ right ankle, 1+ left ankle      Impression:  · Right ankle fracture with ongoing ankle pain  · Peripheral neuropathy with neuralgia and numbness/tingling related to diabetes  · Lumbar spondylosis low back pain  · Gait disturbance  · Sciatica    Plan:  · Ankle stabilizing orthosis right ankle, continue as needed  · Zanaflex 2 mg BID PRN for muscle spasms and neck pain x 2 weeks. · Continue with Norco as needed for pain- refills given  · [place]ARRS reviewed. Current MED: 15  · Patient was not offered naloxone for home. · Discussed long term side effects of medications, tolerance, dependency and addiction. · Previous UDS reviewed  · UDS preformed today for compliance. · Patient told can not receive any pain medications from any other source. · No evidence of abuse, diversion or aberrant behavior.  Medications and/or procedures to improve function and quality of life- patient understanding with this and that may not be pain free   Discussed with patient about safe storage of medications at home   Discussed possible weaning of medication dosing dependent on treatment/procedure results. Will continue to monitor any benefits vs side effects of the medications as prescribed. The patient has been warned about the risk of operating machinery including driving if impaired in any way by these medications. The patient also accepts the risks of tolerance, dependency, or addiction related to the prescribed medications. All questions were answered. Reevaluation as planned, or sooner if requested.      Controlled Substances Monitoring: Periodic Controlled Substance Monitoring: Possible medication side effects, risk of tolerance/dependence & alternative treatments discussed., No signs of potential drug abuse or diversion identified. , Random urine drug screen sent today. DONOVAN Hill CNP)    Return in about 3 months (around 2/13/2021). It was my pleasure to evaluate Sergei Cuevasier today. Please call with any concerns or questions.   15 minutes spent in evaluation efforts    DONOVAN Oscar - CNP

## 2020-11-14 LAB
ORGANISM: ABNORMAL
URINE CULTURE, ROUTINE: ABNORMAL

## 2020-11-18 ENCOUNTER — TELEPHONE (OUTPATIENT)
Dept: FAMILY MEDICINE CLINIC | Age: 65
End: 2020-11-18

## 2020-11-18 RX ORDER — TETRACYCLINE HYDROCHLORIDE 250 MG/1
250 CAPSULE ORAL 2 TIMES DAILY
Qty: 20 CAPSULE | Refills: 0 | Status: SHIPPED | OUTPATIENT
Start: 2020-11-18 | End: 2020-11-28

## 2020-12-15 ENCOUNTER — TELEPHONE (OUTPATIENT)
Dept: FAMILY MEDICINE CLINIC | Age: 65
End: 2020-12-15

## 2021-01-11 DIAGNOSIS — K59.00 CONSTIPATION, UNSPECIFIED CONSTIPATION TYPE: ICD-10-CM

## 2021-01-11 NOTE — TELEPHONE ENCOUNTER
Last visit- 11/12/2020  Next visit- 5/12/2021    Requested Prescriptions     Pending Prescriptions Disp Refills    lactulose (CHRONULAC) 10 GM/15ML solution 1419 mL 0     Sig: Take twice daily as needed for constipation

## 2021-01-13 RX ORDER — LACTULOSE 10 G/15ML
SOLUTION ORAL
Qty: 1419 ML | Refills: 0 | Status: SHIPPED | OUTPATIENT
Start: 2021-01-13 | End: 2021-01-14 | Stop reason: SDUPTHER

## 2021-01-14 DIAGNOSIS — K59.00 CONSTIPATION, UNSPECIFIED CONSTIPATION TYPE: ICD-10-CM

## 2021-01-14 RX ORDER — LACTULOSE 10 G/15ML
SOLUTION ORAL
Qty: 2700 ML | Refills: 1 | Status: SHIPPED | OUTPATIENT
Start: 2021-01-14 | End: 2021-09-17 | Stop reason: SDUPTHER

## 2021-01-14 NOTE — TELEPHONE ENCOUNTER
Last visit- 11/12/2020  Next visit- 5/12/2021    Requested Prescriptions     Pending Prescriptions Disp Refills    lactulose (CHRONULAC) 10 GM/15ML solution 1419 mL 0     Sig: Take twice daily as needed for constipation       Patient was just given script yesterday, they are requesting a 90 day supply.   If appropriate, please change, review or deny

## 2021-01-19 NOTE — TELEPHONE ENCOUNTER
Gabino Patton, APRN - CNP    Please advise how many ml's you would like patient to take BID.       lactulose ([address] [facility] 10 GM/15ML solution [[id number]]     Order Details  Dose, Route, Frequency: As Directed   Dispense Quantity: 2,700 mL Refills: 1    Note to Pharmacy: DX Code Needed  .         Sig: Take twice daily as needed for constipation         Start Date: 01/14/21 End Date: --   Written Date: 01/14/21 Expiration Date: 01/14/22

## 2021-01-20 NOTE — TELEPHONE ENCOUNTER
Patient notified and voiced understanding. Patient also stated if [facility] request a 90 day supply do not fill states she does not need this prescription like that. Denied any other questions or concerns at this time.

## 2021-02-02 ENCOUNTER — HOSPITAL ENCOUNTER (OUTPATIENT)
Dept: WOMENS IMAGING | Age: 66
Discharge: HOME | End: 2021-02-02
Payer: MEDICARE

## 2021-02-02 DIAGNOSIS — Z12.31 VISIT FOR SCREENING MAMMOGRAM: ICD-10-CM

## 2021-02-02 PROCEDURE — 77063 BREAST TOMOSYNTHESIS BI: CPT

## 2021-02-03 ENCOUNTER — TELEPHONE (OUTPATIENT)
Dept: FAMILY MEDICINE CLINIC | Age: 66
End: 2021-02-03

## 2021-02-03 ENCOUNTER — OFFICE VISIT (OUTPATIENT)
Dept: INTERNAL MEDICINE CLINIC | Age: 66
End: 2021-02-03
Payer: COMMERCIAL

## 2021-02-03 VITALS
SYSTOLIC BLOOD PRESSURE: 146 MMHG | HEART RATE: 65 BPM | TEMPERATURE: 97.3 F | WEIGHT: 178 LBS | BODY MASS INDEX: 29.66 KG/M2 | HEIGHT: 65 IN | DIASTOLIC BLOOD PRESSURE: 70 MMHG

## 2021-02-03 DIAGNOSIS — I10 BENIGN ESSENTIAL HTN: ICD-10-CM

## 2021-02-03 DIAGNOSIS — Z79.4 TYPE 2 DIABETES MELLITUS WITHOUT COMPLICATION, WITH LONG-TERM CURRENT USE OF INSULIN (HCC): ICD-10-CM

## 2021-02-03 DIAGNOSIS — E11.9 TYPE 2 DIABETES MELLITUS WITHOUT COMPLICATION, WITH LONG-TERM CURRENT USE OF INSULIN (HCC): ICD-10-CM

## 2021-02-03 DIAGNOSIS — I10 ESSENTIAL HYPERTENSION: ICD-10-CM

## 2021-02-03 PROCEDURE — 2022F DILAT RTA XM EVC RTNOPTHY: CPT | Performed by: NURSE PRACTITIONER

## 2021-02-03 PROCEDURE — G8417 CALC BMI ABV UP PARAM F/U: HCPCS | Performed by: NURSE PRACTITIONER

## 2021-02-03 PROCEDURE — G8484 FLU IMMUNIZE NO ADMIN: HCPCS | Performed by: NURSE PRACTITIONER

## 2021-02-03 PROCEDURE — 1036F TOBACCO NON-USER: CPT | Performed by: NURSE PRACTITIONER

## 2021-02-03 PROCEDURE — 1123F ACP DISCUSS/DSCN MKR DOCD: CPT | Performed by: NURSE PRACTITIONER

## 2021-02-03 PROCEDURE — G8427 DOCREV CUR MEDS BY ELIG CLIN: HCPCS | Performed by: NURSE PRACTITIONER

## 2021-02-03 PROCEDURE — G8399 PT W/DXA RESULTS DOCUMENT: HCPCS | Performed by: NURSE PRACTITIONER

## 2021-02-03 PROCEDURE — 99214 OFFICE O/P EST MOD 30 MIN: CPT | Performed by: NURSE PRACTITIONER

## 2021-02-03 PROCEDURE — 4040F PNEUMOC VAC/ADMIN/RCVD: CPT | Performed by: NURSE PRACTITIONER

## 2021-02-03 PROCEDURE — 3017F COLORECTAL CA SCREEN DOC REV: CPT | Performed by: NURSE PRACTITIONER

## 2021-02-03 PROCEDURE — 3044F HG A1C LEVEL LT 7.0%: CPT | Performed by: NURSE PRACTITIONER

## 2021-02-03 PROCEDURE — 1090F PRES/ABSN URINE INCON ASSESS: CPT | Performed by: NURSE PRACTITIONER

## 2021-02-03 RX ORDER — NITROGLYCERIN 0.4 MG/1
0.4 TABLET SUBLINGUAL EVERY 5 MIN PRN
Qty: 25 TABLET | Refills: 2 | Status: SHIPPED | OUTPATIENT
Start: 2021-02-03 | End: 2021-08-11 | Stop reason: ALTCHOICE

## 2021-02-03 RX ORDER — CARVEDILOL 25 MG/1
TABLET ORAL
Qty: 360 TABLET | Refills: 1 | Status: SHIPPED | OUTPATIENT
Start: 2021-02-03 | End: 2022-02-09 | Stop reason: SDUPTHER

## 2021-02-03 RX ORDER — INSULIN ASPART 100 [IU]/ML
INJECTION, SOLUTION INTRAVENOUS; SUBCUTANEOUS
Qty: 15 PEN | Refills: 1 | Status: SHIPPED | OUTPATIENT
Start: 2021-02-03 | End: 2021-08-04 | Stop reason: SDUPTHER

## 2021-02-03 RX ORDER — BUDESONIDE AND FORMOTEROL FUMARATE DIHYDRATE 160; 4.5 UG/1; UG/1
2 AEROSOL RESPIRATORY (INHALATION) 2 TIMES DAILY
COMMUNITY
End: 2021-03-01

## 2021-02-03 RX ORDER — TACROLIMUS 1 MG/1
1 CAPSULE ORAL
COMMUNITY

## 2021-02-03 RX ORDER — INSULIN GLARGINE 100 [IU]/ML
25 INJECTION, SOLUTION SUBCUTANEOUS NIGHTLY
Qty: 15 PEN | Refills: 1 | Status: SHIPPED | OUTPATIENT
Start: 2021-02-03 | End: 2021-08-04 | Stop reason: SDUPTHER

## 2021-02-03 NOTE — PROGRESS NOTES
on exertion, dyspnea on exertion, swelling of ankles, orthostatic dizziness or lightheadedness, and/or palpitations. /70 in office today. Follows with cardiology at [facility].     Medications    Current Outpatient Medications:     insulin aspart (NOVOLOG FLEXPEN) 100 UNIT/ML injection pen, Sliding scale insulin coverage Glucose:Dose: If Less [id number] =No Insulin/ 140-199= 2 Units/ 200-249=4 Units/ 250-299= 6 Units/  300-349=8 Units/  350-400=10 Units/ Above 400 = 12 Units max 48 units daily, Disp: 15 pen, Rfl: 1    insulin glargine (LANTUS SOLOSTAR) 100 UNIT/ML injection pen, Inject 25 Units into the skin nightly, Disp: 15 pen, Rfl: 1    carvedilol (COREG) 25 MG tablet, 2 tablets BID, Disp: 360 tablet, Rfl: 1    tacrolimus (PROGRAF) 1 MG capsule, Take 1 mg by mouth 7 CAPSULES EVERY MORNING AND 6 CAPSULES EVERY EVENING, Disp: , Rfl:     budesonide-formoterol (SYMBICORT) 160-4.5 MCG/ACT AERO, Inhale 2 puffs into the lungs 2 times daily, Disp: , Rfl:     nitroGLYCERIN (NITROSTAT) 0.4 MG SL tablet, Place 1 tablet under the tongue every 5 minutes as needed for Chest pain, Disp: 25 tablet, Rfl: 2    lactulose (CHRONULAC) 10 GM/15ML solution, Take twice daily as needed for constipation, Disp: 2700 mL, Rfl: 1    vitamin D (ERGOCALCIFEROL) 1.25 MG (69347 UT) CAPS capsule, TAKE 1 CAPSULE BY MOUTH EVERY 14 DAYS ON MONDAYS, Disp: 12 capsule, Rfl: 1    pantoprazole (PROTONIX) 40 MG tablet, TAKE 1 TABLET BY MOUTH EVERY DAY BEFORE BREAKFAST (Patient taking differently: 2 times daily ), Disp: 90 tablet, Rfl: 1    atorvastatin (LIPITOR) 40 MG tablet, Take 1 tablet by mouth daily, Disp: 90 tablet, Rfl: 1    NIFEdipine (ADALAT CC) 60 MG extended release tablet, Take 60 mg by mouth 2 times daily, Disp: , Rfl:     Mycophenolate Sodium 360 MG TBEC, Take 360 mg by mouth 2 tablets BID, Disp: , Rfl:     cloNIDine (CATAPRES) 0.3 MG tablet, Take 0.3 mg by mouth 3 times daily , Disp: , Rfl:     fluticasone (FLONASE) 50 MCG/ACT Cellulitis in diabetic foot ([address] Chest pain, CHF (congestive heart failure) ([address] Chronic anemia, Chronic kidney disease, Chronic kidney disease, stage III (moderate), Chronic renal insufficiency, COPD (chronic obstructive pulmonary disease) ([address] Coronary disease, Depression, Diabetes mellitus, type 2 ([address] Disease of blood and blood forming organ, GERD (gastroesophageal reflux disease), 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 Past Surgical History  The patient  has a past surgical history that includes eye surgery (March 30th, 2012); Hysterectomy (1980 and 1985); Carpal tunnel release (1988); Foot surgery (1990); Colonoscopy (2009); Endoscopy, colon, diagnostic (2007); Appendectomy (1980s); back surgery (1990's); Cosmetic surgery (3/30/2012); Ankle surgery (Right, 02-10-14); liver biopsy (6/2015); Lung biopsy (2009); joint replacement (2015); fracture surgery (2015); Cardiac surgery (2008); pr office/outpt visit,procedure only (Left, 8/23/2018); Colonoscopy (Left, 6/10/2019); Upper gastrointestinal endoscopy (Left, 6/14/2019); Diagnostic Cardiac Cath Lab Procedure; and Kidney transplant (04/10/2020). Family History  This patient's family history includes Alcohol Abuse in her father; Arthritis in her mother; Cancer in her sister; Diabetes in her brother, brother, father, sister, sister, sister, sister, and sister; Early Death in her sister; Heart Disease in her father, mother, sister, and sister; High Blood Pressure in her mother; Kidney Disease in her mother; Mental Illness in her mother; Obesity in her father; Stroke in her mother.     Social History  Ruthie Rubi  reports that she quit smoking about 11 years ago. Her smoking use included cigarettes. She started smoking about 39 years ago. She has a 45.00 pack-year smoking history. She has never used smokeless tobacco. She reports that she does not drink alcohol or use drugs. Health Maintenance:    Health Maintenance   Topic Date Due    Diabetic foot exam  12/13/1963    Diabetic retinal exam  12/13/1963    COVID-19 Vaccine (1 of 2) 12/13/1969    Low dose CT lung screening  12/16/2020    Shingles Vaccine (2 of 2) 01/29/2021    TSH testing  08/17/2021    A1C test (Diabetic or Prediabetic)  01/18/2022    Lipid screen  01/18/2022    Potassium monitoring  02/15/2022    Creatinine monitoring  02/15/2022    Breast cancer screen  02/02/2023    Colon cancer screen colonoscopy  06/10/2029    DTaP/Tdap/Td vaccine (3 - Td) 11/28/2030    DEXA (modify frequency per FRAX score)  Completed    Flu vaccine  Completed    Pneumococcal 65+ yrs at Risk Vaccine  Completed    Hepatitis C screen  Completed    Hepatitis A vaccine  Aged Out    Hib vaccine  Aged Out    Meningococcal (ACWY) vaccine  Aged Out       Subjective:      Review of Systems   Constitutional: Positive for fatigue. Negative for appetite change, chills and fever. HENT: Negative for congestion, sinus pressure, sinus pain, sore throat and trouble swallowing. Eyes: Negative for photophobia, pain and visual disturbance. Respiratory: Negative for cough, shortness of breath and wheezing. Cardiovascular: Negative for chest pain, palpitations and leg swelling. Gastrointestinal: Negative for abdominal distention, abdominal pain, blood in stool, constipation, diarrhea, nausea and vomiting. Endocrine: Negative for polydipsia, polyphagia and polyuria. Genitourinary: Negative for difficulty urinating, dysuria and hematuria. Musculoskeletal: Negative for arthralgias, back pain and myalgias. Skin: Negative for rash and wound. Neurological: Positive for weakness.  Negative for person, place, and time. Motor: No weakness. Coordination: Coordination normal.      Gait: Gait normal.   Psychiatric:         Mood and Affect: Mood normal.         Behavior: Behavior normal.         Thought Content: Thought content normal.         Judgment: Judgment normal.         Labs Reviewed 2/3/2021:    Lab Results   Component Value Date    WBC 9.4 02/15/2021    HGB 10.5 (L) 02/15/2021    HCT 33.3 (L) 02/15/2021     02/15/2021    CHOL 138 01/18/2021    TRIG 184 01/18/2021    HDL 41 01/18/2021    LDLDIRECT 67.68 08/24/2020    ALT 14 02/15/2021    AST 19 02/15/2021     02/15/2021    K 3.8 02/15/2021     02/15/2021    CREATININE 1.3 (H) 02/15/2021    BUN 25 (H) 02/15/2021    CO2 29 02/15/2021    TSH 1.400 08/17/2020    INR 1.28 (H) 08/02/2020    LABA1C 6.8 (H) 01/18/2021    LABMICR 20.95 01/04/2018       Assessment/Plan      1. Type 2 diabetes mellitus without complication, with long-term current use of insulin (HCC)    - insulin aspart (NOVOLOG FLEXPEN) 100 UNIT/ML injection pen; Sliding scale insulin coverage Glucose:Dose: If Less [id number] =No Insulin/ 140-199= 2 Units/ 200-249=4 Units/ 250-299= 6 Units/  300-349=8 Units/  350-400=10 Units/ Above 400 = 12 Units max 48 units daily  Dispense: 15 pen; Refill: 3  - insulin glargine (LANTUS SOLOSTAR) 100 UNIT/ML injection pen; Inject 25 Units into the skin nightly  Dispense: 15 pen; Refill: 3  - Meter download reviewed  - Continue current medication regimen  - Encouraged dietary modifications and exercise regimen to promote optimal glycemic control  - Call if any low blood sugars or if consistently > 180    2. Benign essential HTN    - carvedilol (COREG) 25 MG tablet; 2 tablets BID  Dispense: 360 tablet; Refill: 3  - Continue current medication regimen  - Monitor BP periodically. Call with readings > 150/90 or < 110/50      Return in about 6 months (around 8/3/2021). Patient given educational materials - see patient instructions.

## 2021-02-03 NOTE — TELEPHONE ENCOUNTER
----- Message from Barry Sotelo DO sent at 2/3/2021 10:18 AM EST -----  How are you doing? It seems you have fallen offof our scheudle - can we make an apt for this spring for you to go over how you are doing with everything?

## 2021-02-04 DIAGNOSIS — E11.9 TYPE 2 DIABETES MELLITUS WITHOUT COMPLICATION, WITH LONG-TERM CURRENT USE OF INSULIN (HCC): ICD-10-CM

## 2021-02-04 DIAGNOSIS — Z79.4 TYPE 2 DIABETES MELLITUS WITHOUT COMPLICATION, WITH LONG-TERM CURRENT USE OF INSULIN (HCC): ICD-10-CM

## 2021-02-04 RX ORDER — PEN NEEDLE, DIABETIC 31 GX5/16"
NEEDLE, DISPOSABLE MISCELLANEOUS
Qty: 200 EACH | Refills: 3 | Status: SHIPPED | OUTPATIENT
Start: 2021-02-04 | End: 2022-02-09 | Stop reason: SDUPTHER

## 2021-02-04 NOTE — TELEPHONE ENCOUNTER
Pt called in. She does have an appointment with Tamara Bonilla in May but has been having some back and neck pain. She would like to see Dr. Noelle Tyler sooner rather than later. No openings until mid-April.  Please squeeze in if possible and inform patient of availability

## 2021-02-04 NOTE — TELEPHONE ENCOUNTER
Last visit- 2/3/2021      Requested Prescriptions     Pending Prescriptions Disp Refills    Insulin Pen Needle (B-D ULTRAFINE III SHORT PEN) 31G X 8 MM MISC 200 each 3     Sig: Use three times daily Diagnosis Code E11.9

## 2021-02-05 NOTE — TELEPHONE ENCOUNTER
Spoke to pt and she is just going to see her pain management doctor on 2/19/21 and will discuss then.

## 2021-02-09 ENCOUNTER — TELEPHONE (OUTPATIENT)
Dept: FAMILY MEDICINE CLINIC | Age: 66
End: 2021-02-09

## 2021-02-09 NOTE — TELEPHONE ENCOUNTER
Left message for pt to call the office back to get phone numbers to get scheduled for the over 65 COVID vaccine.

## 2021-02-09 NOTE — TELEPHONE ENCOUNTER
Pt returned call. Gave pt local phone numbers to call to schedule a COVID vaccine. Pt verbalized understanding.

## 2021-02-15 ENCOUNTER — TELEPHONE (OUTPATIENT)
Dept: FAMILY MEDICINE CLINIC | Age: 66
End: 2021-02-15

## 2021-02-17 ENCOUNTER — TELEPHONE (OUTPATIENT)
Dept: PULMONOLOGY | Age: 66
End: 2021-02-17

## 2021-02-17 NOTE — TELEPHONE ENCOUNTER
I placed an order for Covid 19 test in Epic as requested. Please fax the order to her lab and inform patient.

## 2021-02-18 ENCOUNTER — HOSPITAL ENCOUNTER (OUTPATIENT)
Age: 66
Setting detail: SPECIMEN
Discharge: HOME | End: 2021-02-18
Payer: MEDICARE

## 2021-02-18 PROCEDURE — U0003 INFECTIOUS AGENT DETECTION BY NUCLEIC ACID (DNA OR RNA); SEVERE ACUTE RESPIRATORY SYNDROME CORONAVIRUS 2 (SARS-COV-2) (CORONAVIRUS DISEASE [COVID-19]), AMPLIFIED PROBE TECHNIQUE, MAKING USE OF HIGH THROUGHPUT TECHNOLOGIES AS DESCRIBED BY CMS-2020-01-R: HCPCS

## 2021-02-19 ENCOUNTER — OFFICE VISIT (OUTPATIENT)
Dept: PHYSICAL MEDICINE AND REHAB | Age: 66
End: 2021-02-19
Payer: MEDICARE

## 2021-02-19 VITALS
SYSTOLIC BLOOD PRESSURE: 126 MMHG | HEIGHT: 65 IN | WEIGHT: 178 LBS | DIASTOLIC BLOOD PRESSURE: 68 MMHG | BODY MASS INDEX: 29.66 KG/M2 | TEMPERATURE: 97.5 F

## 2021-02-19 DIAGNOSIS — M25.571 CHRONIC PAIN OF RIGHT ANKLE: ICD-10-CM

## 2021-02-19 DIAGNOSIS — G89.29 CHRONIC PAIN OF RIGHT ANKLE: ICD-10-CM

## 2021-02-19 DIAGNOSIS — M47.816 LUMBAR SPONDYLOSIS: Primary | ICD-10-CM

## 2021-02-19 DIAGNOSIS — G89.29 OTHER CHRONIC PAIN: ICD-10-CM

## 2021-02-19 PROCEDURE — G8399 PT W/DXA RESULTS DOCUMENT: HCPCS | Performed by: NURSE PRACTITIONER

## 2021-02-19 PROCEDURE — 4040F PNEUMOC VAC/ADMIN/RCVD: CPT | Performed by: NURSE PRACTITIONER

## 2021-02-19 PROCEDURE — G8484 FLU IMMUNIZE NO ADMIN: HCPCS | Performed by: NURSE PRACTITIONER

## 2021-02-19 PROCEDURE — 1036F TOBACCO NON-USER: CPT | Performed by: NURSE PRACTITIONER

## 2021-02-19 PROCEDURE — 1123F ACP DISCUSS/DSCN MKR DOCD: CPT | Performed by: NURSE PRACTITIONER

## 2021-02-19 PROCEDURE — G8417 CALC BMI ABV UP PARAM F/U: HCPCS | Performed by: NURSE PRACTITIONER

## 2021-02-19 PROCEDURE — 1090F PRES/ABSN URINE INCON ASSESS: CPT | Performed by: NURSE PRACTITIONER

## 2021-02-19 PROCEDURE — G8427 DOCREV CUR MEDS BY ELIG CLIN: HCPCS | Performed by: NURSE PRACTITIONER

## 2021-02-19 PROCEDURE — 99213 OFFICE O/P EST LOW 20 MIN: CPT | Performed by: NURSE PRACTITIONER

## 2021-02-19 PROCEDURE — 3017F COLORECTAL CA SCREEN DOC REV: CPT | Performed by: NURSE PRACTITIONER

## 2021-02-19 RX ORDER — HYDROCODONE BITARTRATE AND ACETAMINOPHEN 5; 325 MG/1; MG/1
1 TABLET ORAL EVERY 8 HOURS PRN
Qty: 90 TABLET | Refills: 0 | Status: SHIPPED | OUTPATIENT
Start: 2021-02-19 | End: 2021-03-11

## 2021-02-19 RX ORDER — HYDROCODONE BITARTRATE AND ACETAMINOPHEN 5; 325 MG/1; MG/1
1 TABLET ORAL EVERY 8 HOURS PRN
Qty: 90 TABLET | Refills: 0 | Status: SHIPPED | OUTPATIENT
Start: 2021-02-19 | End: 2021-03-21

## 2021-02-19 NOTE — PROGRESS NOTES
[address] [facility]  Outpatient progress note    Chief Complaint:   Chief Complaint   Patient presents with    Follow-up     3 mo fu        Subjective: Shae Mar is a 77 y.o. female who returns to the office today for further follow up. Low back pain, chronic ankle pain, and neuropathy are all stable. No changes. She is on Norco with good benefit. Medications reviewed. Patient denies side effects with medications. Patient states she is taking medications as prescribed. She denies receiving pain medications from other sources. She denies any ER visits since last visit. Pain scale with out pain medications or at its worst is 6/10. Pain scale with pain medications or at its best is 3/10. Last dose of [place] was today  Drug screen reviewed from last visit and was appropriate  Patient does not have naloxone available at home. Patient has not required use of naloxone at home since last office visit. Review of Systems:  CONSTITUTIONAL: negative  RESPIRATORY: negative  CARDIOVASCULAR: positive for peripheral edema  GASTROINTESTINAL: negative  GENITOURINARY: negative  MUSCULOSKELETAL: positive for pain  NEUROLOGICAL: positive for numbness/tingling  BEHAVIOR/PSYCH: negative  10 point system review otherwise negative    Physical Exam:  /68   Temp 97.5 °F (36.4 °C)   Ht 5' 5\" (1.651 m)   Wt 178 lb (80.7 kg)   BMI 29.62 kg/m²     awake  Orientation:   person, place, time  Mood: within normal limits  Affect: calm  General appearance: Patient is well nourished, well developed, well groomed and in no acute distress    ROM:  abnormal - limited in right ankle and lumbar spine. Tenderness with palpation around right ankle. Tenderness of low back right paraspinals referred into right gluteal area. Tender cervical region and cervical paraspinals going into trap region.   Motor Exam:  Motor exam is symmetrical 5 out of 5 all extremities bilaterally  Tone:  normal  Muscle bulk: within normal limits  Sensory:  Diminished to light touch bilateral lower extremities  Coordination:   normal    Skin: warm and dry, no rash or erythema  Peripheral vascular: Pulses: Normal upper and lower extremity pulses; Edema: 2+ right ankle, 1+ left ankle      Impression:  · Right ankle fracture with ongoing ankle pain  · Peripheral neuropathy with neuralgia and numbness/tingling related to diabetes  · Lumbar spondylosis low back pain  · Gait disturbance  · Sciatica    Plan:  · Ankle stabilizing orthosis right ankle, continue as needed  · Continue with Norco as needed for pain- refills given  · [place] reviewed. Current MED: 15  · Patient was not offered naloxone for home. · Discussed long term side effects of medications, tolerance, dependency and addiction. · Previous UDS reviewed  · UDS preformed today for compliance. · Patient told can not receive any pain medications from any other source. · No evidence of abuse, diversion or aberrant behavior.  Medications and/or procedures to improve function and quality of life- patient understanding with this and that may not be pain free   Discussed with patient about safe storage of medications at home   Discussed possible weaning of medication dosing dependent on treatment/procedure results. Will continue to monitor any benefits vs side effects of the medications as prescribed. The patient has been warned about the risk of operating machinery including driving if impaired in any way by these medications. The patient also accepts the risks of tolerance, dependency, or addiction related to the prescribed medications. All questions were answered. Reevaluation as planned, or sooner if requested. Controlled Substances Monitoring:      No follow-ups on file. It was my pleasure to evaluate Edgewood Liner today. Please call with any concerns or questions.   15 minutes spent in evaluation efforts    [facility] Stacey Estrada - CNP

## 2021-02-20 LAB — SARS-COV-2: NOT DETECTED

## 2021-02-21 ASSESSMENT — ENCOUNTER SYMPTOMS
ABDOMINAL PAIN: 0
DIARRHEA: 0
PHOTOPHOBIA: 0
SORE THROAT: 0
CONSTIPATION: 0
VOMITING: 0
WHEEZING: 0
SINUS PAIN: 0
NAUSEA: 0
BLOOD IN STOOL: 0
BACK PAIN: 0
ABDOMINAL DISTENTION: 0
EYE PAIN: 0
TROUBLE SWALLOWING: 0
SHORTNESS OF BREATH: 0
COUGH: 0
SINUS PRESSURE: 0

## 2021-02-22 NOTE — TELEPHONE ENCOUNTER
Last visit- 11/12/2020  Next visit- 5/12/2021    Requested Prescriptions     Pending Prescriptions Disp Refills    atorvastatin (LIPITOR) 40 MG tablet [Pharmacy Med Name: ATORVASTATIN TABS 40MG] 90 tablet 3     Sig: TAKE 1 TABLET DAILY

## 2021-02-23 RX ORDER — ATORVASTATIN CALCIUM 40 MG/1
TABLET, FILM COATED ORAL
Qty: 90 TABLET | Refills: 3 | Status: SHIPPED | OUTPATIENT
Start: 2021-02-23 | End: 2022-04-19

## 2021-02-24 ENCOUNTER — HOSPITAL ENCOUNTER (OUTPATIENT)
Age: 66
Discharge: HOME | End: 2021-02-24
Payer: MEDICARE

## 2021-02-24 ENCOUNTER — HOSPITAL ENCOUNTER (OUTPATIENT)
Dept: GENERAL RADIOLOGY | Age: 66
Discharge: HOME | End: 2021-02-24
Payer: MEDICARE

## 2021-02-24 ENCOUNTER — HOSPITAL ENCOUNTER (OUTPATIENT)
Dept: PULMONOLOGY | Age: 66
Discharge: HOME | End: 2021-02-24
Payer: MEDICARE

## 2021-02-24 ENCOUNTER — HOSPITAL ENCOUNTER (OUTPATIENT)
Dept: CT IMAGING | Age: 66
Discharge: HOME | End: 2021-02-24
Payer: MEDICARE

## 2021-02-24 DIAGNOSIS — M47.816 LUMBAR SPONDYLOSIS: ICD-10-CM

## 2021-02-24 DIAGNOSIS — Z87.891 PERSONAL HISTORY OF TOBACCO USE, PRESENTING HAZARDS TO HEALTH: ICD-10-CM

## 2021-02-24 DIAGNOSIS — G89.29 CHRONIC PAIN OF RIGHT ANKLE: ICD-10-CM

## 2021-02-24 DIAGNOSIS — Z87.891 PERSONAL HISTORY OF TOBACCO USE: ICD-10-CM

## 2021-02-24 DIAGNOSIS — M25.571 CHRONIC PAIN OF RIGHT ANKLE: ICD-10-CM

## 2021-02-24 DIAGNOSIS — J44.9 STAGE 3 SEVERE COPD BY GOLD CLASSIFICATION (HCC): ICD-10-CM

## 2021-02-24 PROCEDURE — 72100 X-RAY EXAM L-S SPINE 2/3 VWS: CPT

## 2021-02-24 PROCEDURE — 94060 EVALUATION OF WHEEZING: CPT

## 2021-02-24 PROCEDURE — 73610 X-RAY EXAM OF ANKLE: CPT

## 2021-02-24 PROCEDURE — 71271 CT THORAX LUNG CANCER SCR C-: CPT

## 2021-02-24 NOTE — PROGRESS NOTES
Prescreening performed prior to testing. The following symptoms may indicate COVID-19 infection:        One of the following criteria:   Temperature taken, patient temperature was 97.9 F. Fever greater 100.0 F -no  Cough -  no  New onset shortness of breath -no  New onset difficulty breathing -no        And/or   Two or more of the following criteria:  Muscle aches -no  Headache -no  Sore throat -no  New onset loss of smell/taste -no  New onset diarrhea -no    Patient's screening was negative. PFT will be performed.

## 2021-02-25 ENCOUNTER — TELEPHONE (OUTPATIENT)
Dept: RHEUMATOLOGY | Age: 66
End: 2021-02-25

## 2021-03-01 ENCOUNTER — APPOINTMENT (OUTPATIENT)
Dept: GENERAL RADIOLOGY | Age: 66
DRG: 552 | End: 2021-03-01
Payer: MEDICARE

## 2021-03-01 ENCOUNTER — OFFICE VISIT (OUTPATIENT)
Dept: PULMONOLOGY | Age: 66
End: 2021-03-01
Payer: MEDICARE

## 2021-03-01 ENCOUNTER — HOSPITAL ENCOUNTER (INPATIENT)
Age: 66
LOS: 1 days | Discharge: HOME HEALTH | DRG: 552 | End: 2021-03-04
Attending: EMERGENCY MEDICINE | Admitting: INTERNAL MEDICINE
Payer: MEDICARE

## 2021-03-01 VITALS
OXYGEN SATURATION: 99 % | HEART RATE: 67 BPM | HEIGHT: 65 IN | TEMPERATURE: 97.5 F | BODY MASS INDEX: 30.12 KG/M2 | WEIGHT: 180.8 LBS | SYSTOLIC BLOOD PRESSURE: 138 MMHG | DIASTOLIC BLOOD PRESSURE: 60 MMHG

## 2021-03-01 DIAGNOSIS — J30.89 NON-SEASONAL ALLERGIC RHINITIS, UNSPECIFIED TRIGGER: ICD-10-CM

## 2021-03-01 DIAGNOSIS — J41.0 SIMPLE CHRONIC BRONCHITIS (HCC): Primary | ICD-10-CM

## 2021-03-01 DIAGNOSIS — Z87.891 PERSONAL HISTORY OF TOBACCO USE: ICD-10-CM

## 2021-03-01 DIAGNOSIS — R07.9 CHEST PAIN, UNSPECIFIED TYPE: Primary | ICD-10-CM

## 2021-03-01 PROBLEM — M79.603 ARM PAIN: Status: ACTIVE | Noted: 2021-03-01

## 2021-03-01 LAB
ALBUMIN SERPL-MCNC: 4.3 G/DL (ref 3.5–5.1)
ALP BLD-CCNC: 103 U/L (ref 38–126)
ALT SERPL-CCNC: 12 U/L (ref 11–66)
ANION GAP SERPL CALCULATED.3IONS-SCNC: 11 MEQ/L (ref 8–16)
AST SERPL-CCNC: 17 U/L (ref 5–40)
ATYPICAL LYMPHOCYTES: ABNORMAL %
BASOPHILIA: ABNORMAL
BASOPHILS # BLD: 0.2 %
BASOPHILS ABSOLUTE: 0 THOU/MM3 (ref 0–0.1)
BILIRUB SERPL-MCNC: 0.2 MG/DL (ref 0.3–1.2)
BUN BLDV-MCNC: 25 MG/DL (ref 7–22)
CALCIUM SERPL-MCNC: 10.3 MG/DL (ref 8.5–10.5)
CHLORIDE BLD-SCNC: 100 MEQ/L (ref 98–111)
CO2: 28 MEQ/L (ref 23–33)
CREAT SERPL-MCNC: 1.2 MG/DL (ref 0.4–1.2)
EOSINOPHIL # BLD: 0.6 %
EOSINOPHILS ABSOLUTE: 0.1 THOU/MM3 (ref 0–0.4)
ERYTHROCYTE [DISTWIDTH] IN BLOOD BY AUTOMATED COUNT: 13.6 % (ref 11.5–14.5)
ERYTHROCYTE [DISTWIDTH] IN BLOOD BY AUTOMATED COUNT: 44.2 FL (ref 35–45)
GLUCOSE BLD-MCNC: 192 MG/DL (ref 70–108)
GLUCOSE BLD-MCNC: 64 MG/DL (ref 70–108)
HCT VFR BLD CALC: 34.3 % (ref 37–47)
HEMOGLOBIN: 10.6 GM/DL (ref 12–16)
IMMATURE GRANS (ABS): 0.77 THOU/MM3 (ref 0–0.07)
IMMATURE GRANULOCYTES: 8.7 %
LYMPHOCYTES # BLD: 14.4 %
LYMPHOCYTES ABSOLUTE: 1.3 THOU/MM3 (ref 1–4.8)
MCH RBC QN AUTO: 27.6 PG (ref 26–33)
MCHC RBC AUTO-ENTMCNC: 30.9 GM/DL (ref 32.2–35.5)
MCV RBC AUTO: 89.3 FL (ref 81–99)
MONOCYTES # BLD: 8.2 %
MONOCYTES ABSOLUTE: 0.7 THOU/MM3 (ref 0.4–1.3)
NUCLEATED RED BLOOD CELLS: 0 /100 WBC
OSMOLALITY CALCULATION: 280 MOSMOL/KG (ref 275–300)
PLATELET # BLD: 242 THOU/MM3 (ref 130–400)
PLATELET ESTIMATE: ADEQUATE
PMV BLD AUTO: 10.7 FL (ref 9.4–12.4)
POIKILOCYTES: ABNORMAL
POTASSIUM REFLEX MAGNESIUM: 3.6 MEQ/L (ref 3.5–5.2)
RBC # BLD: 3.84 MILL/MM3 (ref 4.2–5.4)
SCAN OF BLOOD SMEAR: NORMAL
SEG NEUTROPHILS: 67.9 %
SEGMENTED NEUTROPHILS ABSOLUTE COUNT: 6 THOU/MM3 (ref 1.8–7.7)
SODIUM BLD-SCNC: 139 MEQ/L (ref 135–145)
TOTAL PROTEIN: 7 G/DL (ref 6.1–8)
TROPONIN T: 0.01 NG/ML
TROPONIN T: < 0.01 NG/ML
WBC # BLD: 8.9 THOU/MM3 (ref 4.8–10.8)

## 2021-03-01 PROCEDURE — G0378 HOSPITAL OBSERVATION PER HR: HCPCS

## 2021-03-01 PROCEDURE — 73030 X-RAY EXAM OF SHOULDER: CPT

## 2021-03-01 PROCEDURE — 1090F PRES/ABSN URINE INCON ASSESS: CPT | Performed by: NURSE PRACTITIONER

## 2021-03-01 PROCEDURE — 85025 COMPLETE CBC W/AUTO DIFF WBC: CPT

## 2021-03-01 PROCEDURE — G8399 PT W/DXA RESULTS DOCUMENT: HCPCS | Performed by: NURSE PRACTITIONER

## 2021-03-01 PROCEDURE — 99284 EMERGENCY DEPT VISIT MOD MDM: CPT

## 2021-03-01 PROCEDURE — G8427 DOCREV CUR MEDS BY ELIG CLIN: HCPCS | Performed by: NURSE PRACTITIONER

## 2021-03-01 PROCEDURE — 36415 COLL VENOUS BLD VENIPUNCTURE: CPT

## 2021-03-01 PROCEDURE — 99219 PR INITIAL OBSERVATION CARE/DAY 50 MINUTES: CPT | Performed by: INTERNAL MEDICINE

## 2021-03-01 PROCEDURE — 1036F TOBACCO NON-USER: CPT | Performed by: NURSE PRACTITIONER

## 2021-03-01 PROCEDURE — 82948 REAGENT STRIP/BLOOD GLUCOSE: CPT

## 2021-03-01 PROCEDURE — 99214 OFFICE O/P EST MOD 30 MIN: CPT | Performed by: NURSE PRACTITIONER

## 2021-03-01 PROCEDURE — G8417 CALC BMI ABV UP PARAM F/U: HCPCS | Performed by: NURSE PRACTITIONER

## 2021-03-01 PROCEDURE — G8926 SPIRO NO PERF OR DOC: HCPCS | Performed by: NURSE PRACTITIONER

## 2021-03-01 PROCEDURE — 80053 COMPREHEN METABOLIC PANEL: CPT

## 2021-03-01 PROCEDURE — 84484 ASSAY OF TROPONIN QUANT: CPT

## 2021-03-01 PROCEDURE — 6370000000 HC RX 637 (ALT 250 FOR IP): Performed by: EMERGENCY MEDICINE

## 2021-03-01 PROCEDURE — G8484 FLU IMMUNIZE NO ADMIN: HCPCS | Performed by: NURSE PRACTITIONER

## 2021-03-01 PROCEDURE — 4040F PNEUMOC VAC/ADMIN/RCVD: CPT | Performed by: NURSE PRACTITIONER

## 2021-03-01 PROCEDURE — 3017F COLORECTAL CA SCREEN DOC REV: CPT | Performed by: NURSE PRACTITIONER

## 2021-03-01 PROCEDURE — 1123F ACP DISCUSS/DSCN MKR DOCD: CPT | Performed by: NURSE PRACTITIONER

## 2021-03-01 PROCEDURE — 3023F SPIROM DOC REV: CPT | Performed by: NURSE PRACTITIONER

## 2021-03-01 PROCEDURE — 93005 ELECTROCARDIOGRAM TRACING: CPT | Performed by: EMERGENCY MEDICINE

## 2021-03-01 RX ORDER — ALBUTEROL SULFATE 90 UG/1
2 AEROSOL, METERED RESPIRATORY (INHALATION) EVERY 6 HOURS PRN
Status: DISCONTINUED | OUTPATIENT
Start: 2021-03-01 | End: 2021-03-04 | Stop reason: HOSPADM

## 2021-03-01 RX ORDER — PANTOPRAZOLE SODIUM 40 MG/1
40 TABLET, DELAYED RELEASE ORAL 2 TIMES DAILY
Status: DISCONTINUED | OUTPATIENT
Start: 2021-03-02 | End: 2021-03-04 | Stop reason: HOSPADM

## 2021-03-01 RX ORDER — DEXTROSE MONOHYDRATE 25 G/50ML
12.5 INJECTION, SOLUTION INTRAVENOUS PRN
Status: DISCONTINUED | OUTPATIENT
Start: 2021-03-01 | End: 2021-03-04 | Stop reason: HOSPADM

## 2021-03-01 RX ORDER — LACTULOSE 10 G/15ML
20 SOLUTION ORAL 2 TIMES DAILY PRN
Status: DISCONTINUED | OUTPATIENT
Start: 2021-03-01 | End: 2021-03-04 | Stop reason: HOSPADM

## 2021-03-01 RX ORDER — NITROGLYCERIN 0.4 MG/1
0.4 TABLET SUBLINGUAL EVERY 5 MIN PRN
Status: DISCONTINUED | OUTPATIENT
Start: 2021-03-01 | End: 2021-03-04 | Stop reason: HOSPADM

## 2021-03-01 RX ORDER — SODIUM CHLORIDE 0.9 % (FLUSH) 0.9 %
10 SYRINGE (ML) INJECTION PRN
Status: DISCONTINUED | OUTPATIENT
Start: 2021-03-01 | End: 2021-03-04 | Stop reason: HOSPADM

## 2021-03-01 RX ORDER — FLUTICASONE PROPIONATE 50 MCG
1 SPRAY, SUSPENSION (ML) NASAL DAILY PRN
Qty: 3 BOTTLE | Refills: 3 | Status: SHIPPED | OUTPATIENT
Start: 2021-03-01 | End: 2022-03-02 | Stop reason: SDUPTHER

## 2021-03-01 RX ORDER — ASPIRIN 81 MG/1
81 TABLET, CHEWABLE ORAL DAILY
Status: DISCONTINUED | OUTPATIENT
Start: 2021-03-02 | End: 2021-03-04 | Stop reason: HOSPADM

## 2021-03-01 RX ORDER — MYCOPHENOLIC ACID 180 MG/1
360 TABLET, DELAYED RELEASE ORAL 2 TIMES DAILY
Status: DISCONTINUED | OUTPATIENT
Start: 2021-03-01 | End: 2021-03-04 | Stop reason: HOSPADM

## 2021-03-01 RX ORDER — NIFEDIPINE 60 MG/1
60 TABLET, EXTENDED RELEASE ORAL 2 TIMES DAILY
Status: DISCONTINUED | OUTPATIENT
Start: 2021-03-01 | End: 2021-03-04 | Stop reason: HOSPADM

## 2021-03-01 RX ORDER — NIFEDIPINE 60 MG/1
60 TABLET, EXTENDED RELEASE ORAL ONCE
Status: COMPLETED | OUTPATIENT
Start: 2021-03-01 | End: 2021-03-01

## 2021-03-01 RX ORDER — NICOTINE POLACRILEX 4 MG
15 LOZENGE BUCCAL PRN
Status: DISCONTINUED | OUTPATIENT
Start: 2021-03-01 | End: 2021-03-04 | Stop reason: HOSPADM

## 2021-03-01 RX ORDER — ASPIRIN 81 MG/1
81 TABLET ORAL DAILY
Status: DISCONTINUED | OUTPATIENT
Start: 2021-03-02 | End: 2021-03-01 | Stop reason: SDUPTHER

## 2021-03-01 RX ORDER — POTASSIUM CHLORIDE 7.45 MG/ML
10 INJECTION INTRAVENOUS PRN
Status: DISCONTINUED | OUTPATIENT
Start: 2021-03-01 | End: 2021-03-04 | Stop reason: HOSPADM

## 2021-03-01 RX ORDER — FLUTICASONE PROPIONATE 50 MCG
1 SPRAY, SUSPENSION (ML) NASAL DAILY PRN
Status: DISCONTINUED | OUTPATIENT
Start: 2021-03-01 | End: 2021-03-04 | Stop reason: HOSPADM

## 2021-03-01 RX ORDER — DEXTROSE MONOHYDRATE 50 MG/ML
100 INJECTION, SOLUTION INTRAVENOUS PRN
Status: DISCONTINUED | OUTPATIENT
Start: 2021-03-01 | End: 2021-03-04 | Stop reason: HOSPADM

## 2021-03-01 RX ORDER — PROMETHAZINE HYDROCHLORIDE 25 MG/1
12.5 TABLET ORAL EVERY 6 HOURS PRN
Status: DISCONTINUED | OUTPATIENT
Start: 2021-03-01 | End: 2021-03-04 | Stop reason: HOSPADM

## 2021-03-01 RX ORDER — ACETAMINOPHEN 325 MG/1
650 TABLET ORAL EVERY 6 HOURS PRN
Status: DISCONTINUED | OUTPATIENT
Start: 2021-03-01 | End: 2021-03-04 | Stop reason: HOSPADM

## 2021-03-01 RX ORDER — PANTOPRAZOLE SODIUM 40 MG/1
40 TABLET, DELAYED RELEASE ORAL 2 TIMES DAILY
COMMUNITY

## 2021-03-01 RX ORDER — SODIUM CHLORIDE 0.9 % (FLUSH) 0.9 %
10 SYRINGE (ML) INJECTION EVERY 12 HOURS SCHEDULED
Status: DISCONTINUED | OUTPATIENT
Start: 2021-03-01 | End: 2021-03-04 | Stop reason: HOSPADM

## 2021-03-01 RX ORDER — INSULIN GLARGINE 100 [IU]/ML
25 INJECTION, SOLUTION SUBCUTANEOUS NIGHTLY
Status: DISCONTINUED | OUTPATIENT
Start: 2021-03-01 | End: 2021-03-04 | Stop reason: HOSPADM

## 2021-03-01 RX ORDER — LIDOCAINE 4 G/G
1 PATCH TOPICAL DAILY
Status: DISCONTINUED | OUTPATIENT
Start: 2021-03-01 | End: 2021-03-01 | Stop reason: HOSPADM

## 2021-03-01 RX ORDER — ATORVASTATIN CALCIUM 40 MG/1
40 TABLET, FILM COATED ORAL DAILY
Status: DISCONTINUED | OUTPATIENT
Start: 2021-03-02 | End: 2021-03-04 | Stop reason: HOSPADM

## 2021-03-01 RX ORDER — SULFAMETHOXAZOLE AND TRIMETHOPRIM 800; 160 MG/1; MG/1
1 TABLET ORAL DAILY
Status: DISCONTINUED | OUTPATIENT
Start: 2021-03-02 | End: 2021-03-04 | Stop reason: HOSPADM

## 2021-03-01 RX ORDER — CARVEDILOL 25 MG/1
25 TABLET ORAL 2 TIMES DAILY WITH MEALS
Status: DISCONTINUED | OUTPATIENT
Start: 2021-03-02 | End: 2021-03-04 | Stop reason: HOSPADM

## 2021-03-01 RX ORDER — POTASSIUM CHLORIDE 20 MEQ/1
40 TABLET, EXTENDED RELEASE ORAL PRN
Status: DISCONTINUED | OUTPATIENT
Start: 2021-03-01 | End: 2021-03-04 | Stop reason: HOSPADM

## 2021-03-01 RX ORDER — ASPIRIN 81 MG/1
324 TABLET, CHEWABLE ORAL ONCE
Status: COMPLETED | OUTPATIENT
Start: 2021-03-01 | End: 2021-03-01

## 2021-03-01 RX ORDER — ACETAMINOPHEN 650 MG/1
650 SUPPOSITORY RECTAL EVERY 6 HOURS PRN
Status: DISCONTINUED | OUTPATIENT
Start: 2021-03-01 | End: 2021-03-04 | Stop reason: HOSPADM

## 2021-03-01 RX ORDER — ERGOCALCIFEROL 1.25 MG/1
50000 CAPSULE ORAL
Status: DISCONTINUED | OUTPATIENT
Start: 2021-03-15 | End: 2021-03-04 | Stop reason: HOSPADM

## 2021-03-01 RX ORDER — TACROLIMUS 1 MG/1
1 CAPSULE ORAL SEE ADMIN INSTRUCTIONS
Status: DISCONTINUED | OUTPATIENT
Start: 2021-03-01 | End: 2021-03-02

## 2021-03-01 RX ORDER — POLYETHYLENE GLYCOL 3350 17 G/17G
17 POWDER, FOR SOLUTION ORAL DAILY PRN
Status: DISCONTINUED | OUTPATIENT
Start: 2021-03-01 | End: 2021-03-04 | Stop reason: HOSPADM

## 2021-03-01 RX ORDER — ACETAMINOPHEN 325 MG/1
650 TABLET ORAL ONCE
Status: COMPLETED | OUTPATIENT
Start: 2021-03-01 | End: 2021-03-01

## 2021-03-01 RX ORDER — ALBUTEROL SULFATE 90 UG/1
2 AEROSOL, METERED RESPIRATORY (INHALATION) EVERY 6 HOURS PRN
Qty: 3 INHALER | Refills: 3 | Status: SHIPPED | OUTPATIENT
Start: 2021-03-01 | End: 2022-03-02 | Stop reason: SDUPTHER

## 2021-03-01 RX ORDER — HYDROCODONE BITARTRATE AND ACETAMINOPHEN 5; 325 MG/1; MG/1
1 TABLET ORAL EVERY 8 HOURS PRN
Status: DISCONTINUED | OUTPATIENT
Start: 2021-03-01 | End: 2021-03-04 | Stop reason: HOSPADM

## 2021-03-01 RX ORDER — DOCUSATE SODIUM 100 MG/1
200 CAPSULE, LIQUID FILLED ORAL 2 TIMES DAILY
Status: DISCONTINUED | OUTPATIENT
Start: 2021-03-01 | End: 2021-03-04 | Stop reason: HOSPADM

## 2021-03-01 RX ORDER — ONDANSETRON 2 MG/ML
4 INJECTION INTRAMUSCULAR; INTRAVENOUS EVERY 6 HOURS PRN
Status: DISCONTINUED | OUTPATIENT
Start: 2021-03-01 | End: 2021-03-04 | Stop reason: HOSPADM

## 2021-03-01 RX ADMIN — NIFEDIPINE 60 MG: 60 TABLET, EXTENDED RELEASE ORAL at 20:43

## 2021-03-01 RX ADMIN — ACETAMINOPHEN 650 MG: 325 TABLET ORAL at 18:51

## 2021-03-01 RX ADMIN — ASPIRIN 324 MG: 81 TABLET, CHEWABLE ORAL at 19:40

## 2021-03-01 ASSESSMENT — ENCOUNTER SYMPTOMS
WHEEZING: 0
STRIDOR: 0
COUGH: 1
HEMOPTYSIS: 0
DIARRHEA: 0
CHEST TIGHTNESS: 0
SPUTUM PRODUCTION: 0
SHORTNESS OF BREATH: 1
VOMITING: 0
NAUSEA: 0

## 2021-03-01 ASSESSMENT — COPD QUESTIONNAIRES: COPD: 1

## 2021-03-01 ASSESSMENT — PAIN DESCRIPTION - DESCRIPTORS
DESCRIPTORS: ACHING
DESCRIPTORS: ACHING;DISCOMFORT

## 2021-03-01 ASSESSMENT — PAIN SCALES - GENERAL
PAINLEVEL_OUTOF10: 6
PAINLEVEL_OUTOF10: 8
PAINLEVEL_OUTOF10: 6
PAINLEVEL_OUTOF10: 6

## 2021-03-01 ASSESSMENT — PAIN DESCRIPTION - ORIENTATION: ORIENTATION: LEFT;UPPER

## 2021-03-01 ASSESSMENT — PAIN DESCRIPTION - PAIN TYPE: TYPE: ACUTE PAIN

## 2021-03-01 ASSESSMENT — PAIN DESCRIPTION - FREQUENCY
FREQUENCY: CONTINUOUS
FREQUENCY: CONTINUOUS

## 2021-03-01 ASSESSMENT — PAIN DESCRIPTION - LOCATION: LOCATION: ARM

## 2021-03-01 NOTE — PROGRESS NOTES
Medical hx   PMH:  Past Medical History:   Diagnosis Date    Anemia associated with chronic renal failure     Aranesp 150 micrograms every other week given at [facility]. [facility]    Anxiety     Arthritis     Backache     Blood circulation, collateral     Blood transfusion     CAD (coronary artery disease)     Cellulitis in diabetic foot ([address] 03/03/2017    4th and 5th toes right foot    Chest pain     History of    CHF (congestive heart failure) ([address] 1998    Dx'ed by Dr. Jonny Tan Chronic anemia     Chronic kidney disease     Chronic kidney disease, stage III (moderate)     Chronic renal insufficiency 2010    COPD (chronic obstructive pulmonary disease) ([address] 2012    Dr. 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 Lambert Parker    HTN (hypertension) 1970's    Hyperlipemia 1998    Iron deficiency anemia due to dietary causes 6/21/2018    Kidney stones 3/2014    Kidney trouble          MRSA infection 03/2017    right foot-Dr. Soniya Sagastume (podiatrist)    Neuromuscular disorder ([address]     Neuropathy 1989    diabetic neuropathy    Obesity since childhoood    CONTRERAS on CPAP 2010    Dr. Brent Taylor    Pneumonia     PONV (postoperative nausea and vomiting)     Seizures ([address]     Sepsis due to Escherichia coli ([facility] 07/2020     SURGICAL HISTORY:  Past Surgical History:   Procedure Laterality Date    ANKLE SURGERY Right 02-10-14    Dr. Lolis Bailey at [facility]  1990's    removal of benign tumor   [address]  2008   [address]  2009    2 polyps, not precanceorus    COLONOSCOPY Left 6/10/2019    COLONOSCOPY DIAGNOSTIC performed by Anjelica Crowder MD at [place] [address] SURGERY  3/30/2012    eye lid lift    DIAGNOSTIC CARDIAC CATH LAB PROCEDURE      ENDOSCOPY, COLON, DIAGNOSTIC     Etheleen Daunt EYE SURGERY  2012    left sided ptosis    FOOT SURGERY      Tarsal tunnel surgery    FRACTURE SURGERY     [address] and [address]    first partial in , then total in [address] [facility]     [address] [facility]  04/10/2020    RIGHT    LIVER BIOPSY  2015    LUNG BIOPSY  2009    MS OFFICE/OUTPT VISIT,PROCEDURE ONLY Left 2018    LEFT UPPER EXTREMENTY AV FISTULA CREATION performed by Saira Ly MD at [facility] Left 2019    EGD BIOPSY performed by Isidoro Shaw MD at [facility]     SOCIAL HISTORY:  Social History     Tobacco Use    Smoking status: Former Smoker     Packs/day: 1.50     Years: 30.00     Pack years: 45.00     Types: Cigarettes     Start date: 1979     Quit date: 3/13/2009     Years since quittin.9    Smokeless tobacco: Never Used    Tobacco comment: quit    Substance Use Topics    Alcohol use: No     Alcohol/week: 0.0 standard drinks    Drug use: No     ALLERGIES:  Allergies   Allergen Reactions    Pioglitazone Swelling    Actos [Pioglitazone Hydrochloride] Swelling    Cymbalta [Duloxetine Hcl] Other (See Comments)     Anxiety and lethargic    Gabapentin Anxiety     FAMILY HISTORY:  Family History   Problem Relation Age of Onset    Diabetes Sister     Diabetes Brother     Diabetes Sister     Diabetes Sister     Cancer Sister     Early Death Sister     Heart Disease Sister     Diabetes Sister     Heart Disease Sister     Diabetes Sister     Diabetes Brother     Arthritis Mother     Heart Disease Mother     High Blood Pressure Mother     Kidney Disease Mother     Mental Illness Mother     Stroke Mother     Heart Disease Father     Diabetes Father     Obesity Father     Alcohol Abuse Father      CURRENT MEDICATIONS:  Current Outpatient Medications   Medication Sig Dispense Refill    tiotropium (SPIRIVA RESPIMAT) 2.5 MCG/ACT AERS inhaler Inhale 2 puffs into the lungs daily 1 Inhaler 11    albuterol sulfate HFA (PROAIR HFA) 108 (90 Base) MCG/ACT inhaler Inhale 2 puffs into the lungs every 6 hours as needed for Wheezing or Shortness of Breath 3 Inhaler 3    fluticasone (FLONASE) 50 MCG/ACT nasal spray 1 spray by Each Nostril route daily as needed for Rhinitis 3 Bottle 3    atorvastatin (LIPITOR) 40 MG tablet TAKE 1 TABLET DAILY 90 tablet 3    HYDROcodone-acetaminophen (NORCO) 5-325 MG per tablet Take 1 tablet by mouth every 8 hours as needed for Pain for up to 30 days. Fill on/after 4/19/2021 90 tablet 0    HYDROcodone-acetaminophen (NORCO) 5-325 MG per tablet Take 1 tablet by mouth every 8 hours as needed for Pain for up to 30 days. Fill on/after 3/20/2021 90 tablet 0    HYDROcodone-acetaminophen (NORCO) 5-325 MG per tablet Take 1 tablet by mouth every 8 hours as needed for Pain for up to 30 days.  Fill on/after 2/20/21 90 tablet 0    Insulin Pen Needle (B-D ULTRAFINE III SHORT PEN) 31G X 8 MM MISC Use three times daily Diagnosis Code E11.9 200 each 3    insulin aspart (NOVOLOG FLEXPEN) 100 UNIT/ML injection pen Sliding scale insulin coverage Glucose:Dose: If Less [id number] =No Insulin/ 140-199= 2 Units/ 200-249=4 Units/ 250-299= 6 Units/  300-349=8 Units/  350-400=10 Units/ Above 400 = 12 Units max 48 units daily 15 pen 1    insulin glargine (LANTUS SOLOSTAR) 100 UNIT/ML injection pen Inject 25 Units into the skin nightly 15 pen 1    carvedilol (COREG) 25 MG tablet 2 tablets  tablet 1    tacrolimus (PROGRAF) 1 MG capsule Take 1 mg by mouth 7 CAPSULES EVERY MORNING AND 6 CAPSULES EVERY EVENING      nitroGLYCERIN (NITROSTAT) 0.4 MG SL tablet Place 1 tablet under the tongue every 5 minutes as needed for Chest pain 25 tablet 2    lactulose (CHRONULAC) 10 GM/15ML solution Take twice daily as needed for constipation 2700 mL 1    vitamin D (ERGOCALCIFEROL) 1.25 MG (14809 UT) CAPS capsule TAKE 1 CAPSULE BY MOUTH EVERY 14 DAYS ON MONDAYS 12 capsule 1    pantoprazole (PROTONIX) 40 MG tablet TAKE 1 TABLET BY MOUTH EVERY DAY BEFORE BREAKFAST (Patient taking differently: 2 times daily ) 90 tablet 1    NIFEdipine (ADALAT CC) 60 MG extended release tablet Take 60 mg by mouth 2 times daily      Mycophenolate Sodium 360 MG TBEC Take 360 mg by mouth 2 tablets BID      cloNIDine (CATAPRES) 0.3 MG tablet Take 0.3 mg by mouth 3 times daily       docusate sodium (COLACE) 100 MG capsule Take 200 mg by mouth 2 times daily      magnesium oxide (MAG-OX) 400 MG tablet Take 400 mg by mouth 2 times daily      sulfamethoxazole-trimethoprim (BACTRIM DS;SEPTRA DS) 800-160 MG per tablet Take 1 tablet by mouth daily      linaclotide (LINZESS) 290 MCG CAPS capsule Take 145 mcg by mouth every other day       aspirin 81 MG EC tablet Take 81 mg by mouth daily. No current facility-administered medications for this visit. Delsa Severance ROS   Review of Systems   Constitutional: Negative for chills, fever and unexpected weight change. HENT: Negative for postnasal drip. Respiratory: Positive for cough and shortness of breath. Negative for hemoptysis, sputum production, chest tightness, wheezing and stridor. Cardiovascular: Positive for dyspnea on exertion and leg swelling. Negative for chest pain. Gastrointestinal: Negative for diarrhea, nausea and vomiting. Genitourinary: Negative for dysuria. Physical exam   /60 (Site: Right Upper Arm, Position: Sitting, Cuff Size: Medium Adult)   Pulse 67   Temp 97.5 °F (36.4 °C) (Temporal)   Ht 5' 5\" (1.651 m)   Wt 180 lb 12.8 oz (82 kg)   SpO2 99% Comment: Room air at rest  BMI 30.09 kg/m²    Wt Readings from Last 3 Encounters:   03/01/21 180 lb 12.8 oz (82 kg)   02/19/21 178 lb (80.7 kg)   02/03/21 178 lb (80.7 kg)       Physical Exam  Vitals signs and nursing note reviewed.    Constitutional: General: She is not in acute distress. Appearance: She is well-developed. HENT:      Head: Normocephalic and atraumatic. Neck:      Musculoskeletal: Neck supple. Trachea: No tracheal deviation. Cardiovascular:      Rate and Rhythm: Normal rate and regular rhythm. Heart sounds: Normal heart sounds. No murmur. Pulmonary:      Effort: Pulmonary effort is normal. No respiratory distress. Breath sounds: Normal breath sounds. No stridor. No wheezing or rales. Chest:      Chest wall: No tenderness. Abdominal:      General: Bowel sounds are normal. There is no distension. Palpations: Abdomen is soft. Musculoskeletal:      Right lower leg: Edema present. Left lower leg: Edema present. Comments: +1 BLE   Skin:     General: Skin is warm and dry. Capillary Refill: Capillary refill takes less than 2 seconds. Neurological:      Mental Status: She is alert and oriented to person, place, and time. Psychiatric:         Behavior: Behavior normal.         Thought Content: Thought content normal.          Results   Lung Nodule Screening     [x] Qualifies    [] Does not qualify   [] Declined    [] Completed     The USPSTF recommends annual screening for lung cancer with low-dose computed tomography (LDCT) in adults aged 54 to [de-identified] years who have a 30 pack-year smoking history and currently smoke or have quit within the past 15 years. Screening should be discontinued once a person has not smoked for 15 years or develops a health problem that substantially limits life expectancy or the ability or willingness to have curative lung surgery. NONCONTRAST SCREENING CT CHEST   2/24/2021   FINDINGS: LUNGS NODULES:   1. There is a 7 mm noncalcified nodule in the lingular region of the left lung, unchanged compared to 12/16/2019. This area was obscured in 2018.    2. There is a stable 3 mm pleural-based nodule at the lateral aspect of the right upper lobe, unchanged compared to prior exams.   3. There is a calcified nodule in the superior aspect of the left lower lobe, stable compared to prior exams. LYMPHADENOPATHY: 1. There are no pathologically enlarged lymph nodes. OTHER (LUNGS/MEDIASTINUM/MUSCULOSKELETAL/ABDOMEN): 1. There is coronary artery calcification. Impression:  1. Stable 7 mm noncalcified nodule in the lingular region of the left lung. 2. There are no pathologically enlarged lymph nodes. 3. LUNGRADS ASSESSMENT VALUE: 2,       The LUNG RADS RECOMMENDATIONS for monitoring lung nodules listed below (ACR- Lung-RADS Version 1.0 Assessment Categories Release date\" April 28, 2014)  LUNG RADS RECOMMENDATIONS;   1.  Normal, continue annual screening   2. Benign appearance or behavior, continue annual screening   3.  6 month CT recommended   4A.  3 month CT recommended; may consider PET/CT   4B. Additional diagnostics and/or tissue sampling recommended   4X. Additional diagnostics and/or tissue sampling recommended          NONCONTRAST SCREENING CT CHEST   11/30/2018   FINDINGS: LUNGS NODULES: 1. 4 mm pleural-based right upper lobe nodule, slightly increased. Stable densely calcified left lower lobe granuloma. 3 mm pleural-based left upper lobe density, slightly increased. LYMPHADENOPATHY: 1. There are no pathologically enlarged lymph nodes. OTHER (LUNGS/MEDIASTINUM/MUSCULOSKELETAL/ABDOMEN): 1. Small-to-moderate right pleural effusion is worsened. Tiny left pleural effusion. 2.2 cm pericardial effusion is new. Mild scarring within each lung. This is worsened within the lingula. This is fairly linear along the major fissure on the right. There is worsened parenchymal opacity at the right lung base suggesting atelectasis. 1. There are a few lung nodules. Two of them have slightly increased from previous though are pleural-based without significant spiculation and probably benign. 2. Worsening pleural effusions and a large pericardial effusion.  Worsened atelectasis and worsened scarring. 3. LUNGRADS ASSESSMENT VALUE: 2,     The LUNG RADS RECOMMENDATIONS for monitoring lung nodules listed below (ACR- Lung-RADS Version 1.0 Assessment Categories Release date\" April 28, 2014)  LUNG RADS RECOMMENDATIONS;   1.  Normal, continue annual screening   2. Benign appearance or behavior, continue annual screening   3.  6 month CT recommended   4A.  3 month CT recommended; may consider PET/CT   4B. Additional diagnostics and/or tissue sampling recommended   4X. Additional diagnostics and/or tissue sampling recommended          2021                  Assessment      Diagnosis Orders   1. Simple chronic bronchitis (HCC)  tiotropium (SPIRIVA RESPIMAT) 2.5 MCG/ACT AERS inhaler    albuterol sulfate HFA (PROAIR HFA) 108 (90 Base) MCG/ACT inhaler   2. Personal history of tobacco use     3. Non-seasonal allergic rhinitis, unspecified trigger  fluticasone (FLONASE) 50 MCG/ACT nasal spray      CONTRERAS on CPAP follows with Sagrario Moore PA-C    Plan   -Change spiriva to respimat for ease of use and bad taste , Rx printed per patient request  -Albuterol 2 puff Q6 hrs PRN for SOB/wheezing escribed  -Discussed albuterol inhaler use. Reviewed signs and symptoms indicating need for use including Shortness of breath and wheezing. Discussed with patient the importance of using inhaler within the prescribed time frames. Patient verbalized understanding to use within prescribed time frame.  Patient also verbalized understanding that if they experience no relief after using albuterol and resting for 15 minutes they need to go to nearest ER or call 911  -Reviewed LDCT with patient left anterior nodule stable for 1 year will repeat in 1 year per USPSTF recommendations   -Reviewed spirometry with patient explained improvement since 2018 likely correlates with improvement in pleural effusion and pericardial effusion given LDCT imaging in 2018 given improvement on therapy and reported history of symptoms will continue LAMA likely element of chronic bronchitis    -Will continue flonase for allergic rhinitis escribed  -Advised to maintain pneumonia vaccine with PCP and to take flu vaccine this coming season.  -Advised patient to call office with any changes, questions, or concerns regarding respiratory status    Will see Evelia Matters back in: 6 months, will order LDCT at next appointment     Lulu Mendoza CNP  3/1/2021

## 2021-03-01 NOTE — ED TRIAGE NOTES
Pt comes to the ED with left arm pain for the past 2 weeks or so. Pt states sometimes positioning helps. Pt denies any CP, SOB. Pt states she was just at her pulmonologist appointment and everything checked out fine there. She had her annual CT.

## 2021-03-02 ENCOUNTER — APPOINTMENT (OUTPATIENT)
Dept: CT IMAGING | Age: 66
DRG: 552 | End: 2021-03-02
Payer: MEDICARE

## 2021-03-02 PROBLEM — M79.602 LEFT ARM PAIN: Status: ACTIVE | Noted: 2021-03-01

## 2021-03-02 LAB
ANION GAP SERPL CALCULATED.3IONS-SCNC: 12 MEQ/L (ref 8–16)
BASOPHILS # BLD: 0.2 %
BASOPHILS ABSOLUTE: 0 THOU/MM3 (ref 0–0.1)
BUN BLDV-MCNC: 22 MG/DL (ref 7–22)
CALCIUM SERPL-MCNC: 9.7 MG/DL (ref 8.5–10.5)
CHLORIDE BLD-SCNC: 98 MEQ/L (ref 98–111)
CO2: 25 MEQ/L (ref 23–33)
CREAT SERPL-MCNC: 1.1 MG/DL (ref 0.4–1.2)
EKG ATRIAL RATE: 63 BPM
EKG ATRIAL RATE: 66 BPM
EKG P AXIS: -9 DEGREES
EKG P AXIS: 12 DEGREES
EKG P-R INTERVAL: 156 MS
EKG P-R INTERVAL: 168 MS
EKG Q-T INTERVAL: 406 MS
EKG Q-T INTERVAL: 418 MS
EKG QRS DURATION: 68 MS
EKG QRS DURATION: 92 MS
EKG QTC CALCULATION (BAZETT): 425 MS
EKG QTC CALCULATION (BAZETT): 427 MS
EKG R AXIS: 25 DEGREES
EKG R AXIS: 8 DEGREES
EKG T AXIS: 17 DEGREES
EKG T AXIS: 21 DEGREES
EKG VENTRICULAR RATE: 63 BPM
EKG VENTRICULAR RATE: 66 BPM
EOSINOPHIL # BLD: 1.2 %
EOSINOPHILS ABSOLUTE: 0.1 THOU/MM3 (ref 0–0.4)
ERYTHROCYTE [DISTWIDTH] IN BLOOD BY AUTOMATED COUNT: 13.3 % (ref 11.5–14.5)
ERYTHROCYTE [DISTWIDTH] IN BLOOD BY AUTOMATED COUNT: 43.8 FL (ref 35–45)
GLUCOSE BLD-MCNC: 184 MG/DL (ref 70–108)
GLUCOSE BLD-MCNC: 203 MG/DL (ref 70–108)
GLUCOSE BLD-MCNC: 223 MG/DL (ref 70–108)
GLUCOSE BLD-MCNC: 242 MG/DL (ref 70–108)
GLUCOSE BLD-MCNC: 248 MG/DL (ref 70–108)
GLUCOSE BLD-MCNC: 328 MG/DL (ref 70–108)
HCT VFR BLD CALC: 32.4 % (ref 37–47)
HEMOGLOBIN: 9.9 GM/DL (ref 12–16)
IMMATURE GRANS (ABS): 0.59 THOU/MM3 (ref 0–0.07)
IMMATURE GRANULOCYTES: 7.3 %
LYMPHOCYTES # BLD: 16.1 %
LYMPHOCYTES ABSOLUTE: 1.3 THOU/MM3 (ref 1–4.8)
MAGNESIUM: 1.5 MG/DL (ref 1.6–2.4)
MCH RBC QN AUTO: 27.7 PG (ref 26–33)
MCHC RBC AUTO-ENTMCNC: 30.6 GM/DL (ref 32.2–35.5)
MCV RBC AUTO: 90.5 FL (ref 81–99)
MONOCYTES # BLD: 8.4 %
MONOCYTES ABSOLUTE: 0.7 THOU/MM3 (ref 0.4–1.3)
NUCLEATED RED BLOOD CELLS: 0 /100 WBC
PLATELET # BLD: 210 THOU/MM3 (ref 130–400)
PMV BLD AUTO: 10.7 FL (ref 9.4–12.4)
POTASSIUM REFLEX MAGNESIUM: 3.3 MEQ/L (ref 3.5–5.2)
POTASSIUM SERPL-SCNC: 3.8 MEQ/L (ref 3.5–5.2)
RBC # BLD: 3.58 MILL/MM3 (ref 4.2–5.4)
SEG NEUTROPHILS: 66.8 %
SEGMENTED NEUTROPHILS ABSOLUTE COUNT: 5.4 THOU/MM3 (ref 1.8–7.7)
SODIUM BLD-SCNC: 135 MEQ/L (ref 135–145)
TROPONIN T: < 0.01 NG/ML
WBC # BLD: 8.1 THOU/MM3 (ref 4.8–10.8)

## 2021-03-02 PROCEDURE — 80048 BASIC METABOLIC PNL TOTAL CA: CPT

## 2021-03-02 PROCEDURE — G0378 HOSPITAL OBSERVATION PER HR: HCPCS

## 2021-03-02 PROCEDURE — 2580000003 HC RX 258: Performed by: INTERNAL MEDICINE

## 2021-03-02 PROCEDURE — 6360000002 HC RX W HCPCS: Performed by: INTERNAL MEDICINE

## 2021-03-02 PROCEDURE — 94640 AIRWAY INHALATION TREATMENT: CPT

## 2021-03-02 PROCEDURE — 6360000002 HC RX W HCPCS: Performed by: STUDENT IN AN ORGANIZED HEALTH CARE EDUCATION/TRAINING PROGRAM

## 2021-03-02 PROCEDURE — 85025 COMPLETE CBC W/AUTO DIFF WBC: CPT

## 2021-03-02 PROCEDURE — 99225 PR SBSQ OBSERVATION CARE/DAY 25 MINUTES: CPT | Performed by: FAMILY MEDICINE

## 2021-03-02 PROCEDURE — 83735 ASSAY OF MAGNESIUM: CPT

## 2021-03-02 PROCEDURE — 96366 THER/PROPH/DIAG IV INF ADDON: CPT

## 2021-03-02 PROCEDURE — 93307 TTE W/O DOPPLER COMPLETE: CPT

## 2021-03-02 PROCEDURE — 6370000000 HC RX 637 (ALT 250 FOR IP): Performed by: FAMILY MEDICINE

## 2021-03-02 PROCEDURE — 6370000000 HC RX 637 (ALT 250 FOR IP): Performed by: INTERNAL MEDICINE

## 2021-03-02 PROCEDURE — 70450 CT HEAD/BRAIN W/O DYE: CPT

## 2021-03-02 PROCEDURE — 96375 TX/PRO/DX INJ NEW DRUG ADDON: CPT

## 2021-03-02 PROCEDURE — 36415 COLL VENOUS BLD VENIPUNCTURE: CPT

## 2021-03-02 PROCEDURE — 84132 ASSAY OF SERUM POTASSIUM: CPT

## 2021-03-02 PROCEDURE — 72125 CT NECK SPINE W/O DYE: CPT

## 2021-03-02 PROCEDURE — 82948 REAGENT STRIP/BLOOD GLUCOSE: CPT

## 2021-03-02 PROCEDURE — 84484 ASSAY OF TROPONIN QUANT: CPT

## 2021-03-02 PROCEDURE — 93005 ELECTROCARDIOGRAM TRACING: CPT | Performed by: INTERNAL MEDICINE

## 2021-03-02 PROCEDURE — 6360000002 HC RX W HCPCS: Performed by: FAMILY MEDICINE

## 2021-03-02 PROCEDURE — 96372 THER/PROPH/DIAG INJ SC/IM: CPT

## 2021-03-02 PROCEDURE — 96365 THER/PROPH/DIAG IV INF INIT: CPT

## 2021-03-02 RX ORDER — TACROLIMUS 1 MG/1
6 CAPSULE ORAL NIGHTLY
Status: DISCONTINUED | OUTPATIENT
Start: 2021-03-02 | End: 2021-03-04 | Stop reason: HOSPADM

## 2021-03-02 RX ORDER — TACROLIMUS 1 MG/1
7 CAPSULE ORAL EVERY MORNING
Status: DISCONTINUED | OUTPATIENT
Start: 2021-03-02 | End: 2021-03-04 | Stop reason: HOSPADM

## 2021-03-02 RX ORDER — MAGNESIUM SULFATE IN WATER 40 MG/ML
2000 INJECTION, SOLUTION INTRAVENOUS PRN
Status: DISCONTINUED | OUTPATIENT
Start: 2021-03-02 | End: 2021-03-04 | Stop reason: HOSPADM

## 2021-03-02 RX ORDER — HYDRALAZINE HYDROCHLORIDE 20 MG/ML
10 INJECTION INTRAMUSCULAR; INTRAVENOUS EVERY 4 HOURS PRN
Status: DISCONTINUED | OUTPATIENT
Start: 2021-03-02 | End: 2021-03-04 | Stop reason: HOSPADM

## 2021-03-02 RX ORDER — TACROLIMUS 1 MG/1
6 CAPSULE ORAL ONCE
Status: DISCONTINUED | OUTPATIENT
Start: 2021-03-02 | End: 2021-03-02

## 2021-03-02 RX ADMIN — DOCUSATE SODIUM 200 MG: 100 CAPSULE, LIQUID FILLED ORAL at 20:41

## 2021-03-02 RX ADMIN — ENOXAPARIN SODIUM 40 MG: 40 INJECTION, SOLUTION INTRAVENOUS; SUBCUTANEOUS at 08:31

## 2021-03-02 RX ADMIN — CLONIDINE HYDROCHLORIDE 0.3 MG: 0.2 TABLET ORAL at 08:32

## 2021-03-02 RX ADMIN — NIFEDIPINE 60 MG: 60 TABLET, FILM COATED, EXTENDED RELEASE ORAL at 08:33

## 2021-03-02 RX ADMIN — MAGNESIUM GLUCONATE 500 MG ORAL TABLET 400 MG: 500 TABLET ORAL at 08:33

## 2021-03-02 RX ADMIN — MYCOPHENOLIC ACID 360 MG: 180 TABLET, DELAYED RELEASE ORAL at 01:05

## 2021-03-02 RX ADMIN — SODIUM CHLORIDE, PRESERVATIVE FREE 10 ML: 5 INJECTION INTRAVENOUS at 08:34

## 2021-03-02 RX ADMIN — CARVEDILOL 25 MG: 25 TABLET, FILM COATED ORAL at 16:42

## 2021-03-02 RX ADMIN — HYDROCODONE BITARTRATE AND ACETAMINOPHEN 1 TABLET: 5; 325 TABLET ORAL at 01:15

## 2021-03-02 RX ADMIN — SODIUM CHLORIDE, PRESERVATIVE FREE 10 ML: 5 INJECTION INTRAVENOUS at 01:05

## 2021-03-02 RX ADMIN — CLONIDINE HYDROCHLORIDE 0.3 MG: 0.2 TABLET ORAL at 14:23

## 2021-03-02 RX ADMIN — HYDROCODONE BITARTRATE AND ACETAMINOPHEN 1 TABLET: 5; 325 TABLET ORAL at 20:30

## 2021-03-02 RX ADMIN — MYCOPHENOLIC ACID 360 MG: 180 TABLET, DELAYED RELEASE ORAL at 20:31

## 2021-03-02 RX ADMIN — CLONIDINE HYDROCHLORIDE 0.3 MG: 0.2 TABLET ORAL at 20:32

## 2021-03-02 RX ADMIN — TIOTROPIUM BROMIDE INHALATION SPRAY 2 PUFF: 3.12 SPRAY, METERED RESPIRATORY (INHALATION) at 09:15

## 2021-03-02 RX ADMIN — MYCOPHENOLIC ACID 360 MG: 180 TABLET, DELAYED RELEASE ORAL at 08:34

## 2021-03-02 RX ADMIN — ASPIRIN 81 MG: 81 TABLET, CHEWABLE ORAL at 08:31

## 2021-03-02 RX ADMIN — TACROLIMUS 6 MG: 1 CAPSULE ORAL at 20:30

## 2021-03-02 RX ADMIN — TACROLIMUS 7 MG: 1 CAPSULE ORAL at 10:48

## 2021-03-02 RX ADMIN — PANTOPRAZOLE SODIUM 40 MG: 40 TABLET, DELAYED RELEASE ORAL at 05:50

## 2021-03-02 RX ADMIN — SODIUM CHLORIDE, PRESERVATIVE FREE 10 ML: 5 INJECTION INTRAVENOUS at 20:32

## 2021-03-02 RX ADMIN — MAGNESIUM GLUCONATE 500 MG ORAL TABLET 400 MG: 500 TABLET ORAL at 20:29

## 2021-03-02 RX ADMIN — POTASSIUM CHLORIDE 40 MEQ: 1500 TABLET, EXTENDED RELEASE ORAL at 04:02

## 2021-03-02 RX ADMIN — INSULIN LISPRO 1 UNITS: 100 INJECTION, SOLUTION INTRAVENOUS; SUBCUTANEOUS at 20:33

## 2021-03-02 RX ADMIN — INSULIN GLARGINE 25 UNITS: 100 INJECTION, SOLUTION SUBCUTANEOUS at 23:12

## 2021-03-02 RX ADMIN — DOCUSATE SODIUM 200 MG: 100 CAPSULE, LIQUID FILLED ORAL at 08:31

## 2021-03-02 RX ADMIN — MAGNESIUM GLUCONATE 500 MG ORAL TABLET 400 MG: 500 TABLET ORAL at 01:05

## 2021-03-02 RX ADMIN — SULFAMETHOXAZOLE AND TRIMETHOPRIM 1 TABLET: 800; 160 TABLET ORAL at 08:33

## 2021-03-02 RX ADMIN — HYDRALAZINE HYDROCHLORIDE 10 MG: 20 INJECTION INTRAMUSCULAR; INTRAVENOUS at 12:24

## 2021-03-02 RX ADMIN — MAGNESIUM SULFATE HEPTAHYDRATE 2000 MG: 40 INJECTION, SOLUTION INTRAVENOUS at 08:47

## 2021-03-02 RX ADMIN — CARVEDILOL 25 MG: 25 TABLET, FILM COATED ORAL at 08:33

## 2021-03-02 RX ADMIN — ATORVASTATIN CALCIUM 40 MG: 40 TABLET, FILM COATED ORAL at 20:30

## 2021-03-02 RX ADMIN — PANTOPRAZOLE SODIUM 40 MG: 40 TABLET, DELAYED RELEASE ORAL at 16:42

## 2021-03-02 RX ADMIN — NIFEDIPINE 60 MG: 60 TABLET, FILM COATED, EXTENDED RELEASE ORAL at 20:30

## 2021-03-02 ASSESSMENT — PAIN DESCRIPTION - PROGRESSION
CLINICAL_PROGRESSION: GRADUALLY WORSENING

## 2021-03-02 ASSESSMENT — PAIN SCALES - GENERAL
PAINLEVEL_OUTOF10: 6
PAINLEVEL_OUTOF10: 0
PAINLEVEL_OUTOF10: 0
PAINLEVEL_OUTOF10: 8
PAINLEVEL_OUTOF10: 6

## 2021-03-02 ASSESSMENT — PAIN DESCRIPTION - DESCRIPTORS: DESCRIPTORS: ACHING

## 2021-03-02 ASSESSMENT — ENCOUNTER SYMPTOMS
COUGH: 0
VOMITING: 0
SHORTNESS OF BREATH: 0
EYE REDNESS: 0
TROUBLE SWALLOWING: 0
BACK PAIN: 0
NAUSEA: 0
ABDOMINAL PAIN: 0

## 2021-03-02 ASSESSMENT — PAIN DESCRIPTION - ONSET: ONSET: PROGRESSIVE

## 2021-03-02 ASSESSMENT — PAIN DESCRIPTION - ORIENTATION: ORIENTATION: LEFT

## 2021-03-02 ASSESSMENT — PAIN DESCRIPTION - FREQUENCY: FREQUENCY: CONTINUOUS

## 2021-03-02 NOTE — PROGRESS NOTES
PROGRESS NOTE      Patient:  Neto Cruz      Unit/Bed:6K-21/021-A    YOB: 1955    MRN: [id number]       Acct: [de-identified]     PCP: DONOVAN Galeano CNP    Date of Admission: 3/1/2021      Assessment/Plan:        Left arm pain, possibly due to DJD vs tendinitis, r/o atypical presentation CAD  -Left arm pain on admission with unclear etiology. Patient admitted for rule out ACS due to elevated troponin.  -Initial trop mildly elevated at 0.015. Repeat troponin negative x2.  -X-ray of the left shoulder showing degenerative changes however cannot rule out impingement syndrome  -Left arm pain has been chronic for the last 4 weeks and less likely to be cardiac in nature, however, patient also reports dizziness and sob  -Also complaining of pain radiating from her left neck to her left arm. This has worsened over the past day. Could be atypical ACS presentation.  -Order cardiac stress test for further evaluation.  -Recent cath in 2019 with nonobstructive coronary artery disease  -Last echo from August 2020 - for CHF. EF of 55 to 60%  -Rule out ACS with stress test  -Limited echo for further evaluation of cardiac function  -Blood pressure uncontrolled ( questionable compliance)  -ortho consult for further recs for possible impingement syndrome    Hypertensive urgency, possible hypertensive emergency  -Possibly causing demand ischemia causing elevated troponin.  -Blood pressure continues to be uncontrolled  -Has followed with Dr. Tylor Morris in the past  -Cardiac work-up as above  -Continue home blood pressure medications with holding parameters and adjust accordingly  -avoid overcorrection of BP. BP goal <160/90    3. Elevated troponin, possible demand ischemia due to accelerated HTN, resolved     -plan as above  -tele     4. Orthostatic hypotension    -(+) orthostatic VS on 3/2/2021  -start KATHI hose  -c/o dizziness x past 2 weeks    5.  Hypokalemia likely due to hypomagnesemia    -replace per protocol  -BMP in am     6. Hypomagnesemia    -replace per protocol  -magnesium level in am     7. Hx of fall, likely from orthostatic hypotension    -pt reports that she fell 2 weeks ago after her BP dropped. Denies LOC/head injury. 7. Hx of non-obstructive CAD  -Last cardiac cath in 2019 showing nonobstructive coronary artery disease  -Continue statin, aspirin, Coreg    8. ESRD status post kidney transplant in April 2020, stable   -Creatinine currently stable with estimated GFR of 60s  -Continue Prograf and mycophenolate  -Continue Bactrim for prophylaxis    9. Foraminal stenosis of the cervical spine  -c/o neck pain x 3 weeks   -Noted on CT of the cervical spine.  -Possible contributing factor to arm pain. -Orthopedic surgery consult. 10. Diabetes mellitus type 2  -Last hemoglobin A1c in January 2021 at 6.8. POC glucose uncontrolled   -Continue home Lantus dose.  -Sliding scale insulin with Accu-Cheks  -Hypoglycemia protocol  -low carb diet     11. Essential hypertension, uncontrolled   -With severe range blood pressures  -Continue current blood pressure medication regimen  -plan as  above     12. Hyperlipidemia  -Continue statin    13. COPD not in exacerbation  -Continue home inhalers    14. GERD  -Continue Protonix    15. Chronic pain secondary to arthritis  -Patient uses pain medication while at home.  -Continue Tylenol for pain as needed    16. Chronic normocytic anemia  -Likely secondary to prior end-stage renal disease with kidney transplant.  -Stable and at her baseline    Chief Complaint: Left arm pain    Hospital Course: Per H&P    77 y.o. female who [facility] with [facility]. Patient presented to the emergency room for evaluation of left arm pain. She had over the past 2 weeks. Pain will start at left shoulder area will radiate all the way down to forearm. Pain described as achy mild to moderate in severity. No numbness, tingling or weakness.   Denies chest been eating and drinking well. Medications:  Reviewed    Infusion Medications    dextrose       Scheduled Medications    linaclotide  290 mcg Oral Every Other Day    tacrolimus  7 mg Oral QAM    tacrolimus  6 mg Oral Nightly    atorvastatin  40 mg Oral Daily    carvedilol  25 mg Oral BID WC    cloNIDine  0.3 mg Oral TID    docusate sodium  200 mg Oral BID    insulin glargine  25 Units Subcutaneous Nightly    magnesium oxide  400 mg Oral BID    mycophenolate  360 mg Oral BID    NIFEdipine  60 mg Oral BID    pantoprazole  40 mg Oral BID    sulfamethoxazole-trimethoprim  1 tablet Oral Daily    tiotropium  2 puff Inhalation Daily    [START ON 3/15/2021] vitamin D  50,000 Units Oral Q14 Days    sodium chloride flush  10 mL Intravenous 2 times per day    aspirin  81 mg Oral Daily    enoxaparin  40 mg Subcutaneous Daily    insulin lispro  0-12 Units Subcutaneous TID WC    insulin lispro  0-6 Units Subcutaneous Nightly     PRN Meds: magnesium sulfate, hydrALAZINE, albuterol sulfate HFA, fluticasone, HYDROcodone-acetaminophen, lactulose, nitroGLYCERIN, sodium chloride flush, promethazine **OR** ondansetron, acetaminophen **OR** acetaminophen, polyethylene glycol, potassium chloride **OR** potassium alternative oral replacement **OR** potassium chloride, glucose, dextrose, glucagon (rDNA), dextrose      Intake/Output Summary (Last 24 hours) at 3/2/2021 1738  Last data filed at 3/2/2021 1232  Gross per 24 hour   Intake 692.8 ml   Output 0 ml   Net 692.8 ml       Diet:  DIET CARB CONTROL; No Caffeine  Diet NPO, After Midnight    Exam:  BP (!) 146/66 Comment: manual  Pulse 65   Temp 98 °F (36.7 °C) (Oral)   Resp 16   Ht 5' 5\" (1.651 m)   Wt 181 lb (82.1 kg)   SpO2 98%   BMI 30.12 kg/m²     General appearance: No apparent distress, appears stated age and cooperative. HEENT: Pupils equal, round, and reactive to light. Conjunctivae/corneas clear. Neck: Supple, with full range of motion. No jugular venous distention. Trachea midline. Respiratory:  Normal respiratory effort. Clear to auscultation, bilaterally without Rales/Wheezes/Rhonchi. Cardiovascular: Regular rate and rhythm with normal S1/S2 without murmurs, rubs or gallops. Abdomen: Soft, non-tender, non-distended with normal bowel sounds. Musculoskeletal: passive and active ROM x 4 extremities. Prior AV fistula in the left upper extremity. (+) tenderness on anterior aspect of left shoulder   Skin: Skin color, texture, turgor normal.  No rashes or lesions. Neurologic:  Neurovascularly intact without any focal sensory/motor deficits. Cranial nerves: II-XII intact, grossly non-focal.  Psychiatric: thought content appropriate, normal insight  Exam of extremities: peripheral pulses normal, no pedal edema, no clubbing or cyanosis      Labs:   Recent Labs     03/01/21 1741 03/02/21 0155   WBC 8.9 8.1   HGB 10.6* 9.9*   HCT 34.3* 32.4*    210     Recent Labs     03/01/21 1741 03/02/21 0155 03/02/21  1003    135  --    K 3.6 3.3* 3.8    98  --    CO2 28 25  --    BUN 25* 22  --    CREATININE 1.2 1.1  --    CALCIUM 10.3 9.7  --      Recent Labs     03/01/21 1741   AST 17   ALT 12   BILITOT 0.2*   ALKPHOS 103     No results for input(s): INR in the last 72 hours. No results for input(s): Teryl Drown in the last 72 hours. Urinalysis:      Lab Results   Component Value Date    NITRU NEGATIVE 02/15/2021    WBCUA 5-9 02/01/2021    BACTERIA MODERATE 02/01/2021    RBCUA 0-2 02/01/2021    BLOODU NEGATIVE 02/15/2021    SPECGRAV 1.012 02/15/2021    GLUCOSEU Negative 11/12/2020       Radiology:  CT HEAD WO CONTRAST   Final Result    No evidence of an acute process. **This report has been created using voice recognition software. It may contain minor errors which are inherent in voice recognition technology. **      Final report electronically signed by DR Valentin Acuna on 3/2/2021 10:59 AM      CT CERVICAL SPINE WO CONTRAST   Final Result 1. Straightening of the normal cervical lordosis with no acute fracture. 2. There is mild canal, moderate left and mild-to-moderate right centimeter from the C5-C6. 3. Small canal and multiple report from the C3-4 and C7-T1.   4. There are ventral osteophytes especially at T1 and at C3-4.   5. There is calcification along the anterior longitudinal ligament between C4 and C7.   6. There is bilateral carotid artery calcification. 7. Otherwise negative CT scan of the cervical spine. .               **This report has been created using voice recognition software. It may contain minor errors which are inherent in voice recognition technology. **      Final report electronically signed by DR Martin Pineda on 3/2/2021 11:07 AM      XR SHOULDER LEFT (MIN 2 VIEWS)   Final Result   Mild degenerative spurring of the shoulder. Calcific tendinitis. Cannot exclude impingement syndrome. **This report has been created using voice recognition software. It may contain minor errors which are inherent in voice recognition technology. **      Final report electronically signed by Dr. Michelle Saldaña on 3/1/2021 6:00 PM          Diet: DIET CARB CONTROL; No Caffeine  Diet NPO, After Midnight    DVT prophylaxis: [] Lovenox                                 [x] SCDs                                 [] SQ Heparin                                 [] Encourage ambulation           [] Already on Anticoagulation     Disposition:    [x] Home once medically stable        [] TCU       [] Rehab       [] Psych       [] SNF       [] [place]       [] Other-    Code Status: Full Code      Electronically signed by Aziza Marquez DO on 3/2/2021 at 5:38 PM

## 2021-03-02 NOTE — H&P
Admission:      Prior to Admission medications    Medication Sig Start Date End Date Taking? Authorizing Provider   tiotropium (SPIRIVA RESPIMAT) 2.5 MCG/ACT AERS inhaler Inhale 2 puffs into the lungs daily 3/1/21 3/1/22 Yes DONOVAN West CNP   albuterol sulfate HFA (PROAIR HFA) 108 (90 Base) MCG/ACT inhaler Inhale 2 puffs into the lungs every 6 hours as needed for Wheezing or Shortness of Breath 3/1/21 3/1/22 Yes DONOVAN West CNP   fluticasone (FLONASE) 50 MCG/ACT nasal spray 1 spray by Each Nostril route daily as needed for Rhinitis 3/1/21  Yes DONOVAN West CNP   pantoprazole (PROTONIX) 40 MG tablet Take 40 mg by mouth 2 times daily   Yes Historical Provider, MD   atorvastatin (LIPITOR) 40 MG tablet TAKE 1 TABLET DAILY 2/23/21  Yes DONOVAN Beebe CNP   HYDROcodone-acetaminophen (NORCO) 5-325 MG per tablet Take 1 tablet by mouth every 8 hours as needed for Pain for up to 30 days. Fill on/after 4/19/2021 2/19/21 3/21/21 Yes DONOVAN Reed CNP   HYDROcodone-acetaminophen (NORCO) 5-325 MG per tablet Take 1 tablet by mouth every 8 hours as needed for Pain for up to 30 days. Fill on/after 3/20/2021 2/19/21 3/21/21 Yes DONOVAN Reed CNP   HYDROcodone-acetaminophen (NORCO) 5-325 MG per tablet Take 1 tablet by mouth every 8 hours as needed for Pain for up to 30 days.  Fill on/after 2/20/21 2/19/21 3/21/21 Yes DONOVAN Reed CNP   insulin aspart (NOVOLOG FLEXPEN) 100 UNIT/ML injection pen Sliding scale insulin coverage Glucose:Dose: If Less lxub650 =No Insulin/ 140-199= 2 Units/ 200-249=4 Units/ 250-299= 6 Units/  300-349=8 Units/  350-400=10 Units/ Above 400 = 12 Units max 48 units daily 2/3/21  Yes DONOVAN Busch CNP   insulin glargine (LANTUS SOLOSTAR) 100 UNIT/ML injection pen Inject 25 Units into the skin nightly 2/3/21  Yes DONOVAN Busch CNP   carvedilol (COREG) 25 MG tablet 2 tablets BID 2/3/21  Yes Eleanor MCDANIELS DONOVAN Spaulding CNP   tacrolimus (PROGRAF) 1 MG capsule Take 1 mg by mouth 7 CAPSULES EVERY MORNING AND 6 CAPSULES EVERY EVENING   Yes Historical Provider, MD   nitroGLYCERIN (NITROSTAT) 0.4 MG SL tablet Place 1 tablet under the tongue every 5 minutes as needed for Chest pain 2/3/21  Yes DONOVAN Jj CNP   lactulose [facility]) 10 GM/15ML solution Take twice daily as needed for constipation 1/14/21  Yes DONOVAN English CNP   vitamin D (ERGOCALCIFEROL) 1.25 MG (56122 UT) CAPS capsule TAKE 1 CAPSULE BY MOUTH EVERY 14 DAYS ON MONDAYS 9/30/20  Yes DONOVAN English CNP   NIFEdipine (ADALAT CC) 60 MG extended release tablet Take 60 mg by mouth 2 times daily   Yes Historical Provider, MD   Mycophenolate Sodium 360 MG TBEC Take 360 mg by mouth 2 tablets BID   Yes Historical Provider, MD   cloNIDine (CATAPRES) 0.3 MG tablet Take 0.3 mg by mouth 3 times daily    Yes Historical Provider, MD   docusate sodium (COLACE) 100 MG capsule Take 200 mg by mouth 2 times daily   Yes Historical Provider, MD   magnesium oxide (MAG-OX) 400 MG tablet Take 400 mg by mouth 2 times daily   Yes Historical Provider, MD   sulfamethoxazole-trimethoprim (BACTRIM DS;SEPTRA DS) 800-160 MG per tablet Take 1 tablet by mouth daily   Yes Historical Provider, MD   linaclotide (LINZESS) 290 MCG CAPS capsule Take 145 mcg by mouth every other day    Yes Historical Provider, MD   aspirin 81 MG EC tablet Take 81 mg by mouth daily. Yes Historical Provider, MD   Insulin Pen Needle (B-D ULTRAFINE III SHORT PEN) 31G X 8 MM MISC Use three times daily Diagnosis Code E11.9 2/4/21   DONOVAN Jj CNP       Allergies:  Pioglitazone, Actos [pioglitazone hydrochloride], Cymbalta [duloxetine hcl], and Gabapentin    Social History:          TOBACCO:   reports that she quit smoking about 11 years ago. Her smoking use included cigarettes. She started smoking about 41 years ago. She has a 45.00 pack-year smoking history.  She has never used smokeless tobacco.  ETOH:   reports no history of alcohol use. Family History:      Family history was reviewed with the patient, noncontributory        Problem Relation Age of Onset    Diabetes Sister     Diabetes Brother     Diabetes Sister     Diabetes Sister     Cancer Sister     Early Death Sister     Heart Disease Sister     Diabetes Sister     Heart Disease Sister     Diabetes Sister     Diabetes Brother     Arthritis Mother     Heart Disease Mother     High Blood Pressure Mother     Kidney Disease Mother     Mental Illness Mother     Stroke Mother     Heart Disease Father     Diabetes Father     Obesity Father     Alcohol Abuse Father        Diet:  No diet orders on file    REVIEW OF SYSTEMS:   Pertinent positives as noted in the HPI. All other systems reviewed and negative. PHYSICAL EXAM:    BP (!) 205/78   Pulse 67   Temp 98.1 °F (36.7 °C) (Oral)   Resp 16   Ht 5' 5\" (1.651 m)   Wt 180 lb (81.6 kg)   SpO2 100%   BMI 29.95 kg/m²     General appearance:  No apparent distress, appears stated age and cooperative. HEENT:  Normal cephalic, atraumatic without obvious deformity. Pupils equal, round, and reactive to light. Extra ocular muscles intact. Conjunctivae/corneas clear. Neck: Supple, with full range of motion. No jugular venous distention. Trachea midline. Respiratory:  Normal respiratory effort. Clear to auscultation, bilaterally without Rales/Wheezes/Rhonchi. Cardiovascular:  Regular rate and rhythm with normal S1/S2 without murmurs, rubs or gallops. Abdomen: Soft, non-tender, non-distended with normal bowel sounds. Musculoskeletal:  No clubbing, cyanosis or edema bilaterally. Full range of motion without deformity. Skin: Skin color, texture, turgor normal.  No rashes or lesions. Neurologic:  Neurovascularly intact without any focal sensory/motor deficits.  Cranial nerves: II-XII intact, grossly non-focal.  Psychiatric:  Alert and oriented, thought content appropriate, normal insight  Capillary Refill: Brisk,< 3 seconds   Peripheral Pulses: +2 palpable, equal bilaterally       Labs:     Recent Labs     21  174   WBC 8.9   HGB 10.6*   HCT 34.3*        Recent Labs     21  174      K 3.6      CO2 28   BUN 25*   CREATININE 1.2   CALCIUM 10.3     Recent Labs     21  174   AST 17   ALT 12   BILITOT 0.2*   ALKPHOS 103     No results for input(s): INR in the last 72 hours. No results for input(s): Brissa Journey in the last 72 hours. Urinalysis:      Lab Results   Component Value Date    NITRU NEGATIVE 02/15/2021    WBCUA 5-9 2021    BACTERIA MODERATE 2021    RBCUA 0-2 2021    BLOODU NEGATIVE 02/15/2021    SPECGRAV 1.012 02/15/2021    GLUCOSEU Negative 2020       Intake & Output:  No intake/output data recorded. No intake/output data recorded. Radiology:       EKG:  I have reviewed the EKG with the following interpretation: Poor quality normal sinus rhythm nonspecific T wave changes    XR SHOULDER LEFT (MIN 2 VIEWS)   Final Result   Mild degenerative spurring of the shoulder. Calcific tendinitis. Cannot exclude impingement syndrome. **This report has been created using voice recognition software. It may contain minor errors which are inherent in voice recognition technology. **      Final report electronically signed by Dr. Cecile Mcgrath on 3/1/2021 6:00 PM           DVT prophylaxis: {dvt prophylaxis:[id number]    Code Status: Full code      PT/OT Eval Status:     [facility] Problems    Diagnosis Date Noted    Arm pain [M79.603] 2021     38-year-old female patient with history of end-stage renal disease due to diabetic and hypertensive kidney disease was on hemodialysis for 1.5 years for  donor kidney transplant 2020 nonobstructive coronary disease diabetes mellitus type 2 and hypertension presented with left arm pain.    -Left arm pain probable cervical degenerative joint disease probable atypical presentation of coronary disease initial troponin slightly positive no significant EKG changes no chest pain. -Hypertension poorly controlled blood pressure 220/90  -Kidney transplant status had  donor kidney transplant 2020 on tacrolimus and Myfortic  -Diabetes mellitus type 2 on insulin    PLAN:  Telemetry  Resume home antihypertensive medications  Serial troponin  Outpatient physical therapy and Occupational Therapy        Thank you DONOVAN Garza - DAVE for the opportunity to be involved in this patient's care.     Electronically signed by hCan Luis MD on 3/1/2021 at 8:53 PM

## 2021-03-02 NOTE — PROGRESS NOTES
Pharmacy Medication History Note      List of current medications patient is taking is complete. Source of information: patient    Changes made to medication list:  Medications removed (include reason, ex. therapy complete or physician discontinued):  none    Medications added/doses adjusted:  Pantoprazole - takes BID    Other notes (ex. Recent course of antibiotics, Coumadin dosing):  Had home medications with her  Denies use of other OTC or herbal medications.       Allergies reviewed      Electronically signed by Igor Zhou [facility] on 3/1/2021 at 8:09 PM

## 2021-03-02 NOTE — FLOWSHEET NOTE
Advanced Directives Consult: Pt wanted to look at it later. It was explained and I told her to have a  called whenever she is ready to have it completed. 03/02/21 0707   Encounter Summary   Services provided to: Patient   Referral/Consult From: [facility] Family members   Continue Visiting Yes  (3/2)   Complexity of Encounter Low   Length of Encounter 15 minutes   Advance Care Planning Yes   Routine   Type Initial   Assessment Approachable;Calm   Intervention Nurtured hope; Active listening;Empowerment   Outcome Acceptance;Expressed gratitude;Encouraged; Hopeful

## 2021-03-02 NOTE — CARE COORDINATION
3/2/21, 1:54 PM EST  DISCHARGE PLANNING EVALUATION:    Meño Lozano       Admitted: 3/1/2021/ [address] day: 0   Location: 6K-21/021-A Reason for admit: Arm pain [M79.603]   PMH:  has a past medical history of Anemia associated with chronic renal failure, Anxiety, Arthritis, Backache, Blood circulation, collateral, Blood transfusion, CAD (coronary artery disease), Cellulitis in diabetic foot ([address] Chest pain, CHF (congestive heart failure) ([address] Chronic anemia, Chronic kidney disease, Chronic kidney disease, stage III (moderate), Chronic renal insufficiency, COPD (chronic obstructive pulmonary disease) ([address] Coronary disease, Depression, Diabetes mellitus, type 2 ([address] Disease of blood and blood forming organ, GERD (gastroesophageal reflux disease), 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 Barriers to Discharge:  Trop initially elevated, now neg, Mg 1.5, -328. Hypertensive, last 220/92, with +orthostasis. IV apresoline given x1 dose. Stress test in a.m. Ortho consult for arm pain. PT/OT. PCP: DONOVAN Robledo CNP   %    Patient Goals/Plan/Treatment Preferences: Home alone with [address]. Transportation/Food Security/Housekeeping Addressed:  No issues identified.

## 2021-03-02 NOTE — ED NOTES
ED to inpatient nurses report    Chief Complaint   Patient presents with    Arm Pain      Present to ED from home  LOC: alert and orientated to name, place, date  Vital signs   Vitals:    03/01/21 1705 03/01/21 1940 03/01/21 2044   BP: (!) 155/72 (!) 176/80 (!) 205/78   Pulse: 67 66 67   Resp: 12 19 16   Temp: 98.1 °F (36.7 °C)     TempSrc: Oral     SpO2: 100% 99% 100%   Weight: 180 lb (81.6 kg)     Height: 5' 5\" (1.651 m)        Oxygen Baseline RA    Current needs required RA Bipap/Cpap No  LDAs:   Peripheral IV 03/01/21 Right Antecubital (Active)   Site Assessment Clean;Dry; Intact 03/01/21 2044   Dressing Status Clean;Dry; Intact 03/01/21 2011   Dressing Intervention New 03/01/21 2011     Mobility: Independent  Pending ED orders: None  Present condition: Stable. Pt just fed.      Electronically signed by Wil Wallace RN on 3/1/2021 at 8:58 PM       Wil Wallace RN  03/01/21 8969

## 2021-03-02 NOTE — ED PROVIDER NOTES
[facility]    EMERGENCY MEDICINE     Pt Name: Rehana Sykes  MRN: [id number]  [place] 1955  Date of evaluation: 3/1/2021  Provider: Emily Stephens MD,     CHIEF COMPLAINT       Chief Complaint   Patient presents with    Arm Pain       HISTORY OF PRESENT ILLNESS    Rehana Sykes is a pleasant 77 y.o. female who presents to the emergency department from home with concern about arm pain. Patient states that her arm has been bothering her for the past few weeks. She notes that she has a fistula in that arm but she does not use anymore she received a kidney transplant 1 year ago. Patient states the pain travels up into her shoulder and into her chest.  She does not have any shortness of breath. She was at her pulmonologist recently and he stated that everything seemed fine. She is concerned because she is worried that she may be having heart issues that she is unaware of. She states that she has a family history of heart issues. She denies any recent fever, cough, congestion, nausea, vomiting, diarrhea. Triage notes and Nursing notes were reviewed by myself. Any discrepancies are addressed above.     PAST MEDICAL HISTORY     Past Medical History:   Diagnosis Date    Anemia associated with chronic renal failure     Aranesp 150 micrograms every other week given at [facility]    Anxiety     Arthritis     Backache     Blood circulation, collateral     Blood transfusion     CAD (coronary artery disease)     Cellulitis in diabetic foot ([facility] [address] 03/03/2017    4th and 5th toes right foot    Chest pain     History of    CHF (congestive heart failure) ([facility] [address] 1998    Dx'ed by Dr. Beverli Cushing Chronic anemia     Chronic kidney disease     Chronic kidney disease, stage III (moderate)     Chronic renal insufficiency 2010    COPD (chronic obstructive pulmonary disease) ([facility]) 2012    Dr. 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 Antonella Cain    HTN (hypertension) 1970's    Hyperlipemia 1998    Iron deficiency anemia due to dietary causes 6/21/2018    Kidney stones 3/2014    Kidney trouble          MRSA infection 03/2017    right foot-Dr. Magan Chen (podiatrist)    Neuromuscular disorder ([facility] [address] [facility]     Neuropathy 1989    diabetic neuropathy    Obesity since childhoood    CONTRERAS on CPAP 2010    Dr. James Miller    Pneumonia     PONV (postoperative nausea and vomiting)     Seizures ([facility] [address] [facility]     Sepsis due to Escherichia coli ([facility] [address] 07/2020       SURGICAL HISTORY       Past Surgical History:   Procedure Laterality Date    ANKLE SURGERY Right 02-10-14    Dr. Juani Flynn at [facility]  1990's    removal of benign tumor   [address]  2008   [address]  2009    2 polyps, not precanceorus    COLONOSCOPY Left 6/10/2019    COLONOSCOPY DIAGNOSTIC performed by Courtney Pineda MD at [address] [place] [address]  3/30/2012    eye lid lift    DIAGNOSTIC CARDIAC CATH LAB PROCEDURE      ENDOSCOPY, COLON, DIAGNOSTIC  2007   [facility] EYE SURGERY  March 30th, 2012    left sided ptosis    FOOT SURGERY  1990    Tarsal tunnel surgery    FRACTURE SURGERY  2015   [address] and [address]    first partial in 1980's, then total in [address] [facility]  2015   [facility]  04/10/2020    RIGHT    LIVER BIOPSY  6/2015    LUNG BIOPSY  2009    [place] OFFICE/OUTPT VISIT,PROCEDURE ONLY Left 8/23/2018    LEFT UPPER EXTREMENTY AV FISTULA CREATION performed by Robert Villegas MD at [address] [facility] Left 6/14/2019    EGD BIOPSY performed by Courtney Pineda MD at [place] [address]       Current Discharge Medication List CONTINUE these medications which have NOT CHANGED    Details   tiotropium (SPIRIVA RESPIMAT) 2.5 MCG/ACT AERS inhaler Inhale 2 puffs into the lungs daily  Qty: 1 Inhaler, Refills: 11    Associated Diagnoses: Simple chronic bronchitis (HCC)      albuterol sulfate HFA (PROAIR HFA) 108 (90 Base) MCG/ACT inhaler Inhale 2 puffs into the lungs every 6 hours as needed for Wheezing or Shortness of Breath  Qty: 3 Inhaler, Refills: 3    Associated Diagnoses: Simple chronic bronchitis (HCC)      fluticasone (FLONASE) 50 MCG/ACT nasal spray 1 spray by Each Nostril route daily as needed for Rhinitis  Qty: 3 Bottle, Refills: 3    Associated Diagnoses: Non-seasonal allergic rhinitis, unspecified trigger      pantoprazole (PROTONIX) 40 MG tablet Take 40 mg by mouth 2 times daily      atorvastatin (LIPITOR) 40 MG tablet TAKE 1 TABLET DAILY  Qty: 90 tablet, Refills: 3      !! HYDROcodone-acetaminophen (NORCO) 5-325 MG per tablet Take 1 tablet by mouth every 8 hours as needed for Pain for up to 30 days. Fill on/after 4/19/2021  Qty: 90 tablet, Refills: 0    Comments: Reduce doses taken as pain becomes manageable  Associated Diagnoses: Other chronic pain      !! HYDROcodone-acetaminophen (NORCO) 5-325 MG per tablet Take 1 tablet by mouth every 8 hours as needed for Pain for up to 30 days. Fill on/after 3/20/2021  Qty: 90 tablet, Refills: 0    Comments: Reduce doses taken as pain becomes manageable  Associated Diagnoses: Other chronic pain      !! HYDROcodone-acetaminophen (NORCO) 5-325 MG per tablet Take 1 tablet by mouth every 8 hours as needed for Pain for up to 30 days.  Fill on/after 2/20/21  Qty: 90 tablet, Refills: 0    Comments: Reduce doses taken as pain becomes manageable  Associated Diagnoses: Other chronic pain      insulin aspart (NOVOLOG FLEXPEN) 100 UNIT/ML injection pen Sliding scale insulin coverage Glucose:Dose: If Less [id number] =No Insulin/ 140-199= 2 Units/ 200-249=4 Units/ 250-299= 6 Units/  300-349=8 Units/ 350-400=10 Units/ Above 400 = 12 Units max 48 units daily  Qty: 15 pen, Refills: 1    Associated Diagnoses: Type 2 diabetes mellitus without complication, with long-term current use of insulin ([facility])      insulin glargine (LANTUS SOLOSTAR) 100 UNIT/ML injection pen Inject 25 Units into the skin nightly  Qty: 15 pen, Refills: 1    Associated Diagnoses: Type 2 diabetes mellitus without complication, with long-term current use of insulin ([facility])      carvedilol (COREG) 25 MG tablet 2 tablets BID  Qty: 360 tablet, Refills: 1    Associated Diagnoses: Benign essential HTN      tacrolimus (PROGRAF) 1 MG capsule Take 1 mg by mouth 7 CAPSULES EVERY MORNING AND 6 CAPSULES EVERY EVENING      nitroGLYCERIN (NITROSTAT) 0.4 MG SL tablet Place 1 tablet under the tongue every 5 minutes as needed for Chest pain  Qty: 25 tablet, Refills: 2    Associated Diagnoses: Essential hypertension      lactulose (CHRONULAC) 10 GM/15ML solution Take twice daily as needed for constipation  Qty: 2700 mL, Refills: 1    Comments: DX Code Needed  .   Associated Diagnoses: Constipation, unspecified constipation type      vitamin D (ERGOCALCIFEROL) 1.25 MG (02582 UT) CAPS capsule TAKE 1 CAPSULE BY MOUTH EVERY 14 DAYS ON MONDAYS  Qty: 12 capsule, Refills: 1    Associated Diagnoses: Vitamin D deficiency      NIFEdipine (ADALAT CC) 60 MG extended release tablet Take 60 mg by mouth 2 times daily    Associated Diagnoses: Benign essential HTN      Mycophenolate Sodium 360 MG TBEC Take 360 mg by mouth 2 tablets BID      cloNIDine (CATAPRES) 0.3 MG tablet Take 0.3 mg by mouth 3 times daily       docusate sodium (COLACE) 100 MG capsule Take 200 mg by mouth 2 times daily      magnesium oxide (MAG-OX) 400 MG tablet Take 400 mg by mouth 2 times daily      sulfamethoxazole-trimethoprim (BACTRIM DS;SEPTRA DS) 800-160 MG per tablet Take 1 tablet by mouth daily      linaclotide (LINZESS) 290 MCG CAPS capsule Take 145 mcg by mouth every other day       aspirin 81 MG EC Minutes per session: None    Stress: None   Relationships    Social connections     Talks on phone: None     Gets together: None     Attends Mu-ism service: None     Active member of club or organization: None     Attends meetings of clubs or organizations: None     Relationship status: None    Intimate partner violence     Fear of current or ex partner: None     Emotionally abused: None     Physically abused: None     Forced sexual activity: None   Other Topics Concern    None   Social History Narrative    None       REVIEW OF SYSTEMS     Review of Systems   Constitutional: Negative for fatigue and fever. HENT: Negative for congestion and trouble swallowing. Eyes: Negative for redness. Respiratory: Negative for cough and shortness of breath. Cardiovascular: Positive for chest pain. Gastrointestinal: Negative for abdominal pain, nausea and vomiting. Genitourinary: Negative for dysuria. Musculoskeletal: Positive for arthralgias. Negative for back pain. Skin: Negative for rash. Allergic/Immunologic: Negative for immunocompromised state. Neurological: Negative for light-headedness. Hematological: Does not bruise/bleed easily. Except as noted above the remainder of the review of systems was reviewed and is. PHYSICAL EXAM    (up to 7 for level 4, 8 or more for level 5)     ED Triage Vitals [03/01/21 1705]   BP Temp Temp Source Pulse Resp SpO2 Height Weight   (!) 155/72 98.1 °F (36.7 °C) Oral 67 12 100 % 5' 5\" (1.651 m) 180 lb (81.6 kg)       Physical Exam  Vitals signs and nursing note reviewed. Exam conducted with a chaperone present. Constitutional:       General: She is not in acute distress. Appearance: She is normal weight. She is not ill-appearing. HENT:      Head: Normocephalic and atraumatic. Nose: Nose normal. No congestion. Mouth/Throat:      Mouth: Mucous membranes are moist.      Pharynx: Oropharynx is clear.  No oropharyngeal exudate or posterior oropharyngeal erythema. Eyes:      Extraocular Movements: Extraocular movements intact. Pupils: Pupils are equal, round, and reactive to light. Cardiovascular:      Rate and Rhythm: Normal rate and regular rhythm. Pulses: Normal pulses. Radial pulses are 2+ on the right side and 2+ on the left side. Heart sounds: Normal heart sounds, S1 normal and S2 normal. No murmur. No friction rub. Arteriovenous access: left arteriovenous access is present. Comments: Fistula left upper extremity with good thrill  Pulmonary:      Effort: Pulmonary effort is normal. No respiratory distress. Breath sounds: Normal breath sounds. No wheezing. Abdominal:      General: Abdomen is flat. There is no distension. Palpations: Abdomen is soft. Tenderness: There is no abdominal tenderness. There is no guarding or rebound. Musculoskeletal: Normal range of motion. Right lower leg: No edema. Left lower leg: No edema. Skin:     General: Skin is warm and dry. Capillary Refill: Capillary refill takes less than 2 seconds. Neurological:      General: No focal deficit present. Mental Status: She is alert and oriented to person, place, and time. DIAGNOSTIC RESULTS     EKG:(none if blank)  All EKG's are interpreted by [facility] who either signs or Co-signs this chart in the absence of a cardiologist.        RADIOLOGY: (none if blank)   Interpretation per the Radiologistbelow, if available at the time of this note:    XR SHOULDER LEFT (MIN 2 VIEWS)   Final Result   Mild degenerative spurring of the shoulder. Calcific tendinitis. Cannot exclude impingement syndrome. **This report has been created using voice recognition software. It may contain minor errors which are inherent in voice recognition technology. **      Final report electronically signed by Dr. Edwin Joya on 3/1/2021 6:00 PM          LABS:  Labs Reviewed   CBC WITH AUTO DIFFERENTIAL - Abnormal; Notable for the following components:       Result Value    RBC 3.84 (*)     Hemoglobin 10.6 (*)     Hematocrit 34.3 (*)     MCHC 30.9 (*)     Immature Grans (Abs) 0.77 (*)     All other components within normal limits   COMPREHENSIVE METABOLIC PANEL W/ REFLEX TO MG FOR LOW K - Abnormal; Notable for the following components:    Glucose 64 (*)     BUN 25 (*)     Total Bilirubin 0.2 (*)     All other components within normal limits   TROPONIN - Abnormal; Notable for the following components:    Troponin T 0.015 (*)     All other components within normal limits   GLOMERULAR FILTRATION RATE, ESTIMATED - Abnormal; Notable for the following components:    Est, Glom Filt Rate 54 (*)     All other components within normal limits   POCT GLUCOSE - Abnormal; Notable for the following components:    POC Glucose 192 (*)     All other components within normal limits   ANION GAP   OSMOLALITY   SCAN OF BLOOD SMEAR   TROPONIN   BASIC METABOLIC PANEL W/ REFLEX TO MG FOR LOW K   CBC WITH AUTO DIFFERENTIAL   TROPONIN       All other labs were within normal range or not returned as of this dictation. Please note, any cultures that may have been sent were not resulted at the time of this patient visit. EMERGENCY DEPARTMENT COURSE andMedical Decision Making:     MDM  Number of Diagnoses or Management Options  Chest pain, unspecified type  Diagnosis management comments: 69-year-old female presents emergency room for arm pain with some radiation to chest pain. She does have some spasm and pain in the left paraspinal and deltoid muscles. It is possible that this is all secondary to a cervical radiculopathy. However she does have risk factors being a renal transplant patient and having previous CAD. We will get an EKG, CBC, CMP, troponin, chest x-ray. /  ED Course as of Mar 02 0022   Mon Mar 01, 2021   1911 Mild elevation in troponin. She is a renal patient however she has a renal transplant.   No previous elevation of troponin in the past.  Aspirin given. [DD]   Tue Mar 02, 2021   [address] Late entry secondary to patient volume in the emergency department. Spoke with the patient and we agreed upon doing an evaluation inpatient for this elevation of troponin. She has never had a previous elevation in her troponin and the arm pain is worrisome. She continued to have pain despite the Tylenol and the lidocaine patch. She did not want any morphine. Will be admitted under the hospitalist.    [DD]      ED Course User Index  [DD] Aga May MD         The patient was evaluated during the global COVID-19 pandemic, and that diagnosis was considered upon their initial presentation. Their evaluation, treatment and testing was consistent with current guidelines for patients who present with complaints or symptoms that may be related to COVID-19.     Strict returnprecautions and follow up instructions were discussed with the patient with which the patient agrees        ED Medications administered this visit:    Medications   albuterol sulfate  (90 Base) MCG/ACT inhaler 2 puff (has no administration in time range)   atorvastatin (LIPITOR) tablet 40 mg (has no administration in time range)   carvedilol (COREG) tablet 25 mg (has no administration in time range)   cloNIDine (CATAPRES) tablet 0.3 mg (has no administration in time range)   docusate sodium (COLACE) capsule 200 mg (has no administration in time range)   fluticasone (FLONASE) 50 MCG/ACT nasal spray 1 spray (has no administration in time range)   HYDROcodone-acetaminophen (NORCO) 5-325 MG per tablet 1 tablet (has no administration in time range)   insulin glargine (LANTUS) injection vial 25 Units (has no administration in time range)   lactulose (CHRONULAC) 10 GM/15ML solution 20 g (has no administration in time range)   linaclotide (LINZESS) capsule 290 mcg (has no administration in time range)   magnesium oxide (MAG-OX) tablet 400 mg (has no administration in time range)   mycophenolate (MYFORTIC) DR tablet 360 mg (has no administration in time range)   NIFEdipine (PROCARDIA XL) extended release tablet 60 mg (has no administration in time range)   nitroGLYCERIN (NITROSTAT) SL tablet 0.4 mg (has no administration in time range)   pantoprazole (PROTONIX) tablet 40 mg (has no administration in time range)   sulfamethoxazole-trimethoprim (BACTRIM DS;SEPTRA DS) 800-160 MG per tablet 1 tablet (has no administration in time range)   tacrolimus (PROGRAF) capsule 1 mg (has no administration in time range)   tiotropium (SPIRIVA RESPIMAT) 2.5 MCG/ACT inhaler 2 puff (has no administration in time range)   vitamin D (ERGOCALCIFEROL) capsule 50,000 Units (has no administration in time range)   sodium chloride flush 0.9 % injection 10 mL (has no administration in time range)   sodium chloride flush 0.9 % injection 10 mL (has no administration in time range)   promethazine (PHENERGAN) tablet 12.5 mg (has no administration in time range)     Or   ondansetron (ZOFRAN) injection 4 mg (has no administration in time range)   acetaminophen (TYLENOL) tablet 650 mg (has no administration in time range)     Or   acetaminophen (TYLENOL) suppository 650 mg (has no administration in time range)   polyethylene glycol (GLYCOLAX) packet 17 g (has no administration in time range)   aspirin chewable tablet 81 mg (has no administration in time range)   potassium chloride (KLOR-CON M) extended release tablet 40 mEq (has no administration in time range)     Or   potassium bicarb-citric acid (EFFER-K) effervescent tablet 40 mEq (has no administration in time range)     Or   potassium chloride 10 mEq/100 mL IVPB (Peripheral Line) (has no administration in time range)   enoxaparin (LOVENOX) injection 40 mg (has no administration in time range)   insulin lispro (HUMALOG) injection vial 0-12 Units (has no administration in time range)   insulin lispro (HUMALOG) injection vial 0-6 Units (has no

## 2021-03-02 NOTE — CARE COORDINATION
DISCHARGE/PLANNING EVALUATION  3/2/21, 3:46 PM EST    Reason for Referral: has [address]  Mental Status: pt is alert and oriented   Decision Making: pt is capable of making own decisions   Family/Social/Home Environment: pt resides alone an a condo. Pt is independent with personal care, cooking and cleaning. Pt is able to drive, her grandchildren assist as needed. Current Services including food security, transportation and housekeeping: see above  Current Equipment:walker, shower chair  Payment Source:[facility]/Medicare  Concerns or Barriers to Discharge: none noted  Post acute provider list with quality measures, geographic area and applicable managed care information provided. 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 Patient goals, treatment preferences and discharge plan: SW met with pt, she plans on returning home with current services with [address]. Pt denies any additional needs.      Electronically signed by DEBBY Rolon on 3/2/2021 at 3:46 PM

## 2021-03-02 NOTE — PLAN OF CARE
Problem: Falls - Risk of:  Goal: Will remain free from falls  Description: Will remain free from falls  3/2/2021 0135 by Osmar Ames RN  Outcome: Ongoing  3/2/2021 0116 by Osmar Ames RN  Outcome: Ongoing  Goal: Absence of physical injury  Description: Absence of physical injury  3/2/2021 0135 by Osmar Ames RN  Outcome: Ongoing  3/2/2021 0116 by Osmar Ames RN  Outcome: Ongoing     Problem: Pain:  Goal: Pain level will decrease  Description: Pain level will decrease  Outcome: Ongoing  Goal: Control of acute pain  Description: Control of acute pain  Outcome: Ongoing     Problem: Serum Glucose Level - Abnormal:  Goal: Ability to maintain appropriate glucose levels will improve to within specified parameters  Description: Ability to maintain appropriate glucose levels will improve to within specified parameters  Outcome: Ongoing     Problem: Cardiac:  Goal: Ability to maintain an adequate cardiac output will improve  Description: Ability to maintain an adequate cardiac output will improve  Outcome: Ongoing

## 2021-03-03 ENCOUNTER — APPOINTMENT (OUTPATIENT)
Dept: NON INVASIVE DIAGNOSTICS | Age: 66
DRG: 552 | End: 2021-03-03
Payer: MEDICARE

## 2021-03-03 LAB
ANION GAP SERPL CALCULATED.3IONS-SCNC: 11 MEQ/L (ref 8–16)
BUN BLDV-MCNC: 20 MG/DL (ref 7–22)
CALCIUM SERPL-MCNC: 10.1 MG/DL (ref 8.5–10.5)
CHLORIDE BLD-SCNC: 101 MEQ/L (ref 98–111)
CHOLESTEROL, FASTING: 128 MG/DL (ref 100–199)
CO2: 24 MEQ/L (ref 23–33)
CREAT SERPL-MCNC: 1.2 MG/DL (ref 0.4–1.2)
GLUCOSE BLD-MCNC: 156 MG/DL (ref 70–108)
GLUCOSE BLD-MCNC: 182 MG/DL (ref 70–108)
GLUCOSE BLD-MCNC: 195 MG/DL (ref 70–108)
GLUCOSE BLD-MCNC: 279 MG/DL (ref 70–108)
GLUCOSE BLD-MCNC: 303 MG/DL (ref 70–108)
HCT VFR BLD CALC: 35 % (ref 37–47)
HDLC SERPL-MCNC: 36 MG/DL
HEMOGLOBIN: 10.1 GM/DL (ref 12–16)
LDL CHOLESTEROL CALCULATED: 61 MG/DL
MAGNESIUM: 1.9 MG/DL (ref 1.6–2.4)
POTASSIUM SERPL-SCNC: 3.9 MEQ/L (ref 3.5–5.2)
SODIUM BLD-SCNC: 136 MEQ/L (ref 135–145)
TRIGLYCERIDE, FASTING: 153 MG/DL (ref 0–199)

## 2021-03-03 PROCEDURE — 3430000000 HC RX DIAGNOSTIC RADIOPHARMACEUTICAL: Performed by: FAMILY MEDICINE

## 2021-03-03 PROCEDURE — 6360000002 HC RX W HCPCS: Performed by: INTERNAL MEDICINE

## 2021-03-03 PROCEDURE — 78452 HT MUSCLE IMAGE SPECT MULT: CPT

## 2021-03-03 PROCEDURE — 80048 BASIC METABOLIC PNL TOTAL CA: CPT

## 2021-03-03 PROCEDURE — 97162 PT EVAL MOD COMPLEX 30 MIN: CPT

## 2021-03-03 PROCEDURE — 99225 PR SBSQ OBSERVATION CARE/DAY 25 MINUTES: CPT | Performed by: FAMILY MEDICINE

## 2021-03-03 PROCEDURE — 2580000003 HC RX 258: Performed by: INTERNAL MEDICINE

## 2021-03-03 PROCEDURE — 85018 HEMOGLOBIN: CPT

## 2021-03-03 PROCEDURE — 6360000002 HC RX W HCPCS: Performed by: STUDENT IN AN ORGANIZED HEALTH CARE EDUCATION/TRAINING PROGRAM

## 2021-03-03 PROCEDURE — 85014 HEMATOCRIT: CPT

## 2021-03-03 PROCEDURE — 82948 REAGENT STRIP/BLOOD GLUCOSE: CPT

## 2021-03-03 PROCEDURE — 6370000000 HC RX 637 (ALT 250 FOR IP): Performed by: FAMILY MEDICINE

## 2021-03-03 PROCEDURE — 93017 CV STRESS TEST TRACING ONLY: CPT | Performed by: INTERNAL MEDICINE

## 2021-03-03 PROCEDURE — G0378 HOSPITAL OBSERVATION PER HR: HCPCS

## 2021-03-03 PROCEDURE — 6370000000 HC RX 637 (ALT 250 FOR IP): Performed by: INTERNAL MEDICINE

## 2021-03-03 PROCEDURE — 80061 LIPID PANEL: CPT

## 2021-03-03 PROCEDURE — 6360000002 HC RX W HCPCS

## 2021-03-03 PROCEDURE — 99223 1ST HOSP IP/OBS HIGH 75: CPT | Performed by: INTERNAL MEDICINE

## 2021-03-03 PROCEDURE — 83735 ASSAY OF MAGNESIUM: CPT

## 2021-03-03 PROCEDURE — 36415 COLL VENOUS BLD VENIPUNCTURE: CPT

## 2021-03-03 PROCEDURE — A9500 TC99M SESTAMIBI: HCPCS | Performed by: FAMILY MEDICINE

## 2021-03-03 PROCEDURE — 96372 THER/PROPH/DIAG INJ SC/IM: CPT

## 2021-03-03 RX ADMIN — SULFAMETHOXAZOLE AND TRIMETHOPRIM 1 TABLET: 800; 160 TABLET ORAL at 11:42

## 2021-03-03 RX ADMIN — NIFEDIPINE 60 MG: 60 TABLET, FILM COATED, EXTENDED RELEASE ORAL at 22:01

## 2021-03-03 RX ADMIN — PANTOPRAZOLE SODIUM 40 MG: 40 TABLET, DELAYED RELEASE ORAL at 06:11

## 2021-03-03 RX ADMIN — MAGNESIUM GLUCONATE 500 MG ORAL TABLET 400 MG: 500 TABLET ORAL at 11:42

## 2021-03-03 RX ADMIN — NIFEDIPINE 60 MG: 60 TABLET, FILM COATED, EXTENDED RELEASE ORAL at 11:42

## 2021-03-03 RX ADMIN — TACROLIMUS 7 MG: 1 CAPSULE ORAL at 11:42

## 2021-03-03 RX ADMIN — CLONIDINE HYDROCHLORIDE 0.3 MG: 0.2 TABLET ORAL at 11:42

## 2021-03-03 RX ADMIN — DOCUSATE SODIUM 200 MG: 100 CAPSULE, LIQUID FILLED ORAL at 22:01

## 2021-03-03 RX ADMIN — Medication 33.7 MILLICURIE: at 09:07

## 2021-03-03 RX ADMIN — ATORVASTATIN CALCIUM 40 MG: 40 TABLET, FILM COATED ORAL at 22:02

## 2021-03-03 RX ADMIN — ENOXAPARIN SODIUM 40 MG: 40 INJECTION, SOLUTION INTRAVENOUS; SUBCUTANEOUS at 11:42

## 2021-03-03 RX ADMIN — ASPIRIN 81 MG: 81 TABLET, CHEWABLE ORAL at 11:42

## 2021-03-03 RX ADMIN — MYCOPHENOLIC ACID 360 MG: 180 TABLET, DELAYED RELEASE ORAL at 22:02

## 2021-03-03 RX ADMIN — CARVEDILOL 25 MG: 25 TABLET, FILM COATED ORAL at 17:19

## 2021-03-03 RX ADMIN — MAGNESIUM GLUCONATE 500 MG ORAL TABLET 400 MG: 500 TABLET ORAL at 22:02

## 2021-03-03 RX ADMIN — SODIUM CHLORIDE, PRESERVATIVE FREE 10 ML: 5 INJECTION INTRAVENOUS at 22:04

## 2021-03-03 RX ADMIN — SODIUM CHLORIDE, PRESERVATIVE FREE 10 ML: 5 INJECTION INTRAVENOUS at 11:42

## 2021-03-03 RX ADMIN — HYDROCODONE BITARTRATE AND ACETAMINOPHEN 1 TABLET: 5; 325 TABLET ORAL at 19:46

## 2021-03-03 RX ADMIN — INSULIN LISPRO 1 UNITS: 100 INJECTION, SOLUTION INTRAVENOUS; SUBCUTANEOUS at 22:03

## 2021-03-03 RX ADMIN — CLONIDINE HYDROCHLORIDE 0.3 MG: 0.2 TABLET ORAL at 22:02

## 2021-03-03 RX ADMIN — CLONIDINE HYDROCHLORIDE 0.3 MG: 0.2 TABLET ORAL at 14:34

## 2021-03-03 RX ADMIN — MYCOPHENOLIC ACID 360 MG: 180 TABLET, DELAYED RELEASE ORAL at 11:42

## 2021-03-03 RX ADMIN — Medication 9.5 MILLICURIE: at 07:45

## 2021-03-03 RX ADMIN — INSULIN GLARGINE 25 UNITS: 100 INJECTION, SOLUTION SUBCUTANEOUS at 22:02

## 2021-03-03 RX ADMIN — DOCUSATE SODIUM 200 MG: 100 CAPSULE, LIQUID FILLED ORAL at 11:42

## 2021-03-03 RX ADMIN — TACROLIMUS 6 MG: 1 CAPSULE ORAL at 22:01

## 2021-03-03 RX ADMIN — CARVEDILOL 25 MG: 25 TABLET, FILM COATED ORAL at 11:42

## 2021-03-03 ASSESSMENT — PAIN DESCRIPTION - PROGRESSION: CLINICAL_PROGRESSION: GRADUALLY WORSENING

## 2021-03-03 ASSESSMENT — PAIN DESCRIPTION - LOCATION: LOCATION: SHOULDER

## 2021-03-03 ASSESSMENT — PAIN - FUNCTIONAL ASSESSMENT: PAIN_FUNCTIONAL_ASSESSMENT: PREVENTS OR INTERFERES SOME ACTIVE ACTIVITIES AND ADLS

## 2021-03-03 ASSESSMENT — PAIN DESCRIPTION - ORIENTATION: ORIENTATION: LEFT

## 2021-03-03 ASSESSMENT — PAIN DESCRIPTION - PAIN TYPE: TYPE: ACUTE PAIN

## 2021-03-03 ASSESSMENT — PAIN DESCRIPTION - ONSET: ONSET: GRADUAL

## 2021-03-03 ASSESSMENT — PAIN DESCRIPTION - FREQUENCY: FREQUENCY: CONTINUOUS

## 2021-03-03 ASSESSMENT — PAIN SCALES - GENERAL: PAINLEVEL_OUTOF10: 0

## 2021-03-03 NOTE — PROGRESS NOTES
PROGRESS NOTE      Patient:  Michelle Short      Unit/Bed:6K-21/021-A    YOB: 1955    MRN: [id number]       Acct: [de-identified]     PCP: DONOVAN Pavon CNP    Date of Admission: 3/1/2021      Assessment/Plan:    1. Left arm pain, possibly due to DJD vs tendinitis, r/o atypical presentation CAD  -Left arm pain on admission with unclear etiology. Patient admitted for rule out ACS due to elevated troponin.  -Initial trop mildly elevated at 0.015. Repeat troponin negative x2.  -X-ray of the left shoulder showing degenerative changes however cannot rule out impingement syndrome  -Left arm pain has been chronic for the last 4 weeks and less likely to be cardiac in nature, however, patient also reports dizziness and sob  -Also complaining of pain radiating from her left neck to her left arm. This has worsened over the past day. Could be atypical ACS presentation. -STRESS TEST: neg for ischemia although evidence of fixed defect and pt tells me her shoulder pain recurred during the test. This is new pain for her during this hospitalization.   -Recent cath in 2019 with nonobstructive coronary artery disease  -Last echo from August 2020 - for CHF. EF of 55 to 60%  -Rule out ACS with stress test  -Limited echo for further evaluation of cardiac function  -Blood pressure uncontrolled ( questionable compliance)  -ortho consult for further recs for possible impingement syndrome    2. Hypertensive urgency, possible hypertensive emergency  -Possibly causing demand ischemia causing elevated troponin.  -Blood pressure continues to be uncontrolled  -Has followed with Dr. Natan Amanda in the past  -Cardiac work-up as above  -Continue home blood pressure medications with holding parameters and adjust accordingly  -avoid overcorrection of BP. BP goal <160/90    3. Elevated troponin, possible demand ischemia due to accelerated HTN, resolved     -plan as above  -tele     4.  Orthostatic hypotension    -(+) orthostatic VS on 3/3/2021  -start KATHI hose  -c/o dizziness x past 2 weeks    5. Hypokalemia likely due to hypomagnesemia    -replace per protocol  -BMP in am     6. Hypomagnesemia    -replace per protocol  -magnesium level in am     7. Hx of fall, likely from orthostatic hypotension    -pt reports that she fell 2 weeks ago after her BP dropped. Denies LOC/head injury. 7. Hx of non-obstructive CAD  -Last cardiac cath in 2019 showing nonobstructive coronary artery disease  -Continue statin, aspirin, Coreg    8. ESRD status post kidney transplant in April 2020, stable   -Creatinine currently stable with estimated GFR of 60s  -Continue Prograf and mycophenolate  -Continue Bactrim for prophylaxis    9. Foraminal stenosis of the cervical spine  -c/o neck pain x 3 weeks   -Noted on CT of the cervical spine.  -Possible contributing factor to arm pain. -Orthopedic surgery consult. 10. Diabetes mellitus type 2  -Last hemoglobin A1c in January 2021 at 6.8. POC glucose uncontrolled   -Continue home Lantus dose.  -Sliding scale insulin with Accu-Cheks  -Hypoglycemia protocol  -low carb diet     11. Essential hypertension, uncontrolled   -With severe range blood pressures  -Continue current blood pressure medication regimen  -plan as  above     12. Hyperlipidemia  -Continue statin    13. COPD not in exacerbation  -Continue home inhalers    14. GERD  -Continue Protonix    15. Chronic pain secondary to arthritis  -Patient uses pain medication while at home.  -Continue Tylenol for pain as needed    16. Chronic normocytic anemia  -Likely secondary to prior end-stage renal disease with kidney transplant.  -Stable and at her baseline    Chief Complaint: Left arm pain    Hospital Course: Per H&P    77 y.o. female who presented to [address] [place] [address] with left arm pain. Patient presented to the emergency room for evaluation of left arm pain. She had over the past 2 weeks.   Pain will start at left shoulder area will radiate all the way down to forearm. Pain described as achy mild to moderate in severity. No numbness, tingling or weakness. Denies chest pain, shortness of breath, fever, chills, cough, nausea vomiting. She has good appetite. Patient denies any recent trauma. Patient checks her blood pressure at home twice daily. On occasions blood pressure go up to 839 systolic. At the time of my evaluation blood pressure was 220/90. Patient had history of end-stage renal disease on hemodialysis for 1.5 years prior  donor kidney transplant at [address] [place] [address] 2020 on tacrolimus and Myfortic. Patient admits having intermittent left arm and forearm pain for the past 4 weeks, raising her arm alleviates the pain, has been constant for the past 2 weeks. She states normally the pain 6 out of 10 in intensity, however it got worse the day she presented to the ER, 8 out of 10, accompanied by feeling cold on her left fingers. She also reports that about 2 weeks ago, she found out that her back pressure dropped, she denies loss of consciousness and head injury. She also complaining of intermittent mild pain for the past 3 weeks, feels sore, likely 7 out of 10 in intensity, radiates to the left arm. She also reports chronic shortness of breath for the past 2 years occurs on exertion. 3/2/21: Patient admitted for ACS rule out and hypertensive urgency with elevated troponin. Last stress test in 2018 and negative for ischemia. Patient had cardiac cath in 2019 which showed nonobstructive coronary artery disease. Blood pressures have been uncontrolled while in the hospital.  Plan for inpatient stress test to rule out ischemia. Subjective: In recliner.  States her pain recurred after the nuc stress test.       Medications:  Reviewed    Infusion Medications    dextrose       Scheduled Medications    linaclotide  290 mcg Oral Every Other Day    tacrolimus  7 mg Oral QAM    tacrolimus  6 mg Oral Nightly    non-distended with normal bowel sounds. Musculoskeletal: passive and active ROM x 4 extremities. Prior AV fistula in the left upper extremity. (+) tenderness on anterior aspect of left shoulder   Skin: Skin color, texture, turgor normal.  No rashes or lesions. Neurologic:  Neurovascularly intact without any focal sensory/motor deficits. Cranial nerves: II-XII intact, grossly non-focal.  Psychiatric: thought content appropriate, normal insight  Exam of extremities: peripheral pulses normal, no pedal edema, no clubbing or cyanosis      Labs:   Recent Labs     03/01/21 1741 03/02/21  0155 03/03/21 0449   WBC 8.9 8.1  --    HGB 10.6* 9.9* 10.1*   HCT 34.3* 32.4* 35.0*    210  --      Recent Labs     03/01/21 1741 03/02/21  0155 03/02/21  1003 03/03/21  0449    135  --  136   K 3.6 3.3* 3.8 3.9    98  --  101   CO2 28 25  --  24   BUN 25* 22  --  20   CREATININE 1.2 1.1  --  1.2   CALCIUM 10.3 9.7  --  10.1     Recent Labs     03/01/21 1741   AST 17   ALT 12   BILITOT 0.2*   ALKPHOS 103     No results for input(s): INR in the last 72 hours. No results for input(s): Tenna Pinta in the last 72 hours. Urinalysis:      Lab Results   Component Value Date    NITRU NEGATIVE 02/15/2021    WBCUA 5-9 02/01/2021    BACTERIA MODERATE 02/01/2021    RBCUA 0-2 02/01/2021    BLOODU NEGATIVE 02/15/2021    SPECGRAV 1.012 02/15/2021    GLUCOSEU Negative 11/12/2020       Radiology:  CT HEAD WO CONTRAST   Final Result    No evidence of an acute process. **This report has been created using voice recognition software. It may contain minor errors which are inherent in voice recognition technology. **      Final report electronically signed by DR Tara Nelson on 3/2/2021 10:59 AM      CT CERVICAL SPINE WO CONTRAST   Final Result       1. Straightening of the normal cervical lordosis with no acute fracture.    2. There is mild canal, moderate left and mild-to-moderate right centimeter from the C5-C6.   3. Small canal and multiple report from the C3-4 and C7-T1.   4. There are ventral osteophytes especially at T1 and at C3-4.   5. There is calcification along the anterior longitudinal ligament between C4 and C7.   6. There is bilateral carotid artery calcification. 7. Otherwise negative CT scan of the cervical spine. .               **This report has been created using voice recognition software. It may contain minor errors which are inherent in voice recognition technology. **      Final report electronically signed by DR Betzy Perez on 3/2/2021 11:07 AM      XR SHOULDER LEFT (MIN 2 VIEWS)   Final Result   Mild degenerative spurring of the shoulder. Calcific tendinitis. Cannot exclude impingement syndrome. **This report has been created using voice recognition software. It may contain minor errors which are inherent in voice recognition technology. **      Final report electronically signed by Dr. Stacia Remy on 3/1/2021 6:00 PM          Diet: DIET CARB CONTROL; No Caffeine    DVT prophylaxis: [] Lovenox                                 [x] SCDs                                 [] SQ Heparin                                 [] Encourage ambulation           [] Already on Anticoagulation     Disposition:    [x] Home once medically stable        [] TCU       [] Rehab       [] Psych       [] SNF       [] [facility]       [] Other-    Code Status: Full Code      Electronically signed by Carlos Charles MD on 3/3/2021 at 4:19 PM

## 2021-03-03 NOTE — CONSULTS
[facility] [address] [place] [address]  Cardiology Consult      Patient:  Chapis Luke  YOB: 1955    MRN: [id number]   Acct: [de-identified]     Primary Care Physician: DONOVAN Ivey CNP    REASON FOR CONSULT:    demand ischemia, ? abnml stress test ( pain in shoulder arm during test)     CHIEF COMPLAINT:    Arm pain     HISTORY OF PRESENT ILLNESS:    Chapis Luke is a pleasant 77year old female patient with past medical history that includes:   Past Medical History:   Diagnosis Date    Anemia associated with chronic renal failure     Aranesp 150 micrograms every other week given at [facility]. [facility]    Anxiety     Arthritis     Backache     Blood circulation, collateral     Blood transfusion     CAD (coronary artery disease)     Cellulitis in diabetic foot ([address] 03/03/2017    4th and 5th toes right foot    Chest pain     History of    CHF (congestive heart failure) ([address] 1998    Dx'ed by Dr. Ronnie Phalen Chronic anemia     Chronic kidney disease     Chronic kidney disease, stage III (moderate)     Chronic renal insufficiency 2010    COPD (chronic obstructive pulmonary disease) ([address] 2012    Dr. 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 Zita Humphreys    HTN (hypertension) 1970's    Hyperlipemia 1998    Iron deficiency anemia due to dietary causes 6/21/2018    Kidney stones 3/2014    Kidney trouble          MRSA infection 03/2017    right foot-Dr. Milena Choe (podiatrist)    Neuromuscular disorder ([address]     Neuropathy 1989    diabetic neuropathy    Obesity since childhoood    CONTRERAS on CPAP 2010    Dr. Arturo Scott    Pneumonia     PONV (postoperative nausea and vomiting)     Seizures ([address]     Sepsis due to Escherichia coli ([facility] 07/2020   The patient was admitted to the hospital on 3/1/2021. For two weeks prior to admission, the patient was experiencing left arm pain that radiates to her forearm. EKG 3/2/2021 revealed NSR. Troponin 0.015, <0.01, <0.01. She has h/o renal transplant, Cr today was 1.2. She has chronic anemia, Hgb 10.1. Echo 3/2/2021 revealed EF 55%, no RWMA. Stress test today was negative for ischemia, report copied below. After stress test, patient reported recurrent arm pain. Cardiology consult was requested. Per chart review, the patient had LHC at OSH 10/2019, only non-obstructive CAD was reported. The patient has been treated for HTN urgency, uncontrolled HTN during this admission. Her BP at time of admission was 220/90. BP improved, SBP still >150. She has occasional neck pain, dizziness. Patient denies chest pain, shortness of breath, dyspnea on exertion, orthopnea, paroxysmal nocturnal dyspnea, palpitations, syncope, recent weight gain or leg swelling.      All labs, EKG's, diagnostic testing and images as well as cardiac cath, stress testing   were reviewed during this encounter    CARDIAC TESTING  Echo:   ECHO:   Results for orders placed during the hospital encounter of 08/02/20   Echo Complete    Narrative Transthoracic Echocardiography Report (TTE)     Demographics      Patient Name    Cecelia Orozco  Gender               Female                   A      MR #            [id number]       Race                 Black                                      Ethnicity      Account #       [de-identified]       Room Number          0006      Accession       [id number]      Date of Study        08/03/2020   Number      Date of Birth   1955      Referring Physician  YAMIL Marina      Age             75 year(s)      2003 [address] MD                                   Physician     Procedure    Type of Study      TTE procedure:ECHOCARDIOGRAM COMPLETE 2D W DOPPLER W COLOR. Procedure Date  Date: 08/03/2020 Start: 08:56 AM    Study Location: Bedside  Technical Quality: Adequate visualization    Indications:Congestive heart failure. Additional Medical History:COPD ARthritis, Anemia, Obstructive sleep apnea,  Diabetic, Hypertension, coronary artery disease, Hyperlipidemia, Congestive  heart failure, Dyspnea, Ex Smoker, Chronic Kidney disease, Pulmonary Edema,  Pericardial effusion. Patient Status: Routine    Height: 65 inches Weight: 180 pounds BSA: 1.89 m^2 BMI: 29.95 kg/m^2    BP: 146/53 mmHg     Conclusions      Summary   Ejection fraction was estimated at 55-60%. Left atrial size was severely dilated. Ascending aorta = 3.6 cm. IVC size is within normal limits with normal respiratory phasic changes. Signature      ----------------------------------------------------------------   Electronically signed by Sue Cordova MD (Interpreting   physician) on 08/03/2020 at 04:42 PM   ----------------------------------------------------------------      Findings      Mitral Valve   The mitral valve structure was normal with normal leaflet separation. DOPPLER: The transmitral velocity was within the normal range with no   evidence for mitral stenosis. There was no evidence of mitral   regurgitation. Aortic Valve   The aortic valve was trileaflet with normal thickness and cuspal   separation. DOPPLER: Transaortic velocity was within the normal range with   no evidence of aortic stenosis. There was no evidence of aortic   regurgitation. Tricuspid Valve   The tricuspid valve structure was normal with normal leaflet separation. DOPPLER: There was no evidence of tricuspid stenosis. There was trace   tricuspid regurgitation. Pulmonic Valve   The pulmonic valve leaflets were not well seen.  DOPPLER: The transpulmonic velocity was within the normal range with trace regurgitation. Left Atrium   Left atrial size was severely dilated. Left Ventricle   Normal left ventricular size and systolic function. There were no regional wall motion abnormalities. Mild concentric hypertrophy. Ejection fraction was estimated at 55-60%. E/A flow reversal noted. Suggestive of diastolic dysfunction. Right Atrium   Right atrial size was normal.      Right Ventricle   The right ventricular size was normal with normal systolic function and   wall thickness. Pericardial Effusion   The pericardium was normal in appearance with no evidence of a pericardial   effusion. Pleural Effusion   No evidence of pleural effusion. Aorta / Great Vessels   -Ascending aorta = 3.6 cm. -IVC size is within normal limits with normal respiratory phasic changes.      M-Mode/2D Measurements & Calculations      LV Diastolic   LV Systolic Dimension:    AV Cusp Separation: 1.7 cmLA   Dimension: 4.7 3.3 cm                    Dimension: 3.8 cmAO Root   cm             LV Volume Diastolic: 151  Dimension: 3.1 cmLA Area: 24.9   LV FS:29.8 %   ml                        cm^2   LV PW          LV Volume Systolic: 44.2   Diastolic: 1.3 ml   cm             LV EDV/LV EDV Index: 102   Septum         ml/54 m^2LV ESV/LV ESV    RV Diastolic Dimension: 3.2 cm   Diastolic: 1.4 Index: 90.8 ml/23 m^2   cm             EF Calculated: 56.8 %     LA/Aorta: 1.23                                            Ascending Aorta: 3.6 cm                                            LA volume/Index: 84.5 ml /45m^2                     LVOT: 2 cm     Doppler Measurements & Calculations      MV Peak E-Wave: 137 AV Peak Velocity: 228    LVOT Peak Velocity: 140 cm/s   cm/s                cm/s                     LVOT Mean Velocity: 100 cm/s   MV Peak A-Wave: 151 AV Peak Gradient: 20.79  LVOT Peak Gradient: 8   cm/s                mmHg                     mmHgLVOT Mean Gradient: 5 MV E/A Ratio: 0.91  AV Mean Velocity: 157    mmHg   MV Peak Gradient:   cm/s   7.51 mmHg           AV Mean Gradient: 9 mmHg TV Peak E-Wave: 64.3 cm/s                       AV VTI: 44.9 cm          TV Peak A-Wave: 75 cm/s   MV Deceleration     AV Area   Time: 250 msec      (Continuity):2.38 cm^2   TV Peak Gradient: 1.65 mmHg                                                TR Velocity:289 cm/s                       LVOT VTI: 34.1 cm        TR Gradient:33.41 mmHg   MV E' Septal        IVRT: 63 msec            PV Peak Velocity: 96 cm/s   Velocity: 5.9 cm/s                           PV Peak Gradient: 3.69 mmHg   MV A' Septal   Velocity: 9.9 cm/s  AV DVI (VTI): 0.76AV DVI   MV E' Lateral       (Vmax):0.61              MN ED Velocity: 127 cm/s   Velocity: 8.3 cm/s   MV A' Lateral   Velocity: 10.7 cm/s   E/E' septal: 23.22   E/E' lateral: 16.51   MR Velocity: 484   cm/s     [id number]%[id number]        Stress Test:3/3/2021     Summary   There was a small sized, moderate in intensity, fixed myocardial perfusion   defect of the apical wall most consistent with an infarct. There was a small to moderate sized, mild in intensity, paradoxical   myocardial perfusion defect of the inferior wall, consistent with an   artifact. The nuclear images is not suggestive for myocardial ischemia. Recommendation   Clinical correlation is recommended.       Signatures      ----------------------------------------------------------------   Electronically signed by Brittni Barber MD (Interpreting   Cardiologist) on 03/03/2021 at 11:55   ----------------------------------------------------------------    [address] [place], [place] [address]/7/2019  [address] [place] [address]'s [address] [place] [address]  Component Name Value Ref Range   BSA 1.88 m2   Other Result Information   This result has an attachment that is not available. Result Narrative   Non-obstructive CAD. Mildly elevated LVEDP. Recommendations:  Aggressive medical therapy for CAD. No intervention recommended of the LAD or RCA given lack of symptoms and   moderate disease.          Past Medical History:    Past Medical History:   Diagnosis Date    Anemia associated with chronic renal failure     Aranesp 150 micrograms every other week given at [facility]. [facility]    Anxiety     Arthritis     Backache     Blood circulation, collateral     Blood transfusion     CAD (coronary artery disease)     Cellulitis in diabetic foot ([facility] 03/03/2017    4th and 5th toes right foot    Chest pain     History of    CHF (congestive heart failure) ([address] 1998    Dx'ed by Dr. Jaylon Brady Chronic anemia     Chronic kidney disease     Chronic kidney disease, stage III (moderate)     Chronic renal insufficiency 2010    COPD (ch[facility] obstructive [facility] disease) [facility] 2012    Dr. Rasheeda Gould    Coronary disease     Moderate    Depression     Diabetes mellitus, type 2 ([facility] 1988    Disease of blood and blood forming organ     GERD (gastroesophageal reflux disease)     Hemoglobin disease ([facility]     hemoglobin hope    History of granulomatous disease 2009    followed by Dr. Geo Shah    HTN (hypertension) 1970's    Hyperlipemia 1998    Iron deficiency anemia due to dietary causes 6/21/2018    Kidney stones 3/2014    Kidney trouble          MRSA infection 03/2017    right foot-Dr. Bryant Espinoza (podiatrist)    Neuromuscular disorder ([facility]     Neuropathy 1989    diabetic neuropathy    Obesity since childhoood    CONTRERAS on CPAP 2010    Dr. Rasheeda Gould    Pneumonia     PONV (postoperative nausea and vomiting)     Seizures ([facility]     Sepsis due to Escherichia coli ([facility] 07/2020       Past Surgical History:    Past Surgical History:   Procedure Laterality Date    ANKLE SURGERY Right 02-10-14    Dr. Miriam Forbes at [facility] 15  [address] removal of benign tumor   [id number] [facility] [address]    COLONOSCOPY  2009    2 polyps, not precanceorus    COLONOSCOPY Left 6/10/2019    COLONOSCOPY DIAGNOSTIC performed by Etelvina Flores MD at [address] [facility]  3/30/2012    eye lid lift    DIAGNOSTIC CARDIAC CATH LAB PROCEDURE      ENDOSCOPY, COLON, DIAGNOSTIC  2007   [facility]  March 30th, 2012    left sided ptosis    FOOT SURGERY  1990    Tarsal tunnel surgery    FRACTURE SURGERY  2015   [address] [facility] [address] and [address]    first partial in 1980's, then total in [facility]  2015   [facility]  04/10/2020    RIGHT    LIVER BIOPSY  6/2015    LUNG BIOPSY  2009    RI OFFICE/OUTPT VISIT,PROCEDURE ONLY Left 8/23/2018    LEFT UPPER EXTREMENTY AV FISTULA CREATION performed by Azalia Rousseau MD at [place] [address] Left 6/14/2019    EGD BIOPSY performed by Etelvina Flores MD at [facility]       Medications Prior to Admission:    Medications Prior to Admission: tiotropium (SPIRIVA RESPIMAT) 2.5 MCG/ACT AERS inhaler, Inhale 2 puffs into the lungs daily  albuterol sulfate HFA (PROAIR HFA) 108 (90 Base) MCG/ACT inhaler, Inhale 2 puffs into the lungs every 6 hours as needed for Wheezing or Shortness of Breath  fluticasone (FLONASE) 50 MCG/ACT nasal spray, 1 spray by Each Nostril route daily as needed for Rhinitis  pantoprazole (PROTONIX) 40 MG tablet, Take 40 mg by mouth 2 times daily  atorvastatin (LIPITOR) 40 MG tablet, TAKE 1 TABLET DAILY  HYDROcodone-acetaminophen (NORCO) 5-325 MG per tablet, Take 1 tablet by mouth every 8 hours as needed for Pain for up to 30 days. Fill on/after 4/19/2021  HYDROcodone-acetaminophen (NORCO) 5-325 MG per tablet, Take 1 tablet by mouth every 8 hours as needed for Pain for up to 30 days.  Fill on/after 3/20/2021  HYDROcodone-acetaminophen (NORCO) 5-325 MG per tablet, Take 1 tablet by mouth every 8 hours as needed for Pain for up to 30 days. Fill on/after 2/20/21  insulin aspart (NOVOLOG FLEXPEN) 100 UNIT/ML injection pen, Sliding scale insulin coverage Glucose:Dose: If Less [id number] =No Insulin/ 140-199= 2 Units/ 200-249=4 Units/ 250-299= 6 Units/  300-349=8 Units/  350-400=10 Units/ Above 400 = 12 Units max 48 units daily  insulin glargine (LANTUS SOLOSTAR) 100 UNIT/ML injection pen, Inject 25 Units into the skin nightly  carvedilol (COREG) 25 MG tablet, 2 tablets BID  tacrolimus (PROGRAF) 1 MG capsule, Take 1 mg by mouth 7 CAPSULES EVERY MORNING AND 6 CAPSULES EVERY EVENING  nitroGLYCERIN (NITROSTAT) 0.4 MG SL tablet, Place 1 tablet under the tongue every 5 minutes as needed for Chest pain  lactulose (CHRONULAC) 10 GM/15ML solution, Take twice daily as needed for constipation  vitamin D (ERGOCALCIFEROL) 1.25 MG (22147 UT) CAPS capsule, TAKE 1 CAPSULE BY MOUTH EVERY 14 DAYS ON MONDAYS  NIFEdipine (ADALAT CC) 60 MG extended release tablet, Take 60 mg by mouth 2 times daily  Mycophenolate Sodium 360 MG TBEC, Take 360 mg by mouth 2 tablets BID  cloNIDine (CATAPRES) 0.3 MG tablet, Take 0.3 mg by mouth 3 times daily   docusate sodium (COLACE) 100 MG capsule, Take 200 mg by mouth 2 times daily  magnesium oxide (MAG-OX) 400 MG tablet, Take 400 mg by mouth 2 times daily  sulfamethoxazole-trimethoprim (BACTRIM DS;SEPTRA DS) 800-160 MG per tablet, Take 1 tablet by mouth daily  linaclotide (LINZESS) 290 MCG CAPS capsule, Take 145 mcg by mouth every other day   aspirin 81 MG EC tablet, Take 81 mg by mouth daily. Insulin Pen Needle (B-D ULTRAFINE III SHORT PEN) 31G X 8 MM MISC, Use three times daily Diagnosis Code E11.9    Allergies:    Pioglitazone, Actos [pioglitazone hydrochloride], Cymbalta [duloxetine hcl], and Gabapentin    Social History:    reports that she quit smoking about 11 years ago. Her smoking use included cigarettes. She started smoking about 41 years ago. She has a 45.00 pack-year smoking history.  She has never used smokeless tobacco. She reports that she does not drink alcohol or use drugs. Family History:   family history includes Alcohol Abuse in her father; Arthritis in her mother; Cancer in her sister; Diabetes in her brother, brother, father, sister, sister, sister, sister, and sister; Early Death in her sister; Heart Disease in her father, mother, sister, and sister; High Blood Pressure in her mother; Kidney Disease in her mother; Mental Illness in her mother; Obesity in her father; Stroke in her mother. REVIEW OF SYSTEMS:  Constitutional: negative for anorexia, chills and fevers,weight change  Skin: negative for new skin rash per patient  HEENT: negative for head trauma or new visual changes  Respiratory: negative for cough, hemoptysis, wheezing, BANUELOS, SOB   Cardiovascular: negative for  orthopnea, palpitations and syncope. Gastrointestinal: negative for abdominal pain,nausea , vomiting, constipation, diarrhea. Hematologic/lymphatic: negative for bruising,prolonged bleeding,blood clots  Musculoskeletal:negative for muscle weakness, myalgias,wasting  Neurological: negative for coordination problems, dizziness, gait problems and vertigo  Behavioral/Psych:negative for mood/sleep disturbance      PHYSICAL EXAM:   Vitals:  Patient Vitals for the past 24 hrs:   BP Temp Temp src Pulse Resp SpO2 Weight   03/03/21 1558 (!) 164/78 97.4 °F (36.3 °C) Oral 62 18 96 % --   03/03/21 1040 (!) 144/62 97.6 °F (36.4 °C) Oral 68 18 100 % --   03/03/21 0800 (!) 145/70 98.1 °F (36.7 °C) -- 64 20 99 % --   03/03/21 0330 (!) 142/64 98 °F (36.7 °C) Oral 62 18 98 % 180 lb 9.6 oz (81.9 kg)   03/02/21 2300 (!) 148/70 97.5 °F (36.4 °C) Oral 71 18 97 % --   03/02/21 2015 (!) 158/62 97.5 °F (36.4 °C) Oral 61 18 97 % --       Last 3 weights:    Wt Readings from Last 3 Encounters:   03/03/21 180 lb 9.6 oz (81.9 kg)   03/01/21 180 lb 12.8 oz (82 kg)   02/19/21 178 lb (80.7 kg)     24 hour intake/output:    Intake/Output Summary (Last 24 hours) at 3/3/2021 1715  Last Study Location: Bedside Technical Quality: Adequate visualization Indications:Dizziness and Shortness of breath. Additional Medical History:COPD, obesity, hypertension, CAD, hyperlipidemia, arthritis, former smoker, chronic kidney disease, diabetic Patient Status: Routine Height: 65 inches Weight: 181 pounds BSA: 1.9 m^2 BMI: 30.12 kg/m^2 BP: 200/88 mmHg  Conclusions   Summary  Ejection fraction was estimated at 55-60%. The aortic valve was trileaflet with increased thickness/calcification and  preserved cuspal separation. IVC size is within normal limits with normal respiratory phasic changes. Signature   ----------------------------------------------------------------  Electronically signed by Jannet Campbell MD (Interpreting  physician) on 03/02/2021 at 02:42 PM  ----------------------------------------------------------------   Findings   Mitral Valve  The mitral valve structure demonstrated posterior leaflet calcification  and restricted mobility. Aortic Valve  The aortic valve was trileaflet with increased thickness/calcification and  preserved cuspal separation. Tricuspid Valve  The tricuspid valve structure was normal with normal leaflet separation. Pulmonic Valve  The pulmonic valve leaflets were not well seen. Left Atrium  Left atrial size was normal.   Left Ventricle  Normal left ventricular size and systolic function. There were no regional wall motion abnormalities. Wall thickness was within normal limits. Ejection fraction was estimated at 55-60%. Right Atrium  Right atrial size was normal.   Right Ventricle  The right ventricular size was normal with normal systolic function and  wall thickness. Pericardial Effusion  The pericardium was normal in appearance with no evidence of a pericardial  effusion. Pleural Effusion  No evidence of pleural effusion. Aorta / Great Vessels  IVC size is within normal limits with normal respiratory phasic changes.   M-Mode/2D Measurements & Calculations   LV Diastolic    LV Systolic Dimension: 3.4   AV Cusp Separation: 1.1 cmLA  Dimension: 4.9  cm                           Dimension: 4 cmAO Root  cm              LV Volume Diastolic: 717 ml  Dimension: 3 cm  LV FS:30.6 %    LV Volume Systolic: 70.1 ml  LV PW           LV EDV/LV EDV Index: 478  Diastolic: 1.1  IN/87 X^0KN ESV/LV ESV  cm              Index: 47.4 ml/25 m^2        RV Diastolic Dimension: 2.6  Septum          EF Calculated: 58.1 %        cm  Diastolic: 1.1  cm                                           LA/Aorta: 1.33  [email or web address] [id number]    Ct Head Wo Contrast    Result Date: 3/2/2021  PROCEDURE: CT HEAD WO CONTRAST CLINICAL INFORMATION: fall.  headache, dizziness. COMPARISON: No prior study. TECHNIQUE: Noncontrast 5 mm axial images were obtained through the brain. Sagittal and coronal reconstructions were obtained. All CT scans at this facility use dose modulation, iterative reconstruction, and/or weight-based dosing when appropriate to reduce radiation dose to as low as reasonably achievable. FINDINGS: There is no hemorrhage. There are no intra-or extra-axial collections. There is no hydrocephalus, midline shift or mass effect. The gray-white matter differentiation is preserved. There is calcification in the cavernous segments of both internal carotid arteries. There is a cystic pineal gland with peripheral calcification. There is dural calcification. The paranasal sinuses and mastoid air cells are normally aerated. There is no suspicious calvarial abnormality. There is slightly increased density in the nasopharynx. This may represent enlarged adenoids. No evidence of an acute process. **This report has been created using voice recognition software. It may contain minor errors which are inherent in voice recognition technology. ** Final report electronically signed by DR Renetta Stack on 3/2/2021 10:59 AM    Ct Cervical Spine Wo Contrast    Result Date: 3/2/2021  PROCEDURE: CT CERVICAL SPINE WO CONTRAST CLINICAL INFORMATION: neck pain. COMPARISON: No prior study. TECHNIQUE: 3 mm noncontrast axial images were obtained through the cervical spine with sagittal and coronal reconstructions. All CT scans at this facility use dose modulation, iterative reconstruction, and/or weight-based dosing when appropriate to reduce radiation dose to as low as reasonably achievable. FINDINGS: There is straightening of the normal cervical lordosis. There is calcification along the anterior longitudinal ligament between C4 and C7. There are ventral osteophytes especially at C3-4 and T1. There is no fracture. There is no prevertebral soft tissue swelling. . At C1-2, there is normal alignment of the dens  relative to anterior arch of C1. At C2-3, there is no disc herniation, canal or foraminal stenosis. At C3-4, there is a 1.5 mm disc protrusion. This causes mild canal stenosis. There is mild to moderate bilateral foraminal stenosis. At C4-5, there is no disc herniation, canal or foraminal stenosis. At C5-C6, there is a small ventral and left-sided disc protrusion. This causes mild canal stenosis, moderate left and mild to moderate right-sided foraminal stenosis. At C6-7, there is no disc herniation, canal or foraminal stenosis. At C7-T1, there is mild canal and mild-to-moderate bilateral foraminal stenosis. There is bilateral carotid artery calcification. There are no suspicious findings in the lung apices. 1. Straightening of the normal cervical lordosis with no acute fracture. 2. There is mild canal, moderate left and mild-to-moderate right centimeter from the C5-C6.  3. Small canal and multiple report from the C3-4 and C7-T1. 4. There are ventral osteophytes especially at T1 and at C3-4. 5. There is calcification along the anterior longitudinal ligament between C4 and C7. 6. There is bilateral carotid artery calcification. 7. Otherwise negative CT scan of the cervical spine. . **This report has been created using voice recognition software. It may contain minor errors which are inherent in voice recognition technology. ** Final report electronically signed by DR Oriana Brandt on 3/2/2021 11:07 AM    Xr Shoulder Left (min 2 Views)    Result Date: 3/1/2021  PROCEDURE: XR SHOULDER LEFT (MIN 2 VIEWS) CLINICAL INFORMATION: radiculopathy. Shoulder pain. COMPARISON: No prior study. TECHNIQUE: 3 standard views of the left shoulder were obtained FINDINGS: No fracture or dislocation is seen. The glenohumeral joint is well-maintained. There appears be moderate spurring of the scapular glenoid. There is also mild spurring along the inferior aspect of the acromion process and there is a small calcification adjacent to the greater tuberosity, consistent with calcific tendinitis. Mild degenerative spurring of the shoulder. Calcific tendinitis. Cannot exclude impingement syndrome. **This report has been created using voice recognition software. It may contain minor errors which are inherent in voice recognition technology. ** Final report electronically signed by Dr. Jessica Campos on 3/1/2021 6:00 PM      LABS:  Recent Labs     03/01/21  1741 03/01/21  2245 03/02/21  0155   TROPONINT 0.015* < 0.010 < 0.010     CBC:   Lab Results   Component Value Date    WBC 8.1 03/02/2021    RBC 3.58 03/02/2021    RBC 3-5 09/06/2011    HGB 10.1 03/03/2021    HCT 35.0 03/03/2021    MCV 90.5 03/02/2021    MCH 27.7 03/02/2021    MCHC 30.6 03/02/2021    RDW 15.2 04/26/2017     03/02/2021    MPV 10.7 03/02/2021     BMP:    Lab Results   Component Value Date     03/03/2021    K 3.9 03/03/2021    K 3.3 03/02/2021     03/03/2021    CO2 24 03/03/2021    BUN 20 03/03/2021    LABALBU 4.3 03/01/2021    LABALBU 4.8 01/03/2012    CREATININE 1.2 03/03/2021    CALCIUM 10.1 03/03/2021    LABGLOM 54 03/03/2021    GLUCOSE 156 03/03/2021 dietary causes 6/21/2018    Kidney stones 3/2014    Kidney trouble          MRSA infection 03/2017    right foot-Dr. Kamlesh Encinas (podiatrist)    Neuromuscular disorder [facility])     Neuropathy 1989    diabetic neuropathy    Obesity since childhoood    CONTRERAS on CPAP 2010    Dr. Terrie Duenas    Pneumonia     PONV (postoperative nausea and vomiting)     Seizures ([facility]     Sepsis due to Escherichia coli ([facility] 07/2020   The patient was admitted to the hospital on 3/1/2021. For two weeks prior to admission, the patient was experiencing left arm pain that radiates to her forearm. EKG 3/2/2021 revealed NSR. Troponin 0.015, <0.01, <0.01. She has h/o renal transplant, Cr today was 1.2. She has chronic anemia, Hgb 10.1. Echo 3/2/2021 revealed EF 55%, no RWMA. Stress test today was negative for ischemia, report copied below. After stress test, patient reported recurrent arm pain. Cardiology consult was requested. Per chart review, the patient had LHC at [facility] 10/2019, only non-obstructive CAD was reported. The patient has been treated for HTN urgency, uncontrolled HTN during this admission. Her BP at time of admission was 220/90. BP improved, SBP still >150. She has occasional neck pain, dizziness. Patient denies chest pain, shortness of breath, dyspnea on exertion, orthopnea, paroxysmal nocturnal dyspnea, palpitations, syncope, recent weight gain or leg swelling. 1. ?Chest pain, atypical  2. Arm pain  3. Elevated troponin   4. Nonobstructive CAD (Cath 10/2019)   5. Hypertensive urgency  6. S/P Kidney transplantation   7. CKD  8. DM  9. Dyslipidemia   10. GERD  11.  Chronic anemia     RECOMMENDATIONS:   She denies chest pain   Her left arm recurred after stress test   Elevated troponin was minimal, trended down to normal   EKG without ischemic changes    Echo 3/2/2021 revealed EF 55%, no RWMA   Stress test today was negative for ischemia   No clinical evidence for ACS   LHC 10/2019 revealed nonobstructive CAD   Recommend aggressive risk factor modification   ASA   Statin   Needs better BP control as planned by primary team    Although less likely, in setting of HTN urgency and uncontrolled HTN, would benefit from CTA to r/r dissection. Will need to pre-hydrate. D/W Dr Stevie Ruelas Telemetry     Above findings and plan of care were discussed with patient, questions were answered, agreeable to plan. Thank you for allowing me to participate in the care of this patient. Please let me know if I can be of any further assistance.       Marian Golden MD, Jeimy Paul   5:15 PM  3/3/2021

## 2021-03-03 NOTE — PROGRESS NOTES
[facility]  INPATIENT PHYSICAL THERAPY  EVALUATION  [facility] RENAL TELEMETRY 6K - 6K-21/021-A    Time In: 8196  Time Out: 1138  Timed Code Treatment Minutes: 0 Minutes  Minutes: 16          Date: 3/3/2021  Patient Name: Fawad Gould,  Gender:  female        MRN: [id number]  : 1955  (77 y.o.)      Referring Practitioner: Shelton Erickson MD  Diagnosis: arm pain  Additional Pertinent Hx: Per HPI: \"71 y.o. female who presented to [facility] with left arm pain. Patient presented to the emergency room for evaluation of left arm pain. She had over the past 2 weeks. Pain will start at left shoulder area will radiate all the way down to forearm. Pain described as achy mild to moderate in severity. No numbness, tingling or weakness. Denies chest pain, shortness of breath, fever, chills, cough, nausea vomiting. She has good appetite. Patient denies any recent trauma. Patient checks her blood pressure at home twice daily. On occasions blood pressure go up to 529 systolic. At the time of my evaluation blood pressure was 220/90. \"     Restrictions/Precautions:  Restrictions/Precautions: Fall Risk, General Precautions    Vitals: Vitals not assessed per clinical judgement, see nursing flowsheet    Subjective:  Chart Reviewed: Yes  Patient assessed for rehabilitation services?: Yes  Family / Caregiver Present: No  Subjective: RN approved PT evaluation. Pt very pleasant and agreeable to therapy, and motivated to take a walk. She states she just returned from her stress test and she feels like she has a lot of energy. General:  Follows Commands: Within Functional Limits    Vision: Impaired  Vision Exceptions: Wears glasses at all times    Hearing: Within functional limits    Pain: pt states her arm pain did return following stress test, intermittent in nature, though she does say sleeping in certain positions can cause it as well.  She denies numbness/tingling, stating she never gets those symptoms. Social/Functional History:    Lives With: Alone  Type of Home: (condo)  Home Layout: One level  Home Access: Stairs to enter with rails  Entrance Stairs - Number of Steps: 1  Home Equipment: 4 wheeled walker     Receives Help From: Home health(home health RN--blood draws)     Ambulation Assistance: Independent  Transfer Assistance: Independent    Active : Yes     Additional Comments: had home health PT after falling    OBJECTIVE:  Range of Motion:  Bilateral Lower Extremity: WFL    Strength:  Bilateral Lower Extremity: 5/5 GMT    Balance:  Static Sitting Balance:  Independent   Dynamic Sitting Balance: Independent  Static Standing Balance: Independent  Dynamic Standing Balance: Supervision, progressing to CGA with fatigue     Bed Mobility:  Not Tested    Transfers:  Sit to Stand: Modified Independent, use of UEs to stand  Stand to Sit:Modified Independent    Ambulation:  Independent, Supervision, Stand By Assistance, Contact Guard Assistance--with increased distance, pt became slightly unsteady. For short distances (within room) she is steady and independent. Distance: 450'  Surface: Level Tile  Device:gait belt  Gait Deviations:  Decreased L foot clearance for last 50' of walk due to fatigue, slightly unsteady, occasionally reaching for rail in hallway    Functional Outcome Measures: Completed  AM-PAC Inpatient Mobility Raw Score : 22  AM-PAC Inpatient T-Scale Score : 53.28    ASSESSMENT:  Activity Tolerance:  Patient tolerance of  treatment: fair. Pt tolerated very well until end of session when she stated she was fatigued. She admits this was a long walk for her. Treatment Initiated: No treatment initiated    Assessment:  Assessment: Pt was able to safely perform all transfers and ambulation this date. Acute PT is not indicated.  However, if it is determined that pt's arm pain is originating from radicular pathology in the cervical spine (vs cardiac related) she would benefit from outpatient physical therapy to resolve symptoms in  L UE. Prognosis: Good    REQUIRES PT FOLLOW UP: No    Discharge Recommendations:  Discharge Recommendations: Home independently, Outpatient PT (if cause of arm pain is orthopedic or radicular in nature)    Patient Education:  PT Education: Goals, PT Role, Plan of Care, General Safety  Patient Education: benefits of outpatient PT if her arm pain is originating from the C-spine    Equipment Recommendations:  Equipment Needed: No    Plan:  Times per week: N/A    Goals:  Patient goals : find out what is causing arm pain  Short term goals  Time Frame for Short term goals: N/A due to pt is safe to return home/no acute PT goals identified  Long term goals  Time Frame for Long term goals : N/A due to pt is safe to return home/no acute PT goals identified    Following session, patient left in safe position with all fall risk precautions in place. Pt up in chair. Nurse entering room.     Troy Santizo, PT, DPT

## 2021-03-03 NOTE — CONSULTS
Orthopedic Surgery      Orthopaedic Attending Note  Attempted to see patient, she is down getting a stress test.   X-rays of the  Left shoulder reviewed and show no fracture or dislocation. Degenerative spurring. Will attempt to see later today, however, if she is ready for DC she can FU outpatient for evaluation this week or next week, unless there is concern for septic arthritis.    Electronically signed by DONOVAN Flores CNP on 3/3/2021 at 9:17 AM'

## 2021-03-03 NOTE — PROGRESS NOTES
Collaborative Care Pharmacist:    Pt reports only medication she has difficulty affording Linzess prescribed by Dr Kylah Serrano. Pt is not eligible for discount card from drug company due to Medicare. Pt reports she takes every other day and this works for her. I did advise patient to contact Dr Kylah Serrano office, they may have drug samples available or alternate therapy.       Navdeep Jain PharmD 3/3/2021 10:55 AM

## 2021-03-03 NOTE — CARE COORDINATION
3/3/21, 8:25 AM EST    DISCHARGE ON GOING EVALUATION    Carola Joel day: 0  Location: 6K-21/021-A Reason for admit: Arm pain [M79.603]   Barriers to Discharge: Await ortho consult. Anticipate discharge after  PCP: DONOVAN Robledo - CNP   %  Patient Goals/Plan/Treatment Preferences: Home alone with [facility]. MARTA following.

## 2021-03-03 NOTE — CONSULTS
Orthopedic Consult    Requesting Physician: Dr. Brandon Newman:  Left shoulder pain    HISTORY OF PRESENT ILLNESS:      The patient is a 77 y.o. female who ortho was asked to evaluate for the above noted complaint. Patient currently admitted to medicine for left arm pain. She is undergoing cardiac testing. She is a right hand dominant female who states she has had a history of left shoulder and arm pain for some time. She is followed by pain management for lumbar spine and right ankle pain. She denies injury. She notes a history of OA in multiple joints. She denies numbness and tingling. She states the pain starts in the neck and radiates into the back of the shoulder and down the arm. She denies weakness in the arm. Xrays and CT scans reviewed.        Past Medical History:    Past Medical History:   Diagnosis Date    Anemia associated with chronic renal failure     Aranesp 150 micrograms every other week given at [facility]. [facility]    Anxiety     Arthritis     Backache     Blood circulation, collateral     Blood transfusion     CAD (coronary artery disease)     Cellulitis in diabetic foot ([address] 03/03/2017    4th and 5th toes right foot    Chest pain     History of    CHF (congestive heart failure) ([address] 1998    Dx'ed by Dr. Schuyler Andrade Chronic anemia     Chronic kidney disease     Chronic kidney disease, stage III (moderate)     Chronic renal insufficiency 2010    COPD [facility]chronic obstructive pulmonary disease) ([facility]) 2012    Dr. 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 Val Cheng    HTN (hypertension) 1970's    Hyperlipemia 1998    Iron deficiency anemia due to dietary causes 6/21/2018    Kidney stones 3/2014    Kidney trouble Nii Pak, DO   7 mg at 03/03/21 1142    tacrolimus (PROGRAF) capsule 6 mg  6 mg Oral Nightly Nii Pak, DO   6 mg at 03/02/21 2030    hydrALAZINE (APRESOLINE) injection 10 mg  10 mg Intravenous Q4H PRN Cherry Duong MD   10 mg at 03/02/21 1224    albuterol sulfate  (90 Base) MCG/ACT inhaler 2 puff  2 puff Inhalation Q6H PRN Helen Helton MD        atorvastatin (LIPITOR) tablet 40 mg  40 mg Oral Daily Helen Helton MD   40 mg at 03/02/21 2030    carvedilol (COREG) tablet 25 mg  25 mg Oral BID  Heeln Helton MD   25 mg at 03/03/21 1142    cloNIDine (CATAPRES) tablet 0.3 mg  0.3 mg Oral TID Cherry Duong MD   0.3 mg at 03/03/21 1142    docusate sodium (COLACE) capsule 200 mg  200 mg Oral BID Helen Helton MD   200 mg at 03/03/21 1142    fluticasone (FLONASE) 50 MCG/ACT nasal spray 1 spray  1 spray Each Nostril Daily PRN Helen Helton MD        HYDROcodone-acetaminophen (NORCO) 5-325 MG per tablet 1 tablet  1 tablet Oral Q8H PRN Helen Helton MD   1 tablet at 03/02/21 2030    insulin glargine (LANTUS) injection vial 25 Units  25 Units Subcutaneous Nightly Helen Helton MD   25 Units at 03/02/21 2312    lactulose (CHRONULAC) 10 GM/15ML solution 20 g  20 g Oral BID PRN Helen Helton MD        magnesium oxide (MAG-OX) tablet 400 mg  400 mg Oral BID Helen Helton MD   400 mg at 03/03/21 1142    mycophenolate (MYFORTIC) DR tablet 360 mg  360 mg Oral BID Helen Helton MD   360 mg at 03/03/21 1142    NIFEdipine (PROCARDIA XL) extended release tablet 60 mg  60 mg Oral BID Cherry Duong MD   60 mg at 03/03/21 1142    nitroGLYCERIN (NITROSTAT) SL tablet 0.4 mg  0.4 mg Sublingual Q5 Min PRN Helen Helton MD        pantoprazole (PROTONIX) tablet 40 mg  40 mg Oral BID Helen Helton MD   40 mg at 03/03/21 7992    sulfamethoxazole-trimethoprim (BACTRIM DS;SEPTRA DS) 800-160 MG per tablet 1 tablet  1 tablet Oral Daily Helen Helton MD   1 tablet at 03/03/21 1142    tiotropium (SPIRIVA RESPIMAT) 2.5 MCG/ACT inhaler 2 puff  2 puff Inhalation Daily Ansley Loredo MD   2 puff at 03/02/21 0915    [START ON 3/15/2021] vitamin D (ERGOCALCIFEROL) capsule 50,000 Units  50,000 Units Oral Q14 Days Ansley Loredo MD        sodium chloride flush 0.9 % injection 10 mL  10 mL Intravenous 2 times per day Ansley Loredo MD   10 mL at 03/03/21 1142    sodium chloride flush 0.9 % injection 10 mL  10 mL Intravenous PRN Ansley Loredo MD        promethazine (PHENERGAN) tablet 12.5 mg  12.5 mg Oral Q6H PRN Ansley Loredo MD        Or    ondansetron (ZOFRAN) injection 4 mg  4 mg Intravenous Q6H PRN Ansley Loredo MD        acetaminophen (TYLENOL) tablet 650 mg  650 mg Oral Q6H PRN Ansley Loredo MD        Or   [place] acetaminophen (TYLENOL) suppository 650 mg  650 mg Rectal Q6H PRN Ansley Loredo MD        polyethylene glycol (GLYCOLAX) packet 17 g  17 g Oral Daily PRN Ansley Loredo MD        aspirin chewable tablet 81 mg  81 mg Oral Daily Ansley Loredo MD   81 mg at 03/03/21 1142    potassium chloride (KLOR-CON M) extended release tablet 40 mEq  40 mEq Oral PRN Ansley Loredo MD   40 mEq at 03/02/21 0402    Or    potassium bicarb-citric acid (EFFER-K) effervescent tablet 40 mEq  40 mEq Oral PRN Ansley Loredo MD        Or    potassium chloride 10 mEq/100 mL IVPB (Peripheral Line)  10 mEq Intravenous PRN Ansley Loredo MD        enoxaparin (LOVENOX) injection 40 mg  40 mg Subcutaneous Daily Ansley Loredo MD   40 mg at 03/03/21 1142    insulin lispro (HUMALOG) injection vial 0-12 Units  0-12 Units Subcutaneous TID WC Ansley Loredo MD   4 Units at 03/02/21 1642    insulin lispro (HUMALOG) injection vial 0-6 Units  0-6 Units Subcutaneous Nightly Ansley Loredo MD   1 Units at 03/02/21 2033    glucose (GLUTOSE) 40 % oral gel 15 g  15 g Oral PRN Ansley Loredo MD        dextrose 50 % IV solution  12.5 g Intravenous PRN Ansley Loredo MD        glucagon (rDNA) injection 1 mg  1 mg Intramuscular PRN Jass Arriaga MD        dextrose 5 % solution  100 mL/hr Intravenous PRN Jass Arriaga MD           Allergies:  Pioglitazone, Actos [pioglitazone hydrochloride], Cymbalta [duloxetine hcl], and Gabapentin    Social History:   Social History     Tobacco Use   Smoking Status Former Smoker    Packs/day: 1.50    Years: 30.00    Pack years: 45.00    Types: Cigarettes    Start date: 1979   Yeyo Rg Quit date: 3/13/2009    Years since quittin.9   Smokeless Tobacco Never Used   Tobacco Comment    quit      Social History     Substance and Sexual Activity   Alcohol Use No    Alcohol/week: 0.0 standard drinks     Social History     Substance and Sexual Activity   Drug Use No       Family History:  Family History   Problem Relation Age of Onset    Diabetes Sister     Diabetes Brother     Diabetes Sister     Diabetes Sister     Cancer Sister     Early Death Sister     Heart Disease Sister     Diabetes Sister     Heart Disease Sister     Diabetes Sister     Diabetes Brother     Arthritis Mother     Heart Disease Mother     High Blood Pressure Mother     Kidney Disease Mother     Mental Illness Mother     Stroke Mother     Heart Disease Father     Diabetes Father     Obesity Father     Alcohol Abuse Father        REVIEW OF SYSTEMS:  Constitutional: Denies any fever, chills. Derm: Denies any rash or skin color change. Eyes: Denies blurred or decreased in vision. Ent: Denies any tinnitus or vertigo. Resp: Denies any cough or shortness of breath. CV: Denies any syncope, palpitations or chest pain. GI:  Denies any abdominal pain, nausea, vomiting, constipation or diarrhea. : Denies any hematuria, hesitancy, or dysuria. Heme/Lymph: Denies any bleeding. Musculoskeletal: Positive for left shoulder pain  Neuro: Denies any dizziness, paresthesia or weakness.     PHYSICAL EXAM:  Patient Vitals for the past 24 hrs:   BP Temp Temp src Pulse Resp SpO2 Weight   03/03/21 1040 (!) 144/62 97.6 °F (36.4 °C) Oral 68 18 100 % --   03/03/21 0800 (!) 145/70 98.1 °F (36.7 °C) -- 64 20 99 % --   03/03/21 0330 (!) 142/64 98 °F (36.7 °C) Oral 62 18 98 % 180 lb 9.6 oz (81.9 kg)   03/02/21 2300 (!) 148/70 97.5 °F (36.4 °C) Oral 71 18 97 % --   03/02/21 2015 (!) 158/62 97.5 °F (36.4 °C) Oral 61 18 97 % --   03/02/21 1624 (!) 146/66 -- -- -- -- -- --   03/02/21 1504 (!) 182/78 98 °F (36.7 °C) Oral 65 16 98 % --   03/02/21 1421 (!) 182/73 -- -- -- -- -- --     General appearance:  Alert and oriented x 3. No apparent distress, appears stated age and cooperative. HEENT:  Normal cephalic, atraumatic without obvious deformity. Conjunctivae/corneas clear. Neck: Supple, with full range of motion. Respiratory:  Normal respiratory effort. No audible Wheezes or Rhonchi. Cardiovascular:  Regular rate and rhythm. Abdomen: Soft, non-tender, non-distended. Musculoskeletal: LUE skin intact. No swelling or ecchymosis noted. Mild TTP trapezius and biceps groove. Full stable pain free /90/L3. Negative Jobes and Chicas, Neer, and O'Briens. Positive for pain and weakness speeds. Radial pulse 2+. SILT  Skin: Skin color, texture, turgor normal.  No rashes or lesions. Neurologic:  Neurovascularly intact without any focal sensory/motor deficits. Sensation intact. DATA:  CBC:   Lab Results   Component Value Date    WBC 8.1 03/02/2021    HGB 10.1 03/03/2021     03/02/2021     BMP:    Lab Results   Component Value Date     03/03/2021    K 3.9 03/03/2021    K 3.3 03/02/2021     03/03/2021    CO2 24 03/03/2021    BUN 20 03/03/2021    CREATININE 1.2 03/03/2021    CALCIUM 10.1 03/03/2021    GLUCOSE 156 03/03/2021    GLUCOSE 252 03/14/2012     PT/INR:    Lab Results   Component Value Date    INR 1.28 08/02/2020     Troponin:  No results found for: TROPONINI  No results for input(s): LIPASE, AMYLASE in the last 72 hours.   Recent Labs     03/01/21  1741   AST 17 trileaflet with increased thickness/calcification and  preserved cuspal separation. Tricuspid Valve  The tricuspid valve structure was normal with normal leaflet separation. Pulmonic Valve  The pulmonic valve leaflets were not well seen. Left Atrium  Left atrial size was normal.   Left Ventricle  Normal left ventricular size and systolic function. There were no regional wall motion abnormalities. Wall thickness was within normal limits. Ejection fraction was estimated at 55-60%. Right Atrium  Right atrial size was normal.   Right Ventricle  The right ventricular size was normal with normal systolic function and  wall thickness. Pericardial Effusion  The pericardium was normal in appearance with no evidence of a pericardial  effusion. Pleural Effusion  No evidence of pleural effusion. Aorta / Great Vessels  IVC size is within normal limits with normal respiratory phasic changes. M-Mode/2D Measurements & Calculations   LV Diastolic    LV Systolic Dimension: 3.4   AV Cusp Separation: 1.1 cmLA  Dimension: 4.9  cm                           Dimension: 4 cmAO Root  cm              LV Volume Diastolic: 670 ml  Dimension: 3 cm  LV FS:30.6 %    LV Volume Systolic: 95.5 ml  LV PW           LV EDV/LV EDV Index: 705  Diastolic: 1.1  LP/86 I^0EJ ESV/LV ESV  cm              Index: 47.4 ml/25 m^2        RV Diastolic Dimension: 2.6  Septum          EF Calculated: 58.1 %        cm  Diastolic: 1.1  cm                                           LA/Aorta: 1.33  [email or web address] [id number]    Xr Lumbar Spine (2-3 Views)    Result Date: 2/24/2021  LUMBAR SPINE 2 VIEWS: CLINICAL INFORMATION: Lumbar spondylosis COMPARISON: 11/20/2017 TECHNIQUE: Standard AP and lateral views of lumbar spine were obtained. 1. There appears be sacralization of L5. Moderate degenerative facet arthropathy lower 2 lumbar levels. Moderate vertebral body spondylosis scattered in the lumbar spine. 2. No fracture is seen. Disc spaces well-maintained. Sacroiliac joints unremarkable. No appreciable change from prior study. 3. Vascular clips in the pelvis from prior surgery. **This report has been created using voice recognition software. It may contain minor errors which are inherent in voice recognition technology. ** Final report electronically signed by Dr. Stacey Lua on 2/24/2021 12:17 PM    Xr Ankle Right (min 3 Views)    Result Date: 2/24/2021  PROCEDURE: XR ANKLE RIGHT (MIN 3 VIEWS) CLINICAL INFORMATION: Chronic pain of right ankle, Chronic pain of right ankle . COMPARISON: 2/3/2014 TECHNIQUE: 3 projections FINDINGS: Open reduction internal fixation bimalleolar fracture. 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 Seen with ossification along the interosseous membrane. There is lucency around the mid fibular screw. Mild heterotopic ossification adjacent to the medial tibiotalar joint. Subchondral cyst formation is present involving both the tibial plafond and the talus. There is calcaneal spurring. Degenerative changes throughout the midfoot. Soft tissue swelling around the lateral malleolus. Stents of chronic changes detailed above report. No acute abnormality. **This report has been created using voice recognition software. It may contain minor errors which are inherent in voice recognition technology. ** Final report electronically signed by Dr. Brent Orona on 2/24/2021 1:50 PM    Ct Head Wo Contrast    Result Date: 3/2/2021  PROCEDURE: CT HEAD WO CONTRAST CLINICAL INFORMATION: fall.  headache, dizziness. COMPARISON: No prior study. TECHNIQUE: Noncontrast 5 mm axial images were obtained through the brain. Sagittal and coronal reconstructions were obtained.  All CT scans at this facility use dose modulation, iterative reconstruction, and/or weight-based dosing when appropriate to reduce radiation dose to as low as reasonably achievable. FINDINGS: There is no hemorrhage. There are no intra-or extra-axial collections. There is no hydrocephalus, midline shift or mass effect. The gray-white matter differentiation is preserved. There is calcification in the cavernous segments of both internal carotid arteries. There is a cystic pineal gland with peripheral calcification. There is dural calcification. The paranasal sinuses and mastoid air cells are normally aerated. There is no suspicious calvarial abnormality. There is slightly increased density in the nasopharynx. This may represent enlarged adenoids. No evidence of an acute process. **This report has been created using voice recognition software. It may contain minor errors which are inherent in voice recognition technology. ** Final report electronically signed by DR Michelle Reyna on 3/2/2021 10:59 AM    Ct Cervical Spine Wo Contrast    Result Date: 3/2/2021  PROCEDURE: CT CERVICAL SPINE WO CONTRAST CLINICAL INFORMATION: neck pain. COMPARISON: No prior study. TECHNIQUE: 3 mm noncontrast axial images were obtained through the cervical spine with sagittal and coronal reconstructions. All CT scans at this facility use dose modulation, iterative reconstruction, and/or weight-based dosing when appropriate to reduce radiation dose to as low as reasonably achievable. FINDINGS: There is straightening of the normal cervical lordosis. There is calcification along the anterior longitudinal ligament between C4 and C7. There are ventral osteophytes especially at C3-4 and T1. There is no fracture. There is no prevertebral soft tissue swelling. . At C1-2, there is normal alignment of the dens  relative to anterior arch of C1. At C2-3, there is no disc herniation, canal or foraminal stenosis. At C3-4, there is a 1.5 mm disc protrusion. This causes mild canal stenosis. There is mild to moderate bilateral foraminal stenosis. At C4-5, there is no disc herniation, canal or foraminal stenosis.  At technology. ** Final report electronically signed by Dr. Stacia Remy on 3/1/2021 6:00 PM    Ct Lung Screen (annual)    Result Date: 2/24/2021  NONCONTRAST SCREENING CT CHEST: HISTORY: History of smoking. 45 pack year history of smoking. Quit 12 years ago. TECHNIQUE: 1 mm axial imaging of the chest and upper abdomen without IV contrast. All CT scans at this facility use dose modulation, iterative reconstruction, and/or weight based dosing when appropriate to reduce the radiation dose to as low as reasonably achievable. COMPARISON: 12/16/2019 and 11/30/2018. FINDINGS: LUNGS NODULES: 1. There is a 7 mm noncalcified nodule in the lingular region of the left lung, unchanged compared to 12/16/2019. This area was obscured in 2018. 2. There is a stable 3 mm pleural-based nodule at the lateral aspect of the right upper lobe, unchanged compared to prior exams. 3. There is a calcified nodule in the superior aspect of the left lower lobe, stable compared to prior exams. LYMPHADENOPATHY: 1. There are no pathologically enlarged lymph nodes. OTHER (LUNGS/MEDIASTINUM/MUSCULOSKELETAL/ABDOMEN): 1. There is coronary artery calcification. 1. Stable 7 mm noncalcified nodule in the lingular region of the left lung. 2. There are no pathologically enlarged lymph nodes. 3. LUNGRADS ASSESSMENT VALUE: 2, The LUNG RADS RECOMMENDATIONS for monitoring lung nodules listed below (ACR- Lung-RADS Version 1.0 Assessment Categories Release date\" April 28, 2014) LUNG RADS RECOMMENDATIONS; 1.  Normal, continue annual screening 2. Benign appearance or behavior, continue annual screening 3.  6 month CT recommended 4A.  3 month CT recommended; may consider PET/CT 4B. Additional diagnostics and/or tissue sampling recommended 4X. Additional diagnostics and/or tissue sampling recommended  **This report has been created using voice recognition software. It may contain minor errors which are inherent in voice recognition technology. ** Final report electronically signed by Dr. Chloe Dillard MD on 2/24/2021 3:39 PM    [facility] Digital Screen Self Referral W Or Wo Cad Bilateral    Result Date: 2/2/2021  IMPRESSION: Mammogram BI-RADS: Category 2: Benign There is no mammographic evidence of malignancy. A 1 year screening mammogram is recommended. The patient has been entered into our database and they will receive a notification when they are due for their next exam.  The patient was notified of the results. #[id number] - [facility] DIGITAL SCREEN SELF REFERRAL W OR WO CAD BILATERAL BILATERAL DIGITAL SCREENING MAMMOGRAM 3D/2D WITH CAD: 2/2/2021 CLINICAL: Tomosynthesis. Self referred screening mammogram.  Comparison is made to exams dated:  2/3/2020 mammogram and 1/16/2019 mammogram - [facility]. There are scattered fibroglandular elements in both breasts that could obscure a lesion on mammography. Current study was also evaluated with a Computer Aided Detection (CAD) system. There are benign vascular calcifications both breasts. There also are benign scattered calcifications both breasts. No significant masses, calcifications, or other findings are seen  in either breast.  There has been no significant interval change. Jazz Santana M.D.          rd/penrad:2/2/2021 16:33:53  Imaging Technologist: Miranda Jackson, [facility] letter sent: Normal-All Doctor Names  [id number]       ASSESSMENT:Active Problems:    COPD without exacerbation ([address]    Elevated troponin    Left arm pain    Hypertensive urgency    Orthostatic hypotension    Hx of fall    Hx of coronary artery disease    History of end stage renal disease    Cervical stenosis of spinal canal    Hx of gastroesophageal reflux (GERD)    Other chronic pain  Resolved Problems:    * No resolved hospital problems. *       PLAN as discussed with Dr. Miesha Perkins:  -conservative management of left shoulder pain.  She can FU with [facility] in one week to discuss US guided cortisone injections into the shoulder.   -recommend PT/OT  -continue pain management treatment plan  -ice or heat to the shoulder.        Electronically signed by DONOVAN Cruz CNP on 3/3/2021 at 12:44 PM

## 2021-03-04 VITALS
RESPIRATION RATE: 16 BRPM | OXYGEN SATURATION: 97 % | DIASTOLIC BLOOD PRESSURE: 65 MMHG | SYSTOLIC BLOOD PRESSURE: 145 MMHG | TEMPERATURE: 97.8 F | HEIGHT: 65 IN | BODY MASS INDEX: 30.22 KG/M2 | HEART RATE: 60 BPM | WEIGHT: 181.4 LBS

## 2021-03-04 PROBLEM — M79.603 ARM PAIN: Status: ACTIVE | Noted: 2021-03-04

## 2021-03-04 LAB
GLUCOSE BLD-MCNC: 124 MG/DL (ref 70–108)
GLUCOSE BLD-MCNC: 220 MG/DL (ref 70–108)

## 2021-03-04 PROCEDURE — 99239 HOSP IP/OBS DSCHRG MGMT >30: CPT | Performed by: FAMILY MEDICINE

## 2021-03-04 PROCEDURE — 6360000002 HC RX W HCPCS: Performed by: INTERNAL MEDICINE

## 2021-03-04 PROCEDURE — 97110 THERAPEUTIC EXERCISES: CPT

## 2021-03-04 PROCEDURE — 94640 AIRWAY INHALATION TREATMENT: CPT

## 2021-03-04 PROCEDURE — 6360000002 HC RX W HCPCS: Performed by: STUDENT IN AN ORGANIZED HEALTH CARE EDUCATION/TRAINING PROGRAM

## 2021-03-04 PROCEDURE — 94760 N-INVAS EAR/PLS OXIMETRY 1: CPT

## 2021-03-04 PROCEDURE — 82948 REAGENT STRIP/BLOOD GLUCOSE: CPT

## 2021-03-04 PROCEDURE — 2580000003 HC RX 258: Performed by: INTERNAL MEDICINE

## 2021-03-04 PROCEDURE — 97165 OT EVAL LOW COMPLEX 30 MIN: CPT

## 2021-03-04 PROCEDURE — 6370000000 HC RX 637 (ALT 250 FOR IP): Performed by: INTERNAL MEDICINE

## 2021-03-04 PROCEDURE — 1200000003 HC TELEMETRY R&B

## 2021-03-04 PROCEDURE — 6370000000 HC RX 637 (ALT 250 FOR IP): Performed by: FAMILY MEDICINE

## 2021-03-04 RX ADMIN — MYCOPHENOLIC ACID 360 MG: 180 TABLET, DELAYED RELEASE ORAL at 08:29

## 2021-03-04 RX ADMIN — CARVEDILOL 25 MG: 25 TABLET, FILM COATED ORAL at 08:29

## 2021-03-04 RX ADMIN — CLONIDINE HYDROCHLORIDE 0.3 MG: 0.2 TABLET ORAL at 13:16

## 2021-03-04 RX ADMIN — HYDROCODONE BITARTRATE AND ACETAMINOPHEN 1 TABLET: 5; 325 TABLET ORAL at 08:34

## 2021-03-04 RX ADMIN — CLONIDINE HYDROCHLORIDE 0.3 MG: 0.2 TABLET ORAL at 08:28

## 2021-03-04 RX ADMIN — TIOTROPIUM BROMIDE INHALATION SPRAY 2 PUFF: 3.12 SPRAY, METERED RESPIRATORY (INHALATION) at 07:57

## 2021-03-04 RX ADMIN — PANTOPRAZOLE SODIUM 40 MG: 40 TABLET, DELAYED RELEASE ORAL at 05:36

## 2021-03-04 RX ADMIN — ENOXAPARIN SODIUM 40 MG: 40 INJECTION, SOLUTION INTRAVENOUS; SUBCUTANEOUS at 08:29

## 2021-03-04 RX ADMIN — NIFEDIPINE 60 MG: 60 TABLET, FILM COATED, EXTENDED RELEASE ORAL at 08:29

## 2021-03-04 RX ADMIN — ASPIRIN 81 MG: 81 TABLET, CHEWABLE ORAL at 08:29

## 2021-03-04 RX ADMIN — SODIUM CHLORIDE, PRESERVATIVE FREE 10 ML: 5 INJECTION INTRAVENOUS at 08:30

## 2021-03-04 RX ADMIN — TACROLIMUS 7 MG: 1 CAPSULE ORAL at 08:31

## 2021-03-04 RX ADMIN — SULFAMETHOXAZOLE AND TRIMETHOPRIM 1 TABLET: 800; 160 TABLET ORAL at 08:28

## 2021-03-04 RX ADMIN — DOCUSATE SODIUM 200 MG: 100 CAPSULE, LIQUID FILLED ORAL at 08:29

## 2021-03-04 RX ADMIN — MAGNESIUM GLUCONATE 500 MG ORAL TABLET 400 MG: 500 TABLET ORAL at 08:28

## 2021-03-04 ASSESSMENT — PAIN DESCRIPTION - ORIENTATION: ORIENTATION: LEFT

## 2021-03-04 ASSESSMENT — PAIN SCALES - GENERAL: PAINLEVEL_OUTOF10: 7

## 2021-03-04 ASSESSMENT — PAIN DESCRIPTION - PAIN TYPE: TYPE: ACUTE PAIN

## 2021-03-04 NOTE — CARE COORDINATION
3/4/21, 1:23 PM EST    Patient goals/plan/ treatment preferences discussed by  and . Patient goals/plan/ treatment preferences reviewed with patient/ family. Patient/ family verbalize understanding of discharge plan and are in agreement with goal/plan/treatment preferences. Understanding was demonstrated using the teach back method. AVS provided by RN at time of discharge, which includes all necessary medical information pertaining to the patients current course of illness, treatment, post-discharge goals of care, and treatment preferences. Services After Discharge  Services At/After Discharge: Nursing Services([facility])   IMM Letter  IMM Letter given to Patient/Family/Significant other/Guardian/POA/by[de-identified] Ian Craft   IMM Letter date given[de-identified] 03/04/21  IMM Letter time given[de-identified] 18  Observation Status Letter date given[de-identified] 03/01/21  Observation Status Letter time given[de-identified] 2047  Observation Status Letter given to Patient/Family/Significant other/Guardian/POA/by[de-identified] staff     Pt is being discharged home today with current services with [facility] (RN). MARTA notified Ian Craft at [facility] and faxed H&P and AVS.  ANDREW Grey is aware.

## 2021-03-04 NOTE — PROGRESS NOTES
Mary Kate Reddy 58  INPATIENT OCCUPATIONAL THERAPY  [facility] RENAL TELEMETRY 6K  EVALUATION    Time:    Time In: 4015  Time Out: 8440  Timed Code Treatment Minutes: 9 Minutes  Minutes: 24          Date: 3/4/2021  Patient Name: Rayo Nguyen,   Gender: female      MRN: [id number]  : 1955  (77 y.o.)  Referring Practitioner: Dr. Gonzalez Wang  Diagnosis: Left arm pain  Additional Pertinent Hx: Per ER note on 3/1/2021: 77 y.o. female who presents to the emergency department from home with concern about arm pain. Patient states that her arm has been bothering her for the past few weeks. She notes that she has a fistula in that arm but she does not use anymore she received a kidney transplant 1 year ago. Patient states the pain travels up into her shoulder and into her chest.  She does not have any shortness of breath. She was at her pulmonologist recently and he stated that everything seemed fine. She is concerned because she is worried that she may be having heart issues that she is unaware of. Per ortho:conservative management of left shoulder pain. She can FU with [facility] in one week to discuss US guided cortisone injections into the shoulder.     Restrictions/Precautions:  Restrictions/Precautions: Fall Risk, General Precautions    Vitals: Vitals not assessed per clinical judgement, see nursing flowsheet    Subjective  Chart Reviewed: Yes, Orders, Progress Notes, History and Physical  Patient assessed for rehabilitation services?: Yes  Family / Caregiver Present: No    Subjective: cooperative    Pain:  Pain Assessment  Patient Currently in Pain: No    Social/Functional History:  Lives With: Alone  Type of Home: (condo)  Home Layout: One level  Home Access: Stairs to enter with rails((from garage))  Entrance Stairs - Number of Steps: 1  Home Equipment: 4 wheeled walker, Cane   Bathroom Shower/Tub: Tub/Shower unit  Bathroom Toilet: Standard  Bathroom Equipment: Shower chair    Receives Help From: Home health(home health RN--blood draws)  ADL Assistance: Independent  Homemaking Assistance: Needs assistance(brother A)  Ambulation Assistance: Independent  Transfer Assistance: Independent    Active : Yes     Additional Comments: reports using 4 wheeled walker \"sometimes\" at home    VISION:WFL    HEARING:  WFL    COGNITION: WFL    RANGE OF MOTION:  Bilateral Upper Extremity:  WFL    STRENGTH:  Bilateral Upper Extremity:  WFL    SENSATION:   Pt reports ocassional numbness in L hand-forearm    ADL:   Grooming: Stand By Assistance. Lower Extremity Dressing: Stand By Assistance. Adonna Dayhoff BALANCE:  Standing Balance: Stand By Assistance. BED MOBILITY:  Not Tested    TRANSFERS:  Sit to Stand:  Stand By Assistance. FUNCTIONAL MOBILITY:  Assistive Device: None  Assist Level:  Stand By Assistance. Distance: To and from bathroom  1 self corrected LOB     Exercise:  Educated Pt on BUE AROM exercises with \"thumb up position\" especially with AROM above 90 degrees. Educated on BUE gentle dowel exercises for home. Would benefit from handouts for home. Activity Tolerance:  Patient tolerance of  treatment: fair. Assessment:  Assessment: Pt demo mildly decreased ADL & functional mobility status over PLOF d/t pain in LUE & decreased balance. Continued OT recommended to increase indep  & safety with returning home. Performance deficits / Impairments: Decreased balance, Decreased ADL status, Decreased functional mobility , Decreased endurance, Decreased safe awareness  Prognosis: Good, Fair  REQUIRES OT FOLLOW UP: Yes  Decision Making: Low Complexity  Safety Devices in place: Yes  Type of devices: All fall risk precautions in place, Call light within reach, Gait belt    Treatment Initiated: Treatment and education initiated within context of evaluation.   Evaluation time included review of current medical information, gathering information related to past medical, social and functional history, completion

## 2021-03-04 NOTE — CARE COORDINATION
3/4/21, 10:29 AM EST    DISCHARGE ON GOING EVALUATION    Leanna Hudson day: 0  Location: 6K-21/021-A Reason for admit: Arm pain [M79.603]   Procedure: 3/1 stress test  Barriers to Discharge: Cardiology consulted, no plans at this time. Planning discharge to home today if BP remains improved, last 158/70. PCP: DONOVAN Spaulding - CNP   %  Patient Goals/Plan/Treatment Preferences: Home alone with [address]

## 2021-03-04 NOTE — DISCHARGE SUMMARY
DISCHARGE SUMMARY      Patient Identification:   Lionel Carrasco   : 1955  MRN: [id number]   Account: [de-identified]      Patient's PCP: DONOVAN Ramos - DAVE    Admit Date: 3/1/2021     Discharge Date:   3/4/21    Admitting Physician: Ramoen Briscoe MD     Discharge Physician: J Carlos Ricardo DO     Discharge Diagnoses: Active Hospital Problems    Diagnosis Date Noted    Arm pain [M79.603] 2021    Hypertensive urgency [I16.0]     Orthostatic hypotension [I95.1]     Hx of fall [Z91.81]     Hx of coronary artery disease [Z86.79]     History of end stage renal disease [Z87.448]     Cervical stenosis of spinal canal [M48.02]     Hx of gastroesophageal reflux (GERD) [Z87.19]     Other chronic pain [G89.29]     Left arm pain [M79.602] 2021    Elevated troponin [R77.8]     COPD without exacerbation ([facility] [address] [J44.9] 10/01/2012     1. Left arm pain, possibly due to DJD vs tendinitis, r/o atypical presentation CAD  -Left arm pain on admission with unclear etiology. Patient admitted for rule out ACS due to elevated troponin.  -Initial trop mildly elevated at 0.015. Repeat troponin negative x2.  -X-ray of the left shoulder showing degenerative changes however cannot rule out impingement syndrome  -Left arm pain has been chronic for the last 4 weeks and less likely to be cardiac in nature, however, patient also reports dizziness and sob  -Also complaining of pain radiating from her left neck to her left arm. This has worsened over the past day. Could be atypical ACS presentation.  -Recent cath in 2019 with nonobstructive coronary artery disease  -Last echo from 2020 - for CHF. EF of 55 to 60%  -Stress test showing possible old infarct however no signs of acute ischemia.   Cardiology consulted and recommended CTA for rule out of dissection however patient declined this procedure as she was not feeling chest pain and has a solitary kidney and kidney disease.   -Blood pressure uncontrolled ( questionable compliance)  -ortho fguajej-[id number] in 1 week at [facility]     2. Hypertensive urgency, possible hypertensive emergency  -Possibly causing demand ischemia causing elevated troponin.  -Follow-up with cardiology as outpatient  -Pressures initially uncontrolled however improved prior to discharge on home medications, encouraged compliance     3. Elevated troponin, possible demand ischemia due to accelerated HTN, resolved      4. Orthostatic hypotension  -(+) orthostatic VS on 3/2/2021  -start KATHI hose  -c/o dizziness x past 2 weeks     5. Hypokalemia likely due to hypomagnesemia, resolved     6. Hypomagnesemia   -replaced     7. Hx of fall, likely from orthostatic hypotension  -pt reports that she fell 2 weeks ago after her BP dropped. Denies LOC/head injury.      7. Hx of non-obstructive CAD  -Last cardiac cath in 2019 showing nonobstructive coronary artery disease  -Continue statin, aspirin, Coreg     8. ESRD status post kidney transplant in April 2020, stable   -Creatinine currently stable with estimated GFR of 60s  -Continue Prograf and mycophenolate  -Continue Bactrim for prophylaxis     9. Foraminal stenosis of the cervical spine  -c/o neck pain x 3 weeks   -Noted on CT of the cervical spine.  -Possible contributing factor to arm pain. -ortho recommending outpt follow up     10. Diabetes mellitus type 2  -Last hemoglobin A1c in January 2021 at 6.8.  -Continue home insulin regimen    11. Essential hypertension, uncontrolled   -With severe range blood pressures  -Continue current blood pressure medication regimen  -plan as  above      12. Hyperlipidemia  -Continue statin     13. COPD not in exacerbation  -Continue home inhalers     14. GERD  -Continue Protonix     15. Chronic pain secondary to arthritis  -Patient uses pain medication while at home.  -Continue Tylenol for pain as needed     16.  Chronic normocytic anemia  -Likely secondary to prior end-stage renal disease with kidney in 2018 and negative for ischemia. Patient had cardiac cath in 2019 which showed nonobstructive coronary artery disease. Blood pressures have been uncontrolled while in the hospital.  Plan for inpatient stress test to rule out ischemia. 3/4/21: Arm pain had generally resolved. Patient declined any chest pain. Arm pain is worse with movements suggestive of orthopedic issue. Patient is to follow-up with [facility] for further evaluation as outpatient. Stress test negative for acute ischemia with possible old infarct. Patient to follow-up with cardiology as outpatient. Patient discharged home in stable condition. Exam:     Vitals:  Vitals:    03/04/21 0758 03/04/21 0815 03/04/21 1148 03/04/21 1315   BP:  (!) 158/70 (!) 118/56 (!) 145/65   Pulse:  61 60 60   Resp:  16 16    Temp:  97.5 °F (36.4 °C) 97.8 °F (36.6 °C)    TempSrc:  Oral Oral    SpO2: 96% 98% 97%    Weight:       Height:         Weight: Weight: 181 lb 6.4 oz (82.3 kg)     24 hour intake/output:    Intake/Output Summary (Last 24 hours) at 3/4/2021 1359  Last data filed at 3/4/2021 0609  Gross per 24 hour   Intake 1050 ml   Output 0 ml   Net 1050 ml         General appearance:  No apparent distress, appears stated age and cooperative. HEENT:  Normal cephalic, atraumatic without obvious deformity. Pupils equal, round, and reactive to light. Extra ocular muscles intact. Conjunctivae/corneas clear. Neck: Supple, with full range of motion. No jugular venous distention. Trachea midline. Respiratory:  Normal respiratory effort. Clear to auscultation, bilaterally without Rales/Wheezes/Rhonchi. Cardiovascular:  Regular rate and rhythm with normal S1/S2 without murmurs, rubs or gallops. Abdomen: Soft, non-tender, non-distended with normal bowel sounds. Musculoskeletal:  No clubbing, cyanosis or edema bilaterally. Full range of motion without deformity. Skin: Skin color, texture, turgor normal.  No rashes or lesions.   Neurologic:  Neurovascularly intact without any focal sensory/motor deficits. Cranial nerves: II-XII intact, grossly non-focal.  Psychiatric:  Alert and oriented, thought content appropriate, normal insight  Capillary Refill: Brisk,< 3 seconds   Peripheral Pulses: +2 palpable, equal bilaterally       Labs: For convenience and continuity at follow-up the following most recent labs are provided:      CBC:    Lab Results   Component Value Date    WBC 8.1 03/02/2021    HGB 10.1 03/03/2021    HCT 35.0 03/03/2021     03/02/2021       Renal:    Lab Results   Component Value Date     03/03/2021    K 3.9 03/03/2021    K 3.3 03/02/2021     03/03/2021    CO2 24 03/03/2021    BUN 20 03/03/2021    CREATININE 1.2 03/03/2021    CALCIUM 10.1 03/03/2021    PHOS 3.0 02/15/2021         Significant Diagnostic Studies    Radiology:   CT HEAD WO CONTRAST   Final Result    No evidence of an acute process. **This report has been created using voice recognition software. It may contain minor errors which are inherent in voice recognition technology. **      Final report electronically signed by DR Munroe Failing on 3/2/2021 10:59 AM      CT CERVICAL SPINE WO CONTRAST   Final Result       1. Straightening of the normal cervical lordosis with no acute fracture. 2. There is mild canal, moderate left and mild-to-moderate right centimeter from the C5-C6. 3. Small canal and multiple report from the C3-4 and C7-T1.   4. There are ventral osteophytes especially at T1 and at C3-4.   5. There is calcification along the anterior longitudinal ligament between C4 and C7.   6. There is bilateral carotid artery calcification. 7. Otherwise negative CT scan of the cervical spine. .               **This report has been created using voice recognition software. It may contain minor errors which are inherent in voice recognition technology. **      Final report electronically signed by DR Munroe Failing on 3/2/2021 11:07 AM      XR SHOULDER LEFT (MIN 2 VIEWS) Final Result   Mild degenerative spurring of the shoulder. Calcific tendinitis. Cannot exclude impingement syndrome. **This report has been created using voice recognition software. It may contain minor errors which are inherent in voice recognition technology. **      Final report electronically signed by Dr. Adrian Noe on 3/1/2021 6:00 PM              Consults:     [address] [facility] [address] [facility] [address] TO SOCIAL WORK  IP CONSULT TO ORTHOPEDIC SURGERY  IP CONSULT TO CARDIOLOGY    Disposition:    [x] Home       [] TCU       [] Rehab       [] Psych       [] SNF       [] [place]       [] Other-    Condition at Discharge: Stable    Code Status:  Full Code     Patient Instructions:    Discharge lab work: Activity: activity as tolerated  Diet: DIET CARB CONTROL; No Caffeine      Follow-up visits:   [address]  [facility] [address] [phone]    Schedule an appointment as soon as possible for a visit in 1 week  For Hospital Follow-Up         Discharge Medications:      Ruth Souza   [place] Medication Instructions [facility]:[id number]    Printed on:03/04/21 8050   Medication Information                      albuterol sulfate HFA (PROAIR HFA) 108 (90 Base) MCG/ACT inhaler  Inhale 2 puffs into the lungs every 6 hours as needed for Wheezing or Shortness of Breath             aspirin 81 MG EC tablet  Take 81 mg by mouth daily.                atorvastatin (LIPITOR) 40 MG tablet  TAKE 1 TABLET DAILY             carvedilol (COREG) 25 MG tablet  2 tablets BID             cloNIDine (CATAPRES) 0.3 MG tablet  Take 0.3 mg by mouth 3 times daily              docusate sodium (COLACE) 100 MG capsule  Take 200 mg by mouth 2 times daily             fluticasone (FLONASE) 50 MCG/ACT nasal spray  1 spray by Each Nostril route daily as needed for Rhinitis             HYDROcodone-acetaminophen (NORCO) 5-325 MG per tablet  Take 1 tablet by mouth every 8 hours as needed for Pain for up to 30 days. Fill on/after 4/19/2021             HYDROcodone-acetaminophen (NORCO) 5-325 MG per tablet  Take 1 tablet by mouth every 8 hours as needed for Pain for up to 30 days. Fill on/after 3/20/2021             HYDROcodone-acetaminophen (NORCO) 5-325 MG per tablet  Take 1 tablet by mouth every 8 hours as needed for Pain for up to 30 days.  Fill on/after 2/20/21             insulin aspart (NOVOLOG FLEXPEN) 100 UNIT/ML injection pen  Sliding scale insulin coverage Glucose:Dose: If Less [id number] =No Insulin/ 140-199= 2 Units/ 200-249=4 Units/ 250-299= 6 Units/  300-349=8 Units/  350-400=10 Units/ Above 400 = 12 Units max 48 units daily             insulin glargine (LANTUS SOLOSTAR) 100 UNIT/ML injection pen  Inject 25 Units into the skin nightly             Insulin Pen Needle (B-D ULTRAFINE III SHORT PEN) 31G X 8 MM MISC  Use three times daily Diagnosis Code E11.9             lactulose (CHRONULAC) 10 GM/15ML solution  Take twice daily as needed for constipation             linaclotide (LINZESS) 290 MCG CAPS capsule  Take 145 mcg by mouth every other day              magnesium oxide (MAG-OX) 400 MG tablet  Take 400 mg by mouth 2 times daily             Mycophenolate Sodium 360 MG TBEC  Take 360 mg by mouth 2 tablets BID             NIFEdipine (ADALAT CC) 60 MG extended release tablet  Take 60 mg by mouth 2 times daily             nitroGLYCERIN (NITROSTAT) 0.4 MG SL tablet  Place 1 tablet under the tongue every 5 minutes as needed for Chest pain             pantoprazole (PROTONIX) 40 MG tablet  Take 40 mg by mouth 2 times daily             sulfamethoxazole-trimethoprim (BACTRIM DS;SEPTRA DS) 800-160 MG per tablet  Take 1 tablet by mouth daily             tacrolimus (PROGRAF) 1 MG capsule  Take 1 mg by mouth 7 CAPSULES EVERY MORNING AND 6 CAPSULES EVERY EVENING             tiotropium (SPIRIVA RESPIMAT) 2.5 MCG/ACT AERS inhaler  Inhale 2 puffs into the lungs daily             vitamin D (ERGOCALCIFEROL) 1.25 MG (48038 UT) CAPS capsule  TAKE 1 CAPSULE BY MOUTH EVERY 14 DAYS ON MONDAYS                 Time Spent on discharge is more than 45 minutes in the examination, evaluation, counseling and review of medications and discharge plan. Signed: Thank you DONOVAN Wallace CNP for the opportunity to be involved in this patient's care.     Electronically signed by Celia Rogers DO on 3/4/2021 at 1:59 PM

## 2021-03-04 NOTE — PROGRESS NOTES
Discharge teaching and instructions for diagnosis/procedure of L arm pain completed with patient using teachback method. AVS reviewed. Printed prescriptions given to patient. Patient voiced understanding regarding prescriptions, follow up appointments, and care of self at home. Discharged in a wheelchair to  home with support per family.

## 2021-03-05 ENCOUNTER — TELEPHONE (OUTPATIENT)
Dept: FAMILY MEDICINE CLINIC | Age: 66
End: 2021-03-05

## 2021-03-05 LAB
GFR SERPL CREATININE-BSD FRML MDRD: 45 ML/MIN/1.73M2
GFR SERPL CREATININE-BSD FRML MDRD: 45 ML/MIN/1.73M2
GFR SERPL CREATININE-BSD FRML MDRD: 50 ML/MIN/1.73M2

## 2021-03-09 ENCOUNTER — TELEPHONE (OUTPATIENT)
Dept: FAMILY MEDICINE CLINIC | Age: 66
End: 2021-03-09

## 2021-03-11 ENCOUNTER — OFFICE VISIT (OUTPATIENT)
Dept: FAMILY MEDICINE CLINIC | Age: 66
End: 2021-03-11
Payer: MEDICARE

## 2021-03-11 VITALS
TEMPERATURE: 97.5 F | DIASTOLIC BLOOD PRESSURE: 62 MMHG | SYSTOLIC BLOOD PRESSURE: 138 MMHG | WEIGHT: 182.2 LBS | OXYGEN SATURATION: 95 % | HEART RATE: 65 BPM | BODY MASS INDEX: 30.32 KG/M2 | RESPIRATION RATE: 17 BRPM

## 2021-03-11 DIAGNOSIS — M19.212 OTHER SECONDARY OSTEOARTHRITIS OF LEFT SHOULDER: ICD-10-CM

## 2021-03-11 DIAGNOSIS — M77.9 BONE SPUR: ICD-10-CM

## 2021-03-11 DIAGNOSIS — I10 ESSENTIAL HYPERTENSION: Primary | ICD-10-CM

## 2021-03-11 DIAGNOSIS — Z94.0 RENAL TRANSPLANT RECIPIENT: ICD-10-CM

## 2021-03-11 DIAGNOSIS — M25.512 ACUTE PAIN OF LEFT SHOULDER: ICD-10-CM

## 2021-03-11 DIAGNOSIS — Z09 HOSPITAL DISCHARGE FOLLOW-UP: ICD-10-CM

## 2021-03-11 PROCEDURE — 1111F DSCHRG MED/CURRENT MED MERGE: CPT | Performed by: NURSE PRACTITIONER

## 2021-03-11 PROCEDURE — 99495 TRANSJ CARE MGMT MOD F2F 14D: CPT | Performed by: NURSE PRACTITIONER

## 2021-03-11 ASSESSMENT — ENCOUNTER SYMPTOMS
RHINORRHEA: 0
ABDOMINAL PAIN: 0
CONSTIPATION: 0
EYE DISCHARGE: 0
DIARRHEA: 0
ANAL BLEEDING: 0
SHORTNESS OF BREATH: 0
SORE THROAT: 0
NAUSEA: 0
EYE REDNESS: 0
BLOOD IN STOOL: 0
ABDOMINAL DISTENTION: 0
COUGH: 0
COLOR CHANGE: 0

## 2021-03-11 ASSESSMENT — PATIENT HEALTH QUESTIONNAIRE - PHQ9
2. FEELING DOWN, DEPRESSED OR HOPELESS: 1
1. LITTLE INTEREST OR PLEASURE IN DOING THINGS: 1
SUM OF ALL RESPONSES TO PHQ QUESTIONS 1-9: 2

## 2021-03-11 NOTE — PROGRESS NOTES
Post-Discharge Transitional Care Management Services or Hospital Follow Up    Meño Lozano   YOB: 1955    Date of Office Visit:  3/11/2021  Date of Hospital Admission: 3/1/21  Date of Hospital Discharge: 3/4/21  Readmission Risk Score(high >=14%. Medium >=10%):Readmission Risk Score: 22    Care management risk score Rising risk (score 2-5) and Complex Care (Scores >=6): 8     Non face to face  following discharge, date last encounter closed (first attempt may have been earlier): 3/5/2021 10:35 AM 3/5/2021 10:35 AM    Call initiated 2 business days of discharge: Yes     Patient Active Problem List   Diagnosis    Uncontrolled hypertension    Chronic anemia    Coronary disease    Hyperlipemia    Arthritis    Neuropathy, diabetic ([address]    Leg pain    Obesity (BMI 30-39. 9)    Low HDL (under 40)    Need for prophylactic vaccination against diphtheria-tetanus-pertussis (DTP)    History of tobacco use    Allergic rhinitis    COPD without exacerbation (HCC)    Lung mass    Anemia, chronic disease    Gout    Urolithiasis    Ankle fracture, right    Secondary hyperparathyroidism of renal origin ([address]    Obstructive sleep apnea on CPAP    Vitamin D deficiency    Hypertensive nephropathy    Persistent proteinuria associated with type 2 diabetes mellitus ([address]    Cellulitis in diabetic foot ([address]    VIDA (acute kidney injury) ([address]    Persistent proteinuria    Acquired hypothyroidism    CKD (chronic kidney disease), stage IV (HCC)    Nephrotic syndrome    Type 2 diabetes mellitus (HCC)    Iron deficiency anemia due to dietary causes    Anemia of chronic disease    Stage 5 chronic kidney disease not on chronic dialysis ([address]    ESRD (end stage renal disease) ([address]    Hypervolemia associated with renal insufficiency    Pulmonary edema, noncardiac    CKD (chronic kidney disease), stage V (HCC)    Chronic progressive renal failure, stage 5 (HCC)    Accelerated hypertension  Diabetic peripheral neuropathy associated with type 2 diabetes mellitus (HCC)    Pericardial effusion    Hypokalemia    Type II diabetes mellitus with stage 5 chronic kidney disease (HCC)    Acute on chronic diastolic congestive heart failure (HCC)    End stage renal disease due to benign hypertension (HCC)    ESRD (end stage renal disease) on dialysis (HCC)    Post-operative nausea and vomiting    Chronic constipation    Candida UTI    Fluid overload    Pleural effusion    Acute respiratory failure with hypoxia (HCC)    Elevated troponin    Dyspnea    Bilateral leg edema    Hypernatremia    Metabolic acidosis    Generalized weakness    Lymphadenopathy, inguinal    Atelectasis of left lung    Thyroid nodule    [facility]    Debility    Kidney transplant recipient    Sepsis ([facility]    E. coli UTI    Acute kidney injury superimposed on CKD (HCC)    Intractable nausea and vomiting    Hypomagnesemia    Hypermagnesemia    Chest discomfort    Left arm pain    Hypertensive urgency    Orthostatic hypotension    Hx of fall    Hx of coronary artery disease    History of end stage renal disease    Cervical stenosis of spinal canal    Hx of gastroesophageal reflux (GERD)    Other chronic pain    Arm pain     Allergies   Allergen Reactions    Pioglitazone Swelling    Actos [Pioglitazone Hydrochloride] Swelling    Cymbalta [Duloxetine Hcl] Other (See Comments)     Anxiety and lethargic    Gabapentin Anxiety     Medications listed as ordered at the time of discharge from hospital   Judy RING   Home Medication Instructions ALEXEY:    Printed on:03/11/21 1057   Medication Information                      albuterol sulfate HFA (PROAIR HFA) 108 (90 Base) MCG/ACT inhaler  Inhale 2 puffs into the lungs every 6 hours as needed for Wheezing or Shortness of Breath             aspirin 81 MG EC tablet  Take 81 mg by mouth daily.                atorvastatin (LIPITOR) 40 MG tablet  TAKE 1 TABLET DAILY             carvedilol (COREG) 25 MG tablet  2 tablets BID             cloNIDine (CATAPRES) 0.3 MG tablet  Take 0.3 mg by mouth 3 times daily              docusate sodium (COLACE) 100 MG capsule  Take 200 mg by mouth 2 times daily             fluticasone (FLONASE) 50 MCG/ACT nasal spray  1 spray by Each Nostril route daily as needed for Rhinitis             HYDROcodone-acetaminophen (NORCO) 5-325 MG per tablet  Take 1 tablet by mouth every 8 hours as needed for Pain for up to 30 days.  Fill on/after 4/19/2021             insulin aspart (NOVOLOG FLEXPEN) 100 UNIT/ML injection pen  Sliding scale insulin coverage Glucose:Dose: If Less [id number] =No Insulin/ 140-199= 2 Units/ 200-249=4 Units/ 250-299= 6 Units/  300-349=8 Units/  350-400=10 Units/ Above 400 = 12 Units max 48 units daily             insulin glargine (LANTUS SOLOSTAR) 100 UNIT/ML injection pen  Inject 25 Units into the skin nightly             Insulin Pen Needle (B-D ULTRAFINE III SHORT PEN) 31G X 8 MM MISC  Use three times daily Diagnosis Code E11.9             lactulose (CHRONULAC) 10 GM/15ML solution  Take twice daily as needed for constipation             linaclotide (LINZESS) 290 MCG CAPS capsule  Take 145 mcg by mouth every other day              magnesium oxide (MAG-OX) 400 MG tablet  Take 400 mg by mouth 2 times daily             Mycophenolate Sodium 360 MG TBEC  Take 360 mg by mouth 2 tablets BID             NIFEdipine (ADALAT CC) 60 MG extended release tablet  Take 60 mg by mouth 2 times daily             nitroGLYCERIN (NITROSTAT) 0.4 MG SL tablet  Place 1 tablet under the tongue every 5 minutes as needed for Chest pain             pantoprazole (PROTONIX) 40 MG tablet  Take 40 mg by mouth 2 times daily             sulfamethoxazole-trimethoprim (BACTRIM DS;SEPTRA DS) 800-160 MG per tablet  Take 1 tablet by mouth daily             tacrolimus (PROGRAF) 1 MG capsule  Take 1 mg by mouth 7 CAPSULES EVERY MORNING AND 6 CAPSULES EVERY EVENING             tiotropium (SPIRIVA RESPIMAT) 2.5 MCG/ACT AERS inhaler  Inhale 2 puffs into the lungs daily             vitamin D (ERGOCALCIFEROL) 1.25 MG (28855 UT) CAPS capsule  TAKE 1 CAPSULE BY MOUTH EVERY 14 DAYS ON MONDAYS                 Medications marked \"taking\" at this time  Outpatient Medications Marked as Taking for the 3/11/21 encounter (Office Visit) with DONOVAN Cain CNP   Medication Sig Dispense Refill    tiotropium (SPIRIVA RESPIMAT) 2.5 MCG/ACT AERS inhaler Inhale 2 puffs into the lungs daily 1 Inhaler 11    albuterol sulfate HFA (PROAIR HFA) 108 (90 Base) MCG/ACT inhaler Inhale 2 puffs into the lungs every 6 hours as needed for Wheezing or Shortness of Breath 3 Inhaler 3    fluticasone (FLONASE) 50 MCG/ACT nasal spray 1 spray by Each Nostril route daily as needed for Rhinitis 3 Bottle 3    pantoprazole (PROTONIX) 40 MG tablet Take 40 mg by mouth 2 times daily      atorvastatin (LIPITOR) 40 MG tablet TAKE 1 TABLET DAILY 90 tablet 3    HYDROcodone-acetaminophen (NORCO) 5-325 MG per tablet Take 1 tablet by mouth every 8 hours as needed for Pain for up to 30 days.  Fill on/after 4/19/2021 90 tablet 0    Insulin Pen Needle (B-D ULTRAFINE III SHORT PEN) 31G X 8 MM MISC Use three times daily Diagnosis Code E11.9 200 each 3    insulin aspart (NOVOLOG FLEXPEN) 100 UNIT/ML injection pen Sliding scale insulin coverage Glucose:Dose: If Less [id number] =No Insulin/ 140-199= 2 Units/ 200-249=4 Units/ 250-299= 6 Units/  300-349=8 Units/  350-400=10 Units/ Above 400 = 12 Units max 48 units daily 15 pen 1    insulin glargine (LANTUS SOLOSTAR) 100 UNIT/ML injection pen Inject 25 Units into the skin nightly 15 pen 1    carvedilol (COREG) 25 MG tablet 2 tablets  tablet 1    tacrolimus (PROGRAF) 1 MG capsule Take 1 mg by mouth 7 CAPSULES EVERY MORNING AND 6 CAPSULES EVERY EVENING      nitroGLYCERIN (NITROSTAT) 0.4 MG SL tablet Place 1 tablet under the tongue every 5 minutes as needed for Chest pain 25 tablet 2    lactulose (CHRONULAC) 10 GM/15ML solution Take twice daily as needed for constipation 2700 mL 1    vitamin D (ERGOCALCIFEROL) 1.25 MG (75132 UT) CAPS capsule TAKE 1 CAPSULE BY MOUTH EVERY 14 DAYS ON MONDAYS 12 capsule 1    NIFEdipine (ADALAT CC) 60 MG extended release tablet Take 60 mg by mouth 2 times daily      Mycophenolate Sodium 360 MG TBEC Take 360 mg by mouth 2 tablets BID      cloNIDine (CATAPRES) 0.3 MG tablet Take 0.3 mg by mouth 3 times daily       docusate sodium (COLACE) 100 MG capsule Take 200 mg by mouth 2 times daily      magnesium oxide (MAG-OX) 400 MG tablet Take 400 mg by mouth 2 times daily      sulfamethoxazole-trimethoprim (BACTRIM DS;SEPTRA DS) 800-160 MG per tablet Take 1 tablet by mouth daily      linaclotide (LINZESS) 290 MCG CAPS capsule Take 145 mcg by mouth every other day       aspirin 81 MG EC tablet Take 81 mg by mouth daily. Medications patient taking as of now reconciled against medications ordered at time of hospital discharge: Yes    Chief Complaint   Patient presents with    Follow-Up from Hospital     left arm pain     HPI    Went to [facility] ER with c/o arm pain for a few weeks prior to going to ER. Has a fistula in that arm but it is not utilized since transplant 1 year ago. Concerned for heart issues. BP was elevated at that time    XR SHOULDER LEFT (MIN 2 VIEWS)   Final Result   Mild degenerative spurring of the shoulder. Calcific tendinitis. Cannot exclude impingement syndrome.         03/01/2021 Stress test:  ECG Findings   No ischemic EKG changes. Arrhythmias   No stress induced arrhythmia. Complications   Procedure complication was none. Stress Interpretation  Lexiscan EKG stress test is not suggestive for ischemia. Inpatient course: Discharge summary reviewed- see chart.     Getting second COVID shot 03/19/2021    Home health comes every other Monday for labs    Going back to [facility] 4/15/2021 for renal check up - 1 year follow up from transplant. Interval history/Current status: Stable     Review of Systems   Constitutional: Negative for chills, fatigue and fever. HENT: Negative for congestion, ear pain, postnasal drip, rhinorrhea and sore throat. Eyes: Negative for discharge and redness. Respiratory: Negative for cough and shortness of breath. Cardiovascular: Negative for chest pain and leg swelling. Gastrointestinal: Negative for abdominal distention, abdominal pain, anal bleeding, blood in stool, constipation, diarrhea and nausea. Skin: Negative for color change and rash. Neurological: Negative for facial asymmetry, speech difficulty and weakness. Hematological: Does not bruise/bleed easily. Psychiatric/Behavioral: Negative for agitation and confusion. Vitals:    03/11/21 1034   BP: 138/62   Site: Right Upper Arm   Position: Sitting   Cuff Size: Medium Adult   Pulse: 65   Resp: 17   Temp: 97.5 °F (36.4 °C)   TempSrc: Skin   SpO2: 95%   Weight: 182 lb 3.2 oz (82.6 kg)     Body mass index is 30.32 kg/m². Wt Readings from Last 3 Encounters:   03/11/21 182 lb 3.2 oz (82.6 kg)   03/04/21 181 lb 6.4 oz (82.3 kg)   03/01/21 180 lb 12.8 oz (82 kg)     BP Readings from Last 3 Encounters:   03/11/21 138/62   03/04/21 (!) 145/65   03/01/21 138/60       Physical Exam  Constitutional:       General: She is not in acute distress. Appearance: She is well-developed. She is not ill-appearing or diaphoretic. HENT:      Head: Normocephalic and atraumatic. Right Ear: Hearing and external ear normal. No decreased hearing noted. Left Ear: Hearing and external ear normal. No decreased hearing noted. Nose: Nose normal. No nasal deformity. Eyes:      General:         Right eye: No discharge. Left eye: No discharge. Conjunctiva/sclera: Conjunctivae normal.   Neck:      Musculoskeletal: Normal range of motion and neck supple.    Pulmonary:      Effort: Pulmonary effort is normal. No respiratory distress. Abdominal:      General: There is no distension. Tenderness: There is no guarding. Musculoskeletal: Normal range of motion. General: No tenderness or deformity. Skin:     Coloration: Skin is not pale. Findings: No erythema or rash (On exposed areas). Neurological:      Mental Status: She is alert. Gait: Gait normal.   Psychiatric:         Speech: Speech normal.         Behavior: Behavior normal.         Thought Content:  Thought content normal.         Judgment: Judgment normal.           Valeriy Randhawa was seen today for follow-up from hospital.    Diagnoses and all orders for this visit:    Essential hypertension  -     ME DISCHARGE MEDS RECONCILED W/ CURRENT OUTPATIENT MED LIST    Renal transplant recipient  -     ME DISCHARGE MEDS RECONCILED [facility] discharge follow-up  -     ME DISCHARGE MEDS RECONCILED W/ CURRENT OUTPATIENT MED LIST    Acute pain of left shoulder  -     AFL - Mitzi DO Gilberto, Orthopedic Surgery, [address] [place] [address]    Bone spur  -     AFL - Mitzi DO Gilberto, Orthopedic Surgery, [address] [facility]    Other secondary osteoarthritis of left shoulder  -     AFL - Mitzi DO Gilberto, Orthopedic Surgery, [facility] Decision Making: moderate complexity

## 2021-03-11 NOTE — PATIENT INSTRUCTIONS
Patient Education        Shoulder Blade: Exercises  Introduction  Here are some examples of exercises for you to try. The exercises may be suggested for a condition or for rehabilitation. Start each exercise slowly. Ease off the exercises if you start to have pain. You will be told when to start these exercises and which ones will work best for you. How to do the exercises  Shoulder roll   1. Stand tall with your chin slightly tucked. Imagine that a string at the top of your head is pulling you straight up. 2. Keep your arms relaxed. All motion will be in your shoulders. 3. Shrug your shoulders up toward your ears, then up and back. [place] your shoulders down and back, like you're sliding your hands down into your back pants pockets. 4. Repeat the circles at least 2 to 4 times. 5. This exercise is also helpful anytime you want to relax. Lower neck and upper back stretch   1. With your arms about shoulder height, clasp your hands in front of you. 2. Drop your chin toward your chest.  3. Reach straight forward so you are rounding your upper back. Think about pulling your shoulder blades apart. [place] feel a stretch across your upper back and shoulders. Hold for at least 6 seconds. 4. Repeat 2 to 4 times. Triceps stretch   1. Reach your arm straight up. 2. Keeping your elbow in place, bend your arm and reach your hand down behind your back. 3. With your other hand, apply gentle pressure to the bent elbow. [place] feel a stretch at the back of your upper arm and shoulder. Hold about 6 seconds. 4. Repeat 2 to 4 times with each arm. Shoulder stretch   1. Relax your shoulders. 2. Raise one arm to shoulder height, and reach it across your chest.  3. Pull the arm slightly toward you with your other arm. This will help you get a gentle stretch. Hold for about 6 seconds. 4. Repeat 2 to 4 times. Shoulder blade squeeze   1. Sit or stand up tall with your arms at your sides.   2. Keep your shoulders relaxed and down, not shrugged. 3. Squeeze your shoulder blades together. Hold for 6 seconds, then relax. 4. Repeat 8 to 12 times. Straight-arm shoulder blade squeeze   1. Sit or stand tall. Relax your shoulders. 2. With palms down, hold your elastic tubing or band straight out in front of you. 3. Start with slight tension in the tubing or band, with your hands about shoulder-width apart. 4. Slowly pull straight out to the sides, squeezing your shoulder blades together. Keep your arms straight and at shoulder height. Slowly release. 5. Repeat 8 to 12 times. Rowing   1. [place] your elastic tubing or band at about waist height. Take one end in each hand. 2. Sit or stand with your feet hip-width apart. 3. Hold your arms straight in front of you. Adjust your distance to create slight tension in the tubing or band. 4. Slightly tuck your chin. Relax your shoulders. 5. Without shrugging your shoulders, pull straight back. Your elbows will pass alongside your waist.    Pull-downs   1. [place] your elastic tubing or band in the top of a closed door. Take one end in each hand. 2. Either sit or stand, depending on what is more comfortable. If you feel unsteady, sit on a chair. 3. Start with your arms up and comfortably apart, elbows straight. There should be a slight tension in the tubing or band. 4. Slightly tuck your chin, and look straight ahead. 5. Keeping your back straight, slowly pull down and back, bending your elbows. 6. Stop where your hands are level with your chin, in a \"goalpost\" position. 7. Repeat 8 to 12 times. Chest T stretch   1. Lie on your back. Raise your knees so they are bent. Plant your feet on the floor, hip-width apart. 2. Tuck your chin, and relax your shoulders. 3. Reach your arms straight out to the sides.  If you don't feel a mild stretch in your shoulders and across your chest, use a foam roll or a tightly rolled blanket under your spine, from your tailbone to your head.  4. Relax in this position for at least 15 to 30 seconds while you breathe normally. Repeat 2 to 4 times. 5. As you get used to this stretch, keep adding a little more time until you are able relax in this position for 2 or 3 minutes. When you can relax for at least 2 minutes, you only need to do the exercise 1 time per session. Chest goalpost stretch   1. Lie on your back. Raise your knees so they are bent. Plant your feet on the floor, hip-width apart. 2. Tuck your chin, and relax your shoulders. 3. Reach your arms straight out to the sides. 4. Bend your arms at the elbows, with your hands pointed toward the top of your head. Your arms should make an L on either side of your head. Your palms should be facing up. 5. If you don't feel a mild stretch in your shoulders and across your chest, use a foam roll or tightly rolled blanket under your spine, from your tailbone to your head. 6. Relax in this position for at least 15 to 30 seconds while you breathe normally. Repeat 2 to 4 times. 7. Each day you do this exercise, add a little more time until you can relax in this position for 2 or 3 minutes. When you can relax for at least 2 minutes, you only need to do the exercise 1 time per session. Follow-up care is a key part of your treatment and safety. Be sure to make and go to all appointments, and call your doctor if you are having problems. It's also a good idea to know your test results and keep a list of the medicines you take. Where can you learn more? Go to https://SimpleTherapyrosa.InteliCoat Technologies. org and sign in to your [employer] account. Enter [phone] in the [facility] box to learn more about \"Shoulder Blade: Exercises. \"     If you do not have an account, please click on the \"Sign Up Now\" link. Current as of: November 16, 2020               Content Version: 12.8  © 3628-1301 Healthwise, Incorporated. Care instructions adapted under license by [facility] [place] ([facility]).  If you have questions about a medical condition or this instruction, always ask your healthcare professional. [address] any warranty or liability for your use of this information. Patient Education        Shoulder Pain: Care Instructions  Your Care Instructions     You can hurt your shoulder by using it too much during an activity, such as fishing or baseball. It can also happen as part of the everyday wear and tear of getting older. Shoulder injuries can be slow to heal, but your shoulder should get better with time. Your doctor may recommend a sling to rest your shoulder. If you have injured your shoulder, you may need testing and treatment. Follow-up care is a key part of your treatment and safety. Be sure to make and go to all appointments, and call your doctor if you are having problems. It's also a good idea to know your test results and keep a list of the medicines you take. How can you care for yourself at home? · Take pain medicines exactly as directed. ? If the doctor gave you a prescription medicine for pain, take it as prescribed. ? If you are not taking a prescription pain medicine, ask your doctor if you can take an over-the-counter medicine. ? Do not take two or more pain medicines at the same time unless the doctor told you to. Many pain medicines contain acetaminophen, which is Tylenol. Too much acetaminophen (Tylenol) can be harmful. · If your doctor recommends that you wear a sling, use it as directed. Do not take it off before your doctor tells you to. · Put ice or a cold pack on the sore area for 10 to 20 minutes at a time. Put a thin cloth between the ice and your skin. · If there is no swelling, you can put moist heat, a heating pad, or a warm cloth on your shoulder. Some doctors suggest alternating between hot and cold. · Rest your shoulder for a few days. If your doctor recommends it, you can then begin gentle exercise of the shoulder, but do not lift anything heavy.   When should you call for help? Call 911 anytime you think you may need emergency care. For example, call if:    · You have chest pain or pressure. This may occur with:  ? Sweating. ? Shortness of breath. ? Nausea or vomiting. ? Pain that spreads from the chest to the neck, jaw, or one or both shoulders or arms. ? Dizziness or lightheadedness. ? A fast or uneven pulse. After calling 911, chew 1 adult-strength aspirin. Wait for an ambulance. Do not try to drive yourself.     · Your arm or hand is cool or pale or changes color. Call your doctor now or seek immediate medical care if:    · You have signs of infection, such as:  ? Increased pain, swelling, warmth, or redness in your shoulder. ? Red streaks leading from a place on your shoulder. ? Pus draining from an area of your shoulder. ? Swollen lymph nodes in your neck, armpits, or groin. ? A fever. Watch closely for changes in your health, and be sure to contact your doctor if:    · You cannot use your shoulder.     · Your shoulder does not get better as expected. Where can you learn more? Go to https://Mission Control Technologies.[employer] Life. org and sign in to your [employer] account. Enter X886 in the [employer] box to learn more about \"Shoulder Pain: Care Instructions. \"     If you do not have an account, please click on the \"Sign Up Now\" link. Current as of: November 16, 2020               Content Version: 12.8  © 2006-2021 [employer]. Care instructions adapted under license by [facility] ([facility]). If you have questions about a medical condition or this instruction, always ask your healthcare professional. [place] [address] any warranty or liability for your use of this information. Patient Education        Arthritis: Care Instructions  Overview     Arthritis, also called osteoarthritis, is a breakdown of the cartilage that cushions your joints. When the cartilage wears down, your bones rub against each other.  This causes pain and stiffness. Many people have some arthritis as they age. Arthritis most often affects the joints of the spine, hands, hips, knees, or feet. Arthritis never goes away completely. But medicine and home treatment can help reduce pain and help you stay active. Follow-up care is a key part of your treatment and safety. Be sure to make and go to all appointments, and call your doctor if you are having problems. It's also a good idea to know your test results and keep a list of the medicines you take. How can you care for yourself at home? · Stay at a healthy weight. Being overweight puts extra strain on your joints. · Talk to your doctor or physical therapist about exercises that will help ease joint pain. ? Stretch. You may enjoy gentle forms of yoga to help keep your joints and muscles flexible. ? Walk instead of jog. Other types of exercise that are less stressful on the joints include riding a bike, swimming, kiesha chi, or water exercise. ? Lift weights. Strong muscles help reduce stress on your joints. Stronger thigh muscles, for example, take some of the stress off of the knees and hips. Learn the right way to lift weights so you don't make joint pain worse. · Take your medicines exactly as prescribed. Call your doctor if you think you are having a problem with your medicine. · Take pain medicines exactly as directed. ? If the doctor gave you a prescription medicine for pain, take it as prescribed. ? If you are not taking a prescription pain medicine, ask your doctor if you can take an over-the-counter medicine. · Use a cane, crutch, walker, or another device if you need help to get around. These can help rest your joints. You also can use other things to make life easier, such as a higher toilet seat and padded handles on kitchen utensils. · Do not sit in low chairs. They can make it hard to get up. · Put heat or cold on your sore joints as needed.  Use whichever helps you most. You can also switch between hot and cold packs. ? Apply heat 2 or 3 times a day for 20 to 30 minutes--using a heating pad, hot shower, or hot pack--to relieve pain and stiffness. But don't use heat on a swollen joint. ? Put ice or a cold pack on your sore joint for 10 to 20 minutes at a time. Put a thin cloth between the ice and your skin. When should you call for help? Call your doctor now or seek immediate medical care if:    · You have sudden swelling, warmth, or pain in any joint.     · You have joint pain and a fever or rash.     · You have such bad pain that you cannot use a joint. Watch closely for changes in your health, and be sure to contact your doctor if:    · You have mild joint symptoms that continue even with more than 6 weeks of care at home.     · You have stomach pain or other problems with your medicine. Where can you learn more? Go to https://FireLayers.CDP. org and sign in to your [employer] account. Enter U828 in the [employer] box to learn more about \"Arthritis: Care Instructions. \"     If you do not have an account, please click on the \"Sign Up Now\" link. Current as of: August 5, 2020               Content Version: 12.8  © 2006-2021 [employer]. Care instructions adapted under license by [facility] ([facility]). If you have questions about a medical condition or this instruction, always ask your healthcare professional. [place] [address] any warranty or liability for your use of this information. Patient Education        High Blood Pressure: Care Instructions  Overview     It's normal for blood pressure to go up and down throughout the day. But if it stays up, you have high blood pressure. Another name for high blood pressure is hypertension. Despite what a lot of people think, high blood pressure usually doesn't cause headaches or make you feel dizzy or lightheaded. It usually has no symptoms.  But it does increase your risk of stroke, heart attack, and other problems. You and your doctor will talk about your risks of these problems based on your blood pressure. Your doctor will give you a goal for your blood pressure. Your goal will be based on your health and your age. Lifestyle changes, such as eating healthy and being active, are always important to help lower blood pressure. You might also take medicine to reach your blood pressure goal.  Follow-up care is a key part of your treatment and safety. Be sure to make and go to all appointments, and call your doctor if you are having problems. It's also a good idea to know your test results and keep a list of the medicines you take. How can you care for yourself at home? Medical treatment  · If you stop taking your medicine, your blood pressure will go back up. You may take one or more types of medicine to lower your blood pressure. Be safe with medicines. Take your medicine exactly as prescribed. Call your doctor if you think you are having a problem with your medicine. · Talk to your doctor before you start taking aspirin every day. Aspirin can help certain people lower their risk of a heart attack or stroke. But taking aspirin isn't right for everyone, because it can cause serious bleeding. · See your doctor regularly. You may need to see the doctor more often at first or until your blood pressure comes down. · If you are taking blood pressure medicine, talk to your doctor before you take decongestants or anti-inflammatory medicine, such as ibuprofen. Some of these medicines can raise blood pressure. · Learn how to check your blood pressure at home. Lifestyle changes  · Stay at a healthy weight. This is especially important if you put on weight around the waist. Losing even 10 pounds can help you lower your blood pressure. · If your doctor recommends it, get more exercise. Walking is a good choice. Bit by bit, increase the amount you walk every day.  Try for at least 30 minutes on most days of the week. You also may want to swim, bike, or do other activities. · Avoid or limit alcohol. Talk to your doctor about whether you can drink any alcohol. · Try to limit how much sodium you eat to less than 2,300 milligrams (mg) a day. Your doctor may ask you to try to eat less than 1,500 mg a day. · Eat plenty of fruits (such as bananas and oranges), vegetables, legumes, whole grains, and low-fat dairy products. · Lower the amount of saturated fat in your diet. Saturated fat is found in animal products such as milk, cheese, and meat. Limiting these foods may help you lose weight and also lower your risk for heart disease. · Do not smoke. Smoking increases your risk for heart attack and stroke. If you need help quitting, talk to your doctor about stop-smoking programs and medicines. These can increase your chances of quitting for good. When should you call for help? Call  911 anytime you think you may need emergency care. This may mean having symptoms that suggest that your blood pressure is causing a serious heart or blood vessel problem. Your blood pressure may be over 180/120. For example, call 911 if:    · You have symptoms of a heart attack. These may include:  ? Chest pain or pressure, or a strange feeling in the chest.  ? Sweating. ? Shortness of breath. ? Nausea or vomiting. ? Pain, pressure, or a strange feeling in the back, neck, jaw, or upper belly or in one or both shoulders or arms. ? Lightheadedness or sudden weakness. ? A fast or irregular heartbeat.     · You have symptoms of a stroke. These may include:  ? Sudden numbness, tingling, weakness, or loss of movement in your face, arm, or leg, especially on only one side of your body. ? Sudden vision changes. ? Sudden trouble speaking. ? Sudden confusion or trouble understanding simple statements. ? Sudden problems with walking or balance.   ? A sudden, severe headache that is different from past headaches.     · You have severe back or belly pain. Do not wait until your blood pressure comes down on its own. Get help right away. Call your doctor now or seek immediate care if:    · Your blood pressure is much higher than normal (such as 180/120 or higher), but you don't have symptoms.     · You think high blood pressure is causing symptoms, such as:  ? Severe headache.  ? Blurry vision. Watch closely for changes in your health, and be sure to contact your doctor if:    · Your blood pressure measures higher than your doctor recommends at least 2 times. That means the top number is higher or the bottom number is higher, or both.     · You think you may be having side effects from your blood pressure medicine. Where can you learn more? Go to https://ActionalitypeOmbudeweb.Crossfader. org and sign in to your [employer] account. Enter X817 in the [employer] box to learn more about \"High Blood Pressure: Care Instructions. \"     If you do not have an account, please click on the \"Sign Up Now\" link. Current as of: August 31, 2020               Content Version: 12.8  © 5733-9835 [employer]. Care instructions adapted under license by [facility] ([facility]). If you have questions about a medical condition or this instruction, always ask your healthcare professional. [address] any warranty or liability for your use of this information. Patient Education        Low Sodium Diet (2,000 Milligram): Care Instructions  Overview     Limiting sodium can be an important part of managing some health problems. The most common source of sodium is salt. People get most of the salt in their diet from canned, prepared, and packaged foods. Fast food and restaurant meals also are very high in sodium. Your doctor will probably limit your sodium to less than 2,000 milligrams (mg) a day. This limit counts all the sodium in prepared and packaged foods and any salt you add to your food.   Follow-up care is a key part of your treatment and safety. Be sure to make and go to all appointments, and call your doctor if you are having problems. It's also a good idea to know your test results and keep a list of the medicines you take. How can you care for yourself at home? Read food labels  · Read labels on cans and food packages. The labels tell you how much sodium is in each serving. Make sure that you look at the serving size. If you eat more than the serving size, you have eaten more sodium. · Food labels also tell you the Percent Daily Value for sodium. Choose products with low Percent Daily Values for sodium. · Be aware that sodium can come in forms other than salt, including monosodium glutamate (MSG), sodium citrate, and sodium bicarbonate (baking soda). MSG is often added to Asian food. When you eat out, you can sometimes ask for food without MSG or added salt. Buy low-sodium foods  · Buy foods that are labeled \"unsalted\" (no salt added), \"sodium-free\" (less than 5 mg of sodium per serving), or \"low-sodium\" (140 mg or less of sodium per serving). Foods labeled \"reduced-sodium\" and \"light sodium\" may still have too much sodium. Be sure to read the label to see how much sodium you are getting. · Buy fresh vegetables, or frozen vegetables without added sauces. Buy low-sodium versions of canned vegetables, soups, and other canned goods. Prepare low-sodium meals  · Cut back on the amount of salt you use in cooking. This will help you adjust to the taste. Do not add salt after cooking. One teaspoon of salt has about 2,300 mg of sodium. · Take the salt shaker off the table. · Flavor your food with garlic, lemon juice, onion, vinegar, herbs, and spices. Do not use soy sauce, lite soy sauce, steak sauce, onion salt, garlic salt, celery salt, or ketchup on your food. · Use low-sodium salad dressings, sauces, and ketchup. Or make your own salad dressings and sauces without adding salt.   · Use less salt (or none) when recipes call for it. You can often use half the salt a recipe calls for without losing flavor. Other foods such as rice, pasta, and grains do not need added salt. · Rinse canned vegetables, and cook them in fresh water. This removes some--but not all--of the salt. · Avoid water that is naturally high in sodium or that has been treated with water softeners, which add sodium. If you buy bottled water, read the label and choose a sodium-free brand. Avoid high-sodium foods  · Avoid eating:  ? Smoked, cured, salted, and canned meat, fish, and poultry. ? Ham, parker, hot dogs, and luncheon meats. ? Regular, hard, and processed cheese and regular peanut butter. ? Crackers with salted tops, and other salted snack foods such as pretzels, chips, and salted popcorn. ? Frozen prepared meals, unless labeled low-sodium. ? Canned and dried soups, broths, and bouillon, unless labeled sodium-free or low-sodium. ? Canned vegetables, unless labeled sodium-free or low-sodium. ? Western Bee fries, pizza, tacos, and other fast foods. ? Pickles, olives, ketchup, and other condiments, especially soy sauce, unless labeled sodium-free or low-sodium. Where can you learn more? Go to https://Shadow Healthjagdisheb.dinCloud. org and sign in to your [employer]back account. Enter K932 in the [facility] box to learn more about \"Low Sodium Diet (2,000 Milligram): Care Instructions. \"     If you do not have an account, please click on the \"Sign Up Now\" link. Current as of: December 17, 2020               Content Version: 12.8  © 8943-0924 Healthwise, iSkoot. Care instructions adapted under license by [facility] ([facility]). If you have questions about a medical condition or this instruction, always ask your healthcare professional. Hieu Armijo any warranty or liability for your use of this information.

## 2021-03-11 NOTE — PROGRESS NOTES
Health Maintenance Due   Topic Date Due    Diabetic foot exam  Never done    Diabetic retinal exam  Never done pt aware    Shingles Vaccine (2 of 2) 01/29/2021

## 2021-03-17 ENCOUNTER — TELEPHONE (OUTPATIENT)
Dept: FAMILY MEDICINE CLINIC | Age: 66
End: 2021-03-17

## 2021-03-18 ENCOUNTER — TELEPHONE (OUTPATIENT)
Dept: FAMILY MEDICINE CLINIC | Age: 66
End: 2021-03-18

## 2021-04-02 ENCOUNTER — TELEPHONE (OUTPATIENT)
Dept: FAMILY MEDICINE CLINIC | Age: 66
End: 2021-04-02

## 2021-04-07 ENCOUNTER — TELEPHONE (OUTPATIENT)
Dept: FAMILY MEDICINE CLINIC | Age: 66
End: 2021-04-07

## 2021-04-08 ENCOUNTER — OFFICE VISIT (OUTPATIENT)
Dept: PHYSICAL MEDICINE AND REHAB | Age: 66
End: 2021-04-08
Payer: MEDICARE

## 2021-04-08 VITALS
DIASTOLIC BLOOD PRESSURE: 72 MMHG | HEIGHT: 65 IN | SYSTOLIC BLOOD PRESSURE: 124 MMHG | BODY MASS INDEX: 30.32 KG/M2 | WEIGHT: 182 LBS

## 2021-04-08 DIAGNOSIS — M47.816 SPONDYLOSIS OF LUMBAR REGION WITHOUT MYELOPATHY OR RADICULOPATHY: Primary | ICD-10-CM

## 2021-04-08 DIAGNOSIS — M19.012 PRIMARY OSTEOARTHRITIS OF LEFT SHOULDER: ICD-10-CM

## 2021-04-08 DIAGNOSIS — E11.42 DIABETIC POLYNEUROPATHY ASSOCIATED WITH TYPE 2 DIABETES MELLITUS (HCC): ICD-10-CM

## 2021-04-08 DIAGNOSIS — M25.571 CHRONIC PAIN OF RIGHT ANKLE: ICD-10-CM

## 2021-04-08 DIAGNOSIS — M47.816 LUMBAR FACET ARTHROPATHY: ICD-10-CM

## 2021-04-08 DIAGNOSIS — G89.29 CHRONIC PAIN OF RIGHT ANKLE: ICD-10-CM

## 2021-04-08 DIAGNOSIS — G89.4 CHRONIC PAIN SYNDROME: ICD-10-CM

## 2021-04-08 PROCEDURE — 99214 OFFICE O/P EST MOD 30 MIN: CPT | Performed by: NURSE PRACTITIONER

## 2021-04-08 PROCEDURE — G8399 PT W/DXA RESULTS DOCUMENT: HCPCS | Performed by: NURSE PRACTITIONER

## 2021-04-08 PROCEDURE — 3044F HG A1C LEVEL LT 7.0%: CPT | Performed by: NURSE PRACTITIONER

## 2021-04-08 PROCEDURE — 1036F TOBACCO NON-USER: CPT | Performed by: NURSE PRACTITIONER

## 2021-04-08 PROCEDURE — G8427 DOCREV CUR MEDS BY ELIG CLIN: HCPCS | Performed by: NURSE PRACTITIONER

## 2021-04-08 PROCEDURE — 4040F PNEUMOC VAC/ADMIN/RCVD: CPT | Performed by: NURSE PRACTITIONER

## 2021-04-08 PROCEDURE — 2022F DILAT RTA XM EVC RTNOPTHY: CPT | Performed by: NURSE PRACTITIONER

## 2021-04-08 PROCEDURE — 1090F PRES/ABSN URINE INCON ASSESS: CPT | Performed by: NURSE PRACTITIONER

## 2021-04-08 PROCEDURE — G8417 CALC BMI ABV UP PARAM F/U: HCPCS | Performed by: NURSE PRACTITIONER

## 2021-04-08 PROCEDURE — 1123F ACP DISCUSS/DSCN MKR DOCD: CPT | Performed by: NURSE PRACTITIONER

## 2021-04-08 PROCEDURE — 3017F COLORECTAL CA SCREEN DOC REV: CPT | Performed by: NURSE PRACTITIONER

## 2021-04-08 ASSESSMENT — ENCOUNTER SYMPTOMS
BACK PAIN: 1
GASTROINTESTINAL NEGATIVE: 1

## 2021-04-08 NOTE — PROGRESS NOTES
[facility] [address] [place] [address]  Dept: [phone]  Dept Fax: [phone]: [phone]    Visit Date: 4/8/2021    Camilo Bills is a 77 y.o. female who is referred for pain management evaluation and treatment per Dr. Rodriguez ref. provider found. [facility] Questions   1. In the last three months, have you felt you should cut down or stop drinking or using drugs? Yes []        No [x]     2. In the last three months, has anyone annoyed you or gotten on your nerves by telling you to cut down or stop drinking or using drugs? Yes []        No [x]     3. In the last three months, have you felt guilty or bad about how much you drink or use drugs? Yes []        No [x]     4. In the last three months, have you been waking up wanting to have an alcoholic drink or use drugs? Yes []        No [x]        Opioid Risk Tool:  Clinician Form       1. Family History of Substance Abuse: Female Male    Alcohol   []1   []3    Illegal drugs   []2   []3    Prescription drugs     []4   []4   2. Personal History of Substance Abuse:          Alcohol   []3   []3    Illegal drugs   []4   []4    Prescription drugs     []5   []5   3. Age (eliaan box if between 12 and 39):     []1   []1   4. History of Preadolescent Sexual Abuse:     []3   []0   5. Psychological Disease:      Attention deficit disorder, obsessive-compulsive disorder, bipolar, schizophrenia   []2   []2      Depression     []1   []1    Scoring Totals       Total Score  Low Risk  Moderate Risk  High Risk   Risk Category   0 - 3   4 - 7   8 or Above      Patient states symptoms interfere with:  A.  General Activity:  no   B. Mood: no    C. Walking Ability:   no   D. Normal Work (Includes both work outside the home and housework):   no    E.  Relations with Other People:  no   F. Sleep:   no   G.  Enjoyment of Life:  no       HPI: Romana Rincon is a 77 y.o. female is here today for    Chief Complaint: Low back pain, leg pain    HPI   Patient is a new patient to pain management. She was a patient of [facility] with physiatry and received her pain medications from her. Patients main pain complaint is pain across lower back constant and aching pain   Pain in bilateral legs from knees to feet- aching, numbness and tingling- allergic to Neurontin and states she did not tolerate Lyrica in the past   Patient reports that her pain is very well controlled with prn Norco. Taking it TID prn and patient tolerates daily activity. Can ambulate without walker with Norco       Medications reviewed. Patient denies side effects with medications. Patient states she is taking medications as prescribed. Shedenies receiving pain medications from other sources. She had 1 ER visit since last visit for chest pain  any ER visits since last visit. Pain scale with out pain medications or at its worst is 7-8/10. Pain scale with pain medications or at its best is 3/10. Last dose of [place]  was today  Drug screen reviewed from 2/19/2021 and was appropriate  Pill count was not completed today and instructed to bring at next visit   Patient does not have naloxone available at home. Patient has not required use of naloxone at home since last office visit. Lumbar xray:   1. There appears be sacralization of L5. Moderate degenerative facet arthropathy lower 2 lumbar levels. Moderate vertebral body spondylosis scattered in the lumbar spine. 2. No fracture is seen. Disc spaces well-maintained. Sacroiliac joints unremarkable. No appreciable change from prior study. 3. Vascular clips in the pelvis from prior surgery. Right ankle xray:      FINDINGS: Open reduction internal fixation bimalleolar fracture. 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 Seen with ossification along the interosseous membrane.  There is lucency around the mid fibular screw. Mild heterotopic ossification    adjacent to the medial tibiotalar joint. Subchondral cyst formation is present involving both the tibial plafond and the talus. There is calcaneal spurring. Degenerative changes throughout the midfoot. Soft tissue swelling around the lateral malleolus.       Impression   Stents of chronic changes detailed above report. No acute abnormality.         Left shoulder xray:      FINDINGS: No fracture or dislocation is seen. The glenohumeral joint is well-maintained. There appears be moderate spurring of the scapular glenoid. There is also mild spurring along the inferior aspect of the acromion process and there is a small    calcification adjacent to the greater tuberosity, consistent with calcific tendinitis.           Impression   Mild degenerative spurring of the shoulder. Calcific tendinitis. Cannot exclude impingement syndrome.         Cervical CT scan:   FINDINGS:           There is straightening of the normal cervical lordosis. There is calcification along the anterior longitudinal ligament between C4 and C7. There are ventral osteophytes especially at C3-4 and T1.  There is no fracture.  There is no prevertebral soft    tissue swelling. .                   At C1-2, there is normal alignment of the dens  relative to anterior arch of C1.       At C2-3, there is no disc herniation, canal or foraminal stenosis.       At C3-4, there is a 1.5 mm disc protrusion. This causes mild canal stenosis. There is mild to moderate bilateral foraminal stenosis.       At C4-5, there is no disc herniation, canal or foraminal stenosis.       At C5-C6, there is a small ventral and left-sided disc protrusion.  This causes mild canal stenosis, moderate left and mild to moderate right-sided foraminal stenosis.       At C6-7, there is no disc herniation, canal or foraminal stenosis.       At C7-T1, there is mild canal and mild-to-moderate bilateral foraminal stenosis.        There is bilateral carotid artery calcification.  There are no suspicious findings in the lung apices.            Impression       1. Straightening of the normal cervical lordosis with no acute fracture. 2. There is mild canal, moderate left and mild-to-moderate right centimeter from the C5-C6. 3. Small canal and multiple report from the C3-4 and C7-T1.   4. There are ventral osteophytes especially at T1 and at C3-4.   5. There is calcification along the anterior longitudinal ligament between C4 and C7.   6. There is bilateral carotid artery calcification. 7. Otherwise negative CT scan of the cervical spine. .           The patientis allergic to pioglitazone; actos [pioglitazone hydrochloride]; cymbalta [duloxetine hcl]; and gabapentin. Subjective:      Review of Systems   Constitutional: Negative. HENT: Negative. Eyes: Positive for visual disturbance. Wearing corrective glasses    Cardiovascular: Positive for leg swelling. Gastrointestinal: Negative. Genitourinary: Negative. Musculoskeletal: Positive for arthralgias, back pain, gait problem, joint swelling and myalgias. Negative for neck pain and neck stiffness. Neurological: Positive for weakness and numbness. Psychiatric/Behavioral: Negative. Objective:     Vitals:    04/08/21 1153   BP: 124/72   Weight: 182 lb (82.6 kg)   Height: 5' 5\" (1.651 m)       Physical Exam  Vitals signs and nursing note reviewed. Constitutional:       General: She is not in acute distress. Appearance: She is well-developed. She is not diaphoretic. HENT:      Head: Normocephalic and atraumatic. Right Ear: External ear normal.      Left Ear: External ear normal.      Nose: Nose normal.      Mouth/Throat:      Pharynx: No oropharyngeal exudate. Eyes:      General: No scleral icterus. Right eye: No discharge. Left eye: No discharge. Conjunctiva/sclera: Conjunctivae normal.      Pupils: Pupils are equal, round, and reactive to light. Neck:      Musculoskeletal: Full passive range of motion without pain, normal range of motion and neck supple. Normal range of motion. No edema, erythema, neck rigidity or muscular tenderness. Thyroid: No thyromegaly. Cardiovascular:      Rate and Rhythm: Normal rate and regular rhythm. Heart sounds: Normal heart sounds. No murmur. No friction rub. No gallop. Pulmonary:      Effort: Pulmonary effort is normal. No respiratory distress. Breath sounds: Normal breath sounds. No wheezing or rales. Chest:      Chest wall: No tenderness. Abdominal:      General: Bowel sounds are normal. There is no distension. Palpations: Abdomen is soft. Tenderness: There is no abdominal tenderness. There is no guarding or rebound. Musculoskeletal:         General: Tenderness present. Left shoulder: She exhibits decreased range of motion, tenderness and pain. Right ankle: She exhibits swelling. Tenderness. Left ankle: She exhibits swelling. Lumbar back: She exhibits decreased range of motion, tenderness and pain. Back:       Right lower leg: Edema present. Left lower leg: Edema present. Skin:     General: Skin is warm. Coloration: Skin is not pale. Findings: No erythema or rash. Neurological:      Mental Status: She is alert and oriented to person, place, and time. She is not disoriented. Cranial Nerves: No cranial nerve deficit. Sensory: Sensory deficit present. Motor: Weakness present. No atrophy or abnormal muscle tone. Coordination: Coordination normal.      Gait: Gait abnormal.      Deep Tendon Reflexes: Reflexes are normal and symmetric. Babinski sign absent on the right side. Babinski sign absent on the left side. Comments: Gait cane sometimes  4/5 bilateral lower extremitie   Psychiatric:         Attention and Perception: She is attentive. Mood and Affect: Mood normal. Mood is not anxious or depressed.  Affect xray   Discussed possible L-facet MBB, left shoulder injection but at this time pain remains well controlled with medications.  Continue Norco 5/325 TID prn- filled 3/21/2021    Updated UDS ordered today    Meds. Prescribed:   No orders of the defined types were placed in this encounter. Return in about 3 months (around 7/8/2021), or if symptoms worsen or fail to improve, for follow up  for medications.                Electronically signed by DONOVAN Zuniga CNP on4/8/2021 at 12:23 PM

## 2021-04-23 ENCOUNTER — TELEPHONE (OUTPATIENT)
Dept: FAMILY MEDICINE CLINIC | Age: 66
End: 2021-04-23

## 2021-04-28 ENCOUNTER — APPOINTMENT (OUTPATIENT)
Dept: CT IMAGING | Age: 66
End: 2021-04-28
Payer: MEDICARE

## 2021-04-28 ENCOUNTER — HOSPITAL ENCOUNTER (EMERGENCY)
Age: 66
Discharge: ANOTHER HOSPITAL | End: 2021-04-29
Attending: EMERGENCY MEDICINE
Payer: MEDICARE

## 2021-04-28 DIAGNOSIS — N39.0 SEPSIS SECONDARY TO UTI (HCC): ICD-10-CM

## 2021-04-28 DIAGNOSIS — Z94.0 HISTORY OF RENAL TRANSPLANT: ICD-10-CM

## 2021-04-28 DIAGNOSIS — I10 PRIMARY HYPERTENSION: ICD-10-CM

## 2021-04-28 DIAGNOSIS — N17.9 ACUTE KIDNEY INJURY (HCC): ICD-10-CM

## 2021-04-28 DIAGNOSIS — N39.0 URINARY TRACT INFECTION IN FEMALE: Primary | ICD-10-CM

## 2021-04-28 DIAGNOSIS — A41.9 SEPSIS SECONDARY TO UTI (HCC): ICD-10-CM

## 2021-04-28 DIAGNOSIS — E11.65 POORLY CONTROLLED DIABETES MELLITUS (HCC): ICD-10-CM

## 2021-04-28 LAB
ALBUMIN SERPL-MCNC: 4.2 G/DL (ref 3.5–5.1)
ALP BLD-CCNC: 88 U/L (ref 38–126)
ALT SERPL-CCNC: 22 U/L (ref 11–66)
ANION GAP SERPL CALCULATED.3IONS-SCNC: 15 MEQ/L (ref 8–16)
AST SERPL-CCNC: 26 U/L (ref 5–40)
BACTERIA: ABNORMAL /HPF
BILIRUB SERPL-MCNC: 0.8 MG/DL (ref 0.3–1.2)
BILIRUBIN URINE: NEGATIVE
BLOOD, URINE: ABNORMAL
BUN BLDV-MCNC: 27 MG/DL (ref 7–22)
CALCIUM SERPL-MCNC: 9.8 MG/DL (ref 8.5–10.5)
CASTS 2: PRESENT /LPF
CASTS UA: ABNORMAL /LPF
CHARACTER, URINE: ABNORMAL
CHLORIDE BLD-SCNC: 95 MEQ/L (ref 98–111)
CO2: 26 MEQ/L (ref 23–33)
COLOR: YELLOW
CREAT SERPL-MCNC: 1.5 MG/DL (ref 0.4–1.2)
CRYSTALS, UA: ABNORMAL
EPITHELIAL CELLS, UA: ABNORMAL /HPF
GLUCOSE BLD-MCNC: 314 MG/DL (ref 70–108)
GLUCOSE URINE: >= 1000 MG/DL
KETONES, URINE: ABNORMAL
LEUKOCYTE ESTERASE, URINE: ABNORMAL
LIPASE: 10.1 U/L (ref 5.6–51.3)
MISCELLANEOUS 2: ABNORMAL
NITRITE, URINE: NEGATIVE
OSMOLALITY CALCULATION: 289 MOSMOL/KG (ref 275–300)
PH UA: 5.5 (ref 5–9)
POTASSIUM SERPL-SCNC: 3.5 MEQ/L (ref 3.5–5.2)
PROTEIN UA: 30
RBC URINE: ABNORMAL /HPF
RENAL EPITHELIAL, UA: ABNORMAL
SARS-COV-2, NAAT: NOT DETECTED
SCAN OF BLOOD SMEAR: NORMAL
SODIUM BLD-SCNC: 136 MEQ/L (ref 135–145)
SPECIFIC GRAVITY, URINE: 1.02 (ref 1–1.03)
TOTAL PROTEIN: 6.8 G/DL (ref 6.1–8)
TROPONIN T: 0.03 NG/ML
UROBILINOGEN, URINE: 1 EU/DL (ref 0–1)
WBC UA: > 100 /HPF
YEAST: ABNORMAL

## 2021-04-28 PROCEDURE — 87186 SC STD MICRODIL/AGAR DIL: CPT

## 2021-04-28 PROCEDURE — 6360000002 HC RX W HCPCS

## 2021-04-28 PROCEDURE — 6360000002 HC RX W HCPCS: Performed by: EMERGENCY MEDICINE

## 2021-04-28 PROCEDURE — 2580000003 HC RX 258: Performed by: EMERGENCY MEDICINE

## 2021-04-28 PROCEDURE — 36415 COLL VENOUS BLD VENIPUNCTURE: CPT

## 2021-04-28 PROCEDURE — 83690 ASSAY OF LIPASE: CPT

## 2021-04-28 PROCEDURE — 96365 THER/PROPH/DIAG IV INF INIT: CPT

## 2021-04-28 PROCEDURE — 84484 ASSAY OF TROPONIN QUANT: CPT

## 2021-04-28 PROCEDURE — 81001 URINALYSIS AUTO W/SCOPE: CPT

## 2021-04-28 PROCEDURE — 87635 SARS-COV-2 COVID-19 AMP PRB: CPT

## 2021-04-28 PROCEDURE — 87086 URINE CULTURE/COLONY COUNT: CPT

## 2021-04-28 PROCEDURE — 93005 ELECTROCARDIOGRAM TRACING: CPT | Performed by: EMERGENCY MEDICINE

## 2021-04-28 PROCEDURE — 96376 TX/PRO/DX INJ SAME DRUG ADON: CPT

## 2021-04-28 PROCEDURE — 80053 COMPREHEN METABOLIC PANEL: CPT

## 2021-04-28 PROCEDURE — 99284 EMERGENCY DEPT VISIT MOD MDM: CPT

## 2021-04-28 PROCEDURE — 87077 CULTURE AEROBIC IDENTIFY: CPT

## 2021-04-28 PROCEDURE — 85025 COMPLETE CBC W/AUTO DIFF WBC: CPT

## 2021-04-28 PROCEDURE — 74176 CT ABD & PELVIS W/O CONTRAST: CPT

## 2021-04-28 PROCEDURE — 96374 THER/PROPH/DIAG INJ IV PUSH: CPT

## 2021-04-28 PROCEDURE — 96375 TX/PRO/DX INJ NEW DRUG ADDON: CPT

## 2021-04-28 RX ORDER — MORPHINE SULFATE 4 MG/ML
4 INJECTION, SOLUTION INTRAMUSCULAR; INTRAVENOUS ONCE
Status: COMPLETED | OUTPATIENT
Start: 2021-04-28 | End: 2021-04-28

## 2021-04-28 RX ORDER — ONDANSETRON 2 MG/ML
INJECTION INTRAMUSCULAR; INTRAVENOUS
Status: COMPLETED
Start: 2021-04-28 | End: 2021-04-28

## 2021-04-28 RX ORDER — 0.9 % SODIUM CHLORIDE 0.9 %
1000 INTRAVENOUS SOLUTION INTRAVENOUS ONCE
Status: COMPLETED | OUTPATIENT
Start: 2021-04-28 | End: 2021-04-28

## 2021-04-28 RX ORDER — ONDANSETRON 2 MG/ML
4 INJECTION INTRAMUSCULAR; INTRAVENOUS ONCE
Status: COMPLETED | OUTPATIENT
Start: 2021-04-28 | End: 2021-04-28

## 2021-04-28 RX ORDER — DEXTROSE AND SODIUM CHLORIDE 5; .45 G/100ML; G/100ML
INJECTION, SOLUTION INTRAVENOUS CONTINUOUS
Status: DISCONTINUED | OUTPATIENT
Start: 2021-04-28 | End: 2021-04-29 | Stop reason: HOSPADM

## 2021-04-28 RX ADMIN — MORPHINE SULFATE 4 MG: 4 INJECTION, SOLUTION INTRAMUSCULAR; INTRAVENOUS at 20:13

## 2021-04-28 RX ADMIN — CEFTRIAXONE SODIUM 1000 MG: 1 INJECTION, POWDER, FOR SOLUTION INTRAMUSCULAR; INTRAVENOUS at 20:46

## 2021-04-28 RX ADMIN — ONDANSETRON 4 MG: 2 INJECTION INTRAMUSCULAR; INTRAVENOUS at 17:42

## 2021-04-28 RX ADMIN — ONDANSETRON 4 MG: 2 INJECTION INTRAMUSCULAR; INTRAVENOUS at 21:00

## 2021-04-28 RX ADMIN — SODIUM CHLORIDE 1000 ML: 9 INJECTION, SOLUTION INTRAVENOUS at 17:42

## 2021-04-28 ASSESSMENT — ENCOUNTER SYMPTOMS
SORE THROAT: 0
VOMITING: 1
WHEEZING: 0
CONSTIPATION: 0
ABDOMINAL PAIN: 0
SHORTNESS OF BREATH: 0
RHINORRHEA: 0
EYE REDNESS: 0
CHEST TIGHTNESS: 0
EYE PAIN: 0
NAUSEA: 1
COUGH: 0
EYE DISCHARGE: 0
DIARRHEA: 0
BACK PAIN: 0
ABDOMINAL DISTENTION: 0
PHOTOPHOBIA: 0
STRIDOR: 0
EYE ITCHING: 0

## 2021-04-28 ASSESSMENT — PAIN DESCRIPTION - FREQUENCY: FREQUENCY: INTERMITTENT

## 2021-04-28 ASSESSMENT — PAIN DESCRIPTION - PAIN TYPE: TYPE: ACUTE PAIN

## 2021-04-28 ASSESSMENT — PAIN SCALES - GENERAL
PAINLEVEL_OUTOF10: 7
PAINLEVEL_OUTOF10: 7

## 2021-04-28 NOTE — ED NOTES
Upon initial contact pt resting in bed. Voiced no concerns. Call light within reach.  This nurse to monitor,     Mary Solo RN  04/28/21 [id number]

## 2021-04-28 NOTE — ED PROVIDER NOTES
[address] ENCOUNTER      PATIENT NAME: Carmen Britton  MRN: [id number]  : 1955  BANUELOS: 2021  PROVIDER: Sam Raza MD      CHIEF COMPLAINT       Chief Complaint   Patient presents with    Emesis    Dizziness    Dysuria       Nurses Notes reviewed and I agree except as noted in the HPI. HISTORY OF PRESENT ILLNESS    Carmen Britton is a 77 y.o. female who presents to Emergency Department with Emesis, Dizziness, and Dysuria    78-year-old female with past medical history of kidney transplant presents to ED complaining nausea vomiting and dysuria since early morning. She is s/p kidney transplant on 4/10/2020 at [facility]. Patient is concerned that she may have dehydration and UTI. No fever, no chills. No flank pain. She feels lower abdominal and suprapubic discomfort with burning sensation during urination. Pain intensity now at 3/10. No hematuria. This HPI was provided by the patient. REVIEW OF SYSTEMS     Review of Systems   Constitutional: Negative for activity change, appetite change, chills, fatigue, fever and unexpected weight change. HENT: Negative for congestion, ear discharge, ear pain, hearing loss, nosebleeds, rhinorrhea and sore throat. Eyes: Negative for photophobia, pain, discharge, redness and itching. Respiratory: Negative for cough, chest tightness, shortness of breath, wheezing and stridor. Cardiovascular: Negative for chest pain, palpitations and leg swelling. Gastrointestinal: Positive for nausea and vomiting. Negative for abdominal distention, abdominal pain, constipation and diarrhea. Endocrine: Negative for cold intolerance, heat intolerance, polydipsia and polyphagia. Genitourinary: Positive for dysuria. Negative for flank pain, frequency and hematuria. Musculoskeletal: Negative for arthralgias, back pain, gait problem, myalgias, neck pain and neck stiffness. Skin: Negative for pallor, rash and wound. Allergic/Immunologic: Negative for environmental allergies and food allergies. Neurological: Negative for dizziness, tremors, syncope, weakness and headaches. Psychiatric/Behavioral: Negative for agitation, behavioral problems, confusion, self-injury, sleep disturbance and suicidal ideas.         PAST MEDICAL HISTORY     Past Medical History:   Diagnosis Date    Anemia associated with chronic renal failure     Aranesp 150 micrograms every other week given at [facility]. [facility]    Anxiety     Arthritis     Backache     Blood circulation, collateral     Blood transfusion     CAD (coronary artery disease)     Cellulitis in diabetic foot ([address] 03/03/2017    4th and 5th toes right foot    Chest pain     History of    CHF (congestive heart failure) ([address] 1998    Dx'ed by Dr. Bruce Vallecillo Chronic anemia     Chronic kidney disease     Chronic kidney disease, stage III (moderate)     Chronic renal insufficiency 2010    COPD (chronic obstructive pulmonary disease) ([facility]) 2012    Dr. 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 Lyda Schirmer    HTN (hypertension) 1970's    Hyperlipemia 1998    Iron deficiency anemia due to dietary causes 6/21/2018    Kidney stones 3/2014    Kidney trouble          MRSA infection 03/2017    right foot-Dr. Sabas Johnson (podiatrist)    Neuromuscular disorder ([address]     Neuropathy 1989    diabetic neuropathy    Obesity since childhoood    CONTRERAS on CPAP 2010    Dr. Annabella Mercado    Pneumonia     PONV (postoperative nausea and vomiting)     Seizures ([address]     Sepsis due to Escherichia coli ([address] 07/2020       SURGICAL HISTORY       Past Surgical History:   Procedure Laterality Date    ANKLE SURGERY Right 02-10-14    [facility]  1990's    removal of benign tumor   [address]  2008   [address]    COLONOSCOPY  2009    2 polyps, not precanceorus    COLONOSCOPY Left 6/10/2019    COLONOSCOPY DIAGNOSTIC performed by Eunice Rosales MD at [address] [facility]  3/30/2012    eye lid lift    DIAGNOSTIC CARDIAC CATH LAB PROCEDURE      ENDOSCOPY, COLON, DIAGNOSTIC  2007   Oren Galla EYE SURGERY  March 30th, 2012    left sided ptosis    FOOT SURGERY  1990    Tarsal tunnel surgery    FRACTURE SURGERY  2015   [address] and [address]    first partial in 1980's, then total in [address] [facility]  2015   [address] [facility]  04/10/2020    RIGHT    LIVER BIOPSY  6/2015    LUNG BIOPSY  2009    MS OFFICE/OUTPT VISIT,PROCEDURE ONLY Left 8/23/2018    LEFT UPPER EXTREMENTY AV FISTULA CREATION performed by Gaudencio Hanson MD at [address] Left 6/14/2019    EGD BIOPSY performed by Eunice Rosales MD at [address] [facility]       Previous Medications    ALBUTEROL SULFATE HFA (PROAIR HFA) 108 (90 BASE) MCG/ACT INHALER    Inhale 2 puffs into the lungs every 6 hours as needed for Wheezing or Shortness of Breath    ASPIRIN 81 MG EC TABLET    Take 81 mg by mouth daily.       ATORVASTATIN (LIPITOR) 40 MG TABLET    TAKE 1 TABLET DAILY    CARVEDILOL (COREG) 25 MG TABLET    2 tablets BID    CLONIDINE (CATAPRES) 0.3 MG TABLET    Take 0.3 mg by mouth 3 times daily     DOCUSATE SODIUM (COLACE) 100 MG CAPSULE    Take 200 mg by mouth 2 times daily    FLUTICASONE (FLONASE) 50 MCG/ACT NASAL SPRAY    1 spray by Each Nostril route daily as needed for Rhinitis    INSULIN ASPART (NOVOLOG FLEXPEN) 100 UNIT/ML INJECTION PEN    Sliding scale insulin coverage Glucose:Dose: If Less [id number] =No Insulin/ 140-199= 2 Units/ 200-249=4 Units/ 250-299= 6 Units/  300-349=8 Units/  350-400=10 Units/ Above 400 = 12 Units max 48 units daily INSULIN GLARGINE (LANTUS SOLOSTAR) 100 UNIT/ML INJECTION PEN    Inject 25 Units into the skin nightly    INSULIN PEN NEEDLE (B-D ULTRAFINE III SHORT PEN) 31G X 8 MM MISC    Use three times daily Diagnosis Code E11.9    LACTULOSE (CHRONULAC) 10 GM/15ML SOLUTION    Take twice daily as needed for constipation    LINACLOTIDE (LINZESS) 290 MCG CAPS CAPSULE    Take 145 mcg by mouth every other day     MAGNESIUM OXIDE (MAG-OX) 400 MG TABLET    Take 400 mg by mouth 2 times daily    MYCOPHENOLATE SODIUM 360 MG TBEC    Take 360 mg by mouth 2 tablets BID    NIFEDIPINE (ADALAT CC) 60 MG EXTENDED RELEASE TABLET    Take 60 mg by mouth 2 times daily    NITROGLYCERIN (NITROSTAT) 0.4 MG SL TABLET    Place 1 tablet under the tongue every 5 minutes as needed for Chest pain    PANTOPRAZOLE (PROTONIX) 40 MG TABLET    Take 40 mg by mouth 2 times daily    SULFAMETHOXAZOLE-TRIMETHOPRIM (BACTRIM DS;SEPTRA DS) 800-160 MG PER TABLET    Take 1 tablet by mouth daily    TACROLIMUS (PROGRAF) 1 MG CAPSULE    Take 1 mg by mouth 7 CAPSULES EVERY MORNING AND 6 CAPSULES EVERY EVENING    TIOTROPIUM (SPIRIVA RESPIMAT) 2.5 MCG/ACT AERS INHALER    Inhale 2 puffs into the lungs daily    VITAMIN D (ERGOCALCIFEROL) 1.25 MG (49790 UT) CAPS CAPSULE    TAKE 1 CAPSULE BY MOUTH EVERY 14 DAYS ON        ALLERGIES     Pioglitazone, Actos [pioglitazone hydrochloride], Cymbalta [duloxetine hcl], and Gabapentin    FAMILY HISTORY     She indicated that her mother is . She indicated that her father is . She indicated that two of her five sisters are alive. She indicated that all of her four brothers are alive. She indicated that her maternal grandmother is . She indicated that her maternal grandfather is . She indicated that her paternal grandmother is . She indicated that her paternal grandfather is .    family history includes Alcohol Abuse in her father; Arthritis in her mother; Cancer in her sister; Diabetes in her brother, brother, father, sister, sister, sister, sister, and sister; Early Death in her sister; Heart Disease in her father, mother, sister, and sister; High Blood Pressure in her mother; Kidney Disease in her mother; Mental Illness in her mother; Obesity in her father; Stroke in her mother. SOCIAL HISTORY      reports that she quit smoking about 12 years ago. Her smoking use included cigarettes. She started smoking about 41 years ago. She has a 45.00 pack-year smoking history. She has never used smokeless tobacco. She reports that she does not drink alcohol or use drugs. PHYSICAL EXAM     INITIAL VITALS:    height is 5' 5\" (1.651 m) and weight is 184 lb (83.5 kg). Her oral temperature is 98.7 °F (37.1 °C). Her blood pressure is 171/57 (abnormal) and her pulse is 80. Her respiration is 18 and oxygen saturation is 95%. Physical Exam  Vitals signs and nursing note reviewed. Constitutional:       Appearance: She is well-developed. She is not diaphoretic. HENT:      Head: Normocephalic and atraumatic. Nose: Nose normal.   Eyes:      General: No scleral icterus. Right eye: No discharge. Left eye: No discharge. Conjunctiva/sclera: Conjunctivae normal.      Pupils: Pupils are equal, round, and reactive to light. Neck:      Musculoskeletal: Normal range of motion and neck supple. Vascular: No JVD. Trachea: No tracheal deviation. Cardiovascular:      Rate and Rhythm: Normal rate and regular rhythm. Heart sounds: Normal heart sounds. No murmur. No friction rub. No gallop. Pulmonary:      Effort: Pulmonary effort is normal. No respiratory distress. Breath sounds: Normal breath sounds. No stridor. No wheezing or rales. Chest:      Chest wall: No tenderness. Abdominal:      General: Bowel sounds are normal. There is no distension. Palpations: Abdomen is soft. There is no mass. Tenderness: There is no abdominal tenderness.  There is no guarding or rebound. Hernia: No hernia is present. Musculoskeletal:         General: No tenderness or deformity. Lymphadenopathy:      Cervical: No cervical adenopathy. Skin:     General: Skin is warm and dry. Capillary Refill: Capillary refill takes less than 2 seconds. Coloration: Skin is not pale. Findings: No erythema or rash. Neurological:      Mental Status: She is alert and oriented to person, place, and time. Cranial Nerves: No cranial nerve deficit. Sensory: No sensory deficit. Motor: No abnormal muscle tone. Coordination: Coordination normal.      Deep Tendon Reflexes: Reflexes normal.   Psychiatric:         Behavior: Behavior normal.         Thought Content: Thought content normal.         Judgment: Judgment normal.         MEDICAL DEDISION MAKINGS:   4:43 PM: Patient is seen and evaluated in a timely fashion. ED nurse's documentations are reviewed. DIFFERENTIAL DIAGNOSIS:  Cystitis, UTI, pyelonephritis, dehydration, electrolyte imbalance    EKG:    Interpreted by ED physician  Not indicated    LAB RESULTS:  Results for orders placed or performed during the hospital encounter of 04/28/21   CBC Auto Differential   Result Value Ref Range    WBC 14.0 (H) 4.8 - 10.8 thou/mm3    RBC 3.77 (L) 4.20 - 5.40 mill/mm3    Hemoglobin 10.2 (L) 12.0 - 16.0 gm/dl    Hematocrit 33.8 (L) 37.0 - 47.0 %    MCV 89.7 81.0 - 99.0 fL    MCH 27.1 26.0 - 33.0 pg    MCHC 30.2 (L) 32.2 - 35.5 gm/dl    RDW-CV 13.7 11.5 - 14.5 %    RDW-SD 44.8 35.0 - 45.0 fL    Platelets 679 574 - 014 thou/mm3    MPV 11.3 9.4 - 12.4 fL    Metamyelocytes 12 %    Myelocytes 5 %    Platelet Estimate ADEQUATE Adequate   Comprehensive Metabolic Panel   Result Value Ref Range    Glucose 314 (H) 70 - 108 mg/dL    CREATININE 1.5 (H) 0.4 - 1.2 mg/dL    BUN 27 (H) 7 - 22 mg/dL    Sodium 136 135 - 145 meq/L    Potassium 3.5 3.5 - 5.2 meq/L    Chloride 95 (L) 98 - 111 meq/L    CO2 26 23 - 33 meq/L    Calcium 9.8 8.5 - 10.5 mg/dL    AST 26 5 - 40 U/L    Alkaline Phosphatase 88 38 - 126 U/L    Total Protein 6.8 6.1 - 8.0 g/dL    Albumin 4.2 3.5 - 5.1 g/dL    Total Bilirubin 0.8 0.3 - 1.2 mg/dL    ALT 22 11 - 66 U/L   Lipase   Result Value Ref Range    Lipase 10.1 5.6 - 51.3 U/L   Troponin   Result Value Ref Range    Troponin T 0.028 (A) ng/ml   Anion Gap   Result Value Ref Range    Anion Gap 15.0 8.0 - 16.0 meq/L   Glomerular Filtration Rate, Estimated   Result Value Ref Range    Est, Glom Filt Rate 42 (A) ml/min/1.73m2   Osmolality   Result Value Ref Range    Osmolality Calc 289.0 275.0 - 300.0 mOsmol/kg   Scan of Blood Smear   Result Value Ref Range    SCAN OF BLOOD SMEAR see below    Urine with Reflexed Micro   Result Value Ref Range    Glucose, Ur >= 1000 (A) NEGATIVE mg/dl    Bilirubin Urine NEGATIVE NEGATIVE    Ketones, Urine TRACE (A) NEGATIVE    Specific Gravity, Urine 1.023 1.002 - 1.030    Blood, Urine SMALL (A) NEGATIVE    pH, UA 5.5 5.0 - 9.0    Protein, UA 30 (A) NEGATIVE    Urobilinogen, Urine 1.0 0.0 - 1.0 eu/dl    Nitrite, Urine NEGATIVE NEGATIVE    Leukocyte Esterase, Urine SMALL (A) NEGATIVE    Color, UA YELLOW STRAW-YELLOW    Character, Urine CLOUDY (A) CLEAR-SL CLOUD    RBC, UA 3-5 0-2/hpf /hpf    WBC, UA > 100 0-4/hpf /hpf    Epithelial Cells, UA 0-2 3-5/hpf /hpf    Bacteria, UA MANY FEW/NONE SEEN /hpf    Casts UA >15 HYALINE NONE SEEN /lpf    Crystals, UA NONE SEEN NONE SEEN    Renal Epithelial, UA NONE SEEN NONE SEEN    Yeast, UA NONE SEEN NONE SEEN    CASTS 2 PRESENT NONE SEEN /lpf    MISCELLANEOUS 2 NONE SEEN    EKG 12 Lead   Result Value Ref Range    Ventricular Rate 72 BPM    Atrial Rate 72 BPM    P-R Interval 168 ms    QRS Duration 82 ms    Q-T Interval 414 ms    QTc Calculation (Bazett) 453 ms    P Axis 53 degrees    R Axis 23 degrees    T Axis 34 degrees       RADIOLOGY  CT ABDOMEN PELVIS WO CONTRAST Additional Contrast? None   Final Result   1. Right hemipelvic renal transplant.  Infiltration of fat adjacent to the    transplanted kidney. Correlate with urinalysis to exclude pyelonephritis. 2. Tiny pocket of gas within the bladder lumen. This could be secondary to    recent catheterization or cystitis. 3.  4.9 cm circumscribed hyperdense nodule within the left kidney without    change. This likely represents a hemorrhagic cyst but is incompletely    evaluated by this study. This document has been electronically signed by: Oleksandr Valerio MD on    04/28/2021 08:56 PM      All CTs at this facility use dose modulation techniques and iterative    reconstructions, and/or weight-based dosing   when appropriate to reduce radiation to a low as reasonably achievable. RATIONALE:      Medications   cefTRIAXone (ROCEPHIN) 1000 mg IVPB in 50 mL D5W minibag (1,000 mg Intravenous New Bag 4/28/21 2046)   dextrose 5 % and 0.45 % sodium chloride infusion (has no administration in time range)   0.9 % sodium chloride bolus (0 mLs Intravenous Stopped 4/28/21 1931)   ondansetron (ZOFRAN) injection 4 mg (4 mg Intravenous Given 4/28/21 1742)   morphine injection 4 mg (4 mg Intravenous Given 4/28/21 2013)   ondansetron (ZOFRAN) 4 MG/2ML injection (4 mg  Given 4/28/21 2100)     She has UTI and VIDA with transplant kidney. Admission is warranted. Discussed with [facility] transplant team and accepted by Dr. Steven Castañeda as a direct admission. CRITICAL CARE:   None    CONSULTS:  [facility] transfer center    PROCEDURES:  None    RE-EXAMINATION AND RE-EVALUATION   Stable and feeling better. VITALS IN ED  Vitals:    04/28/21 1654 04/28/21 1754 04/28/21 1905 04/28/21 2010   BP: (!) 159/61 (!) 157/65 (!) 147/60 (!) 171/57   Pulse: 77 77 83 80   Resp: 24 19 29 18   Temp:       TempSrc:       SpO2: 95% 95% 97% 95%   Weight:       Height:           FINAL IMPRESSION      1. Urinary tract infection in female    2. History of renal transplant    3. Poorly controlled diabetes mellitus ([facility]    4. Sepsis secondary to UTI ([facility]    5.  Acute kidney injury ([facility]    6. Primary hypertension          DISPOSITION/PLAN   Admit    PATIENT REFERRED TO:  No follow-up provider specified.     DISCHARGE MEDICATIONS:  New Prescriptions    No medications on file       (Please note that portions of this note were completed with a voice recognition program.  Efforts were made to edit the dictations but occasionally words aremis-transcribed.)    MD Isabella Sheehan MD  04/28/21 2136

## 2021-04-28 NOTE — ED NOTES
ED nurse-to-nurse bedside report    Chief Complaint   Patient presents with    Emesis    Dizziness    Dysuria      LOC: alert and orientated to name, place, date  Vital signs   Vitals:    04/28/21 1555 04/28/21 1654 04/28/21 1754 04/28/21 1905   BP: (!) 159/62 (!) 159/61 (!) 157/65 (!) 147/60   Pulse: 74 77 77 83   Resp: 26 24 19 29   Temp: 98.7 °F (37.1 °C)      TempSrc: Oral      SpO2: 98% 95% 95% 97%   Weight: 184 lb (83.5 kg)      Height: 5' 5\" (1.651 m)         Pain:    Pain Interventions: nonpharmacological   Pain Goal: 0  Oxygen: No    Current needs required room air    Telemetry: Yes  LDAs:   Peripheral IV 04/28/21 Right Antecubital (Active)   Site Assessment Clean;Dry; Intact 04/28/21 1603   Line Status Blood return noted;Specimen collected; Flushed;Normal saline locked 04/28/21 1603   Dressing Status Clean; Intact;Dry 04/28/21 1603   Dressing Intervention New 04/28/21 1603     Continuous Infusions:   Mobility: Requires assistance * 1  Abbasi Fall Risk Score: Fall Risk 3/11/2021 11/13/2019 9/4/2019 1/8/2019   2 or more falls in past year?  yes yes no no   Fall with injury in past year? no no no no     Fall Interventions: side rails up x2, call light   Report given to: ANDREW Del Rosario RN  04/28/21 4090

## 2021-04-28 NOTE — ED TRIAGE NOTES
Patient to ED with complaints of a possible UTI and dehydration. Patient states its painful with urination and patient states she is dizzy as well. Patient denies chest pain. Denies recent illness. Patient walked to room with walker.  Patient resting on cot,  respirations unlabored

## 2021-04-29 ENCOUNTER — TELEPHONE (OUTPATIENT)
Dept: FAMILY MEDICINE CLINIC | Age: 66
End: 2021-04-29

## 2021-04-29 VITALS
DIASTOLIC BLOOD PRESSURE: 58 MMHG | RESPIRATION RATE: 16 BRPM | HEIGHT: 65 IN | OXYGEN SATURATION: 95 % | SYSTOLIC BLOOD PRESSURE: 143 MMHG | WEIGHT: 184 LBS | TEMPERATURE: 98.7 F | HEART RATE: 82 BPM | BODY MASS INDEX: 30.66 KG/M2

## 2021-04-29 LAB
BASOPHILS # BLD: 0 %
BASOPHILS ABSOLUTE: 0 THOU/MM3 (ref 0–0.1)
DOHLE BODIES: PRESENT
EKG ATRIAL RATE: 72 BPM
EKG P AXIS: 53 DEGREES
EKG P-R INTERVAL: 168 MS
EKG Q-T INTERVAL: 414 MS
EKG QRS DURATION: 82 MS
EKG QTC CALCULATION (BAZETT): 453 MS
EKG R AXIS: 23 DEGREES
EKG T AXIS: 34 DEGREES
EKG VENTRICULAR RATE: 72 BPM
EOSINOPHIL # BLD: 0 %
EOSINOPHILS ABSOLUTE: 0 THOU/MM3 (ref 0–0.4)
ERYTHROCYTE [DISTWIDTH] IN BLOOD BY AUTOMATED COUNT: 13.7 % (ref 11.5–14.5)
ERYTHROCYTE [DISTWIDTH] IN BLOOD BY AUTOMATED COUNT: 44.8 FL (ref 35–45)
HCT VFR BLD CALC: 33.8 % (ref 37–47)
HEMOGLOBIN: 10.2 GM/DL (ref 12–16)
IMMATURE GRANS (ABS): 3.34 THOU/MM3 (ref 0–0.07)
IMMATURE GRANULOCYTES: 23.8 %
LYMPHOCYTES # BLD: 5 %
LYMPHOCYTES ABSOLUTE: 0.7 THOU/MM3 (ref 1–4.8)
MCH RBC QN AUTO: 27.1 PG (ref 26–33)
MCHC RBC AUTO-ENTMCNC: 30.2 GM/DL (ref 32.2–35.5)
MCV RBC AUTO: 89.7 FL (ref 81–99)
METAMYELOCYTES: 12 %
MONOCYTES # BLD: 4 %
MONOCYTES ABSOLUTE: 0.6 THOU/MM3 (ref 0.4–1.3)
MYELOCYTES: 5 %
NUCLEATED RED BLOOD CELLS: 0 /100 WBC
PATHOLOGIST REVIEW: ABNORMAL
PLATELET # BLD: 185 THOU/MM3 (ref 130–400)
PLATELET ESTIMATE: ADEQUATE
PMV BLD AUTO: 11.3 FL (ref 9.4–12.4)
POIKILOCYTES: ABNORMAL
RBC # BLD: 3.77 MILL/MM3 (ref 4.2–5.4)
SEG NEUTROPHILS: 74 %
SEGMENTED NEUTROPHILS ABSOLUTE COUNT: 10.4 THOU/MM3 (ref 1.8–7.7)
WBC # BLD: 14 THOU/MM3 (ref 4.8–10.8)

## 2021-04-29 PROCEDURE — 2580000003 HC RX 258: Performed by: EMERGENCY MEDICINE

## 2021-04-29 PROCEDURE — 6360000002 HC RX W HCPCS: Performed by: EMERGENCY MEDICINE

## 2021-04-29 RX ORDER — ONDANSETRON 2 MG/ML
4 INJECTION INTRAMUSCULAR; INTRAVENOUS ONCE
Status: COMPLETED | OUTPATIENT
Start: 2021-04-29 | End: 2021-04-29

## 2021-04-29 RX ORDER — MORPHINE SULFATE 4 MG/ML
4 INJECTION, SOLUTION INTRAMUSCULAR; INTRAVENOUS ONCE
Status: COMPLETED | OUTPATIENT
Start: 2021-04-29 | End: 2021-04-29

## 2021-04-29 RX ADMIN — DEXTROSE AND SODIUM CHLORIDE: 5; 450 INJECTION, SOLUTION INTRAVENOUS at 00:48

## 2021-04-29 RX ADMIN — MORPHINE SULFATE 4 MG: 4 INJECTION, SOLUTION INTRAMUSCULAR; INTRAVENOUS at 01:33

## 2021-04-29 RX ADMIN — ONDANSETRON 4 MG: 2 INJECTION INTRAMUSCULAR; INTRAVENOUS at 01:34

## 2021-04-29 ASSESSMENT — PAIN SCALES - GENERAL: PAINLEVEL_OUTOF10: 7

## 2021-04-29 NOTE — ED NOTES
Pt resting in bed. Voiced no concerns. Call light within reach. Will monitor.       David Luis RN  04/29/21 2015

## 2021-04-29 NOTE — LETTER
[address] [address],[address] [facility]  [address] [facility] [address]  Phone: [phone]  Fax: [address], APRN - CNP        April 29, 2021    [facility]  [address] [address]      Dear Espinoza Wilde:      [address] [facility]  [address], [address]  Phone: [phone]  Fax: [phone]    April 29, 2021    [facility]  [address]    Dear Espinoza Wilde,    This letter is regarding your Emergency Department (ED) visit at [address] [address] on 4/29/21. Rolly Ouch j Marnell Krabbe wanted to make sure that you understand your discharge instructions and that you were able to fill any prescriptions that may have been ordered for you. Please contact the office at the above phone number if the ED advised you to follow up with Nigel Chadwick, or if you have any further questions or needs. Also did you know -   *Visiting the ED for a non-emergency could result in higher co-pays than you would normally be subject to paying? *You can call your doctor even after hours so they can direct you to the most appropriate care. [facility]) practices can often offer you an appointment on the same day that you call. *We have some [facility] offices that offer Walk-in appointments; check our website for availability in your community, [email or web address].      *Evisits are now available for patients for $36 through [employer] for certain conditions:  * Sinus, cold and or cough       * Diarrhea            * Headache  * Heartburn                                * Poison Urvashi          * Back pain     * Urinary problems                         If you do not have [employer] and are interested, please contact the office and a staff member may assist you or go to [email or web address].     Sincerely,   Landy Mandel,

## 2021-04-29 NOTE — PROGRESS NOTES
[facility]  [address]  Dept: [phone]  Dept Fax: [phone]: [phone]    Visit Date: 9/18/2019    Functionality Assessment/Goals Worksheet     On a scale of 0 (Does not Interfere) to 10 (Completely Interferes)     1. Which number describes how during the past week pain has interfered with       the following:  A. General Activity:  8  B. Mood: 6  C. Walking Ability:  7  D. Normal Work (Includes both work outside the home and housework):  7  E. Relations with Other People:   6  F. Sleep:   2  G. Enjoyment of Life:   5    2. Patient Prefers to Take their Pain Medications:     []  On a regular basis   [x]  Only when necessary    []  Does not take pain medications    3. What are the Patient's Goals/Expectations for Visiting Pain Management? [x]  Learn about my pain    []  Receive Medication   []  Physical Therapy     []  Treat Depression   []  Receive Injections    []  Treat Sleep   []  Deal with Anxiety and Stress   []  Treat Opoid Dependence/Addiction   []  Other:      HPI:   Kwame Buenrostro is a 70 y.o. female is here today for    Chief Complaint:  3 month follow up    HPI     States continues with increase right ankle pain. In the process evaluation for kidney transplant. Medications reviewed. Patient denies side effects with medications. Patient states she is taking medications as prescribed. Shedenies receiving pain medications from other sources. She denies any ER visits since last visit. Pain scale with out pain medications or at its worst is 8/10. Pain scale with pain medications or at its best is 6/10. Last dose of Norco was today  Drug screen reviewed from 06/24/19 and was appropriate  Pill count was not completed today and patient was educated on bringing in medications  Patient does not have naloxone available at home.  Patient has not required use Lilian Norton comes into clinic today at the request of Caryle Tomczyk, CNP Ordering Provider for Cystic Fibrosis Culture [employer] Bacterial.    Pt. tolerated well.    This service provided today was under the supervising provider of the day Dr. Caleb Ortiz, who was available if needed.    MARIN Heard     sister, and sister; Early Death in her sister; Heart Disease in her father, mother, sister, and sister; High Blood Pressure in her mother; Kidney Disease in her mother; Mental Illness in her mother; Obesity in her father; Stroke in her mother. Social History  Nishant Huber  reports that she quit smoking about 10 years ago. Her smoking use included cigarettes. She started smoking about 37 years ago. She has a 45.00 pack-year smoking history. She has never used smokeless tobacco. She reports that she does not drink alcohol or use drugs. Medications    Current Outpatient Medications:     HYDROcodone-acetaminophen (NORCO) 5-325 MG per tablet, Take 1 tablet by mouth every 8 hours as needed for Pain for up to 30 days. Fill date 09/22/2019, Disp: 90 tablet, Rfl: 0    naloxone 4 MG/0.1ML LIQD nasal spray, 1 spray by Nasal route as needed for Opioid Reversal, Disp: 1 each, Rfl: 5    lactulose (CHRONULAC) 10 GM/15ML solution, TAKE 30ML BY MOUTH 2 TIMES DAILY AS NEEDED CONSTIPATION. USE IF NO BM WITH OTHER SOFTNERS, Disp: 473 mL, Rfl: 0    amLODIPine (NORVASC) 10 MG tablet, TAKE 1 TABLET BY MOUTH EVERY DAY, Disp: 30 tablet, Rfl: 5    folic acid-pyridoxine-cyanocobalamine (FOLBEE) 2.5-25-1 MG TABS tablet, TAKE 1 TABLET BY MOUTH DAILY, Disp: 30 tablet, Rfl: 5    Insulin Pen Needle 31G X 8 MM MISC, 1 each by Does not apply route 3 times daily, Disp: 300 each, Rfl: 5    carvedilol (COREG) 25 MG tablet, Take 1.5 tablets by mouth 2 times daily . hold if SBP less than 100 mm hg or HR less than 60, Disp: 270 tablet, Rfl: 3    insulin glargine (LANTUS SOLOSTAR) 100 UNIT/ML injection pen, Inject 15 Units into the skin nightly, Disp: 15 pen, Rfl: 5    insulin aspart (NOVOLOG FLEXPEN) 100 UNIT/ML injection pen, Sliding scale insulin coverage Glucose:Dose: If Less [id number] =No Insulin/ 140-199= 2 Units/ 200-249=4 Units/ 250-299= 6 Units/  300-349=8 Units/  350-400=10 Units/ Above 400 = 12 Units, Disp: 15 pen, Rfl: 5    atorvastatin  HYDROcodone-acetaminophen (NORCO) 5-325 MG per tablet     Sig: Take 1 tablet by mouth every 8 hours as needed for Pain for up to 30 days. Fill date 2019     Dispense:  90 tablet     Refill:  0     Reduce doses taken as pain becomes manageable    naloxone 4 MG/0.1ML LIQD nasal spray     Si spray by Nasal route as needed for Opioid Reversal     Dispense:  1 each     Refill:  5       Return in about 3 months (around 2019) for with Griffin Muro.          Electronically signed by Katiuska Harris MD on2019 at 1:53 PM

## 2021-04-29 NOTE — ED NOTES
Pt in bed vomiting and reporting increased pain. Dr. Jil Lindsay made aware.  Verbal order given for zofran and morphine      Himanshu Bennett RN  04/29/21 1949

## 2021-04-30 LAB
ORGANISM: ABNORMAL
URINE CULTURE REFLEX: ABNORMAL

## 2021-05-07 ENCOUNTER — TELEPHONE (OUTPATIENT)
Dept: FAMILY MEDICINE CLINIC | Age: 66
End: 2021-05-07

## 2021-05-10 ENCOUNTER — TELEPHONE (OUTPATIENT)
Dept: FAMILY MEDICINE CLINIC | Age: 66
End: 2021-05-10

## 2021-05-10 NOTE — TELEPHONE ENCOUNTER
Primo Chiang from [address] called asking how often she should be getting a UA on the pt d/t frequent UTIs. Please advise.

## 2021-05-11 DIAGNOSIS — R30.0 DYSURIA: Primary | ICD-10-CM

## 2021-05-11 NOTE — TELEPHONE ENCOUNTER
I called Chu Estrella and asked her if this was ok and she said this should be ok. She will put in an order for us to sign.

## 2021-05-12 ENCOUNTER — TELEPHONE (OUTPATIENT)
Dept: FAMILY MEDICINE CLINIC | Age: 66
End: 2021-05-12

## 2021-05-12 ENCOUNTER — OFFICE VISIT (OUTPATIENT)
Dept: FAMILY MEDICINE CLINIC | Age: 66
End: 2021-05-12
Payer: MEDICARE

## 2021-05-12 VITALS
SYSTOLIC BLOOD PRESSURE: 110 MMHG | HEART RATE: 60 BPM | DIASTOLIC BLOOD PRESSURE: 42 MMHG | OXYGEN SATURATION: 98 % | WEIGHT: 181 LBS | TEMPERATURE: 98.7 F | BODY MASS INDEX: 30.12 KG/M2 | RESPIRATION RATE: 15 BRPM

## 2021-05-12 DIAGNOSIS — Z99.2 ESRD (END STAGE RENAL DISEASE) ON DIALYSIS (HCC): ICD-10-CM

## 2021-05-12 DIAGNOSIS — Z09 HOSPITAL DISCHARGE FOLLOW-UP: Primary | ICD-10-CM

## 2021-05-12 DIAGNOSIS — Z79.4 TYPE 2 DIABETES MELLITUS WITH STAGE 5 CHRONIC KIDNEY DISEASE NOT ON CHRONIC DIALYSIS, WITH LONG-TERM CURRENT USE OF INSULIN (HCC): ICD-10-CM

## 2021-05-12 DIAGNOSIS — G62.81 POLYNEUROPATHY ASSOCIATED WITH CRITICAL ILLNESS (HCC): ICD-10-CM

## 2021-05-12 DIAGNOSIS — I50.33 ACUTE ON CHRONIC DIASTOLIC CONGESTIVE HEART FAILURE (HCC): ICD-10-CM

## 2021-05-12 DIAGNOSIS — L89.129: ICD-10-CM

## 2021-05-12 DIAGNOSIS — N25.81 SECONDARY HYPERPARATHYROIDISM OF RENAL ORIGIN (HCC): ICD-10-CM

## 2021-05-12 DIAGNOSIS — N18.6 ESRD (END STAGE RENAL DISEASE) ON DIALYSIS (HCC): ICD-10-CM

## 2021-05-12 DIAGNOSIS — E11.22 TYPE 2 DIABETES MELLITUS WITH STAGE 5 CHRONIC KIDNEY DISEASE NOT ON CHRONIC DIALYSIS, WITH LONG-TERM CURRENT USE OF INSULIN (HCC): ICD-10-CM

## 2021-05-12 DIAGNOSIS — N18.5 TYPE 2 DIABETES MELLITUS WITH STAGE 5 CHRONIC KIDNEY DISEASE NOT ON CHRONIC DIALYSIS, WITH LONG-TERM CURRENT USE OF INSULIN (HCC): ICD-10-CM

## 2021-05-12 LAB — HBA1C MFR BLD: 7.1 % (ref 4.3–5.7)

## 2021-05-12 PROCEDURE — 1111F DSCHRG MED/CURRENT MED MERGE: CPT | Performed by: NURSE PRACTITIONER

## 2021-05-12 PROCEDURE — 99214 OFFICE O/P EST MOD 30 MIN: CPT | Performed by: NURSE PRACTITIONER

## 2021-05-12 RX ORDER — FAMOTIDINE 40 MG/1
40 TABLET, FILM COATED ORAL 2 TIMES DAILY PRN
COMMUNITY
Start: 2021-03-16

## 2021-05-12 RX ORDER — CEFDINIR 300 MG/1
300 CAPSULE ORAL 2 TIMES DAILY
COMMUNITY
End: 2021-06-04 | Stop reason: ALTCHOICE

## 2021-05-12 RX ORDER — CLONIDINE HYDROCHLORIDE 0.2 MG/1
0.2 TABLET ORAL 3 TIMES DAILY
COMMUNITY
End: 2022-06-15 | Stop reason: ALTCHOICE

## 2021-05-12 RX ORDER — IRON POLYSACCHARIDE COMPLEX 150 MG
150 CAPSULE ORAL DAILY
COMMUNITY
Start: 2021-05-04 | End: 2022-06-15 | Stop reason: ALTCHOICE

## 2021-05-12 RX ORDER — CHLORTHALIDONE 25 MG/1
25 TABLET ORAL DAILY
COMMUNITY
End: 2021-07-26

## 2021-05-12 RX ORDER — POLYETHYLENE GLYCOL 3350 17 G/17G
17 POWDER, FOR SOLUTION ORAL DAILY
COMMUNITY
End: 2021-07-26

## 2021-05-12 RX ORDER — HYDROCODONE BITARTRATE AND ACETAMINOPHEN 5; 325 MG/1; MG/1
TABLET ORAL
COMMUNITY
Start: 2021-04-19 | End: 2021-05-18 | Stop reason: SDUPTHER

## 2021-05-12 NOTE — TELEPHONE ENCOUNTER
[facility] health form \"resumption of care\" and \"HH cert POC\"in mailbox to be signed.

## 2021-05-12 NOTE — PROGRESS NOTES
Health Maintenance Due   Topic Date Due    Diabetic foot exam  Never done    Diabetic retinal exam  Never done    Shingles Vaccine (2 of 2) 01/29/2021    Annual Wellness Visit (AWV)  Never done

## 2021-05-12 NOTE — PATIENT INSTRUCTIONS

## 2021-05-12 NOTE — TELEPHONE ENCOUNTER
----- Message from DONOVAN Thakkar - CNP sent at 5/11/2021  9:30 AM EDT -----  Will discuss tomorrow at appointment

## 2021-05-12 NOTE — PROGRESS NOTES
Chronic progressive renal failure, stage 5 (HCC)    Accelerated hypertension    Diabetic peripheral neuropathy associated with type 2 diabetes mellitus (HCC)    Pericardial effusion    Hypokalemia    Type II diabetes mellitus with stage 5 chronic kidney disease (HCC)    Acute on chronic diastolic congestive heart failure (HCC)    End stage renal disease due to benign hypertension (HCC)    ESRD (end stage renal disease) [facility] dialysis [facility]    Post-operative nausea and vomiting    Chronic constipation    Candida UTI    Fluid overload    Pleural effusion    Acute respiratory failure with hypoxia ([facility])    Dyspnea    Bilateral leg edema    Hypernatremia    Metabolic acidosis    Generalized weakness    Lymphadenopathy, inguinal    Atelectasis of left lung    Thyroid nodule    ESR[facility]    Debility    Kidney transplant recipient    Sepsis ([facility]    E. coli UTI    Acute kidney injury superimposed on CKD ([facility]    Intractable nausea and vomiting    Hypomagnesemia    Hypermagnesemia    Chest discomfort    Left arm pain    Hypertensive urgency    Orthostatic hypotension    Hx of fall    Hx of coronary artery disease    History of end stage renal disease    Cervical stenosis of spinal canal    Hx of gastroesophageal reflux (GERD)    Other chronic pain    Arm pain    Polyneuropathy associated with critical illness (HCC)       Allergies   Allergen Reactions    Pioglitazone Swelling    Actos [Pioglitazone Hydrochloride] Swelling    Cymbalta [Duloxetine Hcl] Other (See Comments)     Anxiety and lethargic    Gabapentin Anxiety       Medications listed as ordered at the time of discharge from hospital  Yes    Medications marked \"taking\" at this time  Outpatient Medications Marked as Taking for the 5/12/21 encounter (Office Visit) with DONOVAN Prado CNP   Medication Sig Dispense Refill    cefdinir (OMNICEF) 300 MG capsule Take 300 mg by mouth 2 times daily      chlorthalidone (HYGROTON) 25 MG tablet Take 25 mg by mouth daily      polyethylene glycol (GLYCOLAX) 17 GM/SCOOP powder Take 17 g by mouth daily      cloNIDine (CATAPRES) 0.2 MG tablet Take 0.2 mg by mouth 3 times daily      iron polysaccharides (NIFEREX) 150 MG capsule Take 150 mg by mouth daily      tiotropium (SPIRIVA RESPIMAT) 2.5 MCG/ACT AERS inhaler Inhale 2 puffs into the lungs daily 1 Inhaler 11    albuterol sulfate HFA (PROAIR HFA) 108 (90 Base) MCG/ACT inhaler Inhale 2 puffs into the lungs every 6 hours as needed for Wheezing or Shortness of Breath 3 Inhaler 3    fluticasone (FLONASE) 50 MCG/ACT nasal spray 1 spray by Each Nostril route daily as needed for Rhinitis 3 Bottle 3    pantoprazole (PROTONIX) 40 MG tablet Take 40 mg by mouth 2 times daily      atorvastatin (LIPITOR) 40 MG tablet TAKE 1 TABLET DAILY 90 tablet 3    Insulin Pen Needle (B-D ULTRAFINE III SHORT PEN) 31G X 8 MM MISC Use three times daily Diagnosis Code E11.9 200 each 3    insulin aspart (NOVOLOG FLEXPEN) 100 UNIT/ML injection pen Sliding scale insulin coverage Glucose:Dose: If Less [id number] =No Insulin/ 140-199= 2 Units/ 200-249=4 Units/ 250-299= 6 Units/  300-349=8 Units/  350-400=10 Units/ Above 400 = 12 Units max 48 units daily 15 pen 1    insulin glargine (LANTUS SOLOSTAR) 100 UNIT/ML injection pen Inject 25 Units into the skin nightly (Patient taking differently: Inject 20 Units into the skin nightly ) 15 pen 1    carvedilol (COREG) 25 MG tablet 2 tablets BID (Patient taking differently: 1 tablets BID) 360 tablet 1    tacrolimus (PROGRAF) 1 MG capsule Take 1 mg by mouth 4 CAPSULES EVERY MORNING AND 4 CAPSULES EVERY EVENING      nitroGLYCERIN (NITROSTAT) 0.4 MG SL tablet Place 1 tablet under the tongue every 5 minutes as needed for Chest pain 25 tablet 2    lactulose (CHRONULAC) 10 GM/15ML solution Take twice daily as needed for constipation 2700 mL 1    vitamin D (ERGOCALCIFEROL) 1.25 MG (15832 UT) CAPS capsule TAKE 1 CAPSULE BY MOUTH EVERY 14 DAYS ON MONDAYS 12 capsule 1    NIFEdipine (ADALAT CC) 60 MG extended release tablet Take 60 mg by mouth 2 times daily      Mycophenolate Sodium 360 MG TBEC Take 360 mg by mouth 1 tablets BID      magnesium oxide (MAG-OX) 400 MG tablet Take 400 mg by mouth 2 times daily      sulfamethoxazole-trimethoprim (BACTRIM DS;SEPTRA DS) 800-160 MG per tablet Take 1 tablet by mouth daily      linaclotide (LINZESS) 290 MCG CAPS capsule Take 145 mcg by mouth every other day       aspirin 81 MG EC tablet Take 81 mg by mouth daily. Medications patient taking as of now reconciled against medications ordered at time of hospital discharge: Yes    Chief Complaint   Patient presents with    Follow-up     6 month    Follow-Up from [place] [address] - dehydration/UTI/Pneumonia       History of Present illness    Went to [facility] ER with UTI symptoms - sent to [facility] for VIDA and hypertension. Has one more day on her antibiotic - Omnicef    Ordered a standing order to urinalysis    Taking a diuretic     Over feeling a little better, still some weakness. Urinating fine    Bowels are moving normally    No fever since discharge    Follow up with Nephrologist in 6 months    Inpatient course: Discharge summary reviewed- see chart. Interval history/Current status: Stable    Vitals:    05/12/21 1427   BP: (!) 110/42   Site: Right Upper Arm   Position: Sitting   Cuff Size: Medium Adult   Pulse: 60   Resp: 15   Temp: 98.7 °F (37.1 °C)   TempSrc: Oral   SpO2: 98%   Weight: 181 lb (82.1 kg)     Body mass index is 30.12 kg/m². Wt Readings from Last 3 Encounters:   05/12/21 181 lb (82.1 kg)   04/28/21 184 lb (83.5 kg)   04/08/21 182 lb (82.6 kg)     BP Readings from Last 3 Encounters:   05/12/21 (!) 110/42   04/29/21 (!) 143/58   04/08/21 124/72       A comprehensive review of systems was negative except for what was noted in the HPI.     Physical Exam:  General Appearance: alert and oriented to person, place and time, well developed and well- nourished, in no acute distress  Skin: warm and dry, no rash or erythema  Head: normocephalic and atraumatic  Eyes: pupils equal, round, and reactive to light, extraocular eye movements intact, conjunctivae normal  ENT: tympanic membrane, external ear and ear canal normal bilaterally, nose without deformity, nasal mucosa and turbinates normal without polyps  Neck: supple and non-tender without mass, no thyromegaly or thyroid nodules, no cervical lymphadenopathy  Pulmonary/Chest: clear to auscultation bilaterally- no wheezes, rales or rhonchi, normal air movement, no respiratory distress  Cardiovascular: normal rate, regular rhythm, normal S1 and S2, no murmurs, rubs, clicks, or gallops, distal pulses intact, no carotid bruits  Abdomen: soft, non-tender, non-distended, normal bowel sounds, no masses or organomegaly  Extremities: no cyanosis, clubbing or edema  Musculoskeletal: normal range of motion, no joint swelling, deformity or tenderness  Neurologic: reflexes normal and symmetric, no cranial nerve deficit, gait, coordination and speech normal    Savannah Rust was seen today for follow-up and follow-up from hospital.    Diagnoses and all orders for this visit:    Hospital discharge follow-up  -     CA DISCHARGE MEDS RECONCILED W/ CURRENT OUTPATIENT MED LIST    Polyneuropathy associated with critical illness ([address]  -     CA DISCHARGE MEDS RECONCILED W/ CURRENT OUTPATIENT MED LIST    ESRD (end stage renal disease) on dialysis ([address]  -     CA DISCHARGE MEDS RECONCILED W/ CURRENT OUTPATIENT MED LIST    Acute on chronic diastolic congestive heart failure (HCC)  -     CA DISCHARGE MEDS RECONCILED W/ CURRENT OUTPATIENT MED LIST    Secondary hyperparathyroidism of renal origin ([address]  -     CA DISCHARGE MEDS RECONCILED W/ CURRENT OUTPATIENT MED LIST    Pressure injury of skin of left upper back, unspecified injury stage  -     [facility]    Type 2 diabetes mellitus with stage 5 chronic kidney disease not on chronic dialysis, with long-term current use of insulin ([facility]  -     [facility]    Other orders  -     POCT glycosylated hemoglobin (Hb A1C)            Medical Decision Making: moderate complexity

## 2021-05-13 ENCOUNTER — TELEPHONE (OUTPATIENT)
Dept: FAMILY MEDICINE CLINIC | Age: 66
End: 2021-05-13

## 2021-05-18 DIAGNOSIS — G89.4 CHRONIC PAIN SYNDROME: Primary | ICD-10-CM

## 2021-05-18 NOTE — TELEPHONE ENCOUNTER
Carmen Britton called requesting a refill on the following medications:  Requested Prescriptions     Pending Prescriptions Disp Refills    HYDROcodone-acetaminophen (NORCO) 5-325 MG per tablet       Pharmacy verified: [facility] [address]  . [address]      Date of last visit: 4/8/2021  Date of next visit (if applicable): 9/0/2674

## 2021-05-20 RX ORDER — HYDROCODONE BITARTRATE AND ACETAMINOPHEN 5; 325 MG/1; MG/1
1 TABLET ORAL EVERY 8 HOURS PRN
Qty: 90 TABLET | Refills: 0 | Status: SHIPPED | OUTPATIENT
Start: 2021-05-20 | End: 2021-06-18 | Stop reason: SDUPTHER

## 2021-05-21 ENCOUNTER — HOSPITAL ENCOUNTER (OUTPATIENT)
Dept: WOUND CARE | Age: 66
Discharge: HOME | End: 2021-05-21
Payer: MEDICARE

## 2021-05-21 VITALS
RESPIRATION RATE: 16 BRPM | DIASTOLIC BLOOD PRESSURE: 64 MMHG | OXYGEN SATURATION: 98 % | HEART RATE: 68 BPM | TEMPERATURE: 97.6 F | SYSTOLIC BLOOD PRESSURE: 143 MMHG

## 2021-05-21 DIAGNOSIS — S21.202A BACK WOUND, LEFT, INITIAL ENCOUNTER: ICD-10-CM

## 2021-05-21 DIAGNOSIS — L89.103 PRESSURE INJURY OF BACK, STAGE 3 (HCC): ICD-10-CM

## 2021-05-21 LAB — GFR SERPL CREATININE-BSD FRML MDRD: 42 ML/MIN/1.73M2

## 2021-05-21 PROCEDURE — 97597 DBRDMT OPN WND 1ST 20 CM/<: CPT | Performed by: NURSE PRACTITIONER

## 2021-05-21 PROCEDURE — 6370000000 HC RX 637 (ALT 250 FOR IP): Performed by: NURSE PRACTITIONER

## 2021-05-21 PROCEDURE — 99203 OFFICE O/P NEW LOW 30 MIN: CPT | Performed by: NURSE PRACTITIONER

## 2021-05-21 PROCEDURE — 99213 OFFICE O/P EST LOW 20 MIN: CPT

## 2021-05-21 PROCEDURE — 97597 DBRDMT OPN WND 1ST 20 CM/<: CPT

## 2021-05-21 RX ORDER — CLOBETASOL PROPIONATE 0.5 MG/G
OINTMENT TOPICAL ONCE
Status: CANCELLED | OUTPATIENT
Start: 2021-05-21 | End: 2021-05-21

## 2021-05-21 RX ORDER — BACITRACIN, NEOMYCIN, POLYMYXIN B 400; 3.5; 5 [USP'U]/G; MG/G; [USP'U]/G
OINTMENT TOPICAL ONCE
Status: CANCELLED | OUTPATIENT
Start: 2021-05-21 | End: 2021-05-21

## 2021-05-21 RX ORDER — LIDOCAINE HYDROCHLORIDE 20 MG/ML
JELLY TOPICAL ONCE
Status: CANCELLED | OUTPATIENT
Start: 2021-05-21 | End: 2021-05-21

## 2021-05-21 RX ORDER — BETAMETHASONE DIPROPIONATE 0.05 %
OINTMENT (GRAM) TOPICAL ONCE
Status: CANCELLED | OUTPATIENT
Start: 2021-05-21 | End: 2021-05-21

## 2021-05-21 RX ORDER — LIDOCAINE HYDROCHLORIDE 20 MG/ML
JELLY TOPICAL ONCE
Status: COMPLETED | OUTPATIENT
Start: 2021-05-21 | End: 2021-05-21

## 2021-05-21 RX ORDER — GENTAMICIN SULFATE 1 MG/G
OINTMENT TOPICAL ONCE
Status: CANCELLED | OUTPATIENT
Start: 2021-05-21 | End: 2021-05-21

## 2021-05-21 RX ORDER — LIDOCAINE HYDROCHLORIDE 40 MG/ML
SOLUTION TOPICAL ONCE
Status: CANCELLED | OUTPATIENT
Start: 2021-05-21 | End: 2021-05-21

## 2021-05-21 RX ORDER — LIDOCAINE 40 MG/G
CREAM TOPICAL ONCE
Status: CANCELLED | OUTPATIENT
Start: 2021-05-21 | End: 2021-05-21

## 2021-05-21 RX ORDER — BACITRACIN ZINC AND POLYMYXIN B SULFATE 500; 1000 [USP'U]/G; [USP'U]/G
OINTMENT TOPICAL ONCE
Status: CANCELLED | OUTPATIENT
Start: 2021-05-21 | End: 2021-05-21

## 2021-05-21 RX ORDER — GINSENG 100 MG
CAPSULE ORAL ONCE
Status: CANCELLED | OUTPATIENT
Start: 2021-05-21 | End: 2021-05-21

## 2021-05-21 RX ORDER — LIDOCAINE 50 MG/G
OINTMENT TOPICAL ONCE
Status: CANCELLED | OUTPATIENT
Start: 2021-05-21 | End: 2021-05-21

## 2021-05-21 RX ADMIN — LIDOCAINE HYDROCHLORIDE: 20 JELLY TOPICAL at 15:10

## 2021-05-21 NOTE — PROGRESS NOTES
 Neuromuscular disorder ([facility]     Neuropathy 1989    diabetic neuropathy    Obesity since childhoood    CONTRERAS on CPAP 2010    Dr. Ronel Gaspar    Pneumonia     PONV (postoperative nausea and vomiting)     Seizures ([facility]     Sepsis due to Escherichia coli ([facility] 07/2020       PAST SURGICAL HISTORY     Past Surgical History:   Procedure Laterality Date    ANKLE SURGERY Right 02-10-14    Dr. Annabelle Barros at [facility]  1990's    removal of benign tumor   [address]  2008   [address]    COLONOSCOPY  2009    2 polyps, not precanceorus    COLONOSCOPY Left 6/10/2019    COLONOSCOPY DIAGNOSTIC performed by Daniela Denise MD at [address] [facility]  3/30/2012    eye lid lift    DIAGNOSTIC CARDIAC CATH LAB PROCEDURE      ENDOSCOPY, COLON, DIAGNOSTIC  2007   [facility] EYE SURGERY  March 30th, 2012    left sided ptosis    FOOT SURGERY  1990    Tarsal tunnel surgery    FRACTURE SURGERY  2015    HYSTERECTOMY  1980 and 1985    first partial in 1980's, then total in [address] [facility]  2015   [address] [place] [address]  04/10/2020    RIGHT    LIVER BIOPSY  6/2015    LUNG BIOPSY  2009    [place] OFFICE/OUTPT VISIT,PROCEDURE ONLY Left 8/23/2018    LEFT UPPER EXTREMENTY AV FISTULA CREATION performed by Jose Miguel Carey MD at [address] [facility] Left 6/14/2019    EGD BIOPSY performed by Daniela Denise MD at [facility]       FAMILY HISTORY     Family History   Problem Relation Age of Onset    Diabetes Sister     Diabetes Brother     Diabetes Sister     Diabetes Sister     Cancer Sister     Early Death Sister     Heart Disease Sister     Diabetes Sister     Heart Disease Sister     Diabetes Sister     Diabetes Brother     Arthritis Mother     Heart Disease Mother     High Blood Pressure Mother     Kidney Disease Mother     Mental Illness Mother     Stroke Mother     Heart Disease Father     Diabetes Father     Obesity Father     Alcohol Abuse Father        SOCIAL HISTORY     Social History     Tobacco Use    Smoking status: Former Smoker     Packs/day: 1.50     Years: 30.00     Pack years: 45.00     Types: Cigarettes     Start date: 1979     Quit date: 3/13/2009     Years since quittin.1    Smokeless tobacco: Never Used    Tobacco comment: quit 2009   Vaping Use    Vaping Use: Never used   Substance Use Topics    Alcohol use: No     Alcohol/week: 0.0 standard drinks    Drug use: No       ALLERGIES     Allergies   Allergen Reactions    Pioglitazone Swelling    Actos [Pioglitazone Hydrochloride] Swelling    Cymbalta [Duloxetine Hcl] Other (See Comments)     Anxiety and lethargic    Gabapentin Anxiety       MEDICATIONS     Current Outpatient Medications on File Prior to Encounter   Medication Sig Dispense Refill    cefdinir (OMNICEF) 300 MG capsule Take 300 mg by mouth 2 times daily      chlorthalidone (HYGROTON) 25 MG tablet Take 25 mg by mouth daily      polyethylene glycol (GLYCOLAX) 17 GM/SCOOP powder Take 17 g by mouth daily      cloNIDine (CATAPRES) 0.2 MG tablet Take 0.2 mg by mouth 3 times daily      famotidine (PEPCID) 40 MG tablet       iron polysaccharides (NIFEREX) 150 MG capsule Take 150 mg by mouth daily      tiotropium (SPIRIVA RESPIMAT) 2.5 MCG/ACT AERS inhaler Inhale 2 puffs into the lungs daily 1 Inhaler 11    albuterol sulfate HFA (PROAIR HFA) 108 (90 Base) MCG/ACT inhaler Inhale 2 puffs into the lungs every 6 hours as needed for Wheezing or Shortness of Breath 3 Inhaler 3    fluticasone (FLONASE) 50 MCG/ACT nasal spray 1 spray by Each Nostril route daily as needed for Rhinitis 3 Bottle 3    pantoprazole (PROTONIX) 40 MG tablet Take 40 mg by mouth 2 times daily      atorvastatin (LIPITOR) 40 MG tablet TAKE 1 TABLET DAILY 90 tablet 3    Insulin Pen Needle (B-D ULTRAFINE III SHORT PEN) 31G X 8 MM MISC Use three times daily Diagnosis Code E11.9 200 each 3    insulin aspart (NOVOLOG FLEXPEN) 100 UNIT/ML injection pen Sliding scale insulin coverage Glucose:Dose: If Less [id number] =No Insulin/ 140-199= 2 Units/ 200-249=4 Units/ 250-299= 6 Units/  300-349=8 Units/  350-400=10 Units/ Above 400 = 12 Units max 48 units daily 15 pen 1    carvedilol (COREG) 25 MG tablet 2 tablets BID (Patient taking differently: 1 tablets BID) 360 tablet 1    tacrolimus (PROGRAF) 1 MG capsule Take 1 mg by mouth 4 CAPSULES EVERY MORNING AND 4 CAPSULES EVERY EVENING      lactulose (CHRONULAC) 10 GM/15ML solution Take twice daily as needed for constipation 2700 mL 1    vitamin D (ERGOCALCIFEROL) 1.25 MG (31557 UT) CAPS capsule TAKE 1 CAPSULE BY MOUTH EVERY 14 DAYS ON MONDAYS 12 capsule 1    NIFEdipine (ADALAT CC) 60 MG extended release tablet Take 60 mg by mouth 2 times daily      Mycophenolate Sodium 360 MG TBEC Take 360 mg by mouth 1 tablets BID      magnesium oxide (MAG-OX) 400 MG tablet Take 400 mg by mouth 2 times daily      sulfamethoxazole-trimethoprim (BACTRIM DS;SEPTRA DS) 800-160 MG per tablet Take 1 tablet by mouth daily      linaclotide (LINZESS) 290 MCG CAPS capsule Take 145 mcg by mouth every other day       aspirin 81 MG EC tablet Take 81 mg by mouth daily.  HYDROcodone-acetaminophen (NORCO) 5-325 MG per tablet Take 1 tablet by mouth every 8 hours as needed for Pain for up to 30 days. 90 tablet 0    insulin glargine (LANTUS SOLOSTAR) 100 UNIT/ML injection pen Inject 25 Units into the skin nightly (Patient taking differently: Inject 20 Units into the skin nightly ) 15 pen 1    nitroGLYCERIN (NITROSTAT) 0.4 MG SL tablet Place 1 tablet under the tongue every 5 minutes as needed for Chest pain 25 tablet 2     No current facility-administered medications on file prior to encounter.        REVIEW OF SYSTEMS:     Constitutional: Denies fever, chills, night sweats  Head: Denies headache,  dizziness, loss of consciousness  Respiratory: Denies shortness of breath, cough, wheezing, dyspnea with exertion  Cardiovascular:Denies chest pain, palpitations, edema  Gastrointestinal: Denies nausea, vomiting, constipation, diarrhea, abdominal pain   Musculoskeletal: Denies joint pain, swelling , stiffness,  Endocrine: Denies polyuria, polydipsia, cold or heat intolerance  Hematology: Denies easy brusing or bleeding, hx of clotting disorder  Dermatology: +left upper back wound Denies skin rash, eczema, pruritis    PHYSICAL EXAM:     BP (!) 143/64   Pulse 68   Temp 97.6 °F (36.4 °C) (Infrared)   Resp 16   SpO2 98%   Wt Readings from Last 3 Encounters:   05/12/21 181 lb (82.1 kg)   04/28/21 184 lb (83.5 kg)   04/08/21 182 lb (82.6 kg)       General:  Awake, alert, no apparent distress. Appears stated age  [de-identified]: conjuctivae are clear without exudate or hemorrhage, anicteric sclera, moist oral mucosa. Chest:  Respirations regular, non-labored. Chest rise and fall equal bilaterally. Neurological: Awake, alert, oriented x4  Psychiatric:  Appropriate mood and affect  Extremities: non-traumatic in appearance. Skin:  Warm and dry  Wound:     Location: Left upper back   Classification/stage: pressure   Tunneling/undermining: Not Present   Wound bed: slough, devitalized tissue   Exudate:  None pre-debridement   Romana-wound:dry and intact   Complications[de-identified] uncomplicated   Pain:Pain worsens with touch/pressure and Pain is relieved/improved with rest   -No signs of infection. Wound 05/21/21 Back Left;Upper (Active)   Wound Image   05/21/21 1454   Wound Cleansed Cleansed with saline 05/21/21 1454   Wound Length (cm) 1.5 cm 05/21/21 1454   Wound Width (cm) 0.8 cm 05/21/21 1454   Wound Depth (cm) 0.1 cm 05/21/21 1454   Wound Surface Area (cm^2) 1.2 cm^2 05/21/21 1454   Wound Volume (cm^3) 0.12 cm^3 05/21/21 1454   Wound Assessment [facility] 05/21/21 1454   Drainage Amount None 05/21/21 1454   Odor None 05/21/21 1454   Romana-wound Assessment Dry/flaky; Intact 05/21/21 1454   Margins Attached edges 05/21/21 1454   Wound Thickness Description not for Pressure Injury Full thickness 05/21/21 1454   Number of days: 0         LABS/IMAGING     UA: No results for input(s): Rodger Nakita, COLORU, CLARITYU, MUCUS, PROTEINU, BLOODU, RBCUA, WBCUA, BACTERIA, NITRU, GLUCOSEU, BILIRUBINUR, UROBILINOGEN, KETUA, LABCAST, LABCASTTY, AMORPHOS in the last 72 hours. Invalid input(s): CRYSTALS  Micro:   Lab Results   Component Value Date    BC No growth-preliminary No growth  08/03/2020       ASSESSMENT     -Stage 3 pressure injury, left upper back    Ulcer Identification:  Ulcer Type: pressure  Contributing Factors: diabetes, chronic pressure and decreased mobility    Depth of Diabetic/Pressure/Non Pressure Ulcers or Wound:  Pressure ulcer, Stage 3     Patient Active Problem List   Diagnosis Code    Uncontrolled hypertension I10    Chronic anemia D64.9    Coronary disease I25.10    Hyperlipemia E78.5    Arthritis M19.90    Neuropathy, diabetic ([facility] E11.40    Leg pain M79.606    Obesity (BMI 30-39. 9) E66.9    Low HDL (under 40) E78.6    Need for prophylactic vaccination against diphtheria-tetanus-pertussis (DTP) Z23    History of tobacco use Z87.891    Allergic rhinitis J30.9    COPD without exacerbation ([facility]) J44.9    Lung mass R91.8    Anemia, chronic disease D63.8    Gout M10.9    Urolithiasis N20.9    Ankle fracture, right S82.891A    Secondary hyperparathyroidism of renal origin ([facility] N25.81    Obstructive sleep apnea on CPAP G47.33, Z99.89    Vitamin D deficiency E55.9    Hypertensive nephropathy I12.9    Persistent proteinuria associated with type 2 diabetes mellitus (HCC) E11.29, R80.9    Cellulitis in diabetic foot (HCC) E11.628, L03.119    VIDA (acute kidney injury) ([facility] N17.9    Persistent proteinuria R80.1    Acquired hypothyroidism E03.9    CKD (chronic kidney disease), stage IV (HCC) N18.4    Nephrotic syndrome N04.9    Type 2 diabetes mellitus (HCC) E11.9    Iron deficiency anemia due to dietary causes D50.8    Anemia of chronic disease D63.8    Stage 5 chronic kidney disease not on chronic dialysis (HCC) N18.5    ESRD (end stage renal disease) ([facility] N18.6    Hypervolemia associated with renal insufficiency E87.70, N28.9    Pulmonary edema, noncardiac J81.1    CKD (chronic kidney disease), stage V ([facility]) N18.5    Chronic progressive renal failure, stage 5 ([facility]) N18.5    Accelerated hypertension I10    Diabetic peripheral neuropathy associated with type 2 diabetes mellitus ([facility]) E11.42    Pericardial effusion I31.3    Hypokalemia E87.6    Type II diabetes mellitus with stage 5 chronic kidney disease ([facility]) E11.22, N18.5    Acute on chronic diastolic congestive heart failure ([facility]) I50.33    End stage renal disease due to benign hypertension ([facility]) I12.0, N18.6    ESRD (end stage renal disease) on dialysis ([facility]) N18.6, Z99.2    Post-operative nausea and vomiting R11.2, Z98.890    Chronic constipation K59.09    Candida UTI B37.49    Fluid overload E87.70    Pleural effusion J90    Acute respiratory failure with hypoxia ([facility]) J96.01    Dyspnea R06.00    Bilateral leg edema R60.0    Hypernatremia C48.4    Metabolic acidosis Q02.9    Generalized weakness R53.1    Lymphadenopathy, inguinal R59.0    Atelectasis of left lung J98.11    Thyroid nodule E04.1    ESRD needing dialysis ([facility] N18.6, Z99.2    Debility R53.81    Kidney transplant recipient Z80.0    Sepsis ([facility] A41.9    E. coli UTI N39.0, B96.20    Acute kidney injury superimposed on CKD ([facility] N17.9, N18.9    Intractable nausea and vomiting R11.2    Hypomagnesemia E83.42    Hypermagnesemia E83.41    Chest discomfort R07.89    Left arm pain M79.602    Hypertensive urgency I16.0    Orthostatic hypotension I95.1    Hx of fall Z91.81    Hx of coronary artery disease Z86.79    History of end stage renal disease Z87.448    Cervical stenosis of spinal canal M48.02    Hx of gastroesophageal reflux (GERD) Z87.19    Other chronic pain G89.29    Arm pain M79.603    Polyneuropathy associated with critical illness ([facility] G62.81    Back wound, left, initial encounter S21.202A     PROCEDURE NOTE     Indications:  Based on my examination of this patient's wound(s)/ulcer(s) today, debridement is required to promote healing and evaluate the extent healing. Performed by: [address] [place] [address], APRN - [facility]    Consent obtained: Yes    Time out taken:  Yes    Pain control: lidocaine 2%    Debridement:Non-excisional Debridement    Using curette and forceps the wound(s)/ulcer(s) was/were sharply debrided down through and including the removal of epidermis and dermis. Devitalized Tissue Debrided:  fibrin and slough    Pre Debridement Measurements:  Are located in the Wound/Ulcer Documentation Flow Sheet    Wound/Ulcer #: 1    Post Debridement Measurements:    Wound/Ulcer Descriptions are listed under Physical Exam above. Wound/Ulcer Descriptions are Pre Debridement except measurements    Percent of Wound/Ulcer Debrided: 100%    Total Surface Area Debrided:  1.2 sq cm     Bleeding:  None    Hemostasis Achieved:  not needed    Procedural Pain:  0  / 10     Post Procedural Pain:  0 / 10     Response to treatment:  Well tolerated by patient. PLAN     Patient examined and evaluated. All available lab work, radiology studies, and progress notes pertaining to Jaja Farfan reviewed prior to or during patient visit today.    -Stage 3 pressure injury, left upper back-Most likely occurred during recent hospital stay during which time patient reports she was in bed for days, didn't move or ambulate much. Wound bed has large amount of slough and fibrotic material.  This was debrided at today's visit revealing granular tissue underneath. Discussed importance of discontinuing hydrogen peroxide to wound bed as this will lead to additional dryness and delayed healing.   Education provided to patient that wound will most likely start to have some drainage since this layer of devitalized tissue was removed, this is normal.  Start use of silver alginate and silicone bordered foam dressings to wound. Change three times weekly. Patient is current with home health for other medical needs. Will send orders to them to assist with advanced wound care as this is in a location that would be difficult for patient to complete independently.      -No indications of infection to wound at today's visit. Education provided on signs and symptoms of infection. Call clinic or seek emergency care should these occur. Educated Tammy Garcia on the importance of offloading wound(s) for wound healing/prevention. Pressure reduction techniques reviewed. Patient verbalized understanding. Follow up 2 weeks for re-evaluation. Call clinic with any needs or concerns prior to scheduled visit.       Treatment:   Orders Placed This Encounter   Procedures    Initiate Outpatient Wound Care Protocol     Cleanse wound with saline    If wound contains bioburden or contamination cleanse with wound cleanser or antimicrobial solution     For normal periwound tissue without irritation nor maceration, apply topical skin protectant    For periwound tissue with irritation and/or maceration, apply zinc based product, topical steroid cream/ointment, or equivalent     For wounds with dry firm black eschar and/or without exudate, apply betadine and leave open to air      For wounds with scant/small to no exudate or drainage, apply wound gel, hydrocolloid, polymer, or equivalent and cover with secondary dressing/foam      For wounds with moderate/large exudate or drainage, apply alginate, hydrofiber, polymer, or equivalent and cover with secondary dressing/foam    For wounds with nonviable tissue requiring removal, apply chemical or mechanical debrider and cover with secondary dressing/foam    For wounds with tunneling, dead space, or cavity, fill or pack with strip/gauze/kerlex to fit and cover with secondary dressing/foam    For wounds with adequate granulation or epithelization, apply wound gel, hydrocolloid, polymer, collagen, or transparent film, and cover secondary dry dressing/foam    For wounds that need additional secondary dressing to help pad or control additional drainage/exudates, add foam, absorbent pad or hydrocolloid    For wounds with suspected or known infection, apply antimicrobial mesh and/or antimicrobial alginate/hydrofiber, or antimicrobial solution moistened gauze/kerlex, or equivalent and cover with secondary dressing/foam    Compression Management needed for edema control, apply multilayer compression or tubular garment or equivalent    Offloading Management needed for pressure relief, apply offloading shoe/boot or equivalent     Standing Status:   Standing     Number of Occurrences:   1     All questions and concerns addressed prior to discharge from today's visit. Please see attached Discharge Instructions    Written patient dismissal instructions given to patient and signed by patient or POA. I spent a total of 35 minutes reviewing previous notes, test results and face to face with Colton Seats  on 5/21/2021. Greater than 50% of this time was spent on counseling, reviewing and discussing test results, answering questions, instructions on treatment and/or medications ordered, and coordinating the care based on my plan and assessment as noted. Discharge Instructions         Discharge Instructions       Visit Discharge/Physician Orders:  - debridement of back wound done today. - stop using peroxide on back wound     Home Care:  [facility]     Wound Location: left upper back     Dressing orders:     1) Gather wound care supplies and arrange on clean table. 2) Wash your hands with soap and water or use alcohol based hand  for 20 seconds (sing \"Happy Birthday\" twice). 3) Cleanse wounds with normal saline or wound cleanser and gauze. Pat dry with clean gauze. 4) Left upper back wound- Silver alginate to wound bed. Cover with silicone bordered foam. Home health to change 3 times a week. Keep all dressings clean & dry. Do not shower, take baths or get wound wet, unless otherwise instructed by your Wound Care doctor. Follow up visit:   2 Weeks 6/4/21 at 230pm    Keep next scheduled appointment. Please give 24 hour notice if unable to keep appointment. [phone]    If you experience any of the following, please call the Wound Care Service during business hours: Monday through Friday 8:00 am - 4:30 pm  ([phone]). *Increase in pain   *Temperature over 101   *Increase in drainage from your wound or a foul odor   *Uncontrolled swelling   *Need for compression bandage changes due to slippage, breakthrough drainage    If you need medical attention outside of business hours, please contact your Primary Care Doctor or go to the nearest emergency room.                    Electronically signed by DONOVAN Avila CNP on 5/21/2021 at 3:46 PM

## 2021-05-21 NOTE — PLAN OF CARE
Problem: Wound:  Goal: Will show signs of wound healing; wound closure and no evidence of infection  Description: Will show signs of wound healing; wound closure and no evidence of infection  Outcome: Ongoing  Note: New patient seen for back wound today. Patient is current with [facility]. See AVS for discharge instructions. Follow up in 2 weeks. Care plan reviewed with patient. Patient verbalize understanding of the plan of care and contribute to goal setting.

## 2021-06-04 ENCOUNTER — HOSPITAL ENCOUNTER (OUTPATIENT)
Dept: WOUND CARE | Age: 66
Discharge: HOME | End: 2021-06-04
Payer: MEDICARE

## 2021-06-04 VITALS
HEART RATE: 62 BPM | RESPIRATION RATE: 16 BRPM | DIASTOLIC BLOOD PRESSURE: 72 MMHG | OXYGEN SATURATION: 99 % | TEMPERATURE: 97.6 F | SYSTOLIC BLOOD PRESSURE: 160 MMHG

## 2021-06-04 DIAGNOSIS — L89.103 PRESSURE INJURY OF BACK, STAGE 3 (HCC): Primary | ICD-10-CM

## 2021-06-04 PROCEDURE — 99213 OFFICE O/P EST LOW 20 MIN: CPT

## 2021-06-04 PROCEDURE — 99213 OFFICE O/P EST LOW 20 MIN: CPT | Performed by: NURSE PRACTITIONER

## 2021-06-04 RX ORDER — LIDOCAINE HYDROCHLORIDE 40 MG/ML
SOLUTION TOPICAL ONCE
Status: CANCELLED | OUTPATIENT
Start: 2021-06-04 | End: 2021-06-04

## 2021-06-04 RX ORDER — BETAMETHASONE DIPROPIONATE 0.05 %
OINTMENT (GRAM) TOPICAL ONCE
Status: CANCELLED | OUTPATIENT
Start: 2021-06-04 | End: 2021-06-04

## 2021-06-04 RX ORDER — BACITRACIN, NEOMYCIN, POLYMYXIN B 400; 3.5; 5 [USP'U]/G; MG/G; [USP'U]/G
OINTMENT TOPICAL ONCE
Status: CANCELLED | OUTPATIENT
Start: 2021-06-04 | End: 2021-06-04

## 2021-06-04 RX ORDER — LIDOCAINE 40 MG/G
CREAM TOPICAL ONCE
Status: CANCELLED | OUTPATIENT
Start: 2021-06-04 | End: 2021-06-04

## 2021-06-04 RX ORDER — GINSENG 100 MG
CAPSULE ORAL ONCE
Status: CANCELLED | OUTPATIENT
Start: 2021-06-04 | End: 2021-06-04

## 2021-06-04 RX ORDER — LIDOCAINE HYDROCHLORIDE 20 MG/ML
JELLY TOPICAL ONCE
Status: CANCELLED | OUTPATIENT
Start: 2021-06-04 | End: 2021-06-04

## 2021-06-04 RX ORDER — GENTAMICIN SULFATE 1 MG/G
OINTMENT TOPICAL ONCE
Status: CANCELLED | OUTPATIENT
Start: 2021-06-04 | End: 2021-06-04

## 2021-06-04 RX ORDER — BACITRACIN ZINC AND POLYMYXIN B SULFATE 500; 1000 [USP'U]/G; [USP'U]/G
OINTMENT TOPICAL ONCE
Status: CANCELLED | OUTPATIENT
Start: 2021-06-04 | End: 2021-06-04

## 2021-06-04 RX ORDER — CLOBETASOL PROPIONATE 0.5 MG/G
OINTMENT TOPICAL ONCE
Status: CANCELLED | OUTPATIENT
Start: 2021-06-04 | End: 2021-06-04

## 2021-06-04 RX ORDER — LIDOCAINE 50 MG/G
OINTMENT TOPICAL ONCE
Status: CANCELLED | OUTPATIENT
Start: 2021-06-04 | End: 2021-06-04

## 2021-06-04 ASSESSMENT — PAIN DESCRIPTION - ORIENTATION: ORIENTATION: RIGHT;LEFT

## 2021-06-04 ASSESSMENT — PAIN DESCRIPTION - PAIN TYPE: TYPE: CHRONIC PAIN

## 2021-06-04 ASSESSMENT — PAIN SCALES - GENERAL: PAINLEVEL_OUTOF10: 6

## 2021-06-04 ASSESSMENT — PAIN DESCRIPTION - LOCATION: LOCATION: BACK;LEG

## 2021-06-04 NOTE — PLAN OF CARE
Problem: Wound:  Goal: Will show signs of wound healing; wound closure and no evidence of infection  Description: Will show signs of wound healing; wound closure and no evidence of infection  Outcome: Ongoing   Patient presents to wound clinic for follow up of left upper back wound. Discharge instructions/dressing orders faxed to [facility]. Left upper back wound- Apply silver alginate to wound bed. Cover with silicone bordered foam. Home health to change 3 times a week. Follow up visit: 2 Weeks 6/18/21 at 2:30 pm.    Care plan reviewed with patient. Patient verbalize understanding of the plan of care and contribute to goal setting.

## 2021-06-04 NOTE — PROGRESS NOTES
[facility]  Consult and Procedure Note      19 Tatiana Connelly RECORD NUMBER:  [id number]  AGE: 77 y.o. GENDER: female  : 1955  EPISODE DATE:  2021    SUBJECTIVE:     Chief Complaint   Patient presents with    Wound Check     Left upper back wound         HISTORY OF PRESENT ILLNESS      Lalo Villa is a 77 y.o. female who presents today for re-evaluation of left upper back wound. Patient reports that wound developed during recent hospital stay, unsure exact cause. Wound had large amount of devitalized tissue at initial visit at which time wound was debrided. Previously reported pain with touch and pressure has improved since that time. Currently ordered wound care includes silver alginate and silicone bordered foams, changed three times weekly. Patient reports that home health nursing did not come for the first week and have been using adhesive bandages as they have yet to get correct supplies. Denies fever or chills. Denies any further needs or concerns.     PAST MEDICAL HISTORY             Diagnosis Date    Anemia associated with chronic renal failure     Aranesp 150 micrograms every other week given at [facility]    Anxiety     Arthritis     Backache     Blood circulation, collateral     Blood transfusion     CAD (coronary artery disease)     Cellulitis in diabetic foot ([address] 2017    4th and 5th toes right foot    Chest pain     History of    CHF (congestive heart failure) ([address]     Dx'ed by Dr. Aden Conroy Chronic anemia     Chronic kidney disease     Chronic kidney disease, stage III (moderate)     Chronic renal insufficiency     COPD (chronic obstructive pulmonary disease) ([facility])     Dr. 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 David Trent    HTN (hypertension) 1970's    Hyperlipemia 1998    Iron deficiency anemia due to dietary causes 6/21/2018    Kidney stones 3/2014    Kidney trouble          MRSA infection 03/2017    right foot-Dr. Carole Mendoza (podiatrist)    Neuromuscular disorder ([facility]     Neuropathy 1989    diabetic neuropathy    Obesity since childhoood    CONTRERAS on CPAP 2010    Dr. Eric Nichols    Pneumonia     PONV (postoperative nausea and vomiting)     Seizures ([facility]     Sepsis due to Escherichia coli ([facility] 07/2020       PAST SURGICAL HISTORY     Past Surgical History:   Procedure Laterality Date    ANKLE SURGERY Right 02-10-14    Dr. Denny Fermin at [place] [id number]  1990's    removal of benign tumor   [address]  2008   [address]  2009    2 polyps, not precanceorus    COLONOSCOPY Left 6/10/2019    COLONOSCOPY DIAGNOSTIC performed by Khris Shultz MD at [address] [facility]  3/30/2012    eye lid lift    DIAGNOSTIC CARDIAC CATH LAB PROCEDURE      ENDOSCOPY, COLON, DIAGNOSTIC  2007   [employer] [facility]  March 30th, 2012    left sided ptosis    FOOT SURGERY  1990    Tarsal tunnel surgery    FRACTURE SURGERY  2015   [address] and [address]    first partial in 1980's, then total in [facility]  2015   [address] [place] [address]  04/10/2020    RIGHT    LIVER BIOPSY  6/2015    LUNG BIOPSY  2009    CO OFFICE/OUTPT VISIT,PROCEDURE ONLY Left 8/23/2018    LEFT UPPER EXTREMENTY AV FISTULA CREATION performed by Huey Slaughter MD at [facility] Left 6/14/2019    EGD BIOPSY performed by Khris Shultz MD at [facility]       FAMILY HISTORY     Family History   Problem Relation Age of Onset    Diabetes Sister     Diabetes Brother     Diabetes Sister     Diabetes Sister     Cancer Sister     Early Death Sister     Heart Disease Sister     Diabetes Sister     Heart Disease Sister     Diabetes Sister     Diabetes Brother     Arthritis Mother     Heart Disease Mother     High Blood Pressure Mother     Kidney Disease Mother     Mental Illness Mother     Stroke Mother     Heart Disease Father     Diabetes Father     Obesity Father     Alcohol Abuse Father      SOCIAL HISTORY     Social History     Tobacco Use    Smoking status: Former Smoker     Packs/day: 1.50     Years: 30.00     Pack years: 45.00     Types: Cigarettes     Start date: 1979     Quit date: 3/13/2009     Years since quittin.2    Smokeless tobacco: Never Used    Tobacco comment: quit    Vaping Use    Vaping Use: Never used   Substance Use Topics    Alcohol use: No     Alcohol/week: 0.0 standard drinks    Drug use: No     ALLERGIES     Allergies   Allergen Reactions    Pioglitazone Swelling    Actos [Pioglitazone Hydrochloride] Swelling    Cymbalta [Duloxetine Hcl] Other (See Comments)     Anxiety and lethargic    Gabapentin Anxiety       MEDICATIONS     Current Outpatient Medications on File Prior to Encounter   Medication Sig Dispense Refill    HYDROcodone-acetaminophen (NORCO) 5-325 MG per tablet Take 1 tablet by mouth every 8 hours as needed for Pain for up to 30 days.  90 tablet 0    chlorthalidone (HYGROTON) 25 MG tablet Take 25 mg by mouth daily      polyethylene glycol (GLYCOLAX) 17 GM/SCOOP powder Take 17 g by mouth daily      cloNIDine (CATAPRES) 0.2 MG tablet Take 0.2 mg by mouth 3 times daily      famotidine (PEPCID) 40 MG tablet       iron polysaccharides (NIFEREX) 150 MG capsule Take 150 mg by mouth daily      tiotropium (SPIRIVA RESPIMAT) 2.5 MCG/ACT AERS inhaler Inhale 2 puffs into the lungs daily 1 Inhaler 11    fluticasone (FLONASE) 50 MCG/ACT nasal spray 1 spray by Each Nostril route daily as needed for Rhinitis 3 Bottle 3    pantoprazole (PROTONIX) 40 MG tablet Take 40 mg by mouth 2 times daily      atorvastatin headache,  dizziness, loss of consciousness  Respiratory: Denies shortness of breath, cough, wheezing, dyspnea with exertion  Cardiovascular:Denies chest pain, palpitations, edema  Gastrointestinal: Denies nausea, vomiting, constipation, diarrhea, abdominal pain   Musculoskeletal: Denies joint pain, swelling , stiffness,  Endocrine: Denies polyuria, polydipsia, cold or heat intolerance  Hematology: Denies easy brusing or bleeding, hx of clotting disorder  Dermatology: +left upper back wound Denies skin rash, eczema, pruritis    PHYSICAL EXAM:     BP (!) 160/72   Pulse 62   Temp 97.6 °F (36.4 °C) (Infrared)   Resp 16   SpO2 99%   Wt Readings from Last 3 Encounters:   05/12/21 181 lb (82.1 kg)   04/28/21 184 lb (83.5 kg)   04/08/21 182 lb (82.6 kg)     General:  Awake, alert, no apparent distress. Appears stated age  [de-identified]: conjuctivae are clear without exudate or hemorrhage, anicteric sclera, moist oral mucosa. Chest:  Respirations regular, non-labored. Chest rise and fall equal bilaterally. Neurological: Awake, alert, oriented x4  Psychiatric:  Appropriate mood and affect  Extremities: non-traumatic in appearance. Skin:  Warm and dry  Wound:                Location: Left upper back              Classification/stage: pressure, stage 3              Tunneling/undermining: Not Present              Wound bed: granular              Exudate:  moderate, yellow              Romana-wound:dry and intact              Complications[de-identified] uncomplicated              Pain: Denies              -No signs of infection. Wound 05/21/21 Back Left;Upper (Active)   Wound Image   06/04/21 1445   Wound Etiology Pressure Stage  3 06/04/21 1445   Dressing Status Intact; New dressing applied 06/04/21 1445   Wound Cleansed Cleansed with saline 06/04/21 1445   Dressing/Treatment Alginate with Ag; Foam 06/04/21 1445   Offloading for Diabetic Foot Ulcers No offloading required 06/04/21 1445   Wound Length (cm) 1.1 cm 06/04/21 1445   Wound Width (cm) 0.6 cm 06/04/21 1445   Wound Depth (cm) 0.1 cm 06/04/21 1445   Wound Surface Area (cm^2) 0.66 cm^2 06/04/21 1445   Change in Wound Size % (l*w) 45 06/04/21 1445   Wound Volume (cm^3) 0.07 cm^3 06/04/21 1445   Wound Healing % 42 06/04/21 1445   Wound Assessment Granulation tissue 06/04/21 1445   Drainage Amount Moderate 06/04/21 1445   Drainage Description Yellow;Brown 06/04/21 1445   Odor None 06/04/21 1445   Romana-wound Assessment Dry/flaky 06/04/21 1445   Margins Attached edges 06/04/21 1445   Wound Thickness Description not for Pressure Injury Full thickness 06/04/21 1445   Number of days: 14         LABS/IMAGING     UA: No results for input(s): SPECGRAV, PHUR, COLORU, CLARITYU, MUCUS, PROTEINU, BLOODU, RBCUA, WBCUA, BACTERIA, NITRU, GLUCOSEU, BILIRUBINUR, UROBILINOGEN, KETUA, LABCAST, LABCASTTY, AMORPHOS in the last 72 hours. Invalid input(s): CRYSTALS  Micro:   Lab Results   Component Value Date    BC No growth-preliminary No growth  08/03/2020     ASSESSMENT     -Stage 3 pressure injury, left upper back     Ulcer Identification:  Ulcer Type: pressure  Contributing Factors: diabetes, chronic pressure and decreased mobility     Depth of Diabetic/Pressure/Non Pressure Ulcers or Wound:  Pressure ulcer, Stage 3     Patient Active Problem List   Diagnosis Code    Uncontrolled hypertension I10    Chronic anemia D64.9    Coronary disease I25.10    Hyperlipemia E78.5    Arthritis M19.90    Neuropathy, diabetic ([facility] E11.40    Leg pain M79.606    Obesity (BMI 30-39. 9) E66.9    Low HDL (under 40) E78.6    Need for prophylactic vaccination against diphtheria-tetanus-pertussis (DTP) Z23    History of tobacco use Z87.891    Allergic rhinitis J30.9    COPD without exacerbation (HCC) J44.9    Lung mass R91.8    Anemia, chronic disease D63.8    Gout M10.9    Urolithiasis N20.9    Ankle fracture, right S82.891A    Secondary hyperparathyroidism of renal origin ([facility] N25.81    Obstructive sleep apnea on CPAP G47.33, Z99.89    Vitamin D deficiency E55.9    Hypertensive nephropathy I12.9    Persistent proteinuria associated with type 2 diabetes mellitus (HCC) E11.29, R80.9    Cellulitis in diabetic foot ([facility]) E11.628, L03.119    VIDA (acute kidney injury) ([facility] N17.9    Persistent proteinuria R80.1    Acquired hypothyroidism E03.9    CKD (chronic kidney disease), stage IV ([facility]) N18.4    Nephrotic syndrome N04.9    Type 2 diabetes mellitus ([facility]) E11.9    Iron deficiency anemia due to dietary causes D50.8    Anemia of chronic disease D63.8    Stage 5 chronic kidney disease not on chronic dialysis ([facility]) N18.5    ESRD (end stage renal disease) ([facility]) N18.6    Hypervolemia associated with renal insufficiency E87.70, N28.9    Pulmonary edema, noncardiac J81.1    CKD (chronic kidney disease), stage V ([facility]) N18.5    Chronic progressive renal failure, stage 5 ([facility]) N18.5    Accelerated hypertension I10    Diabetic peripheral neuropathy associated with type 2 diabetes mellitus ([facility]) E11.42    Pericardial effusion I31.3    Hypokalemia E87.6    Type II diabetes mellitus with stage 5 chronic kidney disease ([facility]) E11.22, N18.5    Acute on chronic diastolic congestive heart failure ([facility]) I50.33    End stage renal disease due to benign hypertension ([facility]) I12.0, N18.6    ESRD (end stage renal disease) on dialysis ([facility]) N18.6, Z99.2    Post-operative nausea and vomiting R11.2, Z98.890    Chronic constipation K59.09    Candida UTI B37.49    Fluid overload E87.70    Pleural effusion J90    Acute respiratory failure with hypoxia ([facility]) J96.01    Dyspnea R06.00    Bilateral leg edema R60.0    Hypernatremia P55.6    Metabolic acidosis H65.6    Generalized weakness R53.1    Lymphadenopathy, inguinal R59.0    Atelectasis of left lung J98.11    Thyroid nodule E04.1    ESRD needing dialysis ([facility] N18.6, Z99.2    Debility R53.81    Kidney transplant recipient Z94.0    Sepsis ([facility] A41.9    E. coli UTI N39.0, B96.20    Acute kidney injury superimposed on CKD ([facility] N17.9, N18.9    Intractable nausea and vomiting R11.2    Hypomagnesemia E83.42    Hypermagnesemia E83.41    Chest discomfort R07.89    Left arm pain M79.602    Hypertensive urgency I16.0    Orthostatic hypotension I95.1    Hx of fall Z91.81    Hx of coronary artery disease Z86.79    History of end stage renal disease Z87.448    Cervical stenosis of spinal canal M48.02    Hx of gastroesophageal reflux (GERD) Z87.19    Other chronic pain G89.29    Arm pain M79.603    Polyneuropathy associated with critical illness (HCC) G62.81    Pressure injury of back, stage 3 ([facility] L89.103         PLAN     Patient examined and evaluated. All available lab work, radiology studies, and progress notes pertaining to Bienvenido Cea reviewed prior to or during patient visit today.    -Stage 3 pressure injury, left upper back- Measuring smaller at today's visit. Wound bed granular, no indications of infection. Continue silver alginate and silicone bordered foam dressings to wound. Change three times weekly. Advised patient to call clinic with any further troubles with incorrect dressings being utilized.     Treatment:   Orders Placed This Encounter   Procedures    Initiate Outpatient Wound Care Protocol     Cleanse wound with saline    If wound contains bioburden or contamination cleanse with wound cleanser or antimicrobial solution     For normal periwound tissue without irritation nor maceration, apply topical skin protectant    For periwound tissue with irritation and/or maceration, apply zinc based product, topical steroid cream/ointment, or equivalent     For wounds with dry firm black eschar and/or without exudate, apply betadine and leave open to air      For wounds with scant/small to no exudate or drainage, apply wound gel, hydrocolloid, polymer, or equivalent and cover with secondary dressing/foam      For wounds with moderate/large exudate or drainage, apply alginate, hydrofiber, polymer, or equivalent and cover with secondary dressing/foam    For wounds with nonviable tissue requiring removal, apply chemical or mechanical debrider and cover with secondary dressing/foam    For wounds with tunneling, dead space, or cavity, fill or pack with strip/gauze/kerlex to fit and cover with secondary dressing/foam    For wounds with adequate granulation or epithelization, apply wound gel, hydrocolloid, polymer, collagen, or transparent film, and cover secondary dry dressing/foam    For wounds that need additional secondary dressing to help pad or control additional drainage/exudates, add foam, absorbent pad or hydrocolloid    For wounds with suspected or known infection, apply antimicrobial mesh and/or antimicrobial alginate/hydrofiber, or antimicrobial solution moistened gauze/kerlex, or equivalent and cover with secondary dressing/foam    Compression Management needed for edema control, apply multilayer compression or tubular garment or equivalent    Offloading Management needed for pressure relief, apply offloading shoe/boot or equivalent     Standing Status:   Standing     Number of Occurrences:   1    Misc nursing order (specify)     Visit Discharge/Physician Orders:     Home Care: [facility]     Wound Location: Left upper back      Dressing orders:      1) Gather wound care supplies and arrange on clean table.      2) Wash your hands with soap and water or use alcohol based hand  for 20 seconds (sing \"Happy Birthday\" twice).    3) Cleanse wounds with normal saline or wound cleanser and gauze. Pat dry with clean gauze.    4) Left upper back wound- Apply silver alginate to wound bed.  Cover with silicone bordered foam. Home health to change 3 times a week.       Keep all dressings clean & dry.     Do not shower, take baths or get wound wet, unless otherwise instructed by your Wound Care doctor.      Follow up visit:   2 Weeks 6/18/21 at 2:30 pm     Standing Status:   Standing     Number of Occurrences:   1     -No indications of infection to wound at today's visit. Education provided on signs and symptoms of infection. Call clinic or seek emergency care should these occur. Educated Ministerio Hoffmann on the importance of offloading wound(s) for wound healing/prevention. Pressure reduction techniques reviewed. Patient verbalized understanding. Follow up 2 weeks for re-evaluations. Call clinic with any needs or concerns prior to scheduled visit. All questions and concerns addressed prior to discharge from today's visit. Please see attached Discharge Instructions    Written patient dismissal instructions given to patient and signed by patient or POA. I spent a total of 25 minutes reviewing previous notes, test results and face to face with Ministerio Hoffmann  on 6/4/2021. Greater than 50% of this time was spent on counseling, reviewing and discussing test results, answering questions, instructions on treatment and/or medications ordered, and coordinating the care based on my plan and assessment as noted. Discharge Instructions     Discharge Instructions       Visit Discharge/Physician Orders:     Home Care: [facility]     Wound Location: Left upper back      Dressing orders:      1) Gather wound care supplies and arrange on clean table.      2) Wash your hands with soap and water or use alcohol based hand  for 20 seconds (sing \"Happy Birthday\" twice).    3) Cleanse wounds with normal saline or wound cleanser and gauze. Pat dry with clean gauze.    4) Left upper back wound- Apply silver alginate to wound bed.  Cover with silicone bordered foam. Home health to change 3 times a week.       Keep all dressings clean & dry.     Do not shower, take baths or get wound wet, unless otherwise instructed by your Wound Care doctor.      Follow up visit:   2 Weeks 6/18/21 at 2:30 pm     Keep next scheduled appointment. Please give 24 hour notice if unable to keep appointment. [phone]     If you experience any of the following, please call the Wound Care Service during business hours: Monday through Friday 8:00 am - 4:30 pm  ([phone]).               *Increase in pain              *Temperature over 101              *Increase in drainage from your wound or a foul odor              *Uncontrolled swelling              *Need for compression bandage changes due to slippage, breakthrough drainage     If you need medical attention outside of business hours, please contact your Primary Care Doctor or go to the nearest emergency room.        Electronically signed by DONOVAN Dozier CNP on 6/4/2021 at 3:57 PM

## 2021-06-08 ENCOUNTER — TELEPHONE (OUTPATIENT)
Dept: FAMILY MEDICINE CLINIC | Age: 66
End: 2021-06-08

## 2021-06-08 DIAGNOSIS — N30.90 CYSTITIS: Primary | ICD-10-CM

## 2021-06-08 RX ORDER — CEFDINIR 300 MG/1
300 CAPSULE ORAL DAILY
Qty: 7 CAPSULE | Refills: 0 | Status: SHIPPED | OUTPATIENT
Start: 2021-06-08 | End: 2021-06-15

## 2021-06-08 NOTE — TELEPHONE ENCOUNTER
Pt called asking for the lab results. She said the [facility] nurse took a urine sample from her since she is having UTI sxs. Pt states her temp this morning is 100.3.  Please advise

## 2021-06-08 NOTE — TELEPHONE ENCOUNTER
I prescribed one time a day because of her kidney function, her GFR is below 30. So the dose is one time a day.  It is appropriate for her UTI

## 2021-06-09 ENCOUNTER — HOSPITAL ENCOUNTER (EMERGENCY)
Age: 66
Discharge: ANOTHER HOSPITAL | End: 2021-06-09
Attending: EMERGENCY MEDICINE
Payer: MEDICARE

## 2021-06-09 VITALS
BODY MASS INDEX: 29.82 KG/M2 | HEART RATE: 77 BPM | OXYGEN SATURATION: 96 % | WEIGHT: 179 LBS | HEIGHT: 65 IN | SYSTOLIC BLOOD PRESSURE: 111 MMHG | DIASTOLIC BLOOD PRESSURE: 55 MMHG | RESPIRATION RATE: 18 BRPM | TEMPERATURE: 99 F

## 2021-06-09 DIAGNOSIS — Z94.9 TRANSPLANT, ORGAN: ICD-10-CM

## 2021-06-09 DIAGNOSIS — N17.9 ACUTE RENAL FAILURE, UNSPECIFIED ACUTE RENAL FAILURE TYPE (HCC): Primary | ICD-10-CM

## 2021-06-09 LAB
ALBUMIN SERPL-MCNC: 4.1 G/DL (ref 3.5–5.1)
ALP BLD-CCNC: 74 U/L (ref 38–126)
ALT SERPL-CCNC: 11 U/L (ref 11–66)
ANION GAP SERPL CALCULATED.3IONS-SCNC: 12 MEQ/L (ref 8–16)
AST SERPL-CCNC: 15 U/L (ref 5–40)
BACTERIA: ABNORMAL /HPF
BILIRUB SERPL-MCNC: 0.6 MG/DL (ref 0.3–1.2)
BILIRUBIN DIRECT: < 0.2 MG/DL (ref 0–0.3)
BILIRUBIN URINE: NEGATIVE
BLOOD, URINE: ABNORMAL
BUN BLDV-MCNC: 49 MG/DL (ref 7–22)
CALCIUM SERPL-MCNC: 10.1 MG/DL (ref 8.5–10.5)
CASTS 2: ABNORMAL /LPF
CASTS UA: ABNORMAL /LPF
CHARACTER, URINE: ABNORMAL
CHLORIDE BLD-SCNC: 94 MEQ/L (ref 98–111)
CO2: 31 MEQ/L (ref 23–33)
COLOR: ABNORMAL
CREAT SERPL-MCNC: 2.7 MG/DL (ref 0.4–1.2)
CRYSTALS, UA: ABNORMAL
DIFFERENTIAL, MANUAL: NORMAL
EPITHELIAL CELLS, UA: ABNORMAL /HPF
GLUCOSE BLD-MCNC: 193 MG/DL (ref 70–108)
GLUCOSE URINE: 100 MG/DL
KETONES, URINE: ABNORMAL
LEUKOCYTE ESTERASE, URINE: ABNORMAL
MISCELLANEOUS 2: ABNORMAL
NITRITE, URINE: NEGATIVE
OSMOLALITY CALCULATION: 292 MOSMOL/KG (ref 275–300)
PH UA: 5 (ref 5–9)
POTASSIUM SERPL-SCNC: 3 MEQ/L (ref 3.5–5.2)
PROTEIN UA: 30
RBC URINE: ABNORMAL /HPF
RENAL EPITHELIAL, UA: ABNORMAL
SARS-COV-2, NAAT: NOT DETECTED
SCAN OF BLOOD SMEAR: NORMAL
SODIUM BLD-SCNC: 137 MEQ/L (ref 135–145)
SPECIFIC GRAVITY, URINE: 1.02 (ref 1–1.03)
TOTAL PROTEIN: 7.6 G/DL (ref 6.1–8)
UROBILINOGEN, URINE: 1 EU/DL (ref 0–1)
WBC UA: > 200 /HPF
YEAST: ABNORMAL

## 2021-06-09 PROCEDURE — 80053 COMPREHEN METABOLIC PANEL: CPT

## 2021-06-09 PROCEDURE — 82248 BILIRUBIN DIRECT: CPT

## 2021-06-09 PROCEDURE — 85025 COMPLETE CBC W/AUTO DIFF WBC: CPT

## 2021-06-09 PROCEDURE — 96375 TX/PRO/DX INJ NEW DRUG ADDON: CPT

## 2021-06-09 PROCEDURE — 96361 HYDRATE IV INFUSION ADD-ON: CPT

## 2021-06-09 PROCEDURE — 6360000002 HC RX W HCPCS: Performed by: PHYSICIAN ASSISTANT

## 2021-06-09 PROCEDURE — 36415 COLL VENOUS BLD VENIPUNCTURE: CPT

## 2021-06-09 PROCEDURE — 81001 URINALYSIS AUTO W/SCOPE: CPT

## 2021-06-09 PROCEDURE — 96374 THER/PROPH/DIAG INJ IV PUSH: CPT

## 2021-06-09 PROCEDURE — 99283 EMERGENCY DEPT VISIT LOW MDM: CPT

## 2021-06-09 PROCEDURE — 87635 SARS-COV-2 COVID-19 AMP PRB: CPT

## 2021-06-09 PROCEDURE — 6370000000 HC RX 637 (ALT 250 FOR IP): Performed by: PHYSICIAN ASSISTANT

## 2021-06-09 PROCEDURE — 87086 URINE CULTURE/COLONY COUNT: CPT

## 2021-06-09 PROCEDURE — 2580000003 HC RX 258: Performed by: PHYSICIAN ASSISTANT

## 2021-06-09 RX ORDER — 0.9 % SODIUM CHLORIDE 0.9 %
500 INTRAVENOUS SOLUTION INTRAVENOUS ONCE
Status: COMPLETED | OUTPATIENT
Start: 2021-06-09 | End: 2021-06-09

## 2021-06-09 RX ORDER — MORPHINE SULFATE 4 MG/ML
4 INJECTION, SOLUTION INTRAMUSCULAR; INTRAVENOUS ONCE
Status: COMPLETED | OUTPATIENT
Start: 2021-06-09 | End: 2021-06-09

## 2021-06-09 RX ORDER — POTASSIUM CHLORIDE 20 MEQ/1
40 TABLET, EXTENDED RELEASE ORAL ONCE
Status: COMPLETED | OUTPATIENT
Start: 2021-06-09 | End: 2021-06-09

## 2021-06-09 RX ORDER — ONDANSETRON 2 MG/ML
4 INJECTION INTRAMUSCULAR; INTRAVENOUS ONCE
Status: COMPLETED | OUTPATIENT
Start: 2021-06-09 | End: 2021-06-09

## 2021-06-09 RX ADMIN — ONDANSETRON 4 MG: 2 INJECTION INTRAMUSCULAR; INTRAVENOUS at 20:19

## 2021-06-09 RX ADMIN — SODIUM CHLORIDE 500 ML: 9 INJECTION, SOLUTION INTRAVENOUS at 18:03

## 2021-06-09 RX ADMIN — MORPHINE SULFATE 4 MG: 4 INJECTION, SOLUTION INTRAMUSCULAR; INTRAVENOUS at 21:43

## 2021-06-09 RX ADMIN — POTASSIUM CHLORIDE 40 MEQ: 1500 TABLET, EXTENDED RELEASE ORAL at 19:43

## 2021-06-09 ASSESSMENT — PAIN SCALES - GENERAL
PAINLEVEL_OUTOF10: 8
PAINLEVEL_OUTOF10: 8

## 2021-06-09 ASSESSMENT — PAIN DESCRIPTION - PAIN TYPE: TYPE: ACUTE PAIN

## 2021-06-09 NOTE — ED PROVIDER NOTES
Neuromuscular disorder ([facility]     Neuropathy     diabetic neuropathy    Obesity since childhoood    CONTRERAS on CPAP     Dr. Dionicio Chun    Pneumonia     PONV (postoperative nausea and vomiting)     Seizures ([facility]     Sepsis due to Escherichia coli ([facility] [address] 2020       Past Surgical History:   Procedure Laterality Date    ANKLE SURGERY Right 02-10-14    Dr. Karina Donohue at [facility]      removal of benign tumor   [address]  2008   [address] [place] [address]  2009    2 polyps, not precanceorus    COLONOSCOPY Left 6/10/2019    COLONOSCOPY DIAGNOSTIC performed by Nancy Uriarte MD at [address] [facility]  3/30/2012    eye lid lift    DIAGNOSTIC CARDIAC CATH LAB PROCEDURE      ENDOSCOPY, COLON, DIAGNOSTIC     [facility] EYE SURGERY  2012    left sided ptosis   [address] [facility]    Tarsal tunnel surgery    FRACTURE SURGERY     [address] and [address]    first partial in s, then total in [address] [facility]     [address]  04/10/2020    RIGHT    LIVER BIOPSY  2015    LUNG BIOPSY  2009    SD OFFICE/OUTPT VISIT,PROCEDURE ONLY Left 2018    LEFT UPPER EXTREMENTY AV FISTULA CREATION performed by Rajiv Hobbs MD at [facility] Left 2019    EGD BIOPSY performed by Nancy Uriarte MD at [facility] Marital status:       Spouse name: Not on file    Number of children: 1    Years of education: 10    Highest education level: Not on file   Occupational History    Not on file   Tobacco Use    Smoking status: Former Smoker     Packs/day: 1.50     Years: 30.00     Pack years: 45.00     Types: Cigarettes     Start date: 1979     Quit date: 3/13/2009     Years since quittin.2    Smokeless tobacco: Never Used    Tobacco comment: quit 2009   Vaping Use    Vaping Use: Never used   Substance and Sexual Activity    Alcohol use: No     Alcohol/week: 0.0 standard drinks    Drug use: No    Sexual activity: Yes     Partners: Male   Other Topics Concern    Not on file   Social History Narrative    Not on file     Social Determinants of Health     Financial Resource Strain:     Difficulty of Paying Living Expenses:    Food Insecurity:     Worried About Running Out of Food in the Last Year:     [address] in the Last Year:    Transportation Needs:     Lack of Transportation (Medical):  Lack of Transportation (Non-Medical):    Physical Activity:     Days of Exercise per Week:     Minutes of Exercise per Session:    Stress:     Feeling of Stress :    Social Connections:     Frequency of Communication with Friends and Family:     Frequency of Social Gatherings with Friends and Family:     Attends Orthodox Services:     Active Member of Clubs or Organizations:     Attends Club or Organization Meetings:     Marital Status:    Intimate Partner Violence:     Fear of Current or Ex-Partner:     Emotionally Abused:     Physically Abused:     Sexually Abused:        Previous Medications    ALBUTEROL SULFATE HFA (PROAIR HFA) 108 (90 BASE) MCG/ACT INHALER    Inhale 2 puffs into the lungs every 6 hours as needed for Wheezing or Shortness of Breath    ASPIRIN 81 MG EC TABLET    Take 81 mg by mouth daily.       ATORVASTATIN (LIPITOR) 40 MG TABLET    TAKE 1 TABLET DAILY    CARVEDILOL (COREG) 25 MG TABLET    2 tablets BID    CEFDINIR (OMNICEF) 300 MG CAPSULE    Take 1 capsule by mouth daily for 7 days    CHLORTHALIDONE (HYGROTON) 25 MG TABLET    Take 25 mg by mouth daily    CLONIDINE (CATAPRES) 0.2 MG TABLET    Take 0.2 mg by mouth 3 times daily    FAMOTIDINE (PEPCID) 40 MG TABLET        FLUTICASONE (FLONASE) 50 MCG/ACT NASAL SPRAY    1 spray by Each Nostril route daily as needed for Rhinitis    HYDROCODONE-ACETAMINOPHEN (NORCO) 5-325 MG PER TABLET    Take 1 tablet by mouth every 8 hours as needed for Pain for up to 30 days. INSULIN ASPART (NOVOLOG FLEXPEN) 100 UNIT/ML INJECTION PEN    Sliding scale insulin coverage Glucose:Dose: If Less [id number] =No Insulin/ 140-199= 2 Units/ 200-249=4 Units/ 250-299= 6 Units/  300-349=8 Units/  350-400=10 Units/ Above 400 = 12 Units max 48 units daily    INSULIN GLARGINE (LANTUS SOLOSTAR) 100 UNIT/ML INJECTION PEN    Inject 25 Units into the skin nightly    INSULIN PEN NEEDLE (B-D ULTRAFINE III SHORT PEN) 31G X 8 MM MISC    Use three times daily Diagnosis Code E11.9    IRON POLYSACCHARIDES (NIFEREX) 150 MG CAPSULE    Take 150 mg by mouth daily    LACTULOSE (CHRONULAC) 10 GM/15ML SOLUTION    Take twice daily as needed for constipation    LINACLOTIDE (LINZESS) 290 MCG CAPS CAPSULE    Take 145 mcg by mouth every other day     MAGNESIUM OXIDE (MAG-OX) 400 MG TABLET    Take 400 mg by mouth 2 times daily    MYCOPHENOLATE SODIUM 360 MG TBEC    Take 360 mg by mouth 1 tablets BID    NIFEDIPINE (ADALAT CC) 60 MG EXTENDED RELEASE TABLET    Take 60 mg by mouth 2 times daily    NITROGLYCERIN (NITROSTAT) 0.4 MG SL TABLET    Place 1 tablet under the tongue every 5 minutes as needed for Chest pain    PANTOPRAZOLE (PROTONIX) 40 MG TABLET    Take 40 mg by mouth 2 times daily    POLYETHYLENE GLYCOL (GLYCOLAX) 17 GM/SCOOP POWDER    Take 17 g by mouth daily    TACROLIMUS (PROGRAF) 1 MG CAPSULE    Take 1 mg by mouth 4 CAPSULES EVERY MORNING AND 4 CAPSULES EVERY EVENING    TIOTROPIUM (SPIRIVA RESPIMAT) 2.5 MCG/ACT AERS INHALER    Inhale 2 puffs into the lungs daily    VITAMIN D (ERGOCALCIFEROL) 1.25 MG (75356 UT) CAPS CAPSULE    TAKE 1 CAPSULE BY MOUTH EVERY 14 DAYS ON MONDAYS       Allergies:    Allergies as of 06/09/2021 - Fully Reviewed 06/09/2021   Allergen Reaction Noted    Pioglitazone Swelling 04/15/2019    Actos [pioglitazone hydrochloride] Swelling 08/31/2011    Cymbalta [duloxetine hcl] Other (See Comments) 08/08/2013    Gabapentin Anxiety 11/05/2015 Review of Systems       See HPI for further details. At least 10 systems reviewed and are otherwise negative unless noted in the history of present illness. Constitutional: Admits decreased appetite, generalized weakness, subjective fever or chills   Eyes: Denies change in visual acuity   HENT: Denies nasal congestion or sore throat   Respiratory: Denies cough or shortness of breath   Cardiovascular: Denies chest pain or edema   GI:Admits nausea and vomiting. Denies abdominal pain, bloody stools or diarrhea   : Denies dysuria   Musculoskeletal: Denies back pain or joint pain   Integument: Denies rash   Neurologic: Admits dizziness. Denies headache, focal weakness or sensory changes   Endocrine: Denies polyuria or polydipsia   Lymphatic: Denies swollen glands   Psychiatric: Denies depression or anxiety       Physical Exam       Vitals:    06/09/21 1716 06/09/21 1803 06/09/21 1945 06/09/21 2021   BP: (!) 111/55      Pulse: 73 72 68 77   Resp: 16 18 16 18   Temp: 99 °F (37.2 °C)      TempSrc: Oral      SpO2: 94% 98% 94% 96%   Weight: 179 lb (81.2 kg)      Height: 5' 5\" (1.651 m)          Constitutional: No acute distress, Non-toxic appearance; well nourished    HENT: Normocephalic, Atraumatic, Bilateral external ears normal, Oropharynx moist, No oral exudates, Nose normal.    Eyes: PERRLA, EOMI, Conjunctiva normal, No discharge. Neck: Normal range of motion, No tenderness, Supple, No lymphadenopathy, No stridor. Cardiovascular: Normal heart rate, Normal rhythm, No murmurs, No rubs, No gallops. Pulmonary/Chest: Normal breath sounds, No respiratory distress, No wheezing, No  chest tenderness    Abdomen: Bowel sounds normal, Soft, No tenderness, No masses, No pulsatile masses    Back: No tenderness, No CVA tenderness    Extremities: Normal range of motion, Intact distal pulses, No edema, No tenderness  Neurologic: Alert & oriented x 3, CN II-XII intact;  Normal motor function, Normal sensory function, No focal defecits    Skin: Warm, Dry, No erythema, No rash    Psychiatric: Alert and oriented to person, place, and time. Patient maintains good eye contact. Mood and affect were normal. Concentration appeared normal      Diagnostic Studies       Please see electronic medical record for any tests performed in the ED. Labs:    Labs Reviewed   BASIC METABOLIC PANEL - Abnormal; Notable for the following components:       Result Value    Potassium 3.0 (*)     Chloride 94 (*)     Glucose 193 (*)     BUN 49 (*)     CREATININE 2.7 (*)     All other components within normal limits   CBC WITH AUTO DIFFERENTIAL - Abnormal; Notable for the following components:    RBC 3.57 (*)     Hemoglobin 9.8 (*)     Hematocrit 32.2 (*)     MCHC 30.4 (*)     RDW-SD 46.8 (*)     All other components within normal limits   GLOMERULAR FILTRATION RATE, ESTIMATED - Abnormal; Notable for the following components:    Est, Glom Filt Rate 21 (*)     All other components within normal limits   URINE WITH REFLEXED MICRO - Abnormal; Notable for the following components:    Glucose, Ur 100 (*)     Ketones, Urine TRACE (*)     Blood, Urine SMALL (*)     Protein, UA 30 (*)     Leukocyte Esterase, Urine MODERATE (*)     Color, UA DK YELLOW (*)     Character, Urine TURBID (*)     All other components within normal limits   CULTURE, REFLEXED, URINE    Narrative:     Source: Specimen not received       Site:           Current Antibiotics:   COVID-19, RAPID   HEPATIC FUNCTION PANEL   ANION GAP   OSMOLALITY   SCAN OF BLOOD SMEAR   MANUAL DIFFERENTIAL       Radiology:    No orders to display       Emergency Department Procedures         ED Course and MDM       Chinedu Michel is a 77 y.o. female who presented to the emergency department with a chief complaint of decreased appetite, nausea, and vomiting. Urine revealed >200 WBC, trace ketones, and moderate leukocytes. Creatinine was 2.7, BUN 49, GFR 21.  Patient was treated with IV fluids, Zofran, and Klor-Con. Patient had kidney transplant 4/2020 and transplant location was contacted. Dr Aleshia Cristobal at [facility] has accepted the patient for transport. Patient was seen, history and physical exam was performed. Patient remains stable here in the emergency department.  Patient's laboratory values, imaging studies and physical examination findings were reviewed and were concerning for renal failure post transplant      Labs Reviewed   BASIC METABOLIC PANEL - Abnormal; Notable for the following components:       Result Value    Potassium 3.0 (*)     Chloride 94 (*)     Glucose 193 (*)     BUN 49 (*)     CREATININE 2.7 (*)     All other components within normal limits   CBC WITH AUTO DIFFERENTIAL - Abnormal; Notable for the following components:    RBC 3.57 (*)     Hemoglobin 9.8 (*)     Hematocrit 32.2 (*)     MCHC 30.4 (*)     RDW-SD 46.8 (*)     All other components within normal limits   GLOMERULAR FILTRATION RATE, ESTIMATED - Abnormal; Notable for the following components:    Est, Glom Filt Rate 21 (*)     All other components within normal limits   URINE WITH REFLEXED MICRO - Abnormal; Notable for the following components:    Glucose, Ur 100 (*)     Ketones, Urine TRACE (*)     Blood, Urine SMALL (*)     Protein, UA 30 (*)     Leukocyte Esterase, Urine MODERATE (*)     Color, UA DK YELLOW (*)     Character, Urine TURBID (*)     All other components within normal limits   CULTURE, REFLEXED, URINE    Narrative:     Source: Specimen not received       Site:           Current Antibiotics:   COVID-19, RAPID   HEPATIC FUNCTION PANEL   ANION GAP   OSMOLALITY   SCAN OF BLOOD SMEAR   MANUAL DIFFERENTIAL     Medications   0.9 % sodium chloride bolus (0 mLs Intravenous Stopped 6/9/21 2020)   ondansetron (ZOFRAN) injection 4 mg (4 mg Intravenous Given 6/9/21 2019)   potassium chloride (KLOR-CON M) extended release tablet 40 mEq (40 mEq Oral Given 6/9/21 1943)     New Prescriptions    No medications on file         Counseling     The emergency provider has spoken with the patient and discussed today's findings, in addition to providing specific details for the plan of care. Questions are answered and there is agreement with the plan. I have explained to the patient in appropriate terminology our work-up in the ED and their diagnosis. I have also given anticipatory guidance and expectant management of their condition as an outpatient as per my custom. They are instructed to return to the ED if their condition deteriorates in any way    This patient was seen under the direct supervision of the attending physician who was available for consultation. Differential Diagnosis   Gastritis vs enteritis vs metabolic abnormality    Consults: D/W Dr. Anayeli Ricardo transplant team- agreed to accept patient. DECISION to ADMIT / DISCHARGE:     9:08 PM    Clinical Impression       1.   1. Acute renal failure, unspecified acute renal failure type ([facility] [address]    2. Transplant, organ        Disposition       All results were shared with the patient, medical decision making was discussed and all questions were answered, and she agreed to assessment and plan. Patient was TRANSFERRED to the [address] [place] [address] based on concerning ED workup. At this point in time, I believe the patient has the mental capacity to make medical decisions.           Viktor Mir [facility]  06/09/21 2101

## 2021-06-10 ENCOUNTER — TELEPHONE (OUTPATIENT)
Dept: FAMILY MEDICINE CLINIC | Age: 66
End: 2021-06-10

## 2021-06-10 LAB
BASOPHILS # BLD: 0 %
BASOPHILS ABSOLUTE: 0 THOU/MM3 (ref 0–0.1)
EOSINOPHIL # BLD: 0 %
EOSINOPHILS ABSOLUTE: 0 THOU/MM3 (ref 0–0.4)
ERYTHROCYTE [DISTWIDTH] IN BLOOD BY AUTOMATED COUNT: 14.3 % (ref 11.5–14.5)
ERYTHROCYTE [DISTWIDTH] IN BLOOD BY AUTOMATED COUNT: 46.8 FL (ref 35–45)
HCT VFR BLD CALC: 32.2 % (ref 37–47)
HEMOGLOBIN: 9.8 GM/DL (ref 12–16)
LYMPHOCYTES # BLD: 6 %
LYMPHOCYTES ABSOLUTE: 0.4 THOU/MM3 (ref 1–4.8)
MCH RBC QN AUTO: 27.5 PG (ref 26–33)
MCHC RBC AUTO-ENTMCNC: 30.4 GM/DL (ref 32.2–35.5)
MCV RBC AUTO: 90.2 FL (ref 81–99)
METAMYELOCYTES: 5 %
MONOCYTES # BLD: 14 %
MONOCYTES ABSOLUTE: 0.8 THOU/MM3 (ref 0.4–1.3)
MYELOCYTES: 1 %
NUCLEATED RED BLOOD CELLS: 0 /100 WBC
PATHOLOGIST REVIEW: ABNORMAL
PLATELET # BLD: 178 THOU/MM3 (ref 130–400)
PLATELET ESTIMATE: ADEQUATE
PMV BLD AUTO: 11.5 FL (ref 9.4–12.4)
POIKILOCYTES: ABNORMAL
RBC # BLD: 3.57 MILL/MM3 (ref 4.2–5.4)
SEG NEUTROPHILS: 74 %
SEGMENTED NEUTROPHILS ABSOLUTE COUNT: 4.4 THOU/MM3 (ref 1.8–7.7)
WBC # BLD: 5.9 THOU/MM3 (ref 4.8–10.8)

## 2021-06-10 NOTE — ED NOTES
Patient medicated with zofran at this time per provider order due to nausea. Patient vital signs stable and respirations unlabored.       Chuck Lomax RN  06/09/21 2021

## 2021-06-10 NOTE — ED PROVIDER NOTES
I have seen and examined the patient myself and I have reviewed the mid-[facility]'s chart. Please refer to [facility]-level provider's chart for detailed history of present illness, physical exam and medical decision making. I agree with [facility]'s assessment and plan. 78-year-old female with previous history of renal transplant in 04/2020 at [address] [place] [address] presents to ED complaining of nausea, vomiting, fatigue and decreased appetite over last 4-5 days. 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 Patient received normal saline bolus in ED. Discussed with [facility] transplant team Dr. Carlene Rose who accepted her as a direct admission to [facility]. IMPRESSION  1. Acute renal failure, unspecified acute renal failure type ([facility] [address]    2.  Transplant, organ        DISPOSITION AND PLAN  Transfer to [facility]    Isabella Hansen M.D.        Isabella Hansen MD  06/10/21 3761

## 2021-06-10 NOTE — LETTER
[address] [address],[address] [facility]  [address] [facility],[address]  Phone: [phone]  Fax: [address], APRN - CNP        Rosalina 10, 2021    [place]  [address]      Dear Mark Pena:      [address] [facility]  [address] [address] [address], [address]  Phone: [phone]  Fax: [phone]    Rosalina 10, 2021    [address] [place] [address]    Dear Mark Pena,    This letter is regarding your Emergency Department (ED) visit at [address] [address] on 6/9/21. Cortney Acevedo wanted to make sure that you understand your discharge instructions and that you were able to fill any prescriptions that may have been ordered for you. Please contact the office at the above phone number if the ED advised you to follow up with Bayron Palmer, or if you have any further questions or needs. Also did you know -   *Visiting the ED for a non-emergency could result in higher co-pays than you would normally be subject to paying? *You can call your doctor even after hours so they can direct you to the most appropriate care. [facility]) practices can often offer you an appointment on the same day that you call. *We have some [facility] offices that offer Walk-in appointments; check our website for availability in your community, [email or web address].      *Evisits are now available for patients for $36 through [employer] for certain conditions:  * Sinus, cold and or cough       * Diarrhea            * Headache  * Heartburn                                * Poison Urvashi          * Back pain     * Urinary problems                         If you do not have [employer] and are interested, please contact the office and a staff member may assist you or go to [email or web address].     Sincerely,   Wil Martel, DONOVAN Jiang CNP and your [place] [address] Team       If you have any questions or concerns, please don't hesitate to call.     Sincerely,        DONOVAN Harvey - CNP

## 2021-06-11 LAB — URINE CULTURE REFLEX: NORMAL

## 2021-06-14 DIAGNOSIS — E55.9 VITAMIN D DEFICIENCY: ICD-10-CM

## 2021-06-17 RX ORDER — ERGOCALCIFEROL 1.25 MG/1
50000 CAPSULE ORAL
Qty: 6 CAPSULE | Refills: 3 | Status: SHIPPED | OUTPATIENT
Start: 2021-06-17 | End: 2022-05-31

## 2021-06-18 ENCOUNTER — HOSPITAL ENCOUNTER (OUTPATIENT)
Dept: WOUND CARE | Age: 66
Discharge: HOME | End: 2021-06-18
Payer: MEDICARE

## 2021-06-18 VITALS
OXYGEN SATURATION: 99 % | SYSTOLIC BLOOD PRESSURE: 158 MMHG | DIASTOLIC BLOOD PRESSURE: 70 MMHG | HEART RATE: 90 BPM | RESPIRATION RATE: 16 BRPM | TEMPERATURE: 97.2 F

## 2021-06-18 DIAGNOSIS — L89.103 PRESSURE INJURY OF BACK, STAGE 3 (HCC): Primary | ICD-10-CM

## 2021-06-18 DIAGNOSIS — G89.4 CHRONIC PAIN SYNDROME: ICD-10-CM

## 2021-06-18 PROCEDURE — 99213 OFFICE O/P EST LOW 20 MIN: CPT

## 2021-06-18 PROCEDURE — 99212 OFFICE O/P EST SF 10 MIN: CPT | Performed by: NURSE PRACTITIONER

## 2021-06-18 RX ORDER — LIDOCAINE HYDROCHLORIDE 20 MG/ML
JELLY TOPICAL ONCE
Status: CANCELLED | OUTPATIENT
Start: 2021-06-18 | End: 2021-06-18

## 2021-06-18 RX ORDER — BACITRACIN, NEOMYCIN, POLYMYXIN B 400; 3.5; 5 [USP'U]/G; MG/G; [USP'U]/G
OINTMENT TOPICAL ONCE
Status: CANCELLED | OUTPATIENT
Start: 2021-06-18 | End: 2021-06-18

## 2021-06-18 RX ORDER — LIDOCAINE HYDROCHLORIDE 40 MG/ML
SOLUTION TOPICAL ONCE
Status: CANCELLED | OUTPATIENT
Start: 2021-06-18 | End: 2021-06-18

## 2021-06-18 RX ORDER — HYDROCODONE BITARTRATE AND ACETAMINOPHEN 5; 325 MG/1; MG/1
1 TABLET ORAL EVERY 8 HOURS PRN
Qty: 90 TABLET | Refills: 0 | Status: SHIPPED | OUTPATIENT
Start: 2021-06-19 | End: 2021-07-20 | Stop reason: SDUPTHER

## 2021-06-18 RX ORDER — LIDOCAINE 50 MG/G
OINTMENT TOPICAL ONCE
Status: CANCELLED | OUTPATIENT
Start: 2021-06-18 | End: 2021-06-18

## 2021-06-18 RX ORDER — CEFDINIR 300 MG/1
300 CAPSULE ORAL 2 TIMES DAILY
COMMUNITY
End: 2021-07-06

## 2021-06-18 RX ORDER — BACITRACIN ZINC AND POLYMYXIN B SULFATE 500; 1000 [USP'U]/G; [USP'U]/G
OINTMENT TOPICAL ONCE
Status: CANCELLED | OUTPATIENT
Start: 2021-06-18 | End: 2021-06-18

## 2021-06-18 RX ORDER — CLOBETASOL PROPIONATE 0.5 MG/G
OINTMENT TOPICAL ONCE
Status: CANCELLED | OUTPATIENT
Start: 2021-06-18 | End: 2021-06-18

## 2021-06-18 RX ORDER — GINSENG 100 MG
CAPSULE ORAL ONCE
Status: CANCELLED | OUTPATIENT
Start: 2021-06-18 | End: 2021-06-18

## 2021-06-18 RX ORDER — BETAMETHASONE DIPROPIONATE 0.05 %
OINTMENT (GRAM) TOPICAL ONCE
Status: CANCELLED | OUTPATIENT
Start: 2021-06-18 | End: 2021-06-18

## 2021-06-18 RX ORDER — GENTAMICIN SULFATE 1 MG/G
OINTMENT TOPICAL ONCE
Status: CANCELLED | OUTPATIENT
Start: 2021-06-18 | End: 2021-06-18

## 2021-06-18 RX ORDER — LIDOCAINE 40 MG/G
CREAM TOPICAL ONCE
Status: CANCELLED | OUTPATIENT
Start: 2021-06-18 | End: 2021-06-18

## 2021-06-18 ASSESSMENT — PAIN DESCRIPTION - LOCATION: LOCATION: BACK

## 2021-06-18 ASSESSMENT — PAIN DESCRIPTION - PAIN TYPE: TYPE: CHRONIC PAIN

## 2021-06-18 ASSESSMENT — PAIN SCALES - GENERAL: PAINLEVEL_OUTOF10: 7

## 2021-06-18 NOTE — PROGRESS NOTES
GERD (gastroesophageal reflux disease)     Hemoglobin disease (HCC)     hemoglobin hope    History of granulomatous disease 2009    followed by Dr. Radha Lang    HTN (hypertension) 1970's    Hyperlipemia 1998    Iron deficiency anemia due to dietary causes 6/21/2018    Kidney stones 3/2014    Kidney trouble          MRSA infection 03/2017    right foot-Dr. Aniya Mace (podiatrist)    Neuromuscular disorder ([facility]     Neuropathy 1989    diabetic neuropathy    Obesity since childhoood    CONTRERAS on CPAP 2010    Dr. Klaudia Tellez    Pneumonia     PONV (postoperative nausea and vomiting)     Seizures ([facility]     Sepsis due to Escherichia coli ([facility] 07/2020       PAST SURGICAL HISTORY     Past Surgical History:   Procedure Laterality Date    ANKLE SURGERY Right 02-10-14    Dr. Shaun Coffey at [facility]  1990's    removal of benign tumor   Michelle Carter  2008   [address]  2009    2 polyps, not precanceorus    COLONOSCOPY Left 6/10/2019    COLONOSCOPY DIAGNOSTIC performed by Ankur Burris MD at [address] [facility]  3/30/2012    eye lid lift    DIAGNOSTIC CARDIAC CATH LAB PROCEDURE      ENDOSCOPY, COLON, DIAGNOSTIC  2007   [facility]  March 30th, 2012    left sided ptosis    FOOT SURGERY  1990    Tarsal tunnel surgery    FRACTURE SURGERY  2015   [address] and [address]    first partial in 1980's, then total in [address] [facility]  2015   [address]  04/10/2020    RIGHT    LIVER BIOPSY  6/2015    LUNG BIOPSY  2009    PA OFFICE/OUTPT VISIT,PROCEDURE ONLY Left 8/23/2018    LEFT UPPER EXTREMENTY AV FISTULA CREATION performed by Shoshana Thompson MD at [address] [facility] Left 6/14/2019    EGD BIOPSY performed by Ankur Burris MD at [address] [email or web address]       FAMILY HISTORY     Family History   Problem Relation Age of Onset    Diabetes Sister     Diabetes Brother     Diabetes Sister  Diabetes Sister     Cancer Sister     Early Death Sister     Heart Disease Sister     Diabetes Sister     Heart Disease Sister     Diabetes Sister     Diabetes Brother     Arthritis Mother     Heart Disease Mother     High Blood Pressure Mother     Kidney Disease Mother     Mental Illness Mother     Stroke Mother     Heart Disease Father     Diabetes Father     Obesity Father     Alcohol Abuse Father        SOCIAL HISTORY     Social History     Tobacco Use    Smoking status: Former Smoker     Packs/day: 1.50     Years: 30.00     Pack years: 45.00     Types: Cigarettes     Start date: 1979     Quit date: 3/13/2009     Years since quittin.2    Smokeless tobacco: Never Used    Tobacco comment: quit 2009   Vaping Use    Vaping Use: Never used   Substance Use Topics    Alcohol use: No     Alcohol/week: 0.0 standard drinks    Drug use: No       ALLERGIES     Allergies   Allergen Reactions    Pioglitazone Swelling    Actos [Pioglitazone Hydrochloride] Swelling    Cymbalta [Duloxetine Hcl] Other (See Comments)     Anxiety and lethargic    Gabapentin Anxiety       MEDICATIONS     Current Outpatient Medications on File Prior to Encounter   Medication Sig Dispense Refill    vitamin D (ERGOCALCIFEROL) 1.25 MG (60888 UT) CAPS capsule TAKE 1 CAPSULE BY MOUTH EVERY 14 DAYS ON  6 capsule 3    chlorthalidone (HYGROTON) 25 MG tablet Take 25 mg by mouth daily      polyethylene glycol (GLYCOLAX) 17 GM/SCOOP powder Take 17 g by mouth daily      cloNIDine (CATAPRES) 0.2 MG tablet Take 0.2 mg by mouth 3 times daily      famotidine (PEPCID) 40 MG tablet       iron polysaccharides (NIFEREX) 150 MG capsule Take 150 mg by mouth daily      tiotropium (SPIRIVA RESPIMAT) 2.5 MCG/ACT AERS inhaler Inhale 2 puffs into the lungs daily 1 Inhaler 11    albuterol sulfate HFA (PROAIR HFA) 108 (90 Base) MCG/ACT inhaler Inhale 2 puffs into the lungs every 6 hours as needed for Wheezing or Shortness of Breath 3 Inhaler 3    fluticasone (FLONASE) 50 MCG/ACT nasal spray 1 spray by Each Nostril route daily as needed for Rhinitis 3 Bottle 3    pantoprazole (PROTONIX) 40 MG tablet Take 40 mg by mouth 2 times daily      atorvastatin (LIPITOR) 40 MG tablet TAKE 1 TABLET DAILY 90 tablet 3    insulin aspart (NOVOLOG FLEXPEN) 100 UNIT/ML injection pen Sliding scale insulin coverage Glucose:Dose: If Less esur452 =No Insulin/ 140-199= 2 Units/ 200-249=4 Units/ 250-299= 6 Units/  300-349=8 Units/  350-400=10 Units/ Above 400 = 12 Units max 48 units daily 15 pen 1    insulin glargine (LANTUS SOLOSTAR) 100 UNIT/ML injection pen Inject 25 Units into the skin nightly (Patient taking differently: Inject 20 Units into the skin nightly ) 15 pen 1    carvedilol (COREG) 25 MG tablet 2 tablets BID (Patient taking differently: 1 tablets BID) 360 tablet 1    tacrolimus (PROGRAF) 1 MG capsule Take 1 mg by mouth 4 CAPSULES EVERY MORNING AND 4 CAPSULES EVERY EVENING      NIFEdipine (ADALAT CC) 60 MG extended release tablet Take 60 mg by mouth 2 times daily      Mycophenolate Sodium 360 MG TBEC Take 360 mg by mouth 1 tablets BID      magnesium oxide (MAG-OX) 400 MG tablet Take 400 mg by mouth 2 times daily      linaclotide (LINZESS) 290 MCG CAPS capsule Take 145 mcg by mouth every other day       aspirin 81 MG EC tablet Take 81 mg by mouth daily.  Insulin Pen Needle (B-D ULTRAFINE III SHORT PEN) 31G X 8 MM MISC Use three times daily Diagnosis Code E11.9 200 each 3    nitroGLYCERIN (NITROSTAT) 0.4 MG SL tablet Place 1 tablet under the tongue every 5 minutes as needed for Chest pain 25 tablet 2    lactulose (CHRONULAC) 10 GM/15ML solution Take twice daily as needed for constipation 2700 mL 1     No current facility-administered medications on file prior to encounter.        REVIEW OF SYSTEMS:     Constitutional: Denies fever, chills, night sweats  Head: Denies headache,  dizziness, loss of consciousness  Respiratory: Denies shortness of breath, cough, wheezing, dyspnea with exertion  Cardiovascular:Denies chest pain, palpitations, edema  Gastrointestinal: Denies nausea, vomiting, constipation, diarrhea, abdominal pain   Musculoskeletal: +weakness Denies joint pain, swelling , stiffness,  Endocrine: Denies polyuria, polydipsia, cold or heat intolerance  Hematology: Denies easy brusing or bleeding, hx of clotting disorder  Dermatology: +left upper back wound Denies skin rash, eczema, pruritis    PHYSICAL EXAM:     BP (!) 158/70   Pulse 90   Temp 97.2 °F (36.2 °C) (Infrared)   Resp 16   SpO2 99%   Wt Readings from Last 3 Encounters:   06/09/21 179 lb (81.2 kg)   05/12/21 181 lb (82.1 kg)   04/28/21 184 lb (83.5 kg)     General:  Awake, alert, no apparent distress.  Appears stated age  HEENT: conjuctivae are clear without exudate or hemorrhage, anicteric sclera, moist oral mucosa. Chest:  Respirations regular, non-labored.  Chest rise and fall equal bilaterally.    Neurological: Awake, alert, oriented x4  Psychiatric:  Appropriate mood and affect  Extremities: non-traumatic in appearance.    Skin:  Warm and dry  Wound:                Location: Left upper back              Classification/stage: pressure, stage 3              Tunneling/undermining: Not Present              AONXU bed: granular              Exudate:  moderate, yellow              Romana-wound:dry and intact              Complications[de-identified] uncomplicated              Pain: Denies              -No signs of infection. Wound 05/21/21 Back Left;Upper (Active)   Wound Image   06/18/21 1421   Wound Etiology Pressure Stage  3 06/18/21 1421   Dressing Status Intact; New dressing applied 06/18/21 1421   Wound Cleansed Cleansed with saline 06/18/21 1421   Dressing/Treatment Alginate with Ag; Foam 06/04/21 1445   Offloading for Diabetic Foot Ulcers No offloading required 06/18/21 1421   Wound Length (cm) 0.9 cm 06/18/21 1421   Wound Width (cm) 0.5 cm 06/18/21 1421   Wound Depth Vitamin D deficiency E55.9    Hypertensive nephropathy I12.9    Persistent proteinuria associated with type 2 diabetes mellitus (HCC) E11.29, R80.9    Cellulitis in diabetic foot ([facility]) E11.628, L03.119    VIDA (acute kidney injury) ([facility] N17.9    Persistent proteinuria R80.1    Acquired hypothyroidism E03.9    CKD (chronic kidney disease), stage IV ([facility]) N18.4    Nephrotic syndrome N04.9    Type 2 diabetes mellitus (HCC) E11.9    Iron deficiency anemia due to dietary causes D50.8    Anemia of chronic disease D63.8    Stage 5 chronic kidney disease not on chronic dialysis ([facility]) N18.5    ESRD (end stage renal disease) ([facility]) N18.6    Hypervolemia associated with renal insufficiency E87.70, N28.9    Pulmonary edema, noncardiac J81.1    CKD (chronic kidney disease), stage V ([facility]) N18.5    Chronic progressive renal failure, stage 5 ([facility]) N18.5    Accelerated hypertension I10    Diabetic peripheral neuropathy associated with type 2 diabetes mellitus ([facility]) E11.42    Pericardial effusion I31.3    Hypokalemia E87.6    Type II diabetes mellitus with stage 5 chronic kidney disease ([facility]) E11.22, N18.5    Acute on chronic diastolic congestive heart failure ([facility]) I50.33    End stage renal disease due to benign hypertension ([facility]) I12.0, N18.6    ESRD (end stage renal disease) on dialysis ([facility]) N18.6, Z99.2    Post-operative nausea and vomiting R11.2, Z98.890    Chronic constipation K59.09    Candida UTI B37.49    Fluid overload E87.70    Pleural effusion J90    Acute respiratory failure with hypoxia ([facility]) J96.01    Dyspnea R06.00    Bilateral leg edema R60.0    Hypernatremia N00.8    Metabolic acidosis R95.2    Generalized weakness R53.1    Lymphadenopathy, inguinal R59.0    Atelectasis of left lung J98.11    Thyroid nodule E04.1    ESRD needing dialysis ([facility] N18.6, Z99.2    Debility R53.81    Kidney transplant recipient Z94.0    Sepsis ([facility] A41.9    E. coli UTI N39.0, B96.20    Acute kidney injury superimposed on CKD ([facility] [address] N17.9, N18.9    Intractable nausea and vomiting R11.2    Hypomagnesemia E83.42    Hypermagnesemia E83.41    Chest discomfort R07.89    Left arm pain M79.602    Hypertensive urgency I16.0    Orthostatic hypotension I95.1    Hx of fall Z91.81    Hx of coronary artery disease Z86.79    History of end stage renal disease Z87.448    Cervical stenosis of spinal canal M48.02    Hx of gastroesophageal reflux (GERD) Z87.19    Other chronic pain G89.29    Arm pain M79.603    Polyneuropathy associated with critical illness (HCC) G62.81    Pressure injury of back, stage 3 ([facility] L89.103       PLAN     Patient examined and evaluated. All available lab work, radiology studies, and progress notes pertaining to Cipriano Fothergill reviewed prior to or during patient visit today.    -Stage 3 pressure injury, left upper back- Continues to show significant improvement with wound care as ordered. Wound measuring significantly smaller at today's visit. Will continue silver alginate and silicone bordered foams, change three times weekly.      -No indications of infection to wound at today's visit. Education provided on signs and symptoms of infection to wound. Call clinic or seek emergency care should these occur. Educated Cipriano Fothergill on the importance of offloading wound(s) for wound healing/prevention. Pressure reduction techniques reviewed. Patient verbalized understanding. Follow up 2 weeks for re-evaluation. Call clinic with any needs or concerns prior to scheduled visit. All questions and concerns addressed prior to discharge from today's visit. Please see attached Discharge Instructions    Written patient dismissal instructions given to patient and signed by patient or POA. I spent a total of 15 minutes reviewing previous notes, test results and face to face with Cipriano Fothergill  on 6/18/2021.   Greater than 50% of this time was spent on counseling, reviewing and discussing test results, answering questions, instructions on treatment and/or medications ordered, and coordinating the care based on my plan and assessment as noted. Discharge Instructions     Discharge Instructions       Visit Discharge/Physician Orders:     Home Care: [facility]     Wound Location: Left upper back      Dressing orders:      1) Gather wound care supplies and arrange on clean table.      2) Wash your hands with soap and water or use alcohol based hand  for 20 seconds (sing \"Happy Birthday\" twice).    3) Cleanse wounds with normal saline or wound cleanser and gauze. Pat dry with clean gauze.    4) Left upper back wound- Apply silver alginate to wound bed. Cover with silicone bordered foam. Home health to change 3 times a week.       Keep all dressings clean & dry.     Do not shower, take baths or get wound wet, unless otherwise instructed by your Wound Care doctor.      Follow up visit:   2 Weeks 6/18/21 at 2:30 pm     Keep next scheduled appointment. Please give 24 hour notice if unable to keep appointment. [phone]     If you experience any of the following, please call the Wound Care Service during business hours: Monday through Friday 8:00 am - 4:30 pm  ([phone]).             *Increase in pain              *Temperature over 101              *Increase in drainage from your wound or a foul odor              *Uncontrolled swelling              *Need for compression bandage changes due to slippage, breakthrough drainage     If you need medical attention outside of business hours, please contact your Primary Care Doctor or go to the nearest emergency room.          Electronically signed by DONOVAN Cardoza CNP on 6/18/2021 at 2:33 PM

## 2021-06-18 NOTE — TELEPHONE ENCOUNTER
Sierra Hamilton called requesting a refill on the following medications:  Requested Prescriptions     Pending Prescriptions Disp Refills    HYDROcodone-acetaminophen (NORCO) 5-325 MG per tablet 90 tablet 0     Sig: Take 1 tablet by mouth every 8 hours as needed for Pain for up to 30 days.      Pharmacy verified:  .pv  [facility] [address]    Date of last visit: 04/08/2021  Date of next visit (if applicable): 5/5/3147

## 2021-06-18 NOTE — TELEPHONE ENCOUNTER
OARRS reviewed. UDS: + for Dihydrocodeine, Hydrocodone, Norhydrocodone, Hydromorphone. Last seen: 4/8/2021.  Follow-up: 7/8/21

## 2021-06-21 ENCOUNTER — TELEPHONE (OUTPATIENT)
Dept: FAMILY MEDICINE CLINIC | Age: 66
End: 2021-06-21

## 2021-06-21 NOTE — TELEPHONE ENCOUNTER
Dr. Samanta Smith from [facility] called and states that she will be faxing over some forms for you to fill out due to they are resuming her care after her Hospital stay at [facility].

## 2021-06-25 ENCOUNTER — TELEPHONE (OUTPATIENT)
Dept: FAMILY MEDICINE CLINIC | Age: 66
End: 2021-06-25

## 2021-06-30 ENCOUNTER — TELEPHONE (OUTPATIENT)
Dept: CARDIOLOGY CLINIC | Age: 66
End: 2021-06-30

## 2021-06-30 ENCOUNTER — OFFICE VISIT (OUTPATIENT)
Dept: CARDIOLOGY CLINIC | Age: 66
End: 2021-06-30
Payer: MEDICARE

## 2021-06-30 VITALS
HEART RATE: 65 BPM | WEIGHT: 182.2 LBS | HEIGHT: 65 IN | DIASTOLIC BLOOD PRESSURE: 53 MMHG | BODY MASS INDEX: 30.35 KG/M2 | SYSTOLIC BLOOD PRESSURE: 119 MMHG

## 2021-06-30 DIAGNOSIS — Z94.0 RENAL TRANSPLANT RECIPIENT: ICD-10-CM

## 2021-06-30 DIAGNOSIS — I10 ESSENTIAL HYPERTENSION: Primary | ICD-10-CM

## 2021-06-30 PROCEDURE — G8427 DOCREV CUR MEDS BY ELIG CLIN: HCPCS | Performed by: INTERNAL MEDICINE

## 2021-06-30 PROCEDURE — G8399 PT W/DXA RESULTS DOCUMENT: HCPCS | Performed by: INTERNAL MEDICINE

## 2021-06-30 PROCEDURE — G8417 CALC BMI ABV UP PARAM F/U: HCPCS | Performed by: INTERNAL MEDICINE

## 2021-06-30 PROCEDURE — 99212 OFFICE O/P EST SF 10 MIN: CPT | Performed by: INTERNAL MEDICINE

## 2021-06-30 PROCEDURE — 1036F TOBACCO NON-USER: CPT | Performed by: INTERNAL MEDICINE

## 2021-06-30 PROCEDURE — 3017F COLORECTAL CA SCREEN DOC REV: CPT | Performed by: INTERNAL MEDICINE

## 2021-06-30 PROCEDURE — 1090F PRES/ABSN URINE INCON ASSESS: CPT | Performed by: INTERNAL MEDICINE

## 2021-06-30 PROCEDURE — 4040F PNEUMOC VAC/ADMIN/RCVD: CPT | Performed by: INTERNAL MEDICINE

## 2021-06-30 PROCEDURE — 1123F ACP DISCUSS/DSCN MKR DOCD: CPT | Performed by: INTERNAL MEDICINE

## 2021-06-30 NOTE — PROGRESS NOTES
[id number] \[facility]\"" [address]  [place] [address]  Dept: [phone]  Dept Fax: [phone]  Loc: [phone]    Visit Date: 6/30/2021    Ms. Mary Jane Watts is a 77 y.o. female  who presented for:  Chief Complaint   Patient presents with    1 Year Follow Up    Coronary Artery Disease    Check-Up       HPI:   HPI   78 yo F hx of ESRD s/p renal transplant 4/2020 who presents for follow-up. 4/2021 had sob admission to [facility] with left sided effusion, 2 weeks ago had UTI/dehydration. No chest pain, angina, BANUELOS, orthopnea, PND, sob at rest, palpitations, LE edema, or syncope. Current Outpatient Medications:     HYDROcodone-acetaminophen (NORCO) 5-325 MG per tablet, Take 1 tablet by mouth every 8 hours as needed for Pain for up to 30 days. , Disp: 90 tablet, Rfl: 0    cefdinir (OMNICEF) 300 MG capsule, Take 300 mg by mouth 2 times daily, Disp: , Rfl:     vitamin D (ERGOCALCIFEROL) 1.25 MG (03768 UT) CAPS capsule, TAKE 1 CAPSULE BY MOUTH EVERY 14 DAYS ON MONDAYS, Disp: 6 capsule, Rfl: 3    chlorthalidone (HYGROTON) 25 MG tablet, Take 25 mg by mouth daily, Disp: , Rfl:     polyethylene glycol (GLYCOLAX) 17 GM/SCOOP powder, Take 17 g by mouth daily, Disp: , Rfl:     cloNIDine (CATAPRES) 0.2 MG tablet, Take 0.2 mg by mouth 3 times daily, Disp: , Rfl:     famotidine (PEPCID) 40 MG tablet, , Disp: , Rfl:     iron polysaccharides (NIFEREX) 150 MG capsule, Take 150 mg by mouth daily, Disp: , Rfl:     tiotropium (SPIRIVA RESPIMAT) 2.5 MCG/ACT AERS inhaler, Inhale 2 puffs into the lungs daily, Disp: 1 Inhaler, Rfl: 11    albuterol sulfate HFA (PROAIR HFA) 108 (90 Base) MCG/ACT inhaler, Inhale 2 puffs into the lungs every 6 hours as needed for Wheezing or Shortness of Breath, Disp: 3 Inhaler, Rfl: 3    fluticasone (FLONASE) 50 MCG/ACT nasal spray, 1 spray by Each Nostril route daily as needed for Rhinitis, Disp: 3 Bottle, Rfl: 3   pantoprazole (PROTONIX) 40 MG tablet, Take 40 mg by mouth 2 times daily, Disp: , Rfl:     atorvastatin (LIPITOR) 40 MG tablet, TAKE 1 TABLET DAILY, Disp: 90 tablet, Rfl: 3    Insulin Pen Needle (B-D ULTRAFINE III SHORT PEN) 31G X 8 MM MISC, Use three times daily Diagnosis Code E11.9, Disp: 200 each, Rfl: 3    insulin aspart (NOVOLOG FLEXPEN) 100 UNIT/ML injection pen, Sliding scale insulin coverage Glucose:Dose: If Less [id number] =No Insulin/ 140-199= 2 Units/ 200-249=4 Units/ 250-299= 6 Units/  300-349=8 Units/  350-400=10 Units/ Above 400 = 12 Units max 48 units daily, Disp: 15 pen, Rfl: 1    insulin glargine (LANTUS SOLOSTAR) 100 UNIT/ML injection pen, Inject 25 Units into the skin nightly (Patient taking differently: Inject 20 Units into the skin nightly ), Disp: 15 pen, Rfl: 1    carvedilol (COREG) 25 MG tablet, 2 tablets BID (Patient taking differently: 1 tablets BID), Disp: 360 tablet, Rfl: 1    tacrolimus (PROGRAF) 1 MG capsule, Take 1 mg by mouth 4 CAPSULES EVERY MORNING AND 4 CAPSULES EVERY EVENING, Disp: , Rfl:     nitroGLYCERIN (NITROSTAT) 0.4 MG SL tablet, Place 1 tablet under the tongue every 5 minutes as needed for Chest pain, Disp: 25 tablet, Rfl: 2    lactulose (CHRONULAC) 10 GM/15ML solution, Take twice daily as needed for constipation, Disp: 2700 mL, Rfl: 1    NIFEdipine (ADALAT CC) 60 MG extended release tablet, Take 60 mg by mouth 2 times daily, Disp: , Rfl:     Mycophenolate Sodium 360 MG TBEC, Take 360 mg by mouth 1 tablets BID, Disp: , Rfl:     magnesium oxide (MAG-OX) 400 MG tablet, Take 400 mg by mouth 2 times daily, Disp: , Rfl:     linaclotide (LINZESS) 290 MCG CAPS capsule, Take 145 mcg by mouth every other day , Disp: , Rfl:     aspirin 81 MG EC tablet, Take 81 mg by mouth daily.   , Disp: , Rfl:     Past Medical History  Hans Chen  has a past medical history of Anemia associated with chronic renal failure, Anxiety, Arthritis, Backache, Blood circulation, collateral, Blood transfusion, CAD (coronary artery disease), Cellulitis in diabetic foot ([address] Chest pain, CHF (congestive heart failure) ([address] Chronic anemia, Chronic kidney disease, Chronic kidney disease, stage III (moderate) ([address] Chronic renal insufficiency, COPD (chronic obstructive pulmonary disease) ([address] Coronary disease, Depression, Diabetes mellitus, type 2 ([facility] Disease of blood and blood forming organ, GERD (gastroesophageal reflux disease), Hemoglobin disease ([address] History of granulomatous disease, HTN (hypertension), Hyperlipemia, Iron deficiency anemia due to dietary causes, Kidney stones, Kidney trouble, MRSA infection, Neuromuscular disorder ([address] Neuropathy, Obesity, CONTRERAS on CPAP, Pneumonia, PONV (postoperative nausea and vomiting), Seizures ([address] and Sepsis due to Escherichia coli ([facility] [address] Social History  Alena Richards  reports that she quit smoking about 12 years ago. Her smoking use included cigarettes. She started smoking about 41 years ago. She has a 45.00 pack-year smoking history. She has never used smokeless tobacco. She reports that she does not drink alcohol and does not use drugs. Family History  [facility] family history includes Alcohol Abuse in her father; Arthritis in her mother; Cancer in her sister; Diabetes in her brother, brother, father, sister, sister, sister, sister, and sister; Early Death in her sister; Heart Disease in her father, mother, sister, and sister; High Blood Pressure in her mother; Kidney Disease in her mother; Mental Illness in her mother; Obesity in her father; Stroke in her mother. There is no family history of bicuspid aortic valve, aneurysms, heart transplant, pacemakers, defibrillators, or sudden cardiac death.       Past Surgical History   Past Surgical History:   Procedure Laterality Date    ANKLE SURGERY Right 02-10-14    Dr. Lacie Bonds at [facility]  1990's    removal of benign tumor   [address]  2008    CARPAL TUNNEL RELEASE 1988    COLONOSCOPY  2009    2 polyps, not precanceorus    COLONOSCOPY Left 6/10/2019    COLONOSCOPY DIAGNOSTIC performed by Katy Yates MD at [address] [facility]  3/30/2012    eye lid lift    DIAGNOSTIC CARDIAC CATH LAB PROCEDURE      ENDOSCOPY, COLON, DIAGNOSTIC  2007   [facility] EYE SURGERY  March 30th, 2012    left sided ptosis    FOOT SURGERY  1990    Tarsal tunnel surgery    FRACTURE SURGERY  2015   [address] and [address]    first partial in 1980's, then total in [address]  2015   [address]  04/10/2020    RIGHT    LIVER BIOPSY  6/2015    LUNG BIOPSY  2009    MI OFFICE/OUTPT VISIT,PROCEDURE ONLY Left 8/23/2018    LEFT UPPER EXTREMENTY AV FISTULA CREATION performed by Jean Anaya MD at [facility] Left 6/14/2019    EGD BIOPSY performed by Katy Yates MD at [address] [facility]       Review of Systems   Constitutional: Negative for chills and fever  HENT: Negative for congestion, sinus pressure, sneezing and sore throat. Eyes: Negative for pain, discharge, redness and itching. Respiratory: Negative for apnea, cough  Gastrointestinal: Negative for blood in stool, constipation, diarrhea   Endocrine: Negative for cold intolerance, heat intolerance, polydipsia. Genitourinary: Negative for dysuria, enuresis, flank pain and hematuria. Musculoskeletal: Negative for arthralgias, joint swelling and neck pain. Neurological: Negative for numbness and headaches. Psychiatric/Behavioral: Negative for agitation, confusion, decreased concentration and dysphoric mood.      Objective:     BP (!) 119/53   Pulse 65   Ht 5' 5\" (1.651 m)   Wt 182 lb 3.2 oz (82.6 kg)   BMI 30.32 kg/m²     Wt Readings from Last 3 Encounters:   06/30/21 182 lb 3.2 oz (82.6 kg)   06/09/21 179 lb (81.2 kg)   05/12/21 181 lb (82.1 kg)     BP Readings from Last 3 Encounters:   06/30/21 (!) 119/53   06/18/21 (!) 158/70   06/09/21 (!) 111/55       Nursing note and vitals reviewed. Physical Exam   Constitutional: Oriented to person, place, and time. Appears well-developed and well-nourished. HENT:   Head: Normocephalic and atraumatic. Eyes: EOM are normal. Pupils are equal, round, and reactive to light. Neck: Normal range of motion. Neck supple. No JVD present. Cardiovascular: Normal rate, regular rhythm, normal heart sounds and intact distal pulses. No murmur heard. Pulmonary/Chest: Effort normal and breath sounds normal. No respiratory distress. No wheezes. No rales. Abdominal: Soft. Bowel sounds are normal. No distension. There is no tenderness. Musculoskeletal: Normal range of motion. No edema. Neurological: Alert and oriented to person, place, and time. No cranial nerve deficit. Coordination normal.   Skin: Skin is warm and dry. Psychiatric: Normal mood and affect.        No results found for: CKTOTAL, CKMB, CKMBINDEX    Lab Results   Component Value Date    WBC 5.9 06/09/2021    RBC 3.57 06/09/2021    RBC 3-5 09/06/2011    HGB 9.8 06/09/2021    HCT 32.2 06/09/2021    MCV 90.2 06/09/2021    MCH 27.5 06/09/2021    MCHC 30.4 06/09/2021    RDW 15.2 04/26/2017     06/09/2021    MPV 11.5 06/09/2021       Lab Results   Component Value Date     06/30/2021    K 3.5 06/30/2021    K 3.3 03/02/2021     06/30/2021    CO2 29 06/30/2021    BUN 24 06/30/2021    LABALBU 4.1 06/09/2021    LABALBU 4.8 01/03/2012    CREATININE 1.2 06/30/2021    CALCIUM 10.7 06/30/2021    LABGLOM 45 06/30/2021    GLUCOSE 208 06/30/2021    GLUCOSE 252 03/14/2012       Lab Results   Component Value Date    ALKPHOS 74 06/09/2021    ALT 11 06/09/2021    AST 15 06/09/2021    PROT 7.6 06/09/2021    BILITOT 0.6 06/09/2021    BILIDIR <0.2 06/09/2021    LABALBU 4.1 06/09/2021    LABALBU 4.8 01/03/2012       Lab Results   Component Value Date    MG 1.9 04/05/2021       Lab Results   Component Value Date    INR 1.28 (H) 08/02/2020    INR 1.13 08/23/2018    INR 0.97 06/16/2015         Lab Results   Component Value Date    LABA1C 7.1 05/12/2021    LABA1C 6.8 01/18/2021       Lab Results   Component Value Date    TRIG 184 01/18/2021    HDL 36 03/03/2021    LDLCALC 61 03/03/2021    LDLDIRECT 67.68 08/24/2020       Lab Results   Component Value Date    TSH 1.400 08/17/2020         Testing Reviewed:      I have individually reviewed the cardiac test below:    ECHO: Results for orders placed during the hospital encounter of 08/02/20    Echo Complete    Narrative  Transthoracic Echocardiography Report (TTE)    Demographics    Patient Name    Dez Musa  Gender               Female  A    MR #            [id number]       Race                 Black    Ethnicity    Account #       [de-identified]       Room Number          0006    Accession       [id number]      Date of Study        08/03/2020  Number    Date of Birth   1955      Referring Physician  YAMIL Robertson    Age             75 year(s)      Lorenzo Yoo, [facility]    Interpreting         Chastity Hardin MD  Physician    Procedure    Type of Study    TTE procedure:ECHOCARDIOGRAM COMPLETE 2D W DOPPLER W COLOR. Procedure Date  Date: 08/03/2020 Start: 08:56 AM    Study Location: Bedside  Technical Quality: Adequate visualization    Indications:Congestive heart failure. Additional Medical History:COPD ARthritis, Anemia, Obstructive sleep apnea,  Diabetic, Hypertension, coronary artery disease, Hyperlipidemia, Congestive  heart failure, Dyspnea, Ex Smoker, Chronic Kidney disease, Pulmonary Edema,  Pericardial effusion. Patient Status: Routine    Height: 65 inches Weight: 180 pounds BSA: 1.89 m^2 BMI: 29.95 kg/m^2    BP: 146/53 mmHg    Conclusions    Summary  Ejection fraction was estimated at 55-60%. Left atrial size was severely dilated. Ascending aorta = 3.6 cm.   IVC size is within normal limits with normal respiratory phasic changes. Signature    ----------------------------------------------------------------  Electronically signed by Zohreh Elder MD (Interpreting  physician) on 08/03/2020 at 04:42 PM  ----------------------------------------------------------------    Findings    Mitral Valve  The mitral valve structure was normal with normal leaflet separation. DOPPLER: The transmitral velocity was within the normal range with no  evidence for mitral stenosis. There was no evidence of mitral  regurgitation. Aortic Valve  The aortic valve was trileaflet with normal thickness and cuspal  separation. DOPPLER: Transaortic velocity was within the normal range with  no evidence of aortic stenosis. There was no evidence of aortic  regurgitation. Tricuspid Valve  The tricuspid valve structure was normal with normal leaflet separation. DOPPLER: There was no evidence of tricuspid stenosis. There was trace  tricuspid regurgitation. Pulmonic Valve  The pulmonic valve leaflets were not well seen. DOPPLER: The transpulmonic  velocity was within the normal range with trace regurgitation. Left Atrium  Left atrial size was severely dilated. Left Ventricle  Normal left ventricular size and systolic function. There were no regional wall motion abnormalities. Mild concentric hypertrophy. Ejection fraction was estimated at 55-60%. E/A flow reversal noted. Suggestive of diastolic dysfunction. Right Atrium  Right atrial size was normal.    Right Ventricle  The right ventricular size was normal with normal systolic function and  wall thickness. Pericardial Effusion  The pericardium was normal in appearance with no evidence of a pericardial  effusion. Pleural Effusion  No evidence of pleural effusion. Aorta / Great Vessels  -Ascending aorta = 3.6 cm. -IVC size is within normal limits with normal respiratory phasic changes.     M-Mode/2D Measurements & Calculations    LV Diastolic   LV Systolic Dimension:    AV Cusp Separation: 1.7 cmLA  Dimension: 4.7 3.3 cm                    Dimension: 3.8 cmAO Root  cm             LV Volume Diastolic: 718  Dimension: 3.1 cmLA Area: 24.9  LV FS:29.8 %   ml                        cm^2  LV PW          LV Volume Systolic: 91.6  Diastolic: 1.3 ml  cm             LV EDV/LV EDV Index: 102  Septum         ml/54 m^2LV ESV/LV ESV    RV Diastolic Dimension: 3.2 cm  Diastolic: 1.4 Index: 91.8 ml/23 m^2  cm             EF Calculated: 56.8 %     LA/Aorta: 1.23  Ascending Aorta: 3.6 cm  LA volume/Index: 84.5 ml /45m^2    LVOT: 2 cm    Doppler Measurements & Calculations    MV Peak E-Wave: 137 AV Peak Velocity: 228    LVOT Peak Velocity: 140 cm/s  cm/s                cm/s                     LVOT Mean Velocity: 100 cm/s  MV Peak A-Wave: 151 AV Peak Gradient: 20.79  LVOT Peak Gradient: 8  cm/s                mmHg                     mmHgLVOT Mean Gradient: 5  MV E/A Ratio: 0.91  AV Mean Velocity: 157    mmHg  MV Peak Gradient:   cm/s  7.51 mmHg           AV Mean Gradient: 9 mmHg TV Peak E-Wave: 64.3 cm/s  AV VTI: 44.9 cm          TV Peak A-Wave: 75 cm/s  MV Deceleration     AV Area  Time: 250 msec      (Continuity):2.38 cm^2   TV Peak Gradient: 1.65 mmHg  TR Velocity:289 cm/s  LVOT VTI: 34.1 cm        TR Gradient:33.41 mmHg  MV E' Septal        IVRT: 63 msec            PV Peak Velocity: 96 cm/s  Velocity: 5.9 cm/s                           PV Peak Gradient: 3.69 mmHg  MV A' Septal  Velocity: 9.9 cm/s  AV DVI (VTI): 0.76AV DVI  MV E' Lateral       (Vmax):0.61              ID ED Velocity: 127 cm/s  Velocity: 8.3 cm/s  MV A' Lateral  Velocity: 10.7 cm/s  E/E' septal: 23.22  E/E' lateral: 16.51  MR Velocity: 484  cm/s    [id number]       Assessment/Plan   Recurrent UTI  HTN  EF 50%  Cath at OSH - non-obstructive  ESRD - s/p renal transplant  DMII  Doing well, no major issues, needs

## 2021-07-02 ENCOUNTER — TELEPHONE (OUTPATIENT)
Dept: FAMILY MEDICINE CLINIC | Age: 66
End: 2021-07-02

## 2021-07-02 ENCOUNTER — HOSPITAL ENCOUNTER (OUTPATIENT)
Dept: WOUND CARE | Age: 66
Discharge: HOME | End: 2021-07-02
Payer: MEDICARE

## 2021-07-02 VITALS
OXYGEN SATURATION: 98 % | DIASTOLIC BLOOD PRESSURE: 62 MMHG | HEART RATE: 62 BPM | TEMPERATURE: 97.6 F | RESPIRATION RATE: 16 BRPM | SYSTOLIC BLOOD PRESSURE: 131 MMHG

## 2021-07-02 DIAGNOSIS — L89.103 PRESSURE INJURY OF BACK, STAGE 3 (HCC): Primary | ICD-10-CM

## 2021-07-02 PROCEDURE — 99212 OFFICE O/P EST SF 10 MIN: CPT | Performed by: NURSE PRACTITIONER

## 2021-07-02 PROCEDURE — 99213 OFFICE O/P EST LOW 20 MIN: CPT

## 2021-07-02 RX ORDER — LIDOCAINE 50 MG/G
OINTMENT TOPICAL ONCE
Status: CANCELLED | OUTPATIENT
Start: 2021-07-02 | End: 2021-07-02

## 2021-07-02 RX ORDER — LIDOCAINE 40 MG/G
CREAM TOPICAL ONCE
Status: CANCELLED | OUTPATIENT
Start: 2021-07-02 | End: 2021-07-02

## 2021-07-02 RX ORDER — LIDOCAINE HYDROCHLORIDE 20 MG/ML
JELLY TOPICAL ONCE
Status: CANCELLED | OUTPATIENT
Start: 2021-07-02 | End: 2021-07-02

## 2021-07-02 RX ORDER — GINSENG 100 MG
CAPSULE ORAL ONCE
Status: CANCELLED | OUTPATIENT
Start: 2021-07-02 | End: 2021-07-02

## 2021-07-02 RX ORDER — BETAMETHASONE DIPROPIONATE 0.05 %
OINTMENT (GRAM) TOPICAL ONCE
Status: CANCELLED | OUTPATIENT
Start: 2021-07-02 | End: 2021-07-02

## 2021-07-02 RX ORDER — BACITRACIN, NEOMYCIN, POLYMYXIN B 400; 3.5; 5 [USP'U]/G; MG/G; [USP'U]/G
OINTMENT TOPICAL ONCE
Status: CANCELLED | OUTPATIENT
Start: 2021-07-02 | End: 2021-07-02

## 2021-07-02 RX ORDER — BACITRACIN ZINC AND POLYMYXIN B SULFATE 500; 1000 [USP'U]/G; [USP'U]/G
OINTMENT TOPICAL ONCE
Status: CANCELLED | OUTPATIENT
Start: 2021-07-02 | End: 2021-07-02

## 2021-07-02 RX ORDER — CLOBETASOL PROPIONATE 0.5 MG/G
OINTMENT TOPICAL ONCE
Status: CANCELLED | OUTPATIENT
Start: 2021-07-02 | End: 2021-07-02

## 2021-07-02 RX ORDER — LIDOCAINE HYDROCHLORIDE 40 MG/ML
SOLUTION TOPICAL ONCE
Status: CANCELLED | OUTPATIENT
Start: 2021-07-02 | End: 2021-07-02

## 2021-07-02 RX ORDER — GENTAMICIN SULFATE 1 MG/G
OINTMENT TOPICAL ONCE
Status: CANCELLED | OUTPATIENT
Start: 2021-07-02 | End: 2021-07-02

## 2021-07-02 ASSESSMENT — PAIN SCALES - GENERAL: PAINLEVEL_OUTOF10: 7

## 2021-07-02 ASSESSMENT — PAIN DESCRIPTION - PAIN TYPE: TYPE: CHRONIC PAIN

## 2021-07-02 ASSESSMENT — PAIN DESCRIPTION - LOCATION: LOCATION: OTHER (COMMENT)

## 2021-07-02 NOTE — PLAN OF CARE
Problem: Wound:  Goal: Will show signs of wound healing; wound closure and no evidence of infection  Description: Will show signs of wound healing; wound closure and no evidence of infection  Outcome: Ongoing   Patient presents to wound clinic for follow up of left upper back wound. Discharge instructions/dressing orders faxed to [facility]. Left upper back wound- Cover with silicone bordered foam. Home health to change 2 times a week. Follow up visit:   2 Weeks on Wednesday 7/21/21 at 3:00 pm.    Care plan reviewed with patient. Patient verbalize understanding of the plan of care and contribute to goal setting.

## 2021-07-02 NOTE — PROGRESS NOTES
deficiency anemia due to dietary causes 6/21/2018    Kidney stones 3/2014    Kidney trouble          MRSA infection 03/2017    right foot-Dr. Lindsay Aceves (podiatrist)    Neuromuscular disorder [facility])     Neuropathy 1989    diabetic neuropathy    Obesity since childhoood    CONTRERAS on CPAP 2010    Dr. Norma Kenyno    Pneumonia     PONV (postoperative nausea and vomiting)     Seizures ([address]     Sepsis due to Escherichia coli ([facility] 07/2020       PAST SURGICAL HISTORY     Past Surgical History:   Procedure Laterality Date    ANKLE SURGERY Right 02-10-14    Dr. Octavio Billings at [facility]  1990's    removal of benign tumor   [address]  2008   [address]  2009    2 polyps, not precanceorus    COLONOSCOPY Left 6/10/2019    COLONOSCOPY DIAGNOSTIC performed by Melisa Vogt MD at [address] [place] [address]  3/30/2012    eye lid lift    DIAGNOSTIC CARDIAC CATH LAB PROCEDURE      ENDOSCOPY, COLON, DIAGNOSTIC  2007   [employer] [facility]  March 30th, 2012    left sided ptosis   [facility]    Tarsal tunnel surgery    FRACTURE SURGERY  2015   [address] and [address]    first partial in 1980's, then total in [address] [facility]  2015   [facility]  04/10/2020    RIGHT    LIVER BIOPSY  6/2015    LUNG BIOPSY  2009    [place] OFFICE/OUTPT VISIT,PROCEDURE ONLY Left 8/23/2018    LEFT UPPER EXTREMENTY AV FISTULA CREATION performed by Kasia Moya MD at [facility] Left 6/14/2019    EGD BIOPSY performed by Melisa Vogt MD at [facility]       FAMILY HISTORY     Family History   Problem Relation Age of Onset    Diabetes Sister     Diabetes Brother     Diabetes Sister     Diabetes Sister     Cancer Sister     Early Death Sister     Heart Disease Sister     Diabetes Sister     Heart Disease Sister     Diabetes Sister     Diabetes Brother     Arthritis Mother     Heart Disease Mother     High Blood Pressure Mother     Kidney Disease Mother     Mental Illness Mother     Stroke Mother     Heart Disease Father     Diabetes Father     Obesity Father     Alcohol Abuse Father        SOCIAL HISTORY     Social History     Tobacco Use    Smoking status: Former Smoker     Packs/day: 1.50     Years: 30.00     Pack years: 45.00     Types: Cigarettes     Start date: 1979     Quit date: 3/13/2009     Years since quittin.3    Smokeless tobacco: Never Used    Tobacco comment: quit    Vaping Use    Vaping Use: Never used   Substance Use Topics    Alcohol use: No     Alcohol/week: 0.0 standard drinks    Drug use: No       ALLERGIES     Allergies   Allergen Reactions    Pioglitazone Swelling    Actos [Pioglitazone Hydrochloride] Swelling    Cymbalta [Duloxetine Hcl] Other (See Comments)     Anxiety and lethargic    Gabapentin Anxiety       MEDICATIONS     Current Outpatient Medications on File Prior to Encounter   Medication Sig Dispense Refill    HYDROcodone-acetaminophen (NORCO) 5-325 MG per tablet Take 1 tablet by mouth every 8 hours as needed for Pain for up to 30 days.  90 tablet 0    cefdinir (OMNICEF) 300 MG capsule Take 300 mg by mouth 2 times daily      vitamin D (ERGOCALCIFEROL) 1.25 MG (73423 UT) CAPS capsule TAKE 1 CAPSULE BY MOUTH EVERY 14 DAYS ON  6 capsule 3    chlorthalidone (HYGROTON) 25 MG tablet Take 25 mg by mouth daily      polyethylene glycol (GLYCOLAX) 17 GM/SCOOP powder Take 17 g by mouth daily      cloNIDine (CATAPRES) 0.2 MG tablet Take 0.2 mg by mouth 3 times daily      famotidine (PEPCID) 40 MG tablet       iron polysaccharides (NIFEREX) 150 MG capsule Take 150 mg by mouth daily      tiotropium (SPIRIVA RESPIMAT) 2.5 MCG/ACT AERS inhaler Inhale 2 puffs into the lungs daily 1 Inhaler 11    fluticasone (FLONASE) 50 MCG/ACT nasal spray 1 spray by Each Nostril route daily as needed for Rhinitis 3 Bottle 3    pantoprazole (PROTONIX) 40 MG tablet Take 40 mg by mouth 2 times daily      atorvastatin (LIPITOR) 40 MG tablet TAKE 1 TABLET DAILY 90 tablet 3    insulin aspart (NOVOLOG FLEXPEN) 100 UNIT/ML injection pen Sliding scale insulin coverage Glucose:Dose: If Less [id number] =No Insulin/ 140-199= 2 Units/ 200-249=4 Units/ 250-299= 6 Units/  300-349=8 Units/  350-400=10 Units/ Above 400 = 12 Units max 48 units daily 15 pen 1    insulin glargine (LANTUS SOLOSTAR) 100 UNIT/ML injection pen Inject 25 Units into the skin nightly (Patient taking differently: Inject 20 Units into the skin nightly ) 15 pen 1    carvedilol (COREG) 25 MG tablet 2 tablets BID (Patient taking differently: 1 tablets BID) 360 tablet 1    tacrolimus (PROGRAF) 1 MG capsule Take 1 mg by mouth 4 CAPSULES EVERY MORNING AND 4 CAPSULES EVERY EVENING      NIFEdipine (ADALAT CC) 60 MG extended release tablet Take 60 mg by mouth 2 times daily      Mycophenolate Sodium 360 MG TBEC Take 360 mg by mouth 1 tablets BID      magnesium oxide (MAG-OX) 400 MG tablet Take 400 mg by mouth 2 times daily      linaclotide (LINZESS) 290 MCG CAPS capsule Take 145 mcg by mouth every other day       aspirin 81 MG EC tablet Take 81 mg by mouth daily.  albuterol sulfate HFA (PROAIR HFA) 108 (90 Base) MCG/ACT inhaler Inhale 2 puffs into the lungs every 6 hours as needed for Wheezing or Shortness of Breath 3 Inhaler 3    Insulin Pen Needle (B-D ULTRAFINE III SHORT PEN) 31G X 8 MM MISC Use three times daily Diagnosis Code E11.9 200 each 3    nitroGLYCERIN (NITROSTAT) 0.4 MG SL tablet Place 1 tablet under the tongue every 5 minutes as needed for Chest pain 25 tablet 2    lactulose (CHRONULAC) 10 GM/15ML solution Take twice daily as needed for constipation 2700 mL 1     No current facility-administered medications on file prior to encounter.        REVIEW OF SYSTEMS:     Constitutional: Denies fever, chills, night sweats  Head: Denies headache,  dizziness, loss of consciousness  Respiratory: Denies shortness of breath, cough, wheezing, dyspnea with exertion  Cardiovascular:Denies chest pain, palpitations, edema  Gastrointestinal: Denies nausea, vomiting, constipation, diarrhea, abdominal pain   Musculoskeletal: +weakness Denies joint pain, swelling , stiffness,  Endocrine: Denies polyuria, polydipsia, cold or heat intolerance  Hematology: Denies easy brusing or bleeding, hx of clotting disorder  Dermatology: +left upper back wound Denies skin rash, eczema, pruritis       PHYSICAL EXAM:     /62   Pulse 62   Temp 97.6 °F (36.4 °C) (Infrared)   Resp 16   SpO2 98%   Wt Readings from Last 3 Encounters:   06/30/21 182 lb 3.2 oz (82.6 kg)   06/09/21 179 lb (81.2 kg)   05/12/21 181 lb (82.1 kg)     General:  Awake, alert, no apparent distress.  Appears stated age  HEENT: conjuctivae are clear without exudate or hemorrhage, anicteric sclera, moist oral mucosa. Chest:  Respirations regular, non-labored.  Chest rise and fall equal bilaterally.    Neurological: Awake, alert, oriented x4  Psychiatric:  Appropriate mood and affect  Extremities: non-traumatic in appearance.    Skin:  Warm and dry  Wound:                Location: Left upper back              Classification/stage: pressure, stage 3              Tunneling/undermining: Not Present              CWWEL bed: granular              Exudate:  moderate, serosanguinous              Romana-wound:dry and intact              Complications[de-identified] uncomplicated              Pain: Denies              -No signs of infection. Wound 05/21/21 Back Left;Upper (Active)   Wound Image   07/02/21 1445   Wound Etiology Pressure Stage  3 07/02/21 1445   Dressing Status Intact; New dressing applied 07/02/21 1445   Wound Cleansed Cleansed with saline 07/02/21 1445   Dressing/Treatment Alginate with Ag; Foam 06/18/21 1421   Offloading for Diabetic Foot Ulcers No offloading required 07/02/21 1445   Wound Length (cm) 0.2 cm 07/02/21 1445   Wound Width (cm) 0.2 cm 07/02/21 1445 Wound Depth (cm) 0.1 cm 07/02/21 1445   Wound Surface Area (cm^2) 0.04 cm^2 07/02/21 1445   Change in Wound Size % (l*w) 96.67 07/02/21 1445   Wound Volume (cm^3) 0.004 cm^3 07/02/21 1445   Wound Healing % 97 07/02/21 1445   Wound Assessment Granulation tissue 07/02/21 1445   Drainage Amount Moderate 07/02/21 1445   Drainage Description Serosanguinous 07/02/21 1445   Odor None 07/02/21 1445   Romana-wound Assessment Dry/flaky 07/02/21 1445   Margins Attached edges 07/02/21 1445   Wound Thickness Description not for Pressure Injury Full thickness 07/02/21 1445   Number of days: 42         LABS/IMAGING     CBC: No results for input(s): WBC, HGB, PLT in the last 72 hours. BMP:    Recent Labs     06/30/21  1145      K 3.5      CO2 29   BUN 24*   CREATININE 1.2   GLUCOSE 208*     Calcium:  Recent Labs     06/30/21  1145   CALCIUM 10.7*       UA: No results for input(s): Lobo Bare, COLORU, CLARITYU, MUCUS, PROTEINU, BLOODU, RBCUA, WBCUA, BACTERIA, NITRU, GLUCOSEU, BILIRUBINUR, UROBILINOGEN, KETUA, LABCAST, LABCASTTY, AMORPHOS in the last 72 hours. Invalid input(s): CRYSTALS  Micro:   Lab Results   Component Value Date    BC No growth-preliminary No growth  08/03/2020         ASSESSMENT     -Stage 3 pressure injury, left upper back     Ulcer Identification:  Ulcer Type: pressure  Contributing Factors: diabetes, chronic pressure and decreased mobility     Depth of Diabetic/Pressure/Non Pressure Ulcers or Wound:  Pressure ulcer, Stage 3     Patient Active Problem List   Diagnosis Code    Uncontrolled hypertension I10    Chronic anemia D64.9    Coronary disease I25.10    Hyperlipemia E78.5    Arthritis M19.90    Neuropathy, diabetic ([facility] [address] E11.40    Leg pain M79.606    Obesity (BMI 30-39. 9) E66.9    Low HDL (under 40) E78.6    Need for prophylactic vaccination against diphtheria-tetanus-pertussis (DTP) Z23    History of tobacco use Z87.891    Allergic rhinitis J30.9    COPD without exacerbation ([facility] J44.9    Lung mass R91.8    Anemia, chronic disease D63.8    Gout M10.9    Urolithiasis N20.9    Ankle fracture, right S82.891A    Secondary hyperparathyroidism of renal origin ([facility] N25.81    Obstructive sleep apnea on CPAP G47.33, Z99.89    Vitamin D deficiency E55.9    Hypertensive nephropathy I12.9    Persistent proteinuria associated with type 2 diabetes mellitus ([facility]) E11.29, R80.9    Cellulitis in diabetic foot ([facility]) E11.628, L03.119    VIDA (acute kidney injury) ([facility] N17.9    Persistent proteinuria R80.1    Acquired hypothyroidism E03.9    CKD (chronic kidney disease), stage IV ([facility]) N18.4    Nephrotic syndrome N04.9    Type 2 diabetes mellitus ([facility]) E11.9    Iron deficiency anemia due to dietary causes D50.8    Anemia of chronic disease D63.8    Stage 5 chronic kidney disease not on chronic dialysis ([facility]) N18.5    ESRD (end stage renal disease) ([facility]) N18.6    Hypervolemia associated with renal insufficiency E87.70, N28.9    Pulmonary edema, noncardiac J81.1    CKD (chronic kidney disease), stage V ([facility]) N18.5    Chronic progressive renal failure, stage 5 ([facility]) N18.5    Accelerated hypertension I10    Diabetic peripheral neuropathy associated with type 2 diabetes mellitus ([facility]) E11.42    Pericardial effusion I31.3    Hypokalemia E87.6    Type II diabetes mellitus with stage 5 chronic kidney disease ([facility]) E11.22, N18.5    Acute on chronic diastolic congestive heart failure ([facility]) I50.33    End stage renal disease due to benign hypertension ([facility]) I12.0, N18.6    ESRD (end stage renal disease) on dialysis ([facility]) N18.6, Z99.2    Post-operative nausea and vomiting R11.2, Z98.890    Chronic constipation K59.09    Candida UTI B37.49    Fluid overload E87.70    Pleural effusion J90    Acute respiratory failure with hypoxia ([facility]) J96.01    Dyspnea R06.00    Bilateral leg edema R60.0    Hypernatremia W10.1    Metabolic acidosis S59.1    Generalized weakness R53.1    Lymphadenopathy, inguinal R59.0    Atelectasis of left lung J98.11    Thyroid nodule E04.1    ESRD needing dialysis ([facility] N18.6, Z99.2    Debility R53.81    Kidney transplant recipient Z80.0    Sepsis ([facility] A41.9    E. coli UTI N39.0, B96.20    Acute kidney injury superimposed on CKD ([facility] N17.9, N18.9    Intractable nausea and vomiting R11.2    Hypomagnesemia E83.42    Hypermagnesemia E83.41    Chest discomfort R07.89    Left arm pain M79.602    Hypertensive urgency I16.0    Orthostatic hypotension I95.1    Hx of fall Z91.81    Hx of coronary artery disease Z86.79    History of end stage renal disease Z87.448    Cervical stenosis of spinal canal M48.02    Hx of gastroesophageal reflux (GERD) Z87.19    Other chronic pain G89.29    Arm pain M79.603    Polyneuropathy associated with critical illness (HCC) G62.81    Pressure injury of back, stage 3 ([facility] L89.103       PLAN     Patient examined and evaluated. All available lab work, radiology studies, and progress notes pertaining to Cody Stephens reviewed prior to or during patient visit today.    -Stage 3 pressure injury, left upper back- Has significantly improved from last visit, small opening remains at today's visit. Will stop alginate as drainage has decreased. Continue silicone bordered foams, decrease frequency to twice weekly.     -No indications of infection to wound at today's visit. Education provided on signs and symptoms of infection. Call clinic or seek emergency care should these occur. Educated Cody Stephens on the importance of offloading wound(s) for wound healing/prevention. Pressure reduction techniques reviewed. Patient verbalized understanding. Follow up 2 weeks for re-evaluation. Call clinic with any needs or concerns prior to scheduled visit. All questions and concerns addressed prior to discharge from today's visit.     Please see attached Discharge Instructions    Written patient dismissal instructions given to patient and signed by patient or POA. I spent a total of 18 minutes reviewing previous notes, test results and face to face with Cheng Dionnejohanna  on 7/2/2021. Greater than 50% of this time was spent on counseling, reviewing and discussing test results, answering questions, instructions on treatment and/or medications ordered, and coordinating the care based on my plan and assessment as noted. Discharge Instructions       Discharge Instructions       Visit Discharge/Physician Orders:     Home Care: [facility]     Wound Location: Left upper back      Dressing orders:      1) Gather wound care supplies and arrange on clean table.      2) Wash your hands with soap and water or use alcohol based hand  for 20 seconds (sing \"Happy Birthday\" twice).    3) Cleanse wounds with normal saline or wound cleanser and gauze. Pat dry with clean gauze.    4) Left upper back wound- Cover with silicone bordered foam. Home health to change 2 times a week.       Keep all dressings clean & dry.     Do not shower, take baths or get wound wet, unless otherwise instructed by your Wound Care doctor.      Follow up visit: Kasey Montoya Wednesday 7/21/21 at 3:00 pm     Keep next scheduled appointment. Please give 24 hour notice if unable to keep appointment. [phone]     If you experience any of the following, please call the Wound Care Service during business hours: Monday through Friday 8:00 am - 4:30 pm  ([phone]).             *Increase in pain              *Temperature over 101              *Increase in drainage from your wound or a foul odor              *Uncontrolled swelling              *Need for compression bandage changes due to slippage, breakthrough drainage     If you need medical attention outside of business hours, please contact your Primary Care Doctor or go to the nearest emergency room.          Electronically signed by DONOVAN Moses CNP on 7/2/2021 at 3:21 PM

## 2021-07-06 ENCOUNTER — TELEPHONE (OUTPATIENT)
Dept: FAMILY MEDICINE CLINIC | Age: 66
End: 2021-07-06

## 2021-07-06 ENCOUNTER — OFFICE VISIT (OUTPATIENT)
Dept: NEPHROLOGY | Age: 66
End: 2021-07-06
Payer: MEDICARE

## 2021-07-06 VITALS
SYSTOLIC BLOOD PRESSURE: 163 MMHG | HEART RATE: 63 BPM | BODY MASS INDEX: 30.82 KG/M2 | OXYGEN SATURATION: 98 % | WEIGHT: 185.2 LBS | DIASTOLIC BLOOD PRESSURE: 67 MMHG

## 2021-07-06 DIAGNOSIS — N18.31 DIABETES MELLITUS DUE TO UNDERLYING CONDITION WITH STAGE 3A CHRONIC KIDNEY DISEASE, UNSPECIFIED WHETHER LONG TERM INSULIN USE (HCC): ICD-10-CM

## 2021-07-06 DIAGNOSIS — I10 ESSENTIAL HYPERTENSION: ICD-10-CM

## 2021-07-06 DIAGNOSIS — E08.22 DIABETES MELLITUS DUE TO UNDERLYING CONDITION WITH STAGE 3A CHRONIC KIDNEY DISEASE, UNSPECIFIED WHETHER LONG TERM INSULIN USE (HCC): ICD-10-CM

## 2021-07-06 DIAGNOSIS — T86.11 CHRONIC RENAL ALLOGRAFT NEPHROPATHY: ICD-10-CM

## 2021-07-06 DIAGNOSIS — N39.0 RECURRENT UTI: ICD-10-CM

## 2021-07-06 DIAGNOSIS — Z94.0 KIDNEY TRANSPLANT RECIPIENT: Primary | ICD-10-CM

## 2021-07-06 PROBLEM — D63.8 ANEMIA OF CHRONIC DISEASE: Status: RESOLVED | Noted: 2018-06-21 | Resolved: 2021-07-06

## 2021-07-06 PROBLEM — R11.2 POST-OPERATIVE NAUSEA AND VOMITING: Status: RESOLVED | Noted: 2018-08-25 | Resolved: 2021-07-06

## 2021-07-06 PROBLEM — N18.6 ESRD (END STAGE RENAL DISEASE) ON DIALYSIS (HCC): Status: RESOLVED | Noted: 2018-08-23 | Resolved: 2021-07-06

## 2021-07-06 PROBLEM — Z98.890 POST-OPERATIVE NAUSEA AND VOMITING: Status: RESOLVED | Noted: 2018-08-25 | Resolved: 2021-07-06

## 2021-07-06 PROBLEM — J81.1 PULMONARY EDEMA, NONCARDIAC: Status: RESOLVED | Noted: 2018-07-25 | Resolved: 2021-07-06

## 2021-07-06 PROBLEM — M79.602 LEFT ARM PAIN: Status: RESOLVED | Noted: 2021-03-01 | Resolved: 2021-07-06

## 2021-07-06 PROBLEM — Z99.2 ESRD (END STAGE RENAL DISEASE) ON DIALYSIS (HCC): Status: RESOLVED | Noted: 2018-08-23 | Resolved: 2021-07-06

## 2021-07-06 PROBLEM — I12.0 END STAGE RENAL DISEASE DUE TO BENIGN HYPERTENSION (HCC): Status: RESOLVED | Noted: 2018-08-23 | Resolved: 2021-07-06

## 2021-07-06 PROBLEM — E11.22 TYPE II DIABETES MELLITUS WITH STAGE 5 CHRONIC KIDNEY DISEASE (HCC): Status: RESOLVED | Noted: 2018-08-09 | Resolved: 2021-07-06

## 2021-07-06 PROBLEM — N18.4 CKD (CHRONIC KIDNEY DISEASE), STAGE IV (HCC): Status: RESOLVED | Noted: 2018-04-26 | Resolved: 2021-07-06

## 2021-07-06 PROBLEM — N18.5 TYPE II DIABETES MELLITUS WITH STAGE 5 CHRONIC KIDNEY DISEASE (HCC): Status: RESOLVED | Noted: 2018-08-09 | Resolved: 2021-07-06

## 2021-07-06 PROBLEM — N18.6 ESRD (END STAGE RENAL DISEASE) (HCC): Status: RESOLVED | Noted: 2018-07-24 | Resolved: 2021-07-06

## 2021-07-06 PROBLEM — R53.81 DEBILITY: Status: RESOLVED | Noted: 2018-11-09 | Resolved: 2021-07-06

## 2021-07-06 PROBLEM — N18.6 END STAGE RENAL DISEASE DUE TO BENIGN HYPERTENSION (HCC): Status: RESOLVED | Noted: 2018-08-23 | Resolved: 2021-07-06

## 2021-07-06 PROBLEM — N18.5 STAGE 5 CHRONIC KIDNEY DISEASE NOT ON CHRONIC DIALYSIS (HCC): Status: RESOLVED | Noted: 2018-06-21 | Resolved: 2021-07-06

## 2021-07-06 PROBLEM — E87.70 FLUID OVERLOAD: Status: RESOLVED | Noted: 2018-11-01 | Resolved: 2021-07-06

## 2021-07-06 PROBLEM — N17.9 AKI (ACUTE KIDNEY INJURY) (HCC): Status: RESOLVED | Noted: 2018-04-12 | Resolved: 2021-07-06

## 2021-07-06 PROBLEM — M79.603 ARM PAIN: Status: RESOLVED | Noted: 2021-03-04 | Resolved: 2021-07-06

## 2021-07-06 LAB — GFR SERPL CREATININE-BSD FRML MDRD: 18 ML/MIN/1.73M2

## 2021-07-06 PROCEDURE — G8427 DOCREV CUR MEDS BY ELIG CLIN: HCPCS | Performed by: INTERNAL MEDICINE

## 2021-07-06 PROCEDURE — 1090F PRES/ABSN URINE INCON ASSESS: CPT | Performed by: INTERNAL MEDICINE

## 2021-07-06 PROCEDURE — 99213 OFFICE O/P EST LOW 20 MIN: CPT | Performed by: INTERNAL MEDICINE

## 2021-07-06 PROCEDURE — G8399 PT W/DXA RESULTS DOCUMENT: HCPCS | Performed by: INTERNAL MEDICINE

## 2021-07-06 PROCEDURE — 3017F COLORECTAL CA SCREEN DOC REV: CPT | Performed by: INTERNAL MEDICINE

## 2021-07-06 PROCEDURE — 4040F PNEUMOC VAC/ADMIN/RCVD: CPT | Performed by: INTERNAL MEDICINE

## 2021-07-06 PROCEDURE — 1123F ACP DISCUSS/DSCN MKR DOCD: CPT | Performed by: INTERNAL MEDICINE

## 2021-07-06 PROCEDURE — 1036F TOBACCO NON-USER: CPT | Performed by: INTERNAL MEDICINE

## 2021-07-06 PROCEDURE — G8417 CALC BMI ABV UP PARAM F/U: HCPCS | Performed by: INTERNAL MEDICINE

## 2021-07-06 RX ORDER — SULFAMETHOXAZOLE AND TRIMETHOPRIM 800; 160 MG/1; MG/1
1 TABLET ORAL
COMMUNITY
End: 2021-12-14

## 2021-07-06 RX ORDER — DOXYCYCLINE HYCLATE 100 MG/1
CAPSULE ORAL
COMMUNITY
Start: 2021-06-11 | End: 2021-10-07

## 2021-07-06 RX ORDER — CEFDINIR 300 MG/1
300 CAPSULE ORAL 2 TIMES DAILY
Qty: 20 CAPSULE | Refills: 3 | Status: SHIPPED | OUTPATIENT
Start: 2021-07-06 | End: 2021-07-16

## 2021-07-06 RX ORDER — DOCUSATE SODIUM 100 MG/1
CAPSULE, LIQUID FILLED ORAL
COMMUNITY
Start: 2021-06-24 | End: 2021-08-11 | Stop reason: SDUPTHER

## 2021-07-06 NOTE — PROGRESS NOTES
Renal Progress Note    Assessment and Plan:      Diagnosis Orders   1. Kidney transplant recipient     2. Chronic renal allograft nephropathy     3. Essential hypertension     4. Diabetes mellitus due to underlying condition with stage 3a chronic kidney disease, unspecified whether long term insulin use ([address]     5. Recurrent UTI  [facility]. [facility]     PLAN:  I discussed my thoughts with the patient. She understood. I addressed her questions. Labs reviewed. Serum creatinine has been mostly stable around 1.2 mg/dL. Medications reviewed. No changes. We will however prescribe Cefdinir 300 mg twice a day for 10 days as needed for symptoms of UTI. Will refer to urology per [facility] recommendations for currently UTI  Return visit in 3 months  Continue monthly protocol for transplant            Patient Active Problem List   Diagnosis    Uncontrolled hypertension    Coronary disease    Hyperlipemia    Arthritis    Neuropathy, diabetic ([address]    Leg pain    Obesity (BMI 30-39. 9)    Low HDL (under 40)    Need for prophylactic vaccination against diphtheria-tetanus-pertussis (DTP)    History of tobacco use    Allergic rhinitis    COPD without exacerbation (HCC)    Lung mass    Anemia, chronic disease    Gout    Urolithiasis    Ankle fracture, right    Secondary hyperparathyroidism of renal origin ([address]    Obstructive sleep apnea on CPAP    Vitamin D deficiency    Hypertensive nephropathy    Persistent proteinuria associated with type 2 diabetes mellitus (HCC)    Cellulitis in diabetic foot (HCC)    Persistent proteinuria    Acquired hypothyroidism    CKD (chronic kidney disease), stage IV (HCC)    Nephrotic syndrome    Type 2 diabetes mellitus (HCC)    Iron deficiency anemia due to dietary causes    Anemia of chronic disease    Stage 5 chronic kidney disease not on chronic dialysis ([address]    ESRD (end stage renal disease) ([address]    Hypervolemia associated with renal insufficiency    Pulmonary edema, noncardiac    Chronic progressive renal failure, stage 5 (HCC)    Accelerated hypertension    Diabetic peripheral neuropathy associated with type 2 diabetes mellitus (HCC)    Pericardial effusion    Hypokalemia    Type II diabetes mellitus with stage 5 chronic kidney disease (HCC)    Acute on chronic diastolic congestive heart failure (HCC)    End stage renal disease due to benign hypertension (HCC)    Post-operative nausea and vomiting    Chronic constipation    Candida UTI    Fluid overload    Pleural effusion    Dyspnea    Bilateral leg edema    Metabolic acidosis    Lymphadenopathy, inguinal    Atelectasis of left lung    Thyroid nodule    Debility    Kidney transplant recipient    E. coli UTI    Hypomagnesemia    Hypermagnesemia    Hypertensive urgency    Orthostatic hypotension    Hx of coronary artery disease    History of end stage renal disease    Cervical stenosis of spinal canal    Hx of gastroesophageal reflux (GERD)    Other chronic pain    Arm pain    Polyneuropathy associated with critical illness (HCC)    Pressure injury of back, stage 3 (HCC)       Subjective:   Chief complaint:  Chief Complaint   Patient presents with    Kidney Transplant    Chronic Kidney Disease     Stage IIIa      HPI:This is a follow up visit for Ms. Jd Jones  who is here today for return appointment. I see her for kidney transplant and chronic allograft nephropathy. She was last seen in August 2020. Doing well since then. She is also followed at [facility]. No chest pain. No nausea or vomiting. No fevers. .  Was recently admitted for dehydration,nausea,vomitting with acute kidney injury at [facility]. She was discharged in good condition. Serum creatinine was 2.7 mg/dL on admission. On discharge it was 11.4 mg/dL. Today it is 1.2 mg/dL. ROS:  Pertinent positives stated above in HPI.  All other systems were reviewed and were negative. Medications:     Current Outpatient Medications   Medication Sig Dispense Refill    sulfamethoxazole-trimethoprim (BACTRIM DS;SEPTRA DS) 800-160 MG per tablet Take 1 tablet by mouth three times a week      docusate sodium (COLACE) 100 MG capsule TAKE 1 CAPSULE BY MOUTH 2 TIMES DAILY AS NEEDED.  doxycycline hyclate (VIBRAMYCIN) 100 MG capsule TAKE 2 CAPSULES BY MOUTH ONCE A WEEK.  cefdinir (OMNICEF) 300 MG capsule Take 1 capsule by mouth 2 times daily for 10 days 20 capsule 3    HYDROcodone-acetaminophen (NORCO) 5-325 MG per tablet Take 1 tablet by mouth every 8 hours as needed for Pain for up to 30 days.  90 tablet 0    vitamin D (ERGOCALCIFEROL) 1.25 MG (63844 UT) CAPS capsule TAKE 1 CAPSULE BY MOUTH EVERY 14 DAYS ON MONDAYS 6 capsule 3    chlorthalidone (HYGROTON) 25 MG tablet Take 25 mg by mouth daily      polyethylene glycol (GLYCOLAX) 17 GM/SCOOP powder Take 17 g by mouth daily      cloNIDine (CATAPRES) 0.2 MG tablet Take 0.2 mg by mouth 3 times daily      famotidine (PEPCID) 40 MG tablet Take 40 mg by mouth daily as needed       iron polysaccharides (NIFEREX) 150 MG capsule Take 150 mg by mouth daily      tiotropium (SPIRIVA RESPIMAT) 2.5 MCG/ACT AERS inhaler Inhale 2 puffs into the lungs daily 1 Inhaler 11    albuterol sulfate HFA (PROAIR HFA) 108 (90 Base) MCG/ACT inhaler Inhale 2 puffs into the lungs every 6 hours as needed for Wheezing or Shortness of Breath 3 Inhaler 3    fluticasone (FLONASE) 50 MCG/ACT nasal spray 1 spray by Each Nostril route daily as needed for Rhinitis 3 Bottle 3    pantoprazole (PROTONIX) 40 MG tablet Take 40 mg by mouth daily       atorvastatin (LIPITOR) 40 MG tablet TAKE 1 TABLET DAILY 90 tablet 3    Insulin Pen Needle (B-D ULTRAFINE III SHORT PEN) 31G X 8 MM MISC Use three times daily Diagnosis Code E11.9 200 each 3    insulin aspart (NOVOLOG FLEXPEN) 100 UNIT/ML injection pen Sliding scale insulin coverage Glucose:Dose: If Less [id number] =No 06/30/2021    GLUCOSE 150 (H) 06/23/2021    GLUCOSE 228 (H) 06/16/2021      Hepatic:   Lab Results   Component Value Date    AST 15 06/09/2021    AST 26 04/28/2021    AST 20 04/05/2021    ALT 11 06/09/2021    ALT 22 04/28/2021    ALT 13 04/05/2021    BILITOT 0.6 06/09/2021    BILITOT 0.8 04/28/2021    BILITOT 0.2 (L) 04/05/2021    ALKPHOS 74 06/09/2021    ALKPHOS 88 04/28/2021    ALKPHOS 117 04/05/2021     BNP: No results found for: BNP  Lipids:   Lab Results   Component Value Date    CHOL 138 01/18/2021    HDL 36 03/03/2021     INR:   Lab Results   Component Value Date    INR 1.28 (H) 08/02/2020    INR 1.13 08/23/2018    INR 0.97 06/16/2015     URINE:   Lab Results   Component Value Date    PROTUR 29.6 02/01/2021     Lab Results   Component Value Date    NITRU NEGATIVE 06/23/2021    COLORU YELLOW 06/23/2021    PHUR 6.0 06/23/2021    LABCAST 4-8 HYALINE 06/07/2021    LABCAST NONE SEEN 06/07/2021    WBCUA > 200 06/09/2021    RBCUA 3-5 06/09/2021    MUCUS THREADS 12/08/2020    YEAST NONE SEEN 06/09/2021    BACTERIA MODERATE 06/09/2021    SPECGRAV 1.014 06/23/2021    LEUKOCYTESUR NEGATIVE 06/23/2021    LEUKOCYTESUR MODERATE 06/09/2021    UROBILINOGEN 0.2 06/23/2021    BILIRUBINUR NEGATIVE 06/23/2021    BILIRUBINUR NEGATIVE 09/06/2011    BLOODU NEGATIVE 06/23/2021    GLUCOSEU 100 06/09/2021    KETUA NEGATIVE 06/23/2021    AMORPHOUS URATES 04/20/2021      Microalbumen/Creatinine ratio:  No components found for: RUCREAT    Objective:   Vitals: BP (!) 163/67 (Site: Right Upper Arm, Position: Sitting, Cuff Size: Large Adult)   Pulse 63   Wt 185 lb 3.2 oz (84 kg)   SpO2 98%   BMI 30.82 kg/m²      Constitutional:  Alert, awake, no apparent distress  Skin:normal with no rash or any lesions  HEENT:Pupils are reactive . Throat is clear.   Oral mucosa is moist.  Neck:supple with no thyromegaly or bruit   Cardiovascular:  S1, S2 without murmur   Respiratory:  Clear to auscultation with no wheezes or rales  Abdomen: +bowel sound, soft, non tender and no bruit  Ext: No LE edema  Musculoskeletal:Intact  Neuro:Alert, awake and oriented with no obvious focal deficit. Speech is normal.    Electronically signed by Wood Garrido MD on 7/6/2021 at 10:46 AM   **This report has been created using voice recognition software. It maycontain minor  errors which are inherent in voice recognition technology. **

## 2021-07-07 ENCOUNTER — TELEPHONE (OUTPATIENT)
Dept: FAMILY MEDICINE CLINIC | Age: 66
End: 2021-07-07

## 2021-07-08 ENCOUNTER — OFFICE VISIT (OUTPATIENT)
Dept: PHYSICAL MEDICINE AND REHAB | Age: 66
End: 2021-07-08
Payer: MEDICARE

## 2021-07-08 ENCOUNTER — TELEPHONE (OUTPATIENT)
Dept: FAMILY MEDICINE CLINIC | Age: 66
End: 2021-07-08

## 2021-07-08 VITALS
DIASTOLIC BLOOD PRESSURE: 80 MMHG | WEIGHT: 185 LBS | SYSTOLIC BLOOD PRESSURE: 158 MMHG | BODY MASS INDEX: 30.82 KG/M2 | HEIGHT: 65 IN

## 2021-07-08 DIAGNOSIS — M19.012 PRIMARY OSTEOARTHRITIS OF LEFT SHOULDER: ICD-10-CM

## 2021-07-08 DIAGNOSIS — E11.42 DIABETIC POLYNEUROPATHY ASSOCIATED WITH TYPE 2 DIABETES MELLITUS (HCC): ICD-10-CM

## 2021-07-08 DIAGNOSIS — M25.571 CHRONIC PAIN OF RIGHT ANKLE: ICD-10-CM

## 2021-07-08 DIAGNOSIS — M47.816 LUMBAR FACET ARTHROPATHY: ICD-10-CM

## 2021-07-08 DIAGNOSIS — M54.50 CHRONIC BILATERAL LOW BACK PAIN WITHOUT SCIATICA: Primary | ICD-10-CM

## 2021-07-08 DIAGNOSIS — G89.4 CHRONIC PAIN SYNDROME: ICD-10-CM

## 2021-07-08 DIAGNOSIS — M47.816 SPONDYLOSIS OF LUMBAR REGION WITHOUT MYELOPATHY OR RADICULOPATHY: ICD-10-CM

## 2021-07-08 DIAGNOSIS — M47.816 LUMBAR SPONDYLOSIS: ICD-10-CM

## 2021-07-08 DIAGNOSIS — G89.29 CHRONIC PAIN OF RIGHT ANKLE: ICD-10-CM

## 2021-07-08 DIAGNOSIS — G89.29 CHRONIC BILATERAL LOW BACK PAIN WITHOUT SCIATICA: Primary | ICD-10-CM

## 2021-07-08 PROCEDURE — 1090F PRES/ABSN URINE INCON ASSESS: CPT | Performed by: NURSE PRACTITIONER

## 2021-07-08 PROCEDURE — G8427 DOCREV CUR MEDS BY ELIG CLIN: HCPCS | Performed by: NURSE PRACTITIONER

## 2021-07-08 PROCEDURE — 4040F PNEUMOC VAC/ADMIN/RCVD: CPT | Performed by: NURSE PRACTITIONER

## 2021-07-08 PROCEDURE — 3051F HG A1C>EQUAL 7.0%<8.0%: CPT | Performed by: NURSE PRACTITIONER

## 2021-07-08 PROCEDURE — 1036F TOBACCO NON-USER: CPT | Performed by: NURSE PRACTITIONER

## 2021-07-08 PROCEDURE — G8399 PT W/DXA RESULTS DOCUMENT: HCPCS | Performed by: NURSE PRACTITIONER

## 2021-07-08 PROCEDURE — G8417 CALC BMI ABV UP PARAM F/U: HCPCS | Performed by: NURSE PRACTITIONER

## 2021-07-08 PROCEDURE — 99214 OFFICE O/P EST MOD 30 MIN: CPT | Performed by: NURSE PRACTITIONER

## 2021-07-08 PROCEDURE — 3017F COLORECTAL CA SCREEN DOC REV: CPT | Performed by: NURSE PRACTITIONER

## 2021-07-08 PROCEDURE — 2022F DILAT RTA XM EVC RTNOPTHY: CPT | Performed by: NURSE PRACTITIONER

## 2021-07-08 PROCEDURE — 1123F ACP DISCUSS/DSCN MKR DOCD: CPT | Performed by: NURSE PRACTITIONER

## 2021-07-08 ASSESSMENT — ENCOUNTER SYMPTOMS
GASTROINTESTINAL NEGATIVE: 1
BACK PAIN: 1

## 2021-07-08 NOTE — PROGRESS NOTES
[address] [facility] [address]  Dept: [phone]  Dept Fax: [phone]: [phone]    Visit Date: 7/8/2021    Functionality Assessment/Goals Worksheet     On a scale of 0 (Does not Interfere) to 10 (Completely Interferes)     1. Which number describes how during the past week pain has interfered with       the following:  A. General Activity:7  B.  Mood: 7  C. Walking Ability: 8  D. Normal Work (Includes both work outside the home and housework): 8  E. Relations with Other People:  6  F. Sleep:   7  G. Enjoyment of Life:  7    2. Patient Prefers to Take their Pain Medications:     [x]  On a regular basis   [x]  Only when necessary    []  Does not take pain medications    3. What are the Patient's Goals/Expectations for Visiting Pain Management? []  Learn about my pain    [x]  Receive Medication   []  Physical Therapy     []  Treat Depression   [x]  Receive Injections    []  Treat Sleep   []  Deal with Anxiety and Stress   []  Treat Opoid Dependence/Addiction   []  Other:      HPI:   Sulma Jordan is a 77 y.o. female is here today for    Chief Complaint: Low back pain, leg pain     HPI   3 month FU. Continues to have pain across low back- aching and continues to have pain in bilateral legs from knees to feet- aching, numbness and tingling   Pain increases with bending, lifting, twisting , walking and getting up and down. Norco remains effective in making pain tolerable. Has been in the hospital for a couple times d/t dehydration, UTI. Pain has has been increased since this. On antibiotics at this time   Ambulating with walker   Medications reviewed. Patient denies side effects with medications. Patient states she is taking medications as prescribed. Shedenies receiving pain medications from other sources. She had 2 ER visits since last visit.  any ER visits since last visit. Pain scale with out pain medications or at its worst is 8-9/10. Pain scale with pain medications or at its best is 3-6/10. Last dose of Norco was today  Drug screen reviewed from 4/8/2021 and was appropriate  Pill count was completed today and was appropriate  Patient does not have naloxone available at home. Patient has not required use of naloxone at home since last office visit. The patientis allergic to pioglitazone, actos [pioglitazone hydrochloride], cymbalta [duloxetine hcl], and gabapentin. Subjective:      Review of Systems   Constitutional: Negative. HENT: Negative. Eyes: Positive for visual disturbance. Wearing corrective glasses    Cardiovascular: Positive for leg swelling. Gastrointestinal: Negative. Genitourinary: Positive for flank pain. Musculoskeletal: Positive for arthralgias, back pain, gait problem, joint swelling and myalgias. Negative for neck pain and neck stiffness. Neurological: Positive for weakness and numbness. Psychiatric/Behavioral: Negative. Objective:     Vitals:    07/08/21 1144   BP: (!) 158/80   Weight: 185 lb (83.9 kg)   Height: 5' 5\" (1.651 m)       Physical Exam  Vitals and nursing note reviewed. Constitutional:       General: She is not in acute distress. Appearance: She is well-developed. She is not diaphoretic. HENT:      Head: Normocephalic and atraumatic. Right Ear: External ear normal.      Left Ear: External ear normal.      Nose: Nose normal.      Mouth/Throat:      Pharynx: No oropharyngeal exudate. Eyes:      General: No scleral icterus. Right eye: No discharge. Left eye: No discharge. Conjunctiva/sclera: Conjunctivae normal.      Pupils: Pupils are equal, round, and reactive to light. Neck:      Thyroid: No thyromegaly. Cardiovascular:      Rate and Rhythm: Normal rate and regular rhythm. Heart sounds: Normal heart sounds. No murmur heard. No friction rub.  No gallop. Pulmonary:      Effort: Pulmonary effort is normal. No respiratory distress. Breath sounds: Normal breath sounds. No wheezing or rales. Chest:      Chest wall: No tenderness. Abdominal:      General: Bowel sounds are normal. There is no distension. Palpations: Abdomen is soft. Tenderness: There is no abdominal tenderness. There is no guarding or rebound. Musculoskeletal:         General: Tenderness present. Left shoulder: Tenderness present. Decreased range of motion. Cervical back: Full passive range of motion without pain, normal range of motion and neck supple. No edema, erythema or rigidity. No muscular tenderness. Normal range of motion. Lumbar back: Tenderness present. Decreased range of motion. Back:       Right lower leg: Edema present. Left lower leg: Edema present. Right ankle: Swelling present. Tenderness present. Left ankle: Swelling present. Skin:     General: Skin is warm. Coloration: Skin is not pale. Findings: No erythema or rash. Neurological:      Mental Status: She is alert and oriented to person, place, and time. She is not disoriented. Cranial Nerves: No cranial nerve deficit. Sensory: Sensory deficit present. Motor: Weakness present. No atrophy or abnormal muscle tone. Coordination: Coordination normal.      Gait: Gait abnormal.      Deep Tendon Reflexes: Reflexes are normal and symmetric. Babinski sign absent on the right side. Babinski sign absent on the left side. Comments: Gait cane sometimes  4/5 bilateral lower extremitie   Psychiatric:         Attention and Perception: She is attentive. Mood and Affect: Mood normal. Mood is not anxious or depressed. Affect is not labile, blunt, angry or inappropriate. Speech: She is communicative.  Speech is not rapid and pressured, delayed, slurred or tangential.         Behavior: Behavior is not agitated, slowed, aggressive, withdrawn, hyperactive or combative. Thought Content: Thought content is not paranoid or delusional. Thought content does not include homicidal or suicidal ideation. Thought content does not include homicidal or suicidal plan. Cognition and Memory: Memory is not impaired. She does not exhibit impaired recent memory or impaired remote memory. Judgment: Judgment is not impulsive or inappropriate. RADHA test: negative   Yeomans test: negative   Gaenslen test: negative      Assessment:     1. Chronic bilateral low back pain without sciatica    2. Spondylosis of lumbar region without myelopathy or radiculopathy    3. Lumbar facet arthropathy    4. Diabetic polyneuropathy associated with type 2 diabetes mellitus ([facility] [address]    5. Primary osteoarthritis of left shoulder    6. Chronic pain of right ankle    7. Lumbar spondylosis    8. Chronic pain syndrome            Plan:      · OARRS reviewed. Current MED: 15.00  · Patient was offered naloxone for home. · Discussed long term side effects of medications, tolerance, dependency and addiction. · Previous UDS reviewed  · UDS preformed today for compliance. · Patient told can not receive any pain medications from any other source. · No evidence of abuse, diversion or aberrant behavior.  Medications and/or procedures to improve function and quality of life- patient understanding with this and that may not be pain free   Discussed with patient about safe storage of medications at home   Discussed possible weaning of medication dosing dependent on treatment/procedure results.  Discussed with patient about risks with procedure including infection, reaction to medication, increased pain, or bleeding. · Received lumbar xray, ankle xray and left shoulder xray  · Again discussed possible L-facet MBB, left shoulder injection .  She reports that she is not interested in procedures at this time   · On antibiotics at this time for UTI   · Continue Norco 5/325 TID prn- filled 6/22/2021 and has plenty   · Updated UDS ordered today  · Encouraged home exercises        Meds. Prescribed:   No orders of the defined types were placed in this encounter. Return in about 3 months (around 10/8/2021), or if symptoms worsen or fail to improve, for follow up  for medications.                Electronically signed by DONOVAN Landry CNP on7/8/2021 at 12:01 PM

## 2021-07-08 NOTE — TELEPHONE ENCOUNTER
SINCE YOU ARE COVERING FOR GUERDA, THERE IS A FORM TO BE REVIEWED AND SIGNED.       ORDER DATE 7/6/21, ADMIT 6/9/21-6/11/21  THANK YOU!!

## 2021-07-19 DIAGNOSIS — G89.4 CHRONIC PAIN SYNDROME: ICD-10-CM

## 2021-07-19 NOTE — TELEPHONE ENCOUNTER
Sherice May called requesting a refill on the following medications:  Requested Prescriptions     Pending Prescriptions Disp Refills    HYDROcodone-acetaminophen (NORCO) 5-325 MG per tablet 90 tablet 0     Sig: Take 1 tablet by mouth every 8 hours as needed for Pain for up to 30 days. Pharmacy verified: [facility]  . pv      Date of last visit: 7/08/2021  Date of next visit (if applicable): 66/70/2048

## 2021-07-20 RX ORDER — HYDROCODONE BITARTRATE AND ACETAMINOPHEN 5; 325 MG/1; MG/1
1 TABLET ORAL EVERY 8 HOURS PRN
Qty: 90 TABLET | Refills: 0 | Status: SHIPPED | OUTPATIENT
Start: 2021-07-22 | End: 2021-08-23 | Stop reason: SDUPTHER

## 2021-07-20 NOTE — TELEPHONE ENCOUNTER
OARRS reviewed. UDS: + for Dihydrocodeine, Hydrocodone, Norhydrocodone, Hydromorphone. Last seen: 7/8/2021.  Follow-up:   Future Appointments   Date Time Provider Franklin Chin   7/21/2021  3:00 PM DONOVAN Curran -  [address] [facility]   7/26/2021  2:45 PM Akin Beltre  [facility]   8/4/2021  1:00 PM DONOVAN Crouch - CNP [facility] [place]   8/11/2021  2:00 PM DONOVAN Patel CNP [facility] FM RES [facility] [place]   8/25/2021  3:30 PM DONOVAN Luna - [facility] [place]   10/7/2021  2:00 PM Jesus Dumas MD [facility]. [facility] [place]   10/11/2021 12:00 PM DONOVAN Finley CNP N [facility] Pain [facility] [place] [address]

## 2021-07-21 ENCOUNTER — HOSPITAL ENCOUNTER (OUTPATIENT)
Dept: WOUND CARE | Age: 66
Discharge: HOME | End: 2021-07-21
Payer: MEDICARE

## 2021-07-21 VITALS
TEMPERATURE: 97.1 F | OXYGEN SATURATION: 95 % | DIASTOLIC BLOOD PRESSURE: 58 MMHG | RESPIRATION RATE: 16 BRPM | SYSTOLIC BLOOD PRESSURE: 119 MMHG | HEART RATE: 62 BPM

## 2021-07-21 DIAGNOSIS — L89.103 PRESSURE INJURY OF BACK, STAGE 3 (HCC): Primary | ICD-10-CM

## 2021-07-21 PROCEDURE — 99211 OFF/OP EST MAY X REQ PHY/QHP: CPT

## 2021-07-21 PROCEDURE — 99212 OFFICE O/P EST SF 10 MIN: CPT | Performed by: NURSE PRACTITIONER

## 2021-07-21 RX ORDER — BACITRACIN, NEOMYCIN, POLYMYXIN B 400; 3.5; 5 [USP'U]/G; MG/G; [USP'U]/G
OINTMENT TOPICAL ONCE
Status: CANCELLED | OUTPATIENT
Start: 2021-07-21 | End: 2021-07-21

## 2021-07-21 RX ORDER — LIDOCAINE 50 MG/G
OINTMENT TOPICAL ONCE
Status: CANCELLED | OUTPATIENT
Start: 2021-07-21 | End: 2021-07-21

## 2021-07-21 RX ORDER — BACITRACIN ZINC AND POLYMYXIN B SULFATE 500; 1000 [USP'U]/G; [USP'U]/G
OINTMENT TOPICAL ONCE
Status: CANCELLED | OUTPATIENT
Start: 2021-07-21 | End: 2021-07-21

## 2021-07-21 RX ORDER — CLOBETASOL PROPIONATE 0.5 MG/G
OINTMENT TOPICAL ONCE
Status: CANCELLED | OUTPATIENT
Start: 2021-07-21 | End: 2021-07-21

## 2021-07-21 RX ORDER — LIDOCAINE HYDROCHLORIDE 40 MG/ML
SOLUTION TOPICAL ONCE
Status: CANCELLED | OUTPATIENT
Start: 2021-07-21 | End: 2021-07-21

## 2021-07-21 RX ORDER — GINSENG 100 MG
CAPSULE ORAL ONCE
Status: CANCELLED | OUTPATIENT
Start: 2021-07-21 | End: 2021-07-21

## 2021-07-21 RX ORDER — BETAMETHASONE DIPROPIONATE 0.05 %
OINTMENT (GRAM) TOPICAL ONCE
Status: CANCELLED | OUTPATIENT
Start: 2021-07-21 | End: 2021-07-21

## 2021-07-21 RX ORDER — GENTAMICIN SULFATE 1 MG/G
OINTMENT TOPICAL ONCE
Status: CANCELLED | OUTPATIENT
Start: 2021-07-21 | End: 2021-07-21

## 2021-07-21 RX ORDER — LIDOCAINE HYDROCHLORIDE 20 MG/ML
JELLY TOPICAL ONCE
Status: CANCELLED | OUTPATIENT
Start: 2021-07-21 | End: 2021-07-21

## 2021-07-21 RX ORDER — LIDOCAINE 40 MG/G
CREAM TOPICAL ONCE
Status: CANCELLED | OUTPATIENT
Start: 2021-07-21 | End: 2021-07-21

## 2021-07-21 ASSESSMENT — PAIN DESCRIPTION - PAIN TYPE: TYPE: CHRONIC PAIN

## 2021-07-21 ASSESSMENT — PAIN DESCRIPTION - LOCATION: LOCATION: BACK

## 2021-07-21 NOTE — PROGRESS NOTES
[facility]  Consult and Procedure Note      19 Tatiana Connelly RECORD NUMBER:  [id number]  AGE: 77 y.o. GENDER: female  : 1955  EPISODE DATE:  2021    SUBJECTIVE:     Chief Complaint   Patient presents with    Wound Check     upper back         HISTORY OF PRESENT ILLNESS      Valeria Wasserman is a 77 y.o. female who presents today for re-evaluation of left upper back wound.  Patient reports that wound developed during recent hospital stay, unsure exact cause.  Wound had large amount of devitalized tissue at initial visit at which time wound was debrided.  Previously reported pain with touch and pressure has resolved.  Currently ordered wound care includes silver alginate and silicone bordered foams, changed three times weekly. Tariq Madsen denies any concerns regarding wound care as ordered.   Denies fever or chills.  Denies any further needs or concerns.     PAST MEDICAL HISTORY             Diagnosis Date    Anemia associated with chronic renal failure     Aranesp 150 micrograms every other week given at [address] [facility]    Anxiety     Arthritis     Backache     Blood circulation, collateral     Blood transfusion     CAD (coronary artery disease)     Cellulitis in diabetic foot ([address] 2017    4th and 5th toes right foot    Chest pain     History of    CHF (congestive heart failure) ([address]     Dx'ed by Dr. Ajith Baum Chronic anemia     Chronic kidney disease     Chronic kidney disease, stage III (moderate) (HCC)     Chronic renal insufficiency     COPD (chronic obstructive pulmonary disease) ([address]     Dr. Zac Al    Coronary disease     Moderate    Depression     Diabetes mellitus, type 2 ([facility]     Disease of blood and blood forming organ     GERD (gastroesophageal reflux disease)     Hemoglobin disease (HCC)     hemoglobin hope    History of granulomatous disease     followed by Dr. Jus Richards    HTN (hypertension) 's    Hyperlipemia 1998    Iron deficiency anemia due to dietary causes 6/21/2018    Kidney stones 3/2014    Kidney trouble          MRSA infection 03/2017    right foot-Dr. Chivo Barrera (podiatrist)    Neuromuscular disorder [facility]     Neuropathy 1989    diabetic neuropathy    Obesity since childhoood    CONTRERAS on CPAP 2010    Dr. Darrow Sacks    Pneumonia     PONV (postoperative nausea and vomiting)     Seizures ([facility]     Sepsis due to Escherichia coli ([facility] 07/2020       PAST SURGICAL HISTORY     Past Surgical History:   Procedure Laterality Date    ANKLE SURGERY Right 02-10-14    Dr. Samuel Geiger at [facility]  1990's    removal of benign tumor   [address]  2008   [address]  2009    2 polyps, not precanceorus    COLONOSCOPY Left 6/10/2019    COLONOSCOPY DIAGNOSTIC performed by Sotero Marie MD at [address] [facility]  3/30/2012    eye lid lift    DIAGNOSTIC CARDIAC CATH LAB PROCEDURE      ENDOSCOPY, COLON, DIAGNOSTIC  2007   [facility]  March 30th, 2012    left sided ptosis   [facility]    Tarsal tunnel surgery    FRACTURE SURGERY  2015   [address] and [address]    first partial in 1980's, then total in [address] [facility]  2015   [address] [place] [address]  04/10/2020    RIGHT    LIVER BIOPSY  6/2015    LUNG BIOPSY  2009    [place] OFFICE/OUTPT VISIT,PROCEDURE ONLY Left 8/23/2018    LEFT UPPER EXTREMENTY AV FISTULA CREATION performed by Aayush Gastelum MD at [facility] Left 6/14/2019    EGD BIOPSY performed by Sotero Marie MD at [address] [facility]       FAMILY HISTORY     Family History   Problem Relation Age of Onset    Diabetes Sister     Diabetes Brother     Diabetes Sister     Diabetes Sister     Cancer Sister     Early Death Sister     Heart Disease Sister     Diabetes Sister     Heart Disease Sister     Diabetes Sister     Diabetes Brother     Arthritis Mother     Heart Disease Mother     High Blood Pressure Mother     Kidney Disease Mother     Mental Illness Mother     Stroke Mother     Heart Disease Father     Diabetes Father     Obesity Father     Alcohol Abuse Father        SOCIAL HISTORY     Social History     Tobacco Use    Smoking status: Former Smoker     Packs/day: 1.50     Years: 30.00     Pack years: 45.00     Types: Cigarettes     Start date: 1979     Quit date: 3/13/2009     Years since quittin.3    Smokeless tobacco: Never Used    Tobacco comment: quit 2009   Vaping Use    Vaping Use: Never used   Substance Use Topics    Alcohol use: No     Alcohol/week: 0.0 standard drinks    Drug use: No       ALLERGIES     Allergies   Allergen Reactions    Pioglitazone Swelling    Actos [Pioglitazone Hydrochloride] Swelling    Cymbalta [Duloxetine Hcl] Other (See Comments)     Anxiety and lethargic    Gabapentin Anxiety       MEDICATIONS     Current Outpatient Medications on File Prior to Encounter   Medication Sig Dispense Refill    [START ON 2021] HYDROcodone-acetaminophen (NORCO) 5-325 MG per tablet Take 1 tablet by mouth every 8 hours as needed for Pain for up to 30 days. 90 tablet 0    sulfamethoxazole-trimethoprim (BACTRIM DS;SEPTRA DS) 800-160 MG per tablet Take 1 tablet by mouth three times a week      docusate sodium (COLACE) 100 MG capsule TAKE 1 CAPSULE BY MOUTH 2 TIMES DAILY AS NEEDED.  doxycycline hyclate (VIBRAMYCIN) 100 MG capsule TAKE 2 CAPSULES BY MOUTH ONCE A WEEK.       vitamin D (ERGOCALCIFEROL) 1.25 MG (88799 UT) CAPS capsule TAKE 1 CAPSULE BY MOUTH EVERY 14 DAYS ON  6 capsule 3    chlorthalidone (HYGROTON) 25 MG tablet Take 25 mg by mouth daily      polyethylene glycol (GLYCOLAX) 17 GM/SCOOP powder Take 17 g by mouth daily      cloNIDine (CATAPRES) 0.2 MG tablet Take 0.2 mg by mouth 3 times daily      famotidine (PEPCID) 40 MG tablet Take 40 mg by mouth daily as needed       iron polysaccharides (NIFEREX) 150 MG capsule Take 150 mg by mouth daily      tiotropium (SPIRIVA RESPIMAT) 2.5 MCG/ACT AERS inhaler Inhale 2 puffs into the lungs daily 1 Inhaler 11    albuterol sulfate HFA (PROAIR HFA) 108 (90 Base) MCG/ACT inhaler Inhale 2 puffs into the lungs every 6 hours as needed for Wheezing or Shortness of Breath 3 Inhaler 3    fluticasone (FLONASE) 50 MCG/ACT nasal spray 1 spray by Each Nostril route daily as needed for Rhinitis 3 Bottle 3    pantoprazole (PROTONIX) 40 MG tablet Take 40 mg by mouth daily       atorvastatin (LIPITOR) 40 MG tablet TAKE 1 TABLET DAILY 90 tablet 3    Insulin Pen Needle (B-D ULTRAFINE III SHORT PEN) 31G X 8 MM MISC Use three times daily Diagnosis Code E11.9 200 each 3    insulin aspart (NOVOLOG FLEXPEN) 100 UNIT/ML injection pen Sliding scale insulin coverage Glucose:Dose: If Less [id number] =No Insulin/ 140-199= 2 Units/ 200-249=4 Units/ 250-299= 6 Units/  300-349=8 Units/  350-400=10 Units/ Above 400 = 12 Units max 48 units daily 15 pen 1    insulin glargine (LANTUS SOLOSTAR) 100 UNIT/ML injection pen Inject 25 Units into the skin nightly (Patient taking differently: Inject 20 Units into the skin nightly ) 15 pen 1    carvedilol (COREG) 25 MG tablet 2 tablets BID (Patient taking differently: 1 tablets BID) 360 tablet 1    tacrolimus (PROGRAF) 1 MG capsule Take 1 mg by mouth 4 CAPSULES EVERY MORNING AND 4 CAPSULES EVERY EVENING      lactulose (CHRONULAC) 10 GM/15ML solution Take twice daily as needed for constipation 2700 mL 1    NIFEdipine (ADALAT CC) 60 MG extended release tablet Take 60 mg by mouth 2 times daily      Mycophenolate Sodium 360 MG TBEC Take 360 mg by mouth 1 tablets BID      magnesium oxide (MAG-OX) 400 MG tablet Take 400 mg by mouth 2 times daily      linaclotide (LINZESS) 290 MCG CAPS capsule Take 145 mcg by mouth every other day       aspirin 81 MG EC tablet Take 81 mg by mouth daily.         nitroGLYCERIN (NITROSTAT) 0.4 MG SL tablet Place 1 tablet under the tongue every 5 minutes as needed for Chest pain 25 tablet 2     No current facility-administered medications on file prior to encounter. REVIEW OF SYSTEMS:     Constitutional: Denies fever, chills, night sweats  Head: Denies headache,  dizziness, loss of consciousness  Respiratory: Denies shortness of breath, cough, wheezing, dyspnea with exertion  Cardiovascular:Denies chest pain, palpitations, edema  Gastrointestinal: Denies nausea, vomiting, constipation, diarrhea, abdominal pain   Musculoskeletal: +weakness Denies joint pain, swelling , stiffness,  Endocrine: Denies polyuria, polydipsia, cold or heat intolerance  Hematology: Denies easy brusing or bleeding, hx of clotting disorder  Dermatology: Denies skin rash, eczema, pruritis    PHYSICAL EXAM:     BP (!) 119/58   Pulse 62   Temp 97.1 °F (36.2 °C) (Infrared)   Resp 16   SpO2 95%   Wt Readings from Last 3 Encounters:   07/08/21 185 lb (83.9 kg)   07/06/21 185 lb 3.2 oz (84 kg)   06/30/21 182 lb 3.2 oz (82.6 kg)     General:  Awake, alert, no apparent distress.  Appears stated age  HEENT: conjuctivae are clear without exudate or hemorrhage, anicteric sclera, moist oral mucosa. Chest:  Respirations regular, non-labored.  Chest rise and fall equal bilaterally.    Neurological: Awake, alert, oriented x4  Psychiatric:  Appropriate mood and affect  Extremities: non-traumatic in appearance.    Skin:  Warm and dry  Wound:                Location: Left upper back              Classification/stage: pressure, stage 3   =Healed    Wound 05/21/21 Back Left;Upper (Active)   Wound Image   07/02/21 1445   Wound Etiology Pressure Stage  3 07/02/21 1445   Dressing Status Intact; New dressing applied 07/02/21 1445   Wound Cleansed Cleansed with saline 07/02/21 1445   Dressing/Treatment Foam 07/02/21 1445   Offloading for Diabetic Foot Ulcers No offloading required 07/02/21 1445   Wound Length (cm) 0.2 cm 07/02/21 1445   Wound Width (cm) 0.2 cm 07/02/21 1445   Wound Depth (cm) 0.1 cm 07/02/21 1445   Wound Surface Area (cm^2) 0.04 cm^2 07/02/21 1445   Change in Wound Size % (l*w) 96.67 07/02/21 1445   Wound Volume (cm^3) 0.004 cm^3 07/02/21 1445   Wound Healing % 97 07/02/21 1445   Wound Assessment Granulation tissue 07/02/21 1445   Drainage Amount Moderate 07/02/21 1445   Drainage Description Serosanguinous 07/02/21 1445   Odor None 07/02/21 1445   Romana-wound Assessment Dry/flaky 07/02/21 1445   Margins Attached edges 07/02/21 1445   Wound Thickness Description not for Pressure Injury Full thickness 07/02/21 1445   Number of days: 61         LABS/IMAGING     UA: No results for input(s): SPECGRAV, PHUR, COLORU, CLARITYU, MUCUS, PROTEINU, BLOODU, RBCUA, WBCUA, BACTERIA, NITRU, GLUCOSEU, BILIRUBINUR, UROBILINOGEN, KETUA, LABCAST, LABCASTTY, AMORPHOS in the last 72 hours. Invalid input(s): CRYSTALS  Micro:   Lab Results   Component Value Date    BC No growth-preliminary No growth  08/03/2020       ASSESSMENT     -Stage 3 pressure injury, left upper back     Patient Active Problem List   Diagnosis Code    Coronary disease I25.10    Hyperlipemia E78.5    Arthritis M19.90    Neuropathy, diabetic ([facility] E11.40    Leg pain M79.606    Obesity (BMI 30-39. 9) E66.9    Low HDL (under 40) E78.6    History of tobacco use Z87.891    COPD without exacerbation ([facility]) J44.9    Lung mass R91.8    Anemia, chronic disease D63.8    Gout M10.9    Urolithiasis N20.9    Secondary hyperparathyroidism of renal origin ([facility] N25.81    Obstructive sleep apnea on CPAP G47.33, Z99.89    Vitamin D deficiency E55.9    Hypertensive nephropathy I12.9    Persistent proteinuria associated with type 2 diabetes mellitus (HCC) E11.29, R80.9    Cellulitis in diabetic foot ([facility]) E11.628, L03.119    Persistent proteinuria R80.1    Acquired hypothyroidism E03.9    Nephrotic syndrome N04.9    Type 2 diabetes mellitus (HCC) E11.9    Iron deficiency anemia due to dietary causes D50.8    Hypervolemia associated with renal insufficiency E87.70, N28.9    Chronic progressive renal failure, stage 5 (HCC) N18.5    Accelerated hypertension I10    Diabetic peripheral neuropathy associated with type 2 diabetes mellitus (HCC) E11.42    Pericardial effusion I31.3    Acute on chronic diastolic congestive heart failure (HCC) I50.33    Chronic constipation H31.25    Metabolic acidosis H37.1    Lymphadenopathy, inguinal R59.0    Atelectasis of left lung J98.11    Thyroid nodule E04.1    Kidney transplant recipient Z94.0    E. coli UTI N39.0, B96.20    Hypomagnesemia E83.42    Hypermagnesemia E83.41    Hypertensive urgency I16.0    Orthostatic hypotension I95.1    Hx of coronary artery disease Z86.79    History of end stage renal disease Z87.448    Cervical stenosis of spinal canal M48.02    Hx of gastroesophageal reflux (GERD) Z87.19    Polyneuropathy associated with critical illness ([facility]) G62.81    Pressure injury of back, stage 3 ([address] L89.103       PLAN     Patient examined and evaluated. All available lab work, radiology studies, and progress notes pertaining to Mario Alberto Diaz reviewed prior to or during patient visit today.    -Stage 3 pressure injury, left upper back- Has healed. Reviewed importance of ongoing offloading of pressure to this area as it heals to prevent re-opening.    -No indications of infection at today's visit. Education provided on signs and symptoms of infection. Call clinic or seek emergency care should these occur. Follow up as needed. Discharge patient from wound clinic today due to complete healing of wound. Discontinue all previously ordered wound care. Call clinic for any further needs or concerns. All questions and concerns addressed prior to discharge from today's visit. Please see attached Discharge Instructions    Written patient dismissal instructions given to patient and signed by patient or POA.          I spent a total of 15 minutes reviewing previous notes, test results and face to face with Derrek Yimi  on 7/21/2021. Greater than 50% of this time was spent on counseling, reviewing and discussing test results, answering questions, instructions on treatment and/or medications ordered, and coordinating the care based on my plan and assessment as noted. Discharge Instructions         Discharge Instructions       Visit Discharge/Physician Orders: wound is healed ok to leave open to air  - the area should continue to be less sore over the next few weeks     Home Care: [facility]     Wound Location: Left upper back          Follow up visit:   as needed     Keep next scheduled appointment. Please give 24 hour notice if unable to keep appointment. [phone]     If you experience any of the following, please call the Wound Care Service during business hours: Monday through Friday 8:00 am - 4:30 pm  ([phone]).               *Increase in pain              *Temperature over 101              *Increase in drainage from your wound or a foul odor              *Uncontrolled swelling              *Need for compression bandage changes due to slippage, breakthrough drainage     If you need medical attention outside of business hours, please contact your Primary Care Doctor or go to the nearest emergency room.            Electronically signed by DONOVAN Paez CNP on 7/21/2021 at 3:32 PM

## 2021-07-26 ENCOUNTER — OFFICE VISIT (OUTPATIENT)
Dept: UROLOGY | Age: 66
End: 2021-07-26
Payer: MEDICARE

## 2021-07-26 ENCOUNTER — TELEPHONE (OUTPATIENT)
Dept: UROLOGY | Age: 66
End: 2021-07-26

## 2021-07-26 VITALS — HEIGHT: 65 IN | BODY MASS INDEX: 30.75 KG/M2 | WEIGHT: 184.6 LBS | RESPIRATION RATE: 17 BRPM

## 2021-07-26 DIAGNOSIS — Z94.0 HISTORY OF KIDNEY TRANSPLANT: ICD-10-CM

## 2021-07-26 DIAGNOSIS — N39.0 RECURRENT UTI: Primary | ICD-10-CM

## 2021-07-26 DIAGNOSIS — N32.81 OAB (OVERACTIVE BLADDER): ICD-10-CM

## 2021-07-26 PROCEDURE — 1123F ACP DISCUSS/DSCN MKR DOCD: CPT | Performed by: UROLOGY

## 2021-07-26 PROCEDURE — 1036F TOBACCO NON-USER: CPT | Performed by: UROLOGY

## 2021-07-26 PROCEDURE — 1090F PRES/ABSN URINE INCON ASSESS: CPT | Performed by: UROLOGY

## 2021-07-26 PROCEDURE — G8417 CALC BMI ABV UP PARAM F/U: HCPCS | Performed by: UROLOGY

## 2021-07-26 PROCEDURE — 99204 OFFICE O/P NEW MOD 45 MIN: CPT | Performed by: UROLOGY

## 2021-07-26 PROCEDURE — 4040F PNEUMOC VAC/ADMIN/RCVD: CPT | Performed by: UROLOGY

## 2021-07-26 PROCEDURE — 3017F COLORECTAL CA SCREEN DOC REV: CPT | Performed by: UROLOGY

## 2021-07-26 PROCEDURE — G8399 PT W/DXA RESULTS DOCUMENT: HCPCS | Performed by: UROLOGY

## 2021-07-26 PROCEDURE — G8427 DOCREV CUR MEDS BY ELIG CLIN: HCPCS | Performed by: UROLOGY

## 2021-07-26 NOTE — PROGRESS NOTES
[address] [address] [address]  Dept: [phone]  Dept Fax: [phone] : [phone]    [address] [place] Urology Office Note -     Patient:  Mario Alberto Diaz  YOB: 1955  Date: 7/26/2021    The patient is a 77 y.o. female who presents today for evaluation of the following problems:   Chief Complaint   Patient presents with    Advice Only     recurrent UTI    referred/consultation requested by DONOVAN Castaneda CNP. HISTORY OF PRESENT ILLNESS:     Recurrent UTI  Has seen [facility] in the past-- was told she had retention  Has been having infections since kidney transplant    Kidney allograft  Hx of transplant by [facility]  Infections started afterwards          Requested/reviewed records from DONOVAN Castaneda CNP office and/or outside [de-identified]    (Patient's old records have been requested, reviewed and pertinent findings summarized in today's note.)    Procedures Today: N/A    Last several PSA's:  No results found for: PSA    Last total testosterone:  No results found for: TESTOSTERONE    Urinalysis today:  No results found for this visit on 07/26/21. Last BUN and creatinine:  Lab Results   Component Value Date    BUN 25 (H) 07/07/2021     Lab Results   Component Value Date    CREATININE 1.3 (H) 07/07/2021       Imaging Reviewed during this Office Visit:   Hanane Gamble MD independently reviewed the images and verified the radiology reports from:    No results found.     PAST MEDICAL, FAMILY AND SOCIAL HISTORY:  Past Medical History:   Diagnosis Date    Anemia associated with chronic renal failure     Aranesp 150 micrograms every other week given at [facility]    Anxiety     Arthritis     Backache     Blood circulation, collateral     Blood transfusion     CAD (coronary artery disease)     Cellulitis in diabetic foot ([facility] 03/03/2017    4th and 5th toes right foot    Chest pain     History of    CHF (congestive heart failure) ([address] 1998    Dx'ed by Dr. Gill Nuñez Chronic anemia     Chronic kidney disease     Chronic kidney disease, stage III (moderate) ([address]     Chronic renal insufficiency 2010    COPD (chronic obstructive pulmonary disease) ([address] 2012    Dr. 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 Philip Guzman    HTN (hypertension) 1970's    Hyperlipemia 1998    Iron deficiency anemia due to dietary causes 6/21/2018    Kidney stones 3/2014    Kidney trouble          MRSA infection 03/2017    right foot-Dr. Hola Garcia (podiatrist)    Neuromuscular disorder ([address]     Neuropathy 1989    diabetic neuropathy    Obesity since childhoood    CONTRERAS on CPAP 2010    Dr. Caleb Burgess    Pneumonia     PONV (postoperative nausea and vomiting)     Seizures ([address]     Sepsis due to Escherichia coli ([facility] 07/2020     Past Surgical History:   Procedure Laterality Date    ANKLE SURGERY Right 02-10-14    Dr. Gloria Garcia at [facility]  1990's    removal of benign tumor   [address]  2008   [address]  2009    2 polyps, not precanceorus    COLONOSCOPY Left 6/10/2019    COLONOSCOPY DIAGNOSTIC performed by Kirstin Kirby MD at [address] [facility]  3/30/2012    eye lid lift    DIAGNOSTIC CARDIAC CATH LAB PROCEDURE      ENDOSCOPY, COLON, DIAGNOSTIC  2007   [facility] SURGERY  March 30th, 2012    left sided ptosis    FOOT SURGERY  1990    Tarsal tunnel surgery    FRACTURE SURGERY  2015   [address] [facility] and 1985    first partial in 1980's, then total in [address] [facility]  2015   [address] [place] [address]  04/10/2020    RIGHT    LIVER BIOPSY  6/2015    LUNG BIOPSY  2009    [place] OFFICE/OUTPT VISIT,PROCEDURE ONLY Left 8/23/2018    LEFT UPPER EXTREMENTY AV FISTULA CREATION performed by Raymond Gutierrez MD at [address] [facility] [address] Left 6/14/2019    EGD BIOPSY performed by Prashant Smith MD at [address] [facility] [facility]     Family History   Problem Relation Age of Onset    Diabetes Sister     Diabetes Brother     Diabetes Sister     Diabetes Sister    Radha Goodpasture Sister     Early Death Sister     Heart Disease Sister     Diabetes Sister     Heart Disease Sister     Diabetes Sister     Diabetes Brother     Arthritis Mother     Heart Disease Mother     High Blood Pressure Mother     Kidney Disease Mother     Mental Illness Mother     Stroke Mother     Heart Disease Father     Diabetes Father     Obesity Father     Alcohol Abuse Father      Outpatient Medications Marked as Taking for the 7/26/21 encounter (Office Visit) with Macario Sosa MD   Medication Sig Dispense Refill    HYDROcodone-acetaminophen (NORCO) 5-325 MG per tablet Take 1 tablet by mouth every 8 hours as needed for Pain for up to 30 days. 90 tablet 0    sulfamethoxazole-trimethoprim (BACTRIM DS;SEPTRA DS) 800-160 MG per tablet Take 1 tablet by mouth three times a week      docusate sodium (COLACE) 100 MG capsule TAKE 1 CAPSULE BY MOUTH 2 TIMES DAILY AS NEEDED.  doxycycline hyclate (VIBRAMYCIN) 100 MG capsule TAKE 2 CAPSULES BY MOUTH ONCE A WEEK.       vitamin D (ERGOCALCIFEROL) 1.25 MG (90282 UT) CAPS capsule TAKE 1 CAPSULE BY MOUTH EVERY 14 DAYS ON MONDAYS 6 capsule 3    cloNIDine (CATAPRES) 0.2 MG tablet Take 0.2 mg by mouth 3 times daily      famotidine (PEPCID) 40 MG tablet Take 40 mg by mouth daily as needed       iron polysaccharides (NIFEREX) 150 MG capsule Take 150 mg by mouth daily      tiotropium (SPIRIVA RESPIMAT) 2.5 MCG/ACT AERS inhaler Inhale 2 puffs into the lungs daily 1 Inhaler 11    albuterol sulfate HFA (PROAIR HFA) 108 (90 Comment    quit 2009      (If patient a smoker, smoking cessation counseling offered)   Social History     Substance and Sexual Activity   Alcohol Use No    Alcohol/week: 0.0 standard drinks       REVIEW OF SYSTEMS:  Constitutional: negative  Eyes: negative  Respiratory: negative  Cardiovascular: negative  Gastrointestinal: negative  Genitourinary: see HPI  Musculoskeletal: negative  Skin: negative   Neurological: negative  Hematological/Lymphatic: negative  Psychological: negative      Physical Exam:    This a 77 y.o. female  Vitals:    07/26/21 1442   Resp: 17     Body mass index is 30.72 kg/m². Constitutional: Patient in no acute distress;       Assessment and Plan        1. Recurrent UTI    2. History of kidney transplant    3. OAB (overactive bladder)               Plan:      Infections worsened since transplant  May need prophylaxis in future  Incomplete emptying vs. Immunosuppression increasing infection risk  Will get ultrasound of native kidneys, transplant kidney and bladder          Prescriptions Ordered:  No orders of the defined types were placed in this encounter. Orders Placed:  No orders of the defined types were placed in this encounter.            Donna Silverman MD

## 2021-08-04 ENCOUNTER — OFFICE VISIT (OUTPATIENT)
Dept: INTERNAL MEDICINE CLINIC | Age: 66
End: 2021-08-04
Payer: MEDICARE

## 2021-08-04 VITALS
TEMPERATURE: 97.5 F | HEIGHT: 65 IN | SYSTOLIC BLOOD PRESSURE: 112 MMHG | WEIGHT: 183 LBS | DIASTOLIC BLOOD PRESSURE: 56 MMHG | HEART RATE: 60 BPM | BODY MASS INDEX: 30.49 KG/M2

## 2021-08-04 DIAGNOSIS — E11.9 TYPE 2 DIABETES MELLITUS WITHOUT COMPLICATION, WITH LONG-TERM CURRENT USE OF INSULIN (HCC): ICD-10-CM

## 2021-08-04 DIAGNOSIS — Z79.4 TYPE 2 DIABETES MELLITUS WITHOUT COMPLICATION, WITH LONG-TERM CURRENT USE OF INSULIN (HCC): ICD-10-CM

## 2021-08-04 DIAGNOSIS — I10 ESSENTIAL HYPERTENSION: Primary | ICD-10-CM

## 2021-08-04 PROCEDURE — 1123F ACP DISCUSS/DSCN MKR DOCD: CPT | Performed by: NURSE PRACTITIONER

## 2021-08-04 PROCEDURE — G8417 CALC BMI ABV UP PARAM F/U: HCPCS | Performed by: NURSE PRACTITIONER

## 2021-08-04 PROCEDURE — 1036F TOBACCO NON-USER: CPT | Performed by: NURSE PRACTITIONER

## 2021-08-04 PROCEDURE — 3017F COLORECTAL CA SCREEN DOC REV: CPT | Performed by: NURSE PRACTITIONER

## 2021-08-04 PROCEDURE — 3051F HG A1C>EQUAL 7.0%<8.0%: CPT | Performed by: NURSE PRACTITIONER

## 2021-08-04 PROCEDURE — 4040F PNEUMOC VAC/ADMIN/RCVD: CPT | Performed by: NURSE PRACTITIONER

## 2021-08-04 PROCEDURE — 1090F PRES/ABSN URINE INCON ASSESS: CPT | Performed by: NURSE PRACTITIONER

## 2021-08-04 PROCEDURE — G8399 PT W/DXA RESULTS DOCUMENT: HCPCS | Performed by: NURSE PRACTITIONER

## 2021-08-04 PROCEDURE — 99214 OFFICE O/P EST MOD 30 MIN: CPT | Performed by: NURSE PRACTITIONER

## 2021-08-04 PROCEDURE — G8427 DOCREV CUR MEDS BY ELIG CLIN: HCPCS | Performed by: NURSE PRACTITIONER

## 2021-08-04 PROCEDURE — 2022F DILAT RTA XM EVC RTNOPTHY: CPT | Performed by: NURSE PRACTITIONER

## 2021-08-04 RX ORDER — INSULIN GLARGINE 100 [IU]/ML
25 INJECTION, SOLUTION SUBCUTANEOUS NIGHTLY
Qty: 15 PEN | Refills: 1 | Status: SHIPPED | OUTPATIENT
Start: 2021-08-04 | End: 2022-02-09 | Stop reason: SDUPTHER

## 2021-08-04 RX ORDER — INSULIN ASPART 100 [IU]/ML
INJECTION, SOLUTION INTRAVENOUS; SUBCUTANEOUS
Qty: 15 PEN | Refills: 1 | Status: SHIPPED | OUTPATIENT
Start: 2021-08-04 | End: 2022-02-09 | Stop reason: SDUPTHER

## 2021-08-04 NOTE — PROGRESS NOTES
[facility]  [address]  Dept: [phone]  Dept Fax: [phone]: [phone]     Visit Date:  2021    Patient:  Michel Leahy  YOB: 1955    HPI:     Chief Complaint   Patient presents with    6 Month Follow-Up    Diabetes    Hypertension     Rosi Thomas (:  1955) is a 64 y.o. female, here for evaluation of the following medical concerns: Diabetes and HTN     I last seen Suzette Jackson 6 months ago. She follows routinely with Dr. Adali Pedersen ER visits or hospitalizations since last visit.      Renal transplant was on 4/10/20. Diabetes-   Libbyreece Manuel reports being diabetic for over 25 years. She states her diabetes is now well controlled. A1C 7.1% on , was 6.8% on . She is on Novolog Sliding Scale group 2 before meals and at bedtime, and Lantus 25 units at night. Her appetite is poor. Is using Glucerna as a dietary supplement. States hypoglycemic events not present, last event unknown. She is able to voice signs/symptoms of hypoglycemia. Reports checking glucose 2 times per day, with range of glucose readings 81-200s per meter download. Dietary modification for diabetes management and/or weight loss encouraged. Does not report difficulty with obtaining medications. She voices understanding of the importance of annual eye exams. On aspirin 81 mg daily and atorvastatin 40 mg daily.     Denies polyuria, polydipsia, polyphagia. Reports neuropathic symptoms including numbness, tingling. She follows routinely with podiatry every 3 months, next visit      Essential HTN-  Reports taking her medications as instructed with no medication side effects.  Denies chest pain on exertion, dyspnea on exertion, swelling of ankles, orthostatic dizziness or lightheadedness, and/or palpitations. /56 in office today. Follows with cardiology at [facility]) 31G X 8 MM MISC, Use three times daily Diagnosis Code E11.9, Disp: 200 each, Rfl: 3    carvedilol (COREG) 25 MG tablet, 2 tablets BID, Disp: 360 tablet, Rfl: 1    tacrolimus (PROGRAF) 1 MG capsule, Take 1 mg by mouth 5 CAPSULES EVERY MORNING AND 5 CAPSULES EVERY EVENING, Disp: , Rfl:     lactulose (CHRONULAC) 10 GM/15ML solution, Take twice daily as needed for constipation, Disp: 2700 mL, Rfl: 1    Mycophenolate Sodium 360 MG TBEC, Take 360 mg by mouth 1 tablets BID, Disp: , Rfl:     magnesium oxide (MAG-OX) 400 MG tablet, Take 400 mg by mouth 2 times daily, Disp: , Rfl:     linaclotide (LINZESS) 290 MCG CAPS capsule, Take 145 mcg by mouth every other day , Disp: , Rfl:     aspirin 81 MG EC tablet, Take 81 mg by mouth daily. , Disp: , Rfl:     docusate sodium (COLACE) 100 MG capsule, TAKE 2 CAPSULE BY MOUTH 2 TIMES DAILY AS NEEDED., Disp: 360 capsule, Rfl: 1    The patient is allergic to actos [pioglitazone hydrochloride], pioglitazone, cymbalta [duloxetine hcl], and gabapentin.     Past Medical History  Figueroa Khan  has a past medical history of Anemia associated with chronic renal failure, Anxiety, Arthritis, Backache, Blood circulation, collateral, Blood transfusion, CAD (coronary artery disease), Cellulitis in diabetic foot ([address] Chest pain, CHF (congestive heart failure) ([address] Chronic anemia, Chronic kidney disease, Chronic kidney disease, stage III (moderate) ([facility]), Chronic renal insufficiency, COPD (chronic obstructive pulmonary disease) ([address] Coronary disease, Depression, Diabetes mellitus, type 2 ([address] Disease of blood and blood forming organ, GERD (gastroesophageal reflux disease), Hemoglobin disease ([address] History of granulomatous disease, HTN (hypertension), Hyperlipemia, Iron deficiency anemia due to dietary causes, Kidney stones, Kidney trouble, MRSA infection, Neuromuscular disorder ([address] Neuropathy, Obesity, CONTRERAS on CPAP, Pneumonia, PONV (postoperative nausea and vomiting), Seizures ([facility] and Sepsis due to Escherichia coli ([facility] Past Surgical History  The patient  has a past surgical history that includes eye surgery (March 30th, 2012); Hysterectomy (1980 and 1985); Carpal tunnel release (1988); Foot surgery (1990); Colonoscopy (2009); Endoscopy, colon, diagnostic (2007); Appendectomy (1980s); back surgery (1990's); Cosmetic surgery (3/30/2012); Ankle surgery (Right, 02-10-14); liver biopsy (6/2015); Lung biopsy (2009); joint replacement (2015); fracture surgery (2015); Cardiac surgery (2008); pr office/outpt visit,procedure only (Left, 8/23/2018); Colonoscopy (Left, 6/10/2019); Upper gastrointestinal endoscopy (Left, 6/14/2019); Diagnostic Cardiac Cath Lab Procedure; and Kidney transplant (04/10/2020). Family History  This patient's family history includes Alcohol Abuse in her father; Arthritis in her mother; Cancer in her sister; Diabetes in her brother, brother, father, sister, sister, sister, sister, and sister; Early Death in her sister; Heart Disease in her father, mother, sister, and sister; High Blood Pressure in her mother; Kidney Disease in her mother; Mental Illness in her mother; Obesity in her father; Stroke in her mother. Social History  Michelle Ruth  reports that she quit smoking about 12 years ago. Her smoking use included cigarettes. She started smoking about 41 years ago. She has a 45.00 pack-year smoking history. She has never used smokeless tobacco. She reports that she does not drink alcohol and does not use drugs.     Health Maintenance:    Health Maintenance   Topic Date Due    Diabetic foot exam  Never done    Diabetic retinal exam  Never done    Shingles Vaccine (2 of 2) 01/29/2021    Annual Wellness Visit (AWV)  Never done    TSH testing  08/17/2021    Flu vaccine (1) 09/01/2021    Low dose CT lung screening  02/24/2022    Lipid screen  03/03/2022    A1C test (Diabetic or Prediabetic)  05/12/2022    Potassium monitoring  08/04/2022    Creatinine monitoring  08/04/2022    Breast cancer screen  02/02/2023    Colon cancer screen colonoscopy  06/10/2029    DTaP/Tdap/Td vaccine (3 - Td or Tdap) 11/28/2030    DEXA (modify frequency per FRAX score)  Completed    Pneumococcal 65+ yrs at Risk Vaccine  Completed    COVID-19 Vaccine  Completed    Hepatitis C screen  Completed    Hepatitis A vaccine  Aged Out    Hib vaccine  Aged Out    Meningococcal (ACWY) vaccine  Aged Out       Subjective:      Review of Systems   Constitutional: Negative for appetite change, chills, fatigue and fever. HENT: Negative for congestion, sinus pressure, sinus pain, sore throat and trouble swallowing. Eyes: Negative for photophobia, pain and visual disturbance. Respiratory: Negative for cough, shortness of breath and wheezing. Cardiovascular: Negative for chest pain, palpitations and leg swelling. Gastrointestinal: Negative for abdominal distention, abdominal pain, blood in stool, constipation, diarrhea, nausea and vomiting. Endocrine: Negative for polydipsia, polyphagia and polyuria. Genitourinary: Negative for difficulty urinating, dysuria and hematuria. Musculoskeletal: Negative for arthralgias, back pain and myalgias. Skin: Negative for rash and wound. Neurological: Positive for weakness. Negative for dizziness, tremors, light-headedness and headaches. Psychiatric/Behavioral: Negative for sleep disturbance. The patient is not nervous/anxious. Objective:     BP (!) 112/56 (Site: Right Upper Arm, Cuff Size: Medium Adult)   Pulse 60   Temp 97.5 °F (36.4 °C) (Temporal)   Ht 5' 5\" (1.651 m)   Wt 183 lb (83 kg)   BMI 30.45 kg/m²     Physical Exam  Constitutional:       General: She is not in acute distress. Appearance: Normal appearance. She is well-developed. HENT:      Head: Normocephalic and atraumatic.       Right Ear: Tympanic membrane, ear canal and external ear normal.      Left Ear: Tympanic membrane, ear canal and external ear normal.      Nose: Nose normal. No congestion or rhinorrhea. Mouth/Throat:      Mouth: Mucous membranes are moist.      Pharynx: Oropharynx is clear. No oropharyngeal exudate or posterior oropharyngeal erythema. Eyes:      Extraocular Movements: Extraocular movements intact. Conjunctiva/sclera: Conjunctivae normal.      Pupils: Pupils are equal, round, and reactive to light. Neck:      Thyroid: No thyromegaly. Vascular: No JVD. Cardiovascular:      Rate and Rhythm: Normal rate and regular rhythm. Heart sounds: Normal heart sounds. No murmur heard. No friction rub. No gallop. Pulmonary:      Effort: Pulmonary effort is normal. No respiratory distress. Breath sounds: Normal breath sounds. No wheezing or rales. Abdominal:      General: Bowel sounds are normal. There is no distension. Palpations: Abdomen is soft. Tenderness: There is no abdominal tenderness. Musculoskeletal:         General: Normal range of motion. Cervical back: Normal range of motion and neck supple. Right lower leg: Edema (trace) present. Left lower leg: Edema (trace) present. Lymphadenopathy:      Cervical: No cervical adenopathy. Skin:     General: Skin is warm and dry. Capillary Refill: Capillary refill takes less than 2 seconds. Neurological:      Mental Status: She is alert and oriented to person, place, and time. Motor: No weakness. Coordination: Coordination normal.      Gait: Gait normal.   Psychiatric:         Mood and Affect: Mood normal.         Behavior: Behavior normal.         Thought Content:  Thought content normal.         Judgment: Judgment normal.         Labs Reviewed 8/4/2021:    Lab Results   Component Value Date    WBC 7.6 08/04/2021    HGB 10.4 (L) 08/04/2021    HCT 34.3 (L) 08/04/2021     08/04/2021    CHOL 138 01/18/2021    TRIG 184 01/18/2021    HDL 36 03/03/2021    LDLDIRECT 67.68 08/24/2020    ALT 16 08/04/2021    AST 17 08/04/2021  08/04/2021    K 3.7 08/04/2021     08/04/2021    CREATININE 1.3 (H) 08/04/2021    BUN 24 (H) 08/04/2021    CO2 27 08/04/2021    TSH 1.400 08/17/2020    INR 1.28 (H) 08/02/2020    LABA1C 7.1 (H) 05/12/2021    LABMICR 20.95 01/04/2018       Assessment/Plan      1. Type 2 diabetes mellitus without complication, with long-term current use of insulin (HCC)    - insulin glargine (LANTUS SOLOSTAR) 100 UNIT/ML injection pen; Inject 25 Units into the skin nightly  Dispense: 15 pen; Refill: 1  - insulin aspart (NOVOLOG FLEXPEN) 100 UNIT/ML injection pen; Sliding scale insulin coverage Glucose:Dose: If Less [id number] =No Insulin/ 140-199= 2 Units/ 200-249=4 Units/ 250-299= 6 Units/  300-349=8 Units/  350-400=10 Units/ Above 400 = 12 Units max 48 units daily  Dispense: 15 pen; Refill: 1  - Hemoglobin A1C; Future  - Meter download reviewed  - Encouraged dietary modifications and exercise regimen to promote optimal glycemic control  - Call if any low blood sugars or if consistently > 180    2. Essential hypertension    - Medications reviewed, continue current regimen  - Follow with cardiology and nephrology as scheduled      Return in about 6 months (around 2/4/2022). Patient given educational materials - see patient instructions. Discussed use, benefit, and side effects of prescribed medications. All patient questions answered. Pt voiced understanding. Reviewed health maintenance.        Electronically signed DONOVAN Sainz - CNP on 8/4/21 at 1:35 PM EDT

## 2021-08-11 ENCOUNTER — OFFICE VISIT (OUTPATIENT)
Dept: FAMILY MEDICINE CLINIC | Age: 66
End: 2021-08-11
Payer: MEDICARE

## 2021-08-11 VITALS
HEIGHT: 65 IN | WEIGHT: 184.4 LBS | DIASTOLIC BLOOD PRESSURE: 60 MMHG | BODY MASS INDEX: 30.72 KG/M2 | TEMPERATURE: 98.3 F | HEART RATE: 62 BPM | OXYGEN SATURATION: 99 % | SYSTOLIC BLOOD PRESSURE: 138 MMHG | RESPIRATION RATE: 16 BRPM

## 2021-08-11 DIAGNOSIS — N18.6 ESRD (END STAGE RENAL DISEASE) ON DIALYSIS (HCC): ICD-10-CM

## 2021-08-11 DIAGNOSIS — K59.00 CONSTIPATION, UNSPECIFIED CONSTIPATION TYPE: Primary | ICD-10-CM

## 2021-08-11 DIAGNOSIS — Z79.4 TYPE 2 DIABETES MELLITUS WITH STAGE 5 CHRONIC KIDNEY DISEASE NOT ON CHRONIC DIALYSIS, WITH LONG-TERM CURRENT USE OF INSULIN (HCC): ICD-10-CM

## 2021-08-11 DIAGNOSIS — E11.22 TYPE 2 DIABETES MELLITUS WITH STAGE 5 CHRONIC KIDNEY DISEASE NOT ON CHRONIC DIALYSIS, WITH LONG-TERM CURRENT USE OF INSULIN (HCC): ICD-10-CM

## 2021-08-11 DIAGNOSIS — N18.5 TYPE 2 DIABETES MELLITUS WITH STAGE 5 CHRONIC KIDNEY DISEASE NOT ON CHRONIC DIALYSIS, WITH LONG-TERM CURRENT USE OF INSULIN (HCC): ICD-10-CM

## 2021-08-11 DIAGNOSIS — Z99.2 ESRD (END STAGE RENAL DISEASE) ON DIALYSIS (HCC): ICD-10-CM

## 2021-08-11 PROCEDURE — 1090F PRES/ABSN URINE INCON ASSESS: CPT | Performed by: NURSE PRACTITIONER

## 2021-08-11 PROCEDURE — 2022F DILAT RTA XM EVC RTNOPTHY: CPT | Performed by: NURSE PRACTITIONER

## 2021-08-11 PROCEDURE — G8417 CALC BMI ABV UP PARAM F/U: HCPCS | Performed by: NURSE PRACTITIONER

## 2021-08-11 PROCEDURE — 99213 OFFICE O/P EST LOW 20 MIN: CPT | Performed by: NURSE PRACTITIONER

## 2021-08-11 PROCEDURE — 1123F ACP DISCUSS/DSCN MKR DOCD: CPT | Performed by: NURSE PRACTITIONER

## 2021-08-11 PROCEDURE — G8427 DOCREV CUR MEDS BY ELIG CLIN: HCPCS | Performed by: NURSE PRACTITIONER

## 2021-08-11 PROCEDURE — G8399 PT W/DXA RESULTS DOCUMENT: HCPCS | Performed by: NURSE PRACTITIONER

## 2021-08-11 PROCEDURE — 4040F PNEUMOC VAC/ADMIN/RCVD: CPT | Performed by: NURSE PRACTITIONER

## 2021-08-11 PROCEDURE — 3017F COLORECTAL CA SCREEN DOC REV: CPT | Performed by: NURSE PRACTITIONER

## 2021-08-11 PROCEDURE — 3051F HG A1C>EQUAL 7.0%<8.0%: CPT | Performed by: NURSE PRACTITIONER

## 2021-08-11 PROCEDURE — 1036F TOBACCO NON-USER: CPT | Performed by: NURSE PRACTITIONER

## 2021-08-11 RX ORDER — DOCUSATE SODIUM 100 MG/1
CAPSULE, LIQUID FILLED ORAL
Qty: 360 CAPSULE | Refills: 1 | Status: SHIPPED | OUTPATIENT
Start: 2021-08-11 | End: 2021-10-07

## 2021-08-11 ASSESSMENT — ENCOUNTER SYMPTOMS
NAUSEA: 0
CONSTIPATION: 0
RHINORRHEA: 0
EYE REDNESS: 0
BLOOD IN STOOL: 0
ABDOMINAL DISTENTION: 0
SORE THROAT: 0
SHORTNESS OF BREATH: 0
COLOR CHANGE: 0
ABDOMINAL PAIN: 0
DIARRHEA: 0
COUGH: 0
EYE DISCHARGE: 0
ANAL BLEEDING: 0

## 2021-08-11 NOTE — PROGRESS NOTES
[address] [facility]  [phone] (phone)  [phone] (fax)    Visit Date: 8/11/2021    Yimi Barajas is a 77 y.o. female who presents today for:  Chief Complaint   Patient presents with    3 Month Follow-Up     HPI:     Done with home health -they were coming once monthly for labs    Released from wound care office    Saw internal med specialist - going to get the A1C tomorrow. No medication changes    Has not had an eye exam in a long time. Went to a doctor on [address].     Otherwise, she is feeling well. Right foot pain since breaking her right ankle 2012:  Narrative   PROCEDURE: XR ANKLE RIGHT (MIN 3 VIEWS)       CLINICAL INFORMATION: Chronic pain of right ankle, Chronic pain of right ankle .       COMPARISON: 2/3/2014       TECHNIQUE: 3 projections       FINDINGS: Open reduction internal fixation bimalleolar fracture. 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 Seen with ossification along the interosseous membrane. There is lucency around the mid fibular screw. Mild heterotopic ossification    adjacent to the medial tibiotalar joint. Subchondral cyst formation is present involving both the tibial plafond and the talus. There is calcaneal spurring. Degenerative changes throughout the midfoot. Soft tissue swelling around the lateral malleolus.       Impression   Stents of chronic changes detailed above report. No acute abnormality.        HPI  Health Maintenance   Topic Date Due    Diabetic foot exam  Never done    Diabetic retinal exam  Never done    Shingles Vaccine (2 of 2) 01/29/2021    Annual Wellness Visit (AWV)  Never done    TSH testing  08/17/2021    Flu vaccine (1) 09/01/2021    Low dose CT lung screening  02/24/2022    Lipid screen  03/03/2022    A1C test (Diabetic or Prediabetic)  05/12/2022    Potassium monitoring  08/04/2022    Creatinine monitoring  08/04/2022    Breast cancer screen  02/02/2023    Colon cancer screen colonoscopy  06/10/2029    DTaP/Tdap/Td vaccine (3 - Td or Tdap) 11/28/2030    DEXA (modify frequency per FRAX score)  Completed    Pneumococcal 65+ yrs at Risk Vaccine  Completed    COVID-19 Vaccine  Completed    Hepatitis C screen  Completed    Hepatitis A vaccine  Aged Out    Hib vaccine  Aged Out    Meningococcal (ACWY) vaccine  Aged Out     Past Medical History:   Diagnosis Date    Anemia associated with chronic renal failure     Aranesp 150 micrograms every other week [facility] at [facility]    Anxiety     Arthritis     Backache     Blood circulation, collateral     Blood transfusion     CAD (coronary artery disease)     Cellulitis in diabetic foot ([facility]) 03/03/2017    4th and 5th toes right foot    Chest pain     History of    CHF (congestive heart failure) ([facility]) 1998    Dx'ed by Dr. Luis Madsen Chronic anemia     Chronic kidney disease     Chronic kidney disease, stage III (moderate) (HCC)     Chronic renal insufficiency 2010    COPD (chronic obstructive pulmonary disease) ([facility]) 2012    Dr. 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 Melly Ayers    HTN (hypertension) 1970's    Hyperlipemia 1998    Iron deficiency anemia due to dietary causes 6/21/2018    Kidney stones 3/2014    Kidney trouble          MRSA infection 03/2017    right foot-Dr. Aidee Aguilar (podiatrist)    Neuromuscular disorder ([facility])     Neuropathy 1989    diabetic neuropathy    Obesity since childhoood    CONTRERAS on CPAP 2010    Dr. Angelo Ayon    Pneumonia     PONV (postoperative nausea and vomiting)     Seizures ([facility])     Sepsis due to Escherichia coli ([facility]) 07/2020      Past Surgical History:   Procedure Laterality Date    ANKLE SURGERY Right 02-10-14    Dr. Nkechi Serrano [facility] APPENDECTOMY  1980s    BACK SURGERY      removal of benign tumor    CARDIAC SURGERY      CARPAL TUNNEL RELEASE  1988    COLONOSCOPY  2009    2 polyps, not precanceorus    COLONOSCOPY Left 6/10/2019    COLONOSCOPY DIAGNOSTIC performed by Aria Buckner MD at [address] [facility]  3/30/2012    eye lid lift    DIAGNOSTIC CARDIAC CATH LAB PROCEDURE      ENDOSCOPY, COLON, DIAGNOSTIC     [facility] EYE SURGERY  2012    left sided ptosis    FOOT SURGERY      Tarsal tunnel surgery    FRACTURE SURGERY      HYSTERECTOMY   and     first partial in , then total in [address] [facility]  2015   [address] [place] [address]  04/10/2020    RIGHT    LIVER BIOPSY  2015    LUNG BIOPSY  2009    CT OFFICE/OUTPT VISIT,PROCEDURE ONLY Left 2018    LEFT UPPER EXTREMENTY AV FISTULA CREATION performed by Gian Mendoza MD at [address] Left 2019    EGD BIOPSY performed by Aria Buckner MD at [facility]     Family History   Problem Relation Age of Onset    Diabetes Sister     Diabetes Brother     Diabetes Sister     Diabetes Sister     Cancer Sister     Early Death Sister     Heart Disease Sister     Diabetes Sister     Heart Disease Sister     Diabetes Sister     Diabetes Brother     Arthritis Mother     Heart Disease Mother     High Blood Pressure Mother     Kidney Disease Mother     Mental Illness Mother     Stroke Mother     Heart Disease Father     Diabetes Father     Obesity Father     Alcohol Abuse Father      Social History     Tobacco Use    Smoking status: Former Smoker     Packs/day: 1.50     Years: 30.00     Pack years: 45.00     Types: Cigarettes     Start date: 1979     Quit date: 3/13/2009     Years since quittin.4    Smokeless tobacco: Never Used    Tobacco comment: quit 2009   Substance Use Topics    Alcohol use: No     Alcohol/week: 0.0 standard drinks      Current Outpatient Medications   Medication Sig Dispense Refill    docusate sodium (COLACE) 100 MG capsule TAKE 2 CAPSULE BY MOUTH 2 TIMES DAILY AS NEEDED. 360 capsule 1    insulin glargine (LANTUS SOLOSTAR) 100 UNIT/ML injection pen Inject 25 Units into the skin nightly 15 pen 1    insulin aspart (NOVOLOG FLEXPEN) 100 UNIT/ML injection pen Sliding scale insulin coverage Glucose:Dose: If Less yaks710 =No Insulin/ 140-199= 2 Units/ 200-249=4 Units/ 250-299= 6 Units/  300-349=8 Units/  350-400=10 Units/ Above 400 = 12 Units max 48 units daily 15 pen 1    HYDROcodone-acetaminophen (NORCO) 5-325 MG per tablet Take 1 tablet by mouth every 8 hours as needed for Pain for up to 30 days. 90 tablet 0    sulfamethoxazole-trimethoprim (BACTRIM DS;SEPTRA DS) 800-160 MG per tablet Take 1 tablet by mouth three times a week      doxycycline hyclate (VIBRAMYCIN) 100 MG capsule TAKE 2 CAPSULES BY MOUTH ONCE A WEEK.       vitamin D (ERGOCALCIFEROL) 1.25 MG (81510 UT) CAPS capsule TAKE 1 CAPSULE BY MOUTH EVERY 14 DAYS ON MONDAYS 6 capsule 3    cloNIDine (CATAPRES) 0.2 MG tablet Take 0.2 mg by mouth 3 times daily      famotidine (PEPCID) 40 MG tablet Take 40 mg by mouth daily as needed       iron polysaccharides (NIFEREX) 150 MG capsule Take 150 mg by mouth daily      tiotropium (SPIRIVA RESPIMAT) 2.5 MCG/ACT AERS inhaler Inhale 2 puffs into the lungs daily 1 Inhaler 11    albuterol sulfate HFA (PROAIR HFA) 108 (90 Base) MCG/ACT inhaler Inhale 2 puffs into the lungs every 6 hours as needed for Wheezing or Shortness of Breath 3 Inhaler 3    fluticasone (FLONASE) 50 MCG/ACT nasal spray 1 spray by Each Nostril route daily as needed for Rhinitis 3 Bottle 3    pantoprazole (PROTONIX) 40 MG tablet Take 40 mg by mouth daily       atorvastatin (LIPITOR) 40 MG tablet TAKE 1 TABLET DAILY 90 tablet 3    Insulin Pen Needle (B-D ULTRAFINE III SHORT PEN) 31G X 8 MM MISC Use three times daily Diagnosis Code E11.9 200 each 3    carvedilol (COREG) 25 MG tablet 2 tablets  tablet 1    tacrolimus (PROGRAF) 1 MG capsule Take 1 mg by mouth 5 CAPSULES EVERY MORNING AND 5 CAPSULES EVERY EVENING      nitroGLYCERIN (NITROSTAT) 0.4 MG SL tablet Place 1 tablet under the tongue every 5 minutes as needed for Chest pain 25 tablet 2    lactulose (CHRONULAC) 10 GM/15ML solution Take twice daily as needed for constipation 2700 mL 1    NIFEdipine (ADALAT CC) 60 MG extended release tablet Take 60 mg by mouth 2 times daily      Mycophenolate Sodium 360 MG TBEC Take 360 mg by mouth 1 tablets BID      magnesium oxide (MAG-OX) 400 MG tablet Take 400 mg by mouth 2 times daily      linaclotide (LINZESS) 290 MCG CAPS capsule Take 145 mcg by mouth every other day       aspirin 81 MG EC tablet Take 81 mg by mouth daily. No current facility-administered medications for this visit. Allergies   Allergen Reactions    Actos [Pioglitazone Hydrochloride] Swelling    Pioglitazone Swelling     Patient states she does not know this one. 8/11/21    Cymbalta [Duloxetine Hcl] Other (See Comments)     Anxiety and lethargic    Gabapentin Anxiety       Subjective:    Review of Systems   Constitutional: Negative for chills, fatigue and fever. HENT: Negative for congestion, ear pain, postnasal drip, rhinorrhea and sore throat. Eyes: Negative for discharge and redness. Respiratory: Negative for cough and shortness of breath. Cardiovascular: Negative for chest pain and leg swelling. Gastrointestinal: Negative for abdominal distention, abdominal pain, anal bleeding, blood in stool, constipation, diarrhea and nausea. Musculoskeletal: Positive for arthralgias and gait problem (right ankle pain). Skin: Negative for color change and rash. Neurological: Negative for facial asymmetry, speech difficulty and weakness. Hematological: Does not bruise/bleed easily. Psychiatric/Behavioral: Negative for agitation and confusion.        Objective:     Vitals: 08/11/21 1402   BP: 138/60   Site: Right Upper Arm   Position: Sitting   Cuff Size: Large Adult   Pulse: 62   Resp: 16   Temp: 98.3 °F (36.8 °C)   TempSrc: Oral   SpO2: 99%   Weight: 184 lb 6.4 oz (83.6 kg)   Height: 5' 5\" (1.651 m)       Body mass index is 30.69 kg/m². Wt Readings from Last 3 Encounters:   08/11/21 184 lb 6.4 oz (83.6 kg)   08/04/21 183 lb (83 kg)   07/26/21 184 lb 9.6 oz (83.7 kg)     BP Readings from Last 3 Encounters:   08/11/21 138/60   08/04/21 (!) 112/56   07/21/21 (!) 119/58     Physical Exam  Constitutional:       General: She is not in acute distress. Appearance: She is well-developed. She is not ill-appearing or diaphoretic. HENT:      Head: Normocephalic and atraumatic. Right Ear: Hearing and external ear normal. No decreased hearing noted. Left Ear: Hearing and external ear normal. No decreased hearing noted. Nose: Nose normal. No nasal deformity. Eyes:      General:         Right eye: No discharge. Left eye: No discharge. Conjunctiva/sclera: Conjunctivae normal.   Pulmonary:      Effort: Pulmonary effort is normal. No respiratory distress. Abdominal:      General: There is no distension. Tenderness: There is no guarding. Musculoskeletal:         General: No tenderness or deformity. Normal range of motion. Cervical back: Normal range of motion and neck supple. Skin:     Coloration: Skin is not pale. Findings: No erythema or rash (On exposed areas). Neurological:      Mental Status: She is alert. Gait: Gait normal.   Psychiatric:         Speech: Speech normal.         Behavior: Behavior normal.         Thought Content:  Thought content normal.         Judgment: Judgment normal.         Lab Results   Component Value Date    WBC 7.6 08/04/2021    HGB 10.4 (L) 08/04/2021    HCT 34.3 (L) 08/04/2021     08/04/2021    CHOL 138 01/18/2021    TRIG 184 01/18/2021    HDL 36 03/03/2021    LDLDIRECT 67.68 08/24/2020    [address] [facility] 61 03/03/2021    AST 17 08/04/2021     08/04/2021    K 3.7 08/04/2021     08/04/2021    CREATININE 1.3 (H) 08/04/2021    BUN 24 (H) 08/04/2021    CO2 27 08/04/2021    TSH 1.400 08/17/2020    INR 1.28 (H) 08/02/2020    LABA1C 7.1 (H) 05/12/2021    LABMICR 20.95 01/04/2018    LABGLOM 41 (A) 08/04/2021    MG 2.0 08/04/2021    CALCIUM 10.8 (H) 08/04/2021    VITD25 48 11/13/2019     Assessment:       Diagnosis Orders   1. Constipation, unspecified constipation type  docusate sodium (COLACE) 100 MG capsule   2. ESRD (end stage renal disease) on dialysis ([facility]     3. Type 2 diabetes mellitus with stage 5 chronic kidney disease not on chronic dialysis, with long-term current use of insulin ([facility]         Plan:   Please see your eye doctor for diabetic eye exam      Return in about 6 months (around 2/11/2022). Orders Placed:  No orders of the defined types were placed in this encounter. Medications Prescribed:  Orders Placed This Encounter   Medications    docusate sodium (COLACE) 100 MG capsule     Sig: TAKE 2 CAPSULE BY MOUTH 2 TIMES DAILY AS NEEDED. Dispense:  360 capsule     Refill:  1     Future Appointments   Date Time Provider Franklin Chin   8/23/2021  1:00 PM STR ULTRASOUND RM 2 STRZ US STR Radiolog   8/25/2021  3:30 [facility] [facility]   8/31/2021 11:00 AM Chapis Bai MD [address] [place] [address]   10/7/2021  2:00 PM MD [facility]NESTO [facility]. [facility]   10/11/2021 12:00 PM DONOVAN Gonzalez CNP [facility] [place] [address] [place] [address]   2/3/2022  2:00 PM Cecille Duverney, APRN - CNP SRPX IM MED [facility] [place] [address]      Patient given educational materials - see patient instructions. Discussed use, benefit, and side effects of prescribedmedications. All patient questions answered. Pt voiced understanding. Reviewed health maintenance. Instructed to continue current medications, diet and exercise. Patient agreed with treatment plan.  Follow up as directed.     Electronically signed by DONOVAN Carrasco CNP on 8/11/2021 at 2:26 PM

## 2021-08-11 NOTE — PROGRESS NOTES
Health Maintenance Due   Topic Date Due    Diabetic foot exam  Never done    Diabetic retinal exam  Never done    Shingles Vaccine (2 of 2) 01/29/2021    Annual Wellness Visit (AWV)  Never done    TSH testing  08/17/2021   Patient states she was in the hospital since her last visit with Yusuf Nichols. Patient c/o no symptoms.   Patient states that she was seen by a urologist, us bladder and kidney-frequent UTI  Fistula in left arm

## 2021-08-11 NOTE — PATIENT INSTRUCTIONS
Patient Education        Learning About Meal Planning for Diabetes  Why plan your meals? Meal planning can be a key part of managing diabetes. Planning meals and snacks with the right balance of carbohydrate, protein, and fat can help you keep your blood sugar at the target level you set with your doctor. You don't have to eat special foods. You can eat what your family eats, including sweets once in a while. But you do have to pay attention to how often you eat and how much you eat of certain foods. You may want to work with a dietitian or a certified diabetes educator. He or she can give you tips and meal ideas and can answer your questions about meal planning. This health professional can also help you reach a healthy weight if that is one of your goals. What plan is right for you? Your dietitian or diabetes educator may suggest that you start with the plate format or carbohydrate counting. The plate format  The plate format is a simple way to help you manage how you eat. You plan meals by learning how much space each food should take on a plate. Using the plate format helps you spread carbohydrate throughout the day. It can make it easier to keep your blood sugar level within your target range. It also helps you see if you're eating healthy portion sizes. To use the plate format, you put non-starchy vegetables on half your plate. Add meat or meat substitutes on one-quarter of the plate. Put a grain or starchy vegetable (such as brown rice or a potato) on the final quarter of the plate. You can add a small piece of fruit and some low-fat or fat-free milk or yogurt, depending on your carbohydrate goal for each meal.  Here are some tips for using the plate format:  · Make sure that you are not using an oversized plate. A 9-inch plate is best. Many restaurants use larger plates. · Get used to using the plate format at home. Then you can use it when you eat out.   · Write down your questions about using rapid-acting insulin to take before you eat. If you use an insulin pump, you get a constant rate of insulin during the day. So the pump must be programmed at meals to give you extra insulin to cover the rise in blood sugar after meals. When you know how much carbohydrate you will eat, you can take the right amount of insulin. Or, if you always use the same amount of insulin, you need to make sure that you eat the same amount of carbohydrate at meals. If you need more help to understand carbohydrate counting and food labels, ask your doctor, dietitian, or diabetes educator. How can you plan healthy meals? Here are some tips to get started:  · Plan your meals a week at a time. Don't forget to include snacks too. · Use cookbooks or online recipes to plan several main meals. Plan some quick meals for busy nights. You also can double some recipes that freeze well. Then you can save half for other busy nights when you don't have time to cook. · Make sure you have the ingredients you need for your recipes. If you're running low on basic items, put these items on your shopping list too. · List foods that you use to make breakfasts, lunches, and snacks. List plenty of fruits and vegetables. · Post this list on the refrigerator. Add to it as you think of more things you need. · Take the list to the store to do your weekly shopping. Follow-up care is a key part of your treatment and safety. Be sure to make and go to all appointments, and call your doctor if you are having problems. It's also a good idea to know your test results and keep a list of the medicines you take. Where can you learn more? Go to https://Tenaxis [email or web address].[email or web address]. org and sign in to your [employer] Daily [employer] account. Enter E453 in the WebGen Systems box to learn more about \"Learning About Meal Planning for Diabetes. \"     If you do not have an account, please click on the \"Sign Up Now\" link.   Current as of: August 31, 2020               Content Version: 12.9  © 4642-3925 Healthwise, [employer]. Care instructions adapted under license by [facility] ([facility]). If you have questions about a medical condition or this instruction, always ask your healthcare professional. [address] any warranty or liability for your use of this information. Patient Education        Counting Carbohydrates for Diabetes: Care Instructions  Your Care Instructions     You don't have to eat special foods when you have diabetes. You just have to be careful to eat healthy foods. Carbohydrates (carbs) raise blood sugar higher and quicker than any other nutrient. Carbs are found in desserts, breads and cereals, and fruit. They're also in starchy vegetables. These include potatoes, corn, and grains such as rice and pasta. Carbs are also in milk and yogurt. The more carbs you eat at one time, the higher your blood sugar will rise. Spreading carbs all through the day helps keep your blood sugar levels within your target range. Counting carbs is one of the best ways to keep your blood sugar under control. If you use insulin, counting carbs helps you match the right amount of insulin to the number of grams of carbs in a meal. Then you can change your diet and insulin dose as needed. Testing your blood sugar several times a day can help you learn how carbs affect your blood sugar. A registered dietitian or certified diabetes educator can help you plan meals and snacks. Follow-up care is a key part of your treatment and safety. Be sure to make and go to all appointments, and call your doctor if you are having problems. It's also a good idea to know your test results and keep a list of the medicines you take. How can you care for yourself at home? Know your daily amount of carbohydrates  Your daily amount depends on several things, such as your weight, how active you are, which diabetes medicines you take, and what your goals are for your blood sugar levels.  A registered dietitian or certified diabetes educator can help you plan how many carbs to include in each meal and snack. For most adults, a guideline for the daily amount of carbs is:  · 45 to 60 grams at each meal. That's about the same as 3 to 4 carbohydrate servings. · 15 to 20 grams at each snack. That's about the same as 1 carbohydrate serving. Count carbs  Counting carbs lets you know how much rapid-acting insulin to take before you eat. If you use an insulin pump, you get a constant rate of insulin during the day. So the pump must be programmed at meals. This gives you extra insulin to cover the rise in blood sugar after meals. If you take insulin:  · Learn your own insulin-to-carb ratio. You and your diabetes health professional will figure out the ratio. You can do this by testing your blood sugar after meals. For example, you may need a certain amount of insulin for every 15 grams of carbs. · Add up the carb grams in a meal. Then you can figure out how many units of insulin to take based on your insulin-to-carb ratio. · Exercise lowers blood sugar. You can use less insulin than you would if you were not doing exercise. Keep in mind that timing matters. If you exercise within 1 hour after a meal, your body may need less insulin for that meal than it would if you exercised 3 hours after the meal. Test your blood sugar to find out how exercise affects your need for insulin. If you do or don't take insulin:  · Look at labels on packaged foods. This can tell you how many carbs are in a serving. You can also use guides from the American Diabetes Association. · Be aware of portions, or serving sizes. If a package has two servings and you eat the whole package, you need to double the number of grams of carbohydrate listed for one serving. · Protein, fat, and fiber do not raise blood sugar as much as carbs do.  If you eat a lot of these nutrients in a meal, your blood sugar will rise more slowly than it would otherwise. Eat from all food groups  · Eat at least three meals a day. · Plan meals to include food from all the food groups. The food groups include grains, fruits, dairy, proteins, and vegetables. · Talk to your dietitian or diabetes educator about ways to add limited amounts of sweets into your meal plan. · If you drink alcohol, talk to your doctor. It may not be recommended when you are taking certain diabetes medicines. Where can you learn more? Go to https://Streem.NewCross [email or web address] org and sign in to your [employer] Commerce account. Enter M912 in the Amerpages box to learn more about \"Counting Carbohydrates for Diabetes: Care Instructions. \"     If you do not have an account, please click on the \"Sign Up Now\" link. Current as of: August 31, 2020               Content Version: 12.9  © 7653-1036 [employer]. Care instructions adapted under license by [facility] ([facility]). If you have questions about a medical condition or this instruction, always ask your healthcare professional. [place] [address] any warranty or liability for your use of this information. Patient Education        Learning About Type 2 Diabetes  What is type 2 diabetes? Type 2 diabetes is a condition in which you have too much sugar (glucose) in your blood. Glucose is a type of sugar produced in your body when carbohydrates and other foods are digested. It provides energy to cells throughout the body. Normally, blood sugar levels increase after you eat a meal. When blood sugar rises, cells in the pancreas release insulin, which causes the body to absorb sugar from the blood and lowers the blood sugar level to normal.  When you have type 2 diabetes, sugar stays in the blood rather than entering the body's cells to be used for energy. This results in high blood sugar. It happens when your body can't use insulin the right way.   Over time, high blood sugar can harm many parts of the body, such as your eyes, heart, blood vessels, nerves, and kidneys. It can also increase your risk for other health problems (complications). What can you expect with type 2 diabetes? Silvio Hernandez keep hearing about how important it is to keep your blood sugar within a target range. That's because over time, high blood sugar can lead to serious problems. It can:  · Harm your eyes, nerves, and kidneys. · Damage your blood vessels, leading to heart disease and stroke. · Reduce blood flow and cause nerve damage to parts of your body, especially your feet. This can cause slow healing and pain when you walk. · Make your immune system weak and less able to fight infections. When people hear the word \"diabetes,\" they often think of problems like these. But daily care and treatment can help prevent or delay these problems. The goal is to keep your blood sugar in a target range. That's the best way to reduce your chance of having more problems from diabetes. What are the symptoms? Some people who have type 2 diabetes may not have any symptoms early on. Many people with the disease don't even know they have it at first. But with time, diabetes starts to cause symptoms. You have most symptoms of type 2 diabetes when your blood sugar is either too high or too low. The most common symptoms of high blood sugar include:  · Thirst.  · Needing to urinate often. · Weight loss. · Blurry vision. The symptoms of low blood sugar include:  · Sweating. · Shakiness. · Weakness. · Hunger. · Confusion. You're not likely to get symptoms of low blood sugar unless you take insulin or use certain diabetes medicines that lower blood sugar. How can you help prevent type 2 diabetes? There are things you can do to help prevent type 2 diabetes. Stay at a healthy weight. Exercise regularly, and eat healthy foods. Even small changes can make a difference. If you have prediabetes, the medicine metformin can help prevent type 2 diabetes.   How is type 2 diabetes treated? Treatment for type 2 diabetes will change over time to meet your needs. But the focus of your treatment will usually be to keep your blood sugar levels in your target range. This will help prevent problems such as eye, kidney, heart, blood vessel, and nerve disease. Some people may need medicines to help their bodies make insulin or decrease insulin resistance. Some medicines slow down how quickly the body absorbs carbohydrates. Treatment to manage type 2 diabetes includes:  · Making healthy food choices and being active. · Losing weight, if you need to. · Seeing your doctor regularly. · Keeping your blood sugar in your target range. · Taking medicines, if you need them. · Quitting smoking, if you smoke. · Keeping your blood pressure and cholesterol under control. Follow-up care is a key part of your treatment and safety. Be sure to make and go to all appointments, and call your doctor if you are having problems. It's also a good idea to know your test results and keep a list of the medicines you take. Where can you learn more? Go to [email or web address]://Pathflow.[email or web address].com. org and sign in to your PoachIt account. Enter H756 in the Qwbcg box to learn more about \"Learning About Type 2 Diabetes. \"     If you do not have an account, please click on the \"Sign Up Now\" link. Current as of: August 31, 2020               Content Version: 12.9  © 2006-2021 Healthwise, Incorporated. Care instructions adapted under license by [facility] ([facility]). If you have questions about a medical condition or this instruction, always ask your healthcare professional. [place] [address] any warranty or liability for your use of this information. Patient Education        Kidney Disease and Diabetes: Care Instructions  Overview     When you have diabetes, your body cannot make enough insulin or use it the way it should.   Your body needs insulin to help sugar move from the blood to the cells. Without it, your blood sugar gets too high. High blood sugar damages your kidneys and makes it hard for them to filter blood. This causes fluid and waste to build up in your blood. If you have diabetes, it is very important to keep your blood sugar in your target range. There are many steps you can take to do this. If you can control your blood sugar, you will have the best chance to slow or stop damage to your kidneys. Follow-up care is a key part of your treatment and safety. Be sure to make and go to all appointments, and call your doctor if you are having problems. It's also a good idea to know your test results and keep a list of the medicines you take. How can you care for yourself at home? To manage your diabetes and slow or stop damage to your kidneys  · Keep your blood sugar in your target range. The American Diabetes Association recommends a hemoglobin A1c (Hb A1c) target level of less than 7%. Talk to your doctor about your target. The lower your A1c, the better your chance of stopping kidney damage. · Keep your blood pressure in your target range. Doctors recommend specific types of blood pressure medicines for people who have diabetes and kidney disease. Examples include ACE inhibitors and angiotensin II receptor blockers (ARBs). Your doctor may have you take one of these even if you don't have high blood pressure. · Take all of your medicines. You may have several. For example, you may take medicines for diabetes, cholesterol, and blood pressure. It's very important to take all of them just as your doctor tells you to and to keep taking them. · Make good food choices. Follow an eating plan that is best for your diabetes and your kidneys. You may want to work with a dietitian to make a plan. This will help you know how much carbohydrate to have for meals and snacks. It will also make sure that you get the right amount of salt (sodium), fluids, and protein.   · Stay at a and sign in to your [employer] account. Enter C726 in the [employer]erals box to learn more about \"Kidney Disease and Diabetes: Care Instructions. \"     If you do not have an account, please click on the \"Sign Up Now\" link. Current as of: December 17, 2020               Content Version: 12.9  © 8350-4170 Healthwise, Incorporated. Care instructions adapted under license by [facility] ([facility]). If you have questions about a medical condition or this instruction, always ask your healthcare professional. [address] any warranty or liability for your use of this information.

## 2021-08-13 NOTE — TELEPHONE ENCOUNTER
----- Message from Juan Cox sent at 8/13/2021 11:23 AM EDT -----  Subject: Message to Provider    QUESTIONS  Information for Provider? Pt called and stated that the medication list on   her paper work states to stop taking her blood pressure medication. The   name of the medication Nisedipine 60 mg ER tablets and Nitroglycerin, the   pt still takes . Pt stated that she didn't recognize the name when asked   in the room. Pt stated that she would like for that to be changed in   medication that she is still taking that medication. Please be advised  ---------------------------------------------------------------------------  --------------  CALL BACK INFO  What is the best way for the office to contact you? OK to leave message on   voicemail  Preferred Call Back Phone Number? [phone]  ---------------------------------------------------------------------------  --------------  SCRIPT ANSWERS  Relationship to Patient?  Self

## 2021-08-16 ASSESSMENT — ENCOUNTER SYMPTOMS
DIARRHEA: 0
PHOTOPHOBIA: 0
ABDOMINAL DISTENTION: 0
SORE THROAT: 0
BACK PAIN: 0
WHEEZING: 0
BLOOD IN STOOL: 0
ABDOMINAL PAIN: 0
CONSTIPATION: 0
TROUBLE SWALLOWING: 0
SINUS PRESSURE: 0
COUGH: 0
EYE PAIN: 0
VOMITING: 0
SINUS PAIN: 0
NAUSEA: 0
SHORTNESS OF BREATH: 0

## 2021-08-23 ENCOUNTER — HOSPITAL ENCOUNTER (OUTPATIENT)
Dept: ULTRASOUND IMAGING | Age: 66
Discharge: HOME | End: 2021-08-23
Payer: MEDICARE

## 2021-08-23 DIAGNOSIS — G89.4 CHRONIC PAIN SYNDROME: ICD-10-CM

## 2021-08-23 DIAGNOSIS — Z94.0 HISTORY OF KIDNEY TRANSPLANT: ICD-10-CM

## 2021-08-23 PROCEDURE — 76776 US EXAM K TRANSPL W/DOPPLER: CPT

## 2021-08-23 RX ORDER — HYDROCODONE BITARTRATE AND ACETAMINOPHEN 5; 325 MG/1; MG/1
1 TABLET ORAL EVERY 8 HOURS PRN
Qty: 90 TABLET | Refills: 0 | Status: SHIPPED | OUTPATIENT
Start: 2021-08-23 | End: 2021-09-20 | Stop reason: SDUPTHER

## 2021-08-23 NOTE — TELEPHONE ENCOUNTER
OARRS reviewed. UDS: + for  Hydrocodone. Last seen: 7/8/2021.  Follow-up:   Future Appointments   Date Time Provider Franklin Chin   8/25/2021  3:30 PM DONOVAN Chavez CNP [facility] [address]   8/31/2021 11:00 AM Valencia Brian MD [place] [id number]   10/7/2021  2:00 PM Jesus Reeves MD [facility]   10/11/2021 12:00 PM DONOVAN Feldman CNP [facility]   2/3/2022  2:00 PM DONOVAN Ballesteros CNPX IM [facility]   2/10/2022  2:00 PM DONOVAN Monroe CNPX FM [facility]

## 2021-08-23 NOTE — TELEPHONE ENCOUNTER
Dearmargarito Ceballos called requesting a refill on the following medications:  Requested Prescriptions     Pending Prescriptions Disp Refills    HYDROcodone-acetaminophen (NORCO) 5-325 MG per tablet 90 tablet 0     Sig: Take 1 tablet by mouth every 8 hours as needed for Pain for up to 30 days.      Pharmacy verified:  .pv  [facility] [address]    Date of last visit: 07/08/2021  Date of next visit (if applicable): 17/46/2964

## 2021-08-25 ENCOUNTER — OFFICE VISIT (OUTPATIENT)
Dept: PULMONOLOGY | Age: 66
End: 2021-08-25
Payer: MEDICARE

## 2021-08-25 VITALS
SYSTOLIC BLOOD PRESSURE: 138 MMHG | HEART RATE: 60 BPM | BODY MASS INDEX: 31.36 KG/M2 | WEIGHT: 188.2 LBS | TEMPERATURE: 98.4 F | OXYGEN SATURATION: 96 % | DIASTOLIC BLOOD PRESSURE: 72 MMHG | HEIGHT: 65 IN

## 2021-08-25 DIAGNOSIS — Z87.891 PERSONAL HISTORY OF TOBACCO USE: ICD-10-CM

## 2021-08-25 DIAGNOSIS — J41.0 SIMPLE CHRONIC BRONCHITIS (HCC): Primary | ICD-10-CM

## 2021-08-25 PROCEDURE — 99214 OFFICE O/P EST MOD 30 MIN: CPT | Performed by: NURSE PRACTITIONER

## 2021-08-25 PROCEDURE — 1036F TOBACCO NON-USER: CPT | Performed by: NURSE PRACTITIONER

## 2021-08-25 PROCEDURE — 4040F PNEUMOC VAC/ADMIN/RCVD: CPT | Performed by: NURSE PRACTITIONER

## 2021-08-25 PROCEDURE — G8399 PT W/DXA RESULTS DOCUMENT: HCPCS | Performed by: NURSE PRACTITIONER

## 2021-08-25 PROCEDURE — 1090F PRES/ABSN URINE INCON ASSESS: CPT | Performed by: NURSE PRACTITIONER

## 2021-08-25 PROCEDURE — 1123F ACP DISCUSS/DSCN MKR DOCD: CPT | Performed by: NURSE PRACTITIONER

## 2021-08-25 PROCEDURE — G8427 DOCREV CUR MEDS BY ELIG CLIN: HCPCS | Performed by: NURSE PRACTITIONER

## 2021-08-25 PROCEDURE — G8417 CALC BMI ABV UP PARAM F/U: HCPCS | Performed by: NURSE PRACTITIONER

## 2021-08-25 PROCEDURE — G0296 VISIT TO DETERM LDCT ELIG: HCPCS | Performed by: NURSE PRACTITIONER

## 2021-08-25 PROCEDURE — 3017F COLORECTAL CA SCREEN DOC REV: CPT | Performed by: NURSE PRACTITIONER

## 2021-08-25 PROCEDURE — G8926 SPIRO NO PERF OR DOC: HCPCS | Performed by: NURSE PRACTITIONER

## 2021-08-25 PROCEDURE — 3023F SPIROM DOC REV: CPT | Performed by: NURSE PRACTITIONER

## 2021-08-25 ASSESSMENT — ENCOUNTER SYMPTOMS
NAUSEA: 0
COUGH: 0
SHORTNESS OF BREATH: 1
WHEEZING: 1
VOMITING: 0
CHEST TIGHTNESS: 0
DIARRHEA: 0
HEMOPTYSIS: 0
SPUTUM PRODUCTION: 0
STRIDOR: 0

## 2021-08-25 ASSESSMENT — COPD QUESTIONNAIRES: COPD: 1

## 2021-08-25 NOTE — PROGRESS NOTES
[facility]    Patient: Smith Class, 77 y.o.   : 1955    Pt of Dr. Anahy Phoenix   Patient presents with    Follow-up     6 mo fu with no test        COPD  She complains of shortness of breath and wheezing. There is no cough, hemoptysis or sputum production. This is a chronic problem. The current episode started more than 1 year ago. The problem occurs daily. Progression since onset: slightly better since change to spiriva respimat. Associated symptoms include dyspnea on exertion, nasal congestion and postnasal drip. Pertinent negatives include no chest pain or fever. Her symptoms are aggravated by change in weather, exposure to fumes, strenuous activity and exposure to smoke (certain perfumes). Her symptoms are alleviated by beta-agonist and rest. She reports significant improvement on treatment. Risk factors for lung disease include smoking/tobacco exposure. Her past medical history is significant for COPD. Salvador Roblero is here for follow up for severe COPD. PMH significant for Anemia, anxiety, CAD, COPD, CKD s/p left renal transplant 4/10/2020 had some issues with UTI and cystitis. Overall patient reports respiratory symptoms have been slightly improved since last appointment and change in spiriva to respimat. Patient reports good compliance with inhaled medications (spiriva). Patient using albuterol 2-3 times per week on average. Patient reports minimal  physical limitation due to respiratory symptoms. spiriva handihaler Changed to respimat 2/2 bad taste  Flonase for rhinits    Progress History:   Since last visit any new medical issues? Yes seeing urologist Dr. Jeanne Jacobsen had [facility] ER or hospital visits? Yes UTI and dehydration   Any new or changes in medicines? ATB for Doxycycline for UTI and pyelonephritis see [facility] admission 2021 in EMR  Using inhalers? Yes spiriva respimat  Are they helpful?  Yes   Pneumonia vaccine?  Last dose 2019  Past Medical hx   PMH:  Past Medical History:   Diagnosis Date    Anemia associated with chronic renal failure     Aranesp 150 micrograms every other week given [facility]    Anxiety     Arthritis     Backache     Blood circulation, collateral     Blood transfusion     CAD (coronary artery disease)     Cellulitis in diabetic foot ([address] 03/03/2017    4th and 5th toes right foot    Chest pain     History of    CHF (congestive heart failure) ([address] 1998    Dx'ed by Dr. Fabian Levy Chronic anemia     Chronic kidney disease     Chronic kidney disease, stage III (moderate) (HCC)     Chronic renal insufficiency 2010    COPD (chronic obstructive pulmonary disease) ([address] 2012    Dr. 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 Breanne Chandler    HTN (hypertension) 1970's    Hyperlipemia 1998    Iron deficiency anemia due to dietary causes 6/21/2018    Kidney stones 3/2014    Kidney trouble          MRSA infection 03/2017    right foot-Dr. Aron Kirkpatrick (podiatrist)    Neuromuscular disorder ([address]     Neuropathy 1989    diabetic neuropathy    Obesity since childhoood    CONTRERAS on CPAP 2010    Dr. Jesus Vázquez    Pneumonia     PONV (postoperative nausea and vomiting)     Seizures ([address]     Sepsis due to Escherichia coli ([facility] 07/2020     SURGICAL HISTORY:  Past Surgical History:   Procedure Laterality Date    ANKLE SURGERY Right 02-10-14    Dr. Nhan Tamayo at [facility]  1990's    removal of benign tumor   [address]  2008   [address]  2009    2 polyps, not precanceorus    COLONOSCOPY Left 6/10/2019    COLONOSCOPY DIAGNOSTIC performed by Wilma Chinchilla MD at [address] [facility]  3/30/2012    eye lid lift    DIAGNOSTIC CARDIAC CATH LAB PROCEDURE      ENDOSCOPY, COLON, DIAGNOSTIC     [facility] EYE SURGERY  2012    left sided ptosis    FOOT SURGERY      Tarsal tunnel surgery    FRACTURE SURGERY     [address] and [address]    first partial in , then total in [address] [facility]     [facility]  04/10/2020    RIGHT    LIVER BIOPSY  2015    LUNG BIOPSY  2009    RI OFFICE/OUTPT VISIT,PROCEDURE ONLY Left 2018    LEFT UPPER EXTREMENTY AV FISTULA CREATION performed by Odalys Carrizales MD at [address] Left 2019    EGD BIOPSY performed by Kaleb Gallardo MD at [facility]     SOCIAL HISTORY:  Social History     Tobacco Use    Smoking status: Former Smoker     Packs/day: 1.50     Years: 30.00     Pack years: 45.00     Types: Cigarettes     Start date: 1979     Quit date: 3/13/2009     Years since quittin.4    Smokeless tobacco: Never Used    Tobacco comment: quit    Vaping Use    Vaping Use: Never used   Substance Use Topics    Alcohol use: No     Alcohol/week: 0.0 standard drinks    Drug use: No     ALLERGIES:  Allergies   Allergen Reactions    Actos [Pioglitazone Hydrochloride] Swelling    Pioglitazone Swelling     Patient states she does not know this one. 21    Cymbalta [Duloxetine Hcl] Other (See Comments)     Anxiety and lethargic    Gabapentin Anxiety     FAMILY HISTORY:  Family History   Problem Relation Age of Onset    Diabetes Sister     Diabetes Brother     Diabetes Sister     Diabetes Sister     Cancer Sister     Early Death Sister     Heart Disease Sister     Diabetes Sister     Heart Disease Sister     Diabetes Sister     Diabetes Brother     Arthritis Mother     Heart Disease Mother     High Blood Pressure Mother     Kidney Disease Mother     Mental Illness Mother     Stroke Mother     Heart Disease Father     Diabetes Father     Obesity Father     Alcohol Abuse Father      CURRENT MEDICATIONS:  Current Outpatient Medications   Medication Sig Dispense Refill    HYDROcodone-acetaminophen (NORCO) 5-325 MG per tablet Take 1 tablet by mouth every 8 hours as needed for Pain for up to 30 days. 90 tablet 0    docusate sodium (COLACE) 100 MG capsule TAKE 2 CAPSULE BY MOUTH 2 TIMES DAILY AS NEEDED. 360 capsule 1    insulin glargine (LANTUS SOLOSTAR) 100 UNIT/ML injection pen Inject 25 Units into the skin nightly 15 pen 1    insulin aspart (NOVOLOG FLEXPEN) 100 UNIT/ML injection pen Sliding scale insulin coverage Glucose:Dose: If Less [id number] =No Insulin/ 140-199= 2 Units/ 200-249=4 Units/ 250-299= 6 Units/  300-349=8 Units/  350-400=10 Units/ Above 400 = 12 Units max 48 units daily 15 pen 1    sulfamethoxazole-trimethoprim (BACTRIM DS;SEPTRA DS) 800-160 MG per tablet Take 1 tablet by mouth three times a week      doxycycline hyclate (VIBRAMYCIN) 100 MG capsule TAKE 2 CAPSULES BY MOUTH ONCE A WEEK.       vitamin D (ERGOCALCIFEROL) 1.25 MG (76966 UT) CAPS capsule TAKE 1 CAPSULE BY MOUTH EVERY 14 DAYS ON MONDAYS 6 capsule 3    cloNIDine (CATAPRES) 0.2 MG tablet Take 0.2 mg by mouth 3 times daily      famotidine (PEPCID) 40 MG tablet Take 40 mg by mouth daily as needed       iron polysaccharides (NIFEREX) 150 MG capsule Take 150 mg by mouth daily      tiotropium (SPIRIVA RESPIMAT) 2.5 MCG/ACT AERS inhaler Inhale 2 puffs into the lungs daily 1 Inhaler 11    albuterol sulfate HFA (PROAIR HFA) 108 (90 Base) MCG/ACT inhaler Inhale 2 puffs into the lungs every 6 hours as needed for Wheezing or Shortness of Breath 3 Inhaler 3    fluticasone (FLONASE) 50 MCG/ACT nasal spray 1 spray by Each Nostril route daily as needed for Rhinitis 3 Bottle 3    pantoprazole (PROTONIX) 40 MG tablet Take 40 mg by mouth daily       atorvastatin (LIPITOR) 40 MG tablet TAKE 1 TABLET DAILY 90 tablet 3    Insulin Pen Needle (B-D ULTRAFINE III SHORT PEN) 31G X 8 MM MISC Use three times daily Diagnosis Code E11.9 200 each 3    carvedilol (COREG) 25 MG tablet 2 tablets  tablet 1    tacrolimus (PROGRAF) 1 MG capsule Take 1 mg by mouth 5 CAPSULES EVERY MORNING AND 5 CAPSULES EVERY EVENING      lactulose (CHRONULAC) 10 GM/15ML solution Take twice daily as needed for constipation 2700 mL 1    Mycophenolate Sodium 360 MG TBEC Take 360 mg by mouth 1 tablets BID      magnesium oxide (MAG-OX) 400 MG tablet Take 400 mg by mouth 2 times daily      linaclotide (LINZESS) 290 MCG CAPS capsule Take 145 mcg by mouth every other day       aspirin 81 MG EC tablet Take 81 mg by mouth daily. No current facility-administered medications for this visit. Jovana SANTILLAN   Review of Systems   Constitutional: Negative for chills, fever and unexpected weight change. HENT: Positive for postnasal drip. Respiratory: Positive for shortness of breath and wheezing. Negative for cough, hemoptysis, sputum production, chest tightness and stridor. Cardiovascular: Positive for dyspnea on exertion. Negative for chest pain and leg swelling. Gastrointestinal: Negative for diarrhea, nausea and vomiting. Genitourinary: Negative for dysuria. Physical exam   /72 (Site: Right Upper Arm, Position: Sitting, Cuff Size: Large Adult)   Pulse 60   Temp 98.4 °F (36.9 °C)   Ht 5' 5\" (1.651 m)   Wt 188 lb 3.2 oz (85.4 kg)   SpO2 96% Comment: on r/a  BMI 31.32 kg/m²    Wt Readings from Last 3 Encounters:   08/25/21 188 lb 3.2 oz (85.4 kg)   08/11/21 184 lb 6.4 oz (83.6 kg)   08/04/21 183 lb (83 kg)       Physical Exam  Vitals and nursing note reviewed. Constitutional:       General: She is not in acute distress. Appearance: She is well-developed. HENT:      Head: Normocephalic and atraumatic. Neck:      Trachea: No tracheal deviation. Cardiovascular:      Rate and Rhythm: Normal rate and regular rhythm.       Heart sounds: Normal heart sounds. No murmur heard. Pulmonary:      Effort: Pulmonary effort is normal. No respiratory distress. Breath sounds: Normal breath sounds. No stridor. No wheezing or rales. Chest:      Chest wall: No tenderness. Abdominal:      General: Bowel sounds are normal. There is no distension. Palpations: Abdomen is soft. Musculoskeletal:      Cervical back: Neck supple. Right lower leg: Edema present. Comments: Hx ankle fracture   Skin:     General: Skin is warm and dry. Capillary Refill: Capillary refill takes less than 2 seconds. Neurological:      Mental Status: She is alert and oriented to person, place, and time. Psychiatric:         Behavior: Behavior normal.         Thought Content: Thought content normal.          Results   Lung Nodule Screening     [x] Qualifies    [] Does not qualify   [] Declined    [] Completed  Quit 2009   The USPSTF recommends annual screening for lung cancer with low-dose computed tomography (LDCT) in adults aged 48 to [de-identified] years who have a 20 pack-year smoking history and currently smoke or have quit within the past 15 years. Screening should be discontinued once a person has not smoked for 15 years or develops a health problem that substantially limits life expectancy or the ability or willingness to have curative lung surgery. NONCONTRAST SCREENING CT CHEST   2/24/2021   FINDINGS:   LUNGS NODULES:   1. There is a 7 mm noncalcified nodule in the lingular region of the left lung, unchanged compared to 12/16/2019. This area was obscured in 2018. 2. There is a stable 3 mm pleural-based nodule at the lateral aspect of the right upper lobe, unchanged compared to prior exams. 3. There is a calcified nodule in the superior aspect of the left lower lobe, stable compared to prior exams. LYMPHADENOPATHY: 1. There are no pathologically enlarged lymph nodes. OTHER (LUNGS/MEDIASTINUM/MUSCULOSKELETAL/ABDOMEN):   1.  There is coronary artery calcification. Impression:  1. Stable 7 mm noncalcified nodule in the lingular region of the left lung. 2. There are no pathologically enlarged lymph nodes. 3. LUNGRADS ASSESSMENT VALUE: 2,       The LUNG RADS RECOMMENDATIONS for monitoring lung nodules listed below (ACR- Lung-RADS Version 1.0 Assessment Categories Release date\" April 28, 2014)  LUNG RADS RECOMMENDATIONS;   1.  Normal, continue annual screening   2. Benign appearance or behavior, continue annual screening   3.  6 month CT recommended   4A.  3 month CT recommended; may consider PET/CT   4B. Additional diagnostics and/or tissue sampling recommended   4X. Additional diagnostics and/or tissue sampling recommended        Assessment      Diagnosis Orders   1. Simple chronic bronchitis ([address]     2. Personal history of tobacco use       -CONTRERAS on CPAP follows with Tahir Swan PA-C  Plan   -Continue spiriva respimat reports symptoms improved no longer having bad taste  -continue Flonase PRN for rhinitis  -Albuterol 2 puff Q6 hrs PRN for SOB/wheezing   -Discussed albuterol inhaler use. Reviewed signs and symptoms indicating need for use including Shortness of breath and wheezing. Discussed with patient the importance of using inhaler within the prescribed time frames. Patient verbalized understanding to use within prescribed time frame.  Patient also verbalized understanding that if they experience no relief after using albuterol and resting for 15 minutes they need to go to nearest ER or call 911.  -LDCT for Feb 2022 previous Lung RADs 2  -Advised to maintain pneumonia vaccine with PCP and to take flu vaccine this coming season.  -Advised patient to call office with any changes, questions, or concerns regarding respiratory status    Will see Oz Lambert back in: 6 months with LDCT    Bree Tomlinson CNP  8/25/2021       Low Dose CT (LDCT) Lung Screening criteria met   Age 55-77   Pack year smoking >30   Still smoking or less than 15 year since quit   No sign or symptoms of lung cancer   > 11 months since last LDCT     Risks and benefits of lung cancer screening with LDCT scans discussed:    Significance of positive screen - False-positive LDCT results often occur. 95% of all positive results do not lead to a diagnosis of cancer. Usually further imaging can resolve most false-positive results; however, some patients may require invasive procedures. Over diagnosis risk - 10% to 12% of screen-detected lung cancer cases are over diagnosed--that is, the cancer would not have been detected in the patient's lifetime without the screening. Need for follow up screens annually to continue lung cancer screening effectiveness     Risks associated with radiation from annual LDCT- Radiation exposure is about the same as for a mammogram, which is about 1/3 of the annual background radiation exposure from everyday life. Starting screening at age 54 is not likely to increase cancer risk from radiation exposure. Patients with comorbidities resulting in life expectancy of < 10 years, or that would preclude treatment of an abnormality identified on CT, should not be screened due to lack of benefit.     To obtain maximal benefit from this screening, smoking cessation and long-term abstinence from smoking is critical

## 2021-08-31 ENCOUNTER — OFFICE VISIT (OUTPATIENT)
Dept: UROLOGY | Age: 66
End: 2021-08-31
Payer: MEDICARE

## 2021-08-31 VITALS — HEIGHT: 65 IN | WEIGHT: 184 LBS | BODY MASS INDEX: 30.66 KG/M2

## 2021-08-31 DIAGNOSIS — N32.81 OAB (OVERACTIVE BLADDER): ICD-10-CM

## 2021-08-31 DIAGNOSIS — N39.0 RECURRENT UTI: Primary | ICD-10-CM

## 2021-08-31 DIAGNOSIS — Z94.0 HISTORY OF KIDNEY TRANSPLANT: ICD-10-CM

## 2021-08-31 PROCEDURE — G8427 DOCREV CUR MEDS BY ELIG CLIN: HCPCS | Performed by: UROLOGY

## 2021-08-31 PROCEDURE — G8399 PT W/DXA RESULTS DOCUMENT: HCPCS | Performed by: UROLOGY

## 2021-08-31 PROCEDURE — 3017F COLORECTAL CA SCREEN DOC REV: CPT | Performed by: UROLOGY

## 2021-08-31 PROCEDURE — G8417 CALC BMI ABV UP PARAM F/U: HCPCS | Performed by: UROLOGY

## 2021-08-31 PROCEDURE — 99213 OFFICE O/P EST LOW 20 MIN: CPT | Performed by: UROLOGY

## 2021-08-31 PROCEDURE — 4040F PNEUMOC VAC/ADMIN/RCVD: CPT | Performed by: UROLOGY

## 2021-08-31 PROCEDURE — 1036F TOBACCO NON-USER: CPT | Performed by: UROLOGY

## 2021-08-31 PROCEDURE — 1123F ACP DISCUSS/DSCN MKR DOCD: CPT | Performed by: UROLOGY

## 2021-08-31 PROCEDURE — 1090F PRES/ABSN URINE INCON ASSESS: CPT | Performed by: UROLOGY

## 2021-08-31 NOTE — PROGRESS NOTES
[address] Benita Shi [address] [address]  Dept: [phone]  Dept Fax: [phone] : [phone]    [facility] [place] [facility] Urology Office Note -     Patient:  Marti Gaona  YOB: 1955  Date: 8/31/2021    The patient is a 77 y.o. female who presents today for evaluation of the following problems:   Chief Complaint   Patient presents with    Follow-up     renal ultrasound results. referred/consultation requested by DONOVAN Mazariegos CNP. HISTORY OF PRESENT ILLNESS:     Recurrent UTI  Has seen [facility] in the past-- was told she had retention  Has been having infections since kidney transplant  Here with renal u/s negative    Kidney allograft  Hx of transplant by [facility]  Infections started afterwards          Requested/reviewed records from DONOVAN Mazariegos CNP office and/or outside [de-identified]    (Patient's old records have been requested, reviewed and pertinent findings summarized in today's note.)    Procedures Today: N/A    Last several PSA's:  No results found for: PSA    Last total testosterone:  No results found for: TESTOSTERONE    Urinalysis today:  No results found for this visit on 08/31/21. Last BUN and creatinine:  Lab Results   Component Value Date    BUN 24 (H) 08/04/2021     Lab Results   Component Value Date    CREATININE 1.3 (H) 08/04/2021       Imaging Reviewed during this Office Visit:   imaging independently reviewed by Swapna Yoon MD and radiology report verified demonstrating     [address]    Result Date: 8/23/2021  BILATERAL RENAL ULTRASOUND: CLINICAL INFORMATION: History of kidney transplant COMPARISON: No comparison available. TECHNIQUE: Multiple sonographic images of both kidneys were obtained. Images of the urinary bladder were also obtained.  FINDINGS: TRANSPLANT KIDNEY: 13.1 x 6.9 x 6.9 cm Resistive index: 0.76 Renal cortex: 1.8 cm NATIVE RIGHT KIDNEY - 9.6 x 4.8 x 4.3 cm Resistive Index - 0.82 Cortical Thickness - 0.6 cm NATIVE LEFT KIDNEY - 10.9 x 4.9 x 5.1 cm Resistive Index - 0.82 Cortical Thickness - 0.7 cm URINARY BLADDER Pre-Void - 314 mL Post-Void - 169 mL  KIDNEYS:  Native kidneys and transplant kidneys are normal in size and contour. There is thinning of the renal cortex of the native kidneys. A normal renal parenchymal component is present in the transplanted kidney. Resistive indices are elevated for both transplant and native kidneys. MASS/CYST: Small benign-appearing 13 mm cyst mid anterior aspect native right kidney. Benign-appearing 5.2 cm cyst in upper Pole native left kidney. HYDRONEPHROSIS:  There is no hydronephrosis. CALCULI:  No calculi are seen. BLADDER:  The urinary bladder is unremarkable. . TRANSPLANT VASCULARITY: Good pulsatile flow was demonstrated in the transplant renal artery. The transplant renal vein is patent with good flow as well. Velocity in the right external iliac artery proximal to the anastomosis to 268/18 cm/s. Velocity at the anastomosis measures 499/31 cm/s. Velocities distal to the anastomosis measure 311/27 cm/s. 1. The previously noted hydronephrosis of the transplant kidney has resolved. 2. Good pulsatile flow was noted into the transplant kidney. Transplant artery and vein remain widely patent. 3. Atrophic changes of the native kidneys. . **This report has been created using voice recognition software. It may contain minor errors which are inherent in voice recognition technology. ** Final report electronically signed by Dr. Esthela Shelton on 8/23/2021 5:50 PM      PAST MEDICAL, FAMILY AND SOCIAL HISTORY:  Past Medical History:   Diagnosis Date    Anemia associated with chronic renal failure     Aranesp 150 micrograms every other week [facility] at [facility]. [facility]    Anxiety     Arthritis     Backache     Blood circulation, collateral     Blood transfusion     CAD (coronary artery disease)     Cellulitis in diabetic foot ([address] 03/03/2017    4th and 5th toes right foot    Chest pain     History of    CHF (congestive heart failure) ([address] 1998    Dx'ed by Dr. Gill Nuñez Chronic anemia     Chronic kidney disease     Chronic kidney disease, stage III (moderate) ([address]     Chronic renal insufficiency 2010    COPD (chronic obstructive pulmonary disease) ([address] 2012    Dr. 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 Philip Guzman    HTN (hypertension) 1970's    Hyperlipemia 1998    Iron deficiency anemia due to dietary causes 6/21/2018    Kidney stones 3/2014    Kidney trouble          MRSA infection 03/2017    right foot-Dr. Hola Garcia (podiatrist)    Neuromuscular disorder ([address]     Neuropathy 1989    diabetic neuropathy    Obesity since childhoood    CONTRERAS on CPAP 2010    Dr. Caleb Burgess    Pneumonia     PONV (postoperative nausea and vomiting)     Seizures ([address]     Sepsis due to Escherichia coli ([address] 07/2020     Past Surgical History:   Procedure Laterality Date    ANKLE SURGERY Right 02-10-14    Dr. Gloria Garcia at [facility]  1990's    removal of benign tumor   [address]  2008   [address]  2009    2 polyps, not precanceorus    COLONOSCOPY Left 6/10/2019    COLONOSCOPY DIAGNOSTIC performed by Kirstin Kirby MD at [address] [facility]  3/30/2012    eye lid lift    DIAGNOSTIC CARDIAC CATH LAB PROCEDURE      ENDOSCOPY, COLON, DIAGNOSTIC  2007   Pamela Solum EYE SURGERY  March 30th, 2012    left sided ptosis    FOOT SURGERY  1990    Tarsal tunnel surgery    FRACTURE SURGERY  2015   [address]    first partial in 1980's, then total in [facility]  2015   [facility]  04/10/2020    RIGHT    LIVER BIOPSY  6/2015    LUNG BIOPSY  2009    [place] OFFICE/OUTPT VISIT,PROCEDURE ONLY Left 8/23/2018    LEFT UPPER EXTREMENTY AV FISTULA CREATION performed by Aayush Gastelum MD at [facility] Left 6/14/2019    EGD BIOPSY performed by Sotero Marie MD at [facility]     Family History   Problem Relation Age of Onset    Diabetes Sister     Diabetes Brother     Diabetes Sister     Diabetes Sister    Bryn Peeks Sister     Early Death Sister     Heart Disease Sister     Diabetes Sister     Heart Disease Sister     Diabetes Sister     Diabetes Brother     Arthritis Mother     Heart Disease Mother     High Blood Pressure Mother     Kidney Disease Mother     Mental Illness Mother     Stroke Mother     Heart Disease Father     Diabetes Father     Obesity Father     Alcohol Abuse Father      Outpatient Medications Marked as Taking for the 8/31/21 encounter (Office Visit) with Dora Matthews MD   Medication Sig Dispense Refill    HYDROcodone-acetaminophen (NORCO) 5-325 MG per tablet Take 1 tablet by mouth every 8 hours as needed for Pain for up to 30 days. 90 tablet 0    docusate sodium (COLACE) 100 MG capsule TAKE 2 CAPSULE BY MOUTH 2 TIMES DAILY AS NEEDED. 360 capsule 1    insulin glargine (LANTUS SOLOSTAR) 100 UNIT/ML injection pen Inject 25 Units into the skin nightly 15 pen 1    insulin aspart (NOVOLOG FLEXPEN) 100 UNIT/ML injection pen Sliding scale insulin coverage Glucose:Dose: If Less [id number] =No Insulin/ 140-199= 2 Units/ 200-249=4 Units/ 250-299= 6 Units/  300-349=8 Units/  350-400=10 Units/ Above 400 = 12 Units max 48 units daily 15 pen 1    sulfamethoxazole-trimethoprim (BACTRIM DS;SEPTRA DS) 800-160 MG per tablet Take 1 tablet by mouth three times a week      doxycycline hyclate (VIBRAMYCIN) 100 MG capsule TAKE 2 CAPSULES BY MOUTH ONCE A WEEK.       vitamin D (ERGOCALCIFEROL) 1.25 MG (39228 UT) CAPS capsule TAKE 1 CAPSULE BY MOUTH EVERY 14 DAYS ON  6 capsule 3    cloNIDine (CATAPRES) 0.2 MG tablet Take 0.2 mg by mouth 3 times daily      famotidine (PEPCID) 40 MG tablet Take 40 mg by mouth daily as needed       iron polysaccharides (NIFEREX) 150 MG capsule Take 150 mg by mouth daily      tiotropium (SPIRIVA RESPIMAT) 2.5 MCG/ACT AERS inhaler Inhale 2 puffs into the lungs daily 1 Inhaler 11    albuterol sulfate HFA (PROAIR HFA) 108 (90 Base) MCG/ACT inhaler Inhale 2 puffs into the lungs every 6 hours as needed for Wheezing or Shortness of Breath 3 Inhaler 3    fluticasone (FLONASE) 50 MCG/ACT nasal spray 1 spray by Each Nostril route daily as needed for Rhinitis 3 Bottle 3    pantoprazole (PROTONIX) 40 MG tablet Take 40 mg by mouth daily       atorvastatin (LIPITOR) 40 MG tablet TAKE 1 TABLET DAILY 90 tablet 3    Insulin Pen Needle (B-D ULTRAFINE III SHORT PEN) 31G X 8 MM MISC Use three times daily Diagnosis Code E11.9 200 each 3    carvedilol (COREG) 25 MG tablet 2 tablets  tablet 1    tacrolimus (PROGRAF) 1 MG capsule Take 1 mg by mouth 5 CAPSULES EVERY MORNING AND 5 CAPSULES EVERY EVENING      lactulose (CHRONULAC) 10 GM/15ML solution Take twice daily as needed for constipation 2700 mL 1    Mycophenolate Sodium 360 MG TBEC Take 360 mg by mouth 1 tablets BID      magnesium oxide (MAG-OX) 400 MG tablet Take 400 mg by mouth 2 times daily      linaclotide (LINZESS) 290 MCG CAPS capsule Take 145 mcg by mouth every other day       aspirin 81 MG EC tablet Take 81 mg by mouth daily.            Actos [pioglitazone hydrochloride], Pioglitazone, Cymbalta [duloxetine hcl], and Gabapentin  Social History     Tobacco Use   Smoking Status Former Smoker    Packs/day: 1.50    Years: 30.00    Pack years: 45.00    Types: Cigarettes    Start date: 1979   Pam Curl Quit date: 3/13/2009    Years since quittin.4   Smokeless Tobacco Never Used   Tobacco Comment    quit 2009      (If patient a smoker, smoking cessation counseling offered)   Social History     Substance and Sexual Activity   Alcohol Use No    Alcohol/week: 0.0 standard drinks       REVIEW OF SYSTEMS:  Constitutional: negative  Eyes: negative  Respiratory: negative  Cardiovascular: negative  Gastrointestinal: negative  Genitourinary: see HPI  Musculoskeletal: negative  Skin: negative   Neurological: negative  Hematological/Lymphatic: negative  Psychological: negative      Physical Exam:    This a 77 y.o. female  There were no vitals filed for this visit. Body mass index is 30.62 kg/m². Constitutional: Patient in no acute distress;       Assessment and Plan        1. Recurrent UTI    2. History of kidney transplant    3. OAB (overactive bladder)               Plan:      Infections worsened since transplant  On prophylaxis right now doxy per [facility]  Incomplete emptying vs. Immunosuppression increasing infection risk      Cystoscopy in office after doxy   May need longer course in future. Will reassess at time of cystoscopy        Prescriptions Ordered:  No orders of the defined types were placed in this encounter. Orders Placed:  No orders of the defined types were placed in this encounter.            Donna Silverman MD

## 2021-09-07 ENCOUNTER — NURSE ONLY (OUTPATIENT)
Dept: LAB | Age: 66
End: 2021-09-07

## 2021-09-07 DIAGNOSIS — Z79.4 TYPE 2 DIABETES MELLITUS WITHOUT COMPLICATION, WITH LONG-TERM CURRENT USE OF INSULIN (HCC): ICD-10-CM

## 2021-09-07 DIAGNOSIS — E11.9 TYPE 2 DIABETES MELLITUS WITHOUT COMPLICATION, WITH LONG-TERM CURRENT USE OF INSULIN (HCC): ICD-10-CM

## 2021-09-07 LAB
ALBUMIN SERPL-MCNC: 4.6 G/DL (ref 3.5–5.1)
ALP BLD-CCNC: 100 U/L (ref 38–126)
ALT SERPL-CCNC: 15 U/L (ref 11–66)
ANION GAP SERPL CALCULATED.3IONS-SCNC: 9 MEQ/L (ref 8–16)
AST SERPL-CCNC: 15 U/L (ref 5–40)
BILIRUB SERPL-MCNC: 0.3 MG/DL (ref 0.3–1.2)
BUN BLDV-MCNC: 24 MG/DL (ref 7–22)
CALCIUM SERPL-MCNC: 10.6 MG/DL (ref 8.5–10.5)
CHLORIDE BLD-SCNC: 100 MEQ/L (ref 98–111)
CHOLESTEROL, TOTAL: 145 MG/DL (ref 100–199)
CO2: 30 MEQ/L (ref 23–33)
CREAT SERPL-MCNC: 1.3 MG/DL (ref 0.4–1.2)
ERYTHROCYTE [DISTWIDTH] IN BLOOD BY AUTOMATED COUNT: 14.2 % (ref 11.5–14.5)
ERYTHROCYTE [DISTWIDTH] IN BLOOD BY AUTOMATED COUNT: 46 FL (ref 35–45)
ESTIMATED AVERAGE GLUCOSE: 159 MG/DL (ref 70–126)
FERRITIN: 1458 NG/ML (ref 10–291)
GLUCOSE BLD-MCNC: 226 MG/DL (ref 70–108)
HBA1C MFR BLD: 7.3 % (ref 4.4–6.4)
HCT VFR BLD CALC: 35.5 % (ref 37–47)
HDLC SERPL-MCNC: 42 MG/DL
HEMOGLOBIN: 11.1 GM/DL (ref 12–16)
IRON: 44 UG/DL (ref 50–170)
LDL CHOLESTEROL DIRECT: 84.65 MG/DL
MAGNESIUM: 2 MG/DL (ref 1.6–2.4)
MCH RBC QN AUTO: 28.1 PG (ref 26–33)
MCHC RBC AUTO-ENTMCNC: 31.3 GM/DL (ref 32.2–35.5)
MCV RBC AUTO: 89.9 FL (ref 81–99)
PHOSPHORUS: 2.5 MG/DL (ref 2.4–4.7)
PLATELET # BLD: 245 THOU/MM3 (ref 130–400)
PMV BLD AUTO: 11.8 FL (ref 9.4–12.4)
POTASSIUM SERPL-SCNC: 3.5 MEQ/L (ref 3.5–5.2)
PTH INTACT: 162.4 PG/ML (ref 15–65)
RBC # BLD: 3.95 MILL/MM3 (ref 4.2–5.4)
SODIUM BLD-SCNC: 139 MEQ/L (ref 135–145)
TOTAL IRON BINDING CAPACITY: 190 UG/DL (ref 171–450)
TRIGL SERPL-MCNC: 147 MG/DL (ref 0–199)
URIC ACID: 6.6 MG/DL (ref 2.4–5.7)
WBC # BLD: 9.8 THOU/MM3 (ref 4.8–10.8)

## 2021-09-08 LAB
CREATININE URINE: 64.3 MG/DL
PROT/CREAT RATIO, UR: 0.09
PROTEIN, URINE: 5.5 MG/DL

## 2021-09-10 LAB — TACROLIMUS BLOOD: 7.2 NG/ML

## 2021-09-12 LAB — BK VIRUS QUALITATIVE, PCR: NORMAL

## 2021-09-16 DIAGNOSIS — K59.00 CONSTIPATION, UNSPECIFIED CONSTIPATION TYPE: ICD-10-CM

## 2021-09-16 NOTE — TELEPHONE ENCOUNTER
Patient's last appointment was : 8/11/2021  Patient's next appointment is : 2/10/2022  Last refilled: 1/14/21

## 2021-09-17 RX ORDER — LACTULOSE 10 G/15ML
SOLUTION ORAL
Qty: 2700 ML | Refills: 1 | Status: SHIPPED | OUTPATIENT
Start: 2021-09-17

## 2021-09-20 DIAGNOSIS — G89.4 CHRONIC PAIN SYNDROME: ICD-10-CM

## 2021-09-20 RX ORDER — HYDROCODONE BITARTRATE AND ACETAMINOPHEN 5; 325 MG/1; MG/1
1 TABLET ORAL EVERY 8 HOURS PRN
Qty: 90 TABLET | Refills: 0 | Status: SHIPPED | OUTPATIENT
Start: 2021-09-22 | End: 2021-10-11 | Stop reason: SDUPTHER

## 2021-09-20 NOTE — TELEPHONE ENCOUNTER
Jazmín Moulton called requesting a refill on the following medications:  Requested Prescriptions     Pending Prescriptions Disp Refills    HYDROcodone-acetaminophen (NORCO) 5-325 MG per tablet 90 tablet 0     Sig: Take 1 tablet by mouth every 8 hours as needed for Pain for up to 30 days.      Pharmacy verified:  .pv  [facility] [address]    Date of last visit: 07/08/2021  Date of next visit (if applicable): 29/18/1655

## 2021-09-20 NOTE — TELEPHONE ENCOUNTER
OARRS reviewed. UDS: + for Hydrocodone. Last seen: 7/8/2021.  Follow-up:   Future Appointments   Date Time Provider Franklin Chin   9/30/2021  1:30 PM Jonathan Martin, [address] [place] [address] Road   10/7/2021  2:00 PM MD ERNESTO Vee [facility]. [facility]   10/11/2021 12:00 PM DONOVAN Pratt CNP N SRPX Pain [address]   2/3/2022  2:00 PM DONOVAN De La Garza CNP SRPX IM MED [facility] [address] [place] [address]   2/10/2022  2:00 PM DONOVAN Lim CNP SRPX FM RES [address]   2/25/2022  3:00 PM STR CT IMAGING RM1  [facility] OUT EXP STR Radiolog   3/2/2022  3:00 PM DONOVAN Hernández CNP [address] [place] [address]

## 2021-09-30 ENCOUNTER — PROCEDURE VISIT (OUTPATIENT)
Dept: UROLOGY | Age: 66
End: 2021-09-30
Payer: MEDICARE

## 2021-09-30 VITALS — BODY MASS INDEX: 31.65 KG/M2 | WEIGHT: 190 LBS | HEIGHT: 65 IN | RESPIRATION RATE: 16 BRPM

## 2021-09-30 DIAGNOSIS — Z94.0 HISTORY OF KIDNEY TRANSPLANT: ICD-10-CM

## 2021-09-30 DIAGNOSIS — N39.0 RECURRENT UTI: Primary | ICD-10-CM

## 2021-09-30 DIAGNOSIS — N32.81 OAB (OVERACTIVE BLADDER): ICD-10-CM

## 2021-09-30 LAB — GFR SERPL CREATININE-BSD FRML MDRD: 41 ML/MIN/1.73M2

## 2021-09-30 PROCEDURE — 52000 CYSTOURETHROSCOPY: CPT | Performed by: UROLOGY

## 2021-09-30 RX ORDER — DOXYCYCLINE HYCLATE 100 MG
50 TABLET ORAL DAILY
Qty: 45 TABLET | Refills: 3 | Status: SHIPPED | OUTPATIENT
Start: 2021-09-30 | End: 2022-07-21

## 2021-09-30 NOTE — PROGRESS NOTES
Cystoscopy Operative Note  Patient:  Frankey Jules  MRN: [id number]  YOB: 1955    Date: 09/30/21  Surgeon: Dianne Mercado MD  Anesthesia: Urethral 2% Xylocaine  Indications: recurrent UTI  Position: Dorsal Lithotomy    Findings:   The patient was prepped and draped in the usual sterile fashion. The cystoscope was advanced through the urethra and into the bladder. The bladder was thoroughly inspected and the following was noted:    Vagina: normal appearing vagina with normal color and discharge, no lesions  Residual Urine: mild  Urethra: normal appearing urethra with no masses, tenderness or lesions  Bladder: some debris in bladder from infection.l No tumors or CIS noted. No bladder diverticulum. mild trabeculation noted. Ureters: Clear efflux from both ureters. Orifices with normal configuration and location. The cystoscope was removed. The patient tolerated the procedure well. Doxy 50 daily for three months for proph  oab- conservative management. Will reassess oab and recurrent uti in three months.  Likely stop doxy in three months (transplant patient)

## 2021-10-07 ENCOUNTER — OFFICE VISIT (OUTPATIENT)
Dept: NEPHROLOGY | Age: 66
End: 2021-10-07
Payer: MEDICARE

## 2021-10-07 VITALS
HEART RATE: 63 BPM | OXYGEN SATURATION: 99 % | TEMPERATURE: 97.7 F | BODY MASS INDEX: 32.05 KG/M2 | WEIGHT: 192.6 LBS | DIASTOLIC BLOOD PRESSURE: 67 MMHG | SYSTOLIC BLOOD PRESSURE: 145 MMHG

## 2021-10-07 DIAGNOSIS — T86.11 CHRONIC RENAL ALLOGRAFT NEPHROPATHY: ICD-10-CM

## 2021-10-07 DIAGNOSIS — N18.31 DIABETES MELLITUS DUE TO UNDERLYING CONDITION WITH STAGE 3A CHRONIC KIDNEY DISEASE, UNSPECIFIED WHETHER LONG TERM INSULIN USE (HCC): ICD-10-CM

## 2021-10-07 DIAGNOSIS — N39.0 RECURRENT UTI: ICD-10-CM

## 2021-10-07 DIAGNOSIS — N25.81 HYPERPARATHYROIDISM, SECONDARY RENAL (HCC): ICD-10-CM

## 2021-10-07 DIAGNOSIS — I10 ESSENTIAL HYPERTENSION: ICD-10-CM

## 2021-10-07 DIAGNOSIS — E08.22 DIABETES MELLITUS DUE TO UNDERLYING CONDITION WITH STAGE 3A CHRONIC KIDNEY DISEASE, UNSPECIFIED WHETHER LONG TERM INSULIN USE (HCC): ICD-10-CM

## 2021-10-07 DIAGNOSIS — Z94.0 KIDNEY TRANSPLANT RECIPIENT: Primary | ICD-10-CM

## 2021-10-07 PROCEDURE — 1123F ACP DISCUSS/DSCN MKR DOCD: CPT | Performed by: INTERNAL MEDICINE

## 2021-10-07 PROCEDURE — 99213 OFFICE O/P EST LOW 20 MIN: CPT | Performed by: INTERNAL MEDICINE

## 2021-10-07 PROCEDURE — G8427 DOCREV CUR MEDS BY ELIG CLIN: HCPCS | Performed by: INTERNAL MEDICINE

## 2021-10-07 PROCEDURE — 3017F COLORECTAL CA SCREEN DOC REV: CPT | Performed by: INTERNAL MEDICINE

## 2021-10-07 PROCEDURE — G8417 CALC BMI ABV UP PARAM F/U: HCPCS | Performed by: INTERNAL MEDICINE

## 2021-10-07 PROCEDURE — 1036F TOBACCO NON-USER: CPT | Performed by: INTERNAL MEDICINE

## 2021-10-07 PROCEDURE — 1090F PRES/ABSN URINE INCON ASSESS: CPT | Performed by: INTERNAL MEDICINE

## 2021-10-07 PROCEDURE — G8399 PT W/DXA RESULTS DOCUMENT: HCPCS | Performed by: INTERNAL MEDICINE

## 2021-10-07 PROCEDURE — 4040F PNEUMOC VAC/ADMIN/RCVD: CPT | Performed by: INTERNAL MEDICINE

## 2021-10-07 PROCEDURE — G8484 FLU IMMUNIZE NO ADMIN: HCPCS | Performed by: INTERNAL MEDICINE

## 2021-10-07 RX ORDER — NIFEDIPINE 60 MG/1
60 TABLET, FILM COATED, EXTENDED RELEASE ORAL 2 TIMES DAILY
Status: ON HOLD | COMMUNITY
Start: 2021-09-23 | End: 2022-04-09 | Stop reason: HOSPADM

## 2021-10-07 NOTE — PROGRESS NOTES
Thyroid nodule    Kidney transplant recipient    E. coli UTI    Hypomagnesemia    Hypermagnesemia    Hypertensive urgency    Orthostatic hypotension    Hx of coronary artery disease    History of end stage renal disease    Cervical stenosis of spinal canal    Hx of gastroesophageal reflux (GERD)    Polyneuropathy associated with critical illness (HCC)    Pressure injury of back, stage 3 (HCC)           Subjective:   Chief complaint:  Chief Complaint   Patient presents with    Kidney Transplant    Chronic Kidney Disease     Stage IIIa      HPI:This is a follow up visit for Fernando Stewards who is here today for return appointment. I see her  for kidney transplant and chronic allograft nephropathy. She was last seen about 3 months ago. Doing well since then. She has had  recurrent UTI. She was referred to urology last appointment. She had a cystoscopy done by Dr. Zahra Mathur fromUrology on 30 September 2021 that revealed trabeculation in the wall of urinary bladder as well as debris  from bladder infection. .  No UTI since then. No chest pain. No shortness of breath. No difficulties with urination. No nausea vomiting. No fever chills. ROS:  Pertinent positives stated above in HPI. All other systems were reviewed and were negative. Medications:     Current Outpatient Medications   Medication Sig Dispense Refill    doxycycline hyclate (VIBRA-TABS) 100 MG tablet Take 0.5 tablets by mouth daily 45 tablet 3    HYDROcodone-acetaminophen (NORCO) 5-325 MG per tablet Take 1 tablet by mouth every 8 hours as needed for Pain for up to 30 days.  90 tablet 0    lactulose (CHRONULAC) 10 GM/15ML solution Take twice daily as needed for constipation 2700 mL 1    insulin glargine (LANTUS SOLOSTAR) 100 UNIT/ML injection pen Inject 25 Units into the skin nightly 15 pen 1    insulin aspart (NOVOLOG FLEXPEN) 100 UNIT/ML injection pen Sliding scale insulin coverage Glucose:Dose: If Less [id number] =No Insulin/ 140-199= 2 Units/ 200-249=4 Units/ 250-299= 6 Units/  300-349=8 Units/  350-400=10 Units/ Above 400 = 12 Units max 48 units daily 15 pen 1    sulfamethoxazole-trimethoprim (BACTRIM DS;SEPTRA DS) 800-160 MG per tablet Take 1 tablet by mouth three times a week      vitamin D (ERGOCALCIFEROL) 1.25 MG (38298 UT) CAPS capsule TAKE 1 CAPSULE BY MOUTH EVERY 14 DAYS ON MONDAYS 6 capsule 3    cloNIDine (CATAPRES) 0.2 MG tablet Take 0.2 mg by mouth 3 times daily      famotidine (PEPCID) 40 MG tablet Take 40 mg by mouth daily as needed       iron polysaccharides (NIFEREX) 150 MG capsule Take 150 mg by mouth daily      tiotropium (SPIRIVA RESPIMAT) 2.5 MCG/ACT AERS inhaler Inhale 2 puffs into the lungs daily 1 Inhaler 11    albuterol sulfate HFA (PROAIR HFA) 108 (90 Base) MCG/ACT inhaler Inhale 2 puffs into the lungs every 6 hours as needed for Wheezing or Shortness of Breath 3 Inhaler 3    fluticasone (FLONASE) 50 MCG/ACT nasal spray 1 spray by Each Nostril route daily as needed for Rhinitis 3 Bottle 3    pantoprazole (PROTONIX) 40 MG tablet Take 40 mg by mouth daily       atorvastatin (LIPITOR) 40 MG tablet TAKE 1 TABLET DAILY 90 tablet 3    Insulin Pen Needle (B-D ULTRAFINE III SHORT PEN) 31G X 8 MM MISC Use three times daily Diagnosis Code E11.9 200 each 3    carvedilol (COREG) 25 MG tablet 2 tablets  tablet 1    tacrolimus (PROGRAF) 1 MG capsule Take 1 mg by mouth 5 CAPSULES EVERY MORNING AND 5 CAPSULES EVERY EVENING      Mycophenolate Sodium 360 MG TBEC Take 360 mg by mouth 1 tablets BID      magnesium oxide (MAG-OX) 400 MG tablet Take 400 mg by mouth 2 times daily      linaclotide (LINZESS) 290 MCG CAPS capsule Take 145 mcg by mouth every other day       aspirin 81 MG EC tablet Take 81 mg by mouth daily. No current facility-administered medications for this visit.        Lab Results:    CBC:   Lab Results   Component Value Date    WBC 8.8 10/05/2021    HGB 10.7 (L) 10/05/2021    HCT 35.3 (L) 10/05/2021    MCV 91.7 10/05/2021     10/05/2021     BMP:    Lab Results   Component Value Date     10/05/2021     09/07/2021     08/04/2021    K 3.7 10/05/2021    K 3.5 09/07/2021    K 3.7 08/04/2021     10/05/2021     09/07/2021     08/04/2021    CO2 28 10/05/2021    CO2 30 09/07/2021    CO2 27 08/04/2021    BUN 22 10/05/2021    BUN 24 (H) 09/07/2021    BUN 24 (H) 08/04/2021    CREATININE 1.2 10/05/2021    CREATININE 1.3 (H) 09/07/2021    CREATININE 1.3 (H) 08/04/2021    GLUCOSE 175 (H) 10/05/2021    GLUCOSE 226 (H) 09/07/2021    GLUCOSE 148 (H) 08/04/2021      Hepatic:   Lab Results   Component Value Date    AST 15 09/07/2021    AST 17 08/04/2021    AST 15 07/07/2021    ALT 15 09/07/2021    ALT 16 08/04/2021    ALT 13 07/07/2021    BILITOT 0.3 09/07/2021    BILITOT 0.3 08/04/2021    BILITOT 0.3 07/07/2021    ALKPHOS 100 09/07/2021    ALKPHOS 91 08/04/2021    ALKPHOS 109 07/07/2021     BNP: No results found for: BNP  Lipids:   Lab Results   Component Value Date    CHOL 145 09/07/2021    HDL 42 09/07/2021     INR:   Lab Results   Component Value Date    INR 1.28 (H) 08/02/2020    INR 1.13 08/23/2018    INR 0.97 06/16/2015     URINE:   Lab Results   Component Value Date    PROTUR 5.5 09/08/2021     Lab Results   Component Value Date    NITRU NEGATIVE 06/23/2021    COLORU YELLOW 06/23/2021    PHUR 6.0 06/23/2021    LABCAST 4-8 HYALINE 06/07/2021    LABCAST NONE SEEN 06/07/2021    WBCUA > 200 06/09/2021    RBCUA 3-5 06/09/2021    MUCUS THREADS 12/08/2020    YEAST NONE SEEN 06/09/2021    BACTERIA MODERATE 06/09/2021    SPECGRAV 1.014 06/23/2021    LEUKOCYTESUR NEGATIVE 06/23/2021    LEUKOCYTESUR MODERATE 06/09/2021    UROBILINOGEN 0.2 06/23/2021    BILIRUBINUR NEGATIVE 06/23/2021    BILIRUBINUR NEGATIVE 09/06/2011    BLOODU NEGATIVE 06/23/2021    GLUCOSEU 100 06/09/2021    KETUA NEGATIVE 06/23/2021    AMORPHOUS URATES 04/20/2021      Microalbumen/Creatinine ratio:  No components found for: RUCREAT    Objective:   Vitals: BP (!) 145/67 (Site: Right Upper Arm, Position: Sitting, Cuff Size: Large Adult)   Pulse 63   Temp 97.7 °F (36.5 °C)   Wt 192 lb 9.6 oz (87.4 kg)   SpO2 99%   BMI 32.05 kg/m²      Constitutional:  Alert, awake, no apparent distress  Skin:normal with no rash or any lesions  HEENT:Pupils are reactive . Throat is clear. Oral mucosa is moist.  Neck:supple with no thyromegaly or bruit   Cardiovascular:  S1, S2 without murmur   Respiratory:  Clear to auscultation with no wheezes or rales  Abdomen: +bowel sound, soft, non tender and no bruit  Ext: No LE edema  Musculoskeletal:Intact  Neuro:Alert, awake and oriented with no obvious focal deficit. Speech is normal    Electronically signed by Mayco Gerber MD on 10/7/2021 at 2:12 PM   **This report has been created using voice recognition software. It maycontain minor  errors which are inherent in voice recognition technology. **

## 2021-10-11 ENCOUNTER — OFFICE VISIT (OUTPATIENT)
Dept: PHYSICAL MEDICINE AND REHAB | Age: 66
End: 2021-10-11
Payer: MEDICARE

## 2021-10-11 VITALS
HEIGHT: 65 IN | DIASTOLIC BLOOD PRESSURE: 70 MMHG | WEIGHT: 192 LBS | SYSTOLIC BLOOD PRESSURE: 160 MMHG | BODY MASS INDEX: 31.99 KG/M2

## 2021-10-11 DIAGNOSIS — M54.50 CHRONIC BILATERAL LOW BACK PAIN WITHOUT SCIATICA: ICD-10-CM

## 2021-10-11 DIAGNOSIS — M25.571 CHRONIC PAIN OF RIGHT ANKLE: ICD-10-CM

## 2021-10-11 DIAGNOSIS — E11.42 DIABETIC POLYNEUROPATHY ASSOCIATED WITH TYPE 2 DIABETES MELLITUS (HCC): ICD-10-CM

## 2021-10-11 DIAGNOSIS — G89.29 CHRONIC BILATERAL LOW BACK PAIN WITHOUT SCIATICA: ICD-10-CM

## 2021-10-11 DIAGNOSIS — M47.816 SPONDYLOSIS OF LUMBAR REGION WITHOUT MYELOPATHY OR RADICULOPATHY: Primary | ICD-10-CM

## 2021-10-11 DIAGNOSIS — G89.29 CHRONIC PAIN OF RIGHT ANKLE: ICD-10-CM

## 2021-10-11 DIAGNOSIS — M19.012 PRIMARY OSTEOARTHRITIS OF LEFT SHOULDER: ICD-10-CM

## 2021-10-11 DIAGNOSIS — G89.4 CHRONIC PAIN SYNDROME: ICD-10-CM

## 2021-10-11 DIAGNOSIS — M47.816 LUMBAR FACET ARTHROPATHY: ICD-10-CM

## 2021-10-11 PROCEDURE — 1090F PRES/ABSN URINE INCON ASSESS: CPT | Performed by: NURSE PRACTITIONER

## 2021-10-11 PROCEDURE — 99214 OFFICE O/P EST MOD 30 MIN: CPT | Performed by: NURSE PRACTITIONER

## 2021-10-11 PROCEDURE — G8399 PT W/DXA RESULTS DOCUMENT: HCPCS | Performed by: NURSE PRACTITIONER

## 2021-10-11 PROCEDURE — 1036F TOBACCO NON-USER: CPT | Performed by: NURSE PRACTITIONER

## 2021-10-11 PROCEDURE — 3017F COLORECTAL CA SCREEN DOC REV: CPT | Performed by: NURSE PRACTITIONER

## 2021-10-11 PROCEDURE — 1123F ACP DISCUSS/DSCN MKR DOCD: CPT | Performed by: NURSE PRACTITIONER

## 2021-10-11 PROCEDURE — 3051F HG A1C>EQUAL 7.0%<8.0%: CPT | Performed by: NURSE PRACTITIONER

## 2021-10-11 PROCEDURE — G8417 CALC BMI ABV UP PARAM F/U: HCPCS | Performed by: NURSE PRACTITIONER

## 2021-10-11 PROCEDURE — G8484 FLU IMMUNIZE NO ADMIN: HCPCS | Performed by: NURSE PRACTITIONER

## 2021-10-11 PROCEDURE — 2022F DILAT RTA XM EVC RTNOPTHY: CPT | Performed by: NURSE PRACTITIONER

## 2021-10-11 PROCEDURE — 4040F PNEUMOC VAC/ADMIN/RCVD: CPT | Performed by: NURSE PRACTITIONER

## 2021-10-11 PROCEDURE — G8427 DOCREV CUR MEDS BY ELIG CLIN: HCPCS | Performed by: NURSE PRACTITIONER

## 2021-10-11 RX ORDER — HYDROCODONE BITARTRATE AND ACETAMINOPHEN 5; 325 MG/1; MG/1
1 TABLET ORAL EVERY 8 HOURS PRN
Qty: 90 TABLET | Refills: 0 | Status: SHIPPED | OUTPATIENT
Start: 2021-10-22 | End: 2021-11-18 | Stop reason: SDUPTHER

## 2021-10-11 ASSESSMENT — ENCOUNTER SYMPTOMS
BACK PAIN: 1
GASTROINTESTINAL NEGATIVE: 1

## 2021-10-11 NOTE — PROGRESS NOTES
[facility] [address]  Dept: [phone]  Dept Fax: [phone]: [phone]    Visit Date: 10/11/2021    Functionality Assessment/Goals Worksheet     On a scale of 0 (Does not Interfere) to 10 (Completely Interferes)     1. Which number describes how during the past week pain has interfered with       the following:  A. General Activity:  8  B. Mood: 8  C. Walking Ability:  9  D. Normal Work (Includes both work outside the home and housework):  8  E. Relations with Other People:   7  F. Sleep:   7  G. Enjoyment of Life:   8    2. Patient Prefers to Take their Pain Medications:     [x]  On a regular basis   []  Only when necessary    []  Does not take pain medications    3. What are the Patient's Goals/Expectations for Visiting Pain Management? []  Learn about my pain    [x]  Receive Medication   []  Physical Therapy     []  Treat Depression   [x]  Receive Injections    []  Treat Sleep   []  Deal with Anxiety and Stress   []  Treat Opoid Dependence/Addiction   []  Other:      HPI:   Avel Us is a 77 y.o. female is here today for    Chief Complaint: Low back pain, leg pain     HPI   3 month FU. Main complaint remains pain across low back- constant aching pain. Continues to have pain in bilateral legs from knees to feet- heavy, tired, aching and constant numbness and tingling. Pain is up and down    Norco prn remains effective in decreasing pain to a tolerable level  Unsteady on feet at times and continues to ambulate with walker     Pain increases with bending, walking, standing and getting up and down. Medications reviewed. Patient denies side effects with medications. Patient states she is taking medications as prescribed. Shedenies receiving pain medications from other sources. She denies any ER visits since last visit.     Pain scale with out pain medications or at its worst is 8/10. Pain scale with pain medications or at its best is 3-4/10. Last dose of Norco was today   Drug screen reviewed from 7/8/2021 and was appropriate  Pill count was completed today and was appropriate    Patient does not have naloxone available at home. Patient has not required use of naloxone at home since last office visit. The patientis allergic to actos [pioglitazone hydrochloride], pioglitazone, cymbalta [duloxetine hcl], and gabapentin. Subjective:      Review of Systems   Constitutional: Negative. Negative for chills, fatigue and fever. HENT: Negative. Eyes: Positive for visual disturbance. Wearing corrective glasses    Cardiovascular: Positive for leg swelling. Gastrointestinal: Negative. Genitourinary: Positive for flank pain. Musculoskeletal: Positive for arthralgias, back pain, gait problem, joint swelling and myalgias. Negative for neck pain and neck stiffness. Neurological: Positive for weakness and numbness. Psychiatric/Behavioral: Negative. Objective:     Vitals:    10/11/21 1205   BP: (!) 160/70   Weight: 192 lb (87.1 kg)   Height: 5' 5\" (1.651 m)       Physical Exam  Vitals and nursing note reviewed. Constitutional:       General: She is not in acute distress. Appearance: She is well-developed. She is not diaphoretic. HENT:      Head: Normocephalic and atraumatic. Right Ear: External ear normal.      Left Ear: External ear normal.      Nose: Nose normal.      Mouth/Throat:      Pharynx: No oropharyngeal exudate. Eyes:      General: No scleral icterus. Right eye: No discharge. Left eye: No discharge. Conjunctiva/sclera: Conjunctivae normal.      Pupils: Pupils are equal, round, and reactive to light. Neck:      Thyroid: No thyromegaly. Cardiovascular:      Rate and Rhythm: Normal rate and regular rhythm. Heart sounds: Normal heart sounds. No murmur heard. No friction rub.  No gallop. Pulmonary:      Effort: Pulmonary effort is normal. No respiratory distress. Breath sounds: Normal breath sounds. No wheezing or rales. Chest:      Chest wall: No tenderness. Abdominal:      General: Bowel sounds are normal. There is no distension. Palpations: Abdomen is soft. Tenderness: There is no abdominal tenderness. There is no guarding or rebound. Musculoskeletal:         General: Tenderness present. Left shoulder: Tenderness present. Decreased range of motion. Cervical back: Full passive range of motion without pain, normal range of motion and neck supple. No edema, erythema or rigidity. No muscular tenderness. Normal range of motion. Lumbar back: Tenderness present. Decreased range of motion. Back:       Right lower leg: Edema present. Left lower leg: Edema present. Right ankle: Swelling present. Tenderness present. Left ankle: Swelling present. Skin:     General: Skin is warm. Coloration: Skin is not pale. Findings: No erythema or rash. Neurological:      Mental Status: She is alert and oriented to person, place, and time. She is not disoriented. Cranial Nerves: No cranial nerve deficit. Sensory: Sensory deficit present. Motor: Weakness present. No atrophy or abnormal muscle tone. Coordination: Coordination normal.      Gait: Gait abnormal.      Deep Tendon Reflexes: Reflexes are normal and symmetric. Babinski sign absent on the right side. Babinski sign absent on the left side. Comments: Gait cane sometimes  4/5 bilateral lower extremitie   Psychiatric:         Attention and Perception: She is attentive. Mood and Affect: Mood normal. Mood is not anxious or depressed. Affect is not labile, blunt, angry or inappropriate. Speech: She is communicative.  Speech is not rapid and pressured, delayed, slurred or tangential.         Behavior: Behavior is not agitated, slowed, aggressive, withdrawn, hyperactive or combative. Thought Content: Thought content is not paranoid or delusional. Thought content does not include homicidal or suicidal ideation. Thought content does not include homicidal or suicidal plan. Cognition and Memory: Memory is not impaired. She does not exhibit impaired recent memory or impaired remote memory. Judgment: Judgment is not impulsive or inappropriate. RADHA test: negative   Yeomans test: negative   Gaenslen test: negative      Assessment:     1. Spondylosis of lumbar region without myelopathy or radiculopathy    2. Chronic bilateral low back pain without sciatica    3. Lumbar facet arthropathy    4. Diabetic polyneuropathy associated with type 2 diabetes mellitus ([facility]    5. Primary osteoarthritis of left shoulder    6. Chronic pain of right ankle    7. Chronic pain syndrome            Plan:      · OARRS reviewed. Current MED: 15.00  · Patient was offered naloxone for home. · Discussed long term side effects of medications, tolerance, dependency and addiction. · Previous UDS reviewed  · UDS preformed today for compliance. · Patient told can not receive any pain medications from any other source. · No evidence of abuse, diversion or aberrant behavior.  Medications and/or procedures to improve function and quality of life- patient understanding with this and that may not be pain free   Discussed with patient about safe storage of medications at home   Discussed possible weaning of medication dosing dependent on treatment/procedure results.  Discussed with patient about risks with procedure including infection, reaction to medication, increased pain, or bleeding.  Again discussed L-facet MBB but pain remains controlled with current t medications.  Remains on antibiotics for UTI   Continue Norco 5/325 TID prn- ordered next refill    UDS ordered today       Meds.  Prescribed:   Orders Placed This Encounter   Medications   

## 2021-10-19 ENCOUNTER — HOSPITAL ENCOUNTER (OUTPATIENT)
Dept: NUCLEAR MEDICINE | Age: 66
Discharge: HOME | End: 2021-10-19
Payer: MEDICARE

## 2021-10-19 DIAGNOSIS — K31.89 GASTROPTOSIS: ICD-10-CM

## 2021-10-19 PROCEDURE — 3430000000 HC RX DIAGNOSTIC RADIOPHARMACEUTICAL: Performed by: INTERNAL MEDICINE

## 2021-10-19 PROCEDURE — A9541 TC99M SULFUR COLLOID: HCPCS | Performed by: INTERNAL MEDICINE

## 2021-10-19 PROCEDURE — 78264 GASTRIC EMPTYING IMG STUDY: CPT

## 2021-10-19 RX ADMIN — Medication 1 MILLICURIE: at 08:10

## 2021-11-05 ENCOUNTER — NURSE ONLY (OUTPATIENT)
Dept: LAB | Age: 66
End: 2021-11-05

## 2021-11-05 DIAGNOSIS — N25.81 HYPERPARATHYROIDISM, SECONDARY RENAL (HCC): ICD-10-CM

## 2021-11-05 LAB
PTH INTACT: 146.8 PG/ML (ref 15–65)
VITAMIN D 25-HYDROXY: 30 NG/ML (ref 30–100)

## 2021-11-18 DIAGNOSIS — G89.4 CHRONIC PAIN SYNDROME: ICD-10-CM

## 2021-11-18 RX ORDER — HYDROCODONE BITARTRATE AND ACETAMINOPHEN 5; 325 MG/1; MG/1
1 TABLET ORAL EVERY 8 HOURS PRN
Qty: 90 TABLET | Refills: 0 | Status: SHIPPED | OUTPATIENT
Start: 2021-11-21 | End: 2021-12-17 | Stop reason: SDUPTHER

## 2021-11-18 NOTE — TELEPHONE ENCOUNTER
America Nagy called requesting a refill on the following medications:  Requested Prescriptions     Pending Prescriptions Disp Refills    HYDROcodone-acetaminophen (NORCO) 5-325 MG per tablet 90 tablet 0     Sig: Take 1 tablet by mouth every 8 hours as needed for Pain for up to 30 days.      [facility]      Date of last visit:   Date of next visit (if applicable): 7/9/8431

## 2021-11-21 ENCOUNTER — HOSPITAL ENCOUNTER (EMERGENCY)
Age: 66
Discharge: HOME | End: 2021-11-21
Payer: MEDICARE

## 2021-11-21 ENCOUNTER — APPOINTMENT (OUTPATIENT)
Dept: GENERAL RADIOLOGY | Age: 66
End: 2021-11-21
Payer: MEDICARE

## 2021-11-21 VITALS
SYSTOLIC BLOOD PRESSURE: 131 MMHG | WEIGHT: 182.5 LBS | TEMPERATURE: 98.9 F | RESPIRATION RATE: 16 BRPM | HEART RATE: 62 BPM | HEIGHT: 65 IN | BODY MASS INDEX: 30.41 KG/M2 | OXYGEN SATURATION: 98 % | DIASTOLIC BLOOD PRESSURE: 60 MMHG

## 2021-11-21 DIAGNOSIS — U07.1 COVID-19: Primary | ICD-10-CM

## 2021-11-21 LAB — SARS-COV-2, NAA: DETECTED

## 2021-11-21 PROCEDURE — 99213 OFFICE O/P EST LOW 20 MIN: CPT

## 2021-11-21 PROCEDURE — 71046 X-RAY EXAM CHEST 2 VIEWS: CPT

## 2021-11-21 PROCEDURE — 87635 SARS-COV-2 COVID-19 AMP PRB: CPT

## 2021-11-21 PROCEDURE — 99212 OFFICE O/P EST SF 10 MIN: CPT | Performed by: NURSE PRACTITIONER

## 2021-11-21 RX ORDER — DEXTROMETHORPHAN HYDROBROMIDE, GUAIFENESIN 5; 100 MG/5ML; MG/5ML
20 LIQUID ORAL EVERY 4 HOURS PRN
Qty: 118 ML | Refills: 0 | Status: SHIPPED | OUTPATIENT
Start: 2021-11-21 | End: 2021-11-28

## 2021-11-21 ASSESSMENT — ENCOUNTER SYMPTOMS
COUGH: 1
SORE THROAT: 1

## 2021-11-21 ASSESSMENT — PAIN SCALES - GENERAL: PAINLEVEL_OUTOF10: 6

## 2021-11-21 ASSESSMENT — PAIN DESCRIPTION - ORIENTATION: ORIENTATION: LEFT

## 2021-11-21 ASSESSMENT — PAIN DESCRIPTION - LOCATION: LOCATION: RIB CAGE

## 2021-11-21 NOTE — ED NOTES
Presents with c/o productive cough, sore throat, left sided rib pain x 1 week.      aMrgie Dennis, ANDREW  11/21/21 3089 24.2

## 2021-11-21 NOTE — ED PROVIDER NOTES
[phone]       Chief Complaint   Patient presents with    Cough       Nurses Notes reviewed and I agree except as noted in the HPI. HISTORY OF PRESENT ILLNESS   Main Diamond is a 77 y.o. female who presents for evaluation and COVID-19 testing. The history is provided by the patient. URI  Presenting symptoms: cough (productive, rib pain with cough, left side) and sore throat    Duration:  1 week      The patient/patient representative has no other acute complaints at this time. REVIEW OF SYSTEMS     Review of Systems   HENT: Positive for sore throat. Respiratory: Positive for cough (productive, rib pain with cough, left side). All other systems reviewed and are negative.       PAST MEDICAL HISTORY         Diagnosis Date    Anemia associated with chronic renal failure     Aranesp 150 micrograms every other week [facility] at [facility]. [facility]    Anxiety     Arthritis     Backache     Blood circulation, collateral     Blood transfusion     CAD (coronary artery disease)     Cellulitis in diabetic foot ([address] 03/03/2017    4th and 5th toes right foot    Chest pain     History of    CHF (congestive heart failure) ([address] 1998    Dx'ed by Dr. Jabari Galeas Chronic anemia     Chronic kidney disease     Chronic kidney disease, stage III (moderate) (HCC)     Chronic renal insufficiency 2010    COPD (chronic obstructive pulmonary disease) ([address] 2012    Dr. 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 Charlotte Myers    HTN (hypertension) 1970's    Hyperlipemia 1998    Iron deficiency anemia due to dietary causes 6/21/2018    Kidney stones 3/2014    Kidney trouble          MRSA infection 03/2017    right foot-Dr. Noah Polk (podiatrist)    Neuromuscular disorder [facility])     Neuropathy 1989    diabetic neuropathy    Obesity since childhoood    CONTRERAS on CPAP 2010    Dr. Jensen Mccoy    Pneumonia     PONV (postoperative nausea and vomiting)     Seizures ([address]     Sepsis due to Escherichia coli ([address] 07/2020       SURGICAL HISTORY     Patient  has a past surgical history that includes eye surgery (March 30th, 2012); Hysterectomy (1980 and 1985); Carpal tunnel release (1988); Foot surgery (1990); Colonoscopy (2009); Endoscopy, colon, diagnostic (2007); Appendectomy (1980s); back surgery (1990's); Cosmetic surgery (3/30/2012); Ankle surgery (Right, 02-10-14); liver biopsy (6/2015); Lung biopsy (2009); joint replacement (2015); fracture surgery (2015); Cardiac surgery (2008); pr office/outpt visit,procedure only (Left, 8/23/2018); Colonoscopy (Left, 6/10/2019); Upper gastrointestinal endoscopy (Left, 6/14/2019); Diagnostic Cardiac Cath Lab Procedure; and Kidney transplant (04/10/2020). CURRENT MEDICATIONS       Previous Medications    ALBUTEROL SULFATE HFA (PROAIR HFA) 108 (90 BASE) MCG/ACT INHALER    Inhale 2 puffs into the lungs every 6 hours as needed for Wheezing or Shortness of Breath    ASPIRIN 81 MG EC TABLET    Take 81 mg by mouth daily. ATORVASTATIN (LIPITOR) 40 MG TABLET    TAKE 1 TABLET DAILY    CARVEDILOL (COREG) 25 MG TABLET    2 tablets BID    CLONIDINE (CATAPRES) 0.2 MG TABLET    Take 0.2 mg by mouth 3 times daily    DOXYCYCLINE HYCLATE (VIBRA-TABS) 100 MG TABLET    Take 0.5 tablets by mouth daily    FAMOTIDINE (PEPCID) 40 MG TABLET    Take 40 mg by mouth daily as needed     FLUTICASONE (FLONASE) 50 MCG/ACT NASAL SPRAY    1 spray by Each Nostril route daily as needed for Rhinitis    HYDROCODONE-ACETAMINOPHEN (NORCO) 5-325 MG PER TABLET    Take 1 tablet by mouth every 8 hours as needed for Pain for up to 30 days.     INSULIN ASPART (NOVOLOG FLEXPEN) 100 UNIT/ML INJECTION her mother; Mental Illness in her mother; Obesity in her father; Stroke in her mother. SOCIAL HISTORY     Patient  reports that she quit smoking about 12 years ago. Her smoking use included cigarettes. She started smoking about 41 years ago. She has a 45.00 pack-year smoking history. She has never used smokeless tobacco. She reports that she does not drink alcohol and does not use drugs. PHYSICAL EXAM     ED TRIAGE VITALS  BP: 131/60, Temp: 98.9 °F (37.2 °C), Pulse: 62, Resp: 16, SpO2: 98 %  Physical Exam  Vitals and nursing note reviewed. Constitutional:       General: She is not in acute distress. Appearance: Normal appearance. She is well-developed and well-groomed. HENT:      Head: Normocephalic and atraumatic. Right Ear: External ear normal.      Left Ear: External ear normal.      Nose: Nose normal.      Mouth/Throat:      Lips: Pink. Mouth: Mucous membranes are moist.   Eyes:      Conjunctiva/sclera: Conjunctivae normal.      Right eye: Right conjunctiva is not injected. Left eye: Left conjunctiva is not injected. Pupils: Pupils are equal.   Cardiovascular:      Rate and Rhythm: Normal rate. Heart sounds: Normal heart sounds. Pulmonary:      Effort: Pulmonary effort is normal. No respiratory distress. Breath sounds: Examination of the right-lower field reveals decreased breath sounds. Examination of the left-lower field reveals decreased breath sounds. Decreased breath sounds present. Musculoskeletal:      Cervical back: Normal range of motion. Skin:     General: Skin is warm and dry. Findings: No rash (on exposed surfaces). Neurological:      Mental Status: She is alert and oriented to person, place, and time. Psychiatric:         Mood and Affect: Mood normal.         Speech: Speech normal.         Behavior: Behavior is cooperative.          DIAGNOSTIC RESULTS   Labs:  Abnormal Labs Reviewed   COVID-19, RAPID - Abnormal; Notable for the following components:       Result Value    SARS-CoV-2, ALYCE DETECTED (*)     All other components within normal limits        IMAGING:  XR CHEST (2 VW)   Final Result   1. Borderline heart size. 2. No acute findings. No infiltrates or effusions are seen. **This report has been created using voice recognition software. It may contain minor errors which are inherent in voice recognition technology. **      Final report electronically signed by Dr. Isadora Diaz on 11/21/2021 3:00 PM        URGENT CARE COURSE:     Vitals:    11/21/21 1435   BP: 131/60   Pulse: 62   Resp: 16   Temp: 98.9 °F (37.2 °C)   TempSrc: Temporal   SpO2: 98%   Weight: 182 lb 8 oz (82.8 kg)   Height: 5' 5\" (1.651 m)       Medications - No data to display  PROCEDURES:  FINALIMPRESSION      1. COVID-19        DISPOSITION/PLAN   DISPOSITION    Discharge     ED Course as of 11/21/21 1523   Sun Nov 21, 2021   1519 SARS-CoV-2, ALYCE(!!): DETECTED  Quarantine as directed  Call family [de-identified] office in the morning to discuss possible Regeneron. [HA]   1519 XR CHEST (2 VW) [HA]      ED Course User Index  Zayda Colorado, APRN - CNP        Internal Administration   First Dose COVID-19, Georgeana Crumble, Primary or Immunocompromised, PF, 100mcg/0.5mL  02/19/2021   Second Dose COVID-19, Georgeana Crumble, Primary or Immunocompromised, PF, 100mcg/0.5mL   03/19/2021       Last COVID Lab SARS-CoV-2 (no units)   Date Value   02/18/2021 Not Detected     SARS-CoV-2, ALYCE (no units)   Date Value   11/21/2021 DETECTED (AA)     SARS-CoV-2, NAAT (no units)   Date Value   06/09/2021 NOT DETECTED          Physical assessment findings, diagnostic testing(s) if applicable, and vital signs reviewed with patient/patient representative. If applicable, patient/patient representative will be contacted upon receipt of final culture and sensitivity or other testing results when available.   Any additions or changes to medications or changes the plan of care will be made at that time.  Differential diagnosis(s) discussed with patient/patient representative. Patient is to follow-up with family care provider in 2-3 days if no improvement. Patient is to go to the emergency department if symptoms change/worsen. Patient/patient representative is aware of care plan, questions answered, verbalizes understanding and is in agreement. Printed instructions attached to after visit summary. Problem List Items Addressed This Visit     None      Visit Diagnoses     COVID-19    -  Primary    Relevant Medications    Dextromethorphan-guaiFENesin (ROBITUSSIN COUGH+CHEST KAYLA DM)  MG/20ML LIQD          PATIENT REFERRED TO:  Jann Veronica, APRN - CNP  [address] [facility] [address]    Call   Pilo Zia morning to discuss COVID-19 positive test result and to discuss Regeneron.       Temi Harper, APRN - DAVE Harper, APRN - CNP  11/21/21 044 Linh Vasques, APRN - CNP  11/21/21 1720

## 2021-11-22 ENCOUNTER — TELEPHONE (OUTPATIENT)
Dept: FAMILY MEDICINE CLINIC | Age: 66
End: 2021-11-22

## 2021-11-22 ENCOUNTER — CARE COORDINATION (OUTPATIENT)
Dept: CARE COORDINATION | Age: 66
End: 2021-11-22

## 2021-11-22 NOTE — CARE COORDINATION
Patient contacted regarding COVID-19 diagnosis. Discussed COVID-19 related testing which was available at this time. Test results were positive. Patient informed of results, if available? Yes. Ambulatory Care Manager contacted the patient by telephone to perform post discharge assessment. Call within 2 business days of discharge: Yes. Verified name and  with patient as identifiers. Provided introduction to self, and explanation of the CTN/ACM role, and reason for call due to risk factors for infection and/or exposure to COVID-19. Symptoms reviewed with patient who verbalized the following symptoms: no new symptoms, no worsening symptoms and sore throat, cough, chills. Due to no new or worsening symptoms encounter was not routed to provider for escalation. Discussed follow-up appointments. If no appointment was previously scheduled, appointment scheduling offered: Yes. Agreeable to f/u with Nino Jung, DAVE   [address] follow up appointment(s):   Future Appointments   Date Time Provider [facility]   2021  2:30 PM Gamaliel Montoya MD [address]   2022 11:30 AM Esther Duran APRN - CNP N SRPX Pain [facility]   2/3/2022  2:00 PM Kristina Mcnamara APRN - CNP SRPX IM MED [facility]   2/10/2022  2:00 PM Korin Peralta APRN - CNP SRPX FM RES [facility]   2022  3:00 PM STR CT IMAGING RM1  [facility] OUT EXP STR Radiolog   3/2/2022  3:00 PM Lolis Gupta APRN - CNP N Pulm Med [facility]   2022  2:00 PM Jesus Smallwood MD [facility]. [facility]     [id number] follow up appointment(s): n/a    Non-face-to-face services provided:  Obtained and reviewed discharge summary and/or continuity of care documents     Advance Care Planning:   Does patient have an Advance Directive:  not on file. Educated patient about risk for severe COVID-19 due to risk factors according to CDC guidelines.  ACM reviewed discharge instructions, medical action plan and red flag symptoms with the patient who verbalized understanding. Discussed COVID vaccination status: Yes. Education provided on COVID-19 vaccination as appropriate. Discussed exposure protocols and quarantine with CDC Guidelines. Patient was given an opportunity to verbalize any questions and concerns and agrees to contact ACM or health care provider for questions related to their healthcare. Reviewed and educated patient on any new and changed medications related to discharge diagnosis     Was patient discharged with a pulse oximeter? No Discussed and confirmed pulse oximeter discharge instructions and when to notify provider or seek emergency care. ACM provided contact information. Plan for follow-up call in 1-2 days based on severity of symptoms and risk factors.   Reviewed COVID zones

## 2021-11-24 ENCOUNTER — CARE COORDINATION (OUTPATIENT)
Dept: CARE COORDINATION | Age: 66
End: 2021-11-24

## 2021-11-29 ENCOUNTER — CARE COORDINATION (OUTPATIENT)
Dept: CARE COORDINATION | Age: 66
End: 2021-11-29

## 2021-11-29 NOTE — CARE COORDINATION
You Patient resolved from the Care Transitions episode on 11/29/21  Discussed COVID-19 related testing which was available at this time. Test results were positive. Patient informed of results, if available? Yes    Patient/family has been provided the following resources and education related to COVID-19:                         Signs, symptoms and red flags related to COVID-19            CDC exposure and quarantine guidelines            [employer]it exposure contact - [phone]            Contact for their local Department of Health                 Patient currently reports that the following symptoms have improved:  nasal congestion, cough and no new/worsening symptoms     No further outreach scheduled with this CTN/ACM. Episode of Care resolved. Patient has this CTN/ACM contact information if future needs arise. Appetite limited. Drinking good amts of fluid.

## 2021-12-14 ENCOUNTER — TELEPHONE (OUTPATIENT)
Dept: UROLOGY | Age: 66
End: 2021-12-14

## 2021-12-14 ENCOUNTER — OFFICE VISIT (OUTPATIENT)
Dept: UROLOGY | Age: 66
End: 2021-12-14
Payer: MEDICARE

## 2021-12-14 VITALS
SYSTOLIC BLOOD PRESSURE: 130 MMHG | BODY MASS INDEX: 30.66 KG/M2 | HEIGHT: 65 IN | WEIGHT: 184 LBS | DIASTOLIC BLOOD PRESSURE: 68 MMHG

## 2021-12-14 DIAGNOSIS — Z94.0 HISTORY OF KIDNEY TRANSPLANT: ICD-10-CM

## 2021-12-14 DIAGNOSIS — N39.0 RECURRENT UTI: ICD-10-CM

## 2021-12-14 DIAGNOSIS — N32.81 OAB (OVERACTIVE BLADDER): Primary | ICD-10-CM

## 2021-12-14 PROCEDURE — G8484 FLU IMMUNIZE NO ADMIN: HCPCS | Performed by: UROLOGY

## 2021-12-14 PROCEDURE — 1090F PRES/ABSN URINE INCON ASSESS: CPT | Performed by: UROLOGY

## 2021-12-14 PROCEDURE — 1036F TOBACCO NON-USER: CPT | Performed by: UROLOGY

## 2021-12-14 PROCEDURE — 1123F ACP DISCUSS/DSCN MKR DOCD: CPT | Performed by: UROLOGY

## 2021-12-14 PROCEDURE — G8427 DOCREV CUR MEDS BY ELIG CLIN: HCPCS | Performed by: UROLOGY

## 2021-12-14 PROCEDURE — 99213 OFFICE O/P EST LOW 20 MIN: CPT | Performed by: UROLOGY

## 2021-12-14 PROCEDURE — 4040F PNEUMOC VAC/ADMIN/RCVD: CPT | Performed by: UROLOGY

## 2021-12-14 PROCEDURE — 3017F COLORECTAL CA SCREEN DOC REV: CPT | Performed by: UROLOGY

## 2021-12-14 PROCEDURE — G8417 CALC BMI ABV UP PARAM F/U: HCPCS | Performed by: UROLOGY

## 2021-12-14 PROCEDURE — G8399 PT W/DXA RESULTS DOCUMENT: HCPCS | Performed by: UROLOGY

## 2021-12-14 NOTE — TELEPHONE ENCOUNTER
Patient scheduled for US RENAL LIMITED  at [facility] MR on 6/7/22 ARRIVAL  PM FOR A 2 PM SCAN TIME WITH NO CARBONATED BEVERAGES THE DAY OF AND ARRIVE WELL HYDRATED. Patient advised of instructions.   Order mailed/given to patient

## 2021-12-14 NOTE — PROGRESS NOTES
[address] [address]  Dept: [phone]  Dept Fax: [phone]: [phone]    [facility] Office Note -     Patient:  Ashok Lopez  YOB: 1955  Date: 12/14/2021    The patient is a 74 y.o. female who presents today for evaluation of the following problems:   Chief Complaint   Patient presents with    Follow-up     recurrent uti, OAB    referred/consultation requested by DONOVAN Mccarthy CNP. HISTORY OF PRESENT ILLNESS:     Recurrent UTI  Has seen [facility] in the past-- was told she had retention  Had been having infections since kidney transplant  recent renal u/s negative      Kidney allograft  Hx of transplant by [facility]  Infections started afterwards  Finished prolonged doxy course          Requested/reviewed records from DONOVAN Mccarthy CNP office and/or outside physician/EMR    (Patient's old records have been requested, reviewed and pertinent findings summarized in today's note.)    Procedures Today: N/A    Last several PSA's:  No results found for: PSA    Last total testosterone:  No results found for: TESTOSTERONE    Urinalysis today:  No results found for this visit on 12/14/21. Last BUN and creatinine:  Lab Results   Component Value Date    BUN 16 12/13/2021     Lab Results   Component Value Date    CREATININE 1.1 12/13/2021       Imaging Reviewed during this Office Visit:   imaging independently reviewed by Rios Pro MD and radiology report verified demonstrating     [address]    Result Date: 8/23/2021  BILATERAL RENAL ULTRASOUND: CLINICAL INFORMATION: History of kidney transplant COMPARISON: No comparison available. TECHNIQUE: Multiple sonographic images of both kidneys were obtained. Images of the urinary bladder were also obtained.  FINDINGS: TRANSPLANT KIDNEY: 13.1 x 6.9 x 6.9 cm Resistive index: 0.76 Renal cortex: 1.8 cm NATIVE RIGHT KIDNEY - 9.6 x 4.8 x 4.3 cm Resistive Index - 0.82 Cortical Thickness - 0.6 cm NATIVE LEFT KIDNEY - 10.9 x 4.9 x 5.1 cm Resistive Index - 0.82 Cortical Thickness - 0.7 cm URINARY BLADDER Pre-Void - 314 mL Post-Void - 169 mL  KIDNEYS:  Native kidneys and transplant kidneys are normal in size and contour. There is thinning of the renal cortex of the native kidneys. A normal renal parenchymal component is present in the transplanted kidney. Resistive indices are elevated for both transplant and native kidneys. MASS/CYST: Small benign-appearing 13 mm cyst mid anterior aspect native right kidney. Benign-appearing 5.2 cm cyst in upper Pole native left kidney. HYDRONEPHROSIS:  There is no hydronephrosis. CALCULI:  No calculi are seen. BLADDER:  The urinary bladder is unremarkable. . TRANSPLANT VASCULARITY: Good pulsatile flow was demonstrated in the transplant renal artery. The transplant renal vein is patent with good flow as well. Velocity in the right external iliac artery proximal to the anastomosis to 268/18 cm/s. Velocity at the anastomosis measures 499/31 cm/s. Velocities distal to the anastomosis measure 311/27 cm/s. 1. The previously noted hydronephrosis of the transplant kidney has resolved. 2. Good pulsatile flow was noted into the transplant kidney. Transplant artery and vein remain widely patent. 3. Atrophic changes of the native kidneys. . **This report has been created using voice recognition software. It may contain minor errors which are inherent in voice recognition technology. ** Final report electronically signed by Dr. Raina Ocasio on 8/23/2021 5:50 PM      PAST MEDICAL, FAMILY AND SOCIAL HISTORY:  Past Medical History:   Diagnosis Date    Anemia associated with chronic renal failure     Aranesp 150 micrograms every other week [facility]    Anxiety     Arthritis     Backache     Blood circulation, collateral     Blood transfusion     CAD (coronary artery disease)     Cellulitis in diabetic foot ([address] 03/03/2017    4th and 5th toes right foot    Chest pain     History of    CHF (congestive heart failure) ([address] 1998    Dx'ed by Dr. Tico Fuentes Chronic anemia     Chronic kidney disease     Chronic kidney disease, stage III (moderate) ([address]     Chronic renal insufficiency 2010    COPD (chronic obstructive pulmonary disease) ([address] 2012    Dr. 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 Selena Boyce    HTN (hypertension) 1970's    Hyperlipemia 1998    Iron deficiency anemia due to dietary causes 6/21/2018    Kidney stones 3/2014    Kidney trouble          MRSA infection 03/2017    right foot-Dr. Geovanny Otero (podiatrist)    Neuromuscular disorder [facility]     Neuropathy 1989    diabetic neuropathy    Obesity since childhoood    CONTRERAS on CPAP 2010    Dr. Yuliya Oliveros    Pneumonia     PONV (postoperative nausea and vomiting)     Seizures ([address]     Sepsis due to Escherichia coli ([facility] 07/2020     Past Surgical History:   Procedure Laterality Date    ANKLE SURGERY Right 02-10-14    Dr. Maggie Graham at [facility]  1990's    removal of benign tumor   [address]  2008   [address]  2009    2 polyps, not precanceorus    COLONOSCOPY Left 6/10/2019    COLONOSCOPY DIAGNOSTIC performed by Levy Lobato MD at [address] [facility]  3/30/2012    eye lid lift    DIAGNOSTIC CARDIAC CATH LAB PROCEDURE      ENDOSCOPY, COLON, DIAGNOSTIC  2007   [facility] EYE SURGERY  March 30th, 2012    left sided ptosis    FOOT SURGERY  1990    Tarsal tunnel surgery    FRACTURE SURGERY  2015   [address] and [address] first partial in 1980's, then total in [address] [facility]  2015   [address]  04/10/2020    RIGHT    LIVER BIOPSY  6/2015    LUNG BIOPSY  2009    [place] OFFICE/OUTPT VISIT,PROCEDURE ONLY Left 8/23/2018    LEFT UPPER EXTREMENTY AV FISTULA CREATION performed by Boston Lebron MD at [address] [facility] [address] Left 6/14/2019    EGD BIOPSY performed by Jolie Larose MD at [facility]     Family History   Problem Relation Age of Onset    Diabetes Sister     Diabetes Brother     Diabetes Sister     Diabetes Sister    Pauletta Slain Sister     Early Death Sister     Heart Disease Sister     Diabetes Sister     Heart Disease Sister     Diabetes Sister     Diabetes Brother     Arthritis Mother     Heart Disease Mother     High Blood Pressure Mother     Kidney Disease Mother     Mental Illness Mother     Stroke Mother     Heart Disease Father     Diabetes Father     Obesity Father     Alcohol Abuse Father      Outpatient Medications Marked as Taking for the 12/14/21 encounter (Office Visit) with Yinka Roberson MD   Medication Sig Dispense Refill    HYDROcodone-acetaminophen (NORCO) 5-325 MG per tablet Take 1 tablet by mouth every 8 hours as needed for Pain for up to 30 days.  90 tablet 0    NIFEdipine (ADALAT CC) 60 MG extended release tablet Take 60 mg by mouth 2 times daily       doxycycline hyclate (VIBRA-TABS) 100 MG tablet Take 0.5 tablets by mouth daily 45 tablet 3    lactulose (CHRONULAC) 10 GM/15ML solution Take twice daily as needed for constipation 2700 mL 1    insulin glargine (LANTUS SOLOSTAR) 100 UNIT/ML injection pen Inject 25 Units into the skin nightly 15 pen 1    insulin aspart (NOVOLOG FLEXPEN) 100 UNIT/ML injection pen Sliding scale insulin coverage Glucose:Dose: If Less [id number] =No Insulin/ 140-199= 2 Units/ 200-249=4 Units/ 250-299= 6 Units/  300-349=8 Units/  350-400=10 Units/ Above 400 = 12 Units max 48 units daily 15 pen 1    sulfamethoxazole-trimethoprim (BACTRIM DS;SEPTRA DS) 800-160 MG per tablet Take 1 tablet by mouth three times a week      vitamin D (ERGOCALCIFEROL) 1.25 MG (70853 UT) CAPS capsule TAKE 1 CAPSULE BY MOUTH EVERY 14 DAYS ON  6 capsule 3    cloNIDine (CATAPRES) 0.2 MG tablet Take 0.2 mg by mouth 3 times daily      famotidine (PEPCID) 40 MG tablet Take 40 mg by mouth daily as needed       iron polysaccharides (NIFEREX) 150 MG capsule Take 150 mg by mouth daily      tiotropium (SPIRIVA RESPIMAT) 2.5 MCG/ACT AERS inhaler Inhale 2 puffs into the lungs daily 1 Inhaler 11    albuterol sulfate HFA (PROAIR HFA) 108 (90 Base) MCG/ACT inhaler Inhale 2 puffs into the lungs every 6 hours as needed for Wheezing or Shortness of Breath 3 Inhaler 3    fluticasone (FLONASE) 50 MCG/ACT nasal spray 1 spray by Each Nostril route daily as needed for Rhinitis 3 Bottle 3    pantoprazole (PROTONIX) 40 MG tablet Take 40 mg by mouth daily       atorvastatin (LIPITOR) 40 MG tablet TAKE 1 TABLET DAILY 90 tablet 3    Insulin Pen Needle (B-D ULTRAFINE III SHORT PEN) 31G X 8 MM MISC Use three times daily Diagnosis Code E11.9 200 each 3    carvedilol (COREG) 25 MG tablet 2 tablets  tablet 1    tacrolimus (PROGRAF) 1 MG capsule Take 1 mg by mouth 5 CAPSULES EVERY MORNING AND 5 CAPSULES EVERY EVENING      Mycophenolate Sodium 360 MG TBEC Take 360 mg by mouth 1 tablets BID      magnesium oxide (MAG-OX) 400 MG tablet Take 400 mg by mouth 2 times daily      linaclotide (LINZESS) 290 MCG CAPS capsule Take 145 mcg by mouth every other day       aspirin 81 MG EC tablet Take 81 mg by mouth daily.            Actos [pioglitazone hydrochloride], Pioglitazone, Cymbalta [duloxetine hcl], and Gabapentin  Social History     Tobacco Use   Smoking Status Former Smoker    Packs/day: 1.50    Years: 30.00    Pack years: 45.00    Types: Cigarettes    Start date: 1979   Bob Jimenez Quit date: 3/13/2009    Years since quittin.7 Smokeless Tobacco Never Used   Tobacco Comment    quit 2009      (If patient a smoker, smoking cessation counseling offered)   Social History     Substance and Sexual Activity   Alcohol Use No    Alcohol/week: 0.0 standard drinks       REVIEW OF SYSTEMS:  Constitutional: negative  Eyes: negative  Respiratory: negative  Cardiovascular: negative  Gastrointestinal: negative  Genitourinary: see HPI  Musculoskeletal: negative  Skin: negative   Neurological: negative  Hematological/Lymphatic: negative  Psychological: negative      Physical Exam:    This a 74 y.o. female  Vitals:    12/14/21 1444   BP: 130/68     Body mass index is 30.62 kg/m². Constitutional: Patient in no acute distress;       Assessment and Plan        1. OAB (overactive bladder)    2. Recurrent UTI    3. History of kidney transplant               Plan:      Infections worsened since transplant but has improved greatly with prophylaxis (three months)  Incomplete emptying vs. Immunosuppression increasing infection risk  Cystoscopy last visit was normal  Had very tough course with covid last month- recovering     Extend doxy one more month  Six months with renal u/s        Prescriptions Ordered:  No orders of the defined types were placed in this encounter. Orders Placed:  No orders of the defined types were placed in this encounter.            Jax Hobbs MD

## 2021-12-17 DIAGNOSIS — G89.4 CHRONIC PAIN SYNDROME: ICD-10-CM

## 2021-12-17 RX ORDER — HYDROCODONE BITARTRATE AND ACETAMINOPHEN 5; 325 MG/1; MG/1
1 TABLET ORAL EVERY 8 HOURS PRN
Qty: 90 TABLET | Refills: 0 | Status: SHIPPED | OUTPATIENT
Start: 2021-12-21 | End: 2022-01-21 | Stop reason: SDUPTHER

## 2021-12-17 NOTE — TELEPHONE ENCOUNTER
Freddie Mckeon called requesting a refill on the following medications:  Requested Prescriptions     Pending Prescriptions Disp Refills    HYDROcodone-acetaminophen (NORCO) 5-325 MG per tablet 90 tablet 0     Sig: Take 1 tablet by mouth every 8 hours as needed for Pain for up to 30 days.      Pharmacy verified:  [facility]    Date of last visit: 10-11-21  Date of next visit (if applicable): 1/2/7868

## 2022-01-04 ENCOUNTER — OFFICE VISIT (OUTPATIENT)
Dept: PHYSICAL MEDICINE AND REHAB | Age: 67
End: 2022-01-04
Payer: MEDICARE

## 2022-01-04 VITALS
BODY MASS INDEX: 30.66 KG/M2 | DIASTOLIC BLOOD PRESSURE: 80 MMHG | WEIGHT: 184 LBS | SYSTOLIC BLOOD PRESSURE: 142 MMHG | HEIGHT: 65 IN

## 2022-01-04 DIAGNOSIS — M19.012 PRIMARY OSTEOARTHRITIS OF LEFT SHOULDER: ICD-10-CM

## 2022-01-04 DIAGNOSIS — G89.29 CHRONIC BILATERAL LOW BACK PAIN WITHOUT SCIATICA: ICD-10-CM

## 2022-01-04 DIAGNOSIS — M47.816 LUMBAR FACET ARTHROPATHY: ICD-10-CM

## 2022-01-04 DIAGNOSIS — M54.50 CHRONIC BILATERAL LOW BACK PAIN WITHOUT SCIATICA: ICD-10-CM

## 2022-01-04 DIAGNOSIS — M47.816 SPONDYLOSIS OF LUMBAR REGION WITHOUT MYELOPATHY OR RADICULOPATHY: Primary | ICD-10-CM

## 2022-01-04 DIAGNOSIS — G89.4 CHRONIC PAIN SYNDROME: ICD-10-CM

## 2022-01-04 DIAGNOSIS — E11.42 DIABETIC POLYNEUROPATHY ASSOCIATED WITH TYPE 2 DIABETES MELLITUS (HCC): ICD-10-CM

## 2022-01-04 PROCEDURE — G8484 FLU IMMUNIZE NO ADMIN: HCPCS | Performed by: NURSE PRACTITIONER

## 2022-01-04 PROCEDURE — G8427 DOCREV CUR MEDS BY ELIG CLIN: HCPCS | Performed by: NURSE PRACTITIONER

## 2022-01-04 PROCEDURE — 1123F ACP DISCUSS/DSCN MKR DOCD: CPT | Performed by: NURSE PRACTITIONER

## 2022-01-04 PROCEDURE — 1036F TOBACCO NON-USER: CPT | Performed by: NURSE PRACTITIONER

## 2022-01-04 PROCEDURE — 2022F DILAT RTA XM EVC RTNOPTHY: CPT | Performed by: NURSE PRACTITIONER

## 2022-01-04 PROCEDURE — 3017F COLORECTAL CA SCREEN DOC REV: CPT | Performed by: NURSE PRACTITIONER

## 2022-01-04 PROCEDURE — 4040F PNEUMOC VAC/ADMIN/RCVD: CPT | Performed by: NURSE PRACTITIONER

## 2022-01-04 PROCEDURE — 3046F HEMOGLOBIN A1C LEVEL >9.0%: CPT | Performed by: NURSE PRACTITIONER

## 2022-01-04 PROCEDURE — G8399 PT W/DXA RESULTS DOCUMENT: HCPCS | Performed by: NURSE PRACTITIONER

## 2022-01-04 PROCEDURE — 99214 OFFICE O/P EST MOD 30 MIN: CPT | Performed by: NURSE PRACTITIONER

## 2022-01-04 PROCEDURE — G8417 CALC BMI ABV UP PARAM F/U: HCPCS | Performed by: NURSE PRACTITIONER

## 2022-01-04 PROCEDURE — 1090F PRES/ABSN URINE INCON ASSESS: CPT | Performed by: NURSE PRACTITIONER

## 2022-01-04 ASSESSMENT — ENCOUNTER SYMPTOMS
BACK PAIN: 1
CHEST TIGHTNESS: 0
SHORTNESS OF BREATH: 1
GASTROINTESTINAL NEGATIVE: 1

## 2022-01-04 NOTE — PROGRESS NOTES
She had 1 ER visit for COVID   Pain scale with out pain medications or at its worst is 7-8/10. Pain scale with pain medications or at its best is 4-5/10. \"tolerable\"   Last dose of Norco was yesterday   Drug screen reviewed from 10/11/2021 and was not in UDS and discussed needs to be in   Pill count completed  today and WNL: Yes      The patientis allergic to actos [pioglitazone hydrochloride], pioglitazone, cymbalta [duloxetine hcl], and gabapentin. Subjective:      Review of Systems   Constitutional: Positive for fatigue. Negative for chills and fever. HENT: Negative. Eyes: Positive for visual disturbance. Wearing corrective glasses    Respiratory: Positive for shortness of breath. Negative for chest tightness. Cardiovascular: Positive for leg swelling. Negative for chest pain. Gastrointestinal: Negative. Genitourinary: Negative for flank pain. Musculoskeletal: Positive for arthralgias, back pain, gait problem, joint swelling and myalgias. Negative for neck pain and neck stiffness. Ambulating with walker    Neurological: Positive for weakness and numbness. Psychiatric/Behavioral: Negative. Objective:     Vitals:    01/04/22 1125   BP: (!) 142/80   Weight: 184 lb (83.5 kg)   Height: 5' 5\" (1.651 m)       Physical Exam  Vitals and nursing note reviewed. Constitutional:       General: She is not in acute distress. Appearance: She is well-developed. She is not diaphoretic. HENT:      Head: Normocephalic and atraumatic. Right Ear: External ear normal.      Left Ear: External ear normal.      Nose: Nose normal.      Mouth/Throat:      Pharynx: No oropharyngeal exudate. Eyes:      General: No scleral icterus. Right eye: No discharge. Left eye: No discharge. Conjunctiva/sclera: Conjunctivae normal.      Pupils: Pupils are equal, round, and reactive to light. Neck:      Thyroid: No thyromegaly.    Cardiovascular:      Rate and Rhythm: Normal rate and regular rhythm. Heart sounds: Normal heart sounds. No murmur heard. No friction rub. No gallop. Pulmonary:      Effort: Pulmonary effort is normal. No respiratory distress. Breath sounds: Normal breath sounds. No wheezing or rales. Chest:      Chest wall: No tenderness. Abdominal:      General: Bowel sounds are normal. There is no distension. Palpations: Abdomen is soft. Tenderness: There is no abdominal tenderness. There is no guarding or rebound. Musculoskeletal:         General: Tenderness present. Left shoulder: Tenderness present. Decreased range of motion. Cervical back: Full passive range of motion without pain, normal range of motion and neck supple. No edema, erythema or rigidity. No muscular tenderness. Normal range of motion. Lumbar back: Tenderness present. Decreased range of motion. Back:       Right lower leg: Edema present. Left lower leg: Edema present. Right ankle: Swelling present. Tenderness present. Left ankle: Swelling present. Skin:     General: Skin is warm. Coloration: Skin is not pale. Findings: No erythema or rash. Neurological:      Mental Status: She is alert and oriented to person, place, and time. She is not disoriented. Cranial Nerves: No cranial nerve deficit. Sensory: Sensory deficit present. Motor: Weakness present. No atrophy or abnormal muscle tone. Coordination: Coordination normal.      Gait: Gait abnormal.      Deep Tendon Reflexes: Reflexes are normal and symmetric. Babinski sign absent on the right side. Babinski sign absent on the left side. Comments: Ambulating with walker   4/5 bilateral lower extremities   Psychiatric:         Attention and Perception: She is attentive. Mood and Affect: Mood normal. Mood is not anxious or depressed. Affect is not labile, blunt, angry or inappropriate. Speech: She is communicative.  Speech is not rapid candidate for procedure at this time   · Continue Norco 5/325 TID prn- filled 12/22/2021 and still has plenty   · UDS ordered today    Offered therapy to build strength from Jyoti but she decline   Updated UDS ordered today- discussed needs to be in UDS. Meds. Prescribed:   No orders of the defined types were placed in this encounter. Return in about 3 months (around 4/4/2022), or if symptoms worsen or fail to improve, for follow up  for medications.                Electronically signed by DONOVAN Mack CNP on1/4/2022 at 11:54 AM

## 2022-01-21 DIAGNOSIS — G89.4 CHRONIC PAIN SYNDROME: ICD-10-CM

## 2022-01-21 RX ORDER — HYDROCODONE BITARTRATE AND ACETAMINOPHEN 5; 325 MG/1; MG/1
1 TABLET ORAL EVERY 8 HOURS PRN
Qty: 90 TABLET | Refills: 0 | Status: SHIPPED | OUTPATIENT
Start: 2022-01-21 | End: 2022-02-18 | Stop reason: SDUPTHER

## 2022-01-21 NOTE — TELEPHONE ENCOUNTER
Colton Stewards called requesting a refill on the following medications:  Requested Prescriptions     Pending Prescriptions Disp Refills    HYDROcodone-acetaminophen (NORCO) 5-325 MG per tablet 90 tablet 0     Sig: Take 1 tablet by mouth every 8 hours as needed for Pain for up to 30 days. Pharmacy verified: [facility] [address]  [email or web address]      Date of last visit: 1/04/2022  Date of next visit (if applicable): 4/1/8505

## 2022-02-08 ENCOUNTER — HOSPITAL ENCOUNTER (OUTPATIENT)
Dept: WOMENS IMAGING | Age: 67
Discharge: HOME | End: 2022-02-08
Payer: MEDICARE

## 2022-02-08 DIAGNOSIS — Z12.31 VISIT FOR SCREENING MAMMOGRAM: ICD-10-CM

## 2022-02-08 PROCEDURE — 77063 BREAST TOMOSYNTHESIS BI: CPT

## 2022-02-09 ENCOUNTER — OFFICE VISIT (OUTPATIENT)
Dept: INTERNAL MEDICINE CLINIC | Age: 67
End: 2022-02-09
Payer: MEDICARE

## 2022-02-09 VITALS
WEIGHT: 190 LBS | HEIGHT: 65 IN | TEMPERATURE: 97.3 F | DIASTOLIC BLOOD PRESSURE: 68 MMHG | BODY MASS INDEX: 31.65 KG/M2 | SYSTOLIC BLOOD PRESSURE: 138 MMHG | HEART RATE: 64 BPM

## 2022-02-09 DIAGNOSIS — E11.9 TYPE 2 DIABETES MELLITUS WITHOUT COMPLICATION, WITH LONG-TERM CURRENT USE OF INSULIN (HCC): Primary | ICD-10-CM

## 2022-02-09 DIAGNOSIS — Z79.4 TYPE 2 DIABETES MELLITUS WITHOUT COMPLICATION, WITH LONG-TERM CURRENT USE OF INSULIN (HCC): Primary | ICD-10-CM

## 2022-02-09 DIAGNOSIS — R07.89 CHEST TIGHTNESS: ICD-10-CM

## 2022-02-09 DIAGNOSIS — I10 BENIGN ESSENTIAL HTN: ICD-10-CM

## 2022-02-09 LAB — HBA1C MFR BLD: 6.1 % (ref 4.3–5.7)

## 2022-02-09 PROCEDURE — G8399 PT W/DXA RESULTS DOCUMENT: HCPCS | Performed by: NURSE PRACTITIONER

## 2022-02-09 PROCEDURE — G8482 FLU IMMUNIZE ORDER/ADMIN: HCPCS | Performed by: NURSE PRACTITIONER

## 2022-02-09 PROCEDURE — 3017F COLORECTAL CA SCREEN DOC REV: CPT | Performed by: NURSE PRACTITIONER

## 2022-02-09 PROCEDURE — G8417 CALC BMI ABV UP PARAM F/U: HCPCS | Performed by: NURSE PRACTITIONER

## 2022-02-09 PROCEDURE — 99214 OFFICE O/P EST MOD 30 MIN: CPT | Performed by: NURSE PRACTITIONER

## 2022-02-09 PROCEDURE — G8427 DOCREV CUR MEDS BY ELIG CLIN: HCPCS | Performed by: NURSE PRACTITIONER

## 2022-02-09 PROCEDURE — 3044F HG A1C LEVEL LT 7.0%: CPT | Performed by: NURSE PRACTITIONER

## 2022-02-09 PROCEDURE — 1123F ACP DISCUSS/DSCN MKR DOCD: CPT | Performed by: NURSE PRACTITIONER

## 2022-02-09 PROCEDURE — 1090F PRES/ABSN URINE INCON ASSESS: CPT | Performed by: NURSE PRACTITIONER

## 2022-02-09 PROCEDURE — 4040F PNEUMOC VAC/ADMIN/RCVD: CPT | Performed by: NURSE PRACTITIONER

## 2022-02-09 PROCEDURE — 2022F DILAT RTA XM EVC RTNOPTHY: CPT | Performed by: NURSE PRACTITIONER

## 2022-02-09 PROCEDURE — 93000 ELECTROCARDIOGRAM COMPLETE: CPT | Performed by: NURSE PRACTITIONER

## 2022-02-09 PROCEDURE — 1036F TOBACCO NON-USER: CPT | Performed by: NURSE PRACTITIONER

## 2022-02-09 PROCEDURE — 83036 HEMOGLOBIN GLYCOSYLATED A1C: CPT | Performed by: NURSE PRACTITIONER

## 2022-02-09 RX ORDER — PEN NEEDLE, DIABETIC 31 GX5/16"
NEEDLE, DISPOSABLE MISCELLANEOUS
Qty: 200 EACH | Refills: 3 | Status: SHIPPED | OUTPATIENT
Start: 2022-02-09 | End: 2022-08-09 | Stop reason: SDUPTHER

## 2022-02-09 RX ORDER — INSULIN GLARGINE 100 [IU]/ML
25 INJECTION, SOLUTION SUBCUTANEOUS NIGHTLY
Qty: 15 PEN | Refills: 1 | Status: SHIPPED | OUTPATIENT
Start: 2022-02-09 | End: 2022-08-09 | Stop reason: SDUPTHER

## 2022-02-09 RX ORDER — SULFAMETHOXAZOLE AND TRIMETHOPRIM 800; 160 MG/1; MG/1
1 TABLET ORAL
COMMUNITY

## 2022-02-09 RX ORDER — ZINC OXIDE 216 MG/ML
2 LOTION TOPICAL DAILY
Qty: 60 EACH | Refills: 0 | Status: SHIPPED | OUTPATIENT
Start: 2022-02-09 | End: 2022-03-03 | Stop reason: SDUPTHER

## 2022-02-09 RX ORDER — DOCUSATE SODIUM 100 MG/1
100 CAPSULE, LIQUID FILLED ORAL 2 TIMES DAILY PRN
COMMUNITY
End: 2022-03-21 | Stop reason: SDUPTHER

## 2022-02-09 RX ORDER — CARVEDILOL 25 MG/1
25 TABLET ORAL 2 TIMES DAILY
Qty: 180 TABLET | Refills: 1 | Status: ON HOLD | OUTPATIENT
Start: 2022-02-09 | End: 2022-04-09 | Stop reason: HOSPADM

## 2022-02-09 RX ORDER — INSULIN ASPART 100 [IU]/ML
INJECTION, SOLUTION INTRAVENOUS; SUBCUTANEOUS
Qty: 15 PEN | Refills: 1 | Status: SHIPPED | OUTPATIENT
Start: 2022-02-09 | End: 2022-08-09 | Stop reason: SDUPTHER

## 2022-02-09 NOTE — PROGRESS NOTES
[facility]  Archana Chi  Dept: [phone]  Dept Fax: [phone]: [phone]     Visit Date:  2022    Patient:  Konstantin Solomon  YOB: 1955    HPI:     Chief Complaint   Patient presents with    6 Month Follow-Up    Diabetes    Hypertension     Lia Thomas (:  1955) is a 64 y.o. female, here for evaluation of the following medical concerns: Diabetes and HTN     I last seen Anel Richard 6 months ago. She follows routinely with Dr. Jesus Garcia ER visits or hospitalizations since last visit.      Renal transplant was on 4/10/20. She had COVID in November. Since that time she has had bilateral feel swelling and upper left chest pain. Diabetes-   Anel Richard reports being diabetic for over 25 years. She states her diabetes is now well controlled. A1C 6.0% today in office, was 7.1% previously. She is on Novolog Sliding Scale group 2 before meals and at bedtime, and Lantus 25 units at night. Her appetite is poor. Is using Glucerna as a dietary supplement. States hypoglycemic events present occasionally. She is able to voice signs/symptoms of hypoglycemia. Reports checking glucose 2 times per day, with 7 day blood sugar average 161 per meter download. She is having low readings in the afternoon around lunch, but she is not eating well. Dietary modification for diabetes management and/or weight loss encouraged. Does not report difficulty with obtaining medications. She voices understanding of the importance of annual eye exams. On aspirin 81 mg daily and atorvastatin 40 mg daily.     Denies polyuria, polydipsia, polyphagia. Reports neuropathic symptoms including numbness, tingling. She follows routinely with podiatry every 3 months.     Essential HTN-  Reports taking her medications as instructed with no medication side effects.  Denies chest pain on exertion, dyspnea on exertion, swelling of ankles, orthostatic dizziness or lightheadedness, and/or palpitations. /68 in office today.     Medications    Current Outpatient Medications:     docusate sodium (COLACE) 100 MG capsule, Take 100 mg by mouth 2 times daily as needed for Constipation, Disp: , Rfl:     sulfamethoxazole-trimethoprim (BACTRIM DS) 800-160 MG per tablet, Take 1 tablet by mouth 3 times a week, Disp: , Rfl:     insulin aspart (NOVOLOG FLEXPEN) 100 UNIT/ML injection pen, Sliding scale insulin coverage Glucose:Dose: If Less [id number] =No Insulin/ 140-199= 2 Units/ 200-249=4 Units/ 250-299= 6 Units/  300-349=8 Units/  350-400=10 Units/ Above 400 = 12 Units max 48 units daily, Disp: 15 pen, Rfl: 1    insulin glargine (LANTUS SOLOSTAR) 100 UNIT/ML injection pen, Inject 25 Units into the skin nightly, Disp: 15 pen, Rfl: 1    Insulin Pen Needle (B-D ULTRAFINE III SHORT PEN) 31G X 8 MM MISC, Use three times daily Diagnosis Code E11.9, Disp: 200 each, Rfl: 3    carvedilol (COREG) 25 MG tablet, Take 1 tablet by mouth 2 times daily 2 tablets BID, Disp: 180 tablet, Rfl: 1    Nutritional Supplement LIQD, Take 2 Cans by mouth daily STRAWBERRY GLUCERNA, Disp: 60 each, Rfl: 0    NIFEdipine (ADALAT CC) 60 MG extended release tablet, Take 60 mg by mouth 2 times daily , Disp: , Rfl:     lactulose (CHRONULAC) 10 GM/15ML solution, Take twice daily as needed for constipation, Disp: 2700 mL, Rfl: 1    vitamin D (ERGOCALCIFEROL) 1.25 MG (33786 UT) CAPS capsule, TAKE 1 CAPSULE BY MOUTH EVERY 14 DAYS ON MONDAYS, Disp: 6 capsule, Rfl: 3    cloNIDine (CATAPRES) 0.2 MG tablet, Take 0.2 mg by mouth 3 times daily, Disp: , Rfl:     famotidine (PEPCID) 40 MG tablet, Take 40 mg by mouth 2 times daily as needed , Disp: , Rfl:     iron polysaccharides (NIFEREX) 150 MG capsule, Take 150 mg by mouth daily, Disp: , Rfl:     tiotropium (SPIRIVA RESPIMAT) 2.5 MCG/ACT AERS inhaler, Inhale 2 puffs into the lungs daily, Disp: 1 granulomatous disease, HTN (hypertension), Hyperlipemia, Iron deficiency anemia due to dietary causes, Kidney stones, Kidney trouble, MRSA infection, Neuromuscular disorder ([address] Neuropathy, Obesity, CONTRERAS on CPAP, Pneumonia, PONV (postoperative nausea and vomiting), Seizures ([address] and Sepsis due to Escherichia coli ([address] Past Surgical History  The patient  has a past surgical history that includes eye surgery (March 30th, 2012); Carpal tunnel release (1988); Foot surgery (1990); Colonoscopy (2009); Endoscopy, colon, diagnostic (2007); Appendectomy (1980s); back surgery (1990's); Cosmetic surgery (3/30/2012); Ankle surgery (Right, 02-10-14); liver biopsy (6/2015); Lung biopsy (2009); joint replacement (2015); fracture surgery (2015); Cardiac surgery (2008); pr office/outpt visit,procedure only (Left, 8/23/2018); Colonoscopy (Left, 6/10/2019); Upper gastrointestinal endoscopy (Left, 6/14/2019); Diagnostic Cardiac Cath Lab Procedure; Kidney transplant (04/10/2020); Ovary removal (1985); and Hysterectomy (1980 and 1985). Family History  This patient's family history includes Alcohol Abuse in her father; Arthritis in her mother; Catherine Hamburger in her sister; Breast Cancer (age of onset: 45) in her paternal cousin; Diabetes in her brother, brother, father, sister, sister, sister, sister, and sister; Early Death in her sister; Heart Disease in her father, mother, sister, and sister; High Blood Pressure in her mother; Kidney Disease in her mother; Mental Illness in her mother; Obesity in her father; Stroke in her mother. Social History  Seema Cochran  reports that she quit smoking about 12 years ago. Her smoking use included cigarettes. She started smoking about 42 years ago. She has a 45.00 pack-year smoking history. She has never used smokeless tobacco. She reports that she does not drink alcohol and does not use drugs.     Health Maintenance:    Health Maintenance   Topic Date Due    Diabetic foot exam  Never done   [facility] Manual Diabetic retinal exam  Never done   [facility] ([facility])  03/11/2021    Low dose CT lung screening  02/24/2022    Depression Screen  03/11/2022    COVID-19 Vaccine (4 - Booster for Moderna series) 04/08/2022    Lipid screen  09/07/2022    A1C test (Diabetic or Prediabetic)  02/09/2023    TSH testing  02/10/2023    Potassium monitoring  02/10/2023    Creatinine monitoring  02/10/2023    Breast cancer screen  02/08/2024    Colorectal Cancer Screen  06/10/2029    DTaP/Tdap/Td vaccine (3 - Td or Tdap) 11/28/2030    DEXA (modify frequency per FRAX score)  Completed    Flu vaccine  Completed    Shingles Vaccine  Completed    Pneumococcal 65+ yrs at Risk Vaccine  Completed    Hepatitis C screen  Completed    Hepatitis A vaccine  Aged Out    Hib vaccine  Aged Out    Meningococcal (ACWY) vaccine  Aged Out       Subjective:      Review of Systems   Constitutional: Negative for appetite change, chills, fatigue and fever. HENT: Negative for congestion, sinus pressure, sinus pain, sore throat and trouble swallowing. Eyes: Negative for photophobia, pain and visual disturbance. Respiratory: Negative for cough, shortness of breath and wheezing. Cardiovascular: Negative for chest pain, palpitations and leg swelling. Gastrointestinal: Negative for abdominal distention, abdominal pain, blood in stool, constipation, diarrhea, nausea and vomiting. Endocrine: Negative for polydipsia, polyphagia and polyuria. Genitourinary: Negative for difficulty urinating, dysuria and hematuria. Musculoskeletal: Negative for arthralgias, back pain and myalgias. Skin: Negative for rash and wound. Neurological: Positive for weakness. Negative for dizziness, tremors, light-headedness and headaches. Psychiatric/Behavioral: Negative for sleep disturbance. The patient is not nervous/anxious.         Objective:     /68 (Site: Left Upper Arm, Position: Sitting, Cuff Size: Large Adult)   Pulse 64   Temp 97.3 °F (36.3 °C) (Temporal)   Ht 5' 5\" (1.651 m)   Wt 190 lb (86.2 kg)   BMI 31.62 kg/m²     Physical Exam  Constitutional:       General: She is not in acute distress. Appearance: Normal appearance. She is well-developed. HENT:      Head: Normocephalic and atraumatic. Right Ear: Tympanic membrane, ear canal and external ear normal.      Left Ear: Tympanic membrane, ear canal and external ear normal.      Nose: Nose normal. No congestion or rhinorrhea. Mouth/Throat:      Mouth: Mucous membranes are moist.      Pharynx: Oropharynx is clear. No oropharyngeal exudate or posterior oropharyngeal erythema. Eyes:      Extraocular Movements: Extraocular movements intact. Conjunctiva/sclera: Conjunctivae normal.      Pupils: Pupils are equal, round, and reactive to light. Neck:      Thyroid: No thyromegaly. Vascular: No JVD. Cardiovascular:      Rate and Rhythm: Normal rate and regular rhythm. Heart sounds: Normal heart sounds. No murmur heard. No friction rub. No gallop. Pulmonary:      Effort: Pulmonary effort is normal. No respiratory distress. Breath sounds: Normal breath sounds. No wheezing or rales. Abdominal:      General: Bowel sounds are normal. There is no distension. Palpations: Abdomen is soft. Tenderness: There is no abdominal tenderness. Musculoskeletal:         General: Normal range of motion. Cervical back: Normal range of motion and neck supple. Right lower leg: Edema (trace) present. Left lower leg: Edema (trace) present. Lymphadenopathy:      Cervical: No cervical adenopathy. Skin:     General: Skin is warm and dry. Capillary Refill: Capillary refill takes less than 2 seconds. Neurological:      Mental Status: She is alert and oriented to person, place, and time. Motor: No weakness.       Coordination: Coordination normal.      Gait: Gait normal.   Psychiatric:         Mood and Affect: Mood normal. Behavior: Behavior normal.         Thought Content: Thought content normal.         Judgment: Judgment normal.         Labs Reviewed 2/9/2022:    Lab Results   Component Value Date    WBC 7.5 02/07/2022    HGB 10.3 (L) 02/07/2022    HCT 34.2 (L) 02/07/2022     02/07/2022    CHOL 145 09/07/2021    TRIG 147 09/07/2021    HDL 42 09/07/2021    LDLDIRECT 84.65 09/07/2021    ALT 11 02/10/2022    AST 12 02/10/2022     02/10/2022    K 3.5 02/10/2022     02/10/2022    CREATININE 1.2 02/10/2022    BUN 23 (H) 02/10/2022    CO2 27 02/10/2022    TSH 1.110 02/10/2022    INR 1.28 (H) 08/02/2020    LABA1C 6.1 (H) 02/09/2022    LABMICR 20.95 01/04/2018       Assessment/Plan      1. Type 2 diabetes mellitus without complication, with long-term current use of insulin ([facility])    - POCT glycosylated hemoglobin (Hb A1C)  - insulin aspart (NOVOLOG FLEXPEN) 100 UNIT/ML injection pen; Sliding scale insulin coverage Glucose:Dose: If Less [id number] =No Insulin/ 140-199= 2 Units/ 200-249=4 Units/ 250-299= 6 Units/  300-349=8 Units/  350-400=10 Units/ Above 400 = 12 Units max 48 units daily  Dispense: 15 pen; Refill: 1  - insulin glargine (LANTUS SOLOSTAR) 100 UNIT/ML injection pen; Inject 25 Units into the skin nightly  Dispense: 15 pen; Refill: 1  - Insulin Pen Needle (B-D ULTRAFINE III SHORT PEN) 31G X 8 MM MISC; Use three times daily Diagnosis Code E11.9  Dispense: 200 each; Refill: 3  - Nutritional Supplement LIQD; Take 2 Cans by mouth daily STRAWBERRY GLUCERNA  Dispense: 60 each; Refill: 0  - Meter download reviewed  - Encouraged dietary modifications and exercise regimen to promote optimal glycemic control  - Call if any low blood sugars or if consistently > 180  - Follow with ophthalmology and podiatry as scheduled    2. Benign essential HTN    - carvedilol (COREG) 25 MG tablet; Take 1 tablet by mouth 2 times daily 2 tablets BID  Dispense: 180 tablet; Refill: 1    3.  Chest tightness    - EKG 12 Lead  - CXR today      Return in about 6 months (around 8/9/2022). Patient given educational materials - see patient instructions. Discussed use, benefit, and side effects of prescribed medications. All patient questions answered. Pt voiced understanding. Reviewed health maintenance.        Electronically signed DONOVAN Thomas CNP on 2/9/22 at 2:52 PM EST

## 2022-02-09 NOTE — PROGRESS NOTES
Swelling in both feet since November (had COVID). Numbness and tingling in feet. Denies SOB  Reports chest pain since COVID    Sugars ranging from . Denies polyuria. Incontinent at night. Reports eating and drinking more. Eating mostly fish, veggies, fruits and occassionally sweets. Haven't had eyes checked, will make an appointment. Large blisters on right toes. Reports that it has been like this since transplant. Reports swelling in feet and legs are more swollen at night. Feet are a darker color since COVID. A1C is 6.1 today.

## 2022-02-10 ENCOUNTER — HOSPITAL ENCOUNTER (OUTPATIENT)
Dept: GENERAL RADIOLOGY | Age: 67
Discharge: HOME | End: 2022-02-10
Payer: MEDICARE

## 2022-02-10 ENCOUNTER — OFFICE VISIT (OUTPATIENT)
Dept: FAMILY MEDICINE CLINIC | Age: 67
End: 2022-02-10
Payer: MEDICARE

## 2022-02-10 ENCOUNTER — HOSPITAL ENCOUNTER (OUTPATIENT)
Age: 67
Discharge: HOME | End: 2022-02-10
Payer: MEDICARE

## 2022-02-10 VITALS
OXYGEN SATURATION: 98 % | HEIGHT: 65 IN | TEMPERATURE: 97 F | HEART RATE: 61 BPM | SYSTOLIC BLOOD PRESSURE: 122 MMHG | BODY MASS INDEX: 31.19 KG/M2 | WEIGHT: 187.2 LBS | RESPIRATION RATE: 14 BRPM | DIASTOLIC BLOOD PRESSURE: 60 MMHG

## 2022-02-10 DIAGNOSIS — E11.22 TYPE 2 DIABETES MELLITUS WITH STAGE 5 CHRONIC KIDNEY DISEASE NOT ON CHRONIC DIALYSIS, WITH LONG-TERM CURRENT USE OF INSULIN (HCC): ICD-10-CM

## 2022-02-10 DIAGNOSIS — N18.5 TYPE 2 DIABETES MELLITUS WITH STAGE 5 CHRONIC KIDNEY DISEASE NOT ON CHRONIC DIALYSIS, WITH LONG-TERM CURRENT USE OF INSULIN (HCC): ICD-10-CM

## 2022-02-10 DIAGNOSIS — N18.5 TYPE 2 DIABETES MELLITUS WITH STAGE 5 CHRONIC KIDNEY DISEASE NOT ON CHRONIC DIALYSIS, WITH LONG-TERM CURRENT USE OF INSULIN (HCC): Primary | ICD-10-CM

## 2022-02-10 DIAGNOSIS — Z94.0 RENAL TRANSPLANT RECIPIENT: ICD-10-CM

## 2022-02-10 DIAGNOSIS — Z79.4 TYPE 2 DIABETES MELLITUS WITH STAGE 5 CHRONIC KIDNEY DISEASE NOT ON CHRONIC DIALYSIS, WITH LONG-TERM CURRENT USE OF INSULIN (HCC): Primary | ICD-10-CM

## 2022-02-10 DIAGNOSIS — E11.22 TYPE 2 DIABETES MELLITUS WITH STAGE 5 CHRONIC KIDNEY DISEASE NOT ON CHRONIC DIALYSIS, WITH LONG-TERM CURRENT USE OF INSULIN (HCC): Primary | ICD-10-CM

## 2022-02-10 DIAGNOSIS — R07.89 CHEST TIGHTNESS: ICD-10-CM

## 2022-02-10 DIAGNOSIS — I10 ESSENTIAL HYPERTENSION: ICD-10-CM

## 2022-02-10 DIAGNOSIS — E03.9 ACQUIRED HYPOTHYROIDISM: ICD-10-CM

## 2022-02-10 DIAGNOSIS — Z79.4 TYPE 2 DIABETES MELLITUS WITH STAGE 5 CHRONIC KIDNEY DISEASE NOT ON CHRONIC DIALYSIS, WITH LONG-TERM CURRENT USE OF INSULIN (HCC): ICD-10-CM

## 2022-02-10 LAB
ALBUMIN SERPL-MCNC: 4.5 G/DL (ref 3.5–5.1)
ALP BLD-CCNC: 85 U/L (ref 38–126)
ALT SERPL-CCNC: 11 U/L (ref 11–66)
ANION GAP SERPL CALCULATED.3IONS-SCNC: 12 MEQ/L (ref 8–16)
AST SERPL-CCNC: 12 U/L (ref 5–40)
BILIRUB SERPL-MCNC: 0.3 MG/DL (ref 0.3–1.2)
BUN BLDV-MCNC: 23 MG/DL (ref 7–22)
CALCIUM SERPL-MCNC: 10.4 MG/DL (ref 8.5–10.5)
CHLORIDE BLD-SCNC: 103 MEQ/L (ref 98–111)
CO2: 27 MEQ/L (ref 23–33)
CREAT SERPL-MCNC: 1.2 MG/DL (ref 0.4–1.2)
GLUCOSE BLD-MCNC: 129 MG/DL (ref 70–108)
POTASSIUM SERPL-SCNC: 3.5 MEQ/L (ref 3.5–5.2)
SODIUM BLD-SCNC: 142 MEQ/L (ref 135–145)
TOTAL PROTEIN: 7.3 G/DL (ref 6.1–8)
TSH SERPL DL<=0.05 MIU/L-ACNC: 1.11 UIU/ML (ref 0.4–4.2)

## 2022-02-10 PROCEDURE — 36415 COLL VENOUS BLD VENIPUNCTURE: CPT

## 2022-02-10 PROCEDURE — G8427 DOCREV CUR MEDS BY ELIG CLIN: HCPCS | Performed by: NURSE PRACTITIONER

## 2022-02-10 PROCEDURE — 80053 COMPREHEN METABOLIC PANEL: CPT

## 2022-02-10 PROCEDURE — 2022F DILAT RTA XM EVC RTNOPTHY: CPT | Performed by: NURSE PRACTITIONER

## 2022-02-10 PROCEDURE — G8482 FLU IMMUNIZE ORDER/ADMIN: HCPCS | Performed by: NURSE PRACTITIONER

## 2022-02-10 PROCEDURE — 1090F PRES/ABSN URINE INCON ASSESS: CPT | Performed by: NURSE PRACTITIONER

## 2022-02-10 PROCEDURE — 71046 X-RAY EXAM CHEST 2 VIEWS: CPT

## 2022-02-10 PROCEDURE — 4040F PNEUMOC VAC/ADMIN/RCVD: CPT | Performed by: NURSE PRACTITIONER

## 2022-02-10 PROCEDURE — 84443 ASSAY THYROID STIM HORMONE: CPT

## 2022-02-10 PROCEDURE — 3017F COLORECTAL CA SCREEN DOC REV: CPT | Performed by: NURSE PRACTITIONER

## 2022-02-10 PROCEDURE — G8417 CALC BMI ABV UP PARAM F/U: HCPCS | Performed by: NURSE PRACTITIONER

## 2022-02-10 PROCEDURE — 99213 OFFICE O/P EST LOW 20 MIN: CPT | Performed by: NURSE PRACTITIONER

## 2022-02-10 PROCEDURE — 1036F TOBACCO NON-USER: CPT | Performed by: NURSE PRACTITIONER

## 2022-02-10 PROCEDURE — 1123F ACP DISCUSS/DSCN MKR DOCD: CPT | Performed by: NURSE PRACTITIONER

## 2022-02-10 PROCEDURE — G8399 PT W/DXA RESULTS DOCUMENT: HCPCS | Performed by: NURSE PRACTITIONER

## 2022-02-10 PROCEDURE — 3044F HG A1C LEVEL LT 7.0%: CPT | Performed by: NURSE PRACTITIONER

## 2022-02-10 SDOH — ECONOMIC STABILITY: FOOD INSECURITY: WITHIN THE PAST 12 MONTHS, THE FOOD YOU BOUGHT JUST DIDN'T LAST AND YOU DIDN'T HAVE MONEY TO GET MORE.: SOMETIMES TRUE

## 2022-02-10 SDOH — ECONOMIC STABILITY: FOOD INSECURITY: WITHIN THE PAST 12 MONTHS, YOU WORRIED THAT YOUR FOOD WOULD RUN OUT BEFORE YOU GOT MONEY TO BUY MORE.: SOMETIMES TRUE

## 2022-02-10 ASSESSMENT — SOCIAL DETERMINANTS OF HEALTH (SDOH): HOW HARD IS IT FOR YOU TO PAY FOR THE VERY BASICS LIKE FOOD, HOUSING, MEDICAL CARE, AND HEATING?: SOMEWHAT HARD

## 2022-02-10 ASSESSMENT — ENCOUNTER SYMPTOMS
ANAL BLEEDING: 0
CONSTIPATION: 0
DIARRHEA: 0
COLOR CHANGE: 0
BLOOD IN STOOL: 0
SORE THROAT: 0
EYE REDNESS: 0
ABDOMINAL PAIN: 0
COUGH: 0
EYE DISCHARGE: 0
RHINORRHEA: 0
NAUSEA: 0
SHORTNESS OF BREATH: 0
ABDOMINAL DISTENTION: 0

## 2022-02-10 NOTE — PATIENT INSTRUCTIONS
Patient Education        Learning About Meal Planning for Diabetes  Why plan your meals? Meal planning can be a key part of managing diabetes. Planning meals and snacks with the right balance of carbohydrate, protein, and fat can help you keep your blood sugar at the target level you set with your doctor. You don't have to eat special foods. You can eat what your family eats, including sweets once in a while. But you do have to pay attention to how often you eat and how much you eat of certain foods. You may want to work with a dietitian or a certified diabetes educator. He or she can give you tips and meal ideas and can answer your questions about meal planning. This health professional can also help you reach a healthy weight if that is one of your goals. What plan is right for you? Your dietitian or diabetes educator may suggest that you start with the plate format or carbohydrate counting. The plate format  The plate format is a simple way to help you manage how you eat. You plan meals by learning how much space each food should take on a plate. Using the plate format helps you spread carbohydrate throughout the day. It can make it easier to keep your blood sugar level within your target range. It also helps you see if you're eating healthy portion sizes. To use the plate format, you put non-starchy vegetables on half your plate. Add meat or meat substitutes on one-quarter of the plate. Put a grain or starchy vegetable (such as brown rice or a potato) on the final quarter of the plate. You can add a small piece of fruit and some low-fat or fat-free milk or yogurt, depending on your carbohydrate goal for each meal.  Here are some tips for using the plate format:  · Make sure that you are not using an oversized plate. A 9-inch plate is best. Many restaurants use larger plates. · Get used to using the plate format at home. Then you can use it when you eat out.   · Write down your questions about using the plate format. Talk to your doctor, a dietitian, or a diabetes educator about your concerns. Carbohydrate counting  With carbohydrate counting, you plan meals based on the amount of carbohydrate in each food. Carbohydrate raises blood sugar higher and more quickly than any other nutrient. It is found in desserts, breads and cereals, and fruit. It's also found in starchy vegetables such as potatoes and corn, grains such as rice and pasta, and milk and yogurt. Spreading carbohydrate throughout the day helps keep your blood sugar levels within your target range. Your daily amount depends on several things, including your weight, how active you are, which diabetes medicines you take, and what your goals are for your blood sugar levels. A registered dietitian or diabetes educator can help you plan how much carbohydrate to include in each meal and snack. A guideline for your daily amount of carbohydrate is:  · 45 to 60 grams at each meal. That's about the same as 3 to 4 carbohydrate servings. · 15 to 20 grams at each snack. That's about the same as 1 carbohydrate serving. The Nutrition Facts label on packaged foods tells you how much carbohydrate is in a serving of the food. First, look at the serving size on the food label. Is that the amount you eat in a serving? All of the nutrition information on a food label is based on that serving size. So if you eat more or less than that, you'll need to adjust the other numbers. Total carbohydrate is the next thing you need to look for on the label. If you count carbohydrate servings, one serving of carbohydrate is 15 grams. For foods that don't come with labels, such as fresh fruits and vegetables, you'll need a guide that lists carbohydrate in these foods. Ask your doctor, dietitian, or diabetes educator about books or other nutrition guides you can use.   If you take insulin, you need to know how many grams of carbohydrate are in a meal. This lets you know how much 2021               Content Version: 13.1  © 2006-2021 [employer]. Care instructions adapted under license by [facility] ([facility]). If you have questions about a medical condition or this instruction, always ask your healthcare professional. [address] any warranty or liability for your use of this information. Patient Education        Type 2 Diabetes: Care Instructions  Your Care Instructions     Type 2 diabetes is a disease that develops when the body's tissues cannot use insulin properly. Over time, the pancreas cannot make enough insulin. Insulin is a hormone that helps the body's cells use sugar (glucose) for energy. It also helps the body store extra sugar in muscle, fat, and liver cells. Without insulin, the sugar cannot get into the cells to do its work. It stays in the blood instead. This can cause high blood sugar levels. A person has diabetes when the blood sugar stays too high too much of the time. Over time, diabetes can lead to diseases of the heart, blood vessels, nerves, kidneys, and eyes. You may be able to control your blood sugar by losing weight, eating a healthy diet, and getting daily exercise. You may also have to take insulin or other diabetes medicine. Follow-up care is a key part of your treatment and safety. Be sure to make and go to all appointments. Call your doctor if you are having problems. It's also a good idea to know your test results and keep a list of the medicines you take. How can you care for yourself at home? · Keep your blood sugar at a target level (which you set with your doctor). ? Carbohydrate--the body's main source of fuel--affects blood sugar more than any other nutrient. Carbohydrate is in fruits, vegetables, milk, and yogurt. It also is in breads, cereals, vegetables such as potatoes and corn, and sugary foods such as candy and cakes.  Follow your meal plan to know how much carbohydrate to eat at each meal and snack.  ? Aim for 30 minutes of exercise on most, preferably all, days of the week. Walking is a good choice. You also may want to do other activities, such as running, swimming, cycling, or playing tennis or team sports. Try to do muscle-strengthening exercises at least 2 times a week. ? Take your medicines exactly as prescribed. Call your doctor if you think you are having a problem with your medicine. You will get more details on the specific medicines your doctor prescribes. · Check your blood sugar as often as your doctor recommends. It is important to keep track of any symptoms you have, such as low blood sugar. Also tell your doctor if you have any changes in your activities, diet, or insulin use. · Talk to your doctor before you start taking aspirin every day. Aspirin can help certain people lower their risk of a heart attack or stroke. But taking aspirin isn't right for everyone, because it can cause serious bleeding. · Do not smoke. If you need help quitting, talk to your doctor about stop-smoking programs and medicines. These can increase your chances of quitting for good. · Keep your cholesterol and blood pressure at normal levels. You may need to take one or more medicines to reach your goals. Take them exactly as directed. Do not stop or change a medicine without talking to your doctor first.  When should you call for help? Call 911 anytime you think you may need emergency care. For example, call if:    · You passed out (lost consciousness), or you suddenly become very sleepy or confused. (You may have very low blood sugar.)   Call your doctor now or seek immediate medical care if:    · Your blood sugar is 300 mg/dL or is higher than the level your doctor has set for you.     · You have symptoms of low blood sugar, such as:  ? Sweating. ? Feeling nervous, shaky, and weak. ? Extreme hunger and slight nausea. ? Dizziness and headache.  ? Blurred vision. ? Confusion.    Watch closely for changes in your health, and be sure to contact your doctor if:    · You often have problems controlling your blood sugar.     · You have symptoms of long-term diabetes problems, such as:  ? New vision changes. ? New pain, numbness, or tingling in your hands or feet. ? Skin problems. Where can you learn more? Go to https://chpekely.iTMan. org and sign in to your [employer] account. Enter C553 in the [employer] box to learn more about \"Type 2 Diabetes: Care Instructions. \"     If you do not have an account, please click on the \"Sign Up Now\" link. Current as of: July 28, 2021               Content Version: 13.1  © 2006-2021 [employer]. Care instructions adapted under license by [facility] ([facility]). If you have questions about a medical condition or this instruction, always ask your healthcare professional. [place] [address] any warranty or liability for your use of this information. Patient Education        Learning About Type 2 Diabetes  What is type 2 diabetes? Type 2 diabetes is a condition in which you have too much sugar (glucose) in your blood. Glucose is a type of sugar produced in your body when carbohydrates and other foods are digested. It provides energy to cells throughout the body. Normally, blood sugar levels increase after you eat a meal. When blood sugar rises, cells in the pancreas release insulin, which causes the body to absorb sugar from the blood and lowers the blood sugar level to normal.  When you have type 2 diabetes, sugar stays in the blood rather than entering the body's cells to be used for energy. This results in high blood sugar. It happens when your body can't use insulin the right way. Over time, high blood sugar can harm many parts of the body, such as your eyes, heart, blood vessels, nerves, and kidneys. It can also increase your risk for other health problems (complications).   What can you expect with type 2 diabetes? Lindsey Antis keep hearing about how important it is to keep your blood sugar within a target range. That's because over time, high blood sugar can lead to serious problems. It can:  · Harm your eyes, nerves, and kidneys. · Damage your blood vessels, leading to heart disease and stroke. · Reduce blood flow and cause nerve damage to parts of your body, especially your feet. This can cause slow healing and pain when you walk. · Make your immune system weak and less able to fight infections. When people hear the word \"diabetes,\" they often think of problems like these. But daily care and treatment can help prevent or delay these problems. The goal is to keep your blood sugar in a target range. That's the best way to reduce your chance of having more problems from diabetes. What are the symptoms? Some people who have type 2 diabetes may not have any symptoms early on. Many people with the disease don't even know they have it at first. But with time, diabetes starts to cause symptoms. You have most symptoms of type 2 diabetes when your blood sugar is either too high or too low. The most common symptoms of high blood sugar include:  · Thirst.  · Needing to urinate often. · Weight loss. · Blurry vision. The symptoms of low blood sugar include:  · Sweating. · Shakiness. · Weakness. · Hunger. · Confusion. You're not likely to get symptoms of low blood sugar unless you take insulin or use certain diabetes medicines that lower blood sugar. How can you help prevent type 2 diabetes? There are things you can do to help prevent type 2 diabetes. Stay at a healthy weight. Exercise regularly, and eat healthy foods. Even small changes can make a difference. If you have prediabetes, the medicine metformin can help prevent type 2 diabetes. How is type 2 diabetes treated? Treatment for type 2 diabetes will change over time to meet your needs.  But the focus of your treatment will usually be to keep your blood sugar levels in your target range. This will help prevent problems such as eye, kidney, heart, blood vessel, and nerve disease. Some people may need medicines to help their bodies make insulin or decrease insulin resistance. Some medicines slow down how quickly the body absorbs carbohydrates. Treatment to manage type 2 diabetes includes:  · Making healthy food choices and being active. · Losing weight, if you need to. · Seeing your doctor regularly. · Keeping your blood sugar in your target range. · Taking medicines, if you need them. · Quitting smoking, if you smoke. · Keeping your blood pressure and cholesterol under control. Follow-up care is a key part of your treatment and safety. Be sure to make and go to all appointments, and call your doctor if you are having problems. It's also a good idea to know your test results and keep a list of the medicines you take. Where can you learn more? Go to https://THE [email or web address] NOT[employer] NETWORKpepicewWooshii.Accruent. org and sign in to your [employer] account. Enter G085 in the [employer] box to learn more about \"Learning About Type 2 Diabetes. \"     If you do not have an account, please click on the \"Sign Up Now\" link. Current as of: July 28, 2021               Content Version: 13.1  © 2006-2021 [employer]. Care instructions adapted under license by [facility] [place] ([facility]). If you have questions about a medical condition or this instruction, always ask your healthcare professional. [place] [id number] any warranty or liability for your use of this information. Patient Education        High Blood Pressure: Care Instructions  Overview     It's normal for blood pressure to go up and down throughout the day. But if it stays up, you have high blood pressure. Another name for high blood pressure is hypertension. Despite what a lot of people think, high blood pressure usually doesn't cause headaches or make you feel dizzy or lightheaded.  It usually has no symptoms. But it does increase your risk of stroke, heart attack, and other problems. You and your doctor will talk about your risks of these problems based on your blood pressure. Your doctor will give you a goal for your blood pressure. Your goal will be based on your health and your age. Lifestyle changes, such as eating healthy and being active, are always important to help lower blood pressure. You might also take medicine to reach your blood pressure goal.  Follow-up care is a key part of your treatment and safety. Be sure to make and go to all appointments, and call your doctor if you are having problems. It's also a good idea to know your test results and keep a list of the medicines you take. How can you care for yourself at home? Medical treatment  · If you stop taking your medicine, your blood pressure will go back up. You may take one or more types of medicine to lower your blood pressure. Be safe with medicines. Take your medicine exactly as prescribed. Call your doctor if you think you are having a problem with your medicine. · Talk to your doctor before you start taking aspirin every day. Aspirin can help certain people lower their risk of a heart attack or stroke. But taking aspirin isn't right for everyone, because it can cause serious bleeding. · See your doctor regularly. You may need to see the doctor more often at first or until your blood pressure comes down. · If you are taking blood pressure medicine, talk to your doctor before you take decongestants or anti-inflammatory medicine, such as ibuprofen. Some of these medicines can raise blood pressure. · Learn how to check your blood pressure at home. Lifestyle changes  · Stay at a healthy weight. This is especially important if you put on weight around the waist. Losing even 10 pounds can help you lower your blood pressure. · If your doctor recommends it, get more exercise. Walking is a good choice.  Bit by bit, increase the amount you walk every day. Try for at least 30 minutes on most days of the week. You also may want to swim, bike, or do other activities. · Avoid or limit alcohol. Talk to your doctor about whether you can drink any alcohol. · Try to limit how much sodium you eat to less than 2,300 milligrams (mg) a day. Your doctor may ask you to try to eat less than 1,500 mg a day. · Eat plenty of fruits (such as bananas and oranges), vegetables, legumes, whole grains, and low-fat dairy products. · Lower the amount of saturated fat in your diet. Saturated fat is found in animal products such as milk, cheese, and meat. Limiting these foods may help you lose weight and also lower your risk for heart disease. · Do not smoke. Smoking increases your risk for heart attack and stroke. If you need help quitting, talk to your doctor about stop-smoking programs and medicines. These can increase your chances of quitting for good. When should you call for help? Call 911  anytime you think you may need emergency care. This may mean having symptoms that suggest that your blood pressure is causing a serious heart or blood vessel problem. Your blood pressure may be over 180/120. For example, call 911 if:    · You have symptoms of a heart attack. These may include:  ? Chest pain or pressure, or a strange feeling in the chest.  ? Sweating. ? Shortness of breath. ? Nausea or vomiting. ? Pain, pressure, or a strange feeling in the back, neck, jaw, or upper belly or in one or both shoulders or arms. ? Lightheadedness or sudden weakness. ? A fast or irregular heartbeat.     · You have symptoms of a stroke. These may include:  ? Sudden numbness, tingling, weakness, or loss of movement in your face, arm, or leg, especially on only one side of your body. ? Sudden vision changes. ? Sudden trouble speaking. ? Sudden confusion or trouble understanding simple statements. ? Sudden problems with walking or balance.   ? A sudden, severe headache that is different from past headaches.     · You have severe back or belly pain. Do not wait until your blood pressure comes down on its own. Get help right away. Call your doctor now or seek immediate care if:    · Your blood pressure is much higher than normal (such as 180/120 or higher), but you don't have symptoms.     · You think high blood pressure is causing symptoms, such as:  ? Severe headache.  ? Blurry vision. Watch closely for changes in your health, and be sure to contact your doctor if:    · Your blood pressure measures higher than your doctor recommends at least 2 times. That means the top number is higher or the bottom number is higher, or both.     · You think you may be having side effects from your blood pressure medicine. Where can you learn more? Go to https://Mybandstock.NuScale [email or web address] org and sign in to your [employer] account. Enter B256 in the [employer] box to learn more about \"High Blood Pressure: Care Instructions. \"     If you do not have an account, please click on the \"Sign Up Now\" link. Current as of: April 29, 2021               Content Version: 13.1  © 9451-0933 [employer]. Care instructions adapted under license by [facility] ([facility]). If you have questions about a medical condition or this instruction, always ask your healthcare professional. [place] [address] any warranty or liability for your use of this information. Patient Education        Learning About High Blood Pressure  What is high blood pressure? Blood pressure is a measure of how hard the blood pushes against the walls of your arteries. It's normal for blood pressure to go up and down throughout the day. But if it stays up, you have high blood pressure. Another name for high blood pressure is hypertension. Two numbers tell you your blood pressure. The first number is the systolic pressure (top number). It shows how hard the blood pushes when your heart is pumping.  The second number is the diastolic pressure (bottom number). It shows how hard the blood pushes between heartbeats, when your heart is relaxed and filling with blood. Your doctor will give you a goal for your blood pressure based on your health and your age. High blood pressure (hypertension) means that the top number stays high, or the bottom number stays high, or both. High blood pressure increases the risk of stroke, heart attack, and other problems. What happens when you have high blood pressure? · Blood flows through your arteries with too much force. Over time, this can damage the heart and the walls of your arteries. But you can't feel it. High blood pressure usually doesn't cause symptoms. · High blood pressure makes your heart work harder. And that can lead to heart failure, which means your heart doesn't pump as much blood as your body needs. · Fat and calcium start to build up in your arteries. This buildup is called hardening of the arteries. It can cause many problems including a heart attack and stroke. · Arteries also carry blood and oxygen to organs like your eyes, kidneys, and brain. If high blood pressure damages those arteries, it can lead to vision loss, kidney disease, stroke, and a higher risk of dementia. How can you prevent high blood pressure? · Stay at a healthy weight. · Try to limit how much sodium you eat to less than 2,300 milligrams (mg) a day. If you limit your sodium to 1,500 mg a day, you can lower your blood pressure even more. ? Buy foods that are labeled \"unsalted,\" \"sodium-free,\" or \"low-sodium. \" Foods labeled \"reduced-sodium\" and \"light sodium\" may still have too much sodium. ? Flavor your food with garlic, lemon juice, onion, vinegar, herbs, and spices instead of salt. Do not use soy sauce, steak sauce, onion salt, garlic salt, mustard, or ketchup on your food. ? Use less salt (or none) when recipes call for it.  You can often use half the salt a recipe calls for without losing flavor. · Be physically active. Get at least 30 minutes of exercise on most days of the week. Walking is a good choice. You also may want to do other activities, such as running, swimming, cycling, or playing tennis or team sports. · Limit alcohol to 2 drinks a day for men and 1 drink a day for women. · Eat plenty of fruits, vegetables, and low-fat dairy products. Eat less saturated and total fats. How is high blood pressure treated? · Your doctor will suggest making lifestyle changes to help your heart. For example, your doctor may ask you to eat healthy foods, quit smoking, lose extra weight, and be more active. · If lifestyle changes don't help enough, your doctor may recommend that you take medicine. · When blood pressure is very high, medicines are needed to lower it. Follow-up care is a key part of your treatment and safety. Be sure to make and go to all appointments, and call your doctor if you are having problems. It's also a good idea to know your test results and keep a list of the medicines you take. Where can you learn more? Go to https://MindJoltpeCB Biotechnologies.iVinci Health. org and sign in to your TrustedAd account. Enter P501 in the Skytap box to learn more about \"Learning About High Blood Pressure. \"     If you do not have an account, please click on the \"Sign Up Now\" link. Current as of: April 29, 2021               Content Version: 13.1  © 2238-1704 [employer]. Care instructions adapted under license by [facility] ([facility]). If you have questions about a medical condition or this instruction, always ask your healthcare professional. [address] any warranty or liability for your use of this information.

## 2022-02-10 NOTE — PROGRESS NOTES
[address] [facility]  [phone] (phone)  [phone] (fax)    Visit Date: 2/10/2022    Elsy Garcia is a 74 y.o. female who presents today for:  Chief Complaint   Patient presents with    6 Month Follow-Up     HPI:     6 month follow-up    Was seen yesterday by DONOVAN Benitez - for diabetes - A1C was 6.0% at that time. On NovoLog sliding scale group 2 before meals and at bedtime also taking Lantus 25 units nightly. Uses glycerin as a dietary supplement. Try to check sugar at least twice daily.     Had covid in November - was not able to eat much then - appetite is better    Was seeing a Podiatrist before covid    Seeing [facility] tomorrow    HPI  Health Maintenance   Topic Date Due    Diabetic foot exam  Never done    Diabetic retinal exam  Never done    Annual Wellness Visit (AWV)  03/11/2021    TSH testing  08/17/2021    Low dose CT lung screening  02/24/2022    Depression Screen  03/11/2022    COVID-19 Vaccine (4 - Booster for Moderna series) 04/08/2022    Lipid screen  09/07/2022    Potassium monitoring  02/07/2023    Creatinine monitoring  02/07/2023    A1C test (Diabetic or Prediabetic)  02/09/2023    Breast cancer screen  02/08/2024    Colon cancer screen colonoscopy  06/10/2029    DTaP/Tdap/Td vaccine (3 - Td or Tdap) 11/28/2030    DEXA (modify frequency per FRAX score)  Completed    Flu vaccine  Completed    Shingles Vaccine  Completed    Pneumococcal 65+ yrs at Risk Vaccine  Completed    Hepatitis C screen  Completed    Hepatitis A vaccine  Aged Out    Hib vaccine  Aged Out    Meningococcal (ACWY) vaccine  Aged Out     Past Medical History:   Diagnosis Date    Anemia associated with chronic renal failure     Aranesp 150 micrograms every other week given at [facility]    Anxiety     Arthritis     Backache     Blood circulation, collateral     Blood transfusion     CAD (coronary artery disease)     Cellulitis in diabetic foot ([address] 03/03/2017    4th and 5th toes right foot    Chest pain     History of    CHF (congestive heart failure) ([address] 1998    Dx'ed by Dr. Marci Boyce Chronic anemia     Chronic kidney disease     Chronic kidney disease, stage III (moderate) ([address]     Chronic renal insufficiency 2010    COPD (chronic obstructive pulmonary disease) ([address] 2012    Dr. 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 Brenna De La Cruz    HTN (hypertension) 1970's    Hyperlipemia 1998    Iron deficiency anemia due to dietary causes 6/21/2018    Kidney stones 3/2014    Kidney trouble          MRSA infection 03/2017    right foot-Dr. Abi Pederson (podiatrist)    Neuromuscular disorder ([address]     Neuropathy 1989    diabetic neuropathy    Obesity since childhoood    CONTRERAS on CPAP 2010    Dr. Cristopher Caputo    Pneumonia     PONV (postoperative nausea and vomiting)     Seizures ([address]     Sepsis due to Escherichia coli ([address] 07/2020      Past Surgical History:   Procedure Laterality Date    ANKLE SURGERY Right 02-10-14    Dr. Selena Parks at [facility]  1990's    removal of benign tumor   [address]  2008   [address]  2009    2 polyps, not precanceorus    COLONOSCOPY Left 6/10/2019    COLONOSCOPY DIAGNOSTIC performed by Salbador Taveras MD at [address]  3/30/2012    eye lid lift    DIAGNOSTIC CARDIAC CATH LAB PROCEDURE      ENDOSCOPY, COLON, DIAGNOSTIC  2007   [facility] EYE SURGERY  March 30th, 2012    left sided ptosis    FOOT SURGERY  1990    Tarsal tunnel surgery    FRACTURE SURGERY  2015    HYSTERECTOMY  1980 and 1985    first partial in 1980's, then total in [facility]  2015    KIDNEY TRANSPLANT  04/10/2020    RIGHT    LIVER BIOPSY  2015    LUNG BIOPSY      OVARY REMOVAL  1985    IA OFFICE/OUTPT VISIT,PROCEDURE ONLY Left 2018    LEFT UPPER EXTREMENTY AV FISTULA CREATION performed by Cara Yi MD at [facility] Left 2019    EGD BIOPSY performed by Evangelina Smith MD at [facility]     Family History   Problem Relation Age of Onset    Diabetes Sister     Diabetes Brother     Diabetes Sister     Diabetes Sister     Early Death Sister     Heart Disease Sister     Brain Cancer Sister     Diabetes Sister     Heart Disease Sister     Diabetes Sister     Diabetes Brother     Arthritis Mother     Heart Disease Mother     High Blood Pressure Mother     Kidney Disease Mother     Mental Illness Mother     Stroke Mother     Heart Disease Father     Diabetes Father     Obesity Father     Alcohol Abuse Father     Breast Cancer Paternal Cousin 45     Social History     Tobacco Use    Smoking status: Former Smoker     Packs/day: 1.50     Years: 30.00     Pack years: 45.00     Types: Cigarettes     Start date: 1979     Quit date: 3/13/2009     Years since quittin.9    Smokeless tobacco: Never Used    Tobacco comment: quit    Substance Use Topics    Alcohol use: No     Alcohol/week: 0.0 standard drinks      Current Outpatient Medications   Medication Sig Dispense Refill    docusate sodium (COLACE) 100 MG capsule Take 100 mg by mouth 2 times daily as needed for Constipation      sulfamethoxazole-trimethoprim (BACTRIM DS) 800-160 MG per tablet Take 1 tablet by mouth 3 times a week      insulin aspart (NOVOLOG FLEXPEN) 100 UNIT/ML injection pen Sliding scale insulin coverage Glucose:Dose: If Less [id number] =No Insulin/ 140-199= 2 Units/ 200-249=4 Units/ 250-299= 6 Units/  300-349=8 Units/  350-400=10 Units/ Above 400 = 12 Units max 48 units daily 15 pen 1    insulin glargine (LANTUS SOLOSTAR) 100 UNIT/ML injection pen Inject 25 Units into the skin nightly 15 pen 1    Insulin Pen Needle (B-D ULTRAFINE III SHORT PEN) 31G X 8 MM MISC Use three times daily Diagnosis Code E11.9 200 each 3    carvedilol (COREG) 25 MG tablet Take 1 tablet by mouth 2 times daily 2 tablets  tablet 1    Nutritional Supplement LIQD Take 2 Cans by mouth daily STRAWBERRY GLUCERNA 60 each 0    HYDROcodone-acetaminophen (NORCO) 5-325 MG per tablet Take 1 tablet by mouth every 8 hours as needed for Pain for up to 30 days.  90 tablet 0    NIFEdipine (ADALAT CC) 60 MG extended release tablet Take 60 mg by mouth 2 times daily       lactulose (CHRONULAC) 10 GM/15ML solution Take twice daily as needed for constipation 2700 mL 1    vitamin D (ERGOCALCIFEROL) 1.25 MG (99655 UT) CAPS capsule TAKE 1 CAPSULE BY MOUTH EVERY 14 DAYS ON MONDAYS 6 capsule 3    cloNIDine (CATAPRES) 0.2 MG tablet Take 0.2 mg by mouth 3 times daily      famotidine (PEPCID) 40 MG tablet Take 40 mg by mouth 2 times daily as needed       iron polysaccharides (NIFEREX) 150 MG capsule Take 150 mg by mouth daily      tiotropium (SPIRIVA RESPIMAT) 2.5 MCG/ACT AERS inhaler Inhale 2 puffs into the lungs daily 1 Inhaler 11    albuterol sulfate HFA (PROAIR HFA) 108 (90 Base) MCG/ACT inhaler Inhale 2 puffs into the lungs every 6 hours as needed for Wheezing or Shortness of Breath 3 Inhaler 3    fluticasone (FLONASE) 50 MCG/ACT nasal spray 1 spray by Each Nostril route daily as needed for Rhinitis 3 Bottle 3    pantoprazole (PROTONIX) 40 MG tablet Take 40 mg by mouth 2 times daily       atorvastatin (LIPITOR) 40 MG tablet TAKE 1 TABLET DAILY 90 tablet 3    tacrolimus (PROGRAF) 1 MG capsule Take 1 mg by mouth 5 CAPSULES EVERY MORNING AND 5 CAPSULES EVERY EVENING      Mycophenolate Sodium 360 MG TBEC Take 360 mg by mouth 1 tablets BID      magnesium oxide (MAG-OX) 400 MG tablet Take 400 mg by mouth 2 times daily      linaclotide (LINZESS) 290 MCG CAPS capsule Take 145 mcg by mouth every other day       aspirin 81 MG EC tablet Take 81 mg by mouth daily. No current facility-administered medications for this visit. Allergies   Allergen Reactions    Actos [Pioglitazone Hydrochloride] Swelling    Pioglitazone Swelling     Patient states she does not know this one. 8/11/21    Cymbalta [Duloxetine Hcl] Other (See Comments)     Anxiety and lethargic    Gabapentin Anxiety       Subjective:    Review of Systems   Constitutional: Negative for chills, fatigue and fever. HENT: Negative for congestion, ear pain, postnasal drip, rhinorrhea and sore throat. Eyes: Negative for discharge and redness. Respiratory: Negative for cough and shortness of breath. Cardiovascular: Negative for chest pain and leg swelling. Gastrointestinal: Negative for abdominal distention, abdominal pain, anal bleeding, blood in stool, constipation, diarrhea and nausea. Skin: Negative for color change and rash. Blisters on toes   Neurological: Negative for facial asymmetry, speech difficulty and weakness. Hematological: Does not bruise/bleed easily. Psychiatric/Behavioral: Negative for agitation and confusion. Objective:     Vitals:    02/10/22 1406   BP: 122/60   Site: Right Upper Arm   Position: Sitting   Cuff Size: Medium Adult   Pulse: 61   Resp: 14   Temp: 97 °F (36.1 °C)   TempSrc: Skin   SpO2: 98%   Weight: 187 lb 3.2 oz (84.9 kg)   Height: 5' 5\" (1.651 m)       Body mass index is 31.15 kg/m². Wt Readings from Last 3 Encounters:   02/10/22 187 lb 3.2 oz (84.9 kg)   02/09/22 190 lb (86.2 kg)   01/04/22 184 lb (83.5 kg)     BP Readings from Last 3 Encounters:   02/10/22 122/60   02/09/22 138/68   01/04/22 (!) 142/80     Physical Exam  Vitals and nursing note reviewed. Constitutional:       Appearance: She is well-developed. HENT:      Head: Normocephalic and atraumatic.       Right Ear: External ear normal.      Left Ear: External ear normal.   Eyes:      Conjunctiva/sclera: Conjunctivae normal.   Cardiovascular:      Rate and Rhythm: Normal rate. Pulmonary:      Effort: Pulmonary effort is normal.   Abdominal:      General: Bowel sounds are normal.      Palpations: Abdomen is soft. Genitourinary:     Vagina: Normal. No vaginal discharge. Musculoskeletal:         General: No tenderness. Normal range of motion. Cervical back: Normal range of motion. Feet:      Comments: Blistering to toes   Skin:     General: Skin is warm and dry. Neurological:      Mental Status: She is alert and oriented to person, place, and time. Psychiatric:         Behavior: Behavior normal.         Thought Content: Thought content normal.         Judgment: Judgment normal.         Lab Results   Component Value Date    WBC 7.5 02/07/2022    HGB 10.3 (L) 02/07/2022    HCT 34.2 (L) 02/07/2022     02/07/2022    CHOL 145 09/07/2021    TRIG 147 09/07/2021    HDL 42 09/07/2021    LDLDIRECT 84.65 09/07/2021    LDLCALC 61 03/03/2021    AST 15 09/07/2021     02/07/2022    K 3.9 02/07/2022     02/07/2022    CREATININE 1.1 02/07/2022    BUN 24 (H) 02/07/2022    CO2 25 02/07/2022    TSH 1.400 08/17/2020    INR 1.28 (H) 08/02/2020    LABA1C 6.1 (H) 02/09/2022    LABMICR 20.95 01/04/2018    LABGLOM 49 (A) 02/07/2022    MG 2.0 09/07/2021    CALCIUM 10.6 (H) 09/07/2021    VITD25 30 11/05/2021     Assessment:       Diagnosis Orders   1. Type 2 diabetes mellitus with stage 5 chronic kidney disease not on chronic dialysis, with long-term current use of insulin (HCC)  Comprehensive Metabolic Panel    Glomerular Filtration Rate, Estimated   2. Essential hypertension  Comprehensive Metabolic Panel    Glomerular Filtration Rate, Estimated   3. Renal transplant recipient  Comprehensive Metabolic Panel    Glomerular Filtration Rate, Estimated   4. Acquired hypothyroidism  TSH with Reflex       Plan:    We will have the see podiatrist  We will get some repeat blood work  We will see the nephrologist tomorrow    Return in about 6 months (around 8/10/2022). Orders Placed:  Orders Placed This Encounter   Procedures    TSH with Reflex    Comprehensive Metabolic Panel    Glomerular Filtration Rate, Estimated     Medications Prescribed:  No orders of the defined types were placed in this encounter. Future Appointments   Date Time Provider Franklin Uribei   2/25/2022  3:00 PM STR CT IMAGING RM1  [facility] OUT EXP STR Radiolog   3/2/2022  3:00 PM DONOVAN Trujillo CNP [facility]   4/5/2022 11:30 AM DONOVAN Nava CNP N SRPX Pain [facility]   4/7/2022  2:00 PM Jesus Carty MD [facility] [place] [facility]   6/7/2022  2:00 PM STR ULTRASOUND RM 2 STRZ US STR Radiolog   6/14/2022  2:30 PM MD Esthela Mathews Uro [facility]   8/9/2022  2:00 PM DONOVAN Chaudhary CNP SRPX IM MED [facility]      Patient given educational materials - see patient instructions. Discussed use, benefit, and side effects of prescribedmedications. All patient questions answered. Pt voiced understanding. Reviewed health maintenance. Instructed to continue current medications, diet and exercise. Patient agreed with treatment plan. Follow up as directed.     Electronically signed by DONOVAN Jarrett CNP on 2/10/2022 at 2:23 PM

## 2022-02-18 DIAGNOSIS — G89.4 CHRONIC PAIN SYNDROME: ICD-10-CM

## 2022-02-18 NOTE — TELEPHONE ENCOUNTER
Ginger Whittaker called requesting a refill on the following medications:  Requested Prescriptions     Pending Prescriptions Disp Refills    HYDROcodone-acetaminophen (NORCO) 5-325 MG per tablet 90 tablet 0     Sig: Take 1 tablet by mouth every 8 hours as needed for Pain for up to 30 days.      [facility]arm[facility]      Date of last visit:   Date of next visit (if applicable): 2/3/6176

## 2022-02-21 ENCOUNTER — HOSPITAL ENCOUNTER (OUTPATIENT)
Dept: INTERVENTIONAL RADIOLOGY/VASCULAR | Age: 67
Discharge: HOME | End: 2022-02-21
Payer: MEDICARE

## 2022-02-21 DIAGNOSIS — I73.9 INTERMITTENT CLAUDICATION (HCC): ICD-10-CM

## 2022-02-21 DIAGNOSIS — R20.0 NUMBNESS: ICD-10-CM

## 2022-02-21 DIAGNOSIS — G89.4 CHRONIC PAIN SYNDROME: ICD-10-CM

## 2022-02-21 PROCEDURE — 93923 UPR/LXTR ART STDY 3+ LVLS: CPT

## 2022-02-21 RX ORDER — HYDROCODONE BITARTRATE AND ACETAMINOPHEN 5; 325 MG/1; MG/1
1 TABLET ORAL EVERY 8 HOURS PRN
Qty: 90 TABLET | Refills: 0 | Status: SHIPPED | OUTPATIENT
Start: 2022-02-21 | End: 2022-02-21 | Stop reason: SDUPTHER

## 2022-02-21 RX ORDER — HYDROCODONE BITARTRATE AND ACETAMINOPHEN 5; 325 MG/1; MG/1
1 TABLET ORAL EVERY 8 HOURS PRN
Qty: 90 TABLET | Refills: 0 | Status: SHIPPED | OUTPATIENT
Start: 2022-02-21 | End: 2022-03-18 | Stop reason: SDUPTHER

## 2022-02-21 NOTE — TELEPHONE ENCOUNTER
OARRS reviewed. UDS: + for Dihydrocodeine, Hydrocodone, Norhydrocodone, Hydromorphone   Last seen: 1/4/22.  Follow-up: 4/5/22

## 2022-02-22 ENCOUNTER — NURSE ONLY (OUTPATIENT)
Dept: LAB | Age: 67
End: 2022-02-22

## 2022-02-22 LAB
CREATININE URINE: 109.7 MG/DL
PROT/CREAT RATIO, UR: 0.32
PROTEIN, URINE: 34.6 MG/DL

## 2022-02-24 ASSESSMENT — ENCOUNTER SYMPTOMS
COUGH: 0
SINUS PAIN: 0
BACK PAIN: 0
ABDOMINAL DISTENTION: 0
NAUSEA: 0
DIARRHEA: 0
SINUS PRESSURE: 0
EYE PAIN: 0
TROUBLE SWALLOWING: 0
VOMITING: 0
CONSTIPATION: 0
BLOOD IN STOOL: 0
ABDOMINAL PAIN: 0
PHOTOPHOBIA: 0
SHORTNESS OF BREATH: 0
WHEEZING: 0
SORE THROAT: 0

## 2022-02-25 ENCOUNTER — HOSPITAL ENCOUNTER (OUTPATIENT)
Dept: CT IMAGING | Age: 67
Discharge: HOME | End: 2022-02-25
Payer: MEDICARE

## 2022-02-25 DIAGNOSIS — Z87.891 PERSONAL HISTORY OF TOBACCO USE: ICD-10-CM

## 2022-02-25 PROCEDURE — 71271 CT THORAX LUNG CANCER SCR C-: CPT

## 2022-03-02 ENCOUNTER — OFFICE VISIT (OUTPATIENT)
Dept: PULMONOLOGY | Age: 67
End: 2022-03-02
Payer: MEDICARE

## 2022-03-02 VITALS
SYSTOLIC BLOOD PRESSURE: 120 MMHG | HEIGHT: 65 IN | OXYGEN SATURATION: 96 % | WEIGHT: 191.8 LBS | DIASTOLIC BLOOD PRESSURE: 60 MMHG | BODY MASS INDEX: 31.96 KG/M2 | HEART RATE: 68 BPM | TEMPERATURE: 97.5 F

## 2022-03-02 DIAGNOSIS — Z86.16 HISTORY OF COVID-19: ICD-10-CM

## 2022-03-02 DIAGNOSIS — J41.0 SIMPLE CHRONIC BRONCHITIS (HCC): Primary | ICD-10-CM

## 2022-03-02 DIAGNOSIS — J81.0 ACUTE PULMONARY EDEMA (HCC): ICD-10-CM

## 2022-03-02 DIAGNOSIS — J30.89 NON-SEASONAL ALLERGIC RHINITIS, UNSPECIFIED TRIGGER: ICD-10-CM

## 2022-03-02 DIAGNOSIS — Z87.891 PERSONAL HISTORY OF TOBACCO USE: ICD-10-CM

## 2022-03-02 LAB — GFR SERPL CREATININE-BSD FRML MDRD: 45 ML/MIN/1.73M2

## 2022-03-02 PROCEDURE — G8427 DOCREV CUR MEDS BY ELIG CLIN: HCPCS | Performed by: NURSE PRACTITIONER

## 2022-03-02 PROCEDURE — G8399 PT W/DXA RESULTS DOCUMENT: HCPCS | Performed by: NURSE PRACTITIONER

## 2022-03-02 PROCEDURE — G8482 FLU IMMUNIZE ORDER/ADMIN: HCPCS | Performed by: NURSE PRACTITIONER

## 2022-03-02 PROCEDURE — 3017F COLORECTAL CA SCREEN DOC REV: CPT | Performed by: NURSE PRACTITIONER

## 2022-03-02 PROCEDURE — G8417 CALC BMI ABV UP PARAM F/U: HCPCS | Performed by: NURSE PRACTITIONER

## 2022-03-02 PROCEDURE — 99214 OFFICE O/P EST MOD 30 MIN: CPT | Performed by: NURSE PRACTITIONER

## 2022-03-02 PROCEDURE — 4040F PNEUMOC VAC/ADMIN/RCVD: CPT | Performed by: NURSE PRACTITIONER

## 2022-03-02 PROCEDURE — 1036F TOBACCO NON-USER: CPT | Performed by: NURSE PRACTITIONER

## 2022-03-02 PROCEDURE — 1123F ACP DISCUSS/DSCN MKR DOCD: CPT | Performed by: NURSE PRACTITIONER

## 2022-03-02 PROCEDURE — 1090F PRES/ABSN URINE INCON ASSESS: CPT | Performed by: NURSE PRACTITIONER

## 2022-03-02 PROCEDURE — 3023F SPIROM DOC REV: CPT | Performed by: NURSE PRACTITIONER

## 2022-03-02 RX ORDER — FLUTICASONE PROPIONATE 50 MCG
1 SPRAY, SUSPENSION (ML) NASAL DAILY
Qty: 3 EACH | Refills: 3 | Status: SHIPPED | OUTPATIENT
Start: 2022-03-02

## 2022-03-02 RX ORDER — ALBUTEROL SULFATE 90 UG/1
2 AEROSOL, METERED RESPIRATORY (INHALATION) EVERY 6 HOURS PRN
Qty: 3 EACH | Refills: 3 | Status: SHIPPED | OUTPATIENT
Start: 2022-03-02 | End: 2023-03-02

## 2022-03-02 ASSESSMENT — ENCOUNTER SYMPTOMS
SPUTUM PRODUCTION: 0
WHEEZING: 1
STRIDOR: 0
NAUSEA: 0
VOMITING: 0
CHEST TIGHTNESS: 0
SHORTNESS OF BREATH: 1
COUGH: 0
DIARRHEA: 0

## 2022-03-02 ASSESSMENT — COPD QUESTIONNAIRES: COPD: 1

## 2022-03-02 NOTE — PROGRESS NOTES
[facility]    Patient: Ghassan Hyde, 74 y.o.   : 1955  3/2/2022    Pt of Dr. Kayden Sam   Patient presents with    Follow-up     6 mo f/u with ldct        COPD  She complains of shortness of breath and wheezing. There is no cough or sputum production. This is a chronic problem. The current episode started more than 1 year ago. The problem occurs daily. The problem has been gradually worsening. Associated symptoms include dyspnea on exertion. Pertinent negatives include no chest pain, fever or nasal congestion. Her symptoms are aggravated by strenuous activity. Her symptoms are alleviated by rest and beta-agonist. She reports significant improvement on treatment. Risk factors for lung disease include smoking/tobacco exposure. Her past medical history is significant for COPD. Nixon Andrews is here for follow up for severe COPD. PMH significant for Anemia, anxiety, CAD, COPD, CKD s/p left renal transplant 4/10/2020 had some issues with UTI and cystitis.  Overall patient reports respiratory symptoms have been waxing and waning had increased issues when had Covid feels has recovered well but not back to baseline. Patient reports good compliance with inhaled medications (spiriva respimat). Patient using albuterol 3-4  times per week on average. Patient reports some physical limitation due to respiratory symptoms. Reports takes some breaks when vaccuming. Has been having issues with BLE edema to the point of causing fluid filled blisters informed her Nephrologist who advised her to follow-up with Cardiology per pt. Covid in November was not admitted to hospital. Was given cough syrup recovered after 3 weeks. Former smoker 30 yrs 1.5 PPD Quit     Progress History:   Since last visit any new medical issues? Yes BLE edema Had Covid in 2021  New ER or hospital visits? No  Any new or changes in medicines? No  Using inhalers? Yes spiriva respimat  Are they helpful? Yes  Flu vaccine? 12/2021  Pneumonia vaccine?  Last dose 2020  Past Medical hx   PMH:  Past Medical History:   Diagnosis Date    Anemia associated with chronic renal failure     Aranesp 150 micrograms every other week given at [facility]. [facility]    Anxiety     Arthritis     Backache     Blood circulation, collateral     Blood transfusion     CAD (coronary artery disease)     Cellulitis in diabetic foot ([address] 03/03/2017    4th and 5th toes right foot    Chest pain     History of    CHF (congestive heart failure) ([address] 1998    Dx'ed by Dr. Nely Fenton Chronic anemia     Chronic kidney disease     Chronic kidney disease, stage III (moderate) (HCC)     Chronic renal insufficiency 2010    COPD (chronic obstructive pulmonary disease) ([address] 2012    Dr. 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 Hernandez Britton    HTN (hypertension) 1970's    Hyperlipemia 1998    Iron deficiency anemia due to dietary causes 6/21/2018    Kidney stones 3/2014    Kidney trouble          MRSA infection 03/2017    right foot-Dr. Federica Grant (podiatrist)    Neuromuscular disorder ([facility] [address]     Neuropathy 1989    diabetic neuropathy    Obesity since childhoood    CONTRERAS on CPAP 2010    Dr. Jordyn Call    Pneumonia     PONV (postoperative nausea and vomiting)     Seizures ([facility]     Sepsis due to Escherichia coli ([facility] [address] [facility] 07/2020     SURGICAL HISTORY:  Past Surgical History:   Procedure Laterality Date    ANKLE SURGERY Right 02-10-14    Dr. Germán Paul at [facility] 15  1990's    removal of benign tumor   [address] [place] [address]    COLONOSCOPY  2009    2 polyps, not precanceorus    COLONOSCOPY Left 6/10/2019    COLONOSCOPY DIAGNOSTIC performed by Ankit Sargent MD at [address] [facility]  3/30/2012    eye lid lift    DIAGNOSTIC CARDIAC CATH LAB PROCEDURE      ENDOSCOPY, COLON, DIAGNOSTIC     Ayush Garcia EYE SURGERY  2012    left sided ptosis    FOOT SURGERY      Tarsal tunnel surgery    FRACTURE SURGERY     [address] and [address]    first partial in , then total in [facility]     [facility]  04/10/2020    RIGHT    LIVER BIOPSY  2015    LUNG BIOPSY      OVARY REMOVAL      MS OFFICE/OUTPT VISIT,PROCEDURE ONLY Left 2018    LEFT UPPER EXTREMENTY AV FISTULA CREATION performed by Loan Gates MD at [address] Left 2019    EGD BIOPSY performed by Ankit Sargent MD at [facility]     SOCIAL HISTORY:  Social History     Tobacco Use    Smoking status: Former Smoker     Packs/day: 1.50     Years: 30.00     Pack years: 45.00     Types: Cigarettes     Start date: 1979     Quit date: 3/13/2009     Years since quittin.9    Smokeless tobacco: Never Used    Tobacco comment: quit    Vaping Use    Vaping Use: Never used   Substance Use Topics    Alcohol use: No     Alcohol/week: 0.0 standard drinks    Drug use: No     ALLERGIES:  Allergies   Allergen Reactions    Actos [Pioglitazone Hydrochloride] Swelling    Pioglitazone Swelling     Patient states she does not know this one. 21    Cymbalta [Duloxetine Hcl] Other (See Comments)     Anxiety and lethargic    Gabapentin Anxiety     FAMILY HISTORY:  Family History   Problem Relation Age of Onset    Diabetes Sister     Diabetes Brother     Diabetes Sister     Diabetes Sister     Early Death Sister     Heart Disease Sister     Brain Cancer Sister     Diabetes Sister     Heart Disease Sister     Diabetes Sister     Diabetes Brother     Arthritis Mother     Heart Disease Mother     High Blood Pressure Mother     Kidney Disease Mother     Mental Illness Mother     Stroke Mother     Heart Disease Father     Diabetes Father     Obesity Father     Alcohol Abuse Father     Breast Cancer Paternal Cousin 45     CURRENT MEDICATIONS:  Current Outpatient Medications   Medication Sig Dispense Refill    tiotropium (SPIRIVA RESPIMAT) 2.5 MCG/ACT AERS inhaler Inhale 2 puffs into the lungs daily 1 each 11    albuterol sulfate HFA (PROAIR HFA) 108 (90 Base) MCG/ACT inhaler Inhale 2 puffs into the lungs every 6 hours as needed for Wheezing or Shortness of Breath 3 each 3    fluticasone (FLONASE) 50 MCG/ACT nasal spray 1 spray by Each Nostril route daily 3 each 3    HYDROcodone-acetaminophen (NORCO) 5-325 MG per tablet Take 1 tablet by mouth every 8 hours as needed for Pain for up to 30 days.  90 tablet 0    docusate sodium (COLACE) 100 MG capsule Take 100 mg by mouth 2 times daily as needed for Constipation      sulfamethoxazole-trimethoprim (BACTRIM DS) 800-160 MG per tablet Take 1 tablet by mouth 3 times a week      insulin aspart (NOVOLOG FLEXPEN) 100 UNIT/ML injection pen Sliding scale insulin coverage Glucose:Dose: If Less [id number] =No Insulin/ 140-199= 2 Units/ 200-249=4 Units/ 250-299= 6 Units/  300-349=8 Units/  350-400=10 Units/ Above 400 = 12 Units max 48 units daily 15 pen 1    insulin glargine (LANTUS SOLOSTAR) 100 UNIT/ML injection pen Inject 25 Units into the skin nightly 15 pen 1    Insulin Pen Needle (B-D ULTRAFINE III SHORT PEN) 31G X 8 MM MISC Use three times daily Diagnosis Code E11.9 200 each 3    carvedilol (COREG) 25 MG tablet Take 1 tablet by mouth 2 times daily 2 tablets  tablet 1    Nutritional Supplement LIQD Take 2 Cans by mouth daily STRAWBERRY GLUCERNA 60 each 0    NIFEdipine (ADALAT CC) 60 MG extended release tablet Take 60 mg by mouth 2 times daily       lactulose (CHRONULAC) 10 GM/15ML solution Take twice daily as needed for constipation 2700 mL 1    vitamin D (ERGOCALCIFEROL) 1.25 MG (14008 UT) CAPS capsule TAKE 1 CAPSULE BY MOUTH EVERY 14 DAYS ON MONDAYS 6 capsule 3    cloNIDine (CATAPRES) 0.2 MG tablet Take 0.2 mg by mouth 3 times daily      famotidine (PEPCID) 40 MG tablet Take 40 mg by mouth 2 times daily as needed       iron polysaccharides (NIFEREX) 150 MG capsule Take 150 mg by mouth daily      pantoprazole (PROTONIX) 40 MG tablet Take 40 mg by mouth 2 times daily       atorvastatin (LIPITOR) 40 MG tablet TAKE 1 TABLET DAILY 90 tablet 3    tacrolimus (PROGRAF) 1 MG capsule Take 1 mg by mouth 5 CAPSULES EVERY MORNING AND 5 CAPSULES EVERY EVENING      Mycophenolate Sodium 360 MG TBEC Take 360 mg by mouth 1 tablets BID      magnesium oxide (MAG-OX) 400 MG tablet Take 400 mg by mouth 2 times daily      linaclotide (LINZESS) 290 MCG CAPS capsule Take 145 mcg by mouth every other day       aspirin 81 MG EC tablet Take 81 mg by mouth daily. No current facility-administered medications for this visit. Hakeem SANTILLAN   Review of Systems   Constitutional: Negative for chills and fever. Respiratory: Positive for shortness of breath and wheezing. Negative for cough, sputum production, chest tightness and stridor. Cardiovascular: Positive for dyspnea on exertion and leg swelling. Negative for chest pain. Gastrointestinal: Negative for diarrhea, nausea and vomiting. Genitourinary: Negative for dysuria. Physical exam   /60 (Site: Right Upper Arm, Position: Sitting, Cuff Size: Small Adult)   Pulse 68   Temp 97.5 °F (36.4 °C)   Ht 5' 5\" (1.651 m)   Wt 191 lb 12.8 oz (87 kg)   SpO2 96% Comment: on r/a  BMI 31.92 kg/m²    Wt Readings from Last 3 Encounters:   03/02/22 191 lb 12.8 oz (87 kg)   02/10/22 187 lb 3.2 oz (84.9 kg)   02/09/22 190 lb (86.2 kg)       Physical Exam  Vitals and nursing note reviewed. Constitutional:       General: She is not in acute distress. Appearance: She is well-developed.    HENT:      Head: Normocephalic and atraumatic. Neck:      Trachea: No tracheal deviation. Cardiovascular:      Rate and Rhythm: Normal rate and regular rhythm. Heart sounds: Normal heart sounds. No murmur heard. Pulmonary:      Effort: Pulmonary effort is normal. No respiratory distress. Breath sounds: No stridor. Rales present. No wheezing. Comments: Faint rales   Chest:      Chest wall: No tenderness. Abdominal:      General: Bowel sounds are normal. There is no distension. Palpations: Abdomen is soft. Musculoskeletal:      Cervical back: Neck supple. Right lower leg: Edema present. Left lower leg: Edema present. Skin:     General: Skin is warm and dry. Capillary Refill: Capillary refill takes less than 2 seconds. Neurological:      Mental Status: She is alert and oriented to person, place, and time. Psychiatric:         Behavior: Behavior normal.         Thought Content: Thought content normal.          Results   Lung Nodule Screening     [x] Qualifies    [] Does not qualify   [] Declined    [] Completed     The USPSTF recommends annual screening for lung cancer with low-dose computed tomography (LDCT) in adults aged 48 to [de-identified] years who have a 20 pack-year smoking history and currently smoke or have quit within the past 15 years. Screening should be discontinued once a person has not smoked for 15 years or develops a health problem that substantially limits life expectancy or the ability or willingness to have curative lung surgery. NONCONTRAST SCREENING CT CHEST   2/25/2022   FINDINGS: LUNGS NODULES: 1. There is a stable 7 mm nodule in the lingular region of the left lung dating back to 2019. 2. There is a stable 3 mm pleural-based nodule at the lateral aspect of the right upper lobe. 3. There are stable calcified nodules in the superior aspect of left upper lobe and right middle lobe. LYMPHADENOPATHY: 1. There are no pathologically enlarged lymph nodes.   OTHER (LUNGS/MEDIASTINUM/MUSCULOSKELETAL/ABDOMEN): 1. There are patchy geographic groundglass opacities in both lungs. 2. The heart is stably enlarged. Impression:  1. There are no suspicious masses or nodules within the lung fields. 2. Patchy groundglass opacities bilaterally likely represents an infectious/inflammatory process. Consider follow-up chest CT in 2 months to assess resolution. 3. LUNGRADS ASSESSMENT VALUE: 2, LUNGRADS S MODIFIER       The LUNG RADS RECOMMENDATIONS for monitoring lung nodules listed below (ACR- Lung-RADS Version 1.0 Assessment Categories Release date\" April 28, 2014)  LUNG RADS RECOMMENDATIONS;   1.  Normal, continue annual screening   2. Benign appearance or behavior, continue annual screening   3.  6 month CT recommended   4A.  3 month CT recommended; may consider PET/CT   4B. Additional diagnostics and/or tissue sampling recommended   4X. Additional diagnostics and/or tissue sampling recommended         Conclusions      Summary   Ejection fraction was estimated at 55-60%. The aortic valve was trileaflet with increased thickness/calcification and   preserved cuspal separation. IVC size is within normal limits with normal respiratory phasic changes. Signature      ----------------------------------------------------------------   Electronically signed by Jose Daniel Jolley MD (Interpreting   physician) on 03/02/2021 at 02:42 PM   ----------------------------------------------------------------    XR CHEST (2 VW)   2/10/2022   FINDINGS: Lines/tubes: None. Heart/mediastinum: Cardiomegaly is stable. The pulmonary vascularity is unremarkable. Lungs: The lungs remain hyperinflated. Left scarring versus atelectasis is present. No focal consolidation, effusion, or pneumothorax is observed. Bones: Diffuse osteopenia is present. The visualized skeletal structures appear intact. Impression:  Pulmonary hyperinflation. Cardiomegaly and chronic findings are stable.  No acute intrathoracic process is observed. Assessment      Diagnosis Orders   1. Simple chronic bronchitis (HCC)  tiotropium (SPIRIVA RESPIMAT) 2.5 MCG/ACT AERS inhaler    albuterol sulfate HFA (PROAIR HFA) 108 (90 Base) MCG/ACT inhaler   2. Non-seasonal allergic rhinitis, unspecified trigger  fluticasone (FLONASE) 50 MCG/ACT nasal spray   3. Personal history of tobacco use     4. Acute pulmonary edema (HCC)  CT CHEST WO CONTRAST        -Bilateral GGO possible scarring 2/2 Covid-19 infection vs pulmonary edema with BLE swelling     Plan   -Will continue on spiriva no exacerbations since last appointment   -Albuterol 2 puff Q6 hrs PRN for SOB/wheezing   -Discussed albuterol inhaler use. Reviewed signs and symptoms indicating need for use including Shortness of breath and wheezing. Discussed with patient the importance of using inhaler within the prescribed time frames. Patient verbalized understanding to use within prescribed time frame.  Patient also verbalized understanding that if they experience no relief after using albuterol and resting for 15 minutes they need to go to nearest ER or call 911.  -LDCT reviewed will have patient scheduled to follow-up with cardiology suspect patchy GGO 2/2 post covid changes vs volume overload with BLE edema has follow-up with cardiology next week, will repeat CT chest in 3 months follow for resolution/document stability  -Advised to maintain pneumonia vaccine with PCP and to take flu vaccine this coming season.  -Advised patient to call office with any changes, questions, or concerns regarding respiratory status    Will see Manuel Rodriguez in: 3 months CT chest     Mack Houser CNP  3/2/2022

## 2022-03-03 DIAGNOSIS — E11.9 TYPE 2 DIABETES MELLITUS WITHOUT COMPLICATION, WITH LONG-TERM CURRENT USE OF INSULIN (HCC): ICD-10-CM

## 2022-03-03 DIAGNOSIS — Z79.4 TYPE 2 DIABETES MELLITUS WITHOUT COMPLICATION, WITH LONG-TERM CURRENT USE OF INSULIN (HCC): ICD-10-CM

## 2022-03-03 RX ORDER — ZINC OXIDE 216 MG/ML
2 LOTION TOPICAL DAILY
Qty: 60 EACH | Refills: 2 | Status: SHIPPED | OUTPATIENT
Start: 2022-03-03 | End: 2022-06-14

## 2022-03-03 NOTE — TELEPHONE ENCOUNTER
Pharmacy needing diagnosis code on nutritional supplement shakes     Per [facility] use Dx code R63.0, L95    Updated script sent.

## 2022-03-04 NOTE — PROGRESS NOTES
TELEHEALTH EVALUATION -- Audio/Visual (During AOOOF-47 public health emergency)    HPI:    Tanna Allison (:  1955) has requested an audio/video evaluation for the following concern(s):  Tanna Allison is a 75 y.o. female who presents today for:  No chief complaint on file. Goals      Exercise 3x per week (30 min per time)           HPI:     HPI   She is doing ok other than the hands shaking form the tremors    Her kidney is doing really well since the transplant    Her sugar is on the higher side - it is a lot of fried chicken - people are bringing her food - she has lost weight - now under when she went in for the surgery    203 this am and mostly in the 200s - she is usually not like that - just since the kidney transplant    Taking 18 of lantus at night and a sliding scale of the novolog - usually using 6 units at the most and     PT makes her walk and not use the walker as much - does not need occupational therapy    Does not need extra help around the house - her grandkids help     Has not been doing as well with her CPAP - she is on the TV in her bed and watching tv and the next thing she notices it is morning. The cpap does aggravate her face    Breathing is ok, way better than it was - she had so much fluid in her body, but now is doing better - still some alergies - uses the nasal spray and that is working    Stopped the lasix on     They did increase the clonidine to 0.3mg and on the norvasc 10 mg  nyfortic - 2 at 10 am and 10 pm  foltex in creased  Asa  norvasc  Nystatin - oral suspension 4 times a day  Bactrim q am  lipitor 40mg   Coreg 25mg bid  Clonidine  0,3 tid  protinix - 40mg in the am - lots of heart burn lately  Colace 100mg bid for constipation  linzess - for contipation as needed  norco as needed      No question data found.     Past Medical History:   Diagnosis Date    Anemia associated with chronic renal failure     Aranesp 150 micrograms every other week given at [address] [place] [address]. inhaler Inhale 2 puffs into the lungs every 6 hours as needed for Wheezing or Shortness of Breath 3 Inhaler 3    fluticasone (FLONASE) 50 MCG/ACT nasal spray 2 sprays by Nasal route daily 3 Bottle 3    cloNIDine (CATAPRES) 0.3 MG tablet TAKE 1 TABLET EVERY 8 HOURS (THREE TIMES A DAY) 270 tablet 1    atorvastatin (LIPITOR) 40 MG tablet daily       lactulose (CHRONULAC) 10 GM/15ML solution TAKE 30ML BY MOUTH 2 TIMES DAILY AS NEEDED CONSTIPATION. USE IF NO BM WITH OTHER SOFTNERS 473 mL 0    vitamin D (ERGOCALCIFEROL) 1.25 MG (16399 UT) CAPS capsule TAKE ONE CAPSULE BY MOUTH ONE TIME PER WEEK 4 capsule 5    carvedilol (COREG) 25 MG tablet Take 1.5 tablets by mouth 2 times daily . hold if SBP less than 100 mm hg or HR less than 60 270 tablet 3    insulin aspart (NOVOLOG FLEXPEN) 100 UNIT/ML injection pen Sliding scale insulin coverage  Glucose:Dose: If Less [id number] =No Insulin/ 140-199= 2 Units/ 200-249=4 Units/ 250-299= 6 Units/  300-349=8 Units/  350-400=10 Units/ Above 400 = 12 Units 15 pen 5    lidocaine-prilocaine (EMLA) 2.5-2.5 % cream APPLY SMALL AMOUNT TO ACCESS SITE (AVF) 1 TO 2 HOURS BEFORE DIALYSIS. COVER WITH OCCLUSIVE DRESSING (SARAN WRAP)  11    linaclotide (LINZESS) 145 MCG capsule Take 145 mcg by mouth every morning (before breakfast)      Insulin Pen Needle (B-D ULTRAFINE III SHORT PEN) 31G X 8 MM MISC Use three times daily Diagnosis Code E11.9 200 each 3    nitroGLYCERIN (NITROSTAT) 0.4 MG SL tablet Place 1 tablet under the tongue every 5 minutes as needed for Chest pain 25 tablet 5    aspirin 81 MG EC tablet Take 81 mg by mouth daily. No current facility-administered medications for this visit.       Allergies   Allergen Reactions    Pioglitazone Swelling    Actos [Pioglitazone Hydrochloride] Swelling    Cymbalta [Duloxetine Hcl] Other (See Comments)     Anxiety and lethargic    Gabapentin Anxiety       Health Maintenance   Topic Date Due    Diabetic foot exam  12/13/1963    Diabetic retinal exam  12/13/1963    Shingles Vaccine (1 of 2) 12/13/2003    Pneumococcal 65+ yrs at Risk Vaccine (1 of 2 - PCV13) 12/13/2018    TSH testing  09/26/2019    Flu vaccine (Season Ended) 09/01/2020    Annual Wellness Visit (AWV)  11/13/2020    Low dose CT lung screening  12/16/2020    A1C test (Diabetic or Prediabetic)  02/12/2021    Lipid screen  04/16/2021    Potassium monitoring  04/23/2021    Creatinine monitoring  04/23/2021    Breast cancer screen  02/03/2022    DTaP/Tdap/Td vaccine (2 - Td) 05/21/2022    Colon cancer screen colonoscopy  06/10/2029    DEXA (modify frequency per FRAX score)  Completed    Hepatitis C screen  Completed    Hepatitis A vaccine  Aged Out    Hib vaccine  Aged Out    Meningococcal (ACWY) vaccine  Aged Out       Subjective:      Review of Systems   Constitutional: Negative for chills, fatigue and fever. HENT: Negative for ear pain, postnasal drip and trouble swallowing. Eyes: Negative for pain and visual disturbance. Respiratory: Negative for cough and shortness of breath. Cardiovascular: Negative for chest pain and palpitations. Gastrointestinal: Positive for constipation. Negative for abdominal pain, blood in stool, diarrhea, nausea and vomiting. Genitourinary: Negative for difficulty urinating. Skin: Negative for rash. Neurological: Negative for headaches. Psychiatric/Behavioral: Negative for agitation. Objective:     As this is a video visit new vitals are not able to be obtained - the vitals listed below are from previous visits to our facility. There were no vitals filed for this visit. There is no height or weight on file to calculate BMI.     Wt Readings from Last 3 Encounters:   03/09/20 181 lb (82.1 kg)   02/12/20 181 lb 3.2 oz (82.2 kg)   01/10/20 175 lb 3.2 oz (79.5 kg)     BP Readings from Last 3 Encounters:   03/09/20 138/72   02/12/20 (!) 144/72   01/10/20 136/62       Physical Exam  Constitutional: Stage 2: Additional Anesthesia Type: 2% lidocaine with epinephrine and a 1:10 solution of 8.4% sodium bicarbonate

## 2022-03-09 ENCOUNTER — TELEPHONE (OUTPATIENT)
Dept: CARDIOLOGY CLINIC | Age: 67
End: 2022-03-09

## 2022-03-09 ENCOUNTER — OFFICE VISIT (OUTPATIENT)
Dept: CARDIOLOGY CLINIC | Age: 67
End: 2022-03-09
Payer: MEDICARE

## 2022-03-09 VITALS
HEIGHT: 65 IN | WEIGHT: 186.3 LBS | HEART RATE: 65 BPM | SYSTOLIC BLOOD PRESSURE: 131 MMHG | DIASTOLIC BLOOD PRESSURE: 58 MMHG | BODY MASS INDEX: 31.04 KG/M2

## 2022-03-09 DIAGNOSIS — I73.9 PAD (PERIPHERAL ARTERY DISEASE) (HCC): Primary | ICD-10-CM

## 2022-03-09 DIAGNOSIS — I10 ESSENTIAL (PRIMARY) HYPERTENSION: ICD-10-CM

## 2022-03-09 PROCEDURE — G8399 PT W/DXA RESULTS DOCUMENT: HCPCS | Performed by: INTERNAL MEDICINE

## 2022-03-09 PROCEDURE — 1036F TOBACCO NON-USER: CPT | Performed by: INTERNAL MEDICINE

## 2022-03-09 PROCEDURE — 99214 OFFICE O/P EST MOD 30 MIN: CPT | Performed by: INTERNAL MEDICINE

## 2022-03-09 PROCEDURE — G8427 DOCREV CUR MEDS BY ELIG CLIN: HCPCS | Performed by: INTERNAL MEDICINE

## 2022-03-09 PROCEDURE — G8417 CALC BMI ABV UP PARAM F/U: HCPCS | Performed by: INTERNAL MEDICINE

## 2022-03-09 PROCEDURE — 4040F PNEUMOC VAC/ADMIN/RCVD: CPT | Performed by: INTERNAL MEDICINE

## 2022-03-09 PROCEDURE — 1090F PRES/ABSN URINE INCON ASSESS: CPT | Performed by: INTERNAL MEDICINE

## 2022-03-09 PROCEDURE — G8482 FLU IMMUNIZE ORDER/ADMIN: HCPCS | Performed by: INTERNAL MEDICINE

## 2022-03-09 PROCEDURE — 1123F ACP DISCUSS/DSCN MKR DOCD: CPT | Performed by: INTERNAL MEDICINE

## 2022-03-09 PROCEDURE — 3017F COLORECTAL CA SCREEN DOC REV: CPT | Performed by: INTERNAL MEDICINE

## 2022-03-09 NOTE — PROGRESS NOTES
[id number] [facility] [place] [address] [place] [address]  Dept: [phone]  Dept Fax: [phone]  Loc: [phone]    Visit Date: 3/9/2022    Ms. Herson Cabral is a 74 y.o. female  who presented for:  Chief Complaint   Patient presents with    Follow-up       HPI:   HPI   75 yo F ESRD s/p renal transplant 4/2020 who presents for follow-up. Has been having pain and discomfort for the last couple weeks. She feels that her pain is worse post COVID. Left leg hurts, but right leg is the worst.  She notes some blistering. She describes pain that occurs acutely in the middle of the night. She cannot walk even 1 block without getting pain and numbness. 8/10. Laying down helps the pain. R JOSE ANTONIO 0.86, L JOSE ANTONIO 0.57. She has abnormal EMG studies. Preserved EF 55-60%. Cr 1.1. She is on ASA/Statin. No chest pain, angina, BANUELOS, orthopnea, PND, sob at rest, palpitations, LE edema, or syncope. Interaction with pletal and tacrolimus. Current Outpatient Medications:     Nutritional Supplement LIQD, Take 2 Cans by mouth daily STRAWBERRY GLUCERNA, Disp: 60 each, Rfl: 2    tiotropium (SPIRIVA RESPIMAT) 2.5 MCG/ACT AERS inhaler, Inhale 2 puffs into the lungs daily, Disp: 1 each, Rfl: 11    albuterol sulfate HFA (PROAIR HFA) 108 (90 Base) MCG/ACT inhaler, Inhale 2 puffs into the lungs every 6 hours as needed for Wheezing or Shortness of Breath, Disp: 3 each, Rfl: 3    fluticasone (FLONASE) 50 MCG/ACT nasal spray, 1 spray by Each Nostril route daily, Disp: 3 each, Rfl: 3    HYDROcodone-acetaminophen (NORCO) 5-325 MG per tablet, Take 1 tablet by mouth every 8 hours as needed for Pain for up to 30 days. , Disp: 90 tablet, Rfl: 0    docusate sodium (COLACE) 100 MG capsule, Take 100 mg by mouth 2 times daily as needed for Constipation, Disp: , Rfl:     sulfamethoxazole-trimethoprim (BACTRIM DS) 800-160 MG per tablet, Take 1 tablet by mouth 3 times a week, Disp: , Rfl:     insulin aspart (NOVOLOG FLEXPEN) 100 UNIT/ML injection pen, Sliding scale insulin coverage Glucose:Dose: If Less [id number] =No Insulin/ 140-199= 2 Units/ 200-249=4 Units/ 250-299= 6 Units/  300-349=8 Units/  350-400=10 Units/ Above 400 = 12 Units max 48 units daily, Disp: 15 pen, Rfl: 1    insulin glargine (LANTUS SOLOSTAR) 100 UNIT/ML injection pen, Inject 25 Units into the skin nightly, Disp: 15 pen, Rfl: 1    Insulin Pen Needle (B-D ULTRAFINE III SHORT PEN) 31G X 8 MM MISC, Use three times daily Diagnosis Code E11.9, Disp: 200 each, Rfl: 3    carvedilol (COREG) 25 MG tablet, Take 1 tablet by mouth 2 times daily 2 tablets BID, Disp: 180 tablet, Rfl: 1    NIFEdipine (ADALAT CC) 60 MG extended release tablet, Take 60 mg by mouth 2 times daily , Disp: , Rfl:     lactulose (CHRONULAC) 10 GM/15ML solution, Take twice daily as needed for constipation, Disp: 2700 mL, Rfl: 1    vitamin D (ERGOCALCIFEROL) 1.25 MG (16130 UT) CAPS capsule, TAKE 1 CAPSULE BY MOUTH EVERY 14 DAYS ON MONDAYS, Disp: 6 capsule, Rfl: 3    cloNIDine (CATAPRES) 0.2 MG tablet, Take 0.2 mg by mouth 3 times daily, Disp: , Rfl:     famotidine (PEPCID) 40 MG tablet, Take 40 mg by mouth 2 times daily as needed , Disp: , Rfl:     iron polysaccharides (NIFEREX) 150 MG capsule, Take 150 mg by mouth daily, Disp: , Rfl:     pantoprazole (PROTONIX) 40 MG tablet, Take 40 mg by mouth 2 times daily , Disp: , Rfl:     atorvastatin (LIPITOR) 40 MG tablet, TAKE 1 TABLET DAILY, Disp: 90 tablet, Rfl: 3    tacrolimus (PROGRAF) 1 MG capsule, Take 1 mg by mouth 5 CAPSULES EVERY MORNING AND 5 CAPSULES EVERY EVENING, Disp: , Rfl:     Mycophenolate Sodium 360 MG TBEC, Take 360 mg by mouth 1 tablets BID, Disp: , Rfl:     magnesium oxide (MAG-OX) 400 MG tablet, Take 400 mg by mouth 2 times daily, Disp: , Rfl:     linaclotide (LINZESS) 290 MCG CAPS capsule, Take 145 mcg by mouth every other day , Disp: , Rfl:     aspirin 81 MG EC tablet, Take 81 mg by mouth daily. , Disp: , Rfl:     Past Medical History  Nixon Andrews  has a past medical history of Anemia associated with chronic renal failure, Anxiety, Arthritis, Backache, Blood circulation, collateral, Blood transfusion, CAD (coronary artery disease), Cellulitis in diabetic foot ([address] Chest pain, CHF (congestive heart failure) ([address] Chronic anemia, Chronic kidney disease, Chronic kidney disease, stage III (moderate) ([facility]), Chronic renal insufficiency, COPD (chronic obstructive pulmonary disease) ([address] Coronary disease, COVID-19, Depression, Diabetes mellitus, type 2 ([address] Disease of blood and blood forming organ, GERD (gastroesophageal reflux disease), 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 Social History  Nixon Andrews  reports that she quit smoking about 12 years ago. Her smoking use included cigarettes. She started smoking about 42 years ago. She has a 45.00 pack-year smoking history. She has never used smokeless tobacco. She reports that she does not drink alcohol and does not use drugs. Family History  Nixon Andrews family history includes Alcohol Abuse in her father; Arthritis in her mother; Cortney Duos in her sister; Breast Cancer (age of onset: 45) in her paternal cousin; Diabetes in her brother, brother, father, sister, sister, sister, sister, and sister; Early Death in her sister; Heart Disease in her father, mother, sister, and sister; High Blood Pressure in her mother; Kidney Disease in her mother; Mental Illness in her mother; Obesity in her father; Stroke in her mother.     There is no family history of bicuspid aortic valve, aneurysms, heart transplant, pacemakers, defibrillators, or sudden cardiac death. Past Surgical History   Past Surgical History:   Procedure Laterality Date    ANKLE SURGERY Right 02-10-14    Dr. Augusto Paget at [facility]  1990's    removal of benign tumor   [address]  2008   [address]    COLONOSCOPY  2009    2 polyps, not precanceorus    COLONOSCOPY Left 6/10/2019    COLONOSCOPY DIAGNOSTIC performed by Kristy Houston MD at [address] [facility]  3/30/2012    eye lid lift    DIAGNOSTIC CARDIAC CATH LAB PROCEDURE      ENDOSCOPY, COLON, DIAGNOSTIC  2007   [facility]  March 30th, 2012    left sided ptosis    FOOT SURGERY  1990    Tarsal tunnel surgery    FRACTURE SURGERY  2015   [address] and [address]    first partial in 1980's, then total in [facility]  2015   [facility]  04/10/2020    RIGHT    LIVER BIOPSY  6/2015    LUNG BIOPSY  2009    OVARY REMOVAL  1985    RI OFFICE/OUTPT VISIT,PROCEDURE ONLY Left 8/23/2018    LEFT UPPER EXTREMENTY AV FISTULA CREATION performed by Frantz Ac MD at [facility] Left 6/14/2019    EGD BIOPSY performed by Kristy Houston MD at [facility]       Review of Systems   Constitutional: Negative for chills and fever  HENT: Negative for congestion, sinus pressure, sneezing and sore throat. Eyes: Negative for pain, discharge, redness and itching. Respiratory: Negative for apnea, cough  Gastrointestinal: Negative for blood in stool, constipation, diarrhea   Endocrine: Negative for cold intolerance, heat intolerance, polydipsia. Genitourinary: Negative for dysuria, enuresis, flank pain and hematuria. Musculoskeletal: Negative for arthralgias, joint swelling and neck pain. Neurological: Negative for numbness and headaches. Psychiatric/Behavioral: Negative for agitation, confusion, decreased concentration and dysphoric mood.      Objective:     BP (!) 131/58   Pulse 65   Ht 5' 5\" (1.651 m)   Wt 186 lb 4.8 oz (84.5 kg)   BMI 31.00 kg/m²     Wt Readings from Last 3 Encounters:   03/09/22 186 lb 4.8 oz (84.5 kg)   03/02/22 191 lb 12.8 oz (87 kg)   02/10/22 187 lb 3.2 oz (84.9 kg)     BP Readings from Last 3 Encounters:   03/09/22 (!) 131/58   03/02/22 120/60   02/10/22 122/60       Nursing note and vitals reviewed. Physical Exam   Constitutional: Oriented to person, place, and time. Appears well-developed and well-nourished. HENT:   Head: Normocephalic and atraumatic. Eyes: EOM are normal. Pupils are equal, round, and reactive to light. Neck: Normal range of motion. Neck supple. No JVD present. Cardiovascular: Normal rate, regular rhythm, normal heart sounds and intact distal pulses. No murmur heard. Pulmonary/Chest: Effort normal and breath sounds normal. No respiratory distress. No wheezes. No rales. Abdominal: Soft. Bowel sounds are normal. No distension. There is no tenderness. Musculoskeletal: Normal range of motion. No edema. Neurological: Alert and oriented to person, place, and time. No cranial nerve deficit. Coordination normal.   Skin: Skin is warm and dry. Skin sloughing of the toes on the right. Psychiatric: Normal mood and affect.        No results found for: CKTOTAL, CKMB, CKMBINDEX    Lab Results   Component Value Date    WBC 7.6 03/09/2022    RBC 4.07 03/09/2022    RBC 3-5 09/06/2011    HGB 10.8 03/09/2022    HCT 35.6 03/09/2022    MCV 87.5 03/09/2022    MCH 26.5 03/09/2022    MCHC 30.3 03/09/2022    RDW 15.2 04/26/2017     03/09/2022    MPV 11.3 03/09/2022       Lab Results   Component Value Date     03/09/2022    K 3.9 03/09/2022    K 3.3 03/02/2021     03/09/2022    CO2 29 03/09/2022    BUN 20 03/09/2022    LABALBU 4.5 02/10/2022    LABALBU 4.8 01/03/2012    CREATININE 1.1 03/09/2022    CALCIUM 10.4 02/10/2022    LABGLOM 49 03/09/2022    GLUCOSE 166 03/09/2022    GLUCOSE 252 03/14/2012       Lab Results   Component Value Date ALKPHOS 85 02/10/2022    ALT 11 02/10/2022    AST 12 02/10/2022    PROT 7.3 02/10/2022    BILITOT 0.3 02/10/2022    BILIDIR <0.2 06/09/2021    LABALBU 4.5 02/10/2022    LABALBU 4.8 01/03/2012       Lab Results   Component Value Date    MG 2.0 09/07/2021       Lab Results   Component Value Date    INR 1.28 (H) 08/02/2020    INR 1.13 08/23/2018    INR 0.97 06/16/2015         Lab Results   Component Value Date    LABA1C 6.1 02/09/2022    LABA1C 7.3 09/07/2021       Lab Results   Component Value Date    TRIG 147 09/07/2021    HDL 42 09/07/2021    LDLCALC 61 03/03/2021    LDLDIRECT 84.65 09/07/2021       Lab Results   Component Value Date    TSH 1.110 02/10/2022         Testing Reviewed:      I have individually reviewed the cardiac test below:    ECHO: Results for orders placed during the hospital encounter of 08/02/20    Echo Complete    Narrative  Transthoracic Echocardiography Report (TTE)    Demographics    Patient Name    Asmita Band  Gender               Female  A    MR #            [id number]       Race                 Black    Ethnicity    Account #       [de-identified]       Room Number          0006    Accession       [id number]      Date of Study        08/03/2020  Number    Date of Birth   1955      Referring Physician  YAMIL Pérez    Age             75 year(s)      Ashley Dumont MD  Physician    Procedure    Type of Study    TTE procedure:ECHOCARDIOGRAM COMPLETE 2D W DOPPLER W COLOR. Procedure Date  Date: 08/03/2020 Start: 08:56 AM    Study Location: Bedside  Technical Quality: Adequate visualization    Indications:Congestive heart failure. Additional Medical History:COPD ARthritis, Anemia, Obstructive sleep apnea,  Diabetic, Hypertension, coronary artery disease, Hyperlipidemia, Congestive  heart failure, Dyspnea, Ex Smoker, Chronic Kidney disease, Pulmonary Edema,  Pericardial effusion.     Patient Status: Routine    Height: 65 inches Weight: 180 pounds BSA: 1.89 m^2 BMI: 29.95 kg/m^2    BP: 146/53 mmHg    Conclusions    Summary  Ejection fraction was estimated at 55-60%. Left atrial size was severely dilated. Ascending aorta = 3.6 cm. IVC size is within normal limits with normal respiratory phasic changes. Signature    ----------------------------------------------------------------  Electronically signed by Marlon Mendoza MD (Interpreting  physician) on 08/03/2020 at 04:42 PM  ----------------------------------------------------------------    Findings    Mitral Valve  The mitral valve structure was normal with normal leaflet separation. DOPPLER: The transmitral velocity was within the normal range with no  evidence for mitral stenosis. There was no evidence of mitral  regurgitation. Aortic Valve  The aortic valve was trileaflet with normal thickness and cuspal  separation. DOPPLER: Transaortic velocity was within the normal range with  no evidence of aortic stenosis. There was no evidence of aortic  regurgitation. Tricuspid Valve  The tricuspid valve structure was normal with normal leaflet separation. DOPPLER: There was no evidence of tricuspid stenosis. There was trace  tricuspid regurgitation. Pulmonic Valve  The pulmonic valve leaflets were not well seen. DOPPLER: The transpulmonic  velocity was within the normal range with trace regurgitation. Left Atrium  Left atrial size was severely dilated. Left Ventricle  Normal left ventricular size and systolic function. There were no regional wall motion abnormalities. Mild concentric hypertrophy. Ejection fraction was estimated at 55-60%. E/A flow reversal noted. Suggestive of diastolic dysfunction. Right Atrium  Right atrial size was normal.    Right Ventricle  The right ventricular size was normal with normal systolic function and  wall thickness.     Pericardial Effusion  The pericardium was normal in appearance with no evidence of a pericardial  effusion. Pleural Effusion  No evidence of pleural effusion. Aorta / Great Vessels  -Ascending aorta = 3.6 cm. -IVC size is within normal limits with normal respiratory phasic changes.     M-Mode/2D Measurements & Calculations    LV Diastolic   LV Systolic Dimension:    AV Cusp Separation: 1.7 cmLA  Dimension: 4.7 3.3 cm                    Dimension: 3.8 cmAO Root  cm             LV Volume Diastolic: 226  Dimension: 3.1 cmLA Area: 24.9  LV FS:29.8 %   ml                        cm^2  LV PW          LV Volume Systolic: 53.9  Diastolic: 1.3 ml  cm             LV EDV/LV EDV Index: 102  Septum         ml/54 m^2LV ESV/LV ESV    RV Diastolic Dimension: 3.2 cm  Diastolic: 1.4 Index: 14.5 ml/23 m^2  cm             EF Calculated: 56.8 %     LA/Aorta: 1.23  Ascending Aorta: 3.6 cm  LA volume/Index: 84.5 ml /45m^2    LVOT: 2 cm    Doppler Measurements & Calculations    MV Peak E-Wave: 137 AV Peak Velocity: 228    LVOT Peak Velocity: 140 cm/s  cm/s                cm/s                     LVOT Mean Velocity: 100 cm/s  MV Peak A-Wave: 151 AV Peak Gradient: 20.79  LVOT Peak Gradient: 8  cm/s                mmHg                     mmHgLVOT Mean Gradient: 5  MV E/A Ratio: 0.91  AV Mean Velocity: 157    mmHg  MV Peak Gradient:   cm/s  7.51 mmHg           AV Mean Gradient: 9 mmHg TV Peak E-Wave: 64.3 cm/s  AV VTI: 44.9 cm          TV Peak A-Wave: 75 cm/s  MV Deceleration     AV Area  Time: 250 msec      (Continuity):2.38 cm^2   TV Peak Gradient: 1.65 mmHg  TR Velocity:289 cm/s  LVOT VTI: 34.1 cm        TR Gradient:33.41 mmHg  MV E' Septal        IVRT: 63 msec            PV Peak Velocity: 96 cm/s  Velocity: 5.9 cm/s                           PV Peak Gradient: 3.69 mmHg  MV A' Septal  Velocity: 9.9 cm/s  AV DVI (VTI): 0.76AV DVI  MV E' Lateral       (Vmax):0.61              MI ED Velocity: 127 cm/s  Velocity: 8.3 cm/s  MV A' Lateral  Velocity: 10.7 cm/s  E/E' septal: 23.22  E/E' lateral: 16.51  MR Velocity:

## 2022-03-10 NOTE — TELEPHONE ENCOUNTER
Spoke to patient about procedure scheduled on 4/19/22 at 7:00 to arrive at 5:00am.  Jt Daugherty over instructions and gave her Dr Evelyn Villalobos instructions to drink one bottle of water before and after procedure. Patient voiced understanding.   Instructions mailed

## 2022-03-10 NOTE — TELEPHONE ENCOUNTER
If we can do with 100 mL of intravenous contrast that would be great  She is a kidney transplant patient.   Drink 500 ml of fluid before the procedure and another 500 ml of fluid immediately after the procedure

## 2022-03-11 NOTE — TELEPHONE ENCOUNTER
Dr. Amira Altman please see Dr. Brent Velazquez recommendation on CT Dye recommended dose. Let me know if anything different.

## 2022-03-18 DIAGNOSIS — G89.4 CHRONIC PAIN SYNDROME: ICD-10-CM

## 2022-03-18 RX ORDER — HYDROCODONE BITARTRATE AND ACETAMINOPHEN 5; 325 MG/1; MG/1
1 TABLET ORAL EVERY 8 HOURS PRN
Qty: 90 TABLET | Refills: 0 | Status: SHIPPED | OUTPATIENT
Start: 2022-03-23 | End: 2022-04-19 | Stop reason: SDUPTHER

## 2022-03-18 NOTE — TELEPHONE ENCOUNTER
Niko Corbett called requesting a refill on the following medications:  Requested Prescriptions     Pending Prescriptions Disp Refills    HYDROcodone-acetaminophen (NORCO) 5-325 MG per tablet 90 tablet 0     Sig: Take 1 tablet by mouth every 8 hours as needed for Pain for up to 30 days. Pharmacy verified: [facility] [address]  [place] pv      Date of last visit: 1/4/2022  Date of next visit (if applicable): 3/4/4328

## 2022-03-21 RX ORDER — DOCUSATE SODIUM 100 MG/1
100 CAPSULE, LIQUID FILLED ORAL 2 TIMES DAILY PRN
Qty: 60 CAPSULE | Refills: 0 | Status: SHIPPED | OUTPATIENT
Start: 2022-03-21

## 2022-03-21 NOTE — TELEPHONE ENCOUNTER
Patient's last appointment was : 2/10/2022  Patient's next appointment is : 8/11/2022  Last refilled: 2/9/22

## 2022-03-21 NOTE — TELEPHONE ENCOUNTER
----- Message from Zaida Peterson sent at 3/18/2022 12:05 PM EDT -----  Subject: Message to Provider    QUESTIONS  Information for Provider? Patient called in to refill DOCUSATE SODIUM   CAPSULE (CALACE) 100 mg which is not showing in her medication list.   Please contact patient to assist with refill request. [facility]   ---------------------------------------------------------------------------  --------------  CALL BACK INFO  What is the best way for the office to contact you? OK to leave message on   voicemail  Preferred Call Back Phone Number? [phone]  ---------------------------------------------------------------------------  --------------  SCRIPT ANSWERS  Relationship to Patient?  Self

## 2022-03-28 NOTE — TELEPHONE ENCOUNTER
Received request from Eric Rosales RN, to move patient's procedure from 4/19/22 to 4/08/22 at 7:00 am with 5:00 am arrival. All instructions reviewed with patient via phone, patient verbalized understanding. Patient advised to follow same pre-hydration instructions as previously given per Dr. Jovi Coleman, patient again verbalized understanding. Procedure moved with Juventino Alexis in [employer].

## 2022-04-01 DIAGNOSIS — J41.0 SIMPLE CHRONIC BRONCHITIS (HCC): ICD-10-CM

## 2022-04-01 RX ORDER — TIOTROPIUM BROMIDE INHALATION SPRAY 3.12 UG/1
SPRAY, METERED RESPIRATORY (INHALATION)
Qty: 4 G | Refills: 11 | OUTPATIENT
Start: 2022-04-01

## 2022-04-05 ENCOUNTER — OFFICE VISIT (OUTPATIENT)
Dept: PHYSICAL MEDICINE AND REHAB | Age: 67
End: 2022-04-05
Payer: MEDICARE

## 2022-04-05 VITALS
WEIGHT: 186 LBS | BODY MASS INDEX: 30.99 KG/M2 | SYSTOLIC BLOOD PRESSURE: 126 MMHG | DIASTOLIC BLOOD PRESSURE: 64 MMHG | HEIGHT: 65 IN

## 2022-04-05 DIAGNOSIS — M47.816 SPONDYLOSIS OF LUMBAR REGION WITHOUT MYELOPATHY OR RADICULOPATHY: Primary | ICD-10-CM

## 2022-04-05 DIAGNOSIS — M47.816 LUMBAR FACET ARTHROPATHY: ICD-10-CM

## 2022-04-05 DIAGNOSIS — M54.50 CHRONIC BILATERAL LOW BACK PAIN WITHOUT SCIATICA: ICD-10-CM

## 2022-04-05 DIAGNOSIS — G89.4 CHRONIC PAIN SYNDROME: ICD-10-CM

## 2022-04-05 DIAGNOSIS — E11.42 DIABETIC POLYNEUROPATHY ASSOCIATED WITH TYPE 2 DIABETES MELLITUS (HCC): ICD-10-CM

## 2022-04-05 DIAGNOSIS — G89.29 CHRONIC BILATERAL LOW BACK PAIN WITHOUT SCIATICA: ICD-10-CM

## 2022-04-05 DIAGNOSIS — M54.16 LUMBAR RADICULITIS: ICD-10-CM

## 2022-04-05 PROCEDURE — 2022F DILAT RTA XM EVC RTNOPTHY: CPT | Performed by: NURSE PRACTITIONER

## 2022-04-05 PROCEDURE — G8417 CALC BMI ABV UP PARAM F/U: HCPCS | Performed by: NURSE PRACTITIONER

## 2022-04-05 PROCEDURE — 3017F COLORECTAL CA SCREEN DOC REV: CPT | Performed by: NURSE PRACTITIONER

## 2022-04-05 PROCEDURE — 1123F ACP DISCUSS/DSCN MKR DOCD: CPT | Performed by: NURSE PRACTITIONER

## 2022-04-05 PROCEDURE — 1090F PRES/ABSN URINE INCON ASSESS: CPT | Performed by: NURSE PRACTITIONER

## 2022-04-05 PROCEDURE — 3044F HG A1C LEVEL LT 7.0%: CPT | Performed by: NURSE PRACTITIONER

## 2022-04-05 PROCEDURE — G8427 DOCREV CUR MEDS BY ELIG CLIN: HCPCS | Performed by: NURSE PRACTITIONER

## 2022-04-05 PROCEDURE — 1036F TOBACCO NON-USER: CPT | Performed by: NURSE PRACTITIONER

## 2022-04-05 PROCEDURE — 99214 OFFICE O/P EST MOD 30 MIN: CPT | Performed by: NURSE PRACTITIONER

## 2022-04-05 PROCEDURE — 4040F PNEUMOC VAC/ADMIN/RCVD: CPT | Performed by: NURSE PRACTITIONER

## 2022-04-05 PROCEDURE — G8399 PT W/DXA RESULTS DOCUMENT: HCPCS | Performed by: NURSE PRACTITIONER

## 2022-04-05 ASSESSMENT — ENCOUNTER SYMPTOMS
SHORTNESS OF BREATH: 0
CHEST TIGHTNESS: 0
GASTROINTESTINAL NEGATIVE: 1
BACK PAIN: 1

## 2022-04-05 NOTE — PROGRESS NOTES
[facility] [address]  Dept: [phone]  Dept Fax: [phone]: [phone]    Visit Date: 4/5/2022    Functionality Assessment/Goals Worksheet     On a scale of 0 (Does not Interfere) to 10 (Completely Interferes)     1. Which number describes how during the past week pain has interfered with       the following:  A. General Activity:  8  B. Mood: 7  C. Walking Ability:  8  D. Normal Work (Includes both work outside the home and housework):  8  E. Relations with Other People:   5  F. Sleep:   6  G. Enjoyment of Life:   9    2. Patient Prefers to Take their Pain Medications:     []  On a regular basis   []  Only when necessary    []  Does not take pain medications    3. What are the Patient's Goals/Expectations for Visiting Pain Management? []  Learn about my pain    [x]  Receive Medication   []  Physical Therapy     []  Treat Depression   [x]  Receive Injections    []  Treat Sleep   []  Deal with Anxiety and Stress   []  Treat Opoid Dependence/Addiction   []  Other:      HPI:   Michel Leahy is a 74 y.o. female is here today for    Chief Complaint: Low back and leg pain     HPI   3 month FU. Continues to have pain in low back- aching pain. Continues to have aching pain in legs has numbness from knees down left left leg and pins and needles in right leg below knee and feet are numb with numbness and tingling     Had EMG with Dr. Dion Mi reviewed results. Patient scheduled for heart cath this Friday     Pain increases with bending, lifting, twisting , turning torso, walking, standing and getting up and down. Norco prn remains effective makes pain tolerable   Medications reviewed. Patient denies side effects with medications. Patient states she is taking medications as prescribed. Shedenies receiving pain medications from other sources.  She denies any ER visits since last visit. Pain scale with out pain medications or at its worst is 8-10/10. Pain scale with pain medications or at its best is 4-6/10. Last dose of Norco was today   Drug screen reviewed from 1/4/2022 and was appropriate  Pill count completed  today and WNL: Yes      The patientis allergic to actos [pioglitazone hydrochloride], pioglitazone, cymbalta [duloxetine hcl], and gabapentin. Subjective:      Review of Systems   Constitutional: Positive for fatigue. Negative for chills and fever. HENT: Negative. Eyes: Positive for visual disturbance. Wearing corrective glasses    Respiratory: Negative for chest tightness and shortness of breath. Cardiovascular: Positive for leg swelling. Negative for chest pain. Gastrointestinal: Negative. Genitourinary: Negative for flank pain. Musculoskeletal: Positive for arthralgias, back pain, gait problem, joint swelling and myalgias. Negative for neck pain and neck stiffness. Ambulating with walker    Neurological: Positive for weakness and numbness. Psychiatric/Behavioral: Negative. Objective:     Vitals:    04/05/22 1134   BP: 126/64   Weight: 186 lb (84.4 kg)   Height: 5' 5\" (1.651 m)       Physical Exam  Vitals and nursing note reviewed. Constitutional:       General: She is not in acute distress. Appearance: She is well-developed. She is not diaphoretic. HENT:      Head: Normocephalic and atraumatic. Right Ear: External ear normal.      Left Ear: External ear normal.      Nose: Nose normal.      Mouth/Throat:      Pharynx: No oropharyngeal exudate. Eyes:      General: No scleral icterus. Right eye: No discharge. Left eye: No discharge. Conjunctiva/sclera: Conjunctivae normal.      Pupils: Pupils are equal, round, and reactive to light. Neck:      Thyroid: No thyromegaly. Cardiovascular:      Rate and Rhythm: Normal rate and regular rhythm. Heart sounds: Normal heart sounds. No murmur heard. No friction rub. No gallop. Pulmonary:      Effort: Pulmonary effort is normal. No respiratory distress. Breath sounds: Normal breath sounds. No wheezing or rales. Chest:      Chest wall: No tenderness. Abdominal:      General: Bowel sounds are normal. There is no distension. Palpations: Abdomen is soft. Tenderness: There is no abdominal tenderness. There is no guarding or rebound. Musculoskeletal:         General: Swelling and tenderness present. Left shoulder: Tenderness present. Decreased range of motion. Cervical back: Full passive range of motion without pain, normal range of motion and neck supple. No edema, erythema or rigidity. No muscular tenderness. Normal range of motion. Lumbar back: Tenderness and bony tenderness present. Decreased range of motion. Positive right straight leg raise test and positive left straight leg raise test.        Back:       Right lower leg: Edema present. Left lower leg: Edema present. Right ankle: Swelling present. Tenderness present. Decreased range of motion. Left ankle: Swelling present. Tenderness present. Decreased range of motion. Skin:     General: Skin is warm. Coloration: Skin is not pale. Findings: No erythema or rash. Neurological:      Mental Status: She is alert and oriented to person, place, and time. She is not disoriented. Cranial Nerves: No cranial nerve deficit. Sensory: Sensory deficit present. Motor: Weakness present. No atrophy or abnormal muscle tone. Coordination: Coordination normal.      Gait: Gait abnormal.      Deep Tendon Reflexes: Reflexes are normal and symmetric. Babinski sign absent on the right side. Babinski sign absent on the left side. Comments: Ambulating with walker   4/5 bilateral lower extremities   Psychiatric:         Attention and Perception: She is attentive.          Mood and Affect: Mood normal. Mood is not anxious or depressed. Affect is not labile, blunt, angry or inappropriate. Speech: She is communicative. Speech is not rapid and pressured, delayed, slurred or tangential.         Behavior: Behavior is not agitated, slowed, aggressive, withdrawn, hyperactive or combative. Thought Content: Thought content is not paranoid or delusional. Thought content does not include homicidal or suicidal ideation. Thought content does not include homicidal or suicidal plan. Cognition and Memory: Memory is not impaired. She does not exhibit impaired recent memory or impaired remote memory. Judgment: Judgment is not impulsive or inappropriate. RADHA  Patricks test  negative  Yeoman's  negative  Gaenslen's  negative       Assessment:     1. Spondylosis of lumbar region without myelopathy or radiculopathy    2. Lumbar facet arthropathy    3. Chronic bilateral low back pain without sciatica    4. Diabetic polyneuropathy associated with type 2 diabetes mellitus ([facility] [address]    5. Lumbar radiculitis    6. Chronic pain syndrome            Plan:      · OARRS reviewed. Current MED: 15.00  · Patient was offered naloxone for home. · Discussed long term side effects of medications, tolerance, dependency and addiction. · Previous UDS reviewed  · UDS preformed today for compliance. · Patient told can not receive any pain medications from any other source. · No evidence of abuse, diversion or aberrant behavior.  Medications and/or procedures to improve function and quality of life- patient understanding with this and that may not be pain free   Discussed with patient about safe storage of medications at home   Discussed possible weaning of medication dosing dependent on treatment/procedure results.  Discussed with patient about risks with procedure including infection, reaction to medication, increased pain, or bleeding.  Reveiwed EMG from Dr. Hal Baumann. Discussed procedures again L-facet EMILEE and IRVIN.  Again she does not want at this time. Scheduled for Heart Cath this Friday 4/8/2022  · Continue Norco 5/325 TID prn- filled 3/23/2022   Again discussed PT in future    States allergic to all neuropathic pain medications     Meds. Prescribed:   No orders of the defined types were placed in this encounter. Return in about 3 months (around 7/5/2022), or if symptoms worsen or fail to improve, for follow up  for medications.                Electronically signed by Lynnie Sicard, APRN - CNP on4/5/2022 at 4:02 PM

## 2022-04-07 ENCOUNTER — NURSE ONLY (OUTPATIENT)
Dept: LAB | Age: 67
End: 2022-04-07

## 2022-04-07 ENCOUNTER — OFFICE VISIT (OUTPATIENT)
Dept: NEPHROLOGY | Age: 67
End: 2022-04-07
Payer: MEDICARE

## 2022-04-07 VITALS
HEART RATE: 63 BPM | TEMPERATURE: 97.3 F | SYSTOLIC BLOOD PRESSURE: 152 MMHG | WEIGHT: 191.4 LBS | OXYGEN SATURATION: 98 % | BODY MASS INDEX: 31.85 KG/M2 | DIASTOLIC BLOOD PRESSURE: 62 MMHG

## 2022-04-07 DIAGNOSIS — M47.816 SPONDYLOSIS OF LUMBAR REGION WITHOUT MYELOPATHY OR RADICULOPATHY: ICD-10-CM

## 2022-04-07 DIAGNOSIS — M54.16 LUMBAR RADICULITIS: ICD-10-CM

## 2022-04-07 DIAGNOSIS — T86.11 CHRONIC RENAL ALLOGRAFT NEPHROPATHY: ICD-10-CM

## 2022-04-07 DIAGNOSIS — E08.22 DIABETES MELLITUS DUE TO UNDERLYING CONDITION WITH STAGE 3A CHRONIC KIDNEY DISEASE, UNSPECIFIED WHETHER LONG TERM INSULIN USE (HCC): ICD-10-CM

## 2022-04-07 DIAGNOSIS — I10 ESSENTIAL HYPERTENSION: ICD-10-CM

## 2022-04-07 DIAGNOSIS — N18.31 DIABETES MELLITUS DUE TO UNDERLYING CONDITION WITH STAGE 3A CHRONIC KIDNEY DISEASE, UNSPECIFIED WHETHER LONG TERM INSULIN USE (HCC): ICD-10-CM

## 2022-04-07 DIAGNOSIS — M54.50 CHRONIC BILATERAL LOW BACK PAIN WITHOUT SCIATICA: ICD-10-CM

## 2022-04-07 DIAGNOSIS — M47.816 LUMBAR FACET ARTHROPATHY: ICD-10-CM

## 2022-04-07 DIAGNOSIS — N25.81 HYPERPARATHYROIDISM, SECONDARY RENAL (HCC): ICD-10-CM

## 2022-04-07 DIAGNOSIS — E11.42 DIABETIC POLYNEUROPATHY ASSOCIATED WITH TYPE 2 DIABETES MELLITUS (HCC): ICD-10-CM

## 2022-04-07 DIAGNOSIS — G89.29 CHRONIC BILATERAL LOW BACK PAIN WITHOUT SCIATICA: ICD-10-CM

## 2022-04-07 DIAGNOSIS — G89.4 CHRONIC PAIN SYNDROME: ICD-10-CM

## 2022-04-07 DIAGNOSIS — Z94.0 KIDNEY TRANSPLANT RECIPIENT: Primary | ICD-10-CM

## 2022-04-07 LAB
AMPHETAMINE+METHAMPHETAMINE URINE SCREEN: NEGATIVE
BARBITURATE QUANTITATIVE URINE: NEGATIVE
BENZODIAZEPINE QUANTITATIVE URINE: NEGATIVE
CANNABINOID QUANTITATIVE URINE: NEGATIVE
COCAINE METABOLITE QUANTITATIVE URINE: NEGATIVE
CREATININE URINE: 121.6 MG/DL
OPIATES, URINE: NEGATIVE
OXYCODONE: NEGATIVE
PHENCYCLIDINE QUANTITATIVE URINE: NEGATIVE
PROT/CREAT RATIO, UR: 0.18
PROTEIN, URINE: 21.4 MG/DL

## 2022-04-07 PROCEDURE — 4040F PNEUMOC VAC/ADMIN/RCVD: CPT | Performed by: INTERNAL MEDICINE

## 2022-04-07 PROCEDURE — G8417 CALC BMI ABV UP PARAM F/U: HCPCS | Performed by: INTERNAL MEDICINE

## 2022-04-07 PROCEDURE — 1123F ACP DISCUSS/DSCN MKR DOCD: CPT | Performed by: INTERNAL MEDICINE

## 2022-04-07 PROCEDURE — G8399 PT W/DXA RESULTS DOCUMENT: HCPCS | Performed by: INTERNAL MEDICINE

## 2022-04-07 PROCEDURE — 99213 OFFICE O/P EST LOW 20 MIN: CPT | Performed by: INTERNAL MEDICINE

## 2022-04-07 PROCEDURE — 1090F PRES/ABSN URINE INCON ASSESS: CPT | Performed by: INTERNAL MEDICINE

## 2022-04-07 PROCEDURE — G8427 DOCREV CUR MEDS BY ELIG CLIN: HCPCS | Performed by: INTERNAL MEDICINE

## 2022-04-07 PROCEDURE — 3017F COLORECTAL CA SCREEN DOC REV: CPT | Performed by: INTERNAL MEDICINE

## 2022-04-07 PROCEDURE — 1036F TOBACCO NON-USER: CPT | Performed by: INTERNAL MEDICINE

## 2022-04-07 NOTE — PROGRESS NOTES
Renal Progress Note    Assessment and Plan:      Diagnosis Orders   1. Kidney transplant recipient     2. Chronic renal allograft nephropathy     3. Essential hypertension     4. Diabetes mellitus due to underlying condition with stage 3a chronic kidney disease, unspecified whether long term insulin use ([facility] [address]     5. Hyperparathyroidism, secondary renal ([facility] [address]               PLAN:  1. Lab results reviewed with the patient. 2. She understood. 3. I addressed her questions. 4. Serum creatinine is mostly stable between 1.1 mg/dL to 1.2 mg/dL. 5. Medications reviewed  6. No changes  7. Return visit in 8 months which will make it 6 months interval between going to [place] and here  8. Okay for lower extremity angiogram.  9. She is instructed to drink 1 bottle of 16 ounces of fluid before and after the procedure. 10. She understood. Patient Active Problem List   Diagnosis    Coronary disease    Hyperlipemia    Arthritis    Neuropathy, diabetic ([facility] [address]    Leg pain    Obesity (BMI 30-39. 9)    Low HDL (under 40)    History of tobacco use    COPD without exacerbation (HCC)    Anemia, chronic disease    Gout    Urolithiasis    Secondary hyperparathyroidism of renal origin ([facility] [address]    Obstructive sleep apnea on CPAP    Vitamin D deficiency    Hypertensive nephropathy    Persistent proteinuria associated with type 2 diabetes mellitus (HCC)    Cellulitis in diabetic foot (HCC)    Persistent proteinuria    Acquired hypothyroidism    Nephrotic syndrome    Type 2 diabetes mellitus (HCC)    Iron deficiency anemia due to dietary causes    Hypervolemia associated with renal insufficiency    Chronic progressive renal failure, stage 5 (HCC)    Accelerated hypertension    Diabetic peripheral neuropathy associated with type 2 diabetes mellitus (HCC)    Pericardial effusion    Acute on chronic diastolic congestive heart failure (HCC)    Chronic constipation    Metabolic acidosis    Lymphadenopathy, inguinal    Atelectasis of left lung    Thyroid nodule    Kidney transplant recipient    E. coli UTI    Hypomagnesemia    Hypermagnesemia    Hypertensive urgency    Orthostatic hypotension    Hx of coronary artery disease    History of end stage renal disease    Cervical stenosis of spinal canal    Hx of gastroesophageal reflux (GERD)    Polyneuropathy associated with critical illness (HCC)    Pressure injury of back, stage 3 (HCC)           Subjective:   Chief complaint:  Chief Complaint   Patient presents with    Chronic Kidney Disease     Stage IIIa    Kidney Transplant      HPI:This is a follow up visit for Ms. Taz Rendon here today for return appointment. .  I see her for kidney transplant. She was last seen about 6 months ago. .  Doing well since then. No specific complaints or issues of concern. She denies chest pain or shortness of breath. No nausea. No vomiting. No abdominal pain. No tenderness at the transplant site. No fever chills. .  Blood pressure is good at home. Sean Zaldivar She was last seen at [facility] transplant department 2 months ago. She goes there every 6 months. She is scheduled for lower extremity angiogram due to possible peripheral vascular disease. ROS:  Pertinent positives stated above in HPI. All other systems were reviewed and were negative. Medications:     Current Outpatient Medications   Medication Sig Dispense Refill    docusate sodium (COLACE) 100 MG capsule Take 1 capsule by mouth 2 times daily as needed for Constipation 60 capsule 0    HYDROcodone-acetaminophen (NORCO) 5-325 MG per tablet Take 1 tablet by mouth every 8 hours as needed for Pain for up to 30 days.  90 tablet 0    Nutritional Supplement LIQD Take 2 Cans by mouth daily STRAWBERRY GLUCERNA 60 each 2    tiotropium (SPIRIVA RESPIMAT) 2.5 MCG/ACT AERS inhaler Inhale 2 puffs into the lungs daily 1 each 11    albuterol sulfate HFA (PROAIR HFA) 108 (90 Base) MCG/ACT inhaler Inhale 2 puffs into the lungs every 6 hours as needed for Wheezing or Shortness of Breath 3 each 3    fluticasone (FLONASE) 50 MCG/ACT nasal spray 1 spray by Each Nostril route daily 3 each 3    sulfamethoxazole-trimethoprim (BACTRIM DS) 800-160 MG per tablet Take 1 tablet by mouth 3 times a week      insulin aspart (NOVOLOG FLEXPEN) 100 UNIT/ML injection pen Sliding scale insulin coverage Glucose:Dose: If Less [id number] =No Insulin/ 140-199= 2 Units/ 200-249=4 Units/ 250-299= 6 Units/  300-349=8 Units/  350-400=10 Units/ Above 400 = 12 Units max 48 units daily 15 pen 1    insulin glargine (LANTUS SOLOSTAR) 100 UNIT/ML injection pen Inject 25 Units into the skin nightly 15 pen 1    Insulin Pen Needle (B-D ULTRAFINE III SHORT PEN) 31G X 8 MM MISC Use three times daily Diagnosis Code E11.9 200 each 3    carvedilol (COREG) 25 MG tablet Take 1 tablet by mouth 2 times daily 2 tablets  tablet 1    NIFEdipine (ADALAT CC) 60 MG extended release tablet Take 60 mg by mouth 2 times daily       lactulose (CHRONULAC) 10 GM/15ML solution Take twice daily as needed for constipation 2700 mL 1    vitamin D (ERGOCALCIFEROL) 1.25 MG (44767 UT) CAPS capsule TAKE 1 CAPSULE BY MOUTH EVERY 14 DAYS ON MONDAYS 6 capsule 3    cloNIDine (CATAPRES) 0.2 MG tablet Take 0.2 mg by mouth 3 times daily      famotidine (PEPCID) 40 MG tablet Take 40 mg by mouth 2 times daily as needed       iron polysaccharides (NIFEREX) 150 MG capsule Take 150 mg by mouth daily      pantoprazole (PROTONIX) 40 MG tablet Take 40 mg by mouth 2 times daily       atorvastatin (LIPITOR) 40 MG tablet TAKE 1 TABLET DAILY 90 tablet 3    tacrolimus (PROGRAF) 1 MG capsule Take 1 mg by mouth 5 CAPSULES EVERY MORNING AND 5 CAPSULES EVERY EVENING      Mycophenolate Sodium 360 MG TBEC Take 360 mg by mouth 1 tablets BID      magnesium oxide (MAG-OX) 400 MG tablet Take 400 mg by mouth 2 times daily      linaclotide (LINZESS) 290 MCG CAPS capsule Take 145 mcg by mouth every other day  aspirin 81 MG EC tablet Take 81 mg by mouth daily. No current facility-administered medications for this visit.        Lab Results:    CBC:   Lab Results   Component Value Date    WBC 7.6 03/09/2022    HGB 10.8 (L) 03/09/2022    HCT 35.6 (L) 03/09/2022    MCV 87.5 03/09/2022     03/09/2022     BMP:    Lab Results   Component Value Date     03/09/2022     02/10/2022     02/07/2022    K 3.9 03/09/2022    K 3.5 02/10/2022    K 3.9 02/07/2022     03/09/2022     02/10/2022     02/07/2022    CO2 29 03/09/2022    CO2 27 02/10/2022    CO2 25 02/07/2022    BUN 20 03/09/2022    BUN 23 (H) 02/10/2022    BUN 24 (H) 02/07/2022    CREATININE 1.1 03/09/2022    CREATININE 1.2 02/10/2022    CREATININE 1.1 02/07/2022    GLUCOSE 166 (H) 03/09/2022    GLUCOSE 129 (H) 02/10/2022    GLUCOSE 146 (H) 02/07/2022      Hepatic:   Lab Results   Component Value Date    AST 12 02/10/2022    AST 15 09/07/2021    AST 17 08/04/2021    ALT 11 02/10/2022    ALT 15 09/07/2021    ALT 16 08/04/2021    BILITOT 0.3 02/10/2022    BILITOT 0.3 09/07/2021    BILITOT 0.3 08/04/2021    ALKPHOS 85 02/10/2022    ALKPHOS 100 09/07/2021    ALKPHOS 91 08/04/2021     BNP: No results found for: BNP  Lipids:   Lab Results   Component Value Date    CHOL 145 09/07/2021    HDL 42 09/07/2021     INR:   Lab Results   Component Value Date    INR 1.28 (H) 08/02/2020    INR 1.13 08/23/2018    INR 0.97 06/16/2015     URINE:   Lab Results   Component Value Date    PROTUR 34.6 02/22/2022     Lab Results   Component Value Date    NITRU NEGATIVE 06/23/2021    COLORU YELLOW 06/23/2021    PHUR 6.0 06/23/2021    LABCAST 4-8 HYALINE 06/07/2021    LABCAST NONE SEEN 06/07/2021    WBCUA > 200 06/09/2021    RBCUA 3-5 06/09/2021    MUCUS THREADS 12/08/2020    YEAST NONE SEEN 06/09/2021    BACTERIA MODERATE 06/09/2021    SPECGRAV 1.014 06/23/2021    LEUKOCYTESUR NEGATIVE 06/23/2021    LEUKOCYTESUR MODERATE 06/09/2021    UROBILINOGEN 0.2 06/23/2021    BILIRUBINUR NEGATIVE 06/23/2021    BILIRUBINUR NEGATIVE 09/06/2011    BLOODU NEGATIVE 06/23/2021    GLUCOSEU 100 06/09/2021    KETUA NEGATIVE 06/23/2021    AMORPHOUS URATES 04/20/2021      Microalbumen/Creatinine ratio:  No components found for: RUCREAT    Objective:   Vitals: BP (!) 152/62 (Site: Right Upper Arm, Position: Sitting, Cuff Size: Large Adult)   Pulse 63   Temp 97.3 °F (36.3 °C)   Wt 191 lb 6.4 oz (86.8 kg)   SpO2 98%   BMI 31.85 kg/m²      Constitutional:  Alert, awake, no apparent distress  Skin:normal with no rash or any significant lesions  HEENT:Pupils are reactive . Throat is clear. Oral mucosa is moist.  Neck:supple with no thyromegaly, JVD, lymphadenopathy or bruit   Cardiovascular: Regular sinus rhythm without murmur, rubs or gallops   Respiratory:  Clear to auscultation with no wheezes or rales  Abdomen: Good bowel sound, soft, non tender and no bruit  Ext: No LE edema  Musculoskeletal:Intact  Neuro:Alert, awake and oriented with no obvious focal deficit. Speech is normal.    Electronically signed by Wendie Daly MD on 4/7/2022 at 2:08 PM   **This report has been created using voice recognition software. It maycontain minor  errors which are inherent in voice recognition technology. **

## 2022-04-08 ENCOUNTER — HOSPITAL ENCOUNTER (OUTPATIENT)
Age: 67
Discharge: HOME | End: 2022-04-09
Attending: INTERNAL MEDICINE | Admitting: INTERNAL MEDICINE
Payer: MEDICARE

## 2022-04-08 ENCOUNTER — APPOINTMENT (OUTPATIENT)
Dept: INTERVENTIONAL RADIOLOGY/VASCULAR | Age: 67
End: 2022-04-08
Attending: INTERNAL MEDICINE
Payer: MEDICARE

## 2022-04-08 PROBLEM — I73.9 PVD (PERIPHERAL VASCULAR DISEASE) (HCC): Status: ACTIVE | Noted: 2022-04-08

## 2022-04-08 LAB
ABO: NORMAL
ACTIVATED CLOTTING TIME: 237 SECONDS (ref 1–150)
ALBUMIN SERPL-MCNC: 4.4 G/DL (ref 3.5–5.1)
ALP BLD-CCNC: 98 U/L (ref 38–126)
ALT SERPL-CCNC: 16 U/L (ref 11–66)
ANION GAP SERPL CALCULATED.3IONS-SCNC: 12 MEQ/L (ref 8–16)
ANTIBODY SCREEN: NORMAL
AST SERPL-CCNC: 15 U/L (ref 5–40)
BILIRUB SERPL-MCNC: 0.2 MG/DL (ref 0.3–1.2)
BUN BLDV-MCNC: 24 MG/DL (ref 7–22)
CALCIUM SERPL-MCNC: 10.6 MG/DL (ref 8.5–10.5)
CHLORIDE BLD-SCNC: 100 MEQ/L (ref 98–111)
CHOLESTEROL, TOTAL: 117 MG/DL (ref 100–199)
CO2: 27 MEQ/L (ref 23–33)
CREAT SERPL-MCNC: 1.2 MG/DL (ref 0.4–1.2)
ERYTHROCYTE [DISTWIDTH] IN BLOOD BY AUTOMATED COUNT: 14.6 % (ref 11.5–14.5)
ERYTHROCYTE [DISTWIDTH] IN BLOOD BY AUTOMATED COUNT: 45.1 FL (ref 35–45)
GLUCOSE BLD-MCNC: 197 MG/DL (ref 70–108)
GLUCOSE BLD-MCNC: 248 MG/DL (ref 70–108)
GLUCOSE BLD-MCNC: 307 MG/DL (ref 70–108)
HCT VFR BLD CALC: 33.4 % (ref 37–47)
HDLC SERPL-MCNC: 36 MG/DL
HEMOGLOBIN: 10.3 GM/DL (ref 12–16)
INR BLD: 1.04 (ref 0.85–1.13)
LDL CHOLESTEROL CALCULATED: 54 MG/DL
MAGNESIUM: 2 MG/DL (ref 1.6–2.4)
MCH RBC QN AUTO: 26.7 PG (ref 26–33)
MCHC RBC AUTO-ENTMCNC: 30.8 GM/DL (ref 32.2–35.5)
MCV RBC AUTO: 86.5 FL (ref 81–99)
PLATELET # BLD: 225 THOU/MM3 (ref 130–400)
PMV BLD AUTO: 11.2 FL (ref 9.4–12.4)
POTASSIUM REFLEX MAGNESIUM: 3.5 MEQ/L (ref 3.5–5.2)
RBC # BLD: 3.86 MILL/MM3 (ref 4.2–5.4)
RH FACTOR: NORMAL
SODIUM BLD-SCNC: 139 MEQ/L (ref 135–145)
TOTAL PROTEIN: 6.8 G/DL (ref 6.1–8)
TRIGL SERPL-MCNC: 137 MG/DL (ref 0–199)
WBC # BLD: 7.8 THOU/MM3 (ref 4.8–10.8)

## 2022-04-08 PROCEDURE — 2580000003 HC RX 258: Performed by: INTERNAL MEDICINE

## 2022-04-08 PROCEDURE — 75630 X-RAY AORTA LEG ARTERIES: CPT | Performed by: INTERNAL MEDICINE

## 2022-04-08 PROCEDURE — 86850 RBC ANTIBODY SCREEN: CPT

## 2022-04-08 PROCEDURE — 82948 REAGENT STRIP/BLOOD GLUCOSE: CPT

## 2022-04-08 PROCEDURE — 6370000000 HC RX 637 (ALT 250 FOR IP): Performed by: INTERNAL MEDICINE

## 2022-04-08 PROCEDURE — 83735 ASSAY OF MAGNESIUM: CPT

## 2022-04-08 PROCEDURE — 80061 LIPID PANEL: CPT

## 2022-04-08 PROCEDURE — 75716 ARTERY X-RAYS ARMS/LEGS: CPT

## 2022-04-08 PROCEDURE — 80053 COMPREHEN METABOLIC PANEL: CPT

## 2022-04-08 PROCEDURE — 85027 COMPLETE CBC AUTOMATED: CPT

## 2022-04-08 PROCEDURE — 2500000003 HC RX 250 WO HCPCS: Performed by: INTERNAL MEDICINE

## 2022-04-08 PROCEDURE — 6360000002 HC RX W HCPCS: Performed by: INTERNAL MEDICINE

## 2022-04-08 PROCEDURE — 85610 PROTHROMBIN TIME: CPT

## 2022-04-08 PROCEDURE — 75774 ARTERY X-RAY EACH VESSEL: CPT

## 2022-04-08 PROCEDURE — 37224 HC PLASTY UNI FEMPOP: CPT

## 2022-04-08 PROCEDURE — 36415 COLL VENOUS BLD VENIPUNCTURE: CPT

## 2022-04-08 PROCEDURE — 6360000004 HC RX CONTRAST MEDICATION: Performed by: INTERNAL MEDICINE

## 2022-04-08 PROCEDURE — C1894 INTRO/SHEATH, NON-LASER: HCPCS

## 2022-04-08 PROCEDURE — 86900 BLOOD TYPING SEROLOGIC ABO: CPT

## 2022-04-08 PROCEDURE — 37224 PR REVSC OPN/PRG FEM/POP W/ANGIOPLASTY UNI: CPT | Performed by: INTERNAL MEDICINE

## 2022-04-08 PROCEDURE — 85347 COAGULATION TIME ACTIVATED: CPT

## 2022-04-08 PROCEDURE — 86901 BLOOD TYPING SEROLOGIC RH(D): CPT

## 2022-04-08 RX ORDER — SODIUM CHLORIDE 9 MG/ML
25 INJECTION, SOLUTION INTRAVENOUS PRN
Status: DISCONTINUED | OUTPATIENT
Start: 2022-04-08 | End: 2022-04-09 | Stop reason: HOSPADM

## 2022-04-08 RX ORDER — SODIUM CHLORIDE 0.9 % (FLUSH) 0.9 %
5-40 SYRINGE (ML) INJECTION PRN
Status: DISCONTINUED | OUTPATIENT
Start: 2022-04-08 | End: 2022-04-09 | Stop reason: HOSPADM

## 2022-04-08 RX ORDER — IRON POLYSACCHARIDE COMPLEX 150 MG
150 CAPSULE ORAL DAILY
Status: DISCONTINUED | OUTPATIENT
Start: 2022-04-08 | End: 2022-04-09 | Stop reason: HOSPADM

## 2022-04-08 RX ORDER — ASPIRIN 81 MG/1
81 TABLET, CHEWABLE ORAL DAILY
Status: DISCONTINUED | OUTPATIENT
Start: 2022-04-09 | End: 2022-04-08

## 2022-04-08 RX ORDER — NITROGLYCERIN 20 MG/100ML
5-200 INJECTION INTRAVENOUS CONTINUOUS
Status: DISCONTINUED | OUTPATIENT
Start: 2022-04-08 | End: 2022-04-09

## 2022-04-08 RX ORDER — FAMOTIDINE 20 MG/1
40 TABLET, FILM COATED ORAL DAILY
Status: DISCONTINUED | OUTPATIENT
Start: 2022-04-08 | End: 2022-04-09 | Stop reason: HOSPADM

## 2022-04-08 RX ORDER — FLUTICASONE PROPIONATE 50 MCG
1 SPRAY, SUSPENSION (ML) NASAL DAILY
Status: DISCONTINUED | OUTPATIENT
Start: 2022-04-08 | End: 2022-04-09 | Stop reason: HOSPADM

## 2022-04-08 RX ORDER — PANTOPRAZOLE SODIUM 40 MG/1
40 TABLET, DELAYED RELEASE ORAL
Status: DISCONTINUED | OUTPATIENT
Start: 2022-04-08 | End: 2022-04-09 | Stop reason: HOSPADM

## 2022-04-08 RX ORDER — CARVEDILOL 25 MG/1
50 TABLET ORAL 2 TIMES DAILY
Status: DISCONTINUED | OUTPATIENT
Start: 2022-04-08 | End: 2022-04-09 | Stop reason: HOSPADM

## 2022-04-08 RX ORDER — ATORVASTATIN CALCIUM 40 MG/1
40 TABLET, FILM COATED ORAL DAILY
Status: DISCONTINUED | OUTPATIENT
Start: 2022-04-08 | End: 2022-04-09 | Stop reason: HOSPADM

## 2022-04-08 RX ORDER — FENTANYL CITRATE 50 UG/ML
INJECTION, SOLUTION INTRAMUSCULAR; INTRAVENOUS
Status: COMPLETED | OUTPATIENT
Start: 2022-04-08 | End: 2022-04-08

## 2022-04-08 RX ORDER — MYCOPHENOLIC ACID 360 MG/1
360 TABLET, DELAYED RELEASE ORAL 2 TIMES DAILY
Status: DISCONTINUED | OUTPATIENT
Start: 2022-04-08 | End: 2022-04-09 | Stop reason: HOSPADM

## 2022-04-08 RX ORDER — CLOPIDOGREL BISULFATE 75 MG/1
TABLET ORAL
Status: COMPLETED | OUTPATIENT
Start: 2022-04-08 | End: 2022-04-08

## 2022-04-08 RX ORDER — TACROLIMUS 1 MG/1
1 CAPSULE ORAL DAILY
Status: DISCONTINUED | OUTPATIENT
Start: 2022-04-08 | End: 2022-04-08

## 2022-04-08 RX ORDER — SULFAMETHOXAZOLE AND TRIMETHOPRIM 800; 160 MG/1; MG/1
1 TABLET ORAL
Status: DISCONTINUED | OUTPATIENT
Start: 2022-04-08 | End: 2022-04-09 | Stop reason: HOSPADM

## 2022-04-08 RX ORDER — CLONIDINE HYDROCHLORIDE 0.2 MG/1
0.2 TABLET ORAL 3 TIMES DAILY
Status: DISCONTINUED | OUTPATIENT
Start: 2022-04-08 | End: 2022-04-09 | Stop reason: HOSPADM

## 2022-04-08 RX ORDER — DOCUSATE SODIUM 100 MG/1
100 CAPSULE, LIQUID FILLED ORAL 2 TIMES DAILY PRN
Status: DISCONTINUED | OUTPATIENT
Start: 2022-04-08 | End: 2022-04-09 | Stop reason: HOSPADM

## 2022-04-08 RX ORDER — CLOPIDOGREL BISULFATE 75 MG/1
75 TABLET ORAL DAILY
Status: DISCONTINUED | OUTPATIENT
Start: 2022-04-09 | End: 2022-04-09 | Stop reason: HOSPADM

## 2022-04-08 RX ORDER — MIDAZOLAM HYDROCHLORIDE 1 MG/ML
INJECTION INTRAMUSCULAR; INTRAVENOUS
Status: COMPLETED | OUTPATIENT
Start: 2022-04-08 | End: 2022-04-08

## 2022-04-08 RX ORDER — ACETAMINOPHEN 325 MG/1
650 TABLET ORAL EVERY 4 HOURS PRN
Status: DISCONTINUED | OUTPATIENT
Start: 2022-04-08 | End: 2022-04-09 | Stop reason: HOSPADM

## 2022-04-08 RX ORDER — INSULIN GLARGINE 100 [IU]/ML
25 INJECTION, SOLUTION SUBCUTANEOUS NIGHTLY
Status: DISCONTINUED | OUTPATIENT
Start: 2022-04-08 | End: 2022-04-09 | Stop reason: HOSPADM

## 2022-04-08 RX ORDER — SODIUM CHLORIDE 9 MG/ML
INJECTION, SOLUTION INTRAVENOUS CONTINUOUS
Status: DISCONTINUED | OUTPATIENT
Start: 2022-04-08 | End: 2022-04-09

## 2022-04-08 RX ORDER — CARVEDILOL 25 MG/1
25 TABLET ORAL 2 TIMES DAILY
Status: DISCONTINUED | OUTPATIENT
Start: 2022-04-08 | End: 2022-04-08

## 2022-04-08 RX ORDER — HEPARIN SODIUM 1000 [USP'U]/ML
INJECTION, SOLUTION INTRAVENOUS; SUBCUTANEOUS
Status: COMPLETED | OUTPATIENT
Start: 2022-04-08 | End: 2022-04-08

## 2022-04-08 RX ORDER — NIFEDIPINE 60 MG/1
60 TABLET, EXTENDED RELEASE ORAL 2 TIMES DAILY
Status: DISCONTINUED | OUTPATIENT
Start: 2022-04-08 | End: 2022-04-09

## 2022-04-08 RX ORDER — ASPIRIN 81 MG/1
81 TABLET ORAL DAILY
Status: DISCONTINUED | OUTPATIENT
Start: 2022-04-09 | End: 2022-04-09 | Stop reason: HOSPADM

## 2022-04-08 RX ORDER — ALBUTEROL SULFATE 90 UG/1
2 AEROSOL, METERED RESPIRATORY (INHALATION) EVERY 6 HOURS PRN
Status: DISCONTINUED | OUTPATIENT
Start: 2022-04-08 | End: 2022-04-09 | Stop reason: HOSPADM

## 2022-04-08 RX ORDER — SODIUM CHLORIDE 0.9 % (FLUSH) 0.9 %
5-40 SYRINGE (ML) INJECTION EVERY 12 HOURS SCHEDULED
Status: DISCONTINUED | OUTPATIENT
Start: 2022-04-08 | End: 2022-04-09 | Stop reason: HOSPADM

## 2022-04-08 RX ORDER — ERGOCALCIFEROL 1.25 MG/1
50000 CAPSULE ORAL
Status: DISCONTINUED | OUTPATIENT
Start: 2022-04-11 | End: 2022-04-09 | Stop reason: HOSPADM

## 2022-04-08 RX ORDER — TACROLIMUS 1 MG/1
5 CAPSULE ORAL DAILY
Status: DISCONTINUED | OUTPATIENT
Start: 2022-04-09 | End: 2022-04-09 | Stop reason: HOSPADM

## 2022-04-08 RX ORDER — HYDROCODONE BITARTRATE AND ACETAMINOPHEN 5; 325 MG/1; MG/1
1 TABLET ORAL EVERY 4 HOURS PRN
Status: DISCONTINUED | OUTPATIENT
Start: 2022-04-08 | End: 2022-04-09 | Stop reason: HOSPADM

## 2022-04-08 RX ADMIN — FAMOTIDINE 40 MG: 20 TABLET, FILM COATED ORAL at 14:41

## 2022-04-08 RX ADMIN — MIDAZOLAM 1 MG: 1 INJECTION INTRAMUSCULAR; INTRAVENOUS at 09:01

## 2022-04-08 RX ADMIN — CLONIDINE HYDROCHLORIDE 0.2 MG: 0.2 TABLET ORAL at 14:41

## 2022-04-08 RX ADMIN — FENTANYL CITRATE 50 MCG: 50 INJECTION, SOLUTION INTRAMUSCULAR; INTRAVENOUS at 09:01

## 2022-04-08 RX ADMIN — ASPIRIN 325 MG: 325 TABLET, DELAYED RELEASE ORAL at 10:37

## 2022-04-08 RX ADMIN — ATORVASTATIN CALCIUM 40 MG: 40 TABLET, FILM COATED ORAL at 20:30

## 2022-04-08 RX ADMIN — HYDROCODONE BITARTRATE AND ACETAMINOPHEN 1 TABLET: 5; 325 TABLET ORAL at 18:32

## 2022-04-08 RX ADMIN — PANTOPRAZOLE SODIUM 40 MG: 40 TABLET, DELAYED RELEASE ORAL at 18:23

## 2022-04-08 RX ADMIN — FENTANYL CITRATE 50 MCG: 50 INJECTION, SOLUTION INTRAMUSCULAR; INTRAVENOUS at 09:24

## 2022-04-08 RX ADMIN — CLONIDINE HYDROCHLORIDE 0.2 MG: 0.2 TABLET ORAL at 18:21

## 2022-04-08 RX ADMIN — MAGNESIUM GLUCONATE 500 MG ORAL TABLET 400 MG: 500 TABLET ORAL at 20:30

## 2022-04-08 RX ADMIN — HEPARIN SODIUM 8000 UNITS: 1000 INJECTION INTRAVENOUS; SUBCUTANEOUS at 09:11

## 2022-04-08 RX ADMIN — CARVEDILOL 50 MG: 25 TABLET ORAL at 20:30

## 2022-04-08 RX ADMIN — MYCOPHENOLIC ACID 360 MG: 360 TABLET, DELAYED RELEASE ORAL at 20:30

## 2022-04-08 RX ADMIN — MIDAZOLAM 1 MG: 1 INJECTION INTRAMUSCULAR; INTRAVENOUS at 09:24

## 2022-04-08 RX ADMIN — INSULIN GLARGINE 25 UNITS: 100 INJECTION, SOLUTION SUBCUTANEOUS at 21:06

## 2022-04-08 RX ADMIN — NIFEDIPINE 60 MG: 60 TABLET, EXTENDED RELEASE ORAL at 18:23

## 2022-04-08 RX ADMIN — NITROGLYCERIN 5 MCG/MIN: 20 INJECTION INTRAVENOUS at 11:44

## 2022-04-08 RX ADMIN — CLOPIDOGREL BISULFATE 600 MG: 75 TABLET, FILM COATED ORAL at 10:38

## 2022-04-08 RX ADMIN — SODIUM CHLORIDE: 9 INJECTION, SOLUTION INTRAVENOUS at 18:03

## 2022-04-08 RX ADMIN — SULFAMETHOXAZOLE AND TRIMETHOPRIM 1 TABLET: 800; 160 TABLET ORAL at 18:00

## 2022-04-08 RX ADMIN — HYDROCODONE BITARTRATE AND ACETAMINOPHEN 1 TABLET: 5; 325 TABLET ORAL at 14:33

## 2022-04-08 RX ADMIN — IOPAMIDOL 80 ML: 612 INJECTION, SOLUTION INTRAVENOUS at 10:15

## 2022-04-08 RX ADMIN — HEPARIN SODIUM 2000 UNITS: 1000 INJECTION INTRAVENOUS; SUBCUTANEOUS at 09:53

## 2022-04-08 RX ADMIN — CLONIDINE HYDROCHLORIDE 0.2 MG: 0.2 TABLET ORAL at 20:30

## 2022-04-08 RX ADMIN — SODIUM CHLORIDE: 9 INJECTION, SOLUTION INTRAVENOUS at 06:20

## 2022-04-08 ASSESSMENT — PAIN SCALES - GENERAL
PAINLEVEL_OUTOF10: 7
PAINLEVEL_OUTOF10: 7
PAINLEVEL_OUTOF10: 4
PAINLEVEL_OUTOF10: 6
PAINLEVEL_OUTOF10: 3

## 2022-04-08 ASSESSMENT — PAIN DESCRIPTION - PAIN TYPE: TYPE: ACUTE PAIN

## 2022-04-08 ASSESSMENT — PAIN DESCRIPTION - PROGRESSION
CLINICAL_PROGRESSION: NOT CHANGED

## 2022-04-08 ASSESSMENT — PAIN DESCRIPTION - ORIENTATION: ORIENTATION: LEFT

## 2022-04-08 ASSESSMENT — PAIN DESCRIPTION - DIRECTION: RADIATING_TOWARDS: DENIES

## 2022-04-08 ASSESSMENT — PAIN DESCRIPTION - DESCRIPTORS: DESCRIPTORS: THROBBING

## 2022-04-08 ASSESSMENT — PAIN DESCRIPTION - LOCATION: LOCATION: FOOT

## 2022-04-08 ASSESSMENT — PAIN DESCRIPTION - FREQUENCY: FREQUENCY: INTERMITTENT

## 2022-04-08 ASSESSMENT — PAIN DESCRIPTION - ONSET: ONSET: ON-GOING

## 2022-04-08 ASSESSMENT — PAIN - FUNCTIONAL ASSESSMENT: PAIN_FUNCTIONAL_ASSESSMENT: ACTIVITIES ARE NOT PREVENTED

## 2022-04-08 NOTE — BRIEF OP NOTE
[address] [address]  Sedation/Analgesia Post Sedation Record    Pt Name: Sulma Jordan  Account number: [de-identified]  MRN: [id number]  YOB: 1955  Procedure Performed By: MD MD Ankit Earl, [address]  Primary Care Physician: Carlos Leigh, APRN - CNP  Date: 4/8/2022    POST-PROCEDURE    Physicians/Assistants: MD MD Ankit Earl, RPVI    Procedure Performed:Peripheral Angiogram/Intervention      Sedation/Anesthesia: Versed/ Fentanyl and 2% xylocaine local anesthesia. Estimated Blood Loss: < 50 ml. Specimens Removed: None         Disposition of Specimen: N/A        Complications: No Immediate Complications.        Post-procedure Diagnosis/Findings:    LLE angiogram 50-75 cc of dye and CO2  S/p DCB of the SFA/pop with 5.0 in the pop, 6.0 in the SFA  DAPT + Xarelto 2.5 mg BID, d/c on Plavix 75 mg q day and Xarelto 2.5 mg BID  IVF                MD MD Ankit Earl, [address]  Electronically signed 4/8/2022 at 10:42 AM  Interventional Cardiology

## 2022-04-08 NOTE — H&P
[address] [address]  Sedation/Analgesia History & Physical    Pt Name: Yu Matthews  Account number: [de-identified]  MRN: [id number]  YOB: 1955  Provider Performing Procedure: Ada Márquez DO MD  Referring Provider: Alvaro Snyder MD   Primary Care Physician: Sami Farooq, APRN - CNP  Date: 4/8/2022    PRE-PROCEDURE    Code Status: FULL CODE  Brief History/Pre-Procedure Diagnosis:   Severe Left lower limb arterial stenosis, L JOSE ANTONIO 0.57, Henry Class IV. Planning Peripheral intervention with potential stenting. Consent: : I have discussed with the patient risks, benefits, and alternatives (and relevant risks, benefits, and side effects related to alternatives or not receiving care), and likelihood of the success. The patient and/or representative appear to understand and agree to proceed. The discussion encompasses risks, benefits, and side effects related to the alternatives and the risks related to not receiving the proposed care, treatment, and services. The indication, risks and benefits of the procedure and possible therapeutic consequences and alternatives were discussed with the patient. The patient was given the opportunity to ask questions and to have them answered to his/her satisfaction.  Risks of the procedure include but are not limited to the following: Bleeding, hematoma including retroperitoneal hemmorhage, infection, pain and discomfort, injury to the aorta and other blood vessels, rhythm disturbance, low blood pressure, myocardial infarction, stroke, kidney damage/failure, myocardial perforation, allergic reactions to sedatives/contrast material, loss of pulse/vascular compromise requiring surgery, aneurysm/pseudoaneurysm formation, possible loss of a limb/hand/leg, needing blood transfusion, requiring emergent open heart surgery or emergent coronary intervention, the need for intubation/respiratory support, the requirement for defibrillation/cardioversion, and death. Alternatives to and omission of the suggested procedure were discussed. The patient had no further questions and wished to proceed; the consent form was signed. MEDICAL HISTORY   has a past medical history of Anemia associated with chronic renal failure, Anxiety, Arthritis, Backache, Blood circulation, collateral, Blood transfusion, CAD (coronary artery disease), Cellulitis in diabetic foot ([address] Chest pain, CHF (congestive heart failure) ([address] Chronic anemia, Chronic kidney disease, Chronic kidney disease, stage III (moderate) (HCC), Chronic renal insufficiency, COPD (chronic obstructive pulmonary disease) ([address] Coronary disease, COVID-19, Depression, Diabetes mellitus, type 2 ([address] Disease of blood and blood forming organ, GERD (gastroesophageal reflux disease), 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 SURGICAL HISTORY   has a past surgical history that includes eye surgery (March 30th, 2012); Carpal tunnel release (1988); Foot surgery (1990); Colonoscopy (2009); Endoscopy, colon, diagnostic (2007); Appendectomy (1980s); back surgery (1990's); Cosmetic surgery (3/30/2012); Ankle surgery (Right, 02-10-14); liver biopsy (6/2015); Lung biopsy (2009); joint replacement (2015); fracture surgery (2015); Cardiac surgery (2008); pr office/outpt visit,procedure only (Left, 8/23/2018); Colonoscopy (Left, 6/10/2019); Upper gastrointestinal endoscopy (Left, 6/14/2019); Diagnostic Cardiac Cath Lab Procedure; Kidney transplant (04/10/2020); Ovary removal (1985); and Hysterectomy (1980 and 1985).   Additional information:       ALLERGIES   Allergies as of 04/08/2022 - Fully Reviewed 04/08/2022   Allergen Reaction Noted    Actos [pioglitazone hydrochloride] Swelling 08/31/2011    Pioglitazone Swelling 04/15/2019    Cymbalta [duloxetine hcl] Other (See Comments) 08/08/2013    Gabapentin Anxiety 11/05/2015     Additional information:       MEDICATIONS     Current Facility-Administered Medications:     0.9 % sodium chloride infusion, , IntraVENous, Continuous, Juice Dueñas MD, Last Rate: 75 mL/hr at 04/08/22 0620, New Bag at 04/08/22 0620  Prior to Admission medications    Medication Sig Start Date End Date Taking? Authorizing Provider   docusate sodium (COLACE) 100 MG capsule Take 1 capsule by mouth 2 times daily as needed for Constipation 3/21/22   Vale Polkre,    HYDROcodone-acetaminophen (NORCO) 5-325 MG per tablet Take 1 tablet by mouth every 8 hours as needed for Pain for up to 30 days.  3/23/22 4/22/22  DONOVAN Feldman CNP   Nutritional Supplement LIQD Take 2 Cans by mouth daily STRAWBERRY GLUCERNA 3/3/22   DONOVAN Ballesteros CNP   tiotropium (SPIRIVA RESPIMAT) 2.5 MCG/ACT AERS inhaler Inhale 2 puffs into the lungs daily 3/2/22 3/2/23  DONOVAN Richmond CNP   albuterol sulfate HFA (PROAIR HFA) 108 (90 Base) MCG/ACT inhaler Inhale 2 puffs into the lungs every 6 hours as needed for Wheezing or Shortness of Breath 3/2/22 3/2/23  DONOVAN Richmond CNP   fluticasone (FLONASE) 50 MCG/ACT nasal spray 1 spray by Each Nostril route daily 3/2/22   DONOVAN Richmond CNP   sulfamethoxazole-trimethoprim (BACTRIM DS) 800-160 MG per tablet Take 1 tablet by mouth 3 times a week    Historical Provider, MD   insulin aspart (NOVOLOG FLEXPEN) 100 UNIT/ML injection pen Sliding scale insulin coverage Glucose:Dose: If Less [id number] =No Insulin/ 140-199= 2 Units/ 200-249=4 Units/ 250-299= 6 Units/  300-349=8 Units/  350-400=10 Units/ Above 400 = 12 Units max 48 units daily 2/9/22   Excell Breed, APRN - CNP   insulin glargine (LANTUS SOLOSTAR) 100 UNIT/ML injection pen Inject 25 Units into the skin nightly 2/9/22   Lillian DONOVAN Ratliff CNP   Insulin Pen Needle (B-D ULTRAFINE III SHORT PEN) 31G X 8 MM MISC Use three times daily Diagnosis Code E11.9 2/9/22   Lillian DONOVAN Ratliff CNP   carvedilol (COREG) 25 MG tablet Take 1 tablet by mouth 2 times daily 2 tablets BID 2/9/22 5/10/22  Lillian DONOVAN Ratliff CNP   NIFEdipine (ADALAT CC) 60 MG extended release tablet Take 60 mg by mouth 2 times daily  9/23/21   Historical Provider, MD   lactulose [facility]) 10 GM/15ML solution Take twice daily as needed for constipation 9/17/21   Luis Lai DO   vitamin D (ERGOCALCIFEROL) 1.25 MG (56248 UT) CAPS capsule TAKE 1 CAPSULE BY MOUTH EVERY 14 DAYS ON MONDAYS 6/17/21   [facility], APRN - CNP   cloNIDine (CATAPRES) 0.2 MG tablet Take 0.2 mg by mouth 3 times daily    Historical Provider, MD   famotidine (PEPCID) 40 MG tablet Take 40 mg by mouth 2 times daily as needed  3/16/21   Historical Provider, MD   iron polysaccharides (NIFEREX) 150 MG capsule Take 150 mg by mouth daily 5/4/21   Historical Provider, MD   pantoprazole (PROTONIX) 40 MG tablet Take 40 mg by mouth 2 times daily     Historical Provider, MD   atorvastatin (LIPITOR) 40 MG tablet TAKE 1 TABLET DAILY 2/23/21   [facility], APRN - CNP   tacrolimus (PROGRAF) 1 MG capsule Take 1 mg by mouth 5 CAPSULES EVERY MORNING AND 5 CAPSULES EVERY EVENING    Historical Provider, MD   Mycophenolate Sodium 360 MG TBEC Take 360 mg by mouth 1 tablets BID    Historical Provider, MD   magnesium oxide (MAG-OX) 400 MG tablet Take 400 mg by mouth 2 times daily    Historical Provider, MD   linaclotide (LINZESS) 290 MCG CAPS capsule Take 145 mcg by mouth every other day     Historical Provider, MD   aspirin 81 MG EC tablet Take 81 mg by mouth daily.       Historical Provider, MD     Additional information:       VITAL SIGNS   Vitals:    04/08/22 0539   BP: (!) 164/66   Pulse: 66   Resp: 21   Temp: 98 °F (36.7 °C)   SpO2: 98% PHYSICAL:   General: No acute distress  HEENT:  Unremarkable for age  Neck: without increased JVD, carotid pulses 2+ bilaterally without bruits  Heart: RRR, S1 & S2 WNL, without murmurs, gallops, or rubs   NYHA: 1  Magoffin Classification: IV  Lungs: Clear to auscultation    Abdomen: BS present, without HSM, masses, or tenderness    Extremities: without C,C. Trace edema in lower limbs bilaterally. Pulses 1+ bilaterally  Mental Status: Alert & Oriented        PLANNED PROCEDURE   []Cath  []PCI                []Pacemaker/AICD  []LUPILLO             []Cardioversion [x]Peripheral angiography/PTA  []Other:      SEDATION  Planned agent:[x]Midazolam []Meperidine [x]Sublimaze []Morphine  []Diazepam  []Other:     ASA Classification:  []1 []2 [x]3 []4 []5  Class 1: A normal healthy patient  Class 2: Pt with mild to moderate systemic disease  Class 3: Severe systemic disease or disturbance  Class 4: Severe systemic disorders that are already life threatening. Class 5: Moribund pt with little chances of survival, for more than 24 hours. Mallampati I Airway Classification:   []1 []2 [x]3 []4    [x]Pre-procedure diagnostic studies complete and results available. Comment: Creatinine 1.2 Kidney Transplant (using CO2 for dye), HgB stable. [x]Previous sedation/anesthesia experiences assessed. Comment: No issues previuosly  [x]The patient is an appropriate candidate to undergo the planned procedure sedation and anesthesia. (Refer to nursing sedation/analgesia documentation record)  [x]Formulation and discussion of sedation/procedure plan, risks, and expectations with patient and/or responsible adult completed. [x]Patient examined immediately prior to the procedure.  (Refer to nursing sedation/analgesia documentation record)    Selvin Alvarado DO MD   Electronically signed 4/8/2022 at 7:57 AM     Attending Supervising Physician's Attestation Statement  I performed a history and physical examination on the patient and discussed the management with the resident physician. I reviewed and agree with the findings and plan as documented in the resident's note.     Electronically signed by Whitley Briscoe MD on 4/8/22 at 10:42 AM EDT

## 2022-04-08 NOTE — PROGRESS NOTES
Returned to 2E11. Monitor attached showing SR. Dressing to right groin dry and intact. No bleeding, swelling, or edema noted.   0.9NSS infusing with approx 600 ml remaining

## 2022-04-08 NOTE — PROGRESS NOTES
7291 Patient received in IR for procedure. 3290 This procedure has been fully reviewed with the patient and written informed consent has been obtained. 7785 Patient prepped for procedure. 2219 Procedure started with Dr. Fawad Jeffries.  0900 Lidocaine given.  8391 Access with use of sonosite. 6F sheath. 4940 Angioplasty of the left popliteal with Reading-18 2x100.  [id number] Angioplasty of the left popliteal with Reading-18 5x200.   0931 Angioplasty of the left popliteal with IN. PACT 5x250 in left popliteal.   [id number] Angioplasty of the left popliteal with IN. PACT 5x40 in left popliteal.   [id number] Angioplasty of the left popliteal with IN. PACT 5x40 in left popliteal.   0945 Angioplasty of the left popliteal with Reading-35 6x200 in left popliteal.   0948 ACT started. 4478 Angioplasty of the left popliteal with IN. PACT 6x250 in left popliteal  0952 . Dr. Fawad Jeffries notified. [place] received. 1126 Angioplasty of the left popliteal with Reading-35 6x250. 1015 Procedure completed; patient tolerated well. Angio seal device deployed to right femoral artery. Manual pressure held by Marlene,RT  1030 Manual pressure discontinued. -  1033 Bacitracin oint, gauze and op site to right femoral site; area soft to touch with no bleeding noted. 1042 Patient on bed; comfort ensured. right femoral dressing remains dry and intact with area soft. 1044 Report called to [facility] on 2E.  1045 Patient taken to 2E via bed with family at bedside. Right femoral dressing remains dry and intact with area soft.

## 2022-04-09 VITALS
BODY MASS INDEX: 31.82 KG/M2 | TEMPERATURE: 99 F | WEIGHT: 191 LBS | RESPIRATION RATE: 18 BRPM | OXYGEN SATURATION: 93 % | HEART RATE: 70 BPM | DIASTOLIC BLOOD PRESSURE: 49 MMHG | SYSTOLIC BLOOD PRESSURE: 142 MMHG | HEIGHT: 65 IN

## 2022-04-09 LAB
ANION GAP SERPL CALCULATED.3IONS-SCNC: 13 MEQ/L (ref 8–16)
BUN BLDV-MCNC: 20 MG/DL (ref 7–22)
CALCIUM SERPL-MCNC: 9.5 MG/DL (ref 8.5–10.5)
CHLORIDE BLD-SCNC: 102 MEQ/L (ref 98–111)
CO2: 22 MEQ/L (ref 23–33)
CREAT SERPL-MCNC: 1.1 MG/DL (ref 0.4–1.2)
GLUCOSE BLD-MCNC: 248 MG/DL (ref 70–108)
GLUCOSE BLD-MCNC: 266 MG/DL (ref 70–108)
GLUCOSE BLD-MCNC: 283 MG/DL (ref 70–108)
POTASSIUM SERPL-SCNC: 3.5 MEQ/L (ref 3.5–5.2)
SODIUM BLD-SCNC: 137 MEQ/L (ref 135–145)

## 2022-04-09 PROCEDURE — 36415 COLL VENOUS BLD VENIPUNCTURE: CPT

## 2022-04-09 PROCEDURE — 6370000000 HC RX 637 (ALT 250 FOR IP): Performed by: INTERNAL MEDICINE

## 2022-04-09 PROCEDURE — 99232 SBSQ HOSP IP/OBS MODERATE 35: CPT | Performed by: NURSE PRACTITIONER

## 2022-04-09 PROCEDURE — 80048 BASIC METABOLIC PNL TOTAL CA: CPT

## 2022-04-09 PROCEDURE — 82948 REAGENT STRIP/BLOOD GLUCOSE: CPT

## 2022-04-09 PROCEDURE — 94760 N-INVAS EAR/PLS OXIMETRY 1: CPT

## 2022-04-09 PROCEDURE — 6370000000 HC RX 637 (ALT 250 FOR IP): Performed by: NURSE PRACTITIONER

## 2022-04-09 PROCEDURE — 2580000003 HC RX 258: Performed by: INTERNAL MEDICINE

## 2022-04-09 RX ORDER — NIFEDIPINE 90 MG/1
90 TABLET, EXTENDED RELEASE ORAL 2 TIMES DAILY
Qty: 60 TABLET | Refills: 3 | Status: SHIPPED | OUTPATIENT
Start: 2022-04-09 | End: 2022-06-28 | Stop reason: SDUPTHER

## 2022-04-09 RX ORDER — NIFEDIPINE 30 MG/1
90 TABLET, EXTENDED RELEASE ORAL 2 TIMES DAILY
Status: DISCONTINUED | OUTPATIENT
Start: 2022-04-09 | End: 2022-04-09 | Stop reason: HOSPADM

## 2022-04-09 RX ORDER — CARVEDILOL 25 MG/1
50 TABLET ORAL 2 TIMES DAILY
Qty: 60 TABLET | Refills: 3 | Status: SHIPPED | OUTPATIENT
Start: 2022-04-09 | End: 2022-04-18 | Stop reason: SDUPTHER

## 2022-04-09 RX ORDER — CLOPIDOGREL BISULFATE 75 MG/1
75 TABLET ORAL DAILY
Qty: 30 TABLET | Refills: 3 | Status: SHIPPED | OUTPATIENT
Start: 2022-04-09 | End: 2022-07-05 | Stop reason: SDUPTHER

## 2022-04-09 RX ORDER — NIFEDIPINE 90 MG/1
90 TABLET, EXTENDED RELEASE ORAL DAILY
Qty: 30 TABLET | Refills: 5 | Status: SHIPPED | OUTPATIENT
Start: 2022-04-09 | End: 2022-04-09 | Stop reason: SDUPTHER

## 2022-04-09 RX ADMIN — HYDROCODONE BITARTRATE AND ACETAMINOPHEN 1 TABLET: 5; 325 TABLET ORAL at 04:33

## 2022-04-09 RX ADMIN — SODIUM CHLORIDE, PRESERVATIVE FREE 10 ML: 5 INJECTION INTRAVENOUS at 08:24

## 2022-04-09 RX ADMIN — INSULIN LISPRO 2 UNITS: 100 INJECTION, SOLUTION INTRAVENOUS; SUBCUTANEOUS at 00:06

## 2022-04-09 RX ADMIN — HYDROCODONE BITARTRATE AND ACETAMINOPHEN 1 TABLET: 5; 325 TABLET ORAL at 12:09

## 2022-04-09 RX ADMIN — ATORVASTATIN CALCIUM 40 MG: 40 TABLET, FILM COATED ORAL at 08:24

## 2022-04-09 RX ADMIN — CARVEDILOL 50 MG: 25 TABLET ORAL at 08:24

## 2022-04-09 RX ADMIN — TACROLIMUS 5 MG: 1 CAPSULE ORAL at 12:08

## 2022-04-09 RX ADMIN — INSULIN LISPRO 6 UNITS: 100 INJECTION, SOLUTION INTRAVENOUS; SUBCUTANEOUS at 12:12

## 2022-04-09 RX ADMIN — NIFEDIPINE 90 MG: 30 TABLET, FILM COATED, EXTENDED RELEASE ORAL at 09:48

## 2022-04-09 RX ADMIN — CLONIDINE HYDROCHLORIDE 0.2 MG: 0.2 TABLET ORAL at 08:24

## 2022-04-09 RX ADMIN — RIVAROXABAN 2.5 MG: 2.5 TABLET, FILM COATED ORAL at 09:48

## 2022-04-09 RX ADMIN — CLOPIDOGREL BISULFATE 75 MG: 75 TABLET ORAL at 08:29

## 2022-04-09 RX ADMIN — ASPIRIN 81 MG: 81 TABLET ORAL at 08:24

## 2022-04-09 RX ADMIN — FAMOTIDINE 40 MG: 20 TABLET, FILM COATED ORAL at 08:24

## 2022-04-09 RX ADMIN — PANTOPRAZOLE SODIUM 40 MG: 40 TABLET, DELAYED RELEASE ORAL at 06:12

## 2022-04-09 RX ADMIN — Medication 150 MG: at 08:24

## 2022-04-09 RX ADMIN — MAGNESIUM GLUCONATE 500 MG ORAL TABLET 400 MG: 500 TABLET ORAL at 08:24

## 2022-04-09 RX ADMIN — INSULIN LISPRO 4 UNITS: 100 INJECTION, SOLUTION INTRAVENOUS; SUBCUTANEOUS at 08:24

## 2022-04-09 RX ADMIN — MYCOPHENOLIC ACID 360 MG: 360 TABLET, DELAYED RELEASE ORAL at 12:08

## 2022-04-09 RX ADMIN — CLONIDINE HYDROCHLORIDE 0.2 MG: 0.2 TABLET ORAL at 13:37

## 2022-04-09 ASSESSMENT — PAIN DESCRIPTION - PROGRESSION
CLINICAL_PROGRESSION: NOT CHANGED

## 2022-04-09 ASSESSMENT — PAIN SCALES - GENERAL
PAINLEVEL_OUTOF10: 7
PAINLEVEL_OUTOF10: 0
PAINLEVEL_OUTOF10: 5
PAINLEVEL_OUTOF10: 6

## 2022-04-09 ASSESSMENT — PAIN DESCRIPTION - LOCATION: LOCATION: LEG

## 2022-04-09 ASSESSMENT — PAIN DESCRIPTION - ONSET: ONSET: ON-GOING

## 2022-04-09 ASSESSMENT — PAIN DESCRIPTION - PAIN TYPE: TYPE: ACUTE PAIN

## 2022-04-09 ASSESSMENT — PAIN - FUNCTIONAL ASSESSMENT: PAIN_FUNCTIONAL_ASSESSMENT: ACTIVITIES ARE NOT PREVENTED

## 2022-04-09 ASSESSMENT — PAIN DESCRIPTION - DESCRIPTORS: DESCRIPTORS: THROBBING

## 2022-04-09 ASSESSMENT — PAIN DESCRIPTION - FREQUENCY: FREQUENCY: INTERMITTENT

## 2022-04-09 ASSESSMENT — PAIN DESCRIPTION - ORIENTATION: ORIENTATION: LEFT

## 2022-04-09 NOTE — PROCEDURES
[address] [place] [address]                            CARDIAC CATHETERIZATION    PATIENT NAME: Yahaira Mendez                   :        1955  MED REC NO:   [id number]                           ROOM:       [id number]  ACCOUNT NO:   [de-identified]                           ADMIT DATE: 2022  PROVIDER:     Nika Pedraza MD    DATE OF PROCEDURE:  2022    INDICATION FOR PROCEDURE: Life-limiting  claudication, Henry 3-4,  on optimal medical therapy with severely reduced ABIs of  the left lower extremity. DESCRIPTION OF PROCEDURE:  After informed consent was obtained from the  patient, she was brought to the special procedure suite and prepped in  sterile fashion. Right femoral artery was chosen as the primary point  of access. Preprocedure timeout was completed. After infiltration of  the right inguinal region with 2% lidocaine using micropuncture and  modified Seldinger technique under fluoroscopic guidance and ultrasound  guidance, I was able to insert a 5-Palestinian sheath in the right femoral  artery. I inserted a 5-Palestinian VCF catheter over a 0.035 angled  guidewire and placed the catheter in the distal abdominal aorta and  performed a digital substraction angiography. Due to her CKD, CO2  contrast agent was used between 20, 30 to 50 mL with each bolus with  approximately 10 to 15 seconds of intervals between each injection. PERIPHERAL ANGIOGRAM:  ABDOMINAL AORTA:  No significant dissection or aneurysmal disease noted. RENAL ARTERIES:  Bilaterally patent. COMMON ILIAC ARTERIES:  Bilaterally patent. EXTERNAL ILIAC ARTERIES:  Bilaterally patent. COMMON FEMORAL ARTERIES:  Bilaterally patent.     I then used a 5-Palestinian VCF catheter and a 0.035 angled guidewire and  crossed the common iliac artery bifurcation and then passed the catheter  over to the left common femoral artery and performed angiography from  this position. LEFT LOWER EXTREMITY:  LEFT SFA:  Proximal left SFA has about a 70% stenosis with diffuse  luminal irregularities throughout with around 50% to 60% stenosis of the  Benjamin's canal and then ultimately occludes right at Benjamin's canal and  then reconstitutes at the level of the popliteal artery for profunda  collaterals. POPLITEAL ARTERY:  Diffusely diseased, heavily calcified 99% stenosis  after reconstitution. TIBIOPERONEAL ARTERIES:  Anterior tibial artery is occluded in the mid  segment. TP trunk has 90% stenosis and then reconstitutes with the  posterior tibial artery and the peroneal artery. PEDAL ARTERIES:  Posterior tibial artery continues down into the foot  beyond the ankle. Anterior tibial artery was reconstituted via peroneal  artery collaterals into the dorsalis pedis. INTERVENTION:  Given the findings and presentation, I elected to proceed  with intervention. I exchanged out for a 6-Malay ANL1 catheter over a  0.035 angled Glidewire Advantage. Heparin IV was given. ACT was  confirmed to be above 250 seconds. I used an 0.035 TrailBlazer catheter  and 0.035 angled Glidewire Advantage and traversed the proximal aspect  of the SFA  to the mid popliteal artery. Once I was in the mid  popliteal artery, I exchanged out for Connect 250T and was able to cross the  heavily calcified lesion into the true lumen. Once I was in true lumen,  I then took an angiogram, confirmed I was in the lumen of the popliteal  artery and then exchanged out for a 2.0 x 100 [place] balloon on an 0.018  system and pre-treated the entire lesion and then passed a 5 x 200  [place] balloon on an 0.018 system and treated the entire lesion up to 16  atmospheres. I then used a 5 x 250 IN. PACT Admiral drug-coated balloon  and treated the popliteal artery into the mid SFA. After 3-minute  inflation, however, this ruptured, but all the drug did likely  delivered.   Repeat angiography demonstrated that we had reconstituted  the flow distally. Therefore, I treated that lesion with a 5 x 40  IN. PACT Admiral drug-coated balloon for 3-minute inflation. Then, I  exchanged out for an 0.035 angled Glidewire Advantage and used a 6 x 200  [place] balloon and treated the same segment again up to 20 atmospheres  times 1-minute inflation and proximal to this up to 4-minute inflation. Then, I took a repeat angiogram proximally to confirm the position. I  then treated this lesion with a 6 x 250 Admiral IN. PACT drug-coated  balloon for 3-minute inflation at 12 to 14 atmospheres. Repeat  injection demonstrated good flow into the entire SFA popliteal artery  and then brisk flow into the tibioperoneal trunk and then into the  posterior tibial artery. Once this was done, dopplerable pulses that  were biphasic were heard at the PT and DP region. Therefore, no further  intervention was undertaken. The catheter was then pulled back in the  right lower extremity and CO2 was used once again to evaluate the right  lower extremity. Right lower extremity shows proximal 50% to 60% stenosis of the SFA and  then diffuse disease distally. At the level of the mid SFA, there was  about 70% stenosis. Popliteal artery has a 99% stenosis in the mid segment, followed by  another 99% stenosis in the P3 segment. TIBIOPERONEAL ARTERIES:  Anterior tibial artery was occluded. TP trunk  is patent. Posterior tibial artery is occluded. Peroneal artery patent  to the foot and the peroneal artery reconstitutes the posterior tibial  artery and the anterior tibial artery distally. IMMEDIATE COMPLICATIONS:  None. MEDICATIONS:  See EMR. ACCESS:  Angio-Seal was used for hemostasis. ESTIMATED BLOOD LOSS:  Less than 50 mL. SUMMARY:  Successful left lower extremity SFA-popliteal drug-coated balloon  angioplasty. PLAN:  1. Bedrest.  2.  Optimal medial therapy. 3.  Risk factor management. 4.  Routine access site care.   5. Aspirin and Plavix. 6.  Xarelto 2.5 mg b.i.d. starting tomorrow. 7.  IV fluids. 8.  PAD rehab. 9.  Surveillance ABIs. 10.  Follow up with myself in two to four weeks postprocedure to discuss  the management of the right lower extremity and symptoms. All the above was explained to the patient and the patient's family. They are agreeable and amenable to the above plan.         Saeed Del Rio MD    D: 04/08/2022 17:39:06       T: 04/08/2022 20:43:25     [email or web address]  Job#: [id number]     Doc#: [id number]    CC:

## 2022-04-09 NOTE — PROGRESS NOTES
Cardiology Progress Note      Patient:  Rachelle Salcedo  YOB: 1955  MRN: [id number]   Acct: [de-identified]  Admit Date:  4/8/2022  Primary Cardiologist: Dr. Marisol Song    Chief Complaint:   Pt admitted for op elective Le angiogram     Subjective (Events in last 24 hours):     Pt in chair - states chronic leg pain -- no change in leg pain after procedure   Foot warm - good pulses - neurovascular wnl  RT groin cath site - dressing dry and intact - no ecchymosis or hematoma - pulses present to bilateral feet - neurovascular checks WNL       Objective:   BP (!) 164/70   Pulse 65   Temp 97.8 °F (36.6 °C) (Oral)   Resp 18   Ht 5' 5\" (1.651 m)   Wt 191 lb (86.6 kg)   SpO2 97%   BMI 31.78 kg/m²        TELEMETRY: SR no ectopy     Physical Exam:  General Appearance: alert and oriented to person, place and time, in no acute distress  Cardiovascular: normal rate, regular rhythm, normal S1 and S2, no murmurs, rubs, clicks, or gallops, distal pulses intact,   Pulmonary/Chest: clear to auscultation bilaterally- no wheezes, rales or rhonchi, normal air movement, no respiratory distress  Abdomen: soft, non-tender, non-distended, normal bowel sounds, no masses Extremities: no cyanosis, clubbing or edema, pulses present    Skin: warm and dry, no rash or erythema  Musculoskeletal: normal range of motion, no joint swelling, deformity or tenderness  Neurological: alert, oriented, normal speech, no focal findings or movement disorder noted    Medications:    sodium chloride flush  5-40 mL IntraVENous 2 times per day    clopidogrel  75 mg Oral Daily    aspirin  81 mg Oral Daily    magnesium oxide  400 mg Oral BID    Mycophenolate Sodium  360 mg Oral BID    atorvastatin  40 mg Oral Daily    pantoprazole  40 mg Oral BID AC    cloNIDine  0.2 mg Oral TID    famotidine  40 mg Oral Daily    iron polysaccharides  150 mg Oral Daily    [START ON 4/11/2022] vitamin D  50,000 Units Oral Q14 Days    NIFEdipine  60 mg Oral BID    insulin glargine  25 Units SubCUTAneous Nightly    tiotropium  2 puff Inhalation Daily    fluticasone  1 spray Each Nostril Daily    carvedilol  50 mg Oral BID    tacrolimus  5 mg Oral Daily    sulfamethoxazole-trimethoprim  1 tablet Oral Once per day on Mon Wed Fri    insulin lispro  0-12 Units SubCUTAneous TID WC    insulin lispro  0-6 Units SubCUTAneous Nightly      sodium chloride 75 mL/hr at 04/08/22 1803    sodium chloride      nitroGLYCERIN 20 mcg/min (04/08/22 1304)     sodium chloride flush, 5-40 mL, PRN  sodium chloride, 25 mL, PRN  acetaminophen, 650 mg, Q4H PRN  albuterol sulfate HFA, 2 puff, Q6H PRN  HYDROcodone-acetaminophen, 1 tablet, Q4H PRN  docusate sodium, 100 mg, BID PRN        Diagnostics:  La angiogram:  Post-procedure Diagnosis/Findings:    LLE angiogram 50-75 cc of dye and CO2  S/p DCB of the SFA/pop with 5.0 in the pop, 6.0 in the SFA  DAPT + Xarelto 2.5 mg BID, d/c on Plavix 75 mg q day and Xarelto 2.5 mg BID  IVF                                                                                                   MD MD Saji Lacy RPVI  Electronically signed 4/8/2022 at 10:42 AM  Interventional Cardiology    Lab Data:    Cardiac Enzymes:  No results for input(s): CKTOTAL, CKMB, CKMBINDEX, TROPONINI in the last 72 hours.     CBC:   Lab Results   Component Value Date    WBC 7.8 04/08/2022    RBC 3.86 04/08/2022    RBC 3-5 09/06/2011    HGB 10.3 04/08/2022    HCT 33.4 04/08/2022     04/08/2022       CMP:    Lab Results   Component Value Date     04/09/2022    K 3.5 04/09/2022    K 3.5 04/08/2022     04/09/2022    CO2 22 04/09/2022    BUN 20 04/09/2022    CREATININE 1.1 04/09/2022    LABGLOM 60 04/09/2022    GLUCOSE 283 04/09/2022    GLUCOSE 252 03/14/2012    CALCIUM 9.5 04/09/2022       Hepatic Function Panel:    Lab Results   Component Value Date    ALKPHOS 98 04/08/2022    ALT 16 04/08/2022    AST 15 04/08/2022    PROT 6.8 04/08/2022 BILITOT 0.2 04/08/2022    BILIDIR <0.2 06/09/2021    LABALBU 4.4 04/08/2022    LABALBU 4.8 01/03/2012       Magnesium:    Lab Results   Component Value Date    MG 2.0 04/08/2022       PT/INR:    Lab Results   Component Value Date    INR 1.04 04/08/2022       HgBA1c:    Lab Results   Component Value Date    LABA1C 6.1 02/09/2022    LABA1C 7.3 09/07/2021       FLP:    Lab Results   Component Value Date    TRIG 137 04/08/2022    HDL 36 04/08/2022    LDLCALC 54 04/08/2022    LDLDIRECT 68.72 04/07/2022       TSH:    Lab Results   Component Value Date    TSH 1.110 02/10/2022         Assessment:    S\p OP elective LE angiogram 4/8/22:  S/p DCB of the SFA/pop with 5.0 in the pop, 6.0 in the SFA  RT JOSE ANTONIO 0.86    Uncontrolled HTN -   Hx renal transplant       Plan:  · Home today   · Stop IV nitro - meds adjusted  · Follow 2-3 weeks   · DAPT- plavix 75mg day and xarelto 2.5mg po BID   · PAD rehab          Electronically signed by DONOVAN Marshall CNP on 4/9/2022 at 7:58 AM

## 2022-04-09 NOTE — PROGRESS NOTES
Discharge teaching and instructions for diagnosis/procedure of PAD completed with patient using teachback method. AVS reviewed. Printed prescriptions given to patient. Patient voiced understanding regarding prescriptions, follow up appointments, and care of self at home. Discharged ambulatory and in a wheelchair to  home with support per family.

## 2022-04-09 NOTE — PLAN OF CARE
Problem: Preoperative Routine:  Goal: Risk for injury before, during, or after surgery or procedure will decrease  Description: Risk for injury before, during, or after surgery or procedure will decrease  Outcome: Ongoing  Note: No issues. Problem: Tobacco Use:  Goal: Will participate in inpatient tobacco-use cessation counseling  Description: Will participate in inpatient tobacco-use cessation counseling  Outcome: Ongoing  Note: No issues. Problem: Discharge Planning:  Goal: Discharged to appropriate level of care  Description: Discharged to appropriate level of care  Outcome: Ongoing  Note: Pt will be discharged to the appropriate level of care. Problem: Falls - Risk of:  Goal: Will remain free from falls  Description: Will remain free from falls  Outcome: Ongoing  Note: Patient uses call light for all needs. Overbed table within reach. Call light within reach. Hourly rounds complete and needs are made known during rounds. Goal: Absence of physical injury  Description: Absence of physical injury  Outcome: Ongoing  Note: Patient uses call light for all needs. Overbed table within reach. Call light within reach. Hourly rounds complete and needs are made known during rounds. Problem: Pain:  Goal: Pain level will decrease  Description: Pain level will decrease  Outcome: Ongoing  Note: Pt has no complaints of pain this shift. Goal: Control of acute pain  Description: Control of acute pain  Outcome: Ongoing  Note: Pt has no complaints of pain so far this shift. Goal: Control of chronic pain  Description: Control of chronic pain  Outcome: Ongoing  Note: Pt has no complaints of pain so far this shift. Care plan reviewed with patient. Patient verbalize understanding of the plan of care and contribute to goal setting.

## 2022-04-11 ENCOUNTER — TELEPHONE (OUTPATIENT)
Dept: FAMILY MEDICINE CLINIC | Age: 67
End: 2022-04-11

## 2022-04-11 NOTE — TELEPHONE ENCOUNTER
T[facility] Transitions Initial Follow Up Call    Outreach made within 2 business days of discharge: Yes    Patient: Adam Lucia Patient : 1955   MRN: [id number]  Reason for Admission: There are no discharge diagnoses documented for the most recent discharge. Discharge Date: 22       Spoke with: Pt    Discharge department/facility: [facility]    TCM Interactive Patient Contact:  Was patient able to fill all prescriptions: Yes  Was patient instructed to bring all medications to the follow-up visit: Yes  Is patient taking all medications as directed in the discharge summary?  Yes  Does patient understand their discharge instructions: Yes  Does patient have questions or concerns that need addressed prior to 7-14 day follow up office visit: no    Scheduled appointment with PCP within 7-14 days    Follow Up  Future Appointments   Date Time Provider Franklin Chin   2022  1:00 PM STR CT IMAGING RM1  [facility] STR[facility] OUT EXP STR Radiolog   6/3/2022 11:30 AM DONOVAN Carbone 37   2022  2:00 PM STR ULTRASOUND RM 2 STRZ US STR Radiolog   2022  2:30 PM MD ERNESTO [facility]   2022 11:15 AM Maddy Giang APRN Deidre CNP N SRPX Pain [facility] [place]   2022  2:00 PM DONOVAN Carpenter - CNP SRPX IM MED [facility]   2022  2:00 PM DONOVAN Michel CNP SRPX FM RES [facility] [place]   2022  3:30 PM Zay Wang MD N SRPX Heart [facility] [place]   2022  2:20 PM Jesus Perez MD N [facility] [address] [place] [address] ([address] [place] [address])

## 2022-04-12 ENCOUNTER — TELEPHONE (OUTPATIENT)
Dept: CARDIOLOGY CLINIC | Age: 67
End: 2022-04-12

## 2022-04-12 NOTE — TELEPHONE ENCOUNTER
Angiogram done on 4/8/2022. Follow up call to patient and she is doing okay. She states she was able to get her meds from the pharmacy. Follow-up appt made for patient to follow up with Dalila as no appt was made.

## 2022-04-13 PROBLEM — E87.70 HYPERVOLEMIA ASSOCIATED WITH RENAL INSUFFICIENCY: Status: RESOLVED | Noted: 2018-07-24 | Resolved: 2022-04-13

## 2022-04-13 PROBLEM — I12.9 HYPERTENSIVE NEPHROPATHY: Status: RESOLVED | Noted: 2017-02-09 | Resolved: 2022-04-13

## 2022-04-13 PROBLEM — N28.9 HYPERVOLEMIA ASSOCIATED WITH RENAL INSUFFICIENCY: Status: RESOLVED | Noted: 2018-07-24 | Resolved: 2022-04-13

## 2022-04-13 PROBLEM — E11.9 TYPE 2 DIABETES MELLITUS (HCC): Status: RESOLVED | Noted: 2018-04-26 | Resolved: 2022-04-13

## 2022-04-18 RX ORDER — CARVEDILOL 25 MG/1
50 TABLET ORAL 2 TIMES DAILY
Qty: 90 TABLET | Refills: 3 | Status: SHIPPED | OUTPATIENT
Start: 2022-04-18 | End: 2022-04-20 | Stop reason: SDUPTHER

## 2022-04-19 DIAGNOSIS — G89.4 CHRONIC PAIN SYNDROME: ICD-10-CM

## 2022-04-19 RX ORDER — HYDROCODONE BITARTRATE AND ACETAMINOPHEN 5; 325 MG/1; MG/1
1 TABLET ORAL EVERY 8 HOURS PRN
Qty: 90 TABLET | Refills: 0 | Status: SHIPPED | OUTPATIENT
Start: 2022-04-22 | End: 2022-05-19 | Stop reason: SDUPTHER

## 2022-04-19 RX ORDER — ATORVASTATIN CALCIUM 40 MG/1
TABLET, FILM COATED ORAL
Qty: 90 TABLET | Refills: 3 | Status: SHIPPED | OUTPATIENT
Start: 2022-04-19

## 2022-04-19 NOTE — TELEPHONE ENCOUNTER
Judy Arnold called requesting a refill on the following medications:  Requested Prescriptions     Pending Prescriptions Disp Refills    HYDROcodone-acetaminophen (NORCO) 5-325 MG per tablet 90 tablet 0     Sig: Take 1 tablet by mouth every 8 hours as needed for Pain for up to 30 days.      Pharmacy verified:  [facility]      Date of last visit: 04-05-22  Date of next visit (if applicable): 6/9/2257

## 2022-04-19 NOTE — TELEPHONE ENCOUNTER
Patient's last appointment was : 2/10/2022  Patient's next appointment is : 8/11/2022  Last refilled: 2/23/21

## 2022-04-20 RX ORDER — CARVEDILOL 25 MG/1
50 TABLET ORAL 2 TIMES DAILY
Qty: 360 TABLET | Refills: 3 | Status: SHIPPED | OUTPATIENT
Start: 2022-04-20 | End: 2022-06-27 | Stop reason: SDUPTHER

## 2022-04-25 ENCOUNTER — OFFICE VISIT (OUTPATIENT)
Dept: CARDIOLOGY CLINIC | Age: 67
End: 2022-04-25
Payer: MEDICARE

## 2022-04-25 VITALS
DIASTOLIC BLOOD PRESSURE: 60 MMHG | BODY MASS INDEX: 30.82 KG/M2 | SYSTOLIC BLOOD PRESSURE: 120 MMHG | HEIGHT: 65 IN | HEART RATE: 64 BPM | WEIGHT: 185 LBS

## 2022-04-25 DIAGNOSIS — I73.9 PAD (PERIPHERAL ARTERY DISEASE) (HCC): Primary | ICD-10-CM

## 2022-04-25 DIAGNOSIS — Z98.62 S/P PERCUTANEOUS TRANSLUMINAL ANGIOPLASTY (PTA): ICD-10-CM

## 2022-04-25 DIAGNOSIS — I50.33 ACUTE ON CHRONIC DIASTOLIC CONGESTIVE HEART FAILURE (HCC): ICD-10-CM

## 2022-04-25 DIAGNOSIS — I10 ESSENTIAL HYPERTENSION: ICD-10-CM

## 2022-04-25 DIAGNOSIS — N18.31 STAGE 3A CHRONIC KIDNEY DISEASE (HCC): ICD-10-CM

## 2022-04-25 PROBLEM — N18.30 CHRONIC RENAL DISEASE, STAGE III (HCC): Status: ACTIVE | Noted: 2022-04-25

## 2022-04-25 LAB — GFR SERPL CREATININE-BSD FRML MDRD: 45 ML/MIN/1.73M2

## 2022-04-25 PROCEDURE — 99213 OFFICE O/P EST LOW 20 MIN: CPT | Performed by: NURSE PRACTITIONER

## 2022-04-25 PROCEDURE — G8399 PT W/DXA RESULTS DOCUMENT: HCPCS | Performed by: NURSE PRACTITIONER

## 2022-04-25 PROCEDURE — 4040F PNEUMOC VAC/ADMIN/RCVD: CPT | Performed by: NURSE PRACTITIONER

## 2022-04-25 PROCEDURE — 3017F COLORECTAL CA SCREEN DOC REV: CPT | Performed by: NURSE PRACTITIONER

## 2022-04-25 PROCEDURE — G8417 CALC BMI ABV UP PARAM F/U: HCPCS | Performed by: NURSE PRACTITIONER

## 2022-04-25 PROCEDURE — 1090F PRES/ABSN URINE INCON ASSESS: CPT | Performed by: NURSE PRACTITIONER

## 2022-04-25 PROCEDURE — 1036F TOBACCO NON-USER: CPT | Performed by: NURSE PRACTITIONER

## 2022-04-25 PROCEDURE — 1123F ACP DISCUSS/DSCN MKR DOCD: CPT | Performed by: NURSE PRACTITIONER

## 2022-04-25 PROCEDURE — G8427 DOCREV CUR MEDS BY ELIG CLIN: HCPCS | Performed by: NURSE PRACTITIONER

## 2022-04-25 NOTE — PROGRESS NOTES
[address] [facility] [address]  [facility] [address]  Dept: [phone]  Dept Fax: [phone]  Loc: [phone]    Visit Date: 4/25/2022    Ms. Michelle Hernandez is a 74 y.o. female  who presented for:  No chief complaint on file. HPI:   HPI   Last seen in office per Dr. Korina Levy on 3/9/2022. Per office note:   75 yo F ESRD s/p renal transplant 4/2020 who presents for follow-up. Has been having pain and discomfort for the last couple weeks. She feels that her pain is worse post COVID. Left leg hurts, but right leg is the worst.  She notes some blistering. She describes pain that occurs acutely in the middle of the night. She cannot walk even 1 block without getting pain and numbness. 8/10. Laying down helps the pain. R JOSE ANTONIO 0.86, L JOSE ANTONIO 0.57. She has abnormal EMG studies. Preserved EF 55-60%. Cr 1.1. She is on ASA/Statin. No chest pain, angina, BANUELOS, orthopnea, PND, sob at rest, palpitations, LE edema, or syncope. Interaction with pletal and tacrolimus. A & P:  [place] 4   R JOSE ANTONIO 0.86, L JOSE ANTONIO 0.57. LE neuropathy  ESRD s/p renal transplant  Cannot take Pletal, is on ASA/statin. Discussed proceeding with angiogram and possible intervention, she wants to proceed. Current Outpatient Medications:     carvedilol (COREG) 25 MG tablet, Take 2 tablets by mouth 2 times daily, Disp: 360 tablet, Rfl: 3    HYDROcodone-acetaminophen (NORCO) 5-325 MG per tablet, Take 1 tablet by mouth every 8 hours as needed for Pain for up to 30 days. , Disp: 90 tablet, Rfl: 0    atorvastatin (LIPITOR) 40 MG tablet, TAKE 1 TABLET DAILY, Disp: 90 tablet, Rfl: 3    rivaroxaban (XARELTO) 2.5 MG TABS tablet, Take 1 tablet by mouth 2 times daily, Disp: 60 tablet, Rfl: 3    clopidogrel (PLAVIX) 75 MG tablet, Take 1 tablet by mouth daily, Disp: 30 tablet, Rfl: 3    NIFEdipine (PROCARDIA XL) 90 MG extended release tablet, Take 1 tablet by mouth in the morning and at bedtime, Disp: 60 tablet, Rfl: 3    docusate sodium (COLACE) 100 MG capsule, Take 1 capsule by mouth 2 times daily as needed for Constipation, Disp: 60 capsule, Rfl: 0    Nutritional Supplement LIQD, Take 2 Cans by mouth daily STRAWBERRY GLUCERNA, Disp: 60 each, Rfl: 2    tiotropium (SPIRIVA RESPIMAT) 2.5 MCG/ACT AERS inhaler, Inhale 2 puffs into the lungs daily, Disp: 1 each, Rfl: 11    albuterol sulfate HFA (PROAIR HFA) 108 (90 Base) MCG/ACT inhaler, Inhale 2 puffs into the lungs every 6 hours as needed for Wheezing or Shortness of Breath, Disp: 3 each, Rfl: 3    fluticasone (FLONASE) 50 MCG/ACT nasal spray, 1 spray by Each Nostril route daily, Disp: 3 each, Rfl: 3    sulfamethoxazole-trimethoprim (BACTRIM DS) 800-160 MG per tablet, Take 1 tablet by mouth 3 times a week, Disp: , Rfl:     insulin aspart (NOVOLOG FLEXPEN) 100 UNIT/ML injection pen, Sliding scale insulin coverage Glucose:Dose: If Less [id number] =No Insulin/ 140-199= 2 Units/ 200-249=4 Units/ 250-299= 6 Units/  300-349=8 Units/  350-400=10 Units/ Above 400 = 12 Units max 48 units daily, Disp: 15 pen, Rfl: 1    insulin glargine (LANTUS SOLOSTAR) 100 UNIT/ML injection pen, Inject 25 Units into the skin nightly, Disp: 15 pen, Rfl: 1    Insulin Pen Needle (B-D ULTRAFINE III SHORT PEN) 31G X 8 MM MISC, Use three times daily Diagnosis Code E11.9, Disp: 200 each, Rfl: 3    lactulose (CHRONULAC) 10 GM/15ML solution, Take twice daily as needed for constipation, Disp: 2700 mL, Rfl: 1    vitamin D (ERGOCALCIFEROL) 1.25 MG (71417 UT) CAPS capsule, TAKE 1 CAPSULE BY MOUTH EVERY 14 DAYS ON MONDAYS, Disp: 6 capsule, Rfl: 3    cloNIDine (CATAPRES) 0.2 MG tablet, Take 0.2 mg by mouth 3 times daily, Disp: , Rfl:     famotidine (PEPCID) 40 MG tablet, Take 40 mg by mouth 2 times daily as needed , Disp: , Rfl:     iron polysaccharides (NIFEREX) 150 MG capsule, Take 150 mg by mouth daily, Disp: , Rfl:     pantoprazole (PROTONIX) 40 MG tablet, Take 40 mg by mouth 2 times daily , Disp: , Rfl:     tacrolimus (PROGRAF) 1 MG capsule, Take 1 mg by mouth 5 CAPSULES EVERY MORNING AND 5 CAPSULES EVERY EVENING, Disp: , Rfl:     Mycophenolate Sodium 360 MG TBEC, Take 360 mg by mouth 1 tablets BID, Disp: , Rfl:     magnesium oxide (MAG-OX) 400 MG tablet, Take 400 mg by mouth 2 times daily, Disp: , Rfl:     linaclotide (LINZESS) 290 MCG CAPS capsule, Take 145 mcg by mouth every other day , Disp: , Rfl:     aspirin 81 MG EC tablet, Take 81 mg by mouth daily. , Disp: , Rfl:     Past Medical History  Michelle Ruth  has a past medical history of Anemia associated with chronic renal failure, Anxiety, Arthritis, Backache, Blood circulation, collateral, Blood transfusion, CAD (coronary artery disease), Cellulitis in diabetic foot ([address] Chest pain, CHF (congestive heart failure) ([address] Chronic anemia, Chronic kidney disease, Chronic kidney disease, stage III (moderate) ([facility]), Chronic renal insufficiency, COPD (chronic obstructive pulmonary disease) ([address] Coronary disease, COVID-19, Depression, Diabetes mellitus, type 2 ([address] Disease of blood and blood forming organ, GERD (gastroesophageal reflux disease), Hemoglobin disease ([address] History of granulomatous disease, HTN (hypertension), Hyperlipemia, Iron deficiency anemia due to dietary causes, Kidney stones, Kidney trouble, MRSA infection, Neuromuscular disorder ([address] Neuropathy, Obesity, CONTRERAS on CPAP, Pneumonia, PONV (postoperative nausea and vomiting), Seizures ([address] and Sepsis due to Escherichia coli ([facility] Social History  Michelle Ruth  reports that she quit smoking about 13 years ago. Her smoking use included cigarettes. She started smoking about 42 years ago. She has a 45.00 pack-year smoking history. She has never used smokeless tobacco. She reports that she does not drink alcohol and does not use drugs.     Family History  Michelle Ruth family history includes Alcohol Abuse in her father; Arthritis in her mother; Brain Cancer in her sister; Breast Cancer (age of onset: 45) in her paternal cousin; Diabetes in her brother, brother, father, sister, sister, sister, sister, and sister; Early Death in her sister; Heart Disease in her father, mother, sister, and sister; High Blood Pressure in her mother; Kidney Disease in her mother; Mental Illness in her mother; Obesity in her father; Stroke in her mother. There is no family history of bicuspid aortic valve, aneurysms, heart transplant, pacemakers, defibrillators, or sudden cardiac death.       Past Surgical History   Past Surgical History:   Procedure Laterality Date    ANKLE SURGERY Right 02-10-14    Dr. Cira Hoang at [facility]  1990's    removal of benign tumor   [address]  2008   [address]    COLONOSCOPY  2009    2 polyps, not precanceorus    COLONOSCOPY Left 6/10/2019    COLONOSCOPY DIAGNOSTIC performed by Lupis Cohen MD at [facility]  3/30/2012    eye lid lift    DIAGNOSTIC CARDIAC CATH LAB PROCEDURE      ENDOS[facility]    left sided ptosis    FOOT SURGERY  1990    Tarsal tunnel surgery    FRACTURE SURGERY  2015   [address] and [address]    first partial in 1980's, then total in [address] [facility]  2015   [address] [place] [address]  04/10/2020    RIGHT    LIVER BIOPSY  6/2015    LUNG BIOPSY  2009    OVARY REMOVAL  1985    MD OFFICE/OUTPT VISIT,PROCEDURE ONLY Left 8/23/2018    LEFT UPPER EXTREMENTY AV FISTULA CREATION performed by Navin Ozuna MD at [address] Left 6/14/2019    EGD BIOPSY performed by Lupis Cohen MD at [facility]     Today's visit:   Subjective:  Occasionally still have stabbing pains in the left leg and left foot  Able to walk better  Genevia Plunk about a week after the procedure; went to sit on walker and did not have locked - fell - no injuries  BP well controlled - had one day where was low - but none since  No chest pain or pressure  Breathing is ok - no need for prn inhaler  Tolerating meds; no bleeding issues      Review of Systems   Constitutional: Negative for chills and fever  HENT: Negative for congestion, sinus pressure, sneezing and sore throat. Eyes: Negative for pain, discharge, redness and itching. Respiratory: Negative for PND, orthopnea, cough  Gastrointestinal: Negative for blood in stool, constipation, diarrhea   Endocrine: Negative for cold intolerance, heat intolerance, polydipsia. Genitourinary: Negative for dysuria,  hematuria. Musculoskeletal: Negative for arthralgias, joint swelling and neck pain. Neurological: Negative for numbness and headaches. Shooting neuropathic pain L foot occasionally   Psychiatric/Behavioral: Negative for agitation, confusion, decreased concentration and dysphoric mood. Objective: There were no vitals taken for this visit. Wt Readings from Last 3 Encounters:   04/08/22 191 lb (86.6 kg)   04/07/22 191 lb 6.4 oz (86.8 kg)   04/05/22 186 lb (84.4 kg)     BP Readings from Last 3 Encounters:   04/09/22 (!) 142/49   04/07/22 (!) 152/62   04/05/22 126/64       Nursing note and vitals reviewed. Physical Exam   Constitutional: Oriented to person, place, and time. Appears well-developed and well-nourished. No distress. HENT:   Head: Normocephalic and atraumatic. Eyes: EOM are normal. Pupils are equal, round, and reactive to light. Neck: Normal range of motion. Neck supple. No JVD present. Cardiovascular: Normal rate, regular rhythm, normal heart sounds and intact distal pulses. No murmur heard. Pulmonary/Chest: Effort normal and breath sounds normal. No respiratory distress. No wheezes. No rales. Abdominal: Soft. Bowel sounds are normal. No distension. There is no tenderness. Musculoskeletal: Normal range of motion. No edema. Neurological: Alert and oriented to person, place, and time. No cranial nerve deficit. Coordination normal.   Skin: Skin is warm and dry. Skin intact bilateral legs and feet. L AT & PT 2+; R AT  & PT 1+. Toes R foot just slightly cooler than L. Psychiatric: Normal mood and affect. No results found for: CKTOTAL, CKMB, CKMBINDEX    Lab Results   Component Value Date    WBC 7.8 04/08/2022    RBC 3.86 04/08/2022    RBC 3-5 09/06/2011    HGB 10.3 04/08/2022    HCT 33.4 04/08/2022    MCV 86.5 04/08/2022    MCH 26.7 04/08/2022    MCHC 30.8 04/08/2022    RDW 15.2 04/26/2017     04/08/2022    MPV 11.2 04/08/2022       Lab Results   Component Value Date     04/09/2022    K 3.5 04/09/2022    K 3.5 04/08/2022     04/09/2022    CO2 22 04/09/2022    BUN 20 04/09/2022    LABALBU 4.4 04/08/2022    LABALBU 4.8 01/03/2012    CREATININE 1.1 04/09/2022    CALCIUM 9.5 04/09/2022    LABGLOM 60 04/09/2022    GLUCOSE 283 04/09/2022    GLUCOSE 252 03/14/2012       Lab Results   Component Value Date    ALKPHOS 98 04/08/2022    ALT 16 04/08/2022    AST 15 04/08/2022    PROT 6.8 04/08/2022    BILITOT 0.2 04/08/2022    BILIDIR <0.2 06/09/2021    LABALBU 4.4 04/08/2022    LABALBU 4.8 01/03/2012       Lab Results   Component Value Date    MG 2.0 04/08/2022       Lab Results   Component Value Date    INR 1.04 04/08/2022    INR 1.28 (H) 08/02/2020    INR 1.13 08/23/2018         Lab Results   Component Value Date    LABA1C 6.1 02/09/2022    LABA1C 7.3 09/07/2021       Lab Results   Component Value Date    TRIG 137 04/08/2022    HDL 36 04/08/2022    LDLCALC 54 04/08/2022    LDLDIRECT 68.72 04/07/2022       Lab Results   Component Value Date    TSH 1.110 02/10/2022         Testing Reviewed:      I have individually reviewed the cardiac test below:    ECHO: Results for orders placed during the hospital encounter of 08/02/20    Summary  Ejection fraction was estimated at 55-60%. Left atrial size was severely dilated. Ascending aorta = 3.6 cm.   IVC size is within normal limits with normal respiratory phasic changes. Signature    ----------------------------------------------------------------  Electronically signed by Rubi Garcia MD (Interpreting  physician) on 2020 at 04:42 PM  ----------------------------------------------------------------       2022: Peripheral arteriogram:   Successful left lower extremity SFA-popliteal drug-coated balloon  angioplasty.       PATIENT NAME: Ammon Fitzgerald                   :        1955  MED REC NO:   [id number]                           ROOM:       [facility]  ACCOUNT NO:   [de-identified]                           ADMIT DATE: 2022  PROVIDER:     Sam Brand MD     DATE OF PROCEDURE:  2022     INDICATION FOR PROCEDURE: Life-limiting  claudication, Henry 3-4,  on optimal medical therapy with severely reduced ABIs of  the left lower extremity.     DESCRIPTION OF PROCEDURE:  After informed consent was obtained from the  patient, she was brought to the special procedure suite and prepped in  sterile fashion. Right femoral artery was chosen as the primary point  of access. Preprocedure timeout was completed. After infiltration of  the right inguinal region with 2% lidocaine using micropuncture and  modified Seldinger technique under fluoroscopic guidance and ultrasound  guidance, I was able to insert a 5-Tamazight sheath in the right femoral  artery. I inserted a 5-Tamazight VCF catheter over a 0.035 angled  guidewire and placed the catheter in the distal abdominal aorta and  performed a digital substraction angiography.   Due to her CKD, CO2  contrast agent was used between 20, 30 to 50 mL with each bolus with  approximately 10 to 15 seconds of intervals between each injection.     PERIPHERAL ANGIOGRAM:  ABDOMINAL AORTA:  No significant dissection or aneurysmal disease noted.     RENAL ARTERIES:  Bilaterally patent.     COMMON ILIAC ARTERIES:  Bilaterally patent.     EXTERNAL ILIAC ARTERIES:  Bilaterally patent.     COMMON FEMORAL ARTERIES:  Bilaterally patent.     I then used a 5-Lithuanian VCF catheter and a 0.035 angled guidewire and  crossed the common iliac artery bifurcation and then passed the catheter  over to the left common femoral artery and performed angiography from  this position.     LEFT LOWER EXTREMITY:  LEFT SFA:  Proximal left SFA has about a 70% stenosis with diffuse  luminal irregularities throughout with around 50% to 60% stenosis of the  Benjamin's canal and then ultimately occludes right at Benjamin's canal and  then reconstitutes at the level of the popliteal artery for profunda  collaterals.     POPLITEAL ARTERY:  Diffusely diseased, heavily calcified 99% stenosis  after reconstitution.     TIBIOPERONEAL ARTERIES:  Anterior tibial artery is occluded in the mid  segment. TP trunk has 90% stenosis and then reconstitutes with the  posterior tibial artery and the peroneal artery.     PEDAL ARTERIES:  Posterior tibial artery continues down into the foot  beyond the ankle. Anterior tibial artery was reconstituted via peroneal  artery collaterals into the dorsalis pedis.     INTERVENTION:  Given the findings and presentation, I elected to proceed  with intervention. I exchanged out for a 6-Lithuanian ANL1 catheter over a  0.035 angled Glidewire Advantage. Heparin IV was given. ACT was  confirmed to be above 250 seconds. I used an 0.035 TrailBlazer catheter  and 0.035 angled Glidewire Advantage and traversed the proximal aspect  of the SFA  to the mid popliteal artery. Once I was in the mid  popliteal artery, I exchanged out for Connect 250T and was able to cross the  heavily calcified lesion into the true lumen. Once I was in true lumen,  I then took an angiogram, confirmed I was in the lumen of the popliteal  artery and then exchanged out for a 2.0 x 100 [place] balloon on an 0.018  system and pre-treated the entire lesion and then passed a 5 x 200  [place] balloon on an 0.018 system and treated the entire lesion up to 16  atmospheres.   I then used a 5 x 250 IN. PACT Admiral drug-coated balloon  and treated the popliteal artery into the mid SFA. After 3-minute  inflation, however, this ruptured, but all the drug did likely  delivered. Repeat angiography demonstrated that we had reconstituted  the flow distally. Therefore, I treated that lesion with a 5 x 40  IN. PACT Admiral drug-coated balloon for 3-minute inflation. Then, I  exchanged out for an 0.035 angled Glidewire Advantage and used a 6 x 200  [place] balloon and treated the same segment again up to 20 atmospheres  times 1-minute inflation and proximal to this up to 4-minute inflation.     Then, I took a repeat angiogram proximally to confirm the position. I  then treated this lesion with a 6 x 250 Admiral IN. PACT drug-coated  balloon for 3-minute inflation at 12 to 14 atmospheres. Repeat  injection demonstrated good flow into the entire SFA popliteal artery  and then brisk flow into the tibioperoneal trunk and then into the  posterior tibial artery. Once this was done, dopplerable pulses that  were biphasic were heard at the PT and DP region. Therefore, no further  intervention was undertaken. The catheter was then pulled back in the  right lower extremity and CO2 was used once again to evaluate the right  lower extremity.     Right lower extremity shows proximal 50% to 60% stenosis of the SFA and  then diffuse disease distally. At the level of the mid SFA, there was  about 70% stenosis.     Popliteal artery has a 99% stenosis in the mid segment, followed by  another 99% stenosis in the P3 segment.     TIBIOPERONEAL ARTERIES:  Anterior tibial artery was occluded. TP trunk  is patent. Posterior tibial artery is occluded.   Peroneal artery patent  to the foot and the peroneal artery reconstitutes the posterior tibial  artery and the anterior tibial artery distally.     IMMEDIATE COMPLICATIONS:  None.     MEDICATIONS:  See EMR.     ACCESS:  Angio-Seal was used for hemostasis.     ESTIMATED BLOOD LOSS:  Less than 50 mL.     SUMMARY:  Successful left lower extremity SFA-popliteal drug-coated balloon  angioplasty.     PLAN:  1. Bedrest.  2.  Optimal medial therapy. 3.  Risk factor management. 4.  Routine access site care. 5.  Aspirin and Plavix. 6.  Xarelto 2.5 mg b.i.d. starting tomorrow. 7.  IV fluids. 8.  PAD rehab. 9.  Surveillance ABIs. 10.  Follow up with myself in two to four weeks postprocedure to discuss  the management of the right lower extremity and symptoms.     All the above was explained to the patient and the patient's family. They are agreeable and amenable to the above plan.     Costa Guaman MD   D: 04/08/2022       Assessment/Plan   Henry 4   R JOSE ANTONIO 0.86, L JOSE ANTONIO 0.57. Successful left lower extremity SFA-popliteal drug-coated balloon  Angioplasty 4/8/22 per Dr. Elodia Steiner to walk better; no open wounds no skin breakdown legs or feet  LE neuropathy - improved on the L  ESRD s/p renal transplant  Cannot take Pletal, is on ASA/statin. Wants to have R LE intervention if warranted. Will check 1 follow-up JOSE ANTONIO. Discussed symptoms needing emergency care. Discussed diet/exercise/BP/weight loss/health lifestyle choices/lipids; the patient understands the goals and will try to comply.     Disposition: 3 months         Electronically signed by DONOVAN Armstrong CNP   4/25/2022 at 4:34 PM EST

## 2022-04-25 NOTE — PATIENT INSTRUCTIONS
Continue current medications as prescribed. Continue to try to walk more. Have the OJSE ANTONIO study done as ordered. We will call with the results and then decide when to have procedure on your right leg. Follow-up with your PCP as scheduled. Follow-up with Dr. Vijay Tian or Dalila ACOSTA in 3 months as scheduled or sooner if need.

## 2022-04-25 NOTE — PROGRESS NOTES
Pt C/O HA, dizziness if bp goes to low, HA, swelling at times in feet, some fatigue.       Pt denies CP, SOB, heart palpitations

## 2022-05-02 RX ORDER — NIFEDIPINE 90 MG/1
TABLET, EXTENDED RELEASE ORAL
Qty: 30 TABLET | Refills: 5 | OUTPATIENT
Start: 2022-05-02

## 2022-05-09 ENCOUNTER — HOSPITAL ENCOUNTER (OUTPATIENT)
Dept: INTERVENTIONAL RADIOLOGY/VASCULAR | Age: 67
Discharge: HOME | End: 2022-05-09
Payer: MEDICARE

## 2022-05-09 DIAGNOSIS — I73.9 PAD (PERIPHERAL ARTERY DISEASE) (HCC): ICD-10-CM

## 2022-05-09 DIAGNOSIS — Z98.62 S/P PERCUTANEOUS TRANSLUMINAL ANGIOPLASTY (PTA): ICD-10-CM

## 2022-05-09 LAB — GFR SERPL CREATININE-BSD FRML MDRD: 60 ML/MIN/1.73M2

## 2022-05-09 PROCEDURE — 93922 UPR/L XTREMITY ART 2 LEVELS: CPT

## 2022-05-19 DIAGNOSIS — G89.4 CHRONIC PAIN SYNDROME: ICD-10-CM

## 2022-05-19 RX ORDER — HYDROCODONE BITARTRATE AND ACETAMINOPHEN 5; 325 MG/1; MG/1
1 TABLET ORAL EVERY 8 HOURS PRN
Qty: 90 TABLET | Refills: 0 | Status: SHIPPED | OUTPATIENT
Start: 2022-05-22 | End: 2022-06-20 | Stop reason: SDUPTHER

## 2022-05-19 NOTE — TELEPHONE ENCOUNTER
Frankey Jules called requesting a refill on the following medications:  Requested Prescriptions     Pending Prescriptions Disp Refills    HYDROcodone-acetaminophen (NORCO) 5-325 MG per tablet 90 tablet 0     Sig: Take 1 tablet by mouth every 8 hours as needed for Pain for up to 30 days.      Pharmacy verified:  [facility]      Date of last visit: 04-5-22  Date of next visit (if applicable): 0/4/8141

## 2022-05-23 ENCOUNTER — TELEPHONE (OUTPATIENT)
Dept: CARDIOLOGY CLINIC | Age: 67
End: 2022-05-23

## 2022-05-23 DIAGNOSIS — I73.9 CLAUDICATION (HCC): Primary | ICD-10-CM

## 2022-05-23 NOTE — TELEPHONE ENCOUNTER
Spoke with patient. She states she is having pain in both legs \"had me up all night. \" Notified of Dr Negirto Sorto recommendations, she is going to think about proceeding with RLE intervention, \"I just don't know if it will help me, since I'm still having pain in my left leg. \" She will call us back within the next couple of days.

## 2022-05-27 DIAGNOSIS — E55.9 VITAMIN D DEFICIENCY: ICD-10-CM

## 2022-05-31 RX ORDER — ERGOCALCIFEROL 1.25 MG/1
50000 CAPSULE ORAL
Qty: 6 CAPSULE | Refills: 3 | Status: SHIPPED | OUTPATIENT
Start: 2022-05-31 | End: 2022-06-15 | Stop reason: ALTCHOICE

## 2022-05-31 NOTE — TELEPHONE ENCOUNTER
Patient's last appointment was : 2/10/2022  Patient's next appointment is : 8/11/22  Future Appointments   Date Time Provider Franklin Chin   6/2/2022  1:00 PM STR CT IMAGING RM1  [facility] OUT EXP STR Radiolog   6/3/2022 11:30 AM Belinda Colon APRN - CNP N Pulm Med [address]   6/7/2022  2:00 PM STR ULTRASOUND RM 2 STRZ US STR Radiolog   6/14/2022  2:30 PM MD Coleen Rodriguez Uro [address]   7/5/2022 11:15 AM Radha Wang APRN - CNP N SRPX Pain [address]   7/13/2022  1:30 PM MD ERNESTO Hu SRPX Heart [address]   8/9/2022  2:00 PM Aba Brian APRN - CNP SRPX IM MED [facility]   8/11/2022  2:00 PM Kareen Hall APRN - CNP SRPX FM RES [facility]   9/7/2022  3:30 PM MD ERNESTO Hu SRPX Heart [address]   12/8/2022  2:20 PM Farida Lujan MD [facility]. [address]     Last refilled: 6/17/21    Lab Results   Component Value Date    LABA1C 6.1 (H) 02/09/2022     Lab Results   Component Value Date    CHOL 117 04/08/2022    TRIG 137 04/08/2022    HDL 36 04/08/2022    LDLCALC 54 04/08/2022    LDLDIRECT 68.72 04/07/2022     Lab Results   Component Value Date     05/10/2022    K 3.9 05/10/2022     05/10/2022    CO2 26 05/10/2022    BUN 24 (H) 05/10/2022    CREATININE 1.1 05/10/2022    GLUCOSE 164 (H) 05/10/2022    CALCIUM 9.5 04/09/2022    PROT 6.8 04/08/2022    LABALBU 4.4 04/08/2022    BILITOT 0.2 (L) 04/08/2022    ALKPHOS 98 04/08/2022    AST 15 04/08/2022    ALT 16 04/08/2022    LABGLOM 49 (A) 05/10/2022     Lab Results   Component Value Date    TSH 1.110 02/10/2022    T4FREE 1.16 10/14/2016     Lab Results   Component Value Date    WBC 7.8 05/10/2022    HGB 9.9 (L) 05/10/2022    HCT 33.1 (L) 05/10/2022    MCV 87.8 05/10/2022     05/10/2022

## 2022-06-01 RX ORDER — CLONIDINE HYDROCHLORIDE 0.2 MG/1
0.2 TABLET ORAL 3 TIMES DAILY
Qty: 60 TABLET | OUTPATIENT
Start: 2022-06-01

## 2022-06-01 RX ORDER — IRON POLYSACCHARIDE COMPLEX 150 MG
150 CAPSULE ORAL DAILY
Qty: 60 CAPSULE | OUTPATIENT
Start: 2022-06-01

## 2022-06-02 ENCOUNTER — HOSPITAL ENCOUNTER (OUTPATIENT)
Dept: CT IMAGING | Age: 67
Discharge: HOME | End: 2022-06-02
Payer: MEDICARE

## 2022-06-02 DIAGNOSIS — J81.0 ACUTE PULMONARY EDEMA (HCC): ICD-10-CM

## 2022-06-02 PROCEDURE — 71250 CT THORAX DX C-: CPT

## 2022-06-06 ENCOUNTER — TELEPHONE (OUTPATIENT)
Dept: CARDIOLOGY CLINIC | Age: 67
End: 2022-06-06

## 2022-06-06 NOTE — TELEPHONE ENCOUNTER
Spoke to patient to schedule denia angio for 7/15/22 at 7:00 am per Dr. Lacy Vogel, patient to arrive at 5:00 am. Patient to hold Xarelto for two day prior to procedure, beginning on 7/13/22. Patient to hold insulin on day of procedure, take all other meds as usual. All instructions reviewed with patient and also mailed to patient this date. Patient verbalized understanding of all instructions.

## 2022-06-07 ENCOUNTER — HOSPITAL ENCOUNTER (OUTPATIENT)
Dept: ULTRASOUND IMAGING | Age: 67
Discharge: HOME | End: 2022-06-07
Payer: MEDICARE

## 2022-06-07 ENCOUNTER — APPOINTMENT (OUTPATIENT)
Dept: ULTRASOUND IMAGING | Age: 67
End: 2022-06-07
Payer: MEDICARE

## 2022-06-07 DIAGNOSIS — Z94.0 HISTORY OF KIDNEY TRANSPLANT: ICD-10-CM

## 2022-06-07 DIAGNOSIS — N39.0 RECURRENT UTI: ICD-10-CM

## 2022-06-07 PROCEDURE — 76776 US EXAM K TRANSPL W/DOPPLER: CPT

## 2022-06-09 RX ORDER — IRON POLYSACCHARIDE COMPLEX 150 MG
150 CAPSULE ORAL DAILY
Qty: 60 CAPSULE | OUTPATIENT
Start: 2022-06-09

## 2022-06-09 RX ORDER — CLONIDINE HYDROCHLORIDE 0.2 MG/1
0.2 TABLET ORAL 3 TIMES DAILY
Qty: 60 TABLET | OUTPATIENT
Start: 2022-06-09

## 2022-06-14 ENCOUNTER — OFFICE VISIT (OUTPATIENT)
Dept: UROLOGY | Age: 67
End: 2022-06-14
Payer: MEDICARE

## 2022-06-14 VITALS
WEIGHT: 185.8 LBS | SYSTOLIC BLOOD PRESSURE: 132 MMHG | BODY MASS INDEX: 30.96 KG/M2 | DIASTOLIC BLOOD PRESSURE: 70 MMHG | HEIGHT: 65 IN

## 2022-06-14 DIAGNOSIS — N39.0 RECURRENT UTI: ICD-10-CM

## 2022-06-14 DIAGNOSIS — Z94.0 HISTORY OF KIDNEY TRANSPLANT: ICD-10-CM

## 2022-06-14 DIAGNOSIS — N32.81 OAB (OVERACTIVE BLADDER): Primary | ICD-10-CM

## 2022-06-14 PROCEDURE — G8427 DOCREV CUR MEDS BY ELIG CLIN: HCPCS | Performed by: UROLOGY

## 2022-06-14 PROCEDURE — G8417 CALC BMI ABV UP PARAM F/U: HCPCS | Performed by: UROLOGY

## 2022-06-14 PROCEDURE — 99213 OFFICE O/P EST LOW 20 MIN: CPT | Performed by: UROLOGY

## 2022-06-14 PROCEDURE — 1123F ACP DISCUSS/DSCN MKR DOCD: CPT | Performed by: UROLOGY

## 2022-06-14 PROCEDURE — 1036F TOBACCO NON-USER: CPT | Performed by: UROLOGY

## 2022-06-14 PROCEDURE — 1090F PRES/ABSN URINE INCON ASSESS: CPT | Performed by: UROLOGY

## 2022-06-14 PROCEDURE — G8399 PT W/DXA RESULTS DOCUMENT: HCPCS | Performed by: UROLOGY

## 2022-06-14 PROCEDURE — 3017F COLORECTAL CA SCREEN DOC REV: CPT | Performed by: UROLOGY

## 2022-06-14 NOTE — PROGRESS NOTES
[id number] [place] [address] [address]  Dept: [phone]  Dept Fax: [phone] : [phone]    [address] [facility] Office Note -     Patient:  Stas Santiago  YOB: 1955  Date: 6/14/2022    The patient is a 74 y.o. female who presents today for evaluation of the following problems:   Chief Complaint   Patient presents with    Follow-up     OAB, hx of kidney transplant, recurrent UTI, JESSIE prior    referred/consultation requested by DONOVAN Watts CNP. HISTORY OF PRESENT ILLNESS:     Recurrent UTI  Has seen [facility] in the past  Had been having infections since kidney transplant  recent renal u/s negative  Does well with doxycycline prophylaxis    Kidney allograft  Hx of transplant by [facility]  Infections started afterwards  Finished prolonged doxy course          Requested/reviewed records from DONOVAN Watts CNP office and/or outside [de-identified]    (Patient's old records have been requested, reviewed and pertinent findings summarized in today's note.)    Procedures Today: N/A    Last several PSA's:  No results found for: PSA    Last total testosterone:  No results found for: TESTOSTERONE    Urinalysis today:  No results found for this visit on 06/14/22. Last BUN and creatinine:  Lab Results   Component Value Date    BUN 24 (H) 05/10/2022     Lab Results   Component Value Date    CREATININE 1.3 (H) 06/08/2022       Imaging Reviewed during this Office Visit:   imaging independently reviewed by Kal Lang MD and radiology report verified demonstrating     imaging independently reviewed by Kal Lang MD and radiology report verified demonstrating     [address]    Result Date: 6/7/2022  PROCEDURE: US KIDNEY TRANSPLANT WITH COLOR DOPPLER CLINICAL INFORMATION: Recurrent UTI, History of kidney transplant .  TECHNIQUE: Rosalio Spotted scale color and spectral duplex imaging COMPARISON: 8/23/2021 FINDINGS: Right kidney Right kidney is within normal limits of size. Echogenicity is very difficult to assess no distinct images of the liver are included. Borderline renal cortical thinning. No hydronephrosis. 1 cm peripelvic cyst. No solid mass identified. The resistive index is normal. Left kidney no significant change the prior exam left kidney shows borderline renal cortical thickness echogenicity appears to be increased. There is a 5.6 cm cyst lower pole left kidney there is no solid mass seen there is no hydronephrosis. The resistive index is normal. Transplanted kidney There is mild pelvic caliectasis there is no renal cortical thinning echogenicity appears normal. There is no solid or cystic mass. Elevated mid arcuate resistive index. Normal superior arcuate resistive index. External iliac artery proximally anastomosis is elevated flow velocity. The anastomosis velocities are within the normal range there is no indication of a anastomotic stenosis. Renal vein spectral waveform is normal monophasic. The bladder is mildly distended. Bladder wall appears normal. There is partially imaged transplant ureter. No significant voiding with of post void residual 100%. Technical notes: RIGHT KIDNEY - 9.4 x 4.9 x 4.7 cm Resistive Index - 0.74 Cortical Thickness - 1.0 cm LEFT KIDNEY - 10.1 x 4.0 x 4.8 cm Resistive Index - 0.74 Cortical Thickness - 1.2 cm TRANSPLANT KIDNEY - 13.1 x 7.3 x 5.5 cm Resistive Index - superior: 0.72, mid: 0.86, inferior: 0.83 Cortical Thickness - 1.2 cm URINARY BLADDER Pre-Void - 237.9 mL Post-Void - 232.2 mL     There is no evidence of anastomotic stenosis of the transplanted kidney. Resistive indices are not elevated. Flow velocities are elevated in the proximal external iliac artery. Patient was unable to void significant volume. **This report has been created using voice recognition software.   It may contain minor errors which are inherent in voice recognition technology. ** Final report electronically signed by Dr. Ruth Harris on 6/7/2022 4:23 PM      [facility]    Result Date: 8/23/2021  BILATERAL RENAL ULTRASOUND: CLINICAL INFORMATION: History of kidney transplant COMPARISON: No comparison available. TECHNIQUE: Multiple sonographic images of both kidneys were obtained. Images of the urinary bladder were also obtained. FINDINGS: TRANSPLANT KIDNEY: 13.1 x 6.9 x 6.9 cm Resistive index: 0.76 Renal cortex: 1.8 cm NATIVE RIGHT KIDNEY - 9.6 x 4.8 x 4.3 cm Resistive Index - 0.82 Cortical Thickness - 0.6 cm NATIVE LEFT KIDNEY - 10.9 x 4.9 x 5.1 cm Resistive Index - 0.82 Cortical Thickness - 0.7 cm URINARY BLADDER Pre-Void - 314 mL Post-Void - 169 mL  KIDNEYS:  Native kidneys and transplant kidneys are normal in size and contour. There is thinning of the renal cortex of the native kidneys. A normal renal parenchymal component is present in the transplanted kidney. Resistive indices are elevated for both transplant and native kidneys. MASS/CYST: Small benign-appearing 13 mm cyst mid anterior aspect native right kidney. Benign-appearing 5.2 cm cyst in upper Pole native left kidney. HYDRONEPHROSIS:  There is no hydronephrosis. CALCULI:  No calculi are seen. BLADDER:  The urinary bladder is unremarkable. . TRANSPLANT VASCULARITY: Good pulsatile flow was demonstrated in the transplant renal artery. The transplant renal vein is patent with good flow as well. Velocity in the right external iliac artery proximal to the anastomosis to 268/18 cm/s. Velocity at the anastomosis measures 499/31 cm/s. Velocities distal to the anastomosis measure 311/27 cm/s. 1. The previously noted hydronephrosis of the transplant kidney has resolved. 2. Good pulsatile flow was noted into the transplant kidney. Transplant artery and vein remain widely patent. 3. Atrophic changes of the native kidneys. . **This report has been created using voice recognition software. It may contain minor errors which are inherent in voice recognition technology. ** Final report electronically signed by Dr. Louise Lovett on 8/23/2021 5:50 PM      PAST MEDICAL, FAMILY AND SOCIAL HISTORY:  Past Medical History:   Diagnosis Date    Anemia associated with chronic renal failure     Aranesp 150 micrograms every other week [facility]. [facility]    Anxiety     Arthritis     Backache     Blood circulation, collateral     Blood transfusion     CAD (coronary artery disease)     Cellulitis in diabetic foot ([address] 03/03/2017    4th and 5th toes right foot    Chest pain     History of    CHF (congestive heart failure) ([address] 1998    Dx'ed by Dr. Rosales Chavarria Chronic anemia     Chronic kidney disease     Chronic kidney disease, stage III (moderate) (HCC)     Chronic renal insufficiency 2010    COPD (chronic obstructive pulmonary disease) ([address] 2012    Dr. 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 Jose Nelson    HTN (hypertension) 1970's    Hyperlipemia 1998    Iron deficiency anemia due to dietary causes 06/21/2018    Kidney stones 03/2014    Kidney trouble          MRSA infection 03/2017    right foot-Dr. Katrina Mckinney (podiatrist)    Neuromuscular disorder ([facility]     Neuropathy 1989    diabetic neuropathy    Obesity since childhoood    CONTRERAS on CPAP 2010    Dr. Jacquie Butler    Pneumonia     PONV (postoperative nausea and vomiting)     Seizures ([facility]     Sepsis due to Escherichia coli ([facility] 07/2020     Past Surgical History:   Procedure Laterality Date    ANKLE SURGERY Right 02-10-14    Dr. Eleanor Cordon at [facility]  1990's    removal of benign tumor   [address] 2008    CARPAL TUNNEL RELEASE  1988    COLONOSCOPY  2009    2 polyps, not precanceorus    COLONOSCOPY Left 6/10/2019    COLONOSCOPY DIAGNOSTIC performed by Kirstin Kirby MD at [address] [facility]  3/30/2012    eye lid lift    DIAGNOSTIC CARDIAC CATH LAB PROCEDURE      ENDOSCOPY, COLON, DIAGNOSTIC  2007   Pamela Solum EYE SURGERY  March 30th, 2012    left sided ptosis    FOOT SURGERY  1990    Tarsal tunnel surgery    FRACTURE SURGERY  2015    HYSTERECTOMY (CERVIX STATUS UNKNOWN)  1980 and 1985    first partial in 1980's, then total in [facility]  2015   [address] [place] [address]  04/10/2020    RIGHT    LIVER BIOPSY  6/2015    LUNG BIOPSY  2009    OVARY REMOVAL  1985    MA OFFICE/OUTPT VISIT,PROCEDURE ONLY Left 8/23/2018    LEFT UPPER EXTREMENTY AV FISTULA CREATION performed by Monica Morgan MD at [address] Left 6/14/2019    EGD BIOPSY performed by Kirstin Kirby MD at [address] [facility]     Family History   Problem Relation Age of Onset    Diabetes Sister     Diabetes Brother     Diabetes Sister     Diabetes Sister     Early Death Sister     Heart Disease Sister     Brain Cancer Sister     Diabetes Sister     Heart Disease Sister     Diabetes Sister     Diabetes Brother     Arthritis Mother     Heart Disease Mother     High Blood Pressure Mother     Kidney Disease Mother     Mental Illness Mother     Stroke Mother     Heart Disease Father     Diabetes Father     Obesity Father     Alcohol Abuse Father     Breast Cancer Paternal Cousin 45     Outpatient Medications Marked as Taking for the 6/14/22 encounter (Office Visit) with Candelaria Allen MD   Medication Sig Dispense Refill    vitamin D (ERGOCALCIFEROL) 1.25 MG (03262 UT) CAPS capsule TAKE 1 CAPSULE BY MOUTH EVERY 14 DAYS ON MONDAYS 6 capsule 3    HYDROcodone-acetaminophen (NORCO) 5-325 MG per tablet Take 1 tablet by mouth every 8 hours as needed for Pain for up to 30 days.  90 tablet 0    carvedilol (COREG) 25 MG tablet Take 2 tablets by mouth 2 times daily 360 tablet 3    atorvastatin (LIPITOR) 40 MG tablet TAKE 1 TABLET DAILY 90 tablet 3    rivaroxaban (XARELTO) 2.5 MG TABS tablet Take 1 tablet by mouth 2 times daily 60 tablet 3    clopidogrel (PLAVIX) 75 MG tablet Take 1 tablet by mouth daily 30 tablet 3    NIFEdipine (PROCARDIA XL) 90 MG extended release tablet Take 1 tablet by mouth in the morning and at bedtime 60 tablet 3    docusate sodium (COLACE) 100 MG capsule Take 1 capsule by mouth 2 times daily as needed for Constipation 60 capsule 0    tiotropium (SPIRIVA RESPIMAT) 2.5 MCG/ACT AERS inhaler Inhale 2 puffs into the lungs daily 1 each 11    albuterol sulfate HFA (PROAIR HFA) 108 (90 Base) MCG/ACT inhaler Inhale 2 puffs into the lungs every 6 hours as needed for Wheezing or Shortness of Breath 3 each 3    fluticasone (FLONASE) 50 MCG/ACT nasal spray 1 spray by Each Nostril route daily 3 each 3    sulfamethoxazole-trimethoprim (BACTRIM DS) 800-160 MG per tablet Take 1 tablet by mouth 3 times a week      insulin aspart (NOVOLOG FLEXPEN) 100 UNIT/ML injection pen Sliding scale insulin coverage Glucose:Dose: If Less [id number] =No Insulin/ 140-199= 2 Units/ 200-249=4 Units/ 250-299= 6 Units/  300-349=8 Units/  350-400=10 Units/ Above 400 = 12 Units max 48 units daily 15 pen 1    insulin glargine (LANTUS SOLOSTAR) 100 UNIT/ML injection pen Inject 25 Units into the skin nightly 15 pen 1    Insulin Pen Needle (B-D ULTRAFINE III SHORT PEN) 31G X 8 MM MISC Use three times daily Diagnosis Code E11.9 200 each 3    lactulose (CHRONULAC) 10 GM/15ML solution Take twice daily as needed for constipation 2700 mL 1    cloNIDine (CATAPRES) 0.2 MG tablet Take 0.2 mg by mouth 3 times daily      famotidine (PEPCID) 40 MG tablet Take 40 mg by mouth 2 times daily as needed       iron polysaccharides (NIFEREX) 150 MG capsule Take 150 mg by mouth daily      pantoprazole (PROTONIX) 40 MG tablet Take 40 mg by mouth 2 times daily       tacrolimus (PROGRAF) 1 MG capsule Take 1 mg by mouth 5 CAPSULES EVERY MORNING AND 5 CAPSULES EVERY EVENING      Mycophenolate Sodium 360 MG TBEC Take 360 mg by mouth 1 tablets BID      magnesium oxide (MAG-OX) 400 MG tablet Take 400 mg by mouth 2 times daily      linaclotide (LINZESS) 290 MCG CAPS capsule Take 145 mcg by mouth every other day       aspirin 81 MG EC tablet Take 81 mg by mouth daily. Actos [pioglitazone hydrochloride], Pioglitazone, Cymbalta [duloxetine hcl], Lyrica [pregabalin], and Gabapentin  Social History     Tobacco Use   Smoking Status Former Smoker    Packs/day: 1.50    Years: 30.00    Pack years: 45.00    Types: Cigarettes    Start date: 1979   [employer] Quit date: 3/13/2009    Years since quittin.2   Smokeless Tobacco Never Used   Tobacco Comment    quit       (If patient a smoker, smoking cessation counseling offered)   Social History     Substance and Sexual Activity   Alcohol Use No    Alcohol/week: 0.0 standard drinks       REVIEW OF SYSTEMS:  Constitutional: negative  Eyes: negative  Respiratory: negative  Cardiovascular: negative  Gastrointestinal: negative  Genitourinary: see HPI  Musculoskeletal: negative  Skin: negative   Neurological: negative  Hematological/Lymphatic: negative  Psychological: negative      Physical Exam:    This a 74 y.o. female  Vitals:    22 1432   BP: 132/70     Body mass index is 30.92 kg/m². Constitutional: Patient in no acute distress;       Assessment and Plan        1. OAB (overactive bladder)    2. Recurrent UTI    3.  History of kidney transplant               Plan:      Infections worsened since transplant but has improved greatly with prophylaxis (three months)  Incomplete emptying vs. Immunosuppression increasing infection risk  Cystoscopy recent visit was normal    Standing order for urine culture  F/u in one year  Will call if she has another infection        Prescriptions Ordered:  No orders of the defined types were placed in this encounter. Orders Placed:  No orders of the defined types were placed in this encounter.            Cheyanne Montoya MD

## 2022-06-15 ENCOUNTER — OFFICE VISIT (OUTPATIENT)
Dept: FAMILY MEDICINE CLINIC | Age: 67
End: 2022-06-15
Payer: MEDICARE

## 2022-06-15 VITALS
TEMPERATURE: 97.9 F | HEIGHT: 65 IN | SYSTOLIC BLOOD PRESSURE: 128 MMHG | HEART RATE: 61 BPM | RESPIRATION RATE: 16 BRPM | DIASTOLIC BLOOD PRESSURE: 64 MMHG | WEIGHT: 184 LBS | OXYGEN SATURATION: 97 % | BODY MASS INDEX: 30.66 KG/M2

## 2022-06-15 DIAGNOSIS — I50.22 CHRONIC SYSTOLIC (CONGESTIVE) HEART FAILURE (HCC): ICD-10-CM

## 2022-06-15 DIAGNOSIS — Z99.2 ESRD (END STAGE RENAL DISEASE) ON DIALYSIS (HCC): ICD-10-CM

## 2022-06-15 DIAGNOSIS — D50.8 IRON DEFICIENCY ANEMIA DUE TO DIETARY CAUSES: ICD-10-CM

## 2022-06-15 DIAGNOSIS — N18.6 ESRD (END STAGE RENAL DISEASE) ON DIALYSIS (HCC): ICD-10-CM

## 2022-06-15 DIAGNOSIS — I10 ESSENTIAL HYPERTENSION: Primary | ICD-10-CM

## 2022-06-15 DIAGNOSIS — G62.81 POLYNEUROPATHY ASSOCIATED WITH CRITICAL ILLNESS (HCC): ICD-10-CM

## 2022-06-15 DIAGNOSIS — E55.9 VITAMIN D DEFICIENCY: ICD-10-CM

## 2022-06-15 DIAGNOSIS — N18.30 STAGE 3 CHRONIC KIDNEY DISEASE, UNSPECIFIED WHETHER STAGE 3A OR 3B CKD (HCC): ICD-10-CM

## 2022-06-15 DIAGNOSIS — L89.103 PRESSURE INJURY OF BACK, STAGE 3 (HCC): ICD-10-CM

## 2022-06-15 DIAGNOSIS — Z94.0 RENAL TRANSPLANT RECIPIENT: ICD-10-CM

## 2022-06-15 PROCEDURE — G8417 CALC BMI ABV UP PARAM F/U: HCPCS | Performed by: NURSE PRACTITIONER

## 2022-06-15 PROCEDURE — 1123F ACP DISCUSS/DSCN MKR DOCD: CPT | Performed by: NURSE PRACTITIONER

## 2022-06-15 PROCEDURE — 99214 OFFICE O/P EST MOD 30 MIN: CPT | Performed by: NURSE PRACTITIONER

## 2022-06-15 PROCEDURE — G8399 PT W/DXA RESULTS DOCUMENT: HCPCS | Performed by: NURSE PRACTITIONER

## 2022-06-15 PROCEDURE — G8427 DOCREV CUR MEDS BY ELIG CLIN: HCPCS | Performed by: NURSE PRACTITIONER

## 2022-06-15 PROCEDURE — 1036F TOBACCO NON-USER: CPT | Performed by: NURSE PRACTITIONER

## 2022-06-15 PROCEDURE — 1090F PRES/ABSN URINE INCON ASSESS: CPT | Performed by: NURSE PRACTITIONER

## 2022-06-15 PROCEDURE — 3017F COLORECTAL CA SCREEN DOC REV: CPT | Performed by: NURSE PRACTITIONER

## 2022-06-15 RX ORDER — CLONIDINE HYDROCHLORIDE 0.2 MG/1
0.2 TABLET ORAL 3 TIMES DAILY
Qty: 90 TABLET | Refills: 11 | Status: SHIPPED | OUTPATIENT
Start: 2022-06-15

## 2022-06-15 RX ORDER — IRON POLYSACCHARIDE COMPLEX 150 MG
150 CAPSULE ORAL DAILY
Qty: 60 CAPSULE | Refills: 11 | Status: SHIPPED | OUTPATIENT
Start: 2022-06-15

## 2022-06-15 RX ORDER — ERGOCALCIFEROL 1.25 MG/1
50000 CAPSULE ORAL
Qty: 12 CAPSULE | Refills: 5 | Status: SHIPPED | OUTPATIENT
Start: 2022-06-15

## 2022-06-15 ASSESSMENT — PATIENT HEALTH QUESTIONNAIRE - PHQ9
SUM OF ALL RESPONSES TO PHQ QUESTIONS 1-9: 2
1. LITTLE INTEREST OR PLEASURE IN DOING THINGS: 1
SUM OF ALL RESPONSES TO PHQ QUESTIONS 1-9: 2
2. FEELING DOWN, DEPRESSED OR HOPELESS: 1
SUM OF ALL RESPONSES TO PHQ9 QUESTIONS 1 & 2: 2

## 2022-06-15 ASSESSMENT — ENCOUNTER SYMPTOMS
COLOR CHANGE: 0
ABDOMINAL PAIN: 0
CONSTIPATION: 0
COUGH: 0
RHINORRHEA: 0
NAUSEA: 0
EYE DISCHARGE: 0
BLOOD IN STOOL: 0
SORE THROAT: 0
SHORTNESS OF BREATH: 0
ANAL BLEEDING: 0
EYE REDNESS: 0
ABDOMINAL DISTENTION: 0
DIARRHEA: 0

## 2022-06-15 NOTE — PATIENT INSTRUCTIONS
Patient Education        DASH Diet: Care Instructions  Your Care Instructions     The DASH diet is an eating plan that can help lower your blood pressure. DASH stands for Dietary Approaches to Stop Hypertension. Hypertension is high bloodpressure. The DASH diet focuses on eating foods that are high in calcium, potassium, and magnesium. These nutrients can lower blood pressure. The foods that are highest in these nutrients are fruits, vegetables, low-fat dairy products, nuts, seeds, and legumes. But taking calcium, potassium, and magnesium supplements instead of eating foods that are high in those nutrients does not have the same effect. The DASH diet also includes whole grains, fish, and poultry. The DASH diet is one of several lifestyle changes your doctor may recommend to lower your high blood pressure. Your doctor may also want you to decrease the amount of sodium in your diet. Lowering sodium while following the DASH dietcan lower blood pressure even further than just the DASH diet alone. Follow-up care is a key part of your treatment and safety. Be sure to make and go to all appointments, and call your doctor if you are having problems. It's also a good idea to know your test results and keep alist of the medicines you take. How can you care for yourself at home? Following the DASH diet   Eat 4 to 5 servings of fruit each day. A serving is 1 medium-sized piece of fruit, ½ cup chopped or canned fruit, 1/4 cup dried fruit, or 4 ounces (½ cup) of fruit juice. Choose fruit more often than fruit juice.  Eat 4 to 5 servings of vegetables each day. A serving is 1 cup of lettuce or raw leafy vegetables, ½ cup of chopped or cooked vegetables, or 4 ounces (½ cup) of vegetable juice. Choose vegetables more often than vegetable juice.  Get 2 to 3 servings of low-fat and fat-free dairy each day. A serving is 8 ounces of milk, 1 cup of yogurt, or 1 ½ ounces of cheese.  Eat 6 to 8 servings of grains each day.  A low-fat dip as an appetizer instead of chips and dip. ? Sprinkle sunflower seeds or chopped almonds over salads. Or try adding chopped walnuts or almonds to cooked vegetables. ? Try some vegetarian meals using beans and peas. Add garbanzo or kidney beans to salads. Make burritos and tacos with mashed ponce beans or black beans. Where can you learn more? Go to https://Wavii.Complix. org and sign in to your Advanced Photonix account. Enter H788 in the [facility] box to learn more about \"DASH Diet: Care Instructions. \"     If you do not have an account, please click on the \"Sign Up Now\" link. Current as of: January 10, 2022               Content Version: 13.2  © 2006-2022 EnerG2. Care instructions adapted under license by [facility] [place] ([facility]). If you have questions about a medical condition or this instruction, always ask your healthcare professional. [place] [address] any warranty or liability for your use of this information. Patient Education        Learning About Diuretics for High Blood Pressure  Overview  Diuretics help to lower blood pressure. This reduces your risk of a heartattack and stroke. It also reduces your risk of kidney disease. Diuretics cause your kidneys to remove sodium and water. They also relax theblood vessel walls. These help lower your blood pressure. Examples   Chlorthalidone   Hydrochlorothiazide  Possible side effects  There are some common side effects. They are:   Too little potassium.  Feeling dizzy.  Rash.  Urinating a lot.  High blood sugar. (But this is not common.)  You may have other side effects. Check the information that comes with yourmedicine. What to know about taking this medicine   You may take other medicines for blood pressure. Diuretics can help those work better. They can also prevent extra fluid in your body.  You may need to take potassium pills. Ask your doctor about this.    You may need blood tests to check on your health. For example, you may have tests to check your kidneys and your potassium level.  Take your medicines exactly as prescribed. Call your doctor if you think you are having a problem with your medicine.  Check with your doctor or pharmacist before you use any other medicines. This includes over-the-counter medicines. Make sure your doctor knows all of the medicines, vitamins, herbal products, and supplements you take. Taking some medicines together can cause problems. Where can you learn more? Go to https://LoveThispeHealth Strategies Group.Dental Fix RX. org and sign in to your PureSignCo account. Enter L640 in the [facility] box to learn more about \"Learning About Diuretics for High Blood Pressure. \"     If you do not have an account, please click on the \"Sign Up Now\" link. Current as of: January 10, 2022               Content Version: 13.2  © 2006-2022 Codemasters. Care instructions adapted under license by [facility] [place] ([facility]). If you have questions about a medical condition or this instruction, always ask your healthcare professional. [place] [address] any warranty or liability for your use of this information. Patient Education        High Blood Pressure: Care Instructions  Overview     It's normal for blood pressure to go up and down throughout the day. But if it stays up, you have high blood pressure. Another name for high blood pressure ishypertension. Despite what a lot of people think, high blood pressure usually doesn't cause headaches or make you feel dizzy or lightheaded. It usually has no symptoms. But it does increase your risk of stroke, heart attack, and other problems. You and your doctor will talk about your risks of these problems based on yourblood pressure. Your doctor will give you a goal for your blood pressure. Your goal will bebased on your health and your age.   Lifestyle changes, such as eating healthy and being active, are always important to help lower blood pressure. You might also take medicine to reachyour blood pressure goal.  Follow-up care is a key part of your treatment and safety. Be sure to make and go to all appointments, and call your doctor if you are having problems. It's also a good idea to know your test results and keep alist of the medicines you take. How can you care for yourself at home? Medical treatment   If you stop taking your medicine, your blood pressure will go back up. You may take one or more types of medicine to lower your blood pressure. Be safe with medicines. Take your medicine exactly as prescribed. Call your doctor if you think you are having a problem with your medicine.  Talk to your doctor before you start taking aspirin every day. Aspirin can help certain people lower their risk of a heart attack or stroke. But taking aspirin isn't right for everyone, because it can cause serious bleeding.  See your doctor regularly. You may need to see the doctor more often at first or until your blood pressure comes down.  If you are taking blood pressure medicine, talk to your doctor before you take decongestants or anti-inflammatory medicine, such as ibuprofen. Some of these medicines can raise blood pressure.  Learn how to check your blood pressure at home. Lifestyle changes   Stay at a healthy weight. This is especially important if you put on weight around the waist. Losing even 10 pounds can help you lower your blood pressure.  If your doctor recommends it, get more exercise. Walking is a good choice. Bit by bit, increase the amount you walk every day. Try for at least 30 minutes on most days of the week. You also may want to swim, bike, or do other activities.  Avoid or limit alcohol. Talk to your doctor about whether you can drink any alcohol.  Try to limit how much sodium you eat to less than 2,300 milligrams (mg) a day.  Your doctor may ask you to try to eat less than 1,500 mg a day.   Eat plenty of fruits (such as bananas and oranges), vegetables, legumes, whole grains, and low-fat dairy products.  Lower the amount of saturated fat in your diet. Saturated fat is found in animal products such as milk, cheese, and meat. Limiting these foods may help you lose weight and also lower your risk for heart disease.  Do not smoke. Smoking increases your risk for heart attack and stroke. If you need help quitting, talk to your doctor about stop-smoking programs and medicines. These can increase your chances of quitting for good. When should you call for help? Call 911  anytime you think you may need emergency care. This may mean having symptoms that suggest that your blood pressure is causing a serious heart or blood vessel problem. Your blood pressure may be over 180/120. For example, call 911 if:     You have symptoms of a heart attack. These may include:  ? Chest pain or pressure, or a strange feeling in the chest.  ? Sweating. ? Shortness of breath. ? Nausea or vomiting. ? Pain, pressure, or a strange feeling in the back, neck, jaw, or upper belly or in one or both shoulders or arms. ? Lightheadedness or sudden weakness. ? A fast or irregular heartbeat.      You have symptoms of a stroke. These may include:  ? Sudden numbness, tingling, weakness, or loss of movement in your face, arm, or leg, especially on only one side of your body. ? Sudden vision changes. ? Sudden trouble speaking. ? Sudden confusion or trouble understanding simple statements. ? Sudden problems with walking or balance. ? A sudden, severe headache that is different from past headaches.      You have severe back or belly pain. Do not wait until your blood pressure comes down on its own. Get help right away.   Call your doctor now or seek immediate care if:     Your blood pressure is much higher than normal (such as 180/120 or higher), but you don't have symptoms.      You think high blood pressure is causing symptoms, such as:  ? Severe headache.  ? Blurry vision. Watch closely for changes in your health, and be sure to contact your doctor if:     Your blood pressure measures higher than your doctor recommends at least 2 times. That means the top number is higher or the bottom number is higher, or both.      You think you may be having side effects from your blood pressure medicine. Where can you learn more? Go to https://Beijing Jingyuntong TechnologypeTerraSky.Edimer Pharmaceuticals. org and sign in to your Money On Mobile account. Enter C950 in the [employer] box to learn more about \"High Blood Pressure: Care Instructions. \"     If you do not have an account, please click on the \"Sign Up Now\" link. Current as of: January 10, 2022               Content Version: 13.2  © 2006-2022 EcoDirect. Care instructions adapted under license by [facility] ([facility]). If you have questions about a medical condition or this instruction, always ask your healthcare professional. [place] [address] any warranty or liability for your use of this information. Patient Education        Learning About Kidney Transplant Surgery  What is a kidney transplant? A kidney transplant gives you a healthy kidney from another person. You may need a transplant if your kidneys work poorly because of diabetes, high bloodpressure, or another illness. You need only one kidney to live. The new kidney can do the work that your own kidneys cannot. It will remove waste from your blood. It will keep your body's fluids and chemicals in balance. You are likely to feel better and have moreenergy. You have to meet certain rules to be able to get a kidney. For example, youroverall health (other than kidney problems) has to be good. Getting a new kidney can take a long time. If you're getting your kidney from a living donor, you may not have to wait long.  But if it's from a person who hasdied, your name is put on a waiting list.  How is a likely to get an infection or become sick. To reduce your risk of infection, wash your hands often. Stay away from crowds of people, and avoid contact with people who havea cold or the flu. If your body starts to reject the kidney, your doctor may be able to stop the rejection. But if not, you will need to have dialysis again. It's possible thatyou can have another transplant. You may have many different emotions after your kidney transplant. You may feel grateful and happy. But you also may feel guilty or depressed. These feelingsare common. It may help to talk about your feelings with your doctor and family. Follow-up care is a key part of your treatment and safety. Be sure to make and go to all appointments, and call your doctor if you are having problems. It's also a good idea to know your test results and keep alist of the medicines you take. Where can you learn more? Go to https://MarketwiredpeWikkit LLC.iPosi. org and sign in to your Bitzer Mobile account. Enter C198 in the "[employer] box to learn more about \"Learning About Kidney Transplant Surgery. \"     If you do not have an account, please click on the \"Sign Up Now\" link. Current as of: February 10, 2021               Content Version: 13.2  © 2006-2022 Healthwise, Incorporated. Care instructions adapted under license by [facility] [address] If you have questions about a medical condition or this instruction, always ask your healthcare professional. [place] [address] any warranty or liability for your use of this information. Patient Education        Low Sodium Diet (2,000 Milligram): Care Instructions  Overview     Limiting sodium can be an important part of managing some health problems. The most common source of sodium is salt. People get most of the salt in their diet from canned, prepared, and packaged foods. Fast food and restaurant meals also are very high in sodium.  Your doctor will probably limit your sodium to less food.   Use low-sodium salad dressings, sauces, and ketchup. Or make your own salad dressings and sauces without adding salt.  Use less salt (or none) when recipes call for it. You can often use half the salt a recipe calls for without losing flavor. Other foods such as rice, pasta, and grains do not need added salt.  Rinse canned vegetables, and cook them in fresh water. This removes some--but not all--of the salt.  Avoid water that is naturally high in sodium or that has been treated with water softeners, which add sodium. If you buy bottled water, read the label and choose a sodium-free brand. Avoid high-sodium foods   Avoid eating:  ? Smoked, cured, salted, and canned meat, fish, and poultry. ? Ham, parker, hot dogs, and luncheon meats. ? Regular, hard, and processed cheese and regular peanut butter. ? Crackers with salted tops, and other salted snack foods such as pretzels, chips, and salted popcorn. ? Frozen prepared meals, unless labeled low-sodium. ? Canned and dried soups, broths, and bouillon, unless labeled sodium-free or low-sodium. ? Canned vegetables, unless labeled sodium-free or low-sodium. ? Western Bee fries, pizza, tacos, and other fast foods. ? Pickles, olives, ketchup, and other condiments, especially soy sauce, unless labeled sodium-free or low-sodium. Where can you learn more? Go to https://Gamma Medica-Ideas.Scooters. org and sign in to your PE INTER[employer] account. Enter V024 in the [facility] box to learn more about \"Low Sodium Diet (2,000 Milligram): Care Instructions. \"     If you do not have an account, please click on the \"Sign Up Now\" link. Current as of: September 8, 2021               Content Version: 13.2  © 3921-7605 Healthwise, Incorporated. Care instructions adapted under license by [facility] ([facility]).  If you have questions about a medical condition or this instruction, always ask your healthcare professional. Ernestine Carey disclaims any warranty or liability for your use of this information.

## 2022-06-17 DIAGNOSIS — G89.4 CHRONIC PAIN SYNDROME: ICD-10-CM

## 2022-06-17 NOTE — TELEPHONE ENCOUNTER
Shira Rendon called requesting a refill on the following medications:  Requested Prescriptions     Pending Prescriptions Disp Refills    HYDROcodone-acetaminophen (NORCO) 5-325 MG per tablet 90 tablet 0     Sig: Take 1 tablet by mouth every 8 hours as needed for Pain for up to 30 days.      [facility]      Date of last visit:   Date of next visit (if applicable): 5/3/7540

## 2022-06-20 RX ORDER — HYDROCODONE BITARTRATE AND ACETAMINOPHEN 5; 325 MG/1; MG/1
1 TABLET ORAL EVERY 8 HOURS PRN
Qty: 90 TABLET | Refills: 0 | Status: SHIPPED | OUTPATIENT
Start: 2022-06-21 | End: 2022-07-20 | Stop reason: SDUPTHER

## 2022-06-27 RX ORDER — CARVEDILOL 25 MG/1
50 TABLET ORAL 2 TIMES DAILY
Qty: 360 TABLET | Refills: 0 | Status: SHIPPED | OUTPATIENT
Start: 2022-06-27 | End: 2022-09-07 | Stop reason: SDUPTHER

## 2022-06-28 RX ORDER — NIFEDIPINE 90 MG/1
90 TABLET, EXTENDED RELEASE ORAL 2 TIMES DAILY
Qty: 180 TABLET | Refills: 0 | Status: SHIPPED | OUTPATIENT
Start: 2022-06-28 | End: 2022-09-07 | Stop reason: SDUPTHER

## 2022-07-05 ENCOUNTER — OFFICE VISIT (OUTPATIENT)
Dept: PHYSICAL MEDICINE AND REHAB | Age: 67
End: 2022-07-05
Payer: MEDICARE

## 2022-07-05 VITALS
DIASTOLIC BLOOD PRESSURE: 80 MMHG | HEIGHT: 65 IN | WEIGHT: 184 LBS | BODY MASS INDEX: 30.66 KG/M2 | SYSTOLIC BLOOD PRESSURE: 126 MMHG

## 2022-07-05 DIAGNOSIS — M47.816 SPONDYLOSIS OF LUMBAR REGION WITHOUT MYELOPATHY OR RADICULOPATHY: Primary | ICD-10-CM

## 2022-07-05 DIAGNOSIS — M54.50 CHRONIC BILATERAL LOW BACK PAIN WITHOUT SCIATICA: ICD-10-CM

## 2022-07-05 DIAGNOSIS — E11.42 DIABETIC POLYNEUROPATHY ASSOCIATED WITH TYPE 2 DIABETES MELLITUS (HCC): ICD-10-CM

## 2022-07-05 DIAGNOSIS — G89.4 CHRONIC PAIN SYNDROME: ICD-10-CM

## 2022-07-05 DIAGNOSIS — M54.16 LUMBAR RADICULITIS: ICD-10-CM

## 2022-07-05 DIAGNOSIS — G89.29 CHRONIC BILATERAL LOW BACK PAIN WITHOUT SCIATICA: ICD-10-CM

## 2022-07-05 DIAGNOSIS — M47.816 LUMBAR FACET ARTHROPATHY: ICD-10-CM

## 2022-07-05 PROCEDURE — 1090F PRES/ABSN URINE INCON ASSESS: CPT | Performed by: NURSE PRACTITIONER

## 2022-07-05 PROCEDURE — 3044F HG A1C LEVEL LT 7.0%: CPT | Performed by: NURSE PRACTITIONER

## 2022-07-05 PROCEDURE — 3017F COLORECTAL CA SCREEN DOC REV: CPT | Performed by: NURSE PRACTITIONER

## 2022-07-05 PROCEDURE — 1036F TOBACCO NON-USER: CPT | Performed by: NURSE PRACTITIONER

## 2022-07-05 PROCEDURE — G8417 CALC BMI ABV UP PARAM F/U: HCPCS | Performed by: NURSE PRACTITIONER

## 2022-07-05 PROCEDURE — 1123F ACP DISCUSS/DSCN MKR DOCD: CPT | Performed by: NURSE PRACTITIONER

## 2022-07-05 PROCEDURE — 99214 OFFICE O/P EST MOD 30 MIN: CPT | Performed by: NURSE PRACTITIONER

## 2022-07-05 PROCEDURE — G8399 PT W/DXA RESULTS DOCUMENT: HCPCS | Performed by: NURSE PRACTITIONER

## 2022-07-05 PROCEDURE — G8427 DOCREV CUR MEDS BY ELIG CLIN: HCPCS | Performed by: NURSE PRACTITIONER

## 2022-07-05 PROCEDURE — 2022F DILAT RTA XM EVC RTNOPTHY: CPT | Performed by: NURSE PRACTITIONER

## 2022-07-05 RX ORDER — CLOPIDOGREL BISULFATE 75 MG/1
75 TABLET ORAL DAILY
Qty: 90 TABLET | Refills: 3 | Status: SHIPPED | OUTPATIENT
Start: 2022-07-05 | End: 2022-07-06 | Stop reason: SDUPTHER

## 2022-07-05 ASSESSMENT — ENCOUNTER SYMPTOMS
CHEST TIGHTNESS: 0
SHORTNESS OF BREATH: 0
BACK PAIN: 1
GASTROINTESTINAL NEGATIVE: 1

## 2022-07-05 NOTE — PROGRESS NOTES
8-10/10. Pain scale with pain medications or at its best is 5-6/10. Last dose of Norco was today   Drug screen reviewed from 4/5/2022 and was appropriate  Pill count completed  today and WNL: Yes      The patientis allergic to actos [pioglitazone hydrochloride], pioglitazone, cymbalta [duloxetine hcl], lyrica [pregabalin], and gabapentin. Subjective:      Review of Systems   Constitutional: Negative for chills, fatigue and fever. HENT: Negative. Eyes: Positive for visual disturbance. Wearing corrective glasses    Respiratory: Negative for chest tightness and shortness of breath. Cardiovascular: Positive for leg swelling. Negative for chest pain. Gastrointestinal: Negative. Genitourinary: Negative for flank pain. Musculoskeletal: Positive for arthralgias, back pain, gait problem, joint swelling and myalgias. Negative for neck pain and neck stiffness. Ambulating with walker    Neurological: Positive for weakness and numbness. Psychiatric/Behavioral: Negative. Objective:     Vitals:    07/05/22 1124   BP: 126/80   Weight: 184 lb (83.5 kg)   Height: 5' 5\" (1.651 m)       Physical Exam  Vitals and nursing note reviewed. Constitutional:       General: She is not in acute distress. Appearance: She is well-developed. She is not diaphoretic. HENT:      Head: Normocephalic and atraumatic. Right Ear: External ear normal.      Left Ear: External ear normal.      Nose: Nose normal.      Mouth/Throat:      Pharynx: No oropharyngeal exudate. Eyes:      General: No scleral icterus. Right eye: No discharge. Left eye: No discharge. Conjunctiva/sclera: Conjunctivae normal.      Pupils: Pupils are equal, round, and reactive to light. Neck:      Thyroid: No thyromegaly. Cardiovascular:      Rate and Rhythm: Normal rate and regular rhythm. Heart sounds: Normal heart sounds. No murmur heard. No friction rub. No gallop.     Pulmonary:      Effort: Pulmonary effort is normal. No respiratory distress. Breath sounds: Normal breath sounds. No wheezing or rales. Chest:      Chest wall: No tenderness. Abdominal:      General: Bowel sounds are normal. There is no distension. Palpations: Abdomen is soft. Tenderness: There is no abdominal tenderness. There is no guarding or rebound. Musculoskeletal:         General: Swelling and tenderness present. Left shoulder: Tenderness present. Decreased range of motion. Cervical back: Full passive range of motion without pain, normal range of motion and neck supple. No edema, erythema or rigidity. No muscular tenderness. Normal range of motion. Lumbar back: Tenderness and bony tenderness present. Decreased range of motion. Positive right straight leg raise test and positive left straight leg raise test.        Back:       Right lower leg: Edema present. Left lower leg: Edema present. Right ankle: Swelling present. Tenderness present. Decreased range of motion. Left ankle: Swelling present. Tenderness present. Decreased range of motion. Skin:     General: Skin is warm. Coloration: Skin is not pale. Findings: No erythema or rash. Neurological:      Mental Status: She is alert and oriented to person, place, and time. She is not disoriented. Cranial Nerves: No cranial nerve deficit. Sensory: Sensory deficit present. Motor: Weakness present. No atrophy or abnormal muscle tone. Coordination: Coordination normal.      Gait: Gait abnormal.      Deep Tendon Reflexes: Reflexes are normal and symmetric. Babinski sign absent on the right side. Babinski sign absent on the left side. Comments: Ambulating with walker   4/5 bilateral lower extremities   Psychiatric:         Attention and Perception: She is attentive. Mood and Affect: Mood normal. Mood is not anxious or depressed. Affect is not labile, blunt, angry or inappropriate. Speech: She is communicative. Speech is not rapid and pressured, delayed, slurred or tangential.         Behavior: Behavior is not agitated, slowed, aggressive, withdrawn, hyperactive or combative. Thought Content: Thought content is not paranoid or delusional. Thought content does not include homicidal or suicidal ideation. Thought content does not include homicidal or suicidal plan. Cognition and Memory: Memory is not impaired. She does not exhibit impaired recent memory or impaired remote memory. Judgment: Judgment is not impulsive or inappropriate. RADHA  Patricks test  negative  Yeoman's  or Gaenslen's negative       Assessment:     1. Spondylosis of lumbar region without myelopathy or radiculopathy    2. Lumbar facet arthropathy    3. Chronic bilateral low back pain without sciatica    4. Diabetic polyneuropathy associated with type 2 diabetes mellitus ([facility] [address]    5. Lumbar radiculitis    6. Chronic pain syndrome            Plan:      · OARRS reviewed. Current MED: 15.00  · Patient was offered naloxone for home. · Discussed long term side effects of medications, tolerance, dependency and addiction. · Previous UDS reviewed  · UDS preformed today for compliance. · Patient told can not receive any pain medications from any other source. · No evidence of abuse, diversion or aberrant behavior.  Medications and/or procedures to improve function and quality of life- patient understanding with this and that may not be pain free   Discussed with patient about safe storage of medications at home   Discussed possible weaning of medication dosing dependent on treatment/procedure results.  Discussed procedures again L-facet MBB and IRVIN. Again she does not want at this time.  Continue Norco 5/325 TID prn- filled 6/21/2022 States that pain remains tolerable with medications    Again discussed PT .  She wants to wait as she has angiogram scheduled    States allergic to all neuropathic pain medications       Meds. Prescribed:   No orders of the defined types were placed in this encounter. Return in about 3 months (around 10/5/2022), or if symptoms worsen or fail to improve, for follow up  for medications.                Electronically signed by DONOVAN Bond CNP on7/5/2022 at 11:41 AM

## 2022-07-06 RX ORDER — CLOPIDOGREL BISULFATE 75 MG/1
75 TABLET ORAL DAILY
Qty: 90 TABLET | Refills: 3 | Status: SHIPPED | OUTPATIENT
Start: 2022-07-06 | End: 2022-07-16

## 2022-07-12 ENCOUNTER — PRE-PROCEDURE TELEPHONE (OUTPATIENT)
Dept: INPATIENT UNIT | Age: 67
End: 2022-07-12

## 2022-07-12 NOTE — PROGRESS NOTES
Message juan with following info:  NPO after midnight  Bring drivers license and insurance information  Wear comfortable clean clothes  Shower morning of and night before with liquid antibacterial soap  Remove jewelry   May have to stay overnight if have PTCA/stent  Bring medications in original bottles  Made aware of visitors limit to 2 at a time  Follow all instructions given by your physician  Please notify doctor office if you need to cancel or reschedule your procedure   needed at discharge

## 2022-07-15 ENCOUNTER — HOSPITAL ENCOUNTER (OUTPATIENT)
Dept: INTERVENTIONAL RADIOLOGY/VASCULAR | Age: 67
Discharge: HOME | End: 2022-07-16
Attending: INTERNAL MEDICINE | Admitting: INTERNAL MEDICINE
Payer: MEDICARE

## 2022-07-15 DIAGNOSIS — I10 ESSENTIAL (PRIMARY) HYPERTENSION: ICD-10-CM

## 2022-07-15 DIAGNOSIS — I73.9 PAD (PERIPHERAL ARTERY DISEASE) (HCC): ICD-10-CM

## 2022-07-15 LAB
ABO: NORMAL
ACTIVATED CLOTTING TIME: 219 SECONDS (ref 1–150)
ANION GAP SERPL CALCULATED.3IONS-SCNC: 11 MEQ/L (ref 8–16)
ANTIBODY SCREEN: NORMAL
APTT: 30.9 SECONDS (ref 22–38)
BUN BLDV-MCNC: 26 MG/DL (ref 7–22)
CALCIUM SERPL-MCNC: 10.5 MG/DL (ref 8.5–10.5)
CHLORIDE BLD-SCNC: 104 MEQ/L (ref 98–111)
CO2: 27 MEQ/L (ref 23–33)
CREAT SERPL-MCNC: 1.1 MG/DL (ref 0.4–1.2)
ERYTHROCYTE [DISTWIDTH] IN BLOOD BY AUTOMATED COUNT: 14.2 % (ref 11.5–14.5)
ERYTHROCYTE [DISTWIDTH] IN BLOOD BY AUTOMATED COUNT: 45 FL (ref 35–45)
GLUCOSE BLD-MCNC: 141 MG/DL (ref 70–108)
GLUCOSE BLD-MCNC: 171 MG/DL (ref 70–108)
HCT VFR BLD CALC: 32.4 % (ref 37–47)
HEMOGLOBIN: 9.8 GM/DL (ref 12–16)
INR BLD: 1.07 (ref 0.85–1.13)
MCH RBC QN AUTO: 26.5 PG (ref 26–33)
MCHC RBC AUTO-ENTMCNC: 30.2 GM/DL (ref 32.2–35.5)
MCV RBC AUTO: 87.6 FL (ref 81–99)
PLATELET # BLD: 266 THOU/MM3 (ref 130–400)
PMV BLD AUTO: 11 FL (ref 9.4–12.4)
POTASSIUM SERPL-SCNC: 3.4 MEQ/L (ref 3.5–5.2)
POTASSIUM SERPL-SCNC: 3.4 MEQ/L (ref 3.5–5.2)
RBC # BLD: 3.7 MILL/MM3 (ref 4.2–5.4)
RH FACTOR: NORMAL
SODIUM BLD-SCNC: 142 MEQ/L (ref 135–145)
WBC # BLD: 7.5 THOU/MM3 (ref 4.8–10.8)

## 2022-07-15 PROCEDURE — 2580000003 HC RX 258: Performed by: INTERNAL MEDICINE

## 2022-07-15 PROCEDURE — 86901 BLOOD TYPING SEROLOGIC RH(D): CPT

## 2022-07-15 PROCEDURE — 6360000004 HC RX CONTRAST MEDICATION: Performed by: INTERNAL MEDICINE

## 2022-07-15 PROCEDURE — 84132 ASSAY OF SERUM POTASSIUM: CPT

## 2022-07-15 PROCEDURE — 2709999900 IR ANGIOGRAM EXTREMITY BILATERAL

## 2022-07-15 PROCEDURE — 6370000000 HC RX 637 (ALT 250 FOR IP): Performed by: NUCLEAR MEDICINE

## 2022-07-15 PROCEDURE — 6370000000 HC RX 637 (ALT 250 FOR IP): Performed by: INTERNAL MEDICINE

## 2022-07-15 PROCEDURE — 86850 RBC ANTIBODY SCREEN: CPT

## 2022-07-15 PROCEDURE — 85610 PROTHROMBIN TIME: CPT

## 2022-07-15 PROCEDURE — 75710 ARTERY X-RAYS ARM/LEG: CPT

## 2022-07-15 PROCEDURE — 6360000002 HC RX W HCPCS: Performed by: INTERNAL MEDICINE

## 2022-07-15 PROCEDURE — 37224 HC PLASTY UNI FEMPOP: CPT

## 2022-07-15 PROCEDURE — 85730 THROMBOPLASTIN TIME PARTIAL: CPT

## 2022-07-15 PROCEDURE — 36415 COLL VENOUS BLD VENIPUNCTURE: CPT

## 2022-07-15 PROCEDURE — 85027 COMPLETE CBC AUTOMATED: CPT

## 2022-07-15 PROCEDURE — 85347 COAGULATION TIME ACTIVATED: CPT

## 2022-07-15 PROCEDURE — 75625 CONTRAST EXAM ABDOMINL AORTA: CPT

## 2022-07-15 PROCEDURE — 75774 ARTERY X-RAY EACH VESSEL: CPT

## 2022-07-15 PROCEDURE — 75710 ARTERY X-RAYS ARM/LEG: CPT | Performed by: INTERNAL MEDICINE

## 2022-07-15 PROCEDURE — 37224 PR REVSC OPN/PRG FEM/POP W/ANGIOPLASTY UNI: CPT | Performed by: INTERNAL MEDICINE

## 2022-07-15 PROCEDURE — 82948 REAGENT STRIP/BLOOD GLUCOSE: CPT

## 2022-07-15 PROCEDURE — 80048 BASIC METABOLIC PNL TOTAL CA: CPT

## 2022-07-15 PROCEDURE — 86900 BLOOD TYPING SEROLOGIC ABO: CPT

## 2022-07-15 PROCEDURE — 94640 AIRWAY INHALATION TREATMENT: CPT

## 2022-07-15 RX ORDER — DEXTROSE MONOHYDRATE 50 MG/ML
100 INJECTION, SOLUTION INTRAVENOUS PRN
Status: DISCONTINUED | OUTPATIENT
Start: 2022-07-15 | End: 2022-07-16 | Stop reason: HOSPADM

## 2022-07-15 RX ORDER — BACITRACIN, NEOMYCIN, POLYMYXIN B 400; 3.5; 5 [USP'U]/G; MG/G; [USP'U]/G
OINTMENT TOPICAL
Status: COMPLETED | OUTPATIENT
Start: 2022-07-15 | End: 2022-07-15

## 2022-07-15 RX ORDER — ACETAMINOPHEN 325 MG/1
650 TABLET ORAL EVERY 4 HOURS PRN
Status: DISCONTINUED | OUTPATIENT
Start: 2022-07-15 | End: 2022-07-16 | Stop reason: HOSPADM

## 2022-07-15 RX ORDER — CARVEDILOL 25 MG/1
50 TABLET ORAL 2 TIMES DAILY
Status: DISCONTINUED | OUTPATIENT
Start: 2022-07-15 | End: 2022-07-16 | Stop reason: HOSPADM

## 2022-07-15 RX ORDER — DOCUSATE SODIUM 100 MG/1
100 CAPSULE, LIQUID FILLED ORAL 2 TIMES DAILY PRN
Status: DISCONTINUED | OUTPATIENT
Start: 2022-07-15 | End: 2022-07-16 | Stop reason: HOSPADM

## 2022-07-15 RX ORDER — HEPARIN SODIUM 1000 [USP'U]/ML
INJECTION, SOLUTION INTRAVENOUS; SUBCUTANEOUS
Status: COMPLETED | OUTPATIENT
Start: 2022-07-15 | End: 2022-07-15

## 2022-07-15 RX ORDER — HYDROCODONE BITARTRATE AND ACETAMINOPHEN 5; 325 MG/1; MG/1
1 TABLET ORAL EVERY 8 HOURS PRN
Status: DISCONTINUED | OUTPATIENT
Start: 2022-07-15 | End: 2022-07-16 | Stop reason: HOSPADM

## 2022-07-15 RX ORDER — CLOPIDOGREL BISULFATE 75 MG/1
75 TABLET ORAL DAILY
Status: DISCONTINUED | OUTPATIENT
Start: 2022-07-16 | End: 2022-07-16 | Stop reason: HOSPADM

## 2022-07-15 RX ORDER — SODIUM CHLORIDE 0.9 % (FLUSH) 0.9 %
5-40 SYRINGE (ML) INJECTION PRN
Status: DISCONTINUED | OUTPATIENT
Start: 2022-07-15 | End: 2022-07-16 | Stop reason: HOSPADM

## 2022-07-15 RX ORDER — ERGOCALCIFEROL 1.25 MG/1
50000 CAPSULE ORAL
Status: DISCONTINUED | OUTPATIENT
Start: 2022-07-28 | End: 2022-07-16 | Stop reason: HOSPADM

## 2022-07-15 RX ORDER — ASPIRIN 81 MG/1
324 TABLET, CHEWABLE ORAL ONCE
Status: DISCONTINUED | OUTPATIENT
Start: 2022-07-15 | End: 2022-07-16 | Stop reason: HOSPADM

## 2022-07-15 RX ORDER — MIDAZOLAM HYDROCHLORIDE 1 MG/ML
INJECTION INTRAMUSCULAR; INTRAVENOUS
Status: COMPLETED | OUTPATIENT
Start: 2022-07-15 | End: 2022-07-15

## 2022-07-15 RX ORDER — FENTANYL CITRATE 50 UG/ML
INJECTION, SOLUTION INTRAMUSCULAR; INTRAVENOUS
Status: COMPLETED | OUTPATIENT
Start: 2022-07-15 | End: 2022-07-15

## 2022-07-15 RX ORDER — ASPIRIN 81 MG/1
81 TABLET ORAL DAILY
Status: DISCONTINUED | OUTPATIENT
Start: 2022-07-16 | End: 2022-07-16 | Stop reason: HOSPADM

## 2022-07-15 RX ORDER — CLONIDINE HYDROCHLORIDE 0.2 MG/1
0.2 TABLET ORAL 3 TIMES DAILY
Status: DISCONTINUED | OUTPATIENT
Start: 2022-07-15 | End: 2022-07-16 | Stop reason: HOSPADM

## 2022-07-15 RX ORDER — DIPHENHYDRAMINE HYDROCHLORIDE 50 MG/ML
25 INJECTION INTRAMUSCULAR; INTRAVENOUS ONCE
Status: DISCONTINUED | OUTPATIENT
Start: 2022-07-15 | End: 2022-07-16 | Stop reason: HOSPADM

## 2022-07-15 RX ORDER — PANTOPRAZOLE SODIUM 40 MG/1
40 TABLET, DELAYED RELEASE ORAL 2 TIMES DAILY
Status: DISCONTINUED | OUTPATIENT
Start: 2022-07-15 | End: 2022-07-16 | Stop reason: HOSPADM

## 2022-07-15 RX ORDER — IRON POLYSACCHARIDE COMPLEX 150 MG
150 CAPSULE ORAL DAILY
Status: DISCONTINUED | OUTPATIENT
Start: 2022-07-15 | End: 2022-07-16 | Stop reason: HOSPADM

## 2022-07-15 RX ORDER — SODIUM CHLORIDE 0.9 % (FLUSH) 0.9 %
5-40 SYRINGE (ML) INJECTION EVERY 12 HOURS SCHEDULED
Status: DISCONTINUED | OUTPATIENT
Start: 2022-07-15 | End: 2022-07-16 | Stop reason: HOSPADM

## 2022-07-15 RX ORDER — POTASSIUM CHLORIDE 20 MEQ/1
20 TABLET, EXTENDED RELEASE ORAL ONCE
Status: COMPLETED | OUTPATIENT
Start: 2022-07-15 | End: 2022-07-15

## 2022-07-15 RX ORDER — MYCOPHENOLIC ACID 180 MG/1
720 TABLET, DELAYED RELEASE ORAL 2 TIMES DAILY
Status: DISCONTINUED | OUTPATIENT
Start: 2022-07-15 | End: 2022-07-16 | Stop reason: HOSPADM

## 2022-07-15 RX ORDER — TACROLIMUS 1 MG/1
5 CAPSULE ORAL 2 TIMES DAILY
Status: DISCONTINUED | OUTPATIENT
Start: 2022-07-15 | End: 2022-07-16 | Stop reason: HOSPADM

## 2022-07-15 RX ORDER — SODIUM CHLORIDE 9 MG/ML
INJECTION, SOLUTION INTRAVENOUS PRN
Status: DISCONTINUED | OUTPATIENT
Start: 2022-07-15 | End: 2022-07-16 | Stop reason: HOSPADM

## 2022-07-15 RX ORDER — INSULIN GLARGINE 100 [IU]/ML
25 INJECTION, SOLUTION SUBCUTANEOUS NIGHTLY
Status: DISCONTINUED | OUTPATIENT
Start: 2022-07-15 | End: 2022-07-16 | Stop reason: HOSPADM

## 2022-07-15 RX ORDER — NITROGLYCERIN 0.4 MG/1
0.4 TABLET SUBLINGUAL EVERY 5 MIN PRN
Status: DISCONTINUED | OUTPATIENT
Start: 2022-07-15 | End: 2022-07-16 | Stop reason: HOSPADM

## 2022-07-15 RX ORDER — NIFEDIPINE 90 MG/1
90 TABLET, EXTENDED RELEASE ORAL 2 TIMES DAILY
Status: DISCONTINUED | OUTPATIENT
Start: 2022-07-15 | End: 2022-07-16 | Stop reason: HOSPADM

## 2022-07-15 RX ORDER — ASPIRIN 81 MG/1
81 TABLET, CHEWABLE ORAL DAILY
Status: DISCONTINUED | OUTPATIENT
Start: 2022-07-16 | End: 2022-07-15 | Stop reason: SDUPTHER

## 2022-07-15 RX ORDER — LANOLIN ALCOHOL/MO/W.PET/CERES
400 CREAM (GRAM) TOPICAL 2 TIMES DAILY
Status: DISCONTINUED | OUTPATIENT
Start: 2022-07-15 | End: 2022-07-16 | Stop reason: HOSPADM

## 2022-07-15 RX ORDER — FLUTICASONE PROPIONATE 50 MCG
1 SPRAY, SUSPENSION (ML) NASAL DAILY
Status: DISCONTINUED | OUTPATIENT
Start: 2022-07-16 | End: 2022-07-16 | Stop reason: HOSPADM

## 2022-07-15 RX ADMIN — HEPARIN SODIUM 3000 UNITS: 1000 INJECTION INTRAVENOUS; SUBCUTANEOUS at 08:06

## 2022-07-15 RX ADMIN — SODIUM CHLORIDE: 9 INJECTION, SOLUTION INTRAVENOUS at 06:23

## 2022-07-15 RX ADMIN — PANTOPRAZOLE SODIUM 40 MG: 40 TABLET, DELAYED RELEASE ORAL at 21:23

## 2022-07-15 RX ADMIN — HEPARIN SODIUM 6000 UNITS: 1000 INJECTION INTRAVENOUS; SUBCUTANEOUS at 07:39

## 2022-07-15 RX ADMIN — MYCOPHENOLIC ACID 720 MG: 180 TABLET, DELAYED RELEASE ORAL at 21:23

## 2022-07-15 RX ADMIN — Medication 150 MG: at 15:58

## 2022-07-15 RX ADMIN — BACITRACIN ZINC, NEOMYCIN SULFATE, AND POLYMYXIN B SULFATE 1 EACH: 400; 3.5; 5 OINTMENT TOPICAL at 08:27

## 2022-07-15 RX ADMIN — FENTANYL CITRATE 50 MCG: 50 INJECTION, SOLUTION INTRAMUSCULAR; INTRAVENOUS at 08:14

## 2022-07-15 RX ADMIN — Medication 400 MG: at 21:23

## 2022-07-15 RX ADMIN — CLONIDINE HYDROCHLORIDE 0.2 MG: 0.2 TABLET ORAL at 21:23

## 2022-07-15 RX ADMIN — TACROLIMUS 5 MG: 1 CAPSULE ORAL at 21:23

## 2022-07-15 RX ADMIN — POTASSIUM CHLORIDE 20 MEQ: 1500 TABLET, EXTENDED RELEASE ORAL at 21:23

## 2022-07-15 RX ADMIN — SODIUM CHLORIDE, PRESERVATIVE FREE 10 ML: 5 INJECTION INTRAVENOUS at 21:23

## 2022-07-15 RX ADMIN — FENTANYL CITRATE 50 MCG: 50 INJECTION, SOLUTION INTRAMUSCULAR; INTRAVENOUS at 07:29

## 2022-07-15 RX ADMIN — INSULIN GLARGINE 25 UNITS: 100 INJECTION, SOLUTION SUBCUTANEOUS at 21:23

## 2022-07-15 RX ADMIN — CLONIDINE HYDROCHLORIDE 0.2 MG: 0.2 TABLET ORAL at 15:57

## 2022-07-15 RX ADMIN — IOPAMIDOL 85 ML: 612 INJECTION, SOLUTION INTRAVENOUS at 08:28

## 2022-07-15 RX ADMIN — CARVEDILOL 50 MG: 25 TABLET ORAL at 21:23

## 2022-07-15 RX ADMIN — NIFEDIPINE 90 MG: 90 TABLET, EXTENDED RELEASE ORAL at 21:23

## 2022-07-15 RX ADMIN — HYDROCODONE BITARTRATE AND ACETAMINOPHEN 1 TABLET: 5; 325 TABLET ORAL at 22:13

## 2022-07-15 RX ADMIN — HYDROCODONE BITARTRATE AND ACETAMINOPHEN 1 TABLET: 5; 325 TABLET ORAL at 13:36

## 2022-07-15 RX ADMIN — MIDAZOLAM 1 MG: 1 INJECTION INTRAMUSCULAR; INTRAVENOUS at 07:29

## 2022-07-15 ASSESSMENT — PAIN DESCRIPTION - LOCATION: LOCATION: LEG

## 2022-07-15 ASSESSMENT — PAIN SCALES - GENERAL
PAINLEVEL_OUTOF10: 6
PAINLEVEL_OUTOF10: 0

## 2022-07-15 ASSESSMENT — PAIN DESCRIPTION - ORIENTATION: ORIENTATION: RIGHT;LEFT

## 2022-07-15 NOTE — H&P
[facility]  Sedation/Analgesia History & Physical    Pt Name: Jerome Garvin  Account number: [de-identified]  MRN: [id number]  YOB: 1955  Provider Performing Procedure: Deisi Olmstead MD MD  Referring Provider: Deisi Olmstead MD   Primary Care Physician: Ezekiel Serrano, APRN - CNP  Date: 7/15/2022    PRE-PROCEDURE    Code Status: FULL CODE  Brief History/Pre-Procedure Diagnosis:  Severe PAD  RLE rest pain    Consent: : I have discussed with the patient risks, benefits, and alternatives (and relevant risks, benefits, and side effects related to alternatives or not receiving care), and likelihood of the success. The patient and/or representative appear to understand and agree to proceed. The discussion encompasses risks, benefits, and side effects related to the alternatives and the risks related to not receiving the proposed care, treatment, and services. The indication, risks and benefits of the procedure and possible therapeutic consequences and alternatives were discussed with the patient. The patient was given the opportunity to ask questions and to have them answered to his/her satisfaction. Risks of the procedure include but are not limited to the following: Bleeding, hematoma including retroperitoneal hemmorhage, infection, pain and discomfort, injury to the aorta and other blood vessels, rhythm disturbance, low blood pressure, myocardial infarction, stroke, kidney damage/failure, myocardial perforation, allergic reactions to sedatives/contrast material, loss of pulse/vascular compromise requiring surgery, aneurysm/pseudoaneurysm formation, possible loss of a limb/hand/leg, needing blood transfusion, requiring emergent open heart surgery or emergent coronary intervention, the need for intubation/respiratory support, the requirement for defibrillation/cardioversion, and death. Alternatives to and omission of the suggested procedure were discussed.  The patient had no further questions and wished to proceed; the consent form was signed. MEDICAL HISTORY   has a past medical history of Anemia associated with chronic renal failure, Anxiety, Arthritis, Backache, Blood circulation, collateral, Blood transfusion, CAD (coronary artery disease), Cellulitis in diabetic foot ([address] Chest pain, CHF (congestive heart failure) ([address] Chronic anemia, Chronic kidney disease, Chronic kidney disease, stage III (moderate) (HCC), Chronic renal insufficiency, COPD (chronic obstructive pulmonary disease) ([address] Coronary disease, COVID-19, Depression, Diabetes mellitus, type 2 ([address] Disease of blood and blood forming organ, GERD (gastroesophageal reflux disease), 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 SURGICAL HISTORY   has a past surgical history that includes eye surgery (March 30th, 2012); Carpal tunnel release (1988); Foot surgery (1990); Colonoscopy (2009); Endoscopy, colon, diagnostic (2007); Appendectomy (1980s); back surgery (1990's); Cosmetic surgery (3/30/2012); Ankle surgery (Right, 02-10-14); liver biopsy (6/2015); Lung biopsy (2009); joint replacement (2015); fracture surgery (2015); Cardiac surgery (2008); pr office/outpt visit,procedure only (Left, 8/23/2018); Colonoscopy (Left, 6/10/2019); Upper gastrointestinal endoscopy (Left, 6/14/2019); Diagnostic Cardiac Cath Lab Procedure; Kidney transplant (04/10/2020); Ovary removal (1985); and Hysterectomy (1980 and 1985).   Additional information:       ALLERGIES   Allergies as of 07/15/2022 - Fully Reviewed 07/15/2022   Allergen Reaction Noted    Actos [pioglitazone hydrochloride] Swelling 08/31/2011 Pioglitazone Swelling 04/15/2019    Cymbalta [duloxetine hcl] Other (See Comments) 08/08/2013    Lyrica [pregabalin]  04/25/2022    Gabapentin Anxiety 11/05/2015     Additional information:       MEDICATIONS     Current Facility-Administered Medications:     sodium chloride flush 0.9 % injection 5-40 mL, 5-40 mL, IntraVENous, 2 times per day, Errol Otero MD    sodium chloride flush 0.9 % injection 5-40 mL, 5-40 mL, IntraVENous, PRN, Errol Otero MD    0.9 % sodium chloride infusion, , IntraVENous, PRN, Errol Otero MD, Last Rate: 50 mL/hr at 07/15/22 0623, New Bag at 07/15/22 0268    aspirin chewable tablet 324 mg, 324 mg, Oral, Once, Errol Otero MD    diphenhydrAMINE (BENADRYL) injection 25 mg, 25 mg, IntraVENous, Once, Errol Otero MD    hydrocortisone sodium succinate PF (SOLU-CORTEF) injection 100 mg, 100 mg, IntraVENous, Once, Errol Otero MD    nitroGLYCERIN (NITROSTAT) SL tablet 0.4 mg, 0.4 mg, SubLINGual, Q5 Min PRN, Errol Otero MD  Prior to Admission medications    Medication Sig Start Date End Date Taking? Authorizing Provider   clopidogrel (PLAVIX) 75 MG tablet Take 1 tablet by mouth daily 7/6/22   Richard Bhatti MD   rivaroxaban (XARELTO) 2.5 MG TABS tablet Take 1 tablet by mouth 2 times daily 7/6/22   Richard Bhatti MD   NIFEdipine (PROCARDIA XL) 90 MG extended release tablet Take 1 tablet by mouth in the morning and at bedtime 6/28/22   DONOVAN Shannon CNP   carvedilol (COREG) 25 MG tablet Take 2 tablets by mouth 2 times daily 6/27/22   DONOVAN Shannon CNP   HYDROcodone-acetaminophen (NORCO) 5-325 MG per tablet Take 1 tablet by mouth every 8 hours as needed for Pain for up to 30 days.  6/21/22 7/21/22  DONOVAN Osborn CNP   iron polysaccharides (FERREX 150) 150 MG capsule Take 1 capsule by mouth daily 6/15/22   DONOVAN Almeida CNP   cloNIDine (CATAPRES) 0.2 MG tablet Take 1 tablet by mouth in the morning, at noon, and at bedtime 6/15/22   DONOVAN Johnston CNP   vitamin D (ERGOCALCIFEROL) 1.25 MG (97518 UT) CAPS capsule Take 1 capsule by mouth every 14 days 6/15/22   DONOVAN Johnston CNP   atorvastatin (LIPITOR) 40 MG tablet TAKE 1 TABLET DAILY 4/19/22   DONOVAN Johnston CNP   docusate sodium (COLACE) 100 MG capsule Take 1 capsule by mouth 2 times daily as needed for Constipation 3/21/22   Suzan Sas, DO   tiotropium (SPIRIVA RESPIMAT) 2.5 MCG/ACT AERS inhaler Inhale 2 puffs into the lungs daily 3/2/22 3/2/23  DONOVAN Angelo CNP   albuterol sulfate HFA (PROAIR HFA) 108 (90 Base) MCG/ACT inhaler Inhale 2 puffs into the lungs every 6 hours as needed for Wheezing or Shortness of Breath 3/2/22 3/2/23  DONOVAN Angelo CNP   fluticasone (FLONASE) 50 MCG/ACT nasal spray 1 spray by Each Nostril route daily 3/2/22   DONOVAN Angelo CNP   sulfamethoxazole-trimethoprim (BACTRIM DS;SEPTRA DS) 800-160 MG per tablet Take 1 tablet by mouth 3 times a week    Historical Provider, MD   insulin aspart (NOVOLOG FLEXPEN) 100 UNIT/ML injection pen Sliding scale insulin coverage Glucose:Dose: If Less [id number] =No Insulin/ 140-199= 2 Units/ 200-249=4 Units/ 250-299= 6 Units/  300-349=8 Units/  350-400=10 Units/ Above 400 = 12 Units max 48 units daily 2/9/22   DONOVAN Santizo CNP   insulin glargine (LANTUS SOLOSTAR) 100 UNIT/ML injection pen Inject 25 Units into the skin nightly 2/9/22   DONOVAN Santizo CNP   Insulin Pen Needle (B-D ULTRAFINE III SHORT PEN) 31G X 8 MM MISC Use three times daily Diagnosis Code E11.9 2/9/22   DONOVAN Santizo CNP   lactulose [facility]) 10 GM/15ML solution Take twice daily as needed for constipation 9/17/21   Suzan Moya,    famotidine (PEPCID) 40 MG tablet Take 40 mg by mouth 2 times daily as needed  3/16/21   Historical Provider, MD   pantoprazole (PROTONIX) 40 MG tablet Take 40 mg by mouth 2 times daily     Historical Provider, MD tacrolimus (PROGRAF) 1 MG capsule Take 1 mg by mouth 5 CAPSULES EVERY MORNING AND 5 CAPSULES EVERY EVENING    Historical Provider, MD   Mycophenolate Sodium 360 MG TBEC Take 720 mg by mouth 2 tablets BID    Historical Provider, MD   magnesium oxide (MAG-OX) 400 MG tablet Take 400 mg by mouth 2 times daily    Historical Provider, MD   linaclotide (LINZESS) 290 MCG CAPS capsule Take 145 mcg by mouth every other day     Historical Provider, MD   aspirin 81 MG EC tablet Take 81 mg by mouth daily. Historical Provider, MD     Additional information:       VITAL SIGNS   Vitals:    07/15/22 0827   BP: (!) 168/69   Pulse: 60   Resp: 16   Temp:    SpO2: 95%       PHYSICAL:   General: No acute distress  HEENT:  Unremarkable for age  Neck: without increased JVD, carotid pulses 2+ bilaterally without bruits  Heart: RRR, S1 & S2 WNL, S4 gallop, without murmurs or rubs   NYHA: 2  Lungs: Clear to auscultation    Abdomen: BS present, without HSM, masses, or tenderness   Extremities: without C,C,E.  Pulses 2+ bilaterally  Mental Status: Alert & Oriented        PLANNED PROCEDURE   []Cath  []PCI                []Pacemaker/AICD  []LUPILLO             []Cardioversion [x]Peripheral angiography/PTA  []Other:      SEDATION  Planned agent:[x]Midazolam []Meperidine [x]Sublimaze []Morphine  []Diazepam  []Other:     ASA Classification:  []1 []2 [x]3 []4 []5  Class 1: A normal healthy patient  Class 2: Pt with mild to moderate systemic disease  Class 3: Severe systemic disease or disturbance  Class 4: Severe systemic disorders that are already life threatening. Class 5: Moribund pt with little chances of survival, for more than 24 hours. Mallampati I Airway Classification:   []1 []2 [x]3 []4    [x]Pre-procedure diagnostic studies complete and results available. Comment:    [x]Previous sedation/anesthesia experiences assessed. Comment:    [x]The patient is an appropriate candidate to undergo the planned procedure sedation and anesthesia.

## 2022-07-15 NOTE — PROGRESS NOTES
8989 Patient received in IR for procedure. 0908 This procedure has been fully reviewed with the patient and written informed consent has been obtained. 5963 Patient prepped for procedure. 0730 Procedure started with Dr. Javier Ragland.  4432 Access obtained in left femoral with use of sonosite. 7163 Angioplasty of SFA/popliteal using [place] 5 x 200 balloon. 1719 Angioplasty of SFA using IN. PACT 5 x 250 balloon. 9456 Angioplasty of SFA/popliteal using IN. PACT 5 x 250 balloon. 5911 Angioplasty of SFA using IN. PACT 6 x 150 balloon. 0835 Angioplasty of popliteal using [place] 6 x 120 balloon. 0800 ACT running. 7323 , Dr Javier Ragland notified. 1685 Angioplasty of popliteal using [place] 6 x 40 balloon. 6336  Angioplasty of SFA using [place] 6 x 40 balloon. 0630 Angioplasty of popliteal using [place] 6 x 40 balloon. 2921 Angioplasty of SFA using [place] 6 x 40 balloon. 8295 Procedure completed; patient tolerated well. Angio seal device deployed to left femoral artery. Manual pressure held to femoral site. 0827 Bacitracin oint, gauze and op site to left femoral site; area soft to touch with no bleeding noted. 3969 Patient on bed; comfort ensured. Left femoral dressing remains dry and intact with area soft. 2229 Report called to Sophia Sanchez on 2E.  Alice Alcazar Patient taken to 2E via bed. Left femoral dressing remains dry and intact with area soft.

## 2022-07-15 NOTE — BRIEF OP NOTE
[address] [address]  Sedation/Analgesia Post Sedation Record    Pt Name: Elton Cook  Account number: [de-identified]  MRN: [id number]  YOB: 1955  Procedure Performed By: MD MD Brii Sol, [address]  Primary Care Physician: Gala Aj, APRN - CNP  Date: 7/15/2022    POST-PROCEDURE    Physicians/Assistants: MD MD Brii Sol RPVI    Procedure Performed:Peripheral Angiogram      Sedation/Anesthesia: Versed/ Fentanyl and 2% xylocaine local anesthesia. Estimated Blood Loss: < 50 ml. Specimens Removed: None         Disposition of Specimen: N/A        Complications: No Immediate Complications.        Post-procedure Diagnosis/Findings:       R SFA PTA--DCB         MD MD Brii Sol RPVI  Electronically signed 7/15/2022 at 8:41 AM  Interventional Cardiology

## 2022-07-16 VITALS
SYSTOLIC BLOOD PRESSURE: 120 MMHG | DIASTOLIC BLOOD PRESSURE: 58 MMHG | WEIGHT: 183 LBS | BODY MASS INDEX: 30.49 KG/M2 | TEMPERATURE: 98.2 F | HEART RATE: 61 BPM | HEIGHT: 65 IN | OXYGEN SATURATION: 93 % | RESPIRATION RATE: 16 BRPM

## 2022-07-16 LAB
ANION GAP SERPL CALCULATED.3IONS-SCNC: 10 MEQ/L (ref 8–16)
BUN BLDV-MCNC: 20 MG/DL (ref 7–22)
CALCIUM SERPL-MCNC: 10 MG/DL (ref 8.5–10.5)
CHLORIDE BLD-SCNC: 104 MEQ/L (ref 98–111)
CO2: 25 MEQ/L (ref 23–33)
CREAT SERPL-MCNC: 1 MG/DL (ref 0.4–1.2)
ERYTHROCYTE [DISTWIDTH] IN BLOOD BY AUTOMATED COUNT: 14.2 % (ref 11.5–14.5)
ERYTHROCYTE [DISTWIDTH] IN BLOOD BY AUTOMATED COUNT: 45.6 FL (ref 35–45)
GLUCOSE BLD-MCNC: 183 MG/DL (ref 70–108)
GLUCOSE BLD-MCNC: 227 MG/DL (ref 70–108)
GLUCOSE BLD-MCNC: 228 MG/DL (ref 70–108)
HCT VFR BLD CALC: 32.5 % (ref 37–47)
HEMOGLOBIN: 9.8 GM/DL (ref 12–16)
MCH RBC QN AUTO: 26.7 PG (ref 26–33)
MCHC RBC AUTO-ENTMCNC: 30.2 GM/DL (ref 32.2–35.5)
MCV RBC AUTO: 88.6 FL (ref 81–99)
PLATELET # BLD: 204 THOU/MM3 (ref 130–400)
PMV BLD AUTO: 10.8 FL (ref 9.4–12.4)
POTASSIUM SERPL-SCNC: 3.9 MEQ/L (ref 3.5–5.2)
RBC # BLD: 3.67 MILL/MM3 (ref 4.2–5.4)
SODIUM BLD-SCNC: 139 MEQ/L (ref 135–145)
WBC # BLD: 7.5 THOU/MM3 (ref 4.8–10.8)

## 2022-07-16 PROCEDURE — 2580000003 HC RX 258: Performed by: INTERNAL MEDICINE

## 2022-07-16 PROCEDURE — 82948 REAGENT STRIP/BLOOD GLUCOSE: CPT

## 2022-07-16 PROCEDURE — 6370000000 HC RX 637 (ALT 250 FOR IP): Performed by: INTERNAL MEDICINE

## 2022-07-16 PROCEDURE — 80048 BASIC METABOLIC PNL TOTAL CA: CPT

## 2022-07-16 PROCEDURE — 36415 COLL VENOUS BLD VENIPUNCTURE: CPT

## 2022-07-16 PROCEDURE — 85027 COMPLETE CBC AUTOMATED: CPT

## 2022-07-16 RX ORDER — NITROGLYCERIN 0.4 MG/1
TABLET SUBLINGUAL
Qty: 25 TABLET | Refills: 3 | Status: SHIPPED | OUTPATIENT
Start: 2022-07-16

## 2022-07-16 RX ADMIN — TACROLIMUS 5 MG: 1 CAPSULE ORAL at 08:39

## 2022-07-16 RX ADMIN — ASPIRIN 81 MG: 81 TABLET ORAL at 08:28

## 2022-07-16 RX ADMIN — CLOPIDOGREL BISULFATE 75 MG: 75 TABLET ORAL at 08:28

## 2022-07-16 RX ADMIN — MYCOPHENOLIC ACID 720 MG: 180 TABLET, DELAYED RELEASE ORAL at 08:33

## 2022-07-16 RX ADMIN — SODIUM CHLORIDE, PRESERVATIVE FREE 10 ML: 5 INJECTION INTRAVENOUS at 08:37

## 2022-07-16 RX ADMIN — Medication 400 MG: at 08:32

## 2022-07-16 RX ADMIN — SODIUM CHLORIDE, PRESERVATIVE FREE 10 ML: 5 INJECTION INTRAVENOUS at 08:36

## 2022-07-16 RX ADMIN — Medication 1 SPRAY: at 08:29

## 2022-07-16 RX ADMIN — Medication 150 MG: at 08:30

## 2022-07-16 RX ADMIN — PANTOPRAZOLE SODIUM 40 MG: 40 TABLET, DELAYED RELEASE ORAL at 08:34

## 2022-07-16 NOTE — FLOWSHEET NOTE
Discharge teaching and instructions for diagnosis/procedure of Angiogram completed with patient using teachback method. AVS reviewed. Printed prescriptions given to patient. Patient voiced understanding regarding prescriptions, follow up appointments, and care of self at home. Discharged in a wheelchair to  independent living per family.

## 2022-07-16 NOTE — DISCHARGE SUMMARY
Cardiology Discharge Summary      Patient Identification:  Ralph Almanza  : 1955  MRN: [id number]   Account: [de-identified]     Admit date: 7/15/2022  Discharge date: 22     Attending provider: No att. providers found        Primary care provider: DONOVAN Mccoy CNP     Rationale for Cardiology admit: Peripheral arteriogram with possible intervention. (Planned staged procedure to the right lower extremity). HPI/PMH: Per Dr. Augustina Brizuela office note of 3/9/2022:  75 yo F ESRD s/p renal transplant 2020 who presents for follow-up. Has been having pain and discomfort for the last couple weeks. She feels that her pain is worse post COVID. Left leg hurts, but right leg is the worst.  She notes some blistering. She describes pain that occurs acutely in the middle of the night. She cannot walk even 1 block without getting pain and numbness. 8/10. Laying down helps the pain. R JOSE ANTONIO 0.86, L JOSE ANTONIO 0.57. She has abnormal EMG studies. Preserved EF 55-60%. Cr 1.1. She is on ASA/Statin. No chest pain, angina, BANUELOS, orthopnea, PND, sob at rest, palpitations, LE edema, or syncope. Interaction with pletal and tacrolimus.     Past Medical History  [facility]  has a past medical history of Anemia associated with chronic renal failure, Anxiety, Arthritis, Backache, Blood circulation, collateral, Blood transfusion, CAD (coronary artery disease), Cellulitis in diabetic foot ([address] Chest pain, CHF (congestive heart failure) ([address] Chronic anemia, Chronic kidney disease, Chronic kidney disease, stage III (moderate) (HCC), Chronic renal insufficiency, COPD (chronic obstructive pulmonary disease) ([address] Coronary disease, COVID-19, Depression, Diabetes mellitus, type 2 ([address] Disease of blood and blood forming organ, GERD (gastroesophageal reflux disease), Hemoglobin disease ([address] History of granulomatous disease, HTN (hypertension), Hyperlipemia, Iron deficiency anemia due to dietary causes, Kidney stones, Kidney trouble, MRSA infection, Neuromuscular disorder ([address] Neuropathy, Obesity, CONTRERAS on CPAP, Pneumonia, PONV (postoperative nausea and vomiting), Seizures ([address] and Sepsis due to Escherichia coli ([facility] A & P:  [facility]  R JOSE ANTONIO 0.86, L JOSE ANTONIO 0.57. LE neuropathy  ESRD s/p renal transplant  Cannot take Pletal, is on ASA/statin. Discussed proceeding with angiogram and possible intervention, she wants to proceed. 4/8/2022: Peripheral arteriogram: Successful left lower extremity SFA-popliteal drug-coated balloon  Angioplasty per Luci Parks MD.  Anticipated staged procedure to the right lower extremity. Admission Diagnoses:  PAD (peripheral artery disease) ([facility]     Discharge Diagnoses:   Principal Problem:    PAD (peripheral artery disease) ([facility]  Resolved Problems:    * No resolved hospital problems. *         Hospital Course:   Eduin Gore is a 74 y.o. female admitted to [facility] on 7/15/2022 for planned peripheral angiogram and possible peripheral therapeutic intervention to her right lower extremity. R SFA PTA--DCB was performed per Luci Parks MD.  Patient Toller procedure well and was admitted to [facility] for overnight monitoring. Vital signs stable overnight. Access site: L groin site dressing D & I, no bleeding, swelling or hematoma. Labs stable: K 3.9 (3.4), BUN/creat: 20/1.0 (26/1.1), H&H 9.8/32.5 (9.8/32.4). Tolerating medications, no bleeding. Tolerating diet and activity. Left anterior tib strong with Doppler. Right posterior tib and dorsalis pedis strong with Doppler. No swelling. Feet warm bilaterally. Denies pain or discomfort. Baseline level paresthesia. Stable for discharge to home today with follow-up in the office in 3 to 4 weeks.     Procedures: 7/15/2022:    [facility]  Sedation/Analgesia Post Sedation Record     Pt Name: Eduin Gore  Account number: [de-identified]  MRN: [id number]  YOB: 1955  Procedure Performed By: MD MD Omar Horta, [address]  Primary Care Physician: DONOVAN Giraldo - DAVE  Date: 7/15/2022     POST-PROCEDURE     Physicians/Assistants: MD MD Omar Horta, ROXANNE     Procedure Performed:Peripheral Angiogram                 Sedation/Anesthesia: Versed/ Fentanyl and 2% xylocaine local anesthesia. Estimated Blood Loss: < 50 ml. Specimens Removed: None                                           Disposition of Specimen: N/A                                         Complications: No Immediate Complications. Post-procedure Diagnosis/Findings:                                   R SFA PTA--DCB                                MD MD Omar Horta, RPKIEL  Electronically signed 7/15/2022 at 8:41 AM  Interventional Cardiology       Code Status: Prior     Consults:   none    Examination:  Vitals:BP (!) 120/58   Pulse 61   Temp 98.2 °F (36.8 °C) (Oral)   Resp 16   Ht 5' 5\" (1.651 m)   Wt 183 lb (83 kg)   SpO2 93%   BMI 30.45 kg/m²      24 hour intake/output:  Intake/Output Summary (Last 24 hours) at 7/16/2022 1619  Last data filed at 7/16/2022 0836  Gross per 24 hour   Intake 10 ml   Output --   Net 10 ml       General Appearance: alert and oriented to person, place and time, in no acute distress, up in chair at bedside  Cardiovascular: normal rate, regular rhythm, normal S1 and S2, no murmurs, rubs, clicks, or gallops, distal pulses intact  Pulmonary/Chest: clear to auscultation bilaterally- no wheezes, rales or rhonchi, normal air movement, no respiratory distress, room air  Abdomen: soft, non-tender, non-distended, normal bowel sounds  Extremities: no cyanosis, trace 1+ nonpitting edema right foot. Left anterior tib, right posterior tib and dorsalis pedis pulses all strong with Doppler.  L groin site dressing D & I, no bleeding, swelling or hematoma  Skin: warm and dry, no rash or erythema  Head: normocephalic and atraumatic  Eyes: pupils equal, round, and reactive to light  Neck: supple and non-tender without mass, no thyromegaly   Musculoskeletal: normal range of motion, no joint swelling, deformity or tenderness  Neurological: alert, oriented, normal speech, no focal findings or movement disorder noted    Medications:  Current Discharge Medication List        CONTINUE these medications which have NOT CHANGED    Details   ASA 81 mg  TAke 1 tablet daily. rivaroxaban (XARELTO) 2.5 MG TABS tablet Take 1 tablet by mouth 2 times daily  Qty: 180 tablet, Refills: 3      NIFEdipine (PROCARDIA XL) 90 MG extended release tablet Take 1 tablet by mouth in the morning and at bedtime  Qty: 180 tablet, Refills: 0      carvedilol (COREG) 25 MG tablet Take 2 tablets by mouth 2 times daily  Qty: 360 tablet, Refills: 0      HYDROcodone-acetaminophen (NORCO) 5-325 MG per tablet Take 1 tablet by mouth every 8 hours as needed for Pain for up to 30 days. Qty: 90 tablet, Refills: 0    Comments: Reduce doses taken as pain becomes manageable  Associated Diagnoses: Chronic pain syndrome      iron polysaccharides (FERREX 150) 150 MG capsule Take 1 capsule by mouth daily  Qty: 60 capsule, Refills: 11    Associated Diagnoses: Iron deficiency anemia due to dietary causes      cloNIDine (CATAPRES) 0.2 MG tablet Take 1 tablet by mouth in the morning, at noon, and at bedtime  Qty: 90 tablet, Refills: 11    Associated Diagnoses: Essential hypertension;  Renal transplant recipient      vitamin D (ERGOCALCIFEROL) 1.25 MG (86106 UT) CAPS capsule Take 1 capsule by mouth every 14 days  Qty: 12 capsule, Refills: 5    Associated Diagnoses: Vitamin D deficiency      atorvastatin (LIPITOR) 40 MG tablet TAKE 1 TABLET DAILY  Qty: 90 tablet, Refills: 3      docusate sodium (COLACE) 100 MG capsule Take 1 capsule by mouth 2 times daily as needed for Constipation  Qty: 60 capsule, Refills: 0 tiotropium (SPIRIVA RESPIMAT) 2.5 MCG/ACT AERS inhaler Inhale 2 puffs into the lungs daily  Qty: 1 each, Refills: 11    Associated Diagnoses: Simple chronic bronchitis (HCC)      albuterol sulfate HFA (PROAIR HFA) 108 (90 Base) MCG/ACT inhaler Inhale 2 puffs into the lungs every 6 hours as needed for Wheezing or Shortness of Breath  Qty: 3 each, Refills: 3    Associated Diagnoses: Simple chronic bronchitis (HCC)      fluticasone (FLONASE) 50 MCG/ACT nasal spray 1 spray by Each Nostril route daily  Qty: 3 each, Refills: 3    Associated Diagnoses: Non-seasonal allergic rhinitis, unspecified trigger      sulfamethoxazole-trimethoprim (BACTRIM DS;SEPTRA DS) 800-160 MG per tablet Take 1 tablet by mouth 3 times a week      insulin aspart (NOVOLOG FLEXPEN) 100 UNIT/ML injection pen Sliding scale insulin coverage Glucose:Dose: If Less [id number] =No Insulin/ 140-199= 2 Units/ 200-249=4 Units/ 250-299= 6 Units/  300-349=8 Units/  350-400=10 Units/ Above 400 = 12 Units max 48 units daily  Qty: 15 pen, Refills: 1    Associated Diagnoses: Type 2 diabetes mellitus without complication, with long-term current use of insulin ([facility])      insulin glargine (LANTUS SOLOSTAR) 100 UNIT/ML injection pen Inject 25 Units into the skin nightly  Qty: 15 pen, Refills: 1    Associated Diagnoses: Type 2 diabetes mellitus without complication, with long-term current use of insulin ([facility])      Insulin Pen Needle (B-D ULTRAFINE III SHORT PEN) 31G X 8 MM MISC Use three times daily Diagnosis Code E11.9  Qty: 200 each, Refills: 3    Associated Diagnoses: Type 2 diabetes mellitus without complication, with long-term current use of insulin ([facility])      lactulose (CHRONULAC) 10 GM/15ML solution Take twice daily as needed for constipation  Qty: 2700 mL, Refills: 1    Comments: DX Code Needed  .   Associated Diagnoses: Constipation, unspecified constipation type      famotidine (PEPCID) 40 MG tablet Take 40 mg by mouth 2 times daily as needed pantoprazole (PROTONIX) 40 MG tablet Take 40 mg by mouth 2 times daily       tacrolimus (PROGRAF) 1 MG capsule Take 1 mg by mouth 5 CAPSULES EVERY MORNING AND 5 CAPSULES EVERY EVENING      Mycophenolate Sodium 360 MG TBEC Take 720 mg by mouth 2 tablets BID      magnesium oxide (MAG-OX) 400 MG tablet Take 400 mg by mouth 2 times daily      linaclotide (LINZESS) 290 MCG CAPS capsule Take 145 mcg by mouth every other day                     Significant Diagnostics:   Radiology: IR ANGIOGRAM EXTREMITY BILATERAL    Result Date: 7/15/2022  Radiology exam is complete. No Radiologist dictation. Please follow up with ordering provider.        Labs:   Recent Results (from the past 72 hour(s))   CBC    Collection Time: 07/15/22  5:32 AM   Result Value Ref Range    WBC 7.5 4.8 - 10.8 thou/mm3    RBC 3.70 (L) 4.20 - 5.40 mill/mm3    Hemoglobin 9.8 (L) 12.0 - 16.0 gm/dl    Hematocrit 32.4 (L) 37.0 - 47.0 %    MCV 87.6 81.0 - 99.0 fL    MCH 26.5 26.0 - 33.0 pg    MCHC 30.2 (L) 32.2 - 35.5 gm/dl    RDW-CV 14.2 11.5 - 14.5 %    RDW-SD 45.0 35.0 - 45.0 fL    Platelets 865 848 - 683 thou/mm3    MPV 11.0 9.4 - 12.4 fL   Protime-INR    Collection Time: 07/15/22  5:32 AM   Result Value Ref Range    INR 1.07 0.85 - 1.13   APTT    Collection Time: 07/15/22  5:32 AM   Result Value Ref Range    aPTT 30.9 22.0 - 38.0 seconds   Basic Metabolic Panel    Collection Time: 07/15/22  5:32 AM   Result Value Ref Range    Sodium 142 135 - 145 meq/L    Potassium 3.4 (L) 3.5 - 5.2 meq/L    Chloride 104 98 - 111 meq/L    CO2 27 23 - 33 meq/L    Glucose 141 (H) 70 - 108 mg/dL    BUN 26 (H) 7 - 22 mg/dL    CREATININE 1.1 0.4 - 1.2 mg/dL    Calcium 10.5 8.5 - 10.5 mg/dL   TYPE AND SCREEN    Collection Time: 07/15/22  5:32 AM   Result Value Ref Range    ABO B     Rh Factor POS     Antibody Screen NEG    Anion Gap    Collection Time: 07/15/22  5:32 AM   Result Value Ref Range    Anion Gap 11.0 8.0 - 16.0 meq/L   Glomerular Filtration Rate, Estimated Collection Time: 07/15/22  5:32 AM   Result Value Ref Range    Est, Glom Filt Rate 60 (A) ml/min/1.73m2   POCT activated clotting time    Collection Time: 07/15/22  8:02 AM   Result Value Ref Range    Activated Clotting Time 219 (H) 1 - 150 seconds   Potassium    Collection Time: 07/15/22  4:59 PM   Result Value Ref Range    Potassium 3.4 (L) 3.5 - 5.2 meq/L   POCT glucose    Collection Time: 07/15/22  8:02 PM   Result Value Ref Range    POC Glucose 171 (H) 70 - 108 mg/dl   CBC    Collection Time: 07/16/22  7:25 AM   Result Value Ref Range    WBC 7.5 4.8 - 10.8 thou/mm3    RBC 3.67 (L) 4.20 - 5.40 mill/mm3    Hemoglobin 9.8 (L) 12.0 - 16.0 gm/dl    Hematocrit 32.5 (L) 37.0 - 47.0 %    MCV 88.6 81.0 - 99.0 fL    MCH 26.7 26.0 - 33.0 pg    MCHC 30.2 (L) 32.2 - 35.5 gm/dl    RDW-CV 14.2 11.5 - 14.5 %    RDW-SD 45.6 (H) 35.0 - 45.0 fL    Platelets 945 309 - 612 thou/mm3    MPV 10.8 9.4 - 12.4 fL   Basic Metabolic Panel    Collection Time: 07/16/22  7:25 AM   Result Value Ref Range    Sodium 139 135 - 145 meq/L    Potassium 3.9 3.5 - 5.2 meq/L    Chloride 104 98 - 111 meq/L    CO2 25 23 - 33 meq/L    Glucose 183 (H) 70 - 108 mg/dL    BUN 20 7 - 22 mg/dL    CREATININE 1.0 0.4 - 1.2 mg/dL    Calcium 10.0 8.5 - 10.5 mg/dL   Anion Gap    Collection Time: 07/16/22  7:25 AM   Result Value Ref Range    Anion Gap 10.0 8.0 - 16.0 meq/L   Glomerular Filtration Rate, Estimated    Collection Time: 07/16/22  7:25 AM   Result Value Ref Range    Est, Glom Filt Rate 67 (A) ml/min/1.73m2   POCT glucose    Collection Time: 07/16/22  8:21 AM   Result Value Ref Range    POC Glucose 227 (H) 70 - 108 mg/dl   POCT glucose    Collection Time: 07/16/22 12:46 PM   Result Value Ref Range    POC Glucose 228 (H) 70 - 108 mg/dl       Patient Instructions: Activity:   You may resume normal activity in 2 days, including driving, letting pain be your guide. Limit lifting over 5 pounds (half gallon of milk) to one week or until site heals.   Limit

## 2022-07-16 NOTE — DISCHARGE INSTRUCTIONS
Follow-up in office with Dmitry Crooks MD or Dalila ACOSTA in 3 - 4 weeks. Call the office with questions or concerns. Groin Care Instructions        Normal Observation: You may or may not experience these. Soreness or tenderness that may last a few weeks. Possible bruising that could last a few weeks and up to one month. Formation of a small lump (dime to quarter size) that should last only a few weeks. Care of your incision  You may shower 24 hours after the procedure. Wet the dressing thoroughly and gently remove the bandage from the hospital during showering. It is easier to remove this way. Gently clean your site daily using soap and water while standing in the shower. Dry thoroughly. Do not apply powders or lotions to the site for 2 weeks. Keep the site clean and dry to prevent infection. Do not sit in a bathtub or a pool of water for 7 days. Inspect the site daily. Activity   You may resume normal activity in 2 days, including driving, letting pain be your     guide. Limit lifting over 5 pounds (half gallon of milk) to one week or until site heals. Limit vigorous activity (contact sports) to two weeks time. You will be able to return to work in 1-3 days. Call our office immediately if you experience any of the following  Significant bleeding does not stop after 10 minutes of applying firm pressure directly over incision. Increased swelling of groin or leg. Unusual pain at groin or down that leg. Signs of infection: redness, warmth to touch, drainage, poorly healing incision, fever, or chills.

## 2022-07-16 NOTE — PLAN OF CARE
Problem: Chronic Conditions and Co-morbidities  Goal: Patient's chronic conditions and co-morbidity symptoms are monitored and maintained or improved  Outcome: Completed     Problem: Discharge Planning  Goal: Discharge to home or other facility with appropriate resources  Outcome: Completed  Note: Patient discharging home today. Problem: Safety - Adult  Goal: Free from fall injury  Outcome: Completed  Flowsheets (Taken 7/16/2022 1015)  Free From Fall Injury: Instruct family/caregiver on patient safety     Problem: Pain  Goal: Verbalizes/displays adequate comfort level or baseline comfort level  Outcome: Completed   Care plan reviewed with patient. Patient understanding.

## 2022-07-18 ENCOUNTER — TELEPHONE (OUTPATIENT)
Dept: FAMILY MEDICINE CLINIC | Age: 67
End: 2022-07-18

## 2022-07-18 DIAGNOSIS — G89.4 CHRONIC PAIN SYNDROME: ICD-10-CM

## 2022-07-18 NOTE — TELEPHONE ENCOUNTER
[facility] Transitions Initial Follow Up Call    Outreach made within 2 business days of discharge: Yes    Patient: Sherron Cruz Patient : 1955   MRN: [id number]  Reason for Admission: There are no discharge diagnoses documented for the most recent discharge. Discharge Date: 22       Spoke with: Pt    Discharge department[facility]facility[facility]    TCM Interactive Patient Contact:  Was patient able to fill all prescriptions: Yes  Was patient instructed to bring all medications to the follow-up visit: Yes  Is patient taking all medications as directed in the discharge summary?  Yes  Does patient understand their discharge instructions: Yes  Does patient have questions or concerns that need addressed prior to 7-14 day follow up office visit: no    Scheduled appointment with PCP within 7-14 days    Follow Up  Future Appointments   Date Time Provider Franklin Chin   2022  2:00 PM DONOVAN Choudhary CNP SRPX IM MED [facility] [place]   2022  3:30 PM DONOVAN Bhakta   2022  3:30 PM Mounika Betancourt MD [address] [place] [address]   10/4/2022 11:15 AM DONOVAN Tillman CNP N SRPX Pain [facility] [place]   2022  2:20 PM Jesus Smallwood MD [facility] [place]   12/15/2022  1:00 PM DONOVAN Vaz CNP SRPX FM RES [facility] [place]   2023  2:30 PM Gamaliel Montoya MD [address] [place] [address] ([address] [place] [address])

## 2022-07-18 NOTE — PROCEDURES
[address] [place] [address]                            CARDIAC CATHETERIZATION    PATIENT NAME: Gonzalo Gunn                   :        1955  MED REC NO:   [id number]                           ROOM:       [id number]  ACCOUNT NO:   [de-identified]                           ADMIT DATE: 07/15/2022  PROVIDER:     Natty Wood MD    DATE OF PROCEDURE:  07/15/2022    PERIPHERAL ANGIOGRAM/INTERVENTION    INDICATION:  [place] IV claudication, right lower extremity rest pain  with known severe PAD, on optimal medical therapy. DESCRIPTION OF PROCEDURE:  After informed consent was obtained from the  patient, she was brought to the special procedure suite and prepped in  sterile fashion. Left femoral artery was chosen as the primary point of  the access. Preprocedure timeout was completed. After infiltration of  left inguinal region with 2% lidocaine using micropuncture and modified  Seldinger technique under fluoroscopic guidance and ultrasound guidance,  I was able to insert a 5-Tajik sheath in the left femoral artery. We  had preprocedure angiography performed. I used a 5-Tajik VCF catheter. I used a 0.035 angled Glidewire Advantage and crossed over the common  iliac artery bifurcation. I placed the catheter in the right common  femoral artery. Angiography of the right lower extremity was performed  in this position. RIGHT LOWER EXTREMITY:  RIGHT COMMON FEMORAL ARTERY:  Patent. RIGHT SFA:  80% to 90% stenosis. Heavy calcification to the proximal  segment. POPLITEAL ARTERY:  90% to 95% stenosis of mid popliteal artery with mild  calcification seen. TIBIOPERONEAL ARTERIES:  Anterior tibial artery is occluded. TP trunk  is patent. Posterior tibial artery is patent to the ankle. Peroneal  artery is patent, supplies collaterals to the anterior tibial artery  distally.     I exchanged out for a 6-Tajik AL1 catheter and placed up and over the  common iliac artery bifurcation. Using a 0.035 TrailBlazer catheter and  angled Glidewire Advantage, I was able to traverse the entire length of  the diseased segment and I placed the wire in the distal popliteal  artery. I then used a 5 x 200 Ligonier balloon and treated the entire  popliteal artery as well as the SFA at high pressure up to 20  atmospheres. I then used a 5 x 250 Admiral IN. PACT drug-coated balloon  and treated the popliteal artery to the mid SFA at 3-minute inflation at  12 atmospheres. I then used another 5 x 150 Admiral IN. PACT drug-coated  balloon and treated the ostium and SFA in the mid section. Repeat  angiogram demonstrated brisk flow in the SFA. I used 6 x 120 Ligonier  balloon and treated the distal popliteal artery once again with a  prolonged inflation into some mild dissection last seen that resulted in  improved flow into popliteal artery. I used 6 x 40, treated the area of  popliteal artery over the joint that had most significant stenosis at 20  atmospheres x1 inflations each that resulted in brisk flow into the  popliteal artery and distal vessels. Mild disease seen otherwise. The  ostium of the SFA was preserved, but I used a 6 x 40 Ligonier balloon and  predilated into the ostium once more to rid the vessel any reflow, and a  repeat angiogram was done demonstrating brisk flow into the leg with no  prolonged dissection. Therefore, all equipments were removed from the  patient. The patient tolerated the procedure well. Angio-Seal was used  for hemostasis in the left common femoral artery. IMMEDIATE COMPLICATIONS:  None. MEDICATIONS:  See EMR. ACCESS:  See above. ESTIMATED BLOOD LOSS:  Less than 50 mL. SUMMARY:  Successful right SFA/popliteal artery drug-coated balloon  angioplasty followed by high pressure 6 mm angioplasty. PLAN:  1. Bedrest.  2.  Optimal medical therapy. 3.  Risk factor management. 4.  Routine access site care.   5. IV fluids. 6.  Overnight observation. 7.  Aspirin and Xarelto. 8.  PAD rehab. 9.  Surveillance ABIs. All the above was explained to the patient and the patient's family. They are agreeable and amenable to the above plan.         Alondra Lindo MD    D: 07/18/2022 14:06:14       T: 07/18/2022 15:14:03     [email or web address]  Job#: [id number]     Doc#: [id number]    CC:

## 2022-07-20 RX ORDER — HYDROCODONE BITARTRATE AND ACETAMINOPHEN 5; 325 MG/1; MG/1
1 TABLET ORAL EVERY 8 HOURS PRN
Qty: 90 TABLET | Refills: 0 | Status: SHIPPED | OUTPATIENT
Start: 2022-07-21 | End: 2022-08-23 | Stop reason: SDUPTHER

## 2022-07-21 RX ORDER — DOXYCYCLINE HYCLATE 100 MG
TABLET ORAL
Qty: 45 TABLET | Refills: 3 | Status: SHIPPED | OUTPATIENT
Start: 2022-07-21 | End: 2022-08-09

## 2022-07-21 NOTE — TELEPHONE ENCOUNTER
Denise Montejo called requesting a refill on the following medications:  Requested Prescriptions     Pending Prescriptions Disp Refills    doxycycline hyclate (VIBRA-TABS) 100 MG tablet [Pharmacy Med Name: DOXYCYCLINE HYCLATE 100 MG TAB] 45 tablet 3     Sig: TAKE 1/2 TABLET BY MOUTH EVERY DAY     Pharmacy verified:  [email or web address]      Date of last visit: 06/14/2022  Date of next visit (if applicable): Visit date not found

## 2022-08-09 ENCOUNTER — NURSE ONLY (OUTPATIENT)
Dept: LAB | Age: 67
End: 2022-08-09

## 2022-08-09 ENCOUNTER — OFFICE VISIT (OUTPATIENT)
Dept: PULMONOLOGY | Age: 67
End: 2022-08-09
Payer: MEDICARE

## 2022-08-09 ENCOUNTER — OFFICE VISIT (OUTPATIENT)
Dept: INTERNAL MEDICINE CLINIC | Age: 67
End: 2022-08-09
Payer: MEDICARE

## 2022-08-09 VITALS
OXYGEN SATURATION: 99 % | HEIGHT: 65 IN | BODY MASS INDEX: 31.75 KG/M2 | DIASTOLIC BLOOD PRESSURE: 68 MMHG | TEMPERATURE: 94.4 F | SYSTOLIC BLOOD PRESSURE: 151 MMHG | HEART RATE: 68 BPM | WEIGHT: 190.6 LBS

## 2022-08-09 VITALS
TEMPERATURE: 96.6 F | WEIGHT: 190.6 LBS | SYSTOLIC BLOOD PRESSURE: 152 MMHG | DIASTOLIC BLOOD PRESSURE: 58 MMHG | HEART RATE: 69 BPM | HEIGHT: 65 IN | BODY MASS INDEX: 31.75 KG/M2

## 2022-08-09 DIAGNOSIS — Z87.891 PERSONAL HISTORY OF TOBACCO USE: ICD-10-CM

## 2022-08-09 DIAGNOSIS — R91.1 PULMONARY NODULE: ICD-10-CM

## 2022-08-09 DIAGNOSIS — J41.0 SIMPLE CHRONIC BRONCHITIS (HCC): Primary | ICD-10-CM

## 2022-08-09 DIAGNOSIS — E11.9 TYPE 2 DIABETES MELLITUS WITHOUT COMPLICATION, WITH LONG-TERM CURRENT USE OF INSULIN (HCC): ICD-10-CM

## 2022-08-09 DIAGNOSIS — Z79.4 TYPE 2 DIABETES MELLITUS WITHOUT COMPLICATION, WITH LONG-TERM CURRENT USE OF INSULIN (HCC): ICD-10-CM

## 2022-08-09 LAB
ANION GAP SERPL CALCULATED.3IONS-SCNC: 17 MEQ/L (ref 8–16)
BASOPHILS # BLD: 0.1 %
BASOPHILS ABSOLUTE: 0 THOU/MM3 (ref 0–0.1)
BUN BLDV-MCNC: 24 MG/DL (ref 7–22)
CHLORIDE BLD-SCNC: 103 MEQ/L (ref 98–111)
CO2: 25 MEQ/L (ref 23–33)
CREAT SERPL-MCNC: 1.2 MG/DL (ref 0.4–1.2)
EOSINOPHIL # BLD: 1.8 %
EOSINOPHILS ABSOLUTE: 0.1 THOU/MM3 (ref 0–0.4)
ERYTHROCYTE [DISTWIDTH] IN BLOOD BY AUTOMATED COUNT: 14.2 % (ref 11.5–14.5)
ERYTHROCYTE [DISTWIDTH] IN BLOOD BY AUTOMATED COUNT: 44.8 FL (ref 35–45)
GFR SERPL CREATININE-BSD FRML MDRD: 45 ML/MIN/1.73M2
GFR SERPL CREATININE-BSD FRML MDRD: 49 ML/MIN/1.73M2
GFR SERPL CREATININE-BSD FRML MDRD: 55 ML/MIN/1.73M2
GLUCOSE BLD-MCNC: 165 MG/DL (ref 70–108)
HBA1C MFR BLD: 6.8 % (ref 4.3–5.7)
HCT VFR BLD CALC: 31.5 % (ref 37–47)
HEMOGLOBIN: 9.7 GM/DL (ref 12–16)
IMMATURE GRANS (ABS): 0.02 THOU/MM3 (ref 0–0.07)
IMMATURE GRANULOCYTES: 0.2 %
LYMPHOCYTES # BLD: 12.2 %
LYMPHOCYTES ABSOLUTE: 1 THOU/MM3 (ref 1–4.8)
MCH RBC QN AUTO: 26.6 PG (ref 26–33)
MCHC RBC AUTO-ENTMCNC: 30.8 GM/DL (ref 32.2–35.5)
MCV RBC AUTO: 86.5 FL (ref 81–99)
MONOCYTES # BLD: 8.1 %
MONOCYTES ABSOLUTE: 0.7 THOU/MM3 (ref 0.4–1.3)
NUCLEATED RED BLOOD CELLS: 0 /100 WBC
PLATELET # BLD: 271 THOU/MM3 (ref 130–400)
PMV BLD AUTO: 10.7 FL (ref 9.4–12.4)
POTASSIUM SERPL-SCNC: 3.7 MEQ/L (ref 3.5–5.2)
RBC # BLD: 3.64 MILL/MM3 (ref 4.2–5.4)
SEG NEUTROPHILS: 77.6 %
SEGMENTED NEUTROPHILS ABSOLUTE COUNT: 6.4 THOU/MM3 (ref 1.8–7.7)
SODIUM BLD-SCNC: 145 MEQ/L (ref 135–145)
WBC # BLD: 8.3 THOU/MM3 (ref 4.8–10.8)

## 2022-08-09 PROCEDURE — G8427 DOCREV CUR MEDS BY ELIG CLIN: HCPCS | Performed by: NURSE PRACTITIONER

## 2022-08-09 PROCEDURE — G0296 VISIT TO DETERM LDCT ELIG: HCPCS | Performed by: NURSE PRACTITIONER

## 2022-08-09 PROCEDURE — 99213 OFFICE O/P EST LOW 20 MIN: CPT | Performed by: NURSE PRACTITIONER

## 2022-08-09 PROCEDURE — 1090F PRES/ABSN URINE INCON ASSESS: CPT | Performed by: NURSE PRACTITIONER

## 2022-08-09 PROCEDURE — 99214 OFFICE O/P EST MOD 30 MIN: CPT | Performed by: NURSE PRACTITIONER

## 2022-08-09 PROCEDURE — 83036 HEMOGLOBIN GLYCOSYLATED A1C: CPT | Performed by: NURSE PRACTITIONER

## 2022-08-09 PROCEDURE — 1123F ACP DISCUSS/DSCN MKR DOCD: CPT | Performed by: NURSE PRACTITIONER

## 2022-08-09 PROCEDURE — 2022F DILAT RTA XM EVC RTNOPTHY: CPT | Performed by: NURSE PRACTITIONER

## 2022-08-09 PROCEDURE — 1036F TOBACCO NON-USER: CPT | Performed by: NURSE PRACTITIONER

## 2022-08-09 PROCEDURE — 3023F SPIROM DOC REV: CPT | Performed by: NURSE PRACTITIONER

## 2022-08-09 PROCEDURE — G8399 PT W/DXA RESULTS DOCUMENT: HCPCS | Performed by: NURSE PRACTITIONER

## 2022-08-09 PROCEDURE — 3044F HG A1C LEVEL LT 7.0%: CPT | Performed by: NURSE PRACTITIONER

## 2022-08-09 PROCEDURE — 3017F COLORECTAL CA SCREEN DOC REV: CPT | Performed by: NURSE PRACTITIONER

## 2022-08-09 PROCEDURE — G8417 CALC BMI ABV UP PARAM F/U: HCPCS | Performed by: NURSE PRACTITIONER

## 2022-08-09 RX ORDER — INSULIN ASPART 100 [IU]/ML
INJECTION, SOLUTION INTRAVENOUS; SUBCUTANEOUS
Qty: 15 PEN | Refills: 1 | Status: SHIPPED | OUTPATIENT
Start: 2022-08-09

## 2022-08-09 RX ORDER — INSULIN GLARGINE 100 [IU]/ML
25 INJECTION, SOLUTION SUBCUTANEOUS NIGHTLY
Qty: 15 PEN | Refills: 1 | Status: SHIPPED | OUTPATIENT
Start: 2022-08-09

## 2022-08-09 RX ORDER — PEN NEEDLE, DIABETIC 31 GX5/16"
NEEDLE, DISPOSABLE MISCELLANEOUS
Qty: 200 EACH | Refills: 3 | Status: SHIPPED | OUTPATIENT
Start: 2022-08-09

## 2022-08-09 ASSESSMENT — ENCOUNTER SYMPTOMS
SHORTNESS OF BREATH: 1
EYES NEGATIVE: 1
GASTROINTESTINAL NEGATIVE: 1
WHEEZING: 0
ALLERGIC/IMMUNOLOGIC NEGATIVE: 1
COUGH: 1

## 2022-08-09 NOTE — PROGRESS NOTES
[facility]  Archana Chi  Dept: [phone]  Dept Fax: [phone]: [phone]     Visit Date:  2022    Patient:  Lucille Syed  YOB: 1955    HPI:     Lucille Syed (:  1955) is a 75 y.o. female, here for evaluation of the following medical concerns: Diabetes and HTN     I last seen Jeovanny Urias 6 months ago. She follows routinely with Dr. Gabrielle Silva. No falls since last visit. Renal transplant on 4/10/20. Follows with Dr. Caroline Barrett routinely. Had bilateral angiogram to BLE since last visit with Dr. Priya Alvarez. Diabetes-   Jeovanny Urias reports being diabetic for over 25 years. She states her diabetes is now well controlled. A1C 6.8% today in office, was 6.1% previously. She is on Novolog Sliding Scale group 2 before meals and at bedtime, and Lantus 25 units at night. Her appetite is poor. Is using Glucerna as a dietary supplement. States hypoglycemic events present occasionally. She is able to voice signs/symptoms of hypoglycemia. Reports checking glucose 2 times per day, with blood sugars ranging  per meter download. Dietary modification for diabetes management and/or weight loss encouraged. Does not report difficulty with obtaining medications. She voices understanding of the importance of annual eye exams. On aspirin 81 mg daily and atorvastatin 40 mg daily. Denies polyuria, polydipsia, polyphagia. Reports neuropathic symptoms including numbness, tingling. She follows routinely with podiatry, Dr. Elias Shelley every 3 months. Essential HTN-  Reports taking her medications as instructed with no medication side effects. Denies chest pain on exertion, dyspnea on exertion, swelling of ankles, orthostatic dizziness or lightheadedness, and/or palpitations. /58 in office today.      Medications    Current Outpatient Medications:     Insulin Pen Needle (B-D ULTRAFINE III SHORT PEN) 31G X 8 MM MISC, Use three times daily Diagnosis Code E11.9, Disp: 200 each, Rfl: 3    insulin glargine (LANTUS SOLOSTAR) 100 UNIT/ML injection pen, Inject 25 Units into the skin nightly, Disp: 15 pen, Rfl: 1    insulin aspart (NOVOLOG FLEXPEN) 100 UNIT/ML injection pen, Sliding scale insulin coverage Glucose:Dose: If Less [id number] =No Insulin/ 140-199= 2 Units/ 200-249=4 Units/ 250-299= 6 Units/  300-349=8 Units/  350-400=10 Units/ Above 400 = 12 Units max 48 units daily, Disp: 15 pen, Rfl: 1    HYDROcodone-acetaminophen (NORCO) 5-325 MG per tablet, Take 1 tablet by mouth every 8 hours as needed for Pain for up to 30 days. , Disp: 90 tablet, Rfl: 0    nitroGLYCERIN (NITROSTAT) 0.4 MG SL tablet, up to max of 3 total doses.  If no relief after 1 dose, call 911., Disp: 25 tablet, Rfl: 3    rivaroxaban (XARELTO) 2.5 MG TABS tablet, Take 1 tablet by mouth 2 times daily, Disp: 180 tablet, Rfl: 3    NIFEdipine (PROCARDIA XL) 90 MG extended release tablet, Take 1 tablet by mouth in the morning and at bedtime, Disp: 180 tablet, Rfl: 0    carvedilol (COREG) 25 MG tablet, Take 2 tablets by mouth 2 times daily, Disp: 360 tablet, Rfl: 0    iron polysaccharides (FERREX 150) 150 MG capsule, Take 1 capsule by mouth daily, Disp: 60 capsule, Rfl: 11    cloNIDine (CATAPRES) 0.2 MG tablet, Take 1 tablet by mouth in the morning, at noon, and at bedtime, Disp: 90 tablet, Rfl: 11    vitamin D (ERGOCALCIFEROL) 1.25 MG (15514 UT) CAPS capsule, Take 1 capsule by mouth every 14 days, Disp: 12 capsule, Rfl: 5    atorvastatin (LIPITOR) 40 MG tablet, TAKE 1 TABLET DAILY, Disp: 90 tablet, Rfl: 3    docusate sodium (COLACE) 100 MG capsule, Take 1 capsule by mouth 2 times daily as needed for Constipation, Disp: 60 capsule, Rfl: 0    tiotropium (SPIRIVA RESPIMAT) 2.5 MCG/ACT AERS inhaler, Inhale 2 puffs into the lungs daily, Disp: 1 each, Rfl: 11    albuterol sulfate HFA (PROAIR HFA) 108 (90 Base) MCG/ACT inhaler, Inhale 2 puffs into the lungs every 6 hours as needed for Wheezing or Shortness of Breath, Disp: 3 each, Rfl: 3    fluticasone (FLONASE) 50 MCG/ACT nasal spray, 1 spray by Each Nostril route daily, Disp: 3 each, Rfl: 3    sulfamethoxazole-trimethoprim (BACTRIM DS;SEPTRA DS) 800-160 MG per tablet, Take 1 tablet by mouth 3 times a week, Disp: , Rfl:     lactulose (CHRONULAC) 10 GM/15ML solution, Take twice daily as needed for constipation, Disp: 2700 mL, Rfl: 1    famotidine (PEPCID) 40 MG tablet, Take 40 mg by mouth 2 times daily as needed , Disp: , Rfl:     pantoprazole (PROTONIX) 40 MG tablet, Take 40 mg by mouth 2 times daily , Disp: , Rfl:     tacrolimus (PROGRAF) 1 MG capsule, Take 1 mg by mouth 5 CAPSULES EVERY MORNING AND 5 CAPSULES EVERY EVENING, Disp: , Rfl:     Mycophenolate Sodium 360 MG TBEC, Take 720 mg by mouth 2 tablets BID, Disp: , Rfl:     magnesium oxide (MAG-OX) 400 MG tablet, Take 400 mg by mouth 2 times daily, Disp: , Rfl:     linaclotide (LINZESS) 290 MCG CAPS capsule, Take 145 mcg by mouth every other day , Disp: , Rfl:     aspirin 81 MG EC tablet, Take 81 mg by mouth daily. , Disp: , Rfl:     The patient is allergic to actos [pioglitazone hydrochloride], pioglitazone, cymbalta [duloxetine hcl], lyrica [pregabalin], and gabapentin.     Past Medical History  Yu Valerio  has a past medical history of Anemia associated with chronic renal failure, Anxiety, Arthritis, Backache, Blood circulation, collateral, Blood transfusion, CAD (coronary artery disease), Cellulitis in diabetic foot ([address] Chest pain, CHF (congestive heart failure) ([address] Chronic anemia, Chronic kidney disease, Chronic kidney disease, stage III (moderate) ([facility]), Chronic renal insufficiency, COPD (chronic obstructive pulmonary disease) ([address] Coronary disease, COVID-19, Depression, Diabetes mellitus, type 2 ([address] Disease of blood and blood forming organ, GERD (gastroesophageal reflux disease), Hemoglobin disease ([address] History of granulomatous disease, HTN (hypertension), Hyperlipemia, Iron deficiency anemia due to dietary causes, Kidney stones, Kidney trouble, MRSA infection, Neuromuscular disorder ([address] Neuropathy, Obesity, CONTRERAS on CPAP, Pneumonia, PONV (postoperative nausea and vomiting), Seizures ([address] and Sepsis due to Escherichia coli ([address] Past Surgical History  The patient  has a past surgical history that includes eye surgery (March 30th, 2012); Carpal tunnel release (1988); Foot surgery (1990); Colonoscopy (2009); Endoscopy, colon, diagnostic (2007); Appendectomy (1980s); back surgery (1990's); Cosmetic surgery (3/30/2012); Ankle surgery (Right, 02-10-14); liver biopsy (6/2015); Lung biopsy (2009); joint replacement (2015); fracture surgery (2015); Cardiac surgery (2008); pr office/outpt visit,procedure only (Left, 8/23/2018); Colonoscopy (Left, 6/10/2019); Upper gastrointestinal endoscopy (Left, 6/14/2019); Diagnostic Cardiac Cath Lab Procedure; Kidney transplant (04/10/2020); Ovary removal (1985); and Hysterectomy (1980 and 1985). Family History  This patient's family history includes Alcohol Abuse in her father; Arthritis in her mother; Jessica Mallet in her sister; Breast Cancer (age of onset: 45) in her paternal cousin; Diabetes in her brother, brother, father, sister, sister, sister, sister, and sister; Early Death in her sister; Heart Disease in her father, mother, sister, and sister; High Blood Pressure in her mother; Kidney Disease in her mother; Mental Illness in her mother; Obesity in her father; Stroke in her mother. Social History  Immanuel Howard  reports that she quit smoking about 13 years ago. Her smoking use included cigarettes. She started smoking about 42 years ago. She has a 45.00 pack-year smoking history. She has never used smokeless tobacco. She reports that she does not drink alcohol and does not use drugs.     Health Maintenance:    Health Maintenance   Topic Date Due    Diabetic foot exam  Never done Diabetic retinal exam  Never done    Annual Wellness Visit (AWV)  11/13/2020    Flu vaccine (1) 09/01/2022    COVID-19 Vaccine (5 - Booster for Moderna series) 09/16/2022    Low dose CT lung screening  02/25/2023    Lipids  04/08/2023    Depression Screen  06/15/2023    A1C test (Diabetic or Prediabetic)  08/09/2023    Breast cancer screen  02/08/2024    Colorectal Cancer Screen  06/10/2029    DTaP/Tdap/Td vaccine (3 - Td or Tdap) 11/28/2030    DEXA (modify frequency per FRAX score)  Completed    Shingles vaccine  Completed    Pneumococcal 65+ years Vaccine  Completed    Hepatitis C screen  Completed    Hepatitis A vaccine  Aged Out    Hib vaccine  Aged Out    Meningococcal (ACWY) vaccine  Aged Out       Subjective:      Review of Systems   Constitutional:  Negative for appetite change, chills, fatigue and fever. HENT:  Negative for congestion, sinus pressure, sinus pain, sore throat and trouble swallowing. Eyes:  Negative for photophobia, pain and visual disturbance. Respiratory:  Negative for cough, shortness of breath and wheezing. Cardiovascular:  Negative for chest pain, palpitations and leg swelling. Gastrointestinal:  Negative for abdominal distention, abdominal pain, blood in stool, constipation, diarrhea, nausea and vomiting. Endocrine: Negative for polydipsia, polyphagia and polyuria. Genitourinary:  Negative for difficulty urinating, dysuria and hematuria. Musculoskeletal:  Negative for arthralgias, back pain and myalgias. Skin:  Negative for rash and wound. Neurological:  Positive for weakness. Negative for dizziness, tremors, light-headedness and headaches. Psychiatric/Behavioral:  Negative for sleep disturbance. The patient is not nervous/anxious.       Objective:     BP (!) 152/58 (Site: Left Wrist, Position: Sitting)   Pulse 69   Temp (!) 96.6 °F (35.9 °C) (Temporal)   Ht 5' 5\" (1.651 m)   Wt 190 lb 9.6 oz (86.5 kg)   BMI 31.72 kg/m²     Physical Exam  Constitutional: General: She is not in acute distress. Appearance: Normal appearance. She is well-developed. HENT:      Head: Normocephalic and atraumatic. Right Ear: Tympanic membrane, ear canal and external ear normal.      Left Ear: Tympanic membrane, ear canal and external ear normal.      Nose: Nose normal. No congestion or rhinorrhea. Mouth/Throat:      Mouth: Mucous membranes are moist.      Pharynx: Oropharynx is clear. No oropharyngeal exudate or posterior oropharyngeal erythema. Eyes:      Extraocular Movements: Extraocular movements intact. Conjunctiva/sclera: Conjunctivae normal.      Pupils: Pupils are equal, round, and reactive to light. Neck:      Thyroid: No thyromegaly. Vascular: No JVD. Cardiovascular:      Rate and Rhythm: Normal rate and regular rhythm. Heart sounds: Normal heart sounds. No murmur heard. No friction rub. No gallop. Pulmonary:      Effort: Pulmonary effort is normal. No respiratory distress. Breath sounds: Normal breath sounds. No wheezing or rales. Abdominal:      General: Bowel sounds are normal. There is no distension. Palpations: Abdomen is soft. Tenderness: There is no abdominal tenderness. Musculoskeletal:         General: Normal range of motion. Cervical back: Normal range of motion and neck supple. Right lower leg: Edema (trace) present. Left lower leg: Edema (trace) present. Lymphadenopathy:      Cervical: No cervical adenopathy. Skin:     General: Skin is warm and dry. Capillary Refill: Capillary refill takes less than 2 seconds. Neurological:      Mental Status: She is alert and oriented to person, place, and time. Motor: No weakness. Coordination: Coordination normal.      Gait: Gait normal.   Psychiatric:         Mood and Affect: Mood normal.         Behavior: Behavior normal.         Thought Content:  Thought content normal.         Judgment: Judgment normal.       Labs Reviewed 8/9/2022:    Lab Results   Component Value Date    WBC 8.3 08/09/2022    HGB 9.7 (L) 08/09/2022    HCT 31.5 (L) 08/09/2022     08/09/2022    CHOL 117 04/08/2022    TRIG 137 04/08/2022    HDL 36 04/08/2022    LDLDIRECT 68.72 04/07/2022    ALT 16 04/08/2022    AST 15 04/08/2022     08/09/2022    K 3.7 08/09/2022     08/09/2022    CREATININE 1.2 08/09/2022    BUN 24 (H) 08/09/2022    CO2 25 08/09/2022    TSH 1.110 02/10/2022    INR 1.07 07/15/2022    LABA1C 6.8 (H) 08/09/2022    LABMICR 20.95 01/04/2018       Assessment/Plan      1. Type 2 diabetes mellitus without complication, with long-term current use of insulin ([facility])    - Insulin Pen Needle (B-D ULTRAFINE III SHORT PEN) 31G X 8 MM MISC; Use three times daily Diagnosis Code E11.9  Dispense: 200 each; Refill: 3  - insulin glargine (LANTUS SOLOSTAR) 100 UNIT/ML injection pen; Inject 25 Units into the skin nightly  Dispense: 15 pen; Refill: 1  - insulin aspart (NOVOLOG FLEXPEN) 100 UNIT/ML injection pen; Sliding scale insulin coverage Glucose:Dose: If Less [id number] =No Insulin/ 140-199= 2 Units/ 200-249=4 Units/ 250-299= 6 Units/  300-349=8 Units/  350-400=10 Units/ Above 400 = 12 Units max 48 units daily  Dispense: 15 pen; Refill: 1  - POCT glycosylated hemoglobin (Hb A1C)  - Meter download reviewed  - Encouraged dietary modifications and exercise regimen to promote optimal glycemic control  - Call if any low blood sugars or if consistently > 180  - Follow with ophthalmology and podiatry as scheduled         Return in about 6 months (around 2/9/2023). Patient given educational materials - see patient instructions. Discussed use, benefit, and side effects of prescribed medications. All patient questions answered. Pt voiced understanding. Reviewed health maintenance.        Electronically signed DONOVAN Walton - CNP on 8/9/22 at 2:21 PM EDT

## 2022-08-09 NOTE — PROGRESS NOTES
[facility]    Patient: Mo Oates, 74 y.o.   : 1955    Pt of Dr. Talat Carbone   Patient presents with    Follow-up     Chronic bronchitis 6 month follow up          HPI  Donna Payton is here for follow up for chronic bronchitis with CT Chest to review. Patient is experiencing SOB: Little  22- CT Chest done with stable findings   No home oxygen use      Patient is experiencing wheezing: No     Patient states they have had a Absent = 4 cough. Patient is coughing up blood: no     Patient has been experiencing chest pains: pressure-like     Patient is using their rescue inhaler 2 times per day. Patient needs refills of the following medications: Spiriva and albuterol     All refills should be sent to [facility]     Other: Needs to go back on pap   Intermittent coughing  No wheezing     Progress History:   Since last visit any new medical issues? No  New ER or hospital visits? No  Any new or changes in medicines? No  Using inhalers? Yes   Are they helpful?  Yes     Past Medical hx   PMH:  Past Medical History:   Diagnosis Date    Anemia associated with chronic renal failure     Aranesp 150 micrograms every other week given at [facility]. [facility]    Anxiety     Arthritis     Backache     Blood circulation, collateral     Blood transfusion     CAD (coronary artery disease)     Cellulitis in diabetic foot ([address] 2017    4th and 5th toes right foot    Chest pain     History of    CHF (congestive heart failure) ([address]     Dx'ed by Dr. Laquita Dorman    Chronic anemia     Chronic kidney disease     Chronic kidney disease, stage III (moderate) ([address]     Chronic renal insufficiency     COPD (chronic obstructive pulmonary disease) ([address]     Dr. Patsy Kenny    Coronary disease     Moderate    COVID-19 2021    Depression     Diabetes mellitus, type 2 ([address]     Disease of blood and blood forming organ     GERD (gastroesophageal reflux disease)     Hemoglobin disease (HCC)     hemoglobin hope    History of granulomatous disease 2009    followed by Dr. Tarik Zhao    HTN (hypertension) 1970's    Hyperlipemia 1998    Iron deficiency anemia due to dietary causes 06/21/2018    Kidney stones 03/2014    Kidney trouble          MRSA infection 03/2017    right foot-Dr. Sunday Warren (podiatrist)    Neuromuscular disorder [facility])     Neuropathy 1989    diabetic neuropathy    Obesity since childhoood    CONTRERAS on CPAP 2010    Dr. Yuli Diego    Pneumonia     PONV (postoperative nausea and vomiting)     Seizures ([address]     Sepsis due to Escherichia coli ([facility] 07/2020     SURGICAL HISTORY:  Past Surgical History:   Procedure Laterality Date    ANKLE SURGERY Right 02-10-14    Dr. Rosario Farfan at [address] [place] [address]  1990's    removal of benign tumor    [address]  2009    2 polyps, not precanceorus    COLONOSCOPY Left 6/10/2019    COLONOSCOPY DIAGNOSTIC performed by Erwin Lara MD at [facility]  3/30/2012    eye lid lift    DIAGNOSTIC CARDIAC CATH LAB PROCEDURE      ENDOSCOPY, COLON, DIAGNOSTIC  2007    EYE SURGERY  March 30th, 2012    left sided ptosis    FOOT SURGERY  1990    Tarsal tunnel surgery    FRACTURE SURGERY  2015    HYSTERECTOMY (CERVIX STATUS UNKNOWN)  1980 and 1985    first partial in 1980's, then total in [address] [place] [address]  04/10/2020    RIGHT    LIVER BIOPSY  6/2015    LUNG BIOPSY  2009    OVARY REMOVAL  1985    ME OFFICE/OUTPT VISIT,PROCEDURE ONLY Left 8/23/2018    LEFT UPPER EXTREMENTY AV FISTULA CREATION performed by Jerardo Santacruz MD at [facility] Left 6/14/2019    EGD BIOPSY performed by Erwin Lara MD at [facility]     SOCIAL HISTORY:  Social History     Tobacco Use    Smoking status: Former     Packs/day: 1.50     Years: 30.00     Pack years: 45.00 Types: Cigarettes     Start date: 1979     Quit date: 3/13/2009     Years since quittin.4    Smokeless tobacco: Never    Tobacco comments:     quit 2009   Vaping Use    Vaping Use: Never used   Substance Use Topics    Alcohol use: No     Alcohol/week: 0.0 standard drinks    Drug use: No     ALLERGIES:  Allergies   Allergen Reactions    Actos [Pioglitazone Hydrochloride] Swelling    Pioglitazone Swelling     Patient states she does not know this one. 21    Cymbalta [Duloxetine Hcl] Other (See Comments)     Anxiety and lethargic    Lyrica [Pregabalin]      Made her feel crazy    Gabapentin Anxiety     FAMILY HISTORY:  Family History   Problem Relation Age of Onset    Diabetes Sister     Diabetes Brother     Diabetes Sister     Diabetes Sister     Early Death Sister     Heart Disease Sister     Brain Cancer Sister     Diabetes Sister     Heart Disease Sister     Diabetes Sister     Diabetes Brother     Arthritis Mother     Heart Disease Mother     High Blood Pressure Mother     Kidney Disease Mother     Mental Illness Mother     Stroke Mother     Heart Disease Father     Diabetes Father     Obesity Father     Alcohol Abuse Father     Breast Cancer Paternal Cousin 45     CURRENT MEDICATIONS:  Current Outpatient Medications   Medication Sig Dispense Refill    HYDROcodone-acetaminophen (NORCO) 5-325 MG per tablet Take 1 tablet by mouth every 8 hours as needed for Pain for up to 30 days. 90 tablet 0    nitroGLYCERIN (NITROSTAT) 0.4 MG SL tablet up to max of 3 total doses.  If no relief after 1 dose, call 911. 25 tablet 3    rivaroxaban (XARELTO) 2.5 MG TABS tablet Take 1 tablet by mouth 2 times daily 180 tablet 3    NIFEdipine (PROCARDIA XL) 90 MG extended release tablet Take 1 tablet by mouth in the morning and at bedtime 180 tablet 0    carvedilol (COREG) 25 MG tablet Take 2 tablets by mouth 2 times daily 360 tablet 0    iron polysaccharides (FERREX 150) 150 MG capsule Take 1 capsule by mouth daily 60 capsule 11    cloNIDine (CATAPRES) 0.2 MG tablet Take 1 tablet by mouth in the morning, at noon, and at bedtime 90 tablet 11    vitamin D (ERGOCALCIFEROL) 1.25 MG (44103 UT) CAPS capsule Take 1 capsule by mouth every 14 days 12 capsule 5    atorvastatin (LIPITOR) 40 MG tablet TAKE 1 TABLET DAILY 90 tablet 3    docusate sodium (COLACE) 100 MG capsule Take 1 capsule by mouth 2 times daily as needed for Constipation 60 capsule 0    tiotropium (SPIRIVA RESPIMAT) 2.5 MCG/ACT AERS inhaler Inhale 2 puffs into the lungs daily 1 each 11    albuterol sulfate HFA (PROAIR HFA) 108 (90 Base) MCG/ACT inhaler Inhale 2 puffs into the lungs every 6 hours as needed for Wheezing or Shortness of Breath 3 each 3    fluticasone (FLONASE) 50 MCG/ACT nasal spray 1 spray by Each Nostril route daily 3 each 3    sulfamethoxazole-trimethoprim (BACTRIM DS;SEPTRA DS) 800-160 MG per tablet Take 1 tablet by mouth 3 times a week      insulin aspart (NOVOLOG FLEXPEN) 100 UNIT/ML injection pen Sliding scale insulin coverage Glucose:Dose: If Less [id number] =No Insulin/ 140-199= 2 Units/ 200-249=4 Units/ 250-299= 6 Units/  300-349=8 Units/  350-400=10 Units/ Above 400 = 12 Units max 48 units daily 15 pen 1    insulin glargine (LANTUS SOLOSTAR) 100 UNIT/ML injection pen Inject 25 Units into the skin nightly 15 pen 1    Insulin Pen Needle (B-D ULTRAFINE III SHORT PEN) 31G X 8 MM MISC Use three times daily Diagnosis Code E11.9 200 each 3    lactulose (CHRONULAC) 10 GM/15ML solution Take twice daily as needed for constipation 2700 mL 1    famotidine (PEPCID) 40 MG tablet Take 40 mg by mouth 2 times daily as needed       pantoprazole (PROTONIX) 40 MG tablet Take 40 mg by mouth 2 times daily       tacrolimus (PROGRAF) 1 MG capsule Take 1 mg by mouth 5 CAPSULES EVERY MORNING AND 5 CAPSULES EVERY EVENING      Mycophenolate Sodium 360 MG TBEC Take 720 mg by mouth 2 tablets BID      magnesium oxide (MAG-OX) 400 MG tablet Take 400 mg by mouth 2 times daily linaclotide (LINZESS) 290 MCG CAPS capsule Take 145 mcg by mouth every other day       aspirin 81 MG EC tablet Take 81 mg by mouth daily. No current facility-administered medications for this visit. ROS   Review of Systems   Constitutional: Negative. Negative for chills and fever. HENT: Negative. Negative for congestion. Eyes: Negative. Respiratory:  Positive for cough (intermittent) and shortness of breath (more with exertion). Negative for wheezing. Cardiovascular:  Positive for chest pain and leg swelling (intermittent R>L). Gastrointestinal: Negative. Endocrine: Negative. Genitourinary: Negative. Musculoskeletal:  Positive for gait problem (use of rolling walker). Allergic/Immunologic: Negative. Hematological: Negative. Psychiatric/Behavioral: Negative. Negative for sleep disturbance. Physical exam   BP (!) 151/68   Pulse 68   Temp (!) 94.4 °F (34.7 °C)   Ht 5' 5\" (1.651 m)   Wt 190 lb 9.6 oz (86.5 kg)   SpO2 99%   BMI 31.72 kg/m²    Wt Readings from Last 3 Encounters:   08/09/22 190 lb 9.6 oz (86.5 kg)   07/15/22 183 lb (83 kg)   07/05/22 184 lb (83.5 kg)       Physical Exam  Vitals and nursing note reviewed. Constitutional:       Appearance: She is well-developed. She is obese. HENT:      Head: Normocephalic and atraumatic. Eyes:      Conjunctiva/sclera: Conjunctivae normal.      Pupils: Pupils are equal, round, and reactive to light. Neck:      Vascular: No JVD. Cardiovascular:      Rate and Rhythm: Normal rate and regular rhythm. Heart sounds: Normal heart sounds. No murmur heard. No friction rub. No gallop. Pulmonary:      Effort: Pulmonary effort is normal. No respiratory distress. Breath sounds: Normal breath sounds. No wheezing or rales. Abdominal:      General: Bowel sounds are normal.      Palpations: Abdomen is soft. Musculoskeletal:         General: Normal range of motion.       Cervical back: Normal range of motion and neck supple. Right lower leg: Edema present. Left lower leg: Edema present. Skin:     General: Skin is warm and dry. Capillary Refill: Capillary refill takes less than 2 seconds. Neurological:      Mental Status: She is alert and oriented to person, place, and time. Gait: Gait abnormal.   Psychiatric:         Behavior: Behavior normal.         Thought Content: Thought content normal.         Judgment: Judgment normal.        results   Lung Nodule Screening     [x] Qualifies    [] Does not qualify   [] Declined    [x] Completed    The USPSTF recommends annual screening for lung cancer with low-dose computed tomography (LDCT) in adults aged 48 to [de-identified] years who have a 30 pack-year smoking history and currently smoke or have quit within the past 20 years. Screening should be discontinued once a person has not smoked for 20 years or develops a health problem that substantially limits life expectancy or the ability or willingness to have curative lung surgery. 6/2/2022     CT CHEST WO CONTRAST      FINDINGS: The thyroid appears normal. No mediastinal hilar or axillary lymphadenopathy based on a noncontrast exam. Heart size is mildly enlarged. There are coronary artery calcifications. LUNGS: Calcified granuloma upper lung. Atelectasis posterior right upper lung. Focal infiltrate or atelectasis lateral anterior left lower lobe. Stable 7 mm nodule lingula. No effusions. Upper abdomen No suspicious abnormalities. Patchy opacity left lower lobe suggesting atelectasis or developing infiltrate. Right upper lobe atelectasis. Exam is otherwise stable. Assessment      Diagnosis Orders   1. Simple chronic bronchitis ([address]        2. Personal history of tobacco use  ID VISIT TO DISCUSS LUNG CA SCREEN W LDCT    CT Lung Screen (Annual)      3.  Pulmonary nodule      Stable 7mm            Plan   -CT Chest reviewed with stable findings would continue annual screenings   -LDCT in one year  -Continue current inhaler regimen  -Weight loss encouraged discussed reaching out to PCP for weight loss options   -Advised to maintain pneumonia vaccine with PCP and to take flu vaccine this coming season.  -Advised patient to call office with any changes, questions, or concerns regarding respiratory status    Will see Jerome Garvin back in: 1 year    Marika Kwong, CNP  8/9/2022    Low Dose CT (LDCT) Lung Screening criteria met:     Age 50-77(Medicare) or 50-80 ([facility])   Pack year smoking >20   Still smoking or less than 15 year since quit   No sign or symptoms of lung cancer   > 11 months since last LDCT     Risks and benefits of lung cancer screening with LDCT scans discussed:    Significance of positive screen - False-positive LDCT results often occur. 95% of all positive results do not lead to a diagnosis of cancer. Usually further imaging can resolve most false-positive results; however, some patients may require invasive procedures. Over diagnosis risk - 10% to 12% of screen-detected lung cancer cases are over diagnosed--that is, the cancer would not have been detected in the patient's lifetime without the screening. Need for follow up screens annually to continue lung cancer screening effectiveness     Risks associated with radiation from annual LDCT- Radiation exposure is about the same as for a mammogram, which is about 1/3 of the annual background radiation exposure from everyday life. Starting screening at age 54 is not likely to increase cancer risk from radiation exposure. Patients with comorbidities resulting in life expectancy of < 10 years, or that would preclude treatment of an abnormality identified on CT, should not be screened due to lack of benefit.     To obtain maximal benefit from this screening, smoking cessation and long-term abstinence from smoking is critical

## 2022-08-09 NOTE — PROGRESS NOTES
Patient is experiencing SOB: Little    Patient is experiencing wheezing: No    Patient states they have had a Absent = 4 cough. Patient is coughing up blood: no    Patient has been experiencing chest pains: pressure-like      Patient is using their rescue inhaler 2 times per day.     Patient needs refills of the following medications: Spiriva and albuterol    All refills should be sent to [facility]    Other: Needs to go back on pap

## 2022-08-10 ASSESSMENT — ENCOUNTER SYMPTOMS
SORE THROAT: 0
NAUSEA: 0
VOMITING: 0
WHEEZING: 0
DIARRHEA: 0
SHORTNESS OF BREATH: 0
EYE PAIN: 0
COUGH: 0
PHOTOPHOBIA: 0
SINUS PRESSURE: 0
ABDOMINAL PAIN: 0
ABDOMINAL DISTENTION: 0
TROUBLE SWALLOWING: 0
BLOOD IN STOOL: 0
CONSTIPATION: 0
BACK PAIN: 0
SINUS PAIN: 0

## 2022-08-12 LAB — TACROLIMUS BLOOD: 6.9 NG/ML

## 2022-08-22 DIAGNOSIS — G89.4 CHRONIC PAIN SYNDROME: ICD-10-CM

## 2022-08-23 RX ORDER — HYDROCODONE BITARTRATE AND ACETAMINOPHEN 5; 325 MG/1; MG/1
1 TABLET ORAL EVERY 8 HOURS PRN
Qty: 90 TABLET | Refills: 0 | Status: SHIPPED | OUTPATIENT
Start: 2022-08-23 | End: 2022-09-20 | Stop reason: SDUPTHER

## 2022-08-29 RX ORDER — NIFEDIPINE 90 MG/1
TABLET, EXTENDED RELEASE ORAL
Qty: 90 TABLET | Refills: 1 | OUTPATIENT
Start: 2022-08-29

## 2022-09-07 ENCOUNTER — OFFICE VISIT (OUTPATIENT)
Dept: CARDIOLOGY CLINIC | Age: 67
End: 2022-09-07
Payer: MEDICARE

## 2022-09-07 VITALS
BODY MASS INDEX: 30.99 KG/M2 | WEIGHT: 186 LBS | HEIGHT: 65 IN | HEART RATE: 60 BPM | SYSTOLIC BLOOD PRESSURE: 130 MMHG | DIASTOLIC BLOOD PRESSURE: 62 MMHG

## 2022-09-07 DIAGNOSIS — I73.9 PAD (PERIPHERAL ARTERY DISEASE) (HCC): Primary | ICD-10-CM

## 2022-09-07 PROCEDURE — 3017F COLORECTAL CA SCREEN DOC REV: CPT | Performed by: INTERNAL MEDICINE

## 2022-09-07 PROCEDURE — 1123F ACP DISCUSS/DSCN MKR DOCD: CPT | Performed by: INTERNAL MEDICINE

## 2022-09-07 PROCEDURE — 1036F TOBACCO NON-USER: CPT | Performed by: INTERNAL MEDICINE

## 2022-09-07 PROCEDURE — 1090F PRES/ABSN URINE INCON ASSESS: CPT | Performed by: INTERNAL MEDICINE

## 2022-09-07 PROCEDURE — G8417 CALC BMI ABV UP PARAM F/U: HCPCS | Performed by: INTERNAL MEDICINE

## 2022-09-07 PROCEDURE — G8427 DOCREV CUR MEDS BY ELIG CLIN: HCPCS | Performed by: INTERNAL MEDICINE

## 2022-09-07 PROCEDURE — G8399 PT W/DXA RESULTS DOCUMENT: HCPCS | Performed by: INTERNAL MEDICINE

## 2022-09-07 PROCEDURE — 99213 OFFICE O/P EST LOW 20 MIN: CPT | Performed by: INTERNAL MEDICINE

## 2022-09-07 RX ORDER — NIFEDIPINE 90 MG/1
90 TABLET, EXTENDED RELEASE ORAL 2 TIMES DAILY
Qty: 180 TABLET | Refills: 3 | Status: SHIPPED | OUTPATIENT
Start: 2022-09-07

## 2022-09-07 RX ORDER — CARVEDILOL 25 MG/1
50 TABLET ORAL 2 TIMES DAILY
Qty: 360 TABLET | Refills: 3 | Status: SHIPPED | OUTPATIENT
Start: 2022-09-07

## 2022-09-07 NOTE — PROGRESS NOTES
cloNIDine (CATAPRES) 0.2 MG tablet, Take 1 tablet by mouth in the morning, at noon, and at bedtime, Disp: 90 tablet, Rfl: 11    vitamin D (ERGOCALCIFEROL) 1.25 MG (15004 UT) CAPS capsule, Take 1 capsule by mouth every 14 days, Disp: 12 capsule, Rfl: 5    atorvastatin (LIPITOR) 40 MG tablet, TAKE 1 TABLET DAILY, Disp: 90 tablet, Rfl: 3    docusate sodium (COLACE) 100 MG capsule, Take 1 capsule by mouth 2 times daily as needed for Constipation, Disp: 60 capsule, Rfl: 0    tiotropium (SPIRIVA RESPIMAT) 2.5 MCG/ACT AERS inhaler, Inhale 2 puffs into the lungs daily, Disp: 1 each, Rfl: 11    albuterol sulfate HFA (PROAIR HFA) 108 (90 Base) MCG/ACT inhaler, Inhale 2 puffs into the lungs every 6 hours as needed for Wheezing or Shortness of Breath, Disp: 3 each, Rfl: 3    fluticasone (FLONASE) 50 MCG/ACT nasal spray, 1 spray by Each Nostril route daily, Disp: 3 each, Rfl: 3    sulfamethoxazole-trimethoprim (BACTRIM DS;SEPTRA DS) 800-160 MG per tablet, Take 1 tablet by mouth 3 times a week, Disp: , Rfl:     lactulose (CHRONULAC) 10 GM/15ML solution, Take twice daily as needed for constipation, Disp: 2700 mL, Rfl: 1    famotidine (PEPCID) 40 MG tablet, Take 40 mg by mouth 2 times daily as needed , Disp: , Rfl:     pantoprazole (PROTONIX) 40 MG tablet, Take 40 mg by mouth 2 times daily , Disp: , Rfl:     tacrolimus (PROGRAF) 1 MG capsule, Take 1 mg by mouth 5 CAPSULES EVERY MORNING AND 5 CAPSULES EVERY EVENING, Disp: , Rfl:     Mycophenolate Sodium 360 MG TBEC, Take 720 mg by mouth 2 tablets BID, Disp: , Rfl:     magnesium oxide (MAG-OX) 400 MG tablet, Take 400 mg by mouth 2 times daily, Disp: , Rfl:     linaclotide (LINZESS) 290 MCG CAPS capsule, Take 145 mcg by mouth every other day , Disp: , Rfl:     aspirin 81 MG EC tablet, Take 81 mg by mouth daily.   , Disp: , Rfl:     Past Medical History  Peyman Tony  has a past medical history of Anemia associated with chronic renal failure, Anxiety, Arthritis, Backache, Blood circulation, BP Readings from Last 3 Encounters:   09/07/22 130/62   08/09/22 (!) 152/58   08/09/22 (!) 151/68       Nursing note and vitals reviewed. Physical Exam   Constitutional: Oriented to person, place, and time. Appears well-developed and well-nourished. HENT:   Head: Normocephalic and atraumatic. Eyes: EOM are normal. Pupils are equal, round, and reactive to light. Neck: Normal range of motion. Neck supple. No JVD present. Cardiovascular: Normal rate, regular rhythm, normal heart sounds and intact distal pulses. No murmur heard. Pulmonary/Chest: Effort normal and breath sounds normal. No respiratory distress. No wheezes. No rales. Abdominal: Soft. Bowel sounds are normal. No distension. There is no tenderness. Musculoskeletal: Normal range of motion. No edema. Neurological: Alert and oriented to person, place, and time. No cranial nerve deficit. Coordination normal.   Skin: Skin is warm and dry. Psychiatric: Normal mood and affect.        No results found for: CKTOTAL, CKMB, CKMBINDEX    Lab Results   Component Value Date/Time    WBC 8.3 08/09/2022 11:54 AM    RBC 3.64 08/09/2022 11:54 AM    RBC 3-5 09/06/2011 02:00 PM    HGB 9.7 08/09/2022 11:54 AM    HCT 31.5 08/09/2022 11:54 AM    MCV 86.5 08/09/2022 11:54 AM    MCH 26.6 08/09/2022 11:54 AM    MCHC 30.8 08/09/2022 11:54 AM    RDW 15.2 04/26/2017 02:50 PM     08/09/2022 11:54 AM    MPV 10.7 08/09/2022 11:54 AM       Lab Results   Component Value Date/Time     08/09/2022 11:54 AM    K 3.7 08/09/2022 11:54 AM    K 3.5 04/08/2022 05:43 AM     08/09/2022 11:54 AM    CO2 25 08/09/2022 11:54 AM    BUN 24 08/09/2022 11:54 AM    LABALBU 4.4 04/08/2022 05:43 AM    LABALBU 4.8 01/03/2012 01:00 PM    CREATININE 1.2 08/09/2022 11:54 AM    CALCIUM 10.0 07/16/2022 07:25 AM    LABGLOM 45 08/09/2022 11:54 AM    GLUCOSE 165 08/09/2022 11:54 AM    GLUCOSE 252 03/14/2012 11:30 AM       Lab Results   Component Value Date/Time    ALKPHOS 98 04/08/2022 05:43 AM    ALT 16 04/08/2022 05:43 AM    AST 15 04/08/2022 05:43 AM    PROT 6.8 04/08/2022 05:43 AM    BILITOT 0.2 04/08/2022 05:43 AM    BILIDIR <0.2 06/09/2021 06:15 PM    LABALBU 4.4 04/08/2022 05:43 AM    LABALBU 4.8 01/03/2012 01:00 PM       Lab Results   Component Value Date/Time    MG 2.0 04/08/2022 05:43 AM       Lab Results   Component Value Date    INR 1.07 07/15/2022    INR 1.04 04/08/2022    INR 1.28 (H) 08/02/2020         Lab Results   Component Value Date/Time    LABA1C 6.8 08/09/2022 02:13 PM    LABA1C 7.3 09/07/2021 11:32 AM       Lab Results   Component Value Date/Time    TRIG 137 04/08/2022 05:43 AM    HDL 36 04/08/2022 05:43 AM    LDLCALC 54 04/08/2022 05:43 AM    LDLDIRECT 68.72 04/07/2022 12:26 PM       Lab Results   Component Value Date/Time    TSH 1.110 02/10/2022 03:16 PM         Testing Reviewed:      I have individually reviewed the cardiac test below:    ECHO: Results for orders placed during the hospital encounter of 08/02/20    Echo Complete    Narrative  Transthoracic Echocardiography Report (TTE)    Demographics    Patient Name    Ludwin Benites  Gender               Female  A    MR #            [id number]       Race                 Black    Ethnicity    Account #       [de-identified]       Room Number          0006    Accession       [id number]      Date of Study        08/03/2020  Number    Date of Birth   1955      Referring Physician  YAMIL Manning    Age             75 year(s)      Adrienne Chaparro MD  Physician    Procedure    Type of Study    TTE procedure:ECHOCARDIOGRAM COMPLETE 2D W DOPPLER W COLOR. Procedure Date  Date: 08/03/2020 Start: 08:56 AM    Study Location: Bedside  Technical Quality: Adequate visualization    Indications:Congestive heart failure.     Additional Medical History:COPD ARthritis, Anemia, Obstructive sleep apnea,  Diabetic, Hypertension, coronary artery disease, Hyperlipidemia, Congestive  heart failure, Dyspnea, Ex Smoker, Chronic Kidney disease, Pulmonary Edema,  Pericardial effusion. Patient Status: Routine    Height: 65 inches Weight: 180 pounds BSA: 1.89 m^2 BMI: 29.95 kg/m^2    BP: 146/53 mmHg    Conclusions    Summary  Ejection fraction was estimated at 55-60%. Left atrial size was severely dilated. Ascending aorta = 3.6 cm. IVC size is within normal limits with normal respiratory phasic changes. Signature    ----------------------------------------------------------------  Electronically signed by Yvette Hernandez MD (Interpreting  physician) on 08/03/2020 at 04:42 PM  ----------------------------------------------------------------    Findings    Mitral Valve  The mitral valve structure was normal with normal leaflet separation. DOPPLER: The transmitral velocity was within the normal range with no  evidence for mitral stenosis. There was no evidence of mitral  regurgitation. Aortic Valve  The aortic valve was trileaflet with normal thickness and cuspal  separation. DOPPLER: Transaortic velocity was within the normal range with  no evidence of aortic stenosis. There was no evidence of aortic  regurgitation. Tricuspid Valve  The tricuspid valve structure was normal with normal leaflet separation. DOPPLER: There was no evidence of tricuspid stenosis. There was trace  tricuspid regurgitation. Pulmonic Valve  The pulmonic valve leaflets were not well seen. DOPPLER: The transpulmonic  velocity was within the normal range with trace regurgitation. Left Atrium  Left atrial size was severely dilated. Left Ventricle  Normal left ventricular size and systolic function. There were no regional wall motion abnormalities. Mild concentric hypertrophy. Ejection fraction was estimated at 55-60%. E/A flow reversal noted. Suggestive of diastolic dysfunction.     Right Atrium  Right atrial size was normal.    Right Ventricle  The right ventricular size was normal with normal systolic function and  wall thickness. Pericardial Effusion  The pericardium was normal in appearance with no evidence of a pericardial  effusion. Pleural Effusion  No evidence of pleural effusion. Aorta / Great Vessels  -Ascending aorta = 3.6 cm. -IVC size is within normal limits with normal respiratory phasic changes.     M-Mode/2D Measurements & Calculations    LV Diastolic   LV Systolic Dimension:    AV Cusp Separation: 1.7 cmLA  Dimension: 4.7 3.3 cm                    Dimension: 3.8 cmAO Root  cm             LV Volume Diastolic: 288  Dimension: 3.1 cmLA Area: 24.9  LV FS:29.8 %   ml                        cm^2  LV PW          LV Volume Systolic: 92.8  Diastolic: 1.3 ml  cm             LV EDV/LV EDV Index: 102  Septum         ml/54 m^2LV ESV/LV ESV    RV Diastolic Dimension: 3.2 cm  Diastolic: 1.4 Index: 90.5 ml/23 m^2  cm             EF Calculated: 56.8 %     LA/Aorta: 1.23  Ascending Aorta: 3.6 cm  LA volume/Index: 84.5 ml /45m^2    LVOT: 2 cm    Doppler Measurements & Calculations    MV Peak E-Wave: 137 AV Peak Velocity: 228    LVOT Peak Velocity: 140 cm/s  cm/s                cm/s                     LVOT Mean Velocity: 100 cm/s  MV Peak A-Wave: 151 AV Peak Gradient: 20.79  LVOT Peak Gradient: 8  cm/s                mmHg                     mmHgLVOT Mean Gradient: 5  MV E/A Ratio: 0.91  AV Mean Velocity: 157    mmHg  MV Peak Gradient:   cm/s  7.51 mmHg           AV Mean Gradient: 9 mmHg TV Peak E-Wave: 64.3 cm/s  AV VTI: 44.9 cm          TV Peak A-Wave: 75 cm/s  MV Deceleration     AV Area  Time: 250 msec      (Continuity):2.38 cm^2   TV Peak Gradient: 1.65 mmHg  TR Velocity:289 cm/s  LVOT VTI: 34.1 cm        TR Gradient:33.41 mmHg  MV E' Septal        IVRT: 63 msec            PV Peak Velocity: 96 cm/s  Velocity: 5.9 cm/s                           PV Peak Gradient: 3.69 mmHg  MV A' Septal  Velocity: 9.9 cm/s  AV DVI (VTI): 0.76AV DVI  MV E' Lateral       (Vmax):0.61 ND ED Velocity: 127 cm/s  Velocity: 8.3 cm/s  MV A' Lateral  Velocity: 10.7 cm/s  E/E' septal: 23.22  E/E' lateral: 16.51  MR Velocity: 484  cm/s    [id number]       Assessment/Plan   Hx of bilateral LE intervention with DCB to both SFA, 7/2022  Back DJD  Spinal stenosis  DM neuropathy  ESRD s/p renal transplant 4/2020   Preserved EF  Check arterial duplex for surveillance. Continue ASA/Xarelto. No bleeding. Continue walking. Discussed diet/exercise/BP/weight loss/health lifestyle choices/lipids; the patient understands the goals and will try to comply.       Disposition:  1 year           Electronically signed by Darius Antoine MD   9/7/2022 at 3:38 PM EDT

## 2022-09-14 ENCOUNTER — HOSPITAL ENCOUNTER (OUTPATIENT)
Dept: INTERVENTIONAL RADIOLOGY/VASCULAR | Age: 67
Discharge: HOME | End: 2022-09-14
Payer: MEDICARE

## 2022-09-14 DIAGNOSIS — I73.9 PAD (PERIPHERAL ARTERY DISEASE) (HCC): ICD-10-CM

## 2022-09-14 PROCEDURE — 93925 LOWER EXTREMITY STUDY: CPT

## 2022-09-20 DIAGNOSIS — G89.4 CHRONIC PAIN SYNDROME: ICD-10-CM

## 2022-09-20 RX ORDER — HYDROCODONE BITARTRATE AND ACETAMINOPHEN 5; 325 MG/1; MG/1
1 TABLET ORAL EVERY 8 HOURS PRN
Qty: 90 TABLET | Refills: 0 | Status: SHIPPED | OUTPATIENT
Start: 2022-09-22 | End: 2022-10-19 | Stop reason: SDUPTHER

## 2022-09-20 NOTE — TELEPHONE ENCOUNTER
Seema Shaver called requesting a refill on the following medications:  Requested Prescriptions     Pending Prescriptions Disp Refills    HYDROcodone-acetaminophen (NORCO) 5-325 MG per tablet 90 tablet 0     Sig: Take 1 tablet by mouth every 8 hours as needed for Pain for up to 30 days.      [facility]      Date of last visit:   Date of next visit (if applicable): 31/8/3107

## 2022-10-04 ENCOUNTER — OFFICE VISIT (OUTPATIENT)
Dept: PHYSICAL MEDICINE AND REHAB | Age: 67
End: 2022-10-04
Payer: MEDICARE

## 2022-10-04 VITALS
WEIGHT: 186 LBS | SYSTOLIC BLOOD PRESSURE: 124 MMHG | HEIGHT: 65 IN | DIASTOLIC BLOOD PRESSURE: 62 MMHG | BODY MASS INDEX: 30.99 KG/M2

## 2022-10-04 DIAGNOSIS — M47.816 LUMBAR FACET ARTHROPATHY: ICD-10-CM

## 2022-10-04 DIAGNOSIS — M54.16 LUMBAR RADICULITIS: ICD-10-CM

## 2022-10-04 DIAGNOSIS — G89.29 CHRONIC PAIN OF RIGHT ANKLE: ICD-10-CM

## 2022-10-04 DIAGNOSIS — M25.571 CHRONIC PAIN OF RIGHT ANKLE: ICD-10-CM

## 2022-10-04 DIAGNOSIS — M47.816 LUMBAR SPONDYLOSIS: Primary | ICD-10-CM

## 2022-10-04 DIAGNOSIS — G89.4 CHRONIC PAIN SYNDROME: ICD-10-CM

## 2022-10-04 DIAGNOSIS — E11.42 DIABETIC POLYNEUROPATHY ASSOCIATED WITH TYPE 2 DIABETES MELLITUS (HCC): ICD-10-CM

## 2022-10-04 PROCEDURE — 1090F PRES/ABSN URINE INCON ASSESS: CPT | Performed by: NURSE PRACTITIONER

## 2022-10-04 PROCEDURE — 3044F HG A1C LEVEL LT 7.0%: CPT | Performed by: NURSE PRACTITIONER

## 2022-10-04 PROCEDURE — 2022F DILAT RTA XM EVC RTNOPTHY: CPT | Performed by: NURSE PRACTITIONER

## 2022-10-04 PROCEDURE — G8399 PT W/DXA RESULTS DOCUMENT: HCPCS | Performed by: NURSE PRACTITIONER

## 2022-10-04 PROCEDURE — 99214 OFFICE O/P EST MOD 30 MIN: CPT | Performed by: NURSE PRACTITIONER

## 2022-10-04 PROCEDURE — G8417 CALC BMI ABV UP PARAM F/U: HCPCS | Performed by: NURSE PRACTITIONER

## 2022-10-04 PROCEDURE — 1123F ACP DISCUSS/DSCN MKR DOCD: CPT | Performed by: NURSE PRACTITIONER

## 2022-10-04 PROCEDURE — 3017F COLORECTAL CA SCREEN DOC REV: CPT | Performed by: NURSE PRACTITIONER

## 2022-10-04 PROCEDURE — G8427 DOCREV CUR MEDS BY ELIG CLIN: HCPCS | Performed by: NURSE PRACTITIONER

## 2022-10-04 PROCEDURE — G8484 FLU IMMUNIZE NO ADMIN: HCPCS | Performed by: NURSE PRACTITIONER

## 2022-10-04 PROCEDURE — 1036F TOBACCO NON-USER: CPT | Performed by: NURSE PRACTITIONER

## 2022-10-04 ASSESSMENT — ENCOUNTER SYMPTOMS
SHORTNESS OF BREATH: 0
GASTROINTESTINAL NEGATIVE: 1
CHEST TIGHTNESS: 0
BACK PAIN: 1

## 2022-10-04 NOTE — PROGRESS NOTES
[address] [place], [place] [address]d [address]  Dept: [phone]  Dept Fax: [phone]: [phone]    Visit Date: 10/4/2022    Functionality Assessment/Goals Worksheet     On a scale of 0 (Does not Interfere) to 10 (Completely Interferes)     1. Which number describes how during the past week pain has interfered with       the following:  A. General Activity:  8  B. Mood: 9  C. Walking Ability:  10  D. Normal Work (Includes both work outside the home and housework):  0  E. Relations with Other People:   8  F. Sleep:   1  G. Enjoyment of Life:   9    2. Patient Prefers to Take their Pain Medications:     []  On a regular basis   [x]  Only when necessary    []  Does not take pain medications    3. What are the Patient's Goals/Expectations for Visiting Pain Management? []  Learn about my pain    [x]  Receive Medication   []  Physical Therapy     []  Treat Depression   [x]  Receive Injections    []  Treat Sleep   []  Deal with Anxiety and Stress   []  Treat Opoid Dependence/Addiction   []  Other:      HPI:   Jessica Ortiz is a 74 y.o. female is here today for    Chief Complaint: Low back pain, leg pain     HPI   3 month FU. Continues to have pain in low back- constant aching and increased with activity Continues to have aching pain in legs has numbness from knees down pins and needles and numbness especially in feet     Pain is up and down depending on activity   Having increased leg swelling     Pain increases with bending, lifting, walking, standing, getting up and down, and housework or working at job. Pain remains tolerable with Norco prn. Again discussed procedures. Medications reviewed. Patient denies side effects with medications. Patient states she is taking medications as prescribed. Shedenies receiving pain medications from other sources.  She denies any ER visits since last visit. Pain scale with out pain medications or at its worst is 8-10/10. Pain scale with pain medications or at its best is 5-7/10. Last dose of Norco was today   Drug screen reviewed from 7/5/2022 and was appropriate  Pill count completed  today and WNL: Yes      The patientis allergic to actos [pioglitazone hydrochloride], pioglitazone, cymbalta [duloxetine hcl], lyrica [pregabalin], and gabapentin. Subjective:      Review of Systems   Constitutional:  Negative for chills, fatigue and fever. HENT: Negative. Eyes:  Positive for visual disturbance. Wearing corrective glasses    Respiratory:  Negative for chest tightness and shortness of breath. Cardiovascular:  Positive for leg swelling. Negative for chest pain. Gastrointestinal: Negative. Genitourinary:  Negative for flank pain. Musculoskeletal:  Positive for arthralgias, back pain, gait problem, joint swelling and myalgias. Negative for neck pain and neck stiffness. Ambulating with walker    Skin:  Positive for wound. Right posterior heal abrasion/ ulcer    Neurological:  Positive for weakness and numbness. Psychiatric/Behavioral: Negative. Objective:     Vitals:    10/04/22 1116   BP: 124/62   Weight: 186 lb (84.4 kg)   Height: 5' 5\" (1.651 m)       Physical Exam  Vitals and nursing note reviewed. Constitutional:       General: She is not in acute distress. Appearance: She is well-developed. She is not diaphoretic. HENT:      Head: Normocephalic and atraumatic. Right Ear: External ear normal.      Left Ear: External ear normal.      Nose: Nose normal.      Mouth/Throat:      Pharynx: No oropharyngeal exudate. Eyes:      General: No scleral icterus. Right eye: No discharge. Left eye: No discharge. Conjunctiva/sclera: Conjunctivae normal.      Pupils: Pupils are equal, round, and reactive to light. Neck:      Thyroid: No thyromegaly.    Cardiovascular:      Rate and Rhythm: Normal rate and regular rhythm. Heart sounds: Normal heart sounds. No murmur heard. No friction rub. No gallop. Pulmonary:      Effort: Pulmonary effort is normal. No respiratory distress. Breath sounds: Normal breath sounds. No wheezing or rales. Chest:      Chest wall: No tenderness. Abdominal:      General: Bowel sounds are normal. There is no distension. Palpations: Abdomen is soft. Tenderness: There is no abdominal tenderness. There is no guarding or rebound. Musculoskeletal:         General: Swelling and tenderness present. Left shoulder: Tenderness present. Decreased range of motion. Cervical back: Full passive range of motion without pain, normal range of motion and neck supple. No edema, erythema or rigidity. No muscular tenderness. Normal range of motion. Lumbar back: Tenderness and bony tenderness present. Decreased range of motion. Positive right straight leg raise test and positive left straight leg raise test.        Back:       Right lower leg: Edema present. Left lower leg: Edema present. Right ankle: Swelling present. Tenderness present. Decreased range of motion. Left ankle: Swelling present. Tenderness present. Decreased range of motion. Skin:     General: Skin is warm. Coloration: Skin is not pale. Findings: No erythema or rash. Neurological:      Mental Status: She is alert and oriented to person, place, and time. She is not disoriented. Cranial Nerves: No cranial nerve deficit. Sensory: Sensory deficit present. Motor: Weakness present. No atrophy or abnormal muscle tone. Coordination: Coordination normal.      Gait: Gait abnormal.      Deep Tendon Reflexes: Reflexes are normal and symmetric. Babinski sign absent on the right side. Babinski sign absent on the left side.       Comments: Ambulating with walker   4/5 bilateral lower extremities   Psychiatric:         Attention and Perception: She is attentive. Mood and Affect: Mood normal. Mood is not anxious or depressed. Affect is not labile, blunt, angry or inappropriate. Speech: She is communicative. Speech is not rapid and pressured, delayed, slurred or tangential.         Behavior: Behavior is not agitated, slowed, aggressive, withdrawn, hyperactive or combative. Thought Content: Thought content is not paranoid or delusional. Thought content does not include homicidal or suicidal ideation. Thought content does not include homicidal or suicidal plan. Cognition and Memory: Memory is not impaired. She does not exhibit impaired recent memory or impaired remote memory. Judgment: Judgment is not impulsive or inappropriate. RADHA  Patricks test  negative  Yeoman's  or Gaenslen's negative         Assessment:     1. Lumbar spondylosis    2. Lumbar facet arthropathy    3. Diabetic polyneuropathy associated with type 2 diabetes mellitus ([address]    4. Lumbar radiculitis    5. Chronic pain of right ankle    6. Chronic pain syndrome            Plan:      OARRS reviewed. Current MED: 15.00  Patient was offered naloxone for home. Discussed long term side effects of medications, tolerance, dependency and addiction. Previous UDS reviewed  UDS preformed today for compliance. Patient told can not receive any pain medications from any other source. No evidence of abuse, diversion or aberrant behavior. Medications and/or procedures to improve function and quality of life- patient understanding with this and that may not be pain free  Discussed with patient about safe storage of medications at home  Discussed possible weaning of medication dosing dependent on treatment/procedure results. Discussed with patient about risks with procedure including infection, reaction to medication, increased pain, or bleeding. Discussed procedures again L-facet EMILEE and IRVIN. Again she does not want at this time. Again discussed therapy   Continue Norco 5/325 TID prn- filled 9/23/2022 States that pain remains tolerable with medications   Ordered updated lumbar xray   States allergic to all neuropathic pain medications   Is compliant       Meds. Prescribed:   No orders of the defined types were placed in this encounter. Return in about 10 weeks (around 12/13/2022), or if symptoms worsen or fail to improve, for follow up  for medications.                Electronically signed by DONOVAN Tinoco CNP on10/4/2022 at 11:39 AM

## 2022-10-10 ENCOUNTER — HOSPITAL ENCOUNTER (OUTPATIENT)
Age: 67
Discharge: HOME | End: 2022-10-10
Payer: MEDICARE

## 2022-10-10 ENCOUNTER — HOSPITAL ENCOUNTER (OUTPATIENT)
Dept: GENERAL RADIOLOGY | Age: 67
Discharge: HOME | End: 2022-10-10
Payer: MEDICARE

## 2022-10-10 DIAGNOSIS — M47.816 LUMBAR SPONDYLOSIS: ICD-10-CM

## 2022-10-10 DIAGNOSIS — G89.4 CHRONIC PAIN SYNDROME: ICD-10-CM

## 2022-10-10 DIAGNOSIS — M47.816 LUMBAR FACET ARTHROPATHY: ICD-10-CM

## 2022-10-10 PROCEDURE — 72100 X-RAY EXAM L-S SPINE 2/3 VWS: CPT

## 2022-10-19 DIAGNOSIS — G89.4 CHRONIC PAIN SYNDROME: ICD-10-CM

## 2022-10-19 RX ORDER — HYDROCODONE BITARTRATE AND ACETAMINOPHEN 5; 325 MG/1; MG/1
1 TABLET ORAL EVERY 8 HOURS PRN
Qty: 90 TABLET | Refills: 0 | Status: SHIPPED | OUTPATIENT
Start: 2022-10-23 | End: 2022-11-22

## 2022-10-19 NOTE — TELEPHONE ENCOUNTER
Nanda Garduno called requesting a refill on the following medications:  Requested Prescriptions     Pending Prescriptions Disp Refills    HYDROcodone-acetaminophen (NORCO) 5-325 MG per tablet 90 tablet 0     Sig: Take 1 tablet by mouth every 8 hours as needed for Pain for up to 30 days. [facility] [address]  . pv      Date of last visit: 10/4/2022  Date of next visit (if applicable): 36/81/1425

## 2022-11-08 ENCOUNTER — NURSE ONLY (OUTPATIENT)
Dept: LAB | Age: 67
End: 2022-11-08

## 2022-11-08 LAB
ALBUMIN SERPL-MCNC: 4.7 G/DL (ref 3.5–5.1)
ALP BLD-CCNC: 99 U/L (ref 38–126)
ALT SERPL-CCNC: 12 U/L (ref 11–66)
ANION GAP SERPL CALCULATED.3IONS-SCNC: 16 MEQ/L (ref 8–16)
AST SERPL-CCNC: 14 U/L (ref 5–40)
BASOPHILS # BLD: 0.3 %
BASOPHILS ABSOLUTE: 0 THOU/MM3 (ref 0–0.1)
BILIRUB SERPL-MCNC: 0.3 MG/DL (ref 0.3–1.2)
BUN BLDV-MCNC: 21 MG/DL (ref 7–22)
CALCIUM SERPL-MCNC: 10.9 MG/DL (ref 8.5–10.5)
CHLORIDE BLD-SCNC: 100 MEQ/L (ref 98–111)
CHOLESTEROL, TOTAL: 125 MG/DL (ref 100–199)
CO2: 25 MEQ/L (ref 23–33)
CREAT SERPL-MCNC: 1.3 MG/DL (ref 0.4–1.2)
EOSINOPHIL # BLD: 4.8 %
EOSINOPHILS ABSOLUTE: 0.4 THOU/MM3 (ref 0–0.4)
ERYTHROCYTE [DISTWIDTH] IN BLOOD BY AUTOMATED COUNT: 14.6 % (ref 11.5–14.5)
ERYTHROCYTE [DISTWIDTH] IN BLOOD BY AUTOMATED COUNT: 45 FL (ref 35–45)
GFR SERPL CREATININE-BSD FRML MDRD: 45 ML/MIN/1.73M2
GLUCOSE BLD-MCNC: 244 MG/DL (ref 70–108)
HCT VFR BLD CALC: 32.5 % (ref 37–47)
HDLC SERPL-MCNC: 37 MG/DL
HEMOGLOBIN: 10.1 GM/DL (ref 12–16)
IMMATURE GRANS (ABS): 0.03 THOU/MM3 (ref 0–0.07)
IMMATURE GRANULOCYTES %: 0.3 %
LDL CHOLESTEROL DIRECT: 62.12 MG/DL
LYMPHOCYTES # BLD: 10.7 %
LYMPHOCYTES ABSOLUTE: 1 THOU/MM3 (ref 1–4.8)
MAGNESIUM: 1.8 MG/DL (ref 1.6–2.4)
MCH RBC QN AUTO: 26.3 PG (ref 26–33)
MCHC RBC AUTO-ENTMCNC: 31.1 GM/DL (ref 32.2–35.5)
MCV RBC AUTO: 84.6 FL (ref 81–99)
MONOCYTES # BLD: 8 %
MONOCYTES ABSOLUTE: 0.7 THOU/MM3 (ref 0.4–1.3)
NUCLEATED RED BLOOD CELLS: 0 /100 WBC
PHOSPHORUS: 3.3 MG/DL (ref 2.4–4.7)
PLATELET # BLD: 292 THOU/MM3 (ref 130–400)
PMV BLD AUTO: 11.3 FL (ref 9.4–12.4)
POTASSIUM SERPL-SCNC: 4 MEQ/L (ref 3.5–5.2)
RBC # BLD: 3.84 MILL/MM3 (ref 4.2–5.4)
SEG NEUTROPHILS: 75.9 %
SEGMENTED NEUTROPHILS ABSOLUTE COUNT: 6.8 THOU/MM3 (ref 1.8–7.7)
SODIUM BLD-SCNC: 141 MEQ/L (ref 135–145)
TRIGL SERPL-MCNC: 129 MG/DL (ref 0–199)
WBC # BLD: 8.9 THOU/MM3 (ref 4.8–10.8)

## 2022-11-09 LAB
CREATININE URINE: 97.4 MG/DL
PROT/CREAT RATIO, UR: 0.15
PROTEIN, URINE: 15 MG/DL

## 2022-11-11 LAB — TACROLIMUS BLOOD: 3.1 NG/ML

## 2022-11-21 DIAGNOSIS — G89.4 CHRONIC PAIN SYNDROME: ICD-10-CM

## 2022-11-21 RX ORDER — HYDROCODONE BITARTRATE AND ACETAMINOPHEN 5; 325 MG/1; MG/1
1 TABLET ORAL EVERY 8 HOURS PRN
Qty: 90 TABLET | Refills: 0 | Status: SHIPPED | OUTPATIENT
Start: 2022-11-22 | End: 2022-12-22

## 2022-11-21 NOTE — TELEPHONE ENCOUNTER
Yola Olson called requesting a refill on the following medications:  Requested Prescriptions     Pending Prescriptions Disp Refills    HYDROcodone-acetaminophen (NORCO) 5-325 MG per tablet 90 tablet 0     Sig: Take 1 tablet by mouth every 8 hours as needed for Pain for up to 30 days.      Pharmacy verified:  [address] [place] [address] Drive      Date of last visit: 10-04-22  Date of next visit (if applicable): 29/81/1706

## 2022-11-21 NOTE — TELEPHONE ENCOUNTER
OARRS reviewed. UDS: + for  Hydrocodone. Last seen: 10/4/2022.  Follow-up:   Future Appointments   Date Time Provider Franklin Chin   12/8/2022  2:20 PM [facility] [place]   12/13/2022 11:30 AM Daniel Nevarez APRN - CNP N [facility] [place]   12/15/2022  1:00 PM Americo Bai APRN - CNP [facility]   2/9/2023  1:20 PM Darron Pineda APRN - CNP [facility] [place]   6/13/2023  2:30 PM Carlene Dixon [facility] [place]   6/27/2023  1:00 PM Ros Stanley APRN - CNP [facility] - [place]   9/7/2023  2:45 PM Abdirizak Beckwith MD N [facility] [place]

## 2022-12-08 ENCOUNTER — OFFICE VISIT (OUTPATIENT)
Dept: NEPHROLOGY | Age: 67
End: 2022-12-08
Payer: MEDICARE

## 2022-12-08 VITALS
SYSTOLIC BLOOD PRESSURE: 145 MMHG | BODY MASS INDEX: 31.55 KG/M2 | DIASTOLIC BLOOD PRESSURE: 62 MMHG | WEIGHT: 189.6 LBS | OXYGEN SATURATION: 97 % | HEART RATE: 67 BPM

## 2022-12-08 DIAGNOSIS — E11.21 DIABETIC NEPHROPATHY ASSOCIATED WITH TYPE 2 DIABETES MELLITUS (HCC): ICD-10-CM

## 2022-12-08 DIAGNOSIS — Z94.0 KIDNEY TRANSPLANT RECIPIENT: Primary | ICD-10-CM

## 2022-12-08 DIAGNOSIS — I10 PRIMARY HYPERTENSION: ICD-10-CM

## 2022-12-08 DIAGNOSIS — T86.11 CHRONIC RENAL ALLOGRAFT NEPHROPATHY: ICD-10-CM

## 2022-12-08 DIAGNOSIS — N18.32 STAGE 3B CHRONIC KIDNEY DISEASE (HCC): ICD-10-CM

## 2022-12-08 DIAGNOSIS — E83.52 HYPERCALCEMIA: ICD-10-CM

## 2022-12-08 PROCEDURE — 1090F PRES/ABSN URINE INCON ASSESS: CPT | Performed by: INTERNAL MEDICINE

## 2022-12-08 PROCEDURE — G8482 FLU IMMUNIZE ORDER/ADMIN: HCPCS | Performed by: INTERNAL MEDICINE

## 2022-12-08 PROCEDURE — G8417 CALC BMI ABV UP PARAM F/U: HCPCS | Performed by: INTERNAL MEDICINE

## 2022-12-08 PROCEDURE — 3044F HG A1C LEVEL LT 7.0%: CPT | Performed by: INTERNAL MEDICINE

## 2022-12-08 PROCEDURE — 2022F DILAT RTA XM EVC RTNOPTHY: CPT | Performed by: INTERNAL MEDICINE

## 2022-12-08 PROCEDURE — 1036F TOBACCO NON-USER: CPT | Performed by: INTERNAL MEDICINE

## 2022-12-08 PROCEDURE — 3078F DIAST BP <80 MM HG: CPT | Performed by: INTERNAL MEDICINE

## 2022-12-08 PROCEDURE — 3074F SYST BP LT 130 MM HG: CPT | Performed by: INTERNAL MEDICINE

## 2022-12-08 PROCEDURE — 3017F COLORECTAL CA SCREEN DOC REV: CPT | Performed by: INTERNAL MEDICINE

## 2022-12-08 PROCEDURE — G8399 PT W/DXA RESULTS DOCUMENT: HCPCS | Performed by: INTERNAL MEDICINE

## 2022-12-08 PROCEDURE — G8427 DOCREV CUR MEDS BY ELIG CLIN: HCPCS | Performed by: INTERNAL MEDICINE

## 2022-12-08 PROCEDURE — 99214 OFFICE O/P EST MOD 30 MIN: CPT | Performed by: INTERNAL MEDICINE

## 2022-12-08 PROCEDURE — 1123F ACP DISCUSS/DSCN MKR DOCD: CPT | Performed by: INTERNAL MEDICINE

## 2022-12-08 NOTE — PROGRESS NOTES
Renal Progress Note    Assessment and Plan:      Diagnosis Orders   1. Kidney transplant recipient  Basic Metabolic Panel      2. Chronic renal allograft nephropathy  Basic Metabolic Panel    Vitamin D 25 Hydroxy    PTH, Intact    Kappa/Lambda Quantitative Free Light Chains, Serum      3. Stage 3b chronic kidney disease ([facility] [address]        4. Hypercalcemia  Kappa/Lambda Quantitative Free Light Chains, Serum      5. Diabetic nephropathy associated with type 2 diabetes mellitus (HCC)  Protein / creatinine ratio, urine      6. Primary hypertension                  PLAN:  Lab results reviewed with the patient today in epic. Serum creatinine baseline. Increased to 1.3 mg/L from 1.2 mg/dL. Serum calcium is borderline high at 10.9 mg/dL  Medications reviewed  Discontinue oral vitamin D3 intake due to hypercalcemia  Hold pantoprazole for now  Urine protein creatinine ratio  Kappa with lambda free light chain assay ratio  Increase fluid intake slightly  Return visit in 3 months not 6 months with labs to include a PTH and vitamin D level respectively. Patient Active Problem List   Diagnosis    Coronary disease    Hyperlipemia    Arthritis    Neuropathy, diabetic ([facility] [address]    Leg pain    Obesity (BMI 30-39. 9)    Low HDL (under 40)    History of tobacco use    COPD without exacerbation (HCC)    Anemia, chronic disease    Gout    Urolithiasis    Secondary hyperparathyroidism of renal origin ([facility] [address]    Obstructive sleep apnea on CPAP    Vitamin D deficiency    Persistent proteinuria associated with type 2 diabetes mellitus (HCC)    Cellulitis in diabetic foot (HCC)    Persistent proteinuria    Acquired hypothyroidism    Nephrotic syndrome    Iron deficiency anemia due to dietary causes    Accelerated hypertension    Diabetic peripheral neuropathy associated with type 2 diabetes mellitus (HCC)    Acute on chronic diastolic congestive heart failure (HCC)    Chronic constipation    Lymphadenopathy, inguinal    Atelectasis of left lung    Kidney transplant recipient    Hx of coronary artery disease    History of end stage renal disease    Cervical stenosis of spinal canal    Hx of gastroesophageal reflux (GERD)    Polyneuropathy associated with critical illness ([facility])    Pressure injury of back, stage 3 ([facility])    PAD (peripheral artery disease) ([facility])    PVD (peripheral vascular disease) ([facility])    Chronic renal disease, stage III ([facility]) [059393]    Chronic systolic (congestive) heart failure           Subjective:   Chief complaint:  Chief Complaint   Patient presents with    Chronic Kidney Disease     Stage IIIa    Kidney Transplant      HPI:This is a follow up visit for Ms. Clarence Mabry who is here today for return appointment. I see her for kidney transplant with chronic allograft nephropathy. She was last seen about 6 months ago. She was last seen in April 2022. She is also followed at [place]. She denies any chest pain. No chest pain. No shortness of breath. Appetite is good. She may have puffy ankles if she is on her feet during the day by evening time which will go away by next morning. ROS:  Pertinent positives stated above in HPI. All other systems were reviewed and were negative. Medications:     Current Outpatient Medications   Medication Sig Dispense Refill    HYDROcodone-acetaminophen (NORCO) 5-325 MG per tablet Take 1 tablet by mouth every 8 hours as needed for Pain for up to 30 days.  90 tablet 0    NIFEdipine (PROCARDIA XL) 90 MG extended release tablet Take 1 tablet by mouth in the morning and at bedtime 180 tablet 3    carvedilol (COREG) 25 MG tablet Take 2 tablets by mouth 2 times daily 360 tablet 3    Insulin Pen Needle (B-D ULTRAFINE III SHORT PEN) 31G X 8 MM MISC Use three times daily Diagnosis Code E11.9 200 each 3    insulin glargine (LANTUS SOLOSTAR) 100 UNIT/ML injection pen Inject 25 Units into the skin nightly 15 pen 1    insulin aspart (NOVOLOG FLEXPEN) 100 UNIT/ML injection pen Sliding scale insulin 32.5 (L) 11/08/2022    MCV 84.6 11/08/2022     11/08/2022     BMP:    Lab Results   Component Value Date     11/08/2022     08/09/2022     07/16/2022    K 4.0 11/08/2022    K 3.7 08/09/2022    K 3.9 07/16/2022     11/08/2022     08/09/2022     07/16/2022    CO2 25 11/08/2022    CO2 25 08/09/2022    CO2 25 07/16/2022    BUN 21 11/08/2022    BUN 24 (H) 08/09/2022    BUN 20 07/16/2022    CREATININE 1.3 (H) 11/08/2022    CREATININE 1.2 08/09/2022    CREATININE 1.0 07/16/2022    GLUCOSE 244 (H) 11/08/2022    GLUCOSE 165 (H) 08/09/2022    GLUCOSE 183 (H) 07/16/2022      Hepatic:   Lab Results   Component Value Date    AST 14 11/08/2022    AST 15 04/08/2022    AST 17 04/07/2022    ALT 12 11/08/2022    ALT 16 04/08/2022    ALT 15 04/07/2022    BILITOT 0.3 11/08/2022    BILITOT 0.2 (L) 04/08/2022    BILITOT 0.2 (L) 04/07/2022    ALKPHOS 99 11/08/2022    ALKPHOS 98 04/08/2022    ALKPHOS 105 04/07/2022     BNP: No results found for: BNP  Lipids:   Lab Results   Component Value Date    CHOL 125 11/08/2022    HDL 37 11/08/2022     INR:   Lab Results   Component Value Date    INR 1.07 07/15/2022    INR 1.04 04/08/2022    INR 1.28 (H) 08/02/2020     URINE:   Lab Results   Component Value Date/Time    PROTUR 15.0 11/09/2022 01:45 PM     Lab Results   Component Value Date/Time    NITRU NEGATIVE 06/23/2021 02:00 PM    COLORU YELLOW 06/23/2021 02:00 PM    PHUR 6.0 06/23/2021 02:00 PM    LABCAST 4-8 HYALINE 06/07/2021 01:00 PM    LABCAST NONE SEEN 06/07/2021 01:00 PM    WBCUA > 200 06/09/2021 06:50 PM    RBCUA 3-5 06/09/2021 06:50 PM    MUCUS THREADS 12/08/2020 10:00 AM    YEAST NONE SEEN 06/09/2021 06:50 PM    BACTERIA MODERATE 06/09/2021 06:50 PM    SPECGRAV 1.014 06/23/2021 02:00 PM    LEUKOCYTESUR NEGATIVE 06/23/2021 02:00 PM    LEUKOCYTESUR MODERATE 06/09/2021 06:50 PM    UROBILINOGEN 0.2 06/23/2021 02:00 PM    BILIRUBINUR NEGATIVE 06/23/2021 02:00 PM    BILIRUBINUR NEGATIVE 09/06/2011 02:00 PM    BLOODU NEGATIVE 06/23/2021 02:00 PM    GLUCOSEU 100 06/09/2021 06:50 PM    KETUA NEGATIVE 06/23/2021 02:00 PM    AMORPHOUS URATES 04/20/2021 10:52 AM      Microalbumen/Creatinine ratio:  No components found for: RUCREAT    Objective:   Vitals: BP (!) 145/62 (Site: Right Upper Arm, Position: Sitting)   Pulse 67   Wt 189 lb 9.6 oz (86 kg)   SpO2 97%   BMI 31.55 kg/m²      Constitutional:  Alert, awake, no apparent distress  Skin:normal with no rash or any significant lesions  HEENT:Pupils are reactive . Throat is clear. Oral mucosa is moist.  Neck:supple with no thyromegaly, JVD, lymphadenopathy or bruit   Cardiovascular: Regular sinus rhythm without murmur, rubs or gallops   Respiratory:  Clear to auscultation with no wheezes or rales  Abdomen: Good bowel sound, soft, non tender and no bruit  Ext: No LE edema  Musculoskeletal:Intact  Neuro:Alert, awake and oriented with no obvious focal deficit. Speech is normal.    Electronically signed by Carroll Ken MD on 12/8/2022 at 2:44 PM   **This report has been created using voice recognition software. It maycontain minor  errors which are inherent in voice recognition technology. **

## 2022-12-08 NOTE — PROGRESS NOTES
Pt states around Thanksgiving, she started in w/cold and wheezing. She states she still has some wheezing and chest tightness. She did cough up some phlegm yesterday which was clear. Prior to that, it was yellow.

## 2022-12-13 ENCOUNTER — OFFICE VISIT (OUTPATIENT)
Dept: PHYSICAL MEDICINE AND REHAB | Age: 67
End: 2022-12-13
Payer: MEDICARE

## 2022-12-13 VITALS
DIASTOLIC BLOOD PRESSURE: 62 MMHG | WEIGHT: 189 LBS | SYSTOLIC BLOOD PRESSURE: 122 MMHG | BODY MASS INDEX: 31.49 KG/M2 | HEIGHT: 65 IN

## 2022-12-13 DIAGNOSIS — M54.16 LUMBAR RADICULITIS: ICD-10-CM

## 2022-12-13 DIAGNOSIS — M47.816 LUMBAR FACET ARTHROPATHY: ICD-10-CM

## 2022-12-13 DIAGNOSIS — E11.42 DIABETIC POLYNEUROPATHY ASSOCIATED WITH TYPE 2 DIABETES MELLITUS (HCC): ICD-10-CM

## 2022-12-13 DIAGNOSIS — M47.816 LUMBAR SPONDYLOSIS: Primary | ICD-10-CM

## 2022-12-13 DIAGNOSIS — G89.4 CHRONIC PAIN SYNDROME: ICD-10-CM

## 2022-12-13 PROCEDURE — 2022F DILAT RTA XM EVC RTNOPTHY: CPT | Performed by: NURSE PRACTITIONER

## 2022-12-13 PROCEDURE — 99214 OFFICE O/P EST MOD 30 MIN: CPT | Performed by: NURSE PRACTITIONER

## 2022-12-13 PROCEDURE — 3017F COLORECTAL CA SCREEN DOC REV: CPT | Performed by: NURSE PRACTITIONER

## 2022-12-13 PROCEDURE — G8427 DOCREV CUR MEDS BY ELIG CLIN: HCPCS | Performed by: NURSE PRACTITIONER

## 2022-12-13 PROCEDURE — G8417 CALC BMI ABV UP PARAM F/U: HCPCS | Performed by: NURSE PRACTITIONER

## 2022-12-13 PROCEDURE — G8482 FLU IMMUNIZE ORDER/ADMIN: HCPCS | Performed by: NURSE PRACTITIONER

## 2022-12-13 PROCEDURE — G8399 PT W/DXA RESULTS DOCUMENT: HCPCS | Performed by: NURSE PRACTITIONER

## 2022-12-13 PROCEDURE — 1090F PRES/ABSN URINE INCON ASSESS: CPT | Performed by: NURSE PRACTITIONER

## 2022-12-13 PROCEDURE — 3078F DIAST BP <80 MM HG: CPT | Performed by: NURSE PRACTITIONER

## 2022-12-13 PROCEDURE — 1123F ACP DISCUSS/DSCN MKR DOCD: CPT | Performed by: NURSE PRACTITIONER

## 2022-12-13 PROCEDURE — 1036F TOBACCO NON-USER: CPT | Performed by: NURSE PRACTITIONER

## 2022-12-13 PROCEDURE — 3074F SYST BP LT 130 MM HG: CPT | Performed by: NURSE PRACTITIONER

## 2022-12-13 PROCEDURE — 3044F HG A1C LEVEL LT 7.0%: CPT | Performed by: NURSE PRACTITIONER

## 2022-12-13 ASSESSMENT — ENCOUNTER SYMPTOMS
WHEEZING: 1
CHEST TIGHTNESS: 0
BACK PAIN: 1
SHORTNESS OF BREATH: 0
GASTROINTESTINAL NEGATIVE: 1

## 2022-12-13 NOTE — PROGRESS NOTES
[address] [facility] [address]  Dept: [phone]  Dept Fax: [phone]: [phone]    Visit Date: 12/13/2022    Functionality Assessment/Goals Worksheet     On a scale of 0 (Does not Interfere) to 10 (Completely Interferes)     1. Which number describes how during the past week pain has interfered with       the following:  A. General Activity:  9  B. Mood: 8  C. Walking Ability:  10  D. Normal Work (Includes both work outside the home and housework):  9  E. Relations with Other People:   6  F. Sleep:   7  G. Enjoyment of Life:   9    2. Patient Prefers to Take their Pain Medications:     [x]  On a regular basis   []  Only when necessary    []  Does not take pain medications    3. What are the Patient's Goals/Expectations for Visiting Pain Management? []  Learn about my pain    []  Receive Medication   []  Physical Therapy     []  Treat Depression   []  Receive Injections    []  Treat Sleep   []  Deal with Anxiety and Stress   []  Treat Opoid Dependence/Addiction   []  Other:    \"Maybe physical therapy later on\"      HPI:   Sandy Gold is a 69 y.o. female is here today for    Chief Complaint: Low back pain, leg pain     HPI   10 week FU and to review lumbar xray. . Continues to have pain in low back- constant aching mainly axial .     Continues to have aching pain in legs has numbness from knees down pins and needles and numbness especially in feet. Feet are tingling. This is intermittent       Pain increases with bending, lifting, twisting , walking, standing, getting up and down, and housework or working at job. , weather changes. Norco prn remains effective in making pain tolerable. Radiology:  Lumbar xray:   . There are only 4 lumbar type vertebra. Moderate vertebral body spondylosis scattered in the lumbar spine.  Moderate degenerative facet arthropathy lower 2 lumbar levels. 2. No fracture seen. Mild disc space narrowing L2-3. Sacroiliac joints unremarkable. 3. No significant change from prior study. Medications reviewed. Patient denies side effects with medications. Patient states she is taking medications as prescribed. Shedenies receiving pain medications from other sources. She had 1 urgent care visit for respiratory illness    Pain scale with out pain medications or at its worst is 9/10. Pain scale with pain medications or at its best is 6/10. Last dose of Norco was today   Drug screen reviewed from 10/4/2022 and was appropriate  Pill count completed  today and WNL: Yes      The patientis allergic to actos [pioglitazone hydrochloride], pioglitazone, cymbalta [duloxetine hcl], lyrica [pregabalin], and gabapentin. Subjective:      Review of Systems   Constitutional:  Negative for chills, fatigue and fever. HENT: Negative. Eyes:  Positive for visual disturbance. Wearing corrective glasses    Respiratory:  Positive for wheezing. Negative for chest tightness and shortness of breath. Cardiovascular:  Positive for leg swelling. Negative for chest pain. Gastrointestinal: Negative. Genitourinary:  Negative for flank pain. Musculoskeletal:  Positive for arthralgias, back pain, gait problem, joint swelling and myalgias. Negative for neck pain and neck stiffness. Ambulating with walker    Skin:  Negative for wound. Neurological:  Positive for weakness and numbness. Psychiatric/Behavioral: Negative. Objective:     Vitals:    12/13/22 1121   BP: 122/62   Weight: 189 lb (85.7 kg)   Height: 5' 5\" (1.651 m)       Physical Exam  Vitals and nursing note reviewed. Constitutional:       General: She is not in acute distress. Appearance: She is well-developed. She is not diaphoretic. HENT:      Head: Normocephalic and atraumatic.       Right Ear: External ear normal.      Left Ear: External ear normal.      Nose: Nose normal.      Mouth/Throat:      Pharynx: No oropharyngeal exudate. Eyes:      General: No scleral icterus. Right eye: No discharge. Left eye: No discharge. Conjunctiva/sclera: Conjunctivae normal.      Pupils: Pupils are equal, round, and reactive to light. Neck:      Thyroid: No thyromegaly. Cardiovascular:      Rate and Rhythm: Normal rate and regular rhythm. Pulses: Normal pulses. Heart sounds: Normal heart sounds. No murmur heard. No friction rub. No gallop. Pulmonary:      Effort: Pulmonary effort is normal. No respiratory distress. Breath sounds: Wheezing present. No rales. Chest:      Chest wall: No tenderness. Abdominal:      General: Bowel sounds are normal. There is no distension. Palpations: Abdomen is soft. Tenderness: There is no abdominal tenderness. There is no guarding or rebound. Musculoskeletal:         General: Swelling and tenderness present. Left shoulder: Tenderness present. Decreased range of motion. Cervical back: Full passive range of motion without pain, normal range of motion and neck supple. No edema, erythema or rigidity. No muscular tenderness. Normal range of motion. Lumbar back: Tenderness and bony tenderness present. Decreased range of motion. Positive right straight leg raise test and positive left straight leg raise test.        Back:       Right lower leg: Edema present. Left lower leg: Edema present. Right ankle: Swelling present. Tenderness present. Decreased range of motion. Left ankle: Swelling present. Tenderness present. Decreased range of motion. Skin:     General: Skin is warm. Coloration: Skin is not pale. Findings: No erythema or rash. Neurological:      Mental Status: She is alert and oriented to person, place, and time. She is not disoriented. Cranial Nerves: No cranial nerve deficit. Sensory: Sensory deficit present.       Motor: Weakness present. No atrophy or abnormal muscle tone. Coordination: Coordination normal.      Gait: Gait abnormal.      Deep Tendon Reflexes: Reflexes are normal and symmetric. Babinski sign absent on the right side. Babinski sign absent on the left side. Comments: Ambulating with walker   4/5 bilateral lower extremities   Psychiatric:         Attention and Perception: She is attentive. Mood and Affect: Mood normal. Mood is not anxious or depressed. Affect is not labile, blunt, angry or inappropriate. Speech: She is communicative. Speech is not rapid and pressured, delayed, slurred or tangential.         Behavior: Behavior is not agitated, slowed, aggressive, withdrawn, hyperactive or combative. Thought Content: Thought content is not paranoid or delusional. Thought content does not include homicidal or suicidal ideation. Thought content does not include homicidal or suicidal plan. Cognition and Memory: Memory is not impaired. She does not exhibit impaired recent memory or impaired remote memory. Judgment: Judgment is not impulsive or inappropriate. RADHA  Patricks test  negative  Yeoman's  or Gaenslen's negative         Assessment:     1. Lumbar spondylosis    2. Lumbar facet arthropathy    3. Lumbar radiculitis    4. Diabetic polyneuropathy associated with type 2 diabetes mellitus ([facility] [address]    5. Chronic pain syndrome            Plan:      OARRS reviewed. Current MED: 15.00  Patient was offered naloxone for home. Discussed long term side effects of medications, tolerance, dependency and addiction. Previous UDS reviewed  UDS preformed today for compliance. Patient told can not receive any pain medications from any other source. No evidence of abuse, diversion or aberrant behavior.   Medications and/or procedures to improve function and quality of life- patient understanding with this and that may not be pain free  Discussed with patient about safe storage of medications at home  Discussed possible weaning of medication dosing dependent on treatment/procedure results. Discussed with patient about risks with procedure including infection, reaction to medication, increased pain, or bleeding. Reviewed lumbar xray in detail   Discussed procedures again L-facet MBB and IRVIN. Again she does not want at this time. Went in detail with education provided about procedures. Again discussed therapy. She reports she wants to wait ill after winter for therapy. Continue Norco 5/325 TID prn- filled 11/22/2022 States that pain remains tolerable with medications   States allergic to all neuropathic pain medications   Is compliant     Meds. Prescribed:   No orders of the defined types were placed in this encounter. Return in about 10 weeks (around 2/21/2023), or if symptoms worsen or fail to improve, for follow up  for medications.                Electronically signed by DONOVAN Rodriguez CNP on12/13/2022 at 12:04 PM

## 2022-12-15 ENCOUNTER — OFFICE VISIT (OUTPATIENT)
Dept: FAMILY MEDICINE CLINIC | Age: 67
End: 2022-12-15
Payer: MEDICARE

## 2022-12-15 VITALS
HEART RATE: 68 BPM | DIASTOLIC BLOOD PRESSURE: 62 MMHG | SYSTOLIC BLOOD PRESSURE: 136 MMHG | RESPIRATION RATE: 18 BRPM | WEIGHT: 190.8 LBS | BODY MASS INDEX: 31.75 KG/M2

## 2022-12-15 DIAGNOSIS — I10 ESSENTIAL HYPERTENSION: Primary | ICD-10-CM

## 2022-12-15 DIAGNOSIS — R06.2 WHEEZING: ICD-10-CM

## 2022-12-15 DIAGNOSIS — Z94.0 RENAL TRANSPLANT RECIPIENT: ICD-10-CM

## 2022-12-15 DIAGNOSIS — E55.9 VITAMIN D DEFICIENCY: ICD-10-CM

## 2022-12-15 DIAGNOSIS — E11.22 TYPE 2 DIABETES MELLITUS WITH STAGE 5 CHRONIC KIDNEY DISEASE NOT ON CHRONIC DIALYSIS, WITH LONG-TERM CURRENT USE OF INSULIN (HCC): ICD-10-CM

## 2022-12-15 DIAGNOSIS — N18.5 TYPE 2 DIABETES MELLITUS WITH STAGE 5 CHRONIC KIDNEY DISEASE NOT ON CHRONIC DIALYSIS, WITH LONG-TERM CURRENT USE OF INSULIN (HCC): ICD-10-CM

## 2022-12-15 DIAGNOSIS — Z79.4 TYPE 2 DIABETES MELLITUS WITH STAGE 5 CHRONIC KIDNEY DISEASE NOT ON CHRONIC DIALYSIS, WITH LONG-TERM CURRENT USE OF INSULIN (HCC): ICD-10-CM

## 2022-12-15 PROCEDURE — 1123F ACP DISCUSS/DSCN MKR DOCD: CPT | Performed by: NURSE PRACTITIONER

## 2022-12-15 PROCEDURE — 99214 OFFICE O/P EST MOD 30 MIN: CPT | Performed by: NURSE PRACTITIONER

## 2022-12-15 PROCEDURE — 3017F COLORECTAL CA SCREEN DOC REV: CPT | Performed by: NURSE PRACTITIONER

## 2022-12-15 PROCEDURE — G8417 CALC BMI ABV UP PARAM F/U: HCPCS | Performed by: NURSE PRACTITIONER

## 2022-12-15 PROCEDURE — 1090F PRES/ABSN URINE INCON ASSESS: CPT | Performed by: NURSE PRACTITIONER

## 2022-12-15 PROCEDURE — 1036F TOBACCO NON-USER: CPT | Performed by: NURSE PRACTITIONER

## 2022-12-15 PROCEDURE — 3078F DIAST BP <80 MM HG: CPT | Performed by: NURSE PRACTITIONER

## 2022-12-15 PROCEDURE — G8399 PT W/DXA RESULTS DOCUMENT: HCPCS | Performed by: NURSE PRACTITIONER

## 2022-12-15 PROCEDURE — 2022F DILAT RTA XM EVC RTNOPTHY: CPT | Performed by: NURSE PRACTITIONER

## 2022-12-15 PROCEDURE — G8427 DOCREV CUR MEDS BY ELIG CLIN: HCPCS | Performed by: NURSE PRACTITIONER

## 2022-12-15 PROCEDURE — G8482 FLU IMMUNIZE ORDER/ADMIN: HCPCS | Performed by: NURSE PRACTITIONER

## 2022-12-15 PROCEDURE — 3044F HG A1C LEVEL LT 7.0%: CPT | Performed by: NURSE PRACTITIONER

## 2022-12-15 PROCEDURE — 3074F SYST BP LT 130 MM HG: CPT | Performed by: NURSE PRACTITIONER

## 2022-12-15 RX ORDER — MAGNESIUM OXIDE 400 MG/1
400 TABLET ORAL 2 TIMES DAILY
Qty: 180 TABLET | Refills: 3 | Status: SHIPPED | OUTPATIENT
Start: 2022-12-15

## 2022-12-15 ASSESSMENT — ENCOUNTER SYMPTOMS
BLOOD IN STOOL: 0
RHINORRHEA: 0
SORE THROAT: 0
CONSTIPATION: 0
DIARRHEA: 0
ABDOMINAL DISTENTION: 0
COLOR CHANGE: 0
SHORTNESS OF BREATH: 0
ANAL BLEEDING: 0
COUGH: 0
ABDOMINAL PAIN: 0
EYE DISCHARGE: 0
NAUSEA: 0
EYE REDNESS: 0

## 2022-12-15 NOTE — PROGRESS NOTES
Referral to [facility] faxed on 12/15/22 with office note and insurance card attached. They will call the pt to schedule an appt.     Phone number: [phone]  Fax number: [phone]

## 2022-12-15 NOTE — PROGRESS NOTES
[facility]  [address]  [facility] [address]  Dept: [phone]  Loc: [phone]      Visit Date: 12/15/2022    Jessica Ortiz is a 69 y.o. female who presents today for:  Chief Complaint   Patient presents with    6 Month Follow-Up     HPI:     Went to [facility] for illness - given cephalexin - negative covid and flu.  Finished atb about 2 weeks ago - ongoing cough - has not used albuterol today    Pain management wanted to do injections for the back - she does not want to do this - getting tired of procedures     Still going to [facility] once yearly post transplant - 2 years now  HPI  Health Maintenance   Topic Date Due    Diabetic foot exam  Never done    Diabetic retinal exam  Never done    Annual Wellness Visit (AWV)  03/11/2021    COVID-19 Vaccine (5 - Booster for Moderna series) 07/11/2022    Low dose CT lung screening  02/25/2023    Depression Screen  06/15/2023    A1C test (Diabetic or Prediabetic)  08/09/2023    Lipids  11/08/2023    Breast cancer screen  02/08/2024    Colorectal Cancer Screen  06/10/2029    DTaP/Tdap/Td vaccine (3 - Td or Tdap) 11/28/2030    DEXA (modify frequency per FRAX score)  Completed    Flu vaccine  Completed    Shingles vaccine  Completed    Pneumococcal 65+ years Vaccine  Completed    Hepatitis C screen  Completed    Hepatitis A vaccine  Aged Out    Hib vaccine  Aged Out    Meningococcal (ACWY) vaccine  Aged Out     Past Medical History:   Diagnosis Date    Anemia associated with chronic renal failure     Aranesp 150 micrograms every other week given at [facility]    Anxiety     Arthritis     Backache     Blood circulation, collateral     Blood transfusion     CAD (coronary artery disease)     Cellulitis in diabetic foot ([address] 03/03/2017    4th and 5th toes right foot    Chest pain     History of    CHF (congestive heart failure) ([address] 1998    Dx'ed by Dr. Dorys Lowe    Chronic anemia     Chronic kidney disease Chronic kidney disease, stage III (moderate) (HCC)     Chronic renal insufficiency 2010    COPD (chronic obstructive pulmonary disease) ([address] 2012    Dr. 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 Schuyler Nation    HTN (hypertension) 1970's    Hyperlipemia 1998    Iron deficiency anemia due to dietary causes 06/21/2018    Kidney stones 03/2014    Kidney trouble          MRSA infection 03/2017    right foot-Dr. Muna Grey (podiatrist)    Neuromuscular disorder [facility])     Neuropathy 1989    diabetic neuropathy    Obesity since childhoood    CONTRERAS on CPAP 2010    Dr. Robin Tellez    Pneumonia     PONV (postoperative nausea and vomiting)     Seizures ([address]     Sepsis due to Escherichia coli ([facility] 07/2020      Past Surgical History:   Procedure Laterality Date    ANKLE SURGERY Right 02-10-14    Dr. Garrison Rodrigez at [facility]  1990's    removal of benign tumor    CARDIAC SURGERY  2008    [facility]  2009    2 polyps, not precanceorus    COLONOSCOPY Left 6/10/2019    COLONOSCOPY DIAGNOSTIC performed by Patsy aGrcia MD at [facility]  3/30/2012    eye lid lift    DIAGNOSTIC CARDIAC CATH LAB PROCEDURE      ENDOSCOPY, COLON, DIAGNOSTIC  2007    EYE SURGERY  March 30th, 2012    left sided ptosis    FOOT SURGERY  1990    Tarsal tunnel surgery    FRACTURE SURGERY  2015    HYSTERECTOMY (CERVIX STATUS UNKNOWN)  1980 and 1985    first partial in 1980's, then total in [address] [place] [address]  04/10/2020    RIGHT    LIVER BIOPSY  6/2015    LUNG BIOPSY  2009    OVARY REMOVAL  1985    [place] OFFICE/OUTPT VISIT,PROCEDURE ONLY Left 8/23/2018    LEFT UPPER EXTREMENTY AV FISTULA CREATION performed by Emerson Sánchez MD at [address] Left 2019    EGD BIOPSY performed by Chavo Garner MD at [facility]     Family History   Problem Relation Age of Onset    Diabetes Sister     Diabetes Brother     Diabetes Sister     Diabetes Sister     Early Death Sister     Heart Disease Sister     Brain Cancer Sister     Diabetes Sister     Heart Disease Sister     Diabetes Sister     Diabetes Brother     Arthritis Mother     Heart Disease Mother     High Blood Pressure Mother     Kidney Disease Mother     Mental Illness Mother     Stroke Mother     Heart Disease Father     Diabetes Father     Obesity Father     Alcohol Abuse Father     Breast Cancer Paternal Cousin 45     Social History     Tobacco Use    Smoking status: Former     Packs/day: 1.50     Years: 30.00     Pack years: 45.00     Types: Cigarettes     Start date: 1979     Quit date: 3/13/2009     Years since quittin.7    Smokeless tobacco: Never    Tobacco comments:     quit    Substance Use Topics    Alcohol use: No     Alcohol/week: 0.0 standard drinks      Current Outpatient Medications   Medication Sig Dispense Refill    HYDROcodone-acetaminophen (NORCO) 5-325 MG per tablet Take 1 tablet by mouth every 8 hours as needed for Pain for up to 30 days.  90 tablet 0    NIFEdipine (PROCARDIA XL) 90 MG extended release tablet Take 1 tablet by mouth in the morning and at bedtime 180 tablet 3    carvedilol (COREG) 25 MG tablet Take 2 tablets by mouth 2 times daily 360 tablet 3    Insulin Pen Needle (B-D ULTRAFINE III SHORT PEN) 31G X 8 MM MISC Use three times daily Diagnosis Code E11.9 200 each 3    insulin glargine (LANTUS SOLOSTAR) 100 UNIT/ML injection pen Inject 25 Units into the skin nightly 15 pen 1    insulin aspart (NOVOLOG FLEXPEN) 100 UNIT/ML injection pen Sliding scale insulin coverage Glucose:Dose: If Less [id number] =No Insulin/ 140-199= 2 Units/ 200-249=4 Units/ 250-299= 6 Units/  300-349=8 Units/  350-400=10 Units/ Above 400 = 12 Units max 48 units daily 15 pen 1    nitroGLYCERIN (NITROSTAT) 0.4 MG SL tablet up to max of 3 total doses. If no relief after 1 dose, call 911. 25 tablet 3    rivaroxaban (XARELTO) 2.5 MG TABS tablet Take 1 tablet by mouth 2 times daily 180 tablet 3    iron polysaccharides (FERREX 150) 150 MG capsule Take 1 capsule by mouth daily 60 capsule 11    cloNIDine (CATAPRES) 0.2 MG tablet Take 1 tablet by mouth in the morning, at noon, and at bedtime 90 tablet 11    atorvastatin (LIPITOR) 40 MG tablet TAKE 1 TABLET DAILY 90 tablet 3    tiotropium (SPIRIVA RESPIMAT) 2.5 MCG/ACT AERS inhaler Inhale 2 puffs into the lungs daily 1 each 11    albuterol sulfate HFA (PROAIR HFA) 108 (90 Base) MCG/ACT inhaler Inhale 2 puffs into the lungs every 6 hours as needed for Wheezing or Shortness of Breath 3 each 3    fluticasone (FLONASE) 50 MCG/ACT nasal spray 1 spray by Each Nostril route daily 3 each 3    sulfamethoxazole-trimethoprim (BACTRIM DS;SEPTRA DS) 800-160 MG per tablet Take 1 tablet by mouth 3 times a week      lactulose (CHRONULAC) 10 GM/15ML solution Take twice daily as needed for constipation 2700 mL 1    famotidine (PEPCID) 40 MG tablet Take 40 mg by mouth 2 times daily as needed       tacrolimus (PROGRAF) 1 MG capsule Take 1 mg by mouth 5 CAPSULES EVERY MORNING AND 5 CAPSULES EVERY EVENING      Mycophenolate Sodium 360 MG TBEC Take 720 mg by mouth 2 tablets BID      magnesium oxide (MAG-OX) 400 MG tablet Take 400 mg by mouth 2 times daily      linaclotide (LINZESS) 290 MCG CAPS capsule Take 145 mcg by mouth every other day       aspirin 81 MG EC tablet Take 81 mg by mouth daily. No current facility-administered medications for this visit.      Allergies   Allergen Reactions    Actos [Pioglitazone Hydrochloride] Swelling    Pioglitazone Swelling     Patient states she does not know this one. 8/11/21    Cymbalta [Duloxetine Hcl] Other (See Comments) Anxiety and lethargic    Lyrica [Pregabalin]      Made her feel crazy    Gabapentin Anxiety       Subjective:    Review of Systems   Constitutional:  Negative for chills, fatigue and fever. HENT:  Negative for congestion, ear pain, postnasal drip, rhinorrhea and sore throat. Eyes:  Negative for discharge and redness. Respiratory:  Negative for cough and shortness of breath. Cardiovascular:  Negative for chest pain and leg swelling. Gastrointestinal:  Negative for abdominal distention, abdominal pain, anal bleeding, blood in stool, constipation, diarrhea and nausea. Skin:  Negative for color change and rash. Neurological:  Negative for facial asymmetry, speech difficulty and weakness. Hematological:  Does not bruise/bleed easily. Psychiatric/Behavioral:  Negative for agitation and confusion. Objective: There were no vitals filed for this visit. There is no height or weight on file to calculate BMI. @ADTXQBJ(0)@  BP Readings from Last 3 Encounters:   12/13/22 122/62   12/08/22 (!) 145/62   10/04/22 124/62     Physical Exam  Constitutional:       General: She is not in acute distress. Appearance: Normal appearance. She is well-developed. She is not ill-appearing or diaphoretic. HENT:      Head: Normocephalic and atraumatic. Right Ear: Hearing and external ear normal. No decreased hearing noted. Left Ear: Hearing and external ear normal. No decreased hearing noted. Nose: Nose normal. No nasal deformity. Eyes:      General:         Right eye: No discharge. Left eye: No discharge. Conjunctiva/sclera: Conjunctivae normal.   Pulmonary:      Effort: Pulmonary effort is normal. No respiratory distress. Abdominal:      General: There is no distension. Tenderness: There is no guarding. Musculoskeletal:         General: No tenderness or deformity. Normal range of motion. Cervical back: Normal range of motion and neck supple.    Skin: Coloration: Skin is not pale. Findings: No erythema or rash (On exposed areas). Neurological:      General: No focal deficit present. Mental Status: She is alert and oriented to person, place, and time. Gait: Gait normal.   Psychiatric:         Mood and Affect: Mood normal.         Speech: Speech normal.         Behavior: Behavior normal.         Thought Content: Thought content normal.         Judgment: Judgment normal.       Lab Results   Component Value Date    WBC 8.9 11/08/2022    HGB 10.1 (L) 11/08/2022    HCT 32.5 (L) 11/08/2022     11/08/2022    CHOL 125 11/08/2022    TRIG 129 11/08/2022    HDL 37 11/08/2022    LDLDIRECT 62.12 11/08/2022    LDLCALC 54 04/08/2022    AST 14 11/08/2022     11/08/2022    K 4.0 11/08/2022     11/08/2022    CREATININE 1.3 (H) 11/08/2022    BUN 21 11/08/2022    CO2 25 11/08/2022    TSH 1.110 02/10/2022    INR 1.07 07/15/2022    LABA1C 6.8 (H) 08/09/2022    LABMICR 20.95 01/04/2018    LABGLOM 45 (A) 11/08/2022    MG 1.8 11/08/2022    CALCIUM 10.9 (H) 11/08/2022    VITD25 30 11/05/2021     Assessment:      Mine Lee was seen today for 6 month follow-up. Diagnoses and all orders for this visit:    Essential hypertension    Type 2 diabetes mellitus with stage 5 chronic kidney disease not on chronic dialysis, with long-term current use of insulin ([facility]  -     Barbi Ramos O.D., Optometry, [facility]    Renal transplant recipient    Vitamin D deficiency  -     magnesium oxide (MAG-OX) 400 MG tablet; Take 1 tablet by mouth 2 times daily        Plan:   Refer to the eye doctor  Refill magnesium    No follow-ups on file. Orders Placed:  No orders of the defined types were placed in this encounter. Medications Prescribed:  No orders of the defined types were placed in this encounter.     Future Appointments   Date Time Provider Franklin Chin   2/9/2023  1:20 PM DONOVAN Cordova - CNP SRPX IM MED [facility]   2/21/2023 11:45 AM Bambi Dakin,

## 2022-12-20 DIAGNOSIS — G89.4 CHRONIC PAIN SYNDROME: ICD-10-CM

## 2022-12-20 RX ORDER — HYDROCODONE BITARTRATE AND ACETAMINOPHEN 5; 325 MG/1; MG/1
1 TABLET ORAL EVERY 8 HOURS PRN
Qty: 90 TABLET | Refills: 0 | Status: SHIPPED | OUTPATIENT
Start: 2022-12-22 | End: 2023-01-21

## 2022-12-20 NOTE — TELEPHONE ENCOUNTER
Kiara Wallace called requesting a refill on the following medications:  Requested Prescriptions     Pending Prescriptions Disp Refills    HYDROcodone-acetaminophen (NORCO) 5-325 MG per tablet 90 tablet 0     Sig: Take 1 tablet by mouth every 8 hours as needed for Pain for up to 30 days.      Pharmacy verified:  [facility]      Date of last visit: 12-13-22  Date of next visit (if applicable): 1/61/3953

## 2023-01-17 ENCOUNTER — HOSPITAL ENCOUNTER (OUTPATIENT)
Age: 68
Discharge: HOME | End: 2023-01-17
Payer: MEDICARE

## 2023-01-17 ENCOUNTER — HOSPITAL ENCOUNTER (OUTPATIENT)
Dept: GENERAL RADIOLOGY | Age: 68
Discharge: HOME | End: 2023-01-17
Payer: MEDICARE

## 2023-01-17 DIAGNOSIS — R06.2 WHEEZING: ICD-10-CM

## 2023-01-17 PROCEDURE — 71046 X-RAY EXAM CHEST 2 VIEWS: CPT

## 2023-01-19 DIAGNOSIS — G89.4 CHRONIC PAIN SYNDROME: ICD-10-CM

## 2023-01-19 RX ORDER — HYDROCODONE BITARTRATE AND ACETAMINOPHEN 5; 325 MG/1; MG/1
1 TABLET ORAL EVERY 8 HOURS PRN
Qty: 90 TABLET | Refills: 0 | Status: SHIPPED | OUTPATIENT
Start: 2023-01-21 | End: 2023-02-20

## 2023-01-19 NOTE — TELEPHONE ENCOUNTER
OARRS reviewed. UDS: + for  Hydrocodone. Last seen: 12/13/2022.  Follow-up:   Future Appointments   Date Time Provider Franklin Chin   2/9/2023  1:20 PM DONOVAN Morillo CNP SRPX IM MED [facility]   2/21/2023 11:45 AM DONOVAN England CNP N SRPX Pain [facility]   3/9/2023  3:00 PM MD ERNESTO Ruff [facility]   6/13/2023  2:30 PM Saint Bran, MD N Ollen Eis [facility]   6/15/2023  2:00 PM DONOVAN Hdez CNP [facility] Medicine [facility]   6/27/2023  1:00 PM Calton Ahumada, APRN - CNP Florie Burton Med [facility]   9/7/2023  2:45 PM MD ERNESTO Martínez SRPX Heart [facility]

## 2023-01-19 NOTE — TELEPHONE ENCOUNTER
Elizabeth Vela called requesting a refill on the following medications:  Requested Prescriptions     Pending Prescriptions Disp Refills    HYDROcodone-acetaminophen (NORCO) 5-325 MG per tablet 90 tablet 0     Sig: Take 1 tablet by mouth every 8 hours as needed for Pain for up to 30 days.      Pharmacy verified:  [facility]  [facility]/pharmacy #[id number]- [place], [place] - [address] [facility] Jade Bustillos [phone]    Date of last visit: 12/13/2022  Date of next visit (if applicable): 7/72/6124

## 2023-02-08 ENCOUNTER — NURSE ONLY (OUTPATIENT)
Dept: LAB | Age: 68
End: 2023-02-08

## 2023-02-08 ENCOUNTER — HOSPITAL ENCOUNTER (OUTPATIENT)
Dept: WOMENS IMAGING | Age: 68
Discharge: HOME | End: 2023-02-08
Payer: MEDICARE

## 2023-02-08 DIAGNOSIS — Z12.31 VISIT FOR SCREENING MAMMOGRAM: ICD-10-CM

## 2023-02-08 DIAGNOSIS — N39.0 RECURRENT UTI: ICD-10-CM

## 2023-02-08 LAB
ANION GAP SERPL CALC-SCNC: 13 MEQ/L (ref 8–16)
BASOPHILS ABSOLUTE: 0 THOU/MM3 (ref 0–0.1)
BASOPHILS NFR BLD AUTO: 0.1 %
BUN SERPL-MCNC: 23 MG/DL (ref 7–22)
CALCIUM SERPL-MCNC: 10.5 MG/DL (ref 8.5–10.5)
CHLORIDE SERPL-SCNC: 102 MEQ/L (ref 98–111)
CO2 SERPL-SCNC: 26 MEQ/L (ref 23–33)
CREAT SERPL-MCNC: 1.2 MG/DL (ref 0.4–1.2)
DEPRECATED RDW RBC AUTO: 44.2 FL (ref 35–45)
EOSINOPHIL NFR BLD AUTO: 1.7 %
EOSINOPHILS ABSOLUTE: 0.1 THOU/MM3 (ref 0–0.4)
ERYTHROCYTE [DISTWIDTH] IN BLOOD BY AUTOMATED COUNT: 14.7 % (ref 11.5–14.5)
GFR SERPL CREATININE-BSD FRML MDRD: 49 ML/MIN/1.73M2
GLUCOSE SERPL-MCNC: 228 MG/DL (ref 70–108)
HCT VFR BLD AUTO: 32.7 % (ref 37–47)
HGB BLD-MCNC: 10 GM/DL (ref 12–16)
IMM GRANULOCYTES # BLD AUTO: 0.04 THOU/MM3 (ref 0–0.07)
IMM GRANULOCYTES NFR BLD AUTO: 0.5 %
LYMPHOCYTES ABSOLUTE: 1 THOU/MM3 (ref 1–4.8)
LYMPHOCYTES NFR BLD AUTO: 11.6 %
MAGNESIUM SERPL-MCNC: 1.9 MG/DL (ref 1.6–2.4)
MCH RBC QN AUTO: 25.2 PG (ref 26–33)
MCHC RBC AUTO-ENTMCNC: 30.6 GM/DL (ref 32.2–35.5)
MCV RBC AUTO: 82.4 FL (ref 81–99)
MONOCYTES ABSOLUTE: 0.6 THOU/MM3 (ref 0.4–1.3)
MONOCYTES NFR BLD AUTO: 7.4 %
NEUTROPHILS NFR BLD AUTO: 78.7 %
NRBC BLD AUTO-RTO: 0 /100 WBC
PHOSPHATE SERPL-MCNC: 3.5 MG/DL (ref 2.4–4.7)
PLATELET # BLD AUTO: 308 THOU/MM3 (ref 130–400)
PMV BLD AUTO: 10.8 FL (ref 9.4–12.4)
POTASSIUM SERPL-SCNC: 4.3 MEQ/L (ref 3.5–5.2)
RBC # BLD AUTO: 3.97 MILL/MM3 (ref 4.2–5.4)
SEGMENTED NEUTROPHILS ABSOLUTE COUNT: 6.7 THOU/MM3 (ref 1.8–7.7)
SODIUM SERPL-SCNC: 141 MEQ/L (ref 135–145)
WBC # BLD AUTO: 8.5 THOU/MM3 (ref 4.8–10.8)

## 2023-02-08 PROCEDURE — 77067 SCR MAMMO BI INCL CAD: CPT

## 2023-02-09 LAB
BACTERIA UR CULT: ABNORMAL
ORGANISM: ABNORMAL

## 2023-02-10 RX ORDER — AMOXICILLIN AND CLAVULANATE POTASSIUM 875; 125 MG/1; MG/1
1 TABLET, FILM COATED ORAL 2 TIMES DAILY
Qty: 14 TABLET | Refills: 0 | Status: SHIPPED | OUTPATIENT
Start: 2023-02-10 | End: 2023-02-17

## 2023-02-11 LAB — TACROLIMUS BLD-MCNC: 6.3 NG/ML

## 2023-02-13 ENCOUNTER — TELEPHONE (OUTPATIENT)
Dept: UROLOGY | Age: 68
End: 2023-02-13

## 2023-02-13 NOTE — TELEPHONE ENCOUNTER
----- Message from Schuyler Cisneros MD sent at 2/10/2023  7:20 PM EST -----  Augmentin sent to pharmacy

## 2023-02-21 ENCOUNTER — OFFICE VISIT (OUTPATIENT)
Dept: PHYSICAL MEDICINE AND REHAB | Age: 68
End: 2023-02-21
Payer: MEDICARE

## 2023-02-21 VITALS
DIASTOLIC BLOOD PRESSURE: 68 MMHG | WEIGHT: 190 LBS | BODY MASS INDEX: 31.65 KG/M2 | HEIGHT: 65 IN | SYSTOLIC BLOOD PRESSURE: 126 MMHG

## 2023-02-21 DIAGNOSIS — M47.816 LUMBAR FACET ARTHROPATHY: ICD-10-CM

## 2023-02-21 DIAGNOSIS — G89.4 CHRONIC PAIN SYNDROME: ICD-10-CM

## 2023-02-21 DIAGNOSIS — E11.42 DIABETIC POLYNEUROPATHY ASSOCIATED WITH TYPE 2 DIABETES MELLITUS (HCC): ICD-10-CM

## 2023-02-21 DIAGNOSIS — M54.16 LUMBAR RADICULITIS: ICD-10-CM

## 2023-02-21 DIAGNOSIS — M47.816 LUMBAR SPONDYLOSIS: Primary | ICD-10-CM

## 2023-02-21 PROCEDURE — 3078F DIAST BP <80 MM HG: CPT | Performed by: NURSE PRACTITIONER

## 2023-02-21 PROCEDURE — G8427 DOCREV CUR MEDS BY ELIG CLIN: HCPCS | Performed by: NURSE PRACTITIONER

## 2023-02-21 PROCEDURE — 3074F SYST BP LT 130 MM HG: CPT | Performed by: NURSE PRACTITIONER

## 2023-02-21 PROCEDURE — G8399 PT W/DXA RESULTS DOCUMENT: HCPCS | Performed by: NURSE PRACTITIONER

## 2023-02-21 PROCEDURE — G8417 CALC BMI ABV UP PARAM F/U: HCPCS | Performed by: NURSE PRACTITIONER

## 2023-02-21 PROCEDURE — 1123F ACP DISCUSS/DSCN MKR DOCD: CPT | Performed by: NURSE PRACTITIONER

## 2023-02-21 PROCEDURE — 1090F PRES/ABSN URINE INCON ASSESS: CPT | Performed by: NURSE PRACTITIONER

## 2023-02-21 PROCEDURE — G8482 FLU IMMUNIZE ORDER/ADMIN: HCPCS | Performed by: NURSE PRACTITIONER

## 2023-02-21 PROCEDURE — 1036F TOBACCO NON-USER: CPT | Performed by: NURSE PRACTITIONER

## 2023-02-21 PROCEDURE — 2022F DILAT RTA XM EVC RTNOPTHY: CPT | Performed by: NURSE PRACTITIONER

## 2023-02-21 PROCEDURE — 3017F COLORECTAL CA SCREEN DOC REV: CPT | Performed by: NURSE PRACTITIONER

## 2023-02-21 PROCEDURE — 99214 OFFICE O/P EST MOD 30 MIN: CPT | Performed by: NURSE PRACTITIONER

## 2023-02-21 PROCEDURE — 3046F HEMOGLOBIN A1C LEVEL >9.0%: CPT | Performed by: NURSE PRACTITIONER

## 2023-02-21 RX ORDER — HYDROCODONE BITARTRATE AND ACETAMINOPHEN 5; 325 MG/1; MG/1
1 TABLET ORAL EVERY 8 HOURS PRN
Qty: 90 TABLET | Refills: 0 | Status: SHIPPED | OUTPATIENT
Start: 2023-02-21 | End: 2023-03-23

## 2023-02-21 ASSESSMENT — ENCOUNTER SYMPTOMS
SHORTNESS OF BREATH: 0
CHEST TIGHTNESS: 0
BACK PAIN: 1
EYE DISCHARGE: 1
WHEEZING: 1
EYE REDNESS: 1
GASTROINTESTINAL NEGATIVE: 1

## 2023-02-21 NOTE — PROGRESS NOTES
[address] [facility] [address]  Dept: [phone]  Dept Fax: [phone]: [phone]    Visit Date: 2/21/2023    Functionality Assessment/Goals Worksheet     On a scale of 0 (Does not Interfere) to 10 (Completely Interferes)     1. Which number describes how during the past week pain has interfered with       the following:  A. General Activity:  9  B. Mood: 9  C. Walking Ability:  9  D. Normal Work (Includes both work outside the home and housework):  0  E. Relations with Other People:   7  F. Sleep:   7  G. Enjoyment of Life:   9    2. Patient Prefers to Take their Pain Medications:     [x]  On a regular basis   []  Only when necessary    []  Does not take pain medications    3. What are the Patient's Goals/Expectations for Visiting Pain Management? []  Learn about my pain    []  Receive Medication   []  Physical Therapy     []  Treat Depression   []  Receive Injections    []  Treat Sleep   []  Deal with Anxiety and Stress   []  Treat Opoid Dependence/Addiction   []  Other:      HPI:   Mariusz Guajardo is a 69 y.o. female is here today for    Chief Complaint: Low back pain, leg pain     HPI   2.5 month FU. Continues to have pain in low back- constant aching  and dull pain worse in right side and continued numbness and tingling from knees down. Pain has been up and down has good and bad days,     States pain medications remain very effective decreasing pain to a very tolerable level. Heat helps as well. Pain increases with bending, lifting, twisting , walking, standing, getting up and down, and housework or working at job, cold and wet weather     Medications reviewed. Patient denies side effects with medications. Patient states she is taking medications as prescribed. Shedenies receiving pain medications from other sources.  She denies any ER visits since last visit. Pain scale with out pain medications or at its worst is 8-10/10. Pain scale with pain medications or at its best is 5-6/10. Last dose of Norco was today   Drug screen reviewed from 12/13/2022 and was appropriate  Pill count completed  today and WNL: Yes      The patientis allergic to actos [pioglitazone hydrochloride], pioglitazone, cymbalta [duloxetine hcl], lyrica [pregabalin], and gabapentin. Subjective:      Review of Systems   Constitutional: Negative. Negative for chills, fatigue and fever. HENT: Negative. Eyes:  Positive for discharge, redness and visual disturbance. Wearing corrective glasses    Respiratory:  Positive for wheezing. Negative for chest tightness and shortness of breath. Cardiovascular:  Positive for leg swelling. Negative for chest pain. Gastrointestinal: Negative. Genitourinary:  Negative for flank pain. Musculoskeletal:  Positive for arthralgias, back pain, gait problem, joint swelling and myalgias. Negative for neck pain and neck stiffness. Ambulating with walker    Skin:  Negative for wound. Neurological:  Positive for weakness and numbness. Psychiatric/Behavioral: Negative. Objective:     Vitals:    02/21/23 1150   BP: 126/68   Weight: 190 lb (86.2 kg)   Height: 5' 5\" (1.651 m)       Physical Exam  Vitals and nursing note reviewed. Constitutional:       General: She is not in acute distress. Appearance: Normal appearance. She is well-developed. She is not diaphoretic. HENT:      Head: Normocephalic and atraumatic. Right Ear: External ear normal.      Left Ear: External ear normal.      Nose: Nose normal.      Mouth/Throat:      Pharynx: No oropharyngeal exudate. Eyes:      General: No scleral icterus. Right eye: Discharge present. Left eye: No discharge. Neck:      Thyroid: No thyromegaly. Cardiovascular:      Rate and Rhythm: Normal rate and regular rhythm. Pulses: Normal pulses.       Heart sounds: Normal heart sounds. No murmur heard. No friction rub. No gallop. Pulmonary:      Effort: Pulmonary effort is normal. No respiratory distress. Breath sounds: Wheezing present. No rales. Chest:      Chest wall: No tenderness. Abdominal:      General: Bowel sounds are normal. There is no distension. Palpations: Abdomen is soft. Tenderness: There is no abdominal tenderness. There is no guarding or rebound. Musculoskeletal:         General: Swelling and tenderness present. Left shoulder: Tenderness present. Decreased range of motion. Cervical back: Full passive range of motion without pain, normal range of motion and neck supple. No edema, erythema or rigidity. No muscular tenderness. Normal range of motion. Lumbar back: Tenderness and bony tenderness present. No spasms. Decreased range of motion. Positive right straight leg raise test and positive left straight leg raise test.        Back:       Right lower leg: Edema present. Left lower leg: Edema present. Right ankle: Swelling present. Tenderness present. Decreased range of motion. Left ankle: Swelling present. Tenderness present. Decreased range of motion. Skin:     General: Skin is warm. Coloration: Skin is not pale. Findings: No erythema or rash. Neurological:      Mental Status: She is alert and oriented to person, place, and time. She is not disoriented. Cranial Nerves: No cranial nerve deficit. Sensory: Sensory deficit present. Motor: Weakness present. No atrophy or abnormal muscle tone. Coordination: Coordination normal.      Gait: Gait abnormal.      Deep Tendon Reflexes: Reflexes are normal and symmetric. Babinski sign absent on the right side. Babinski sign absent on the left side. Comments: Ambulating with walker   4/5 bilateral lower extremities   Psychiatric:         Attention and Perception: She is attentive.          Mood and Affect: Mood normal. Mood is not anxious or depressed. Affect is not labile, blunt, angry or inappropriate. Speech: She is communicative. Speech is not rapid and pressured, delayed, slurred or tangential.         Behavior: Behavior is not agitated, slowed, aggressive, withdrawn, hyperactive or combative. Thought Content: Thought content is not paranoid or delusional. Thought content does not include homicidal or suicidal ideation. Thought content does not include homicidal or suicidal plan. Cognition and Memory: Memory is not impaired. She does not exhibit impaired recent memory or impaired remote memory. Judgment: Judgment is not impulsive or inappropriate. RADHA  Patricks test  negative  Yeoman's  or Gaenslen's negative         Assessment:     1. Lumbar spondylosis    2. Lumbar radiculitis    3. Lumbar facet arthropathy    4. Diabetic polyneuropathy associated with type 2 diabetes mellitus ([facility]    5. Chronic pain syndrome            Plan:      OARRS reviewed. Current MED: 15.00  Patient was offered naloxone for home. Discussed long term side effects of medications, tolerance, dependency and addiction. Previous UDS reviewed  UDS preformed today for compliance. Patient told can not receive any pain medications from any other source. No evidence of abuse, diversion or aberrant behavior. Medications and/or procedures to improve function and quality of life- patient understanding with this and that may not be pain free  Discussed with patient about safe storage of medications at home  Discussed possible weaning of medication dosing dependent on treatment/procedure results. Discussed with patient about risks with procedure including infection, reaction to medication, increased pain, or bleeding. Gain reviewed lumbar xray   Discussed procedures again L-facet MBB and IRVIN. Again she does not want at this time. Again discussed therapy.  She reports she wants to wait ill after winter for therapy. States pain is tolerable with current medications  Continue Norco 5/325 TID prn- ordered refill  States allergic to all neuropathic pain medications   Is compliant       Meds. Prescribed:   Orders Placed This Encounter   Medications    HYDROcodone-acetaminophen (NORCO) 5-325 MG per tablet     Sig: Take 1 tablet by mouth every 8 hours as needed for Pain for up to 30 days. Dispense:  90 tablet     Refill:  0     Reduce doses taken as pain becomes manageable         Return in about 10 weeks (around 5/2/2023), or if symptoms worsen or fail to improve, for follow up  for medications.                Electronically signed by DONOVAN Kwok CNP on2/21/2023 at 12:04 PM

## 2023-03-03 ENCOUNTER — NURSE ONLY (OUTPATIENT)
Dept: LAB | Age: 68
End: 2023-03-03

## 2023-03-03 DIAGNOSIS — E83.52 HYPERCALCEMIA: ICD-10-CM

## 2023-03-03 DIAGNOSIS — T86.11 CHRONIC RENAL ALLOGRAFT NEPHROPATHY: ICD-10-CM

## 2023-03-03 DIAGNOSIS — Z94.0 KIDNEY TRANSPLANT RECIPIENT: ICD-10-CM

## 2023-03-03 DIAGNOSIS — E11.21 DIABETIC NEPHROPATHY ASSOCIATED WITH TYPE 2 DIABETES MELLITUS (HCC): ICD-10-CM

## 2023-03-03 LAB
25(OH)D3 SERPL-MCNC: 40 NG/ML (ref 30–100)
ANION GAP SERPL CALC-SCNC: 13 MEQ/L (ref 8–16)
BUN SERPL-MCNC: 21 MG/DL (ref 7–22)
CALCIUM SERPL-MCNC: 10.5 MG/DL (ref 8.5–10.5)
CHLORIDE SERPL-SCNC: 103 MEQ/L (ref 98–111)
CO2 SERPL-SCNC: 28 MEQ/L (ref 23–33)
CREAT SERPL-MCNC: 1.5 MG/DL (ref 0.4–1.2)
CREAT UR-MCNC: 207.8 MG/DL
GFR SERPL CREATININE-BSD FRML MDRD: 37 ML/MIN/1.73M2
GLUCOSE SERPL-MCNC: 180 MG/DL (ref 70–108)
POTASSIUM SERPL-SCNC: 4 MEQ/L (ref 3.5–5.2)
PROT UR-MCNC: 110.5 MG/DL
PROT/CREAT 24H UR: 0.53 MG/G{CREAT}
PTH-INTACT SERPL-MCNC: 121.8 PG/ML (ref 15–65)
SODIUM SERPL-SCNC: 144 MEQ/L (ref 135–145)

## 2023-03-05 LAB
BACTERIA UR CULT: ABNORMAL
ORGANISM: ABNORMAL

## 2023-03-09 ENCOUNTER — OFFICE VISIT (OUTPATIENT)
Dept: NEPHROLOGY | Age: 68
End: 2023-03-09
Payer: MEDICARE

## 2023-03-09 ENCOUNTER — OFFICE VISIT (OUTPATIENT)
Dept: INTERNAL MEDICINE CLINIC | Age: 68
End: 2023-03-09
Payer: MEDICARE

## 2023-03-09 VITALS
WEIGHT: 190 LBS | SYSTOLIC BLOOD PRESSURE: 122 MMHG | DIASTOLIC BLOOD PRESSURE: 56 MMHG | RESPIRATION RATE: 16 BRPM | HEART RATE: 63 BPM | BODY MASS INDEX: 31.62 KG/M2

## 2023-03-09 VITALS
HEIGHT: 65 IN | HEART RATE: 59 BPM | OXYGEN SATURATION: 95 % | WEIGHT: 188 LBS | DIASTOLIC BLOOD PRESSURE: 60 MMHG | BODY MASS INDEX: 31.32 KG/M2 | SYSTOLIC BLOOD PRESSURE: 127 MMHG

## 2023-03-09 DIAGNOSIS — R60.0 BILATERAL LOWER EXTREMITY EDEMA: ICD-10-CM

## 2023-03-09 DIAGNOSIS — N18.32 STAGE 3B CHRONIC KIDNEY DISEASE (HCC): ICD-10-CM

## 2023-03-09 DIAGNOSIS — Z79.4 TYPE 2 DIABETES MELLITUS WITHOUT COMPLICATION, WITH LONG-TERM CURRENT USE OF INSULIN (HCC): Primary | ICD-10-CM

## 2023-03-09 DIAGNOSIS — Z94.0 KIDNEY TRANSPLANT RECIPIENT: Primary | ICD-10-CM

## 2023-03-09 DIAGNOSIS — N25.81 HYPERPARATHYROIDISM, SECONDARY RENAL (HCC): ICD-10-CM

## 2023-03-09 DIAGNOSIS — E11.21 DIABETIC NEPHROPATHY ASSOCIATED WITH TYPE 2 DIABETES MELLITUS (HCC): ICD-10-CM

## 2023-03-09 DIAGNOSIS — I10 PRIMARY HYPERTENSION: ICD-10-CM

## 2023-03-09 DIAGNOSIS — E11.9 TYPE 2 DIABETES MELLITUS WITHOUT COMPLICATION, WITH LONG-TERM CURRENT USE OF INSULIN (HCC): Primary | ICD-10-CM

## 2023-03-09 DIAGNOSIS — T86.11 CHRONIC RENAL ALLOGRAFT NEPHROPATHY: ICD-10-CM

## 2023-03-09 DIAGNOSIS — D63.8 ANEMIA OF CHRONIC DISEASE: ICD-10-CM

## 2023-03-09 LAB — HBA1C MFR BLD: 7.6 % (ref 4.3–5.7)

## 2023-03-09 PROCEDURE — 3078F DIAST BP <80 MM HG: CPT | Performed by: NURSE PRACTITIONER

## 2023-03-09 PROCEDURE — G8482 FLU IMMUNIZE ORDER/ADMIN: HCPCS | Performed by: NURSE PRACTITIONER

## 2023-03-09 PROCEDURE — 3046F HEMOGLOBIN A1C LEVEL >9.0%: CPT | Performed by: NURSE PRACTITIONER

## 2023-03-09 PROCEDURE — 3078F DIAST BP <80 MM HG: CPT | Performed by: INTERNAL MEDICINE

## 2023-03-09 PROCEDURE — 1123F ACP DISCUSS/DSCN MKR DOCD: CPT | Performed by: INTERNAL MEDICINE

## 2023-03-09 PROCEDURE — G8427 DOCREV CUR MEDS BY ELIG CLIN: HCPCS | Performed by: NURSE PRACTITIONER

## 2023-03-09 PROCEDURE — 1123F ACP DISCUSS/DSCN MKR DOCD: CPT | Performed by: NURSE PRACTITIONER

## 2023-03-09 PROCEDURE — G8417 CALC BMI ABV UP PARAM F/U: HCPCS | Performed by: INTERNAL MEDICINE

## 2023-03-09 PROCEDURE — 99214 OFFICE O/P EST MOD 30 MIN: CPT | Performed by: NURSE PRACTITIONER

## 2023-03-09 PROCEDURE — 3074F SYST BP LT 130 MM HG: CPT | Performed by: INTERNAL MEDICINE

## 2023-03-09 PROCEDURE — 2022F DILAT RTA XM EVC RTNOPTHY: CPT | Performed by: INTERNAL MEDICINE

## 2023-03-09 PROCEDURE — 83036 HEMOGLOBIN GLYCOSYLATED A1C: CPT | Performed by: NURSE PRACTITIONER

## 2023-03-09 PROCEDURE — 1036F TOBACCO NON-USER: CPT | Performed by: INTERNAL MEDICINE

## 2023-03-09 PROCEDURE — G8417 CALC BMI ABV UP PARAM F/U: HCPCS | Performed by: NURSE PRACTITIONER

## 2023-03-09 PROCEDURE — 2022F DILAT RTA XM EVC RTNOPTHY: CPT | Performed by: NURSE PRACTITIONER

## 2023-03-09 PROCEDURE — G8482 FLU IMMUNIZE ORDER/ADMIN: HCPCS | Performed by: INTERNAL MEDICINE

## 2023-03-09 PROCEDURE — 3074F SYST BP LT 130 MM HG: CPT | Performed by: NURSE PRACTITIONER

## 2023-03-09 PROCEDURE — 1090F PRES/ABSN URINE INCON ASSESS: CPT | Performed by: NURSE PRACTITIONER

## 2023-03-09 PROCEDURE — 3051F HG A1C>EQUAL 7.0%<8.0%: CPT | Performed by: INTERNAL MEDICINE

## 2023-03-09 PROCEDURE — G8427 DOCREV CUR MEDS BY ELIG CLIN: HCPCS | Performed by: INTERNAL MEDICINE

## 2023-03-09 PROCEDURE — G8399 PT W/DXA RESULTS DOCUMENT: HCPCS | Performed by: INTERNAL MEDICINE

## 2023-03-09 PROCEDURE — 1090F PRES/ABSN URINE INCON ASSESS: CPT | Performed by: INTERNAL MEDICINE

## 2023-03-09 PROCEDURE — G8399 PT W/DXA RESULTS DOCUMENT: HCPCS | Performed by: NURSE PRACTITIONER

## 2023-03-09 PROCEDURE — 1036F TOBACCO NON-USER: CPT | Performed by: NURSE PRACTITIONER

## 2023-03-09 PROCEDURE — 99214 OFFICE O/P EST MOD 30 MIN: CPT | Performed by: INTERNAL MEDICINE

## 2023-03-09 PROCEDURE — 3017F COLORECTAL CA SCREEN DOC REV: CPT | Performed by: NURSE PRACTITIONER

## 2023-03-09 PROCEDURE — 3017F COLORECTAL CA SCREEN DOC REV: CPT | Performed by: INTERNAL MEDICINE

## 2023-03-09 RX ORDER — BUMETANIDE 2 MG/1
1 TABLET ORAL DAILY
Qty: 30 TABLET | Refills: 3 | Status: SHIPPED | OUTPATIENT
Start: 2023-03-09 | End: 2023-03-12

## 2023-03-09 RX ORDER — INSULIN GLARGINE 100 [IU]/ML
25 INJECTION, SOLUTION SUBCUTANEOUS NIGHTLY
Qty: 12 ADJUSTABLE DOSE PRE-FILLED PEN SYRINGE | Refills: 1 | Status: SHIPPED | OUTPATIENT
Start: 2023-03-09

## 2023-03-09 RX ORDER — DOXYCYCLINE HYCLATE 100 MG/1
50 CAPSULE ORAL 2 TIMES DAILY
COMMUNITY

## 2023-03-09 RX ORDER — INSULIN ASPART 100 [IU]/ML
INJECTION, SOLUTION INTRAVENOUS; SUBCUTANEOUS
Qty: 5 ADJUSTABLE DOSE PRE-FILLED PEN SYRINGE | Refills: 1 | Status: SHIPPED | OUTPATIENT
Start: 2023-03-09

## 2023-03-09 RX ORDER — PEN NEEDLE, DIABETIC 31 GX5/16"
NEEDLE, DISPOSABLE MISCELLANEOUS
Qty: 200 EACH | Refills: 3 | Status: SHIPPED | OUTPATIENT
Start: 2023-03-09

## 2023-03-09 ASSESSMENT — ENCOUNTER SYMPTOMS
ABDOMINAL PAIN: 0
ABDOMINAL DISTENTION: 0
DIARRHEA: 0
EYE PAIN: 0
SINUS PRESSURE: 0
NAUSEA: 0
BACK PAIN: 0
BLOOD IN STOOL: 0
SINUS PAIN: 0
PHOTOPHOBIA: 0
SORE THROAT: 0
TROUBLE SWALLOWING: 0
WHEEZING: 0
CONSTIPATION: 0
VOMITING: 0
SHORTNESS OF BREATH: 0
COUGH: 0

## 2023-03-09 NOTE — PROGRESS NOTES
[facility]GEMENT  Archana Chi  Dept: [phone]  Dept Fax: [phone]: [phone]     Visit Date:  3/9/2023    Patient:  Masood Jennings  YOB: 1955    HPI:     Chief Complaint   Patient presents with    Diabetes     Masood Jennings (:  1955) is a 75 y.o. female, here for evaluation of the following medical concerns: Diabetes and HTN     I last seen Cami Hall 6 months ago. She follows routinely with Dr. Hugo Louis. No falls since last visit. Renal transplant on 4/10/20. Follows with Dr. Gail Cancino routinely. Had bilateral angiogram to BLE since last visit with Dr. Lars Yanez. Diabetes-   Cami Hall reports being diabetic for over 25 years. She states her diabetes is now well controlled. A1C 7.6% today in office, was 6.8% previously. She is on Novolog Sliding Scale group 2 before meals and at bedtime, and Lantus 25 units at night. Her appetite is poor. Is using Glucerna as a dietary supplement. States hypoglycemic events present occasionally. She is able to voice signs/symptoms of hypoglycemia. Reports checking glucose 2 times per day, with blood sugars ranging 110-307 per meter download. Dietary modification for diabetes management and/or weight loss encouraged. Does not report difficulty with obtaining medications. She voices understanding of the importance of annual eye exams. Just completed, new glasses. On aspirin 81 mg daily and atorvastatin 40 mg daily. Denies polyuria, polydipsia, polyphagia. Reports neuropathic symptoms including numbness, tingling. She follows routinely with podiatry, Dr. Serafin Curtis every 3 months. Essential HTN-  Reports taking her medications as instructed with no medication side effects. Denies chest pain on exertion, dyspnea on exertion, swelling of ankles, orthostatic dizziness or lightheadedness, and/or palpitations.  /56 in office today.        Medications    Current Outpatient Medications:     insulin glargine (LANTUS SOLOSTAR) 100 UNIT/ML injection pen, Inject 25 Units into the skin nightly, Disp: 12 Adjustable Dose Pre-filled Pen Syringe, Rfl: 1    insulin aspart (NOVOLOG FLEXPEN) 100 UNIT/ML injection pen, Sliding scale insulin coverage Glucose:Dose: If Less [id number] =No Insulin/ 140-199= 3 Units/ 200-249=6 Units/ 250-299= 9 Units/  300-349=12 Units/  350-400=15 Units/ Above 400 = 18 Units max 68 units, Disp: 5 Adjustable Dose Pre-filled Pen Syringe, Rfl: 1    Insulin Pen Needle (B-D ULTRAFINE III SHORT PEN) 31G X 8 MM MISC, Use three times daily Diagnosis Code E11.9, Disp: 200 each, Rfl: 3    HYDROcodone-acetaminophen (NORCO) 5-325 MG per tablet, Take 1 tablet by mouth every 8 hours as needed for Pain for up to 30 days. , Disp: 90 tablet, Rfl: 0    magnesium oxide (MAG-OX) 400 MG tablet, Take 1 tablet by mouth 2 times daily, Disp: 180 tablet, Rfl: 3    NIFEdipine (PROCARDIA XL) 90 MG extended release tablet, Take 1 tablet by mouth in the morning and at bedtime, Disp: 180 tablet, Rfl: 3    carvedilol (COREG) 25 MG tablet, Take 2 tablets by mouth 2 times daily, Disp: 360 tablet, Rfl: 3    nitroGLYCERIN (NITROSTAT) 0.4 MG SL tablet, up to max of 3 total doses.  If no relief after 1 dose, call 911., Disp: 25 tablet, Rfl: 3    rivaroxaban (XARELTO) 2.5 MG TABS tablet, Take 1 tablet by mouth 2 times daily, Disp: 180 tablet, Rfl: 3    iron polysaccharides (FERREX 150) 150 MG capsule, Take 1 capsule by mouth daily, Disp: 60 capsule, Rfl: 11    cloNIDine (CATAPRES) 0.2 MG tablet, Take 1 tablet by mouth in the morning, at noon, and at bedtime, Disp: 90 tablet, Rfl: 11    atorvastatin (LIPITOR) 40 MG tablet, TAKE 1 TABLET DAILY, Disp: 90 tablet, Rfl: 3    tiotropium (SPIRIVA RESPIMAT) 2.5 MCG/ACT AERS inhaler, Inhale 2 puffs into the lungs daily, Disp: 1 each, Rfl: 11    albuterol sulfate HFA (PROAIR HFA) 108 (90 Base) MCG/ACT inhaler, Inhale 2 puffs into the lungs every 6 hours as needed for Wheezing or Shortness of Breath, Disp: 3 each, Rfl: 3    fluticasone (FLONASE) 50 MCG/ACT nasal spray, 1 spray by Each Nostril route daily, Disp: 3 each, Rfl: 3    lactulose (CHRONULAC) 10 GM/15ML solution, Take twice daily as needed for constipation, Disp: 2700 mL, Rfl: 1    famotidine (PEPCID) 40 MG tablet, Take 40 mg by mouth 2 times daily as needed , Disp: , Rfl:     tacrolimus (PROGRAF) 1 MG capsule, Take 1 mg by mouth 5 CAPSULES EVERY MORNING AND 5 CAPSULES EVERY EVENING, Disp: , Rfl:     Mycophenolate Sodium 360 MG TBEC, Take 720 mg by mouth 2 tablets BID, Disp: , Rfl:     linaclotide (LINZESS) 290 MCG CAPS capsule, Take 145 mcg by mouth every other day , Disp: , Rfl:     aspirin 81 MG EC tablet, Take 81 mg by mouth daily. , Disp: , Rfl:     sulfamethoxazole-trimethoprim (BACTRIM DS;SEPTRA DS) 800-160 MG per tablet, Take 1 tablet by mouth 3 times a week, Disp: , Rfl:     The patient is allergic to actos [pioglitazone hydrochloride], pioglitazone, cymbalta [duloxetine hcl], lyrica [pregabalin], and gabapentin.     Past Medical History  Perez Yanes  has a past medical history of Anemia associated with chronic renal failure, Anxiety, Arthritis, Backache, Blood circulation, collateral, Blood transfusion, CAD (coronary artery disease), Cellulitis in diabetic foot ([address] Chest pain, CHF (congestive heart failure) ([address] Chronic anemia, Chronic kidney disease, Chronic kidney disease, stage III (moderate) ([facility]), Chronic renal insufficiency, COPD (chronic obstructive pulmonary disease) ([address] Coronary disease, COVID-19, Depression, Diabetes mellitus, type 2 ([address] Disease of blood and blood forming organ, GERD (gastroesophageal reflux disease), Hemoglobin disease ([address] History of granulomatous disease, HTN (hypertension), Hyperlipemia, Iron deficiency anemia due to dietary causes, Kidney stones, Kidney trouble, MRSA infection, Neuromuscular disorder ([address] Neuropathy, Obesity, CONTRERAS on CPAP, Pneumonia, PONV (postoperative nausea and vomiting), Seizures (HCC), and Sepsis due to Escherichia coli (HCC).    Past Surgical History  The patient  has a past surgical history that includes eye surgery (March 30th, 2012); Carpal tunnel release (1988); Foot surgery (1990); Colonoscopy (2009); Endoscopy, colon, diagnostic (2007); Appendectomy (1980s); back surgery (1990's); Cosmetic surgery (3/30/2012); Ankle surgery (Right, 02-10-14); liver biopsy (6/2015); Lung biopsy (2009); joint replacement (2015); fracture surgery (2015); Cardiac surgery (2008); pr office/outpt visit,procedure only (Left, 8/23/2018); Colonoscopy (Left, 6/10/2019); Upper gastrointestinal endoscopy (Left, 6/14/2019); Diagnostic Cardiac Cath Lab Procedure; Kidney transplant (04/10/2020); Ovary removal (1985); and Hysterectomy (1980 and 1985).    Family History  This patient's family history includes Alcohol Abuse in her father; Arthritis in her mother; Brain Cancer in her sister; Breast Cancer (age of onset: 38) in her paternal cousin; Diabetes in her brother, brother, father, sister, sister, sister, sister, and sister; Early Death in her sister; Heart Disease in her father, mother, sister, and sister; High Blood Pressure in her mother; Kidney Disease in her mother; Mental Illness in her mother; Obesity in her father; Stroke in her mother.    Social History  Avani  reports that she quit smoking about 13 years ago. Her smoking use included cigarettes. She started smoking about 43 years ago. She has a 45.00 pack-year smoking history. She has never used smokeless tobacco. She reports that she does not drink alcohol and does not use drugs.    Health Maintenance:    Health Maintenance   Topic Date Due    Annual Wellness Visit (AWV)  03/11/2021    COVID-19 Vaccine (5 - Booster for Moderna series) 07/11/2022    Low dose CT lung screening  02/25/2023    Depression Screen  06/15/2023    A1C test (Diabetic or Prediabetic)  08/09/2023     Lipids  11/08/2023    Diabetic foot exam  12/21/2023    Diabetic retinal exam  02/15/2024    Breast cancer screen  02/08/2025    Colorectal Cancer Screen  06/10/2029    DTaP/Tdap/Td vaccine (3 - Td or Tdap) 11/28/2030    DEXA (modify frequency per FRAX score)  Completed    Flu vaccine  Completed    Shingles vaccine  Completed    Pneumococcal 65+ years Vaccine  Completed    Hepatitis C screen  Completed    Hepatitis A vaccine  Aged Out    Hib vaccine  Aged Out    Meningococcal (ACWY) vaccine  Aged Out       Subjective:      Review of Systems   Constitutional:  Negative for appetite change, chills, fatigue and fever. HENT:  Negative for congestion, sinus pressure, sinus pain, sore throat and trouble swallowing. Eyes:  Negative for photophobia, pain and visual disturbance. Respiratory:  Negative for cough, shortness of breath and wheezing. Cardiovascular:  Positive for leg swelling. Negative for chest pain and palpitations. Gastrointestinal:  Negative for abdominal distention, abdominal pain, blood in stool, constipation, diarrhea, nausea and vomiting. Endocrine: Negative for polydipsia, polyphagia and polyuria. Genitourinary:  Negative for difficulty urinating, dysuria and hematuria. Musculoskeletal:  Negative for arthralgias, back pain and myalgias. Skin:  Negative for rash and wound. Neurological:  Positive for weakness. Negative for dizziness, tremors, light-headedness and headaches. Psychiatric/Behavioral:  Negative for sleep disturbance. The patient is not nervous/anxious. Objective:     BP (!) 122/56 (Site: Left Lower Arm, Position: Sitting, Cuff Size: Medium Adult)   Pulse 63   Resp 16   Wt 190 lb (86.2 kg)   BMI 31.62 kg/m²     Physical Exam  Constitutional:       General: She is not in acute distress. Appearance: Normal appearance. She is well-developed. HENT:      Head: Normocephalic and atraumatic.       Right Ear: Tympanic membrane, ear canal and external ear normal. Left Ear: Tympanic membrane, ear canal and external ear normal.      Nose: Nose normal. No congestion or rhinorrhea. Mouth/Throat:      Mouth: Mucous membranes are moist.      Pharynx: Oropharynx is clear. No oropharyngeal exudate or posterior oropharyngeal erythema. Eyes:      Extraocular Movements: Extraocular movements intact. Conjunctiva/sclera: Conjunctivae normal.      Pupils: Pupils are equal, round, and reactive to light. Neck:      Thyroid: No thyromegaly. Vascular: No JVD. Cardiovascular:      Rate and Rhythm: Normal rate and regular rhythm. Heart sounds: Normal heart sounds. No murmur heard. No friction rub. No gallop. Pulmonary:      Effort: Pulmonary effort is normal. No respiratory distress. Breath sounds: Normal breath sounds. No wheezing or rales. Abdominal:      General: Bowel sounds are normal. There is no distension. Palpations: Abdomen is soft. Tenderness: There is no abdominal tenderness. Musculoskeletal:         General: Normal range of motion. Cervical back: Normal range of motion and neck supple. Right lower leg: Edema (trace) present. Left lower leg: Edema (trace) present. Lymphadenopathy:      Cervical: No cervical adenopathy. Skin:     General: Skin is warm and dry. Capillary Refill: Capillary refill takes less than 2 seconds. Neurological:      Mental Status: She is alert and oriented to person, place, and time. Motor: No weakness. Coordination: Coordination normal.      Gait: Gait normal.   Psychiatric:         Mood and Affect: Mood normal.         Behavior: Behavior normal.         Thought Content:  Thought content normal.         Judgment: Judgment normal.       Labs Reviewed 3/9/2023:    Lab Results   Component Value Date    WBC 8.5 02/08/2023    HGB 10.0 (L) 02/08/2023    HCT 32.7 (L) 02/08/2023     02/08/2023    CHOL 125 11/08/2022    TRIG 129 11/08/2022    HDL 37 11/08/2022    LDLDIRECT 62.12 11/08/2022    ALT 12 11/08/2022    AST 14 11/08/2022     03/03/2023    K 4.0 03/03/2023     03/03/2023    CREATININE 1.5 (H) 03/03/2023    BUN 21 03/03/2023    CO2 28 03/03/2023    TSH 1.110 02/10/2022    INR 1.07 07/15/2022    LABA1C 6.8 (H) 08/09/2022    LABMICR 20.95 01/04/2018       Assessment/Plan      1. Type 2 diabetes mellitus without complication, with long-term current use of insulin ([facility])    - POCT glycosylated hemoglobin (Hb A1C)  - insulin glargine (LANTUS SOLOSTAR) 100 UNIT/ML injection pen; Inject 25 Units into the skin nightly  Dispense: 12 Adjustable Dose Pre-filled Pen Syringe; Refill: 1  - insulin aspart (NOVOLOG FLEXPEN) 100 UNIT/ML injection pen; Sliding scale insulin coverage Glucose:Dose: If Less [id number] =No Insulin/ 140-199= 3 Units/ 200-249=6 Units/ 250-299= 9 Units/  300-349=12 Units/  350-400=15 Units/ Above 400 = 18 Units max 68 units  Dispense: 5 Adjustable Dose Pre-filled Pen Syringe; Refill: 1  - Insulin Pen Needle (B-D ULTRAFINE III SHORT PEN) 31G X 8 MM MISC; Use three times daily Diagnosis Code E11.9  Dispense: 200 each; Refill: 3  - Increase sliding scale to group 3 TID with meals  - Meter download reviewed  - Encouraged dietary modifications and exercise regimen to promote optimal glycemic control  - Call if any low blood sugars or if consistently > 180  - Follow with ophthalmology and podiatry as scheduled       Return in about 6 months (around 9/9/2023). Patient given educational materials - see patient instructions. Discussed use, benefit, and side effects of prescribed medications. All patient questions answered. Pt voiced understanding. Reviewed health maintenance.        Electronically signed DONOVAN Eaton - CNP on 3/9/23 at 2:07 PM EST

## 2023-03-09 NOTE — PROGRESS NOTES
Renal Progress Note    Assessment and Plan:      Diagnosis Orders   1. Kidney transplant recipient  Basic Metabolic Panel      2. Chronic renal allograft nephropathy        3. Stage 3b chronic kidney disease ([facility]        4. Primary hypertension        5. Diabetic nephropathy associated with type 2 diabetes mellitus (HCC)  Protein / creatinine ratio, urine      6. Anemia of chronic disease  Ferritin    Hemoglobin and Hematocrit    Iron    IRON SATURATION      7. Hyperparathyroidism, secondary renal (HCC)  Vitamin D 25 Hydroxy    PTH, Intact      8. Bilateral lower extremity edema                  PLAN:  Labs reviewed with the patient in [facility] together   She understood   Addressed her questions   Serum creatinine slightly increased to 1.5 mg/dL up from 1.2 mg/dL but is still within her baseline. Vitamin D level is good at 40. PTH is high at 21.8 but does not need any specific treatment as of yet. Medications reviewed  Bumetanide 1 mg a day for 3 days only due to lower extremity edema. Serum potassium is good and should not be of concern. She can consume food rich in potassium those 3 days. Return visit in 3 months with labs. Patient Active Problem List   Diagnosis    Coronary disease    Hyperlipemia    Arthritis    Neuropathy, diabetic ([address]    Leg pain    Obesity (BMI 30-39. 9)    Low HDL (under 40)    History of tobacco use    COPD without exacerbation (HCC)    Anemia, chronic disease    Gout    Urolithiasis    Secondary hyperparathyroidism of renal origin ([address]    Obstructive sleep apnea on CPAP    Vitamin D deficiency    Persistent proteinuria associated with type 2 diabetes mellitus (HCC)    Cellulitis in diabetic foot (HCC)    Persistent proteinuria    Acquired hypothyroidism    Nephrotic syndrome    Iron deficiency anemia due to dietary causes    Accelerated hypertension    Diabetic peripheral neuropathy associated with type 2 diabetes mellitus (HCC)    Acute on chronic diastolic congestive heart failure (HCC)    Chronic constipation    Lymphadenopathy, inguinal    Atelectasis of left lung    Kidney transplant recipient    Hx of coronary artery disease    History of end stage renal disease    Cervical stenosis of spinal canal    Hx of gastroesophageal reflux (GERD)    Polyneuropathy associated with critical illness ([facility])    Pressure injury of back, stage 3 ([facility])    PAD (peripheral artery disease) ([facility])    PVD (peripheral vascular disease) ([facility])    Chronic renal disease, stage III ([facility]) [[id number]]    Chronic systolic (congestive) heart failure           Subjective:   Chief complaint:  Chief Complaint   Patient presents with    Kidney Transplant      HPI:This is a follow up visit for Ms. Aracelis Herrera here today for return appointment. I see for kidney transplant and chronic allograft nephropathy. She was last seen about 3 months ago. She also has kidney stones, hyperparathyroidism, diabetic peripheral polyneuropathy, peripheral vascular disease among other things. She denies any new concern since last time I saw her. Denies chest pain or shortness of breath. She has good appetite. No difficulties with vision. Has UTI and now on Doxycycline. She received cadaveric kidney transplant in April 2020. ROS:  Pertinent positives stated above in HPI. All other systems were reviewed and were negative.   Medications:     Current Outpatient Medications   Medication Sig Dispense Refill    insulin glargine (LANTUS SOLOSTAR) 100 UNIT/ML injection pen Inject 25 Units into the skin nightly 12 Adjustable Dose Pre-filled Pen Syringe 1    insulin aspart (NOVOLOG FLEXPEN) 100 UNIT/ML injection pen Sliding scale insulin coverage Glucose:Dose: If Less [id number] =No Insulin/ 140-199= 3 Units/ 200-249=6 Units/ 250-299= 9 Units/  300-349=12 Units/  350-400=15 Units/ Above 400 = 18 Units max 68 units 5 Adjustable Dose Pre-filled Pen Syringe 1    Insulin Pen Needle (B-D ULTRAFINE III SHORT PEN) 31G X 8 MM MISC Use three times daily Diagnosis Code E11.9 200 each 3    doxycycline hyclate (VIBRAMYCIN) 100 MG capsule Take 50 mg by mouth 2 times daily      bumetanide (BUMEX) 2 MG tablet Take 0.5 tablets by mouth daily for 3 days 30 tablet 3    HYDROcodone-acetaminophen (NORCO) 5-325 MG per tablet Take 1 tablet by mouth every 8 hours as needed for Pain for up to 30 days. 90 tablet 0    magnesium oxide (MAG-OX) 400 MG tablet Take 1 tablet by mouth 2 times daily 180 tablet 3    NIFEdipine (PROCARDIA XL) 90 MG extended release tablet Take 1 tablet by mouth in the morning and at bedtime 180 tablet 3    carvedilol (COREG) 25 MG tablet Take 2 tablets by mouth 2 times daily 360 tablet 3    nitroGLYCERIN (NITROSTAT) 0.4 MG SL tablet up to max of 3 total doses.  If no relief after 1 dose, call 911. 25 tablet 3    rivaroxaban (XARELTO) 2.5 MG TABS tablet Take 1 tablet by mouth 2 times daily 180 tablet 3    iron polysaccharides (FERREX 150) 150 MG capsule Take 1 capsule by mouth daily 60 capsule 11    cloNIDine (CATAPRES) 0.2 MG tablet Take 1 tablet by mouth in the morning, at noon, and at bedtime 90 tablet 11    atorvastatin (LIPITOR) 40 MG tablet TAKE 1 TABLET DAILY 90 tablet 3    tiotropium (SPIRIVA RESPIMAT) 2.5 MCG/ACT AERS inhaler Inhale 2 puffs into the lungs daily 1 each 11    albuterol sulfate HFA (PROAIR HFA) 108 (90 Base) MCG/ACT inhaler Inhale 2 puffs into the lungs every 6 hours as needed for Wheezing or Shortness of Breath 3 each 3    fluticasone (FLONASE) 50 MCG/ACT nasal spray 1 spray by Each Nostril route daily 3 each 3    sulfamethoxazole-trimethoprim (BACTRIM DS;SEPTRA DS) 800-160 MG per tablet Take 1 tablet by mouth 3 times a week      lactulose (CHRONULAC) 10 GM/15ML solution Take twice daily as needed for constipation 2700 mL 1    famotidine (PEPCID) 40 MG tablet Take 40 mg by mouth 2 times daily as needed       tacrolimus (PROGRAF) 1 MG capsule Take 1 mg by mouth 5 CAPSULES EVERY MORNING AND 5 CAPSULES EVERY EVENING      Mycophenolate Sodium 360 MG TBEC Take 720 mg by mouth 2 tablets BID      linaclotide (LINZESS) 290 MCG CAPS capsule Take 145 mcg by mouth every other day       aspirin 81 MG EC tablet Take 81 mg by mouth daily. No current facility-administered medications for this visit.        Lab Results:    CBC:   Lab Results   Component Value Date    WBC 8.5 02/08/2023    HGB 10.0 (L) 02/08/2023    HCT 32.7 (L) 02/08/2023    MCV 82.4 02/08/2023     02/08/2023     BMP:    Lab Results   Component Value Date     03/03/2023     02/08/2023     11/08/2022    K 4.0 03/03/2023    K 4.3 02/08/2023    K 4.0 11/08/2022     03/03/2023     02/08/2023     11/08/2022    CO2 28 03/03/2023    CO2 26 02/08/2023    CO2 25 11/08/2022    BUN 21 03/03/2023    BUN 23 (H) 02/08/2023    BUN 21 11/08/2022    CREATININE 1.5 (H) 03/03/2023    CREATININE 1.2 02/08/2023    CREATININE 1.3 (H) 11/08/2022    GLUCOSE 180 (H) 03/03/2023    GLUCOSE 228 (H) 02/08/2023    GLUCOSE 244 (H) 11/08/2022      Hepatic:   Lab Results   Component Value Date    AST 14 11/08/2022    AST 15 04/08/2022    AST 17 04/07/2022    ALT 12 11/08/2022    ALT 16 04/08/2022    ALT 15 04/07/2022    BILITOT 0.3 11/08/2022    BILITOT 0.2 (L) 04/08/2022    BILITOT 0.2 (L) 04/07/2022    ALKPHOS 99 11/08/2022    ALKPHOS 98 04/08/2022    ALKPHOS 105 04/07/2022     BNP: No results found for: BNP  Lipids:   Lab Results   Component Value Date    CHOL 125 11/08/2022    HDL 37 11/08/2022     INR:   Lab Results   Component Value Date    INR 1.07 07/15/2022    INR 1.04 04/08/2022    INR 1.28 (H) 08/02/2020     URINE:   Lab Results   Component Value Date/Time    PROTUR 110.5 03/03/2023 01:54 PM     Lab Results   Component Value Date/Time    NITRU NEGATIVE 06/23/2021 02:00 PM    COLORU YELLOW 06/23/2021 02:00 PM    PHUR 6.0 06/23/2021 02:00 PM    LABCAST 4-8 HYALINE 06/07/2021 01:00 PM    LABCAST NONE SEEN 06/07/2021 01:00 PM    WBCUA > 200 06/09/2021 06:50 PM    RBCUA 3-5 06/09/2021 06:50 PM    MUCUS THREADS 12/08/2020 10:00 AM    YEAST NONE SEEN 06/09/2021 06:50 PM    BACTERIA MODERATE 06/09/2021 06:50 PM    SPECGRAV 1.014 06/23/2021 02:00 PM    LEUKOCYTESUR NEGATIVE 06/23/2021 02:00 PM    LEUKOCYTESUR MODERATE 06/09/2021 06:50 PM    UROBILINOGEN 0.2 06/23/2021 02:00 PM    BILIRUBINUR NEGATIVE 06/23/2021 02:00 PM    BILIRUBINUR NEGATIVE 09/06/2011 02:00 PM    BLOODU NEGATIVE 06/23/2021 02:00 PM    GLUCOSEU 100 06/09/2021 06:50 PM    KETUA NEGATIVE 06/23/2021 02:00 PM    AMORPHOUS URATES 04/20/2021 10:52 AM      Microalbumen/Creatinine ratio:  No components found for: RUCREAT    Objective:   Vitals: /60 (Site: Right Upper Arm, Position: Sitting, Cuff Size: Large Adult)   Pulse 59   Ht 5' 5\" (1.651 m)   Wt 188 lb (85.3 kg)   SpO2 95%   BMI 31.28 kg/m²      Constitutional:  Alert, awake, no apparent distress  Skin:normal with no rash or any significant lesions  HEENT:Pupils are reactive . Throat is clear. Oral mucosa is moist.  Neck:supple with no thyromegaly, JVD, lymphadenopathy or bruit **  Cardiovascular: Regular sinus rhythm without murmur, rubs or gallops   Respiratory:  Clear to auscultation with no wheezes or rales  Abdomen: Good bowel sound, soft, non tender and no bruit  Ext: Bilateral 2+ LE edema  Musculoskeletal:Intact  Neuro:Alert, awake and oriented with no obvious focal deficit. Speech is normal.**    Electronically signed by Eduard Ayon MD on 3/9/2023 at 3:18 PM   **This report has been created using voice recognition software. It maycontain minor  errors which are inherent in voice recognition technology. **

## 2023-03-14 RX ORDER — RIVAROXABAN 2.5 MG/1
TABLET, FILM COATED ORAL
Qty: 180 TABLET | Refills: 1 | Status: SHIPPED | OUTPATIENT
Start: 2023-03-14

## 2023-03-20 DIAGNOSIS — G89.4 CHRONIC PAIN SYNDROME: ICD-10-CM

## 2023-03-20 RX ORDER — HYDROCODONE BITARTRATE AND ACETAMINOPHEN 5; 325 MG/1; MG/1
1 TABLET ORAL EVERY 8 HOURS PRN
Qty: 90 TABLET | Refills: 0 | Status: SHIPPED | OUTPATIENT
Start: 2023-03-23 | End: 2023-04-22

## 2023-03-20 NOTE — TELEPHONE ENCOUNTER
Asim Olivares called requesting a refill on the following medications:  Requested Prescriptions     Pending Prescriptions Disp Refills    HYDROcodone-acetaminophen (NORCO) 5-325 MG per tablet 90 tablet 0     Sig: Take 1 tablet by mouth every 8 hours as needed for Pain for up to 30 days.      Pharmacy verified:  sathish  [facility]/pharmacy #[id number]- [place], [place] - [address] [facility] [phone]    Date of last visit: 02/21/2023  Date of next visit (if applicable): 0/4/7902

## 2023-03-28 NOTE — CARE COORDINATION
8/3/20, 7:35 AM EDT  DISCHARGE PLANNING EVALUATION:    Haley Erickson       Admitted from: ER 8/2/2020/ MEDICAL/DENTAL FACILITY AT [place] day: 1   Location: [facility]06/006-A Reason for admit: UTI (urinary tract infection) [N39.0] Status: IP   Admit order signed?: yes  PMH:  has a past medical history of Anemia associated with chronic renal failure, Anxiety, Arthritis, Backache, Blood circulation, collateral, Blood transfusion, CAD (coronary artery disease), Cellulitis in diabetic foot ([address] Chest pain, CHF (congestive heart failure) ([address] Chronic anemia, Chronic kidney disease, Chronic kidney disease, stage III (moderate) ([address] Chronic renal insufficiency, COPD (chronic obstructive pulmonary disease) ([address] Coronary disease, Depression, Diabetes mellitus, type 2 ([address] Disease of blood and blood forming organ, GERD (gastroesophageal reflux disease), Hemoglobin disease ([address] History of granulomatous disease ([address] HTN (hypertension), Hyperlipemia, Iron deficiency anemia due to dietary causes, Kidney stones, Kidney trouble, MRSA infection, Neuromuscular disorder ([address] Neuropathy, Obesity, CONTRERAS on CPAP, Pneumonia, PONV (postoperative nausea and vomiting), and Seizures ([address]     Medications:  Scheduled Meds:   sodium chloride flush  10 mL Intravenous 2 times per day    enoxaparin  40 mg Subcutaneous Daily    famotidine (PEPCID) injection  20 mg Intravenous Daily    lisinopril  5 mg Oral Daily    amLODIPine  10 mg Oral Daily    carvedilol  37.5 mg Oral BID    cloNIDine  0.3 mg Oral TID    magnesium replacement protocol   Other RX Placeholder    aspirin  81 mg Oral Daily    atorvastatin  40 mg Oral Daily    insulin glargine  25 Units Subcutaneous Nightly    insulin lispro  0-6 Units Subcutaneous TID WC    insulin lispro  0-3 Units Subcutaneous Nightly    tiotropium  2 puff Inhalation Daily    tacrolimus  8 mg Oral BID    sulfamethoxazole-trimethoprim  1 tablet Oral Daily    cefTRIAXone (ROCEPHIN) IV  1 g Intravenous Q24H What Type Of Note Output Would You Prefer (Optional)?: Bullet Format Is This A New Presentation, Or A Follow-Up?: Growth

## 2023-04-03 ENCOUNTER — TELEPHONE (OUTPATIENT)
Dept: NEPHROLOGY | Age: 68
End: 2023-04-03

## 2023-04-03 DIAGNOSIS — J41.0 SIMPLE CHRONIC BRONCHITIS (HCC): ICD-10-CM

## 2023-04-03 RX ORDER — ALBUTEROL SULFATE 90 UG/1
AEROSOL, METERED RESPIRATORY (INHALATION)
Qty: 34 G | Refills: 3 | Status: ON HOLD | OUTPATIENT
Start: 2023-04-03

## 2023-04-03 RX ORDER — BUMETANIDE 2 MG/1
TABLET ORAL
Qty: 15 TABLET | Refills: 7 | Status: ON HOLD | OUTPATIENT
Start: 2023-04-03

## 2023-04-05 ENCOUNTER — APPOINTMENT (OUTPATIENT)
Dept: GENERAL RADIOLOGY | Age: 68
DRG: 698 | End: 2023-04-05
Payer: MEDICARE

## 2023-04-05 ENCOUNTER — HOSPITAL ENCOUNTER (INPATIENT)
Age: 68
LOS: 4 days | Discharge: HOME HEALTH | DRG: 698 | End: 2023-04-10
Attending: STUDENT IN AN ORGANIZED HEALTH CARE EDUCATION/TRAINING PROGRAM | Admitting: STUDENT IN AN ORGANIZED HEALTH CARE EDUCATION/TRAINING PROGRAM
Payer: MEDICARE

## 2023-04-05 DIAGNOSIS — G89.29 CHRONIC RIGHT-SIDED LOW BACK PAIN WITHOUT SCIATICA: ICD-10-CM

## 2023-04-05 DIAGNOSIS — M54.50 CHRONIC RIGHT-SIDED LOW BACK PAIN WITHOUT SCIATICA: ICD-10-CM

## 2023-04-05 DIAGNOSIS — N39.0 URINARY TRACT INFECTION WITHOUT HEMATURIA, SITE UNSPECIFIED: ICD-10-CM

## 2023-04-05 DIAGNOSIS — N17.9 AKI (ACUTE KIDNEY INJURY) (HCC): Primary | ICD-10-CM

## 2023-04-05 LAB
ALBUMIN SERPL BCG-MCNC: 4.1 G/DL (ref 3.5–5.1)
ALP SERPL-CCNC: 89 U/L (ref 38–126)
ALT SERPL W/O P-5'-P-CCNC: 11 U/L (ref 11–66)
ANION GAP SERPL CALC-SCNC: 17 MEQ/L (ref 8–16)
AST SERPL-CCNC: 11 U/L (ref 5–40)
BACTERIA URNS QL MICRO: ABNORMAL /HPF
BASOPHILS ABSOLUTE: 0 THOU/MM3 (ref 0–0.1)
BASOPHILS NFR BLD AUTO: 0.2 %
BILIRUB SERPL-MCNC: 0.9 MG/DL (ref 0.3–1.2)
BILIRUB UR QL STRIP.AUTO: NEGATIVE
BUN SERPL-MCNC: 34 MG/DL (ref 7–22)
CALCIUM SERPL-MCNC: 9.7 MG/DL (ref 8.5–10.5)
CASTS #/AREA URNS LPF: ABNORMAL /LPF
CASTS 2: ABNORMAL /LPF
CHARACTER UR: ABNORMAL
CHLORIDE SERPL-SCNC: 95 MEQ/L (ref 98–111)
CO2 SERPL-SCNC: 24 MEQ/L (ref 23–33)
COLOR: YELLOW
CREAT SERPL-MCNC: 2.2 MG/DL (ref 0.4–1.2)
CRYSTALS URNS MICRO: ABNORMAL
DEPRECATED RDW RBC AUTO: 48.2 FL (ref 35–45)
EOSINOPHIL NFR BLD AUTO: 0 %
EOSINOPHILS ABSOLUTE: 0 THOU/MM3 (ref 0–0.4)
EPITHELIAL CELLS, UA: ABNORMAL /HPF
ERYTHROCYTE [DISTWIDTH] IN BLOOD BY AUTOMATED COUNT: 15.9 % (ref 11.5–14.5)
FLUAV RNA RESP QL NAA+PROBE: NOT DETECTED
FLUBV RNA RESP QL NAA+PROBE: NOT DETECTED
GFR SERPL CREATININE-BSD FRML MDRD: 24 ML/MIN/1.73M2
GLUCOSE SERPL-MCNC: 269 MG/DL (ref 70–108)
GLUCOSE UR QL STRIP.AUTO: NEGATIVE MG/DL
HCT VFR BLD AUTO: 32.8 % (ref 37–47)
HGB BLD-MCNC: 9.6 GM/DL (ref 12–16)
HGB UR QL STRIP.AUTO: ABNORMAL
IMM GRANULOCYTES # BLD AUTO: 0.35 THOU/MM3 (ref 0–0.07)
IMM GRANULOCYTES NFR BLD AUTO: 1.7 %
KETONES UR QL STRIP.AUTO: NEGATIVE
LIPASE SERPL-CCNC: 14.8 U/L (ref 5.6–51.3)
LYMPHOCYTES ABSOLUTE: 0.5 THOU/MM3 (ref 1–4.8)
LYMPHOCYTES NFR BLD AUTO: 2.4 %
MCH RBC QN AUTO: 24.5 PG (ref 26–33)
MCHC RBC AUTO-ENTMCNC: 29.3 GM/DL (ref 32.2–35.5)
MCV RBC AUTO: 83.7 FL (ref 81–99)
MISCELLANEOUS 2: ABNORMAL
MONOCYTES ABSOLUTE: 1.3 THOU/MM3 (ref 0.4–1.3)
MONOCYTES NFR BLD AUTO: 6.1 %
NEUTROPHILS NFR BLD AUTO: 89.6 %
NITRITE UR QL STRIP: NEGATIVE
NRBC BLD AUTO-RTO: 0 /100 WBC
NT-PROBNP SERPL IA-MCNC: 3964 PG/ML (ref 0–124)
OSMOLALITY SERPL CALC.SUM OF ELEC: 289 MOSMOL/KG (ref 275–300)
PH UR STRIP.AUTO: 5 [PH] (ref 5–9)
PLATELET # BLD AUTO: 241 THOU/MM3 (ref 130–400)
PMV BLD AUTO: 11.5 FL (ref 9.4–12.4)
POTASSIUM SERPL-SCNC: 3.7 MEQ/L (ref 3.5–5.2)
PROT SERPL-MCNC: 6.6 G/DL (ref 6.1–8)
PROT UR STRIP.AUTO-MCNC: 100 MG/DL
RBC # BLD AUTO: 3.92 MILL/MM3 (ref 4.2–5.4)
RBC URINE: ABNORMAL /HPF
RENAL EPI CELLS #/AREA URNS HPF: ABNORMAL /[HPF]
SARS-COV-2 RNA RESP QL NAA+PROBE: NOT DETECTED
SEGMENTED NEUTROPHILS ABSOLUTE COUNT: 18.9 THOU/MM3 (ref 1.8–7.7)
SODIUM SERPL-SCNC: 136 MEQ/L (ref 135–145)
SP GR UR REFRACT.AUTO: 1.02 (ref 1–1.03)
TROPONIN T: 0.02 NG/ML
UROBILINOGEN, URINE: 0.2 EU/DL (ref 0–1)
WBC # BLD AUTO: 21.1 THOU/MM3 (ref 4.8–10.8)
WBC #/AREA URNS HPF: > 200 /HPF
WBC #/AREA URNS HPF: ABNORMAL /[HPF]
YEAST LIKE FUNGI URNS QL MICRO: ABNORMAL

## 2023-04-05 PROCEDURE — 81001 URINALYSIS AUTO W/SCOPE: CPT

## 2023-04-05 PROCEDURE — 93005 ELECTROCARDIOGRAM TRACING: CPT | Performed by: STUDENT IN AN ORGANIZED HEALTH CARE EDUCATION/TRAINING PROGRAM

## 2023-04-05 PROCEDURE — 87086 URINE CULTURE/COLONY COUNT: CPT

## 2023-04-05 PROCEDURE — 84484 ASSAY OF TROPONIN QUANT: CPT

## 2023-04-05 PROCEDURE — 71046 X-RAY EXAM CHEST 2 VIEWS: CPT

## 2023-04-05 PROCEDURE — 96374 THER/PROPH/DIAG INJ IV PUSH: CPT

## 2023-04-05 PROCEDURE — 80053 COMPREHEN METABOLIC PANEL: CPT

## 2023-04-05 PROCEDURE — 6360000002 HC RX W HCPCS: Performed by: STUDENT IN AN ORGANIZED HEALTH CARE EDUCATION/TRAINING PROGRAM

## 2023-04-05 PROCEDURE — 2580000003 HC RX 258: Performed by: STUDENT IN AN ORGANIZED HEALTH CARE EDUCATION/TRAINING PROGRAM

## 2023-04-05 PROCEDURE — 83690 ASSAY OF LIPASE: CPT

## 2023-04-05 PROCEDURE — 6370000000 HC RX 637 (ALT 250 FOR IP): Performed by: STUDENT IN AN ORGANIZED HEALTH CARE EDUCATION/TRAINING PROGRAM

## 2023-04-05 PROCEDURE — 87636 SARSCOV2 & INF A&B AMP PRB: CPT

## 2023-04-05 PROCEDURE — 87077 CULTURE AEROBIC IDENTIFY: CPT

## 2023-04-05 PROCEDURE — 36415 COLL VENOUS BLD VENIPUNCTURE: CPT

## 2023-04-05 PROCEDURE — 87186 SC STD MICRODIL/AGAR DIL: CPT

## 2023-04-05 PROCEDURE — 99285 EMERGENCY DEPT VISIT HI MDM: CPT

## 2023-04-05 PROCEDURE — 85025 COMPLETE CBC W/AUTO DIFF WBC: CPT

## 2023-04-05 PROCEDURE — 96375 TX/PRO/DX INJ NEW DRUG ADDON: CPT

## 2023-04-05 PROCEDURE — 83880 ASSAY OF NATRIURETIC PEPTIDE: CPT

## 2023-04-05 RX ORDER — 0.9 % SODIUM CHLORIDE 0.9 %
1000 INTRAVENOUS SOLUTION INTRAVENOUS ONCE
Status: COMPLETED | OUTPATIENT
Start: 2023-04-05 | End: 2023-04-06

## 2023-04-05 RX ORDER — FENTANYL CITRATE 50 UG/ML
50 INJECTION, SOLUTION INTRAMUSCULAR; INTRAVENOUS ONCE
Status: COMPLETED | OUTPATIENT
Start: 2023-04-05 | End: 2023-04-05

## 2023-04-05 RX ORDER — LIDOCAINE 4 G/G
1 PATCH TOPICAL DAILY
Status: DISCONTINUED | OUTPATIENT
Start: 2023-04-05 | End: 2023-04-10 | Stop reason: HOSPADM

## 2023-04-05 RX ORDER — CYCLOBENZAPRINE HCL 10 MG
10 TABLET ORAL ONCE
Status: COMPLETED | OUTPATIENT
Start: 2023-04-05 | End: 2023-04-05

## 2023-04-05 RX ORDER — ONDANSETRON 2 MG/ML
4 INJECTION INTRAMUSCULAR; INTRAVENOUS ONCE
Status: COMPLETED | OUTPATIENT
Start: 2023-04-05 | End: 2023-04-05

## 2023-04-05 RX ADMIN — ONDANSETRON 4 MG: 2 INJECTION INTRAMUSCULAR; INTRAVENOUS at 23:05

## 2023-04-05 RX ADMIN — FENTANYL CITRATE 50 MCG: 50 INJECTION, SOLUTION INTRAMUSCULAR; INTRAVENOUS at 23:05

## 2023-04-05 RX ADMIN — CYCLOBENZAPRINE 10 MG: 10 TABLET, FILM COATED ORAL at 23:05

## 2023-04-05 RX ADMIN — SODIUM CHLORIDE 1000 ML: 9 INJECTION, SOLUTION INTRAVENOUS at 23:04

## 2023-04-05 ASSESSMENT — PAIN SCALES - GENERAL
PAINLEVEL_OUTOF10: 8
PAINLEVEL_OUTOF10: 8

## 2023-04-05 ASSESSMENT — PAIN DESCRIPTION - LOCATION
LOCATION: BACK
LOCATION: BACK

## 2023-04-05 ASSESSMENT — PAIN DESCRIPTION - ORIENTATION: ORIENTATION: RIGHT

## 2023-04-05 ASSESSMENT — PAIN - FUNCTIONAL ASSESSMENT: PAIN_FUNCTIONAL_ASSESSMENT: 0-10

## 2023-04-06 ENCOUNTER — APPOINTMENT (OUTPATIENT)
Dept: ULTRASOUND IMAGING | Age: 68
DRG: 698 | End: 2023-04-06
Payer: MEDICARE

## 2023-04-06 PROBLEM — N10 ACUTE PYELONEPHRITIS: Status: ACTIVE | Noted: 2023-04-06

## 2023-04-06 PROBLEM — N17.9 AKI (ACUTE KIDNEY INJURY) (HCC): Status: ACTIVE | Noted: 2023-04-06

## 2023-04-06 PROBLEM — R26.2 AMBULATORY DYSFUNCTION: Status: ACTIVE | Noted: 2023-04-06

## 2023-04-06 PROBLEM — E11.8 DIABETES MELLITUS TYPE 2 WITH COMPLICATIONS (HCC): Status: ACTIVE | Noted: 2018-08-09

## 2023-04-06 PROBLEM — D72.829 LEUKOCYTOSIS: Status: ACTIVE | Noted: 2023-04-06

## 2023-04-06 PROBLEM — K21.9 GASTROESOPHAGEAL REFLUX DISEASE: Status: ACTIVE | Noted: 2023-04-06

## 2023-04-06 LAB
ANION GAP SERPL CALC-SCNC: 14 MEQ/L (ref 8–16)
BACTERIA UR CULT: ABNORMAL
BASOPHILS ABSOLUTE: 0 THOU/MM3 (ref 0–0.1)
BASOPHILS NFR BLD AUTO: 0.1 %
BUN SERPL-MCNC: 34 MG/DL (ref 7–22)
CALCIUM SERPL-MCNC: 9.1 MG/DL (ref 8.5–10.5)
CHLORIDE SERPL-SCNC: 100 MEQ/L (ref 98–111)
CO2 SERPL-SCNC: 23 MEQ/L (ref 23–33)
CREAT SERPL-MCNC: 1.7 MG/DL (ref 0.4–1.2)
CREAT UR-MCNC: 133.7 MG/DL
DEPRECATED RDW RBC AUTO: 47.9 FL (ref 35–45)
EOSINOPHIL NFR BLD AUTO: 0 %
EOSINOPHILS ABSOLUTE: 0 THOU/MM3 (ref 0–0.4)
ERYTHROCYTE [DISTWIDTH] IN BLOOD BY AUTOMATED COUNT: 15.9 % (ref 11.5–14.5)
FERRITIN SERPL IA-MCNC: 1762 NG/ML (ref 10–291)
GFR SERPL CREATININE-BSD FRML MDRD: 32 ML/MIN/1.73M2
GLUCOSE BLD STRIP.AUTO-MCNC: 232 MG/DL (ref 70–108)
GLUCOSE BLD STRIP.AUTO-MCNC: 257 MG/DL (ref 70–108)
GLUCOSE BLD STRIP.AUTO-MCNC: 261 MG/DL (ref 70–108)
GLUCOSE BLD STRIP.AUTO-MCNC: 285 MG/DL (ref 70–108)
GLUCOSE BLD STRIP.AUTO-MCNC: 292 MG/DL (ref 70–108)
GLUCOSE SERPL-MCNC: 254 MG/DL (ref 70–108)
HCT VFR BLD AUTO: 29.1 % (ref 37–47)
HGB BLD-MCNC: 8.7 GM/DL (ref 12–16)
HGB RETIC QN AUTO: 24.8 PG (ref 28.2–35.7)
IMM GRANULOCYTES # BLD AUTO: 0.16 THOU/MM3 (ref 0–0.07)
IMM GRANULOCYTES NFR BLD AUTO: 1.1 %
IMM RETICS NFR: 9.8 % (ref 3–15.9)
IRON SATN MFR SERPL: 9 % (ref 20–50)
IRON SERPL-MCNC: 12 UG/DL (ref 50–170)
LACTIC ACID, SEPSIS: 1.2 MMOL/L (ref 0.5–1.9)
LACTIC ACID, SEPSIS: 1.4 MMOL/L (ref 0.5–1.9)
LYMPHOCYTES ABSOLUTE: 0.4 THOU/MM3 (ref 1–4.8)
LYMPHOCYTES NFR BLD AUTO: 2.5 %
MAGNESIUM SERPL-MCNC: 2 MG/DL (ref 1.6–2.4)
MCH RBC QN AUTO: 24.9 PG (ref 26–33)
MCHC RBC AUTO-ENTMCNC: 29.9 GM/DL (ref 32.2–35.5)
MCV RBC AUTO: 83.4 FL (ref 81–99)
MONOCYTES ABSOLUTE: 1 THOU/MM3 (ref 0.4–1.3)
MONOCYTES NFR BLD AUTO: 6.6 %
MRSA DNA SPEC QL NAA+PROBE: NEGATIVE
NEUTROPHILS NFR BLD AUTO: 89.7 %
NRBC BLD AUTO-RTO: 0 /100 WBC
ORGANISM: ABNORMAL
PHOSPHATE SERPL-MCNC: 2.9 MG/DL (ref 2.4–4.7)
PLATELET # BLD AUTO: 199 THOU/MM3 (ref 130–400)
PMV BLD AUTO: 11.3 FL (ref 9.4–12.4)
POTASSIUM SERPL-SCNC: 3.7 MEQ/L (ref 3.5–5.2)
RBC # BLD AUTO: 3.49 MILL/MM3 (ref 4.2–5.4)
RETICS # AUTO: 45 THOU/MM3 (ref 20–115)
RETICS/RBC NFR AUTO: 1.4 % (ref 0.5–2)
SEGMENTED NEUTROPHILS ABSOLUTE COUNT: 13.3 THOU/MM3 (ref 1.8–7.7)
SODIUM SERPL-SCNC: 137 MEQ/L (ref 135–145)
SODIUM UR-SCNC: < 20 MEQ/L
TIBC SERPL-MCNC: 137 UG/DL (ref 171–450)
TROPONIN T: < 0.01 NG/ML
UUN 24H UR-MCNC: 641 MG/DL
WBC # BLD AUTO: 14.8 THOU/MM3 (ref 4.8–10.8)

## 2023-04-06 PROCEDURE — 83605 ASSAY OF LACTIC ACID: CPT

## 2023-04-06 PROCEDURE — 80048 BASIC METABOLIC PNL TOTAL CA: CPT

## 2023-04-06 PROCEDURE — 2580000003 HC RX 258: Performed by: STUDENT IN AN ORGANIZED HEALTH CARE EDUCATION/TRAINING PROGRAM

## 2023-04-06 PROCEDURE — 97166 OT EVAL MOD COMPLEX 45 MIN: CPT

## 2023-04-06 PROCEDURE — 85046 RETICYTE/HGB CONCENTRATE: CPT

## 2023-04-06 PROCEDURE — 99222 1ST HOSP IP/OBS MODERATE 55: CPT | Performed by: INTERNAL MEDICINE

## 2023-04-06 PROCEDURE — 82948 REAGENT STRIP/BLOOD GLUCOSE: CPT

## 2023-04-06 PROCEDURE — 87186 SC STD MICRODIL/AGAR DIL: CPT

## 2023-04-06 PROCEDURE — 97530 THERAPEUTIC ACTIVITIES: CPT

## 2023-04-06 PROCEDURE — 82728 ASSAY OF FERRITIN: CPT

## 2023-04-06 PROCEDURE — 82607 VITAMIN B-12: CPT

## 2023-04-06 PROCEDURE — 87641 MR-STAPH DNA AMP PROBE: CPT

## 2023-04-06 PROCEDURE — 84466 ASSAY OF TRANSFERRIN: CPT

## 2023-04-06 PROCEDURE — 84540 ASSAY OF URINE/UREA-N: CPT

## 2023-04-06 PROCEDURE — 94761 N-INVAS EAR/PLS OXIMETRY MLT: CPT

## 2023-04-06 PROCEDURE — 87040 BLOOD CULTURE FOR BACTERIA: CPT

## 2023-04-06 PROCEDURE — 94640 AIRWAY INHALATION TREATMENT: CPT

## 2023-04-06 PROCEDURE — 82570 ASSAY OF URINE CREATININE: CPT

## 2023-04-06 PROCEDURE — 1200000000 HC SEMI PRIVATE

## 2023-04-06 PROCEDURE — 84300 ASSAY OF URINE SODIUM: CPT

## 2023-04-06 PROCEDURE — 2700000000 HC OXYGEN THERAPY PER DAY

## 2023-04-06 PROCEDURE — 2580000003 HC RX 258

## 2023-04-06 PROCEDURE — 84100 ASSAY OF PHOSPHORUS: CPT

## 2023-04-06 PROCEDURE — 6370000000 HC RX 637 (ALT 250 FOR IP): Performed by: STUDENT IN AN ORGANIZED HEALTH CARE EDUCATION/TRAINING PROGRAM

## 2023-04-06 PROCEDURE — 85025 COMPLETE CBC W/AUTO DIFF WBC: CPT

## 2023-04-06 PROCEDURE — 87077 CULTURE AEROBIC IDENTIFY: CPT

## 2023-04-06 PROCEDURE — 99223 1ST HOSP IP/OBS HIGH 75: CPT | Performed by: STUDENT IN AN ORGANIZED HEALTH CARE EDUCATION/TRAINING PROGRAM

## 2023-04-06 PROCEDURE — 94669 MECHANICAL CHEST WALL OSCILL: CPT

## 2023-04-06 PROCEDURE — 82746 ASSAY OF FOLIC ACID SERUM: CPT

## 2023-04-06 PROCEDURE — 87801 DETECT AGNT MULT DNA AMPLI: CPT

## 2023-04-06 PROCEDURE — 6370000000 HC RX 637 (ALT 250 FOR IP)

## 2023-04-06 PROCEDURE — 76776 US EXAM K TRANSPL W/DOPPLER: CPT

## 2023-04-06 PROCEDURE — 97535 SELF CARE MNGMENT TRAINING: CPT

## 2023-04-06 PROCEDURE — 80197 ASSAY OF TACROLIMUS: CPT

## 2023-04-06 PROCEDURE — 93010 ELECTROCARDIOGRAM REPORT: CPT | Performed by: NUCLEAR MEDICINE

## 2023-04-06 PROCEDURE — 36415 COLL VENOUS BLD VENIPUNCTURE: CPT

## 2023-04-06 PROCEDURE — 6360000002 HC RX W HCPCS: Performed by: STUDENT IN AN ORGANIZED HEALTH CARE EDUCATION/TRAINING PROGRAM

## 2023-04-06 PROCEDURE — 83540 ASSAY OF IRON: CPT

## 2023-04-06 PROCEDURE — 84484 ASSAY OF TROPONIN QUANT: CPT

## 2023-04-06 PROCEDURE — 83735 ASSAY OF MAGNESIUM: CPT

## 2023-04-06 RX ORDER — ACETAMINOPHEN 650 MG/1
650 SUPPOSITORY RECTAL EVERY 6 HOURS PRN
Status: DISCONTINUED | OUTPATIENT
Start: 2023-04-06 | End: 2023-04-10 | Stop reason: HOSPADM

## 2023-04-06 RX ORDER — ASPIRIN 81 MG/1
81 TABLET ORAL DAILY
Status: DISCONTINUED | OUTPATIENT
Start: 2023-04-06 | End: 2023-04-10 | Stop reason: HOSPADM

## 2023-04-06 RX ORDER — MYCOPHENOLIC ACID 180 MG/1
720 TABLET, DELAYED RELEASE ORAL 2 TIMES DAILY
Status: DISCONTINUED | OUTPATIENT
Start: 2023-04-06 | End: 2023-04-06

## 2023-04-06 RX ORDER — POLYETHYLENE GLYCOL 3350 17 G/17G
17 POWDER, FOR SOLUTION ORAL DAILY PRN
Status: DISCONTINUED | OUTPATIENT
Start: 2023-04-06 | End: 2023-04-10 | Stop reason: HOSPADM

## 2023-04-06 RX ORDER — ACETAMINOPHEN 325 MG/1
650 TABLET ORAL EVERY 6 HOURS PRN
Status: DISCONTINUED | OUTPATIENT
Start: 2023-04-06 | End: 2023-04-10 | Stop reason: HOSPADM

## 2023-04-06 RX ORDER — FAMOTIDINE 20 MG/1
20 TABLET, FILM COATED ORAL DAILY
Status: DISCONTINUED | OUTPATIENT
Start: 2023-04-06 | End: 2023-04-10 | Stop reason: HOSPADM

## 2023-04-06 RX ORDER — INSULIN GLARGINE 100 [IU]/ML
25 INJECTION, SOLUTION SUBCUTANEOUS NIGHTLY
Status: DISCONTINUED | OUTPATIENT
Start: 2023-04-06 | End: 2023-04-10 | Stop reason: HOSPADM

## 2023-04-06 RX ORDER — INSULIN LISPRO 100 [IU]/ML
0-4 INJECTION, SOLUTION INTRAVENOUS; SUBCUTANEOUS NIGHTLY
Status: DISCONTINUED | OUTPATIENT
Start: 2023-04-06 | End: 2023-04-06

## 2023-04-06 RX ORDER — CARVEDILOL 25 MG/1
50 TABLET ORAL 2 TIMES DAILY WITH MEALS
Status: DISCONTINUED | OUTPATIENT
Start: 2023-04-06 | End: 2023-04-10 | Stop reason: HOSPADM

## 2023-04-06 RX ORDER — HYDROCODONE BITARTRATE AND ACETAMINOPHEN 5; 325 MG/1; MG/1
1 TABLET ORAL EVERY 8 HOURS PRN
Status: DISCONTINUED | OUTPATIENT
Start: 2023-04-06 | End: 2023-04-10 | Stop reason: HOSPADM

## 2023-04-06 RX ORDER — INSULIN LISPRO 100 [IU]/ML
0-4 INJECTION, SOLUTION INTRAVENOUS; SUBCUTANEOUS NIGHTLY
Status: DISCONTINUED | OUTPATIENT
Start: 2023-04-06 | End: 2023-04-10 | Stop reason: HOSPADM

## 2023-04-06 RX ORDER — INSULIN LISPRO 100 [IU]/ML
0-4 INJECTION, SOLUTION INTRAVENOUS; SUBCUTANEOUS
Status: DISCONTINUED | OUTPATIENT
Start: 2023-04-06 | End: 2023-04-06

## 2023-04-06 RX ORDER — SODIUM CHLORIDE 0.9 % (FLUSH) 0.9 %
5-40 SYRINGE (ML) INJECTION EVERY 12 HOURS SCHEDULED
Status: DISCONTINUED | OUTPATIENT
Start: 2023-04-06 | End: 2023-04-10 | Stop reason: HOSPADM

## 2023-04-06 RX ORDER — TACROLIMUS 1 MG/1
5 CAPSULE ORAL 2 TIMES DAILY
Status: DISCONTINUED | OUTPATIENT
Start: 2023-04-06 | End: 2023-04-07

## 2023-04-06 RX ORDER — MYCOPHENOLIC ACID 180 MG/1
720 TABLET, DELAYED RELEASE ORAL 2 TIMES DAILY
Status: DISCONTINUED | OUTPATIENT
Start: 2023-04-06 | End: 2023-04-08

## 2023-04-06 RX ORDER — SODIUM CHLORIDE, SODIUM LACTATE, POTASSIUM CHLORIDE, CALCIUM CHLORIDE 600; 310; 30; 20 MG/100ML; MG/100ML; MG/100ML; MG/100ML
INJECTION, SOLUTION INTRAVENOUS CONTINUOUS
Status: DISCONTINUED | OUTPATIENT
Start: 2023-04-06 | End: 2023-04-07

## 2023-04-06 RX ORDER — MAGNESIUM HYDROXIDE/ALUMINUM HYDROXICE/SIMETHICONE 120; 1200; 1200 MG/30ML; MG/30ML; MG/30ML
5 SUSPENSION ORAL EVERY 6 HOURS PRN
COMMUNITY

## 2023-04-06 RX ORDER — FLUTICASONE PROPIONATE 50 MCG
1 SPRAY, SUSPENSION (ML) NASAL DAILY
Status: DISCONTINUED | OUTPATIENT
Start: 2023-04-06 | End: 2023-04-10 | Stop reason: HOSPADM

## 2023-04-06 RX ORDER — DEXTROSE MONOHYDRATE 100 MG/ML
INJECTION, SOLUTION INTRAVENOUS CONTINUOUS PRN
Status: DISCONTINUED | OUTPATIENT
Start: 2023-04-06 | End: 2023-04-10 | Stop reason: HOSPADM

## 2023-04-06 RX ORDER — ATORVASTATIN CALCIUM 40 MG/1
40 TABLET, FILM COATED ORAL DAILY
Status: DISCONTINUED | OUTPATIENT
Start: 2023-04-06 | End: 2023-04-10 | Stop reason: HOSPADM

## 2023-04-06 RX ORDER — SODIUM CHLORIDE 9 MG/ML
INJECTION, SOLUTION INTRAVENOUS PRN
Status: DISCONTINUED | OUTPATIENT
Start: 2023-04-06 | End: 2023-04-10 | Stop reason: HOSPADM

## 2023-04-06 RX ORDER — SODIUM CHLORIDE 0.9 % (FLUSH) 0.9 %
5-40 SYRINGE (ML) INJECTION PRN
Status: DISCONTINUED | OUTPATIENT
Start: 2023-04-06 | End: 2023-04-10 | Stop reason: HOSPADM

## 2023-04-06 RX ORDER — INSULIN LISPRO 100 [IU]/ML
0-16 INJECTION, SOLUTION INTRAVENOUS; SUBCUTANEOUS
Status: DISCONTINUED | OUTPATIENT
Start: 2023-04-06 | End: 2023-04-10 | Stop reason: HOSPADM

## 2023-04-06 RX ORDER — HYDROCODONE BITARTRATE AND ACETAMINOPHEN 5; 325 MG/1; MG/1
1 TABLET ORAL EVERY 8 HOURS PRN
Status: DISCONTINUED | OUTPATIENT
Start: 2023-04-06 | End: 2023-04-06

## 2023-04-06 RX ORDER — SODIUM CHLORIDE 9 MG/ML
INJECTION, SOLUTION INTRAVENOUS CONTINUOUS
Status: DISCONTINUED | OUTPATIENT
Start: 2023-04-06 | End: 2023-04-06

## 2023-04-06 RX ORDER — ALBUTEROL SULFATE 90 UG/1
1 AEROSOL, METERED RESPIRATORY (INHALATION) EVERY 4 HOURS PRN
Status: DISCONTINUED | OUTPATIENT
Start: 2023-04-06 | End: 2023-04-10 | Stop reason: HOSPADM

## 2023-04-06 RX ORDER — TACROLIMUS 1 MG/1
5 CAPSULE ORAL 2 TIMES DAILY
Status: DISCONTINUED | OUTPATIENT
Start: 2023-04-06 | End: 2023-04-06

## 2023-04-06 RX ORDER — SODIUM CHLORIDE 9 MG/ML
INJECTION, SOLUTION INTRAVENOUS CONTINUOUS
Status: ACTIVE | OUTPATIENT
Start: 2023-04-06 | End: 2023-04-06

## 2023-04-06 RX ORDER — ONDANSETRON 4 MG/1
4 TABLET, ORALLY DISINTEGRATING ORAL EVERY 8 HOURS PRN
Status: DISCONTINUED | OUTPATIENT
Start: 2023-04-06 | End: 2023-04-10 | Stop reason: HOSPADM

## 2023-04-06 RX ORDER — ONDANSETRON 2 MG/ML
4 INJECTION INTRAMUSCULAR; INTRAVENOUS EVERY 6 HOURS PRN
Status: DISCONTINUED | OUTPATIENT
Start: 2023-04-06 | End: 2023-04-10 | Stop reason: HOSPADM

## 2023-04-06 RX ORDER — CLONIDINE HYDROCHLORIDE 0.2 MG/1
0.2 TABLET ORAL 3 TIMES DAILY
Status: DISCONTINUED | OUTPATIENT
Start: 2023-04-06 | End: 2023-04-10 | Stop reason: HOSPADM

## 2023-04-06 RX ORDER — CALCIUM CARBONATE 200(500)MG
500 TABLET,CHEWABLE ORAL 3 TIMES DAILY PRN
Status: DISCONTINUED | OUTPATIENT
Start: 2023-04-06 | End: 2023-04-10 | Stop reason: HOSPADM

## 2023-04-06 RX ORDER — NIFEDIPINE 90 MG/1
90 TABLET, EXTENDED RELEASE ORAL 2 TIMES DAILY
Status: DISCONTINUED | OUTPATIENT
Start: 2023-04-06 | End: 2023-04-10 | Stop reason: HOSPADM

## 2023-04-06 RX ADMIN — SODIUM CHLORIDE: 9 INJECTION, SOLUTION INTRAVENOUS at 23:59

## 2023-04-06 RX ADMIN — HYDROCODONE BITARTRATE AND ACETAMINOPHEN 1 TABLET: 5; 325 TABLET ORAL at 21:45

## 2023-04-06 RX ADMIN — CLONIDINE HYDROCHLORIDE 0.2 MG: 0.2 TABLET ORAL at 10:31

## 2023-04-06 RX ADMIN — ASPIRIN 81 MG: 81 TABLET, COATED ORAL at 10:25

## 2023-04-06 RX ADMIN — SODIUM CHLORIDE: 9 INJECTION, SOLUTION INTRAVENOUS at 15:49

## 2023-04-06 RX ADMIN — CARVEDILOL 50 MG: 25 TABLET, FILM COATED ORAL at 10:59

## 2023-04-06 RX ADMIN — NIFEDIPINE 90 MG: 90 TABLET, EXTENDED RELEASE ORAL at 03:38

## 2023-04-06 RX ADMIN — FLUTICASONE PROPIONATE 1 SPRAY: 50 SPRAY, METERED NASAL at 10:31

## 2023-04-06 RX ADMIN — FAMOTIDINE 20 MG: 20 TABLET, FILM COATED ORAL at 10:34

## 2023-04-06 RX ADMIN — RIVAROXABAN 2.5 MG: 2.5 TABLET, FILM COATED ORAL at 21:44

## 2023-04-06 RX ADMIN — RIVAROXABAN 2.5 MG: 2.5 TABLET, FILM COATED ORAL at 11:00

## 2023-04-06 RX ADMIN — HYDROCODONE BITARTRATE AND ACETAMINOPHEN 1 TABLET: 5; 325 TABLET ORAL at 03:43

## 2023-04-06 RX ADMIN — SODIUM CHLORIDE, PRESERVATIVE FREE 10 ML: 5 INJECTION INTRAVENOUS at 11:34

## 2023-04-06 RX ADMIN — TACROLIMUS 5 MG: 1 CAPSULE ORAL at 03:38

## 2023-04-06 RX ADMIN — INSULIN GLARGINE 25 UNITS: 100 INJECTION, SOLUTION SUBCUTANEOUS at 03:39

## 2023-04-06 RX ADMIN — Medication: at 05:34

## 2023-04-06 RX ADMIN — SODIUM CHLORIDE: 9 INJECTION, SOLUTION INTRAVENOUS at 01:46

## 2023-04-06 RX ADMIN — MYCOPHENOLIC ACID 720 MG: 180 TABLET, DELAYED RELEASE ORAL at 21:44

## 2023-04-06 RX ADMIN — MYCOPHENOLIC ACID 720 MG: 180 TABLET, DELAYED RELEASE ORAL at 03:39

## 2023-04-06 RX ADMIN — INSULIN LISPRO 2 UNITS: 100 INJECTION, SOLUTION INTRAVENOUS; SUBCUTANEOUS at 12:04

## 2023-04-06 RX ADMIN — INSULIN LISPRO 2 UNITS: 100 INJECTION, SOLUTION INTRAVENOUS; SUBCUTANEOUS at 09:41

## 2023-04-06 RX ADMIN — TIOTROPIUM BROMIDE INHALATION SPRAY 2 PUFF: 3.12 SPRAY, METERED RESPIRATORY (INHALATION) at 09:48

## 2023-04-06 RX ADMIN — TACROLIMUS 5 MG: 1 CAPSULE ORAL at 21:44

## 2023-04-06 RX ADMIN — ATORVASTATIN CALCIUM 40 MG: 40 TABLET, FILM COATED ORAL at 10:26

## 2023-04-06 RX ADMIN — INSULIN LISPRO 8 UNITS: 100 INJECTION, SOLUTION INTRAVENOUS; SUBCUTANEOUS at 17:14

## 2023-04-06 RX ADMIN — HYDROCODONE BITARTRATE AND ACETAMINOPHEN 1 TABLET: 5; 325 TABLET ORAL at 11:47

## 2023-04-06 RX ADMIN — CEFTRIAXONE SODIUM 1000 MG: 1 INJECTION, POWDER, FOR SOLUTION INTRAMUSCULAR; INTRAVENOUS at 01:11

## 2023-04-06 ASSESSMENT — PAIN DESCRIPTION - ORIENTATION
ORIENTATION: RIGHT
ORIENTATION: RIGHT;LOWER
ORIENTATION: RIGHT
ORIENTATION: LOWER;RIGHT

## 2023-04-06 ASSESSMENT — PAIN SCALES - GENERAL
PAINLEVEL_OUTOF10: 0
PAINLEVEL_OUTOF10: 3
PAINLEVEL_OUTOF10: 5
PAINLEVEL_OUTOF10: 3
PAINLEVEL_OUTOF10: 0
PAINLEVEL_OUTOF10: 4
PAINLEVEL_OUTOF10: 4
PAINLEVEL_OUTOF10: 6
PAINLEVEL_OUTOF10: 9
PAINLEVEL_OUTOF10: 5
PAINLEVEL_OUTOF10: 6

## 2023-04-06 ASSESSMENT — ENCOUNTER SYMPTOMS
NAUSEA: 1
VOMITING: 1

## 2023-04-06 ASSESSMENT — PAIN DESCRIPTION - DESCRIPTORS
DESCRIPTORS: ACHING

## 2023-04-06 ASSESSMENT — PAIN DESCRIPTION - LOCATION
LOCATION: BACK;LEG
LOCATION: BACK

## 2023-04-06 ASSESSMENT — PAIN - FUNCTIONAL ASSESSMENT
PAIN_FUNCTIONAL_ASSESSMENT: ACTIVITIES ARE NOT PREVENTED

## 2023-04-06 ASSESSMENT — PAIN DESCRIPTION - PAIN TYPE: TYPE: CHRONIC PAIN

## 2023-04-06 ASSESSMENT — PAIN DESCRIPTION - FREQUENCY: FREQUENCY: CONTINUOUS

## 2023-04-06 ASSESSMENT — PAIN DESCRIPTION - ONSET: ONSET: ON-GOING

## 2023-04-07 ENCOUNTER — APPOINTMENT (OUTPATIENT)
Dept: GENERAL RADIOLOGY | Age: 68
DRG: 698 | End: 2023-04-07
Payer: MEDICARE

## 2023-04-07 LAB
ACB COMPLEX DNA BLD POS QL NAA+NON-PROBE: NOT DETECTED
ANION GAP SERPL CALC-SCNC: 11 MEQ/L (ref 8–16)
ANION GAP SERPL CALC-SCNC: 13 MEQ/L (ref 8–16)
ARTERIAL PATENCY WRIST A: POSITIVE
B FRAGILIS DNA BLD POS QL NAA+NON-PROBE: NOT DETECTED
BACTERIA BLD AEROBE CULT: ABNORMAL
BASE EXCESS BLDA CALC-SCNC: 3.4 MMOL/L (ref -2.5–2.5)
BASOPHILS ABSOLUTE: 0 THOU/MM3 (ref 0–0.1)
BASOPHILS NFR BLD AUTO: 0.1 %
BDY SITE: ABNORMAL
BLACTX-M ISLT/SPM QL: NOT DETECTED
BLAIMP ISLT/SPM QL: NOT DETECTED
BLAKPC ISLT/SPM QL: NOT DETECTED
BLAOXA-48-LIKE ISLT/SPM QL: NOT DETECTED
BLAVIM ISLT/SPM QL: NOT DETECTED
BOTTLE TYPE: ABNORMAL
BUN SERPL-MCNC: 39 MG/DL (ref 7–22)
BUN SERPL-MCNC: 39 MG/DL (ref 7–22)
C ALBICANS DNA BLD POS QL NAA+NON-PROBE: NOT DETECTED
C AURIS DNA BLD POS QL NAA+NON-PROBE: NOT DETECTED
C GATTII+NEOFOR DNA BLD POS QL NAA+N-PRB: NOT DETECTED
C GLABRATA DNA BLD POS QL NAA+NON-PROBE: NOT DETECTED
C KRUSEI DNA BLD POS QL NAA+NON-PROBE: NOT DETECTED
C PARAP DNA BLD POS QL NAA+NON-PROBE: NOT DETECTED
C TROPICLS DNA BLD POS QL NAA+NON-PROBE: NOT DETECTED
CALCIUM SERPL-MCNC: 9.2 MG/DL (ref 8.5–10.5)
CALCIUM SERPL-MCNC: 9.7 MG/DL (ref 8.5–10.5)
CHLORIDE SERPL-SCNC: 98 MEQ/L (ref 98–111)
CHLORIDE SERPL-SCNC: 99 MEQ/L (ref 98–111)
CO2 SERPL-SCNC: 25 MEQ/L (ref 23–33)
CO2 SERPL-SCNC: 26 MEQ/L (ref 23–33)
COAG NEG STAPH DNA BLD QL NAA+PROBE: NOT DETECTED
COLISTIN RES MCR-1 ISLT/SPM QL: NOT DETECTED
COLLECTED BY:: ABNORMAL
CREAT SERPL-MCNC: 1.8 MG/DL (ref 0.4–1.2)
CREAT SERPL-MCNC: 1.9 MG/DL (ref 0.4–1.2)
DEPRECATED RDW RBC AUTO: 48.8 FL (ref 35–45)
E CLOAC COMP DNA BLD POS NAA+NON-PROBE: NOT DETECTED
E COLI DNA BLD POS QL NAA+NON-PROBE: DETECTED
E FAECALIS DNA BLD POS QL NAA+NON-PROBE: NOT DETECTED
E FAECIUM DNA BLD POS QL NAA+NON-PROBE: NOT DETECTED
ENTEROBACTERALES DNA BLD POS NAA+N-PRB: DETECTED
EOSINOPHIL NFR BLD AUTO: 0.6 %
EOSINOPHILS ABSOLUTE: 0.1 THOU/MM3 (ref 0–0.4)
ERYTHROCYTE [DISTWIDTH] IN BLOOD BY AUTOMATED COUNT: 15.8 % (ref 11.5–14.5)
FIO2 ON VENT O2 ANALYZER: 2 %
FOLATE SERPL-MCNC: 4.5 NG/ML (ref 4.8–24.2)
GFR SERPL CREATININE-BSD FRML MDRD: 28 ML/MIN/1.73M2
GFR SERPL CREATININE-BSD FRML MDRD: 30 ML/MIN/1.73M2
GLUCOSE BLD STRIP.AUTO-MCNC: 199 MG/DL (ref 70–108)
GLUCOSE BLD STRIP.AUTO-MCNC: 218 MG/DL (ref 70–108)
GLUCOSE BLD STRIP.AUTO-MCNC: 240 MG/DL (ref 70–108)
GLUCOSE BLD STRIP.AUTO-MCNC: 315 MG/DL (ref 70–108)
GLUCOSE SERPL-MCNC: 200 MG/DL (ref 70–108)
GLUCOSE SERPL-MCNC: 201 MG/DL (ref 70–108)
GP B STREP DNA SPEC QL NAA+PROBE: NOT DETECTED
GP B STREP DNA SPEC QL NAA+PROBE: NOT DETECTED
HAEM INFLU DNA BLD POS QL NAA+NON-PROBE: NOT DETECTED
HCO3 BLDA-SCNC: 28 MMOL/L (ref 23–28)
HCT VFR BLD AUTO: 25.6 % (ref 37–47)
HCT VFR BLD AUTO: 26.2 % (ref 37–47)
HCT VFR BLD AUTO: 27.4 % (ref 37–47)
HGB BLD-MCNC: 7.6 GM/DL (ref 12–16)
HGB BLD-MCNC: 7.7 GM/DL (ref 12–16)
HGB BLD-MCNC: 7.9 GM/DL (ref 12–16)
HYPOCHROMIA BLD QL SMEAR: PRESENT
IMM GRANULOCYTES # BLD AUTO: 0.09 THOU/MM3 (ref 0–0.07)
IMM GRANULOCYTES NFR BLD AUTO: 0.8 %
K OXYTOCA DNA BLD POS QL NAA+NON-PROBE: NOT DETECTED
K OXYTOCA DNA BLD POS QL NAA+NON-PROBE: NOT DETECTED
KLEBSIELLA SP DNA BLD POS QL NAA+NON-PRB: NOT DETECTED
L MONOCYTOG DNA BLD POS QL NAA+NON-PROBE: NOT DETECTED
LYMPHOCYTES ABSOLUTE: 0.4 THOU/MM3 (ref 1–4.8)
LYMPHOCYTES NFR BLD AUTO: 3.1 %
MAGNESIUM SERPL-MCNC: 2.3 MG/DL (ref 1.6–2.4)
MCH RBC QN AUTO: 24.6 PG (ref 26–33)
MCHC RBC AUTO-ENTMCNC: 28.8 GM/DL (ref 32.2–35.5)
MCV RBC AUTO: 85.4 FL (ref 81–99)
MECA ISLT/SPM QL: ABNORMAL
MECA+MECC+MREJ ISLT/SPM QL: ABNORMAL
MONOCYTES ABSOLUTE: 0.9 THOU/MM3 (ref 0.4–1.3)
MONOCYTES NFR BLD AUTO: 7.6 %
N MEN DNA BLD POS QL NAA+NON-PROBE: NOT DETECTED
NDM: NOT DETECTED
NEUTROPHILS NFR BLD AUTO: 87.8 %
NRBC BLD AUTO-RTO: 0 /100 WBC
ORGANISM: ABNORMAL
P AERUGINOSA DNA BLD POS NAA+NON-PROBE: NOT DETECTED
PATHOLOGIST REVIEW: ABNORMAL
PCO2 BLDA: 40 MMHG (ref 35–45)
PH BLDA: 7.45 [PH] (ref 7.35–7.45)
PHOSPHATE SERPL-MCNC: 3 MG/DL (ref 2.4–4.7)
PLATELET # BLD AUTO: 193 THOU/MM3 (ref 130–400)
PLATELET BLD QL SMEAR: ADEQUATE
PMV BLD AUTO: 11.2 FL (ref 9.4–12.4)
PO2 BLDA: 80 MMHG (ref 71–104)
POIKILOCYTES: ABNORMAL
POTASSIUM SERPL-SCNC: 3.8 MEQ/L (ref 3.5–5.2)
POTASSIUM SERPL-SCNC: 3.9 MEQ/L (ref 3.5–5.2)
PROTEUS SPP: NOT DETECTED
RBC # BLD AUTO: 3.21 MILL/MM3 (ref 4.2–5.4)
S AUREUS DNA BLD POS QL NAA+NON-PROBE: NOT DETECTED
S EPIDERMIDIS DNA BLD POS QL NAA+NON-PRB: NOT DETECTED
S LUGDUNENSIS DNA BLD POS QL NAA+NON-PRB: NOT DETECTED
S MALTOPHILIA DNA BLD POS QL NAA+NON-PRB: NOT DETECTED
S MARCESCENS DNA BLD POS NAA+NON-PROBE: NOT DETECTED
S PYO DNA THROAT QL NAA+PROBE: NOT DETECTED
SALMONELLA DNA BLD POS QL NAA+NON-PROBE: NOT DETECTED
SAO2 % BLDA: 96 %
SCAN OF BLOOD SMEAR: NORMAL
SCHISTOCYTES BLD QL SMEAR: ABNORMAL
SEGMENTED NEUTROPHILS ABSOLUTE COUNT: 10 THOU/MM3 (ref 1.8–7.7)
SODIUM SERPL-SCNC: 136 MEQ/L (ref 135–145)
SODIUM SERPL-SCNC: 136 MEQ/L (ref 135–145)
SOURCE OF BLOOD CULTURE: ABNORMAL
STREPTOCOCCUS DNA BLD QL NAA+PROBE: NOT DETECTED
TRANSFERRIN SERPL-MCNC: 111 MG/DL (ref 200–360)
TROPONIN T: < 0.01 NG/ML
VANA+VANB ISLT/SPM QL: ABNORMAL
VIT B12 SERPL-MCNC: 539 PG/ML (ref 211–911)
WBC # BLD AUTO: 11.4 THOU/MM3 (ref 4.8–10.8)

## 2023-04-07 PROCEDURE — 94669 MECHANICAL CHEST WALL OSCILL: CPT

## 2023-04-07 PROCEDURE — 85025 COMPLETE CBC W/AUTO DIFF WBC: CPT

## 2023-04-07 PROCEDURE — 2580000003 HC RX 258: Performed by: INTERNAL MEDICINE

## 2023-04-07 PROCEDURE — 82948 REAGENT STRIP/BLOOD GLUCOSE: CPT

## 2023-04-07 PROCEDURE — 1200000000 HC SEMI PRIVATE

## 2023-04-07 PROCEDURE — 36600 WITHDRAWAL OF ARTERIAL BLOOD: CPT

## 2023-04-07 PROCEDURE — 85018 HEMOGLOBIN: CPT

## 2023-04-07 PROCEDURE — 6370000000 HC RX 637 (ALT 250 FOR IP): Performed by: STUDENT IN AN ORGANIZED HEALTH CARE EDUCATION/TRAINING PROGRAM

## 2023-04-07 PROCEDURE — 82803 BLOOD GASES ANY COMBINATION: CPT

## 2023-04-07 PROCEDURE — 99232 SBSQ HOSP IP/OBS MODERATE 35: CPT | Performed by: INTERNAL MEDICINE

## 2023-04-07 PROCEDURE — 36415 COLL VENOUS BLD VENIPUNCTURE: CPT

## 2023-04-07 PROCEDURE — 94640 AIRWAY INHALATION TREATMENT: CPT

## 2023-04-07 PROCEDURE — 6370000000 HC RX 637 (ALT 250 FOR IP)

## 2023-04-07 PROCEDURE — 2580000003 HC RX 258: Performed by: STUDENT IN AN ORGANIZED HEALTH CARE EDUCATION/TRAINING PROGRAM

## 2023-04-07 PROCEDURE — 97535 SELF CARE MNGMENT TRAINING: CPT

## 2023-04-07 PROCEDURE — 84100 ASSAY OF PHOSPHORUS: CPT

## 2023-04-07 PROCEDURE — 85014 HEMATOCRIT: CPT

## 2023-04-07 PROCEDURE — 83735 ASSAY OF MAGNESIUM: CPT

## 2023-04-07 PROCEDURE — 6360000002 HC RX W HCPCS: Performed by: STUDENT IN AN ORGANIZED HEALTH CARE EDUCATION/TRAINING PROGRAM

## 2023-04-07 PROCEDURE — 97530 THERAPEUTIC ACTIVITIES: CPT

## 2023-04-07 PROCEDURE — 6360000002 HC RX W HCPCS: Performed by: INTERNAL MEDICINE

## 2023-04-07 PROCEDURE — 80048 BASIC METABOLIC PNL TOTAL CA: CPT

## 2023-04-07 PROCEDURE — 84484 ASSAY OF TROPONIN QUANT: CPT

## 2023-04-07 PROCEDURE — 93010 ELECTROCARDIOGRAM REPORT: CPT | Performed by: NUCLEAR MEDICINE

## 2023-04-07 PROCEDURE — 71045 X-RAY EXAM CHEST 1 VIEW: CPT

## 2023-04-07 PROCEDURE — 99233 SBSQ HOSP IP/OBS HIGH 50: CPT | Performed by: HOSPITALIST

## 2023-04-07 PROCEDURE — 93005 ELECTROCARDIOGRAM TRACING: CPT

## 2023-04-07 PROCEDURE — 2700000000 HC OXYGEN THERAPY PER DAY

## 2023-04-07 RX ORDER — NITROGLYCERIN 0.4 MG/1
0.4 TABLET SUBLINGUAL EVERY 5 MIN PRN
Status: DISCONTINUED | OUTPATIENT
Start: 2023-04-07 | End: 2023-04-10 | Stop reason: HOSPADM

## 2023-04-07 RX ORDER — FUROSEMIDE 10 MG/ML
20 INJECTION INTRAMUSCULAR; INTRAVENOUS ONCE
Status: COMPLETED | OUTPATIENT
Start: 2023-04-07 | End: 2023-04-07

## 2023-04-07 RX ORDER — SODIUM CHLORIDE 9 MG/ML
INJECTION, SOLUTION INTRAVENOUS CONTINUOUS
Status: DISCONTINUED | OUTPATIENT
Start: 2023-04-07 | End: 2023-04-09

## 2023-04-07 RX ORDER — FOLIC ACID 1 MG/1
1 TABLET ORAL DAILY
Status: DISCONTINUED | OUTPATIENT
Start: 2023-04-07 | End: 2023-04-10 | Stop reason: HOSPADM

## 2023-04-07 RX ORDER — TACROLIMUS 1 MG/1
5 CAPSULE ORAL EVERY 12 HOURS
Status: DISCONTINUED | OUTPATIENT
Start: 2023-04-07 | End: 2023-04-10 | Stop reason: HOSPADM

## 2023-04-07 RX ORDER — SODIUM CHLORIDE, SODIUM LACTATE, POTASSIUM CHLORIDE, CALCIUM CHLORIDE 600; 310; 30; 20 MG/100ML; MG/100ML; MG/100ML; MG/100ML
INJECTION, SOLUTION INTRAVENOUS CONTINUOUS
Status: DISCONTINUED | OUTPATIENT
Start: 2023-04-07 | End: 2023-04-07

## 2023-04-07 RX ADMIN — CEFTRIAXONE SODIUM 1000 MG: 1 INJECTION, POWDER, FOR SOLUTION INTRAMUSCULAR; INTRAVENOUS at 03:13

## 2023-04-07 RX ADMIN — SODIUM CHLORIDE, PRESERVATIVE FREE 10 ML: 5 INJECTION INTRAVENOUS at 12:32

## 2023-04-07 RX ADMIN — ATORVASTATIN CALCIUM 40 MG: 40 TABLET, FILM COATED ORAL at 08:55

## 2023-04-07 RX ADMIN — HYDROCODONE BITARTRATE AND ACETAMINOPHEN 1 TABLET: 5; 325 TABLET ORAL at 20:41

## 2023-04-07 RX ADMIN — FLUTICASONE PROPIONATE 1 SPRAY: 50 SPRAY, METERED NASAL at 08:56

## 2023-04-07 RX ADMIN — ASPIRIN 81 MG: 81 TABLET, COATED ORAL at 08:55

## 2023-04-07 RX ADMIN — NITROGLYCERIN 0.4 MG: 0.4 TABLET, ORALLY DISINTEGRATING SUBLINGUAL at 03:53

## 2023-04-07 RX ADMIN — INSULIN LISPRO 4 UNITS: 100 INJECTION, SOLUTION INTRAVENOUS; SUBCUTANEOUS at 21:39

## 2023-04-07 RX ADMIN — SODIUM CHLORIDE: 9 INJECTION, SOLUTION INTRAVENOUS at 12:34

## 2023-04-07 RX ADMIN — FUROSEMIDE 20 MG: 10 INJECTION, SOLUTION INTRAMUSCULAR; INTRAVENOUS at 03:53

## 2023-04-07 RX ADMIN — TIOTROPIUM BROMIDE INHALATION SPRAY 2 PUFF: 3.12 SPRAY, METERED RESPIRATORY (INHALATION) at 07:43

## 2023-04-07 RX ADMIN — INSULIN LISPRO 4 UNITS: 100 INJECTION, SOLUTION INTRAVENOUS; SUBCUTANEOUS at 08:59

## 2023-04-07 RX ADMIN — INSULIN GLARGINE 25 UNITS: 100 INJECTION, SOLUTION SUBCUTANEOUS at 21:39

## 2023-04-07 RX ADMIN — RIVAROXABAN 2.5 MG: 2.5 TABLET, FILM COATED ORAL at 20:42

## 2023-04-07 RX ADMIN — CARVEDILOL 50 MG: 25 TABLET, FILM COATED ORAL at 08:55

## 2023-04-07 RX ADMIN — RIVAROXABAN 2.5 MG: 2.5 TABLET, FILM COATED ORAL at 08:55

## 2023-04-07 RX ADMIN — CEFTRIAXONE SODIUM 2000 MG: 2 INJECTION, POWDER, FOR SOLUTION INTRAMUSCULAR; INTRAVENOUS at 23:42

## 2023-04-07 RX ADMIN — FOLIC ACID 1 MG: 1 TABLET ORAL at 08:55

## 2023-04-07 RX ADMIN — NIFEDIPINE 90 MG: 90 TABLET, EXTENDED RELEASE ORAL at 08:55

## 2023-04-07 RX ADMIN — CEFTRIAXONE SODIUM 1000 MG: 1 INJECTION, POWDER, FOR SOLUTION INTRAMUSCULAR; INTRAVENOUS at 00:01

## 2023-04-07 RX ADMIN — FAMOTIDINE 20 MG: 20 TABLET, FILM COATED ORAL at 08:55

## 2023-04-07 RX ADMIN — TACROLIMUS 5 MG: 1 CAPSULE ORAL at 20:41

## 2023-04-07 RX ADMIN — MYCOPHENOLIC ACID 720 MG: 180 TABLET, DELAYED RELEASE ORAL at 08:55

## 2023-04-07 RX ADMIN — SODIUM CHLORIDE: 9 INJECTION, SOLUTION INTRAVENOUS at 03:11

## 2023-04-07 RX ADMIN — TACROLIMUS 5 MG: 1 CAPSULE ORAL at 08:54

## 2023-04-07 RX ADMIN — CLONIDINE HYDROCHLORIDE 0.2 MG: 0.2 TABLET ORAL at 08:55

## 2023-04-07 RX ADMIN — INSULIN LISPRO 4 UNITS: 100 INJECTION, SOLUTION INTRAVENOUS; SUBCUTANEOUS at 12:28

## 2023-04-07 ASSESSMENT — PAIN DESCRIPTION - LOCATION
LOCATION: BACK
LOCATION: CHEST
LOCATION: CHEST
LOCATION: BACK

## 2023-04-07 ASSESSMENT — PAIN DESCRIPTION - ONSET
ONSET: ON-GOING
ONSET: ON-GOING

## 2023-04-07 ASSESSMENT — PAIN SCALES - GENERAL
PAINLEVEL_OUTOF10: 8
PAINLEVEL_OUTOF10: 4
PAINLEVEL_OUTOF10: 5
PAINLEVEL_OUTOF10: 6

## 2023-04-07 ASSESSMENT — PAIN DESCRIPTION - ORIENTATION
ORIENTATION: RIGHT
ORIENTATION: RIGHT
ORIENTATION: MID
ORIENTATION: MID

## 2023-04-07 ASSESSMENT — PAIN DESCRIPTION - FREQUENCY
FREQUENCY: CONTINUOUS
FREQUENCY: CONTINUOUS

## 2023-04-07 ASSESSMENT — PAIN DESCRIPTION - DESCRIPTORS
DESCRIPTORS: ACHING

## 2023-04-07 ASSESSMENT — PAIN - FUNCTIONAL ASSESSMENT
PAIN_FUNCTIONAL_ASSESSMENT: ACTIVITIES ARE NOT PREVENTED
PAIN_FUNCTIONAL_ASSESSMENT: ACTIVITIES ARE NOT PREVENTED

## 2023-04-07 ASSESSMENT — PAIN DESCRIPTION - PAIN TYPE
TYPE: CHRONIC PAIN
TYPE: CHRONIC PAIN

## 2023-04-08 LAB
ANION GAP SERPL CALC-SCNC: 14 MEQ/L (ref 8–16)
BASOPHILS ABSOLUTE: 0 THOU/MM3 (ref 0–0.1)
BASOPHILS NFR BLD AUTO: 0 %
BUN SERPL-MCNC: 36 MG/DL (ref 7–22)
CALCIUM SERPL-MCNC: 9.6 MG/DL (ref 8.5–10.5)
CHLORIDE SERPL-SCNC: 101 MEQ/L (ref 98–111)
CO2 SERPL-SCNC: 23 MEQ/L (ref 23–33)
CREAT SERPL-MCNC: 1.6 MG/DL (ref 0.4–1.2)
CREAT UR-MCNC: 73 MG/DL
DEPRECATED RDW RBC AUTO: 48.3 FL (ref 35–45)
EOSINOPHIL NFR BLD AUTO: 2 %
EOSINOPHILS ABSOLUTE: 0.2 THOU/MM3 (ref 0–0.4)
ERYTHROCYTE [DISTWIDTH] IN BLOOD BY AUTOMATED COUNT: 15.8 % (ref 11.5–14.5)
GFR SERPL CREATININE-BSD FRML MDRD: 35 ML/MIN/1.73M2
GLUCOSE BLD STRIP.AUTO-MCNC: 183 MG/DL (ref 70–108)
GLUCOSE BLD STRIP.AUTO-MCNC: 193 MG/DL (ref 70–108)
GLUCOSE BLD STRIP.AUTO-MCNC: 245 MG/DL (ref 70–108)
GLUCOSE BLD STRIP.AUTO-MCNC: 260 MG/DL (ref 70–108)
GLUCOSE SERPL-MCNC: 179 MG/DL (ref 70–108)
HCT VFR BLD AUTO: 23.6 % (ref 37–47)
HCT VFR BLD AUTO: 26.6 % (ref 37–47)
HCT VFR BLD AUTO: 28.6 % (ref 37–47)
HCT VFR BLD AUTO: 28.8 % (ref 37–47)
HGB BLD-MCNC: 7 GM/DL (ref 12–16)
HGB BLD-MCNC: 8.1 GM/DL (ref 12–16)
HGB BLD-MCNC: 8.2 GM/DL (ref 12–16)
HGB BLD-MCNC: 8.4 GM/DL (ref 12–16)
IMM GRANULOCYTES # BLD AUTO: 0.02 THOU/MM3 (ref 0–0.07)
IMM GRANULOCYTES NFR BLD AUTO: 0.3 %
LYMPHOCYTES ABSOLUTE: 0.5 THOU/MM3 (ref 1–4.8)
LYMPHOCYTES NFR BLD AUTO: 6.3 %
MAGNESIUM SERPL-MCNC: 2.3 MG/DL (ref 1.6–2.4)
MCH RBC QN AUTO: 24.7 PG (ref 26–33)
MCHC RBC AUTO-ENTMCNC: 29.2 GM/DL (ref 32.2–35.5)
MCV RBC AUTO: 84.7 FL (ref 81–99)
MONOCYTES ABSOLUTE: 0.7 THOU/MM3 (ref 0.4–1.3)
MONOCYTES NFR BLD AUTO: 8.7 %
NEUTROPHILS NFR BLD AUTO: 82.7 %
NRBC BLD AUTO-RTO: 0 /100 WBC
PHOSPHATE SERPL-MCNC: 2.5 MG/DL (ref 2.4–4.7)
PLATELET # BLD AUTO: 215 THOU/MM3 (ref 130–400)
PMV BLD AUTO: 10.9 FL (ref 9.4–12.4)
POTASSIUM SERPL-SCNC: 4 MEQ/L (ref 3.5–5.2)
RBC # BLD AUTO: 3.4 MILL/MM3 (ref 4.2–5.4)
SEGMENTED NEUTROPHILS ABSOLUTE COUNT: 6.4 THOU/MM3 (ref 1.8–7.7)
SODIUM SERPL-SCNC: 138 MEQ/L (ref 135–145)
TACROLIMUS BLD-MCNC: 3.1 NG/ML
TACROLIMUS BLD-MCNC: 5.8 NG/ML
TROPONIN T: < 0.01 NG/ML
WBC # BLD AUTO: 7.7 THOU/MM3 (ref 4.8–10.8)

## 2023-04-08 PROCEDURE — 6370000000 HC RX 637 (ALT 250 FOR IP)

## 2023-04-08 PROCEDURE — 99232 SBSQ HOSP IP/OBS MODERATE 35: CPT | Performed by: INTERNAL MEDICINE

## 2023-04-08 PROCEDURE — 84484 ASSAY OF TROPONIN QUANT: CPT

## 2023-04-08 PROCEDURE — 36415 COLL VENOUS BLD VENIPUNCTURE: CPT

## 2023-04-08 PROCEDURE — 6360000002 HC RX W HCPCS: Performed by: INTERNAL MEDICINE

## 2023-04-08 PROCEDURE — 6370000000 HC RX 637 (ALT 250 FOR IP): Performed by: STUDENT IN AN ORGANIZED HEALTH CARE EDUCATION/TRAINING PROGRAM

## 2023-04-08 PROCEDURE — 80048 BASIC METABOLIC PNL TOTAL CA: CPT

## 2023-04-08 PROCEDURE — 94640 AIRWAY INHALATION TREATMENT: CPT

## 2023-04-08 PROCEDURE — 2580000003 HC RX 258

## 2023-04-08 PROCEDURE — 83735 ASSAY OF MAGNESIUM: CPT

## 2023-04-08 PROCEDURE — 85018 HEMOGLOBIN: CPT

## 2023-04-08 PROCEDURE — 84100 ASSAY OF PHOSPHORUS: CPT

## 2023-04-08 PROCEDURE — 85025 COMPLETE CBC W/AUTO DIFF WBC: CPT

## 2023-04-08 PROCEDURE — 82948 REAGENT STRIP/BLOOD GLUCOSE: CPT

## 2023-04-08 PROCEDURE — 2580000003 HC RX 258: Performed by: INTERNAL MEDICINE

## 2023-04-08 PROCEDURE — 2700000000 HC OXYGEN THERAPY PER DAY

## 2023-04-08 PROCEDURE — 85014 HEMATOCRIT: CPT

## 2023-04-08 PROCEDURE — 82570 ASSAY OF URINE CREATININE: CPT

## 2023-04-08 PROCEDURE — 99233 SBSQ HOSP IP/OBS HIGH 50: CPT | Performed by: HOSPITALIST

## 2023-04-08 PROCEDURE — 93005 ELECTROCARDIOGRAM TRACING: CPT | Performed by: HOSPITALIST

## 2023-04-08 PROCEDURE — 94669 MECHANICAL CHEST WALL OSCILL: CPT

## 2023-04-08 PROCEDURE — 1200000000 HC SEMI PRIVATE

## 2023-04-08 RX ORDER — MYCOPHENOLIC ACID 180 MG/1
720 TABLET, DELAYED RELEASE ORAL 2 TIMES DAILY
Status: DISCONTINUED | OUTPATIENT
Start: 2023-04-09 | End: 2023-04-10 | Stop reason: HOSPADM

## 2023-04-08 RX ADMIN — NIFEDIPINE 90 MG: 90 TABLET, EXTENDED RELEASE ORAL at 08:45

## 2023-04-08 RX ADMIN — ASPIRIN 81 MG: 81 TABLET, COATED ORAL at 08:51

## 2023-04-08 RX ADMIN — INSULIN GLARGINE 25 UNITS: 100 INJECTION, SOLUTION SUBCUTANEOUS at 21:37

## 2023-04-08 RX ADMIN — NITROGLYCERIN 0.4 MG: 0.4 TABLET, ORALLY DISINTEGRATING SUBLINGUAL at 12:53

## 2023-04-08 RX ADMIN — RIVAROXABAN 2.5 MG: 2.5 TABLET, FILM COATED ORAL at 08:45

## 2023-04-08 RX ADMIN — CLONIDINE HYDROCHLORIDE 0.2 MG: 0.2 TABLET ORAL at 15:28

## 2023-04-08 RX ADMIN — NITROGLYCERIN 0.4 MG: 0.4 TABLET, ORALLY DISINTEGRATING SUBLINGUAL at 12:47

## 2023-04-08 RX ADMIN — SODIUM CHLORIDE: 9 INJECTION, SOLUTION INTRAVENOUS at 04:07

## 2023-04-08 RX ADMIN — TIOTROPIUM BROMIDE INHALATION SPRAY 2 PUFF: 3.12 SPRAY, METERED RESPIRATORY (INHALATION) at 09:05

## 2023-04-08 RX ADMIN — RIVAROXABAN 2.5 MG: 2.5 TABLET, FILM COATED ORAL at 21:39

## 2023-04-08 RX ADMIN — NIFEDIPINE 90 MG: 90 TABLET, EXTENDED RELEASE ORAL at 21:39

## 2023-04-08 RX ADMIN — FLUTICASONE PROPIONATE 1 SPRAY: 50 SPRAY, METERED NASAL at 08:44

## 2023-04-08 RX ADMIN — SODIUM CHLORIDE: 9 INJECTION, SOLUTION INTRAVENOUS at 21:37

## 2023-04-08 RX ADMIN — FAMOTIDINE 20 MG: 20 TABLET, FILM COATED ORAL at 08:51

## 2023-04-08 RX ADMIN — TACROLIMUS 5 MG: 1 CAPSULE ORAL at 08:45

## 2023-04-08 RX ADMIN — ATORVASTATIN CALCIUM 40 MG: 40 TABLET, FILM COATED ORAL at 08:45

## 2023-04-08 RX ADMIN — CARVEDILOL 50 MG: 25 TABLET, FILM COATED ORAL at 08:44

## 2023-04-08 RX ADMIN — INSULIN LISPRO 4 UNITS: 100 INJECTION, SOLUTION INTRAVENOUS; SUBCUTANEOUS at 17:30

## 2023-04-08 RX ADMIN — NITROGLYCERIN 0.4 MG: 0.4 TABLET, ORALLY DISINTEGRATING SUBLINGUAL at 12:43

## 2023-04-08 RX ADMIN — CARVEDILOL 50 MG: 25 TABLET, FILM COATED ORAL at 17:33

## 2023-04-08 RX ADMIN — TACROLIMUS 5 MG: 1 CAPSULE ORAL at 21:39

## 2023-04-08 RX ADMIN — CLONIDINE HYDROCHLORIDE 0.2 MG: 0.2 TABLET ORAL at 21:39

## 2023-04-08 RX ADMIN — HYDROCODONE BITARTRATE AND ACETAMINOPHEN 1 TABLET: 5; 325 TABLET ORAL at 05:04

## 2023-04-08 RX ADMIN — FOLIC ACID 1 MG: 1 TABLET ORAL at 08:45

## 2023-04-08 RX ADMIN — CLONIDINE HYDROCHLORIDE 0.2 MG: 0.2 TABLET ORAL at 08:45

## 2023-04-08 ASSESSMENT — PAIN SCALES - GENERAL
PAINLEVEL_OUTOF10: 2
PAINLEVEL_OUTOF10: 6
PAINLEVEL_OUTOF10: 7
PAINLEVEL_OUTOF10: 5
PAINLEVEL_OUTOF10: 6

## 2023-04-08 ASSESSMENT — PAIN DESCRIPTION - LOCATION
LOCATION: CHEST
LOCATION: CHEST
LOCATION: CHEST;SHOULDER

## 2023-04-08 ASSESSMENT — PAIN DESCRIPTION - ORIENTATION
ORIENTATION: LEFT

## 2023-04-08 ASSESSMENT — PAIN DESCRIPTION - DIRECTION: RADIATING_TOWARDS: SHOULDER

## 2023-04-09 LAB
ANION GAP SERPL CALC-SCNC: 11 MEQ/L (ref 8–16)
BUN SERPL-MCNC: 23 MG/DL (ref 7–22)
CALCIUM SERPL-MCNC: 9.9 MG/DL (ref 8.5–10.5)
CHLORIDE SERPL-SCNC: 103 MEQ/L (ref 98–111)
CO2 SERPL-SCNC: 26 MEQ/L (ref 23–33)
CREAT SERPL-MCNC: 1.2 MG/DL (ref 0.4–1.2)
DEPRECATED RDW RBC AUTO: 47.1 FL (ref 35–45)
ERYTHROCYTE [DISTWIDTH] IN BLOOD BY AUTOMATED COUNT: 15.6 % (ref 11.5–14.5)
GFR SERPL CREATININE-BSD FRML MDRD: 49 ML/MIN/1.73M2
GLUCOSE BLD STRIP.AUTO-MCNC: 150 MG/DL (ref 70–108)
GLUCOSE BLD STRIP.AUTO-MCNC: 158 MG/DL (ref 70–108)
GLUCOSE BLD STRIP.AUTO-MCNC: 177 MG/DL (ref 70–108)
GLUCOSE BLD STRIP.AUTO-MCNC: 207 MG/DL (ref 70–108)
GLUCOSE SERPL-MCNC: 163 MG/DL (ref 70–108)
HCT VFR BLD AUTO: 26.1 % (ref 37–47)
HGB BLD-MCNC: 7.7 GM/DL (ref 12–16)
MCH RBC QN AUTO: 24.6 PG (ref 26–33)
MCHC RBC AUTO-ENTMCNC: 29.5 GM/DL (ref 32.2–35.5)
MCV RBC AUTO: 83.4 FL (ref 81–99)
PLATELET # BLD AUTO: 255 THOU/MM3 (ref 130–400)
PMV BLD AUTO: 11.2 FL (ref 9.4–12.4)
POTASSIUM SERPL-SCNC: 3.7 MEQ/L (ref 3.5–5.2)
RBC # BLD AUTO: 3.13 MILL/MM3 (ref 4.2–5.4)
SODIUM SERPL-SCNC: 140 MEQ/L (ref 135–145)
TROPONIN T: < 0.01 NG/ML
WBC # BLD AUTO: 6.5 THOU/MM3 (ref 4.8–10.8)

## 2023-04-09 PROCEDURE — 6360000002 HC RX W HCPCS: Performed by: STUDENT IN AN ORGANIZED HEALTH CARE EDUCATION/TRAINING PROGRAM

## 2023-04-09 PROCEDURE — 6370000000 HC RX 637 (ALT 250 FOR IP)

## 2023-04-09 PROCEDURE — 6370000000 HC RX 637 (ALT 250 FOR IP): Performed by: STUDENT IN AN ORGANIZED HEALTH CARE EDUCATION/TRAINING PROGRAM

## 2023-04-09 PROCEDURE — 99232 SBSQ HOSP IP/OBS MODERATE 35: CPT | Performed by: INTERNAL MEDICINE

## 2023-04-09 PROCEDURE — 84484 ASSAY OF TROPONIN QUANT: CPT

## 2023-04-09 PROCEDURE — 36415 COLL VENOUS BLD VENIPUNCTURE: CPT

## 2023-04-09 PROCEDURE — 93010 ELECTROCARDIOGRAM REPORT: CPT | Performed by: NUCLEAR MEDICINE

## 2023-04-09 PROCEDURE — 2580000003 HC RX 258: Performed by: STUDENT IN AN ORGANIZED HEALTH CARE EDUCATION/TRAINING PROGRAM

## 2023-04-09 PROCEDURE — 82948 REAGENT STRIP/BLOOD GLUCOSE: CPT

## 2023-04-09 PROCEDURE — 94640 AIRWAY INHALATION TREATMENT: CPT

## 2023-04-09 PROCEDURE — 6360000002 HC RX W HCPCS: Performed by: INTERNAL MEDICINE

## 2023-04-09 PROCEDURE — 2700000000 HC OXYGEN THERAPY PER DAY

## 2023-04-09 PROCEDURE — 85027 COMPLETE CBC AUTOMATED: CPT

## 2023-04-09 PROCEDURE — 1200000000 HC SEMI PRIVATE

## 2023-04-09 PROCEDURE — 99233 SBSQ HOSP IP/OBS HIGH 50: CPT | Performed by: HOSPITALIST

## 2023-04-09 PROCEDURE — 93005 ELECTROCARDIOGRAM TRACING: CPT

## 2023-04-09 PROCEDURE — 80048 BASIC METABOLIC PNL TOTAL CA: CPT

## 2023-04-09 RX ADMIN — NITROGLYCERIN 0.4 MG: 0.4 TABLET, ORALLY DISINTEGRATING SUBLINGUAL at 05:40

## 2023-04-09 RX ADMIN — NIFEDIPINE 90 MG: 90 TABLET, EXTENDED RELEASE ORAL at 09:21

## 2023-04-09 RX ADMIN — TACROLIMUS 5 MG: 1 CAPSULE ORAL at 09:21

## 2023-04-09 RX ADMIN — INSULIN GLARGINE 25 UNITS: 100 INJECTION, SOLUTION SUBCUTANEOUS at 21:29

## 2023-04-09 RX ADMIN — ATORVASTATIN CALCIUM 40 MG: 40 TABLET, FILM COATED ORAL at 09:21

## 2023-04-09 RX ADMIN — RIVAROXABAN 2.5 MG: 2.5 TABLET, FILM COATED ORAL at 09:21

## 2023-04-09 RX ADMIN — ASPIRIN 81 MG: 81 TABLET, COATED ORAL at 09:21

## 2023-04-09 RX ADMIN — ANTACID TABLETS 500 MG: 500 TABLET, CHEWABLE ORAL at 03:02

## 2023-04-09 RX ADMIN — MYCOPHENOLIC ACID 720 MG: 180 TABLET, DELAYED RELEASE ORAL at 09:21

## 2023-04-09 RX ADMIN — HYDROCODONE BITARTRATE AND ACETAMINOPHEN 1 TABLET: 5; 325 TABLET ORAL at 03:02

## 2023-04-09 RX ADMIN — POLYETHYLENE GLYCOL 3350 17 G: 17 POWDER, FOR SOLUTION ORAL at 20:56

## 2023-04-09 RX ADMIN — HYDROCODONE BITARTRATE AND ACETAMINOPHEN 1 TABLET: 5; 325 TABLET ORAL at 19:53

## 2023-04-09 RX ADMIN — FAMOTIDINE 20 MG: 20 TABLET, FILM COATED ORAL at 09:21

## 2023-04-09 RX ADMIN — TIOTROPIUM BROMIDE INHALATION SPRAY 2 PUFF: 3.12 SPRAY, METERED RESPIRATORY (INHALATION) at 08:59

## 2023-04-09 RX ADMIN — CARVEDILOL 50 MG: 25 TABLET, FILM COATED ORAL at 09:21

## 2023-04-09 RX ADMIN — FOLIC ACID 1 MG: 1 TABLET ORAL at 09:21

## 2023-04-09 RX ADMIN — SODIUM CHLORIDE, PRESERVATIVE FREE 10 ML: 5 INJECTION INTRAVENOUS at 21:01

## 2023-04-09 RX ADMIN — CLONIDINE HYDROCHLORIDE 0.2 MG: 0.2 TABLET ORAL at 09:21

## 2023-04-09 RX ADMIN — MYCOPHENOLIC ACID 720 MG: 180 TABLET, DELAYED RELEASE ORAL at 20:56

## 2023-04-09 RX ADMIN — RIVAROXABAN 2.5 MG: 2.5 TABLET, FILM COATED ORAL at 20:59

## 2023-04-09 RX ADMIN — INSULIN LISPRO 4 UNITS: 100 INJECTION, SOLUTION INTRAVENOUS; SUBCUTANEOUS at 12:47

## 2023-04-09 RX ADMIN — CEFTRIAXONE SODIUM 2000 MG: 2 INJECTION, POWDER, FOR SOLUTION INTRAMUSCULAR; INTRAVENOUS at 00:20

## 2023-04-09 RX ADMIN — TACROLIMUS 5 MG: 1 CAPSULE ORAL at 20:56

## 2023-04-09 ASSESSMENT — PAIN DESCRIPTION - ORIENTATION
ORIENTATION: LEFT
ORIENTATION: MID
ORIENTATION: LOWER

## 2023-04-09 ASSESSMENT — PAIN DESCRIPTION - LOCATION
LOCATION: FLANK
LOCATION: BACK

## 2023-04-09 ASSESSMENT — PAIN DESCRIPTION - DESCRIPTORS
DESCRIPTORS: ACHING
DESCRIPTORS: BURNING;GNAWING
DESCRIPTORS: ACHING

## 2023-04-09 ASSESSMENT — PAIN SCALES - GENERAL
PAINLEVEL_OUTOF10: 8
PAINLEVEL_OUTOF10: 6
PAINLEVEL_OUTOF10: 4
PAINLEVEL_OUTOF10: 8
PAINLEVEL_OUTOF10: 5

## 2023-04-09 ASSESSMENT — PAIN - FUNCTIONAL ASSESSMENT: PAIN_FUNCTIONAL_ASSESSMENT: ACTIVITIES ARE NOT PREVENTED

## 2023-04-09 ASSESSMENT — PAIN DESCRIPTION - PAIN TYPE: TYPE: CHRONIC PAIN

## 2023-04-09 ASSESSMENT — PAIN DESCRIPTION - ONSET: ONSET: ON-GOING

## 2023-04-09 ASSESSMENT — PAIN DESCRIPTION - FREQUENCY: FREQUENCY: CONTINUOUS

## 2023-04-10 VITALS
BODY MASS INDEX: 30.32 KG/M2 | WEIGHT: 182 LBS | OXYGEN SATURATION: 94 % | SYSTOLIC BLOOD PRESSURE: 117 MMHG | TEMPERATURE: 97.4 F | HEART RATE: 99 BPM | RESPIRATION RATE: 18 BRPM | DIASTOLIC BLOOD PRESSURE: 54 MMHG | HEIGHT: 65 IN

## 2023-04-10 LAB
ANION GAP SERPL CALC-SCNC: 12 MEQ/L (ref 8–16)
BUN SERPL-MCNC: 24 MG/DL (ref 7–22)
CALCIUM SERPL-MCNC: 10.2 MG/DL (ref 8.5–10.5)
CHLORIDE SERPL-SCNC: 101 MEQ/L (ref 98–111)
CO2 SERPL-SCNC: 26 MEQ/L (ref 23–33)
CREAT SERPL-MCNC: 1.4 MG/DL (ref 0.4–1.2)
DEPRECATED RDW RBC AUTO: 47.8 FL (ref 35–45)
ERYTHROCYTE [DISTWIDTH] IN BLOOD BY AUTOMATED COUNT: 15.7 % (ref 11.5–14.5)
GFR SERPL CREATININE-BSD FRML MDRD: 41 ML/MIN/1.73M2
GLUCOSE BLD STRIP.AUTO-MCNC: 141 MG/DL (ref 70–108)
GLUCOSE BLD STRIP.AUTO-MCNC: 229 MG/DL (ref 70–108)
GLUCOSE BLD STRIP.AUTO-MCNC: 86 MG/DL (ref 70–108)
GLUCOSE SERPL-MCNC: 141 MG/DL (ref 70–108)
HCT VFR BLD AUTO: 26.5 % (ref 37–47)
HGB BLD-MCNC: 7.8 GM/DL (ref 12–16)
MAGNESIUM SERPL-MCNC: 2.1 MG/DL (ref 1.6–2.4)
MCH RBC QN AUTO: 24.8 PG (ref 26–33)
MCHC RBC AUTO-ENTMCNC: 29.4 GM/DL (ref 32.2–35.5)
MCV RBC AUTO: 84.1 FL (ref 81–99)
PLATELET # BLD AUTO: 254 THOU/MM3 (ref 130–400)
PMV BLD AUTO: 10.7 FL (ref 9.4–12.4)
POTASSIUM SERPL-SCNC: 3.3 MEQ/L (ref 3.5–5.2)
RBC # BLD AUTO: 3.15 MILL/MM3 (ref 4.2–5.4)
SODIUM SERPL-SCNC: 139 MEQ/L (ref 135–145)
WBC # BLD AUTO: 9 THOU/MM3 (ref 4.8–10.8)

## 2023-04-10 PROCEDURE — 85027 COMPLETE CBC AUTOMATED: CPT

## 2023-04-10 PROCEDURE — 82948 REAGENT STRIP/BLOOD GLUCOSE: CPT

## 2023-04-10 PROCEDURE — 80048 BASIC METABOLIC PNL TOTAL CA: CPT

## 2023-04-10 PROCEDURE — 2700000000 HC OXYGEN THERAPY PER DAY

## 2023-04-10 PROCEDURE — 6370000000 HC RX 637 (ALT 250 FOR IP)

## 2023-04-10 PROCEDURE — 6360000002 HC RX W HCPCS: Performed by: STUDENT IN AN ORGANIZED HEALTH CARE EDUCATION/TRAINING PROGRAM

## 2023-04-10 PROCEDURE — 36415 COLL VENOUS BLD VENIPUNCTURE: CPT

## 2023-04-10 PROCEDURE — 2580000003 HC RX 258: Performed by: STUDENT IN AN ORGANIZED HEALTH CARE EDUCATION/TRAINING PROGRAM

## 2023-04-10 PROCEDURE — 99239 HOSP IP/OBS DSCHRG MGMT >30: CPT | Performed by: HOSPITALIST

## 2023-04-10 PROCEDURE — 6360000002 HC RX W HCPCS: Performed by: INTERNAL MEDICINE

## 2023-04-10 PROCEDURE — 94761 N-INVAS EAR/PLS OXIMETRY MLT: CPT

## 2023-04-10 PROCEDURE — 97535 SELF CARE MNGMENT TRAINING: CPT

## 2023-04-10 PROCEDURE — 97110 THERAPEUTIC EXERCISES: CPT

## 2023-04-10 PROCEDURE — 83735 ASSAY OF MAGNESIUM: CPT

## 2023-04-10 PROCEDURE — 94640 AIRWAY INHALATION TREATMENT: CPT

## 2023-04-10 PROCEDURE — 97161 PT EVAL LOW COMPLEX 20 MIN: CPT

## 2023-04-10 PROCEDURE — 99232 SBSQ HOSP IP/OBS MODERATE 35: CPT | Performed by: INTERNAL MEDICINE

## 2023-04-10 PROCEDURE — 97116 GAIT TRAINING THERAPY: CPT

## 2023-04-10 PROCEDURE — 6370000000 HC RX 637 (ALT 250 FOR IP): Performed by: STUDENT IN AN ORGANIZED HEALTH CARE EDUCATION/TRAINING PROGRAM

## 2023-04-10 RX ORDER — POTASSIUM CHLORIDE 20 MEQ/1
40 TABLET, EXTENDED RELEASE ORAL PRN
Status: DISCONTINUED | OUTPATIENT
Start: 2023-04-10 | End: 2023-04-10 | Stop reason: HOSPADM

## 2023-04-10 RX ORDER — POTASSIUM CHLORIDE 7.45 MG/ML
10 INJECTION INTRAVENOUS PRN
Status: DISCONTINUED | OUTPATIENT
Start: 2023-04-10 | End: 2023-04-10 | Stop reason: HOSPADM

## 2023-04-10 RX ADMIN — TIOTROPIUM BROMIDE INHALATION SPRAY 2 PUFF: 3.12 SPRAY, METERED RESPIRATORY (INHALATION) at 10:16

## 2023-04-10 RX ADMIN — NIFEDIPINE 90 MG: 90 TABLET, EXTENDED RELEASE ORAL at 08:35

## 2023-04-10 RX ADMIN — CLONIDINE HYDROCHLORIDE 0.2 MG: 0.2 TABLET ORAL at 08:37

## 2023-04-10 RX ADMIN — FAMOTIDINE 20 MG: 20 TABLET, FILM COATED ORAL at 08:35

## 2023-04-10 RX ADMIN — TACROLIMUS 5 MG: 1 CAPSULE ORAL at 08:35

## 2023-04-10 RX ADMIN — POTASSIUM CHLORIDE 40 MEQ: 1500 TABLET, EXTENDED RELEASE ORAL at 10:30

## 2023-04-10 RX ADMIN — INSULIN LISPRO 4 UNITS: 100 INJECTION, SOLUTION INTRAVENOUS; SUBCUTANEOUS at 12:29

## 2023-04-10 RX ADMIN — CLONIDINE HYDROCHLORIDE 0.2 MG: 0.2 TABLET ORAL at 16:22

## 2023-04-10 RX ADMIN — ASPIRIN 81 MG: 81 TABLET, COATED ORAL at 08:34

## 2023-04-10 RX ADMIN — MYCOPHENOLIC ACID 720 MG: 180 TABLET, DELAYED RELEASE ORAL at 08:39

## 2023-04-10 RX ADMIN — FLUTICASONE PROPIONATE 1 SPRAY: 50 SPRAY, METERED NASAL at 08:38

## 2023-04-10 RX ADMIN — CARVEDILOL 50 MG: 25 TABLET, FILM COATED ORAL at 08:37

## 2023-04-10 RX ADMIN — CEFTRIAXONE SODIUM 2000 MG: 2 INJECTION, POWDER, FOR SOLUTION INTRAMUSCULAR; INTRAVENOUS at 00:20

## 2023-04-10 RX ADMIN — ATORVASTATIN CALCIUM 40 MG: 40 TABLET, FILM COATED ORAL at 08:36

## 2023-04-10 RX ADMIN — CARVEDILOL 50 MG: 25 TABLET, FILM COATED ORAL at 18:07

## 2023-04-10 RX ADMIN — FOLIC ACID 1 MG: 1 TABLET ORAL at 08:35

## 2023-04-10 RX ADMIN — RIVAROXABAN 2.5 MG: 2.5 TABLET, FILM COATED ORAL at 08:40

## 2023-04-10 ASSESSMENT — PAIN SCALES - GENERAL
PAINLEVEL_OUTOF10: 3
PAINLEVEL_OUTOF10: 2

## 2023-04-10 ASSESSMENT — PAIN DESCRIPTION - LOCATION: LOCATION: BACK

## 2023-04-10 ASSESSMENT — PAIN DESCRIPTION - DESCRIPTORS: DESCRIPTORS: GNAWING

## 2023-04-10 NOTE — CARE COORDINATION
Darius Bradford was evaluated today and a DME order was entered for a wheeled walker with seat because she requires this to successfully complete daily living tasks of eating, bathing, toileting, personal cares, ambulating, grooming, hygiene, dressing upper body, dressing lower body, meal preparation, and taking own medications. A wheeled walker with seat is necessary due to the patient's unsteady gait, upper body weakness, inability to  and ambulation device, ambulating only short distances by pushing a walker, and the need to sit for a short time before resuming ambulation. These tasks cannot be completed with a lesser ambulation device such as a cane, crutch, or standard walker. The need for this equipment was discussed with the patient and she understands and is in agreement.

## 2023-04-10 NOTE — PLAN OF CARE
Problem: Pain  Goal: Verbalizes/displays adequate comfort level or baseline comfort level  Outcome: Progressing     Problem: Skin/Tissue Integrity  Goal: Absence of new skin breakdown  Description: 1. Monitor for areas of redness and/or skin breakdown  2. Assess vascular access sites hourly  3. Every 4-6 hours minimum:  Change oxygen saturation probe site  4. Every 4-6 hours:  If on nasal continuous positive airway pressure, respiratory therapy assess nares and determine need for appliance change or resting period.   Outcome: Progressing     Problem: Safety - Adult  Goal: Free from fall injury  Outcome: Progressing     Problem: ABCDS Injury Assessment  Goal: Absence of physical injury  Outcome: Progressing     Problem: Discharge Planning  Goal: Discharge to home or other facility with appropriate resources  Outcome: Progressing     Problem: Musculoskeletal - Adult  Goal: Return mobility to safest level of function  Outcome: Progressing     Problem: Gastrointestinal - Adult  Goal: Minimal or absence of nausea and vomiting  Outcome: Progressing     Problem: Genitourinary - Adult  Goal: Absence of urinary retention  Outcome: Progressing     Problem: Infection - Adult  Goal: Absence of infection at discharge  Outcome: Progressing  Goal: Absence of infection during hospitalization  Outcome: Progressing     Problem: Chronic Conditions and Co-morbidities  Goal: Patient's chronic conditions and co-morbidity symptoms are monitored and maintained or improved  Outcome: Progressing     Problem: Respiratory - Adult  Goal: Clear lung sounds  4/10/2023 1146 by Dee Mcmanus RN  Outcome: Progressing  4/10/2023 1021 by Brant Escalante RCP  Outcome: Progressing     Problem: Nutrition Deficit:  Goal: Optimize nutritional status  Outcome: Progressing     Problem: Neurosensory - Adult  Goal: Achieves stable or improved neurological status  Outcome: Progressing     Problem: Metabolic/Fluid and Electrolytes - Adult  Goal:

## 2023-04-10 NOTE — PROGRESS NOTES
Patient is alert and oriented x4, to place, time, person, and situation. Patient is sitting up in chair. Mood is appropriate for situation. Well groomed. Patient reports pain in back rated 3/10 described as gnawing pain. Speech is clear, hearing is intat=ct. Vitals: BP: 135/50 T:98.6 F (O) R: 18 SPO2: 96% P: 70. Sensory is intact. Eyesight is impaired. Vision is impaired, wears glasses. Pupils are round and symmetric 3mm bilaterally. Sclera is clear bilaterally. Conjunctiva is pink and moist. Hand grasp is moderate strength. Pedal pull/push moderate strength. Breathing is unlabored, cough is productive with cloudy sputum. Lung sounds anteriorly and posteriorly are clear and diminished. Patient has been educated on importance of incentive spirometer. Heart tones are regular, clear S1 and S2. No bruit or neck vein distention. Radial pulse, tibial pulse, pedal pulses are 2+ regular bilaterally. Capillary refill is less than 3 seconds. Edema is present in lower extermities, non-pitting. Oral mucosa is pink and moist. Condition of teeth has dentures in upper palate. Abdomen is soft and non tender. Bowel sounds are active in all 4 quadrants. Diet is regular adult diet. Urine is clear and yellow. Patient is continent. No Catheter in place. Skin is warm, dry, and good turgor. Upper extremity ROM is full, weakness in strength. Lower extremity ROM is moderate and strength has weakness. Call light is within reach. 2 Side rails up. ID band and limb alert bands are on. Chair alarm is on.  Patient is a stand by-assist.

## 2023-04-10 NOTE — PROGRESS NOTES
Patient discharged with belongings. Patient received at portable home O2 tank prior to discharge. Discharge instructions given.

## 2023-04-10 NOTE — PROGRESS NOTES
[facility]   Nephrology progress note  4/10/2023, 9:40 AM      Pt Name:    Leonie Welch  MRN:     [id number]     YOB: 1955  Admit Date:    4/5/2023 10:10 PM    Chief Complaint: Nephrology following for VIDA/kidney transplant    Subjective:  Patient seen in the AM, no acute events noted overnight. Patient states she is feeling much better today than when she arrived. Denies any further episodes of chest pains. Does not feel like she is fluid overloaded. Objective:  24HR INTAKE/OUTPUT:    Intake/Output Summary (Last 24 hours) at 4/10/2023 0940  Last data filed at 4/10/2023 0813  Gross per 24 hour   Intake 120 ml   Output 300 ml   Net -180 ml         I/O last 3 completed shifts: In: 360 [P.O.:360]  Out: 1150 [Urine:1150]  I/O this shift:  In: -   Out: 300 [Urine:300]   Admission weight: 182 lb (82.6 kg)  Wt Readings from Last 3 Encounters:   04/05/23 182 lb (82.6 kg)   03/09/23 188 lb (85.3 kg)   03/09/23 190 lb (86.2 kg)        Vitals :   Vitals:    04/1955 04/09/23 2023 04/10/23 0400 04/10/23 0813   BP:   (!) 133/51 (!) 135/50   Pulse:   64 70   Resp: 18 18 18 18   Temp:   98.4 °F (36.9 °C) 98.6 °F (37 °C)   TempSrc:   Oral Oral   SpO2:    96%   Weight:       Height:           Physical examination  General Appearance: alert, cooperative, pleasant, appears stated age  Mouth/Throat: Moist oral mucosa  Lungs: Good air movement. Clear to auscultation bilateral. No crackles heard today.    Heart:  S1/S2 hears, no murmurs  GI: Soft, slight distension has not had BM, no tenderness to palpation  Ext:  No edema noted, euvolemic    Medications:  Infusion:    dextrose      sodium chloride Stopped (04/07/23 0356)     Meds:    mycophenolate  720 mg Oral BID    cefTRIAXone (ROCEPHIN) IV  2,000 mg IntraVENous Y74M    folic acid  1 mg Oral Daily    tacrolimus  5 mg Oral Q12H    aspirin  81 mg Oral Daily    atorvastatin  40 mg Oral Daily    carvedilol  50 mg Oral BID WC

## 2023-04-10 NOTE — DISCHARGE INSTRUCTIONS
Take walker order to [address] [place] [address] on 4/11 to receive walker. Order with discharge papers.

## 2023-04-10 NOTE — PROGRESS NOTES
A home oxygen evaluation has been completed. [x]Patient is an inpatient. It is expected that the patient will be discharged within the next 48 hours. Qualified provider to write order for home prescription if patient qualifies. Social service/care managers will arrange for home oxygen. If patient is active, arrange for Home Medical supplier to assess for Oxygen Conserving Device per pulse oximetry. []Patient is an outpatient. Results will be faxed to the ordering provider. Qualified provider to write order for home prescription if patient qualifies and arranges for home oxygen. Patient was placed on room air for 10 minutes. SpO2 was 87 % on room air at rest. Patients SpO2 was 88% or below and qualified for home oxygen. Patient needs 2 lpm at rest via nasal cannula to maintain 90-92%. Patient's actual SpO2 was 94%. Patient was walked for 10 minutes with 2 lpm via nasal cannula to maintain a SpO2 between 90-92% while walking. Actual SpO2 was 92 %.

## 2023-04-10 NOTE — PLAN OF CARE
Patient was evaluated today for the diagnosis of Interstitial lung disease. I entered a DME order for home oxygen because the diagnosis and testing requires the patient to have supplemental oxygen. Condition will improve or be benefited by oxygen use. The patient is not able to perform good mobility in a home setting and therefore does require the use of a portable oxygen system. The need for this equipment was discussed with the patient and she understands and is in agreement.      Electronically signed by Porfirio Berry DO on 4/10/2023 at 5:49 PM

## 2023-04-10 NOTE — PROGRESS NOTES
[address] [address]  INPATIENT PHYSICAL THERAPY  EVALUATION  [facility] [place] ONC MED 5K - 5K-20/020-A    Time In: 0153  Time Out: 1101  Timed Code Treatment Minutes: 8 Minutes  Minutes: 14          Date: 4/10/2023  Patient Name: Jayesh Sauer,  Gender:  female        MRN: [id number]  : 1955  (69 y.o.)      Referring Practitioner: Marily Wise MD  Diagnosis: VIDA (acute kidney injury) [facility])  Additional Pertinent Hx: 68-year-old female with PMHx of recurrent UTI, COPD who presents with complaint of nocturia X 1 week, nausea/vomiting X 2 days, back pain, suprapubic pain, difficulty ambulating and poor appetite. Restrictions/Precautions:  Restrictions/Precautions: Up as Tolerated, Fall Risk  Position Activity Restriction  Other position/activity restrictions: new 1L O2, on room air at baseline    Subjective:  Chart Reviewed: Yes  Patient assessed for rehabilitation services?: Yes  Family / Caregiver Present: No  Subjective: RN approved session. Pt pleasant and agreeable to therapy    General:  Overall Orientation Status: Within Functional Limits  Vision: Within Functional Limits  Hearing: Within functional limits       Pain: /10: back     Vitals: Oxygen: 2L NC    Social/Functional History:    Lives With: Alone  Type of Home: Condo  Home Layout: One level  Home Access: Level entry  Home Equipment: Rollator     Bathroom Shower/Tub: Walk-in shower  Bathroom Toilet: Handicap height  Bathroom Equipment: Shower chair    Receives Help From: Family  ADL Assistance: Independent  Homemaking Assistance: Independent  Homemaking Responsibilities: Yes  Ambulation Assistance: Independent  Transfer Assistance: Independent    Active : Yes  Mode of Transportation: Car  Occupation: Retired  Additional Comments: Pt is typically indep with ADLs and light IADLs. She uses a cane for indoor mobility and rollator for community mobility.  Family checks in daily to provide IADL assist. pt is able to make light meals using her air

## 2023-04-10 NOTE — PROGRESS NOTES
[facility] 5K  Occupational Therapy  Daily Note  Time:   Time In: 9073  Time Out: 4619  Timed Code Treatment Minutes: 28 Minutes  Minutes: 28          Date: 4/10/2023  Patient Name: Rhonda Womack,   Gender: female      Room: [facility]/020-A  MRN: [id number]  : 1955  (69 y.o.)  Referring Practitioner: Karlos Holguin MD  Diagnosis: VIDA  Additional Pertinent Hx: Rhonda Womack is a pleasant 69 y.o. female who presents to the emergency department for evaluation of back pain, nausea and vomiting. Patient states that she has a history of chronic back pain but it has been worse the past 3 days. She states it is in the right paraspinal region. She does have a history of recurrent UTIs and a history of kidney transplant. DX:  VIDA superimposed on CKD stage IIIb, UTI/Pyelonephritis    Restrictions/Precautions:  Restrictions/Precautions: Up as Tolerated, Fall Risk  Position Activity Restriction  Other position/activity restrictions: new 1L O2, on room air at baseline     SUBJECTIVE: RN okayed OT session. Pt. In room and agreeable to participate. Pt. Pleasant and cooperative throughout. PAIN: in L side lower back    Vitals: Oxygen: 2LO2 NC with sats at 91-95%  Heart Rate: 77    COGNITION: Decreased Safety Awareness    ADL:   Grooming: Contact Guard Assistance. To stand at sink and wash hands off  Toileting: Contact Guard Assistance. Toilet Transfer: Contact Guard Assistance. Dai Nobles BALANCE:  Sitting Balance:  Supervision. Seated on EOB  Standing Balance: Contact Guard Assistance. BED MOBILITY:  Supine to Sit: Stand By Assistance      TRANSFERS:  Sit to Stand:  Contact Guard Assistance. Stand to Sit: Contact Guard Assistance. FUNCTIONAL MOBILITY:  Assistive Device: 4 Wheeled Walker  Assist Level:  Contact Guard Assistance. Distance: To and from bathroom  Slow pace required assistance to manage O2 tubing. *Pt.  Provided educated on fall prevention with O2 tubing

## 2023-04-10 NOTE — CARE COORDINATION
4/10/23, 2:34 PM EDT    DISCHARGE PLANNING EVALUATION    Spoke with provider, OT recommending SNF, PT recommending home with assist PRN. SW met with pt this afternoon, pt declines wanting to look into SNF. Pt is agreeable to home health. Post-acute [facility]) provider list was provided to patient. Patient was informed of their freedom to choose [facility] provider. Discussed and offered to show the patient the relevant [facility] Providers quality and resource use measures on Medicare Compare web site via computer based on patient's goals of care and treatment preferences. Questions regarding selection process were answered. Pt would like to use [facility]. SW did provide pt with SNF list if her mind would change after returning home. SW also provided private duty list. Pt asked about rollator walker, SW did talk with CM about this, she will visit with pt. Pt may also need home O2, they will complete home O2 evaluation. Pt reports she had family support, she states they are \"right around the corner\". SW message provider regarding pt declines SNF, but agreeable to [facility]. Call to [facility], spoke with [facility], referral made. They will follow up with pt at discharge. 4/10/23, 2:38 PM EDT    Patient goals/plan/ treatment preferences discussed by  and . Patient goals/plan/ treatment preferences reviewed with patient/ family. Patient/ family verbalize understanding of discharge plan and are in agreement with goal/plan/treatment preferences. Understanding was demonstrated using the teach back method. AVS provided by RN at time of discharge, which includes all necessary medical information pertaining to the patients current course of illness, treatment, post-discharge goals of care, and treatment preferences. Services At/After Discharge: [address] [address].         IMM Letter  IMM Letter given to Patient/Family/Significant other/Guardian/POA/by[de-identified] Bang Kay RN Case

## 2023-04-10 NOTE — DISCHARGE SUMMARY
echogenic compatible with medical renal    disease. The native right kidney is borderline atrophic. This document has been electronically signed by: Lobo Zhao MD on    04/06/2023 05:39 AM      Interpreted by:   Sherron Lopez MD      Signed by:   Sherron Lopez MD   4/6/23      Final result      XR CHEST (2 VW)   Final Result      Impression:   1. Bilateral lower lobe atelectasis and/or scarring. 2. No segmental or lobar pneumonia. 3. Mild cardiomegaly with no CHF. This document has been electronically signed by: Modesto Rucker. DO Jose on    04/05/2023 11:17 PM      Interpreted by:   Jaden Arnold DO      Signed by:   Jaden Arnold DO   4/5/23      Final result             Consults:   IP CONSULT TO SPIRITUAL SERVICES  IP CONSULT TO NEPHROLOGY  IP CONSULT TO HOME CARE NEEDS    Disposition: Home  Condition at Discharge: Stable    Code Status:  Limited     Patient Instructions:    Discharge lab work: Bmp and CBC  Activity: activity as tolerated  Diet: ADULT DIET; Regular; 4 carb choices (60 gm/meal)  ADULT ORAL NUTRITION SUPPLEMENT; Breakfast, Dinner; Other Oral Supplement; Activia yogurt BID  ADULT ORAL NUTRITION SUPPLEMENT; Breakfast, Lunch; Diabetic Oral Supplement      Follow-up visits:   DONOVAN Lau - [facility]  [address]    Schedule an appointment as soon as possible for a visit in 1 week(s)  recent discarge for VIDA and pyelonephritis    Radha Shah, APRN - [facility], [address] [place] [address], [address]  [address]  [phone]    Schedule an appointment as soon as possible for a visit in 1 week(s)  discharged after Pyelonephritis and VIDA     [facility]/[address] [address]  [phone]             Discharge Medications:      Medication List        CONTINUE taking these medications      albuterol sulfate  (90 Base) MCG/ACT

## 2023-04-10 NOTE — DISCHARGE INSTR - COC
Continuity of Care Form    Patient Name: Neal Lugo   :  1955  MRN:  [id number]    Admit date:  2023  Discharge date:  ***    Code Status Order: Limited   Advance Directives:     Admitting Physician:  Stephanie Martinez MD  PCP: DONOVAN Martinez - CNP    Discharging Nurse: [facility] Unit/Room#: 5K-20/020-A  Discharging Unit Phone Number: ***    Emergency Contact:   Extended Emergency Contact Information  Primary Emergency Contact: Cassius Gallardo  Address: [address] Phone: [phone]  Mobile Phone: [phone]  Relation: Grandchild  Hearing or visual needs: None  Other needs: None  Preferred language: English   needed? No  Secondary Emergency Contact: Delma [address] Phone: [phone]  Mobile Phone: [phone]  Relation: Brother/Sister  Hearing or visual needs: None  Other needs: None  Preferred language: English   needed?  No    Past Surgical History:  Past Surgical History:   Procedure Laterality Date    ANKLE SURGERY Right 02-10-14    Dr. Kim De Jesus at [facility]      removal of benign tumor    CARDIAC SURGERY  2008    [facility]  2009    2 polyps, not precanceorus    COLONOSCOPY Left 6/10/2019    COLONOSCOPY DIAGNOSTIC performed by Deann Gilford, MD at [facility]  3/30/2012    eye lid lift    DIAGNOSTIC CARDIAC CATH LAB PROCEDURE      ENDOSCOPY, COLON, DIAGNOSTIC      EYE SURGERY  2012    left sided ptosis    FOOT SURGERY      Tarsal tunnel surgery    FRACTURE SURGERY      HYSTERECTOMY (CERVIX STATUS UNKNOWN)   and     first partial in 's, then total in [address] [facility]      KIDNEY TRANSPLANT  04/10/2020    RIGHT    LIVER BIOPSY  2015    LUNG BIOPSY      OVARY REMOVAL      [place] OFFICE/OUTPT VISIT,PROCEDURE ONLY Left

## 2023-04-10 NOTE — CARE COORDINATION
4/10/23, 2:25 PM EDT    DISCHARGE ON GOING EVALUATION    Melly Weir day: 4  Location: -20/020-A Reason for admit: VIDA (acute kidney injury) ([facility] [N17.9]  Urinary tract infection without hematuria, site unspecified [N39.0]  Chronic right-sided low back pain without sciatica [M54.50, G89.29]   Barriers to Discharge: Nephrology following, Rocephin, DM management, Xarelto, Lidoderm patch, prn Tylenol, Noro, and Zofran, Potassium replacement protocol, daily BMP and CBC, carb choice diet, acapella, incentive spirometry, SCD's, up with assistance. PCP: DONOVAN Garcia CNP  Readmission Risk Score: 16.2%  Patient Goals/Plan/Treatment Preferences: Christy Lucero is from home alone. She plans to stay with her grand-daughter at discharge. She agrees to new , SS arranged. Patient requests a black rollator at discharge. She is aware of the $ 42.00 co-pay. She has orders in place for home oxygen evaluation. Respiratory therapy has been notified.

## 2023-04-11 LAB — BACTERIA BLD AEROBE CULT: NORMAL

## 2023-04-11 NOTE — CARE COORDINATION
4/11/23, 9:19 AM EDT    Patient goals/plan/ treatment preferences discussed by  and . Patient goals/plan/ treatment preferences reviewed with patient/ family. Patient/ family verbalize understanding of discharge plan and are in agreement with goal/plan/treatment preferences. Understanding was demonstrated using the teach back method. AVS provided by RN at time of discharge, which includes all necessary medical information pertaining to the patients current course of illness, treatment, post-discharge goals of care, and treatment preferences. Services At/After Discharge: DME, Nursing service, OT, and PT       IMM Letter  IMM Letter given to Patient/Family/Significant other/Guardian/POA/by[de-identified] Rod Worrell RN Case Mgr. IMM Letter date given[de-identified] 04/07/23  IMM Letter time given[de-identified] [address] for SN, PT/OT. [facility] DME for home oxygen and rollator.

## 2023-04-11 NOTE — PROGRESS NOTES
Physician Progress Note      PATIENT:               Saranya Moore  CSN #:                  [id number]  :                       1955  ADMIT DATE:       2023 10:10 PM  DISCH DATE:        4/10/2023 7:01 PM  RESPONDING  PROVIDER #:        Olimpia Omer          QUERY TEXT:    Pt admitted with UTI. Noted documentation of gram negative sepsis with   bacteremia in nephrology progress notes. If possible, please document in   progress notes and discharge summary:    The medical record reflects the following:  Risk Factors: UTI  Clinical Indicators: sepsis documented in nephrology note, blood culture + x1   for gram negative; on arrival WBC 21.1, lactic 1.4, temp 99.0, HR 81, RR 22  Treatment: labs, imaging, monitoring, Rocephin, IVF  Options provided:  -- Sepsis ruled out  -- Sepsis confirmed present on admission  -- Sepsis confirmed not present on admission  -- Other - I will add my own diagnosis  -- Disagree - Not applicable / Not valid  -- Disagree - Clinically unable to determine / Unknown  -- Refer to Clinical Documentation Reviewer    PROVIDER RESPONSE TEXT:    The diagnosis of sepsis was ruled out.     Query created by: Teresa Gold on 4/10/2023 9:55 AM      Electronically signed by:  Olimpia Omer 4/10/2023 10:00 PM

## 2023-04-19 DIAGNOSIS — G89.4 CHRONIC PAIN SYNDROME: ICD-10-CM

## 2023-04-19 RX ORDER — HYDROCODONE BITARTRATE AND ACETAMINOPHEN 5; 325 MG/1; MG/1
1 TABLET ORAL EVERY 8 HOURS PRN
Qty: 90 TABLET | Refills: 0 | Status: SHIPPED | OUTPATIENT
Start: 2023-04-22 | End: 2023-05-22

## 2023-04-19 NOTE — TELEPHONE ENCOUNTER
[facility] called requesting a refill on the following medications:  Requested Prescriptions     Pending Prescriptions Disp Refills    HYDROcodone-acetaminophen (NORCO) 5-325 MG per tablet 90 tablet 0     Sig: Take 1 tablet by mouth every 8 hours as needed for Pain for up to 30 days. Pharmacy verified: [facility] [address]  . pv      Date of last visit: 2/21/2023  Date of next visit (if applicable): 6/4/3831

## 2023-04-21 ENCOUNTER — NURSE ONLY (OUTPATIENT)
Dept: LAB | Age: 68
End: 2023-04-21

## 2023-04-21 DIAGNOSIS — N39.0 URINARY TRACT INFECTION WITHOUT HEMATURIA, SITE UNSPECIFIED: ICD-10-CM

## 2023-04-21 LAB
ANION GAP SERPL CALC-SCNC: 12 MEQ/L (ref 8–16)
BUN SERPL-MCNC: 30 MG/DL (ref 7–22)
CALCIUM SERPL-MCNC: 10.3 MG/DL (ref 8.5–10.5)
CHLORIDE SERPL-SCNC: 99 MEQ/L (ref 98–111)
CO2 SERPL-SCNC: 27 MEQ/L (ref 23–33)
CREAT SERPL-MCNC: 1.3 MG/DL (ref 0.4–1.2)
DEPRECATED RDW RBC AUTO: 48.9 FL (ref 35–45)
ERYTHROCYTE [DISTWIDTH] IN BLOOD BY AUTOMATED COUNT: 15.7 % (ref 11.5–14.5)
GFR SERPL CREATININE-BSD FRML MDRD: 44 ML/MIN/1.73M2
GLUCOSE SERPL-MCNC: 168 MG/DL (ref 70–108)
HCT VFR BLD AUTO: 28.5 % (ref 37–47)
HGB BLD-MCNC: 8.3 GM/DL (ref 12–16)
MCH RBC QN AUTO: 24.7 PG (ref 26–33)
MCHC RBC AUTO-ENTMCNC: 29.1 GM/DL (ref 32.2–35.5)
MCV RBC AUTO: 84.8 FL (ref 81–99)
PLATELET # BLD AUTO: 371 THOU/MM3 (ref 130–400)
PMV BLD AUTO: 10.5 FL (ref 9.4–12.4)
POTASSIUM SERPL-SCNC: 3.9 MEQ/L (ref 3.5–5.2)
RBC # BLD AUTO: 3.36 MILL/MM3 (ref 4.2–5.4)
SODIUM SERPL-SCNC: 138 MEQ/L (ref 135–145)
WBC # BLD AUTO: 8.7 THOU/MM3 (ref 4.8–10.8)

## 2023-04-23 LAB
EKG ATRIAL RATE: 66 BPM
EKG ATRIAL RATE: 68 BPM
EKG ATRIAL RATE: 69 BPM
EKG ATRIAL RATE: 77 BPM
EKG P AXIS: -18 DEGREES
EKG P AXIS: -4 DEGREES
EKG P AXIS: -7 DEGREES
EKG P AXIS: 25 DEGREES
EKG P-R INTERVAL: 142 MS
EKG P-R INTERVAL: 150 MS
EKG P-R INTERVAL: 152 MS
EKG P-R INTERVAL: 156 MS
EKG Q-T INTERVAL: 364 MS
EKG Q-T INTERVAL: 372 MS
EKG Q-T INTERVAL: 382 MS
EKG Q-T INTERVAL: 402 MS
EKG QRS DURATION: 68 MS
EKG QRS DURATION: 76 MS
EKG QRS DURATION: 78 MS
EKG QRS DURATION: 78 MS
EKG QTC CALCULATION (BAZETT): 398 MS
EKG QTC CALCULATION (BAZETT): 406 MS
EKG QTC CALCULATION (BAZETT): 411 MS
EKG QTC CALCULATION (BAZETT): 421 MS
EKG R AXIS: 51 DEGREES
EKG R AXIS: 55 DEGREES
EKG R AXIS: 6 DEGREES
EKG T AXIS: 27 DEGREES
EKG T AXIS: 31 DEGREES
EKG T AXIS: 37 DEGREES
EKG T AXIS: 39 DEGREES
EKG VENTRICULAR RATE: 66 BPM
EKG VENTRICULAR RATE: 68 BPM
EKG VENTRICULAR RATE: 69 BPM
EKG VENTRICULAR RATE: 77 BPM

## 2023-05-02 ENCOUNTER — OFFICE VISIT (OUTPATIENT)
Dept: PHYSICAL MEDICINE AND REHAB | Age: 68
End: 2023-05-02
Payer: MEDICARE

## 2023-05-02 ENCOUNTER — TELEPHONE (OUTPATIENT)
Dept: PHYSICAL MEDICINE AND REHAB | Age: 68
End: 2023-05-02

## 2023-05-02 VITALS
DIASTOLIC BLOOD PRESSURE: 62 MMHG | SYSTOLIC BLOOD PRESSURE: 136 MMHG | HEIGHT: 65 IN | BODY MASS INDEX: 30.32 KG/M2 | WEIGHT: 182 LBS

## 2023-05-02 DIAGNOSIS — E11.42 DIABETIC POLYNEUROPATHY ASSOCIATED WITH TYPE 2 DIABETES MELLITUS (HCC): ICD-10-CM

## 2023-05-02 DIAGNOSIS — G89.4 CHRONIC PAIN SYNDROME: ICD-10-CM

## 2023-05-02 DIAGNOSIS — M47.816 LUMBAR FACET ARTHROPATHY: ICD-10-CM

## 2023-05-02 DIAGNOSIS — M47.816 LUMBAR SPONDYLOSIS: Primary | ICD-10-CM

## 2023-05-02 DIAGNOSIS — M54.16 LUMBAR RADICULITIS: ICD-10-CM

## 2023-05-02 DIAGNOSIS — R53.81 DEBILITY: ICD-10-CM

## 2023-05-02 PROCEDURE — 3017F COLORECTAL CA SCREEN DOC REV: CPT | Performed by: NURSE PRACTITIONER

## 2023-05-02 PROCEDURE — 1123F ACP DISCUSS/DSCN MKR DOCD: CPT | Performed by: NURSE PRACTITIONER

## 2023-05-02 PROCEDURE — 3051F HG A1C>EQUAL 7.0%<8.0%: CPT | Performed by: NURSE PRACTITIONER

## 2023-05-02 PROCEDURE — 1111F DSCHRG MED/CURRENT MED MERGE: CPT | Performed by: NURSE PRACTITIONER

## 2023-05-02 PROCEDURE — G8417 CALC BMI ABV UP PARAM F/U: HCPCS | Performed by: NURSE PRACTITIONER

## 2023-05-02 PROCEDURE — 99214 OFFICE O/P EST MOD 30 MIN: CPT | Performed by: NURSE PRACTITIONER

## 2023-05-02 PROCEDURE — 1090F PRES/ABSN URINE INCON ASSESS: CPT | Performed by: NURSE PRACTITIONER

## 2023-05-02 PROCEDURE — G8427 DOCREV CUR MEDS BY ELIG CLIN: HCPCS | Performed by: NURSE PRACTITIONER

## 2023-05-02 PROCEDURE — 3075F SYST BP GE 130 - 139MM HG: CPT | Performed by: NURSE PRACTITIONER

## 2023-05-02 PROCEDURE — 2022F DILAT RTA XM EVC RTNOPTHY: CPT | Performed by: NURSE PRACTITIONER

## 2023-05-02 PROCEDURE — G8399 PT W/DXA RESULTS DOCUMENT: HCPCS | Performed by: NURSE PRACTITIONER

## 2023-05-02 PROCEDURE — 3078F DIAST BP <80 MM HG: CPT | Performed by: NURSE PRACTITIONER

## 2023-05-02 PROCEDURE — 1036F TOBACCO NON-USER: CPT | Performed by: NURSE PRACTITIONER

## 2023-05-02 RX ORDER — LIDOCAINE 50 MG/G
1 PATCH TOPICAL DAILY
Qty: 30 PATCH | Refills: 0 | Status: SHIPPED | OUTPATIENT
Start: 2023-05-02 | End: 2023-06-01

## 2023-05-02 RX ORDER — HYDROCODONE BITARTRATE AND ACETAMINOPHEN 5; 325 MG/1; MG/1
1 TABLET ORAL EVERY 8 HOURS PRN
Qty: 90 TABLET | Refills: 0 | Status: SHIPPED | OUTPATIENT
Start: 2023-05-02 | End: 2023-06-01

## 2023-05-02 ASSESSMENT — ENCOUNTER SYMPTOMS
SHORTNESS OF BREATH: 1
EYE DISCHARGE: 0
EYE REDNESS: 0
BACK PAIN: 1
COUGH: 0
WHEEZING: 0
CHEST TIGHTNESS: 0
GASTROINTESTINAL NEGATIVE: 1

## 2023-05-02 NOTE — TELEPHONE ENCOUNTER
Pt states she is not able to fill her [place] 5-325 mg at [facility] due to it is not available. I called [facility] [address] [facility] per patient request to verify if they have any Norco to dispense. Spoke with pharmacist who stated yes they can fill this patient's Norco 5-325 mg. Pt aware and voiced understand and states she would like the script at [facility] to be cancelled. Pharmacist Brown Jimenez voiced understanding to cancel Kimber Prey script.      Herbie aware

## 2023-05-02 NOTE — PROGRESS NOTES
[address] [facility] [address]  Dept: [phone]  Dept Fax: [phone]: [phone]    Visit Date: 5/2/2023    Functionality Assessment/Goals Worksheet     On a scale of 0 (Does not Interfere) to 10 (Completely Interferes)     1. Which number describes how during the past week pain has interfered with       the following:  A. General Activity:  9  B. Mood: 8  C. Walking Ability:  8  D. Normal Work (Includes both work outside the home and housework):  0  E. Relations with Other People:   8  F. Sleep:   6  G. Enjoyment of Life:   8    2. Patient Prefers to Take their Pain Medications:     [x]  On a regular basis   []  Only when necessary    []  Does not take pain medications    3. What are the Patient's Goals/Expectations for Visiting Pain Management? []  Learn about my pain    [x]  Receive Medication   []  Physical Therapy     []  Treat Depression   [x]  Receive Injections    []  Treat Sleep   []  Deal with Anxiety and Stress   []  Treat Opoid Dependence/Addiction   []  Other:      HPI:   Aristides Bullock is a 69 y.o. female is here today for    Chief Complaint: Low back pain, leg pain     HPI   2.5 month FU. Continues to have pain in low back- constant aching  and dull pain. Pain is worse in right low back and continues to have numbness and tingling from knees down. Pain has been up and down. Norco prn remains effective. Having issues getting refill at [facility] as they don't have this medication in stock. Was in the hospital 4/5/2023 for dehydration, UTI, and acute kidney injury for 6 days     Is currently working with PT and OT with home health which is helping pain and mobility       Is on 2 liters of oxygen per nasal cannula. Some SOB with exertion   Pain increases with bending, lifting, twisting , walking, standing, and housework or working at job.       Medications

## 2023-05-08 ENCOUNTER — OFFICE VISIT (OUTPATIENT)
Dept: NEPHROLOGY | Age: 68
End: 2023-05-08
Payer: MEDICARE

## 2023-05-08 ENCOUNTER — NURSE ONLY (OUTPATIENT)
Dept: LAB | Age: 68
End: 2023-05-08

## 2023-05-08 ENCOUNTER — TELEPHONE (OUTPATIENT)
Dept: NEPHROLOGY | Age: 68
End: 2023-05-08

## 2023-05-08 VITALS
OXYGEN SATURATION: 100 % | HEIGHT: 65 IN | DIASTOLIC BLOOD PRESSURE: 66 MMHG | HEART RATE: 66 BPM | BODY MASS INDEX: 31.16 KG/M2 | SYSTOLIC BLOOD PRESSURE: 130 MMHG | WEIGHT: 187 LBS

## 2023-05-08 DIAGNOSIS — D63.8 ANEMIA OF CHRONIC DISEASE: ICD-10-CM

## 2023-05-08 DIAGNOSIS — T86.11 CHRONIC RENAL ALLOGRAFT NEPHROPATHY: ICD-10-CM

## 2023-05-08 DIAGNOSIS — E11.21 DIABETIC NEPHROPATHY ASSOCIATED WITH TYPE 2 DIABETES MELLITUS (HCC): ICD-10-CM

## 2023-05-08 DIAGNOSIS — N25.81 HYPERPARATHYROIDISM, SECONDARY RENAL (HCC): ICD-10-CM

## 2023-05-08 DIAGNOSIS — Z94.0 KIDNEY TRANSPLANT RECIPIENT: Primary | ICD-10-CM

## 2023-05-08 DIAGNOSIS — I10 PRIMARY HYPERTENSION: ICD-10-CM

## 2023-05-08 DIAGNOSIS — Z94.0 KIDNEY TRANSPLANT RECIPIENT: ICD-10-CM

## 2023-05-08 LAB
25(OH)D3 SERPL-MCNC: 38 NG/ML (ref 30–100)
ANION GAP SERPL CALC-SCNC: 12 MEQ/L (ref 8–16)
BUN SERPL-MCNC: 23 MG/DL (ref 7–22)
CALCIUM SERPL-MCNC: 10 MG/DL (ref 8.5–10.5)
CHLORIDE SERPL-SCNC: 105 MEQ/L (ref 98–111)
CO2 SERPL-SCNC: 26 MEQ/L (ref 23–33)
CREAT SERPL-MCNC: 1.1 MG/DL (ref 0.4–1.2)
CREAT UR-MCNC: 45.5 MG/DL
FERRITIN SERPL IA-MCNC: 879 NG/ML (ref 10–291)
GFR SERPL CREATININE-BSD FRML MDRD: 54 ML/MIN/1.73M2
GLUCOSE SERPL-MCNC: 119 MG/DL (ref 70–108)
HCT VFR BLD AUTO: 29.5 % (ref 37–47)
HGB BLD-MCNC: 8.3 GM/DL (ref 12–16)
IRON SATN MFR SERPL: 16 % (ref 20–50)
IRON SERPL-MCNC: 31 UG/DL (ref 50–170)
POTASSIUM SERPL-SCNC: 3.8 MEQ/L (ref 3.5–5.2)
PROT UR-MCNC: 6 MG/DL
PROT/CREAT 24H UR: 0.13 MG/G{CREAT}
PTH-INTACT SERPL-MCNC: 171.9 PG/ML (ref 15–65)
SODIUM SERPL-SCNC: 143 MEQ/L (ref 135–145)
TIBC SERPL-MCNC: 188 UG/DL (ref 171–450)

## 2023-05-08 PROCEDURE — G8399 PT W/DXA RESULTS DOCUMENT: HCPCS | Performed by: INTERNAL MEDICINE

## 2023-05-08 PROCEDURE — G8427 DOCREV CUR MEDS BY ELIG CLIN: HCPCS | Performed by: INTERNAL MEDICINE

## 2023-05-08 PROCEDURE — 1090F PRES/ABSN URINE INCON ASSESS: CPT | Performed by: INTERNAL MEDICINE

## 2023-05-08 PROCEDURE — 3051F HG A1C>EQUAL 7.0%<8.0%: CPT | Performed by: INTERNAL MEDICINE

## 2023-05-08 PROCEDURE — 3075F SYST BP GE 130 - 139MM HG: CPT | Performed by: INTERNAL MEDICINE

## 2023-05-08 PROCEDURE — 3017F COLORECTAL CA SCREEN DOC REV: CPT | Performed by: INTERNAL MEDICINE

## 2023-05-08 PROCEDURE — 3078F DIAST BP <80 MM HG: CPT | Performed by: INTERNAL MEDICINE

## 2023-05-08 PROCEDURE — 1123F ACP DISCUSS/DSCN MKR DOCD: CPT | Performed by: INTERNAL MEDICINE

## 2023-05-08 PROCEDURE — 99214 OFFICE O/P EST MOD 30 MIN: CPT | Performed by: INTERNAL MEDICINE

## 2023-05-08 PROCEDURE — 1111F DSCHRG MED/CURRENT MED MERGE: CPT | Performed by: INTERNAL MEDICINE

## 2023-05-08 PROCEDURE — 2022F DILAT RTA XM EVC RTNOPTHY: CPT | Performed by: INTERNAL MEDICINE

## 2023-05-08 PROCEDURE — G8417 CALC BMI ABV UP PARAM F/U: HCPCS | Performed by: INTERNAL MEDICINE

## 2023-05-08 PROCEDURE — 1036F TOBACCO NON-USER: CPT | Performed by: INTERNAL MEDICINE

## 2023-05-08 RX ORDER — SODIUM CHLORIDE 9 MG/ML
5-250 INJECTION, SOLUTION INTRAVENOUS PRN
OUTPATIENT
Start: 2023-05-09

## 2023-05-08 RX ORDER — DIPHENHYDRAMINE HYDROCHLORIDE 50 MG/ML
50 INJECTION INTRAMUSCULAR; INTRAVENOUS
OUTPATIENT
Start: 2023-05-09

## 2023-05-08 RX ORDER — SODIUM CHLORIDE 0.9 % (FLUSH) 0.9 %
5-40 SYRINGE (ML) INJECTION PRN
OUTPATIENT
Start: 2023-05-09

## 2023-05-08 RX ORDER — EPINEPHRINE 1 MG/ML
0.3 INJECTION, SOLUTION INTRAMUSCULAR; SUBCUTANEOUS PRN
OUTPATIENT
Start: 2023-05-09

## 2023-05-08 RX ORDER — SODIUM CHLORIDE 9 MG/ML
INJECTION, SOLUTION INTRAVENOUS CONTINUOUS
OUTPATIENT
Start: 2023-05-09

## 2023-05-08 RX ORDER — HEPARIN SODIUM (PORCINE) LOCK FLUSH IV SOLN 100 UNIT/ML 100 UNIT/ML
500 SOLUTION INTRAVENOUS PRN
OUTPATIENT
Start: 2023-05-09

## 2023-05-08 NOTE — TELEPHONE ENCOUNTER
I called Patricia Méndez and let her know she is scheduled for 5/11/23 at 11 am she said she may need to push it back to next week for [facility].

## 2023-05-08 NOTE — PROGRESS NOTES
Renal Progress Note    Assessment and Plan:      Diagnosis Orders   1. Kidney transplant recipient        2. Primary hypertension        3. Diabetic nephropathy associated with type 2 diabetes mellitus ([facility]        4. Chronic renal allograft nephropathy        5. Anemia of chronic disease                  PLAN:  Lab result reviewed with the patient to the unit. She understood very well  Most recent serum creatinine level is 1.3 mg/dL  Iron saturation is only 9%  We will arrange for intravenous iron infusion in the outpatient  Medications reviewed  No changes otherwise  Continue to avoid any nonsteroidal anti-inflammatory drugs  Return visit in 6 months with labs          Patient Active Problem List   Diagnosis    Coronary disease    Hyperlipemia    Arthritis    Neuropathy, diabetic (HCC)    Leg pain    Obesity (BMI 30-39. 9)    Low HDL (under 40)    History of tobacco use    Chronic obstructive pulmonary disease (HCC)    Anemia, chronic disease    Gout    Urolithiasis    Secondary hyperparathyroidism of renal origin ([facility]    CONTRERAS (obstructive sleep apnea)    Vitamin D deficiency    Persistent proteinuria associated with type 2 diabetes mellitus (HCC)    Cellulitis in diabetic foot (HCC)    Persistent proteinuria    Acquired hypothyroidism    Nephrotic syndrome    Iron deficiency anemia due to dietary causes    Anemia of chronic disease    Essential hypertension    Diabetic peripheral neuropathy associated with type 2 diabetes mellitus (HCC)    Diabetes mellitus type 2 with complications (HCC)    Acute on chronic diastolic congestive heart failure (HCC)    Constipation    High anion gap metabolic acidosis    Lymphadenopathy, inguinal    Atelectasis of left lung    Kidney transplant recipient    Acute kidney injury superimposed on CKD (HCC)    Hx of coronary artery disease    History of end stage renal disease    Cervical stenosis of spinal canal    Hx of gastroesophageal reflux (GERD)    Polyneuropathy associated with

## 2023-05-11 ENCOUNTER — HOSPITAL ENCOUNTER (OUTPATIENT)
Dept: NURSING | Age: 68
Discharge: HOME | End: 2023-05-11
Payer: MEDICARE

## 2023-05-11 VITALS
RESPIRATION RATE: 18 BRPM | HEART RATE: 65 BPM | SYSTOLIC BLOOD PRESSURE: 134 MMHG | DIASTOLIC BLOOD PRESSURE: 58 MMHG | TEMPERATURE: 96.8 F | OXYGEN SATURATION: 100 %

## 2023-05-11 DIAGNOSIS — D63.8 ANEMIA, CHRONIC DISEASE: ICD-10-CM

## 2023-05-11 DIAGNOSIS — D50.8 IRON DEFICIENCY ANEMIA DUE TO DIETARY CAUSES: Primary | ICD-10-CM

## 2023-05-11 PROCEDURE — 96365 THER/PROPH/DIAG IV INF INIT: CPT

## 2023-05-11 PROCEDURE — 6360000002 HC RX W HCPCS: Performed by: INTERNAL MEDICINE

## 2023-05-11 PROCEDURE — 2580000003 HC RX 258: Performed by: INTERNAL MEDICINE

## 2023-05-11 RX ORDER — SODIUM CHLORIDE 9 MG/ML
5-250 INJECTION, SOLUTION INTRAVENOUS PRN
OUTPATIENT
Start: 2023-05-18

## 2023-05-11 RX ORDER — DIPHENHYDRAMINE HYDROCHLORIDE 50 MG/ML
50 INJECTION INTRAMUSCULAR; INTRAVENOUS
OUTPATIENT
Start: 2023-05-18

## 2023-05-11 RX ORDER — SODIUM CHLORIDE 0.9 % (FLUSH) 0.9 %
5-40 SYRINGE (ML) INJECTION PRN
OUTPATIENT
Start: 2023-05-18

## 2023-05-11 RX ORDER — SODIUM CHLORIDE 9 MG/ML
INJECTION, SOLUTION INTRAVENOUS CONTINUOUS
OUTPATIENT
Start: 2023-05-18

## 2023-05-11 RX ORDER — HEPARIN SODIUM (PORCINE) LOCK FLUSH IV SOLN 100 UNIT/ML 100 UNIT/ML
500 SOLUTION INTRAVENOUS PRN
OUTPATIENT
Start: 2023-05-18

## 2023-05-11 RX ORDER — EPINEPHRINE 1 MG/ML
0.3 INJECTION, SOLUTION INTRAMUSCULAR; SUBCUTANEOUS PRN
OUTPATIENT
Start: 2023-05-18

## 2023-05-11 RX ADMIN — FERUMOXYTOL 510 MG: 510 INJECTION INTRAVENOUS at 11:22

## 2023-05-11 NOTE — PROGRESS NOTES
1103 Patient arrived to [facility] ambulatory for feraheme infusion. Oriented to room and call light  PT RIGHTS AND RESPONSIBILITIES OFFERED TO PT.     1122 Medication started and she denies complaints     1142 Medication completed and she denies complaints. 1212 Patient denies complaints. Iv removed. Discharge instructions given and explained and she denies questions.   Discharged ambulatory       _M___ Safety:       (Environmental)  [place] to environment  Ensure ID band is correct and in place/ allergy band as needed  Assess for fall risk  Initiate fall precautions as applicable (fall band, side rails, etc.)  Call light within reach  Bed in low position/ wheels locked    _M___ Pain:       Assess pain level and characteristics  Administer analgesics as ordered  Assess effectiveness of pain management and report to MD as needed    _M___ Knowledge Deficit:  Assess baseline knowledge  Provide teaching at level of understanding  Provide teaching via preferred learning method  Evaluate teaching effectiveness    __M__ Hemodynamic/Respiratory Status:       (Pre and Post Procedure Monitoring)  Assess/Monitor vital signs and LOC  Assess Baseline SpO2 prior to any sedation  Obtain weight/height  Assess vital signs/ LOC until patient meets discharge criteria  Monitor procedure site and notify MD of any issues

## 2023-05-11 NOTE — DISCHARGE INSTRUCTIONS
Next appointment is scheduled for May 18 at 11:00    Please call outpatient nursing the morning of your appointment at [phone]    ACTIVITY:  Continue usual care with your doctor. Call your doctor immediately if any severe problems or go to the nearest emergency room. I have been treated and hereby acknowledge receiving this instruction sheet.

## 2023-05-18 ENCOUNTER — HOSPITAL ENCOUNTER (OUTPATIENT)
Dept: NURSING | Age: 68
Discharge: HOME | End: 2023-05-18
Payer: MEDICARE

## 2023-05-18 VITALS
RESPIRATION RATE: 20 BRPM | TEMPERATURE: 97 F | SYSTOLIC BLOOD PRESSURE: 140 MMHG | HEART RATE: 66 BPM | DIASTOLIC BLOOD PRESSURE: 61 MMHG | OXYGEN SATURATION: 96 %

## 2023-05-18 DIAGNOSIS — D63.8 ANEMIA, CHRONIC DISEASE: ICD-10-CM

## 2023-05-18 DIAGNOSIS — D50.8 IRON DEFICIENCY ANEMIA DUE TO DIETARY CAUSES: Primary | ICD-10-CM

## 2023-05-18 PROCEDURE — 6360000002 HC RX W HCPCS: Performed by: INTERNAL MEDICINE

## 2023-05-18 PROCEDURE — 2580000003 HC RX 258: Performed by: INTERNAL MEDICINE

## 2023-05-18 PROCEDURE — 96365 THER/PROPH/DIAG IV INF INIT: CPT

## 2023-05-18 RX ORDER — DIPHENHYDRAMINE HYDROCHLORIDE 50 MG/ML
50 INJECTION INTRAMUSCULAR; INTRAVENOUS
OUTPATIENT
Start: 2023-05-25

## 2023-05-18 RX ORDER — SODIUM CHLORIDE 9 MG/ML
5-250 INJECTION, SOLUTION INTRAVENOUS PRN
OUTPATIENT
Start: 2023-05-25

## 2023-05-18 RX ORDER — HEPARIN SODIUM (PORCINE) LOCK FLUSH IV SOLN 100 UNIT/ML 100 UNIT/ML
500 SOLUTION INTRAVENOUS PRN
OUTPATIENT
Start: 2023-05-25

## 2023-05-18 RX ORDER — EPINEPHRINE 1 MG/ML
0.3 INJECTION, SOLUTION INTRAMUSCULAR; SUBCUTANEOUS PRN
OUTPATIENT
Start: 2023-05-25

## 2023-05-18 RX ORDER — SODIUM CHLORIDE 0.9 % (FLUSH) 0.9 %
5-40 SYRINGE (ML) INJECTION PRN
OUTPATIENT
Start: 2023-05-25

## 2023-05-18 RX ORDER — SODIUM CHLORIDE 9 MG/ML
INJECTION, SOLUTION INTRAVENOUS CONTINUOUS
OUTPATIENT
Start: 2023-05-25

## 2023-05-18 RX ADMIN — FERUMOXYTOL 510 MG: 510 INJECTION INTRAVENOUS at 11:25

## 2023-05-18 NOTE — PROGRESS NOTES
1118 Patient in wheelchair to [facility] for Feraheme infusion. Patient states she tolerated her last infusion. Verbalizes understanding of infusion. PT RIGHTS AND RESPONSIBILITIES OFFERED TO PT.     1125 Infusion started. 1150 AVS reviewed with patient. Verbalizes understanding. Patient discharged ambulatory to [facility].        _M___ Safety:       (Environmental)  [place] to environment  Ensure ID band is correct and in place/ allergy band as needed  Assess for fall risk  Initiate fall precautions as applicable (fall band, side rails, etc.)  Call light within reach  Bed in low position/ wheels locked    _M___ Pain:       Assess pain level and characteristics  Administer analgesics as ordered  Assess effectiveness of pain management and report to MD as needed    __M__ Knowledge Deficit:  Assess baseline knowledge  Provide teaching at level of understanding  Provide teaching via preferred learning method  Evaluate teaching effectiveness    __M__ Hemodynamic/Respiratory Status:       (Pre and Post Procedure Monitoring)  Assess/Monitor vital signs and LOC  Assess Baseline SpO2 prior to any sedation  Obtain weight/height  Assess vital signs/ LOC until patient meets discharge criteria  Monitor procedure site and notify MD of any issues

## 2023-05-30 DIAGNOSIS — G89.4 CHRONIC PAIN SYNDROME: ICD-10-CM

## 2023-05-30 RX ORDER — HYDROCODONE BITARTRATE AND ACETAMINOPHEN 5; 325 MG/1; MG/1
1 TABLET ORAL EVERY 8 HOURS PRN
Qty: 90 TABLET | Refills: 0 | Status: SHIPPED | OUTPATIENT
Start: 2023-06-01 | End: 2023-07-01

## 2023-05-30 NOTE — TELEPHONE ENCOUNTER
Randine Compton called requesting a refill on the following medications:  Requested Prescriptions     Pending Prescriptions Disp Refills    HYDROcodone-acetaminophen (NORCO) 5-325 MG per tablet 90 tablet 0     Sig: Take 1 tablet by mouth every 8 hours as needed for Pain for up to 30 days.      Pharmacy verified:  [facility]      Date of last visit: 05-02-23  Date of next visit (if applicable): 9/10/3065

## 2023-06-20 ENCOUNTER — OFFICE VISIT (OUTPATIENT)
Dept: UROLOGY | Age: 68
End: 2023-06-20
Payer: MEDICARE

## 2023-06-20 VITALS — HEIGHT: 66 IN | BODY MASS INDEX: 28.93 KG/M2 | RESPIRATION RATE: 16 BRPM | WEIGHT: 180 LBS

## 2023-06-20 DIAGNOSIS — N32.81 OAB (OVERACTIVE BLADDER): Primary | ICD-10-CM

## 2023-06-20 DIAGNOSIS — Z94.0 HISTORY OF KIDNEY TRANSPLANT: ICD-10-CM

## 2023-06-20 DIAGNOSIS — N39.0 RECURRENT UTI: ICD-10-CM

## 2023-06-20 PROCEDURE — G8399 PT W/DXA RESULTS DOCUMENT: HCPCS | Performed by: UROLOGY

## 2023-06-20 PROCEDURE — 1123F ACP DISCUSS/DSCN MKR DOCD: CPT | Performed by: UROLOGY

## 2023-06-20 PROCEDURE — 3017F COLORECTAL CA SCREEN DOC REV: CPT | Performed by: UROLOGY

## 2023-06-20 PROCEDURE — 1036F TOBACCO NON-USER: CPT | Performed by: UROLOGY

## 2023-06-20 PROCEDURE — 1090F PRES/ABSN URINE INCON ASSESS: CPT | Performed by: UROLOGY

## 2023-06-20 PROCEDURE — G8427 DOCREV CUR MEDS BY ELIG CLIN: HCPCS | Performed by: UROLOGY

## 2023-06-20 PROCEDURE — 99213 OFFICE O/P EST LOW 20 MIN: CPT | Performed by: UROLOGY

## 2023-06-20 PROCEDURE — G8417 CALC BMI ABV UP PARAM F/U: HCPCS | Performed by: UROLOGY

## 2023-06-20 NOTE — PROGRESS NOTES
imaging COMPARISON: 8/23/2021 FINDINGS: Right kidney Right kidney is within normal limits of size. Echogenicity is very difficult to assess no distinct images of the liver are included. Borderline renal cortical thinning. No hydronephrosis. 1 cm peripelvic cyst. No solid mass identified. The resistive index is normal. Left kidney no significant change the prior exam left kidney shows borderline renal cortical thickness echogenicity appears to be increased. There is a 5.6 cm cyst lower pole left kidney there is no solid mass seen there is no hydronephrosis. The resistive index is normal. Transplanted kidney There is mild pelvic caliectasis there is no renal cortical thinning echogenicity appears normal. There is no solid or cystic mass. Elevated mid arcuate resistive index. Normal superior arcuate resistive index. External iliac artery proximally anastomosis is elevated flow velocity. The anastomosis velocities are within the normal range there is no indication of a anastomotic stenosis. Renal vein spectral waveform is normal monophasic. The bladder is mildly distended. Bladder wall appears normal. There is partially imaged transplant ureter. No significant voiding with of post void residual 100%. Technical notes: RIGHT KIDNEY - 9.4 x 4.9 x 4.7 cm Resistive Index - 0.74 Cortical Thickness - 1.0 cm LEFT KIDNEY - 10.1 x 4.0 x 4.8 cm Resistive Index - 0.74 Cortical Thickness - 1.2 cm TRANSPLANT KIDNEY - 13.1 x 7.3 x 5.5 cm Resistive Index - superior: 0.72, mid: 0.86, inferior: 0.83 Cortical Thickness - 1.2 cm URINARY BLADDER Pre-Void - 237.9 mL Post-Void - 232.2 mL     There is no evidence of anastomotic stenosis of the transplanted kidney. Resistive indices are not elevated. Flow velocities are elevated in the proximal external iliac artery. Patient was unable to void significant volume. **This report has been created using voice recognition software.   It may contain minor errors which are inherent in voice

## 2023-06-22 DIAGNOSIS — J41.0 SIMPLE CHRONIC BRONCHITIS (HCC): ICD-10-CM

## 2023-06-22 DIAGNOSIS — J30.89 NON-SEASONAL ALLERGIC RHINITIS, UNSPECIFIED TRIGGER: ICD-10-CM

## 2023-06-22 RX ORDER — FLUTICASONE PROPIONATE 50 MCG
1 SPRAY, SUSPENSION (ML) NASAL DAILY
Qty: 3 EACH | Refills: 3 | Status: SHIPPED | OUTPATIENT
Start: 2023-06-22

## 2023-06-27 ENCOUNTER — OFFICE VISIT (OUTPATIENT)
Dept: PULMONOLOGY | Age: 68
End: 2023-06-27
Payer: MEDICARE

## 2023-06-27 VITALS
HEART RATE: 67 BPM | HEIGHT: 65 IN | BODY MASS INDEX: 29.85 KG/M2 | DIASTOLIC BLOOD PRESSURE: 32 MMHG | WEIGHT: 179.2 LBS | TEMPERATURE: 97.7 F | SYSTOLIC BLOOD PRESSURE: 118 MMHG | OXYGEN SATURATION: 100 %

## 2023-06-27 DIAGNOSIS — J96.01 ACUTE RESPIRATORY FAILURE WITH HYPOXIA (HCC): ICD-10-CM

## 2023-06-27 DIAGNOSIS — Z09 HOSPITAL DISCHARGE FOLLOW-UP: ICD-10-CM

## 2023-06-27 DIAGNOSIS — J41.0 SIMPLE CHRONIC BRONCHITIS (HCC): Primary | ICD-10-CM

## 2023-06-27 DIAGNOSIS — Z87.891 PERSONAL HISTORY OF TOBACCO USE: ICD-10-CM

## 2023-06-27 PROCEDURE — 1036F TOBACCO NON-USER: CPT | Performed by: NURSE PRACTITIONER

## 2023-06-27 PROCEDURE — G8427 DOCREV CUR MEDS BY ELIG CLIN: HCPCS | Performed by: NURSE PRACTITIONER

## 2023-06-27 PROCEDURE — 3074F SYST BP LT 130 MM HG: CPT | Performed by: NURSE PRACTITIONER

## 2023-06-27 PROCEDURE — G8417 CALC BMI ABV UP PARAM F/U: HCPCS | Performed by: NURSE PRACTITIONER

## 2023-06-27 PROCEDURE — 99214 OFFICE O/P EST MOD 30 MIN: CPT | Performed by: NURSE PRACTITIONER

## 2023-06-27 PROCEDURE — 3023F SPIROM DOC REV: CPT | Performed by: NURSE PRACTITIONER

## 2023-06-27 PROCEDURE — 3017F COLORECTAL CA SCREEN DOC REV: CPT | Performed by: NURSE PRACTITIONER

## 2023-06-27 PROCEDURE — 1123F ACP DISCUSS/DSCN MKR DOCD: CPT | Performed by: NURSE PRACTITIONER

## 2023-06-27 PROCEDURE — G0296 VISIT TO DETERM LDCT ELIG: HCPCS | Performed by: NURSE PRACTITIONER

## 2023-06-27 PROCEDURE — 94618 PULMONARY STRESS TESTING: CPT | Performed by: NURSE PRACTITIONER

## 2023-06-27 PROCEDURE — 1090F PRES/ABSN URINE INCON ASSESS: CPT | Performed by: NURSE PRACTITIONER

## 2023-06-27 PROCEDURE — G8399 PT W/DXA RESULTS DOCUMENT: HCPCS | Performed by: NURSE PRACTITIONER

## 2023-06-27 PROCEDURE — 3078F DIAST BP <80 MM HG: CPT | Performed by: NURSE PRACTITIONER

## 2023-06-27 ASSESSMENT — ENCOUNTER SYMPTOMS
COUGH: 0
ALLERGIC/IMMUNOLOGIC NEGATIVE: 1
EYES NEGATIVE: 1
WHEEZING: 0
SHORTNESS OF BREATH: 1
GASTROINTESTINAL NEGATIVE: 1

## 2023-06-28 DIAGNOSIS — G89.4 CHRONIC PAIN SYNDROME: ICD-10-CM

## 2023-06-28 RX ORDER — HYDROCODONE BITARTRATE AND ACETAMINOPHEN 5; 325 MG/1; MG/1
1 TABLET ORAL EVERY 8 HOURS PRN
Qty: 90 TABLET | Refills: 0 | Status: SHIPPED | OUTPATIENT
Start: 2023-07-01 | End: 2023-07-31

## 2023-07-10 DIAGNOSIS — R41.0 CONFUSION: ICD-10-CM

## 2023-07-10 DIAGNOSIS — R39.15 URGENCY OF URINATION: Primary | ICD-10-CM

## 2023-07-10 DIAGNOSIS — R30.0 BURNING WITH URINATION: ICD-10-CM

## 2023-07-11 ENCOUNTER — OFFICE VISIT (OUTPATIENT)
Dept: PHYSICAL MEDICINE AND REHAB | Age: 68
End: 2023-07-11
Payer: MEDICARE

## 2023-07-11 VITALS
DIASTOLIC BLOOD PRESSURE: 42 MMHG | SYSTOLIC BLOOD PRESSURE: 118 MMHG | HEIGHT: 65 IN | WEIGHT: 179 LBS | BODY MASS INDEX: 29.82 KG/M2

## 2023-07-11 DIAGNOSIS — M47.816 LUMBAR SPONDYLOSIS: Primary | ICD-10-CM

## 2023-07-11 DIAGNOSIS — M47.816 LUMBAR FACET ARTHROPATHY: ICD-10-CM

## 2023-07-11 DIAGNOSIS — E11.42 DIABETIC POLYNEUROPATHY ASSOCIATED WITH TYPE 2 DIABETES MELLITUS (HCC): ICD-10-CM

## 2023-07-11 DIAGNOSIS — M54.16 LUMBAR RADICULITIS: ICD-10-CM

## 2023-07-11 DIAGNOSIS — R53.81 DEBILITY: ICD-10-CM

## 2023-07-11 DIAGNOSIS — G89.4 CHRONIC PAIN SYNDROME: ICD-10-CM

## 2023-07-11 PROCEDURE — 3017F COLORECTAL CA SCREEN DOC REV: CPT | Performed by: NURSE PRACTITIONER

## 2023-07-11 PROCEDURE — 1123F ACP DISCUSS/DSCN MKR DOCD: CPT | Performed by: NURSE PRACTITIONER

## 2023-07-11 PROCEDURE — 1090F PRES/ABSN URINE INCON ASSESS: CPT | Performed by: NURSE PRACTITIONER

## 2023-07-11 PROCEDURE — 2022F DILAT RTA XM EVC RTNOPTHY: CPT | Performed by: NURSE PRACTITIONER

## 2023-07-11 PROCEDURE — 99214 OFFICE O/P EST MOD 30 MIN: CPT | Performed by: NURSE PRACTITIONER

## 2023-07-11 PROCEDURE — 3078F DIAST BP <80 MM HG: CPT | Performed by: NURSE PRACTITIONER

## 2023-07-11 PROCEDURE — G8417 CALC BMI ABV UP PARAM F/U: HCPCS | Performed by: NURSE PRACTITIONER

## 2023-07-11 PROCEDURE — 1036F TOBACCO NON-USER: CPT | Performed by: NURSE PRACTITIONER

## 2023-07-11 PROCEDURE — G8427 DOCREV CUR MEDS BY ELIG CLIN: HCPCS | Performed by: NURSE PRACTITIONER

## 2023-07-11 PROCEDURE — G8399 PT W/DXA RESULTS DOCUMENT: HCPCS | Performed by: NURSE PRACTITIONER

## 2023-07-11 PROCEDURE — 3051F HG A1C>EQUAL 7.0%<8.0%: CPT | Performed by: NURSE PRACTITIONER

## 2023-07-11 PROCEDURE — 3074F SYST BP LT 130 MM HG: CPT | Performed by: NURSE PRACTITIONER

## 2023-07-11 ASSESSMENT — ENCOUNTER SYMPTOMS
SHORTNESS OF BREATH: 0
EYE DISCHARGE: 0
WHEEZING: 0
EYE REDNESS: 0
GASTROINTESTINAL NEGATIVE: 1
COUGH: 0
BACK PAIN: 1
CHEST TIGHTNESS: 0

## 2023-07-11 NOTE — PROGRESS NOTES
aberrant behavior. Medications and/or procedures to improve function and quality of life- patient understanding with this and that may not be pain free  Discussed with patient about safe storage of medications at home  Discussed possible weaning of medication dosing dependent on treatment/procedure results. Discussed with patient about risks with procedure including infection, reaction to medication, increased pain, or bleeding. Discussed procedures again L-facet EMILEE and IRVIN. Again she does not want at this time. Continue Norco 5/325 TID prn- filled 7/1/2023  States allergic to all neuropathic pain medications   Insurance would not cover Lidoderm patches   Encouraged to continue home exercises   Is compliant     Meds. Prescribed:   No orders of the defined types were placed in this encounter. Return in about 10 weeks (around 9/19/2023), or if symptoms worsen or fail to improve, for follow up  for medications.                Electronically signed by DONOVAN Beasley CNP on7/11/2023 at 12:15 PM

## 2023-07-12 DIAGNOSIS — E11.9 TYPE 2 DIABETES MELLITUS WITHOUT COMPLICATION, WITH LONG-TERM CURRENT USE OF INSULIN (HCC): ICD-10-CM

## 2023-07-12 DIAGNOSIS — Z79.4 TYPE 2 DIABETES MELLITUS WITHOUT COMPLICATION, WITH LONG-TERM CURRENT USE OF INSULIN (HCC): ICD-10-CM

## 2023-07-12 RX ORDER — INSULIN GLARGINE 100 [IU]/ML
INJECTION, SOLUTION SUBCUTANEOUS
Qty: 30 ML | Refills: 3 | Status: SHIPPED | OUTPATIENT
Start: 2023-07-12

## 2023-07-17 RX ORDER — AMOXICILLIN 500 MG/1
500 CAPSULE ORAL 2 TIMES DAILY
Qty: 14 CAPSULE | Refills: 0 | OUTPATIENT
Start: 2023-07-17 | End: 2023-07-24

## 2023-07-27 DIAGNOSIS — G89.4 CHRONIC PAIN SYNDROME: ICD-10-CM

## 2023-07-27 RX ORDER — HYDROCODONE BITARTRATE AND ACETAMINOPHEN 5; 325 MG/1; MG/1
1 TABLET ORAL EVERY 8 HOURS PRN
Qty: 90 TABLET | Refills: 0 | Status: SHIPPED | OUTPATIENT
Start: 2023-08-01 | End: 2023-08-31

## 2023-08-08 ENCOUNTER — NURSE ONLY (OUTPATIENT)
Dept: LAB | Age: 68
End: 2023-08-08

## 2023-08-08 DIAGNOSIS — I10 PRIMARY HYPERTENSION: ICD-10-CM

## 2023-08-08 LAB
ANION GAP SERPL CALC-SCNC: 14 MEQ/L (ref 8–16)
BASOPHILS ABSOLUTE: 0 THOU/MM3 (ref 0–0.1)
BASOPHILS NFR BLD AUTO: 0.1 %
BUN SERPL-MCNC: 21 MG/DL (ref 7–22)
CALCIUM SERPL-MCNC: 10.8 MG/DL (ref 8.5–10.5)
CHLORIDE SERPL-SCNC: 103 MEQ/L (ref 98–111)
CO2 SERPL-SCNC: 26 MEQ/L (ref 23–33)
CREAT SERPL-MCNC: 1.4 MG/DL (ref 0.4–1.2)
CREAT UR-MCNC: 33.5 MG/DL
DEPRECATED RDW RBC AUTO: 48 FL (ref 35–45)
EOSINOPHIL NFR BLD AUTO: 2.3 %
EOSINOPHILS ABSOLUTE: 0.2 THOU/MM3 (ref 0–0.4)
ERYTHROCYTE [DISTWIDTH] IN BLOOD BY AUTOMATED COUNT: 15.2 % (ref 11.5–14.5)
GFR SERPL CREATININE-BSD FRML MDRD: 41 ML/MIN/1.73M2
GLUCOSE SERPL-MCNC: 104 MG/DL (ref 70–108)
HCT VFR BLD AUTO: 32.4 % (ref 37–47)
HGB BLD-MCNC: 9.5 GM/DL (ref 12–16)
IMM GRANULOCYTES # BLD AUTO: 0.05 THOU/MM3 (ref 0–0.07)
IMM GRANULOCYTES NFR BLD AUTO: 0.7 %
LYMPHOCYTES ABSOLUTE: 0.9 THOU/MM3 (ref 1–4.8)
LYMPHOCYTES NFR BLD AUTO: 11.6 %
MAGNESIUM SERPL-MCNC: 2.3 MG/DL (ref 1.6–2.4)
MCH RBC QN AUTO: 25.5 PG (ref 26–33)
MCHC RBC AUTO-ENTMCNC: 29.3 GM/DL (ref 32.2–35.5)
MCV RBC AUTO: 86.9 FL (ref 81–99)
MONOCYTES ABSOLUTE: 0.6 THOU/MM3 (ref 0.4–1.3)
MONOCYTES NFR BLD AUTO: 8 %
NEUTROPHILS NFR BLD AUTO: 77.3 %
NRBC BLD AUTO-RTO: 0 /100 WBC
PHOSPHATE SERPL-MCNC: 3.4 MG/DL (ref 2.4–4.7)
PLATELET # BLD AUTO: 377 THOU/MM3 (ref 130–400)
PMV BLD AUTO: 10.7 FL (ref 9.4–12.4)
POTASSIUM SERPL-SCNC: 3.8 MEQ/L (ref 3.5–5.2)
PROT UR-MCNC: 20.8 MG/DL
PROT/CREAT 24H UR: 0.62 MG/G{CREAT}
RBC # BLD AUTO: 3.73 MILL/MM3 (ref 4.2–5.4)
SEGMENTED NEUTROPHILS ABSOLUTE COUNT: 5.8 THOU/MM3 (ref 1.8–7.7)
SODIUM SERPL-SCNC: 143 MEQ/L (ref 135–145)
WBC # BLD AUTO: 7.5 THOU/MM3 (ref 4.8–10.8)

## 2023-08-11 LAB — TACROLIMUS BLD-MCNC: 4.9 NG/ML

## 2023-08-14 NOTE — TELEPHONE ENCOUNTER
Patient called in because she had received a letter from [facility] stating that they would not be able to fill the [facility], Patient would like to have it resent over to [facility]

## 2023-08-16 RX ORDER — DOXYCYCLINE HYCLATE 100 MG
TABLET ORAL
Qty: 45 TABLET | Refills: 3 | OUTPATIENT
Start: 2023-08-16

## 2023-08-28 ENCOUNTER — TELEPHONE (OUTPATIENT)
Dept: INTERNAL MEDICINE CLINIC | Age: 68
End: 2023-08-28

## 2023-08-28 DIAGNOSIS — E11.9 TYPE 2 DIABETES MELLITUS WITHOUT COMPLICATION, WITH LONG-TERM CURRENT USE OF INSULIN (HCC): ICD-10-CM

## 2023-08-28 DIAGNOSIS — Z79.4 TYPE 2 DIABETES MELLITUS WITHOUT COMPLICATION, WITH LONG-TERM CURRENT USE OF INSULIN (HCC): ICD-10-CM

## 2023-08-28 RX ORDER — INSULIN ASPART 100 [IU]/ML
INJECTION, SOLUTION INTRAVENOUS; SUBCUTANEOUS
Qty: 5 ADJUSTABLE DOSE PRE-FILLED PEN SYRINGE | Refills: 1 | Status: SHIPPED | OUTPATIENT
Start: 2023-08-28

## 2023-08-28 RX ORDER — INSULIN GLARGINE 100 [IU]/ML
INJECTION, SOLUTION SUBCUTANEOUS
Qty: 30 ML | Refills: 3 | Status: SHIPPED | OUTPATIENT
Start: 2023-08-28

## 2023-08-28 RX ORDER — PEN NEEDLE, DIABETIC 31 GX5/16"
NEEDLE, DISPOSABLE MISCELLANEOUS
Qty: 200 EACH | Refills: 3 | Status: SHIPPED | OUTPATIENT
Start: 2023-08-28

## 2023-08-29 ENCOUNTER — TELEPHONE (OUTPATIENT)
Dept: INTERNAL MEDICINE CLINIC | Age: 68
End: 2023-08-29

## 2023-08-29 DIAGNOSIS — G89.4 CHRONIC PAIN SYNDROME: ICD-10-CM

## 2023-08-29 RX ORDER — GLUCOSAMINE HCL/CHONDROITIN SU 500-400 MG
CAPSULE ORAL
Qty: 100 STRIP | Refills: 11 | Status: SHIPPED | OUTPATIENT
Start: 2023-08-29

## 2023-08-29 RX ORDER — HYDROCODONE BITARTRATE AND ACETAMINOPHEN 5; 325 MG/1; MG/1
1 TABLET ORAL EVERY 8 HOURS PRN
Qty: 90 TABLET | Refills: 0 | Status: SHIPPED | OUTPATIENT
Start: 2023-08-31 | End: 2023-09-30

## 2023-08-29 NOTE — TELEPHONE ENCOUNTER
OARRS reviewed. UDS: + for  hydrocodone. Last seen: 7/11/2023.  Follow-up:   Future Appointments   Date Time Provider [address]   9/7/2023  2:45 PM Ryan Castellon MD N SRPX Heart [facility]   9/12/2023  2:00 PM DONOVAN Lozano CNP SRPX IM MED [facility]   9/19/2023 12:00 PM DONOVAN Danielle CNP N SRPX Pain [facility]   11/16/2023  2:20 PM Jesus Herrera MD N [facility], [facility]. [facility]   12/14/2023  2:20 PM DONOVAN Mosher CNP Fam Medicine [facility]   6/17/2024  1:30 PM DONOVAN Dowling CNP Tara Uro [facility]   6/17/2024  2:00 PM STR CT IMAGING RM1  OP [facility] STRZ OUT EXP STR Radiolog   6/17/2024  2:30 PM STR PULMONARY FUNCTION ROOM 1 STRZ PFT [facility]   6/25/2024  2:00 PM DONOVAN Muñiz CNP [address]

## 2023-08-29 NOTE — TELEPHONE ENCOUNTER
Patient called and stated her Easy Max strips need to go to [address] [place] [address] Ph. [phone].  [phone]

## 2023-08-29 NOTE — TELEPHONE ENCOUNTER
Lilliam Card called requesting a refill on the following medications:  Requested Prescriptions     Pending Prescriptions Disp Refills    HYDROcodone-acetaminophen (NORCO) 5-325 MG per tablet 90 tablet 0     Sig: Take 1 tablet by mouth every 8 hours as needed for Pain for up to 30 days. Pharmacy verified: [facility] [address]  . pv      Date of last visit: 7/11/2023  Date of next visit (if applicable): 9/49/9377

## 2023-09-04 RX ORDER — RIVAROXABAN 2.5 MG/1
TABLET, FILM COATED ORAL
Qty: 180 TABLET | Refills: 0 | Status: SHIPPED | OUTPATIENT
Start: 2023-09-04

## 2023-09-07 ENCOUNTER — OFFICE VISIT (OUTPATIENT)
Dept: CARDIOLOGY CLINIC | Age: 68
End: 2023-09-07
Payer: MEDICARE

## 2023-09-07 VITALS
WEIGHT: 171 LBS | DIASTOLIC BLOOD PRESSURE: 62 MMHG | HEIGHT: 65 IN | BODY MASS INDEX: 28.49 KG/M2 | HEART RATE: 68 BPM | SYSTOLIC BLOOD PRESSURE: 122 MMHG

## 2023-09-07 DIAGNOSIS — I73.9 PAD (PERIPHERAL ARTERY DISEASE) (HCC): Primary | ICD-10-CM

## 2023-09-07 PROCEDURE — 3017F COLORECTAL CA SCREEN DOC REV: CPT | Performed by: INTERNAL MEDICINE

## 2023-09-07 PROCEDURE — G8417 CALC BMI ABV UP PARAM F/U: HCPCS | Performed by: INTERNAL MEDICINE

## 2023-09-07 PROCEDURE — 3078F DIAST BP <80 MM HG: CPT | Performed by: INTERNAL MEDICINE

## 2023-09-07 PROCEDURE — 3074F SYST BP LT 130 MM HG: CPT | Performed by: INTERNAL MEDICINE

## 2023-09-07 PROCEDURE — 99213 OFFICE O/P EST LOW 20 MIN: CPT | Performed by: INTERNAL MEDICINE

## 2023-09-07 PROCEDURE — G8399 PT W/DXA RESULTS DOCUMENT: HCPCS | Performed by: INTERNAL MEDICINE

## 2023-09-07 PROCEDURE — 1090F PRES/ABSN URINE INCON ASSESS: CPT | Performed by: INTERNAL MEDICINE

## 2023-09-07 PROCEDURE — G8427 DOCREV CUR MEDS BY ELIG CLIN: HCPCS | Performed by: INTERNAL MEDICINE

## 2023-09-07 PROCEDURE — 1123F ACP DISCUSS/DSCN MKR DOCD: CPT | Performed by: INTERNAL MEDICINE

## 2023-09-07 PROCEDURE — 1036F TOBACCO NON-USER: CPT | Performed by: INTERNAL MEDICINE

## 2023-09-07 NOTE — PROGRESS NOTES
[address] [place]  Dept: [phone]  Dept Fax: [phone]  Loc: [phone]    Visit Date: 9/7/2023    Ms. Lam Story is a 69 y.o. female  who presented for:  Chief Complaint   Patient presents with    1 Year Follow Up    Hypertension    Check-Up       HPI:   HPI   68 yo F hx of bilateral LE intervention with DCB to both SFA who presents for follow-up. She has some intermittent discomfort in the legs but blames this on DM II neuropathy. No ulcers or wounds. Taking Xarelto. 9/2022 - R JOSE ANTONIO 1.01, and L JOSE ANTONIO 0.89. Taking all other meds. Denies leg pain, ulcers, color change, temperature change, or change in walking distance. She is grieving from the death of her sister. Current Outpatient Medications:     XARELTO 2.5 MG TABS tablet, TAKE 1 TABLET BY MOUTH TWICE  DAILY, Disp: 180 tablet, Rfl: 0    HYDROcodone-acetaminophen (NORCO) 5-325 MG per tablet, Take 1 tablet by mouth every 8 hours as needed for Pain for up to 30 days. , Disp: 90 tablet, Rfl: 0    blood glucose monitor strips, Easy Max Test 4 times a day & as needed for symptoms of irregular blood glucose. Dispense sufficient amount for indicated testing frequency plus additional to accommodate PRN testing needs. , Disp: 100 strip, Rfl: 11    insulin aspart (NOVOLOG FLEXPEN) 100 UNIT/ML injection pen, Sliding scale insulin coverage Glucose:Dose: If Less [id number] =No Insulin/ 140-199= 3 Units/ 200-249=6 Units/ 250-299= 9 Units/  300-349=12 Units/  350-400=15 Units/ Above 400 = 18 Units max 68 units, Disp: 5 Adjustable Dose Pre-filled Pen Syringe, Rfl: 1    Insulin Pen Needle (B-D ULTRAFINE III SHORT PEN) 31G X 8 MM MISC, Use three times daily Diagnosis Code E11.9, Disp: 200 each, Rfl: 3    insulin glargine (LANTUS SOLOSTAR) 100 UNIT/ML injection pen, INJECT SUBCUTANEOUSLY 25 UNITS  NIGHTLY, Disp: 30 mL, Rfl: 3    D-Mannose 500 MG CAPS, Take 1

## 2023-09-11 ENCOUNTER — CLINICAL DOCUMENTATION ONLY (OUTPATIENT)
Facility: CLINIC | Age: 68
End: 2023-09-11

## 2023-09-12 ENCOUNTER — OFFICE VISIT (OUTPATIENT)
Dept: INTERNAL MEDICINE CLINIC | Age: 68
End: 2023-09-12
Payer: MEDICARE

## 2023-09-12 VITALS
DIASTOLIC BLOOD PRESSURE: 51 MMHG | HEART RATE: 63 BPM | BODY MASS INDEX: 28.66 KG/M2 | HEIGHT: 65 IN | WEIGHT: 172 LBS | SYSTOLIC BLOOD PRESSURE: 106 MMHG | RESPIRATION RATE: 18 BRPM

## 2023-09-12 DIAGNOSIS — E11.9 TYPE 2 DIABETES MELLITUS WITHOUT COMPLICATION, WITH LONG-TERM CURRENT USE OF INSULIN (HCC): Primary | ICD-10-CM

## 2023-09-12 DIAGNOSIS — Z79.4 TYPE 2 DIABETES MELLITUS WITHOUT COMPLICATION, WITH LONG-TERM CURRENT USE OF INSULIN (HCC): Primary | ICD-10-CM

## 2023-09-12 DIAGNOSIS — I10 BENIGN ESSENTIAL HTN: ICD-10-CM

## 2023-09-12 LAB — HBA1C MFR BLD: 6.4 % (ref 4.3–5.7)

## 2023-09-12 PROCEDURE — 1036F TOBACCO NON-USER: CPT | Performed by: NURSE PRACTITIONER

## 2023-09-12 PROCEDURE — G8417 CALC BMI ABV UP PARAM F/U: HCPCS | Performed by: NURSE PRACTITIONER

## 2023-09-12 PROCEDURE — 83036 HEMOGLOBIN GLYCOSYLATED A1C: CPT | Performed by: NURSE PRACTITIONER

## 2023-09-12 PROCEDURE — G8428 CUR MEDS NOT DOCUMENT: HCPCS | Performed by: NURSE PRACTITIONER

## 2023-09-12 PROCEDURE — 3078F DIAST BP <80 MM HG: CPT | Performed by: NURSE PRACTITIONER

## 2023-09-12 PROCEDURE — 3074F SYST BP LT 130 MM HG: CPT | Performed by: NURSE PRACTITIONER

## 2023-09-12 PROCEDURE — 3017F COLORECTAL CA SCREEN DOC REV: CPT | Performed by: NURSE PRACTITIONER

## 2023-09-12 PROCEDURE — G8399 PT W/DXA RESULTS DOCUMENT: HCPCS | Performed by: NURSE PRACTITIONER

## 2023-09-12 PROCEDURE — 2022F DILAT RTA XM EVC RTNOPTHY: CPT | Performed by: NURSE PRACTITIONER

## 2023-09-12 PROCEDURE — 1123F ACP DISCUSS/DSCN MKR DOCD: CPT | Performed by: NURSE PRACTITIONER

## 2023-09-12 PROCEDURE — 3044F HG A1C LEVEL LT 7.0%: CPT | Performed by: NURSE PRACTITIONER

## 2023-09-12 PROCEDURE — 99214 OFFICE O/P EST MOD 30 MIN: CPT | Performed by: NURSE PRACTITIONER

## 2023-09-12 PROCEDURE — 1090F PRES/ABSN URINE INCON ASSESS: CPT | Performed by: NURSE PRACTITIONER

## 2023-09-12 ASSESSMENT — ENCOUNTER SYMPTOMS
PHOTOPHOBIA: 0
CONSTIPATION: 0
EYE PAIN: 0
VOMITING: 0
BLOOD IN STOOL: 0
ABDOMINAL DISTENTION: 0
BACK PAIN: 0
SINUS PRESSURE: 0
SORE THROAT: 0
TROUBLE SWALLOWING: 0
ABDOMINAL PAIN: 0
SHORTNESS OF BREATH: 0
COUGH: 0
NAUSEA: 0
DIARRHEA: 0
SINUS PAIN: 0
WHEEZING: 0

## 2023-09-12 NOTE — PROGRESS NOTES
[address] INTERNAL MEDICINE AND MEDICATION MANAGEMENT  [address] [place] [facility] [address] [address]  Dept: [phone]  Dept Fax: [phone]: [phone]     Visit Date:  2023    Patient:  Pratibha Suresh  YOB: 1955    HPI:     Chief Complaint   Patient presents with    Diabetes     Pratibha Suresh (:  1955) is a 75 y.o. female, here for evaluation of the following medical concerns: Diabetes and HTN     I last seen Daryl Wade 6 months ago. She follows routinely with Dr. Jennifer Varma. Renal transplant on 4/10/20. Follows with Dr. Rasheeda Burrell routinely. Had bilateral angiogram to BLE since last visit with Dr. Yina Bhardwaj. Diabetes-   Daryl Wade reports being diabetic for over 25 years. She states her diabetes is now well controlled. A1C 6.4% today in office, was 7.6% previously. She is on Novolog Sliding Scale group 2 before meals and at bedtime, and Lantus 25 units at night. Her appetite is poor. Is using Glucerna as a dietary supplement. States hypoglycemic events present occasionally. She is able to voice signs/symptoms of hypoglycemia. Reports checking glucose 3  times per day, with blood sugars ranging  per meter download. Dietary modification for diabetes management and/or weight loss encouraged. Does not report difficulty with obtaining medications. She voices understanding of the importance of annual eye exams. Just completed, new glasses. On aspirin 81 mg daily and atorvastatin 40 mg daily. Denies polyuria, polydipsia, polyphagia. Reports neuropathic symptoms including numbness, tingling. She follows routinely with podiatry, Dr. Javad Berkowitz every 3 months. Essential HTN-  Reports taking her medications as instructed with no medication side effects. Denies chest pain on exertion, dyspnea on exertion, swelling of ankles, orthostatic dizziness or lightheadedness, and/or palpitations. /51 in office today.

## 2023-09-19 ENCOUNTER — OFFICE VISIT (OUTPATIENT)
Dept: PHYSICAL MEDICINE AND REHAB | Age: 68
End: 2023-09-19
Payer: MEDICARE

## 2023-09-19 VITALS
HEIGHT: 65 IN | DIASTOLIC BLOOD PRESSURE: 62 MMHG | BODY MASS INDEX: 28.66 KG/M2 | SYSTOLIC BLOOD PRESSURE: 122 MMHG | WEIGHT: 172 LBS

## 2023-09-19 DIAGNOSIS — G89.29 CHRONIC BILATERAL LOW BACK PAIN WITHOUT SCIATICA: ICD-10-CM

## 2023-09-19 DIAGNOSIS — E11.42 DIABETIC POLYNEUROPATHY ASSOCIATED WITH TYPE 2 DIABETES MELLITUS (HCC): ICD-10-CM

## 2023-09-19 DIAGNOSIS — G89.4 CHRONIC PAIN SYNDROME: ICD-10-CM

## 2023-09-19 DIAGNOSIS — M54.50 CHRONIC BILATERAL LOW BACK PAIN WITHOUT SCIATICA: ICD-10-CM

## 2023-09-19 DIAGNOSIS — M47.816 LUMBAR SPONDYLOSIS: Primary | ICD-10-CM

## 2023-09-19 DIAGNOSIS — M47.816 LUMBAR FACET ARTHROPATHY: ICD-10-CM

## 2023-09-19 PROCEDURE — 2022F DILAT RTA XM EVC RTNOPTHY: CPT | Performed by: NURSE PRACTITIONER

## 2023-09-19 PROCEDURE — 3044F HG A1C LEVEL LT 7.0%: CPT | Performed by: NURSE PRACTITIONER

## 2023-09-19 PROCEDURE — G8417 CALC BMI ABV UP PARAM F/U: HCPCS | Performed by: NURSE PRACTITIONER

## 2023-09-19 PROCEDURE — 3078F DIAST BP <80 MM HG: CPT | Performed by: NURSE PRACTITIONER

## 2023-09-19 PROCEDURE — 1036F TOBACCO NON-USER: CPT | Performed by: NURSE PRACTITIONER

## 2023-09-19 PROCEDURE — 1123F ACP DISCUSS/DSCN MKR DOCD: CPT | Performed by: NURSE PRACTITIONER

## 2023-09-19 PROCEDURE — 3017F COLORECTAL CA SCREEN DOC REV: CPT | Performed by: NURSE PRACTITIONER

## 2023-09-19 PROCEDURE — 3074F SYST BP LT 130 MM HG: CPT | Performed by: NURSE PRACTITIONER

## 2023-09-19 PROCEDURE — 1090F PRES/ABSN URINE INCON ASSESS: CPT | Performed by: NURSE PRACTITIONER

## 2023-09-19 PROCEDURE — G8427 DOCREV CUR MEDS BY ELIG CLIN: HCPCS | Performed by: NURSE PRACTITIONER

## 2023-09-19 PROCEDURE — 99214 OFFICE O/P EST MOD 30 MIN: CPT | Performed by: NURSE PRACTITIONER

## 2023-09-19 PROCEDURE — G8399 PT W/DXA RESULTS DOCUMENT: HCPCS | Performed by: NURSE PRACTITIONER

## 2023-09-19 RX ORDER — NIFEDIPINE 90 MG/1
TABLET, EXTENDED RELEASE ORAL
Qty: 180 TABLET | Refills: 3 | Status: SHIPPED | OUTPATIENT
Start: 2023-09-19

## 2023-09-19 ASSESSMENT — ENCOUNTER SYMPTOMS
BACK PAIN: 1
SHORTNESS OF BREATH: 0
EYE REDNESS: 0
CHEST TIGHTNESS: 0
EYE DISCHARGE: 0
COUGH: 0
GASTROINTESTINAL NEGATIVE: 1
WHEEZING: 0

## 2023-09-19 NOTE — PROGRESS NOTES
[address]  [facility]  [address] [facility]  Dept: [phone]  Dept Fax: [phone]: [phone]    Visit Date: 9/19/2023    Functionality Assessment/Goals Worksheet     On a scale of 0 (Does not Interfere) to 10 (Completely Interferes)     1. Which number describes how during the past week pain has interfered with       the following:  A. General Activity:  8  B. Mood: 8  C. Walking Ability:  9  D. Normal Work (Includes both work outside the home and housework):  8  E. Relations with Other People:   4  F. Sleep:   7  G. Enjoyment of Life:   7    2. Patient Prefers to Take their Pain Medications:     [x]  On a regular basis   []  Only when necessary    []  Does not take pain medications    3. What are the Patient's Goals/Expectations for Visiting Pain Management? []  Learn about my pain    [x]  Receive Medication   []  Physical Therapy     []  Treat Depression   []  Receive Injections    []  Treat Sleep   []  Deal with Anxiety and Stress   []  Treat Opoid Dependence/Addiction   []  Other:      HPI:   Amanda Valdez is a 69 y.o. female is here today for    Chief Complaint: Low back pain, leg pain     HPI   2.5 month FU. Patient has complaints of pain in low back mainly right side- dull aching pain most bothersome uin this areas especially with standing. Pain intermittently radiates down bilateral legs lower in shins- aching and numbness and heaviness. Norco prn remains effective in decreasing pain down to a tolerable level   Pain increases with bending, lifting, twisting , walking, standing, getting up and down, and housework or working at job. Medications reviewed. Patient denies side effects with medications. Patient states she is taking medications as prescribed. Shedenies receiving pain medications from other sources. She denies any ER visits since last visit.     Pain scale

## 2023-09-26 DIAGNOSIS — G89.4 CHRONIC PAIN SYNDROME: ICD-10-CM

## 2023-09-26 RX ORDER — HYDROCODONE BITARTRATE AND ACETAMINOPHEN 5; 325 MG/1; MG/1
1 TABLET ORAL EVERY 8 HOURS PRN
Qty: 90 TABLET | Refills: 0 | Status: SHIPPED | OUTPATIENT
Start: 2023-09-30 | End: 2023-10-30

## 2023-09-26 NOTE — TELEPHONE ENCOUNTER
OARRS reviewed. UDS: + for  hydrocodone. Last seen: 9/19/2023.  Follow-up:   Future Appointments   Date Time Provider [address]   11/16/2023  2:20 PM Jesus Coy MD [facility]. [facility]   11/28/2023 12:00 PM [facility]   12/14/2023  2:20 PM DONOVAN Bridges CNP Fam Medicine [facility]   2/26/2024  2:00 PM DONOVAN Garza CNP SRPX IM MED [facility] eBard   6/17/2024  1:30 PM [facility] Calder[facility]   6/17/2024  2:00 PM STR CT IMAGING RM1  OP EXPRESS STRZ OUT EXP STR Rad/Card   6/17/2024  2:30 PM STR PULMONARY FUNCTION ROOM 1 STRZ PFT [facility]   6/25/2024  2:00 PM DONOVAN Islas CNP N Pulm Med [facility] [place]   9/10/2024  2:00 PM DONOVAN Vilchis CNP N SRPX Heart [facility] [place]

## 2023-09-26 NOTE — TELEPHONE ENCOUNTER
Janae Odom called requesting a refill on the following medications:  Requested Prescriptions     Pending Prescriptions Disp Refills    HYDROcodone-acetaminophen (NORCO) 5-325 MG per tablet 90 tablet 0     Sig: Take 1 tablet by mouth every 8 hours as needed for Pain for up to 30 days.      Pharmacy verified:  .pv  [id number] - [place], [place] [address] [phone]    Date of last visit: 09/19/2023  Date of next visit (if applicable): 46/62/1159

## 2023-10-05 DIAGNOSIS — R39.15 URGENCY OF URINATION: Primary | ICD-10-CM

## 2023-10-05 DIAGNOSIS — R30.0 BURNING WITH URINATION: ICD-10-CM

## 2023-10-05 RX ORDER — CIPROFLOXACIN 500 MG/1
500 TABLET, FILM COATED ORAL 2 TIMES DAILY
Qty: 10 TABLET | Refills: 0 | Status: SHIPPED | OUTPATIENT
Start: 2023-10-05 | End: 2023-10-10

## 2023-10-05 NOTE — TELEPHONE ENCOUNTER
Pt phoned states for 3 days she has been having a lot of urination that she cant control and very weak - states when she gets this way she normally has a uti - will dr Samuella Anon call her something in?

## 2023-10-09 NOTE — TELEPHONE ENCOUNTER
----- Message from Radha Pope MD sent at 10/9/2023  7:21 AM EDT -----  We have the urine culture report. She is on the correct antibiotic.

## 2023-10-30 DIAGNOSIS — G89.4 CHRONIC PAIN SYNDROME: ICD-10-CM

## 2023-10-30 RX ORDER — HYDROCODONE BITARTRATE AND ACETAMINOPHEN 5; 325 MG/1; MG/1
1 TABLET ORAL EVERY 8 HOURS PRN
Qty: 90 TABLET | Refills: 0 | Status: SHIPPED | OUTPATIENT
Start: 2023-10-31 | End: 2023-11-30

## 2023-10-30 NOTE — TELEPHONE ENCOUNTER
Alyssa Shelton called requesting a refill on the following medications:  Requested Prescriptions     Pending Prescriptions Disp Refills    HYDROcodone-acetaminophen (NORCO) 5-325 MG per tablet 90 tablet 0     Sig: Take 1 tablet by mouth every 8 hours as needed for Pain for up to 30 days. [facility]   . pv      Date of last visit:   Date of next visit (if applicable): 95/32/5677

## 2023-11-13 RX ORDER — RIVAROXABAN 2.5 MG/1
TABLET, FILM COATED ORAL
Qty: 180 TABLET | Refills: 3 | Status: SHIPPED | OUTPATIENT
Start: 2023-11-13

## 2023-11-16 ENCOUNTER — OFFICE VISIT (OUTPATIENT)
Dept: NEPHROLOGY | Age: 68
End: 2023-11-16
Payer: MEDICARE

## 2023-11-16 VITALS
HEART RATE: 58 BPM | WEIGHT: 180 LBS | OXYGEN SATURATION: 98 % | SYSTOLIC BLOOD PRESSURE: 133 MMHG | BODY MASS INDEX: 29.95 KG/M2 | DIASTOLIC BLOOD PRESSURE: 64 MMHG

## 2023-11-16 DIAGNOSIS — N25.81 HYPERPARATHYROIDISM, SECONDARY RENAL (HCC): ICD-10-CM

## 2023-11-16 DIAGNOSIS — I10 PRIMARY HYPERTENSION: ICD-10-CM

## 2023-11-16 DIAGNOSIS — D63.8 ANEMIA OF CHRONIC DISEASE: ICD-10-CM

## 2023-11-16 DIAGNOSIS — Z94.0 KIDNEY TRANSPLANT RECIPIENT: Primary | ICD-10-CM

## 2023-11-16 DIAGNOSIS — T86.11 CHRONIC RENAL ALLOGRAFT NEPHROPATHY: ICD-10-CM

## 2023-11-16 DIAGNOSIS — N18.32 STAGE 3B CHRONIC KIDNEY DISEASE (HCC): ICD-10-CM

## 2023-11-16 DIAGNOSIS — E11.21 DIABETIC NEPHROPATHY ASSOCIATED WITH TYPE 2 DIABETES MELLITUS (HCC): ICD-10-CM

## 2023-11-16 PROCEDURE — 99214 OFFICE O/P EST MOD 30 MIN: CPT | Performed by: INTERNAL MEDICINE

## 2023-11-16 PROCEDURE — G8484 FLU IMMUNIZE NO ADMIN: HCPCS | Performed by: INTERNAL MEDICINE

## 2023-11-16 PROCEDURE — 3078F DIAST BP <80 MM HG: CPT | Performed by: INTERNAL MEDICINE

## 2023-11-16 PROCEDURE — 3017F COLORECTAL CA SCREEN DOC REV: CPT | Performed by: INTERNAL MEDICINE

## 2023-11-16 PROCEDURE — G8399 PT W/DXA RESULTS DOCUMENT: HCPCS | Performed by: INTERNAL MEDICINE

## 2023-11-16 PROCEDURE — 3044F HG A1C LEVEL LT 7.0%: CPT | Performed by: INTERNAL MEDICINE

## 2023-11-16 PROCEDURE — G8417 CALC BMI ABV UP PARAM F/U: HCPCS | Performed by: INTERNAL MEDICINE

## 2023-11-16 PROCEDURE — 2022F DILAT RTA XM EVC RTNOPTHY: CPT | Performed by: INTERNAL MEDICINE

## 2023-11-16 PROCEDURE — G8427 DOCREV CUR MEDS BY ELIG CLIN: HCPCS | Performed by: INTERNAL MEDICINE

## 2023-11-16 PROCEDURE — 1036F TOBACCO NON-USER: CPT | Performed by: INTERNAL MEDICINE

## 2023-11-16 PROCEDURE — 1123F ACP DISCUSS/DSCN MKR DOCD: CPT | Performed by: INTERNAL MEDICINE

## 2023-11-16 PROCEDURE — 3075F SYST BP GE 130 - 139MM HG: CPT | Performed by: INTERNAL MEDICINE

## 2023-11-16 PROCEDURE — 1090F PRES/ABSN URINE INCON ASSESS: CPT | Performed by: INTERNAL MEDICINE

## 2023-11-16 RX ORDER — CALCITRIOL 0.25 UG/1
0.25 CAPSULE, LIQUID FILLED ORAL DAILY
Qty: 90 CAPSULE | Refills: 3 | Status: SHIPPED | OUTPATIENT
Start: 2023-11-16

## 2023-11-16 NOTE — PROGRESS NOTES
Renal Progress Note    Assessment and Plan:      Diagnosis Orders   1. Kidney transplant recipient        2. Chronic renal allograft nephropathy        3. Stage 3b chronic kidney disease ([address])        4. Diabetic nephropathy associated with type 2 diabetes mellitus ([address])        5. Primary hypertension        6. Hyperparathyroidism, secondary renal ([address])        7. Anemia of chronic disease                  PLAN:  I discussed my thoughts with the patient. She understood. I addressed her questions. Lab result reviewed with her. Serum creatinine is mostly stable. PTH is high at 167.8  Medications reviewed  We will add calcitriol 0.25 mcg 1 tablet a day. The rationale for the medication discussed with the patient. Return visit in 6 months with labs. Continue quarterly transplant  lab protocol. Patient Active Problem List   Diagnosis    Coronary disease    Hyperlipemia    Arthritis    Neuropathy, diabetic ([address])    Leg pain    Obesity (BMI 30-39. 9)    Low HDL (under 40)    History of tobacco use    Chronic obstructive pulmonary disease (HCC)    Anemia, chronic disease    Gout    Urolithiasis    Secondary hyperparathyroidism of renal origin ([address])    CONTRERAS (obstructive sleep apnea)    Vitamin D deficiency    Persistent proteinuria associated with type 2 diabetes mellitus (HCC)    Cellulitis in diabetic foot (HCC)    Persistent proteinuria    Acquired hypothyroidism    Nephrotic syndrome    Iron deficiency anemia due to dietary causes    Anemia of chronic disease    Essential hypertension    Diabetic peripheral neuropathy associated with type 2 diabetes mellitus (HCC)    Diabetes mellitus type 2 with complications (HCC)    Acute on chronic diastolic congestive heart failure (HCC)    Constipation    High anion gap metabolic acidosis    Lymphadenopathy, inguinal    Atelectasis of left lung    Kidney transplant recipient    Acute kidney injury superimposed on CKD ([address])    Hx of coronary artery disease    History of

## 2023-11-16 NOTE — PROGRESS NOTES
Pt states she has had R ankle swelling with pain. Tightness and has difficulty walking due to the discomfort. Sleeps with feet elevated.

## 2023-11-28 ENCOUNTER — OFFICE VISIT (OUTPATIENT)
Dept: PHYSICAL MEDICINE AND REHAB | Age: 68
End: 2023-11-28
Payer: MEDICARE

## 2023-11-28 VITALS
HEIGHT: 65 IN | SYSTOLIC BLOOD PRESSURE: 138 MMHG | DIASTOLIC BLOOD PRESSURE: 82 MMHG | WEIGHT: 179.9 LBS | BODY MASS INDEX: 29.97 KG/M2

## 2023-11-28 DIAGNOSIS — G89.29 CHRONIC BILATERAL LOW BACK PAIN WITHOUT SCIATICA: ICD-10-CM

## 2023-11-28 DIAGNOSIS — M54.50 CHRONIC BILATERAL LOW BACK PAIN WITHOUT SCIATICA: ICD-10-CM

## 2023-11-28 DIAGNOSIS — E11.42 DIABETIC POLYNEUROPATHY ASSOCIATED WITH TYPE 2 DIABETES MELLITUS (HCC): ICD-10-CM

## 2023-11-28 DIAGNOSIS — M47.816 LUMBAR SPONDYLOSIS: Primary | ICD-10-CM

## 2023-11-28 DIAGNOSIS — G89.4 CHRONIC PAIN SYNDROME: ICD-10-CM

## 2023-11-28 DIAGNOSIS — R53.81 DEBILITY: ICD-10-CM

## 2023-11-28 DIAGNOSIS — M47.816 LUMBAR FACET ARTHROPATHY: ICD-10-CM

## 2023-11-28 PROCEDURE — G8417 CALC BMI ABV UP PARAM F/U: HCPCS | Performed by: NURSE PRACTITIONER

## 2023-11-28 PROCEDURE — G8427 DOCREV CUR MEDS BY ELIG CLIN: HCPCS | Performed by: NURSE PRACTITIONER

## 2023-11-28 PROCEDURE — 99214 OFFICE O/P EST MOD 30 MIN: CPT | Performed by: NURSE PRACTITIONER

## 2023-11-28 PROCEDURE — 3044F HG A1C LEVEL LT 7.0%: CPT | Performed by: NURSE PRACTITIONER

## 2023-11-28 PROCEDURE — 1123F ACP DISCUSS/DSCN MKR DOCD: CPT | Performed by: NURSE PRACTITIONER

## 2023-11-28 PROCEDURE — 1036F TOBACCO NON-USER: CPT | Performed by: NURSE PRACTITIONER

## 2023-11-28 PROCEDURE — 3079F DIAST BP 80-89 MM HG: CPT | Performed by: NURSE PRACTITIONER

## 2023-11-28 PROCEDURE — 3017F COLORECTAL CA SCREEN DOC REV: CPT | Performed by: NURSE PRACTITIONER

## 2023-11-28 PROCEDURE — 1090F PRES/ABSN URINE INCON ASSESS: CPT | Performed by: NURSE PRACTITIONER

## 2023-11-28 PROCEDURE — G8399 PT W/DXA RESULTS DOCUMENT: HCPCS | Performed by: NURSE PRACTITIONER

## 2023-11-28 PROCEDURE — G8484 FLU IMMUNIZE NO ADMIN: HCPCS | Performed by: NURSE PRACTITIONER

## 2023-11-28 PROCEDURE — 2022F DILAT RTA XM EVC RTNOPTHY: CPT | Performed by: NURSE PRACTITIONER

## 2023-11-28 PROCEDURE — 3075F SYST BP GE 130 - 139MM HG: CPT | Performed by: NURSE PRACTITIONER

## 2023-11-28 RX ORDER — HYDROCODONE BITARTRATE AND ACETAMINOPHEN 5; 325 MG/1; MG/1
1 TABLET ORAL EVERY 8 HOURS PRN
Qty: 90 TABLET | Refills: 0 | Status: SHIPPED | OUTPATIENT
Start: 2023-12-01 | End: 2023-12-31

## 2023-11-28 ASSESSMENT — ENCOUNTER SYMPTOMS
EYE DISCHARGE: 0
CHEST TIGHTNESS: 0
WHEEZING: 0
EYE REDNESS: 0
GASTROINTESTINAL NEGATIVE: 1
SHORTNESS OF BREATH: 0
COUGH: 0
BACK PAIN: 1

## 2023-11-28 NOTE — PROGRESS NOTES
abnormal muscle tone. Coordination: Coordination normal.      Gait: Gait abnormal.      Deep Tendon Reflexes: Reflexes are normal and symmetric. Babinski sign absent on the right side. Babinski sign absent on the left side. Comments:  bilateral lower extremities   Psychiatric:         Attention and Perception: She is attentive. Mood and Affect: Mood normal. Mood is not anxious or depressed. Affect is not labile, blunt, angry or inappropriate. Speech: She is communicative. Speech is not rapid and pressured, delayed, slurred or tangential.         Behavior: Behavior is not agitated, slowed, aggressive, withdrawn, hyperactive or combative. Thought Content: Thought content is not paranoid or delusional. Thought content does not include homicidal or suicidal ideation. Thought content does not include homicidal or suicidal plan. Cognition and Memory: Memory is not impaired. She does not exhibit impaired recent memory or impaired remote memory. Judgment: Judgment is not impulsive or inappropriate. RADHA  Patricks test  negative  Yeoman's  or Gaenslen's negative       Assessment:     1. Lumbar spondylosis    2. Lumbar facet arthropathy    3. Diabetic polyneuropathy associated with type 2 diabetes mellitus ([address])    4. Chronic bilateral low back pain without sciatica    5. Debility    6. Chronic pain syndrome            Plan:      OARRS reviewed. Current MED: 15.00  Patient was offered naloxone for home. Discussed long term side effects of medications, tolerance, dependency and addiction. Previous UDS reviewed  UDS preformed today for compliance. Patient told can not receive any pain medications from any other source. No evidence of abuse, diversion or aberrant behavior.   Medications and/or procedures to improve function and quality of life- patient understanding with this and that may not be pain free  Discussed with patient about safe storage of medications at

## 2023-12-14 ENCOUNTER — OFFICE VISIT (OUTPATIENT)
Dept: FAMILY MEDICINE CLINIC | Age: 68
End: 2023-12-14

## 2023-12-14 VITALS
BODY MASS INDEX: 29.85 KG/M2 | HEIGHT: 65 IN | WEIGHT: 179.2 LBS | SYSTOLIC BLOOD PRESSURE: 138 MMHG | OXYGEN SATURATION: 97 % | RESPIRATION RATE: 16 BRPM | DIASTOLIC BLOOD PRESSURE: 82 MMHG | HEART RATE: 60 BPM

## 2023-12-14 DIAGNOSIS — I50.22 CHRONIC SYSTOLIC (CONGESTIVE) HEART FAILURE (HCC): ICD-10-CM

## 2023-12-14 DIAGNOSIS — E78.5 HYPERLIPIDEMIA, UNSPECIFIED HYPERLIPIDEMIA TYPE: ICD-10-CM

## 2023-12-14 DIAGNOSIS — I10 ESSENTIAL HYPERTENSION: Primary | ICD-10-CM

## 2023-12-14 DIAGNOSIS — Z94.0 RENAL TRANSPLANT RECIPIENT: ICD-10-CM

## 2023-12-14 RX ORDER — MAGNESIUM OXIDE 400 MG/1
400 TABLET ORAL 2 TIMES DAILY
Qty: 180 TABLET | Refills: 3 | Status: SHIPPED | OUTPATIENT
Start: 2023-12-14

## 2023-12-14 NOTE — PROGRESS NOTES
[facility]  [address]  [place] [address]  Dept: [phone]  Loc: [phone]    Visit Date: 12/14/2023    Obed Ontiveros is a 77 y.o. female who presents today for:  Chief Complaint   Patient presents with    6 Month Follow-Up     HPI:     Blood pressure has been running high off and on - is having some stress at home. Dealing with medical things with family causes a lot of stress     Currently taking Procardia 90 mg BID, Coreg 25 mg - TWO tablet twice daily - does not always take the evening dose (depends on her reading) - only takes if it is 160's or higher. Clonidine 0.2 three times daily - only takes when it is high.      Kidney transplant recipient   HPI  Health Maintenance   Topic Date Due    Respiratory Syncytial Virus (RSV) age 61 yrs+ (3 - 1-dose 60+ series) Never done    Lipids  11/08/2023    Diabetic foot exam  12/21/2023    COVID-19 Vaccine (7 - 2023-24 season) 01/10/2024    Diabetic retinal exam  02/15/2024    Low dose CT lung screening &/or counseling  06/13/2024    Depression Screen  06/15/2024    Annual Wellness Visit (AWV)  06/15/2024    A1C test (Diabetic or Prediabetic)  12/08/2024    Breast cancer screen  02/08/2025    Colorectal Cancer Screen  06/10/2029    DTaP/Tdap/Td vaccine (3 - Td or Tdap) 11/28/2030    Hepatitis B vaccine  Completed    DEXA (modify frequency per FRAX score)  Completed    Flu vaccine  Completed    Shingles vaccine  Completed    Pneumococcal 65+ years Vaccine  Completed    Hepatitis C screen  Completed    Hepatitis A vaccine  Aged Out    Hib vaccine  Aged Out    Meningococcal (ACWY) vaccine  Aged Out     Past Medical History:   Diagnosis Date    Anemia associated with chronic renal failure     Aranesp 150 micrograms every other week given at [facility]. [facility]    Anxiety     Arthritis     Backache     Blood circulation, collateral     Blood transfusion     CAD (coronary artery disease)     Cellulitis

## 2023-12-14 NOTE — PATIENT INSTRUCTIONS
Check your blood pressure at least 2 times daily. Check while sitting down, feet flat on the ground, and arm at the level of your heart  Please let us know if you blood pressure readings are 140/90 or higher  Avoid all salt in the diet  Drink plenty of water - half of  Your body weight in ounces daily  Exercise - Aim for 30 minutes daily of aerobic exercise. Can run, jog, walk, swim, weight lift - anything to get the heart rate elevated.      Give us the readings on Monday

## 2023-12-27 DIAGNOSIS — M47.816 LUMBAR FACET ARTHROPATHY: ICD-10-CM

## 2023-12-27 DIAGNOSIS — M47.816 LUMBAR SPONDYLOSIS: ICD-10-CM

## 2023-12-27 DIAGNOSIS — R53.81 DEBILITY: ICD-10-CM

## 2023-12-27 DIAGNOSIS — G89.29 CHRONIC BILATERAL LOW BACK PAIN WITHOUT SCIATICA: ICD-10-CM

## 2023-12-27 DIAGNOSIS — M54.50 CHRONIC BILATERAL LOW BACK PAIN WITHOUT SCIATICA: ICD-10-CM

## 2023-12-27 DIAGNOSIS — G89.4 CHRONIC PAIN SYNDROME: ICD-10-CM

## 2023-12-27 NOTE — TELEPHONE ENCOUNTER
Pablo Cordon called requesting a refill on the following medications:  Requested Prescriptions     Pending Prescriptions Disp Refills    HYDROcodone-acetaminophen (NORCO) 5-325 MG per tablet 90 tablet 0     Sig: Take 1 tablet by mouth every 8 hours as needed for Pain for up to 30 days. Pharmacy verified: [facility] [address]  . pv      Date of last visit: 11/28/2023  Date of next visit (if applicable): 6/2/9626

## 2023-12-28 DIAGNOSIS — I50.22 CHRONIC SYSTOLIC (CONGESTIVE) HEART FAILURE (HCC): Primary | ICD-10-CM

## 2023-12-28 RX ORDER — HYDROCODONE BITARTRATE AND ACETAMINOPHEN 5; 325 MG/1; MG/1
1 TABLET ORAL EVERY 8 HOURS PRN
Qty: 90 TABLET | Refills: 0 | Status: SHIPPED | OUTPATIENT
Start: 2023-12-31 | End: 2024-01-30

## 2024-01-15 ENCOUNTER — TELEPHONE (OUTPATIENT)
Dept: INTERNAL MEDICINE CLINIC | Age: 69
End: 2024-01-15

## 2024-01-15 NOTE — TELEPHONE ENCOUNTER
Called pt per KD to R/S appt on 02/26/24 due to KD being out of office that afternoon.   LVM for pt to see if we could schedule pt on 02/27/24 @ 2pm and to return call @ [phone].

## 2024-01-29 DIAGNOSIS — M47.816 LUMBAR FACET ARTHROPATHY: ICD-10-CM

## 2024-01-29 DIAGNOSIS — R53.81 DEBILITY: ICD-10-CM

## 2024-01-29 DIAGNOSIS — M47.816 LUMBAR SPONDYLOSIS: ICD-10-CM

## 2024-01-29 DIAGNOSIS — G89.4 CHRONIC PAIN SYNDROME: ICD-10-CM

## 2024-01-29 DIAGNOSIS — G89.29 CHRONIC BILATERAL LOW BACK PAIN WITHOUT SCIATICA: ICD-10-CM

## 2024-01-29 DIAGNOSIS — M54.50 CHRONIC BILATERAL LOW BACK PAIN WITHOUT SCIATICA: ICD-10-CM

## 2024-01-29 NOTE — TELEPHONE ENCOUNTER
Avani Thomas called requesting a refill on the following medications:  Requested Prescriptions     Pending Prescriptions Disp Refills    HYDROcodone-acetaminophen (NORCO) 5-325 MG per tablet 90 tablet 0     Sig: Take 1 tablet by mouth every 8 hours as needed for Pain for up to 30 days.     Pharmacy verified:[facility] [address]  .pv      Date of last visit: 11/28/23  Date of next visit (if applicable): 2/6/2024

## 2024-01-30 RX ORDER — HYDROCODONE BITARTRATE AND ACETAMINOPHEN 5; 325 MG/1; MG/1
1 TABLET ORAL EVERY 8 HOURS PRN
Qty: 90 TABLET | Refills: 0 | Status: SHIPPED | OUTPATIENT
Start: 2024-02-01 | End: 2024-03-02

## 2024-02-06 ENCOUNTER — OFFICE VISIT (OUTPATIENT)
Dept: PHYSICAL MEDICINE AND REHAB | Age: 69
End: 2024-02-06
Payer: MEDICARE

## 2024-02-06 VITALS
HEIGHT: 65 IN | BODY MASS INDEX: 29.86 KG/M2 | DIASTOLIC BLOOD PRESSURE: 64 MMHG | SYSTOLIC BLOOD PRESSURE: 162 MMHG | WEIGHT: 179.23 LBS

## 2024-02-06 DIAGNOSIS — M47.816 LUMBAR SPONDYLOSIS: Primary | ICD-10-CM

## 2024-02-06 DIAGNOSIS — M54.50 CHRONIC BILATERAL LOW BACK PAIN WITHOUT SCIATICA: ICD-10-CM

## 2024-02-06 DIAGNOSIS — E11.42 DIABETIC POLYNEUROPATHY ASSOCIATED WITH TYPE 2 DIABETES MELLITUS (HCC): ICD-10-CM

## 2024-02-06 DIAGNOSIS — G89.4 CHRONIC PAIN SYNDROME: ICD-10-CM

## 2024-02-06 DIAGNOSIS — M47.816 LUMBAR FACET ARTHROPATHY: ICD-10-CM

## 2024-02-06 DIAGNOSIS — G89.29 CHRONIC BILATERAL LOW BACK PAIN WITHOUT SCIATICA: ICD-10-CM

## 2024-02-06 DIAGNOSIS — R53.81 DEBILITY: ICD-10-CM

## 2024-02-06 PROCEDURE — 3077F SYST BP >= 140 MM HG: CPT | Performed by: NURSE PRACTITIONER

## 2024-02-06 PROCEDURE — 99214 OFFICE O/P EST MOD 30 MIN: CPT | Performed by: NURSE PRACTITIONER

## 2024-02-06 PROCEDURE — 1090F PRES/ABSN URINE INCON ASSESS: CPT | Performed by: NURSE PRACTITIONER

## 2024-02-06 PROCEDURE — G8417 CALC BMI ABV UP PARAM F/U: HCPCS | Performed by: NURSE PRACTITIONER

## 2024-02-06 PROCEDURE — 1036F TOBACCO NON-USER: CPT | Performed by: NURSE PRACTITIONER

## 2024-02-06 PROCEDURE — 3046F HEMOGLOBIN A1C LEVEL >9.0%: CPT | Performed by: NURSE PRACTITIONER

## 2024-02-06 PROCEDURE — 1123F ACP DISCUSS/DSCN MKR DOCD: CPT | Performed by: NURSE PRACTITIONER

## 2024-02-06 PROCEDURE — G8427 DOCREV CUR MEDS BY ELIG CLIN: HCPCS | Performed by: NURSE PRACTITIONER

## 2024-02-06 PROCEDURE — 3017F COLORECTAL CA SCREEN DOC REV: CPT | Performed by: NURSE PRACTITIONER

## 2024-02-06 PROCEDURE — G8482 FLU IMMUNIZE ORDER/ADMIN: HCPCS | Performed by: NURSE PRACTITIONER

## 2024-02-06 PROCEDURE — G8399 PT W/DXA RESULTS DOCUMENT: HCPCS | Performed by: NURSE PRACTITIONER

## 2024-02-06 PROCEDURE — 2022F DILAT RTA XM EVC RTNOPTHY: CPT | Performed by: NURSE PRACTITIONER

## 2024-02-06 PROCEDURE — 3078F DIAST BP <80 MM HG: CPT | Performed by: NURSE PRACTITIONER

## 2024-02-06 ASSESSMENT — ENCOUNTER SYMPTOMS
CHEST TIGHTNESS: 0
COUGH: 0
SHORTNESS OF BREATH: 0
WHEEZING: 0
BACK PAIN: 1
GASTROINTESTINAL NEGATIVE: 1
EYE REDNESS: 0
EYE DISCHARGE: 0

## 2024-02-06 NOTE — PROGRESS NOTES
[facility]  [address]  [facility] [place] [address]  Dept: [phone]  Dept Fax: [phone]  Loc: [phone]    Visit Date: 2/6/2024    Functionality Assessment/Goals Worksheet     On a scale of 0 (Does not Interfere) to 10 (Completely Interferes)     1.  Which number describes how during the past week pain has interfered with       the following:  A.  General Activity:  10  B.  Mood: 8  C.  Walking Ability:  10  D.  Normal Work (Includes both work outside the home and housework):  N/A  E.  Relations with Other People:   7  F.  Sleep:   7  G.  Enjoyment of Life:   9    2.  Patient Prefers to Take their Pain Medications:     [x]  On a regular basis   [x]  Only when necessary    []  Does not take pain medications    3.  What are the Patient's Goals/Expectations for Visiting Pain Management?     [x]  Learn about my pain    [x]  Receive Medication   [x]  Physical Therapy     []  Treat Depression   [x]  Receive Injections    []  Treat Sleep   []  Deal with Anxiety and Stress   []  Treat Opoid Dependence/Addiction   []  Other:      HPI:   Avani Thomas is a 68 y.o. female is here today for    Chief Complaint: Right low back pain, leg pain      HPI   10 week FU. ontinues to have complaints of pain in low back mainly just right side localized- tender, dull aching pain. Pain continues to intermittently radiate down right legg but worse in right to ankle- heaviness, numbness and tingling. Again leg pain is intermittent.     States ups and downs with activity and weather changes.   Discussed possible procedures again but she states she is not interested at this time. Discussed PT but she states she is interested but not at this time as her brother is in dialysis and she needs to help him but voiced she would like in the future     Norco prn remains effective in decreasing pain down to a tolerable level   Pain increases with

## 2024-02-12 ENCOUNTER — HOSPITAL ENCOUNTER (OUTPATIENT)
Dept: WOMENS IMAGING | Age: 69
Discharge: HOME | End: 2024-02-12
Payer: MEDICARE

## 2024-02-12 VITALS — HEIGHT: 65 IN | WEIGHT: 179 LBS | BODY MASS INDEX: 29.82 KG/M2

## 2024-02-12 DIAGNOSIS — Z12.31 VISIT FOR SCREENING MAMMOGRAM: ICD-10-CM

## 2024-02-12 PROCEDURE — 77063 BREAST TOMOSYNTHESIS BI: CPT

## 2024-02-26 DIAGNOSIS — G89.4 CHRONIC PAIN SYNDROME: ICD-10-CM

## 2024-02-26 DIAGNOSIS — M47.816 LUMBAR SPONDYLOSIS: ICD-10-CM

## 2024-02-26 DIAGNOSIS — G89.29 CHRONIC BILATERAL LOW BACK PAIN WITHOUT SCIATICA: ICD-10-CM

## 2024-02-26 DIAGNOSIS — R53.81 DEBILITY: ICD-10-CM

## 2024-02-26 DIAGNOSIS — M54.50 CHRONIC BILATERAL LOW BACK PAIN WITHOUT SCIATICA: ICD-10-CM

## 2024-02-26 DIAGNOSIS — M47.816 LUMBAR FACET ARTHROPATHY: ICD-10-CM

## 2024-02-26 RX ORDER — HYDROCODONE BITARTRATE AND ACETAMINOPHEN 5; 325 MG/1; MG/1
1 TABLET ORAL EVERY 8 HOURS PRN
Qty: 90 TABLET | Refills: 0 | Status: SHIPPED | OUTPATIENT
Start: 2024-03-01 | End: 2024-03-31

## 2024-02-26 NOTE — TELEPHONE ENCOUNTER
Avani Thomas called requesting a refill on the following medications:  Requested Prescriptions     Pending Prescriptions Disp Refills    HYDROcodone-acetaminophen (NORCO) 5-325 MG per tablet 90 tablet 0     Sig: Take 1 tablet by mouth every 8 hours as needed for Pain for up to 30 days.     Pharmacy verified:[facility] [address]  .[address]      Date of last visit: 2/6/24  Date of next visit (if applicable): 4/16/24

## 2024-02-27 ENCOUNTER — OFFICE VISIT (OUTPATIENT)
Dept: INTERNAL MEDICINE CLINIC | Age: 69
End: 2024-02-27

## 2024-02-27 VITALS
BODY MASS INDEX: 30.16 KG/M2 | WEIGHT: 181 LBS | HEART RATE: 81 BPM | SYSTOLIC BLOOD PRESSURE: 138 MMHG | DIASTOLIC BLOOD PRESSURE: 82 MMHG | HEIGHT: 65 IN

## 2024-02-27 DIAGNOSIS — I10 ESSENTIAL HYPERTENSION: Primary | ICD-10-CM

## 2024-02-27 DIAGNOSIS — E11.9 TYPE 2 DIABETES MELLITUS WITHOUT COMPLICATION, WITH LONG-TERM CURRENT USE OF INSULIN (HCC): ICD-10-CM

## 2024-02-27 DIAGNOSIS — Z79.4 TYPE 2 DIABETES MELLITUS WITHOUT COMPLICATION, WITH LONG-TERM CURRENT USE OF INSULIN (HCC): ICD-10-CM

## 2024-02-27 RX ORDER — INSULIN GLARGINE 100 [IU]/ML
30 INJECTION, SOLUTION SUBCUTANEOUS NIGHTLY
Qty: 30 ML | Refills: 3 | Status: SHIPPED | OUTPATIENT
Start: 2024-02-27 | End: 2024-03-06 | Stop reason: SDUPTHER

## 2024-02-27 RX ORDER — PEN NEEDLE, DIABETIC 31 GX5/16"
NEEDLE, DISPOSABLE MISCELLANEOUS
Qty: 200 EACH | Refills: 3 | Status: SHIPPED | OUTPATIENT
Start: 2024-02-27

## 2024-02-27 RX ORDER — INSULIN ASPART 100 [IU]/ML
INJECTION, SOLUTION INTRAVENOUS; SUBCUTANEOUS
Qty: 5 ADJUSTABLE DOSE PRE-FILLED PEN SYRINGE | Refills: 1 | Status: SHIPPED | OUTPATIENT
Start: 2024-02-27

## 2024-02-27 NOTE — PROGRESS NOTES
vomiting.   Endocrine: Negative for polydipsia, polyphagia and polyuria.   Genitourinary:  Negative for difficulty urinating, dysuria and hematuria.   Musculoskeletal:  Negative for arthralgias, back pain and myalgias.   Skin:  Negative for rash and wound.   Neurological:  Positive for weakness. Negative for dizziness, tremors, light-headedness and headaches.   Psychiatric/Behavioral:  Negative for sleep disturbance. The patient is not nervous/anxious.        Objective:     /82 (Site: Right Upper Arm, Position: Sitting, Cuff Size: Medium Adult)   Pulse 81   Ht 1.651 m (5' 5\")   Wt 82.1 kg (181 lb)   BMI 30.12 kg/m²     Physical Exam  Constitutional:       General: She is not in acute distress.     Appearance: Normal appearance. She is well-developed.   HENT:      Head: Normocephalic and atraumatic.      Right Ear: Tympanic membrane, ear canal and external ear normal.      Left Ear: Tympanic membrane, ear canal and external ear normal.      Nose: Nose normal. No congestion or rhinorrhea.      Mouth/Throat:      Mouth: Mucous membranes are moist.      Pharynx: Oropharynx is clear. No oropharyngeal exudate or posterior oropharyngeal erythema.   Eyes:      Extraocular Movements: Extraocular movements intact.      Conjunctiva/sclera: Conjunctivae normal.      Pupils: Pupils are equal, round, and reactive to light.   Neck:      Thyroid: No thyromegaly.      Vascular: No JVD.   Cardiovascular:      Rate and Rhythm: Normal rate and regular rhythm.      Heart sounds: Normal heart sounds. No murmur heard.     No friction rub. No gallop.   Pulmonary:      Effort: Pulmonary effort is normal. No respiratory distress.      Breath sounds: Normal breath sounds. No wheezing or rales.   Abdominal:      General: Bowel sounds are normal. There is no distension.      Palpations: Abdomen is soft.      Tenderness: There is no abdominal tenderness.   Musculoskeletal:         General: Normal range of motion.      Cervical back:

## 2024-03-06 DIAGNOSIS — E11.9 TYPE 2 DIABETES MELLITUS WITHOUT COMPLICATION, WITH LONG-TERM CURRENT USE OF INSULIN (HCC): ICD-10-CM

## 2024-03-06 DIAGNOSIS — Z79.4 TYPE 2 DIABETES MELLITUS WITHOUT COMPLICATION, WITH LONG-TERM CURRENT USE OF INSULIN (HCC): ICD-10-CM

## 2024-03-06 RX ORDER — INSULIN GLARGINE 100 [IU]/ML
25 INJECTION, SOLUTION SUBCUTANEOUS NIGHTLY
Qty: 30 ML | Refills: 3 | Status: SHIPPED | OUTPATIENT
Start: 2024-03-06

## 2024-03-08 ENCOUNTER — NURSE ONLY (OUTPATIENT)
Dept: LAB | Age: 69
End: 2024-03-08

## 2024-03-08 LAB
ANION GAP SERPL CALC-SCNC: 10 MEQ/L (ref 8–16)
BUN SERPL-MCNC: 24 MG/DL (ref 7–22)
CHLORIDE SERPL-SCNC: 99 MEQ/L (ref 98–111)
CO2 SERPL-SCNC: 28 MEQ/L (ref 23–33)
CREAT SERPL-MCNC: 1.4 MG/DL (ref 0.4–1.2)
GFR SERPL CREATININE-BSD FRML MDRD: 40 ML/MIN/1.73M2
GLUCOSE SERPL-MCNC: 269 MG/DL (ref 70–108)
HCT VFR BLD AUTO: 36 % (ref 37–47)
HGB BLD-MCNC: 11.2 GM/DL (ref 12–16)
PLATELET # BLD AUTO: 267 THOU/MM3 (ref 130–400)
POTASSIUM SERPL-SCNC: 3.7 MEQ/L (ref 3.5–5.2)
SODIUM SERPL-SCNC: 137 MEQ/L (ref 135–145)

## 2024-03-09 ASSESSMENT — ENCOUNTER SYMPTOMS
SINUS PRESSURE: 0
CONSTIPATION: 0
TROUBLE SWALLOWING: 0
PHOTOPHOBIA: 0
SORE THROAT: 0
ABDOMINAL PAIN: 0
SHORTNESS OF BREATH: 0
ABDOMINAL DISTENTION: 0
BACK PAIN: 0
DIARRHEA: 0
BLOOD IN STOOL: 0
SINUS PAIN: 0
COUGH: 0
NAUSEA: 0
VOMITING: 0
WHEEZING: 0
EYE PAIN: 0

## 2024-03-11 LAB — TACROLIMUS BLD-MCNC: 7.9 NG/ML

## 2024-03-12 DIAGNOSIS — J41.0 SIMPLE CHRONIC BRONCHITIS (HCC): ICD-10-CM

## 2024-03-12 RX ORDER — ALBUTEROL SULFATE 90 UG/1
AEROSOL, METERED RESPIRATORY (INHALATION)
Qty: 34 G | Refills: 3 | Status: SHIPPED | OUTPATIENT
Start: 2024-03-12

## 2024-03-13 ENCOUNTER — OFFICE VISIT (OUTPATIENT)
Dept: FAMILY MEDICINE CLINIC | Age: 69
End: 2024-03-13
Payer: MEDICARE

## 2024-03-13 VITALS
DIASTOLIC BLOOD PRESSURE: 60 MMHG | HEART RATE: 68 BPM | SYSTOLIC BLOOD PRESSURE: 136 MMHG | TEMPERATURE: 97.5 F | WEIGHT: 183.4 LBS | BODY MASS INDEX: 30.52 KG/M2 | RESPIRATION RATE: 16 BRPM

## 2024-03-13 DIAGNOSIS — I50.33 ACUTE ON CHRONIC DIASTOLIC CONGESTIVE HEART FAILURE (HCC): ICD-10-CM

## 2024-03-13 DIAGNOSIS — E11.22 TYPE 2 DIABETES MELLITUS WITH STAGE 5 CHRONIC KIDNEY DISEASE NOT ON CHRONIC DIALYSIS, WITH LONG-TERM CURRENT USE OF INSULIN (HCC): ICD-10-CM

## 2024-03-13 DIAGNOSIS — I50.22 CHRONIC SYSTOLIC (CONGESTIVE) HEART FAILURE (HCC): ICD-10-CM

## 2024-03-13 DIAGNOSIS — N25.81 HYPERPARATHYROIDISM, SECONDARY RENAL (HCC): ICD-10-CM

## 2024-03-13 DIAGNOSIS — G62.81 POLYNEUROPATHY ASSOCIATED WITH CRITICAL ILLNESS (HCC): ICD-10-CM

## 2024-03-13 DIAGNOSIS — I10 ESSENTIAL HYPERTENSION: Primary | ICD-10-CM

## 2024-03-13 DIAGNOSIS — N18.5 TYPE 2 DIABETES MELLITUS WITH STAGE 5 CHRONIC KIDNEY DISEASE NOT ON CHRONIC DIALYSIS, WITH LONG-TERM CURRENT USE OF INSULIN (HCC): ICD-10-CM

## 2024-03-13 DIAGNOSIS — E78.5 HYPERLIPIDEMIA, UNSPECIFIED HYPERLIPIDEMIA TYPE: ICD-10-CM

## 2024-03-13 DIAGNOSIS — Z94.0 RENAL TRANSPLANT RECIPIENT: ICD-10-CM

## 2024-03-13 DIAGNOSIS — Z79.4 TYPE 2 DIABETES MELLITUS WITH STAGE 5 CHRONIC KIDNEY DISEASE NOT ON CHRONIC DIALYSIS, WITH LONG-TERM CURRENT USE OF INSULIN (HCC): ICD-10-CM

## 2024-03-13 DIAGNOSIS — I73.9 PAD (PERIPHERAL ARTERY DISEASE) (HCC): ICD-10-CM

## 2024-03-13 DIAGNOSIS — E11.42 DIABETIC POLYNEUROPATHY ASSOCIATED WITH TYPE 2 DIABETES MELLITUS (HCC): ICD-10-CM

## 2024-03-13 PROCEDURE — 3017F COLORECTAL CA SCREEN DOC REV: CPT | Performed by: NURSE PRACTITIONER

## 2024-03-13 PROCEDURE — G8399 PT W/DXA RESULTS DOCUMENT: HCPCS | Performed by: NURSE PRACTITIONER

## 2024-03-13 PROCEDURE — 1036F TOBACCO NON-USER: CPT | Performed by: NURSE PRACTITIONER

## 2024-03-13 PROCEDURE — G8427 DOCREV CUR MEDS BY ELIG CLIN: HCPCS | Performed by: NURSE PRACTITIONER

## 2024-03-13 PROCEDURE — G8482 FLU IMMUNIZE ORDER/ADMIN: HCPCS | Performed by: NURSE PRACTITIONER

## 2024-03-13 PROCEDURE — G8417 CALC BMI ABV UP PARAM F/U: HCPCS | Performed by: NURSE PRACTITIONER

## 2024-03-13 PROCEDURE — 3046F HEMOGLOBIN A1C LEVEL >9.0%: CPT | Performed by: NURSE PRACTITIONER

## 2024-03-13 PROCEDURE — 1090F PRES/ABSN URINE INCON ASSESS: CPT | Performed by: NURSE PRACTITIONER

## 2024-03-13 PROCEDURE — 99214 OFFICE O/P EST MOD 30 MIN: CPT | Performed by: NURSE PRACTITIONER

## 2024-03-13 PROCEDURE — 1123F ACP DISCUSS/DSCN MKR DOCD: CPT | Performed by: NURSE PRACTITIONER

## 2024-03-13 PROCEDURE — 3075F SYST BP GE 130 - 139MM HG: CPT | Performed by: NURSE PRACTITIONER

## 2024-03-13 PROCEDURE — 2022F DILAT RTA XM EVC RTNOPTHY: CPT | Performed by: NURSE PRACTITIONER

## 2024-03-13 PROCEDURE — 3078F DIAST BP <80 MM HG: CPT | Performed by: NURSE PRACTITIONER

## 2024-03-13 RX ORDER — NITROGLYCERIN 0.4 MG/1
TABLET SUBLINGUAL
Qty: 90 TABLET | Refills: 0 | Status: SHIPPED | OUTPATIENT
Start: 2024-03-13

## 2024-03-13 RX ORDER — CAPSAICIN 0.75 MG/G
CREAM TOPICAL
Qty: 1 EACH | Refills: 1 | Status: SHIPPED | OUTPATIENT
Start: 2024-03-13 | End: 2024-04-12

## 2024-03-13 ASSESSMENT — PATIENT HEALTH QUESTIONNAIRE - PHQ9
SUM OF ALL RESPONSES TO PHQ QUESTIONS 1-9: 0
2. FEELING DOWN, DEPRESSED OR HOPELESS: 0
SUM OF ALL RESPONSES TO PHQ9 QUESTIONS 1 & 2: 0
SUM OF ALL RESPONSES TO PHQ QUESTIONS 1-9: 0
SUM OF ALL RESPONSES TO PHQ QUESTIONS 1-9: 0
1. LITTLE INTEREST OR PLEASURE IN DOING THINGS: 0
SUM OF ALL RESPONSES TO PHQ QUESTIONS 1-9: 0

## 2024-03-13 ASSESSMENT — ENCOUNTER SYMPTOMS
COLOR CHANGE: 0
COUGH: 0
NAUSEA: 0
CONSTIPATION: 0
ABDOMINAL DISTENTION: 0
DIARRHEA: 0
RHINORRHEA: 0
SHORTNESS OF BREATH: 0
EYE REDNESS: 0
ANAL BLEEDING: 0
BLOOD IN STOOL: 0
SORE THROAT: 0
ABDOMINAL PAIN: 0
EYE DISCHARGE: 0

## 2024-03-13 NOTE — PROGRESS NOTES
numbness. Negative for facial asymmetry, speech difficulty and weakness.   Hematological:  Does not bruise/bleed easily.   Psychiatric/Behavioral:  Negative for agitation and confusion.        Objective:     Vitals:    03/13/24 1421   BP: 136/60   Pulse: 68   Resp: 16   Temp: 97.5 °F (36.4 °C)   TempSrc: Oral   Weight: 83.2 kg (183 lb 6.4 oz)       Body mass index is 30.52 kg/m².    @LASfTWT(3)@  BP Readings from Last 3 Encounters:   03/13/24 136/60   02/27/24 138/82   02/06/24 (!) 162/64     Physical Exam  Constitutional:       General: She is not in acute distress.     Appearance: Normal appearance. She is well-developed. She is not ill-appearing or diaphoretic.   HENT:      Head: Normocephalic and atraumatic.      Right Ear: Hearing and external ear normal. No decreased hearing noted.      Left Ear: Hearing and external ear normal. No decreased hearing noted.      Nose: Nose normal. No nasal deformity.   Eyes:      General:         Right eye: No discharge.         Left eye: No discharge.      Conjunctiva/sclera: Conjunctivae normal.   Cardiovascular:      Rate and Rhythm: Normal rate and regular rhythm.   Pulmonary:      Effort: Pulmonary effort is normal. No respiratory distress.      Breath sounds: No wheezing.   Abdominal:      General: There is no distension.      Tenderness: There is no guarding.   Musculoskeletal:         General: No tenderness or deformity. Normal range of motion.      Cervical back: Normal range of motion and neck supple.   Skin:     Coloration: Skin is not pale.      Findings: No erythema or rash (On exposed areas).   Neurological:      General: No focal deficit present.      Mental Status: She is alert and oriented to person, place, and time.      Gait: Gait normal.   Psychiatric:         Mood and Affect: Mood normal.         Speech: Speech normal.         Behavior: Behavior normal.         Thought Content: Thought content normal.         Judgment: Judgment normal.         Lab Results

## 2024-03-13 NOTE — PATIENT INSTRUCTIONS
Can add the time released Vitamin B-50 or B-100 Complex - Take 1-2 times daily with meals   Can get at [facility]  No Super-B complex  Increase the amount of water you drink daily - half of your body weight in ounces (ex: 100 pound person = 50 oz water/day  Increase you exercise: 30 minutes daily in addition to your daily activity. Can walk, run, job, swim, or weight train  Limit the amount of fats, carbohydrates, and sugars you consume     Can try Capsicum topical cream - apply up to 3 times daily to area of pain

## 2024-03-26 DIAGNOSIS — M54.12 CERVICAL RADICULOPATHY: ICD-10-CM

## 2024-03-26 DIAGNOSIS — G89.4 CHRONIC PAIN SYNDROME: Primary | ICD-10-CM

## 2024-03-26 DIAGNOSIS — G89.4 CHRONIC PAIN SYNDROME: ICD-10-CM

## 2024-03-26 DIAGNOSIS — M79.7 FIBROMYALGIA: ICD-10-CM

## 2024-03-26 DIAGNOSIS — M47.816 LUMBAR SPONDYLOSIS: ICD-10-CM

## 2024-03-26 DIAGNOSIS — M54.50 CHRONIC BILATERAL LOW BACK PAIN WITHOUT SCIATICA: ICD-10-CM

## 2024-03-26 DIAGNOSIS — M79.10 MYALGIA: ICD-10-CM

## 2024-03-26 DIAGNOSIS — R53.81 DEBILITY: ICD-10-CM

## 2024-03-26 DIAGNOSIS — G89.29 CHRONIC BILATERAL LOW BACK PAIN WITHOUT SCIATICA: ICD-10-CM

## 2024-03-26 DIAGNOSIS — M47.816 LUMBAR FACET ARTHROPATHY: ICD-10-CM

## 2024-03-26 RX ORDER — HYDROCODONE BITARTRATE AND ACETAMINOPHEN 5; 325 MG/1; MG/1
1 TABLET ORAL EVERY 8 HOURS PRN
Qty: 90 TABLET | Refills: 0 | OUTPATIENT
Start: 2024-04-02 | End: 2024-05-02

## 2024-03-26 NOTE — TELEPHONE ENCOUNTER
UDS not appropriate as Norco not detected in UDS. Per note she took that day and she fills monthly so thue needs to be in UDS. Please have her come in today or tomorrow for a pill count and repeat UDS. When she comes in for this I fill give refill in [facility] [place] as this was first inappropriate screen but please let her know if this happens again I can't prescribe. I refused at this time again please bring her in for pill count and UDS.

## 2024-03-26 NOTE — TELEPHONE ENCOUNTER
Avani Thomas called requesting a refill on the following medications:  Requested Prescriptions     Pending Prescriptions Disp Refills    HYDROcodone-acetaminophen (NORCO) 5-325 MG per tablet 90 tablet 0     Sig: Take 1 tablet by mouth every 8 hours as needed for Pain for up to 30 days.     Pharmacy verified: [facility] on [address]  .[address]      Date of last visit: 2/06/2024  Date of next visit (if applicable): Visit date not found

## 2024-03-26 NOTE — TELEPHONE ENCOUNTER
OARRS reviewed. UDS: + for  last UDS 2-6-24 Non Compliant for Hydrocodone  Last seen: 2/6/2024. Follow-up:   Future Appointments   Date Time Provider Department Center   4/16/2024 12:00 PM Romie Bo, APRN - CNP N [facility]   5/16/2024  2:40 PM Jesus Martins MD N [place] [facility]   6/17/2024  1:30 PM Dennise Amador, APRN - CNP N [place] [facility]   6/17/2024  2:00 PM STR CT IMAGING RM1  [facility] OUT EXP STR Rad/Card   6/17/2024  2:30 PM STR PULMONARY FUNCTION ROOM 1 STRZ PFT [facility]   6/25/2024  2:00 PM Judi Win, APRN - CNP N Pulm Med [facility]   8/27/2024  2:00 PM Martha Gaytan, APRN - CNP [facility] IM MED [facility]   9/10/2024  2:00 PM Dalila Will, APRN - CNP N [facility] Heart [facility]   9/17/2024  2:20 PM Hillary Stephenson, DONOVAN Jiang CNP Fam Medicine [facility]

## 2024-03-27 RX ORDER — HYDROCODONE BITARTRATE AND ACETAMINOPHEN 5; 325 MG/1; MG/1
1 TABLET ORAL EVERY 8 HOURS PRN
Qty: 90 TABLET | Refills: 0 | Status: SHIPPED | OUTPATIENT
Start: 2024-03-31 | End: 2024-04-29 | Stop reason: SDUPTHER

## 2024-03-27 NOTE — TELEPHONE ENCOUNTER
Okay to set up Norco prescription for when due but please tell patient this needs to be in her UDS if we are prescribing and she is taking routinely like guillaume me

## 2024-04-12 DIAGNOSIS — Z94.0 RENAL TRANSPLANT RECIPIENT: ICD-10-CM

## 2024-04-12 DIAGNOSIS — I10 ESSENTIAL HYPERTENSION: ICD-10-CM

## 2024-04-12 RX ORDER — ATORVASTATIN CALCIUM 40 MG/1
40 TABLET, FILM COATED ORAL DAILY
Qty: 90 TABLET | Refills: 3 | Status: SHIPPED | OUTPATIENT
Start: 2024-04-12

## 2024-04-12 RX ORDER — CLONIDINE HYDROCHLORIDE 0.2 MG/1
0.2 TABLET ORAL 3 TIMES DAILY
Qty: 270 TABLET | Refills: 3 | Status: SHIPPED | OUTPATIENT
Start: 2024-04-12

## 2024-04-12 NOTE — TELEPHONE ENCOUNTER
This medication refill is regarding a electronic request. Refill requested by patient.    Requested Prescriptions     Pending Prescriptions Disp Refills    cloNIDine (CATAPRES) 0.2 MG tablet [Pharmacy Med Name: cloNIDine HCl 0.2 MG Oral Tablet] 270 tablet 3     Sig: TAKE 1 TABLET BY MOUTH IN THE  MORNING AT NOON, AND AT BEDTIME    atorvastatin (LIPITOR) 40 MG tablet [Pharmacy Med Name: Atorvastatin Calcium 40 MG Oral Tablet] 90 tablet 3     Sig: TAKE 1 TABLET BY MOUTH DAILY       Date of last visit: 3/13/2024   Date of next visit: 9/17/2024      Rx verified, ordered and set to EP.

## 2024-04-16 ENCOUNTER — OFFICE VISIT (OUTPATIENT)
Dept: PHYSICAL MEDICINE AND REHAB | Age: 69
End: 2024-04-16
Payer: MEDICARE

## 2024-04-16 VITALS
HEIGHT: 65 IN | DIASTOLIC BLOOD PRESSURE: 80 MMHG | BODY MASS INDEX: 30.56 KG/M2 | WEIGHT: 183.42 LBS | SYSTOLIC BLOOD PRESSURE: 136 MMHG

## 2024-04-16 DIAGNOSIS — R53.81 DEBILITY: ICD-10-CM

## 2024-04-16 DIAGNOSIS — G89.29 CHRONIC BILATERAL LOW BACK PAIN WITHOUT SCIATICA: ICD-10-CM

## 2024-04-16 DIAGNOSIS — G89.4 CHRONIC PAIN SYNDROME: ICD-10-CM

## 2024-04-16 DIAGNOSIS — M47.816 LUMBAR FACET ARTHROPATHY: ICD-10-CM

## 2024-04-16 DIAGNOSIS — M47.816 LUMBAR SPONDYLOSIS: Primary | ICD-10-CM

## 2024-04-16 DIAGNOSIS — M54.50 CHRONIC BILATERAL LOW BACK PAIN WITHOUT SCIATICA: ICD-10-CM

## 2024-04-16 DIAGNOSIS — E11.42 DIABETIC POLYNEUROPATHY ASSOCIATED WITH TYPE 2 DIABETES MELLITUS (HCC): ICD-10-CM

## 2024-04-16 DIAGNOSIS — M54.16 LUMBAR RADICULITIS: ICD-10-CM

## 2024-04-16 PROCEDURE — 3046F HEMOGLOBIN A1C LEVEL >9.0%: CPT | Performed by: NURSE PRACTITIONER

## 2024-04-16 PROCEDURE — 3079F DIAST BP 80-89 MM HG: CPT | Performed by: NURSE PRACTITIONER

## 2024-04-16 PROCEDURE — 1090F PRES/ABSN URINE INCON ASSESS: CPT | Performed by: NURSE PRACTITIONER

## 2024-04-16 PROCEDURE — 99214 OFFICE O/P EST MOD 30 MIN: CPT | Performed by: NURSE PRACTITIONER

## 2024-04-16 PROCEDURE — 1123F ACP DISCUSS/DSCN MKR DOCD: CPT | Performed by: NURSE PRACTITIONER

## 2024-04-16 PROCEDURE — G8417 CALC BMI ABV UP PARAM F/U: HCPCS | Performed by: NURSE PRACTITIONER

## 2024-04-16 PROCEDURE — 3075F SYST BP GE 130 - 139MM HG: CPT | Performed by: NURSE PRACTITIONER

## 2024-04-16 PROCEDURE — 3017F COLORECTAL CA SCREEN DOC REV: CPT | Performed by: NURSE PRACTITIONER

## 2024-04-16 PROCEDURE — 2022F DILAT RTA XM EVC RTNOPTHY: CPT | Performed by: NURSE PRACTITIONER

## 2024-04-16 PROCEDURE — 1036F TOBACCO NON-USER: CPT | Performed by: NURSE PRACTITIONER

## 2024-04-16 PROCEDURE — G8427 DOCREV CUR MEDS BY ELIG CLIN: HCPCS | Performed by: NURSE PRACTITIONER

## 2024-04-16 PROCEDURE — G8399 PT W/DXA RESULTS DOCUMENT: HCPCS | Performed by: NURSE PRACTITIONER

## 2024-04-16 ASSESSMENT — ENCOUNTER SYMPTOMS
SHORTNESS OF BREATH: 0
EYE DISCHARGE: 0
BACK PAIN: 1
COUGH: 0
GASTROINTESTINAL NEGATIVE: 1
EYE REDNESS: 0
CHEST TIGHTNESS: 0
WHEEZING: 0

## 2024-04-16 NOTE — PROGRESS NOTES
[facility] [place] [facility]  [facility]CI[facility] AND [facility]ATION CENTER  [address]  [place] [address]  Dept: [phone]  Dept Fax: [phone]  Loc: [phone]    Visit Date: 4/16/2024    Functionality Assessment/Goals Worksheet     On a scale of 0 (Does not Interfere) to 10 (Completely Interferes)     1.  Which number describes how during the past week pain has interfered with       the following:  A.  General Activity:  9  B.  Mood: 9  C.  Walking Ability:  9  D.  Normal Work (Includes both work outside the home and housework):  9  E.  Relations with Other People:   8  F.  Sleep:   6  G.  Enjoyment of Life:   9    2.  Patient Prefers to Take their Pain Medications:     []  On a regular basis   [x]  Only when necessary    []  Does not take pain medications    3.  What are the Patient's Goals/Expectations for Visiting Pain Management?     []  Learn about my pain    [x]  Receive Medication   []  Physical Therapy     []  Treat Depression   [x]  Receive Injections    []  Treat Sleep   []  Deal with Anxiety and Stress   []  Treat Opoid Dependence/Addiction   []  Other:        HPI:   Avani Thomas is a 68 y.o. female is here today for    Chief Complaint: right low back, leg pain     HPI   2.5 month FU. Continues to have pain in low back center to right side only- aching dull pain   Continues to have bilateral leg pain - heaviness, numbness and tingling. Is currently seeing home health and was given exercises to do.   States she has pain every day but has ups and downs.     Norco prn remains effective in decreasing pain down to a tolerable level. Able to get around and tolerate daily activities. Discussed procedures again with pain but she states \"I do not want any\"   Pain increases with bending, lifting, twisting , walking, standing, getting up and down, and housework or working at job.      Radiology:  Lumbar xray:     IMPRESSION:  1. There are only 4 lumbar type

## 2024-04-29 DIAGNOSIS — M47.816 LUMBAR SPONDYLOSIS: ICD-10-CM

## 2024-04-29 DIAGNOSIS — M54.50 CHRONIC BILATERAL LOW BACK PAIN WITHOUT SCIATICA: ICD-10-CM

## 2024-04-29 DIAGNOSIS — M47.816 LUMBAR FACET ARTHROPATHY: ICD-10-CM

## 2024-04-29 DIAGNOSIS — R53.81 DEBILITY: ICD-10-CM

## 2024-04-29 DIAGNOSIS — G89.4 CHRONIC PAIN SYNDROME: ICD-10-CM

## 2024-04-29 DIAGNOSIS — G89.29 CHRONIC BILATERAL LOW BACK PAIN WITHOUT SCIATICA: ICD-10-CM

## 2024-04-29 RX ORDER — HYDROCODONE BITARTRATE AND ACETAMINOPHEN 5; 325 MG/1; MG/1
1 TABLET ORAL EVERY 8 HOURS PRN
Qty: 90 TABLET | Refills: 0 | Status: SHIPPED | OUTPATIENT
Start: 2024-04-30 | End: 2024-05-30

## 2024-04-29 NOTE — TELEPHONE ENCOUNTER
Avani Thomas called requesting a refill on the following medications:  Requested Prescriptions     Pending Prescriptions Disp Refills    HYDROcodone-acetaminophen (NORCO) 5-325 MG per tablet 90 tablet 0     Sig: Take 1 tablet by mouth every 8 hours as needed for Pain for up to 30 days.     Pharmacy verified: [facility] [address]      Date of last visit: 4/16/2024  Date of next visit (if applicable): 6/18/2024

## 2024-04-30 DIAGNOSIS — J41.0 SIMPLE CHRONIC BRONCHITIS (HCC): ICD-10-CM

## 2024-05-01 NOTE — TELEPHONE ENCOUNTER
Received refill request for spiriva respimat. Medication was last ordered by ella mary. Medication was last ordered on 6-22-23 with 11 refills.   Patient was last seen in the office 6-27-23. Patient has a scheduled follow up 6-25-24.   Medication needs to be sent to \[facility]\"" home delivery Pharmacy.

## 2024-05-03 DIAGNOSIS — I73.9 PAD (PERIPHERAL ARTERY DISEASE) (HCC): ICD-10-CM

## 2024-05-03 DIAGNOSIS — I50.22 CHRONIC SYSTOLIC (CONGESTIVE) HEART FAILURE (HCC): ICD-10-CM

## 2024-05-03 DIAGNOSIS — I10 ESSENTIAL HYPERTENSION: ICD-10-CM

## 2024-05-03 RX ORDER — TIOTROPIUM BROMIDE INHALATION SPRAY 3.12 UG/1
SPRAY, METERED RESPIRATORY (INHALATION)
Qty: 12 G | Refills: 3 | Status: SHIPPED | OUTPATIENT
Start: 2024-05-03

## 2024-05-03 RX ORDER — NITROGLYCERIN 0.4 MG/1
TABLET SUBLINGUAL
Qty: 75 TABLET | Refills: 0 | Status: SHIPPED | OUTPATIENT
Start: 2024-05-03

## 2024-05-03 NOTE — TELEPHONE ENCOUNTER
This medication refill is regarding a electronic request. Refill requested by  [facility] .    Requested Prescriptions     Pending Prescriptions Disp Refills    nitroGLYCERIN (NITROSTAT) 0.4 MG SL tablet [Pharmacy Med Name: Nitroglycerin 0.4 MG Sublingual Tablet Sublingual] 75 tablet 0     Sig: DISSOLVE 1 TABLET UNDER THE  TONGUE EVERY 5 MINUTES AS NEEDED FOR CHEST PAIN. MAX OF 3 TABLETS IN 15 MINUTES. CALL 911 IF PAIN  PERSISTS.     Date of last visit: 3/13/2024   Date of next visit: 9/17/2024  Date of last refill: 3/13/24 #90/0    Rx verified, ordered and set to EP.

## 2024-05-09 ENCOUNTER — NURSE ONLY (OUTPATIENT)
Dept: LAB | Age: 69
End: 2024-05-09

## 2024-05-09 DIAGNOSIS — E11.21 DIABETIC NEPHROPATHY ASSOCIATED WITH TYPE 2 DIABETES MELLITUS (HCC): ICD-10-CM

## 2024-05-09 DIAGNOSIS — Z94.0 KIDNEY TRANSPLANT RECIPIENT: ICD-10-CM

## 2024-05-09 DIAGNOSIS — D63.8 ANEMIA OF CHRONIC DISEASE: ICD-10-CM

## 2024-05-09 DIAGNOSIS — N25.81 HYPERPARATHYROIDISM, SECONDARY RENAL (HCC): ICD-10-CM

## 2024-05-09 LAB
25(OH)D3 SERPL-MCNC: 35 NG/ML (ref 30–100)
ANION GAP SERPL CALC-SCNC: 15 MEQ/L (ref 8–16)
BUN SERPL-MCNC: 26 MG/DL (ref 7–22)
CALCIUM SERPL-MCNC: 10.9 MG/DL (ref 8.5–10.5)
CHLORIDE SERPL-SCNC: 101 MEQ/L (ref 98–111)
CO2 SERPL-SCNC: 24 MEQ/L (ref 23–33)
CREAT SERPL-MCNC: 1.4 MG/DL (ref 0.4–1.2)
CREAT UR-MCNC: 59.5 MG/DL
FERRITIN SERPL IA-MCNC: 1202 NG/ML (ref 10–291)
GFR SERPL CREATININE-BSD FRML MDRD: 40 ML/MIN/1.73M2
GLUCOSE SERPL-MCNC: 162 MG/DL (ref 70–108)
HCT VFR BLD AUTO: 34.7 % (ref 37–47)
HGB BLD-MCNC: 10.8 GM/DL (ref 12–16)
IRON SATN MFR SERPL: 22 % (ref 20–50)
IRON SERPL-MCNC: 44 UG/DL (ref 50–170)
POTASSIUM SERPL-SCNC: 3.6 MEQ/L (ref 3.5–5.2)
PROT UR-MCNC: 21.9 MG/DL
PROT/CREAT 24H UR: 0.37 MG/G{CREAT}
PTH-INTACT SERPL-MCNC: 127.3 PG/ML (ref 15–65)
SODIUM SERPL-SCNC: 140 MEQ/L (ref 135–145)
TIBC SERPL-MCNC: 196 UG/DL (ref 171–450)

## 2024-05-13 DIAGNOSIS — E78.5 HYPERLIPIDEMIA, UNSPECIFIED HYPERLIPIDEMIA TYPE: ICD-10-CM

## 2024-05-13 DIAGNOSIS — I10 ESSENTIAL HYPERTENSION: ICD-10-CM

## 2024-05-13 RX ORDER — MAGNESIUM OXIDE 400 MG/1
1 TABLET ORAL 2 TIMES DAILY
Qty: 60 TABLET | Refills: 11 | Status: SHIPPED | OUTPATIENT
Start: 2024-05-13

## 2024-05-13 NOTE — TELEPHONE ENCOUNTER
This medication refill is regarding a electronic request. Refill requested by  pharmacy .    Requested Prescriptions     Pending Prescriptions Disp Refills    magnesium oxide (MAG-OX) 400 MG tablet [Pharmacy Med Name: MAGNESIUM OXIDE 400 MG TABLET] 60 tablet 11     Sig: TAKE 1 TABLET BY MOUTH TWICE A DAY       Date of last visit: 3/13/2024   Date of next visit: 9/17/2024  Date of last refill: 12/18/23  Pharmacy Name:     Last Lipid Panel:    Lab Results   Component Value Date/Time    CHOL 125 11/08/2022 12:43 PM    TRIG 129 11/08/2022 12:43 PM    HDL 37 11/08/2022 12:43 PM     Last CMP:   Lab Results   Component Value Date     05/09/2024    K 3.6 05/09/2024     05/09/2024    CO2 24 05/09/2024    BUN 26 (H) 05/09/2024    CREATININE 1.4 (H) 05/09/2024    GLUCOSE 162 (H) 05/09/2024    CALCIUM 10.9 (H) 05/09/2024    BILITOT 0.9 04/05/2023    ALKPHOS 89 04/05/2023    AST 11 04/05/2023    ALT 11 04/05/2023    LABGLOM 40 (A) 05/09/2024       Last Thyroid:    Lab Results   Component Value Date    TSH 1.110 02/10/2022    T4FREE 1.16 10/14/2016     Last Hemoglobin A1C:    Lab Results   Component Value Date/Time    LABA1C 7.2 12/08/2023 11:40 AM       Rx verified, ordered and set to EP.

## 2024-05-16 ENCOUNTER — OFFICE VISIT (OUTPATIENT)
Dept: NEPHROLOGY | Age: 69
End: 2024-05-16

## 2024-05-16 VITALS
HEIGHT: 65 IN | BODY MASS INDEX: 30.82 KG/M2 | SYSTOLIC BLOOD PRESSURE: 159 MMHG | WEIGHT: 185 LBS | OXYGEN SATURATION: 98 % | HEART RATE: 60 BPM | DIASTOLIC BLOOD PRESSURE: 71 MMHG

## 2024-05-16 DIAGNOSIS — E11.21 DIABETIC NEPHROPATHY ASSOCIATED WITH TYPE 2 DIABETES MELLITUS (HCC): ICD-10-CM

## 2024-05-16 DIAGNOSIS — E55.9 VITAMIN D DEFICIENCY: ICD-10-CM

## 2024-05-16 DIAGNOSIS — T86.11 CHRONIC RENAL ALLOGRAFT NEPHROPATHY: ICD-10-CM

## 2024-05-16 DIAGNOSIS — E83.52 HYPERCALCEMIA: ICD-10-CM

## 2024-05-16 DIAGNOSIS — I10 PRIMARY HYPERTENSION: ICD-10-CM

## 2024-05-16 DIAGNOSIS — N25.81 HYPERPARATHYROIDISM, SECONDARY RENAL (HCC): Primary | ICD-10-CM

## 2024-05-16 DIAGNOSIS — Z94.0 KIDNEY TRANSPLANT RECIPIENT: Primary | ICD-10-CM

## 2024-05-16 DIAGNOSIS — N18.32 STAGE 3B CHRONIC KIDNEY DISEASE (HCC): ICD-10-CM

## 2024-05-16 NOTE — PATIENT INSTRUCTIONS
Please take your blood pressure at home at least twice a day for 5 days and call the office with the report.

## 2024-05-16 NOTE — PROGRESS NOTES
Renal Progress Note    Assessment and Plan:      Diagnosis Orders   1. Kidney transplant recipient  Basic Metabolic Panel      2. Diabetic nephropathy associated with type 2 diabetes mellitus (HCC)  Basic Metabolic Panel    Protein / creatinine ratio, urine      3. Chronic renal allograft nephropathy  Basic Metabolic Panel    PTH, Intact    Vitamin D 25 Hydroxy      4. Primary hypertension        5. Hypercalcemia                  PLAN:  I discussed my thoughts with the patient  She understood  I addressed all questions  Serum creatinine is stable at 1.4 mg/dL  Serum calcium is mildly high at 10.9  Advised her to cut down on milk and milk products intake  No calcium products  PTH is high at 127.3 but needs no treatment yet  Vitamin D level is good at 35  Medications reviewed  No changes  Monitor blood pressure at home 2-3 times a day for 5 days and call us with the report.  Return within 6 months with labs          Patient Active Problem List   Diagnosis    Coronary disease    Hyperlipemia    Arthritis    Neuropathy, diabetic (HCC)    Leg pain    Obesity (BMI 30-39.9)    Low HDL (under 40)    History of tobacco use    Chronic obstructive pulmonary disease (HCC)    Anemia, chronic disease    Gout    Urolithiasis    Secondary hyperparathyroidism of renal origin (HCC)    CONTRERAS (obstructive sleep apnea)    Vitamin D deficiency    Persistent proteinuria associated with type 2 diabetes mellitus (HCC)    Cellulitis in diabetic foot (HCC)    Persistent proteinuria    Acquired hypothyroidism    Nephrotic syndrome    Iron deficiency anemia due to dietary causes    Anemia of chronic disease    Essential hypertension    Diabetic peripheral neuropathy associated with type 2 diabetes mellitus (HCC)    Diabetes mellitus type 2 with complications (HCC)    Acute on chronic diastolic congestive heart failure (HCC)    Constipation    High anion gap metabolic acidosis    Lymphadenopathy, inguinal    Atelectasis of left lung    Kidney

## 2024-05-16 NOTE — PROGRESS NOTES
Diabetic neuropathy R ankle. She has significant pain and numbness both legs.   BSL- on average-200's

## 2024-05-24 ENCOUNTER — TELEPHONE (OUTPATIENT)
Dept: NEPHROLOGY | Age: 69
End: 2024-05-24

## 2024-05-24 NOTE — TELEPHONE ENCOUNTER
Pt phoned in bp readings:   5-17 127/60 am 109/54 pm   5-18 123/58 am 153/72 pm   5-19 144/67 am 155/70 pm   5-20 143/67 am 145/66 pm   5-21 128/60 am 113/50 pm   5-22 135/61 am 142/66 pm   5-23 147/68 am 131/63 pm   5-24 135/56 am

## 2024-05-28 DIAGNOSIS — M47.816 LUMBAR SPONDYLOSIS: ICD-10-CM

## 2024-05-28 DIAGNOSIS — G89.4 CHRONIC PAIN SYNDROME: ICD-10-CM

## 2024-05-28 DIAGNOSIS — G89.29 CHRONIC BILATERAL LOW BACK PAIN WITHOUT SCIATICA: ICD-10-CM

## 2024-05-28 DIAGNOSIS — R53.81 DEBILITY: ICD-10-CM

## 2024-05-28 DIAGNOSIS — M54.50 CHRONIC BILATERAL LOW BACK PAIN WITHOUT SCIATICA: ICD-10-CM

## 2024-05-28 DIAGNOSIS — M47.816 LUMBAR FACET ARTHROPATHY: ICD-10-CM

## 2024-05-28 RX ORDER — HYDROCODONE BITARTRATE AND ACETAMINOPHEN 5; 325 MG/1; MG/1
1 TABLET ORAL EVERY 8 HOURS PRN
Qty: 90 TABLET | Refills: 0 | Status: SHIPPED | OUTPATIENT
Start: 2024-05-30 | End: 2024-06-29

## 2024-05-28 NOTE — TELEPHONE ENCOUNTER
Avani Thomas called requesting a refill on the following medications:  Requested Prescriptions     Pending Prescriptions Disp Refills    HYDROcodone-acetaminophen (NORCO) 5-325 MG per tablet 90 tablet 0     Sig: Take 1 tablet by mouth every 8 hours as needed for Pain for up to 30 days.     Pharmacy verified: [facility] on [address]  .[address]      Date of last visit: 4/16/2024  Date of next visit (if applicable): Visit date not found

## 2024-06-10 ENCOUNTER — NURSE ONLY (OUTPATIENT)
Dept: LAB | Age: 69
End: 2024-06-10

## 2024-06-10 LAB
BASOPHILS ABSOLUTE: 0 THOU/MM3 (ref 0–0.1)
BASOPHILS NFR BLD AUTO: 0.1 %
BUN SERPL-MCNC: 21 MG/DL (ref 7–22)
CHLORIDE SERPL-SCNC: 109 MEQ/L (ref 98–111)
CREAT SERPL-MCNC: 1.2 MG/DL (ref 0.4–1.2)
DEPRECATED RDW RBC AUTO: 46.6 FL (ref 35–45)
EOSINOPHIL NFR BLD AUTO: 2.9 %
EOSINOPHILS ABSOLUTE: 0.2 THOU/MM3 (ref 0–0.4)
ERYTHROCYTE [DISTWIDTH] IN BLOOD BY AUTOMATED COUNT: 14.6 % (ref 11.5–14.5)
GFR SERPL CREATININE-BSD FRML MDRD: 49 ML/MIN/1.73M2
GLUCOSE SERPL-MCNC: 154 MG/DL (ref 70–108)
HCT VFR BLD AUTO: 32.5 % (ref 37–47)
HGB BLD-MCNC: 10 GM/DL (ref 12–16)
IMM GRANULOCYTES # BLD AUTO: 0.02 THOU/MM3 (ref 0–0.07)
IMM GRANULOCYTES NFR BLD AUTO: 0.3 %
LYMPHOCYTES ABSOLUTE: 1 THOU/MM3 (ref 1–4.8)
LYMPHOCYTES NFR BLD AUTO: 14.6 %
MCH RBC QN AUTO: 26.8 PG (ref 26–33)
MCHC RBC AUTO-ENTMCNC: 30.8 GM/DL (ref 32.2–35.5)
MCV RBC AUTO: 87.1 FL (ref 81–99)
MONOCYTES ABSOLUTE: 0.6 THOU/MM3 (ref 0.4–1.3)
MONOCYTES NFR BLD AUTO: 8.3 %
NEUTROPHILS ABSOLUTE: 5.2 THOU/MM3 (ref 1.8–7.7)
NEUTROPHILS NFR BLD AUTO: 73.8 %
NRBC BLD AUTO-RTO: 0 /100 WBC
PLATELET # BLD AUTO: 250 THOU/MM3 (ref 130–400)
PMV BLD AUTO: 11.2 FL (ref 9.4–12.4)
POTASSIUM SERPL-SCNC: 3.7 MEQ/L (ref 3.5–5.2)
RBC # BLD AUTO: 3.73 MILL/MM3 (ref 4.2–5.4)
SODIUM SERPL-SCNC: 147 MEQ/L (ref 135–145)
WBC # BLD AUTO: 7.1 THOU/MM3 (ref 4.8–10.8)

## 2024-06-12 LAB — TACROLIMUS BLD-MCNC: 6 NG/ML

## 2024-06-17 ENCOUNTER — HOSPITAL ENCOUNTER (OUTPATIENT)
Dept: CT IMAGING | Age: 69
Discharge: HOME | End: 2024-06-17
Payer: MEDICARE

## 2024-06-17 ENCOUNTER — HOSPITAL ENCOUNTER (OUTPATIENT)
Dept: PULMONOLOGY | Age: 69
Discharge: HOME | End: 2024-06-17
Payer: MEDICARE

## 2024-06-17 DIAGNOSIS — J41.0 SIMPLE CHRONIC BRONCHITIS (HCC): ICD-10-CM

## 2024-06-17 DIAGNOSIS — Z87.891 PERSONAL HISTORY OF TOBACCO USE: ICD-10-CM

## 2024-06-17 PROCEDURE — 94726 PLETHYSMOGRAPHY LUNG VOLUMES: CPT

## 2024-06-17 PROCEDURE — 94729 DIFFUSING CAPACITY: CPT

## 2024-06-17 PROCEDURE — 94060 EVALUATION OF WHEEZING: CPT

## 2024-06-17 PROCEDURE — 71271 CT THORAX LUNG CANCER SCR C-: CPT

## 2024-06-18 ENCOUNTER — OFFICE VISIT (OUTPATIENT)
Dept: UROLOGY | Age: 69
End: 2024-06-18
Payer: MEDICARE

## 2024-06-18 ENCOUNTER — OFFICE VISIT (OUTPATIENT)
Dept: PHYSICAL MEDICINE AND REHAB | Age: 69
End: 2024-06-18
Payer: MEDICARE

## 2024-06-18 VITALS
WEIGHT: 184.97 LBS | SYSTOLIC BLOOD PRESSURE: 142 MMHG | BODY MASS INDEX: 30.82 KG/M2 | HEIGHT: 65 IN | DIASTOLIC BLOOD PRESSURE: 78 MMHG

## 2024-06-18 VITALS — HEIGHT: 65 IN | BODY MASS INDEX: 30.66 KG/M2 | RESPIRATION RATE: 16 BRPM | WEIGHT: 184 LBS

## 2024-06-18 DIAGNOSIS — N32.81 OAB (OVERACTIVE BLADDER): ICD-10-CM

## 2024-06-18 DIAGNOSIS — R53.81 DEBILITY: ICD-10-CM

## 2024-06-18 DIAGNOSIS — M54.16 LUMBAR RADICULITIS: ICD-10-CM

## 2024-06-18 DIAGNOSIS — G89.4 CHRONIC PAIN SYNDROME: ICD-10-CM

## 2024-06-18 DIAGNOSIS — E11.42 DIABETIC POLYNEUROPATHY ASSOCIATED WITH TYPE 2 DIABETES MELLITUS (HCC): ICD-10-CM

## 2024-06-18 DIAGNOSIS — M47.816 LUMBAR SPONDYLOSIS: Primary | ICD-10-CM

## 2024-06-18 DIAGNOSIS — M47.816 LUMBAR FACET ARTHROPATHY: ICD-10-CM

## 2024-06-18 DIAGNOSIS — Z94.0 HISTORY OF KIDNEY TRANSPLANT: ICD-10-CM

## 2024-06-18 DIAGNOSIS — N39.0 RECURRENT UTI: Primary | ICD-10-CM

## 2024-06-18 DIAGNOSIS — R39.14 FEELING OF INCOMPLETE BLADDER EMPTYING: ICD-10-CM

## 2024-06-18 LAB
BILIRUBIN, URINE: NEGATIVE
BLOOD URINE, POC: ABNORMAL
CHARACTER, URINE: CLEAR
COLOR: YELLOW
GLUCOSE URINE: NEGATIVE MG/DL
KETONES, URINE: NEGATIVE
LEUKOCYTE CLUMPS, URINE: ABNORMAL
NITRITE, URINE: NEGATIVE
PH, URINE: 6 (ref 5–9)
PROTEIN, URINE: 30 MG/DL
SPECIFIC GRAVITY UA: 1.02 (ref 1–1.03)
UROBILINOGEN, URINE: 0.2 EU/DL (ref 0–1)

## 2024-06-18 PROCEDURE — 3017F COLORECTAL CA SCREEN DOC REV: CPT | Performed by: UROLOGY

## 2024-06-18 PROCEDURE — 2022F DILAT RTA XM EVC RTNOPTHY: CPT | Performed by: NURSE PRACTITIONER

## 2024-06-18 PROCEDURE — 1090F PRES/ABSN URINE INCON ASSESS: CPT | Performed by: NURSE PRACTITIONER

## 2024-06-18 PROCEDURE — 81003 URINALYSIS AUTO W/O SCOPE: CPT | Performed by: UROLOGY

## 2024-06-18 PROCEDURE — G8399 PT W/DXA RESULTS DOCUMENT: HCPCS | Performed by: NURSE PRACTITIONER

## 2024-06-18 PROCEDURE — 3017F COLORECTAL CA SCREEN DOC REV: CPT | Performed by: NURSE PRACTITIONER

## 2024-06-18 PROCEDURE — G8427 DOCREV CUR MEDS BY ELIG CLIN: HCPCS | Performed by: UROLOGY

## 2024-06-18 PROCEDURE — 1090F PRES/ABSN URINE INCON ASSESS: CPT | Performed by: UROLOGY

## 2024-06-18 PROCEDURE — 1123F ACP DISCUSS/DSCN MKR DOCD: CPT | Performed by: NURSE PRACTITIONER

## 2024-06-18 PROCEDURE — 99213 OFFICE O/P EST LOW 20 MIN: CPT | Performed by: UROLOGY

## 2024-06-18 PROCEDURE — 51798 US URINE CAPACITY MEASURE: CPT | Performed by: UROLOGY

## 2024-06-18 PROCEDURE — G8417 CALC BMI ABV UP PARAM F/U: HCPCS | Performed by: UROLOGY

## 2024-06-18 PROCEDURE — G8399 PT W/DXA RESULTS DOCUMENT: HCPCS | Performed by: UROLOGY

## 2024-06-18 PROCEDURE — 3077F SYST BP >= 140 MM HG: CPT | Performed by: NURSE PRACTITIONER

## 2024-06-18 PROCEDURE — G8417 CALC BMI ABV UP PARAM F/U: HCPCS | Performed by: NURSE PRACTITIONER

## 2024-06-18 PROCEDURE — 1036F TOBACCO NON-USER: CPT | Performed by: UROLOGY

## 2024-06-18 PROCEDURE — 3078F DIAST BP <80 MM HG: CPT | Performed by: NURSE PRACTITIONER

## 2024-06-18 PROCEDURE — 1123F ACP DISCUSS/DSCN MKR DOCD: CPT | Performed by: UROLOGY

## 2024-06-18 PROCEDURE — 99214 OFFICE O/P EST MOD 30 MIN: CPT | Performed by: NURSE PRACTITIONER

## 2024-06-18 PROCEDURE — 1036F TOBACCO NON-USER: CPT | Performed by: NURSE PRACTITIONER

## 2024-06-18 PROCEDURE — G8428 CUR MEDS NOT DOCUMENT: HCPCS | Performed by: NURSE PRACTITIONER

## 2024-06-18 PROCEDURE — 3046F HEMOGLOBIN A1C LEVEL >9.0%: CPT | Performed by: NURSE PRACTITIONER

## 2024-06-18 ASSESSMENT — ENCOUNTER SYMPTOMS
SHORTNESS OF BREATH: 0
GASTROINTESTINAL NEGATIVE: 1
COUGH: 0
EYE REDNESS: 0
WHEEZING: 0
EYE DISCHARGE: 0
BACK PAIN: 1
CHEST TIGHTNESS: 0

## 2024-06-18 NOTE — PROGRESS NOTES
y.o. female  Vitals:    06/18/24 1514   Resp: 16     Body mass index is 30.62 kg/m².  Constitutional: Patient in no acute distress;       Assessment and Plan        1. Recurrent UTI    2. OAB (overactive bladder)    3. History of kidney transplant    4. Feeling of incomplete bladder emptying               Plan:   Assessment & Plan       Recurrent uti- Infections worsened since transplant but had improved greatly with prophylaxis. We stopped this. She no longer gets recurrent infections. Incomplete emptying vs. Immunosuppression increasing infection risk. cont d mannose    Oab- Cystoscopy recent visit was normal. Hold off meds in case we make infections worse                  Prescriptions Ordered:  No orders of the defined types were placed in this encounter.     Orders Placed:  Orders Placed This Encounter   Procedures    POCT Urinalysis No Micro (Auto)    poct post void residual     Bladder scan            ZAHIRA SPAIN MD

## 2024-06-18 NOTE — PROGRESS NOTES
tolerance, dependency and addiction.  Previous UDS reviewed  UDS preformed today for compliance.  Patient told can not receive any pain medications from any other source.  No evidence of abuse, diversion or aberrant behavior.  Medications and/or procedures to improve function and quality of life- patient understanding with this and that may not be pain free  Discussed with patient about safe storage of medications at home  Discussed possible weaning of medication dosing dependent on treatment/procedure results.   Discussed with patient about risks with procedure including infection, reaction to medication, increased pain, or bleeding.  Discussed procedures again L-facet EMILEE and IRVIN. Again she is not interested at this time  Again discussed therapy. She voiced that she does not want at this time and is going to continue exercises on her own.   Continue Norco 5/325 TID prn as this remains effective- filled 5/30/2024   Many allergies to neuropathic medications   Is compliant. Will follow up in another 2 months     Meds. Prescribed:   No orders of the defined types were placed in this encounter.      Return in about 2 months (around 8/18/2024), or if symptoms worsen or fail to improve, for follow up  for medications.               Electronically signed by DONOVAN Beltran CNP on6/18/2024 at 12:40 PM

## 2024-06-25 ENCOUNTER — OFFICE VISIT (OUTPATIENT)
Dept: PULMONOLOGY | Age: 69
End: 2024-06-25
Payer: COMMERCIAL

## 2024-06-25 VITALS
OXYGEN SATURATION: 98 % | SYSTOLIC BLOOD PRESSURE: 174 MMHG | TEMPERATURE: 96.9 F | HEIGHT: 65 IN | DIASTOLIC BLOOD PRESSURE: 72 MMHG | HEART RATE: 60 BPM | WEIGHT: 185.8 LBS | BODY MASS INDEX: 30.96 KG/M2

## 2024-06-25 DIAGNOSIS — J43.9 PULMONARY EMPHYSEMA, UNSPECIFIED EMPHYSEMA TYPE (HCC): ICD-10-CM

## 2024-06-25 DIAGNOSIS — Z87.898 H/O MULTIPLE PULMONARY NODULES: ICD-10-CM

## 2024-06-25 DIAGNOSIS — Z87.891 PERSONAL HISTORY OF TOBACCO USE: ICD-10-CM

## 2024-06-25 DIAGNOSIS — J41.0 SIMPLE CHRONIC BRONCHITIS (HCC): Primary | ICD-10-CM

## 2024-06-25 PROCEDURE — 1090F PRES/ABSN URINE INCON ASSESS: CPT | Performed by: NURSE PRACTITIONER

## 2024-06-25 PROCEDURE — 1036F TOBACCO NON-USER: CPT | Performed by: NURSE PRACTITIONER

## 2024-06-25 PROCEDURE — 3077F SYST BP >= 140 MM HG: CPT | Performed by: NURSE PRACTITIONER

## 2024-06-25 PROCEDURE — 3023F SPIROM DOC REV: CPT | Performed by: NURSE PRACTITIONER

## 2024-06-25 PROCEDURE — G8417 CALC BMI ABV UP PARAM F/U: HCPCS | Performed by: NURSE PRACTITIONER

## 2024-06-25 PROCEDURE — G8427 DOCREV CUR MEDS BY ELIG CLIN: HCPCS | Performed by: NURSE PRACTITIONER

## 2024-06-25 PROCEDURE — G8399 PT W/DXA RESULTS DOCUMENT: HCPCS | Performed by: NURSE PRACTITIONER

## 2024-06-25 PROCEDURE — 3078F DIAST BP <80 MM HG: CPT | Performed by: NURSE PRACTITIONER

## 2024-06-25 PROCEDURE — 3017F COLORECTAL CA SCREEN DOC REV: CPT | Performed by: NURSE PRACTITIONER

## 2024-06-25 PROCEDURE — 1123F ACP DISCUSS/DSCN MKR DOCD: CPT | Performed by: NURSE PRACTITIONER

## 2024-06-25 PROCEDURE — 99213 OFFICE O/P EST LOW 20 MIN: CPT | Performed by: NURSE PRACTITIONER

## 2024-06-25 ASSESSMENT — ENCOUNTER SYMPTOMS
GASTROINTESTINAL NEGATIVE: 1
SHORTNESS OF BREATH: 1
WHEEZING: 0
ALLERGIC/IMMUNOLOGIC NEGATIVE: 1
COUGH: 0
EYES NEGATIVE: 1

## 2024-06-25 NOTE — PROGRESS NOTES
Normocephalic and atraumatic.   Eyes:      Conjunctiva/sclera: Conjunctivae normal.      Pupils: Pupils are equal, round, and reactive to light.   Neck:      Vascular: No JVD.   Cardiovascular:      Rate and Rhythm: Normal rate and regular rhythm.      Heart sounds: Normal heart sounds. No murmur heard.     No friction rub. No gallop.   Pulmonary:      Effort: Pulmonary effort is normal. No respiratory distress.      Breath sounds: Normal breath sounds. No wheezing or rales.   Abdominal:      General: Bowel sounds are normal.      Palpations: Abdomen is soft.   Musculoskeletal:         General: Normal range of motion.      Cervical back: Normal range of motion and neck supple.   Skin:     General: Skin is warm and dry.      Capillary Refill: Capillary refill takes less than 2 seconds.   Neurological:      Mental Status: She is alert and oriented to person, place, and time.   Psychiatric:         Behavior: Behavior normal.         Thought Content: Thought content normal.         Judgment: Judgment normal.          Results   Lung Nodule Screening     [] Qualifies    [x] Does not qualify   [] Declined    [] Completed  - Quit smoking 15+ years ago     The USPSTF recommends annual screening for lung cancer with low-dose computed tomography (LDCT) in adults aged 50 to 80 years who have a 30 pack-year smoking history and currently smoke or have quit within the past 20 years. Screening should be discontinued once a person has not smoked for 20 years or develops a health problem that substantially limits life expectancy or the ability or willingness to have curative lung surgery.           6/18/2024  NONCONTRAST SCREENING CT CHEST:    FINDINGS:  LUNGS NODULES:  There is an 8 mm pulmonary nodule in the lingula on the left side on image #229.  This is stable.     A 5 mm pulmonary nodule abutting the major fissure on the right is stable on  image #141.     There are some calcified granulomas.     LYMPHADENOPATHY:  There are no

## 2024-06-26 DIAGNOSIS — M47.816 LUMBAR FACET ARTHROPATHY: ICD-10-CM

## 2024-06-26 DIAGNOSIS — M54.50 CHRONIC BILATERAL LOW BACK PAIN WITHOUT SCIATICA: ICD-10-CM

## 2024-06-26 DIAGNOSIS — G89.4 CHRONIC PAIN SYNDROME: ICD-10-CM

## 2024-06-26 DIAGNOSIS — R53.81 DEBILITY: ICD-10-CM

## 2024-06-26 DIAGNOSIS — M47.816 LUMBAR SPONDYLOSIS: ICD-10-CM

## 2024-06-26 DIAGNOSIS — G89.29 CHRONIC BILATERAL LOW BACK PAIN WITHOUT SCIATICA: ICD-10-CM

## 2024-06-26 RX ORDER — HYDROCODONE BITARTRATE AND ACETAMINOPHEN 5; 325 MG/1; MG/1
1 TABLET ORAL EVERY 8 HOURS PRN
Qty: 90 TABLET | Refills: 0 | Status: SHIPPED | OUTPATIENT
Start: 2024-06-30 | End: 2024-07-30

## 2024-06-26 NOTE — TELEPHONE ENCOUNTER
Avani Thomas called requesting a refill on the following medications:  Requested Prescriptions     Pending Prescriptions Disp Refills    HYDROcodone-acetaminophen (NORCO) 5-325 MG per tablet 90 tablet 0     Sig: Take 1 tablet by mouth every 8 hours as needed for Pain for up to 30 days.     Pharmacy verified:[facility]   .pv      Date of last visit: 6/18/24   Date of next visit (if applicable): 8/22/2024

## 2024-06-27 DIAGNOSIS — I73.9 PAD (PERIPHERAL ARTERY DISEASE) (HCC): ICD-10-CM

## 2024-06-27 DIAGNOSIS — I50.22 CHRONIC SYSTOLIC (CONGESTIVE) HEART FAILURE (HCC): ICD-10-CM

## 2024-06-27 DIAGNOSIS — I10 ESSENTIAL HYPERTENSION: ICD-10-CM

## 2024-06-27 RX ORDER — NITROGLYCERIN 0.4 MG/1
TABLET SUBLINGUAL
Qty: 75 TABLET | Refills: 3 | Status: SHIPPED | OUTPATIENT
Start: 2024-06-27

## 2024-06-27 NOTE — TELEPHONE ENCOUNTER
Request sent from [facility] for refill of nitro tabs, 0.4 mg SL prn.  Last seen 3/13/24, next appt 9/17/24.  Med verified.  Order pended.

## 2024-07-15 ENCOUNTER — TELEPHONE (OUTPATIENT)
Dept: NEPHROLOGY | Age: 69
End: 2024-07-15

## 2024-07-15 DIAGNOSIS — Z94.0 KIDNEY TRANSPLANT RECIPIENT: Primary | ICD-10-CM

## 2024-07-15 DIAGNOSIS — R30.0 BURNING WITH URINATION: ICD-10-CM

## 2024-07-15 DIAGNOSIS — R39.15 URGENCY OF URINATION: ICD-10-CM

## 2024-07-15 RX ORDER — CIPROFLOXACIN 500 MG/1
500 TABLET, FILM COATED ORAL 2 TIMES DAILY
Qty: 14 TABLET | Refills: 0 | Status: SHIPPED | OUTPATIENT
Start: 2024-07-15 | End: 2024-07-16 | Stop reason: SDUPTHER

## 2024-07-15 NOTE — TELEPHONE ENCOUNTER
Patient called stating for about 3 days she has had a UTI. Complaining of itching, weakness, and frequency. She would like something sent to [facility] [address].

## 2024-07-16 RX ORDER — CIPROFLOXACIN 500 MG/1
500 TABLET, FILM COATED ORAL 2 TIMES DAILY
Qty: 14 TABLET | Refills: 0 | OUTPATIENT
Start: 2024-07-16 | End: 2024-07-23

## 2024-07-16 RX ORDER — CIPROFLOXACIN 500 MG/1
500 TABLET, FILM COATED ORAL 2 TIMES DAILY
Qty: 14 TABLET | Refills: 0 | Status: SHIPPED | OUTPATIENT
Start: 2024-07-16 | End: 2024-07-23

## 2024-07-16 NOTE — TELEPHONE ENCOUNTER
Patient called wanting to know where script was sent yesterday.  Script was sent to wrong pharmacy.  Please sign this order to go to [facility] on [address]

## 2024-07-17 ENCOUNTER — TELEPHONE (OUTPATIENT)
Dept: FAMILY MEDICINE CLINIC | Age: 69
End: 2024-07-17

## 2024-07-17 NOTE — TELEPHONE ENCOUNTER
----- Message from DONOVAN Gomez - CNP sent at 7/17/2024 11:01 AM EDT -----  Urine culture positive for klebsiella - cipro is appropriate treatment - continue until gone

## 2024-07-25 ENCOUNTER — HOSPITAL ENCOUNTER (EMERGENCY)
Age: 69
Discharge: HOME | End: 2024-07-25
Payer: MEDICARE

## 2024-07-25 VITALS
TEMPERATURE: 97.8 F | BODY MASS INDEX: 29.62 KG/M2 | RESPIRATION RATE: 20 BRPM | WEIGHT: 178 LBS | DIASTOLIC BLOOD PRESSURE: 62 MMHG | SYSTOLIC BLOOD PRESSURE: 145 MMHG | OXYGEN SATURATION: 95 % | HEART RATE: 63 BPM

## 2024-07-25 DIAGNOSIS — L03.039 CELLULITIS OF TOE, UNSPECIFIED LATERALITY: Primary | ICD-10-CM

## 2024-07-25 PROCEDURE — 99213 OFFICE O/P EST LOW 20 MIN: CPT

## 2024-07-25 PROCEDURE — 99213 OFFICE O/P EST LOW 20 MIN: CPT | Performed by: NURSE PRACTITIONER

## 2024-07-25 RX ORDER — CEPHALEXIN 500 MG/1
500 CAPSULE ORAL 4 TIMES DAILY
Qty: 28 CAPSULE | Refills: 0 | Status: SHIPPED | OUTPATIENT
Start: 2024-07-25 | End: 2024-08-01

## 2024-07-25 ASSESSMENT — PAIN - FUNCTIONAL ASSESSMENT: PAIN_FUNCTIONAL_ASSESSMENT: NONE - DENIES PAIN

## 2024-07-25 NOTE — DISCHARGE INSTRUCTIONS
Medications as prescribed.    Please report to the emergency room with any new or severe symptoms such as redness, swelling, increased pain to feet.    Call the wound center for close follow-up.

## 2024-07-25 NOTE — ED TRIAGE NOTES
Patient ambulated to room with use of cane. C/o clear, fluid filled sacs to estefania of second, third, and fourth toes of left foot beginning four days ago. Denies injury. No drainage noted at this time.

## 2024-07-25 NOTE — ED PROVIDER NOTES
MG CAPS    Take 1 each by mouth in the morning and at bedtime    FAMOTIDINE (PEPCID) 40 MG TABLET    Take 1 tablet by mouth 2 times daily as needed    FLUTICASONE (FLONASE) 50 MCG/ACT NASAL SPRAY    1 spray by Each Nostril route daily    HANDICAP PLACARD MISC    by Does not apply route    HYDROCODONE-ACETAMINOPHEN (NORCO) 5-325 MG PER TABLET    Take 1 tablet by mouth every 8 hours as needed for Pain for up to 30 days.    INSULIN ASPART (NOVOLOG FLEXPEN) 100 UNIT/ML INJECTION PEN    Sliding scale insulin coverage Glucose:Dose: If Less [id number] =No Insulin/ 140-199= 3 Units/ 200-249=6 Units/ 250-299= 9 Units/  300-349=12 Units/  350-400=15 Units/ Above 400 = 18 Units max 68 units    INSULIN GLARGINE (LANTUS SOLOSTAR) 100 UNIT/ML INJECTION PEN    Inject 25 Units into the skin nightly    INSULIN PEN NEEDLE (B-D ULTRAFINE III SHORT PEN) 31G X 8 MM MISC    Use three times daily Diagnosis Code E11.9    LACTULOSE (CHRONULAC) 10 GM/15ML SOLUTION    Take twice daily as needed for constipation    LINACLOTIDE (LINZESS) 290 MCG CAPS CAPSULE    Take 1 capsule by mouth daily    MAGNESIUM OXIDE (MAG-OX) 400 MG TABLET    TAKE 1 TABLET BY MOUTH TWICE A DAY    MYCOPHENOLATE SODIUM 360 MG TBEC    Take 2 tablets by mouth 1  tablets BID    NIFEDIPINE (PROCARDIA XL) 90 MG EXTENDED RELEASE TABLET    TAKE 1 TABLET BY MOUTH IN THE MORNING AND IN THE EVENING    NITROGLYCERIN (NITROSTAT) 0.4 MG SL TABLET    DISSOLVE 1 TABLET UNDER THE  TONGUE EVERY 5 MINUTES AS NEEDED FOR CHEST PAIN. MAX OF 3 TABLETS IN 15 MINUTES. CALL 911 IF PAIN  PERSISTS.    SPIRIVA RESPIMAT 2.5 MCG/ACT AERS INHALER    USE 2 INHALATIONS BY MOUTH DAILY    SULFAMETHOXAZOLE-TRIMETHOPRIM (BACTRIM DS;SEPTRA DS) 800-160 MG PER TABLET    TAKE 1 TABLET BY MOUTH EVERY MONDAY, WEDNESDAY AND FRIDAY    TACROLIMUS (PROGRAF) 1 MG CAPSULE    Take 1 capsule by mouth 5 CAPSULES EVERY MORNING AND 5 CAPSULES EVERY EVENING    XARELTO 2.5 MG TABS TABLET    TAKE 1 TABLET BY MOUTH TWICE  DAILY  Appropriate frequency plus additional to accommodate PRN testing needs., Disp-100 strip, R-11, Print      D-Mannose 500 MG CAPS Take 1 each by mouth in the morning and at bedtime, Disp-180 capsule, R-3Normal      fluticasone (FLONASE) 50 MCG/ACT nasal spray 1 spray by Each Nostril route daily, Disp-3 each, R-3Normal      sulfamethoxazole-trimethoprim (BACTRIM DS;SEPTRA DS) 800-160 MG per tablet TAKE 1 TABLET BY MOUTH EVERY MONDAY, WEDNESDAY AND FRIDAYHistorical Med      aluminum & magnesium hydroxide-simethicone (MAALOX) 200-200-20 MG/5ML SUSP suspension Take 5 mLs by mouth every 6 hours as needed for IndigestionHistorical Med      carvedilol (COREG) 25 MG tablet Take 2 tablets by mouth 2 times daily, Disp-360 tablet, R-3Normal      lactulose (CHRONULAC) 10 GM/15ML solution Take twice daily as needed for constipation, Disp-2700 mL, R-1DX Code Needed  .Normal      famotidine (PEPCID) 40 MG tablet Take 1 tablet by mouth 2 times daily as neededHistorical Med      tacrolimus (PROGRAF) 1 MG capsule Take 1 capsule by mouth 5 CAPSULES EVERY MORNING AND 5 CAPSULES EVERY EVENINGHistorical Med      Mycophenolate Sodium 360 MG TBEC Take 2 tablets by mouth 1  tablets BIDHistorical Med      linaclotide (LINZESS) 290 MCG CAPS capsule Take 1 capsule by mouth dailyHistorical Med      aspirin 81 MG EC tablet Take 1 tablet by mouth dailyHistorical Med             ALLERGIES     Patient is is allergic to actos [pioglitazone hydrochloride], pioglitazone, cymbalta [duloxetine hcl], lyrica [pregabalin], and gabapentin.    FAMILY HISTORY     Patient'sfamily history includes Alcohol Abuse in her father; Arthritis in her mother; Brain Cancer in her sister; Breast Cancer (age of onset: 38) in her paternal cousin; Diabetes in her brother, brother, father, sister, sister, sister, sister, and sister; Early Death in her sister; Heart Disease in her father, mother, sister, and sister; High Blood Pressure in her mother; Kidney Disease in her mother; Mental

## 2024-07-29 DIAGNOSIS — R53.81 DEBILITY: ICD-10-CM

## 2024-07-29 DIAGNOSIS — G89.4 CHRONIC PAIN SYNDROME: ICD-10-CM

## 2024-07-29 DIAGNOSIS — M47.816 LUMBAR FACET ARTHROPATHY: ICD-10-CM

## 2024-07-29 DIAGNOSIS — M54.50 CHRONIC BILATERAL LOW BACK PAIN WITHOUT SCIATICA: ICD-10-CM

## 2024-07-29 DIAGNOSIS — G89.29 CHRONIC BILATERAL LOW BACK PAIN WITHOUT SCIATICA: ICD-10-CM

## 2024-07-29 DIAGNOSIS — M47.816 LUMBAR SPONDYLOSIS: ICD-10-CM

## 2024-07-29 RX ORDER — HYDROCODONE BITARTRATE AND ACETAMINOPHEN 5; 325 MG/1; MG/1
1 TABLET ORAL EVERY 8 HOURS PRN
Qty: 90 TABLET | Refills: 0 | Status: SHIPPED | OUTPATIENT
Start: 2024-07-30 | End: 2024-08-29

## 2024-07-29 NOTE — TELEPHONE ENCOUNTER
Avani Thomas called requesting a refill on the following medications:  Requested Prescriptions     Pending Prescriptions Disp Refills    HYDROcodone-acetaminophen (NORCO) 5-325 MG per tablet 90 tablet 0     Sig: Take 1 tablet by mouth every 8 hours as needed for Pain for up to 30 days.     Pharmacy verified:[facility], [facility] [place], ph. [phone]  .pv      Date of last visit: 06.18.2024  Date of next visit (if applicable): 08.22.2024

## 2024-08-09 ENCOUNTER — HOSPITAL ENCOUNTER (OUTPATIENT)
Dept: WOUND CARE | Age: 69
Discharge: HOME | End: 2024-08-09
Attending: NURSE PRACTITIONER
Payer: MEDICARE

## 2024-08-09 VITALS
BODY MASS INDEX: 29.99 KG/M2 | HEIGHT: 65 IN | TEMPERATURE: 97.9 F | SYSTOLIC BLOOD PRESSURE: 156 MMHG | RESPIRATION RATE: 20 BRPM | HEART RATE: 64 BPM | OXYGEN SATURATION: 94 % | WEIGHT: 180 LBS | DIASTOLIC BLOOD PRESSURE: 70 MMHG

## 2024-08-09 DIAGNOSIS — E11.42 DIABETIC PERIPHERAL NEUROPATHY ASSOCIATED WITH TYPE 2 DIABETES MELLITUS (HCC): ICD-10-CM

## 2024-08-09 DIAGNOSIS — Z87.2 HISTORY OF FOOT ULCER: ICD-10-CM

## 2024-08-09 DIAGNOSIS — Z87.2 HISTORY OF CELLULITIS: Primary | ICD-10-CM

## 2024-08-09 PROBLEM — L03.119 CELLULITIS IN DIABETIC FOOT (HCC): Status: RESOLVED | Noted: 2017-03-03 | Resolved: 2024-08-09

## 2024-08-09 PROBLEM — R80.1 PERSISTENT PROTEINURIA: Status: RESOLVED | Noted: 2018-04-12 | Resolved: 2024-08-09

## 2024-08-09 PROBLEM — E11.628 CELLULITIS IN DIABETIC FOOT (HCC): Status: RESOLVED | Noted: 2017-03-03 | Resolved: 2024-08-09

## 2024-08-09 PROBLEM — G62.81 POLYNEUROPATHY ASSOCIATED WITH CRITICAL ILLNESS (HCC): Status: RESOLVED | Noted: 2021-05-12 | Resolved: 2024-08-09

## 2024-08-09 PROBLEM — L89.103 PRESSURE INJURY OF BACK, STAGE 3 (HCC): Status: RESOLVED | Noted: 2021-05-21 | Resolved: 2024-08-09

## 2024-08-09 PROCEDURE — 99214 OFFICE O/P EST MOD 30 MIN: CPT | Performed by: NURSE PRACTITIONER

## 2024-08-09 PROCEDURE — 99213 OFFICE O/P EST LOW 20 MIN: CPT

## 2024-08-09 NOTE — PATIENT INSTRUCTIONS
Visit Discharge/Physician Orders:   - Recommend applying compression stockings on both legs daily in the morning and remove at night.   - Continue moisturizing cream to feet/legs daily, avoid between the toes.   - Check feet daily.   - Keep blood sugars well controlled.   - Elevate feet/legs periodically throughout the day to decrease swelling.     Wound Location: Right and Left feet/toes blisters - healed    Follow up visit: As needed    Keep next scheduled appointment. Please give 24 hour notice if unable to keep appointment. [phone]    If you experience any of the following, please call the Wound Care Service during business hours: Monday through Friday 8:00 am - 4:30 pm  ([phone]).   *Increase in pain   *Temperature over 101   *Increase in drainage from your wound or a foul odor   *Uncontrolled swelling   *Need for compression bandage changes due to slippage, breakthrough drainage    If you need medical attention outside of business hours, please contact your Primary Care Doctor or go to the nearest emergency room.

## 2024-08-09 NOTE — PROGRESS NOTES
MOUTH IN THE MORNING AND IN THE EVENING 180 tablet 3    blood glucose monitor strips Easy Max Test 4 times a day & as needed for symptoms of irregular blood glucose. Dispense sufficient amount for indicated testing frequency plus additional to accommodate PRN testing needs. 100 strip 11    D-Mannose 500 MG CAPS Take 1 each by mouth in the morning and at bedtime (Patient not taking: Reported on 5/16/2024) 180 capsule 3    fluticasone (FLONASE) 50 MCG/ACT nasal spray 1 spray by Each Nostril route daily 3 each 3    sulfamethoxazole-trimethoprim (BACTRIM DS;SEPTRA DS) 800-160 MG per tablet TAKE 1 TABLET BY MOUTH EVERY MONDAY, WEDNESDAY AND FRIDAY      aluminum & magnesium hydroxide-simethicone (MAALOX) 200-200-20 MG/5ML SUSP suspension Take 5 mLs by mouth every 6 hours as needed for Indigestion      carvedilol (COREG) 25 MG tablet Take 2 tablets by mouth 2 times daily 360 tablet 3    [DISCONTINUED] clopidogrel (PLAVIX) 75 MG tablet Take 1 tablet by mouth daily 90 tablet 3    lactulose (CHRONULAC) 10 GM/15ML solution Take twice daily as needed for constipation 2700 mL 1    famotidine (PEPCID) 40 MG tablet Take 1 tablet by mouth 2 times daily as needed      tacrolimus (PROGRAF) 1 MG capsule Take 1 capsule by mouth 5 CAPSULES EVERY MORNING AND 5 CAPSULES EVERY EVENING      Mycophenolate Sodium 360 MG TBEC Take 2 tablets by mouth 1  tablets BID      linaclotide (LINZESS) 290 MCG CAPS capsule Take 1 capsule by mouth daily      aspirin 81 MG EC tablet Take 1 tablet by mouth daily       No current facility-administered medications on file prior to encounter.       REVIEW OF SYSTEMS:     A comprehensive review of systems was negative except for: Pertinent items are noted in HPI.    PHYSICAL EXAM:     BP (!) 156/70   Pulse 64   Temp 97.9 °F (36.6 °C) (Infrared)   Resp 20   Ht 1.651 m (5' 5\")   Wt 81.6 kg (180 lb)   SpO2 94%   BMI 29.95 kg/m²   Wt Readings from Last 3 Encounters:   08/09/24 81.6 kg (180 lb)   07/25/24 80.7

## 2024-08-09 NOTE — PLAN OF CARE
Problem: Wound:  Goal: Will show signs of wound healing; wound closure and no evidence of infection  Description: Will show signs of wound healing; wound closure and no evidence of infection  Outcome: Adequate for Discharge   Patient presents to wound clinic for evaluation and treatment of toes/feet. No open wounds. Discharge instructions per AVS. Follow up as needed.    Care plan reviewed with patient.  Patient verbalize understanding of the plan of care and contribute to goal setting.

## 2024-08-14 RX ORDER — LACTULOSE 10 G/15ML
SOLUTION ORAL
Qty: 473 ML | Refills: 0 | Status: SHIPPED | OUTPATIENT
Start: 2024-08-14

## 2024-08-14 NOTE — TELEPHONE ENCOUNTER
This medication refill is regarding a electronic request. Refill requested by patient.    Requested Prescriptions     Pending Prescriptions Disp Refills    lactulose (CHRONULAC) 10 GM/15ML solution 473 mL 0     Sig: Take 30 mLs by mouth 2 times daily as needed (constipation, aif not having BM with other stool softners)       Date of last visit: 3/13/2024   Date of next visit: 9/17/2024    Rx verified, ordered and set to EP.

## 2024-08-22 ENCOUNTER — OFFICE VISIT (OUTPATIENT)
Dept: PHYSICAL MEDICINE AND REHAB | Age: 69
End: 2024-08-22

## 2024-08-22 VITALS
BODY MASS INDEX: 29.97 KG/M2 | SYSTOLIC BLOOD PRESSURE: 130 MMHG | HEIGHT: 65 IN | DIASTOLIC BLOOD PRESSURE: 64 MMHG | WEIGHT: 179.9 LBS

## 2024-08-22 DIAGNOSIS — M47.816 LUMBAR FACET ARTHROPATHY: ICD-10-CM

## 2024-08-22 DIAGNOSIS — G89.4 CHRONIC PAIN SYNDROME: ICD-10-CM

## 2024-08-22 DIAGNOSIS — E11.42 DIABETIC POLYNEUROPATHY ASSOCIATED WITH TYPE 2 DIABETES MELLITUS (HCC): ICD-10-CM

## 2024-08-22 DIAGNOSIS — M54.16 LUMBAR RADICULITIS: ICD-10-CM

## 2024-08-22 DIAGNOSIS — M47.816 LUMBAR SPONDYLOSIS: Primary | ICD-10-CM

## 2024-08-22 DIAGNOSIS — M54.50 CHRONIC BILATERAL LOW BACK PAIN WITHOUT SCIATICA: ICD-10-CM

## 2024-08-22 DIAGNOSIS — R53.81 DEBILITY: ICD-10-CM

## 2024-08-22 DIAGNOSIS — G89.29 CHRONIC BILATERAL LOW BACK PAIN WITHOUT SCIATICA: ICD-10-CM

## 2024-08-22 RX ORDER — HYDROCODONE BITARTRATE AND ACETAMINOPHEN 5; 325 MG/1; MG/1
1 TABLET ORAL EVERY 8 HOURS PRN
Qty: 90 TABLET | Refills: 0 | Status: SHIPPED | OUTPATIENT
Start: 2024-08-29 | End: 2024-09-28

## 2024-08-22 ASSESSMENT — ENCOUNTER SYMPTOMS
GASTROINTESTINAL NEGATIVE: 1
BACK PAIN: 1
WHEEZING: 0
CHEST TIGHTNESS: 0
COUGH: 0
EYE REDNESS: 0
EYE DISCHARGE: 0
SHORTNESS OF BREATH: 0

## 2024-08-22 NOTE — PROGRESS NOTES
Skin:     General: Skin is warm.      Coloration: Skin is not pale.      Findings: No erythema or rash.          Neurological:      General: No focal deficit present.      Mental Status: She is alert and oriented to person, place, and time. She is not disoriented.      Cranial Nerves: No cranial nerve deficit.      Sensory: Sensory deficit present.      Motor: Weakness present. No atrophy or abnormal muscle tone.      Coordination: Coordination normal.      Gait: Gait abnormal.      Deep Tendon Reflexes: Reflexes are normal and symmetric. Babinski sign absent on the right side. Babinski sign absent on the left side.      Comments:  bilateral lower extremities   Psychiatric:         Attention and Perception: She is attentive.         Mood and Affect: Mood normal. Mood is not anxious or depressed. Affect is not labile, blunt, angry or inappropriate.         Speech: She is communicative. Speech is not rapid and pressured, delayed, slurred or tangential.         Behavior: Behavior normal. Behavior is not agitated, slowed, aggressive, withdrawn, hyperactive or combative.         Thought Content: Thought content is not paranoid or delusional. Thought content does not include homicidal or suicidal ideation. Thought content does not include homicidal or suicidal plan.         Cognition and Memory: Memory is not impaired. She does not exhibit impaired recent memory or impaired remote memory.         Judgment: Judgment is not impulsive or inappropriate.       RADHA  Patricks test  negative  Yeoman's  or Gaenslen's negative       Assessment:     1. Lumbar spondylosis    2. Lumbar facet arthropathy    3. Lumbar radiculitis    4. Diabetic polyneuropathy associated with type 2 diabetes mellitus (HCC)    5. Chronic bilateral low back pain without sciatica    6. Chronic pain syndrome    7. Debility         Assessment & Plan   Plan:      OARRS reviewed. Current MED: 15.00  Patient was offered naloxone for home.   Discussed long

## 2024-09-09 ENCOUNTER — LAB (OUTPATIENT)
Dept: LAB | Age: 69
End: 2024-09-09

## 2024-09-09 LAB
ANION GAP SERPL CALC-SCNC: 10 MEQ/L (ref 8–16)
BUN SERPL-MCNC: 21 MG/DL (ref 7–22)
CHLORIDE SERPL-SCNC: 103 MEQ/L (ref 98–111)
CO2 SERPL-SCNC: 27 MEQ/L (ref 23–33)
CREAT SERPL-MCNC: 1.1 MG/DL (ref 0.4–1.2)
GFR SERPL CREATININE-BSD FRML MDRD: 54 ML/MIN/1.73M2
GLUCOSE SERPL-MCNC: 214 MG/DL (ref 70–108)
HCT VFR BLD AUTO: 34.1 % (ref 37–47)
HGB BLD-MCNC: 10.6 GM/DL (ref 12–16)
POTASSIUM SERPL-SCNC: 3.5 MEQ/L (ref 3.5–5.2)
SODIUM SERPL-SCNC: 140 MEQ/L (ref 135–145)
WBC # BLD AUTO: 7.1 THOU/MM3 (ref 4.8–10.8)

## 2024-09-10 ENCOUNTER — OFFICE VISIT (OUTPATIENT)
Dept: CARDIOLOGY CLINIC | Age: 69
End: 2024-09-10

## 2024-09-10 VITALS
WEIGHT: 182 LBS | HEART RATE: 64 BPM | HEIGHT: 65 IN | BODY MASS INDEX: 30.32 KG/M2 | DIASTOLIC BLOOD PRESSURE: 78 MMHG | SYSTOLIC BLOOD PRESSURE: 160 MMHG

## 2024-09-10 DIAGNOSIS — Z98.62 STATUS POST PERIPHERAL ARTERY ANGIOPLASTY: ICD-10-CM

## 2024-09-10 DIAGNOSIS — I73.9 PAD (PERIPHERAL ARTERY DISEASE) (HCC): ICD-10-CM

## 2024-09-10 DIAGNOSIS — E78.00 PURE HYPERCHOLESTEROLEMIA: Primary | ICD-10-CM

## 2024-09-11 LAB — TACROLIMUS BLD-MCNC: 5.9 NG/ML

## 2024-09-11 RX ORDER — RIVAROXABAN 2.5 MG/1
TABLET, FILM COATED ORAL
Qty: 180 TABLET | Refills: 3 | Status: SHIPPED | OUTPATIENT
Start: 2024-09-11

## 2024-09-26 DIAGNOSIS — R53.81 DEBILITY: ICD-10-CM

## 2024-09-26 DIAGNOSIS — M47.816 LUMBAR FACET ARTHROPATHY: ICD-10-CM

## 2024-09-26 DIAGNOSIS — M47.816 LUMBAR SPONDYLOSIS: ICD-10-CM

## 2024-09-26 DIAGNOSIS — E11.9 TYPE 2 DIABETES MELLITUS WITHOUT COMPLICATION, WITH LONG-TERM CURRENT USE OF INSULIN (HCC): ICD-10-CM

## 2024-09-26 DIAGNOSIS — G89.29 CHRONIC BILATERAL LOW BACK PAIN WITHOUT SCIATICA: ICD-10-CM

## 2024-09-26 DIAGNOSIS — Z79.4 TYPE 2 DIABETES MELLITUS WITHOUT COMPLICATION, WITH LONG-TERM CURRENT USE OF INSULIN (HCC): ICD-10-CM

## 2024-09-26 DIAGNOSIS — G89.4 CHRONIC PAIN SYNDROME: ICD-10-CM

## 2024-09-26 DIAGNOSIS — M54.50 CHRONIC BILATERAL LOW BACK PAIN WITHOUT SCIATICA: ICD-10-CM

## 2024-09-26 RX ORDER — INSULIN ASPART 100 [IU]/ML
INJECTION, SOLUTION INTRAVENOUS; SUBCUTANEOUS
Qty: 30 ML | Refills: 7 | Status: SHIPPED | OUTPATIENT
Start: 2024-09-26

## 2024-09-26 RX ORDER — HYDROCODONE BITARTRATE AND ACETAMINOPHEN 5; 325 MG/1; MG/1
1 TABLET ORAL EVERY 8 HOURS PRN
Qty: 90 TABLET | Refills: 0 | Status: SHIPPED | OUTPATIENT
Start: 2024-09-28 | End: 2024-10-28

## 2024-09-27 RX ORDER — CALCITRIOL 0.25 UG/1
0.25 CAPSULE, LIQUID FILLED ORAL DAILY
Qty: 90 CAPSULE | Refills: 3 | Status: SHIPPED | OUTPATIENT
Start: 2024-09-27

## 2024-09-27 RX ORDER — CALCITRIOL 0.25 UG/1
0.25 CAPSULE, LIQUID FILLED ORAL DAILY
Qty: 90 CAPSULE | Refills: 3 | OUTPATIENT
Start: 2024-09-27

## 2024-10-02 ENCOUNTER — TELEPHONE (OUTPATIENT)
Dept: FAMILY MEDICINE CLINIC | Age: 69
End: 2024-10-02

## 2024-10-02 SDOH — HEALTH STABILITY: PHYSICAL HEALTH: ON AVERAGE, HOW MANY DAYS PER WEEK DO YOU ENGAGE IN MODERATE TO STRENUOUS EXERCISE (LIKE A BRISK WALK)?: 0 DAYS

## 2024-10-02 SDOH — HEALTH STABILITY: PHYSICAL HEALTH: ON AVERAGE, HOW MANY MINUTES DO YOU ENGAGE IN EXERCISE AT THIS LEVEL?: 0 MIN

## 2024-10-02 ASSESSMENT — LIFESTYLE VARIABLES
HOW OFTEN DO YOU HAVE A DRINK CONTAINING ALCOHOL: NEVER
HOW OFTEN DO YOU HAVE SIX OR MORE DRINKS ON ONE OCCASION: 1
HOW MANY STANDARD DRINKS CONTAINING ALCOHOL DO YOU HAVE ON A TYPICAL DAY: 0
HOW OFTEN DO YOU HAVE A DRINK CONTAINING ALCOHOL: 1
HOW MANY STANDARD DRINKS CONTAINING ALCOHOL DO YOU HAVE ON A TYPICAL DAY: PATIENT DOES NOT DRINK

## 2024-10-02 ASSESSMENT — PATIENT HEALTH QUESTIONNAIRE - PHQ9
SUM OF ALL RESPONSES TO PHQ9 QUESTIONS 1 & 2: 0
SUM OF ALL RESPONSES TO PHQ QUESTIONS 1-9: 0
SUM OF ALL RESPONSES TO PHQ QUESTIONS 1-9: 0
1. LITTLE INTEREST OR PLEASURE IN DOING THINGS: NOT AT ALL
SUM OF ALL RESPONSES TO PHQ QUESTIONS 1-9: 0
SUM OF ALL RESPONSES TO PHQ QUESTIONS 1-9: 0
2. FEELING DOWN, DEPRESSED OR HOPELESS: NOT AT ALL

## 2024-10-07 ENCOUNTER — OFFICE VISIT (OUTPATIENT)
Dept: FAMILY MEDICINE CLINIC | Age: 69
End: 2024-10-07
Payer: MEDICARE

## 2024-10-07 VITALS
SYSTOLIC BLOOD PRESSURE: 128 MMHG | DIASTOLIC BLOOD PRESSURE: 60 MMHG | RESPIRATION RATE: 16 BRPM | BODY MASS INDEX: 30.72 KG/M2 | HEART RATE: 72 BPM | HEIGHT: 65 IN | WEIGHT: 184.4 LBS

## 2024-10-07 DIAGNOSIS — Z99.2 ESRD (END STAGE RENAL DISEASE) ON DIALYSIS (HCC): ICD-10-CM

## 2024-10-07 DIAGNOSIS — N18.6 ESRD (END STAGE RENAL DISEASE) ON DIALYSIS (HCC): ICD-10-CM

## 2024-10-07 DIAGNOSIS — Z23 NEED FOR INFLUENZA VACCINATION: ICD-10-CM

## 2024-10-07 DIAGNOSIS — Z00.00 MEDICARE ANNUAL WELLNESS VISIT, SUBSEQUENT: Primary | ICD-10-CM

## 2024-10-07 PROCEDURE — G0439 PPPS, SUBSEQ VISIT: HCPCS | Performed by: NURSE PRACTITIONER

## 2024-10-07 PROCEDURE — 1124F ACP DISCUSS-NO DSCNMKR DOCD: CPT | Performed by: NURSE PRACTITIONER

## 2024-10-07 PROCEDURE — 3017F COLORECTAL CA SCREEN DOC REV: CPT | Performed by: NURSE PRACTITIONER

## 2024-10-07 PROCEDURE — 3078F DIAST BP <80 MM HG: CPT | Performed by: NURSE PRACTITIONER

## 2024-10-07 PROCEDURE — G0008 ADMIN INFLUENZA VIRUS VAC: HCPCS | Performed by: NURSE PRACTITIONER

## 2024-10-07 PROCEDURE — 3074F SYST BP LT 130 MM HG: CPT | Performed by: NURSE PRACTITIONER

## 2024-10-07 PROCEDURE — 90653 IIV ADJUVANT VACCINE IM: CPT | Performed by: NURSE PRACTITIONER

## 2024-10-07 PROCEDURE — G8482 FLU IMMUNIZE ORDER/ADMIN: HCPCS | Performed by: NURSE PRACTITIONER

## 2024-10-07 SDOH — ECONOMIC STABILITY: FOOD INSECURITY: WITHIN THE PAST 12 MONTHS, THE FOOD YOU BOUGHT JUST DIDN'T LAST AND YOU DIDN'T HAVE MONEY TO GET MORE.: PATIENT DECLINED

## 2024-10-07 SDOH — ECONOMIC STABILITY: FOOD INSECURITY: WITHIN THE PAST 12 MONTHS, YOU WORRIED THAT YOUR FOOD WOULD RUN OUT BEFORE YOU GOT MONEY TO BUY MORE.: PATIENT DECLINED

## 2024-10-07 SDOH — ECONOMIC STABILITY: INCOME INSECURITY: HOW HARD IS IT FOR YOU TO PAY FOR THE VERY BASICS LIKE FOOD, HOUSING, MEDICAL CARE, AND HEATING?: PATIENT DECLINED

## 2024-10-07 NOTE — PROGRESS NOTES
Vaccine Information Sheet, \"Influenza - Inactivated\"  given to Avani Thomas, and verbalized understanding.    Patient responses:    Have you ever had a reaction to a flu vaccine? No  Do you have an allergy to eggs, Latex -induced anaphylaxis, neomycin or polymixin?  No  Do you have an allergy to Thimerosal, contact lens solution, or Merthiolate? No  Have you ever had Guillian [place] Syndrome?  No  Do you have any current illness?  No  Do you have a temperature above 100.4 degrees? No  Are you pregnant? No  If pregnant, permission obtained from physician? N/A  Do you have an active neurological disorder? No    Immunization(s) given during visit:    Immunizations Administered       Name Date Dose Route    Influenza, FLUAD, (age 65 y+), IM, Trivalent PF, 0.5mL 10/7/2024 0.5 mL Intramuscular    Site: Deltoid- Right    Lot: [id number]    NDC: 49565-072-17          After obtaining consent, and per orders of Hillary Stephenson CNP, the injection above was given by Rusty Green LPN. Medication verified by Jorge Cade CMA.    Patient tolerated well.

## 2024-10-07 NOTE — PATIENT INSTRUCTIONS
for you. Examples include spinach, carrots, peaches, and berries.     Foods high in fiber can reduce your cholesterol and provide important vitamins and minerals. High-fiber foods include whole-grain cereals and breads, oatmeal, beans, brown rice, citrus fruits, and apples.     Eat lean proteins. Heart-healthy proteins include seafood, lean meats and poultry, eggs, beans, peas, nuts, seeds, and soy products.     Limit drinks and foods with added sugar. These include candy, desserts, and soda pop.   Heart-healthy lifestyle    If your doctor recommends it, get more exercise. For many people, walking is a good choice. Or you may want to swim, bike, or do other activities. Bit by bit, increase the time you're active every day. Try for at least 30 minutes on most days of the week.     Try to quit or cut back on using tobacco and other nicotine products. This includes smoking and vaping. If you need help quitting, talk to your doctor about stop-smoking programs and medicines. These can increase your chances of quitting for good. Quitting is one of the most important things you can do to protect your heart. It is never too late to quit. Try to avoid secondhand smoke too.     Stay at a weight that's healthy for you. Talk to your doctor if you need help losing weight.     Try to get 7 to 9 hours of sleep each night.     Limit alcohol to 2 drinks a day for men and 1 drink a day for women. Too much alcohol can cause health problems.     Manage other health problems such as diabetes, high blood pressure, and high cholesterol. If you think you may have a problem with alcohol or drug use, talk to your doctor.   Medicines    Take your medicines exactly as prescribed. Call your doctor if you think you are having a problem with your medicine.     If your doctor recommends aspirin, take the amount directed each day. Make sure you take aspirin and not another kind of pain reliever, such as acetaminophen (Tylenol).   When should you

## 2024-10-07 NOTE — PROGRESS NOTES
Medicare Annual Wellness Visit    Avani Thomas is here for Medicare AWV (Pt would like to discuss her diabetic neuropathy.)    Assessment & Plan   Medicare annual wellness visit, subsequent  ESRD (end stage renal disease) on dialysis (HCC)  Need for influenza vaccination  -     Influenza, FLUAD Trivalent, (age 65 y+), IM, Preservative Free, 0.5mL    Recommendations for Preventive Services Due: see orders and patient instructions/AVS.  Recommended screening schedule for the next 5-10 years is provided to the patient in written form: see Patient Instructions/AVS.     Return in 6 months (on 4/7/2025).     Subjective       Wellness visit:    Here today for an annual wellness exam. DIET: eats 2 meals per day and 3 snacks per day.  She does not exercise regularly.She does take over the counter vitamins or supplements.  The patient has ever had a blood transfusion Yes. Any tattoos No ?    She is independent, she cooks, drives, bathes, and gets dressed without assistance. She a . She has 1 children.  She does not work - retired    She is not a smoker - quit 2009.  Patient does not consume alcoholic beverages on a regular basis.      Mammogram:  IMPRESSION:  No mammographic evidence of malignancy. A 1 year screening mammogram is recommended.     A result letter will be sent to the patient.  She will also receive a reminder 1 month prior to her next mammogram.     BI-RADS CATEGORY 1: NEGATIVE.     Management Recommendation: Routine annual screening mammography.    She has had a colonoscopy - done 2019 - Dr. Vaca .     DEXA 2019:  FINDINGS:   Bone density evaluation was performed 01/16/2019 on the right   femur neck using a Hologic unit. The BMD average for the exam is   0.710  g/cm2. The T-score is -1.70 and the Z-score is -0.40.    This matches the World Health Organization's criteria for   osteopenia and places the patient at a medium risk for fracture.     Declines doing again    She  reports that she quit

## 2024-10-09 ENCOUNTER — LAB (OUTPATIENT)
Dept: LAB | Age: 69
End: 2024-10-09

## 2024-10-09 DIAGNOSIS — E78.00 PURE HYPERCHOLESTEROLEMIA: ICD-10-CM

## 2024-10-09 LAB
ALBUMIN SERPL BCG-MCNC: 4.5 G/DL (ref 3.5–5.1)
ALP SERPL-CCNC: 84 U/L (ref 38–126)
ALT SERPL W/O P-5'-P-CCNC: 19 U/L (ref 11–66)
AST SERPL-CCNC: 14 U/L (ref 5–40)
BILIRUB CONJ SERPL-MCNC: 0.2 MG/DL (ref 0.1–13.8)
BILIRUB SERPL-MCNC: 0.4 MG/DL (ref 0.3–1.2)
CHOLEST SERPL-MCNC: 122 MG/DL (ref 100–199)
HDLC SERPL-MCNC: 44 MG/DL
LDLC SERPL CALC-MCNC: 64 MG/DL
PROT SERPL-MCNC: 7.3 G/DL (ref 6.1–8)
TRIGL SERPL-MCNC: 72 MG/DL (ref 0–199)

## 2024-10-10 ENCOUNTER — TELEPHONE (OUTPATIENT)
Dept: INTERNAL MEDICINE CLINIC | Age: 69
End: 2024-10-10

## 2024-10-10 NOTE — TELEPHONE ENCOUNTER
MARANDA BEAL (Key: [id number])  PA Case ID #: [id number]  Need Help? Call us at [phone]  Outcome  Additional Information Required  This medication or product was previously approved on [id number] from 2024-09-26 to 2025-12-31. **Please note: This request was submitted electronically. Formulary lowering, tiering exception, cost reduction and/or pre-benefit determination review (including prospective Medicare hospice reviews) requests cannot be requested using this method of submission. Providers contact us at [phone] for further assistance.  Drug  NovoLOG FlexPen 100UNIT/ML pen-injectors    Form  [employer] Medicare Part D Electronic Prior Authorization Form (2017 NCPDP)

## 2024-10-15 ENCOUNTER — OFFICE VISIT (OUTPATIENT)
Dept: INTERNAL MEDICINE CLINIC | Age: 69
End: 2024-10-15
Payer: MEDICARE

## 2024-10-15 VITALS
RESPIRATION RATE: 16 BRPM | SYSTOLIC BLOOD PRESSURE: 140 MMHG | HEART RATE: 72 BPM | WEIGHT: 186 LBS | BODY MASS INDEX: 30.95 KG/M2 | DIASTOLIC BLOOD PRESSURE: 74 MMHG

## 2024-10-15 DIAGNOSIS — E11.9 TYPE 2 DIABETES MELLITUS WITHOUT COMPLICATION, WITH LONG-TERM CURRENT USE OF INSULIN (HCC): Primary | ICD-10-CM

## 2024-10-15 DIAGNOSIS — Z79.4 TYPE 2 DIABETES MELLITUS WITHOUT COMPLICATION, WITH LONG-TERM CURRENT USE OF INSULIN (HCC): Primary | ICD-10-CM

## 2024-10-15 LAB
HBA1C MFR BLD: 7.4 %
HBA1C MFR BLD: 7.4 % (ref 4.3–5.7)

## 2024-10-15 PROCEDURE — 83036 HEMOGLOBIN GLYCOSYLATED A1C: CPT | Performed by: NURSE PRACTITIONER

## 2024-10-15 PROCEDURE — 99214 OFFICE O/P EST MOD 30 MIN: CPT | Performed by: NURSE PRACTITIONER

## 2024-10-15 PROCEDURE — 3017F COLORECTAL CA SCREEN DOC REV: CPT | Performed by: NURSE PRACTITIONER

## 2024-10-15 PROCEDURE — 3077F SYST BP >= 140 MM HG: CPT | Performed by: NURSE PRACTITIONER

## 2024-10-15 PROCEDURE — 2022F DILAT RTA XM EVC RTNOPTHY: CPT | Performed by: NURSE PRACTITIONER

## 2024-10-15 PROCEDURE — G8417 CALC BMI ABV UP PARAM F/U: HCPCS | Performed by: NURSE PRACTITIONER

## 2024-10-15 PROCEDURE — 1124F ACP DISCUSS-NO DSCNMKR DOCD: CPT | Performed by: NURSE PRACTITIONER

## 2024-10-15 PROCEDURE — G8399 PT W/DXA RESULTS DOCUMENT: HCPCS | Performed by: NURSE PRACTITIONER

## 2024-10-15 PROCEDURE — G8482 FLU IMMUNIZE ORDER/ADMIN: HCPCS | Performed by: NURSE PRACTITIONER

## 2024-10-15 PROCEDURE — 1090F PRES/ABSN URINE INCON ASSESS: CPT | Performed by: NURSE PRACTITIONER

## 2024-10-15 PROCEDURE — G8428 CUR MEDS NOT DOCUMENT: HCPCS | Performed by: NURSE PRACTITIONER

## 2024-10-15 PROCEDURE — 3051F HG A1C>EQUAL 7.0%<8.0%: CPT | Performed by: NURSE PRACTITIONER

## 2024-10-15 PROCEDURE — 3078F DIAST BP <80 MM HG: CPT | Performed by: NURSE PRACTITIONER

## 2024-10-15 PROCEDURE — 1036F TOBACCO NON-USER: CPT | Performed by: NURSE PRACTITIONER

## 2024-10-15 NOTE — PROGRESS NOTES
Diabetic retinal exam  02/15/2024    COVID-19 Vaccine (7 - 2023-24 season) 09/01/2024    Diabetic foot exam  02/27/2025 (Originally 12/21/2023)    Respiratory Syncytial Virus (RSV) Pregnant or age 60 yrs+ (1 - 1-dose 60+ series) 02/27/2025 (Originally 12/13/2013)    Depression Screen  10/02/2025    Annual Wellness Visit (Medicare)  10/08/2025    Lipids  10/09/2025    A1C test (Diabetic or Prediabetic)  10/15/2025    Breast cancer screen  02/12/2026    Colorectal Cancer Screen  06/10/2029    DTaP/Tdap/Td vaccine (3 - Td or Tdap) 11/28/2030    DEXA (modify frequency per FRAX score)  Completed    Flu vaccine  Completed    Shingles vaccine  Completed    Pneumococcal 65+ years Vaccine  Completed    Hepatitis C screen  Completed    Hepatitis A vaccine  Aged Out    Hib vaccine  Aged Out    Polio vaccine  Aged Out    Meningococcal (ACWY) vaccine  Aged Out    Lung Cancer Screening &/or Counseling  Discontinued       Subjective:      Review of Systems   Constitutional:  Positive for fatigue (chronic). Negative for appetite change (poor appetite, however that is not new), diaphoresis, fever and unexpected weight change.   HENT: Negative.     Eyes: Negative.  Visual disturbance: legally blind.   Respiratory: Negative.  Negative for cough, chest tightness, shortness of breath and wheezing.    Cardiovascular:  Negative for chest pain, palpitations and leg swelling.   Gastrointestinal:  Positive for constipation. Negative for abdominal pain, blood in stool, nausea and vomiting.   Endocrine: Negative.    Genitourinary: Negative.    Musculoskeletal:  Positive for arthralgias, back pain and myalgias.   Skin: Negative.    Neurological: Negative.    Hematological: Negative.    Psychiatric/Behavioral: Negative.         Objective:     BP (!) 140/74 (Site: Right Upper Arm, Position: Sitting, Cuff Size: Medium Adult)   Pulse 72   Resp 16   Wt 84.4 kg (186 lb)   BMI 30.95 kg/m²     Physical Exam  Constitutional:       General: She is

## 2024-10-16 RX ORDER — NIFEDIPINE 90 MG/1
TABLET, EXTENDED RELEASE ORAL
Qty: 180 TABLET | Refills: 3 | Status: SHIPPED | OUTPATIENT
Start: 2024-10-16

## 2024-10-22 ENCOUNTER — OFFICE VISIT (OUTPATIENT)
Dept: PHYSICAL MEDICINE AND REHAB | Age: 69
End: 2024-10-22
Payer: MEDICARE

## 2024-10-22 VITALS
BODY MASS INDEX: 31 KG/M2 | HEIGHT: 65 IN | WEIGHT: 186.07 LBS | SYSTOLIC BLOOD PRESSURE: 136 MMHG | DIASTOLIC BLOOD PRESSURE: 68 MMHG

## 2024-10-22 DIAGNOSIS — G89.4 CHRONIC PAIN SYNDROME: ICD-10-CM

## 2024-10-22 DIAGNOSIS — M47.816 LUMBAR FACET ARTHROPATHY: ICD-10-CM

## 2024-10-22 DIAGNOSIS — M54.50 CHRONIC BILATERAL LOW BACK PAIN WITHOUT SCIATICA: ICD-10-CM

## 2024-10-22 DIAGNOSIS — M47.816 LUMBAR SPONDYLOSIS: Primary | ICD-10-CM

## 2024-10-22 DIAGNOSIS — G89.29 CHRONIC BILATERAL LOW BACK PAIN WITHOUT SCIATICA: ICD-10-CM

## 2024-10-22 DIAGNOSIS — E11.42 DIABETIC POLYNEUROPATHY ASSOCIATED WITH TYPE 2 DIABETES MELLITUS (HCC): ICD-10-CM

## 2024-10-22 DIAGNOSIS — M54.16 LUMBAR RADICULITIS: ICD-10-CM

## 2024-10-22 DIAGNOSIS — R53.81 DEBILITY: ICD-10-CM

## 2024-10-22 PROCEDURE — G8482 FLU IMMUNIZE ORDER/ADMIN: HCPCS | Performed by: NURSE PRACTITIONER

## 2024-10-22 PROCEDURE — 1090F PRES/ABSN URINE INCON ASSESS: CPT | Performed by: NURSE PRACTITIONER

## 2024-10-22 PROCEDURE — 3017F COLORECTAL CA SCREEN DOC REV: CPT | Performed by: NURSE PRACTITIONER

## 2024-10-22 PROCEDURE — 1124F ACP DISCUSS-NO DSCNMKR DOCD: CPT | Performed by: NURSE PRACTITIONER

## 2024-10-22 PROCEDURE — 3078F DIAST BP <80 MM HG: CPT | Performed by: NURSE PRACTITIONER

## 2024-10-22 PROCEDURE — G8399 PT W/DXA RESULTS DOCUMENT: HCPCS | Performed by: NURSE PRACTITIONER

## 2024-10-22 PROCEDURE — G8427 DOCREV CUR MEDS BY ELIG CLIN: HCPCS | Performed by: NURSE PRACTITIONER

## 2024-10-22 PROCEDURE — 3075F SYST BP GE 130 - 139MM HG: CPT | Performed by: NURSE PRACTITIONER

## 2024-10-22 PROCEDURE — 99214 OFFICE O/P EST MOD 30 MIN: CPT | Performed by: NURSE PRACTITIONER

## 2024-10-22 PROCEDURE — 1036F TOBACCO NON-USER: CPT | Performed by: NURSE PRACTITIONER

## 2024-10-22 PROCEDURE — 3051F HG A1C>EQUAL 7.0%<8.0%: CPT | Performed by: NURSE PRACTITIONER

## 2024-10-22 PROCEDURE — 2022F DILAT RTA XM EVC RTNOPTHY: CPT | Performed by: NURSE PRACTITIONER

## 2024-10-22 PROCEDURE — G8417 CALC BMI ABV UP PARAM F/U: HCPCS | Performed by: NURSE PRACTITIONER

## 2024-10-22 RX ORDER — HYDROCODONE BITARTRATE AND ACETAMINOPHEN 5; 325 MG/1; MG/1
1 TABLET ORAL EVERY 8 HOURS PRN
Qty: 90 TABLET | Refills: 0 | Status: SHIPPED | OUTPATIENT
Start: 2024-10-29 | End: 2024-11-28

## 2024-10-22 ASSESSMENT — ENCOUNTER SYMPTOMS
ABDOMINAL PAIN: 0
EYES NEGATIVE: 1
RESPIRATORY NEGATIVE: 1
NAUSEA: 0
COUGH: 0
BLOOD IN STOOL: 0
SHORTNESS OF BREATH: 0
CONSTIPATION: 1
GASTROINTESTINAL NEGATIVE: 1
VOMITING: 0
WHEEZING: 0
WHEEZING: 0
COUGH: 0
CHEST TIGHTNESS: 0
BACK PAIN: 1
CHEST TIGHTNESS: 0
BACK PAIN: 1
EYE DISCHARGE: 0
SHORTNESS OF BREATH: 0
EYE REDNESS: 0

## 2024-10-22 NOTE — PROGRESS NOTES
[facility] [place] [facility]  [facility]  [address]  [facility] [address]  Dept: [phone]  Dept Fax: [phone]  Loc: [phone]    Visit Date: 10/22/2024    Functionality Assessment/Goals Worksheet     On a scale of 0 (Does not Interfere) to 10 (Completely Interferes)     1.  Which number describes how during the past week pain has interfered with       the following:  A.  General Activity:  8  B.  Mood: 8  C.  Walking Ability:  9  D.  Normal Work (Includes both work outside the home and housework):  9  E.  Relations with Other People:   8  F.  Sleep:   7  G.  Enjoyment of Life:   8    2.  Patient Prefers to Take their Pain Medications:     [x]  On a regular basis   [x]  Only when necessary    []  Does not take pain medications    3.  What are the Patient's Goals/Expectations for Visiting Pain Management?     [x]  Learn about my pain    [x]  Receive Medication   [x]  Physical Therapy     []  Treat Depression   [x]  Receive Injections    []  Treat Sleep   []  Deal with Anxiety and Stress   []  Treat Opoid Dependence/Addiction   []  Other:        HPI:   Avani Thomas is a 68 y.o. female is here today for    Chief Complaint: Low back and leg pain     HPI   2 month follow up. Avani continues to have pain in her low back states in center and to the right aching pain and pain radiates down bilateral legs heaviness, numbness and tingling.   Has ups and downs depending on activity. States no changes in pain since last visit   P    ain increases with bending, lifting, twisting , walking, standing, getting up and down, and housework or working at job.    Elevatinglegs helps relieve leg pain     Norco prn remains effective in decreasing pain to a tolerable level. She is still not interested in procedures at this time.     She continues home exercises and uses a  exercise device which is for diabetic neuropathy   Radiology:  Lumbar xray:

## 2024-10-28 RX ORDER — NIFEDIPINE 90 MG/1
90 TABLET, EXTENDED RELEASE ORAL DAILY
Qty: 180 TABLET | Refills: 3 | Status: SHIPPED | OUTPATIENT
Start: 2024-10-28

## 2024-10-28 RX ORDER — CARVEDILOL 25 MG/1
50 TABLET ORAL 2 TIMES DAILY
Qty: 360 TABLET | Refills: 3 | Status: SHIPPED | OUTPATIENT
Start: 2024-10-28

## 2024-10-28 NOTE — TELEPHONE ENCOUNTER
Avani Thomas called requesting a refill on the following medications:  Requested Prescriptions     Pending Prescriptions Disp Refills    NIFEdipine (PROCARDIA XL) 90 MG extended release tablet 180 tablet 3    carvedilol (COREG) 25 MG tablet 360 tablet 3     Sig: Take 2 tablets by mouth 2 times daily     Pharmacy verified: [facility]  sathish      Date of last visit: 9/10/2024  Date of next visit (if applicable):9/22/2025

## 2024-11-06 ENCOUNTER — LAB (OUTPATIENT)
Dept: LAB | Age: 69
End: 2024-11-06

## 2024-11-06 DIAGNOSIS — N25.81 HYPERPARATHYROIDISM, SECONDARY RENAL (HCC): ICD-10-CM

## 2024-11-06 DIAGNOSIS — T86.11 CHRONIC RENAL ALLOGRAFT NEPHROPATHY: ICD-10-CM

## 2024-11-06 DIAGNOSIS — E55.9 VITAMIN D DEFICIENCY: ICD-10-CM

## 2024-11-06 DIAGNOSIS — N18.32 STAGE 3B CHRONIC KIDNEY DISEASE (HCC): ICD-10-CM

## 2024-11-06 DIAGNOSIS — E11.21 DIABETIC NEPHROPATHY ASSOCIATED WITH TYPE 2 DIABETES MELLITUS (HCC): ICD-10-CM

## 2024-11-06 DIAGNOSIS — Z94.0 KIDNEY TRANSPLANT RECIPIENT: ICD-10-CM

## 2024-11-06 LAB
25(OH)D3 SERPL-MCNC: 33 NG/ML (ref 30–100)
ANION GAP SERPL CALC-SCNC: 10 MEQ/L (ref 8–16)
BUN SERPL-MCNC: 31 MG/DL (ref 7–22)
CALCIUM SERPL-MCNC: 10.7 MG/DL (ref 8.5–10.5)
CHLORIDE SERPL-SCNC: 104 MEQ/L (ref 98–111)
CO2 SERPL-SCNC: 29 MEQ/L (ref 23–33)
CREAT SERPL-MCNC: 1.2 MG/DL (ref 0.4–1.2)
GFR SERPL CREATININE-BSD FRML MDRD: 48 ML/MIN/1.73M2
GLUCOSE SERPL-MCNC: 107 MG/DL (ref 70–108)
POTASSIUM SERPL-SCNC: 3.6 MEQ/L (ref 3.5–5.2)
PTH-INTACT SERPL-MCNC: 99.6 PG/ML (ref 15–65)
SODIUM SERPL-SCNC: 143 MEQ/L (ref 135–145)

## 2024-11-07 LAB
CREAT UR-MCNC: 45.2 MG/DL
PROT UR-MCNC: 11.8 MG/DL
PROT/CREAT 24H UR: 0.26 MG/G{CREAT}

## 2024-11-14 ENCOUNTER — OFFICE VISIT (OUTPATIENT)
Dept: NEPHROLOGY | Age: 69
End: 2024-11-14

## 2024-11-14 VITALS
HEIGHT: 65 IN | OXYGEN SATURATION: 95 % | HEART RATE: 61 BPM | BODY MASS INDEX: 30.66 KG/M2 | WEIGHT: 184 LBS | SYSTOLIC BLOOD PRESSURE: 128 MMHG | DIASTOLIC BLOOD PRESSURE: 58 MMHG

## 2024-11-14 DIAGNOSIS — I10 PRIMARY HYPERTENSION: ICD-10-CM

## 2024-11-14 DIAGNOSIS — E83.52 HYPERCALCEMIA: ICD-10-CM

## 2024-11-14 DIAGNOSIS — R80.9 PROTEINURIA, UNSPECIFIED TYPE: ICD-10-CM

## 2024-11-14 DIAGNOSIS — N25.81 HYPERPARATHYROIDISM, SECONDARY RENAL (HCC): ICD-10-CM

## 2024-11-14 DIAGNOSIS — E11.21 DIABETIC NEPHROPATHY ASSOCIATED WITH TYPE 2 DIABETES MELLITUS (HCC): ICD-10-CM

## 2024-11-14 DIAGNOSIS — Z94.0 KIDNEY TRANSPLANT RECIPIENT: Primary | ICD-10-CM

## 2024-11-14 NOTE — PROGRESS NOTES
Renal Progress Note    Assessment and Plan:      Diagnosis Orders   1. Kidney transplant recipient        2. Primary hypertension        3. Diabetic nephropathy associated with type 2 diabetes mellitus (HCC)        4. Hyperparathyroidism, secondary renal (HCC)        5. Hypercalcemia        6. Proteinuria, unspecified type                  PLAN:  Transplanted kidney is stable.  No tenderness at the site.  Hypertension is well-controlled.  Diabetes mellitus managed by another provider.  Parathyroid hormone is improved to 99.6 from 127.3 in May 2024.  Will continue low-dose calcitriol for now however.  Serum calcium is improved to 10.7 mg/dL from 10.9 mg per DL and is likely due to calcitriol.  Advised her to be careful with dairy products.  No calcium supplement.  Urine protein creatinine ratio is improved to 0.26 g from 0.37 g in May 2024.  Medications reviewed  No changes  Return within 6 months with labs          Patient Active Problem List   Diagnosis    Coronary disease    Hyperlipemia    Arthritis    Neuropathy, diabetic (HCC)    Obesity (BMI 30-39.9)    Low HDL (under 40)    History of tobacco use    Chronic obstructive pulmonary disease (HCC)    Anemia, chronic disease    Gout    Urolithiasis    Secondary hyperparathyroidism of renal origin (HCC)    CONTRERAS (obstructive sleep apnea)    Vitamin D deficiency    Persistent proteinuria associated with type 2 diabetes mellitus (HCC)    Acquired hypothyroidism    Nephrotic syndrome    Iron deficiency anemia due to dietary causes    Anemia of chronic disease    Essential hypertension    Diabetic peripheral neuropathy associated with type 2 diabetes mellitus (HCC)    Diabetes mellitus type 2 with complications (HCC)    Acute on chronic diastolic congestive heart failure (HCC)    Constipation    Kidney transplant recipient    Acute kidney injury superimposed on CKD (HCC)    Hx of coronary artery disease    History of end stage renal disease    Cervical stenosis of spinal

## 2024-11-15 ENCOUNTER — TELEPHONE (OUTPATIENT)
Dept: INTERNAL MEDICINE CLINIC | Age: 69
End: 2024-11-15

## 2024-11-15 DIAGNOSIS — E11.9 TYPE 2 DIABETES MELLITUS WITHOUT COMPLICATION, WITH LONG-TERM CURRENT USE OF INSULIN (HCC): ICD-10-CM

## 2024-11-15 DIAGNOSIS — Z79.4 TYPE 2 DIABETES MELLITUS WITHOUT COMPLICATION, WITH LONG-TERM CURRENT USE OF INSULIN (HCC): ICD-10-CM

## 2024-11-15 RX ORDER — VIT B6/ME-THFOLATE/ME-B12/ALA 25-3.75-1
1 CAPSULE ORAL DAILY
Qty: 90 CAPSULE | Refills: 3 | Status: SHIPPED | OUTPATIENT
Start: 2024-11-15

## 2024-11-15 RX ORDER — PEN NEEDLE, DIABETIC 31 GX5/16"
NEEDLE, DISPOSABLE MISCELLANEOUS
Qty: 260 EACH | Refills: 3 | Status: SHIPPED | OUTPATIENT
Start: 2024-11-15

## 2024-11-26 DIAGNOSIS — G89.4 CHRONIC PAIN SYNDROME: ICD-10-CM

## 2024-11-26 DIAGNOSIS — M47.816 LUMBAR FACET ARTHROPATHY: ICD-10-CM

## 2024-11-26 DIAGNOSIS — G89.29 CHRONIC BILATERAL LOW BACK PAIN WITHOUT SCIATICA: ICD-10-CM

## 2024-11-26 DIAGNOSIS — R53.81 DEBILITY: ICD-10-CM

## 2024-11-26 DIAGNOSIS — M47.816 LUMBAR SPONDYLOSIS: ICD-10-CM

## 2024-11-26 DIAGNOSIS — M54.50 CHRONIC BILATERAL LOW BACK PAIN WITHOUT SCIATICA: ICD-10-CM

## 2024-11-26 RX ORDER — HYDROCODONE BITARTRATE AND ACETAMINOPHEN 5; 325 MG/1; MG/1
1 TABLET ORAL EVERY 8 HOURS PRN
Qty: 90 TABLET | Refills: 0 | Status: SHIPPED | OUTPATIENT
Start: 2024-11-28 | End: 2024-12-28

## 2024-11-26 NOTE — TELEPHONE ENCOUNTER
Avani Thomas called requesting a refill on the following medications:  Requested Prescriptions     Pending Prescriptions Disp Refills    HYDROcodone-acetaminophen (NORCO) 5-325 MG per tablet 90 tablet 0     Sig: Take 1 tablet by mouth every 8 hours as needed for Pain for up to 30 days.     Pharmacy verified:[facility] [place], ph [phone]  .pv      Date of last visit: 10.22.2024  Date of next visit (if applicable): 12.30.2024

## 2024-12-09 ENCOUNTER — LAB (OUTPATIENT)
Dept: LAB | Age: 69
End: 2024-12-09

## 2024-12-09 LAB
ANION GAP SERPL CALC-SCNC: 12 MEQ/L (ref 8–16)
BASOPHILS ABSOLUTE: 0 THOU/MM3 (ref 0–0.1)
BASOPHILS NFR BLD AUTO: 0.1 %
BUN SERPL-MCNC: 21 MG/DL (ref 7–22)
CHLORIDE SERPL-SCNC: 104 MEQ/L (ref 98–111)
CO2 SERPL-SCNC: 27 MEQ/L (ref 23–33)
CREAT SERPL-MCNC: 1.2 MG/DL (ref 0.4–1.2)
DEPRECATED RDW RBC AUTO: 45.1 FL (ref 35–45)
EOSINOPHIL NFR BLD AUTO: 3.8 %
EOSINOPHILS ABSOLUTE: 0.3 THOU/MM3 (ref 0–0.4)
ERYTHROCYTE [DISTWIDTH] IN BLOOD BY AUTOMATED COUNT: 14.4 % (ref 11.5–14.5)
GFR SERPL CREATININE-BSD FRML MDRD: 48 ML/MIN/1.73M2
GLUCOSE SERPL-MCNC: 169 MG/DL (ref 70–108)
HCT VFR BLD AUTO: 32.4 % (ref 37–47)
HGB BLD-MCNC: 10.1 GM/DL (ref 12–16)
IMM GRANULOCYTES # BLD AUTO: 0.03 THOU/MM3 (ref 0–0.07)
IMM GRANULOCYTES NFR BLD AUTO: 0.4 %
LYMPHOCYTES ABSOLUTE: 1.1 THOU/MM3 (ref 1–4.8)
LYMPHOCYTES NFR BLD AUTO: 14.3 %
MCH RBC QN AUTO: 27.1 PG (ref 26–33)
MCHC RBC AUTO-ENTMCNC: 31.2 GM/DL (ref 32.2–35.5)
MCV RBC AUTO: 86.9 FL (ref 81–99)
MONOCYTES ABSOLUTE: 0.6 THOU/MM3 (ref 0.4–1.3)
MONOCYTES NFR BLD AUTO: 8 %
NEUTROPHILS ABSOLUTE: 5.7 THOU/MM3 (ref 1.8–7.7)
NEUTROPHILS NFR BLD AUTO: 73.4 %
NRBC BLD AUTO-RTO: 0 /100 WBC
PLATELET # BLD AUTO: 242 THOU/MM3 (ref 130–400)
PMV BLD AUTO: 11.2 FL (ref 9.4–12.4)
POTASSIUM SERPL-SCNC: 3.6 MEQ/L (ref 3.5–5.2)
RBC # BLD AUTO: 3.73 MILL/MM3 (ref 4.2–5.4)
SODIUM SERPL-SCNC: 143 MEQ/L (ref 135–145)
WBC # BLD AUTO: 7.8 THOU/MM3 (ref 4.8–10.8)

## 2024-12-09 NOTE — PROGRESS NOTES
[address] [facility]  [phone] (phone)  [phone] (fax)    Visit Date: 6/15/2022    Jazmín Moulton is a 74 y.o. female who presents today for:  Chief Complaint   Patient presents with    Follow-up    Discuss Medications     HPI:     S/p Kidney replaced by transplant; Immunosuppressed status - transplant 4/10/2020    Seeing internal med for diabetes management - TERRA Gaxiola - goes back August.     [facility] told her that after 2 years following her renal transplant her PCP had to gordon lher clonidine 0.2 mg  and ferrex 150 mg    Goes back to see [facility] in August      seeing Dr. Ankur Conley in July  HPI  Health Maintenance   Topic Date Due    Diabetic foot exam  Never done    Diabetic retinal exam  Never done    Annual Wellness Visit (AWV)  11/13/2020    Depression Screen  03/11/2022    A1C test (Diabetic or Prediabetic)  02/09/2023    Low dose CT lung screening  02/25/2023    Lipids  04/08/2023    Breast cancer screen  02/08/2024    Colorectal Cancer Screen  06/10/2029    DTaP/Tdap/Td vaccine (3 - Td or Tdap) 11/28/2030    DEXA (modify frequency per FRAX score)  Completed    Flu vaccine  Completed    Shingles vaccine  Completed    Pneumococcal 65+ years Vaccine  Completed    COVID-19 Vaccine  Completed    Hepatitis C screen  Completed    Hepatitis A vaccine  Aged Out    Hib vaccine  Aged Out    Meningococcal (ACWY) vaccine  Aged Out     Past Medical History:   Diagnosis Date    Anemia associated with chronic renal failure     Aranesp 150 micrograms every other week given at [facility]    Anxiety     Arthritis     Backache     Blood circulation, collateral     Blood transfusion     CAD (coronary artery disease)     Cellulitis in diabetic foot ([address] 03/03/2017    4th and 5th toes right foot    Chest pain     History of    CHF (congestive heart failure) ([facility] 1998    Dx'ed by Dr. Shalom Collado Chronic anemia     Chronic kidney disease     Chronic kidney disease, stage III (moderate) ([address]     Chronic renal insufficiency 2010    COPD (chronic obstructive pulmonary disease) ([address] 2012    Dr. 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 Tamiko Choe    HTN (hypertension) 1970's    Hyperlipemia 1998    Iron deficiency anemia due to dietary causes 06/21/2018    Kidney stones 03/2014    Kidney trouble          MRSA infection 03/2017    right foot-Dr. Mimi Pearce (podiatrist)    Neuromuscular disorder [facility]     Neuropathy 1989    diabetic neuropathy    Obesity since childhoood    CONTRERAS on CPAP 2010    Dr. Toney Valenzuela    Pneumonia     PONV (postoperative nausea and vomiting)     Seizures ([address]     Sepsis due to Escherichia coli ([facility] 07/2020      Past Surgical History:   Procedure Laterality Date    ANKLE SURGERY Right 02-10-14    Dr. Rasheeda Youngblood at [facility]  1990's    removal of benign tumor   [address]  2008   [address]  2009    2 polyps, not precanceorus    COLONOSCOPY Left 6/10/2019    COLONOSCOPY DIAGNOSTIC performed by Patito Trinidad MD at [address] [place] [address]  3/30/2012    eye lid lift    DIAGNOSTIC CARDIAC CATH LAB PROCEDURE      ENDOSCOPY, COLON, DIAGNOSTIC  2007   [facility] EYE SURGERY  March 30th, 2012    left sided ptosis    FOOT SURGERY  1990    Tarsal tunnel surgery    FRACTURE SURGERY  2015    HYSTERECTOMY (CERVIX STATUS UNKNOWN)  1980 and 1985    first partial in 18's, then total in [address]  04/10/2020    RIGHT    LIVER BIOPSY  6/2015    LUNG BIOPSY  2009    OVARY REMOVAL  1985    OR OFFICE/OUTPT VISIT,PROCEDURE ONLY Left 8/23/2018    LEFT UPPER EXTREMENTY AV FISTULA CREATION performed by Herminia Pinto MD at [address] Left 2019    EGD BIOPSY performed by Deshaun Enriquez MD at [facility]     Family History   Problem Relation Age of Onset    Diabetes Sister     Diabetes Brother     Diabetes Sister     Diabetes Sister     Early Death Sister     Heart Disease Sister     Brain Cancer Sister     Diabetes Sister     Heart Disease Sister     Diabetes Sister     Diabetes Brother     Arthritis Mother     Heart Disease Mother     High Blood Pressure Mother     Kidney Disease Mother     Mental Illness Mother     Stroke Mother     Heart Disease Father     Diabetes Father     Obesity Father     Alcohol Abuse Father     Breast Cancer Paternal Cousin 45     Social History     Tobacco Use    Smoking status: Former Smoker     Packs/day: 1.50     Years: 30.00     Pack years: 45.00     Types: Cigarettes     Start date: 1979     Quit date: 3/13/2009     Years since quittin.2    Smokeless tobacco: Never Used    Tobacco comment: quit    Substance Use Topics    Alcohol use: No     Alcohol/week: 0.0 standard drinks      Current Outpatient Medications   Medication Sig Dispense Refill    iron polysaccharides (FERREX 150) 150 MG capsule Take 1 capsule by mouth daily 60 capsule 11    cloNIDine (CATAPRES) 0.2 MG tablet Take 1 tablet by mouth in the morning, at noon, and at bedtime 90 tablet 11    vitamin D (ERGOCALCIFEROL) 1.25 MG (77147 UT) CAPS capsule Take 1 capsule by mouth every 14 days 12 capsule 5    HYDROcodone-acetaminophen (NORCO) 5-325 MG per tablet Take 1 tablet by mouth every 8 hours as needed for Pain for up to 30 days.  90 tablet 0    carvedilol (COREG) 25 MG tablet Take 2 tablets by mouth 2 times daily 360 tablet 3    atorvastatin (LIPITOR) 40 MG tablet TAKE 1 TABLET DAILY 90 tablet 3    rivaroxaban (XARELTO) 2.5 MG TABS tablet Take 1 tablet by mouth 2 times daily 60 tablet 3    clopidogrel (PLAVIX) 75 MG tablet Take 1 tablet by mouth daily 30 tablet 3    NIFEdipine (PROCARDIA XL) 90 MG extended release tablet Take 1 tablet by mouth in the morning and at bedtime 60 tablet 3    docusate sodium (COLACE) 100 MG capsule Take 1 capsule by mouth 2 times daily as needed for Constipation 60 capsule 0    tiotropium (SPIRIVA RESPIMAT) 2.5 MCG/ACT AERS inhaler Inhale 2 puffs into the lungs daily 1 each 11    albuterol sulfate HFA (PROAIR HFA) 108 (90 Base) MCG/ACT inhaler Inhale 2 puffs into the lungs every 6 hours as needed for Wheezing or Shortness of Breath 3 each 3    fluticasone (FLONASE) 50 MCG/ACT nasal spray 1 spray by Each Nostril route daily 3 each 3    sulfamethoxazole-trimethoprim (BACTRIM DS) 800-160 MG per tablet Take 1 tablet by mouth 3 times a week      insulin aspart (NOVOLOG FLEXPEN) 100 UNIT/ML injection pen Sliding scale insulin coverage Glucose:Dose: If Less nuia626 =No Insulin/ 140-199= 2 Units/ 200-249=4 Units/ 250-299= 6 Units/  300-349=8 Units/  350-400=10 Units/ Above 400 = 12 Units max 48 units daily 15 pen 1    insulin glargine (LANTUS SOLOSTAR) 100 UNIT/ML injection pen Inject 25 Units into the skin nightly 15 pen 1    Insulin Pen Needle (B-D ULTRAFINE III SHORT PEN) 31G X 8 MM MISC Use three times daily Diagnosis Code E11.9 200 each 3    lactulose (CHRONULAC) 10 GM/15ML solution Take twice daily as needed for constipation 2700 mL 1    famotidine (PEPCID) 40 MG tablet Take 40 mg by mouth 2 times daily as needed       pantoprazole (PROTONIX) 40 MG tablet Take 40 mg by mouth 2 times daily       tacrolimus (PROGRAF) 1 MG capsule Take 1 mg by mouth 5 CAPSULES EVERY MORNING AND 5 CAPSULES EVERY EVENING      Mycophenolate Sodium 360 MG TBEC Take 360 mg by mouth 1 tablets BID      magnesium oxide (MAG-OX) 400 MG tablet Take 400 mg by mouth 2 times daily      linaclotide (LINZESS) 290 MCG CAPS capsule Take 145 mcg by mouth every other day       aspirin 81 MG EC tablet Take 81 mg by mouth daily. No current facility-administered medications for this visit. Allergies   Allergen Reactions    Actos [Pioglitazone Hydrochloride] Swelling    Pioglitazone Swelling     Patient states she does not know this one. 8/11/21    Cymbalta [Duloxetine Hcl] Other (See Comments)     Anxiety and lethargic    Lyrica [Pregabalin]      Made her feel crazy    Gabapentin Anxiety       Subjective:    Review of Systems   Constitutional: Negative for chills, fatigue and fever. HENT: Negative for congestion, ear pain, postnasal drip, rhinorrhea and sore throat. Eyes: Negative for discharge and redness. Respiratory: Negative for cough and shortness of breath. Cardiovascular: Negative for chest pain and leg swelling. Gastrointestinal: Negative for abdominal distention, abdominal pain, anal bleeding, blood in stool, constipation, diarrhea and nausea. Musculoskeletal: Positive for arthralgias, gait problem and myalgias. Skin: Negative for color change and rash. Neurological: Negative for facial asymmetry, speech difficulty and weakness. Hematological: Does not bruise/bleed easily. Psychiatric/Behavioral: Negative for agitation and confusion. Objective:     Vitals:    06/15/22 1203   BP: 128/64   Site: Right Upper Arm   Position: Sitting   Cuff Size: Medium Adult   Pulse: 61   Resp: 16   Temp: 97.9 °F (36.6 °C)   SpO2: 97%   Weight: 184 lb (83.5 kg)   Height: 5' 5\" (1.651 m)       Body mass index is 30.62 kg/m². Wt Readings from Last 3 Encounters:   06/15/22 184 lb (83.5 kg)   06/14/22 185 lb 12.8 oz (84.3 kg)   04/25/22 185 lb (83.9 kg)     BP Readings from Last 3 Encounters:   06/15/22 128/64   06/14/22 132/70   04/25/22 120/60     Physical Exam  Constitutional:       General: She is not in acute distress. Appearance: She is well-developed. She is not ill-appearing or diaphoretic. HENT:      Head: Normocephalic and atraumatic.       Right Ear: Hearing and external ear normal. No decreased hearing noted. Left Ear: Hearing and external ear normal. No decreased hearing noted. Nose: Nose normal. No nasal deformity. Eyes:      General:         Right eye: No discharge. Left eye: No discharge. Conjunctiva/sclera: Conjunctivae normal.   Pulmonary:      Effort: Pulmonary effort is normal. No respiratory distress. Abdominal:      General: There is no distension. Tenderness: There is no guarding. Musculoskeletal:         General: No tenderness or deformity. Normal range of motion. Cervical back: Normal range of motion and neck supple. Skin:     Coloration: Skin is not pale. Findings: No erythema or rash (On exposed areas). Neurological:      Mental Status: She is alert. Gait: Gait abnormal (uses rolling walker). Psychiatric:         Speech: Speech normal.         Behavior: Behavior normal.         Thought Content: Thought content normal.         Judgment: Judgment normal.         Lab Results   Component Value Date    WBC 7.7 06/08/2022    HGB 10.0 (L) 06/08/2022    HCT 33.1 (L) 06/08/2022     06/08/2022    CHOL 117 04/08/2022    TRIG 137 04/08/2022    HDL 36 04/08/2022    LDLDIRECT 68.72 04/07/2022    LDLCALC 54 04/08/2022    AST 15 04/08/2022     06/08/2022    K 3.4 (L) 06/08/2022     06/08/2022    CREATININE 1.3 (H) 06/08/2022    BUN 24 (H) 05/10/2022    CO2 27 06/08/2022    TSH 1.110 02/10/2022    INR 1.04 04/08/2022    LABA1C 6.1 (H) 02/09/2022    LABMICR 20.95 01/04/2018    LABGLOM 41 (A) 06/08/2022    MG 2.0 04/08/2022    CALCIUM 9.5 04/09/2022    VITD25 30 11/05/2021     Assessment:       Diagnosis Orders   1. Essential hypertension  cloNIDine (CATAPRES) 0.2 MG tablet   2. Renal transplant recipient  cloNIDine (CATAPRES) 0.2 MG tablet   3. Vitamin D deficiency  vitamin D (ERGOCALCIFEROL) 1.25 MG (90975 UT) CAPS capsule   4.  Iron deficiency anemia due to dietary causes  iron polysaccharides (FERREX 150) 150 MG capsule   5. Stage 3 chronic kidney disease, unspecified whether stage 3a or 3b CKD ([facility]     6. Chronic systolic (congestive) heart failure     7. Pressure injury of back, stage 3 ([facility]     8. Polyneuropathy associated with critical illness ([facility]     9. ESRD (end stage renal disease) on dialysis [facility])         Plan:   Babar Ramon was seen today for follow-up and discuss medications. Diagnoses and all orders for this visit:    Essential hypertension  -     cloNIDine (CATAPRES) 0.2 MG tablet; Take 1 tablet by mouth in the morning, at noon, and at bedtime    Renal transplant recipient  -     cloNIDine (CATAPRES) 0.2 MG tablet; Take 1 tablet by mouth in the morning, at noon, and at bedtime    Vitamin D deficiency  -     vitamin D (ERGOCALCIFEROL) 1.25 MG (19784 UT) CAPS capsule; Take 1 capsule by mouth every 14 days    Iron deficiency anemia due to dietary causes  -     iron polysaccharides (FERREX 150) 150 MG capsule; Take 1 capsule by mouth daily    Stage 3 chronic kidney disease, unspecified whether stage 3a or 3b CKD (HCC)    Chronic systolic (congestive) heart failure    Pressure injury of back, stage 3 (HCC)    Polyneuropathy associated with critical illness ([facility]    ESRD (end stage renal disease) on dialysis ([facility]        Return in about 6 months (around 12/15/2022). Orders Placed:  No orders of the defined types were placed in this encounter.     Medications Prescribed:  Orders Placed This Encounter   Medications    iron polysaccharides (FERREX 150) 150 MG capsule     Sig: Take 1 capsule by mouth daily     Dispense:  60 capsule     Refill:  11    cloNIDine (CATAPRES) 0.2 MG tablet     Sig: Take 1 tablet by mouth in the morning, at noon, and at bedtime     Dispense:  90 tablet     Refill:  11    vitamin D (ERGOCALCIFEROL) 1.25 MG (90612 UT) CAPS capsule     Sig: Take 1 capsule by mouth every 14 days     Dispense:  12 capsule     Refill:  5     Future Appointments   Date Time Provider [place]   7/5/2022 11:15 AM DONOVAN Gonzalez CNP N [facility] Pain [facility] [place]   7/13/2022  1:30 PM Jai Mott MD N [facility] Heart [facility] [place]   7/15/2022  5:00 AM STR SPECIAL PROCEDURE ROOM 2 STRZ SPEC STR Radiolog   7/18/2022 11:40 AM DONOVAN Arellano CNP N Pulm Med [address]   8/9/2022  2:00 PM DONOVAN Todd CNP SRPX IM MED [facility] [place]   9/7/2022  3:30 PM MD ERNESTO Pate SRP[facility] Heart [facility] [place]   12/8/2022  2:20 PM Jesus Oleary MD [facility] [place]   12/15/2022  1:00 PM DONOVAN Banegas CNP SRPX FM RES [facility] [place]   6/13/2023  2:30 PM Hever Clements MD [address] [place] [address]      Patient given educational materials - see patient instructions. Discussed use, benefit, and side effects of prescribedmedications. All patient questions answered. Pt voiced understanding. Reviewed health maintenance. Instructed to continue current medications, diet and exercise. Patient agreed with treatment plan. Follow up as directed.     Electronically signed by DONOVAN Banegas CNP on 6/15/2022 at 1:14 PM Simple: Patient demonstrates quick and easy understanding/Returned Demonstration/Verbalized Understanding

## 2024-12-12 LAB — TACROLIMUS BLD-MCNC: 12.1 NG/ML

## 2024-12-18 ENCOUNTER — LAB (OUTPATIENT)
Dept: LAB | Age: 69
End: 2024-12-18

## 2024-12-18 LAB
HCT VFR BLD AUTO: 32.8 % (ref 37–47)
HGB BLD-MCNC: 10 GM/DL (ref 12–16)
PLATELET # BLD AUTO: 252 THOU/MM3 (ref 130–400)

## 2024-12-21 LAB — TACROLIMUS BLD-MCNC: 3.5 NG/ML

## 2024-12-24 ENCOUNTER — HOSPITAL ENCOUNTER (INPATIENT)
Age: 69
LOS: 5 days | Discharge: HOME | DRG: 193 | End: 2024-12-30
Attending: EMERGENCY MEDICINE
Payer: MEDICARE

## 2024-12-24 ENCOUNTER — APPOINTMENT (OUTPATIENT)
Dept: GENERAL RADIOLOGY | Age: 69
DRG: 193 | End: 2024-12-24
Payer: MEDICARE

## 2024-12-24 DIAGNOSIS — J18.9 PNEUMONIA OF BOTH LUNGS DUE TO INFECTIOUS ORGANISM, UNSPECIFIED PART OF LUNG: ICD-10-CM

## 2024-12-24 DIAGNOSIS — M47.816 LUMBAR SPONDYLOSIS: ICD-10-CM

## 2024-12-24 DIAGNOSIS — G89.29 CHRONIC BILATERAL LOW BACK PAIN WITHOUT SCIATICA: ICD-10-CM

## 2024-12-24 DIAGNOSIS — M54.50 CHRONIC BILATERAL LOW BACK PAIN WITHOUT SCIATICA: ICD-10-CM

## 2024-12-24 DIAGNOSIS — J96.01 ACUTE RESPIRATORY FAILURE WITH HYPOXIA: Primary | ICD-10-CM

## 2024-12-24 DIAGNOSIS — I73.9 PVD (PERIPHERAL VASCULAR DISEASE) (HCC): ICD-10-CM

## 2024-12-24 DIAGNOSIS — G89.4 CHRONIC PAIN SYNDROME: ICD-10-CM

## 2024-12-24 DIAGNOSIS — M47.816 LUMBAR FACET ARTHROPATHY: ICD-10-CM

## 2024-12-24 DIAGNOSIS — R53.81 DEBILITY: ICD-10-CM

## 2024-12-24 LAB
ANION GAP SERPL CALC-SCNC: 16 MEQ/L (ref 8–16)
BASOPHILS ABSOLUTE: 0 THOU/MM3 (ref 0–0.1)
BASOPHILS NFR BLD AUTO: 0.2 %
BUN SERPL-MCNC: 27 MG/DL (ref 7–22)
CALCIUM SERPL-MCNC: 10.1 MG/DL (ref 8.5–10.5)
CHLORIDE SERPL-SCNC: 100 MEQ/L (ref 98–111)
CO2 SERPL-SCNC: 23 MEQ/L (ref 23–33)
CREAT SERPL-MCNC: 1.4 MG/DL (ref 0.4–1.2)
D DIMER PPP IA.FEU-MCNC: 1001 NG/ML FEU (ref 0–500)
DEPRECATED RDW RBC AUTO: 45 FL (ref 35–45)
EOSINOPHIL NFR BLD AUTO: 0.3 %
EOSINOPHILS ABSOLUTE: 0 THOU/MM3 (ref 0–0.4)
ERYTHROCYTE [DISTWIDTH] IN BLOOD BY AUTOMATED COUNT: 14.6 % (ref 11.5–14.5)
FLUAV RNA RESP QL NAA+PROBE: NOT DETECTED
FLUBV RNA RESP QL NAA+PROBE: NOT DETECTED
GFR SERPL CREATININE-BSD FRML MDRD: 40 ML/MIN/1.73M2
GLUCOSE SERPL-MCNC: 153 MG/DL (ref 70–108)
HCT VFR BLD AUTO: 29.5 % (ref 37–47)
HGB BLD-MCNC: 9.2 GM/DL (ref 12–16)
IMM GRANULOCYTES # BLD AUTO: 0.05 THOU/MM3 (ref 0–0.07)
IMM GRANULOCYTES NFR BLD AUTO: 0.5 %
LYMPHOCYTES ABSOLUTE: 0.8 THOU/MM3 (ref 1–4.8)
LYMPHOCYTES NFR BLD AUTO: 8.3 %
MCH RBC QN AUTO: 26.5 PG (ref 26–33)
MCHC RBC AUTO-ENTMCNC: 31.2 GM/DL (ref 32.2–35.5)
MCV RBC AUTO: 85 FL (ref 81–99)
MONOCYTES ABSOLUTE: 0.9 THOU/MM3 (ref 0.4–1.3)
MONOCYTES NFR BLD AUTO: 9.3 %
NEUTROPHILS ABSOLUTE: 7.7 THOU/MM3 (ref 1.8–7.7)
NEUTROPHILS NFR BLD AUTO: 81.4 %
NRBC BLD AUTO-RTO: 0 /100 WBC
NT-PROBNP SERPL IA-MCNC: 5660 PG/ML (ref 0–124)
OSMOLALITY SERPL CALC.SUM OF ELEC: 285.7 MOSMOL/KG (ref 275–300)
PLATELET # BLD AUTO: 281 THOU/MM3 (ref 130–400)
PMV BLD AUTO: 10.7 FL (ref 9.4–12.4)
POTASSIUM SERPL-SCNC: 3.6 MEQ/L (ref 3.5–5.2)
PROCALCITONIN SERPL IA-MCNC: 0.26 NG/ML (ref 0.01–0.09)
RBC # BLD AUTO: 3.47 MILL/MM3 (ref 4.2–5.4)
SARS-COV-2 RNA RESP QL NAA+PROBE: NOT DETECTED
SODIUM SERPL-SCNC: 139 MEQ/L (ref 135–145)
TROPONIN, HIGH SENSITIVITY: 34 NG/L (ref 0–12)
WBC # BLD AUTO: 9.5 THOU/MM3 (ref 4.8–10.8)

## 2024-12-24 PROCEDURE — 80048 BASIC METABOLIC PNL TOTAL CA: CPT

## 2024-12-24 PROCEDURE — 71045 X-RAY EXAM CHEST 1 VIEW: CPT

## 2024-12-24 PROCEDURE — 36415 COLL VENOUS BLD VENIPUNCTURE: CPT

## 2024-12-24 PROCEDURE — 84484 ASSAY OF TROPONIN QUANT: CPT

## 2024-12-24 PROCEDURE — 93005 ELECTROCARDIOGRAM TRACING: CPT | Performed by: EMERGENCY MEDICINE

## 2024-12-24 PROCEDURE — 85025 COMPLETE CBC W/AUTO DIFF WBC: CPT

## 2024-12-24 PROCEDURE — 87636 SARSCOV2 & INF A&B AMP PRB: CPT

## 2024-12-24 PROCEDURE — 99285 EMERGENCY DEPT VISIT HI MDM: CPT

## 2024-12-24 PROCEDURE — 83880 ASSAY OF NATRIURETIC PEPTIDE: CPT

## 2024-12-24 PROCEDURE — 84145 PROCALCITONIN (PCT): CPT

## 2024-12-24 PROCEDURE — 85379 FIBRIN DEGRADATION QUANT: CPT

## 2024-12-24 ASSESSMENT — PAIN DESCRIPTION - LOCATION: LOCATION: LEG

## 2024-12-24 ASSESSMENT — PAIN - FUNCTIONAL ASSESSMENT
PAIN_FUNCTIONAL_ASSESSMENT: NONE - DENIES PAIN

## 2024-12-25 ENCOUNTER — APPOINTMENT (OUTPATIENT)
Dept: INTERVENTIONAL RADIOLOGY/VASCULAR | Age: 69
DRG: 193 | End: 2024-12-25
Payer: MEDICARE

## 2024-12-25 PROBLEM — J18.9 COPD WITH PNEUMONIA (HCC): Status: ACTIVE | Noted: 2024-12-25

## 2024-12-25 PROBLEM — J44.0 COPD WITH PNEUMONIA (HCC): Status: ACTIVE | Noted: 2024-12-25

## 2024-12-25 LAB
ANION GAP SERPL CALC-SCNC: 13 MEQ/L (ref 8–16)
BUN SERPL-MCNC: 28 MG/DL (ref 7–22)
CALCIUM SERPL-MCNC: 9.8 MG/DL (ref 8.5–10.5)
CHLORIDE SERPL-SCNC: 103 MEQ/L (ref 98–111)
CO2 SERPL-SCNC: 23 MEQ/L (ref 23–33)
CREAT SERPL-MCNC: 1.4 MG/DL (ref 0.4–1.2)
DEPRECATED RDW RBC AUTO: 44.7 FL (ref 35–45)
ECHO BSA: 1.93 M2
EKG ATRIAL RATE: 69 BPM
EKG P AXIS: 0 DEGREES
EKG P-R INTERVAL: 156 MS
EKG Q-T INTERVAL: 414 MS
EKG QRS DURATION: 86 MS
EKG QTC CALCULATION (BAZETT): 443 MS
EKG R AXIS: 44 DEGREES
EKG T AXIS: 25 DEGREES
EKG VENTRICULAR RATE: 69 BPM
ERYTHROCYTE [DISTWIDTH] IN BLOOD BY AUTOMATED COUNT: 14.6 % (ref 11.5–14.5)
GFR SERPL CREATININE-BSD FRML MDRD: 40 ML/MIN/1.73M2
GLUCOSE BLD STRIP.AUTO-MCNC: 167 MG/DL (ref 70–108)
GLUCOSE BLD STRIP.AUTO-MCNC: 177 MG/DL (ref 70–108)
GLUCOSE BLD STRIP.AUTO-MCNC: 183 MG/DL (ref 70–108)
GLUCOSE BLD STRIP.AUTO-MCNC: 314 MG/DL (ref 70–108)
GLUCOSE SERPL-MCNC: 162 MG/DL (ref 70–108)
HCT VFR BLD AUTO: 26.3 % (ref 37–47)
HGB BLD-MCNC: 8.3 GM/DL (ref 12–16)
MAGNESIUM SERPL-MCNC: 2.7 MG/DL (ref 1.6–2.4)
MCH RBC QN AUTO: 26.6 PG (ref 26–33)
MCHC RBC AUTO-ENTMCNC: 31.6 GM/DL (ref 32.2–35.5)
MCV RBC AUTO: 84.3 FL (ref 81–99)
PLATELET # BLD AUTO: 313 THOU/MM3 (ref 130–400)
PMV BLD AUTO: 11.4 FL (ref 9.4–12.4)
POTASSIUM SERPL-SCNC: 3.6 MEQ/L (ref 3.5–5.2)
RBC # BLD AUTO: 3.12 MILL/MM3 (ref 4.2–5.4)
SODIUM SERPL-SCNC: 139 MEQ/L (ref 135–145)
TROPONIN, HIGH SENSITIVITY: 29 NG/L (ref 0–12)
WBC # BLD AUTO: 8.5 THOU/MM3 (ref 4.8–10.8)

## 2024-12-25 PROCEDURE — 6360000002 HC RX W HCPCS: Performed by: EMERGENCY MEDICINE

## 2024-12-25 PROCEDURE — 2580000003 HC RX 258: Performed by: EMERGENCY MEDICINE

## 2024-12-25 PROCEDURE — 1200000000 HC SEMI PRIVATE

## 2024-12-25 PROCEDURE — 6370000000 HC RX 637 (ALT 250 FOR IP)

## 2024-12-25 PROCEDURE — 93970 EXTREMITY STUDY: CPT

## 2024-12-25 PROCEDURE — 2500000003 HC RX 250 WO HCPCS

## 2024-12-25 PROCEDURE — 1200000003 HC TELEMETRY R&B

## 2024-12-25 PROCEDURE — 36415 COLL VENOUS BLD VENIPUNCTURE: CPT

## 2024-12-25 PROCEDURE — 85027 COMPLETE CBC AUTOMATED: CPT

## 2024-12-25 PROCEDURE — 83735 ASSAY OF MAGNESIUM: CPT

## 2024-12-25 PROCEDURE — 94640 AIRWAY INHALATION TREATMENT: CPT

## 2024-12-25 PROCEDURE — 96374 THER/PROPH/DIAG INJ IV PUSH: CPT

## 2024-12-25 PROCEDURE — 99232 SBSQ HOSP IP/OBS MODERATE 35: CPT | Performed by: INTERNAL MEDICINE

## 2024-12-25 PROCEDURE — 2500000003 HC RX 250 WO HCPCS: Performed by: EMERGENCY MEDICINE

## 2024-12-25 PROCEDURE — 93010 ELECTROCARDIOGRAM REPORT: CPT | Performed by: INTERNAL MEDICINE

## 2024-12-25 PROCEDURE — 80048 BASIC METABOLIC PNL TOTAL CA: CPT

## 2024-12-25 PROCEDURE — 87040 BLOOD CULTURE FOR BACTERIA: CPT

## 2024-12-25 PROCEDURE — 82948 REAGENT STRIP/BLOOD GLUCOSE: CPT

## 2024-12-25 PROCEDURE — 84484 ASSAY OF TROPONIN QUANT: CPT

## 2024-12-25 RX ORDER — AZITHROMYCIN 250 MG/1
500 TABLET, FILM COATED ORAL DAILY
Status: COMPLETED | OUTPATIENT
Start: 2024-12-26 | End: 2024-12-27

## 2024-12-25 RX ORDER — FLUTICASONE PROPIONATE 50 MCG
1 SPRAY, SUSPENSION (ML) NASAL DAILY PRN
Status: DISCONTINUED | OUTPATIENT
Start: 2024-12-25 | End: 2024-12-30 | Stop reason: HOSPADM

## 2024-12-25 RX ORDER — MYCOPHENOLIC ACID 360 MG/1
360 TABLET, DELAYED RELEASE ORAL 2 TIMES DAILY
Status: DISCONTINUED | OUTPATIENT
Start: 2024-12-25 | End: 2024-12-30 | Stop reason: HOSPADM

## 2024-12-25 RX ORDER — FAMOTIDINE 20 MG/1
40 TABLET, FILM COATED ORAL 2 TIMES DAILY
Status: DISCONTINUED | OUTPATIENT
Start: 2024-12-25 | End: 2024-12-25

## 2024-12-25 RX ORDER — CLONIDINE HYDROCHLORIDE 0.2 MG/1
0.2 TABLET ORAL 3 TIMES DAILY
Status: DISCONTINUED | OUTPATIENT
Start: 2024-12-25 | End: 2024-12-30 | Stop reason: HOSPADM

## 2024-12-25 RX ORDER — TACROLIMUS 1 MG/1
5 CAPSULE ORAL 2 TIMES DAILY
Status: DISCONTINUED | OUTPATIENT
Start: 2024-12-25 | End: 2024-12-30 | Stop reason: HOSPADM

## 2024-12-25 RX ORDER — ALBUTEROL SULFATE 90 UG/1
2 INHALANT RESPIRATORY (INHALATION)
Status: DISCONTINUED | OUTPATIENT
Start: 2024-12-25 | End: 2024-12-30 | Stop reason: HOSPADM

## 2024-12-25 RX ORDER — PREDNISONE 20 MG/1
40 TABLET ORAL DAILY
Status: COMPLETED | OUTPATIENT
Start: 2024-12-25 | End: 2024-12-29

## 2024-12-25 RX ORDER — NIFEDIPINE 90 MG/1
90 TABLET, EXTENDED RELEASE ORAL DAILY
Status: DISCONTINUED | OUTPATIENT
Start: 2024-12-25 | End: 2024-12-30 | Stop reason: HOSPADM

## 2024-12-25 RX ORDER — BENZONATATE 100 MG/1
100 CAPSULE ORAL 3 TIMES DAILY PRN
Status: DISCONTINUED | OUTPATIENT
Start: 2024-12-25 | End: 2024-12-30 | Stop reason: HOSPADM

## 2024-12-25 RX ORDER — ALBUTEROL SULFATE 90 UG/1
2 INHALANT RESPIRATORY (INHALATION) EVERY 4 HOURS PRN
Status: DISCONTINUED | OUTPATIENT
Start: 2024-12-25 | End: 2024-12-30 | Stop reason: HOSPADM

## 2024-12-25 RX ORDER — INSULIN GLARGINE 100 [IU]/ML
30 INJECTION, SOLUTION SUBCUTANEOUS NIGHTLY
Status: DISCONTINUED | OUTPATIENT
Start: 2024-12-25 | End: 2024-12-30 | Stop reason: HOSPADM

## 2024-12-25 RX ORDER — ALBUTEROL SULFATE 90 UG/1
2 INHALANT RESPIRATORY (INHALATION) EVERY 6 HOURS PRN
Status: DISCONTINUED | OUTPATIENT
Start: 2024-12-25 | End: 2024-12-25

## 2024-12-25 RX ORDER — SODIUM CHLORIDE 9 MG/ML
INJECTION, SOLUTION INTRAVENOUS PRN
Status: DISCONTINUED | OUTPATIENT
Start: 2024-12-25 | End: 2024-12-30 | Stop reason: HOSPADM

## 2024-12-25 RX ORDER — ACETAMINOPHEN 325 MG/1
650 TABLET ORAL EVERY 6 HOURS PRN
Status: DISCONTINUED | OUTPATIENT
Start: 2024-12-25 | End: 2024-12-30 | Stop reason: HOSPADM

## 2024-12-25 RX ORDER — ASPIRIN 81 MG/1
81 TABLET ORAL DAILY
Status: DISCONTINUED | OUTPATIENT
Start: 2024-12-25 | End: 2024-12-30 | Stop reason: HOSPADM

## 2024-12-25 RX ORDER — LANOLIN ALCOHOL/MO/W.PET/CERES
400 CREAM (GRAM) TOPICAL 2 TIMES DAILY
Status: DISCONTINUED | OUTPATIENT
Start: 2024-12-25 | End: 2024-12-30 | Stop reason: HOSPADM

## 2024-12-25 RX ORDER — PROMETHAZINE HYDROCHLORIDE 25 MG/1
12.5 TABLET ORAL EVERY 6 HOURS PRN
Status: DISCONTINUED | OUTPATIENT
Start: 2024-12-25 | End: 2024-12-30 | Stop reason: HOSPADM

## 2024-12-25 RX ORDER — CARVEDILOL 25 MG/1
50 TABLET ORAL 2 TIMES DAILY WITH MEALS
Status: DISCONTINUED | OUTPATIENT
Start: 2024-12-25 | End: 2024-12-30 | Stop reason: HOSPADM

## 2024-12-25 RX ORDER — ATORVASTATIN CALCIUM 40 MG/1
40 TABLET, FILM COATED ORAL DAILY
Status: DISCONTINUED | OUTPATIENT
Start: 2024-12-25 | End: 2024-12-30 | Stop reason: HOSPADM

## 2024-12-25 RX ORDER — ACETAMINOPHEN 650 MG/1
650 SUPPOSITORY RECTAL EVERY 6 HOURS PRN
Status: DISCONTINUED | OUTPATIENT
Start: 2024-12-25 | End: 2024-12-30 | Stop reason: HOSPADM

## 2024-12-25 RX ORDER — INSULIN LISPRO 100 [IU]/ML
0-18 INJECTION, SOLUTION INTRAVENOUS; SUBCUTANEOUS
Status: DISCONTINUED | OUTPATIENT
Start: 2024-12-25 | End: 2024-12-30 | Stop reason: HOSPADM

## 2024-12-25 RX ORDER — SULFAMETHOXAZOLE AND TRIMETHOPRIM 800; 160 MG/1; MG/1
1 TABLET ORAL
Status: DISCONTINUED | OUTPATIENT
Start: 2024-12-25 | End: 2024-12-30 | Stop reason: HOSPADM

## 2024-12-25 RX ORDER — SODIUM CHLORIDE 0.9 % (FLUSH) 0.9 %
5-40 SYRINGE (ML) INJECTION EVERY 12 HOURS SCHEDULED
Status: DISCONTINUED | OUTPATIENT
Start: 2024-12-25 | End: 2024-12-30 | Stop reason: HOSPADM

## 2024-12-25 RX ORDER — SODIUM CHLORIDE 0.9 % (FLUSH) 0.9 %
5-40 SYRINGE (ML) INJECTION PRN
Status: DISCONTINUED | OUTPATIENT
Start: 2024-12-25 | End: 2024-12-30 | Stop reason: HOSPADM

## 2024-12-25 RX ORDER — GLUCAGON 1 MG/ML
1 KIT INJECTION PRN
Status: DISCONTINUED | OUTPATIENT
Start: 2024-12-25 | End: 2024-12-30 | Stop reason: HOSPADM

## 2024-12-25 RX ORDER — DEXTROSE MONOHYDRATE 100 MG/ML
INJECTION, SOLUTION INTRAVENOUS CONTINUOUS PRN
Status: DISCONTINUED | OUTPATIENT
Start: 2024-12-25 | End: 2024-12-30 | Stop reason: HOSPADM

## 2024-12-25 RX ORDER — MYCOPHENOLIC ACID 360 MG/1
720 TABLET, DELAYED RELEASE ORAL 2 TIMES DAILY
Status: DISCONTINUED | OUTPATIENT
Start: 2024-12-25 | End: 2024-12-25

## 2024-12-25 RX ORDER — POLYETHYLENE GLYCOL 3350 17 G/17G
17 POWDER, FOR SOLUTION ORAL DAILY PRN
Status: DISCONTINUED | OUTPATIENT
Start: 2024-12-25 | End: 2024-12-30 | Stop reason: HOSPADM

## 2024-12-25 RX ADMIN — ALBUTEROL SULFATE 2 PUFF: 90 AEROSOL, METERED RESPIRATORY (INHALATION) at 18:12

## 2024-12-25 RX ADMIN — INSULIN GLARGINE 30 UNITS: 100 INJECTION, SOLUTION SUBCUTANEOUS at 20:44

## 2024-12-25 RX ADMIN — MYCOPHENOLIC ACID 360 MG: 360 TABLET, DELAYED RELEASE ORAL at 20:44

## 2024-12-25 RX ADMIN — ATORVASTATIN CALCIUM 40 MG: 40 TABLET, FILM COATED ORAL at 09:42

## 2024-12-25 RX ADMIN — CARVEDILOL 50 MG: 25 TABLET, FILM COATED ORAL at 09:41

## 2024-12-25 RX ADMIN — CLONIDINE HYDROCHLORIDE 0.2 MG: 0.2 TABLET ORAL at 20:43

## 2024-12-25 RX ADMIN — SODIUM CHLORIDE, PRESERVATIVE FREE 10 ML: 5 INJECTION INTRAVENOUS at 09:44

## 2024-12-25 RX ADMIN — PREDNISONE 40 MG: 20 TABLET ORAL at 09:42

## 2024-12-25 RX ADMIN — INSULIN LISPRO 3 UNITS: 100 INJECTION, SOLUTION INTRAVENOUS; SUBCUTANEOUS at 12:55

## 2024-12-25 RX ADMIN — INSULIN LISPRO 12 UNITS: 100 INJECTION, SOLUTION INTRAVENOUS; SUBCUTANEOUS at 20:44

## 2024-12-25 RX ADMIN — CARVEDILOL 50 MG: 25 TABLET, FILM COATED ORAL at 17:14

## 2024-12-25 RX ADMIN — TACROLIMUS 5 MG: 1 CAPSULE ORAL at 20:44

## 2024-12-25 RX ADMIN — Medication 400 MG: at 09:42

## 2024-12-25 RX ADMIN — TIOTROPIUM BROMIDE INHALATION SPRAY 2 PUFF: 3.12 SPRAY, METERED RESPIRATORY (INHALATION) at 12:45

## 2024-12-25 RX ADMIN — RIVAROXABAN 2.5 MG: 2.5 TABLET, FILM COATED ORAL at 20:43

## 2024-12-25 RX ADMIN — NIFEDIPINE 90 MG: 90 TABLET, EXTENDED RELEASE ORAL at 09:42

## 2024-12-25 RX ADMIN — SODIUM CHLORIDE, PRESERVATIVE FREE 10 ML: 5 INJECTION INTRAVENOUS at 20:43

## 2024-12-25 RX ADMIN — RIVAROXABAN 2.5 MG: 2.5 TABLET, FILM COATED ORAL at 09:41

## 2024-12-25 RX ADMIN — INSULIN LISPRO 3 UNITS: 100 INJECTION, SOLUTION INTRAVENOUS; SUBCUTANEOUS at 17:15

## 2024-12-25 RX ADMIN — ASPIRIN 81 MG: 81 TABLET, COATED ORAL at 09:42

## 2024-12-25 RX ADMIN — AZITHROMYCIN MONOHYDRATE 500 MG: 500 INJECTION, POWDER, LYOPHILIZED, FOR SOLUTION INTRAVENOUS at 00:41

## 2024-12-25 RX ADMIN — INSULIN LISPRO 3 UNITS: 100 INJECTION, SOLUTION INTRAVENOUS; SUBCUTANEOUS at 09:43

## 2024-12-25 RX ADMIN — SULFAMETHOXAZOLE AND TRIMETHOPRIM 1 TABLET: 800; 160 TABLET ORAL at 09:42

## 2024-12-25 RX ADMIN — Medication 400 MG: at 20:43

## 2024-12-25 RX ADMIN — WATER 1000 MG: 1 INJECTION INTRAMUSCULAR; INTRAVENOUS; SUBCUTANEOUS at 00:15

## 2024-12-25 ASSESSMENT — PAIN DESCRIPTION - DESCRIPTORS
DESCRIPTORS: THROBBING
DESCRIPTORS: TINGLING;NUMBNESS

## 2024-12-25 ASSESSMENT — PAIN DESCRIPTION - LOCATION
LOCATION: LEG
LOCATION: LEG

## 2024-12-25 ASSESSMENT — PAIN SCALES - GENERAL
PAINLEVEL_OUTOF10: 7
PAINLEVEL_OUTOF10: 7

## 2024-12-25 ASSESSMENT — PAIN - FUNCTIONAL ASSESSMENT
PAIN_FUNCTIONAL_ASSESSMENT: NONE - DENIES PAIN
PAIN_FUNCTIONAL_ASSESSMENT: NONE - DENIES PAIN

## 2024-12-25 ASSESSMENT — PAIN DESCRIPTION - PAIN TYPE: TYPE: CHRONIC PAIN

## 2024-12-25 NOTE — ED NOTES
ED to inpatient nurses report      Chief Complaint:  Chief Complaint   Patient presents with    Shortness of Breath    Fatigue     Present to ED from: home    MOA:     LOC: alert and orientated to name, place, date  Mobility: Independent  Oxygen Baseline: Room Air    Current needs required: 2.5 L NC     Code Status:   Prior    What abnormal results were found and what did you give/do to treat them? Hazy right lower lobe opacities in lungs, Elevated D-Dimer  Any procedures or intervention occur? Labs, Rocephin, Zithromax, Chest Xray, Covid Swab    Mental Status:  Level of Consciousness: Alert (0)    Psych Assessment:        Vitals:  Patient Vitals for the past 24 hrs:   BP Temp Temp src Pulse Resp SpO2 Height Weight   12/25/24 0042 (!) 135/55 -- -- 69 26 91 % -- --   12/25/24 0018 (!) 136/56 -- -- 67 24 90 % -- --   12/24/24 2338 (!) 139/56 -- -- 68 20 91 % -- --   12/24/24 2337 -- -- -- -- -- (!) 87 % -- --   12/24/24 2222 -- -- -- 66 18 91 % -- --   12/24/24 2125 -- -- -- -- -- 94 % -- --   12/24/24 2124 (!) 134/58 98.6 °F (37 °C) Oral 68 24 (!) 78 % 1.651 m (5' 5\") 81.2 kg (179 lb)        LDAs:   Peripheral IV 12/25/24 Proximal;Right;Anterior Forearm (Active)       Ambulatory Status:  No data recorded    Diagnosis:  DISPOSITION Admitted 12/25/2024 12:37:34 AM   Final diagnoses:   None        Consults:  None     Pain Score:  Pain Assessment  Pain Assessment: None - Denies Pain  Pain Location: Leg    C-SSRS:   Risk of Suicide: No Risk    Sepsis Screening:       [place] Fall Risk:       Swallow Screening        Preferred Language:   English      ALLERGIES     Actos [pioglitazone hydrochloride], Pioglitazone, Cymbalta [duloxetine hcl], Lyrica [pregabalin], and Gabapentin    SURGICAL HISTORY       Past Surgical History:   Procedure Laterality Date    ANKLE SURGERY Right 02-10-14    Dr. Degroot at [facility]    APPENDECTOMY  1980s    BACK SURGERY  1990's    removal of benign tumor    CARDIAC SURGERY  2008    CARPAL TUNNEL  [place]    HTN (hypertension) 1970's    Hyperlipemia 1998    Iron deficiency anemia due to dietary causes 06/21/2018    Kidney stones 03/2014    Kidney trouble          MRSA infection 03/2017    right foot-Dr. Maria (podiatrist)    Neuromuscular disorder ([facility])     Neuropathy 1989    diabetic neuropathy    Obesity since childhoood    CONTRERAS on CPAP 2010    Dr. Lee    Pneumonia     PONV (postoperative nausea and vomiting)     Seizures ([facility])     Sepsis due to Escherichia coli ([facility]) 07/2020           Electronically signed by Odette Cannon RN on 12/25/2024 at 12:47 AM

## 2024-12-25 NOTE — PLAN OF CARE
Problem: Chronic Conditions and Co-morbidities  Goal: Patient's chronic conditions and co-morbidity symptoms are monitored and maintained or improved  Outcome: Progressing  Flowsheets (Taken 12/25/2024 0332)  Care Plan - Patient's Chronic Conditions and Co-Morbidity Symptoms are Monitored and Maintained or Improved:   Monitor and assess patient's chronic conditions and comorbid symptoms for stability, deterioration, or improvement   Collaborate with multidisciplinary team to address chronic and comorbid conditions and prevent exacerbation or deterioration     Problem: Pain  Goal: Verbalizes/displays adequate comfort level or baseline comfort level  Outcome: Progressing  Flowsheets (Taken 12/25/2024 0332)  Verbalizes/displays adequate comfort level or baseline comfort level:   Encourage patient to monitor pain and request assistance   Assess pain using appropriate pain scale   Administer analgesics based on type and severity of pain and evaluate response   Implement non-pharmacological measures as appropriate and evaluate response     Problem: Safety - Adult  Goal: Free from fall injury  Outcome: Progressing  Flowsheets (Taken 12/25/2024 0332)  Free From Fall Injury: Instruct family/caregiver on patient safety    Care plan reviewed with patient.  Patient verbalizes understanding of the plan of care and contributes to goal setting.

## 2024-12-25 NOTE — ED TRIAGE NOTES
Pt to ED from home. Pt c/c shortness of breath and weakness in the legs. Pt states she has had this problem ongoing and sees  for it- recently increased. Pt states \"my legs feel like bricks\". Pt reports this starting 12/20. Pt hx of diabetic neuropathy in legs. EKG complete. Vitals assessed. PT 78% on RA. Pt placed on 1.5 L NC and maintaining 94%. Pt hx COPD. Covid/flu obtained.

## 2024-12-25 NOTE — PLAN OF CARE
Problem: Chronic Conditions and Co-morbidities  Goal: Patient's chronic conditions and co-morbidity symptoms are monitored and maintained or improved  12/25/2024 1055 by Lisette Castillo RN  Outcome: Progressing     Patient's chronic conditions and co-morbidity symptoms are monitored and maintained.    Problem: Pain  Goal: Verbalizes/displays adequate comfort level or baseline comfort level  12/25/2024 1055 by Lisette Castillo, RN  Outcome: Progressing    Patient states pain relief from PRN pain medications. Pain reassessed one hour post PRN pain medication given.  Patient rates pain 0 on MALIK 0-10 scale.     Problem: Safety - Adult  Goal: Free from fall injury  12/25/2024 1055 by Lisette Castillo RN  Outcome: Progressing    Fall assessment completed. Patient using call light appropriately to call for assistance with ambulation to bathroom.  Personal items within reach. Patient is also compliant with use of non-skid slippers.      Problem: Discharge Planning  Goal: Discharge to home or other facility with appropriate resources  Outcome: Progressing    Discharge plan is in process. Plan discharge home with family.     Problem: Respiratory - Adult  Goal: Achieves optimal ventilation and oxygenation  Outcome: Progressing    Patients oxygen saturation 98 % on 3 L02 per Nasal Canula.  No shortness of breath noted. Lung sounds rhonchi, able C&DB as ordered.     Problem: Infection - Adult  Goal: Absence of infection at discharge  Outcome: Progressing    No s/s infection for shift. VS- WNL.    Care plan reviewed with patient and family.  Patient and family verbalize understanding of the plan of care and contribute to goal setting.

## 2024-12-25 NOTE — RT PROTOCOL NOTE
RT Inhaler-Nebulizer Bronchodilator Protocol Note    There is a bronchodilator order in the chart from a provider indicating to follow the RT Bronchodilator Protocol and there is an “Initiate RT Inhaler-Nebulizer Bronchodilator Protocol” order as well (see protocol at bottom of note).    CXR Findings:  XR CHEST PORTABLE    Result Date: 12/24/2024  1. Hazy right lower lobe opacities may reflect edema or infiltrate. 2. Cardiomegaly. This document has been electronically signed by: Reynaldo Carr MD on 12/24/2024 10:26 PM      The findings from the last RT Protocol Assessment were as follows:   History Pulmonary Disease: Chronic pulmonary disease  Respiratory Pattern: Dyspnea on exertion or RR 21-25 bpm  Breath Sounds: Slightly diminished and/or crackles  Cough: Strong, spontaneous, non-productive  Indication for Bronchodilator Therapy: Decreased or absent breath sounds, On home bronchodilators  Bronchodilator Assessment Score: 6    Aerosolized bronchodilator medication orders have been revised according to the RT Inhaler-Nebulizer Bronchodilator Protocol below.    Respiratory Therapist to perform RT Therapy Protocol Assessment initially then follow the protocol.  Repeat RT Therapy Protocol Assessment PRN for score 0-3 or on second treatment, BID, and PRN for scores above 3.    No Indications - adjust the frequency to every 6 hours PRN wheezing or bronchospasm, if no treatments needed after 48 hours then discontinue using Per Protocol order mode.     If indication present, adjust the RT bronchodilator orders based on the Bronchodilator Assessment Score as indicated below.  Use Inhaler orders unless patient has one or more of the following: on home nebulizer, not able to hold breath for 10 seconds, is not alert and oriented, cannot activate and use MDI correctly, or respiratory rate 25 breaths per minute or more, then use the equivalent nebulizer order(s) with same Frequency and PRN reasons based on the score.  If a  patient is on this medication at home then do not decrease Frequency below that used at home.    0-3 - enter or revise RT bronchodilator order(s) to equivalent RT Bronchodilator order with Frequency of every 4 hours PRN for wheezing or increased work of breathing using Per Protocol order mode.        4-6 - enter or revise RT Bronchodilator order(s) to two equivalent RT bronchodilator orders with one order with BID Frequency and one order with Frequency of every 4 hours PRN wheezing or increased work of breathing using Per Protocol order mode.        7-10 - enter or revise RT Bronchodilator order(s) to two equivalent RT bronchodilator orders with one order with TID Frequency and one order with Frequency of every 4 hours PRN wheezing or increased work of breathing using Per Protocol order mode.       11-13 - enter or revise RT Bronchodilator order(s) to one equivalent RT bronchodilator order with QID Frequency and an Albuterol order with Frequency of every 4 hours PRN wheezing or increased work of breathing using Per Protocol order mode.      Greater than 13 - enter or revise RT Bronchodilator order(s) to one equivalent RT bronchodilator order with every 4 hours Frequency and an Albuterol order with Frequency of every 2 hours PRN wheezing or increased work of breathing using Per Protocol order mode.     RT to enter RT Home Evaluation for COPD & MDI Assessment order using Per Protocol order mode.    Electronically signed by Katt Ohara RCP on 12/25/2024 at 6:14 PM

## 2024-12-25 NOTE — PLAN OF CARE
Problem: Respiratory - Adult  Goal: Clear lung sounds  12/25/2024 1814 by Katt Ohara, RCMATIAS  Outcome: Progressing   Pt started on MDI like she does at home and for maintenance of COPD. Patient mutually agreed on goals.

## 2024-12-25 NOTE — PROGRESS NOTES
Pt admitted to  5K16 via wheelchair from ED.  Complaints: Shortness of breath with exertion  IV site free of s/s of infection or infiltration. IV in right AC. Vital signs obtained. Assessment and data collection initiated. Oriented to room. Policies and procedures for 5K explained. All questions answered with no further questions at this time. Fall prevention and safety brochure discussed with patient.  Bed alarm on. Call light in reach.Oriented to room.   Nidia Anderson, RN, RN 12/25/2024 3:16 AM

## 2024-12-25 NOTE — H&P
History & Physical  Internal Medicine Resident         Patient: Avani Thomas 69 y.o. female      : 1955  Date of Admission: 2024  Date of Service: Pt seen/examined on 24 and Admitted to Inpatient with expected LOS greater than two midnights due to medical therapy.       ASSESSMENT AND PLAN  Acute hypoxic respiratory failure: Secondary to COPD exacerbation/pneumonia.  Patient not on any home O2.  She required 1.5 L NC initially in the ED and required up to 2.5 L NC upon transfer to floor.  Not on any O2 at home.  Was previously on O2 supplementation roughly 1 year ago per patient report.  D-dimer elevated on admission 1001.  Unable to obtain CTA chest in ED due to increasing creatinine.  Vitals stable after administration of supplemental O2 in ED.  Wean O2 to maintain SpO2 between 88% - 92%  V/Q scan    COPD exacerbation secondary to pneumonia: Last PFT showed no obstruction or restriction, mildly decreased DLCO but improved from .  CXR shows hazy RLL opacities and cardiomegaly.  Procalcitonin elevated 0.26.  Follows with Dr. Lee.  Continue ceftriaxone 1 g daily, de-escalate based on cultures and sensitivities  Continue azithromycin 500 mg twice daily for 3 days total  Continue prednisone 40 mg daily for 5 days total  Continue Spiriva inhaler  Albuterol inhaler every 6 hours as needed for wheezing  Pulmonary hygiene; I-S and Acapella  Follow respiratory culture    CKD stage IIIb: Creatinine on admission 1.4.  Baseline 1.2.  BUN 27.  Follows with Dr. Martins.  Last follow-up visit on 2024 showing that transplant kidney is stable with PTH improving and urine protein creatinine ratio improved from 0.37 g to 0.26 g in May 2024.  Follow-up with Dr. Martins  Continue to monitor renal function    Elevated troponin, resolved: Likely due to demand and CKD.  Troponin on admission 34 => 29.  Patient denied any chest pain, nausea, vomiting, lightheadedness or dizziness.    DVT prophylaxis:  COPD (chronic obstructive pulmonary disease) (HCC) 2012    Dr. Lee    Coronary disease     Moderate    COVID-19 11/2021    Depression     Diabetes mellitus, type 2 (HCC) 1988    Disease of blood and blood forming organ     GERD (gastroesophageal reflux disease)     Hemoglobin disease (HCC)     hemoglobin hope    History of granulomatous disease 2009    followed by Dr. MARIO Lee    HTN (hypertension) 1970's    Hyperlipemia 1998    Iron deficiency anemia due to dietary causes 06/21/2018    Kidney stones 03/2014    Kidney trouble          MRSA infection 03/2017    right foot-Dr. Maria (podiatrist)    Neuromuscular disorder (HCC)     Neuropathy 1989    diabetic neuropathy    Obesity since childhoood    CONTRERAS on CPAP 2010    Dr. Lee    Pneumonia     PONV (postoperative nausea and vomiting)     Seizures (HCC)     Sepsis due to Escherichia coli ([facility]) 07/2020         Procedure Laterality Date    ANKLE SURGERY Right 02-10-14    Dr. Degroot at [facility]    APPENDECTOMY  1980s    BACK SURGERY  1990's    removal of benign tumor    CARDIAC SURGERY  2008    CARPAL TUNNEL RELEASE  1988    COLONOSCOPY  2009    2 polyps, not precanceorus    COLONOSCOPY Left 6/10/2019    COLONOSCOPY DIAGNOSTIC performed by Morris Abreu MD at [facility]    COSMETIC SURGERY  3/30/2012    eye lid lift    DIAGNOSTIC CARDIAC CATH LAB PROCEDURE      ENDOSCOPY, COLON, DIAGNOSTIC  2007    EYE SURGERY  March 30th, 2012    left sided ptosis    FOOT SURGERY  1990    Tarsal tunnel surgery    FRACTURE SURGERY  2015    HYSTERECTOMY (CERVIX STATUS UNKNOWN)  1980 and 1985    first partial in 1980's, then total in 1985    JOINT REPLACEMENT  2015    KIDNEY TRANSPLANT  04/10/2020    RIGHT    LIVER BIOPSY  6/2015    LUNG BIOPSY  2009    OVARY REMOVAL  1985    TX OFFICE/OUTPT VISIT,PROCEDURE ONLY Left 8/23/2018    LEFT UPPER EXTREMENTY AV FISTULA CREATION performed by Cam Bah MD at [facility] OR    UPPER GASTROINTESTINAL ENDOSCOPY Left 6/14/2019    EGD

## 2024-12-25 NOTE — PLAN OF CARE
Problem: Respiratory - Adult  Goal: Clear lung sounds  Outcome: Progressing   Pt started on MDI like she does at home and for maintenance of COPD. Patient mutually agreed on goals.

## 2024-12-25 NOTE — ED NOTES
Pt resting in bed. Vitals assessed. Pt family at bedside. RR easy and unlabored. Pt tolerating 1.5 L NC well. Pt updated on plan of care.

## 2024-12-25 NOTE — ED NOTES
Pt resting in bed. Vitals assessed. Pt moved to 2.5 L NC during rest as she was sitting at 87% SPO2. Pt now maintaining 91%. Pt family at bedside.

## 2024-12-25 NOTE — ED PROVIDER NOTES
[facility] EMERGENCY DEPT      CHIEF COMPLAINT       Chief Complaint   Patient presents with    Shortness of Breath    Fatigue       Nurses Notes reviewed and I agree except as noted in the HPI.      HISTORY OF PRESENT ILLNESS    Avani Thomas is a 69 y.o. female who presents with complaint of shortness of breath and fatigue, history of COPD.  Mild cough, no fever or chills.  No chest pain or abdominal pain.  Patient does not smoke.  Patient said that she feels like she is retaining fluid to legs as well; patient also reports history of neuropathy to both feet.   Patient said that at home her O2 monitor has been measuring oxygen at around 75 while on room air for the past few days.  Onset: Acute  Duration: 1 to 2 days  Timing: Persistent  Location of Pain: No pain  Intesity/severity:   Modifying Factors:   Relieved by;  Previous Episodes;  Tx Before arrival: None    PAST MEDICAL HISTORY    has a past medical history of Anemia associated with chronic renal failure, Anxiety, Arthritis, Backache, Blood circulation, collateral, Blood transfusion, CAD (coronary artery disease), Cellulitis in diabetic foot ([facility]), Chest pain, CHF (congestive heart failure) ([facility]), Chronic anemia, Chronic kidney disease, Chronic kidney disease, stage III (moderate) ([facility]), Chronic renal insufficiency, COPD (chronic obstructive pulmonary disease) ([facility]), Coronary disease, COVID-19, Depression, Diabetes mellitus, type 2 ([facility]), Disease of blood and blood forming organ, GERD (gastroesophageal reflux disease), Hemoglobin disease ([facility]), History of granulomatous disease, HTN (hypertension), Hyperlipemia, Iron deficiency anemia due to dietary causes, Kidney stones, Kidney trouble, MRSA infection, Neuromuscular disorder ([facility]), Neuropathy, Obesity, CONTRERAS on CPAP, Pneumonia, PONV (postoperative nausea and vomiting), Seizures ([facility]), and Sepsis due to Escherichia coli ([facility]).    SURGICAL HISTORY      has a past surgical history that includes eye surgery  (March 30th, 2012); Carpal tunnel release (1988); Foot surgery (1990); Colonoscopy (2009); Endoscopy, colon, diagnostic (2007); Appendectomy (1980s); back surgery (1990's); Cosmetic surgery (3/30/2012); Ankle surgery (Right, 02-10-14); liver biopsy (6/2015); Lung biopsy (2009); joint replacement (2015); fracture surgery (2015); Cardiac surgery (2008); pr office/outpt visit,procedure only (Left, 8/23/2018); Colonoscopy (Left, 6/10/2019); Upper gastrointestinal endoscopy (Left, 6/14/2019); Diagnostic Cardiac Cath Lab Procedure; Kidney transplant (04/10/2020); Ovary removal (1985); and Hysterectomy (1980 and 1985).    CURRENT MEDICATIONS       Previous Medications    ALBUTEROL SULFATE HFA (PROVENTIL;VENTOLIN;PROAIR) 108 (90 BASE) MCG/ACT INHALER    USE 2 INHALATIONS BY MOUTH EVERY 6 HOURS AS NEEDED FOR WHEEZING  OR SHORTNESS OF BREATH    ALUMINUM & MAGNESIUM HYDROXIDE-SIMETHICONE (MAALOX) 200-200-20 MG/5ML SUSP SUSPENSION    Take 5 mLs by mouth every 6 hours as needed for Indigestion    ASPIRIN 81 MG EC TABLET    Take 1 tablet by mouth daily    ATORVASTATIN (LIPITOR) 40 MG TABLET    TAKE 1 TABLET BY MOUTH DAILY    BLOOD GLUCOSE MONITOR STRIPS    Easy Max Test 4 times a day & as needed for symptoms of irregular blood glucose. Dispense sufficient amount for indicated testing frequency plus additional to accommodate PRN testing needs.    CARVEDILOL (COREG) 25 MG TABLET    Take 2 tablets by mouth 2 times daily    CLONIDINE (CATAPRES) 0.2 MG TABLET    TAKE 1 TABLET BY MOUTH IN THE  MORNING AT NOON, AND AT BEDTIME    D-MANNOSE 500 MG CAPS    Take 1 each by mouth in the morning and at bedtime    FAMOTIDINE (PEPCID) 40 MG TABLET    Take 1 tablet by mouth 2 times daily as needed    FLUTICASONE (FLONASE) 50 MCG/ACT NASAL SPRAY    1 spray by Each Nostril route daily    HANDICAP PLACARD MISC    by Does not apply route    HYDROCODONE-ACETAMINOPHEN (NORCO) 5-325 MG PER TABLET    Take 1 tablet by mouth every 8 hours as needed for

## 2024-12-26 ENCOUNTER — APPOINTMENT (OUTPATIENT)
Dept: NUCLEAR MEDICINE | Age: 69
DRG: 193 | End: 2024-12-26
Payer: MEDICARE

## 2024-12-26 ENCOUNTER — APPOINTMENT (OUTPATIENT)
Dept: GENERAL RADIOLOGY | Age: 69
DRG: 193 | End: 2024-12-26
Payer: MEDICARE

## 2024-12-26 LAB
ANION GAP SERPL CALC-SCNC: 14 MEQ/L (ref 8–16)
BUN SERPL-MCNC: 30 MG/DL (ref 7–22)
CALCIUM SERPL-MCNC: 10.4 MG/DL (ref 8.5–10.5)
CHLORIDE SERPL-SCNC: 103 MEQ/L (ref 98–111)
CO2 SERPL-SCNC: 23 MEQ/L (ref 23–33)
CREAT SERPL-MCNC: 1.3 MG/DL (ref 0.4–1.2)
DEPRECATED RDW RBC AUTO: 44.8 FL (ref 35–45)
ERYTHROCYTE [DISTWIDTH] IN BLOOD BY AUTOMATED COUNT: 14.3 % (ref 11.5–14.5)
GFR SERPL CREATININE-BSD FRML MDRD: 44 ML/MIN/1.73M2
GLUCOSE BLD STRIP.AUTO-MCNC: 131 MG/DL (ref 70–108)
GLUCOSE BLD STRIP.AUTO-MCNC: 153 MG/DL (ref 70–108)
GLUCOSE BLD STRIP.AUTO-MCNC: 228 MG/DL (ref 70–108)
GLUCOSE BLD STRIP.AUTO-MCNC: 267 MG/DL (ref 70–108)
GLUCOSE BLD STRIP.AUTO-MCNC: 270 MG/DL (ref 70–108)
GLUCOSE SERPL-MCNC: 244 MG/DL (ref 70–108)
HCT VFR BLD AUTO: 28.2 % (ref 37–47)
HGB BLD-MCNC: 8.6 GM/DL (ref 12–16)
MCH RBC QN AUTO: 26 PG (ref 26–33)
MCHC RBC AUTO-ENTMCNC: 30.5 GM/DL (ref 32.2–35.5)
MCV RBC AUTO: 85.2 FL (ref 81–99)
PLATELET # BLD AUTO: 309 THOU/MM3 (ref 130–400)
PMV BLD AUTO: 10.7 FL (ref 9.4–12.4)
POTASSIUM SERPL-SCNC: 3.7 MEQ/L (ref 3.5–5.2)
RBC # BLD AUTO: 3.31 MILL/MM3 (ref 4.2–5.4)
SODIUM SERPL-SCNC: 140 MEQ/L (ref 135–145)
WBC # BLD AUTO: 9.2 THOU/MM3 (ref 4.8–10.8)

## 2024-12-26 PROCEDURE — 78582 LUNG VENTILAT&PERFUS IMAGING: CPT

## 2024-12-26 PROCEDURE — 85027 COMPLETE CBC AUTOMATED: CPT

## 2024-12-26 PROCEDURE — 6360000002 HC RX W HCPCS

## 2024-12-26 PROCEDURE — A9558 XE133 XENON 10MCI: HCPCS

## 2024-12-26 PROCEDURE — 1200000003 HC TELEMETRY R&B

## 2024-12-26 PROCEDURE — 2500000003 HC RX 250 WO HCPCS

## 2024-12-26 PROCEDURE — 80048 BASIC METABOLIC PNL TOTAL CA: CPT

## 2024-12-26 PROCEDURE — 1200000000 HC SEMI PRIVATE

## 2024-12-26 PROCEDURE — 3430000000 HC RX DIAGNOSTIC RADIOPHARMACEUTICAL

## 2024-12-26 PROCEDURE — 71046 X-RAY EXAM CHEST 2 VIEWS: CPT

## 2024-12-26 PROCEDURE — 82948 REAGENT STRIP/BLOOD GLUCOSE: CPT

## 2024-12-26 PROCEDURE — 36415 COLL VENOUS BLD VENIPUNCTURE: CPT

## 2024-12-26 PROCEDURE — 6370000000 HC RX 637 (ALT 250 FOR IP)

## 2024-12-26 PROCEDURE — 99233 SBSQ HOSP IP/OBS HIGH 50: CPT | Performed by: INTERNAL MEDICINE

## 2024-12-26 PROCEDURE — 94640 AIRWAY INHALATION TREATMENT: CPT

## 2024-12-26 PROCEDURE — A9540 TC99M MAA: HCPCS

## 2024-12-26 RX ORDER — XENON XE-133 10 MCI/1
9.7 GAS RESPIRATORY (INHALATION)
Status: COMPLETED | OUTPATIENT
Start: 2024-12-26 | End: 2024-12-26

## 2024-12-26 RX ADMIN — INSULIN LISPRO 9 UNITS: 100 INJECTION, SOLUTION INTRAVENOUS; SUBCUTANEOUS at 20:53

## 2024-12-26 RX ADMIN — NIFEDIPINE 90 MG: 90 TABLET, EXTENDED RELEASE ORAL at 09:14

## 2024-12-26 RX ADMIN — INSULIN LISPRO 9 UNITS: 100 INJECTION, SOLUTION INTRAVENOUS; SUBCUTANEOUS at 09:16

## 2024-12-26 RX ADMIN — CLONIDINE HYDROCHLORIDE 0.2 MG: 0.2 TABLET ORAL at 20:53

## 2024-12-26 RX ADMIN — SODIUM CHLORIDE, PRESERVATIVE FREE 10 ML: 5 INJECTION INTRAVENOUS at 20:53

## 2024-12-26 RX ADMIN — MYCOPHENOLIC ACID 360 MG: 360 TABLET, DELAYED RELEASE ORAL at 20:58

## 2024-12-26 RX ADMIN — RIVAROXABAN 2.5 MG: 2.5 TABLET, FILM COATED ORAL at 09:15

## 2024-12-26 RX ADMIN — Medication 400 MG: at 20:53

## 2024-12-26 RX ADMIN — MYCOPHENOLIC ACID 360 MG: 360 TABLET, DELAYED RELEASE ORAL at 09:20

## 2024-12-26 RX ADMIN — ALBUTEROL SULFATE 2 PUFF: 90 AEROSOL, METERED RESPIRATORY (INHALATION) at 18:13

## 2024-12-26 RX ADMIN — TACROLIMUS 5 MG: 1 CAPSULE ORAL at 09:19

## 2024-12-26 RX ADMIN — SODIUM CHLORIDE, PRESERVATIVE FREE 10 ML: 5 INJECTION INTRAVENOUS at 09:21

## 2024-12-26 RX ADMIN — CARVEDILOL 50 MG: 25 TABLET, FILM COATED ORAL at 09:14

## 2024-12-26 RX ADMIN — Medication 3 MILLICURIE: at 08:35

## 2024-12-26 RX ADMIN — ATORVASTATIN CALCIUM 40 MG: 40 TABLET, FILM COATED ORAL at 09:15

## 2024-12-26 RX ADMIN — INSULIN GLARGINE 30 UNITS: 100 INJECTION, SOLUTION SUBCUTANEOUS at 20:53

## 2024-12-26 RX ADMIN — AZITHROMYCIN DIHYDRATE 500 MG: 250 TABLET ORAL at 09:14

## 2024-12-26 RX ADMIN — XENON XE-133 9.7 MILLICURIE: 10 GAS RESPIRATORY (INHALATION) at 08:25

## 2024-12-26 RX ADMIN — TACROLIMUS 5 MG: 1 CAPSULE ORAL at 20:58

## 2024-12-26 RX ADMIN — Medication 400 MG: at 09:13

## 2024-12-26 RX ADMIN — ASPIRIN 81 MG: 81 TABLET, COATED ORAL at 09:16

## 2024-12-26 RX ADMIN — INSULIN LISPRO 6 UNITS: 100 INJECTION, SOLUTION INTRAVENOUS; SUBCUTANEOUS at 12:01

## 2024-12-26 RX ADMIN — PREDNISONE 40 MG: 20 TABLET ORAL at 09:14

## 2024-12-26 RX ADMIN — WATER 1000 MG: 1 INJECTION INTRAMUSCULAR; INTRAVENOUS; SUBCUTANEOUS at 00:18

## 2024-12-26 RX ADMIN — RIVAROXABAN 2.5 MG: 2.5 TABLET, FILM COATED ORAL at 20:53

## 2024-12-26 ASSESSMENT — PAIN DESCRIPTION - ORIENTATION: ORIENTATION: RIGHT;LEFT

## 2024-12-26 ASSESSMENT — PAIN SCALES - GENERAL: PAINLEVEL_OUTOF10: 6

## 2024-12-26 ASSESSMENT — PAIN DESCRIPTION - LOCATION: LOCATION: LEG

## 2024-12-26 ASSESSMENT — PAIN DESCRIPTION - PAIN TYPE: TYPE: CHRONIC PAIN

## 2024-12-26 ASSESSMENT — PAIN DESCRIPTION - DESCRIPTORS: DESCRIPTORS: NUMBNESS;TINGLING

## 2024-12-26 NOTE — PLAN OF CARE
Problem: Chronic Conditions and Co-morbidities  Goal: Patient's chronic conditions and co-morbidity symptoms are monitored and maintained or improved  12/25/2024 2254 by Valencia Alcantar RN  Outcome: Progressing     Problem: Pain  Goal: Verbalizes/displays adequate comfort level or baseline comfort level  12/25/2024 2254 by Valencia Alcantar RN  Outcome: Progressing   Pain Assessment: 0-10  Pain Level: 7       Is pain goal met at this time?  Yes         Problem: Safety - Adult  Goal: Free from fall injury  12/25/2024 2254 by Valencia Alcantar RN  Outcome: Progressing   All fall precautions in place. Bed in low position, alarm activated and appropriate use of call light.      Problem: Skin/Tissue Integrity  Goal: Absence of new skin breakdown  Description: 1.  Monitor for areas of redness and/or skin breakdown  2.  Assess vascular access sites hourly  3.  Every 4-6 hours minimum:  Change oxygen saturation probe site  4.  Every 4-6 hours:  If on nasal continuous positive airway pressure, respiratory therapy assess nares and determine need for appliance change or resting period.  Outcome: Progressing   Skin assessment completed.  Patient turned every 2 hours and as needed.  No skin breakdown this shift.       Problem: Discharge Planning  Goal: Discharge to home or other facility with appropriate resources  12/25/2024 2254 by Valencia Alcantar RN  Outcome: Progressing     Problem: Respiratory - Adult  Goal: Clear lung sounds  12/25/2024 1814 by Katt Ohara RCP  Outcome: Progressing     Problem: Infection - Adult  Goal: Absence of infection at discharge  12/25/2024 2254 by Valencia Alcantar RN  Outcome: Progressing     Care plan reviewed with patient.  Patient verbalizes understanding of the plan of care and contributes to goal setting.

## 2024-12-26 NOTE — CARE COORDINATION
12/26/24 1344   Service Assessment   Patient Orientation Alert and Oriented;Person;Place;Situation;Self   Cognition Alert   History Provided By Patient;Medical Record   Primary Caregiver Self   Accompanied By/Relationship Unaccompanied   Support Systems Children;Family Members   Patient's Healthcare Decision Maker is: Patient Declined (Legal Next of Kin Remains as Decision Maker)   PCP Verified by CM Yes   Last Visit to PCP Within last 3 months   Prior Functional Level Independent in ADLs/IADLs;Bathing;Toileting;Dressing;Feeding;Cooking;Housework;Shopping;Mobility   Current Functional Level Independent in ADLs/IADLs;Bathing;Dressing;Toileting;Feeding;Cooking;Housework;Shopping;Mobility   Can patient return to prior living arrangement Yes   Ability to make needs known: Good   Family able to assist with home care needs: Yes   Would you like for me to discuss the discharge plan with any other family members/significant others, and if so, who? No   Financial Resources Medicare   Community Resources None   CM/SW Referral Other (see comment)  (N/A)   Social/Functional History   Active  Yes   Occupation Retired   Discharge Planning   Type of Residence House   Living Arrangements Alone   Current Services Prior To Admission Durable Medical Equipment   Current DME Prior to Arrival Cane;Walker   Potential Assistance Needed N/A   DME Ordered? No   Potential Assistance Purchasing Medications No   Type of Home Care Services None   Patient expects to be discharged to: House   History of falls? 0   Services At/After Discharge   Transition of Care Consult (CM Consult) Discharge Planning   Services At/After Discharge None   Confirm Follow Up Transport Family   Condition of Participation: Discharge Planning   The Plan for Transition of Care is related to the following treatment goals: get off oxygen   Freedom of Choice list was provided with basic dialogue that supports the patient's individualized plan of care/goals,

## 2024-12-26 NOTE — PROGRESS NOTES
Hospitalist Progress Note         Patient: Avani Thomas 69 y.o. female      : 1955  Date of Admission: 2024  Date of Service: Pt seen/examined on 24 and Admitted to Inpatient with expected LOS greater than two midnights due to medical therapy.       ASSESSMENT AND PLAN  Acute hypoxic respiratory failure: Secondary to COPD exacerbation/pneumonia.  Patient not on any home O2.  She required 1.5 L NC initially in the ED and required up to 2.5 L NC upon transfer to floor.  Not on any O2 at home.  Was previously on O2 supplementation roughly 1 year ago per patient report.  D-dimer elevated on admission 1001.  Unable to obtain CTA chest in ED due to increasing creatinine.  Vitals stable after administration of supplemental O2 in ED.  Wean O2 to maintain SpO2 between 88% - 92%  V/Q scan    COPD exacerbation secondary to pneumonia: Last PFT showed no obstruction or restriction, mildly decreased DLCO but improved from .  CXR shows hazy RLL opacities and cardiomegaly.  Procalcitonin elevated 0.26.  Follows with Dr. Lee.  Continue ceftriaxone 1 g daily, de-escalate based on cultures and sensitivities  Continue azithromycin 500 mg twice daily for 3 days total  Continue prednisone 40 mg daily for 5 days total  Continue Spiriva inhaler  Albuterol inhaler every 6 hours as needed for wheezing  Pulmonary hygiene; I-S and Acapella  Follow respiratory culture    CKD stage IIIb: Creatinine on admission 1.4.  Baseline 1.2.  BUN 27.  Follows with Dr. Martins.  Last follow-up visit on 2024 showing that transplant kidney is stable with PTH improving and urine protein creatinine ratio improved from 0.37 g to 0.26 g in May 2024.  Follow-up with Dr. Martins  Continue to monitor renal function    Elevated troponin, resolved: Likely due to demand and CKD.  Troponin on admission 34 => 29.  Patient denied any chest pain, nausea, vomiting, lightheadedness or dizziness.    DVT prophylaxis: Xarelto    Chronic

## 2024-12-26 NOTE — RT PROTOCOL NOTE
RT Inhaler-Nebulizer Bronchodilator Protocol Note    There is a bronchodilator order in the chart from a provider indicating to follow the RT Bronchodilator Protocol and there is an “Initiate RT Inhaler-Nebulizer Bronchodilator Protocol” order as well (see protocol at bottom of note).    CXR Findings:  XR CHEST PORTABLE    Result Date: 12/24/2024  1. Hazy right lower lobe opacities may reflect edema or infiltrate. 2. Cardiomegaly. This document has been electronically signed by: Reynaldo Carr MD on 12/24/2024 10:26 PM      The findings from the last RT Protocol Assessment were as follows:   History Pulmonary Disease: Chronic pulmonary disease  Respiratory Pattern: Dyspnea on exertion or RR 21-25 bpm  Breath Sounds: Slightly diminished and/or crackles  Cough: Strong, spontaneous, non-productive  Indication for Bronchodilator Therapy: Decreased or absent breath sounds  Bronchodilator Assessment Score: 6    Aerosolized bronchodilator medication orders have been revised according to the RT Inhaler-Nebulizer Bronchodilator Protocol below.    Respiratory Therapist to perform RT Therapy Protocol Assessment initially then follow the protocol.  Repeat RT Therapy Protocol Assessment PRN for score 0-3 or on second treatment, BID, and PRN for scores above 3.    No Indications - adjust the frequency to every 6 hours PRN wheezing or bronchospasm, if no treatments needed after 48 hours then discontinue using Per Protocol order mode.     If indication present, adjust the RT bronchodilator orders based on the Bronchodilator Assessment Score as indicated below.  Use Inhaler orders unless patient has one or more of the following: on home nebulizer, not able to hold breath for 10 seconds, is not alert and oriented, cannot activate and use MDI correctly, or respiratory rate 25 breaths per minute or more, then use the equivalent nebulizer order(s) with same Frequency and PRN reasons based on the score.  If a patient is on this medication

## 2024-12-27 LAB
ANION GAP SERPL CALC-SCNC: 14 MEQ/L (ref 8–16)
BUN SERPL-MCNC: 25 MG/DL (ref 7–22)
CALCIUM SERPL-MCNC: 10.3 MG/DL (ref 8.5–10.5)
CHLORIDE SERPL-SCNC: 101 MEQ/L (ref 98–111)
CO2 SERPL-SCNC: 24 MEQ/L (ref 23–33)
CREAT SERPL-MCNC: 1.1 MG/DL (ref 0.4–1.2)
DEPRECATED RDW RBC AUTO: 43.8 FL (ref 35–45)
ERYTHROCYTE [DISTWIDTH] IN BLOOD BY AUTOMATED COUNT: 14.3 % (ref 11.5–14.5)
GFR SERPL CREATININE-BSD FRML MDRD: 54 ML/MIN/1.73M2
GLUCOSE BLD STRIP.AUTO-MCNC: 182 MG/DL (ref 70–108)
GLUCOSE BLD STRIP.AUTO-MCNC: 201 MG/DL (ref 70–108)
GLUCOSE BLD STRIP.AUTO-MCNC: 264 MG/DL (ref 70–108)
GLUCOSE BLD STRIP.AUTO-MCNC: 313 MG/DL (ref 70–108)
GLUCOSE SERPL-MCNC: 194 MG/DL (ref 70–108)
HCT VFR BLD AUTO: 31.6 % (ref 37–47)
HGB BLD-MCNC: 9.7 GM/DL (ref 12–16)
MCH RBC QN AUTO: 25.9 PG (ref 26–33)
MCHC RBC AUTO-ENTMCNC: 30.7 GM/DL (ref 32.2–35.5)
MCV RBC AUTO: 84.5 FL (ref 81–99)
PLATELET # BLD AUTO: 358 THOU/MM3 (ref 130–400)
PMV BLD AUTO: 10.5 FL (ref 9.4–12.4)
POTASSIUM SERPL-SCNC: 3.7 MEQ/L (ref 3.5–5.2)
RBC # BLD AUTO: 3.74 MILL/MM3 (ref 4.2–5.4)
SODIUM SERPL-SCNC: 139 MEQ/L (ref 135–145)
WBC # BLD AUTO: 11.3 THOU/MM3 (ref 4.8–10.8)

## 2024-12-27 PROCEDURE — 6360000002 HC RX W HCPCS

## 2024-12-27 PROCEDURE — 2500000003 HC RX 250 WO HCPCS

## 2024-12-27 PROCEDURE — 1200000003 HC TELEMETRY R&B

## 2024-12-27 PROCEDURE — 94640 AIRWAY INHALATION TREATMENT: CPT

## 2024-12-27 PROCEDURE — 85027 COMPLETE CBC AUTOMATED: CPT

## 2024-12-27 PROCEDURE — 6370000000 HC RX 637 (ALT 250 FOR IP)

## 2024-12-27 PROCEDURE — 99233 SBSQ HOSP IP/OBS HIGH 50: CPT | Performed by: INTERNAL MEDICINE

## 2024-12-27 PROCEDURE — 1200000000 HC SEMI PRIVATE

## 2024-12-27 PROCEDURE — 80048 BASIC METABOLIC PNL TOTAL CA: CPT

## 2024-12-27 PROCEDURE — 36415 COLL VENOUS BLD VENIPUNCTURE: CPT

## 2024-12-27 PROCEDURE — 82948 REAGENT STRIP/BLOOD GLUCOSE: CPT

## 2024-12-27 RX ADMIN — ASPIRIN 81 MG: 81 TABLET, COATED ORAL at 09:04

## 2024-12-27 RX ADMIN — CLONIDINE HYDROCHLORIDE 0.2 MG: 0.2 TABLET ORAL at 09:04

## 2024-12-27 RX ADMIN — TACROLIMUS 5 MG: 1 CAPSULE ORAL at 09:04

## 2024-12-27 RX ADMIN — ALBUTEROL SULFATE 2 PUFF: 90 AEROSOL, METERED RESPIRATORY (INHALATION) at 16:07

## 2024-12-27 RX ADMIN — Medication 400 MG: at 09:04

## 2024-12-27 RX ADMIN — CARVEDILOL 50 MG: 25 TABLET, FILM COATED ORAL at 09:04

## 2024-12-27 RX ADMIN — SODIUM CHLORIDE, PRESERVATIVE FREE 10 ML: 5 INJECTION INTRAVENOUS at 09:04

## 2024-12-27 RX ADMIN — INSULIN LISPRO 3 UNITS: 100 INJECTION, SOLUTION INTRAVENOUS; SUBCUTANEOUS at 12:39

## 2024-12-27 RX ADMIN — PREDNISONE 40 MG: 20 TABLET ORAL at 09:04

## 2024-12-27 RX ADMIN — SODIUM CHLORIDE, PRESERVATIVE FREE 10 ML: 5 INJECTION INTRAVENOUS at 20:34

## 2024-12-27 RX ADMIN — AZITHROMYCIN DIHYDRATE 500 MG: 250 TABLET ORAL at 09:04

## 2024-12-27 RX ADMIN — RIVAROXABAN 2.5 MG: 2.5 TABLET, FILM COATED ORAL at 09:04

## 2024-12-27 RX ADMIN — WATER 1000 MG: 1 INJECTION INTRAMUSCULAR; INTRAVENOUS; SUBCUTANEOUS at 01:14

## 2024-12-27 RX ADMIN — RIVAROXABAN 2.5 MG: 2.5 TABLET, FILM COATED ORAL at 20:34

## 2024-12-27 RX ADMIN — CARVEDILOL 50 MG: 25 TABLET, FILM COATED ORAL at 20:33

## 2024-12-27 RX ADMIN — ALBUTEROL SULFATE 2 PUFF: 90 AEROSOL, METERED RESPIRATORY (INHALATION) at 08:17

## 2024-12-27 RX ADMIN — INSULIN LISPRO 6 UNITS: 100 INJECTION, SOLUTION INTRAVENOUS; SUBCUTANEOUS at 08:55

## 2024-12-27 RX ADMIN — Medication 400 MG: at 20:33

## 2024-12-27 RX ADMIN — INSULIN LISPRO 12 UNITS: 100 INJECTION, SOLUTION INTRAVENOUS; SUBCUTANEOUS at 20:34

## 2024-12-27 RX ADMIN — INSULIN LISPRO 9 UNITS: 100 INJECTION, SOLUTION INTRAVENOUS; SUBCUTANEOUS at 18:06

## 2024-12-27 RX ADMIN — CLONIDINE HYDROCHLORIDE 0.2 MG: 0.2 TABLET ORAL at 20:33

## 2024-12-27 RX ADMIN — SULFAMETHOXAZOLE AND TRIMETHOPRIM 1 TABLET: 800; 160 TABLET ORAL at 09:04

## 2024-12-27 RX ADMIN — ATORVASTATIN CALCIUM 40 MG: 40 TABLET, FILM COATED ORAL at 09:04

## 2024-12-27 RX ADMIN — MYCOPHENOLIC ACID 360 MG: 360 TABLET, DELAYED RELEASE ORAL at 09:04

## 2024-12-27 RX ADMIN — NIFEDIPINE 90 MG: 90 TABLET, EXTENDED RELEASE ORAL at 09:04

## 2024-12-27 RX ADMIN — TIOTROPIUM BROMIDE INHALATION SPRAY 2 PUFF: 3.12 SPRAY, METERED RESPIRATORY (INHALATION) at 08:17

## 2024-12-27 RX ADMIN — TACROLIMUS 5 MG: 1 CAPSULE ORAL at 20:35

## 2024-12-27 RX ADMIN — INSULIN GLARGINE 30 UNITS: 100 INJECTION, SOLUTION SUBCUTANEOUS at 20:33

## 2024-12-27 RX ADMIN — MYCOPHENOLIC ACID 360 MG: 360 TABLET, DELAYED RELEASE ORAL at 20:35

## 2024-12-27 ASSESSMENT — PAIN DESCRIPTION - FREQUENCY: FREQUENCY: CONTINUOUS

## 2024-12-27 ASSESSMENT — PAIN SCALES - GENERAL
PAINLEVEL_OUTOF10: 7
PAINLEVEL_OUTOF10: 0

## 2024-12-27 ASSESSMENT — PAIN DESCRIPTION - ORIENTATION: ORIENTATION: RIGHT;LEFT

## 2024-12-27 ASSESSMENT — PAIN DESCRIPTION - ONSET: ONSET: ON-GOING

## 2024-12-27 ASSESSMENT — PAIN DESCRIPTION - LOCATION: LOCATION: LEG

## 2024-12-27 ASSESSMENT — PAIN DESCRIPTION - DESCRIPTORS: DESCRIPTORS: NUMBNESS;TINGLING

## 2024-12-27 ASSESSMENT — PAIN DESCRIPTION - PAIN TYPE: TYPE: CHRONIC PAIN

## 2024-12-27 NOTE — PLAN OF CARE
Problem: Respiratory - Adult  Goal: Clear lung sounds  Outcome: Progressing   Pt continues on MDI for maintenance of COPD, pt does MDI's at home and to clear lung sounds/improve aeration. Patient mutually agreed on goals.

## 2024-12-27 NOTE — PLAN OF CARE
Problem: Respiratory - Adult  Goal: Clear lung sounds  12/27/2024 1611 by Katt Ohara, RCP  Outcome: Progressing      Pt continues on MDI for maintenance of COPD, pt does MDI's at home and to clear lung sounds/improve aeration. Patient mutually agreed on goals.

## 2024-12-27 NOTE — PLAN OF CARE
Problem: Chronic Conditions and Co-morbidities  Goal: Patient's chronic conditions and co-morbidity symptoms are monitored and maintained or improved  Outcome: Progressing     Problem: Pain  Goal: Verbalizes/displays adequate comfort level or baseline comfort level  Outcome: Progressing     Problem: Safety - Adult  Goal: Free from fall injury  Outcome: Progressing     Problem: Discharge Planning  Goal: Discharge to home or other facility with appropriate resources  Outcome: Progressing     Problem: Respiratory - Adult  Goal: Clear lung sounds  12/27/2024 0822 by Katt Ohara RCP  Outcome: Progressing     Problem: Infection - Adult  Goal: Absence of infection at discharge  Outcome: Progressing     Problem: Skin/Tissue Integrity  Goal: Absence of new skin breakdown  Description: 1.  Monitor for areas of redness and/or skin breakdown  2.  Assess vascular access sites hourly  3.  Every 4-6 hours minimum:  Change oxygen saturation probe site  4.  Every 4-6 hours:  If on nasal continuous positive airway pressure, respiratory therapy assess nares and determine need for appliance change or resting period.  Outcome: Progressing   Care plan reviewed with patient.  Patient verbalizes understanding of the plan of care and contributes to goal setting.

## 2024-12-28 LAB
ANION GAP SERPL CALC-SCNC: 13 MEQ/L (ref 8–16)
BASOPHILS ABSOLUTE: 0 THOU/MM3 (ref 0–0.1)
BASOPHILS NFR BLD AUTO: 0.3 %
BUN SERPL-MCNC: 23 MG/DL (ref 7–22)
CALCIUM SERPL-MCNC: 10.6 MG/DL (ref 8.5–10.5)
CHLORIDE SERPL-SCNC: 102 MEQ/L (ref 98–111)
CO2 SERPL-SCNC: 25 MEQ/L (ref 23–33)
CREAT SERPL-MCNC: 1.1 MG/DL (ref 0.4–1.2)
DEPRECATED RDW RBC AUTO: 43.6 FL (ref 35–45)
EOSINOPHIL NFR BLD AUTO: 0.2 %
EOSINOPHILS ABSOLUTE: 0 THOU/MM3 (ref 0–0.4)
ERYTHROCYTE [DISTWIDTH] IN BLOOD BY AUTOMATED COUNT: 14.2 % (ref 11.5–14.5)
GFR SERPL CREATININE-BSD FRML MDRD: 54 ML/MIN/1.73M2
GLUCOSE BLD STRIP.AUTO-MCNC: 196 MG/DL (ref 70–108)
GLUCOSE BLD STRIP.AUTO-MCNC: 216 MG/DL (ref 70–108)
GLUCOSE BLD STRIP.AUTO-MCNC: 267 MG/DL (ref 70–108)
GLUCOSE BLD STRIP.AUTO-MCNC: 290 MG/DL (ref 70–108)
GLUCOSE SERPL-MCNC: 164 MG/DL (ref 70–108)
HCT VFR BLD AUTO: 35.5 % (ref 37–47)
HGB BLD-MCNC: 11 GM/DL (ref 12–16)
IMM GRANULOCYTES # BLD AUTO: 0.19 THOU/MM3 (ref 0–0.07)
IMM GRANULOCYTES NFR BLD AUTO: 1.7 %
LYMPHOCYTES ABSOLUTE: 1.4 THOU/MM3 (ref 1–4.8)
LYMPHOCYTES NFR BLD AUTO: 13.2 %
MCH RBC QN AUTO: 26.3 PG (ref 26–33)
MCHC RBC AUTO-ENTMCNC: 31 GM/DL (ref 32.2–35.5)
MCV RBC AUTO: 84.7 FL (ref 81–99)
MONOCYTES ABSOLUTE: 0.7 THOU/MM3 (ref 0.4–1.3)
MONOCYTES NFR BLD AUTO: 6 %
NEUTROPHILS ABSOLUTE: 8.6 THOU/MM3 (ref 1.8–7.7)
NEUTROPHILS NFR BLD AUTO: 78.6 %
NRBC BLD AUTO-RTO: 0 /100 WBC
PLATELET # BLD AUTO: 436 THOU/MM3 (ref 130–400)
PMV BLD AUTO: 9.9 FL (ref 9.4–12.4)
POTASSIUM SERPL-SCNC: 3.7 MEQ/L (ref 3.5–5.2)
RBC # BLD AUTO: 4.19 MILL/MM3 (ref 4.2–5.4)
SODIUM SERPL-SCNC: 140 MEQ/L (ref 135–145)
WBC # BLD AUTO: 10.9 THOU/MM3 (ref 4.8–10.8)

## 2024-12-28 PROCEDURE — 6370000000 HC RX 637 (ALT 250 FOR IP)

## 2024-12-28 PROCEDURE — 99233 SBSQ HOSP IP/OBS HIGH 50: CPT | Performed by: INTERNAL MEDICINE

## 2024-12-28 PROCEDURE — 85025 COMPLETE CBC W/AUTO DIFF WBC: CPT

## 2024-12-28 PROCEDURE — 1200000000 HC SEMI PRIVATE

## 2024-12-28 PROCEDURE — 6360000002 HC RX W HCPCS

## 2024-12-28 PROCEDURE — 94761 N-INVAS EAR/PLS OXIMETRY MLT: CPT

## 2024-12-28 PROCEDURE — 36415 COLL VENOUS BLD VENIPUNCTURE: CPT

## 2024-12-28 PROCEDURE — 94640 AIRWAY INHALATION TREATMENT: CPT

## 2024-12-28 PROCEDURE — 6370000000 HC RX 637 (ALT 250 FOR IP): Performed by: INTERNAL MEDICINE

## 2024-12-28 PROCEDURE — 80048 BASIC METABOLIC PNL TOTAL CA: CPT

## 2024-12-28 PROCEDURE — 2500000003 HC RX 250 WO HCPCS

## 2024-12-28 PROCEDURE — 82948 REAGENT STRIP/BLOOD GLUCOSE: CPT

## 2024-12-28 RX ORDER — HYDROCODONE BITARTRATE AND ACETAMINOPHEN 5; 325 MG/1; MG/1
1 TABLET ORAL EVERY 8 HOURS PRN
Status: DISCONTINUED | OUTPATIENT
Start: 2024-12-28 | End: 2024-12-30 | Stop reason: HOSPADM

## 2024-12-28 RX ORDER — HYDRALAZINE HYDROCHLORIDE 20 MG/ML
10 INJECTION INTRAMUSCULAR; INTRAVENOUS EVERY 6 HOURS PRN
Status: DISCONTINUED | OUTPATIENT
Start: 2024-12-28 | End: 2024-12-28

## 2024-12-28 RX ORDER — HYDRALAZINE HYDROCHLORIDE 25 MG/1
25 TABLET, FILM COATED ORAL EVERY 8 HOURS SCHEDULED
Status: DISCONTINUED | OUTPATIENT
Start: 2024-12-28 | End: 2024-12-30 | Stop reason: HOSPADM

## 2024-12-28 RX ADMIN — CLONIDINE HYDROCHLORIDE 0.2 MG: 0.2 TABLET ORAL at 04:31

## 2024-12-28 RX ADMIN — HYDRALAZINE HYDROCHLORIDE 25 MG: 25 TABLET ORAL at 18:07

## 2024-12-28 RX ADMIN — ATORVASTATIN CALCIUM 40 MG: 40 TABLET, FILM COATED ORAL at 08:52

## 2024-12-28 RX ADMIN — TACROLIMUS 5 MG: 1 CAPSULE ORAL at 20:09

## 2024-12-28 RX ADMIN — INSULIN LISPRO 9 UNITS: 100 INJECTION, SOLUTION INTRAVENOUS; SUBCUTANEOUS at 11:50

## 2024-12-28 RX ADMIN — CLONIDINE HYDROCHLORIDE 0.2 MG: 0.2 TABLET ORAL at 11:44

## 2024-12-28 RX ADMIN — Medication 400 MG: at 19:53

## 2024-12-28 RX ADMIN — HYDRALAZINE HYDROCHLORIDE 25 MG: 25 TABLET ORAL at 08:52

## 2024-12-28 RX ADMIN — ALBUTEROL SULFATE 2 PUFF: 90 AEROSOL, METERED RESPIRATORY (INHALATION) at 19:38

## 2024-12-28 RX ADMIN — WATER 1000 MG: 1 INJECTION INTRAMUSCULAR; INTRAVENOUS; SUBCUTANEOUS at 00:01

## 2024-12-28 RX ADMIN — PREDNISONE 40 MG: 20 TABLET ORAL at 08:52

## 2024-12-28 RX ADMIN — MYCOPHENOLIC ACID 360 MG: 360 TABLET, DELAYED RELEASE ORAL at 08:55

## 2024-12-28 RX ADMIN — CARVEDILOL 50 MG: 25 TABLET, FILM COATED ORAL at 08:53

## 2024-12-28 RX ADMIN — MYCOPHENOLIC ACID 360 MG: 360 TABLET, DELAYED RELEASE ORAL at 20:07

## 2024-12-28 RX ADMIN — INSULIN LISPRO 3 UNITS: 100 INJECTION, SOLUTION INTRAVENOUS; SUBCUTANEOUS at 09:00

## 2024-12-28 RX ADMIN — INSULIN LISPRO 6 UNITS: 100 INJECTION, SOLUTION INTRAVENOUS; SUBCUTANEOUS at 18:07

## 2024-12-28 RX ADMIN — RIVAROXABAN 2.5 MG: 2.5 TABLET, FILM COATED ORAL at 08:52

## 2024-12-28 RX ADMIN — TIOTROPIUM BROMIDE INHALATION SPRAY 2 PUFF: 3.12 SPRAY, METERED RESPIRATORY (INHALATION) at 08:19

## 2024-12-28 RX ADMIN — HYDROCODONE BITARTRATE AND ACETAMINOPHEN 1 TABLET: 5; 325 TABLET ORAL at 03:56

## 2024-12-28 RX ADMIN — SODIUM CHLORIDE, PRESERVATIVE FREE 10 ML: 5 INJECTION INTRAVENOUS at 08:53

## 2024-12-28 RX ADMIN — NIFEDIPINE 90 MG: 90 TABLET, EXTENDED RELEASE ORAL at 08:52

## 2024-12-28 RX ADMIN — Medication 400 MG: at 08:52

## 2024-12-28 RX ADMIN — ALBUTEROL SULFATE 2 PUFF: 90 AEROSOL, METERED RESPIRATORY (INHALATION) at 08:19

## 2024-12-28 RX ADMIN — INSULIN GLARGINE 30 UNITS: 100 INJECTION, SOLUTION SUBCUTANEOUS at 20:10

## 2024-12-28 RX ADMIN — HYDRALAZINE HYDROCHLORIDE 10 MG: 20 INJECTION INTRAMUSCULAR; INTRAVENOUS at 06:43

## 2024-12-28 RX ADMIN — TACROLIMUS 5 MG: 1 CAPSULE ORAL at 08:55

## 2024-12-28 RX ADMIN — RIVAROXABAN 2.5 MG: 2.5 TABLET, FILM COATED ORAL at 19:53

## 2024-12-28 RX ADMIN — SODIUM CHLORIDE, PRESERVATIVE FREE 10 ML: 5 INJECTION INTRAVENOUS at 19:53

## 2024-12-28 RX ADMIN — CARVEDILOL 50 MG: 25 TABLET, FILM COATED ORAL at 18:07

## 2024-12-28 RX ADMIN — CLONIDINE HYDROCHLORIDE 0.2 MG: 0.2 TABLET ORAL at 23:39

## 2024-12-28 RX ADMIN — INSULIN LISPRO 9 UNITS: 100 INJECTION, SOLUTION INTRAVENOUS; SUBCUTANEOUS at 20:10

## 2024-12-28 RX ADMIN — ASPIRIN 81 MG: 81 TABLET, COATED ORAL at 08:52

## 2024-12-28 RX ADMIN — HYDROCODONE BITARTRATE AND ACETAMINOPHEN 1 TABLET: 5; 325 TABLET ORAL at 20:43

## 2024-12-28 ASSESSMENT — PAIN SCALES - GENERAL
PAINLEVEL_OUTOF10: 6
PAINLEVEL_OUTOF10: 7

## 2024-12-28 ASSESSMENT — PAIN - FUNCTIONAL ASSESSMENT: PAIN_FUNCTIONAL_ASSESSMENT: ACTIVITIES ARE NOT PREVENTED

## 2024-12-28 ASSESSMENT — PAIN DESCRIPTION - LOCATION
LOCATION: BACK;LEG
LOCATION: NECK

## 2024-12-28 ASSESSMENT — PAIN DESCRIPTION - ORIENTATION
ORIENTATION: RIGHT;LEFT
ORIENTATION: MID

## 2024-12-28 ASSESSMENT — PAIN DESCRIPTION - DESCRIPTORS: DESCRIPTORS: ACHING

## 2024-12-28 NOTE — PLAN OF CARE
Problem: Chronic Conditions and Co-morbidities  Goal: Patient's chronic conditions and co-morbidity symptoms are monitored and maintained or improved  12/25/2024 1055 by Lisette Castillo RN  Outcome: Progressing     Patient's chronic conditions and co-morbidity symptoms are monitored and maintained.    Problem: Pain  Goal: Verbalizes/displays adequate comfort level or baseline comfort level  12/25/2024 1055 by Lisette Castillo, RN  Outcome: Progressing    Patient states pain relief from PRN pain medications. Pain reassessed one hour post PRN pain medication given.  Patient rates pain 0 on MALIK 0-10 scale.     Problem: Safety - Adult  Goal: Free from fall injury  12/25/2024 1055 by Lisette Castillo RN  Outcome: Progressing    Fall assessment completed. Patient using call light appropriately to call for assistance with ambulation to bathroom.  Personal items within reach. Patient is also compliant with use of non-skid slippers.      Problem: Discharge Planning  Goal: Discharge to home or other facility with appropriate resources  Outcome: Progressing    Discharge plan is in process. Plan discharge home with family.     Problem: Respiratory - Adult  Goal: Achieves optimal ventilation and oxygenation  Outcome: Progressing    Patients oxygen saturation 94 % on room air.  No shortness of breath noted. Lung sounds rhonchi, able C&DB as ordered.     Problem: Infection - Adult  Goal: Absence of infection at discharge  Outcome: Progressing    No s/s infection for shift. VS- WNL.    Care plan reviewed with patient and family.  Patient and family verbalize understanding of the plan of care and contribute to goal setting.

## 2024-12-28 NOTE — PLAN OF CARE
Problem: Chronic Conditions and Co-morbidities  Goal: Patient's chronic conditions and co-morbidity symptoms are monitored and maintained or improved  12/27/2024 2330 by Valencia Alcantar RN  Outcome: Progressing     Problem: Pain  Goal: Verbalizes/displays adequate comfort level or baseline comfort level  12/27/2024 2330 by Valencia Alcantar RN  Outcome: Progressing   Pain Assessment: None - Denies Pain  Pain Level: 0   Patient's Stated Pain Goal: 5   Is pain goal met at this time?  Yes     Non-Pharmaceutical Pain Intervention(s): Rest, Repositioned   Problem: Safety - Adult  Goal: Free from fall injury  12/27/2024 2330 by Valencia Alcantar RN  Outcome: Progressing     Problem: Discharge Planning  Goal: Discharge to home or other facility with appropriate resources  12/27/2024 2330 by Valencia Alcantar RN  Outcome: Progressing    Problem: Infection - Adult  Goal: Absence of infection at discharge  12/27/2024 2330 by Valencia Alcantar RN  Outcome: Progressing     Problem: Skin/Tissue Integrity  Goal: Absence of new skin breakdown  Description: 1.  Monitor for areas of redness and/or skin breakdown  2.  Assess vascular access sites hourly  3.  Every 4-6 hours minimum:  Change oxygen saturation probe site  4.  Every 4-6 hours:  If on nasal continuous positive airway pressure, respiratory therapy assess nares and determine need for appliance change or resting period.  12/27/2024 2330 by Valencia Alcantar RN  Outcome: Progressing       Care plan reviewed with patient.  Patient verbalizes understanding of the plan of care and contributes to goal setting.

## 2024-12-28 NOTE — PROGRESS NOTES
Hospitalist Progress Note         Patient: Avani Thomas 69 y.o. female      : 1955  Date of Admission: 2024  Date of Service: Pt seen/examined on 24 and Admitted to Inpatient with expected LOS greater than two midnights due to medical therapy.       ASSESSMENT AND PLAN  Acute hypoxic respiratory failure: Secondary to COPD exacerbation/pneumonia.  Patient not on any home O2.  She required 1.5 L NC initially in the ED and required up to 2.5 L NC upon transfer to floor.  Not on any O2 at home.  Was previously on O2 supplementation roughly 1 year ago per patient report.  D-dimer elevated on admission 1001.  Unable to obtain CTA chest in ED due to increasing creatinine.  Vitals stable after administration of supplemental O2 in ED.  Wean O2 to maintain SpO2 between 88% - 92%  V/Q scan    COPD exacerbation secondary to pneumonia: Last PFT showed no obstruction or restriction, mildly decreased DLCO but improved from .  CXR shows hazy RLL opacities and cardiomegaly.  Procalcitonin elevated 0.26.  Follows with Dr. Lee.  Continue ceftriaxone 1 g daily, de-escalate based on cultures and sensitivities  Continue azithromycin 500 mg twice daily for 3 days total  Continue prednisone 40 mg daily for 5 days total  Continue Spiriva inhaler  Albuterol inhaler every 6 hours as needed for wheezing  Pulmonary hygiene; I-S and Acapella  Follow respiratory culture    CKD stage IIIb: Creatinine on admission 1.4.  Baseline 1.2.  BUN 27.  Follows with Dr. Martins.  Last follow-up visit on 2024 showing that transplant kidney is stable with PTH improving and urine protein creatinine ratio improved from 0.37 g to 0.26 g in May 2024.  Follow-up with Dr. Martins  Continue to monitor renal function    Elevated troponin, resolved: Likely due to demand and CKD.  Troponin on admission 34 => 29.  Patient denied any chest pain, nausea, vomiting, lightheadedness or dizziness.    DVT prophylaxis: Xarelto    Chronic  COLONOSCOPY  2009    2 polyps, not precanceorus    COLONOSCOPY Left 6/10/2019    COLONOSCOPY DIAGNOSTIC performed by Morris Abreu MD at [facility]    COSMETIC SURGERY  3/30/2012    eye lid lift    DIAGNOSTIC CARDIAC CATH LAB PROCEDURE      ENDOSCOPY, COLON, DIAGNOSTIC  2007    EYE SURGERY  March 30th, 2012    left sided ptosis    FOOT SURGERY  1990    Tarsal tunnel surgery    FRACTURE SURGERY  2015    HYSTERECTOMY (CERVIX STATUS UNKNOWN)  1980 and 1985    first partial in 1980's, then total in 1985    JOINT REPLACEMENT  2015    KIDNEY TRANSPLANT  04/10/2020    RIGHT    LIVER BIOPSY  6/2015    LUNG BIOPSY  2009    OVARY REMOVAL  1985    [facility]ISIT,PROCEDURE ONLY Left 8/23/2018    LEFT UPPER EXTREMENTY AV FISTULA CREATION performed by Cam Bah MD at [facility] OR    UPPER GASTROINTESTINAL ENDOSCOPY Left 6/14/2019    EGD BIOPSY performed by Morris Abreu MD at [facility]         Problem Relation Age of Onset    Diabetes Sister     Diabetes Brother     Diabetes Sister     Diabetes Sister     Early Death Sister     Heart Disease Sister     Brain Cancer Sister     Diabetes Sister     Heart Disease Sister     Diabetes Sister     Diabetes Brother     Arthritis Mother     Heart Disease Mother     High Blood Pressure Mother     Kidney Disease Mother     Mental Illness Mother     Stroke Mother     Heart Disease Father     Diabetes Father     Obesity Father     Alcohol Abuse Father     Breast Cancer Paternal Cousin 38     Social History     Socioeconomic History    Marital status:      Spouse name: None    Number of children: 1    Years of education: 10    Highest education level: None   Tobacco Use    Smoking status: Former     Current packs/day: 0.00     Average packs/day: 1.5 packs/day for 30.0 years (45.0 ttl pk-yrs)     Types: Cigarettes     Start date: 12/28/1979     Quit date: 3/13/2009     Years since quitting: 15.8    Smokeless tobacco: Never    Tobacco comments:     quit 2009   Vaping  Use    Vaping status: Never Used   Substance and Sexual Activity    Alcohol use: No     Alcohol/week: 0.0 standard drinks of alcohol    Drug use: No    Sexual activity: Yes     Partners: Male     Social Determinants of Health     Financial Resource Strain: Patient Declined (10/7/2024)    Overall Financial Resource Strain (CARDIA)     Difficulty of Paying Living Expenses: Patient declined   Food Insecurity: Patient Declined (12/25/2024)    Hunger Vital Sign     Worried About Running Out of Food in the Last Year: Patient declined     Ran Out of Food in the Last Year: Patient declined   Transportation Needs: Unknown (12/25/2024)    PRAPARE - Transportation     Lack of Transportation (Medical): No     Lack of Transportation (Non-Medical): Patient declined   Physical Activity: Inactive (10/2/2024)    Exercise Vital Sign     Days of Exercise per Week: 0 days     Minutes of Exercise per Session: 0 min   Housing Stability: Unknown (12/25/2024)    Housing Stability Vital Sign     Unable to Pay for Housing in the Last Year: No     Number of Times Moved in the Last Year: 0     Homeless in the Last Year: Patient declined        Code status: Limited     Electronically signed by Patel Reyes DO on 12/28/2024 at 8:03 AM.

## 2024-12-29 LAB
GLUCOSE BLD STRIP.AUTO-MCNC: 178 MG/DL (ref 70–108)
GLUCOSE BLD STRIP.AUTO-MCNC: 261 MG/DL (ref 70–108)
GLUCOSE BLD STRIP.AUTO-MCNC: 299 MG/DL (ref 70–108)
GLUCOSE BLD STRIP.AUTO-MCNC: 349 MG/DL (ref 70–108)

## 2024-12-29 PROCEDURE — 6370000000 HC RX 637 (ALT 250 FOR IP)

## 2024-12-29 PROCEDURE — 1200000000 HC SEMI PRIVATE

## 2024-12-29 PROCEDURE — 94761 N-INVAS EAR/PLS OXIMETRY MLT: CPT

## 2024-12-29 PROCEDURE — 6370000000 HC RX 637 (ALT 250 FOR IP): Performed by: INTERNAL MEDICINE

## 2024-12-29 PROCEDURE — 99232 SBSQ HOSP IP/OBS MODERATE 35: CPT | Performed by: INTERNAL MEDICINE

## 2024-12-29 PROCEDURE — 82948 REAGENT STRIP/BLOOD GLUCOSE: CPT

## 2024-12-29 PROCEDURE — 6360000002 HC RX W HCPCS

## 2024-12-29 PROCEDURE — 2500000003 HC RX 250 WO HCPCS

## 2024-12-29 PROCEDURE — 94640 AIRWAY INHALATION TREATMENT: CPT

## 2024-12-29 RX ORDER — HYDRALAZINE HYDROCHLORIDE 25 MG/1
25 TABLET, FILM COATED ORAL EVERY 8 HOURS SCHEDULED
Qty: 90 TABLET | Refills: 3 | Status: SHIPPED | OUTPATIENT
Start: 2024-12-29 | End: 2024-12-30 | Stop reason: HOSPADM

## 2024-12-29 RX ORDER — INSULIN LISPRO 100 [IU]/ML
1-18 INJECTION, SOLUTION INTRAVENOUS; SUBCUTANEOUS
Qty: 10 ML | Refills: 3 | Status: SHIPPED | OUTPATIENT
Start: 2024-12-29 | End: 2025-03-29

## 2024-12-29 RX ORDER — HYDRALAZINE HYDROCHLORIDE 25 MG/1
25 TABLET, FILM COATED ORAL EVERY 8 HOURS SCHEDULED
Status: CANCELLED | OUTPATIENT
Start: 2024-12-29

## 2024-12-29 RX ORDER — BENZONATATE 100 MG/1
100 CAPSULE ORAL 3 TIMES DAILY PRN
Qty: 30 CAPSULE | Refills: 0 | Status: SHIPPED | OUTPATIENT
Start: 2024-12-29 | End: 2025-01-05

## 2024-12-29 RX ORDER — INSULIN GLARGINE 100 [IU]/ML
30 INJECTION, SOLUTION SUBCUTANEOUS NIGHTLY
Qty: 10 ML | Refills: 3 | Status: SHIPPED | OUTPATIENT
Start: 2024-12-29

## 2024-12-29 RX ADMIN — RIVAROXABAN 2.5 MG: 2.5 TABLET, FILM COATED ORAL at 22:18

## 2024-12-29 RX ADMIN — NIFEDIPINE 90 MG: 90 TABLET, EXTENDED RELEASE ORAL at 08:27

## 2024-12-29 RX ADMIN — TIOTROPIUM BROMIDE INHALATION SPRAY 2 PUFF: 3.12 SPRAY, METERED RESPIRATORY (INHALATION) at 07:30

## 2024-12-29 RX ADMIN — ATORVASTATIN CALCIUM 40 MG: 40 TABLET, FILM COATED ORAL at 08:27

## 2024-12-29 RX ADMIN — Medication 400 MG: at 08:27

## 2024-12-29 RX ADMIN — HYDRALAZINE HYDROCHLORIDE 25 MG: 25 TABLET ORAL at 13:37

## 2024-12-29 RX ADMIN — ALBUTEROL SULFATE 2 PUFF: 90 AEROSOL, METERED RESPIRATORY (INHALATION) at 07:29

## 2024-12-29 RX ADMIN — TACROLIMUS 5 MG: 1 CAPSULE ORAL at 21:26

## 2024-12-29 RX ADMIN — INSULIN LISPRO 9 UNITS: 100 INJECTION, SOLUTION INTRAVENOUS; SUBCUTANEOUS at 21:34

## 2024-12-29 RX ADMIN — WATER 1000 MG: 1 INJECTION INTRAMUSCULAR; INTRAVENOUS; SUBCUTANEOUS at 00:41

## 2024-12-29 RX ADMIN — HYDROCODONE BITARTRATE AND ACETAMINOPHEN 1 TABLET: 5; 325 TABLET ORAL at 22:19

## 2024-12-29 RX ADMIN — TACROLIMUS 5 MG: 1 CAPSULE ORAL at 08:31

## 2024-12-29 RX ADMIN — Medication 400 MG: at 22:17

## 2024-12-29 RX ADMIN — INSULIN LISPRO 3 UNITS: 100 INJECTION, SOLUTION INTRAVENOUS; SUBCUTANEOUS at 17:43

## 2024-12-29 RX ADMIN — HYDRALAZINE HYDROCHLORIDE 25 MG: 25 TABLET ORAL at 04:45

## 2024-12-29 RX ADMIN — INSULIN GLARGINE 30 UNITS: 100 INJECTION, SOLUTION SUBCUTANEOUS at 21:31

## 2024-12-29 RX ADMIN — CARVEDILOL 50 MG: 25 TABLET, FILM COATED ORAL at 08:27

## 2024-12-29 RX ADMIN — MYCOPHENOLIC ACID 360 MG: 360 TABLET, DELAYED RELEASE ORAL at 08:29

## 2024-12-29 RX ADMIN — ASPIRIN 81 MG: 81 TABLET, COATED ORAL at 08:27

## 2024-12-29 RX ADMIN — MYCOPHENOLIC ACID 360 MG: 360 TABLET, DELAYED RELEASE ORAL at 21:25

## 2024-12-29 RX ADMIN — CLONIDINE HYDROCHLORIDE 0.2 MG: 0.2 TABLET ORAL at 08:27

## 2024-12-29 RX ADMIN — PREDNISONE 40 MG: 20 TABLET ORAL at 08:31

## 2024-12-29 RX ADMIN — CLONIDINE HYDROCHLORIDE 0.2 MG: 0.2 TABLET ORAL at 22:17

## 2024-12-29 RX ADMIN — INSULIN LISPRO 9 UNITS: 100 INJECTION, SOLUTION INTRAVENOUS; SUBCUTANEOUS at 12:49

## 2024-12-29 RX ADMIN — INSULIN LISPRO 12 UNITS: 100 INJECTION, SOLUTION INTRAVENOUS; SUBCUTANEOUS at 10:07

## 2024-12-29 RX ADMIN — RIVAROXABAN 2.5 MG: 2.5 TABLET, FILM COATED ORAL at 08:27

## 2024-12-29 RX ADMIN — ALBUTEROL SULFATE 2 PUFF: 90 AEROSOL, METERED RESPIRATORY (INHALATION) at 19:37

## 2024-12-29 ASSESSMENT — PAIN - FUNCTIONAL ASSESSMENT: PAIN_FUNCTIONAL_ASSESSMENT: ACTIVITIES ARE NOT PREVENTED

## 2024-12-29 ASSESSMENT — PAIN DESCRIPTION - ORIENTATION: ORIENTATION: MID

## 2024-12-29 ASSESSMENT — PAIN DESCRIPTION - LOCATION: LOCATION: BACK

## 2024-12-29 ASSESSMENT — PAIN SCALES - GENERAL: PAINLEVEL_OUTOF10: 7

## 2024-12-29 ASSESSMENT — PAIN DESCRIPTION - DESCRIPTORS: DESCRIPTORS: ACHING;DISCOMFORT

## 2024-12-29 NOTE — PLAN OF CARE
Problem: Respiratory - Adult  Goal: Clear lung sounds  12/29/2024 0738 by Sarah Rosario, RCP  Outcome: Progressing   Continue therapy as ordered to achieve and maintain clear breath sounds and improve aeration.

## 2024-12-29 NOTE — DISCHARGE SUMMARY
Hospital Medicine Discharge Summary      Patient Identification:   Avani Thomas   : 1955  MRN: [id number]   Account: [id number]      Patient's PCP: Hillary Stephenson APRN - CNP    Admit Date: 2024     Discharge Date:   2024    Admitting Physician: No admitting provider for patient encounter.     Discharge Physician: Patel Reyes DO     Discharge Diagnoses:    Active Hospital Problems    Diagnosis Date Noted    COPD with pneumonia (HCC) [J44.0, J18.9] 2024       The patient was seen and examined on day of discharge and this discharge summary is in conjunction with any daily progress note from day of discharge.    Hospital Course:   Avani Thomas is a 69 y.o. female admitted to [facility] on 2024 for copd exacerbation 2/2 to CAP.        Acute hypoxic respiratory failure: Secondary to COPD exacerbation/pneumonia.  Patient not on any home O2.  She required 1.5 L NC initially in the ED and required up to 2.5 L NC upon transfer to floor.  Not on any O2 at home.  Was previously on O2 supplementation roughly 1 year ago per patient report.  D-dimer elevated on admission 1001.  Unable to obtain CTA chest in ED due to increasing creatinine.  Vitals stable after administration of supplemental O2 in ED.  Wean O2 to maintain SpO2 between 88% - 92%  V/Q scan     COPD exacerbation secondary to pneumonia: Last PFT showed no obstruction or restriction, mildly decreased DLCO but improved from 2012.  CXR shows hazy RLL opacities and cardiomegaly.  Procalcitonin elevated 0.26.  Follows with Dr. Lee.  Continue ceftriaxone 1 g daily, de-escalate based on cultures and sensitivities  Continue azithromycin 500 mg twice daily for 3 days total  Continue prednisone 40 mg daily for 5 days total  Continue Spiriva inhaler  Albuterol inhaler every 6 hours as needed for wheezing  Pulmonary hygiene; I-S and Acapella  Follow respiratory culture     CKD stage IIIb: Creatinine on

## 2024-12-29 NOTE — PLAN OF CARE
Problem: Chronic Conditions and Co-morbidities  Goal: Patient's chronic conditions and co-morbidity symptoms are monitored and maintained or improved  Outcome: Progressing     Problem: Pain  Goal: Verbalizes/displays adequate comfort level or baseline comfort level  Outcome: Progressing   Pain Assessment: None - Denies Pain  Pain Level: 7   Patient's Stated Pain Goal: 7   Is pain goal met at this time?  Yes     Non-Pharmaceutical Pain Intervention(s): Rest   Problem: Safety - Adult  Goal: Free from fall injury  Outcome: Progressing  All fall precautions in place. Bed in low position, alarm activated and appropriate use of call light.      Problem: Discharge Planning  Goal: Discharge to home or other facility with appropriate resources  Outcome: Progressing     Problem: Infection - Adult  Goal: Absence of infection at discharge  Outcome: Progressing     Problem: Skin/Tissue Integrity  Goal: Absence of new skin breakdown  Description: 1.  Monitor for areas of redness and/or skin breakdown  2.  Assess vascular access sites hourly  3.  Every 4-6 hours minimum:  Change oxygen saturation probe site  4.  Every 4-6 hours:  If on nasal continuous positive airway pressure, respiratory therapy assess nares and determine need for appliance change or resting period.  Outcome: Progressing       Care plan reviewed with patient.  Patient verbalizes understanding of the plan of care and contributes to goal setting.

## 2024-12-29 NOTE — PLAN OF CARE
Problem: Respiratory - Adult  Goal: Clear lung sounds  12/28/2024 1941 by Kelly Trotter RCP  Outcome: Progressing     Patient mutually agreed on goals.

## 2024-12-30 VITALS
DIASTOLIC BLOOD PRESSURE: 51 MMHG | OXYGEN SATURATION: 94 % | WEIGHT: 192.24 LBS | BODY MASS INDEX: 32.03 KG/M2 | RESPIRATION RATE: 16 BRPM | HEIGHT: 65 IN | HEART RATE: 63 BPM | TEMPERATURE: 98.1 F | SYSTOLIC BLOOD PRESSURE: 112 MMHG

## 2024-12-30 LAB
BACTERIA BLD AEROBE CULT: NORMAL
BACTERIA BLD AEROBE CULT: NORMAL
GLUCOSE BLD STRIP.AUTO-MCNC: 199 MG/DL (ref 70–108)
GLUCOSE BLD STRIP.AUTO-MCNC: 251 MG/DL (ref 70–108)

## 2024-12-30 PROCEDURE — 82948 REAGENT STRIP/BLOOD GLUCOSE: CPT

## 2024-12-30 PROCEDURE — 6370000000 HC RX 637 (ALT 250 FOR IP)

## 2024-12-30 PROCEDURE — 99232 SBSQ HOSP IP/OBS MODERATE 35: CPT | Performed by: INTERNAL MEDICINE

## 2024-12-30 PROCEDURE — 94640 AIRWAY INHALATION TREATMENT: CPT

## 2024-12-30 RX ADMIN — MYCOPHENOLIC ACID 360 MG: 360 TABLET, DELAYED RELEASE ORAL at 07:53

## 2024-12-30 RX ADMIN — ATORVASTATIN CALCIUM 40 MG: 40 TABLET, FILM COATED ORAL at 07:54

## 2024-12-30 RX ADMIN — RIVAROXABAN 2.5 MG: 2.5 TABLET, FILM COATED ORAL at 07:54

## 2024-12-30 RX ADMIN — SULFAMETHOXAZOLE AND TRIMETHOPRIM 1 TABLET: 800; 160 TABLET ORAL at 07:54

## 2024-12-30 RX ADMIN — CARVEDILOL 50 MG: 25 TABLET, FILM COATED ORAL at 07:53

## 2024-12-30 RX ADMIN — NIFEDIPINE 90 MG: 90 TABLET, EXTENDED RELEASE ORAL at 07:54

## 2024-12-30 RX ADMIN — INSULIN LISPRO 3 UNITS: 100 INJECTION, SOLUTION INTRAVENOUS; SUBCUTANEOUS at 08:55

## 2024-12-30 RX ADMIN — ASPIRIN 81 MG: 81 TABLET, COATED ORAL at 07:55

## 2024-12-30 RX ADMIN — Medication 400 MG: at 07:54

## 2024-12-30 RX ADMIN — CLONIDINE HYDROCHLORIDE 0.2 MG: 0.2 TABLET ORAL at 05:35

## 2024-12-30 RX ADMIN — ALBUTEROL SULFATE 2 PUFF: 90 AEROSOL, METERED RESPIRATORY (INHALATION) at 07:29

## 2024-12-30 RX ADMIN — TACROLIMUS 5 MG: 1 CAPSULE ORAL at 07:51

## 2024-12-30 RX ADMIN — INSULIN LISPRO 9 UNITS: 100 INJECTION, SOLUTION INTRAVENOUS; SUBCUTANEOUS at 12:42

## 2024-12-30 RX ADMIN — TIOTROPIUM BROMIDE INHALATION SPRAY 2 PUFF: 3.12 SPRAY, METERED RESPIRATORY (INHALATION) at 07:29

## 2024-12-30 RX ADMIN — HYDROCODONE BITARTRATE AND ACETAMINOPHEN 1 TABLET: 5; 325 TABLET ORAL at 11:09

## 2024-12-30 ASSESSMENT — PAIN DESCRIPTION - PAIN TYPE: TYPE: CHRONIC PAIN

## 2024-12-30 ASSESSMENT — PAIN DESCRIPTION - LOCATION: LOCATION: BACK

## 2024-12-30 ASSESSMENT — PAIN SCALES - GENERAL
PAINLEVEL_OUTOF10: 3
PAINLEVEL_OUTOF10: 3
PAINLEVEL_OUTOF10: 5

## 2024-12-30 NOTE — PLAN OF CARE
Problem: Chronic Conditions and Co-morbidities  Goal: Patient's chronic conditions and co-morbidity symptoms are monitored and maintained or improved  Outcome: Progressing  Flowsheets (Taken 12/30/2024 0156)  Care Plan - Patient's Chronic Conditions and Co-Morbidity Symptoms are Monitored and Maintained or Improved:   Monitor and assess patient's chronic conditions and comorbid symptoms for stability, deterioration, or improvement   Collaborate with multidisciplinary team to address chronic and comorbid conditions and prevent exacerbation or deterioration   Update acute care plan with appropriate goals if chronic or comorbid symptoms are exacerbated and prevent overall improvement and discharge     Problem: Pain  Goal: Verbalizes/displays adequate comfort level or baseline comfort level  Outcome: Progressing  Flowsheets (Taken 12/30/2024 0156)  Verbalizes/displays adequate comfort level or baseline comfort level:   Encourage patient to monitor pain and request assistance   Assess pain using appropriate pain scale   Administer analgesics based on type and severity of pain and evaluate response   Consider cultural and social influences on pain and pain management   Implement non-pharmacological measures as appropriate and evaluate response   Notify Licensed Independent Practitioner if interventions unsuccessful or patient reports new pain     Problem: Safety - Adult  Goal: Free from fall injury  Outcome: Progressing  Flowsheets (Taken 12/30/2024 0156)  Free From Fall Injury: Instruct family/caregiver on patient safety     Problem: Discharge Planning  Goal: Discharge to home or other facility with appropriate resources  Outcome: Progressing  Flowsheets (Taken 12/30/2024 0156)  Discharge to home or other facility with appropriate resources: Identify barriers to discharge with patient and caregiver     Problem: Infection - Adult  Goal: Absence of infection at discharge  Outcome: Progressing  Flowsheets (Taken  12/30/2024 0156)  Absence of infection at discharge:   Assess and monitor for signs and symptoms of infection   Monitor lab/diagnostic results   Administer medications as ordered   Instruct and encourage patient and family to use good hand hygiene technique   Monitor all insertion sites i.e., indwelling lines, tubes and drains     Problem: Skin/Tissue Integrity  Goal: Absence of new skin breakdown  Description: 1.  Monitor for areas of redness and/or skin breakdown  2.  Assess vascular access sites hourly  3.  Every 4-6 hours minimum:  Change oxygen saturation probe site  4.  Every 4-6 hours:  If on nasal continuous positive airway pressure, respiratory therapy assess nares and determine need for appliance change or resting period.  12/30/2024 0156 by Dianne Corbett RN  Note: No signs of skin breakdown.  Skin warm, dry, and intact.  Mucous membranes pink and moist.  Assistance with turns/ambulation provided PRN.  Will continue to monitor.    Careplan reviewed with patient and family. Patient and family verbalized understanding of the plan of care.

## 2024-12-30 NOTE — PLAN OF CARE
Problem: Respiratory - Adult  Goal: Clear lung sounds  12/29/2024 1941 by Kelly Trotter RCP  Outcome: Progressing     Patient mutually agreed on goals.

## 2024-12-30 NOTE — FLOWSHEET NOTE
12/30/24 1101   Treatment Team Notification   Reason for Communication Abnormal vitals   Name of Team Member Notified Dr. Reyes   Treatment Team Role Attending Provider   Method of Communication Secure Message   Response Waiting for response   Notification Time 1101     BP 0743 was 206/65. Medications given at that time. Recheck at 1100 195/67      Response: See orders for hydralazine IV    looks like the order was for po hydralazine, but is currently held. Her IV access was removed yesterday.    "You can access the [facility], offered by [facility], by registering with the following website: [email or web address]"

## 2024-12-30 NOTE — FLOWSHEET NOTE
12/30/24 1332   Treatment Team Notification   Reason for Communication Abnormal vitals   Name of Team Member Notified Dr. Reyes   Treatment Team Role Attending Provider   Method of Communication Secure Message   Response Waiting for response   Notification Time 1332     BP just finished lunch up in chair and c/o dizziness, current BP 96/44.

## 2024-12-30 NOTE — PLAN OF CARE
Problem: Respiratory - Adult  Goal: Clear lung sounds  12/30/2024 0733 by Katt Ohara, RCP  Outcome: Progressing     Pt continues on MDI for maintenance of COPD, pt does MDI's at home and to clear lung sounds/improve aeration. Patient mutually agreed on goals.

## 2024-12-30 NOTE — CARE COORDINATION
12/30/24, 8:58 AM EST    Patient goals/plan/ treatment preferences discussed by  and .  Patient goals/plan/ treatment preferences reviewed with patient/ family.  Patient/ family verbalize understanding of discharge plan and are in agreement with goal/plan/treatment preferences.  Understanding was demonstrated using the teach back method.  AVS provided by RN at time of discharge, which includes all necessary medical information pertaining to the patients current course of illness, treatment, post-discharge goals of care, and treatment preferences.     Services At/After Discharge: None    Home health discussed with Avani. She declines and states she has 17 grandchildren and some are old enough to assist her at home if needed.

## 2024-12-30 NOTE — PROGRESS NOTES
appreciated.  Respiratory:  Normal respiratory effort.  Diminished breath sounds and faint crackles right lower lobe.  Cardiovascular: Normal rate, regular rhythm with normal S1/S2 without murmurs.  +1 bilateral lower extremity edema.  Abdomen: Soft, non-tender, non-distended with normal bowel sounds.  Musculoskeletal: No joint swelling or tenderness. Normal tone. No abnormal movements.   Skin: Warm and dry. Healing wounds on feet bilaterally.  Neurologic: Decree sensation in bilateral lower extremities. Cranial nerves:  grossly non-focal 2-12.     Psychiatric: Alert and oriented, normal insight and thought content.   Capillary Refill: Brisk,< 3 seconds.  Peripheral Pulses: +2 palpable, equal bilaterally.       Medications Prior to Admission:   Prior to Admission Medications   Prescriptions Last Dose Informant Patient Reported? Taking?   D-Mannose 500 MG CAPS   No No   Sig: Take 1 each by mouth in the morning and at bedtime   HYDROcodone-acetaminophen (NORCO) 5-325 MG per tablet 12/24/2024  No Yes   Sig: Take 1 tablet by mouth every 8 hours as needed for Pain for up to 30 days.   Handicap Placard MISC   No No   Sig: by Does not apply route   Insulin Pen Needle (B-D ULTRAFINE III SHORT PEN) 31G X 8 MM MISC   No No   Sig: USE 3 TIMES DAILY   L-Methylfol-B12-B6-Lipoic Acid (NUFOLA) 3.75-1- MG CAPS   No No   Sig: Take 1 each by mouth daily   Mycophenolate Sodium 360 MG TBEC   Yes No   Sig: Take 2 tablets by mouth 1  tablets BID   NIFEdipine (PROCARDIA XL) 90 MG extended release tablet   No No   Sig: Take 1 tablet by mouth daily   SPIRIVA RESPIMAT 2.5 MCG/ACT AERS inhaler   No No   Sig: USE 2 INHALATIONS BY MOUTH DAILY   XARELTO 2.5 MG TABS tablet   No No   Sig: TAKE 1 TABLET BY MOUTH TWICE  DAILY   albuterol sulfate HFA (PROVENTIL;VENTOLIN;PROAIR) 108 (90 Base) MCG/ACT inhaler   No No   Sig: USE 2 INHALATIONS BY MOUTH EVERY 6 HOURS AS NEEDED FOR WHEEZING  OR SHORTNESS OF BREATH   aluminum & magnesium  CONTRERAS on CPAP 2010    Dr. Lee    Pneumonia     PONV (postoperative nausea and vomiting)     Seizures (HCC)     Sepsis due to Escherichia coli (HCC) 07/2020         Procedure Laterality Date    ANKLE SURGERY Right 02-10-14    Dr. Degroot at [facility]    APPENDECTOMY  1980s    BACK SURGERY  1990's    removal of benign tumor    CARDIAC SURGERY  2008    CARPAL TUNNEL RELEASE  1988    COLONOSCOPY  2009    2 polyps, not precanceorus    COLONOSCOPY Left 6/10/2019    COLONOSCOPY DIAGNOSTIC performed by Morris Abreu MD at [facility]    COSMETIC SURGERY  3/30/2012    eye lid lift    DIAGNOSTIC CARDIAC CATH LAB PROCEDURE      ENDOSCOPY, COLON, DIAGNOSTIC  2007    EYE SURGERY  March 30th, 2012    left sided ptosis    FOOT SURGERY  1990    Tarsal tunnel surgery    FRACTURE SURGERY  2015    HYSTERECTOMY (CERVIX STATUS UNKNOWN)  1980 and 1985    first partial in 1980's, then total in 1985    JOINT REPLACEMENT  2015    KIDNEY TRANSPLANT  04/10/2020    RIGHT    LIVER BIOPSY  6/2015    LUNG BIOPSY  2009    OVARY REMOVAL  1985    PA OFFICE/OUTPT VISIT,PROCEDURE ONLY Left 8/23/2018    LEFT UPPER EXTREMENTY AV FISTULA CREATION performed by Cam Bah MD at [facility] OR    UPPER GASTROINTESTINAL ENDOSCOPY Left 6/14/2019    EGD BIOPSY performed by Morris Abreu MD at [facility]         Problem Relation Age of Onset    Diabetes Sister     Diabetes Brother     Diabetes Sister     Diabetes Sister     Early Death Sister     Heart Disease Sister     Brain Cancer Sister     Diabetes Sister     Heart Disease Sister     Diabetes Sister     Diabetes Brother     Arthritis Mother     Heart Disease Mother     High Blood Pressure Mother     Kidney Disease Mother     Mental Illness Mother     Stroke Mother     Heart Disease Father     Diabetes Father     Obesity Father     Alcohol Abuse Father     Breast Cancer Paternal Cousin 38     Social History     Socioeconomic History    Marital status:      Spouse name: None    Number of

## 2025-01-02 ENCOUNTER — TELEPHONE (OUTPATIENT)
Dept: FAMILY MEDICINE CLINIC | Age: 70
End: 2025-01-02

## 2025-01-02 NOTE — TELEPHONE ENCOUNTER
Care Transitions Initial Follow Up Call    Outreach made within 2 business days of discharge: Yes    Patient: Avani Thomas   Patient : 1955   MRN: [id number]    Reason for Admission: Acute respiratory failure with hypoxia, pneumonia  Discharge Date: 24       Spoke with: Avani    Discharge department/facility: [facility] Interactive Patient Contact:  Was patient able to fill all prescriptions: Yes  Was patient instructed to bring all medications to the follow-up visit: Yes  Is patient taking all medications as directed in the discharge summary? No. Pt filled the Rx for Hydralazine but isn't taking it because she thinks it will lower her BP too much. She is taking her BP 3 times per day and it has been \"decent\". Will discuss further at her hospital f/up appt.   Does patient understand their discharge instructions: Yes  Does patient have questions or concerns that need addressed prior to 7-14 day follow up office visit: no    Additional needs identified to be addressed with provider  No needs identified             Scheduled appointment with PCP within 7-14 days    Of note, pt was initially scheduled for an appt with [facility] and this is incorrect so I canceled it.     Follow Up  Future Appointments   Date Time Provider Department Center   2025  9:40 AM Nupur Carrasco MD [facility] FM RES [facility] DEP   2025  3:00 PM Valencia Waldrop MD [facility] Medicine [facility] DEP   2025  2:20 PM Jesus Martins MD [facility] - [place]   2025  2:20 PM Hillary Stephenson APRN - CNP [facility] Medicine [facility] DEP   4/15/2025  2:00 PM Martha Gaytan APRN - CNP [facility] IM MED [facility] DEP   5/15/2025  3:00 PM Jesus Martins MD [facility] [place]   2025  2:00 PM Judi Win APRN - CNP N Pulm Med P - [place]   2025  2:30 PM Abdirizak Vaca MD N [facility] Heart P - [place]       JOEL JAVIER CMA ([facility])

## 2025-01-07 ENCOUNTER — OFFICE VISIT (OUTPATIENT)
Dept: FAMILY MEDICINE CLINIC | Age: 70
End: 2025-01-07

## 2025-01-07 VITALS
RESPIRATION RATE: 16 BRPM | WEIGHT: 185.2 LBS | DIASTOLIC BLOOD PRESSURE: 58 MMHG | SYSTOLIC BLOOD PRESSURE: 130 MMHG | HEIGHT: 65 IN | TEMPERATURE: 98.4 F | OXYGEN SATURATION: 96 % | HEART RATE: 72 BPM | BODY MASS INDEX: 30.86 KG/M2

## 2025-01-07 DIAGNOSIS — J18.9 COPD WITH PNEUMONIA (HCC): ICD-10-CM

## 2025-01-07 DIAGNOSIS — J96.01 ACUTE HYPOXIC RESPIRATORY FAILURE: ICD-10-CM

## 2025-01-07 DIAGNOSIS — N18.32 STAGE 3B CHRONIC KIDNEY DISEASE (HCC): ICD-10-CM

## 2025-01-07 DIAGNOSIS — J44.0 COPD WITH PNEUMONIA (HCC): ICD-10-CM

## 2025-01-07 DIAGNOSIS — E11.8 DIABETES MELLITUS TYPE 2 WITH COMPLICATIONS (HCC): ICD-10-CM

## 2025-01-07 DIAGNOSIS — Z94.0 KIDNEY TRANSPLANT RECIPIENT: ICD-10-CM

## 2025-01-07 DIAGNOSIS — Z09 HOSPITAL DISCHARGE FOLLOW-UP: Primary | ICD-10-CM

## 2025-01-07 DIAGNOSIS — I10 ESSENTIAL HYPERTENSION: ICD-10-CM

## 2025-01-07 ASSESSMENT — PATIENT HEALTH QUESTIONNAIRE - PHQ9
SUM OF ALL RESPONSES TO PHQ QUESTIONS 1-9: 1
1. LITTLE INTEREST OR PLEASURE IN DOING THINGS: SEVERAL DAYS
SUM OF ALL RESPONSES TO PHQ QUESTIONS 1-9: 1
2. FEELING DOWN, DEPRESSED OR HOPELESS: NOT AT ALL
SUM OF ALL RESPONSES TO PHQ9 QUESTIONS 1 & 2: 1

## 2025-01-07 NOTE — PROGRESS NOTES
Health Maintenance Due   Topic Date Due    Hepatitis B vaccine (12 of 12 - Risk Dialysis 4-dose series) 01/18/2021    Diabetic retinal exam  02/15/2024    COVID-19 Vaccine (7 - 2023-24 season) 09/01/2024

## 2025-01-07 NOTE — PROGRESS NOTES
Post-Discharge Transitional Care  Follow Up      Avani Thomas   YOB: 1955    Date of Office Visit:  1/7/2025  Date of Hospital Admission: 12/24/24  Date of Hospital Discharge: 12/30/24  Risk of hospital readmission (high >=14%. Medium >=10%) :Readmission Risk Score: 14.7      Care management risk score Rising risk (score 2-5) and Complex Care (Scores >=6): No Risk Score On File     Non face to face  following discharge, date last encounter closed (first attempt may have been earlier): 01/02/2025    Call initiated 2 business days of discharge: Yes    ASSESSMENT/PLAN:   Hospital discharge follow-up  -     [place] DISCHARGE MEDS RECONCILED W/ CURRENT OUTPATIENT MED LIST  Acute hypoxic respiratory failure  COPD with pneumonia (HCC)  -     CBC with Auto Differential; Future  -     XR CHEST STANDARD (2 VW); Future  Diabetes mellitus type 2 with complications (HCC)  -     Hemoglobin A1C; Future  Stage 3b chronic kidney disease (HCC)  -     Basic Metabolic Panel; Future  Kidney transplant recipient  Essential hypertension    Hospital follow-up completed as outlined below.  Patient overall doing better since discharge.  SpO2 remained stable, 96% on room air in office today.    Acute hypoxic respiratory failure: This has resolved  COPD with pneumonia: Overall improving.  Repeat chest x-ray to monitor small infiltrate versus effusion in 1 week.    Diabetes: Chronic.  Due for repeat A1c.  Blood sugars have improved per patient report.  Continue current regimen Lantus 30 units nightly, NovoLog SSI.  Follow-up as scheduled with diabetes clinic.    CKD 3B: Chronic, stable.  Will repeat BMP to monitor for stability.  Follow-up with nephrology as planned.    Hypertension: Chronic, stable in office today.  Continue current regimen Coreg, Procardia, clonidine    Medical Decision Making: moderate complexity  Return in about 3 months (around 4/7/2025).    On this date 1/7/2025 I have spent 25 minutes reviewing previous

## 2025-01-22 NOTE — PROGRESS NOTES
Physician Progress Note      PATIENT:               MARANDA BEAL  [facility] #:                  [id number]  :                       1955  ADMIT DATE:       2024 9:19 PM  DISCH DATE:        2024 4:52 PM  RESPONDING  PROVIDER #:        Patel Reyes DO          QUERY TEXT:    Patient admitted with COPD with pneumonia. Noted documentation of Acute   hypoxic respiratory failure in H&P on  with Pulmonary effort is normal,   no wheezes, rales or rhonchi, no respiratory distress. In order to support the   diagnosis of acute respiratory failure, please include additional clinical   indicators in your documentation.  Or please document if the diagnosis of   acute respiratory failure has been ruled out after further study.    The medical record reflects the following:  Risk Factors: Pneumonia, 69-year-old, COPD, CONTRERAS    Clinical Indicators: H&P  \" Acute hypoxic respiratory failure: Secondary   to COPD exacerbation/pneumonia. Patient not on any home O2. COPD exacerbation   secondary to pneumonia. \"    DS  \" Acute hypoxic respiratory failure: Secondary to COPD   exacerbation/pneumonia. Was previously on O2 supplementation roughly 1 year   ago per patient report. D-dimer elevated on admission 1001. Vitals stable   after administration of supplemental O2 in ED. \"    Pulmonary/Chest: Effort normal and breath sounds normal. No accessory muscle   usage or stridor. No respiratory distress.  no wheezes. has no rales. exhibits   no tenderness.  Respiratory:  Normal respiratory effort. Clear to auscultation, bilaterally   without Rales/Wheezes/Rhonchi.  Diminished breath sounds and faint crackles   right lower lobe.    SpO2- 78,94,87,91,90,94,96,95,97,92  RR- 24,18,20,26,16,18  CO2- 23,23,24,25    Treatment: NC @1.5-2.5 L, pulse oximetry monitoring, albuterol sulfate HFA    Thank you,  Thanku Nicolas GARCIA CDS  Options provided:  -- Acute Respiratory Failure as evidenced by, Please document evidence.  --

## 2025-01-23 ENCOUNTER — HOSPITAL ENCOUNTER (OUTPATIENT)
Age: 70
Discharge: HOME | End: 2025-01-23
Payer: MEDICARE

## 2025-01-23 ENCOUNTER — HOSPITAL ENCOUNTER (OUTPATIENT)
Dept: GENERAL RADIOLOGY | Age: 70
Discharge: HOME | End: 2025-01-23
Payer: MEDICARE

## 2025-01-23 DIAGNOSIS — J18.9 COPD WITH PNEUMONIA (HCC): ICD-10-CM

## 2025-01-23 DIAGNOSIS — E11.8 DIABETES MELLITUS TYPE 2 WITH COMPLICATIONS (HCC): ICD-10-CM

## 2025-01-23 DIAGNOSIS — N18.32 STAGE 3B CHRONIC KIDNEY DISEASE (HCC): ICD-10-CM

## 2025-01-23 DIAGNOSIS — J44.0 COPD WITH PNEUMONIA (HCC): ICD-10-CM

## 2025-01-23 LAB
ANION GAP SERPL CALC-SCNC: 11 MEQ/L (ref 8–16)
BASOPHILS ABSOLUTE: 0 THOU/MM3 (ref 0–0.1)
BASOPHILS NFR BLD AUTO: 0.1 %
BUN SERPL-MCNC: 25 MG/DL (ref 7–22)
CALCIUM SERPL-MCNC: 10.4 MG/DL (ref 8.5–10.5)
CHLORIDE SERPL-SCNC: 101 MEQ/L (ref 98–111)
CO2 SERPL-SCNC: 27 MEQ/L (ref 23–33)
CREAT SERPL-MCNC: 1.4 MG/DL (ref 0.4–1.2)
DEPRECATED MEAN GLUCOSE BLD GHB EST-ACNC: 150 MG/DL (ref 70–126)
DEPRECATED RDW RBC AUTO: 47.4 FL (ref 35–45)
EOSINOPHIL NFR BLD AUTO: 2.2 %
EOSINOPHILS ABSOLUTE: 0.2 THOU/MM3 (ref 0–0.4)
ERYTHROCYTE [DISTWIDTH] IN BLOOD BY AUTOMATED COUNT: 15.6 % (ref 11.5–14.5)
GFR SERPL CREATININE-BSD FRML MDRD: 40 ML/MIN/1.73M2
GLUCOSE SERPL-MCNC: 98 MG/DL (ref 70–108)
HBA1C MFR BLD HPLC: 7 % (ref 4.4–6.4)
HCT VFR BLD AUTO: 32.3 % (ref 37–47)
HGB BLD-MCNC: 10.1 GM/DL (ref 12–16)
IMM GRANULOCYTES # BLD AUTO: 0.02 THOU/MM3 (ref 0–0.07)
IMM GRANULOCYTES NFR BLD AUTO: 0.2 %
LYMPHOCYTES ABSOLUTE: 1.6 THOU/MM3 (ref 1–4.8)
LYMPHOCYTES NFR BLD AUTO: 19.6 %
MCH RBC QN AUTO: 26.3 PG (ref 26–33)
MCHC RBC AUTO-ENTMCNC: 31.3 GM/DL (ref 32.2–35.5)
MCV RBC AUTO: 84.1 FL (ref 81–99)
MONOCYTES ABSOLUTE: 0.7 THOU/MM3 (ref 0.4–1.3)
MONOCYTES NFR BLD AUTO: 8.7 %
NEUTROPHILS ABSOLUTE: 5.6 THOU/MM3 (ref 1.8–7.7)
NEUTROPHILS NFR BLD AUTO: 69.2 %
NRBC BLD AUTO-RTO: 0 /100 WBC
PLATELET # BLD AUTO: 281 THOU/MM3 (ref 130–400)
PMV BLD AUTO: 10.1 FL (ref 9.4–12.4)
POTASSIUM SERPL-SCNC: 3.7 MEQ/L (ref 3.5–5.2)
RBC # BLD AUTO: 3.84 MILL/MM3 (ref 4.2–5.4)
SODIUM SERPL-SCNC: 139 MEQ/L (ref 135–145)
WBC # BLD AUTO: 8.1 THOU/MM3 (ref 4.8–10.8)

## 2025-01-23 PROCEDURE — 36415 COLL VENOUS BLD VENIPUNCTURE: CPT

## 2025-01-23 PROCEDURE — 83036 HEMOGLOBIN GLYCOSYLATED A1C: CPT

## 2025-01-23 PROCEDURE — 85025 COMPLETE CBC W/AUTO DIFF WBC: CPT

## 2025-01-23 PROCEDURE — 80048 BASIC METABOLIC PNL TOTAL CA: CPT

## 2025-01-23 PROCEDURE — 71046 X-RAY EXAM CHEST 2 VIEWS: CPT

## 2025-01-30 ENCOUNTER — OFFICE VISIT (OUTPATIENT)
Dept: PHYSICAL MEDICINE AND REHAB | Age: 70
End: 2025-01-30
Payer: MEDICARE

## 2025-01-30 ENCOUNTER — OFFICE VISIT (OUTPATIENT)
Dept: NEPHROLOGY | Age: 70
End: 2025-01-30
Payer: MEDICARE

## 2025-01-30 VITALS
HEART RATE: 98 BPM | DIASTOLIC BLOOD PRESSURE: 71 MMHG | SYSTOLIC BLOOD PRESSURE: 141 MMHG | OXYGEN SATURATION: 63 % | HEIGHT: 65 IN | BODY MASS INDEX: 31.16 KG/M2 | WEIGHT: 187 LBS

## 2025-01-30 VITALS
BODY MASS INDEX: 30.85 KG/M2 | DIASTOLIC BLOOD PRESSURE: 57 MMHG | SYSTOLIC BLOOD PRESSURE: 138 MMHG | WEIGHT: 185.19 LBS | HEIGHT: 65 IN

## 2025-01-30 DIAGNOSIS — I10 PRIMARY HYPERTENSION: ICD-10-CM

## 2025-01-30 DIAGNOSIS — G89.4 CHRONIC PAIN SYNDROME: ICD-10-CM

## 2025-01-30 DIAGNOSIS — N18.32 STAGE 3B CHRONIC KIDNEY DISEASE (HCC): Primary | ICD-10-CM

## 2025-01-30 DIAGNOSIS — G89.29 CHRONIC BILATERAL LOW BACK PAIN WITHOUT SCIATICA: ICD-10-CM

## 2025-01-30 DIAGNOSIS — M54.50 CHRONIC BILATERAL LOW BACK PAIN WITHOUT SCIATICA: ICD-10-CM

## 2025-01-30 DIAGNOSIS — E11.21 DIABETIC NEPHROPATHY ASSOCIATED WITH TYPE 2 DIABETES MELLITUS (HCC): ICD-10-CM

## 2025-01-30 DIAGNOSIS — R53.81 DEBILITY: ICD-10-CM

## 2025-01-30 DIAGNOSIS — E11.42 DIABETIC POLYNEUROPATHY ASSOCIATED WITH TYPE 2 DIABETES MELLITUS (HCC): ICD-10-CM

## 2025-01-30 DIAGNOSIS — M47.816 LUMBAR FACET ARTHROPATHY: ICD-10-CM

## 2025-01-30 DIAGNOSIS — J18.9 COPD WITH PNEUMONIA (HCC): ICD-10-CM

## 2025-01-30 DIAGNOSIS — J44.0 COPD WITH PNEUMONIA (HCC): ICD-10-CM

## 2025-01-30 DIAGNOSIS — M47.816 LUMBAR SPONDYLOSIS: Primary | ICD-10-CM

## 2025-01-30 DIAGNOSIS — R80.9 PROTEINURIA, UNSPECIFIED TYPE: ICD-10-CM

## 2025-01-30 DIAGNOSIS — M54.16 LUMBAR RADICULITIS: ICD-10-CM

## 2025-01-30 DIAGNOSIS — N25.81 HYPERPARATHYROIDISM, SECONDARY RENAL (HCC): ICD-10-CM

## 2025-01-30 DIAGNOSIS — Z94.0 KIDNEY TRANSPLANT RECIPIENT: ICD-10-CM

## 2025-01-30 PROCEDURE — G8417 CALC BMI ABV UP PARAM F/U: HCPCS | Performed by: INTERNAL MEDICINE

## 2025-01-30 PROCEDURE — 1036F TOBACCO NON-USER: CPT | Performed by: NURSE PRACTITIONER

## 2025-01-30 PROCEDURE — G8427 DOCREV CUR MEDS BY ELIG CLIN: HCPCS | Performed by: NURSE PRACTITIONER

## 2025-01-30 PROCEDURE — 1159F MED LIST DOCD IN RCRD: CPT | Performed by: INTERNAL MEDICINE

## 2025-01-30 PROCEDURE — 99214 OFFICE O/P EST MOD 30 MIN: CPT | Performed by: NURSE PRACTITIONER

## 2025-01-30 PROCEDURE — G8399 PT W/DXA RESULTS DOCUMENT: HCPCS | Performed by: NURSE PRACTITIONER

## 2025-01-30 PROCEDURE — G8399 PT W/DXA RESULTS DOCUMENT: HCPCS | Performed by: INTERNAL MEDICINE

## 2025-01-30 PROCEDURE — 3017F COLORECTAL CA SCREEN DOC REV: CPT | Performed by: NURSE PRACTITIONER

## 2025-01-30 PROCEDURE — 1090F PRES/ABSN URINE INCON ASSESS: CPT | Performed by: NURSE PRACTITIONER

## 2025-01-30 PROCEDURE — G8427 DOCREV CUR MEDS BY ELIG CLIN: HCPCS | Performed by: INTERNAL MEDICINE

## 2025-01-30 PROCEDURE — 1159F MED LIST DOCD IN RCRD: CPT | Performed by: NURSE PRACTITIONER

## 2025-01-30 PROCEDURE — 1125F AMNT PAIN NOTED PAIN PRSNT: CPT | Performed by: NURSE PRACTITIONER

## 2025-01-30 PROCEDURE — 3023F SPIROM DOC REV: CPT | Performed by: INTERNAL MEDICINE

## 2025-01-30 PROCEDURE — 99214 OFFICE O/P EST MOD 30 MIN: CPT | Performed by: INTERNAL MEDICINE

## 2025-01-30 PROCEDURE — 1090F PRES/ABSN URINE INCON ASSESS: CPT | Performed by: INTERNAL MEDICINE

## 2025-01-30 PROCEDURE — 1124F ACP DISCUSS-NO DSCNMKR DOCD: CPT | Performed by: INTERNAL MEDICINE

## 2025-01-30 PROCEDURE — 3078F DIAST BP <80 MM HG: CPT | Performed by: INTERNAL MEDICINE

## 2025-01-30 PROCEDURE — G8417 CALC BMI ABV UP PARAM F/U: HCPCS | Performed by: NURSE PRACTITIONER

## 2025-01-30 PROCEDURE — 2022F DILAT RTA XM EVC RTNOPTHY: CPT | Performed by: INTERNAL MEDICINE

## 2025-01-30 PROCEDURE — 3077F SYST BP >= 140 MM HG: CPT | Performed by: INTERNAL MEDICINE

## 2025-01-30 PROCEDURE — 1124F ACP DISCUSS-NO DSCNMKR DOCD: CPT | Performed by: NURSE PRACTITIONER

## 2025-01-30 PROCEDURE — 1036F TOBACCO NON-USER: CPT | Performed by: INTERNAL MEDICINE

## 2025-01-30 PROCEDURE — 3051F HG A1C>EQUAL 7.0%<8.0%: CPT | Performed by: INTERNAL MEDICINE

## 2025-01-30 PROCEDURE — 3075F SYST BP GE 130 - 139MM HG: CPT | Performed by: NURSE PRACTITIONER

## 2025-01-30 PROCEDURE — 3078F DIAST BP <80 MM HG: CPT | Performed by: NURSE PRACTITIONER

## 2025-01-30 PROCEDURE — 3017F COLORECTAL CA SCREEN DOC REV: CPT | Performed by: INTERNAL MEDICINE

## 2025-01-30 PROCEDURE — 2022F DILAT RTA XM EVC RTNOPTHY: CPT | Performed by: NURSE PRACTITIONER

## 2025-01-30 PROCEDURE — 3051F HG A1C>EQUAL 7.0%<8.0%: CPT | Performed by: NURSE PRACTITIONER

## 2025-01-30 RX ORDER — HYDROCODONE BITARTRATE AND ACETAMINOPHEN 5; 325 MG/1; MG/1
1 TABLET ORAL EVERY 8 HOURS PRN
Qty: 90 TABLET | Refills: 0 | Status: SHIPPED | OUTPATIENT
Start: 2025-01-30 | End: 2025-03-01

## 2025-01-30 ASSESSMENT — ENCOUNTER SYMPTOMS
WHEEZING: 0
BACK PAIN: 1
EYE REDNESS: 0
EYE DISCHARGE: 0
COUGH: 1
GASTROINTESTINAL NEGATIVE: 1
SHORTNESS OF BREATH: 0
CHEST TIGHTNESS: 0

## 2025-01-30 NOTE — PROGRESS NOTES
Patient was hospitalized 12/24 to 12/31 due to having pnemonia. Ankles are a little swollen and also has problems urinating.

## 2025-01-30 NOTE — PROGRESS NOTES
Functionality Assessment/Goals Worksheet     On a scale of 0 (Does not Interfere) to 10 (Completely Interferes)     1.  Which number describes how during the past week pain has interfered with           the following:  A.  General Activity:  9  B.  Mood: 9  C.  Walking Ability:  10  D.  Normal Work (Includes both work outside the home and housework):  9  E.  Relations with Other People:   10  F.  Sleep:   4  G.  Enjoyment of Life:   10    2.  Patient Prefers to Take their Pain Medications:     []  On a regular basis   [x]  Only when necessary    []  Does not take pain medications    3.  What are the Patient's Goals/Expectations for Visiting Pain Management?     []  Learn about my pain    [x]  Receive Medication   [x]  Physical Therapy     []  Treat Depression   [x]  Receive Injections    []  Treat Sleep   []  Deal with Anxiety and Stress   []  Treat Opoid Dependence/Addiction   []  Other:           HPI:   Avani Thomas is a 69 y.o. female is here today for    Chief Complaint: Low back and leg pain     HPI   Has been 3 months since last follow up. This patient was admitted in the hospital here at [facility] on 12/24/2024 until 12/30/2024 for COPD and pneumonia so she had to cancel her appointment with me on 12/30/2024. Is wearing a mask today and still has a cough but is feeling better.     She continues to have a main compliant of pain in her low back center to the right side- [place]man aching pain increased with waling which continues to radiate down bilateral legs to mid shin aching, heaviness, numbness and tingling    Pain increases with bending, lifting, twisting , walking, standing, getting up and down, and housework or working at job, weather changes    Norco prn is effective in decreasing pain. She has been trying to save medication to gt to her appointment today.     She states she continues stretching and exercises a couple times per week.       Radiology:  Lumbar xray:     IMPRESSION:  1.

## 2025-01-30 NOTE — PROGRESS NOTES
Renal Progress Note    Assessment and Plan:      Diagnosis Orders   1. Stage 3b chronic kidney disease (HCC)        2. Kidney transplant recipient        3. Diabetic nephropathy associated with type 2 diabetes mellitus (HCC)        4. Primary hypertension        5. COPD with pneumonia (HCC)        6. Proteinuria, unspecified type        7. Hyperparathyroidism, secondary renal (HCC)                  PLAN:  Stage III chronic kidney disease stable with serum creatinine of 1.4 mg/dL.  Kidney transplant remains stable  Diabetes mellitus is managed by another provider.  Hypertension is stable  COPD managed by other provider  Will check urine protein creatinine ratio next appointment          Patient Active Problem List   Diagnosis    Coronary disease    Hyperlipemia    Arthritis    Neuropathy, diabetic (HCC)    Obesity (BMI 30-39.9)    Low HDL (under 40)    History of tobacco use    Chronic obstructive pulmonary disease (HCC)    Anemia, chronic disease    Gout    Urolithiasis    Secondary hyperparathyroidism of renal origin (HCC)    CONTRERAS (obstructive sleep apnea)    Vitamin D deficiency    Persistent proteinuria associated with type 2 diabetes mellitus (HCC)    Acquired hypothyroidism    Nephrotic syndrome    Iron deficiency anemia due to dietary causes    Anemia of chronic disease    Essential hypertension    Diabetic peripheral neuropathy associated with type 2 diabetes mellitus (HCC)    Diabetes mellitus type 2 with complications (HCC)    Acute on chronic diastolic congestive heart failure (HCC)    ESRD (end stage renal disease) on dialysis (HCC)    Constipation    Kidney transplant recipient    Acute kidney injury superimposed on CKD (HCC)    Hx of coronary artery disease    History of end stage renal disease    Cervical stenosis of spinal canal    Hx of gastroesophageal reflux (GERD)    PAD (peripheral artery disease) (HCC)    PVD (peripheral vascular disease) (HCC)    Chronic renal disease, stage III (HCC) [317578]

## 2025-02-27 DIAGNOSIS — M47.816 LUMBAR SPONDYLOSIS: ICD-10-CM

## 2025-02-27 DIAGNOSIS — R53.81 DEBILITY: ICD-10-CM

## 2025-02-27 DIAGNOSIS — M47.816 LUMBAR FACET ARTHROPATHY: ICD-10-CM

## 2025-02-27 DIAGNOSIS — G89.29 CHRONIC BILATERAL LOW BACK PAIN WITHOUT SCIATICA: ICD-10-CM

## 2025-02-27 DIAGNOSIS — G89.4 CHRONIC PAIN SYNDROME: ICD-10-CM

## 2025-02-27 DIAGNOSIS — M54.50 CHRONIC BILATERAL LOW BACK PAIN WITHOUT SCIATICA: ICD-10-CM

## 2025-02-27 NOTE — TELEPHONE ENCOUNTER
Avani Thomas called requesting a refill on the following medications:  Requested Prescriptions     Pending Prescriptions Disp Refills    HYDROcodone-acetaminophen (NORCO) 5-325 MG per tablet 90 tablet 0     Sig: Take 1 tablet by mouth every 8 hours as needed for Pain for up to 30 days.     Pharmacy verified:[facility] pharmacy   .pv      Date of last visit: 1/30/25   Date of next visit (if applicable): 3/31/2025

## 2025-02-28 RX ORDER — HYDROCODONE BITARTRATE AND ACETAMINOPHEN 5; 325 MG/1; MG/1
1 TABLET ORAL EVERY 8 HOURS PRN
Qty: 90 TABLET | Refills: 0 | Status: SHIPPED | OUTPATIENT
Start: 2025-03-02 | End: 2025-04-01

## 2025-03-11 ENCOUNTER — LAB (OUTPATIENT)
Dept: LAB | Age: 70
End: 2025-03-11

## 2025-03-11 DIAGNOSIS — I10 ESSENTIAL HYPERTENSION: ICD-10-CM

## 2025-03-11 DIAGNOSIS — Z94.0 RENAL TRANSPLANT RECIPIENT: ICD-10-CM

## 2025-03-11 LAB
ANION GAP SERPL CALC-SCNC: 14 MEQ/L (ref 8–16)
BUN SERPL-MCNC: 26 MG/DL (ref 8–23)
CHLORIDE SERPL-SCNC: 113 MEQ/L (ref 98–111)
CO2 SERPL-SCNC: 28 MEQ/L (ref 22–29)
CREAT SERPL-MCNC: 1.5 MG/DL (ref 0.5–0.9)
DEPRECATED RDW RBC AUTO: 46.8 FL (ref 35–45)
ERYTHROCYTE [DISTWIDTH] IN BLOOD BY AUTOMATED COUNT: 15.3 % (ref 11.5–14.5)
GFR SERPL CREATININE-BSD FRML MDRD: 37 ML/MIN/1.73M2
GLUCOSE SERPL-MCNC: 96 MG/DL (ref 74–109)
HCT VFR BLD AUTO: 29.2 % (ref 37–47)
HGB BLD-MCNC: 8.8 GM/DL (ref 12–16)
MCH RBC QN AUTO: 25.7 PG (ref 26–33)
MCHC RBC AUTO-ENTMCNC: 30.1 GM/DL (ref 32.2–35.5)
MCV RBC AUTO: 85.1 FL (ref 81–99)
PLATELET # BLD AUTO: 225 THOU/MM3 (ref 130–400)
PMV BLD AUTO: 11 FL (ref 9.4–12.4)
POTASSIUM SERPL-SCNC: 3.7 MEQ/L (ref 3.5–5.2)
RBC # BLD AUTO: 3.43 MILL/MM3 (ref 4.2–5.4)
SODIUM SERPL-SCNC: 155 MEQ/L (ref 135–145)
WBC # BLD AUTO: 7.2 THOU/MM3 (ref 4.8–10.8)

## 2025-03-12 RX ORDER — CLONIDINE HYDROCHLORIDE 0.2 MG/1
0.2 TABLET ORAL 3 TIMES DAILY
Qty: 270 TABLET | Refills: 3 | Status: SHIPPED | OUTPATIENT
Start: 2025-03-12

## 2025-03-12 RX ORDER — ATORVASTATIN CALCIUM 40 MG/1
40 TABLET, FILM COATED ORAL DAILY
Qty: 90 TABLET | Refills: 3 | Status: SHIPPED | OUTPATIENT
Start: 2025-03-12

## 2025-03-12 NOTE — TELEPHONE ENCOUNTER
This medication refill is regarding a electronic request. Refill requested by  [facility] .    Requested Prescriptions     Pending Prescriptions Disp Refills    cloNIDine (CATAPRES) 0.2 MG tablet [Pharmacy Med Name: cloNIDine HCl 0.2 MG Oral Tablet] 270 tablet 3     Sig: TAKE 1 TABLET BY MOUTH IN THE  MORNING AT NOON, AND AT BEDTIME    atorvastatin (LIPITOR) 40 MG tablet [Pharmacy Med Name: Atorvastatin Calcium 40 MG Oral Tablet] 90 tablet 3     Sig: TAKE 1 TABLET BY MOUTH DAILY     Date of last visit: 1/7/2025   Date of next visit: 4/7/2025  Date of last refill:    -Atorvastatin 4/12/24 #90/3   -Clonidine 4/12/24 #270/3    Last Lipid Panel:    Lab Results   Component Value Date/Time    CHOL 122 10/09/2024 03:27 PM    TRIG 72 10/09/2024 03:27 PM    HDL 44 10/09/2024 03:27 PM     Last CMP:   Lab Results   Component Value Date     (H) 03/11/2025    K 3.7 03/11/2025     (H) 03/11/2025    CO2 28 03/11/2025    BUN 26 (H) 03/11/2025    CREATININE 1.5 (H) 03/11/2025    GLUCOSE 96 03/11/2025    CALCIUM 10.4 01/23/2025    BILITOT 0.4 10/09/2024    ALKPHOS 84 10/09/2024    AST 14 10/09/2024    ALT 19 10/09/2024    LABGLOM 37 (A) 03/11/2025     Rx verified, ordered and set to EP.

## 2025-03-14 LAB — TACROLIMUS BLD-MCNC: 9.2 NG/ML

## 2025-03-31 ENCOUNTER — OFFICE VISIT (OUTPATIENT)
Dept: PHYSICAL MEDICINE AND REHAB | Age: 70
End: 2025-03-31
Payer: MEDICARE

## 2025-03-31 VITALS
BODY MASS INDEX: 31.15 KG/M2 | HEIGHT: 65 IN | DIASTOLIC BLOOD PRESSURE: 78 MMHG | SYSTOLIC BLOOD PRESSURE: 138 MMHG | WEIGHT: 186.95 LBS

## 2025-03-31 DIAGNOSIS — M54.42 CHRONIC BILATERAL LOW BACK PAIN WITH BILATERAL SCIATICA: ICD-10-CM

## 2025-03-31 DIAGNOSIS — M47.816 LUMBAR SPONDYLOSIS: Primary | ICD-10-CM

## 2025-03-31 DIAGNOSIS — R53.81 DEBILITY: ICD-10-CM

## 2025-03-31 DIAGNOSIS — M54.41 CHRONIC BILATERAL LOW BACK PAIN WITH BILATERAL SCIATICA: ICD-10-CM

## 2025-03-31 DIAGNOSIS — E11.42 DIABETIC POLYNEUROPATHY ASSOCIATED WITH TYPE 2 DIABETES MELLITUS: ICD-10-CM

## 2025-03-31 DIAGNOSIS — G89.29 CHRONIC BILATERAL LOW BACK PAIN WITH BILATERAL SCIATICA: ICD-10-CM

## 2025-03-31 DIAGNOSIS — M54.50 CHRONIC BILATERAL LOW BACK PAIN WITHOUT SCIATICA: ICD-10-CM

## 2025-03-31 DIAGNOSIS — M47.816 LUMBAR FACET ARTHROPATHY: ICD-10-CM

## 2025-03-31 DIAGNOSIS — M54.16 LUMBAR RADICULITIS: ICD-10-CM

## 2025-03-31 DIAGNOSIS — G89.29 CHRONIC BILATERAL LOW BACK PAIN WITHOUT SCIATICA: ICD-10-CM

## 2025-03-31 DIAGNOSIS — G89.4 CHRONIC PAIN SYNDROME: ICD-10-CM

## 2025-03-31 PROCEDURE — 99214 OFFICE O/P EST MOD 30 MIN: CPT | Performed by: NURSE PRACTITIONER

## 2025-03-31 PROCEDURE — 3078F DIAST BP <80 MM HG: CPT | Performed by: NURSE PRACTITIONER

## 2025-03-31 PROCEDURE — 1159F MED LIST DOCD IN RCRD: CPT | Performed by: NURSE PRACTITIONER

## 2025-03-31 PROCEDURE — G8427 DOCREV CUR MEDS BY ELIG CLIN: HCPCS | Performed by: NURSE PRACTITIONER

## 2025-03-31 PROCEDURE — 3017F COLORECTAL CA SCREEN DOC REV: CPT | Performed by: NURSE PRACTITIONER

## 2025-03-31 PROCEDURE — 1124F ACP DISCUSS-NO DSCNMKR DOCD: CPT | Performed by: NURSE PRACTITIONER

## 2025-03-31 PROCEDURE — 1036F TOBACCO NON-USER: CPT | Performed by: NURSE PRACTITIONER

## 2025-03-31 PROCEDURE — G8417 CALC BMI ABV UP PARAM F/U: HCPCS | Performed by: NURSE PRACTITIONER

## 2025-03-31 PROCEDURE — 3075F SYST BP GE 130 - 139MM HG: CPT | Performed by: NURSE PRACTITIONER

## 2025-03-31 PROCEDURE — 3051F HG A1C>EQUAL 7.0%<8.0%: CPT | Performed by: NURSE PRACTITIONER

## 2025-03-31 PROCEDURE — 1090F PRES/ABSN URINE INCON ASSESS: CPT | Performed by: NURSE PRACTITIONER

## 2025-03-31 PROCEDURE — G8399 PT W/DXA RESULTS DOCUMENT: HCPCS | Performed by: NURSE PRACTITIONER

## 2025-03-31 PROCEDURE — 1125F AMNT PAIN NOTED PAIN PRSNT: CPT | Performed by: NURSE PRACTITIONER

## 2025-03-31 PROCEDURE — 2022F DILAT RTA XM EVC RTNOPTHY: CPT | Performed by: NURSE PRACTITIONER

## 2025-03-31 RX ORDER — HYDROCODONE BITARTRATE AND ACETAMINOPHEN 5; 325 MG/1; MG/1
1 TABLET ORAL EVERY 8 HOURS PRN
Qty: 90 TABLET | Refills: 0 | Status: SHIPPED | OUTPATIENT
Start: 2025-04-01 | End: 2025-05-01

## 2025-03-31 ASSESSMENT — ENCOUNTER SYMPTOMS
BACK PAIN: 1
GASTROINTESTINAL NEGATIVE: 1
WHEEZING: 0
EYE DISCHARGE: 0
EYE REDNESS: 0
COUGH: 0
CHEST TIGHTNESS: 0
SHORTNESS OF BREATH: 0

## 2025-03-31 NOTE — PROGRESS NOTES
[facility] [place] [facility]  [facility]  [address]  [place] [address]  Dept: [phone]  Dept Fax: [phone]  Loc: [phone]    Visit Date: 3/31/2025    Functionality Assessment/Goals Worksheet     On a scale of 0 (Does not Interfere) to 10 (Completely Interferes)     1.  Which number describes how during the past week pain has interfered with       the following:  A.  General Activity:  10  B.  Mood: 8  C.  Walking Ability:  10  D.  Normal Work (Includes both work outside the home and housework):  10  E.  Relations with Other People:   6  F.  Sleep:   7  G.  Enjoyment of Life:   10    2.  Patient Prefers to Take their Pain Medications:     [x]  On a regular basis   []  Only when necessary    []  Does not take pain medications    3.  What are the Patient's Goals/Expectations for Visiting Pain Management?     [x]  Learn about my pain    [x]  Receive Medication   [x]  Physical Therapy     []  Treat Depression   [x]  Receive Injections    []  Treat Sleep   []  Deal with Anxiety and Stress   []  Treat Opoid Dependence/Addiction   []  Other:        HPI:   Avani Thomas is a 69 y.o. female is here today for    Chief Complaint: Low back pain, leg pain     HPI   2 month follow up. Avani has continued complaints of pain in her low back in the center and is across axially- aching pain which lately has been radiating her bilateral down to her mid shin- numbness, tingling shooting, heaviness. States lately pain has been worse in her left leg and usually it is in her right leg.       Pain increases with bending, lifting, twisting , walking, standing, getting up and down, and housework or working at job.    Currently medications with Norco prn continues to help take the edge off of pain. States she took some her sister in laws prednisone a couple weeks ago states this shot her blood sugars up to in the 400's     Radiology:  Lumbar xray:

## 2025-04-01 ENCOUNTER — HOSPITAL ENCOUNTER (OUTPATIENT)
Dept: WOMENS IMAGING | Age: 70
Discharge: HOME | End: 2025-04-01
Payer: MEDICARE

## 2025-04-01 VITALS — BODY MASS INDEX: 30.99 KG/M2 | WEIGHT: 186 LBS | HEIGHT: 65 IN

## 2025-04-01 DIAGNOSIS — Z13.9 VISIT FOR SCREENING: ICD-10-CM

## 2025-04-01 PROCEDURE — 77063 BREAST TOMOSYNTHESIS BI: CPT

## 2025-04-07 ENCOUNTER — OFFICE VISIT (OUTPATIENT)
Dept: FAMILY MEDICINE CLINIC | Age: 70
End: 2025-04-07
Payer: MEDICARE

## 2025-04-07 VITALS
BODY MASS INDEX: 30.39 KG/M2 | TEMPERATURE: 98.8 F | RESPIRATION RATE: 16 BRPM | SYSTOLIC BLOOD PRESSURE: 106 MMHG | WEIGHT: 182.4 LBS | HEART RATE: 68 BPM | HEIGHT: 65 IN | DIASTOLIC BLOOD PRESSURE: 52 MMHG

## 2025-04-07 DIAGNOSIS — M47.816 LUMBAR SPONDYLOSIS: Primary | ICD-10-CM

## 2025-04-07 DIAGNOSIS — R30.0 DYSURIA: ICD-10-CM

## 2025-04-07 DIAGNOSIS — Z99.2 ESRD (END STAGE RENAL DISEASE) ON DIALYSIS (HCC): ICD-10-CM

## 2025-04-07 DIAGNOSIS — I50.33 ACUTE ON CHRONIC DIASTOLIC CONGESTIVE HEART FAILURE (HCC): ICD-10-CM

## 2025-04-07 DIAGNOSIS — M47.816 LUMBAR FACET ARTHROPATHY: ICD-10-CM

## 2025-04-07 DIAGNOSIS — E11.42 DIABETIC POLYNEUROPATHY ASSOCIATED WITH TYPE 2 DIABETES MELLITUS: ICD-10-CM

## 2025-04-07 DIAGNOSIS — N18.6 ESRD (END STAGE RENAL DISEASE) ON DIALYSIS (HCC): ICD-10-CM

## 2025-04-07 DIAGNOSIS — M54.16 LUMBAR RADICULITIS: ICD-10-CM

## 2025-04-07 PROCEDURE — 3051F HG A1C>EQUAL 7.0%<8.0%: CPT | Performed by: NURSE PRACTITIONER

## 2025-04-07 PROCEDURE — G2211 COMPLEX E/M VISIT ADD ON: HCPCS | Performed by: NURSE PRACTITIONER

## 2025-04-07 PROCEDURE — 99214 OFFICE O/P EST MOD 30 MIN: CPT | Performed by: NURSE PRACTITIONER

## 2025-04-07 PROCEDURE — 1036F TOBACCO NON-USER: CPT | Performed by: NURSE PRACTITIONER

## 2025-04-07 PROCEDURE — 1124F ACP DISCUSS-NO DSCNMKR DOCD: CPT | Performed by: NURSE PRACTITIONER

## 2025-04-07 PROCEDURE — 3017F COLORECTAL CA SCREEN DOC REV: CPT | Performed by: NURSE PRACTITIONER

## 2025-04-07 PROCEDURE — G8399 PT W/DXA RESULTS DOCUMENT: HCPCS | Performed by: NURSE PRACTITIONER

## 2025-04-07 PROCEDURE — G8427 DOCREV CUR MEDS BY ELIG CLIN: HCPCS | Performed by: NURSE PRACTITIONER

## 2025-04-07 PROCEDURE — 1160F RVW MEDS BY RX/DR IN RCRD: CPT | Performed by: NURSE PRACTITIONER

## 2025-04-07 PROCEDURE — 1090F PRES/ABSN URINE INCON ASSESS: CPT | Performed by: NURSE PRACTITIONER

## 2025-04-07 PROCEDURE — 1159F MED LIST DOCD IN RCRD: CPT | Performed by: NURSE PRACTITIONER

## 2025-04-07 PROCEDURE — 2022F DILAT RTA XM EVC RTNOPTHY: CPT | Performed by: NURSE PRACTITIONER

## 2025-04-07 PROCEDURE — 3074F SYST BP LT 130 MM HG: CPT | Performed by: NURSE PRACTITIONER

## 2025-04-07 PROCEDURE — G8417 CALC BMI ABV UP PARAM F/U: HCPCS | Performed by: NURSE PRACTITIONER

## 2025-04-07 PROCEDURE — 3078F DIAST BP <80 MM HG: CPT | Performed by: NURSE PRACTITIONER

## 2025-04-07 ASSESSMENT — ENCOUNTER SYMPTOMS: BACK PAIN: 1

## 2025-04-07 NOTE — PROGRESS NOTES
questions answered.  Pt voiced understanding. Reviewed health maintenance.  Instructed to continue current medications, diet and exercise.  Patient agreed with treatment plan. Follow up as directed.    Electronically signed by DONOVAN Gomez CNP on 4/7/2025 at 3:21 PM

## 2025-04-09 ENCOUNTER — LAB (OUTPATIENT)
Dept: LAB | Age: 70
End: 2025-04-09

## 2025-04-09 DIAGNOSIS — R30.0 DYSURIA: ICD-10-CM

## 2025-04-09 LAB
BACTERIA URNS QL MICRO: ABNORMAL /HPF
BILIRUB UR QL STRIP.AUTO: NEGATIVE
CASTS #/AREA URNS LPF: ABNORMAL /LPF
CASTS 2: ABNORMAL /LPF
CHARACTER UR: ABNORMAL
COLOR, UA: YELLOW
CRYSTALS URNS MICRO: ABNORMAL
EPITHELIAL CELLS, UA: ABNORMAL /HPF
GLUCOSE UR QL STRIP.AUTO: 100 MG/DL
HGB UR QL STRIP.AUTO: ABNORMAL
KETONES UR QL STRIP.AUTO: NEGATIVE
MISCELLANEOUS 2: ABNORMAL
NITRITE UR QL STRIP: NEGATIVE
PH UR STRIP.AUTO: 7.5 [PH] (ref 5–9)
PROT UR STRIP.AUTO-MCNC: ABNORMAL MG/DL
RBC URINE: ABNORMAL /HPF
RENAL EPI CELLS #/AREA URNS HPF: ABNORMAL /[HPF]
SP GR UR REFRACT.AUTO: 1.01 (ref 1–1.03)
UROBILINOGEN, URINE: 0.2 EU/DL (ref 0–1)
WBC #/AREA URNS HPF: ABNORMAL /HPF
WBC #/AREA URNS HPF: ABNORMAL /[HPF]
YEAST LIKE FUNGI URNS QL MICRO: ABNORMAL

## 2025-04-11 ENCOUNTER — RESULTS FOLLOW-UP (OUTPATIENT)
Dept: FAMILY MEDICINE CLINIC | Age: 70
End: 2025-04-11

## 2025-04-11 DIAGNOSIS — N39.0 E. COLI UTI (URINARY TRACT INFECTION): Primary | ICD-10-CM

## 2025-04-11 DIAGNOSIS — B96.20 E. COLI UTI (URINARY TRACT INFECTION): Primary | ICD-10-CM

## 2025-04-11 LAB
BACTERIA UR CULT: ABNORMAL
ORGANISM: ABNORMAL

## 2025-04-11 RX ORDER — CIPROFLOXACIN 500 MG/1
500 TABLET, FILM COATED ORAL 2 TIMES DAILY
Qty: 14 TABLET | Refills: 0 | Status: SHIPPED | OUTPATIENT
Start: 2025-04-11 | End: 2025-04-18

## 2025-04-11 RX ORDER — CIPROFLOXACIN 500 MG/1
500 TABLET, FILM COATED ORAL 2 TIMES DAILY
Qty: 14 TABLET | Refills: 0 | Status: CANCELLED | OUTPATIENT
Start: 2025-04-11 | End: 2025-04-18

## 2025-04-11 NOTE — TELEPHONE ENCOUNTER
Called and LM for Patient regarding antibiotic being sent in. Informed her our phones were forwarded for weekend but to notify her transplant team and begin antibiotic. Will forward results to Nephrology. Please send Rx

## 2025-04-14 ENCOUNTER — HOSPITAL ENCOUNTER (OUTPATIENT)
Dept: PHYSICAL THERAPY | Age: 70
Setting detail: THERAPIES SERIES
Discharge: HOME | End: 2025-04-14
Payer: MEDICARE

## 2025-04-14 DIAGNOSIS — N39.0 E. COLI UTI (URINARY TRACT INFECTION): ICD-10-CM

## 2025-04-14 DIAGNOSIS — B96.20 E. COLI UTI (URINARY TRACT INFECTION): ICD-10-CM

## 2025-04-14 PROCEDURE — 97162 PT EVAL MOD COMPLEX 30 MIN: CPT

## 2025-04-14 PROCEDURE — 97110 THERAPEUTIC EXERCISES: CPT

## 2025-04-14 RX ORDER — CIPROFLOXACIN 250 MG/1
250 TABLET, FILM COATED ORAL 2 TIMES DAILY
Qty: 10 TABLET | Refills: 0 | Status: SHIPPED | OUTPATIENT
Start: 2025-04-14 | End: 2025-04-19

## 2025-04-14 NOTE — TELEPHONE ENCOUNTER
Spoke with pt and notified her of results. Pt verbalized understanding and picked up prescription STEVEN sent into the pharmacy already. Upon review, Dr. Martins recommended a smaller dose for a shorter duration. Please advise.

## 2025-04-14 NOTE — PROGRESS NOTES
in back, radicular symptoms, decreased trunk ROM, decreased hip ROM, weakness in core, weakness in LE, gait deviations, and tightness throughout LE and low back that limits her ability to stand, walk and lift during daily tasks. Patient would benefit from skilled PT to improve pain, ROM, tissue extensibility, strength, gait and posture to allow improved functional mobility. Patient educated on benefit of PT and PT POC with patient in agreement.     Body Structures/Functions/Activity Limitations: impaired activity tolerance, impaired balance, impaired endurance, impaired ROM, impaired strength, pain, abnormal gait, and abnormal posture  Prognosis: good    GOALS:  Patient Goal: to improve pain and mobility as much as possible    Short Term Goals: 4 weeks  Patient will report decrease in pain to 7-8/10 at most to allow ease of daily tasks.  2. Patient will improve B hip IR/ER AROM to 30-35 degrees to allow ease of getting into car and donning socks/shoes.  3. Patient will improve L knee strength to 5/5 to allow ease of walking and step negotiation.  3. Patient will improve 90/90 hamstring length to 20 degrees B to allow decreased pain with standing/walking.    Long Term Goals: 8 weeks  Patient will improve B hip strength to 4+/5 to allow ease of walking and lifting.  2. Patient will perform 10 abdominal braces with minimal to no instruction to improve core strength needed for ease of standing tasks.  3. Patient will improve Modified Oswestry score from 30/50 to 20/50 to allow decrease in disability and improved functional mobility.  4. Patient will be independent with HEP in order to prevent re-injury and improve functional abilities.  5. Patient will be able to stand 15 minutes or greater to improve ability to complete self-care and daily tasks in her home.     Patient Education:   [x]  HEP/Education Completed: Plan of Care, Goals, benefit of PT, attendance policy, HEP handout  [employer] Access Code: [email or web address]  []  No

## 2025-04-15 ENCOUNTER — OFFICE VISIT (OUTPATIENT)
Dept: INTERNAL MEDICINE CLINIC | Age: 70
End: 2025-04-15
Payer: MEDICARE

## 2025-04-15 VITALS
DIASTOLIC BLOOD PRESSURE: 63 MMHG | BODY MASS INDEX: 29.62 KG/M2 | HEART RATE: 66 BPM | SYSTOLIC BLOOD PRESSURE: 135 MMHG | WEIGHT: 178 LBS

## 2025-04-15 DIAGNOSIS — Z79.4 TYPE 2 DIABETES MELLITUS WITHOUT COMPLICATION, WITH LONG-TERM CURRENT USE OF INSULIN (HCC): Primary | ICD-10-CM

## 2025-04-15 DIAGNOSIS — E11.9 TYPE 2 DIABETES MELLITUS WITHOUT COMPLICATION, WITH LONG-TERM CURRENT USE OF INSULIN (HCC): Primary | ICD-10-CM

## 2025-04-15 DIAGNOSIS — I10 ESSENTIAL HYPERTENSION: ICD-10-CM

## 2025-04-15 PROCEDURE — 1124F ACP DISCUSS-NO DSCNMKR DOCD: CPT | Performed by: NURSE PRACTITIONER

## 2025-04-15 PROCEDURE — 3075F SYST BP GE 130 - 139MM HG: CPT | Performed by: NURSE PRACTITIONER

## 2025-04-15 PROCEDURE — 3017F COLORECTAL CA SCREEN DOC REV: CPT | Performed by: NURSE PRACTITIONER

## 2025-04-15 PROCEDURE — 3051F HG A1C>EQUAL 7.0%<8.0%: CPT | Performed by: NURSE PRACTITIONER

## 2025-04-15 PROCEDURE — G8427 DOCREV CUR MEDS BY ELIG CLIN: HCPCS | Performed by: NURSE PRACTITIONER

## 2025-04-15 PROCEDURE — 1036F TOBACCO NON-USER: CPT | Performed by: NURSE PRACTITIONER

## 2025-04-15 PROCEDURE — 1090F PRES/ABSN URINE INCON ASSESS: CPT | Performed by: NURSE PRACTITIONER

## 2025-04-15 PROCEDURE — 2022F DILAT RTA XM EVC RTNOPTHY: CPT | Performed by: NURSE PRACTITIONER

## 2025-04-15 PROCEDURE — 99214 OFFICE O/P EST MOD 30 MIN: CPT | Performed by: NURSE PRACTITIONER

## 2025-04-15 PROCEDURE — G8417 CALC BMI ABV UP PARAM F/U: HCPCS | Performed by: NURSE PRACTITIONER

## 2025-04-15 PROCEDURE — 1159F MED LIST DOCD IN RCRD: CPT | Performed by: NURSE PRACTITIONER

## 2025-04-15 PROCEDURE — G8399 PT W/DXA RESULTS DOCUMENT: HCPCS | Performed by: NURSE PRACTITIONER

## 2025-04-15 PROCEDURE — 3078F DIAST BP <80 MM HG: CPT | Performed by: NURSE PRACTITIONER

## 2025-04-15 RX ORDER — INSULIN GLARGINE 100 [IU]/ML
30 INJECTION, SOLUTION SUBCUTANEOUS NIGHTLY
Qty: 10 ML | Refills: 3 | Status: SHIPPED | OUTPATIENT
Start: 2025-04-15

## 2025-04-15 NOTE — PROGRESS NOTES
Judgment normal.         Labs Reviewed 4/15/2025:    Lab Results   Component Value Date    WBC 7.2 03/11/2025    HGB 8.8 (L) 03/11/2025    HCT 29.2 (L) 03/11/2025     03/11/2025    CHOL 122 10/09/2024    TRIG 72 10/09/2024    HDL 44 10/09/2024    LDLDIRECT 62.12 11/08/2022    ALT 19 10/09/2024    AST 14 10/09/2024     (H) 03/11/2025    K 3.7 03/11/2025     (H) 03/11/2025    CREATININE 1.5 (H) 03/11/2025    BUN 26 (H) 03/11/2025    CO2 28 03/11/2025    TSH 1.110 02/10/2022    INR 1.07 07/15/2022    LABA1C 7.0 (H) 01/23/2025       Assessment/Plan      1. Type 2 diabetes mellitus without complication, with long-term current use of insulin    - insulin glargine (LANTUS) 100 UNIT/ML injection vial; Inject 30 Units into the skin nightly  Dispense: 10 mL; Refill: 3  - continue current regimen. Call if any hypoglycemic events or if readings consistently > 200    2. Essential hypertension    - controlled, continue current regimen      Return in about 6 months (around 10/15/2025).    Patient given educational materials - see patient instructions.  Discussed use, benefit, and side effects of prescribed medications.  All patient questions answered.  Pt voiced understanding.  Reviewed health maintenance.       Electronically signed DONOVAN Perez CNP on 4/15/25 at 2:07 PM EDT

## 2025-04-15 NOTE — TELEPHONE ENCOUNTER
Spoke to the pt and notified her of the new Rx that was sent to the pharmacy and she verbalized understanding. She is going to pick it up and start it today. She has 6 pills left out of 14 from the original Rx for Cipro 500 mg written by STEVEN and will stop taking it.

## 2025-04-16 ENCOUNTER — HOSPITAL ENCOUNTER (OUTPATIENT)
Dept: PHYSICAL THERAPY | Age: 70
Setting detail: THERAPIES SERIES
Discharge: HOME | End: 2025-04-16
Payer: MEDICARE

## 2025-04-16 PROCEDURE — 97530 THERAPEUTIC ACTIVITIES: CPT

## 2025-04-16 NOTE — PROGRESS NOTES
[facility]  PHYSICAL THERAPY  [] EVALUATION  [x] DAILY NOTE (LAND) [] DAILY NOTE (AQUATIC ) [] PROGRESS NOTE [] DISCHARGE NOTE    [x] OUTPATIENT REHABILITATION CENTER [facility]   [] [place] [facility]    [] [facility]   [] [facility]    Date: 2025  Patient Name:  Avani Thomas  : 1955  MRN: [id number]  CSN: [id number]    Referring Practitioner Romie Bo, APRN - CNP [phone]      Diagnosis  Diagnoses       M47.816 (ICD-10-CM) - Lumbar spondylosis    M47.816 (ICD-10-CM) - Lumbar facet arthropathy    M54.16 (ICD-10-CM) - Lumbar radiculitis    M54.42, M54.41, G89.29 (ICD-10-CM) - Chronic bilateral low back pain with bilateral sciatica    E11.42 (ICD-10-CM) - Diabetic polyneuropathy associated with type 2 diabetes mellitus    R53.81 (ICD-10-CM) - Debility    G89.4 (ICD-10-CM) - Chronic pain syndrome           Treatment Diagnosis M54.59, G89.29  Chronic Lower Back Pain  R53.1 Weakness  M25.60 Stiffness of Unspecified Joint   Date of Evaluation 25   Additional Pertinent History Avani Thomas has a past medical history of Anemia associated with chronic renal failure, Anxiety, Arthritis, Backache, Blood circulation, collateral, Blood transfusion, CAD (coronary artery disease), Cellulitis in diabetic foot ([facility]), Chest pain, CHF (congestive heart failure) ([facility]), Chronic anemia, Chronic kidney disease, Chronic kidney disease, stage III (moderate) ([facility]), Chronic renal insufficiency, COPD (chronic obstructive pulmonary disease) ([facility]), Coronary disease, COVID-19, Depression, Diabetes mellitus, type 2 ([facility]), Disease of blood and blood forming organ, GERD (gastroesophageal reflux disease), Hemoglobin disease, History of granulomatous disease, HTN (hypertension), Hyperlipemia, Iron deficiency anemia due to dietary causes, Kidney stones, Kidney trouble, MRSA infection, Neuromuscular disorder ([facility]), Neuropathy, Obesity, CONTRERAS on CPAP, Pneumonia, PONV

## 2025-04-22 ENCOUNTER — HOSPITAL ENCOUNTER (OUTPATIENT)
Dept: PHYSICAL THERAPY | Age: 70
Setting detail: THERAPIES SERIES
Discharge: HOME | End: 2025-04-22
Payer: MEDICARE

## 2025-04-22 PROCEDURE — 97110 THERAPEUTIC EXERCISES: CPT

## 2025-04-22 ASSESSMENT — ENCOUNTER SYMPTOMS
DIARRHEA: 0
SORE THROAT: 0
ABDOMINAL DISTENTION: 0
BACK PAIN: 0
CONSTIPATION: 0
TROUBLE SWALLOWING: 0
BLOOD IN STOOL: 0
SINUS PAIN: 0
NAUSEA: 0
ABDOMINAL PAIN: 0
WHEEZING: 0
COUGH: 0
SHORTNESS OF BREATH: 0
PHOTOPHOBIA: 0
EYE PAIN: 0
VOMITING: 0
SINUS PRESSURE: 0

## 2025-04-22 NOTE — PROGRESS NOTES
[facility]  PHYSICAL THERAPY  [] EVALUATION  [x] DAILY NOTE (LAND) [] DAILY NOTE (AQUATIC ) [] PROGRESS NOTE [] DISCHARGE NOTE    [x] OUTPATIENT REHABILITATION CENTER [facility]   [] [place] [facility]    [] [facility]   [] [facility]    Date: 2025  Patient Name:  Avani hTomas  : 1955  MRN: [id number]  CSN: [id number]    Referring Practitioner Romie Bo, APRN - CNP [phone]      Diagnosis  Diagnoses       M47.816 (ICD-10-CM) - Lumbar spondylosis    M47.816 (ICD-10-CM) - Lumbar facet arthropathy    M54.16 (ICD-10-CM) - Lumbar radiculitis    M54.42, M54.41, G89.29 (ICD-10-CM) - Chronic bilateral low back pain with bilateral sciatica    E11.42 (ICD-10-CM) - Diabetic polyneuropathy associated with type 2 diabetes mellitus    R53.81 (ICD-10-CM) - Debility    G89.4 (ICD-10-CM) - Chronic pain syndrome           Treatment Diagnosis M54.59, G89.29  Chronic Lower Back Pain  R53.1 Weakness  M25.60 Stiffness of Unspecified Joint   Date of Evaluation 25   Additional Pertinent History Avani Thomas has a past medical history of Anemia associated with chronic renal failure, Anxiety, Arthritis, Backache, Blood circulation, collateral, Blood transfusion, CAD (coronary artery disease), Cellulitis in diabetic foot ([facility]), Chest pain, CHF (congestive heart failure) ([facility]), Chronic anemia, Chronic kidney disease, Chronic kidney disease, stage III (moderate) ([facility]), Chronic renal insufficiency, COPD (chronic obstructive pulmonary disease) ([facility]), Coronary disease, COVID-19, Depression, Diabetes mellitus, type 2 ([facility]), Disease of blood and blood forming organ, GERD (gastroesophageal reflux disease), Hemoglobin disease, History of granulomatous disease, HTN (hypertension), Hyperlipemia, Iron deficiency anemia due to dietary causes, Kidney stones, Kidney trouble, MRSA infection, Neuromuscular disorder ([facility]), Neuropathy, Obesity, CONTRERAS on CPAP, Pneumonia, PONV

## 2025-04-24 ENCOUNTER — HOSPITAL ENCOUNTER (OUTPATIENT)
Dept: PHYSICAL THERAPY | Age: 70
Setting detail: THERAPIES SERIES
Discharge: HOME | End: 2025-04-24
Payer: MEDICARE

## 2025-04-24 PROCEDURE — 97112 NEUROMUSCULAR REEDUCATION: CPT

## 2025-04-24 PROCEDURE — 97110 THERAPEUTIC EXERCISES: CPT

## 2025-04-24 NOTE — PROGRESS NOTES
[facility]  PHYSICAL THERAPY  [] EVALUATION  [x] DAILY NOTE (LAND) [] DAILY NOTE (AQUATIC ) [] PROGRESS NOTE [] DISCHARGE NOTE    [x] OUTPATIENT REHABILITATION CENTER [facility]   [] [place] [facility]    [] [facility]   [] [facility]    Date: 2025  Patient Name:  Avani Thomas  : 1955  MRN: [id number]  CSN: [id number]    Referring Practitioner Romie Bo, APRN - CNP [phone]      Diagnosis  Diagnoses       M47.816 (ICD-10-CM) - Lumbar spondylosis    M47.816 (ICD-10-CM) - Lumbar facet arthropathy    M54.16 (ICD-10-CM) - Lumbar radiculitis    M54.42, M54.41, G89.29 (ICD-10-CM) - Chronic bilateral low back pain with bilateral sciatica    E11.42 (ICD-10-CM) - Diabetic polyneuropathy associated with type 2 diabetes mellitus    R53.81 (ICD-10-CM) - Debility    G89.4 (ICD-10-CM) - Chronic pain syndrome           Treatment Diagnosis M54.59, G89.29  Chronic Lower Back Pain  R53.1 Weakness  M25.60 Stiffness of Unspecified Joint   Date of Evaluation 25   Additional Pertinent History Avani Thomas has a past medical history of Anemia associated with chronic renal failure, Anxiety, Arthritis, Backache, Blood circulation, collateral, Blood transfusion, CAD (coronary artery disease), Cellulitis in diabetic foot ([facility]), Chest pain, CHF (congestive heart failure) ([facility]), Chronic anemia, Chronic kidney disease, Chronic kidney disease, stage III (moderate) ([facility]), Chronic renal insufficiency, COPD (chronic obstructive pulmonary disease) ([facility]), Coronary disease, COVID-19, Depression, Diabetes mellitus, type 2 ([facility]), Disease of blood and blood forming organ, GERD (gastroesophageal reflux disease), Hemoglobin disease, History of granulomatous disease, HTN (hypertension), Hyperlipemia, Iron deficiency anemia due to dietary causes, Kidney stones, Kidney trouble, MRSA infection, Neuromuscular disorder ([facility]), Neuropathy, Obesity, CONTRERAS on CPAP, Pneumonia, PONV

## 2025-04-25 ENCOUNTER — HOSPITAL ENCOUNTER (OUTPATIENT)
Dept: CT IMAGING | Age: 70
Discharge: HOME | End: 2025-04-25
Payer: MEDICARE

## 2025-04-25 DIAGNOSIS — M54.16 LUMBAR RADICULITIS: ICD-10-CM

## 2025-04-25 DIAGNOSIS — M54.42 CHRONIC BILATERAL LOW BACK PAIN WITH BILATERAL SCIATICA: ICD-10-CM

## 2025-04-25 DIAGNOSIS — M54.41 CHRONIC BILATERAL LOW BACK PAIN WITH BILATERAL SCIATICA: ICD-10-CM

## 2025-04-25 DIAGNOSIS — M47.816 LUMBAR SPONDYLOSIS: ICD-10-CM

## 2025-04-25 DIAGNOSIS — G89.29 CHRONIC BILATERAL LOW BACK PAIN WITH BILATERAL SCIATICA: ICD-10-CM

## 2025-04-25 DIAGNOSIS — G89.4 CHRONIC PAIN SYNDROME: ICD-10-CM

## 2025-04-25 DIAGNOSIS — E11.42 DIABETIC POLYNEUROPATHY ASSOCIATED WITH TYPE 2 DIABETES MELLITUS (HCC): ICD-10-CM

## 2025-04-25 DIAGNOSIS — M47.816 LUMBAR FACET ARTHROPATHY: ICD-10-CM

## 2025-04-25 PROCEDURE — 72131 CT LUMBAR SPINE W/O DYE: CPT

## 2025-04-29 ENCOUNTER — HOSPITAL ENCOUNTER (OUTPATIENT)
Dept: PHYSICAL THERAPY | Age: 70
Setting detail: THERAPIES SERIES
Discharge: HOME | End: 2025-04-29
Payer: MEDICARE

## 2025-04-29 DIAGNOSIS — M47.816 LUMBAR FACET ARTHROPATHY: ICD-10-CM

## 2025-04-29 DIAGNOSIS — R53.81 DEBILITY: ICD-10-CM

## 2025-04-29 DIAGNOSIS — G89.29 CHRONIC BILATERAL LOW BACK PAIN WITHOUT SCIATICA: ICD-10-CM

## 2025-04-29 DIAGNOSIS — G89.4 CHRONIC PAIN SYNDROME: ICD-10-CM

## 2025-04-29 DIAGNOSIS — M54.50 CHRONIC BILATERAL LOW BACK PAIN WITHOUT SCIATICA: ICD-10-CM

## 2025-04-29 DIAGNOSIS — M47.816 LUMBAR SPONDYLOSIS: ICD-10-CM

## 2025-04-29 PROCEDURE — 97110 THERAPEUTIC EXERCISES: CPT

## 2025-04-29 RX ORDER — HYDROCODONE BITARTRATE AND ACETAMINOPHEN 5; 325 MG/1; MG/1
1 TABLET ORAL EVERY 8 HOURS PRN
Qty: 90 TABLET | Refills: 0 | Status: SHIPPED | OUTPATIENT
Start: 2025-05-01 | End: 2025-05-31

## 2025-04-29 NOTE — PROGRESS NOTES
[facility]  PHYSICAL THERAPY  [] EVALUATION  [x] DAILY NOTE (LAND) [] DAILY NOTE (AQUATIC ) [] PROGRESS NOTE [] DISCHARGE NOTE    [x] OUTPATIENT REHABILITATION CENTER [facility]   [] [place] [facility]    [] [facility]   [] [facility]    Date: 2025  Patient Name:  Avani Thomas  : 1955  MRN: [id number]  CSN: [id number]    Referring Practitioner Romie Bo, APRN - CNP [phone]      Diagnosis  Diagnoses       M47.816 (ICD-10-CM) - Lumbar spondylosis    M47.816 (ICD-10-CM) - Lumbar facet arthropathy    M54.16 (ICD-10-CM) - Lumbar radiculitis    M54.42, M54.41, G89.29 (ICD-10-CM) - Chronic bilateral low back pain with bilateral sciatica    E11.42 (ICD-10-CM) - Diabetic polyneuropathy associated with type 2 diabetes mellitus    R53.81 (ICD-10-CM) - Debility    G89.4 (ICD-10-CM) - Chronic pain syndrome           Treatment Diagnosis M54.59, G89.29  Chronic Lower Back Pain  R53.1 Weakness  M25.60 Stiffness of Unspecified Joint   Date of Evaluation 25   Additional Pertinent History Avani Thomas has a past medical history of Anemia associated with chronic renal failure, Anxiety, Arthritis, Backache, Blood circulation, collateral, Blood transfusion, CAD (coronary artery disease), Cellulitis in diabetic foot ([facility]), Chest pain, CHF (congestive heart failure) ([facility]), Chronic anemia, Chronic kidney disease, Chronic kidney disease, stage III (moderate) ([facility]), Chronic renal insufficiency, COPD (chronic obstructive pulmonary disease) ([facility]), Coronary disease, COVID-19, Depression, Diabetes mellitus, type 2 ([facility]), Disease of blood and blood forming organ, GERD (gastroesophageal reflux disease), Hemoglobin disease, History of granulomatous disease, HTN (hypertension), Hyperlipemia, Iron deficiency anemia due to dietary causes, Kidney stones, Kidney trouble, MRSA infection, Neuromuscular disorder ([facility]), Neuropathy, Obesity, CONTRERAS on CPAP, Pneumonia, PONV

## 2025-04-29 NOTE — TELEPHONE ENCOUNTER
Avani Thomas called requesting a refill on the following medications:  Requested Prescriptions     Pending Prescriptions Disp Refills    HYDROcodone-acetaminophen (NORCO) 5-325 MG per tablet 90 tablet 0     Sig: Take 1 tablet by mouth every 8 hours as needed for Pain for up to 30 days.     Pharmacy verified:    [id number] - [place], [place] - [address] - P [phone] - F [phone]     Date of last visit: 03/31/2025  Date of next visit (if applicable): 6/2/2025

## 2025-04-29 NOTE — TELEPHONE ENCOUNTER
OARRS reviewed. UDS: + for  Hydrocodone.   Last seen: 3/31/2025. Follow-up:   Future Appointments   Date Time Provider Department Center   4/29/2025  1:45 PM Lisa Noland, PTA [facility] [place] [facility]   5/1/2025  1:00 PM Shey Maciel, PTA [facility] [place] [facility]   5/6/2025  1:00 PM Lisa Noland, PTA [facility] [place] [facility]   5/8/2025  1:45 PM Lisa Noland, PTA [facility] [place] [facility]   5/13/2025  1:45 PM Lisa Noland, PTA [facility] [place] [facility]   5/15/2025  2:00 PM Olivia Bourne, PT [facility] [place] [facility]   5/15/2025  3:00 PM Jesus Martins MD N [place] [facility] - [place]   6/2/2025 11:20 AM Romie Bo, APRN - CNP N [facility] Pain P - [place]   6/30/2025  2:00 PM Selina Singer PA-C N Pulm Med P - [place]   9/22/2025  2:30 PM Abdirizak Vaca MD N [facility] Heart P - [place]   10/9/2025  2:00 PM Hillary Stephenson, APRN - CNP [facility] Medicine [facility] E[facility] DEP   10/16/2025  2:00 PM Martha Gaytan APRN - CNP [facility] IM MED [facility] DE[facility]   2/26/2026  2:40 PM Jesus Martins MD N [place] [facility] - [place]

## 2025-05-01 ENCOUNTER — APPOINTMENT (OUTPATIENT)
Dept: PHYSICAL THERAPY | Age: 70
End: 2025-05-01
Payer: MEDICARE

## 2025-05-06 ENCOUNTER — HOSPITAL ENCOUNTER (OUTPATIENT)
Dept: PHYSICAL THERAPY | Age: 70
Setting detail: THERAPIES SERIES
Discharge: HOME | End: 2025-05-06
Payer: MEDICARE

## 2025-05-06 PROCEDURE — 97110 THERAPEUTIC EXERCISES: CPT

## 2025-05-06 NOTE — PROGRESS NOTES
Recommendations: Strengthening, Range of Motion, Balance Training, Endurance Training, Gait Training, Stair Training, Neuromuscular Re-education, Manual Therapy - Soft Tissue Mobilization, Manual Therapy - Joint Manipulation, Pain Management, Home Exercise Program, Patient Education, Integrative Dry Needling, Aquatics, and Modalities    []  Plan of care initiated.  Plan to see patient 2 times per week for 8 weeks to address the treatment planned outlined above.  [x]  Continue with current plan of care  []  Modify plan of care as follows:    []  Hold pending physician visit  []  Discharge    Time In 1300   Time Out 1339   Timed Code Minutes: 39   Total Treatment Time: 39       Electronically Signed by: Lisa Noland PTA

## 2025-05-08 ENCOUNTER — HOSPITAL ENCOUNTER (OUTPATIENT)
Dept: PHYSICAL THERAPY | Age: 70
Setting detail: THERAPIES SERIES
Discharge: HOME | End: 2025-05-08
Payer: MEDICARE

## 2025-05-08 PROCEDURE — 97110 THERAPEUTIC EXERCISES: CPT

## 2025-05-08 NOTE — PROGRESS NOTES
[facility]  PHYSICAL THERAPY  [] EVALUATION  [x] DAILY NOTE (LAND) [] DAILY NOTE (AQUATIC ) [] PROGRESS NOTE [] DISCHARGE NOTE    [x] OUTPATIENT REHABILITATION CENTER [facility]   [] [place] [facility]    [] [facility]   [] [facility]    Date: 2025  Patient Name:  Avani Thomas  : 1955  MRN: [id number]  CSN: [id number]    Referring Practitioner Romie Bo, APRN - CNP [phone]      Diagnosis  Diagnoses       M47.816 (ICD-10-CM) - Lumbar spondylosis    M47.816 (ICD-10-CM) - Lumbar facet arthropathy    M54.16 (ICD-10-CM) - Lumbar radiculitis    M54.42, M54.41, G89.29 (ICD-10-CM) - Chronic bilateral low back pain with bilateral sciatica    E11.42 (ICD-10-CM) - Diabetic polyneuropathy associated with type 2 diabetes mellitus    R53.81 (ICD-10-CM) - Debility    G89.4 (ICD-10-CM) - Chronic pain syndrome           Treatment Diagnosis M54.59, G89.29  Chronic Lower Back Pain  R53.1 Weakness  M25.60 Stiffness of Unspecified Joint   Date of Evaluation 25   Additional Pertinent History Avani Thomas has a past medical history of Anemia associated with chronic renal failure, Anxiety, Arthritis, Backache, Blood circulation, collateral, Blood transfusion, CAD (coronary artery disease), Cellulitis in diabetic foot ([facility]), Chest pain, CHF (congestive heart failure) ([facility]), Chronic anemia, Chronic kidney disease, Chronic kidney disease, stage III (moderate) ([facility]), Chronic renal insufficiency, COPD (chronic obstructive pulmonary disease) ([facility]), Coronary disease, COVID-19, Depression, Diabetes mellitus, type 2 ([facility]), Disease of blood and blood forming organ, GERD (gastroesophageal reflux disease), Hemoglobin disease, History of granulomatous disease, HTN (hypertension), Hyperlipemia, Iron deficiency anemia due to dietary causes, Kidney stones, Kidney trouble, MRSA infection, Neuromuscular disorder ([facility]), Neuropathy, Obesity, CONTRERAS on CPAP, Pneumonia, PONV

## 2025-05-12 DIAGNOSIS — I10 ESSENTIAL HYPERTENSION: ICD-10-CM

## 2025-05-12 DIAGNOSIS — E78.5 HYPERLIPIDEMIA, UNSPECIFIED HYPERLIPIDEMIA TYPE: ICD-10-CM

## 2025-05-12 RX ORDER — MAGNESIUM OXIDE 400 MG/1
1 TABLET ORAL 2 TIMES DAILY
Qty: 60 TABLET | Refills: 11 | Status: SHIPPED | OUTPATIENT
Start: 2025-05-12

## 2025-05-12 NOTE — TELEPHONE ENCOUNTER
This medication refill is regarding a telephone request. Refill requested by patient.    Requested Prescriptions     Pending Prescriptions Disp Refills    magnesium oxide (MAG-OX) 400 MG tablet 60 tablet 11     Sig: Take 1 tablet by mouth 2 times daily     Date of last visit: 4/7/2025   Date of next visit: 10/9/2025  Date of last refill: 5/13/24 #60/11  Pharmacy Name: [facility]    Last CMP:   Lab Results   Component Value Date     (H) 03/11/2025    K 3.7 03/11/2025     (H) 03/11/2025    CO2 28 03/11/2025    BUN 26 (H) 03/11/2025    CREATININE 1.5 (H) 03/11/2025    GLUCOSE 96 03/11/2025    CALCIUM 10.4 01/23/2025    BILITOT 0.4 10/09/2024    ALKPHOS 84 10/09/2024    AST 14 10/09/2024    ALT 19 10/09/2024    LABGLOM 37 (A) 03/11/2025     Rx verified, ordered and set to EP.

## 2025-05-13 ENCOUNTER — HOSPITAL ENCOUNTER (OUTPATIENT)
Dept: PHYSICAL THERAPY | Age: 70
Setting detail: THERAPIES SERIES
Discharge: HOME | End: 2025-05-13
Payer: MEDICARE

## 2025-05-13 PROCEDURE — 97110 THERAPEUTIC EXERCISES: CPT

## 2025-05-13 PROCEDURE — 97116 GAIT TRAINING THERAPY: CPT

## 2025-05-13 NOTE — PROGRESS NOTES
that she is taking pain medication, has not had any recent injections. Patient had a tumor removed on her back but no spinal surgeries. Patient has history of kidney transplant 4 years ago. Patient reports that she uses a cane mostly for walking. Patient does have a walker and she will use that at times in her home. Patient reports that she has had PT in the past and it did help her back.      SUBJECTIVE: Pt states having a busy weekend. Notes attempting to walk more at home with SC. States compliance with HEP.   Objective:      TREATMENT   Precautions: Fall risk, kidney transplant 4 years ago   Pain:     \"X” in shaded column indicates activity completed today    “*\" next to exercise/intervention indicates progression   Modalities Parameters/  Location  Notes   MHP Low Back  Concurrent with Supine Activities               Manual Therapy Time/Technique  Notes                     Exercise/Intervention   Notes   NuStep 7* mins L 3  x           TrA  [id number]  X    Glute Sets  [id number]  X    Marching 15x  X    Bridge 10  x Min ROM   BKFO:unilateral/bilateral 12      SLR 5             Hip Flexor  [id number]      SKTC 3x20*sec  x    Piriformis + RADHA [id number]  x Manually Assisted   Hamstring stretch [id number]  x Manually Assisted   LTR [id number]             Seated       LAQ 15*  x    Marches 10      Sit<>stands 5             Gait training: SC (short distances) 75'  x Decreased paulette and stride length, no LOB           Provided HEP handout- supine ab brace, ab brace with knee fall out, piriformis stretch IR and ER, LTR, seated HS stretch         Specific Interventions Next Treatment: nu step, stretching-piriformis/HS/hip flexor/quad/IT band/low back, gentle abdominal bracing with progression, hip and knee strengthening, gait, standing tolerance.    Activity/Treatment Tolerance:  [x]  Patient tolerated treatment well  []  Patient limited by fatigue  []  Patient limited by pain   []  Patient limited by medical complications  []

## 2025-05-15 ENCOUNTER — HOSPITAL ENCOUNTER (OUTPATIENT)
Dept: PHYSICAL THERAPY | Age: 70
Setting detail: THERAPIES SERIES
Discharge: HOME | End: 2025-05-15
Payer: MEDICARE

## 2025-05-15 PROCEDURE — 97110 THERAPEUTIC EXERCISES: CPT

## 2025-05-15 NOTE — PROGRESS NOTES
[facility]  PHYSICAL THERAPY  [] EVALUATION  [] DAILY NOTE (LAND) [] DAILY NOTE (AQUATIC ) [] PROGRESS NOTE [x] DISCHARGE NOTE    [x] OUTPATIENT REHABILITATION CENTER [facility]   [] [place] [facility]    [] [facility]   [] [facility]    Date: 5/15/2025  Patient Name:  Avani Thomas  : 1955  MRN: [id number]  CSN: [id number]    Referring Practitioner Romie Bo, APRN - CNP [phone]      Diagnosis  Diagnoses       M47.816 (ICD-10-CM) - Lumbar spondylosis    M47.816 (ICD-10-CM) - Lumbar facet arthropathy    M54.16 (ICD-10-CM) - Lumbar radiculitis    M54.42, M54.41, G89.29 (ICD-10-CM) - Chronic bilateral low back pain with bilateral sciatica    E11.42 (ICD-10-CM) - Diabetic polyneuropathy associated with type 2 diabetes mellitus    R53.81 (ICD-10-CM) - Debility    G89.4 (ICD-10-CM) - Chronic pain syndrome           Treatment Diagnosis M54.59, G89.29  Chronic Lower Back Pain  R53.1 Weakness  M25.60 Stiffness of Unspecified Joint   Date of Evaluation 25   Additional Pertinent History Avani Thomas has a past medical history of Anemia associated with chronic renal failure, Anxiety, Arthritis, Backache, Blood circulation, collateral, Blood transfusion, CAD (coronary artery disease), Cellulitis in diabetic foot ([facility]), Chest pain, CHF (congestive heart failure) ([facility]), Chronic anemia, Chronic kidney disease, Chronic kidney disease, stage III (moderate) ([facility]), Chronic renal insufficiency, COPD (chronic obstructive pulmonary disease) ([facility]), Coronary disease, COVID-19, Depression, Diabetes mellitus, type 2 ([facility]), Disease of blood and blood forming organ, GERD (gastroesophageal reflux disease), Hemoglobin disease, History of granulomatous disease, HTN (hypertension), Hyperlipemia, Iron deficiency anemia due to dietary causes, Kidney stones, Kidney trouble, MRSA infection, Neuromuscular disorder ([facility]), Neuropathy, Obesity, CONTRERAS on CPAP, Pneumonia, PONV

## 2025-06-02 ENCOUNTER — OFFICE VISIT (OUTPATIENT)
Dept: PHYSICAL MEDICINE AND REHAB | Age: 70
End: 2025-06-02
Payer: MEDICARE

## 2025-06-02 VITALS
WEIGHT: 177.91 LBS | BODY MASS INDEX: 29.64 KG/M2 | SYSTOLIC BLOOD PRESSURE: 132 MMHG | DIASTOLIC BLOOD PRESSURE: 76 MMHG | HEIGHT: 65 IN

## 2025-06-02 DIAGNOSIS — M47.816 LUMBAR SPONDYLOSIS: Primary | ICD-10-CM

## 2025-06-02 DIAGNOSIS — M48.062 SPINAL STENOSIS OF LUMBAR REGION WITH NEUROGENIC CLAUDICATION: ICD-10-CM

## 2025-06-02 DIAGNOSIS — G89.29 CHRONIC BILATERAL LOW BACK PAIN WITH BILATERAL SCIATICA: ICD-10-CM

## 2025-06-02 DIAGNOSIS — M54.41 CHRONIC BILATERAL LOW BACK PAIN WITH BILATERAL SCIATICA: ICD-10-CM

## 2025-06-02 DIAGNOSIS — R53.81 DEBILITY: ICD-10-CM

## 2025-06-02 DIAGNOSIS — M54.16 LUMBAR RADICULITIS: ICD-10-CM

## 2025-06-02 DIAGNOSIS — E11.42 DIABETIC POLYNEUROPATHY ASSOCIATED WITH TYPE 2 DIABETES MELLITUS (HCC): ICD-10-CM

## 2025-06-02 DIAGNOSIS — M54.42 CHRONIC BILATERAL LOW BACK PAIN WITH BILATERAL SCIATICA: ICD-10-CM

## 2025-06-02 DIAGNOSIS — M47.816 LUMBAR FACET ARTHROPATHY: ICD-10-CM

## 2025-06-02 DIAGNOSIS — G89.4 CHRONIC PAIN SYNDROME: ICD-10-CM

## 2025-06-02 PROCEDURE — 3017F COLORECTAL CA SCREEN DOC REV: CPT | Performed by: NURSE PRACTITIONER

## 2025-06-02 PROCEDURE — 1159F MED LIST DOCD IN RCRD: CPT | Performed by: NURSE PRACTITIONER

## 2025-06-02 PROCEDURE — 3051F HG A1C>EQUAL 7.0%<8.0%: CPT | Performed by: NURSE PRACTITIONER

## 2025-06-02 PROCEDURE — 1090F PRES/ABSN URINE INCON ASSESS: CPT | Performed by: NURSE PRACTITIONER

## 2025-06-02 PROCEDURE — 3075F SYST BP GE 130 - 139MM HG: CPT | Performed by: NURSE PRACTITIONER

## 2025-06-02 PROCEDURE — G8417 CALC BMI ABV UP PARAM F/U: HCPCS | Performed by: NURSE PRACTITIONER

## 2025-06-02 PROCEDURE — G8399 PT W/DXA RESULTS DOCUMENT: HCPCS | Performed by: NURSE PRACTITIONER

## 2025-06-02 PROCEDURE — G8427 DOCREV CUR MEDS BY ELIG CLIN: HCPCS | Performed by: NURSE PRACTITIONER

## 2025-06-02 PROCEDURE — 1125F AMNT PAIN NOTED PAIN PRSNT: CPT | Performed by: NURSE PRACTITIONER

## 2025-06-02 PROCEDURE — 1036F TOBACCO NON-USER: CPT | Performed by: NURSE PRACTITIONER

## 2025-06-02 PROCEDURE — 3078F DIAST BP <80 MM HG: CPT | Performed by: NURSE PRACTITIONER

## 2025-06-02 PROCEDURE — 2022F DILAT RTA XM EVC RTNOPTHY: CPT | Performed by: NURSE PRACTITIONER

## 2025-06-02 PROCEDURE — 99214 OFFICE O/P EST MOD 30 MIN: CPT | Performed by: NURSE PRACTITIONER

## 2025-06-02 PROCEDURE — 1124F ACP DISCUSS-NO DSCNMKR DOCD: CPT | Performed by: NURSE PRACTITIONER

## 2025-06-02 RX ORDER — HYDROCODONE BITARTRATE AND ACETAMINOPHEN 5; 325 MG/1; MG/1
1 TABLET ORAL EVERY 8 HOURS PRN
Qty: 90 TABLET | Refills: 0 | Status: SHIPPED | OUTPATIENT
Start: 2025-06-02 | End: 2025-07-02

## 2025-06-02 ASSESSMENT — ENCOUNTER SYMPTOMS
BACK PAIN: 1
WHEEZING: 0
SHORTNESS OF BREATH: 0
EYE REDNESS: 0
GASTROINTESTINAL NEGATIVE: 1
COUGH: 0
CHEST TIGHTNESS: 0
EYE DISCHARGE: 0

## 2025-06-02 NOTE — PROGRESS NOTES
motion.      Left ankle: Swelling present. Tenderness present. Decreased range of motion.   Skin:     General: Skin is warm.      Coloration: Skin is not pale.      Findings: No erythema or rash.          Neurological:      General: No focal deficit present.      Mental Status: She is alert and oriented to person, place, and time. She is not disoriented.      Cranial Nerves: No cranial nerve deficit.      Sensory: Sensory deficit present.      Motor: Weakness present. No atrophy or abnormal muscle tone.      Coordination: Coordination normal.      Gait: Gait abnormal.      Deep Tendon Reflexes: Reflexes are normal and symmetric. Babinski sign absent on the right side. Babinski sign absent on the left side.      Comments: bilateral lower extremities 4/5 strength, 5/5 bilateral upper extremities     Ambulating with a cane    Psychiatric:         Attention and Perception: She is attentive.         Mood and Affect: Mood normal. Mood is not anxious or depressed. Affect is not labile, blunt, angry or inappropriate.         Speech: She is communicative. Speech is not rapid and pressured, delayed, slurred or tangential.         Behavior: Behavior normal. Behavior is not agitated, slowed, aggressive, withdrawn, hyperactive or combative.         Thought Content: Thought content is not paranoid or delusional. Thought content does not include homicidal or suicidal ideation. Thought content does not include homicidal or suicidal plan.         Cognition and Memory: Memory is not impaired. She does not exhibit impaired recent memory or impaired remote memory.         Judgment: Judgment is not impulsive or inappropriate.       RADHA  Patricks test  negative  Yeoman's  or Gaenslen's negative       Assessment:     1. Lumbar spondylosis    2. Lumbar facet arthropathy    3. Lumbar radiculitis    4. Spinal stenosis of lumbar region with neurogenic claudication    5. Chronic bilateral low back pain with bilateral sciatica    6. Diabetic

## 2025-06-28 ENCOUNTER — APPOINTMENT (OUTPATIENT)
Age: 70
End: 2025-06-28
Payer: MEDICARE

## 2025-06-28 ENCOUNTER — APPOINTMENT (OUTPATIENT)
Dept: GENERAL RADIOLOGY | Age: 70
End: 2025-06-28
Payer: MEDICARE

## 2025-06-28 ENCOUNTER — HOSPITAL ENCOUNTER (EMERGENCY)
Age: 70
Discharge: HOME | End: 2025-06-28
Attending: EMERGENCY MEDICINE
Payer: MEDICARE

## 2025-06-28 ENCOUNTER — APPOINTMENT (OUTPATIENT)
Dept: CT IMAGING | Age: 70
End: 2025-06-28
Payer: MEDICARE

## 2025-06-28 VITALS
RESPIRATION RATE: 20 BRPM | HEART RATE: 72 BPM | OXYGEN SATURATION: 94 % | SYSTOLIC BLOOD PRESSURE: 123 MMHG | DIASTOLIC BLOOD PRESSURE: 58 MMHG | TEMPERATURE: 99 F

## 2025-06-28 DIAGNOSIS — R42 DIZZINESS: Primary | ICD-10-CM

## 2025-06-28 DIAGNOSIS — N39.0 URINARY TRACT INFECTION WITHOUT HEMATURIA, SITE UNSPECIFIED: ICD-10-CM

## 2025-06-28 DIAGNOSIS — R55 SYNCOPE AND COLLAPSE: ICD-10-CM

## 2025-06-28 LAB
ALBUMIN SERPL BCG-MCNC: 4.2 G/DL (ref 3.4–4.9)
ALP SERPL-CCNC: 78 U/L (ref 38–126)
ALT SERPL W/O P-5'-P-CCNC: 15 U/L (ref 10–35)
ANION GAP SERPL CALC-SCNC: 14 MEQ/L (ref 8–16)
AST SERPL-CCNC: 15 U/L (ref 10–35)
BACTERIA URNS QL MICRO: ABNORMAL /HPF
BASOPHILS ABSOLUTE: 0 THOU/MM3 (ref 0–0.1)
BASOPHILS NFR BLD AUTO: 0.1 %
BILIRUB SERPL-MCNC: 0.6 MG/DL (ref 0.3–1.2)
BILIRUB UR QL STRIP.AUTO: NEGATIVE
BUN SERPL-MCNC: 33 MG/DL (ref 8–23)
CALCIUM SERPL-MCNC: 10.6 MG/DL (ref 8.8–10.2)
CASTS #/AREA URNS LPF: ABNORMAL /LPF
CASTS 2: ABNORMAL /LPF
CHARACTER UR: ABNORMAL
CHLORIDE SERPL-SCNC: 103 MEQ/L (ref 98–111)
CO2 SERPL-SCNC: 24 MEQ/L (ref 22–29)
COLOR, UA: YELLOW
CREAT SERPL-MCNC: 1.6 MG/DL (ref 0.5–0.9)
CRYSTALS URNS MICRO: ABNORMAL
DEPRECATED RDW RBC AUTO: 47.8 FL (ref 35–45)
EOSINOPHIL NFR BLD AUTO: 0.3 %
EOSINOPHILS ABSOLUTE: 0 THOU/MM3 (ref 0–0.4)
EPITHELIAL CELLS, UA: ABNORMAL /HPF
ERYTHROCYTE [DISTWIDTH] IN BLOOD BY AUTOMATED COUNT: 15.9 % (ref 11.5–14.5)
GFR SERPL CREATININE-BSD FRML MDRD: 34 ML/MIN/1.73M2
GLUCOSE SERPL-MCNC: 75 MG/DL (ref 74–109)
GLUCOSE UR QL STRIP.AUTO: NEGATIVE MG/DL
HCT VFR BLD AUTO: 31.3 % (ref 37–47)
HGB BLD-MCNC: 9.4 GM/DL (ref 12–16)
HGB UR QL STRIP.AUTO: ABNORMAL
IMM GRANULOCYTES # BLD AUTO: 0.04 THOU/MM3 (ref 0–0.07)
IMM GRANULOCYTES NFR BLD AUTO: 0.3 %
KETONES UR QL STRIP.AUTO: NEGATIVE
LYMPHOCYTES ABSOLUTE: 0.8 THOU/MM3 (ref 1–4.8)
LYMPHOCYTES NFR BLD AUTO: 6.7 %
MAGNESIUM SERPL-MCNC: 2.6 MG/DL (ref 1.6–2.6)
MCH RBC QN AUTO: 25.1 PG (ref 26–33)
MCHC RBC AUTO-ENTMCNC: 30 GM/DL (ref 32.2–35.5)
MCV RBC AUTO: 83.5 FL (ref 81–99)
MISCELLANEOUS 2: ABNORMAL
MONOCYTES ABSOLUTE: 1 THOU/MM3 (ref 0.4–1.3)
MONOCYTES NFR BLD AUTO: 8.8 %
NEUTROPHILS ABSOLUTE: 9.7 THOU/MM3 (ref 1.8–7.7)
NEUTROPHILS NFR BLD AUTO: 83.8 %
NITRITE UR QL STRIP: NEGATIVE
NRBC BLD AUTO-RTO: 0 /100 WBC
NT-PROBNP SERPL IA-MCNC: 1756 PG/ML (ref 0–124)
OSMOLALITY SERPL CALC.SUM OF ELEC: 287.2 MOSMOL/KG (ref 275–300)
PH UR STRIP.AUTO: 6 [PH] (ref 5–9)
PLATELET # BLD AUTO: 245 THOU/MM3 (ref 130–400)
PMV BLD AUTO: 10.9 FL (ref 9.4–12.4)
POTASSIUM SERPL-SCNC: 3.5 MEQ/L (ref 3.5–5.2)
PROT SERPL-MCNC: 7.2 G/DL (ref 6.4–8.3)
PROT UR STRIP.AUTO-MCNC: 300 MG/DL
RBC # BLD AUTO: 3.75 MILL/MM3 (ref 4.2–5.4)
RBC URINE: > 100 /HPF
RENAL EPI CELLS #/AREA URNS HPF: ABNORMAL /[HPF]
SODIUM SERPL-SCNC: 141 MEQ/L (ref 135–145)
SP GR UR REFRACT.AUTO: 1.01 (ref 1–1.03)
TROPONIN, HIGH SENSITIVITY: 37 NG/L (ref 0–12)
TROPONIN, HIGH SENSITIVITY: 38 NG/L (ref 0–12)
TSH SERPL DL<=0.05 MIU/L-ACNC: 1.55 UIU/ML (ref 0.27–4.2)
UROBILINOGEN, URINE: 0.2 EU/DL (ref 0–1)
WBC # BLD AUTO: 11.6 THOU/MM3 (ref 4.8–10.8)
WBC #/AREA URNS HPF: > 200 /HPF
WBC #/AREA URNS HPF: ABNORMAL /[HPF]
YEAST LIKE FUNGI URNS QL MICRO: ABNORMAL

## 2025-06-28 PROCEDURE — 81001 URINALYSIS AUTO W/SCOPE: CPT

## 2025-06-28 PROCEDURE — 84443 ASSAY THYROID STIM HORMONE: CPT

## 2025-06-28 PROCEDURE — 71045 X-RAY EXAM CHEST 1 VIEW: CPT

## 2025-06-28 PROCEDURE — 87077 CULTURE AEROBIC IDENTIFY: CPT

## 2025-06-28 PROCEDURE — 84484 ASSAY OF TROPONIN QUANT: CPT

## 2025-06-28 PROCEDURE — 6370000000 HC RX 637 (ALT 250 FOR IP)

## 2025-06-28 PROCEDURE — 70450 CT HEAD/BRAIN W/O DYE: CPT

## 2025-06-28 PROCEDURE — 83735 ASSAY OF MAGNESIUM: CPT

## 2025-06-28 PROCEDURE — 99285 EMERGENCY DEPT VISIT HI MDM: CPT

## 2025-06-28 PROCEDURE — 93242 EXT ECG>48HR<7D RECORDING: CPT

## 2025-06-28 PROCEDURE — 80053 COMPREHEN METABOLIC PANEL: CPT

## 2025-06-28 PROCEDURE — 83880 ASSAY OF NATRIURETIC PEPTIDE: CPT

## 2025-06-28 PROCEDURE — 36415 COLL VENOUS BLD VENIPUNCTURE: CPT

## 2025-06-28 PROCEDURE — 93005 ELECTROCARDIOGRAM TRACING: CPT | Performed by: EMERGENCY MEDICINE

## 2025-06-28 PROCEDURE — 85025 COMPLETE CBC W/AUTO DIFF WBC: CPT

## 2025-06-28 PROCEDURE — 87086 URINE CULTURE/COLONY COUNT: CPT

## 2025-06-28 PROCEDURE — 87186 SC STD MICRODIL/AGAR DIL: CPT

## 2025-06-28 RX ORDER — ACETAMINOPHEN 500 MG
1000 TABLET ORAL ONCE
Status: COMPLETED | OUTPATIENT
Start: 2025-06-28 | End: 2025-06-28

## 2025-06-28 RX ORDER — KETOROLAC TROMETHAMINE 30 MG/ML
15 INJECTION, SOLUTION INTRAMUSCULAR; INTRAVENOUS ONCE
Status: DISCONTINUED | OUTPATIENT
Start: 2025-06-28 | End: 2025-06-28 | Stop reason: HOSPADM

## 2025-06-28 RX ORDER — CEPHALEXIN 500 MG/1
500 CAPSULE ORAL 2 TIMES DAILY
Qty: 13 CAPSULE | Refills: 0 | Status: SHIPPED | OUTPATIENT
Start: 2025-06-28 | End: 2025-06-28

## 2025-06-28 RX ADMIN — AMOXICILLIN AND CLAVULANATE POTASSIUM 1 TABLET: 875; 125 TABLET, FILM COATED ORAL at 20:49

## 2025-06-28 RX ADMIN — ACETAMINOPHEN 1000 MG: 500 TABLET ORAL at 20:49

## 2025-06-28 ASSESSMENT — PAIN - FUNCTIONAL ASSESSMENT: PAIN_FUNCTIONAL_ASSESSMENT: 0-10

## 2025-06-28 ASSESSMENT — PAIN SCALES - GENERAL: PAINLEVEL_OUTOF10: 5

## 2025-06-28 ASSESSMENT — PAIN DESCRIPTION - LOCATION: LOCATION: NECK

## 2025-06-28 NOTE — ED PROVIDER NOTES
[facility] EMERGENCY DEPARTMENT  EMERGENCY DEPARTMENT ENCOUNTER          Pt Name: Avani Thomas  MRN: [id number]  Birthdate 1955  Date of evaluation: 6/28/2025  Physician: David yMles MD  Supervising Attending Physician: Dr. Hector Burkett      CHIEF COMPLAINT       Chief Complaint   Patient presents with    Fall    Dizziness         HISTORY OF PRESENT ILLNESS    HPI  Avani Thomas is a 69 y.o. female who presents to the emergency department  for evaluation of syncope.  Patient reports she was at [facility] around 3 PM when she sustained a fall.  Patient states she was walking towards the cords when she suddenly had a shaking episode while grabbing to her cane and then she found herself on the floor.  Patient denies prodromal symptoms.  Patient was able to get up with help from the people around.  Patient denies chest pain, nausea, vomiting, palpitation before or after the episode.  Patient is unsure if she hit her head or not.  Patient is on Xarelto and on Plavix.  Patient reports a similar episode that happened a while ago.        PAST MEDICAL AND SURGICAL HISTORY     Past Medical History:   Diagnosis Date    Anemia associated with chronic renal failure     Aranesp 150 micrograms every other week given at [facility]    Anxiety     Arthritis     Backache     Blood circulation, collateral     Blood transfusion     CAD (coronary artery disease)     Cellulitis in diabetic foot ([facility]) 03/03/2017    4th and 5th toes right foot    Chest pain     History of    CHF (congestive heart failure) ([facility]) 1998    Dx'ed by Dr. Avila    Chronic anemia     Chronic kidney disease     Chronic kidney disease, stage III (moderate) ([facility])     Chronic renal insufficiency 2010    COPD (chronic obstructive pulmonary disease) ([facility]) 2012    Dr. Lee    Coronary disease     Moderate    COVID-19 11/2021    Depression     Diabetes mellitus, type 2 ([facility]) 1988    Disease of blood and blood forming  Oropharynx is clear.   Eyes:      General: No scleral icterus.     Conjunctiva/sclera: Conjunctivae normal.   Cardiovascular:      Rate and Rhythm: Normal rate and regular rhythm.      Pulses: Normal pulses.      Heart sounds: Normal heart sounds. No murmur heard.  Pulmonary:      Effort: Pulmonary effort is normal. No respiratory distress.      Breath sounds: Normal breath sounds. No wheezing.   Abdominal:      General: Abdomen is flat. There is no distension.      Palpations: Abdomen is soft.      Tenderness: There is no abdominal tenderness.   Musculoskeletal:         General: Normal range of motion.      Cervical back: Normal range of motion and neck supple. No rigidity.      Right lower leg: No edema.      Left lower leg: No edema.   Lymphadenopathy:      Cervical: No cervical adenopathy.   Skin:     General: Skin is warm and dry.      Capillary Refill: Capillary refill takes less than 2 seconds.      Coloration: Skin is not jaundiced.   Neurological:      General: No focal deficit present.      Mental Status: She is alert and oriented to person, place, and time.      GCS: GCS eye subscore is 4. GCS verbal subscore is 5. GCS motor subscore is 6.      Cranial Nerves: No cranial nerve deficit, dysarthria or facial asymmetry.      Motor: No weakness or pronator drift.      Coordination: Finger-Nose-Finger Test and Heel to Shin Test normal.   Psychiatric:         Mood and Affect: Mood normal.         ED RESULTS   Laboratory results (none if blank):  Labs Reviewed   CBC WITH AUTO DIFFERENTIAL - Abnormal; Notable for the following components:       Result Value    WBC 11.6 (*)     RBC 3.75 (*)     Hemoglobin 9.4 (*)     Hematocrit 31.3 (*)     MCH 25.1 (*)     MCHC 30.0 (*)     RDW-CV 15.9 (*)     RDW-SD 47.8 (*)     Neutrophils Absolute 9.7 (*)     Lymphocytes Absolute 0.8 (*)     All other components within normal limits   COMPREHENSIVE METABOLIC PANEL W/ REFLEX TO MG FOR LOW K - Abnormal; Notable for the following

## 2025-06-28 NOTE — DISCHARGE INSTRUCTIONS
Take your medication as prescribed.    Follow up with your cardiologist to follow up on your heart monitor to check for irregular heart rhythm.    Come back to the emergency department for worsening symptoms or any other concern.

## 2025-06-28 NOTE — ED TRIAGE NOTES
Pt arrives via EMS for dizziness with fall. Pt states she was at [facility] and fell, landing on her R side. Pt denies head injury or LOC. Pt c/o neck pain. Pt taking Xarelto. Pt states she hasn't had much to eat or drink today. EKG complete.

## 2025-06-29 LAB
BACTERIA UR CULT: ABNORMAL
EKG ATRIAL RATE: 65 BPM
EKG P AXIS: -29 DEGREES
EKG P-R INTERVAL: 152 MS
EKG Q-T INTERVAL: 378 MS
EKG QRS DURATION: 70 MS
EKG QTC CALCULATION (BAZETT): 393 MS
EKG R AXIS: 27 DEGREES
EKG T AXIS: 57 DEGREES
EKG VENTRICULAR RATE: 65 BPM
ORGANISM: ABNORMAL

## 2025-06-29 PROCEDURE — 93010 ELECTROCARDIOGRAM REPORT: CPT | Performed by: INTERNAL MEDICINE

## 2025-06-29 NOTE — PROGRESS NOTES
bronchitis - 1 yr f/u no testing         HPI  Complaints: Shortness of Breath with exertion  Onset Duration: Over a year  Exacerbating factors: Exertion  Alleviating factors: Rest and Sitting Down  Pertinent negatives: Cough, Sputum Production, Hemoptysis, Wheezing, Chest Tightness, Post Nasal Drip, Rhinorrhea, Sneezing, Congestion, and Sinus Pain/ Pressure  Risk factors for lung disease: tobacco exposure      Avani is here for follow up for chronic bronchitis. Overall patient reports respiratory symptoms have been stable since last appointment.  No acute concerns or complaints. Reports chronic shortness of breath with exertion. Patient reports good compliance with inhaled medications (Spiriva). Patient using albuterol 1 times per day on average. Patient reports some physical limitation due to respiratory symptoms. No supplemental O2 use at home.  Denies fevers, chills, chest pain, chest tightness, cough, sputum production, wheezing, or hemoptysis. Denies allergy symptoms including congestion, post nasal drip, sneezing or rhinorrhea. Denies unintentional weight loss or night sweats. Patient was evaluated in the ER 2 days ago due to dizziness, and fall.  Patient found to have a UTI and currently taking Augmentin. Currently wearing cardiac Holter monitor and is following up with cardiology in August.     Progress History:   New ER or hospital visits for breathing/pulm reasons? No  Inhalers? Yes, Spiriva and albuterol as needed  Any recent exacerbations?  No  Last PFT: 6/17/2024  Last 6 MWT: 6/27/2023  Last A1AT: None in epic    Smoking History:  Former smoker quit in 2009 with a 45 PYH       Flu vaccine: up-to-date  Pneumonia vaccine: Up-to-date  COVID-19 vaccine: Vaccinated  Results   Lung Nodule Screening     [] Qualifies    [x] Does not qualify   [] Declined    [] Completed     The USPSTF recommends annual screening for lung cancer with low-dose computed tomography (LDCT) in adults aged 50 to 80 years who have a

## 2025-06-30 ENCOUNTER — OFFICE VISIT (OUTPATIENT)
Dept: PULMONOLOGY | Age: 70
End: 2025-06-30
Payer: MEDICARE

## 2025-06-30 ENCOUNTER — TELEPHONE (OUTPATIENT)
Dept: FAMILY MEDICINE CLINIC | Age: 70
End: 2025-06-30

## 2025-06-30 VITALS
WEIGHT: 183 LBS | HEIGHT: 65 IN | OXYGEN SATURATION: 95 % | HEART RATE: 70 BPM | BODY MASS INDEX: 30.49 KG/M2 | DIASTOLIC BLOOD PRESSURE: 60 MMHG | SYSTOLIC BLOOD PRESSURE: 112 MMHG | TEMPERATURE: 97.9 F

## 2025-06-30 DIAGNOSIS — Z87.898 H/O MULTIPLE PULMONARY NODULES: ICD-10-CM

## 2025-06-30 DIAGNOSIS — J41.0 SIMPLE CHRONIC BRONCHITIS (HCC): Primary | ICD-10-CM

## 2025-06-30 DIAGNOSIS — J43.9 PULMONARY EMPHYSEMA, UNSPECIFIED EMPHYSEMA TYPE (HCC): ICD-10-CM

## 2025-06-30 DIAGNOSIS — Z87.891 PERSONAL HISTORY OF TOBACCO USE: ICD-10-CM

## 2025-06-30 LAB
BACTERIA UR CULT: ABNORMAL
ORGANISM: ABNORMAL

## 2025-06-30 PROCEDURE — G8417 CALC BMI ABV UP PARAM F/U: HCPCS

## 2025-06-30 PROCEDURE — 1160F RVW MEDS BY RX/DR IN RCRD: CPT

## 2025-06-30 PROCEDURE — 1124F ACP DISCUSS-NO DSCNMKR DOCD: CPT

## 2025-06-30 PROCEDURE — 1090F PRES/ABSN URINE INCON ASSESS: CPT

## 2025-06-30 PROCEDURE — 1159F MED LIST DOCD IN RCRD: CPT

## 2025-06-30 PROCEDURE — 99214 OFFICE O/P EST MOD 30 MIN: CPT

## 2025-06-30 PROCEDURE — 3074F SYST BP LT 130 MM HG: CPT

## 2025-06-30 PROCEDURE — 1036F TOBACCO NON-USER: CPT

## 2025-06-30 PROCEDURE — 3023F SPIROM DOC REV: CPT

## 2025-06-30 PROCEDURE — 3017F COLORECTAL CA SCREEN DOC REV: CPT

## 2025-06-30 PROCEDURE — G8399 PT W/DXA RESULTS DOCUMENT: HCPCS

## 2025-06-30 PROCEDURE — G8427 DOCREV CUR MEDS BY ELIG CLIN: HCPCS

## 2025-06-30 PROCEDURE — 3078F DIAST BP <80 MM HG: CPT

## 2025-06-30 RX ORDER — ALBUTEROL SULFATE 90 UG/1
2 INHALANT RESPIRATORY (INHALATION) EVERY 6 HOURS PRN
Qty: 34 G | Refills: 3 | Status: SHIPPED | OUTPATIENT
Start: 2025-06-30

## 2025-07-01 DIAGNOSIS — M47.816 LUMBAR FACET ARTHROPATHY: ICD-10-CM

## 2025-07-01 DIAGNOSIS — R53.81 DEBILITY: ICD-10-CM

## 2025-07-01 DIAGNOSIS — M47.816 LUMBAR SPONDYLOSIS: ICD-10-CM

## 2025-07-01 DIAGNOSIS — G89.4 CHRONIC PAIN SYNDROME: ICD-10-CM

## 2025-07-01 NOTE — TELEPHONE ENCOUNTER
Avani is requesting a refill of their   Requested Prescriptions     Pending Prescriptions Disp Refills    HYDROcodone-acetaminophen (NORCO) 5-325 MG per tablet 90 tablet 0     Sig: Take 1 tablet by mouth every 8 hours as needed for Pain for up to 30 days.   . Please advise.      Last Appt:  Visit date not found  Next Appt:   8/4/25  Preferred pharmacy:     [id number] - [place], [place] - [address] - P [phone] - F [phone]

## 2025-07-01 NOTE — PROGRESS NOTES
Pharmacy Note  ED Culture Follow-up    Avani Thomas is a 69 y.o. female.     Allergies: Actos [pioglitazone hydrochloride], Pioglitazone, Cymbalta [duloxetine hcl], Lyrica [pregabalin], and Gabapentin     Current antimicrobials:   Reviewed patient's urine culture - culture positive for Klebsiella oxytoca.  Patient was discharged on Augmentin, and culture is sensitive to prescribed medication.  Antibiotic prescribed at discharge is appropriate - no changes made to antibiotic regimen.    Please call with any questions. Ext. [phone]    Federica Calle RPH, PharmD 2:37 PM 7/1/2025

## 2025-07-02 RX ORDER — HYDROCODONE BITARTRATE AND ACETAMINOPHEN 5; 325 MG/1; MG/1
1 TABLET ORAL EVERY 8 HOURS PRN
Qty: 90 TABLET | Refills: 0 | Status: SHIPPED | OUTPATIENT
Start: 2025-07-02 | End: 2025-08-01

## 2025-07-02 NOTE — TELEPHONE ENCOUNTER
OARRS reviewed. UDS: + for  Hydrocodone.   Last seen: 6/2/2025. Follow-up:   Future Appointments   Date Time Provider Department Center   8/4/2025 11:40 AM Romie Bo APRN - CNP N [facility] Pain P - [place]   9/22/2025  2:30 PM Abdirizak Vaca MD N [facility] Heart [facility] [place]   10/9/2025  2:00 PM Valencia Pearson, DONOVAN - CNP [facility] Medicine [facility] DEP   10/16/2025  2:00 PM Martha Gaytan APRN - CNP [facility] IM MED [facility] [place]   12/22/2025  3:00 PM [facility] PULMONARY FUNCTION ROOM 1 STR PFT [facility]   12/29/2025  2:00 PM Selina Singer PA-C N Pulm Med [facility] [place]   2/3/2026  2:00 PM Jesus Martins MD N [place] [facility] [place]   2/26/2026  2:40 PM [facility] [place] [facility]

## 2025-07-09 DIAGNOSIS — J43.9 PULMONARY EMPHYSEMA, UNSPECIFIED EMPHYSEMA TYPE (HCC): ICD-10-CM

## 2025-07-09 DIAGNOSIS — J41.0 SIMPLE CHRONIC BRONCHITIS (HCC): ICD-10-CM

## 2025-07-09 DIAGNOSIS — Z87.898 H/O MULTIPLE PULMONARY NODULES: ICD-10-CM

## 2025-07-09 DIAGNOSIS — Z87.891 PERSONAL HISTORY OF TOBACCO USE: ICD-10-CM

## 2025-07-16 ENCOUNTER — HOSPITAL ENCOUNTER (EMERGENCY)
Age: 70
Discharge: HOME | End: 2025-07-16
Attending: EMERGENCY MEDICINE
Payer: MEDICARE

## 2025-07-16 ENCOUNTER — APPOINTMENT (OUTPATIENT)
Dept: GENERAL RADIOLOGY | Age: 70
End: 2025-07-16
Payer: MEDICARE

## 2025-07-16 ENCOUNTER — APPOINTMENT (OUTPATIENT)
Dept: CT IMAGING | Age: 70
End: 2025-07-16
Payer: MEDICARE

## 2025-07-16 VITALS
HEART RATE: 66 BPM | DIASTOLIC BLOOD PRESSURE: 63 MMHG | SYSTOLIC BLOOD PRESSURE: 152 MMHG | WEIGHT: 183 LBS | OXYGEN SATURATION: 93 % | TEMPERATURE: 98.3 F | BODY MASS INDEX: 30.45 KG/M2 | RESPIRATION RATE: 18 BRPM

## 2025-07-16 DIAGNOSIS — D64.9 ANEMIA, UNSPECIFIED TYPE: Primary | ICD-10-CM

## 2025-07-16 DIAGNOSIS — Z94.0 KIDNEY TRANSPLANT RECIPIENT: ICD-10-CM

## 2025-07-16 DIAGNOSIS — R60.0 PERIPHERAL EDEMA: ICD-10-CM

## 2025-07-16 LAB
ALBUMIN SERPL BCG-MCNC: 4.2 G/DL (ref 3.4–4.9)
ALP SERPL-CCNC: 73 U/L (ref 38–126)
ALT SERPL W/O P-5'-P-CCNC: 13 U/L (ref 10–35)
ANION GAP SERPL CALC-SCNC: 13 MEQ/L (ref 8–16)
AST SERPL-CCNC: 15 U/L (ref 10–35)
BACTERIA URNS QL MICRO: ABNORMAL /HPF
BASOPHILS ABSOLUTE: 0 THOU/MM3 (ref 0–0.1)
BASOPHILS NFR BLD AUTO: 0.3 %
BILIRUB SERPL-MCNC: 0.5 MG/DL (ref 0.3–1.2)
BILIRUB UR QL STRIP.AUTO: NEGATIVE
BUN SERPL-MCNC: 26 MG/DL (ref 8–23)
CALCIUM SERPL-MCNC: 10.1 MG/DL (ref 8.8–10.2)
CASTS #/AREA URNS LPF: ABNORMAL /LPF
CASTS 2: ABNORMAL /LPF
CHARACTER UR: CLEAR
CHLORIDE SERPL-SCNC: 105 MEQ/L (ref 98–111)
CO2 SERPL-SCNC: 23 MEQ/L (ref 22–29)
COLOR, UA: YELLOW
CREAT SERPL-MCNC: 1.4 MG/DL (ref 0.5–0.9)
CRYSTALS URNS MICRO: ABNORMAL
DEPRECATED RDW RBC AUTO: 48.7 FL (ref 35–45)
EKG ATRIAL RATE: 64 BPM
EKG P AXIS: 2 DEGREES
EKG P-R INTERVAL: 166 MS
EKG Q-T INTERVAL: 420 MS
EKG QRS DURATION: 66 MS
EKG QTC CALCULATION (BAZETT): 433 MS
EKG R AXIS: 44 DEGREES
EKG T AXIS: 44 DEGREES
EKG VENTRICULAR RATE: 64 BPM
EOSINOPHIL NFR BLD AUTO: 1.4 %
EOSINOPHILS ABSOLUTE: 0.1 THOU/MM3 (ref 0–0.4)
EPITHELIAL CELLS, UA: ABNORMAL /HPF
ERYTHROCYTE [DISTWIDTH] IN BLOOD BY AUTOMATED COUNT: 15.9 % (ref 11.5–14.5)
GFR SERPL CREATININE-BSD FRML MDRD: 40 ML/MIN/1.73M2
GLUCOSE SERPL-MCNC: 79 MG/DL (ref 74–109)
GLUCOSE UR QL STRIP.AUTO: NEGATIVE MG/DL
HCT VFR BLD AUTO: 27.3 % (ref 37–47)
HGB BLD-MCNC: 8.1 GM/DL (ref 12–16)
HGB UR QL STRIP.AUTO: NEGATIVE
IMM GRANULOCYTES # BLD AUTO: 0.02 THOU/MM3 (ref 0–0.07)
IMM GRANULOCYTES NFR BLD AUTO: 0.3 %
KETONES UR QL STRIP.AUTO: NEGATIVE
LYMPHOCYTES ABSOLUTE: 0.9 THOU/MM3 (ref 1–4.8)
LYMPHOCYTES NFR BLD AUTO: 13.4 %
MCH RBC QN AUTO: 24.8 PG (ref 26–33)
MCHC RBC AUTO-ENTMCNC: 29.7 GM/DL (ref 32.2–35.5)
MCV RBC AUTO: 83.7 FL (ref 81–99)
MISCELLANEOUS 2: ABNORMAL
MONOCYTES ABSOLUTE: 0.5 THOU/MM3 (ref 0.4–1.3)
MONOCYTES NFR BLD AUTO: 7.9 %
NEUTROPHILS ABSOLUTE: 4.9 THOU/MM3 (ref 1.8–7.7)
NEUTROPHILS NFR BLD AUTO: 76.7 %
NITRITE UR QL STRIP: NEGATIVE
NRBC BLD AUTO-RTO: 0 /100 WBC
NT-PROBNP SERPL IA-MCNC: 1992 PG/ML (ref 0–124)
OSMOLALITY SERPL CALC.SUM OF ELEC: 284.9 MOSMOL/KG (ref 275–300)
PH UR STRIP.AUTO: 7 [PH] (ref 5–9)
PLATELET # BLD AUTO: 269 THOU/MM3 (ref 130–400)
PMV BLD AUTO: 10.6 FL (ref 9.4–12.4)
POTASSIUM SERPL-SCNC: 3.6 MEQ/L (ref 3.5–5.2)
PROT SERPL-MCNC: 6.8 G/DL (ref 6.4–8.3)
PROT UR STRIP.AUTO-MCNC: 30 MG/DL
RBC # BLD AUTO: 3.26 MILL/MM3 (ref 4.2–5.4)
RBC URINE: ABNORMAL /HPF
RENAL EPI CELLS #/AREA URNS HPF: ABNORMAL /[HPF]
SODIUM SERPL-SCNC: 141 MEQ/L (ref 135–145)
SP GR UR REFRACT.AUTO: 1.01 (ref 1–1.03)
UROBILINOGEN, URINE: 0.2 EU/DL (ref 0–1)
WBC # BLD AUTO: 6.4 THOU/MM3 (ref 4.8–10.8)
WBC #/AREA URNS HPF: ABNORMAL /HPF
WBC #/AREA URNS HPF: ABNORMAL /[HPF]
YEAST LIKE FUNGI URNS QL MICRO: ABNORMAL

## 2025-07-16 PROCEDURE — 85025 COMPLETE CBC W/AUTO DIFF WBC: CPT

## 2025-07-16 PROCEDURE — 87086 URINE CULTURE/COLONY COUNT: CPT

## 2025-07-16 PROCEDURE — 70450 CT HEAD/BRAIN W/O DYE: CPT

## 2025-07-16 PROCEDURE — 99285 EMERGENCY DEPT VISIT HI MDM: CPT

## 2025-07-16 PROCEDURE — 80053 COMPREHEN METABOLIC PANEL: CPT

## 2025-07-16 PROCEDURE — 71045 X-RAY EXAM CHEST 1 VIEW: CPT

## 2025-07-16 PROCEDURE — 36415 COLL VENOUS BLD VENIPUNCTURE: CPT

## 2025-07-16 PROCEDURE — 93010 ELECTROCARDIOGRAM REPORT: CPT | Performed by: NUCLEAR MEDICINE

## 2025-07-16 PROCEDURE — 93005 ELECTROCARDIOGRAM TRACING: CPT | Performed by: EMERGENCY MEDICINE

## 2025-07-16 PROCEDURE — 83880 ASSAY OF NATRIURETIC PEPTIDE: CPT

## 2025-07-16 PROCEDURE — 81001 URINALYSIS AUTO W/SCOPE: CPT

## 2025-07-16 ASSESSMENT — PAIN - FUNCTIONAL ASSESSMENT
PAIN_FUNCTIONAL_ASSESSMENT: NONE - DENIES PAIN

## 2025-07-16 NOTE — ED NOTES
Patient to the ED with dizziness. Patient states that she was departing the ED after seeing a family member when she became dizzy. She states that she has had this before. She denies any recent illness. She notes history of CHF. Patient is resting in bed with easy and unlabored respirations. Call light in reach. Side rails up x2. Patient denies further complaints or concerns. Will monitor.

## 2025-07-17 NOTE — ED NOTES
Pt ambulatory to bathroom with this RN. Returned to ED cot. Tolerated well. Urine collected and sent.

## 2025-07-17 NOTE — ED PROVIDER NOTES
[facility] EMERGENCY DEPARTMENT      EMERGENCY MEDICINE     Room # 42/042A    Pt Name: Avani Thomas  MRN: [id number]  Birthdate 1955  Date of evaluation: 7/16/2025  Provider: Ammon Huff MD    CHIEF COMPLAINT       Chief Complaint   Patient presents with    Dizziness     HISTORY OF PRESENT ILLNESS   Avani Thomas is a pleasant 69 y.o. female who presents to the emergency department from from home, as a walk in to the ED lobby for evaluation of dyspnea this afternoon.  The patient states that she fell at [facility] 2 weeks ago while walking with her cane, patient felt shaky and fell and passed out a few seconds.  Patient states that that she was evaluated here in the emergency room following the fall sent by ambulance 6/26/2025.  Patient been doing well, however having leg swelling which is more prominent on the right than the left, denies any pain denies, no nausea no vomiting no fever chills no chest pain, no abdominal pain no diarrhea.  Patient had a history of kidney transplant 5 years ago, history of congestive heart failure and COPD.  Denies any weakness or paralysis, denies any melena or hematochezia    9:00 pm discussed with the patient regarding anemia and she has been anemic for several years she sees oncology Dr. Fontana who gave her iron transfusion in the past    PASTMEDICAL HISTORY     Past Medical History:   Diagnosis Date    Anemia associated with chronic renal failure     Aranesp 150 micrograms every other week given at [facility]    Anxiety     Arthritis     Backache     Blood circulation, collateral     Blood transfusion     CAD (coronary artery disease)     Cellulitis in diabetic foot ([facility]) 03/03/2017    4th and 5th toes right foot    Chest pain     History of    CHF (congestive heart failure) ([facility]) 1998    Dx'ed by Dr. Avila    Chronic anemia     Chronic kidney disease     Chronic kidney disease, stage III (moderate) ([facility])     Chronic renal insufficiency 2010

## 2025-07-18 LAB
BACTERIA UR CULT: ABNORMAL
ORGANISM: ABNORMAL

## 2025-07-23 RX ORDER — RIVAROXABAN 2.5 MG/1
2.5 TABLET, FILM COATED ORAL 2 TIMES DAILY
Qty: 180 TABLET | Refills: 0 | Status: ON HOLD | OUTPATIENT
Start: 2025-07-23

## 2025-07-24 ENCOUNTER — TELEPHONE (OUTPATIENT)
Dept: PULMONOLOGY | Age: 70
End: 2025-07-24

## 2025-07-24 NOTE — TELEPHONE ENCOUNTER
Can we see if we are able to move Avani's appointment up to next available to discuss Nodify test results?  Thank you!!

## 2025-07-24 NOTE — TELEPHONE ENCOUNTER
Pt current appt is in December.  The first available appt w/Selina Singer is 8/29/25.  Is this too far out?  Please advise.  Thank you.

## 2025-07-26 ENCOUNTER — APPOINTMENT (OUTPATIENT)
Dept: GENERAL RADIOLOGY | Age: 70
DRG: 291 | End: 2025-07-26
Payer: MEDICARE

## 2025-07-26 ENCOUNTER — APPOINTMENT (OUTPATIENT)
Dept: ULTRASOUND IMAGING | Age: 70
DRG: 291 | End: 2025-07-26
Payer: MEDICARE

## 2025-07-26 ENCOUNTER — HOSPITAL ENCOUNTER (INPATIENT)
Age: 70
LOS: 5 days | Discharge: HOME | DRG: 291 | End: 2025-07-31
Attending: STUDENT IN AN ORGANIZED HEALTH CARE EDUCATION/TRAINING PROGRAM | Admitting: STUDENT IN AN ORGANIZED HEALTH CARE EDUCATION/TRAINING PROGRAM
Payer: MEDICARE

## 2025-07-26 DIAGNOSIS — R06.02 SHORTNESS OF BREATH: ICD-10-CM

## 2025-07-26 DIAGNOSIS — J18.9 COPD WITH PNEUMONIA (HCC): ICD-10-CM

## 2025-07-26 DIAGNOSIS — I50.9 ACUTE ON CHRONIC CONGESTIVE HEART FAILURE, UNSPECIFIED HEART FAILURE TYPE (HCC): ICD-10-CM

## 2025-07-26 DIAGNOSIS — J96.01 ACUTE RESPIRATORY FAILURE WITH HYPOXIA (HCC): ICD-10-CM

## 2025-07-26 DIAGNOSIS — J44.0 COPD WITH PNEUMONIA (HCC): ICD-10-CM

## 2025-07-26 DIAGNOSIS — N17.9 AKI (ACUTE KIDNEY INJURY): ICD-10-CM

## 2025-07-26 DIAGNOSIS — R07.9 CHEST PAIN, UNSPECIFIED TYPE: ICD-10-CM

## 2025-07-26 DIAGNOSIS — E87.70 HYPERVOLEMIA, UNSPECIFIED HYPERVOLEMIA TYPE: Primary | ICD-10-CM

## 2025-07-26 DIAGNOSIS — R55 SYNCOPE, UNSPECIFIED SYNCOPE TYPE: ICD-10-CM

## 2025-07-26 LAB
ALBUMIN SERPL BCG-MCNC: 4.1 G/DL (ref 3.4–4.9)
ALP SERPL-CCNC: 79 U/L (ref 38–126)
ALT SERPL W/O P-5'-P-CCNC: 16 U/L (ref 10–35)
ANION GAP SERPL CALC-SCNC: 15 MEQ/L (ref 8–16)
ARTERIAL PATENCY WRIST A: POSITIVE
AST SERPL-CCNC: 20 U/L (ref 10–35)
BASE EXCESS BLDA CALC-SCNC: 1.6 MMOL/L (ref -2.5–2.5)
BASOPHILS ABSOLUTE: 0 THOU/MM3 (ref 0–0.1)
BASOPHILS NFR BLD AUTO: 0.3 %
BDY SITE: ABNORMAL
BILIRUB CONJ SERPL-MCNC: 0.3 MG/DL (ref 0–0.2)
BILIRUB SERPL-MCNC: 0.6 MG/DL (ref 0.3–1.2)
BUN SERPL-MCNC: 23 MG/DL (ref 8–23)
CALCIUM SERPL-MCNC: 10.4 MG/DL (ref 8.8–10.2)
CHLORIDE SERPL-SCNC: 108 MEQ/L (ref 98–111)
CO2 SERPL-SCNC: 21 MEQ/L (ref 22–29)
COLLECTED BY:: ABNORMAL
CREAT SERPL-MCNC: 1.6 MG/DL (ref 0.5–0.9)
DEPRECATED RDW RBC AUTO: 51.1 FL (ref 35–45)
DEVICE: ABNORMAL
EKG ATRIAL RATE: 68 BPM
EKG P AXIS: 32 DEGREES
EKG P-R INTERVAL: 172 MS
EKG Q-T INTERVAL: 402 MS
EKG QRS DURATION: 68 MS
EKG QTC CALCULATION (BAZETT): 427 MS
EKG R AXIS: 23 DEGREES
EKG T AXIS: 55 DEGREES
EKG VENTRICULAR RATE: 68 BPM
EOSINOPHIL NFR BLD AUTO: 1.9 %
EOSINOPHILS ABSOLUTE: 0.1 THOU/MM3 (ref 0–0.4)
ERYTHROCYTE [DISTWIDTH] IN BLOOD BY AUTOMATED COUNT: 16.9 % (ref 11.5–14.5)
FIBRINOGEN PPP-MCNC: 411 MG/100ML (ref 155–475)
FIO2 ON VENT O2 ANALYZER: 40 %
GFR SERPL CREATININE-BSD FRML MDRD: 34 ML/MIN/1.73M2
GLUCOSE SERPL-MCNC: 107 MG/DL (ref 74–109)
HCO3 BLDA-SCNC: 26 MMOL/L (ref 23–28)
HCT VFR BLD AUTO: 27.5 % (ref 37–47)
HGB BLD-MCNC: 8.3 GM/DL (ref 12–16)
IMM GRANULOCYTES # BLD AUTO: 0.02 THOU/MM3 (ref 0–0.07)
IMM GRANULOCYTES NFR BLD AUTO: 0.3 %
LYMPHOCYTES ABSOLUTE: 0.7 THOU/MM3 (ref 1–4.8)
LYMPHOCYTES NFR BLD AUTO: 10.2 %
MAGNESIUM SERPL-MCNC: 2.3 MG/DL (ref 1.6–2.6)
MCH RBC QN AUTO: 25.5 PG (ref 26–33)
MCHC RBC AUTO-ENTMCNC: 30.2 GM/DL (ref 32.2–35.5)
MCV RBC AUTO: 84.4 FL (ref 81–99)
MONOCYTES ABSOLUTE: 0.6 THOU/MM3 (ref 0.4–1.3)
MONOCYTES NFR BLD AUTO: 8.6 %
NEUTROPHILS ABSOLUTE: 5.7 THOU/MM3 (ref 1.8–7.7)
NEUTROPHILS NFR BLD AUTO: 78.7 %
NRBC BLD AUTO-RTO: 1 /100 WBC
NT-PROBNP SERPL IA-MCNC: 2875 PG/ML (ref 0–124)
OSMOLALITY SERPL CALC.SUM OF ELEC: 291 MOSMOL/KG (ref 275–300)
PCO2 BLDA: 38 MMHG (ref 35–45)
PEEP SETTING VENT: 8 MMHG
PH BLDA: 7.44 [PH] (ref 7.35–7.45)
PIP: 14 CMH2O
PLATELET # BLD AUTO: 283 THOU/MM3 (ref 130–400)
PMV BLD AUTO: 9.8 FL (ref 9.4–12.4)
PO2 BLDA: 118 MMHG (ref 71–104)
POTASSIUM SERPL-SCNC: 3.3 MEQ/L (ref 3.5–5.2)
PROT SERPL-MCNC: 7.1 G/DL (ref 6.4–8.3)
RBC # BLD AUTO: 3.26 MILL/MM3 (ref 4.2–5.4)
SAO2 % BLDA: 99 %
SODIUM SERPL-SCNC: 144 MEQ/L (ref 135–145)
TROPONIN, HIGH SENSITIVITY: 34 NG/L (ref 0–12)
VENTILATION MODE VENT: ABNORMAL
WBC # BLD AUTO: 7.2 THOU/MM3 (ref 4.8–10.8)

## 2025-07-26 PROCEDURE — 84484 ASSAY OF TROPONIN QUANT: CPT

## 2025-07-26 PROCEDURE — 82306 VITAMIN D 25 HYDROXY: CPT

## 2025-07-26 PROCEDURE — 85384 FIBRINOGEN ACTIVITY: CPT

## 2025-07-26 PROCEDURE — 71045 X-RAY EXAM CHEST 1 VIEW: CPT

## 2025-07-26 PROCEDURE — 86140 C-REACTIVE PROTEIN: CPT

## 2025-07-26 PROCEDURE — 85651 RBC SED RATE NONAUTOMATED: CPT

## 2025-07-26 PROCEDURE — 2700000000 HC OXYGEN THERAPY PER DAY

## 2025-07-26 PROCEDURE — 82330 ASSAY OF CALCIUM: CPT

## 2025-07-26 PROCEDURE — 94660 CPAP INITIATION&MGMT: CPT

## 2025-07-26 PROCEDURE — 83550 IRON BINDING TEST: CPT

## 2025-07-26 PROCEDURE — 36600 WITHDRAWAL OF ARTERIAL BLOOD: CPT

## 2025-07-26 PROCEDURE — 82728 ASSAY OF FERRITIN: CPT

## 2025-07-26 PROCEDURE — 84443 ASSAY THYROID STIM HORMONE: CPT

## 2025-07-26 PROCEDURE — 83735 ASSAY OF MAGNESIUM: CPT

## 2025-07-26 PROCEDURE — 94761 N-INVAS EAR/PLS OXIMETRY MLT: CPT

## 2025-07-26 PROCEDURE — 5A09357 ASSISTANCE WITH RESPIRATORY VENTILATION, LESS THAN 24 CONSECUTIVE HOURS, CONTINUOUS POSITIVE AIRWAY PRESSURE: ICD-10-PCS

## 2025-07-26 PROCEDURE — 83880 ASSAY OF NATRIURETIC PEPTIDE: CPT

## 2025-07-26 PROCEDURE — 84550 ASSAY OF BLOOD/URIC ACID: CPT

## 2025-07-26 PROCEDURE — 94640 AIRWAY INHALATION TREATMENT: CPT

## 2025-07-26 PROCEDURE — 6360000002 HC RX W HCPCS: Performed by: STUDENT IN AN ORGANIZED HEALTH CARE EDUCATION/TRAINING PROGRAM

## 2025-07-26 PROCEDURE — 93005 ELECTROCARDIOGRAM TRACING: CPT | Performed by: NURSE PRACTITIONER

## 2025-07-26 PROCEDURE — 87040 BLOOD CULTURE FOR BACTERIA: CPT

## 2025-07-26 PROCEDURE — 83540 ASSAY OF IRON: CPT

## 2025-07-26 PROCEDURE — 96375 TX/PRO/DX INJ NEW DRUG ADDON: CPT

## 2025-07-26 PROCEDURE — 2500000003 HC RX 250 WO HCPCS: Performed by: STUDENT IN AN ORGANIZED HEALTH CARE EDUCATION/TRAINING PROGRAM

## 2025-07-26 PROCEDURE — 93010 ELECTROCARDIOGRAM REPORT: CPT | Performed by: INTERNAL MEDICINE

## 2025-07-26 PROCEDURE — 99285 EMERGENCY DEPT VISIT HI MDM: CPT

## 2025-07-26 PROCEDURE — 99223 1ST HOSP IP/OBS HIGH 75: CPT | Performed by: NURSE PRACTITIONER

## 2025-07-26 PROCEDURE — 85025 COMPLETE CBC W/AUTO DIFF WBC: CPT

## 2025-07-26 PROCEDURE — 82248 BILIRUBIN DIRECT: CPT

## 2025-07-26 PROCEDURE — 96374 THER/PROPH/DIAG INJ IV PUSH: CPT

## 2025-07-26 PROCEDURE — 6360000002 HC RX W HCPCS

## 2025-07-26 PROCEDURE — 84100 ASSAY OF PHOSPHORUS: CPT

## 2025-07-26 PROCEDURE — 76776 US EXAM K TRANSPL W/DOPPLER: CPT

## 2025-07-26 PROCEDURE — 82803 BLOOD GASES ANY COMBINATION: CPT

## 2025-07-26 PROCEDURE — 93005 ELECTROCARDIOGRAM TRACING: CPT

## 2025-07-26 PROCEDURE — 6370000000 HC RX 637 (ALT 250 FOR IP): Performed by: STUDENT IN AN ORGANIZED HEALTH CARE EDUCATION/TRAINING PROGRAM

## 2025-07-26 PROCEDURE — 2060000000 HC ICU INTERMEDIATE R&B

## 2025-07-26 PROCEDURE — 83615 LACTATE (LD) (LDH) ENZYME: CPT

## 2025-07-26 PROCEDURE — 80053 COMPREHEN METABOLIC PANEL: CPT

## 2025-07-26 PROCEDURE — 36415 COLL VENOUS BLD VENIPUNCTURE: CPT

## 2025-07-26 PROCEDURE — 80061 LIPID PANEL: CPT

## 2025-07-26 RX ORDER — BUMETANIDE 0.25 MG/ML
2 INJECTION, SOLUTION INTRAMUSCULAR; INTRAVENOUS ONCE
Status: COMPLETED | OUTPATIENT
Start: 2025-07-26 | End: 2025-07-26

## 2025-07-26 RX ORDER — DEXTROSE MONOHYDRATE 100 MG/ML
INJECTION, SOLUTION INTRAVENOUS CONTINUOUS PRN
Status: DISCONTINUED | OUTPATIENT
Start: 2025-07-26 | End: 2025-07-31 | Stop reason: HOSPADM

## 2025-07-26 RX ORDER — MYCOPHENOLIC ACID 360 MG/1
360 TABLET, DELAYED RELEASE ORAL 2 TIMES DAILY
Status: DISCONTINUED | OUTPATIENT
Start: 2025-07-27 | End: 2025-07-31 | Stop reason: HOSPADM

## 2025-07-26 RX ORDER — SODIUM CHLORIDE 9 MG/ML
INJECTION, SOLUTION INTRAVENOUS PRN
Status: DISCONTINUED | OUTPATIENT
Start: 2025-07-26 | End: 2025-07-31 | Stop reason: HOSPADM

## 2025-07-26 RX ORDER — ONDANSETRON 4 MG/1
4 TABLET, ORALLY DISINTEGRATING ORAL EVERY 8 HOURS PRN
Status: DISCONTINUED | OUTPATIENT
Start: 2025-07-26 | End: 2025-07-31 | Stop reason: HOSPADM

## 2025-07-26 RX ORDER — CLONIDINE HYDROCHLORIDE 0.2 MG/1
0.2 TABLET ORAL 3 TIMES DAILY
Status: DISCONTINUED | OUTPATIENT
Start: 2025-07-26 | End: 2025-07-27

## 2025-07-26 RX ORDER — ALBUTEROL SULFATE 90 UG/1
2 INHALANT RESPIRATORY (INHALATION)
Status: DISCONTINUED | OUTPATIENT
Start: 2025-07-27 | End: 2025-07-31 | Stop reason: HOSPADM

## 2025-07-26 RX ORDER — INSULIN GLARGINE 100 [IU]/ML
30 INJECTION, SOLUTION SUBCUTANEOUS NIGHTLY
Status: DISCONTINUED | OUTPATIENT
Start: 2025-07-26 | End: 2025-07-31 | Stop reason: HOSPADM

## 2025-07-26 RX ORDER — CARVEDILOL 25 MG/1
50 TABLET ORAL 2 TIMES DAILY WITH MEALS
Status: DISCONTINUED | OUTPATIENT
Start: 2025-07-26 | End: 2025-07-31 | Stop reason: HOSPADM

## 2025-07-26 RX ORDER — ENOXAPARIN SODIUM 100 MG/ML
40 INJECTION SUBCUTANEOUS DAILY
Status: DISCONTINUED | OUTPATIENT
Start: 2025-07-27 | End: 2025-07-26

## 2025-07-26 RX ORDER — RIVAROXABAN 2.5 MG/1
2.5 TABLET, FILM COATED ORAL 2 TIMES DAILY
Status: DISCONTINUED | OUTPATIENT
Start: 2025-07-26 | End: 2025-07-31 | Stop reason: HOSPADM

## 2025-07-26 RX ORDER — MAGNESIUM SULFATE IN WATER 40 MG/ML
2000 INJECTION, SOLUTION INTRAVENOUS PRN
Status: DISCONTINUED | OUTPATIENT
Start: 2025-07-26 | End: 2025-07-31 | Stop reason: HOSPADM

## 2025-07-26 RX ORDER — HYDROCODONE BITARTRATE AND ACETAMINOPHEN 5; 325 MG/1; MG/1
1 TABLET ORAL EVERY 8 HOURS PRN
Refills: 0 | Status: DISCONTINUED | OUTPATIENT
Start: 2025-07-26 | End: 2025-07-31 | Stop reason: HOSPADM

## 2025-07-26 RX ORDER — ALBUTEROL SULFATE 90 UG/1
2 INHALANT RESPIRATORY (INHALATION) EVERY 4 HOURS PRN
Status: DISCONTINUED | OUTPATIENT
Start: 2025-07-26 | End: 2025-07-31 | Stop reason: HOSPADM

## 2025-07-26 RX ORDER — LANOLIN ALCOHOL/MO/W.PET/CERES
400 CREAM (GRAM) TOPICAL 2 TIMES DAILY
Status: DISCONTINUED | OUTPATIENT
Start: 2025-07-26 | End: 2025-07-31 | Stop reason: HOSPADM

## 2025-07-26 RX ORDER — LACTULOSE 10 G/15ML
10 SOLUTION ORAL 2 TIMES DAILY PRN
Status: DISCONTINUED | OUTPATIENT
Start: 2025-07-26 | End: 2025-07-31 | Stop reason: HOSPADM

## 2025-07-26 RX ORDER — ACETAMINOPHEN 650 MG/1
650 SUPPOSITORY RECTAL EVERY 6 HOURS PRN
Status: DISCONTINUED | OUTPATIENT
Start: 2025-07-26 | End: 2025-07-31 | Stop reason: HOSPADM

## 2025-07-26 RX ORDER — SODIUM CHLORIDE 0.9 % (FLUSH) 0.9 %
5-40 SYRINGE (ML) INJECTION EVERY 12 HOURS SCHEDULED
Status: DISCONTINUED | OUTPATIENT
Start: 2025-07-26 | End: 2025-07-31 | Stop reason: HOSPADM

## 2025-07-26 RX ORDER — ATORVASTATIN CALCIUM 40 MG/1
40 TABLET, FILM COATED ORAL DAILY
Status: DISCONTINUED | OUTPATIENT
Start: 2025-07-27 | End: 2025-07-31 | Stop reason: HOSPADM

## 2025-07-26 RX ORDER — FLUTICASONE PROPIONATE 50 MCG
1 SPRAY, SUSPENSION (ML) NASAL DAILY PRN
Status: DISCONTINUED | OUTPATIENT
Start: 2025-07-26 | End: 2025-07-31 | Stop reason: HOSPADM

## 2025-07-26 RX ORDER — ALBUTEROL SULFATE 90 UG/1
2 INHALANT RESPIRATORY (INHALATION) EVERY 6 HOURS PRN
Status: DISCONTINUED | OUTPATIENT
Start: 2025-07-26 | End: 2025-07-26

## 2025-07-26 RX ORDER — IPRATROPIUM BROMIDE AND ALBUTEROL SULFATE 2.5; .5 MG/3ML; MG/3ML
2 SOLUTION RESPIRATORY (INHALATION) ONCE
Status: COMPLETED | OUTPATIENT
Start: 2025-07-26 | End: 2025-07-26

## 2025-07-26 RX ORDER — TACROLIMUS 1 MG/1
5 CAPSULE ORAL 2 TIMES DAILY
Status: DISCONTINUED | OUTPATIENT
Start: 2025-07-27 | End: 2025-07-27

## 2025-07-26 RX ORDER — RIVAROXABAN 2.5 MG/1
2.5 TABLET, FILM COATED ORAL 2 TIMES DAILY
Status: DISCONTINUED | OUTPATIENT
Start: 2025-07-26 | End: 2025-07-26

## 2025-07-26 RX ORDER — NITROGLYCERIN 20 MG/100ML
5-200 INJECTION INTRAVENOUS CONTINUOUS
Status: DISCONTINUED | OUTPATIENT
Start: 2025-07-26 | End: 2025-07-27

## 2025-07-26 RX ORDER — ONDANSETRON 2 MG/ML
4 INJECTION INTRAMUSCULAR; INTRAVENOUS EVERY 6 HOURS PRN
Status: DISCONTINUED | OUTPATIENT
Start: 2025-07-26 | End: 2025-07-31 | Stop reason: HOSPADM

## 2025-07-26 RX ORDER — POTASSIUM CHLORIDE 1500 MG/1
40 TABLET, EXTENDED RELEASE ORAL PRN
Status: DISCONTINUED | OUTPATIENT
Start: 2025-07-26 | End: 2025-07-26

## 2025-07-26 RX ORDER — ASPIRIN 81 MG/1
81 TABLET ORAL DAILY
Status: DISCONTINUED | OUTPATIENT
Start: 2025-07-27 | End: 2025-07-31 | Stop reason: HOSPADM

## 2025-07-26 RX ORDER — GLUCAGON 1 MG/ML
1 KIT INJECTION PRN
Status: DISCONTINUED | OUTPATIENT
Start: 2025-07-26 | End: 2025-07-31 | Stop reason: HOSPADM

## 2025-07-26 RX ORDER — POLYETHYLENE GLYCOL 3350 17 G/17G
17 POWDER, FOR SOLUTION ORAL DAILY PRN
Status: DISCONTINUED | OUTPATIENT
Start: 2025-07-26 | End: 2025-07-31 | Stop reason: HOSPADM

## 2025-07-26 RX ORDER — NIFEDIPINE 90 MG/1
90 TABLET, EXTENDED RELEASE ORAL DAILY
Status: DISCONTINUED | OUTPATIENT
Start: 2025-07-27 | End: 2025-07-31 | Stop reason: HOSPADM

## 2025-07-26 RX ORDER — SODIUM CHLORIDE 0.9 % (FLUSH) 0.9 %
5-40 SYRINGE (ML) INJECTION PRN
Status: DISCONTINUED | OUTPATIENT
Start: 2025-07-26 | End: 2025-07-31 | Stop reason: HOSPADM

## 2025-07-26 RX ORDER — HYDRALAZINE HYDROCHLORIDE 20 MG/ML
10 INJECTION INTRAMUSCULAR; INTRAVENOUS ONCE
Status: COMPLETED | OUTPATIENT
Start: 2025-07-26 | End: 2025-07-26

## 2025-07-26 RX ORDER — ACETAMINOPHEN 325 MG/1
650 TABLET ORAL EVERY 6 HOURS PRN
Status: DISCONTINUED | OUTPATIENT
Start: 2025-07-26 | End: 2025-07-31 | Stop reason: HOSPADM

## 2025-07-26 RX ORDER — POTASSIUM CHLORIDE 7.45 MG/ML
10 INJECTION INTRAVENOUS PRN
Status: DISCONTINUED | OUTPATIENT
Start: 2025-07-26 | End: 2025-07-26

## 2025-07-26 RX ADMIN — WATER 125 MG: 1 INJECTION INTRAMUSCULAR; INTRAVENOUS; SUBCUTANEOUS at 20:24

## 2025-07-26 RX ADMIN — NITROGLYCERIN 40 MCG/MIN: 20 INJECTION INTRAVENOUS at 22:55

## 2025-07-26 RX ADMIN — NITROGLYCERIN 20 MCG/MIN: 20 INJECTION INTRAVENOUS at 20:29

## 2025-07-26 RX ADMIN — HYDRALAZINE HYDROCHLORIDE 10 MG: 20 INJECTION INTRAMUSCULAR; INTRAVENOUS at 19:43

## 2025-07-26 RX ADMIN — BUMETANIDE 2 MG: 0.25 INJECTION INTRAMUSCULAR; INTRAVENOUS at 19:38

## 2025-07-26 RX ADMIN — IPRATROPIUM BROMIDE AND ALBUTEROL SULFATE 2 DOSE: .5; 3 SOLUTION RESPIRATORY (INHALATION) at 20:36

## 2025-07-26 ASSESSMENT — LIFESTYLE VARIABLES
HOW MANY STANDARD DRINKS CONTAINING ALCOHOL DO YOU HAVE ON A TYPICAL DAY: PATIENT DOES NOT DRINK
HOW OFTEN DO YOU HAVE A DRINK CONTAINING ALCOHOL: NEVER

## 2025-07-26 ASSESSMENT — PAIN DESCRIPTION - LOCATION: LOCATION: ANKLE

## 2025-07-26 ASSESSMENT — PAIN DESCRIPTION - ORIENTATION: ORIENTATION: RIGHT;LEFT

## 2025-07-26 ASSESSMENT — PAIN - FUNCTIONAL ASSESSMENT
PAIN_FUNCTIONAL_ASSESSMENT: 0-10
PAIN_FUNCTIONAL_ASSESSMENT: 0-10

## 2025-07-26 ASSESSMENT — PAIN DESCRIPTION - PAIN TYPE: TYPE: ACUTE PAIN

## 2025-07-26 ASSESSMENT — PAIN SCALES - GENERAL
PAINLEVEL_OUTOF10: 0
PAINLEVEL_OUTOF10: 6
PAINLEVEL_OUTOF10: 6

## 2025-07-26 NOTE — ED NOTES
Pt. Resting in bed with even and unlabored respirations. Pt. States pain is a 6/10 at this time. Pt. Updated about plan for admission. Family at bedside. Pt. Has no further concerns, questions or needs at this time. Call light within reach.

## 2025-07-26 NOTE — ED NOTES
Pt arrives to the ED for sob. Pt states she has been sob for abut 3 days and has noticed she has had increased swelling. Pt states she noticed her o2 sat was 77% on room air and decided to be seen. Pt denies any chest pain or fever. Has a cough. States she has 6/10 pain in bilateral ankles. Pt respirations are unlabored. VSS

## 2025-07-26 NOTE — ED PROVIDER NOTES
transcription may contain errors not detected in proofreading.    Electronically signed by Patti Blackburn MD on 7/26/25 at 7:07 PM Doug Quiroz DO  07/26/25 2028

## 2025-07-27 PROBLEM — I50.9 ACUTE ON CHRONIC CONGESTIVE HEART FAILURE (HCC): Status: ACTIVE | Noted: 2025-07-27

## 2025-07-27 PROBLEM — R42 DIZZINESS ON STANDING: Status: ACTIVE | Noted: 2025-07-27

## 2025-07-27 PROBLEM — J81.0 ACUTE PULMONARY EDEMA (HCC): Status: ACTIVE | Noted: 2025-07-27

## 2025-07-27 PROBLEM — I50.31 ACUTE DIASTOLIC CONGESTIVE HEART FAILURE (HCC): Status: ACTIVE | Noted: 2025-07-27

## 2025-07-27 PROBLEM — E83.52 HYPERCALCEMIA: Status: ACTIVE | Noted: 2025-07-27

## 2025-07-27 LAB
25(OH)D3 SERPL-MCNC: 30 NG/ML (ref 30–100)
AMPHETAMINES UR QL SCN: NEGATIVE
ANION GAP SERPL CALC-SCNC: 14 MEQ/L (ref 8–16)
ANION GAP SERPL CALC-SCNC: 18 MEQ/L (ref 8–16)
BARBITURATES UR QL SCN: NEGATIVE
BASOPHILS ABSOLUTE: 0 THOU/MM3 (ref 0–0.1)
BASOPHILS NFR BLD AUTO: 0.1 %
BENZODIAZ UR QL SCN: NEGATIVE
BILIRUB UR QL STRIP: NEGATIVE
BUN SERPL-MCNC: 23 MG/DL (ref 8–23)
BUN SERPL-MCNC: 24 MG/DL (ref 8–23)
BUPRENORPHINE URINE: NEGATIVE
BURR CELLS BLD QL SMEAR: ABNORMAL
BZE UR QL SCN: NEGATIVE
CA-I BLD ISE-SCNC: 1.19 MMOL/L (ref 1.12–1.32)
CALCIUM SERPL-MCNC: 10.2 MG/DL (ref 8.8–10.2)
CALCIUM SERPL-MCNC: 10.3 MG/DL (ref 8.8–10.2)
CANNABINOIDS UR QL SCN: NEGATIVE
CHARACTER UR: CLEAR
CHLORIDE SERPL-SCNC: 105 MEQ/L (ref 98–111)
CHLORIDE SERPL-SCNC: 106 MEQ/L (ref 98–111)
CHOLEST SERPL-MCNC: 91 MG/DL (ref 100–199)
CO2 SERPL-SCNC: 20 MEQ/L (ref 22–29)
CO2 SERPL-SCNC: 22 MEQ/L (ref 22–29)
COLOR UR: YELLOW
CREAT SERPL-MCNC: 1.3 MG/DL (ref 0.5–0.9)
CREAT SERPL-MCNC: 1.4 MG/DL (ref 0.5–0.9)
CREAT UR-MCNC: 20.1 MG/DL
CRP SERPL-MCNC: 0.34 MG/DL (ref 0–0.5)
DEPRECATED RDW RBC AUTO: 52.1 FL (ref 35–45)
EKG ATRIAL RATE: 73 BPM
EKG P AXIS: -6 DEGREES
EKG P-R INTERVAL: 156 MS
EKG Q-T INTERVAL: 416 MS
EKG QRS DURATION: 78 MS
EKG QTC CALCULATION (BAZETT): 458 MS
EKG R AXIS: 50 DEGREES
EKG T AXIS: 12 DEGREES
EKG VENTRICULAR RATE: 73 BPM
EOSINOPHIL NFR BLD AUTO: 0 %
EOSINOPHILS ABSOLUTE: 0 THOU/MM3 (ref 0–0.4)
ERYTHROCYTE [DISTWIDTH] IN BLOOD BY AUTOMATED COUNT: 16.8 % (ref 11.5–14.5)
ERYTHROCYTE [SEDIMENTATION RATE] IN BLOOD BY WESTERGREN METHOD: 47 MM/HR (ref 0–20)
FENTANYL: NEGATIVE
FERRITIN SERPL IA-MCNC: 666 NG/ML (ref 13–150)
GFR SERPL CREATININE-BSD FRML MDRD: 40 ML/MIN/1.73M2
GFR SERPL CREATININE-BSD FRML MDRD: 44 ML/MIN/1.73M2
GLUCOSE BLD STRIP.AUTO-MCNC: 117 MG/DL (ref 70–108)
GLUCOSE BLD STRIP.AUTO-MCNC: 182 MG/DL (ref 70–108)
GLUCOSE BLD STRIP.AUTO-MCNC: 189 MG/DL (ref 70–108)
GLUCOSE BLD STRIP.AUTO-MCNC: 253 MG/DL (ref 70–108)
GLUCOSE BLD STRIP.AUTO-MCNC: 319 MG/DL (ref 70–108)
GLUCOSE SERPL-MCNC: 152 MG/DL (ref 74–109)
GLUCOSE SERPL-MCNC: 99 MG/DL (ref 74–109)
GLUCOSE UR QL STRIP.AUTO: NEGATIVE MG/DL
HCT VFR BLD AUTO: 29.1 % (ref 37–47)
HDLC SERPL-MCNC: 43 MG/DL
HGB BLD-MCNC: 8.4 GM/DL (ref 12–16)
HGB UR QL STRIP.AUTO: NEGATIVE
HYPOCHROMIA BLD QL SMEAR: PRESENT
IMM GRANULOCYTES # BLD AUTO: 0.05 THOU/MM3 (ref 0–0.07)
IMM GRANULOCYTES NFR BLD AUTO: 0.6 %
IRON SATN MFR SERPL: 11 % (ref 20–50)
IRON SERPL-MCNC: 21 UG/DL (ref 37–145)
KETONES UR QL STRIP.AUTO: NEGATIVE
LDH SERPL L TO P-CCNC: 265 U/L (ref 135–214)
LDLC SERPL CALC-MCNC: 35 MG/DL
LEUKOCYTE ESTERASE UR QL STRIP.AUTO: NEGATIVE
LYMPHOCYTES ABSOLUTE: 0.3 THOU/MM3 (ref 1–4.8)
LYMPHOCYTES NFR BLD AUTO: 3.5 %
MAGNESIUM SERPL-MCNC: 2.1 MG/DL (ref 1.6–2.6)
MCH RBC QN AUTO: 24.9 PG (ref 26–33)
MCHC RBC AUTO-ENTMCNC: 28.9 GM/DL (ref 32.2–35.5)
MCV RBC AUTO: 86.4 FL (ref 81–99)
MONOCYTES ABSOLUTE: 0.1 THOU/MM3 (ref 0.4–1.3)
MONOCYTES NFR BLD AUTO: 0.9 %
NEUTROPHILS ABSOLUTE: 7.6 THOU/MM3 (ref 1.8–7.7)
NEUTROPHILS NFR BLD AUTO: 94.9 %
NITRITE UR QL STRIP.AUTO: NEGATIVE
NRBC BLD AUTO-RTO: 0 /100 WBC
OPIATES UR QL SCN: NEGATIVE
OSMOLALITY SERPL CALC.SUM OF ELEC: 292 MOSMOL/KG (ref 275–300)
OXYCODONE: NEGATIVE
PATHOLOGIST REVIEW: ABNORMAL
PCP UR QL SCN: NEGATIVE
PH UR STRIP.AUTO: 6.5 [PH] (ref 5–9)
PHOSPHATE SERPL-MCNC: 2.8 MG/DL (ref 2.5–4.5)
PLATELET # BLD AUTO: 309 THOU/MM3 (ref 130–400)
PMV BLD AUTO: 10 FL (ref 9.4–12.4)
POIKILOCYTES: ABNORMAL
POLYCHROMASIA BLD QL SMEAR: ABNORMAL
POTASSIUM SERPL-SCNC: 3.3 MEQ/L (ref 3.5–5.2)
POTASSIUM SERPL-SCNC: 3.5 MEQ/L (ref 3.5–5.2)
PROT UR STRIP.AUTO-MCNC: NEGATIVE MG/DL
PROT UR-MCNC: 13.3 MG/DL
PROT/CREAT 24H UR: 0.66 MG/G{CREAT}
RBC # BLD AUTO: 3.37 MILL/MM3 (ref 4.2–5.4)
REASON FOR REJECTION: NORMAL
REJECTED TEST: NORMAL
SCAN OF BLOOD SMEAR: NORMAL
SCHISTOCYTES BLD QL SMEAR: ABNORMAL
SODIUM SERPL-SCNC: 142 MEQ/L (ref 135–145)
SODIUM SERPL-SCNC: 143 MEQ/L (ref 135–145)
SP GR UR REFRACT.AUTO: 1.01 (ref 1–1.03)
TIBC SERPL-MCNC: 200 UG/DL (ref 171–450)
TRIGL SERPL-MCNC: 67 MG/DL (ref 0–199)
TROPONIN, HIGH SENSITIVITY: 27 NG/L (ref 0–12)
TSH SERPL DL<=0.05 MIU/L-ACNC: 0.91 UIU/ML (ref 0.27–4.2)
URATE SERPL-MCNC: 7.5 MG/DL (ref 2.4–5.7)
UROBILINOGEN UR QL STRIP.AUTO: 0.2 EU/DL (ref 0–1)
WBC # BLD AUTO: 8 THOU/MM3 (ref 4.8–10.8)

## 2025-07-27 PROCEDURE — 6370000000 HC RX 637 (ALT 250 FOR IP): Performed by: NURSE PRACTITIONER

## 2025-07-27 PROCEDURE — 94640 AIRWAY INHALATION TREATMENT: CPT

## 2025-07-27 PROCEDURE — 85025 COMPLETE CBC W/AUTO DIFF WBC: CPT

## 2025-07-27 PROCEDURE — 36415 COLL VENOUS BLD VENIPUNCTURE: CPT

## 2025-07-27 PROCEDURE — 94660 CPAP INITIATION&MGMT: CPT

## 2025-07-27 PROCEDURE — 81003 URINALYSIS AUTO W/O SCOPE: CPT

## 2025-07-27 PROCEDURE — 6370000000 HC RX 637 (ALT 250 FOR IP): Performed by: INTERNAL MEDICINE

## 2025-07-27 PROCEDURE — 93010 ELECTROCARDIOGRAM REPORT: CPT | Performed by: INTERNAL MEDICINE

## 2025-07-27 PROCEDURE — 2060000000 HC ICU INTERMEDIATE R&B

## 2025-07-27 PROCEDURE — 80048 BASIC METABOLIC PNL TOTAL CA: CPT

## 2025-07-27 PROCEDURE — 6360000002 HC RX W HCPCS: Performed by: NURSE PRACTITIONER

## 2025-07-27 PROCEDURE — 6370000000 HC RX 637 (ALT 250 FOR IP)

## 2025-07-27 PROCEDURE — 6360000002 HC RX W HCPCS: Performed by: INTERNAL MEDICINE

## 2025-07-27 PROCEDURE — 2700000000 HC OXYGEN THERAPY PER DAY

## 2025-07-27 PROCEDURE — 82570 ASSAY OF URINE CREATININE: CPT

## 2025-07-27 PROCEDURE — 80307 DRUG TEST PRSMV CHEM ANLYZR: CPT

## 2025-07-27 PROCEDURE — 6360000002 HC RX W HCPCS

## 2025-07-27 PROCEDURE — 82948 REAGENT STRIP/BLOOD GLUCOSE: CPT

## 2025-07-27 PROCEDURE — 84156 ASSAY OF PROTEIN URINE: CPT

## 2025-07-27 PROCEDURE — 99223 1ST HOSP IP/OBS HIGH 75: CPT | Performed by: INTERNAL MEDICINE

## 2025-07-27 PROCEDURE — 94761 N-INVAS EAR/PLS OXIMETRY MLT: CPT

## 2025-07-27 RX ORDER — POLYVINYL ALCOHOL 14 MG/ML
1 SOLUTION/ DROPS OPHTHALMIC 2 TIMES DAILY
Status: DISCONTINUED | OUTPATIENT
Start: 2025-07-27 | End: 2025-07-31 | Stop reason: HOSPADM

## 2025-07-27 RX ORDER — CLONIDINE HYDROCHLORIDE 0.2 MG/1
0.2 TABLET ORAL EVERY 8 HOURS
Status: DISCONTINUED | OUTPATIENT
Start: 2025-07-27 | End: 2025-07-28

## 2025-07-27 RX ORDER — POTASSIUM CHLORIDE 7.45 MG/ML
10 INJECTION INTRAVENOUS PRN
Status: DISCONTINUED | OUTPATIENT
Start: 2025-07-27 | End: 2025-07-31 | Stop reason: HOSPADM

## 2025-07-27 RX ORDER — POTASSIUM CHLORIDE 1500 MG/1
20 TABLET, EXTENDED RELEASE ORAL 2 TIMES DAILY
Status: DISCONTINUED | OUTPATIENT
Start: 2025-07-27 | End: 2025-07-31

## 2025-07-27 RX ORDER — TACROLIMUS 1 MG/1
5 CAPSULE ORAL EVERY 12 HOURS
Status: DISCONTINUED | OUTPATIENT
Start: 2025-07-27 | End: 2025-07-31 | Stop reason: HOSPADM

## 2025-07-27 RX ORDER — NITROGLYCERIN 20 MG/100ML
5-200 INJECTION INTRAVENOUS CONTINUOUS
Status: DISCONTINUED | OUTPATIENT
Start: 2025-07-27 | End: 2025-07-28

## 2025-07-27 RX ORDER — CARBOXYMETHYLCELLULOSE SODIUM 5 MG/ML
1 SOLUTION/ DROPS OPHTHALMIC 2 TIMES DAILY
Status: DISCONTINUED | OUTPATIENT
Start: 2025-07-27 | End: 2025-07-27 | Stop reason: CLARIF

## 2025-07-27 RX ORDER — POTASSIUM CHLORIDE 1500 MG/1
40 TABLET, EXTENDED RELEASE ORAL PRN
Status: DISCONTINUED | OUTPATIENT
Start: 2025-07-27 | End: 2025-07-31 | Stop reason: HOSPADM

## 2025-07-27 RX ORDER — NITROGLYCERIN 20 MG/100ML
5-200 INJECTION INTRAVENOUS CONTINUOUS
Status: DISCONTINUED | OUTPATIENT
Start: 2025-07-27 | End: 2025-07-27

## 2025-07-27 RX ORDER — BUMETANIDE 0.25 MG/ML
2 INJECTION, SOLUTION INTRAMUSCULAR; INTRAVENOUS
Status: DISCONTINUED | OUTPATIENT
Start: 2025-07-27 | End: 2025-07-30

## 2025-07-27 RX ADMIN — CARVEDILOL 50 MG: 25 TABLET, FILM COATED ORAL at 01:12

## 2025-07-27 RX ADMIN — RIVAROXABAN 2.5 MG: 2.5 TABLET, FILM COATED ORAL at 21:08

## 2025-07-27 RX ADMIN — POTASSIUM CHLORIDE 40 MEQ: 1500 TABLET, EXTENDED RELEASE ORAL at 10:47

## 2025-07-27 RX ADMIN — CLONIDINE HYDROCHLORIDE 0.2 MG: 0.2 TABLET ORAL at 01:13

## 2025-07-27 RX ADMIN — Medication 400 MG: at 01:13

## 2025-07-27 RX ADMIN — Medication 400 MG: at 21:08

## 2025-07-27 RX ADMIN — POLYVINYL ALCOHOL 1 DROP: 1.4 SOLUTION/ DROPS OPHTHALMIC at 15:12

## 2025-07-27 RX ADMIN — CARVEDILOL 50 MG: 25 TABLET, FILM COATED ORAL at 17:28

## 2025-07-27 RX ADMIN — NITROGLYCERIN 200 MCG/MIN: 20 INJECTION INTRAVENOUS at 17:14

## 2025-07-27 RX ADMIN — CLONIDINE HYDROCHLORIDE 0.2 MG: 0.2 TABLET ORAL at 09:58

## 2025-07-27 RX ADMIN — CARVEDILOL 50 MG: 25 TABLET, FILM COATED ORAL at 09:57

## 2025-07-27 RX ADMIN — NITROGLYCERIN 110 MCG/MIN: 20 INJECTION INTRAVENOUS at 23:49

## 2025-07-27 RX ADMIN — NITROGLYCERIN 160 MCG/MIN: 20 INJECTION INTRAVENOUS at 20:38

## 2025-07-27 RX ADMIN — BUMETANIDE 2 MG: 0.25 INJECTION INTRAMUSCULAR; INTRAVENOUS at 17:29

## 2025-07-27 RX ADMIN — HYDROCODONE BITARTRATE AND ACETAMINOPHEN 1 TABLET: 5; 325 TABLET ORAL at 17:16

## 2025-07-27 RX ADMIN — NITROGLYCERIN 180 MCG/MIN: 20 INJECTION INTRAVENOUS at 20:04

## 2025-07-27 RX ADMIN — NIFEDIPINE 90 MG: 90 TABLET, EXTENDED RELEASE ORAL at 09:57

## 2025-07-27 RX ADMIN — NITROGLYCERIN 130 MCG/MIN: 20 INJECTION INTRAVENOUS at 21:47

## 2025-07-27 RX ADMIN — BUMETANIDE 2 MG: 0.25 INJECTION INTRAMUSCULAR; INTRAVENOUS at 05:27

## 2025-07-27 RX ADMIN — MYCOPHENOLIC ACID 360 MG: 360 TABLET, DELAYED RELEASE ORAL at 09:58

## 2025-07-27 RX ADMIN — ASPIRIN 81 MG: 81 TABLET, COATED ORAL at 09:57

## 2025-07-27 RX ADMIN — RIVAROXABAN 2.5 MG: 2.5 TABLET, FILM COATED ORAL at 01:13

## 2025-07-27 RX ADMIN — CLONIDINE HYDROCHLORIDE 0.2 MG: 0.2 TABLET ORAL at 17:29

## 2025-07-27 RX ADMIN — POTASSIUM CHLORIDE 20 MEQ: 1500 TABLET, EXTENDED RELEASE ORAL at 10:49

## 2025-07-27 RX ADMIN — Medication 400 MG: at 09:58

## 2025-07-27 RX ADMIN — TACROLIMUS 5 MG: 1 CAPSULE ORAL at 09:57

## 2025-07-27 RX ADMIN — NITROGLYCERIN 170 MCG/MIN: 20 INJECTION INTRAVENOUS at 20:23

## 2025-07-27 RX ADMIN — POTASSIUM CHLORIDE 20 MEQ: 1500 TABLET, EXTENDED RELEASE ORAL at 21:08

## 2025-07-27 RX ADMIN — Medication 2 PUFF: at 20:59

## 2025-07-27 RX ADMIN — TACROLIMUS 5 MG: 1 CAPSULE ORAL at 21:07

## 2025-07-27 RX ADMIN — NITROGLYCERIN 180 MCG/MIN: 20 INJECTION INTRAVENOUS at 12:56

## 2025-07-27 RX ADMIN — ATORVASTATIN CALCIUM 40 MG: 40 TABLET, FILM COATED ORAL at 09:58

## 2025-07-27 RX ADMIN — MYCOPHENOLIC ACID 360 MG: 360 TABLET, DELAYED RELEASE ORAL at 21:08

## 2025-07-27 RX ADMIN — RIVAROXABAN 2.5 MG: 2.5 TABLET, FILM COATED ORAL at 09:58

## 2025-07-27 RX ADMIN — NITROGLYCERIN 120 MCG/MIN: 20 INJECTION INTRAVENOUS at 22:03

## 2025-07-27 RX ADMIN — NITROGLYCERIN 120 MCG/MIN: 20 INJECTION INTRAVENOUS at 08:04

## 2025-07-27 RX ADMIN — INSULIN GLARGINE 30 UNITS: 100 INJECTION, SOLUTION SUBCUTANEOUS at 21:06

## 2025-07-27 RX ADMIN — NITROGLYCERIN 200 MCG/MIN: 20 INJECTION INTRAVENOUS at 15:13

## 2025-07-27 RX ADMIN — NITROGLYCERIN 150 MCG/MIN: 20 INJECTION INTRAVENOUS at 21:15

## 2025-07-27 ASSESSMENT — PAIN DESCRIPTION - ORIENTATION: ORIENTATION: RIGHT;LEFT

## 2025-07-27 ASSESSMENT — PAIN SCALES - GENERAL
PAINLEVEL_OUTOF10: 1
PAINLEVEL_OUTOF10: 0
PAINLEVEL_OUTOF10: 7

## 2025-07-27 ASSESSMENT — PAIN DESCRIPTION - LOCATION: LOCATION: LEG

## 2025-07-27 NOTE — CARE COORDINATION
Patient Goals/Plan/Treatment Preferences: Met with Avani. Patient lives in a condo alone. She has family support. Patient does not have oxygen at baseline, 4L NC at this time. She states she has had oxygen in the past and has used [facility]. The patient plans to return home once medically stable. She has a cane, walker, shower chair, and bedside commode.     If patient is discharged prior to next notation, then this note serves as note for discharge by case management.         07/27/25 7724   Service Assessment   Patient Orientation Alert and Oriented   Cognition Alert   History Provided By Patient   Primary Caregiver Self   Accompanied By/Relationship unaccompanied   Support Systems Children;Family Members   Patient's Healthcare Decision Maker is: Legal Next of Kin   PCP Verified by CM Yes   Last Visit to PCP Within last 3 months   Prior Functional Level Assistance with the following:;Mobility;Shopping   Current Functional Level Assistance with the following:;Mobility;Shopping   Can patient return to prior living arrangement Yes   Ability to make needs known: Good   Family able to assist with home care needs: Yes   Would you like for me to discuss the discharge plan with any other family members/significant others, and if so, who? No   Financial Resources Medicare;Other (Comment)  ([facility])   Community Resources Other (Comment)  (Food Bank)   Discharge Planning   Type of Residence Apartment   Living Arrangements Alone   Current Services Prior To Admission Durable Medical Equipment   Current DME Prior to Arrival Cane;Walker;Shower Chair;Bedside Commode   Potential Assistance Needed N/A   DME Ordered? No   Potential Assistance Purchasing Medications No   Type of Home Care Services None   Patient expects to be discharged to: Apartment   Condition of Participation: Discharge Planning   The Plan for Transition of Care is related to the following treatment goals: \"Feel Better\"

## 2025-07-27 NOTE — PLAN OF CARE
Problem: Chronic Conditions and Co-morbidities  Goal: Patient's chronic conditions and co-morbidity symptoms are monitored and maintained or improved  Outcome: Progressing  Flowsheets (Taken 7/26/2025 2351)  Care Plan - Patient's Chronic Conditions and Co-Morbidity Symptoms are Monitored and Maintained or Improved:   Monitor and assess patient's chronic conditions and comorbid symptoms for stability, deterioration, or improvement   Collaborate with multidisciplinary team to address chronic and comorbid conditions and prevent exacerbation or deterioration   Update acute care plan with appropriate goals if chronic or comorbid symptoms are exacerbated and prevent overall improvement and discharge     Problem: Discharge Planning  Goal: Discharge to home or other facility with appropriate resources  Outcome: Progressing  Flowsheets (Taken 7/26/2025 2351)  Discharge to home or other facility with appropriate resources:   Identify barriers to discharge with patient and caregiver   Arrange for needed discharge resources and transportation as appropriate   Identify discharge learning needs (meds, wound care, etc)   Refer to discharge planning if patient needs post-hospital services based on physician order or complex needs related to functional status, cognitive ability or social support system     Problem: Safety - Adult  Goal: Free from fall injury  Outcome: Progressing  Flowsheets (Taken 7/26/2025 2351)  Free From Fall Injury: Instruct family/caregiver on patient safety     Problem: ABCDS Injury Assessment  Goal: Absence of physical injury  Outcome: Progressing  Flowsheets (Taken 7/26/2025 2351)  Absence of Physical Injury: Implement safety measures based on patient assessment     Problem: Skin/Tissue Integrity  Goal: Skin integrity remains intact  Description: 1.  Monitor for areas of redness and/or skin breakdown  2.  Assess vascular access sites hourly  3.  Every 4-6 hours minimum:  Change oxygen saturation probe

## 2025-07-27 NOTE — PLAN OF CARE
Problem: Chronic Conditions and Co-morbidities  Goal: Patient's chronic conditions and co-morbidity symptoms are monitored and maintained or improved  7/27/2025 0834 by Kalin Vernon RN  Outcome: Progressing  Flowsheets (Taken 7/27/2025 0800)  Care Plan - Patient's Chronic Conditions and Co-Morbidity Symptoms are Monitored and Maintained or Improved:   Monitor and assess patient's chronic conditions and comorbid symptoms for stability, deterioration, or improvement   Update acute care plan with appropriate goals if chronic or comorbid symptoms are exacerbated and prevent overall improvement and discharge  7/26/2025 2351 by Mauricio Bray, RN  Outcome: Progressing  Flowsheets (Taken 7/26/2025 2351)  Care Plan - Patient's Chronic Conditions and Co-Morbidity Symptoms are Monitored and Maintained or Improved:   Monitor and assess patient's chronic conditions and comorbid symptoms for stability, deterioration, or improvement   Collaborate with multidisciplinary team to address chronic and comorbid conditions and prevent exacerbation or deterioration   Update acute care plan with appropriate goals if chronic or comorbid symptoms are exacerbated and prevent overall improvement and discharge     Problem: Discharge Planning  Goal: Discharge to home or other facility with appropriate resources  7/27/2025 0834 by Kalin Vernon RN  Outcome: Progressing  Flowsheets (Taken 7/27/2025 0800)  Discharge to home or other facility with appropriate resources:   Arrange for needed discharge resources and transportation as appropriate   Identify discharge learning needs (meds, wound care, etc)  7/26/2025 2351 by Mauricio Bray, RN  Outcome: Progressing  Flowsheets (Taken 7/26/2025 2351)  Discharge to home or other facility with appropriate resources:   Identify barriers to discharge with patient and caregiver   Arrange for needed discharge resources and transportation as appropriate   Identify discharge learning

## 2025-07-27 NOTE — PLAN OF CARE
Problem: Respiratory - Adult  Goal: Achieves optimal ventilation and oxygenation  7/27/2025 0559 by Kelly Trotter RCP  Outcome: Progressing     Patient mutually agreed on goals.

## 2025-07-27 NOTE — PROCEDURES
PROCEDURE NOTE  Date: 7/26/2025   Name: Avani Thomas  YOB: 1955    Procedures    EKG Completed. Handed to RN.

## 2025-07-27 NOTE — H&P
Pulse 66   Temp 98.3 °F (36.8 °C) (Oral)   Resp 24   Ht 1.651 m (5' 5\")   Wt 86.2 kg (190 lb)   SpO2 94%   BMI 31.62 kg/m²   General appearance: alert, appears stated age and cooperative, ill in appearance  Skin: Skin color, texture, turgor normal. No rashes or lesions  HEENT: Head: Normocephalic, no lesions, without obvious abnormality.  Head: Normal, normocephalic, atraumatic.  Eye: Normal external eye, conjunctiva, lids cornea, ASHLEY.  Ears: Normal TM's bilaterally. Normal auditory canals and external ears. Non-tender.  Nose: Normal external nose, mucus membranes and septum.  Pharynx: Dental Hygiene adequate. Normal buccal mucosa. Normal pharynx.  Neck / Thyroid: Supple, no masses, nodes, nodules or enlargement.  Neck: no adenopathy, no carotid bruit, supple, symmetrical, trachea midline, thyroid not enlarged, symmetric, no tenderness/mass/nodules, and positive for neck vein distention bilaterally  Lungs: clear to auscultation bilaterally and crackles in bases  Heart: regular rate and rhythm, S1, S2 normal, no murmur, click, rub or gallop  Abdomen: soft, non-tender; bowel sounds normal; no masses,  no organomegaly  Extremities: extremities normal, atraumatic, no cyanosis or edema  Neurologic: Mental status: Alert, oriented, thought content appropriate    CBC:   Recent Labs     07/26/25  1755   WBC 7.2   HGB 8.3*        BMP:    Recent Labs     07/26/25  1755      K 3.3*      CO2 21*   BUN 23   CREATININE 1.6*   GLUCOSE 107     Hepatic:   Recent Labs     07/26/25  1755   AST 20   ALT 16   BILITOT 0.6   ALKPHOS 79     Troponin: No results for input(s): \"TROPONINI\" in the last 72 hours.  BNP: No results for input(s): \"BNP\" in the last 72 hours.  Lipids: No results for input(s): \"CHOL\", \"HDL\" in the last 72 hours.    Invalid input(s): \"LDLCALCU\"  ABGs: No results found for: \"PHART\", \"PO2ART\", \"WFE1XXL\"  INR: No results for input(s): \"INR\" in the last 72

## 2025-07-27 NOTE — ED NOTES
ED to inpatient nurses report      Chief Complaint:  Chief Complaint   Patient presents with    Shortness of Breath     Present to ED from: home    MOA:     LOC: alert and orientated to name, place, date  Mobility: Requires assistance * 1  Oxygen Baseline: None    Current needs required: Bipap     Code Status:   Prior    What abnormal results were found and what did you give/do to treat them? CHF, hypervolemia, Respiratory failure  Any procedures or intervention occur? Nitro drip, bumex, Bipap, steroids    Mental Status:  Level of Consciousness: Alert (0)    Psych Assessment:        Vitals:  Patient Vitals for the past 24 hrs:   BP Temp Temp src Pulse Resp SpO2 Height Weight   07/26/25 2117 -- -- -- 68 21 100 % -- --   07/26/25 2018 (!) 160/55 -- -- 66 25 99 % -- --   07/26/25 1937 (!) 221/60 -- -- 66 24 94 % -- --   07/26/25 1900 (!) 188/67 -- -- 65 21 96 % -- --   07/26/25 1747 (!) 184/64 -- -- -- -- 99 % -- --   07/26/25 1745 -- 98.3 °F (36.8 °C) Oral 71 20 (!) 77 % 1.651 m (5' 5\") 86.2 kg (190 lb)        LDAs:   Peripheral IV 07/26/25 Right Antecubital (Active)   Site Assessment Clean, dry & intact 07/26/25 1901   Line Status Normal saline locked;Flushed;Specimen collected;Brisk blood return;Blood return noted 07/26/25 1901   Phlebitis Assessment No symptoms 07/26/25 1901   Infiltration Assessment 0 07/26/25 1901   Dressing Status Clean, dry & intact 07/26/25 1901       Ambulatory Status:  Presents to emergency department  because of falls (Syncope, seizure, or loss of consciousness): No, Age > 70: Yes, Altered Mental Status, Intoxication with alcohol or substance confusion (Disorientation, impaired judgment, poor safety awaremess, or inability to follow instructions): No, Impaired Mobility: Ambulates or transfers with assistive devices or assistance; Unable to ambulate or transer.: Yes, Nursing Judgement: Yes    Diagnosis:  DISPOSITION Admitted 07/26/2025 08:23:45 PM   Final diagnoses:   Hypervolemia,

## 2025-07-28 ENCOUNTER — APPOINTMENT (OUTPATIENT)
Age: 70
DRG: 291 | End: 2025-07-28
Attending: NURSE PRACTITIONER
Payer: MEDICARE

## 2025-07-28 ENCOUNTER — APPOINTMENT (OUTPATIENT)
Dept: GENERAL RADIOLOGY | Age: 70
DRG: 291 | End: 2025-07-28
Payer: MEDICARE

## 2025-07-28 LAB
ANION GAP SERPL CALC-SCNC: 12 MEQ/L (ref 8–16)
BUN SERPL-MCNC: 34 MG/DL (ref 8–23)
CALCIUM SERPL-MCNC: 9.7 MG/DL (ref 8.8–10.2)
CHLORIDE SERPL-SCNC: 103 MEQ/L (ref 98–111)
CO2 SERPL-SCNC: 24 MEQ/L (ref 22–29)
CREAT SERPL-MCNC: 1.5 MG/DL (ref 0.5–0.9)
DEPRECATED RDW RBC AUTO: 50.2 FL (ref 35–45)
ECHO AO ASC DIAM: 3.4 CM
ECHO AO ASCENDING AORTA INDEX: 1.75 CM/M2
ECHO AV AREA PEAK VELOCITY: 1.8 CM2
ECHO AV AREA VTI: 1.9 CM2
ECHO AV AREA/BSA PEAK VELOCITY: 0.9 CM2/M2
ECHO AV AREA/BSA VTI: 1 CM2/M2
ECHO AV CUSP MM: 1.6 CM
ECHO AV MEAN GRADIENT: 12 MMHG
ECHO AV MEAN VELOCITY: 1.6 M/S
ECHO AV PEAK GRADIENT: 16 MMHG
ECHO AV PEAK VELOCITY: 2 M/S
ECHO AV VELOCITY RATIO: 0.6
ECHO AV VTI: 52.2 CM
ECHO BSA: 1.99 M2
ECHO EST RA PRESSURE: 3 MMHG
ECHO LA AREA 2C: 21.1 CM2
ECHO LA AREA 4C: 22.9 CM2
ECHO LA DIAMETER INDEX: 2.22 CM/M2
ECHO LA DIAMETER: 4.3 CM
ECHO LA MAJOR AXIS: 6.1 CM
ECHO LA MINOR AXIS: 5.3 CM
ECHO LA VOL BP: 74 ML (ref 22–52)
ECHO LA VOL MOD A2C: 68 ML (ref 22–52)
ECHO LA VOL MOD A4C: 69 ML (ref 22–52)
ECHO LA VOL/BSA BIPLANE: 38 ML/M2 (ref 16–34)
ECHO LA VOLUME INDEX MOD A2C: 35 ML/M2 (ref 16–34)
ECHO LA VOLUME INDEX MOD A4C: 36 ML/M2 (ref 16–34)
ECHO LV E' LATERAL VELOCITY: 5 CM/S
ECHO LV E' SEPTAL VELOCITY: 6.1 CM/S
ECHO LV EF PHYSICIAN: 55 %
ECHO LV FRACTIONAL SHORTENING: 34 % (ref 28–44)
ECHO LV INTERNAL DIMENSION DIASTOLE INDEX: 2.58 CM/M2
ECHO LV INTERNAL DIMENSION DIASTOLIC: 5 CM (ref 3.9–5.3)
ECHO LV INTERNAL DIMENSION SYSTOLIC INDEX: 1.7 CM/M2
ECHO LV INTERNAL DIMENSION SYSTOLIC: 3.3 CM
ECHO LV ISOVOLUMETRIC RELAXATION TIME (IVRT): 49 MS
ECHO LV IVSD: 1.1 CM (ref 0.6–0.9)
ECHO LV MASS 2D: 194.4 G (ref 67–162)
ECHO LV MASS INDEX 2D: 100.2 G/M2 (ref 43–95)
ECHO LV POSTERIOR WALL DIASTOLIC: 1 CM (ref 0.6–0.9)
ECHO LV RELATIVE WALL THICKNESS RATIO: 0.4
ECHO LVOT AREA: 3.1 CM2
ECHO LVOT AV VTI INDEX: 0.59
ECHO LVOT DIAM: 2 CM
ECHO LVOT MEAN GRADIENT: 3 MMHG
ECHO LVOT PEAK GRADIENT: 6 MMHG
ECHO LVOT PEAK VELOCITY: 1.2 M/S
ECHO LVOT STROKE VOLUME INDEX: 49.7 ML/M2
ECHO LVOT SV: 96.4 ML
ECHO LVOT VTI: 30.7 CM
ECHO MV A VELOCITY: 0.99 M/S
ECHO MV E DECELERATION TIME (DT): 267 MS
ECHO MV E VELOCITY: 1.39 M/S
ECHO MV E/A RATIO: 1.4
ECHO MV E/E' LATERAL: 27.8
ECHO MV E/E' RATIO (AVERAGED): 25.29
ECHO MV E/E' SEPTAL: 22.79
ECHO MV REGURGITANT PEAK GRADIENT: 67 MMHG
ECHO MV REGURGITANT PEAK VELOCITY: 4.1 M/S
ECHO PULMONARY ARTERY END DIASTOLIC PRESSURE: 6 MMHG
ECHO PV MAX VELOCITY: 1 M/S
ECHO PV PEAK GRADIENT: 4 MMHG
ECHO PV REGURGITANT MAX VELOCITY: 1.2 M/S
ECHO RIGHT VENTRICULAR SYSTOLIC PRESSURE (RVSP): 30 MMHG
ECHO RV INTERNAL DIMENSION: 2.7 CM
ECHO RV TAPSE: 2.7 CM (ref 1.7–?)
ECHO TV E WAVE: 0.7 M/S
ECHO TV REGURGITANT MAX VELOCITY: 2.59 M/S
ECHO TV REGURGITANT PEAK GRADIENT: 27 MMHG
EKG ATRIAL RATE: 59 BPM
EKG ATRIAL RATE: 60 BPM
EKG P AXIS: 14 DEGREES
EKG P-R INTERVAL: 150 MS
EKG P-R INTERVAL: 154 MS
EKG Q-T INTERVAL: 440 MS
EKG Q-T INTERVAL: 452 MS
EKG QRS DURATION: 74 MS
EKG QRS DURATION: 78 MS
EKG QTC CALCULATION (BAZETT): 440 MS
EKG QTC CALCULATION (BAZETT): 447 MS
EKG R AXIS: 19 DEGREES
EKG R AXIS: 32 DEGREES
EKG T AXIS: 14 DEGREES
EKG T AXIS: 34 DEGREES
EKG VENTRICULAR RATE: 59 BPM
EKG VENTRICULAR RATE: 60 BPM
ERYTHROCYTE [DISTWIDTH] IN BLOOD BY AUTOMATED COUNT: 16.9 % (ref 11.5–14.5)
GFR SERPL CREATININE-BSD FRML MDRD: 37 ML/MIN/1.73M2
GLUCOSE BLD STRIP.AUTO-MCNC: 116 MG/DL (ref 70–108)
GLUCOSE BLD STRIP.AUTO-MCNC: 155 MG/DL (ref 70–108)
GLUCOSE BLD STRIP.AUTO-MCNC: 260 MG/DL (ref 70–108)
GLUCOSE BLD STRIP.AUTO-MCNC: 322 MG/DL (ref 70–108)
GLUCOSE SERPL-MCNC: 266 MG/DL (ref 74–109)
HCT VFR BLD AUTO: 24.9 % (ref 37–47)
HGB BLD-MCNC: 7.5 GM/DL (ref 12–16)
MCH RBC QN AUTO: 24.8 PG (ref 26–33)
MCHC RBC AUTO-ENTMCNC: 30.1 GM/DL (ref 32.2–35.5)
MCV RBC AUTO: 82.5 FL (ref 81–99)
NT-PROBNP SERPL IA-MCNC: 3157 PG/ML (ref 0–124)
PLATELET # BLD AUTO: 311 THOU/MM3 (ref 130–400)
PMV BLD AUTO: 10.5 FL (ref 9.4–12.4)
POTASSIUM SERPL-SCNC: 4 MEQ/L (ref 3.5–5.2)
RBC # BLD AUTO: 3.02 MILL/MM3 (ref 4.2–5.4)
SODIUM SERPL-SCNC: 139 MEQ/L (ref 135–145)
TROPONIN, HIGH SENSITIVITY: 28 NG/L (ref 0–12)
WBC # BLD AUTO: 8.2 THOU/MM3 (ref 4.8–10.8)

## 2025-07-28 PROCEDURE — 36415 COLL VENOUS BLD VENIPUNCTURE: CPT

## 2025-07-28 PROCEDURE — 93010 ELECTROCARDIOGRAM REPORT: CPT | Performed by: INTERNAL MEDICINE

## 2025-07-28 PROCEDURE — 84484 ASSAY OF TROPONIN QUANT: CPT

## 2025-07-28 PROCEDURE — 6360000002 HC RX W HCPCS

## 2025-07-28 PROCEDURE — 94640 AIRWAY INHALATION TREATMENT: CPT

## 2025-07-28 PROCEDURE — 71045 X-RAY EXAM CHEST 1 VIEW: CPT

## 2025-07-28 PROCEDURE — 6370000000 HC RX 637 (ALT 250 FOR IP): Performed by: NURSE PRACTITIONER

## 2025-07-28 PROCEDURE — 2700000000 HC OXYGEN THERAPY PER DAY

## 2025-07-28 PROCEDURE — 93005 ELECTROCARDIOGRAM TRACING: CPT | Performed by: INTERNAL MEDICINE

## 2025-07-28 PROCEDURE — 6360000002 HC RX W HCPCS: Performed by: INTERNAL MEDICINE

## 2025-07-28 PROCEDURE — 85027 COMPLETE CBC AUTOMATED: CPT

## 2025-07-28 PROCEDURE — 94761 N-INVAS EAR/PLS OXIMETRY MLT: CPT

## 2025-07-28 PROCEDURE — 80048 BASIC METABOLIC PNL TOTAL CA: CPT

## 2025-07-28 PROCEDURE — 6370000000 HC RX 637 (ALT 250 FOR IP)

## 2025-07-28 PROCEDURE — 6370000000 HC RX 637 (ALT 250 FOR IP): Performed by: INTERNAL MEDICINE

## 2025-07-28 PROCEDURE — 82948 REAGENT STRIP/BLOOD GLUCOSE: CPT

## 2025-07-28 PROCEDURE — 93005 ELECTROCARDIOGRAM TRACING: CPT | Performed by: PHYSICIAN ASSISTANT

## 2025-07-28 PROCEDURE — 2500000003 HC RX 250 WO HCPCS: Performed by: NURSE PRACTITIONER

## 2025-07-28 PROCEDURE — 93306 TTE W/DOPPLER COMPLETE: CPT

## 2025-07-28 PROCEDURE — 94660 CPAP INITIATION&MGMT: CPT

## 2025-07-28 PROCEDURE — 99232 SBSQ HOSP IP/OBS MODERATE 35: CPT | Performed by: INTERNAL MEDICINE

## 2025-07-28 PROCEDURE — 6360000002 HC RX W HCPCS: Performed by: NURSE PRACTITIONER

## 2025-07-28 PROCEDURE — 2060000000 HC ICU INTERMEDIATE R&B

## 2025-07-28 PROCEDURE — 2580000003 HC RX 258

## 2025-07-28 PROCEDURE — 93306 TTE W/DOPPLER COMPLETE: CPT | Performed by: INTERNAL MEDICINE

## 2025-07-28 PROCEDURE — 83880 ASSAY OF NATRIURETIC PEPTIDE: CPT

## 2025-07-28 RX ORDER — METOPROLOL TARTRATE 1 MG/ML
5 INJECTION, SOLUTION INTRAVENOUS EVERY 5 MIN PRN
Status: ACTIVE | OUTPATIENT
Start: 2025-07-28 | End: 2025-07-28

## 2025-07-28 RX ORDER — CLONIDINE HYDROCHLORIDE 0.1 MG/1
0.1 TABLET ORAL EVERY 8 HOURS
Status: DISCONTINUED | OUTPATIENT
Start: 2025-07-28 | End: 2025-07-31 | Stop reason: HOSPADM

## 2025-07-28 RX ORDER — NITROGLYCERIN 0.4 MG/1
0.4 TABLET SUBLINGUAL EVERY 5 MIN PRN
Status: ACTIVE | OUTPATIENT
Start: 2025-07-28 | End: 2025-07-28

## 2025-07-28 RX ORDER — SODIUM CHLORIDE 9 MG/ML
500 INJECTION, SOLUTION INTRAVENOUS CONTINUOUS PRN
Status: ACTIVE | OUTPATIENT
Start: 2025-07-28 | End: 2025-07-28

## 2025-07-28 RX ORDER — SULFAMETHOXAZOLE AND TRIMETHOPRIM 800; 160 MG/1; MG/1
1 TABLET ORAL
Status: DISCONTINUED | OUTPATIENT
Start: 2025-07-28 | End: 2025-07-31 | Stop reason: HOSPADM

## 2025-07-28 RX ORDER — AMINOPHYLLINE 25 MG/ML
50 INJECTION, SOLUTION INTRAVENOUS PRN
Status: ACTIVE | OUTPATIENT
Start: 2025-07-28 | End: 2025-07-28

## 2025-07-28 RX ORDER — ATROPINE SULFATE 0.1 MG/ML
0.5 INJECTION INTRAVENOUS EVERY 5 MIN PRN
Status: ACTIVE | OUTPATIENT
Start: 2025-07-28 | End: 2025-07-28

## 2025-07-28 RX ORDER — REGADENOSON 0.08 MG/ML
0.4 INJECTION, SOLUTION INTRAVENOUS
Status: DISCONTINUED | OUTPATIENT
Start: 2025-07-28 | End: 2025-07-31 | Stop reason: HOSPADM

## 2025-07-28 RX ORDER — INSULIN LISPRO 100 [IU]/ML
0-16 INJECTION, SOLUTION INTRAVENOUS; SUBCUTANEOUS
Status: DISCONTINUED | OUTPATIENT
Start: 2025-07-28 | End: 2025-07-31 | Stop reason: HOSPADM

## 2025-07-28 RX ORDER — SODIUM CHLORIDE 0.9 % (FLUSH) 0.9 %
5-40 SYRINGE (ML) INJECTION PRN
Status: ACTIVE | OUTPATIENT
Start: 2025-07-28 | End: 2025-07-28

## 2025-07-28 RX ADMIN — TIOTROPIUM BROMIDE INHALATION SPRAY 5 MCG: 3.12 SPRAY, METERED RESPIRATORY (INHALATION) at 08:14

## 2025-07-28 RX ADMIN — POTASSIUM CHLORIDE 20 MEQ: 1500 TABLET, EXTENDED RELEASE ORAL at 20:02

## 2025-07-28 RX ADMIN — CARVEDILOL 50 MG: 25 TABLET, FILM COATED ORAL at 09:06

## 2025-07-28 RX ADMIN — ASPIRIN 81 MG: 81 TABLET, COATED ORAL at 09:05

## 2025-07-28 RX ADMIN — SODIUM CHLORIDE, PRESERVATIVE FREE 10 ML: 5 INJECTION INTRAVENOUS at 20:03

## 2025-07-28 RX ADMIN — Medication 2 PUFF: at 20:31

## 2025-07-28 RX ADMIN — Medication 400 MG: at 09:07

## 2025-07-28 RX ADMIN — BUMETANIDE 2 MG: 0.25 INJECTION INTRAMUSCULAR; INTRAVENOUS at 05:09

## 2025-07-28 RX ADMIN — POTASSIUM CHLORIDE 20 MEQ: 1500 TABLET, EXTENDED RELEASE ORAL at 09:05

## 2025-07-28 RX ADMIN — INSULIN LISPRO 12 UNITS: 100 INJECTION, SOLUTION INTRAVENOUS; SUBCUTANEOUS at 12:43

## 2025-07-28 RX ADMIN — HYDROCODONE BITARTRATE AND ACETAMINOPHEN 1 TABLET: 5; 325 TABLET ORAL at 22:41

## 2025-07-28 RX ADMIN — MYCOPHENOLIC ACID 360 MG: 360 TABLET, DELAYED RELEASE ORAL at 09:09

## 2025-07-28 RX ADMIN — HYDROCODONE BITARTRATE AND ACETAMINOPHEN 1 TABLET: 5; 325 TABLET ORAL at 12:45

## 2025-07-28 RX ADMIN — NITROGLYCERIN 100 MCG/MIN: 20 INJECTION INTRAVENOUS at 09:04

## 2025-07-28 RX ADMIN — BUMETANIDE 2 MG: 0.25 INJECTION INTRAMUSCULAR; INTRAVENOUS at 18:54

## 2025-07-28 RX ADMIN — Medication 2 PUFF: at 08:14

## 2025-07-28 RX ADMIN — NIFEDIPINE 90 MG: 90 TABLET, EXTENDED RELEASE ORAL at 09:09

## 2025-07-28 RX ADMIN — RIVAROXABAN 2.5 MG: 2.5 TABLET, FILM COATED ORAL at 20:02

## 2025-07-28 RX ADMIN — Medication 400 MG: at 20:03

## 2025-07-28 RX ADMIN — RIVAROXABAN 2.5 MG: 2.5 TABLET, FILM COATED ORAL at 09:08

## 2025-07-28 RX ADMIN — POLYVINYL ALCOHOL 1 DROP: 1.4 SOLUTION/ DROPS OPHTHALMIC at 09:09

## 2025-07-28 RX ADMIN — TACROLIMUS 5 MG: 1 CAPSULE ORAL at 20:03

## 2025-07-28 RX ADMIN — AZITHROMYCIN MONOHYDRATE 500 MG: 500 INJECTION, POWDER, LYOPHILIZED, FOR SOLUTION INTRAVENOUS at 11:40

## 2025-07-28 RX ADMIN — CLONIDINE HYDROCHLORIDE 0.2 MG: 0.2 TABLET ORAL at 09:06

## 2025-07-28 RX ADMIN — MYCOPHENOLIC ACID 360 MG: 360 TABLET, DELAYED RELEASE ORAL at 20:02

## 2025-07-28 RX ADMIN — ATORVASTATIN CALCIUM 40 MG: 40 TABLET, FILM COATED ORAL at 09:07

## 2025-07-28 RX ADMIN — TACROLIMUS 5 MG: 1 CAPSULE ORAL at 09:07

## 2025-07-28 RX ADMIN — SODIUM CHLORIDE, PRESERVATIVE FREE 10 ML: 5 INJECTION INTRAVENOUS at 09:04

## 2025-07-28 ASSESSMENT — PAIN DESCRIPTION - ORIENTATION: ORIENTATION: LEFT;RIGHT

## 2025-07-28 ASSESSMENT — PAIN SCALES - GENERAL
PAINLEVEL_OUTOF10: 3
PAINLEVEL_OUTOF10: 0
PAINLEVEL_OUTOF10: 6
PAINLEVEL_OUTOF10: 4
PAINLEVEL_OUTOF10: 8

## 2025-07-28 ASSESSMENT — PAIN DESCRIPTION - LOCATION
LOCATION: NECK
LOCATION: NECK;FOOT
LOCATION: NECK

## 2025-07-28 ASSESSMENT — PAIN - FUNCTIONAL ASSESSMENT: PAIN_FUNCTIONAL_ASSESSMENT: ACTIVITIES ARE NOT PREVENTED

## 2025-07-28 ASSESSMENT — PAIN DESCRIPTION - DESCRIPTORS: DESCRIPTORS: ACHING;SORE;DISCOMFORT

## 2025-07-28 NOTE — ACP (ADVANCE CARE PLANNING)
Advance Care Planning     Advance Care Planning Inpatient Note  [facility] Department    Today's Date: 7/27/2025  Unit: [facility] ICU STEPDOWN TELEMETRY 4K    Received request from IDT Member.  Upon review of chart and communication with care team, patient's decision making abilities are not in question.. Patient was/were present in the room during visit.    Goals of ACP Conversation:  Discuss advance care planning documents  Facilitate a discussion related to patient's goals of care as they align with the patient's values and beliefs.    Health Care Decision Makers:       Primary Decision Maker: Katelin Clemente - Grandchild - [phone]    Secondary Decision Maker: Faith Martínez - Brother/Sister - [phone]    Supplemental (Other) Decision Maker: Ant Martínez - Brother/Sister - [phone]  Summary:  Completed New Documents  Verified Healthcare Decision Maker  Updated Healthcare Decision Maker    Advance Care Planning Documents (Patient Wishes):  Healthcare Power of /Advance Directive Appointment of Health Care Agent     Assessment:   responded to consult placed by IDT for advance directives. Patient alone in bed, welcoming of . Patient expressed desire to complete Advance directives for POA and  assisted in completion of form. Patient declined wanting to complete a Living will at this time. Patient discussed her current hospitalizations and chronic health conditions and the toll of it on her. Patient feels well supported by her granddaughter and her siblings. Patient expressed that she felt good having the form completed and was very appreciative of  visit and assistance.  offered words of encouragement and prayed with the patient at her request. Patient expressed gratitude. Made patient aware of  availability and support.    Interventions:  Provided education on documents for clarity and greater understanding  Discussed and provided education on state

## 2025-07-28 NOTE — PROCEDURES
PROCEDURE NOTE  Date: 7/28/2025   Name: Avani Thomas  YOB: 1955    Procedures  12 lead EKG completed. Results handed to Joe MORALES.

## 2025-07-28 NOTE — PLAN OF CARE
Problem: Chronic Conditions and Co-morbidities  Goal: Patient's chronic conditions and co-morbidity symptoms are monitored and maintained or improved  Outcome: Progressing  Flowsheets (Taken 7/27/2025 0800 by Kalin Vernon, RN)  Care Plan - Patient's Chronic Conditions and Co-Morbidity Symptoms are Monitored and Maintained or Improved:   Monitor and assess patient's chronic conditions and comorbid symptoms for stability, deterioration, or improvement   Update acute care plan with appropriate goals if chronic or comorbid symptoms are exacerbated and prevent overall improvement and discharge     Problem: Discharge Planning  Goal: Discharge to home or other facility with appropriate resources  Outcome: Progressing  Flowsheets (Taken 7/28/2025 0456)  Discharge to home or other facility with appropriate resources:   Arrange for needed discharge resources and transportation as appropriate   Identify discharge learning needs (meds, wound care, etc)     Problem: Safety - Adult  Goal: Free from fall injury  Outcome: Progressing  Flowsheets (Taken 7/28/2025 0456)  Free From Fall Injury: Instruct family/caregiver on patient safety     Problem: ABCDS Injury Assessment  Goal: Absence of physical injury  Outcome: Progressing  Flowsheets (Taken 7/26/2025 2351 by Mauricio Bray, RN)  Absence of Physical Injury: Implement safety measures based on patient assessment     Problem: Skin/Tissue Integrity  Goal: Skin integrity remains intact  Description: 1.  Monitor for areas of redness and/or skin breakdown  2.  Assess vascular access sites hourly  3.  Every 4-6 hours minimum:  Change oxygen saturation probe site  4.  Every 4-6 hours:  If on nasal continuous positive airway pressure, respiratory therapy assess nares and determine need for appliance change or resting period  Outcome: Progressing  Flowsheets (Taken 7/28/2025 0456)  Skin Integrity Remains Intact:   Monitor for areas of redness and/or skin breakdown   Assess

## 2025-07-28 NOTE — PLAN OF CARE
Problem: Respiratory - Adult  Goal: Achieves optimal ventilation and oxygenation  7/27/2025 2102 by Va Cortez RCP  Outcome: Progressing   Patient lung sounds are considered normal for their current lung condition. No signs of distress noted. Current treatment regimen appropriate Patient mutually agreed on goals.

## 2025-07-28 NOTE — CARE COORDINATION
7/28/25, 3:06 PM EDT    Patient goals/plan/ treatment preferences discussed by  and .  Patient goals/plan/ treatment preferences reviewed with patient/ family.  Patient/ family verbalize understanding of discharge plan and are in agreement with goal/plan/treatment preferences.  Understanding was demonstrated using the teach back method.  AVS provided by RN at time of discharge, which includes all necessary medical information pertaining to the patients current course of illness, treatment, post-discharge goals of care, and treatment preferences.     Services At/After Discharge: None  Update  Plans home alone when medically cleared (new [facility] oxygen 3L w activity), has cane, walker; grand-daughter/POJHONATHAN Thomason, [facility], ambulates 30 feet w therapy, therapy recommends HH, refused HH; collaborated mayra Hampton [facility] Liaison, client, ANDREW Fuller  Electronically signed by Mauricio Maciel RN on 7/30/2025 at 2:07 PM

## 2025-07-28 NOTE — PLAN OF CARE
Problem: Chronic Conditions and Co-morbidities  Goal: Patient's chronic conditions and co-morbidity symptoms are monitored and maintained or improved  7/28/2025 1829 by Alina Lopez RN  Outcome: Progressing  7/28/2025 0456 by Mateo Live RN  Outcome: Progressing  Flowsheets (Taken 7/27/2025 0800 by Kalin Vernon, RN)  Care Plan - Patient's Chronic Conditions and Co-Morbidity Symptoms are Monitored and Maintained or Improved:   Monitor and assess patient's chronic conditions and comorbid symptoms for stability, deterioration, or improvement   Update acute care plan with appropriate goals if chronic or comorbid symptoms are exacerbated and prevent overall improvement and discharge     Problem: Discharge Planning  Goal: Discharge to home or other facility with appropriate resources  7/28/2025 1829 by Alina Lopez RN  Outcome: Progressing  Flowsheets (Taken 7/28/2025 0456 by Mateo Live RN)  Discharge to home or other facility with appropriate resources:   Arrange for needed discharge resources and transportation as appropriate   Identify discharge learning needs (meds, wound care, etc)  7/28/2025 0456 by Mateo Live RN  Outcome: Progressing  Flowsheets (Taken 7/28/2025 0456)  Discharge to home or other facility with appropriate resources:   Arrange for needed discharge resources and transportation as appropriate   Identify discharge learning needs (meds, wound care, etc)     Problem: Safety - Adult  Goal: Free from fall injury  7/28/2025 1829 by Alina Lopez RN  Outcome: Progressing  Flowsheets (Taken 7/28/2025 0456 by Mateo Live RN)  Free From Fall Injury: Instruct family/caregiver on patient safety  7/28/2025 0456 by Mateo Live RN  Outcome: Progressing  Flowsheets (Taken 7/28/2025 0456)  Free From Fall Injury: Instruct family/caregiver on patient safety     Problem: ABCDS Injury Assessment  Goal: Absence of physical injury  7/28/2025 1829 by Jessica

## 2025-07-28 NOTE — PROCEDURES
PROCEDURE NOTE  Date: 7/28/2025   Name: Avani Thomas  YOB: 1955    Procedures EKG completed, given to RN

## 2025-07-29 ENCOUNTER — APPOINTMENT (OUTPATIENT)
Dept: NUCLEAR MEDICINE | Age: 70
DRG: 291 | End: 2025-07-29
Payer: MEDICARE

## 2025-07-29 ENCOUNTER — HOSPITAL ENCOUNTER (INPATIENT)
Age: 70
Discharge: HOME | DRG: 291 | End: 2025-07-31
Payer: MEDICARE

## 2025-07-29 LAB
ANION GAP SERPL CALC-SCNC: 14 MEQ/L (ref 8–16)
BUN SERPL-MCNC: 36 MG/DL (ref 8–23)
CALCIUM SERPL-MCNC: 9.9 MG/DL (ref 8.8–10.2)
CHLORIDE SERPL-SCNC: 106 MEQ/L (ref 98–111)
CO2 SERPL-SCNC: 24 MEQ/L (ref 22–29)
CREAT SERPL-MCNC: 1.4 MG/DL (ref 0.5–0.9)
DEPRECATED RDW RBC AUTO: 51.3 FL (ref 35–45)
ECHO BSA: 1.99 M2
ERYTHROCYTE [DISTWIDTH] IN BLOOD BY AUTOMATED COUNT: 17 % (ref 11.5–14.5)
GFR SERPL CREATININE-BSD FRML MDRD: 40 ML/MIN/1.73M2
GLUCOSE BLD STRIP.AUTO-MCNC: 168 MG/DL (ref 70–108)
GLUCOSE BLD STRIP.AUTO-MCNC: 198 MG/DL (ref 70–108)
GLUCOSE BLD STRIP.AUTO-MCNC: 237 MG/DL (ref 70–108)
GLUCOSE BLD STRIP.AUTO-MCNC: 296 MG/DL (ref 70–108)
GLUCOSE SERPL-MCNC: 167 MG/DL (ref 74–109)
HCT VFR BLD AUTO: 28.2 % (ref 37–47)
HGB BLD-MCNC: 8.5 GM/DL (ref 12–16)
MCH RBC QN AUTO: 25.3 PG (ref 26–33)
MCHC RBC AUTO-ENTMCNC: 30.1 GM/DL (ref 32.2–35.5)
MCV RBC AUTO: 83.9 FL (ref 81–99)
NUC STRESS EJECTION FRACTION: 56 %
PLATELET # BLD AUTO: 308 THOU/MM3 (ref 130–400)
PMV BLD AUTO: 10.1 FL (ref 9.4–12.4)
POTASSIUM SERPL-SCNC: 3.7 MEQ/L (ref 3.5–5.2)
RBC # BLD AUTO: 3.36 MILL/MM3 (ref 4.2–5.4)
SODIUM SERPL-SCNC: 144 MEQ/L (ref 135–145)
STRESS BASELINE DIAS BP: 67 MMHG
STRESS BASELINE HR: 66 BPM
STRESS BASELINE SYS BP: 213 MMHG
STRESS ESTIMATED WORKLOAD: 1 METS
STRESS PEAK DIAS BP: 64 MMHG
STRESS PEAK SYS BP: 215 MMHG
STRESS PERCENT HR ACHIEVED: 50 %
STRESS POST PEAK HR: 74 BPM
STRESS RATE PRESSURE PRODUCT: NORMAL BPM*MMHG
STRESS STAGE 1 BP: NORMAL MMHG
STRESS STAGE 1 DURATION: 1 MIN:SEC
STRESS STAGE 1 HR: 67 BPM
STRESS STAGE 2 BP: NORMAL MMHG
STRESS STAGE 2 DURATION: 1 MIN:SEC
STRESS STAGE 2 HR: 71 BPM
STRESS STAGE 3 BP: NORMAL MMHG
STRESS STAGE 3 DURATION: 1 MIN:SEC
STRESS STAGE 3 HR: 74 BPM
STRESS STAGE RECOVERY 1 BP: NORMAL MMHG
STRESS STAGE RECOVERY 1 DURATION: 1 MIN:SEC
STRESS STAGE RECOVERY 1 HR: 72 BPM
STRESS STAGE RECOVERY 2 BP: NORMAL MMHG
STRESS STAGE RECOVERY 2 DURATION: 1 MIN:SEC
STRESS STAGE RECOVERY 2 HR: 73 BPM
STRESS STAGE RECOVERY 3 BP: NORMAL MMHG
STRESS STAGE RECOVERY 3 COMMENTS: NORMAL
STRESS STAGE RECOVERY 3 DURATION: 1 MIN:SEC
STRESS STAGE RECOVERY 3 HR: 72 BPM
STRESS STAGE RECOVERY 4 BP: NORMAL MMHG
STRESS STAGE RECOVERY 4 DURATION: 1 MIN:SEC
STRESS STAGE RECOVERY 4 HR: 69 BPM
STRESS TARGET HR: 149 BPM
TID: 1.12
WBC # BLD AUTO: 8.7 THOU/MM3 (ref 4.8–10.8)

## 2025-07-29 PROCEDURE — 6370000000 HC RX 637 (ALT 250 FOR IP): Performed by: INTERNAL MEDICINE

## 2025-07-29 PROCEDURE — 6370000000 HC RX 637 (ALT 250 FOR IP): Performed by: NURSE PRACTITIONER

## 2025-07-29 PROCEDURE — 97162 PT EVAL MOD COMPLEX 30 MIN: CPT

## 2025-07-29 PROCEDURE — 2060000000 HC ICU INTERMEDIATE R&B

## 2025-07-29 PROCEDURE — 93017 CV STRESS TEST TRACING ONLY: CPT

## 2025-07-29 PROCEDURE — 94660 CPAP INITIATION&MGMT: CPT

## 2025-07-29 PROCEDURE — 97166 OT EVAL MOD COMPLEX 45 MIN: CPT

## 2025-07-29 PROCEDURE — 6370000000 HC RX 637 (ALT 250 FOR IP)

## 2025-07-29 PROCEDURE — 94761 N-INVAS EAR/PLS OXIMETRY MLT: CPT

## 2025-07-29 PROCEDURE — 6360000002 HC RX W HCPCS: Performed by: PHYSICIAN ASSISTANT

## 2025-07-29 PROCEDURE — 2580000003 HC RX 258

## 2025-07-29 PROCEDURE — 93018 CV STRESS TEST I&R ONLY: CPT | Performed by: INTERNAL MEDICINE

## 2025-07-29 PROCEDURE — 93016 CV STRESS TEST SUPVJ ONLY: CPT | Performed by: INTERNAL MEDICINE

## 2025-07-29 PROCEDURE — 97530 THERAPEUTIC ACTIVITIES: CPT

## 2025-07-29 PROCEDURE — 97110 THERAPEUTIC EXERCISES: CPT

## 2025-07-29 PROCEDURE — 78452 HT MUSCLE IMAGE SPECT MULT: CPT | Performed by: INTERNAL MEDICINE

## 2025-07-29 PROCEDURE — A9500 TC99M SESTAMIBI: HCPCS | Performed by: INTERNAL MEDICINE

## 2025-07-29 PROCEDURE — 6360000002 HC RX W HCPCS: Performed by: INTERNAL MEDICINE

## 2025-07-29 PROCEDURE — 36415 COLL VENOUS BLD VENIPUNCTURE: CPT

## 2025-07-29 PROCEDURE — 85027 COMPLETE CBC AUTOMATED: CPT

## 2025-07-29 PROCEDURE — 2500000003 HC RX 250 WO HCPCS: Performed by: NURSE PRACTITIONER

## 2025-07-29 PROCEDURE — 3430000000 HC RX DIAGNOSTIC RADIOPHARMACEUTICAL: Performed by: INTERNAL MEDICINE

## 2025-07-29 PROCEDURE — 6360000002 HC RX W HCPCS: Performed by: NURSE PRACTITIONER

## 2025-07-29 PROCEDURE — 2700000000 HC OXYGEN THERAPY PER DAY

## 2025-07-29 PROCEDURE — 6360000002 HC RX W HCPCS

## 2025-07-29 PROCEDURE — 78452 HT MUSCLE IMAGE SPECT MULT: CPT

## 2025-07-29 PROCEDURE — 99232 SBSQ HOSP IP/OBS MODERATE 35: CPT | Performed by: PHYSICIAN ASSISTANT

## 2025-07-29 PROCEDURE — 82948 REAGENT STRIP/BLOOD GLUCOSE: CPT

## 2025-07-29 PROCEDURE — 80048 BASIC METABOLIC PNL TOTAL CA: CPT

## 2025-07-29 PROCEDURE — 6370000000 HC RX 637 (ALT 250 FOR IP): Performed by: PHYSICIAN ASSISTANT

## 2025-07-29 PROCEDURE — 99232 SBSQ HOSP IP/OBS MODERATE 35: CPT | Performed by: INTERNAL MEDICINE

## 2025-07-29 RX ORDER — METOLAZONE 5 MG/1
5 TABLET ORAL ONCE
Status: COMPLETED | OUTPATIENT
Start: 2025-07-29 | End: 2025-07-29

## 2025-07-29 RX ORDER — TETRAKIS(2-METHOXYISOBUTYLISOCYANIDE)COPPER(I) TETRAFLUOROBORATE 1 MG/ML
34.8 INJECTION, POWDER, LYOPHILIZED, FOR SOLUTION INTRAVENOUS
Status: COMPLETED | OUTPATIENT
Start: 2025-07-29 | End: 2025-07-29

## 2025-07-29 RX ORDER — AMINOPHYLLINE 25 MG/ML
75 INJECTION, SOLUTION INTRAVENOUS
Status: DISCONTINUED | OUTPATIENT
Start: 2025-07-29 | End: 2025-08-01 | Stop reason: HOSPADM

## 2025-07-29 RX ORDER — TETRAKIS(2-METHOXYISOBUTYLISOCYANIDE)COPPER(I) TETRAFLUOROBORATE 1 MG/ML
11 INJECTION, POWDER, LYOPHILIZED, FOR SOLUTION INTRAVENOUS
Status: COMPLETED | OUTPATIENT
Start: 2025-07-29 | End: 2025-07-29

## 2025-07-29 RX ORDER — HYDRALAZINE HYDROCHLORIDE 25 MG/1
25 TABLET, FILM COATED ORAL EVERY 8 HOURS SCHEDULED
Status: DISCONTINUED | OUTPATIENT
Start: 2025-07-29 | End: 2025-07-30

## 2025-07-29 RX ORDER — REGADENOSON 0.08 MG/ML
0.4 INJECTION, SOLUTION INTRAVENOUS
Status: COMPLETED | OUTPATIENT
Start: 2025-07-29 | End: 2025-07-29

## 2025-07-29 RX ADMIN — CLONIDINE HYDROCHLORIDE 0.1 MG: 0.1 TABLET ORAL at 11:15

## 2025-07-29 RX ADMIN — NIFEDIPINE 90 MG: 90 TABLET, EXTENDED RELEASE ORAL at 11:07

## 2025-07-29 RX ADMIN — MYCOPHENOLIC ACID 360 MG: 360 TABLET, DELAYED RELEASE ORAL at 20:22

## 2025-07-29 RX ADMIN — Medication 400 MG: at 11:11

## 2025-07-29 RX ADMIN — RIVAROXABAN 2.5 MG: 2.5 TABLET, FILM COATED ORAL at 11:10

## 2025-07-29 RX ADMIN — INSULIN LISPRO 4 UNITS: 100 INJECTION, SOLUTION INTRAVENOUS; SUBCUTANEOUS at 20:22

## 2025-07-29 RX ADMIN — BUMETANIDE 2 MG: 0.25 INJECTION INTRAMUSCULAR; INTRAVENOUS at 05:02

## 2025-07-29 RX ADMIN — ASPIRIN 81 MG: 81 TABLET, COATED ORAL at 12:44

## 2025-07-29 RX ADMIN — POTASSIUM CHLORIDE 20 MEQ: 1500 TABLET, EXTENDED RELEASE ORAL at 20:21

## 2025-07-29 RX ADMIN — RIVAROXABAN 2.5 MG: 2.5 TABLET, FILM COATED ORAL at 20:21

## 2025-07-29 RX ADMIN — CARVEDILOL 50 MG: 25 TABLET, FILM COATED ORAL at 11:09

## 2025-07-29 RX ADMIN — ATORVASTATIN CALCIUM 40 MG: 40 TABLET, FILM COATED ORAL at 12:43

## 2025-07-29 RX ADMIN — TACROLIMUS 5 MG: 1 CAPSULE ORAL at 11:11

## 2025-07-29 RX ADMIN — Medication 34.8 MILLICURIE: at 09:37

## 2025-07-29 RX ADMIN — HYDRALAZINE HYDROCHLORIDE 25 MG: 25 TABLET ORAL at 16:03

## 2025-07-29 RX ADMIN — AZITHROMYCIN MONOHYDRATE 500 MG: 500 INJECTION, POWDER, LYOPHILIZED, FOR SOLUTION INTRAVENOUS at 12:57

## 2025-07-29 RX ADMIN — INSULIN GLARGINE 30 UNITS: 100 INJECTION, SOLUTION SUBCUTANEOUS at 20:22

## 2025-07-29 RX ADMIN — CARVEDILOL 50 MG: 25 TABLET, FILM COATED ORAL at 20:21

## 2025-07-29 RX ADMIN — REGADENOSON 0.4 MG: 0.08 INJECTION, SOLUTION INTRAVENOUS at 09:29

## 2025-07-29 RX ADMIN — INSULIN LISPRO 4 UNITS: 100 INJECTION, SOLUTION INTRAVENOUS; SUBCUTANEOUS at 12:45

## 2025-07-29 RX ADMIN — TACROLIMUS 5 MG: 1 CAPSULE ORAL at 20:21

## 2025-07-29 RX ADMIN — Medication 400 MG: at 20:22

## 2025-07-29 RX ADMIN — HYDROCODONE BITARTRATE AND ACETAMINOPHEN 1 TABLET: 5; 325 TABLET ORAL at 12:52

## 2025-07-29 RX ADMIN — SODIUM CHLORIDE, PRESERVATIVE FREE 10 ML: 5 INJECTION INTRAVENOUS at 20:22

## 2025-07-29 RX ADMIN — MYCOPHENOLIC ACID 360 MG: 360 TABLET, DELAYED RELEASE ORAL at 11:01

## 2025-07-29 RX ADMIN — INSULIN LISPRO 8 UNITS: 100 INJECTION, SOLUTION INTRAVENOUS; SUBCUTANEOUS at 17:40

## 2025-07-29 RX ADMIN — METOLAZONE 5 MG: 5 TABLET ORAL at 11:06

## 2025-07-29 RX ADMIN — Medication 2 PUFF: at 20:09

## 2025-07-29 RX ADMIN — Medication 11 MILLICURIE: at 08:20

## 2025-07-29 RX ADMIN — POLYVINYL ALCOHOL 1 DROP: 1.4 SOLUTION/ DROPS OPHTHALMIC at 20:22

## 2025-07-29 ASSESSMENT — PAIN DESCRIPTION - LOCATION: LOCATION: LEG;NECK

## 2025-07-29 ASSESSMENT — PAIN SCALES - GENERAL
PAINLEVEL_OUTOF10: 0
PAINLEVEL_OUTOF10: 0

## 2025-07-29 ASSESSMENT — PAIN DESCRIPTION - DESCRIPTORS: DESCRIPTORS: ACHING

## 2025-07-29 ASSESSMENT — PAIN DESCRIPTION - ORIENTATION: ORIENTATION: LOWER;POSTERIOR

## 2025-07-29 NOTE — CONSULTS
[facility]   HEART FAILURE PROGRAM          NAME:  Avani Thomas  MEDICAL RECORD NUMBER:  [id number]  AGE: 69 y.o.   GENDER: female  : 1955  Attending: Chencho Patterson MD TODAY'S DATE:  2025    Subjective:     VISIT TYPE: Heart Failure Education at bedside   Cardiac Medications       Calcium Channel Blockers       NIFEdipine (PROCARDIA XL) extended release tablet 90 mg 90 mg, Oral, DAILY, Hold if systolic 130 or below  Do not crush or break.       Antiadrenergic Antihypertensives       cloNIDine (CATAPRES) tablet 0.1 mg 0.1 mg, Oral, Every 8 hours, Hold if systolic blood pressure less than 140       Loop Diuretics       bumetanide (BUMEX) injection 2 mg 2 mg, IntraVENous, USER SPECIFIED       HMG CoA Reductase Inhibitors       atorvastatin (LIPITOR) tablet 40 mg 40 mg, Oral, DAILY       Alpha-Beta Blockers       carvedilol (COREG) tablet 50 mg 50 mg, Oral, 2 TIMES DAILY WITH MEALS, Hold if heart rate less than 60 and or systolic less than 110  Administer with food to minimize the risk of orthostatic hypotension       Salicylates       aspirin EC tablet 81 mg 81 mg, Oral, DAILY, Do not crush or break.       Platelet Aggregation Inhibitors       clopidogrel (PLAVIX) 75 MG tablet (Discontinued) Take 1 tablet by mouth daily     Patient not taking: Reported on 2025       Direct Factor Xa Inhibitors       rivaroxaban (XARELTO) tablet 2.5 mg 2.5 mg, Oral, 2 TIMES DAILY, Administer doses GREATER THAN or EQUAL to 15 mg with food; doses of 2.5 mg and 10 mg may be administered without regard to meals.            Results:   Is this patient a 30 day readmission patient: No      ADMISSION DIAGNOSIS:   Shortness of breath [R06.02]  Acute respiratory failure with hypoxia (HCC) [J96.01]  Hypervolemia, unspecified hypervolemia type [E87.70]  Acute on chronic congestive heart failure, unspecified heart failure type (HCC) [I50.9]     BP (!) 177/74   Pulse 71   Temp 98 °F (36.7 °C) (Oral)   
Acute HFpEF  AHRF  S/p renal transplant  Moderate degree of anemia hgb 8.3 and now 8.4  Hx of syncope  dizziness    Plan  Gntle diuresis  Need hematology evaluation  Bedside ortho after diuresed well and if neg TTT as out pat  Holter on d/c  Keep hgb > 8    [id number]    Ayan Juarez MD    
[facility]          [address]        [address]        [place], [place] [id number] [phone]           Inpatient Initial consult note         7/27/2025 10:03 AM      Patient Name:   Avani Thomas  YOB: 1955  Primary Care Physician:  Valencia Waldrop MD   Admit Date:    7/26/2025  5:40 PM    Consultation requested by : Simone Jay MD    Reason for Consult : Nephrology following for history of kidney transplant and fluid overload.    History of presenting illness :Avani Thomas is a 69 y.o.   female with Past Medical History as stated below presented with a chief complaint of Shortness of Breath   on 7/26/2025 .    Patient presented with chief complaints of shortness of breath which has been worsening for the last 3 days.  She has some associated symptoms of leg swelling as well no chest pain fever chills no nausea vomiting diarrhea no dizziness or lightheadedness however upon arrival to the ER patient blood pressure was noted to be hide she was only saturating 77% on room air she was subsequently placed on 6 L nasal cannula and BiPAP, given a dose of diuretic and clinically looking much better she is on 4 L nasal cannula at this time.  Leg swelling also seems to be better    Past History:  Past Medical History:   Diagnosis Date    Anemia associated with chronic renal failure     Aranesp 150 micrograms every other week given at [facility]    Anxiety     Arthritis     Backache     Blood circulation, collateral     Blood transfusion     CAD (coronary artery disease)     Cellulitis in diabetic foot ([facility]) 03/03/2017    4th and 5th toes right foot    Chest pain     History of    CHF (congestive heart failure) ([facility]) 1998    Dx'ed by Dr. Avila    Chronic anemia     Chronic kidney disease     Chronic kidney disease, stage III (moderate) ([facility])     Chronic renal insufficiency 2010    COPD (chronic obstructive pulmonary disease) ([facility]) 2012 
with congestive heart failure.    She had a Lexiscan nuclear stress test in 2021, negative for ischemia.    The patient had an echo done in 2021, ejection fraction 55% to 60% and in 2020, ejection fraction 55% to 60%.    ASSESSMENT:    1. Acute hypoxic respiratory failure with desaturation secondary to pulmonary edema.  2. Acute heart failure with preserved ejection fraction.  3. Acute pulmonary edema.  4. Moderate degree of anemia. Hemoglobin ranging from 8.3 to 8.4 on this admission.  5. Status post chronic kidney disease; at this time, stage III; status post right kidney transplant, basically from cadaver in the year 2000.  6. Hypertension.  7. Diabetes type 2.  8. Chronic obstructive pulmonary disease.  9. History of multiple pulmonary nodules.  10. History of peripheral artery disease, status post angioplasty of the right SFA and popliteal in 2022 by Dr. Vaca.  11. Hyperparathyroidism.  12. Obstructive sleep apnea, not compliant with CPAP.  13. History of syncope in June 2025, seen in the emergency room recent; the syncope had the prodrome of dizziness per the patient.  14. Intermittent orthostatic dizziness; probably the dizziness could be contributed to by the anemia and orthostatic evaluation is recommended.  15. Mild troponin elevation, seems to be demand related.    PLAN AND RECOMMENDATIONS:  Continue with gentle IV diuresis and the renal function is getting better with diuresis.  Hypoxia is getting better with diuresis and continue diuresis. Follow the BNP and chest x-ray pattern.    There is a mild troponin elevation, so may need ischemia workup in view of the syncope and troponin elevation.    History of syncope, the patient needs ischemia workup.    In view of the syncope, the patient needs also bedside orthostatic evaluation and if that is negative, the patient needs tilt-table test as outpatient.    Echocardiogram in the morning.    We will get a chest x-ray in the morning to follow up the

## 2025-07-29 NOTE — PLAN OF CARE
Problem: Respiratory - Adult  Goal: Achieves optimal ventilation and oxygenation  7/28/2025 2034 by Marichuy Woodruff RCP  Outcome: Progressing

## 2025-07-29 NOTE — PLAN OF CARE
Problem: Chronic Conditions and Co-morbidities  Goal: Patient's chronic conditions and co-morbidity symptoms are monitored and maintained or improved  7/29/2025 0036 by Letitia Chun RN  Outcome: Progressing  Flowsheets (Taken 7/29/2025 0036)  Care Plan - Patient's Chronic Conditions and Co-Morbidity Symptoms are Monitored and Maintained or Improved:   Monitor and assess patient's chronic conditions and comorbid symptoms for stability, deterioration, or improvement   Collaborate with multidisciplinary team to address chronic and comorbid conditions and prevent exacerbation or deterioration   Update acute care plan with appropriate goals if chronic or comorbid symptoms are exacerbated and prevent overall improvement and discharge  7/28/2025 1829 by Alina Lopez, RN  Outcome: Progressing     Problem: Discharge Planning  Goal: Discharge to home or other facility with appropriate resources  7/29/2025 0036 by Letitia Chun RN  Outcome: Progressing  Flowsheets (Taken 7/29/2025 0036)  Discharge to home or other facility with appropriate resources:   Identify barriers to discharge with patient and caregiver   Arrange for needed discharge resources and transportation as appropriate   Identify discharge learning needs (meds, wound care, etc)   Refer to discharge planning if patient needs post-hospital services based on physician order or complex needs related to functional status, cognitive ability or social support system  7/28/2025 1829 by Alina Lopez RN  Outcome: Progressing  Flowsheets (Taken 7/28/2025 0456 by Mateo Live, RN)  Discharge to home or other facility with appropriate resources:   Arrange for needed discharge resources and transportation as appropriate   Identify discharge learning needs (meds, wound care, etc)     Problem: Safety - Adult  Goal: Free from fall injury  7/29/2025 0036 by Letitia Chun RN  Outcome: Progressing  Flowsheets (Taken 7/29/2025 0036)  Free From Fall Injury: Instruct

## 2025-07-29 NOTE — PLAN OF CARE
Problem: Chronic Conditions and Co-morbidities  Goal: Patient's chronic conditions and co-morbidity symptoms are monitored and maintained or improved  Outcome: Progressing     Problem: Discharge Planning  Goal: Discharge to home or other facility with appropriate resources  Outcome: Progressing     Problem: Safety - Adult  Goal: Free from fall injury  Outcome: Progressing     Problem: ABCDS Injury Assessment  Goal: Absence of physical injury  Outcome: Progressing     Problem: Skin/Tissue Integrity  Goal: Skin integrity remains intact  Description: 1.  Monitor for areas of redness and/or skin breakdown  2.  Assess vascular access sites hourly  3.  Every 4-6 hours minimum:  Change oxygen saturation probe site  4.  Every 4-6 hours:  If on nasal continuous positive airway pressure, respiratory therapy assess nares and determine need for appliance change or resting period  Outcome: Progressing     Problem: Respiratory - Adult  Goal: Achieves optimal ventilation and oxygenation  Outcome: Progressing     Problem: Cardiovascular - Adult  Goal: Maintains optimal cardiac output and hemodynamic stability  Outcome: Progressing  Goal: Absence of cardiac dysrhythmias or at baseline  Outcome: Progressing     Problem: Skin/Tissue Integrity - Adult  Goal: Skin integrity remains intact  Description: 1.  Monitor for areas of redness and/or skin breakdown  2.  Assess vascular access sites hourly  3.  Every 4-6 hours minimum:  Change oxygen saturation probe site  4.  Every 4-6 hours:  If on nasal continuous positive airway pressure, respiratory therapy assess nares and determine need for appliance change or resting period  Outcome: Progressing  Goal: Oral mucous membranes remain intact  Outcome: Progressing     Problem: Musculoskeletal - Adult  Goal: Return mobility to safest level of function  Outcome: Progressing  Goal: Return ADL status to a safe level of function  Outcome: Progressing     Problem: Genitourinary - Adult  Goal:  Unable to assess due to medical condition

## 2025-07-30 ENCOUNTER — TELEPHONE (OUTPATIENT)
Dept: FAMILY MEDICINE CLINIC | Age: 70
End: 2025-07-30

## 2025-07-30 LAB
ANION GAP SERPL CALC-SCNC: 11 MEQ/L (ref 8–16)
BUN SERPL-MCNC: 33 MG/DL (ref 8–23)
CALCIUM SERPL-MCNC: 10 MG/DL (ref 8.8–10.2)
CHLORIDE SERPL-SCNC: 105 MEQ/L (ref 98–111)
CO2 SERPL-SCNC: 29 MEQ/L (ref 22–29)
CREAT SERPL-MCNC: 1.3 MG/DL (ref 0.5–0.9)
DEPRECATED RDW RBC AUTO: 51.9 FL (ref 35–45)
ERYTHROCYTE [DISTWIDTH] IN BLOOD BY AUTOMATED COUNT: 17 % (ref 11.5–14.5)
GFR SERPL CREATININE-BSD FRML MDRD: 44 ML/MIN/1.73M2
GLUCOSE BLD STRIP.AUTO-MCNC: 116 MG/DL (ref 70–108)
GLUCOSE BLD STRIP.AUTO-MCNC: 160 MG/DL (ref 70–108)
GLUCOSE BLD STRIP.AUTO-MCNC: 181 MG/DL (ref 70–108)
GLUCOSE BLD STRIP.AUTO-MCNC: 199 MG/DL (ref 70–108)
GLUCOSE SERPL-MCNC: 114 MG/DL (ref 74–109)
HCT VFR BLD AUTO: 29.7 % (ref 37–47)
HGB BLD-MCNC: 8.6 GM/DL (ref 12–16)
MCH RBC QN AUTO: 24.7 PG (ref 26–33)
MCHC RBC AUTO-ENTMCNC: 29 GM/DL (ref 32.2–35.5)
MCV RBC AUTO: 85.3 FL (ref 81–99)
PLATELET # BLD AUTO: 309 THOU/MM3 (ref 130–400)
PMV BLD AUTO: 9.7 FL (ref 9.4–12.4)
POTASSIUM SERPL-SCNC: 3.5 MEQ/L (ref 3.5–5.2)
RBC # BLD AUTO: 3.48 MILL/MM3 (ref 4.2–5.4)
SODIUM SERPL-SCNC: 145 MEQ/L (ref 135–145)
WBC # BLD AUTO: 6.6 THOU/MM3 (ref 4.8–10.8)

## 2025-07-30 PROCEDURE — 6370000000 HC RX 637 (ALT 250 FOR IP): Performed by: PHYSICIAN ASSISTANT

## 2025-07-30 PROCEDURE — 97116 GAIT TRAINING THERAPY: CPT

## 2025-07-30 PROCEDURE — 6360000002 HC RX W HCPCS: Performed by: NURSE PRACTITIONER

## 2025-07-30 PROCEDURE — 2500000003 HC RX 250 WO HCPCS: Performed by: NURSE PRACTITIONER

## 2025-07-30 PROCEDURE — 85027 COMPLETE CBC AUTOMATED: CPT

## 2025-07-30 PROCEDURE — 6370000000 HC RX 637 (ALT 250 FOR IP): Performed by: INTERNAL MEDICINE

## 2025-07-30 PROCEDURE — 6370000000 HC RX 637 (ALT 250 FOR IP): Performed by: NURSE PRACTITIONER

## 2025-07-30 PROCEDURE — 94761 N-INVAS EAR/PLS OXIMETRY MLT: CPT

## 2025-07-30 PROCEDURE — 2060000000 HC ICU INTERMEDIATE R&B

## 2025-07-30 PROCEDURE — 36415 COLL VENOUS BLD VENIPUNCTURE: CPT

## 2025-07-30 PROCEDURE — 80048 BASIC METABOLIC PNL TOTAL CA: CPT

## 2025-07-30 PROCEDURE — 97110 THERAPEUTIC EXERCISES: CPT

## 2025-07-30 PROCEDURE — 2700000000 HC OXYGEN THERAPY PER DAY

## 2025-07-30 PROCEDURE — 2580000003 HC RX 258

## 2025-07-30 PROCEDURE — 94660 CPAP INITIATION&MGMT: CPT

## 2025-07-30 PROCEDURE — 82948 REAGENT STRIP/BLOOD GLUCOSE: CPT

## 2025-07-30 PROCEDURE — 97530 THERAPEUTIC ACTIVITIES: CPT

## 2025-07-30 PROCEDURE — 6360000002 HC RX W HCPCS: Performed by: INTERNAL MEDICINE

## 2025-07-30 PROCEDURE — 94640 AIRWAY INHALATION TREATMENT: CPT

## 2025-07-30 PROCEDURE — 6370000000 HC RX 637 (ALT 250 FOR IP)

## 2025-07-30 PROCEDURE — 6360000002 HC RX W HCPCS

## 2025-07-30 PROCEDURE — 99232 SBSQ HOSP IP/OBS MODERATE 35: CPT | Performed by: INTERNAL MEDICINE

## 2025-07-30 PROCEDURE — 99232 SBSQ HOSP IP/OBS MODERATE 35: CPT | Performed by: PHYSICIAN ASSISTANT

## 2025-07-30 RX ORDER — HYDRALAZINE HYDROCHLORIDE 10 MG/1
10 TABLET, FILM COATED ORAL EVERY 8 HOURS SCHEDULED
Status: DISCONTINUED | OUTPATIENT
Start: 2025-07-30 | End: 2025-07-30

## 2025-07-30 RX ORDER — HYDRALAZINE HYDROCHLORIDE 25 MG/1
25 TABLET, FILM COATED ORAL EVERY 8 HOURS SCHEDULED
Status: DISCONTINUED | OUTPATIENT
Start: 2025-07-30 | End: 2025-07-31 | Stop reason: HOSPADM

## 2025-07-30 RX ORDER — BUMETANIDE 1 MG/1
1 TABLET ORAL 2 TIMES DAILY
Status: DISCONTINUED | OUTPATIENT
Start: 2025-07-30 | End: 2025-07-31 | Stop reason: HOSPADM

## 2025-07-30 RX ORDER — ATORVASTATIN CALCIUM 40 MG/1
40 TABLET, FILM COATED ORAL DAILY
Qty: 90 TABLET | Refills: 0 | Status: CANCELLED | OUTPATIENT
Start: 2025-07-30

## 2025-07-30 RX ADMIN — INSULIN LISPRO 4 UNITS: 100 INJECTION, SOLUTION INTRAVENOUS; SUBCUTANEOUS at 20:53

## 2025-07-30 RX ADMIN — Medication 2 PUFF: at 20:29

## 2025-07-30 RX ADMIN — TIOTROPIUM BROMIDE INHALATION SPRAY 5 MCG: 3.12 SPRAY, METERED RESPIRATORY (INHALATION) at 08:59

## 2025-07-30 RX ADMIN — INSULIN LISPRO 4 UNITS: 100 INJECTION, SOLUTION INTRAVENOUS; SUBCUTANEOUS at 12:51

## 2025-07-30 RX ADMIN — AZITHROMYCIN MONOHYDRATE 500 MG: 500 INJECTION, POWDER, LYOPHILIZED, FOR SOLUTION INTRAVENOUS at 12:55

## 2025-07-30 RX ADMIN — RIVAROXABAN 2.5 MG: 2.5 TABLET, FILM COATED ORAL at 09:35

## 2025-07-30 RX ADMIN — CARVEDILOL 50 MG: 25 TABLET, FILM COATED ORAL at 18:05

## 2025-07-30 RX ADMIN — CLONIDINE HYDROCHLORIDE 0.1 MG: 0.1 TABLET ORAL at 02:57

## 2025-07-30 RX ADMIN — HYDROCODONE BITARTRATE AND ACETAMINOPHEN 1 TABLET: 5; 325 TABLET ORAL at 23:53

## 2025-07-30 RX ADMIN — TACROLIMUS 5 MG: 1 CAPSULE ORAL at 20:53

## 2025-07-30 RX ADMIN — CLONIDINE HYDROCHLORIDE 0.1 MG: 0.1 TABLET ORAL at 23:53

## 2025-07-30 RX ADMIN — CARVEDILOL 50 MG: 25 TABLET, FILM COATED ORAL at 09:35

## 2025-07-30 RX ADMIN — NIFEDIPINE 90 MG: 90 TABLET, EXTENDED RELEASE ORAL at 09:36

## 2025-07-30 RX ADMIN — MYCOPHENOLIC ACID 360 MG: 360 TABLET, DELAYED RELEASE ORAL at 09:36

## 2025-07-30 RX ADMIN — HYDRALAZINE HYDROCHLORIDE 25 MG: 25 TABLET ORAL at 20:53

## 2025-07-30 RX ADMIN — INSULIN GLARGINE 30 UNITS: 100 INJECTION, SOLUTION SUBCUTANEOUS at 20:53

## 2025-07-30 RX ADMIN — POLYVINYL ALCOHOL 1 DROP: 1.4 SOLUTION/ DROPS OPHTHALMIC at 20:54

## 2025-07-30 RX ADMIN — HYDRALAZINE HYDROCHLORIDE 10 MG: 10 TABLET ORAL at 09:37

## 2025-07-30 RX ADMIN — MYCOPHENOLIC ACID 360 MG: 360 TABLET, DELAYED RELEASE ORAL at 20:53

## 2025-07-30 RX ADMIN — Medication 2 PUFF: at 08:59

## 2025-07-30 RX ADMIN — RIVAROXABAN 2.5 MG: 2.5 TABLET, FILM COATED ORAL at 20:53

## 2025-07-30 RX ADMIN — POLYETHYLENE GLYCOL 3350 17 G: 17 POWDER, FOR SOLUTION ORAL at 18:04

## 2025-07-30 RX ADMIN — BUMETANIDE 1 MG: 1 TABLET ORAL at 18:04

## 2025-07-30 RX ADMIN — TACROLIMUS 5 MG: 1 CAPSULE ORAL at 09:35

## 2025-07-30 RX ADMIN — BUMETANIDE 2 MG: 0.25 INJECTION INTRAMUSCULAR; INTRAVENOUS at 05:30

## 2025-07-30 RX ADMIN — POLYVINYL ALCOHOL 1 DROP: 1.4 SOLUTION/ DROPS OPHTHALMIC at 09:42

## 2025-07-30 RX ADMIN — CLONIDINE HYDROCHLORIDE 0.1 MG: 0.1 TABLET ORAL at 09:36

## 2025-07-30 RX ADMIN — SODIUM CHLORIDE, PRESERVATIVE FREE 10 ML: 5 INJECTION INTRAVENOUS at 20:53

## 2025-07-30 RX ADMIN — POTASSIUM CHLORIDE 20 MEQ: 1500 TABLET, EXTENDED RELEASE ORAL at 09:34

## 2025-07-30 RX ADMIN — POTASSIUM CHLORIDE 20 MEQ: 1500 TABLET, EXTENDED RELEASE ORAL at 20:53

## 2025-07-30 RX ADMIN — CLONIDINE HYDROCHLORIDE 0.1 MG: 0.1 TABLET ORAL at 18:05

## 2025-07-30 RX ADMIN — POTASSIUM CHLORIDE 40 MEQ: 1500 TABLET, EXTENDED RELEASE ORAL at 05:30

## 2025-07-30 RX ADMIN — ATORVASTATIN CALCIUM 40 MG: 40 TABLET, FILM COATED ORAL at 09:36

## 2025-07-30 RX ADMIN — Medication 400 MG: at 09:35

## 2025-07-30 RX ADMIN — ASPIRIN 81 MG: 81 TABLET, COATED ORAL at 09:34

## 2025-07-30 RX ADMIN — Medication 400 MG: at 20:53

## 2025-07-30 RX ADMIN — SODIUM CHLORIDE, PRESERVATIVE FREE 10 ML: 5 INJECTION INTRAVENOUS at 09:36

## 2025-07-30 ASSESSMENT — PAIN DESCRIPTION - DESCRIPTORS: DESCRIPTORS: SHARP

## 2025-07-30 ASSESSMENT — PAIN SCALES - GENERAL
PAINLEVEL_OUTOF10: 0
PAINLEVEL_OUTOF10: 7
PAINLEVEL_OUTOF10: 0

## 2025-07-30 ASSESSMENT — PAIN DESCRIPTION - PAIN TYPE: TYPE: ACUTE PAIN

## 2025-07-30 ASSESSMENT — PAIN DESCRIPTION - ONSET: ONSET: GRADUAL

## 2025-07-30 ASSESSMENT — PAIN DESCRIPTION - LOCATION: LOCATION: FOOT;LEG

## 2025-07-30 ASSESSMENT — PAIN - FUNCTIONAL ASSESSMENT: PAIN_FUNCTIONAL_ASSESSMENT: ACTIVITIES ARE NOT PREVENTED

## 2025-07-30 ASSESSMENT — PAIN DESCRIPTION - FREQUENCY: FREQUENCY: INTERMITTENT

## 2025-07-30 NOTE — DISCHARGE INSTRUCTIONS
Tilt table test as OP with dr bedolla    When you are walking around, make sure to use the 3 L of oxygen. When resting you do not need the oxygen.

## 2025-07-30 NOTE — PLAN OF CARE
Problem: Chronic Conditions and Co-morbidities  Goal: Patient's chronic conditions and co-morbidity symptoms are monitored and maintained or improved  7/30/2025 0231 by Darrius Dejesus RN  Outcome: Progressing     Problem: Discharge Planning  Goal: Discharge to home or other facility with appropriate resources  7/30/2025 0231 by Darrius Dejesus RN  Outcome: Progressing     Problem: Safety - Adult  Goal: Free from fall injury  7/30/2025 0231 by Darrius Dejesus RN  Outcome: Progressing     Problem: ABCDS Injury Assessment  Goal: Absence of physical injury  7/30/2025 0231 by Darrius Dejesus RN  Outcome: Progressing     Problem: Skin/Tissue Integrity  Goal: Skin integrity remains intact  Description: 1.  Monitor for areas of redness and/or skin breakdown  2.  Assess vascular access sites hourly  3.  Every 4-6 hours minimum:  Change oxygen saturation probe site  4.  Every 4-6 hours:  If on nasal continuous positive airway pressure, respiratory therapy assess nares and determine need for appliance change or resting period  7/30/2025 0231 by Darrius Dejesus RN  Outcome: Progressing     Problem: Respiratory - Adult  Goal: Achieves optimal ventilation and oxygenation  7/30/2025 0231 by Darrius Dejesus RN  Outcome: Progressing     Problem: Cardiovascular - Adult  Goal: Maintains optimal cardiac output and hemodynamic stability  7/30/2025 0231 by Darrius Dejesus RN  Outcome: Progressing     Problem: Cardiovascular - Adult  Goal: Absence of cardiac dysrhythmias or at baseline  7/30/2025 0231 by Darrius Dejesus RN  Outcome: Progressing     Problem: Skin/Tissue Integrity - Adult  Goal: Skin integrity remains intact  Description: 1.  Monitor for areas of redness and/or skin breakdown  2.  Assess vascular access sites hourly  3.  Every 4-6 hours minimum:  Change oxygen saturation probe site  4.  Every 4-6 hours:  If on nasal continuous positive airway pressure, respiratory therapy assess nares and determine need

## 2025-07-30 NOTE — DISCHARGE INSTR - DIET
Good nutrition is important when healing from an illness, injury, or surgery.  Follow any nutrition recommendations given to you during your hospital stay.   If you were given an oral nutrition supplement while in the hospital, continue to take this supplement at home.  You can take it with meals, in-between meals, and/or before bedtime. These supplements can be purchased at most local grocery stores, pharmacies, and chain super-stores.   If you have any questions about your diet or nutrition, call the hospital and ask for the dietitian.    You are being placed on a diabetic carb counting diet.     Eating healthy is the first step in controlling diabetes    Here's how to get started....    Eat 3 meals a day.  Eat your meals at the same time each day and do not skip meals.  Eat about the same amount of food each day.     Limit sugar and sweets.  Eat less candy, desserts, pastries and jelly. Limit intake of regular pop, sugary beverages and fruit juice.  Drink sugar free beverages such as diet pop, water, Crystal Light, and unsweetened tea instead.  Use Equal or Sweet-n-Low in place of sugar.    Lose weight if you are overweight.  Even a small amount of weight loss may help improve your blood sugar control.  To help lose weight, reduce your portion sizes.     Control your intake of carbohydrates.   Carbohydrate is the main  nutrient that affects blood sugar levels.  All the carbohydrate you eat is turned  into sugar by your body.  Therefore, it is important to control  the amount  of carbohydrate that you eat a day.  You should eat about 60-75  grams of  carbohydrate at each meal.      Common sources of carbohydrates:     Eat more fiber.  Fiber can help slow down the rise in blood sugar following a meal.  To get more fiber in your diet, eat at least 5 servings of fruits and vegetables a day, choose whole grain bread/cereal and eat more beans or legumes.     Reduce your intake of high fat foods.   Cutting back on your

## 2025-07-30 NOTE — TELEPHONE ENCOUNTER
Care Transitions Initial Follow Up Call    Outreach made within 2 business days of discharge: Yes    Patient: Avani Thomas Patient : 1955   MRN: [id number]  Reason for Admission: Hypervolemia  Discharge Date: 25       Spoke with: Nurse    Discharge department/facility: [facility] Interactive Patient Contact:  Was patient able to fill all prescriptions: Yes  Was patient instructed to bring all medications to the follow-up visit: Yes  Is patient taking all medications as directed in the discharge summary? Yes  Does patient understand their discharge instructions: Yes:   Does patient have questions or concerns that need addressed prior to 7-14 day follow up office visit: no    Additional needs identified to be addressed with provider  No needs identified             Scheduled appointment with PCP within 7-14 days    Follow Up  Future Appointments   Date Time Provider Department Center   2025 11:40 AM Romie Bo, APRN - CNP [facility] [place]   2025  9:20 AM Valencia Waldrop MD [facility]   2025  8:00 AM Dalila Will APRN - CNP [facility] CHF [facility] [place]   2025  2:30 PM Abdirizak Vaca MD [facility] Heart [facility] [place]   10/9/2025  2:00 PM Valencia Pearson APRN - CNP [facility]   10/16/2025  2:00 PM Martha Gaytan APRN - CNP [facility] IM MED [facility] [place]   2025  3:00 PM [facility] ROOM 1 [facility]   2025  2:00 PM Selina Singer PA-C N Pulm Med [facility] [place]   2/3/2026  2:00 PM Jesus Martins MD [facility] [place]   2026  2:40 PM Jesus Martins MD [facility] [place]           Yamilka Navarro MA

## 2025-07-30 NOTE — SIGNIFICANT EVENT
Patient was evaluated today for the diagnosis of COPD.  I entered a DME order for home oxygen at 3 lpm because the diagnosis and testing require the patient to have supplemental oxygen.  Condition will improve or be benefited by oxygen use.  The patient is not able to perform good mobility in a home setting and therefore does require the use of a portable oxygen system.  The need for this equipment was discussed with the patient and she understands and is in agreement.    Electronically signed by Chucho Sanders DO on 7/30/2025 at 2:22 PM

## 2025-07-30 NOTE — PLAN OF CARE
Problem: Chronic Conditions and Co-morbidities  Goal: Patient's chronic conditions and co-morbidity symptoms are monitored and maintained or improved  Outcome: Progressing     Problem: Discharge Planning  Goal: Discharge to home or other facility with appropriate resources  Outcome: Progressing     Problem: Safety - Adult  Goal: Free from fall injury  Outcome: Progressing     Problem: ABCDS Injury Assessment  Goal: Absence of physical injury  Outcome: Progressing     Problem: Skin/Tissue Integrity  Goal: Skin integrity remains intact  Description: 1.  Monitor for areas of redness and/or skin breakdown  2.  Assess vascular access sites hourly  3.  Every 4-6 hours minimum:  Change oxygen saturation probe site  4.  Every 4-6 hours:  If on nasal continuous positive airway pressure, respiratory therapy assess nares and determine need for appliance change or resting period  Outcome: Progressing     Problem: Respiratory - Adult  Goal: Achieves optimal ventilation and oxygenation  Outcome: Progressing  Flowsheets (Taken 7/30/2025 0857 by She Rivero, [facility])  Achieves optimal ventilation and oxygenation:   Assess for changes in respiratory status   Position to facilitate oxygenation and minimize respiratory effort   Respiratory therapy support as indicated   Assess and instruct to report shortness of breath or any respiratory difficulty     Problem: Cardiovascular - Adult  Goal: Maintains optimal cardiac output and hemodynamic stability  Outcome: Progressing  Goal: Absence of cardiac dysrhythmias or at baseline  Outcome: Progressing     Problem: Skin/Tissue Integrity - Adult  Goal: Skin integrity remains intact  Description: 1.  Monitor for areas of redness and/or skin breakdown  2.  Assess vascular access sites hourly  3.  Every 4-6 hours minimum:  Change oxygen saturation probe site  4.  Every 4-6 hours:  If on nasal continuous positive airway pressure, respiratory therapy assess nares and determine need for appliance

## 2025-07-31 VITALS
BODY MASS INDEX: 29.66 KG/M2 | RESPIRATION RATE: 18 BRPM | WEIGHT: 178 LBS | SYSTOLIC BLOOD PRESSURE: 153 MMHG | TEMPERATURE: 97.9 F | OXYGEN SATURATION: 94 % | HEIGHT: 65 IN | DIASTOLIC BLOOD PRESSURE: 61 MMHG | HEART RATE: 66 BPM

## 2025-07-31 LAB
ANION GAP SERPL CALC-SCNC: 14 MEQ/L (ref 8–16)
BUN SERPL-MCNC: 31 MG/DL (ref 8–23)
CALCIUM SERPL-MCNC: 10.5 MG/DL (ref 8.8–10.2)
CHLORIDE SERPL-SCNC: 101 MEQ/L (ref 98–111)
CO2 SERPL-SCNC: 27 MEQ/L (ref 22–29)
CREAT SERPL-MCNC: 1.3 MG/DL (ref 0.5–0.9)
GFR SERPL CREATININE-BSD FRML MDRD: 44 ML/MIN/1.73M2
GLUCOSE BLD STRIP.AUTO-MCNC: 119 MG/DL (ref 70–108)
GLUCOSE BLD STRIP.AUTO-MCNC: 145 MG/DL (ref 70–108)
GLUCOSE SERPL-MCNC: 108 MG/DL (ref 74–109)
MAGNESIUM SERPL-MCNC: 1.9 MG/DL (ref 1.6–2.6)
POTASSIUM SERPL-SCNC: 3.4 MEQ/L (ref 3.5–5.2)
SODIUM SERPL-SCNC: 142 MEQ/L (ref 135–145)

## 2025-07-31 PROCEDURE — 2500000003 HC RX 250 WO HCPCS: Performed by: NURSE PRACTITIONER

## 2025-07-31 PROCEDURE — 97530 THERAPEUTIC ACTIVITIES: CPT

## 2025-07-31 PROCEDURE — 83735 ASSAY OF MAGNESIUM: CPT

## 2025-07-31 PROCEDURE — 6360000002 HC RX W HCPCS: Performed by: INTERNAL MEDICINE

## 2025-07-31 PROCEDURE — 97110 THERAPEUTIC EXERCISES: CPT

## 2025-07-31 PROCEDURE — 6370000000 HC RX 637 (ALT 250 FOR IP): Performed by: INTERNAL MEDICINE

## 2025-07-31 PROCEDURE — 6370000000 HC RX 637 (ALT 250 FOR IP): Performed by: NURSE PRACTITIONER

## 2025-07-31 PROCEDURE — 36415 COLL VENOUS BLD VENIPUNCTURE: CPT

## 2025-07-31 PROCEDURE — 2700000000 HC OXYGEN THERAPY PER DAY

## 2025-07-31 PROCEDURE — 82948 REAGENT STRIP/BLOOD GLUCOSE: CPT

## 2025-07-31 PROCEDURE — 80048 BASIC METABOLIC PNL TOTAL CA: CPT

## 2025-07-31 PROCEDURE — 99232 SBSQ HOSP IP/OBS MODERATE 35: CPT | Performed by: INTERNAL MEDICINE

## 2025-07-31 PROCEDURE — 94660 CPAP INITIATION&MGMT: CPT

## 2025-07-31 PROCEDURE — 94761 N-INVAS EAR/PLS OXIMETRY MLT: CPT

## 2025-07-31 PROCEDURE — 94640 AIRWAY INHALATION TREATMENT: CPT

## 2025-07-31 RX ORDER — HYDRALAZINE HYDROCHLORIDE 25 MG/1
25 TABLET, FILM COATED ORAL EVERY 8 HOURS SCHEDULED
Qty: 90 TABLET | Refills: 3 | Status: SHIPPED | OUTPATIENT
Start: 2025-07-31

## 2025-07-31 RX ORDER — POTASSIUM CHLORIDE 1500 MG/1
20 TABLET, EXTENDED RELEASE ORAL 3 TIMES DAILY
Status: DISCONTINUED | OUTPATIENT
Start: 2025-07-31 | End: 2025-07-31 | Stop reason: HOSPADM

## 2025-07-31 RX ADMIN — RIVAROXABAN 2.5 MG: 2.5 TABLET, FILM COATED ORAL at 09:02

## 2025-07-31 RX ADMIN — POTASSIUM CHLORIDE 20 MEQ: 1500 TABLET, EXTENDED RELEASE ORAL at 09:00

## 2025-07-31 RX ADMIN — TACROLIMUS 5 MG: 1 CAPSULE ORAL at 09:00

## 2025-07-31 RX ADMIN — MYCOPHENOLIC ACID 360 MG: 360 TABLET, DELAYED RELEASE ORAL at 12:02

## 2025-07-31 RX ADMIN — SODIUM CHLORIDE, PRESERVATIVE FREE 10 ML: 5 INJECTION INTRAVENOUS at 09:03

## 2025-07-31 RX ADMIN — POLYVINYL ALCOHOL 1 DROP: 1.4 SOLUTION/ DROPS OPHTHALMIC at 09:00

## 2025-07-31 RX ADMIN — Medication 2 PUFF: at 08:18

## 2025-07-31 RX ADMIN — ASPIRIN 81 MG: 81 TABLET, COATED ORAL at 09:00

## 2025-07-31 RX ADMIN — HYDRALAZINE HYDROCHLORIDE 25 MG: 25 TABLET ORAL at 15:10

## 2025-07-31 RX ADMIN — CARVEDILOL 50 MG: 25 TABLET, FILM COATED ORAL at 09:02

## 2025-07-31 RX ADMIN — POTASSIUM CHLORIDE 20 MEQ: 1500 TABLET, EXTENDED RELEASE ORAL at 15:11

## 2025-07-31 RX ADMIN — HYDRALAZINE HYDROCHLORIDE 25 MG: 25 TABLET ORAL at 05:23

## 2025-07-31 RX ADMIN — ATORVASTATIN CALCIUM 40 MG: 40 TABLET, FILM COATED ORAL at 09:00

## 2025-07-31 RX ADMIN — Medication 400 MG: at 08:59

## 2025-07-31 RX ADMIN — BUMETANIDE 1 MG: 1 TABLET ORAL at 09:00

## 2025-07-31 RX ADMIN — TIOTROPIUM BROMIDE INHALATION SPRAY 5 MCG: 3.12 SPRAY, METERED RESPIRATORY (INHALATION) at 08:18

## 2025-07-31 NOTE — PLAN OF CARE
Problem: Chronic Conditions and Co-morbidities  Goal: Patient's chronic conditions and co-morbidity symptoms are monitored and maintained or improved  7/31/2025 0619 by Darrius Dejesus RN  Outcome: Progressing     Problem: Discharge Planning  Goal: Discharge to home or other facility with appropriate resources  7/31/2025 0619 by Darrius Dejesus RN  Outcome: Progressing     Problem: Safety - Adult  Goal: Free from fall injury  7/31/2025 0619 by Darrius Dejesus RN  Outcome: Progressing     Problem: ABCDS Injury Assessment  Goal: Absence of physical injury  7/31/2025 0619 by Darrius Dejesus RN  Outcome: Progressing     Problem: Skin/Tissue Integrity  Goal: Skin integrity remains intact  Description: 1.  Monitor for areas of redness and/or skin breakdown  2.  Assess vascular access sites hourly  3.  Every 4-6 hours minimum:  Change oxygen saturation probe site  4.  Every 4-6 hours:  If on nasal continuous positive airway pressure, respiratory therapy assess nares and determine need for appliance change or resting period  7/31/2025 0619 by Darrius Dejesus RN  Outcome: Progressing     Problem: Respiratory - Adult  Goal: Achieves optimal ventilation and oxygenation  7/31/2025 0619 by Darrius Dejesus RN  Outcome: Progressing     Problem: Cardiovascular - Adult  Goal: Maintains optimal cardiac output and hemodynamic stability  7/31/2025 0619 by Darrius Dejesus RN  Outcome: Progressing     Problem: Cardiovascular - Adult  Goal: Absence of cardiac dysrhythmias or at baseline  7/31/2025 0619 by Darrius Dejesus RN  Outcome: Progressing     Problem: Skin/Tissue Integrity - Adult  Goal: Skin integrity remains intact  Description: 1.  Monitor for areas of redness and/or skin breakdown  2.  Assess vascular access sites hourly  3.  Every 4-6 hours minimum:  Change oxygen saturation probe site  4.  Every 4-6 hours:  If on nasal continuous positive airway pressure, respiratory therapy assess nares and determine need

## 2025-07-31 NOTE — PROGRESS NOTES
Hospitalist Progress Note  Internal Medicine Resident      Patient: Avani Thomas 69 y.o. female      Unit/Bed: 4K-22/022-A    Admit Date: 7/26/2025      ASSESSMENT AND PLAN  Active Problems  Acute hypoxic respiratory failure   patient arrived to the ED 7/26 due to 3 days of worsening shortness of breath, dizziness, ankle swelling.  She was hypertensive and satting at 77% on room air.  Patient was put on 6 L NC to BiPAP.  ABG showed 7 point 4/38/218/26.  High suspicion for AHRF due to heart failure.  Continue to wean patient off oxygen, she is currently on 2L NC saturating at 98%  SPO2 goal of over 90%  PT/OT is following patient  7/30 patient plans home O2 eval and will be set up with home oxygen  Acute on chronic heart failure with preserved ejection fraction  Patient presented to the ED with bilateral lower extremity edema with chest x-ray showing small bilateral pleural effusions.  Cardiac markers: Troponin 20 due to demand ischemia and BNP of 2875 which is greater than her last known baseline per chart.  EKG was normal sinus rhythm.  Echo on 7/28 showed an EF of 55 to 60% with a normal LV size left atrium mildly dilated and and a 3.4 cm.  Trivial pericardial effusion present.   Cardiology consulted, recommendation appreciated  Patient sent to cardiac stress test today 7/29; negative for myocardial ischemia and infarct with EKG at normal sinus rhythm, low risk for cardiac events  For pulmonary edema continue Bumex 2 mg twice daily and one dose of metolazone per nephrology  Home O2 eval pending  7/30 cardiology recommends continuing aspirin, statin, beta-blocker, Xarelto, hydralazine and weaning off clonidine; stress test results are negative. Follow-up with Dr. Vaca in 2 to 4 weeks    Hypertensive urgency  Patient presented to ED with blood pressure 184/64 which increased to 221/60.  She normally takes Procardia XL 90 mg, carvedilol 25 mg, clonidine 0.2 mg at home.  Due to elevated blood pressure patient 
    Hospitalist Progress Note  Internal Medicine Resident      Patient: Avani Thomas 69 y.o. female      Unit/Bed: K-22/022-A    Admit Date: 7/26/2025      ASSESSMENT AND PLAN  Active Problems  Acute hypoxic respiratory failure secondary to acute heart failure exacerbation  patient arrived to the ED 7/26 due to 3 days of worsening shortness of breath, dizziness, ankle swelling.  She was hypertensive and satting at 77% on room air.  Patient was put on 6 L NC to BiPAP.  ABG showed 7 point 4/38/218/26.   Continue to wean patient off oxygen, she is currently on 2L NC saturating at 98%  SPO2 goal of over 90%  PT/OT is following patient  Acute on chronic heart failure exacerbation  Patient presented to the ED with bilateral lower extremity edema with chest x-ray showing small bilateral pleural effusions.  Cardiac markers: Troponin 20 and BNP of 2875 which is greater than her last known baseline per chart.  EKG was normal sinus rhythm.  Echo on 7/28 showed an EF of 55 to 60% with a normal LV size left atrium mildly dilated and and a 3.4 cm.  Trivial pericardial effusion present.   Cardiology consulted, recommendation appreciated  Patient sent to cardiac stress test today 7/29; negative for myocardial ischemia and infarct with EKG at normal sinus rhythm, low risk for cardiac events  For pulmonary edema continue Bumex 2 mg twice daily and one dose of metolazone per nephrology  Home O2 eval pending  Hypertensive urgency  Patient presented to ED with blood pressure 184/64 which increased to 221/60.  She normally takes Procardia XL 90 mg, carvedilol 25 mg, clonidine 0.2 mg at home.  Due to elevated blood pressure patient started on nitro drip, which was discontinued 7/28.  Procardia and carvedilol continued and patient, clonidine reduced to 0.1 mg daily  7/29: Hydralazine 10 mg added for hypertension  Chronic bronchitis  Patient producing yellow sputum, azithromycin 500 added until 7/30  Patient afebrile, no 
    Hospitalist Progress Note  Internal Medicine Resident      Patient: Avani Thomas 69 y.o. female      Unit/Bed: K-22/022-A    Admit Date: 7/26/2025      ASSESSMENT AND PLAN  Active Problems  Acute hypoxic respiratory failure secondary to acute heart failure exacerbation: Room air at baseline.  Presented with worsening shortness of breath for the past 3 days.  CXR 7/26 notable for small bilateral effusion with atelectasis/infiltrates.  Patient was placed on BiPAP, status post ABG notable for 7.44/38/218/26.    Patient was weaned down to 2 L at 96%, SpO2 goal>90%.  Actively weaning off as tolerated.  Continue with PT/OT.  Acute on chronic heart failure exacerbation: Presented with dyspnea and hypoxia, bilateral lower extremity swelling.  Chest x-ray notable for small pleural effusion bilaterally.  Trop 28<27<34, Nt-pro BNP 3157.0. Echo 7/26/2025 notable for EF 55 to 60%.  Normal LV motion.  Right ventricular size normal with normal systolic function.  Ascending aorta 3.4 cm.  Trivial pericardial effusion present.  Devious echo 3/2/2021, EF 55 to 60%.  Cardiology following, recommendation appreciated.  Scheduled for stress test on 7/29/2025, n.p.o. after midnight.   Continue with Bumex 2 mg/twice daily.   Hypertensive urgency: At home on Procardia XL 90 mg/daily, carvedilol 25 mg/twice daily, clonidine 0.2 mg/daily.  Patient was started on nitro gtt. for elevated blood pressure, discontinued on 7/28/2025.  Continue with Procardia 90 mg/daily, carvedilol 25 mg/twice daily.   Decrease clonidine from 0.2 to 0.1 mg/daily.  Continue with Bumex 2 mg/twice daily.  Chronic bronchitis: Secondary to bilateral pulmonary congestion patient also complained of yellow productive cough.  No leukocytosis, afebrile. Continue with azithromycin 500 mg / 3 days. (7/28-7/30).   CKD stage III: s/p allograft nephropathy.  Baseline creatinine 1.2-1.4.  Nephrology following, recommendation appreciated.  Continue monitoring creatinine 
  Physician Progress Note      PATIENT:               MARANDA BEAL  CSN #:                  [id number]  :                       1955  ADMIT DATE:       2025 5:40 PM  DISCH DATE:  RESPONDING  PROVIDER #:        Chencho Patterson MD          QUERY TEXT:    Elevated cardiac troponin (cTc) levels are documented in the medical record in   progress note from . Please clarify the cause:    The clinical indicators include:  --Progress note from  by Jessica Faith reflects \"Mildly elevated   troponins, likely demand related\" and \"Acute hypoxic resp failure\" and \"Acute   on chronic diastolic CHF\"  --Labs on arrival showed troponin trend 34, 27, 28  --Stress test result shows \"Negative for myocardial ischemia and infarction\"  --MAR shows IV Bumex BID, IV Nitroglycerin drip  Options provided:  -- Elevated cardiac troponin levels only without corresponding diagnosis  -- Myocardial injury, non-ischemic (non-traumatic) related to other cause,   Please specify cause.  -- Type 2 myocardial infarction related to acute hypoxic respiratory failure  -- Other - I will add my own diagnosis  -- Disagree - Not applicable / Not valid  -- Disagree - Clinically unable to determine / Unknown  -- Refer to Clinical Documentation Reviewer    PROVIDER RESPONSE TEXT:    Demand ischemia.    Query created by: Taylor Alejandra on 2025 4:07 PM      Electronically signed by:  Chencho Patterson MD 2025 11:13 AM          
 [facility]  INPATIENT PHYSICAL THERAPY  DAILY NOTE  STRZ ICU STEPDOWN TELEMETRY 4K - 4K-22/022-A      Discharge Recommendations: 24 hour assistance or supervision and Inpatient Therapy Stay  Equipment Recommendations: No               Time In: 1201  Time Out: 1224  Timed Code Treatment Minutes: 23 Minutes  Minutes: 23          Date: 2025  Patient Name: Avani Thomas,  Gender:  female        MRN: [id number]  : 1955  (69 y.o.)     Referring Practitioner: Chucho Sanders DO  Diagnosis: Acute respiratory failure with hypoxia (HCC)  Additional Pertinent Hx: Per EMR \"Andres Thomas is a 69 year old female presented to [facility] 2025 with chief complaint of shortness of breath.      Recent [facility] ER evaluation 2025 complaint of dizziness and dyspnea.  Anemia was noted with recommendations to follow-up with Dr. Keller as an outpatient.  Hemoglobin 8.1 hematocrit 27.3.     Patient presents to the ED with complaints of shortness of breath that has been worsening over the last 3 days associated with increased ankle swelling.  Denies any chest pain.  Blood pressure is high, patient identifies compliance with taking prescribed medications.  SaO2 was 77% on room air O2 was escalated to 6 L per nasal cannula to BiPAP.  With noted improvement in work of breathing.  While in the emergency room patient did receive Bumex, Apresoline, DuoNeb aerosol, Solu-Medrol \" stress completed .     Prior Level of Function:  Lives With: Alone  Type of Home: Condo  Home Layout: One level  Home Access: Stairs to enter without rails  Entrance Stairs - Number of Steps: 1 SHILOH  Home Equipment: Cane, Rollator   Bathroom Shower/Tub: Tub/Shower unit  Bathroom Toilet: Standard  Bathroom Equipment: Grab bars in shower, Shower chair  Bathroom Accessibility: Accessible    Receives Help From: Family  Prior Level of Assist for ADLs: Independent  Prior Level of Assist for Homemaking: Needs assistance  Prior Level of Assist 
A home oxygen evaluation has been completed.     [x]Patient is an inpatient. It is expected that the patient will be discharged within the next 48 hours. Qualified provider to write order for home prescription if patient qualifies. Social service/care managers will arrange for home oxygen.  If patient is active, arrange for Home Medical supplier to assess for Oxygen Conserving Device per pulse oximetry.  []Patient is an outpatient. Results will be faxed to the ordering provider. Qualified provider to write order for home prescription if patient qualifies and arranges for home oxygen.    Patient was placed on room air for 30 minutes. SpO2 was 90 % on room air at rest. Patients SpO2 was 89% or above and did not qualify for home oxygen. Patient was walked for 10 minutes. SpO2 was 85 % during walking. Patients SpO2 was below 89% and qualified for home oxygen. Oxygen was applied at 3 lpm via nasal cannula to maintain a SpO2 between 90-92% while walking.     
All discharge instructions given to patient and family with no further questions at this time. Patient discharged off unit via wheelchair. Chart contents placed in yellow bin.    
Cardiology Progress Note      Patient:  Avani Thomas  YOB: 1955  MRN: [id number]   Acct: [id number]  Admit Date:  7/26/2025  Primary Cardiologist: Abdirizak Vaca MD    Note per dr rivas \"REASON FOR CONSULTATION:  CHF exacerbation.     PRIMARY CARDIOLOGIST:  Dr. Vaca.     HISTORY OF PRESENT ILLNESS:  This is a 69-year-old  female patient who presented to the emergency room on 07/26/2025 because of progressive worsening of shortness of breath and leg edema and weight gain. The patient stated that this shortness of breath has been going on for 2-3 weeks and has been progressively getting worse, and she has been having orthopnea of 2-3 pillows; leg edema got worse, short of breath became worse and at home she has oxygen saturation measuring device and with that saturation was 78% to 77% and so finally, the patient decided to come to the emergency room and was found to be in acute CHF exacerbation and with hypoxic respiratory failure and she was saturating 77% on room air and then, the patient was put on 6 L and BiPAP and overnight, with diuresis has improved now.  She is now on 4 L of nasal cannula oxygen.  Short of breath overnight got much better and still has leg edema of +1 and so basically, the patient has been in seen in the emergency room on 07/16/2025 because of shortness of breath and she was found to be also anemic and so she was advised to follow up with her hematologist and family doctor for further management of this severe anemia, associated edema and the patient has been seen also on 06/28/2025, because of dizziness and syncope at [facility] and at that time, she was found to have apparently urinary tract infection.  At that time also, she had severe urinary tract infection with white cell count of more than 200.  At that time, she was discharged from the emergency room with antibiotics and followup with her cardiologist as outpatient.     The patient continued to have 
Cardiology Progress Note      Patient:  Avani Thomas  YOB: 1955  MRN: [id number]   Acct: [id number]  Admit Date:  7/26/2025  Primary Cardiologist: Abdirizak Vaca MD    Note per dr rivas \"REASON FOR CONSULTATION:  CHF exacerbation.     PRIMARY CARDIOLOGIST:  Dr. Vaca.     HISTORY OF PRESENT ILLNESS:  This is a 69-year-old  female patient who presented to the emergency room on 07/26/2025 because of progressive worsening of shortness of breath and leg edema and weight gain. The patient stated that this shortness of breath has been going on for 2-3 weeks and has been progressively getting worse, and she has been having orthopnea of 2-3 pillows; leg edema got worse, short of breath became worse and at home she has oxygen saturation measuring device and with that saturation was 78% to 77% and so finally, the patient decided to come to the emergency room and was found to be in acute CHF exacerbation and with hypoxic respiratory failure and she was saturating 77% on room air and then, the patient was put on 6 L and BiPAP and overnight, with diuresis has improved now.  She is now on 4 L of nasal cannula oxygen.  Short of breath overnight got much better and still has leg edema of +1 and so basically, the patient has been in seen in the emergency room on 07/16/2025 because of shortness of breath and she was found to be also anemic and so she was advised to follow up with her hematologist and family doctor for further management of this severe anemia, associated edema and the patient has been seen also on 06/28/2025, because of dizziness and syncope at [facility] and at that time, she was found to have apparently urinary tract infection.  At that time also, she had severe urinary tract infection with white cell count of more than 200.  At that time, she was discharged from the emergency room with antibiotics and followup with her cardiologist as outpatient.     The patient continued to have 
Cardiology Progress Note      Patient:  Avani Thomas  YOB: 1955  MRN: [id number]   Acct: [id number]  Admit Date:  7/26/2025  Primary Cardiologist: Abdirizak Vaca MD    Note per dr rivas \"REASON FOR CONSULTATION:  CHF exacerbation.     PRIMARY CARDIOLOGIST:  Dr. Vaca.     HISTORY OF PRESENT ILLNESS:  This is a 69-year-old  female patient who presented to the emergency room on 07/26/2025 because of progressive worsening of shortness of breath and leg edema and weight gain. The patient stated that this shortness of breath has been going on for 2-3 weeks and has been progressively getting worse, and she has been having orthopnea of 2-3 pillows; leg edema got worse, short of breath became worse and at home she has oxygen saturation measuring device and with that saturation was 78% to 77% and so finally, the patient decided to come to the emergency room and was found to be in acute CHF exacerbation and with hypoxic respiratory failure and she was saturating 77% on room air and then, the patient was put on 6 L and BiPAP and overnight, with diuresis has improved now.  She is now on 4 L of nasal cannula oxygen.  Short of breath overnight got much better and still has leg edema of +1 and so basically, the patient has been in seen in the emergency room on 07/16/2025 because of shortness of breath and she was found to be also anemic and so she was advised to follow up with her hematologist and family doctor for further management of this severe anemia, associated edema and the patient has been seen also on 06/28/2025, because of dizziness and syncope at [facility] and at that time, she was found to have apparently urinary tract infection.  At that time also, she had severe urinary tract infection with white cell count of more than 200.  At that time, she was discharged from the emergency room with antibiotics and followup with her cardiologist as outpatient.     The patient continued to have 
[facility]  STRZ ICU STEPDOWN TELEMETRY 4K  Occupational Therapy  Daily Note    Discharge Recommendations: Home with assist as needed and Home with Home Health OT  Equipment Recommendations: No        Time In: 1419  Time Out: 1434  Timed Code Treatment Minutes: 15 Minutes  Minutes: 15          Date: 2025  Patient Name: Avani Thomas,   Gender: female      Room: [address] [place] [address]  MRN: [id number]  : 1955  (69 y.o.)  Referring Practitioner: Chucho Sanders DO  Diagnosis: Acute respiratory failure with hypoxia (HCC)  Additional Pertinent Hx: per chart review; \"Avani Thomas is a 69 y.o. female who presents to the emergency department from home, as a walk in to the ED lobby for evaluation of shortness of breath     Patient presents to the ED with complaints of shortness of breath that has been worsening over the last 3 days associated with increased ankle swelling.  She is a transplant patient and has a history of cardiac disease as well.  Denies any chest pain.  Blood pressure is high but stated that she has been taking her blood pressure medications.  She is not on diuretics at home.  The patient has no other acute complaints at this time.  \"    Restrictions/Precautions:  Restrictions/Precautions: Fall Risk, General Precautions  Position Activity Restriction  Other Position/Activity Restrictions: monitor BP and O2, monitor Hgb     Social/Functional History:  Lives With: Alone  Type of Home: Condo  Home Layout: One level  Home Access: Stairs to enter without rails  Entrance Stairs - Number of Steps: 1 SHILOH  Home Equipment: Cane, Rollator   Bathroom Shower/Tub: Tub/Shower unit  Bathroom Toilet: Standard  Bathroom Equipment: Grab bars in shower, Shower chair  Bathroom Accessibility: Accessible  IADL Comments: good family support, checks in daily. Assists with household tasks    Receives Help From: Family  Prior Level of Assist for ADLs: Independent  Prior Level of Assist for Homemaking: 
Echocardiogram complete at bedside.  
[facility]  INPATIENT PHYSICAL THERAPY  EVALUATION  [facility] ICU STEPDOWN TELEMETRY 4K - 4K-22/022-A    Discharge Recommendations: Continue to assess pending progress, 24 hour supervision or assist, Patient would benefit from continued therapy after discharge, inpatient therapy stay would be beneficial  Equipment Recommendations: No             Time In: 1312  Time Out: 1345  Timed Code Treatment Minutes: 24 Minutes  Minutes: 33          Date: 2025  Patient Name: Avani Thomas,  Gender:  female        MRN: [id number]  : 1955  (69 y.o.)      Referring Practitioner: Chucho Sanders DO  Diagnosis: Acute respiratory failure with hypoxia (HCC)  Additional Pertinent Hx: Per EMR \"Andres Thomas is a 69 year old female presented to [facility] 2025 with chief complaint of shortness of breath.      Recent [facility] ER evaluation 2025 complaint of dizziness and dyspnea.  Anemia was noted with recommendations to follow-up with Dr. Keller as an outpatient.  Hemoglobin 8.1 hematocrit 27.3.     Patient presents to the ED with complaints of shortness of breath that has been worsening over the last 3 days associated with increased ankle swelling.  Denies any chest pain.  Blood pressure is high, patient identifies compliance with taking prescribed medications.  SaO2 was 77% on room air O2 was escalated to 6 L per nasal cannula to BiPAP.  With noted improvement in work of breathing.  While in the emergency room patient did receive Bumex, Apresoline, DuoNeb aerosol, Solu-Medrol \" stress completed .     Restrictions/Precautions:  Restrictions/Precautions: Fall Risk, General Precautions       Other Position/Activity Restrictions: monitor BP and O2, monitor Hgb    Required Braces or Orthoses?: No      Subjective:  Chart Reviewed: Yes  Patient assessed for rehabilitation services?: Yes  Family/Caregiver Present: No  Subjective: OK to see pt per nursing. Pt in bed when PT arrived, pleasant and agreeable to PT 
[facility]  PHYSICAL THERAPY MISSED TREATMENT NOTE  STRZ ICU STEPDOWN TELEMETRY 4K    Date: 2025  Patient Name: Avani Thomas        MRN: [id number]   : 1955  (69 y.o.)  Gender: female                REASON FOR MISSED TREATMENT:  Patient at testing and/or off unit. Will check back as able.       
Hypertensive outside of parameters, despite nitro gtt up-titration. Provider notified.   
[facility]   Nephrology progress note  7/28/2025, 12:48 PM      Pt Name:    Avani Thomas  MRN:     [id number]     YOB: 1955  Admit Date:    7/26/2025  5:40 PM    Chief Complaint: Nephrology following for CKD, renal transplant, volume overload.    Subjective:  Patient was seen and examined this morning.   Her breathing has improved. On nasal canula.  CXR shows small pleural effusions.    Objective:  24HR INTAKE/OUTPUT:    Intake/Output Summary (Last 24 hours) at 7/28/2025 1248  Last data filed at 7/28/2025 1020  Gross per 24 hour   Intake 2638.24 ml   Output 1175 ml   Net 1463.24 ml         I/O last 3 completed shifts:  In: 2331.7 [P.O.:1350; I.V.:981.7]  Out: 1525 [Urine:1525]  I/O this shift:  In: 488.5 [P.O.:240; I.V.:248.5]  Out: 200 [Urine:200]   Admission weight: 86.2 kg (190 lb)  Wt Readings from Last 3 Encounters:   07/28/25 86.5 kg (190 lb 11.2 oz)   07/16/25 83 kg (183 lb)   06/30/25 83 kg (183 lb)        Vitals :   Vitals:    07/28/25 0509 07/28/25 0815 07/28/25 0900 07/28/25 1140   BP: (!) 139/57  (!) 132/52 (!) 111/54   Pulse:   63 61   Resp:   16 14   Temp:   97.6 °F (36.4 °C) 97.5 °F (36.4 °C)   TempSrc:   Oral Oral   SpO2:  97% 97% 96%   Weight:       Height:           Physical examination  General Appearance: alert and cooperative with exam, appears comfortable, no distress  Mouth/Throat: Oral mucosa moist  Neck: No JVD  Lungs: diminished with faint rales at bases  Heart:  S1, S2 heard  GI: soft, non-tender, no guarding  Extremities: 1+ leg edema    Medications:  Infusion:    sodium chloride      dextrose       Meds:    insulin lispro  0-16 Units SubCUTAneous 4x Daily AC & HS    [Held by provider] sulfamethoxazole-trimethoprim  1 tablet Oral Once per day on Monday Wednesday Friday    azithromycin  500 mg IntraVENous Q24H    bumetanide  2 mg IntraVENous 2 times per day    potassium chloride  20 mEq Oral BID    tacrolimus  5 mg Oral Q12H    cloNIDine  0.2 mg Oral 
[facility]   Nephrology progress note  7/29/2025, 12:59 PM      Pt Name:    Avani Thomas  MRN:     [id number]     YOB: 1955  Admit Date:    7/26/2025  5:40 PM    Chief Complaint: Nephrology following for CKD, renal transplant, volume overload.    Subjective:  Patient was seen and examined this morning.   States her breathing is better but says her legs still feel heavy.  Recorded urine output 1.3 liters/24 hours.  Weight is down.    Objective:  24HR INTAKE/OUTPUT:    Intake/Output Summary (Last 24 hours) at 7/29/2025 1259  Last data filed at 7/29/2025 0736  Gross per 24 hour   Intake 956 ml   Output 1250 ml   Net -294 ml         I/O last 3 completed shifts:  In: 2299.4 [P.O.:1546; I.V.:521.4; IV Piggyback:231.9]  Out: 1825 [Urine:1825]  I/O this shift:  In: -   Out: 150 [Urine:150]   Admission weight: 86.2 kg (190 lb)  Wt Readings from Last 3 Encounters:   07/29/25 81.1 kg (178 lb 12.7 oz)   07/16/25 83 kg (183 lb)   06/30/25 83 kg (183 lb)        Vitals :   Vitals:    07/29/25 0311 07/29/25 0502 07/29/25 1054 07/29/25 1100   BP: (!) 149/58 (!) 154/68 (!) 182/73 (!) 177/74   Pulse: 63  71    Resp: 14  18    Temp: 97.4 °F (36.3 °C)  98 °F (36.7 °C)    TempSrc: Oral  Oral    SpO2: 96%  97%    Weight: 81.1 kg (178 lb 12.7 oz)      Height:           Physical examination  General Appearance: alert and cooperative with exam, appears comfortable, no distress  Mouth/Throat: Oral mucosa moist  Neck: No JVD  Lungs: diminished   Heart:  S1, S2 heard  GI: soft, non-tender, no guarding  Extremities: improving leg edema    Medications:  Infusion:    sodium chloride      dextrose       Meds:    insulin lispro  0-16 Units SubCUTAneous 4x Daily AC & HS    [Held by provider] sulfamethoxazole-trimethoprim  1 tablet Oral Once per day on Monday Wednesday Friday    azithromycin  500 mg IntraVENous Q24H    cloNIDine  0.1 mg Oral q8h    bumetanide  2 mg IntraVENous 2 times per day    potassium 
[facility]   Nephrology progress note  7/30/2025, 3:13 PM      Pt Name:    Avani Thomas  MRN:     [id number]     YOB: 1955  Admit Date:    7/26/2025  5:40 PM    Chief Complaint: Nephrology following for CKD, renal transplant, volume overload.    Subjective:  Patient was seen and examined this morning.   Doing better.   Weight down 10 pounds.    Objective:  24HR INTAKE/OUTPUT:    Intake/Output Summary (Last 24 hours) at 7/30/2025 1513  Last data filed at 7/30/2025 1423  Gross per 24 hour   Intake 611.16 ml   Output 1450 ml   Net -838.84 ml         I/O last 3 completed shifts:  In: 1050.8 [P.O.:800; IV Piggyback:250.8]  Out: 3150 [Urine:3150]  I/O this shift:  In: 261.2 [I.V.:10; IV Piggyback:251.2]  Out: -    Admission weight: 86.2 kg (190 lb)  Wt Readings from Last 3 Encounters:   07/30/25 81.8 kg (180 lb 6.4 oz)   07/16/25 83 kg (183 lb)   06/30/25 83 kg (183 lb)        Vitals :   Vitals:    07/30/25 0528 07/30/25 0857 07/30/25 0930 07/30/25 1105   BP: (!) 151/69  (!) 175/61    Pulse: 61  66    Resp:   18 18   Temp:   98 °F (36.7 °C) 97.9 °F (36.6 °C)   TempSrc:   Oral Oral   SpO2: 96% 98% 100% 99%   Weight:       Height:           Physical examination  General Appearance: alert and cooperative with exam, appears comfortable, no distress  Mouth/Throat: Oral mucosa moist  Neck: No JVD  Lungs: diminished   Heart:  S1, S2 heard  GI: soft, non-tender, no guarding  Extremities: improving leg edema    Medications:  Infusion:    sodium chloride      dextrose       Meds:    hydrALAZINE  25 mg Oral 3 times per day    bumetanide  1 mg Oral BID    insulin lispro  0-16 Units SubCUTAneous 4x Daily AC & HS    [Held by provider] sulfamethoxazole-trimethoprim  1 tablet Oral Once per day on Monday Wednesday Friday    cloNIDine  0.1 mg Oral q8h    potassium chloride  20 mEq Oral BID    tacrolimus  5 mg Oral Q12H    polyvinyl alcohol  1 drop Both Eyes BID    sodium chloride flush  5-40 mL 
[facility]   Nephrology progress note  7/31/2025, 1:08 PM      Pt Name:    Avani Thomas  MRN:     [id number]     YOB: 1955  Admit Date:    7/26/2025  5:40 PM    Chief Complaint: Nephrology following for CKD, renal transplant, volume overload.    Subjective:  Patient was seen and examined this morning.   Feels better. Still on O2.    Objective:  24HR INTAKE/OUTPUT:    Intake/Output Summary (Last 24 hours) at 7/31/2025 1308  Last data filed at 7/31/2025 0919  Gross per 24 hour   Intake 731.16 ml   Output 2350 ml   Net -1618.84 ml         I/O last 3 completed shifts:  In: 841.2 [P.O.:580; I.V.:10; IV Piggyback:251.2]  Out: 2700 [Urine:2700]  I/O this shift:  In: 240 [P.O.:240]  Out: 400 [Urine:400]   Admission weight: 86.2 kg (190 lb)  Wt Readings from Last 3 Encounters:   07/31/25 80.7 kg (178 lb)   07/16/25 83 kg (183 lb)   06/30/25 83 kg (183 lb)        Vitals :   Vitals:    07/31/25 0818 07/31/25 0845 07/31/25 0849 07/31/25 1222   BP:  (!) 122/57  (!) 150/60   Pulse:  65  63   Resp:  18  18   Temp:  98.8 °F (37.1 °C)  97.6 °F (36.4 °C)   TempSrc:  Oral  Oral   SpO2: 97% 98% 91% 99%   Weight:       Height:           Physical examination  General Appearance: alert and cooperative with exam, appears comfortable, no distress  Mouth/Throat: Oral mucosa moist  Neck: No JVD  Lungs: diminished   Heart:  S1, S2 heard  GI: soft, non-tender, no guarding  Extremities: improving leg edema    Medications:  Infusion:    sodium chloride      dextrose       Meds:    potassium chloride  20 mEq Oral TID    hydrALAZINE  25 mg Oral 3 times per day    bumetanide  1 mg Oral BID    insulin lispro  0-16 Units SubCUTAneous 4x Daily AC & HS    [Held by provider] sulfamethoxazole-trimethoprim  1 tablet Oral Once per day on Monday Wednesday Friday    cloNIDine  0.1 mg Oral q8h    tacrolimus  5 mg Oral Q12H    polyvinyl alcohol  1 drop Both Eyes BID    sodium chloride flush  5-40 mL IntraVENous 2 times 
Maxed on Nitro gtt and BP still not within parameters. Provider notified.   
Patient not appropriate for Home O2 testing this shift. Still on nitro drip per ANDREW Diaz.   
Patient was evaluated today for the diagnosis of CHF.  I entered a DME order for home oxygen at 3 lpm with activity because the diagnosis and testing require the patient to have supplemental oxygen.  Condition will improve or be benefited by oxygen use.  The patient is  able to perform good mobility in a home setting and therefore does require the use of a portable oxygen system.  The need for this equipment was discussed with the patient and she understands and is in agreement.     
Pt admitted to  4K22 from ED.     Complaints: Shortness of breath.      IV none infusing into the antecubital right, condition patent and no redness at a rate of 0 mls/ hour with about 0 mls in the bag still. IV site free of s/s of infection or infiltration.     Vital signs obtained. Assessment and data collection initiated. Two nurse skin assessment performed by KHANH RN and IMELDA RN.    You have the right to have an advocate or support person of your choice accompany you during your stay, as outlined by the No Patient Left Alone Act. Please specify the individual you wish to designate for this admission Undetermined     Policies and procedures for 4K explained. All questions answered with no further questions at this time. Fall prevention and safety brochure discussed with patient.  Bed alarm on. Call light in reach. Oriented to room.    
Spiritual Health History and Assessment/Progress Note  [facility]    (P) Initial Encounter, Spiritual/Emotional Needs, Advance Care Planning,  ,  ,      Name: Avani Thomas MRN: [id number]    Age: 69 y.o.     Sex: female   Language: English   Quaker: Cheondoism   Acute respiratory failure with hypoxia (HCC)     Date: 7/27/2025            Total Time Calculated: (P) 60 min              [facility]        Referral/Consult From: (P) Nurse   Encounter Overview/Reason: (P) Initial Encounter, Spiritual/Emotional Needs, Advance Care Planning  Service Provided For: (P) Patient    Roselia, Belief, Meaning:   Patient identifies as spiritual and has beliefs or practices that help with coping during difficult times  Family/Friends No family/friends present      Importance and Influence:  Patient has spiritual/personal beliefs that influence decisions regarding their health  Family/Friends No family/friends present    Community:  Patient feels well-supported. Support system includes: Friends and Extended family  Family/Friends No family/friends present    Assessment and Plan of Care:  Patient in bed alone, welcoming of . Patient desired to have information and complete advance directives, POA, declined living will at this time. Patient discussed the toll of her current chronic health condition, including her recent hospital admission, expressed that she is feeling better today, and is hopeful to continue to get better. Patient tearfully expressed her grief with the recent loss of her brother and her concern for her other brother who lost his leg due to diabetes recently. Patient feels well supported by her grandchildren and siblings, as well as her friend.  offered words of encouragement and prayed with the patient at her request.  also completed the advance directives (POA) with the patient.  Made patient aware of  
The patient is not able to perform Home O2 testing at this time.  
[facility]  [facility] ICU STEPDOWN TELEMETRY 4K  Occupational Therapy  Daily Note    Discharge Recommendations: Home with Home Health OT  Equipment Recommendations: No         Time In: 1009  Time Out: 1056  Timed Code Treatment Minutes: 47 Minutes  Minutes: 47          Date: 2025  Patient Name: Avani Thomas,   Gender: female      Room: [facility]/022  MRN: [id number]  : 1955  (69 y.o.)  Referring Practitioner: Chucho Sanders DO  Diagnosis: Acute respiratory failure with hypoxia (HCC)  Additional Pertinent Hx: per chart review; \"Avani Thomas is a 69 y.o. female who presents to the emergency department from home, as a walk in to the ED lobby for evaluation of shortness of breath     Patient presents to the ED with complaints of shortness of breath that has been worsening over the last 3 days associated with increased ankle swelling.  She is a transplant patient and has a history of cardiac disease as well.  Denies any chest pain.  Blood pressure is high but stated that she has been taking her blood pressure medications.  She is not on diuretics at home.  The patient has no other acute complaints at this time.  \"    Restrictions/Precautions:  Restrictions/Precautions: Fall Risk, General Precautions  Position Activity Restriction  Other Position/Activity Restrictions: monitor BP and O2, monitor Hgb      Social/Functional History:  Lives With: Alone  Type of Home: Condo  Home Layout: One level  Home Access: Stairs to enter without rails  Entrance Stairs - Number of Steps: 1 SHILOH  Home Equipment: Cane, Rollator   Bathroom Shower/Tub: Tub/Shower unit  Bathroom Toilet: Standard  Bathroom Equipment: Grab bars in shower, Shower chair  Bathroom Accessibility: Accessible  IADL Comments: good family support, checks in daily. Assists with household tasks    Receives Help From: Family  Prior Level of Assist for ADLs: Independent  Prior Level of Assist for Homemaking: Needs assistance  Prior Level of 
AD    Exercise:  None    Functional Outcome Measures:  [facility] (6 CLICK) BASIC MOBILITY  AM-[facility] Inpatient Mobility Raw Score : 17  AM-[facility] Inpatient T-Scale Score : 42.13        Modified Hessel:  Current Functional Status:  Not Applicable    ASSESSMENT:  Assessment: Patient progressing toward established goals.  Activity Tolerance:  Patient tolerance of  treatment:Good.  Plan: Current Treatment Recommendations: Strengthening, Balance training, Gait training, Functional mobility training, Stair training, Neuromuscular re-education, Transfer training, Endurance training, Equipment evaluation, education, & procurement, Patient/Caregiver education & training, Safety education & training, Therapeutic activities, Home exercise program  General Plan:  (5x GM)    Education:  Learners: Patient  Patient Education: Plan of Care, Bed Mobility, Transfers, Gait    Goals:  Patient Goals : feel better and return home  Short Term Goals  Time Frame for Short Term Goals: by discharge  Short Term Goal 1: Pt will demo supine to/from sit transfers with IND with bed flat to progress with mobility.  Short Term Goal 2: Pt will demo sit to/from stand transfers with LRAD with S to progress with mobility.  Short Term Goal 3: Pt will amb for 75 feet with LRAD with S to progress with mobility.  Short Term Goal 4: Pt will negotiate 1 step with LRAD and S to progress with mobility.  Long Term Goals  Time Frame for Long Term Goals : NA due to short ELOS    Following session, patient left in safe position in chair, with alarm, and call light within reach         
Increasing Activity, Fall Prevention, and Assistive Device Safety    Assessment:  Assessment: patient demo overall de-conditioning and weakness secondary to acute respiratory failure with hypoxia impacting patient's ability to complete ADLs and functional transfers as prior. patient would benefit from continued, skilled OT to progress toward PLOF, decrease caregiver burden and increase occupational performance.  Performance deficits / Impairments: Decreased functional mobility , Decreased endurance, Decreased coordination, Decreased ADL status, Decreased balance, Decreased strength  Prognosis: Good  REQUIRES OT FOLLOW-UP: Yes  Decision Making: Medium Complexity    Treatment Initiated: Treatment and education initiated within context of evaluation.  Evaluation time included review of current medical information, gathering information related to past medical, social and functional history, completion of standardized testing, formal and informal observation of tasks, assessment of data and development of plan of care and goals.  Treatment time included skilled education and facilitation of tasks to increase safety and independence with ADL's for improved functional independence and quality of life.    Plan:  Times Per Week: 5x  Times Per Day: Once a day  Current Treatment Recommendations: Strengthening, Balance training, Functional mobility training, Endurance training, Neuromuscular re-education, Positioning, Patient/Caregiver education & training, Safety education & training, Pain management, Self-Care / ADL, Coordination training.  See long-term goal time frame for expected duration of plan of care.  If no long-term goals established, a short length of stay is anticipated.    Goals:  Patient goals : return home at PLOF  Short Term Goals  Time Frame for Short Term Goals: by discharge  Short Term Goal 1: patient will tolerate 5 min functional standing with (S) with O2 >/= 90% to increase ease with toileting and 
effort, minimal crackles on auscultation at bases B/L.  Diminished.  Cardiovascular: Normal rate, regular rhythm with normal S1/S2 without murmurs.    B/L lower extremity edema +1,   Abdomen: Soft, non-tender, non-distended with normal bowel sounds.  Musculoskeletal: No joint swelling or tenderness. Normal tone. No abnormal movements.   Skin: Warm and dry. No rashes or lesions.  Neurologic:  No focal sensory/motor deficits in the upper or lower extremities. Cranial nerves:  grossly non-focal 2-12.     Psychiatric: Alert and oriented, normal insight and thought content.   Capillary Refill: Brisk,< 3 seconds.  Peripheral Pulses: +2 palpable, equal bilaterally.       Labs/Radiology: See chart or assessment above.     Electronically signed by Carmen Waggoner DO on 7/27/2025 at 1:26 PM  Case was discussed with Attending, ROSEMARY Suarez    
calorie 1586-1718kcal (MSJ x 1.2-1.3 AF)  protein 71-78g (1-1.1g/kg CBW) for overweight/border line obesity   fluid 1ml/kcal or per LIP

## 2025-07-31 NOTE — DISCHARGE SUMMARY
&gt;400=18U. MAX 68U PER DAY     rivaroxaban 2.5 MG Tabs tablet  Commonly known as: XARELTO  TAKE 1 TABLET BY MOUTH TWICE  DAILY     tacrolimus 1 MG capsule  Commonly known as: PROGRAF     tiotropium 2.5 MCG/ACT Aers inhaler  Commonly known as: Spiriva Respimat  Inhale 2 puffs into the lungs daily            STOP taking these medications      cloNIDine 0.2 MG tablet  Commonly known as: CATAPRES     clopidogrel 75 MG tablet  Commonly known as: PLAVIX     D-Mannose 500 MG Caps     lactulose 10 GM/15ML solution  Commonly known as: CHRONULAC     Nufola 3.75-1- MG Caps            ASK your doctor about these medications      atorvastatin 40 MG tablet  Commonly known as: LIPITOR  TAKE 1 TABLET BY MOUTH DAILY     sulfamethoxazole-trimethoprim 800-160 MG per tablet  Commonly known as: BACTRIM DS;SEPTRA DS               Where to Get Your Medications        These medications were sent to [id number] - [place], [place] - [address] - P [phone] - F [phone]  [address] [facility] [address]      Hours: 24-hours Phone: [phone]   hydrALAZINE 25 MG tablet                Time Spent on discharge is 45 minutes in the examination, evaluation, counseling and review of medications and discharge plan.    Thank you Valencia Waldrop MD for the opportunity to be involved in this patient's care.      Signed:    Electronically signed by Kasey Bryant DO on 7/31/25 at 2:27 PM EDT     Case was discussed with Attending, Dr. Patterson.

## 2025-07-31 NOTE — PLAN OF CARE
Problem: Chronic Conditions and Co-morbidities  Goal: Patient's chronic conditions and co-morbidity symptoms are monitored and maintained or improved  7/31/2025 1713 by Isabella Strickland RN  Outcome: Completed  Flowsheets (Taken 7/31/2025 0849)  Care Plan - Patient's Chronic Conditions and Co-Morbidity Symptoms are Monitored and Maintained or Improved:   Monitor and assess patient's chronic conditions and comorbid symptoms for stability, deterioration, or improvement   Collaborate with multidisciplinary team to address chronic and comorbid conditions and prevent exacerbation or deterioration  7/31/2025 0619 by Darrius Dejesus RN  Outcome: Progressing  7/31/2025 0619 by Darrius Dejesus RN  Outcome: Progressing     Problem: Discharge Planning  Goal: Discharge to home or other facility with appropriate resources  7/31/2025 1713 by Isabella Strickland RN  Outcome: Completed  Flowsheets (Taken 7/31/2025 0849)  Discharge to home or other facility with appropriate resources:   Identify barriers to discharge with patient and caregiver   Arrange for needed discharge resources and transportation as appropriate   Identify discharge learning needs (meds, wound care, etc)   Refer to discharge planning if patient needs post-hospital services based on physician order or complex needs related to functional status, cognitive ability or social support system  7/31/2025 0619 by Darrius Dejesus RN  Outcome: Progressing  7/31/2025 0619 by Darrius Dejesus RN  Outcome: Progressing     Problem: Safety - Adult  Goal: Free from fall injury  7/31/2025 1713 by Isabella Strickland, RN  Outcome: Completed  7/31/2025 0619 by Darrius Dejesus RN  Outcome: Progressing  7/31/2025 0619 by Darrius Dejesus RN  Outcome: Progressing     Problem: ABCDS Injury Assessment  Goal: Absence of physical injury  7/31/2025 1713 by Isabella Strickland, RN  Outcome: Completed  7/31/2025 0619 by Darrius Dejesus RN  Outcome: Progressing  7/31/2025 0619 by Darrius Dejesus,

## 2025-07-31 NOTE — RT PROTOCOL NOTE
RT Inhaler-Nebulizer Bronchodilator Protocol Note    There is a bronchodilator order in the chart from a provider indicating to follow the RT Bronchodilator Protocol and there is an “Initiate RT Inhaler-Nebulizer Bronchodilator Protocol” order as well (see protocol at bottom of note).    CXR Findings:  XR CHEST PORTABLE  Result Date: 7/26/2025  1. Small bilateral pleural effusions with adjacent atelectasis/infiltrate. 2. Mild stable cardiomegaly. **This report has been created using voice recognition software. It may contain minor errors which are inherent in voice recognition technology.** Electronically signed by Dr. Justo Najera      The findings from the last RT Protocol Assessment were as follows:   History Pulmonary Disease: Smoker 15 pack years or more (no COPD per recent PFT)  Respiratory Pattern: Regular pattern and RR 12-20 bpm  Breath Sounds: Slightly diminished and/or crackles  Cough: Strong, spontaneous, non-productive  Indication for Bronchodilator Therapy: On home bronchodilators (takes albuterol BID at home)  Bronchodilator Assessment Score: 3    Aerosolized bronchodilator medication orders have been revised according to the RT Inhaler-Nebulizer Bronchodilator Protocol below.    Respiratory Therapist to perform RT Therapy Protocol Assessment initially then follow the protocol.  Repeat RT Therapy Protocol Assessment PRN for score 0-3 or on second treatment, BID, and PRN for scores above 3.    No Indications - adjust the frequency to every 6 hours PRN wheezing or bronchospasm, if no treatments needed after 48 hours then discontinue using Per Protocol order mode.     If indication present, adjust the RT bronchodilator orders based on the Bronchodilator Assessment Score as indicated below.  Use Inhaler orders unless patient has one or more of the following: on home nebulizer, not able to hold breath for 10 seconds, is not alert and oriented, cannot activate and use MDI correctly, or respiratory rate 
RT Inhaler-Nebulizer Bronchodilator Protocol Note    There is a bronchodilator order in the chart from a provider indicating to follow the RT Bronchodilator Protocol and there is an “Initiate RT Inhaler-Nebulizer Bronchodilator Protocol” order as well (see protocol at bottom of note).    CXR Findings:  XR CHEST PORTABLE  Result Date: 7/28/2025  Small bilateral pleural effusions with atelectasis, decreased on the right. Cardiomegaly. This document has been electronically signed by: Doni Truong MD on 07/28/2025 07:17 AM    XR CHEST PORTABLE  Result Date: 7/26/2025  1. Small bilateral pleural effusions with adjacent atelectasis/infiltrate. 2. Mild stable cardiomegaly. **This report has been created using voice recognition software. It may contain minor errors which are inherent in voice recognition technology.** Electronically signed by Dr. Justo Najera      The findings from the last RT Protocol Assessment were as follows:   History Pulmonary Disease: Chronic pulmonary disease  Respiratory Pattern: Regular pattern and RR 12-20 bpm  Breath Sounds: Slightly diminished and/or crackles  Cough: Strong, spontaneous, non-productive  Indication for Bronchodilator Therapy: Decreased or absent breath sounds  Bronchodilator Assessment Score: 4    Aerosolized bronchodilator medication orders have been revised according to the RT Inhaler-Nebulizer Bronchodilator Protocol below.    Respiratory Therapist to perform RT Therapy Protocol Assessment initially then follow the protocol.  Repeat RT Therapy Protocol Assessment PRN for score 0-3 or on second treatment, BID, and PRN for scores above 3.    No Indications - adjust the frequency to every 6 hours PRN wheezing or bronchospasm, if no treatments needed after 48 hours then discontinue using Per Protocol order mode.     If indication present, adjust the RT bronchodilator orders based on the Bronchodilator Assessment Score as indicated below.  Use Inhaler orders unless patient has 
increased work of breathing using Per Protocol order mode.        4-6 - enter or revise RT Bronchodilator order(s) to two equivalent RT bronchodilator orders with one order with BID Frequency and one order with Frequency of every 4 hours PRN wheezing or increased work of breathing using Per Protocol order mode.        7-10 - enter or revise RT Bronchodilator order(s) to two equivalent RT bronchodilator orders with one order with TID Frequency and one order with Frequency of every 4 hours PRN wheezing or increased work of breathing using Per Protocol order mode.       11-13 - enter or revise RT Bronchodilator order(s) to one equivalent RT bronchodilator order with QID Frequency and an Albuterol order with Frequency of every 4 hours PRN wheezing or increased work of breathing using Per Protocol order mode.      Greater than 13 - enter or revise RT Bronchodilator order(s) to one equivalent RT bronchodilator order with every 4 hours Frequency and an Albuterol order with Frequency of every 2 hours PRN wheezing or increased work of breathing using Per Protocol order mode.     RT to enter RT Home Evaluation for COPD & MDI Assessment order using Per Protocol order mode.    Electronically signed by Esperanza Wallace RCP on 7/31/2025 at 8:22 AM

## 2025-08-01 LAB
BACTERIA BLD AEROBE CULT: NORMAL
BACTERIA BLD AEROBE CULT: NORMAL

## 2025-08-04 ENCOUNTER — OFFICE VISIT (OUTPATIENT)
Dept: PHYSICAL MEDICINE AND REHAB | Age: 70
End: 2025-08-04
Payer: MEDICARE

## 2025-08-04 VITALS
HEIGHT: 65 IN | SYSTOLIC BLOOD PRESSURE: 152 MMHG | HEART RATE: 63 BPM | BODY MASS INDEX: 29.66 KG/M2 | WEIGHT: 178 LBS | DIASTOLIC BLOOD PRESSURE: 62 MMHG

## 2025-08-04 DIAGNOSIS — M54.41 CHRONIC BILATERAL LOW BACK PAIN WITH BILATERAL SCIATICA: ICD-10-CM

## 2025-08-04 DIAGNOSIS — M54.42 CHRONIC BILATERAL LOW BACK PAIN WITH BILATERAL SCIATICA: ICD-10-CM

## 2025-08-04 DIAGNOSIS — M54.16 LUMBAR RADICULITIS: ICD-10-CM

## 2025-08-04 DIAGNOSIS — M47.816 LUMBAR SPONDYLOSIS: Primary | ICD-10-CM

## 2025-08-04 DIAGNOSIS — E11.42 DIABETIC POLYNEUROPATHY ASSOCIATED WITH TYPE 2 DIABETES MELLITUS (HCC): ICD-10-CM

## 2025-08-04 DIAGNOSIS — M48.062 SPINAL STENOSIS OF LUMBAR REGION WITH NEUROGENIC CLAUDICATION: ICD-10-CM

## 2025-08-04 DIAGNOSIS — M47.816 LUMBAR FACET ARTHROPATHY: ICD-10-CM

## 2025-08-04 DIAGNOSIS — G89.29 CHRONIC BILATERAL LOW BACK PAIN WITH BILATERAL SCIATICA: ICD-10-CM

## 2025-08-04 DIAGNOSIS — R53.81 DEBILITY: ICD-10-CM

## 2025-08-04 DIAGNOSIS — G89.4 CHRONIC PAIN SYNDROME: ICD-10-CM

## 2025-08-04 PROCEDURE — 3017F COLORECTAL CA SCREEN DOC REV: CPT | Performed by: NURSE PRACTITIONER

## 2025-08-04 PROCEDURE — 99214 OFFICE O/P EST MOD 30 MIN: CPT | Performed by: NURSE PRACTITIONER

## 2025-08-04 PROCEDURE — G8399 PT W/DXA RESULTS DOCUMENT: HCPCS | Performed by: NURSE PRACTITIONER

## 2025-08-04 PROCEDURE — 1036F TOBACCO NON-USER: CPT | Performed by: NURSE PRACTITIONER

## 2025-08-04 PROCEDURE — 3051F HG A1C>EQUAL 7.0%<8.0%: CPT | Performed by: NURSE PRACTITIONER

## 2025-08-04 PROCEDURE — 3078F DIAST BP <80 MM HG: CPT | Performed by: NURSE PRACTITIONER

## 2025-08-04 PROCEDURE — 1111F DSCHRG MED/CURRENT MED MERGE: CPT | Performed by: NURSE PRACTITIONER

## 2025-08-04 PROCEDURE — 2022F DILAT RTA XM EVC RTNOPTHY: CPT | Performed by: NURSE PRACTITIONER

## 2025-08-04 PROCEDURE — G8417 CALC BMI ABV UP PARAM F/U: HCPCS | Performed by: NURSE PRACTITIONER

## 2025-08-04 PROCEDURE — 1125F AMNT PAIN NOTED PAIN PRSNT: CPT | Performed by: NURSE PRACTITIONER

## 2025-08-04 PROCEDURE — 1123F ACP DISCUSS/DSCN MKR DOCD: CPT | Performed by: NURSE PRACTITIONER

## 2025-08-04 PROCEDURE — 1159F MED LIST DOCD IN RCRD: CPT | Performed by: NURSE PRACTITIONER

## 2025-08-04 PROCEDURE — 1090F PRES/ABSN URINE INCON ASSESS: CPT | Performed by: NURSE PRACTITIONER

## 2025-08-04 PROCEDURE — G8427 DOCREV CUR MEDS BY ELIG CLIN: HCPCS | Performed by: NURSE PRACTITIONER

## 2025-08-04 PROCEDURE — 3077F SYST BP >= 140 MM HG: CPT | Performed by: NURSE PRACTITIONER

## 2025-08-04 RX ORDER — HYDROCODONE BITARTRATE AND ACETAMINOPHEN 5; 325 MG/1; MG/1
1 TABLET ORAL EVERY 8 HOURS PRN
COMMUNITY

## 2025-08-04 RX ORDER — HYDROCODONE BITARTRATE AND ACETAMINOPHEN 5; 325 MG/1; MG/1
1 TABLET ORAL EVERY 8 HOURS PRN
Qty: 90 TABLET | Refills: 0 | Status: SHIPPED | OUTPATIENT
Start: 2025-08-05 | End: 2025-09-04

## 2025-08-04 ASSESSMENT — ENCOUNTER SYMPTOMS
EYE DISCHARGE: 0
EYE REDNESS: 0
CHEST TIGHTNESS: 0
GASTROINTESTINAL NEGATIVE: 1
COUGH: 0
RESPIRATORY NEGATIVE: 1
BACK PAIN: 1
SHORTNESS OF BREATH: 0
WHEEZING: 0

## 2025-08-06 DIAGNOSIS — R39.9 UTI SYMPTOMS: Primary | ICD-10-CM

## 2025-08-06 RX ORDER — CIPROFLOXACIN 500 MG/1
500 TABLET, FILM COATED ORAL 2 TIMES DAILY
COMMUNITY
End: 2025-08-06 | Stop reason: SDUPTHER

## 2025-08-06 RX ORDER — CIPROFLOXACIN 500 MG/1
500 TABLET, FILM COATED ORAL 2 TIMES DAILY
Qty: 14 TABLET | Refills: 0 | Status: SHIPPED | OUTPATIENT
Start: 2025-08-06

## 2025-08-07 ENCOUNTER — RESULTS FOLLOW-UP (OUTPATIENT)
Dept: NEPHROLOGY | Age: 70
End: 2025-08-07

## 2025-08-07 ENCOUNTER — LAB (OUTPATIENT)
Dept: LAB | Age: 70
End: 2025-08-07

## 2025-08-07 LAB
BACTERIA URNS QL MICRO: ABNORMAL /HPF
BILIRUB UR QL STRIP.AUTO: NEGATIVE
CASTS #/AREA URNS LPF: ABNORMAL /LPF
CASTS 2: ABNORMAL /LPF
CHARACTER UR: CLEAR
COLOR, UA: YELLOW
CREAT UR-MCNC: 42.4 MG/DL
CRYSTALS URNS MICRO: ABNORMAL
EPITHELIAL CELLS, UA: ABNORMAL /HPF
GLUCOSE UR QL STRIP.AUTO: NEGATIVE MG/DL
HGB UR QL STRIP.AUTO: NEGATIVE
KETONES UR QL STRIP.AUTO: NEGATIVE
MISCELLANEOUS 2: ABNORMAL
NITRITE UR QL STRIP: NEGATIVE
PH UR STRIP.AUTO: 7 [PH] (ref 5–9)
PROT UR STRIP.AUTO-MCNC: NEGATIVE MG/DL
PROT UR-MCNC: 12.1 MG/DL
PROT/CREAT 24H UR: 0.29 MG/G{CREAT}
RBC URINE: ABNORMAL /HPF
RENAL EPI CELLS #/AREA URNS HPF: ABNORMAL /[HPF]
SP GR UR REFRACT.AUTO: 1.01 (ref 1–1.03)
UROBILINOGEN, URINE: 0.2 EU/DL (ref 0–1)
WBC #/AREA URNS HPF: ABNORMAL /HPF
WBC #/AREA URNS HPF: ABNORMAL /[HPF]
YEAST LIKE FUNGI URNS QL MICRO: ABNORMAL

## 2025-08-08 LAB
BACTERIA UR CULT: ABNORMAL
ORGANISM: ABNORMAL

## 2025-08-26 ENCOUNTER — OFFICE VISIT (OUTPATIENT)
Dept: CARDIOLOGY CLINIC | Age: 70
End: 2025-08-26
Payer: MEDICARE

## 2025-08-26 VITALS
HEIGHT: 65 IN | HEART RATE: 67 BPM | OXYGEN SATURATION: 98 % | BODY MASS INDEX: 32.15 KG/M2 | WEIGHT: 193 LBS | SYSTOLIC BLOOD PRESSURE: 164 MMHG | DIASTOLIC BLOOD PRESSURE: 60 MMHG

## 2025-08-26 DIAGNOSIS — I50.30 HEART FAILURE WITH PRESERVED EJECTION FRACTION, UNSPECIFIED HF CHRONICITY (HCC): Primary | ICD-10-CM

## 2025-08-26 LAB
ANION GAP SERPL CALC-SCNC: 11 MEQ/L (ref 8–16)
BUN SERPL-MCNC: 32 MG/DL (ref 8–23)
CALCIUM SERPL-MCNC: 10.2 MG/DL (ref 8.5–10.5)
CHLORIDE SERPL-SCNC: 107 MEQ/L (ref 98–111)
CO2 SERPL-SCNC: 24 MEQ/L (ref 22–29)
CREAT SERPL-MCNC: 1.4 MG/DL (ref 0.5–0.9)
GFR SERPL CREATININE-BSD FRML MDRD: 40 ML/MIN/1.73M2
GLUCOSE SERPL-MCNC: 87 MG/DL (ref 74–109)
NT-PROBNP SERPL IA-MCNC: 2468 PG/ML (ref 0–124)
POTASSIUM SERPL-SCNC: 4 MEQ/L (ref 3.5–5.2)
SODIUM SERPL-SCNC: 142 MEQ/L (ref 135–145)

## 2025-08-26 PROCEDURE — G8417 CALC BMI ABV UP PARAM F/U: HCPCS | Performed by: NURSE PRACTITIONER

## 2025-08-26 PROCEDURE — 1111F DSCHRG MED/CURRENT MED MERGE: CPT | Performed by: NURSE PRACTITIONER

## 2025-08-26 PROCEDURE — 3017F COLORECTAL CA SCREEN DOC REV: CPT | Performed by: NURSE PRACTITIONER

## 2025-08-26 PROCEDURE — 1090F PRES/ABSN URINE INCON ASSESS: CPT | Performed by: NURSE PRACTITIONER

## 2025-08-26 PROCEDURE — G8399 PT W/DXA RESULTS DOCUMENT: HCPCS | Performed by: NURSE PRACTITIONER

## 2025-08-26 PROCEDURE — 1123F ACP DISCUSS/DSCN MKR DOCD: CPT | Performed by: NURSE PRACTITIONER

## 2025-08-26 PROCEDURE — 99215 OFFICE O/P EST HI 40 MIN: CPT | Performed by: NURSE PRACTITIONER

## 2025-08-26 PROCEDURE — 3078F DIAST BP <80 MM HG: CPT | Performed by: NURSE PRACTITIONER

## 2025-08-26 PROCEDURE — 1159F MED LIST DOCD IN RCRD: CPT | Performed by: NURSE PRACTITIONER

## 2025-08-26 PROCEDURE — G8427 DOCREV CUR MEDS BY ELIG CLIN: HCPCS | Performed by: NURSE PRACTITIONER

## 2025-08-26 PROCEDURE — 3077F SYST BP >= 140 MM HG: CPT | Performed by: NURSE PRACTITIONER

## 2025-08-26 PROCEDURE — 1036F TOBACCO NON-USER: CPT | Performed by: NURSE PRACTITIONER

## 2025-09-02 DIAGNOSIS — Z79.4 TYPE 2 DIABETES MELLITUS WITHOUT COMPLICATION, WITH LONG-TERM CURRENT USE OF INSULIN (HCC): ICD-10-CM

## 2025-09-02 DIAGNOSIS — E11.9 TYPE 2 DIABETES MELLITUS WITHOUT COMPLICATION, WITH LONG-TERM CURRENT USE OF INSULIN (HCC): ICD-10-CM

## 2025-09-02 RX ORDER — INSULIN ASPART 100 [IU]/ML
INJECTION, SOLUTION INTRAVENOUS; SUBCUTANEOUS
Qty: 75 ML | Refills: 3 | Status: SHIPPED | OUTPATIENT
Start: 2025-09-02

## 2025-09-03 ENCOUNTER — OFFICE VISIT (OUTPATIENT)
Dept: FAMILY MEDICINE CLINIC | Age: 70
End: 2025-09-03
Payer: MEDICARE

## 2025-09-03 VITALS
WEIGHT: 185 LBS | DIASTOLIC BLOOD PRESSURE: 78 MMHG | TEMPERATURE: 97.7 F | RESPIRATION RATE: 18 BRPM | SYSTOLIC BLOOD PRESSURE: 138 MMHG | HEART RATE: 68 BPM | HEIGHT: 65 IN | BODY MASS INDEX: 30.82 KG/M2

## 2025-09-03 DIAGNOSIS — M54.42 CHRONIC BILATERAL LOW BACK PAIN WITH BILATERAL SCIATICA: ICD-10-CM

## 2025-09-03 DIAGNOSIS — J96.01 ACUTE HYPOXIC RESPIRATORY FAILURE (HCC): ICD-10-CM

## 2025-09-03 DIAGNOSIS — N18.32 STAGE 3B CHRONIC KIDNEY DISEASE (HCC): ICD-10-CM

## 2025-09-03 DIAGNOSIS — I50.33 ACUTE ON CHRONIC DIASTOLIC CONGESTIVE HEART FAILURE (HCC): ICD-10-CM

## 2025-09-03 DIAGNOSIS — M47.816 LUMBAR SPONDYLOSIS: ICD-10-CM

## 2025-09-03 DIAGNOSIS — E11.8 DIABETES MELLITUS TYPE 2 WITH COMPLICATIONS (HCC): ICD-10-CM

## 2025-09-03 DIAGNOSIS — M48.062 SPINAL STENOSIS OF LUMBAR REGION WITH NEUROGENIC CLAUDICATION: ICD-10-CM

## 2025-09-03 DIAGNOSIS — G89.29 CHRONIC BILATERAL LOW BACK PAIN WITH BILATERAL SCIATICA: ICD-10-CM

## 2025-09-03 DIAGNOSIS — M47.816 LUMBAR FACET ARTHROPATHY: ICD-10-CM

## 2025-09-03 DIAGNOSIS — Z94.0 RENAL TRANSPLANT RECIPIENT: ICD-10-CM

## 2025-09-03 DIAGNOSIS — I10 ESSENTIAL HYPERTENSION: Primary | ICD-10-CM

## 2025-09-03 DIAGNOSIS — G89.4 CHRONIC PAIN SYNDROME: ICD-10-CM

## 2025-09-03 DIAGNOSIS — E78.2 MIXED HYPERLIPIDEMIA: ICD-10-CM

## 2025-09-03 DIAGNOSIS — M54.41 CHRONIC BILATERAL LOW BACK PAIN WITH BILATERAL SCIATICA: ICD-10-CM

## 2025-09-03 DIAGNOSIS — M54.16 LUMBAR RADICULITIS: ICD-10-CM

## 2025-09-03 PROCEDURE — 1090F PRES/ABSN URINE INCON ASSESS: CPT | Performed by: STUDENT IN AN ORGANIZED HEALTH CARE EDUCATION/TRAINING PROGRAM

## 2025-09-03 PROCEDURE — 1036F TOBACCO NON-USER: CPT | Performed by: STUDENT IN AN ORGANIZED HEALTH CARE EDUCATION/TRAINING PROGRAM

## 2025-09-03 PROCEDURE — 3017F COLORECTAL CA SCREEN DOC REV: CPT | Performed by: STUDENT IN AN ORGANIZED HEALTH CARE EDUCATION/TRAINING PROGRAM

## 2025-09-03 PROCEDURE — 3075F SYST BP GE 130 - 139MM HG: CPT | Performed by: STUDENT IN AN ORGANIZED HEALTH CARE EDUCATION/TRAINING PROGRAM

## 2025-09-03 PROCEDURE — 2022F DILAT RTA XM EVC RTNOPTHY: CPT | Performed by: STUDENT IN AN ORGANIZED HEALTH CARE EDUCATION/TRAINING PROGRAM

## 2025-09-03 PROCEDURE — 3078F DIAST BP <80 MM HG: CPT | Performed by: STUDENT IN AN ORGANIZED HEALTH CARE EDUCATION/TRAINING PROGRAM

## 2025-09-03 PROCEDURE — G8417 CALC BMI ABV UP PARAM F/U: HCPCS | Performed by: STUDENT IN AN ORGANIZED HEALTH CARE EDUCATION/TRAINING PROGRAM

## 2025-09-03 PROCEDURE — 1123F ACP DISCUSS/DSCN MKR DOCD: CPT | Performed by: STUDENT IN AN ORGANIZED HEALTH CARE EDUCATION/TRAINING PROGRAM

## 2025-09-03 PROCEDURE — 3051F HG A1C>EQUAL 7.0%<8.0%: CPT | Performed by: STUDENT IN AN ORGANIZED HEALTH CARE EDUCATION/TRAINING PROGRAM

## 2025-09-03 PROCEDURE — G8427 DOCREV CUR MEDS BY ELIG CLIN: HCPCS | Performed by: STUDENT IN AN ORGANIZED HEALTH CARE EDUCATION/TRAINING PROGRAM

## 2025-09-03 PROCEDURE — 1159F MED LIST DOCD IN RCRD: CPT | Performed by: STUDENT IN AN ORGANIZED HEALTH CARE EDUCATION/TRAINING PROGRAM

## 2025-09-03 PROCEDURE — 99214 OFFICE O/P EST MOD 30 MIN: CPT | Performed by: STUDENT IN AN ORGANIZED HEALTH CARE EDUCATION/TRAINING PROGRAM

## 2025-09-03 PROCEDURE — G8399 PT W/DXA RESULTS DOCUMENT: HCPCS | Performed by: STUDENT IN AN ORGANIZED HEALTH CARE EDUCATION/TRAINING PROGRAM

## 2025-09-03 PROCEDURE — G2211 COMPLEX E/M VISIT ADD ON: HCPCS | Performed by: STUDENT IN AN ORGANIZED HEALTH CARE EDUCATION/TRAINING PROGRAM

## 2025-09-03 RX ORDER — ATORVASTATIN CALCIUM 40 MG/1
40 TABLET, FILM COATED ORAL DAILY
Qty: 90 TABLET | Refills: 3 | Status: SHIPPED | OUTPATIENT
Start: 2025-09-03

## 2025-09-03 RX ORDER — HYDROCODONE BITARTRATE AND ACETAMINOPHEN 5; 325 MG/1; MG/1
1 TABLET ORAL EVERY 8 HOURS PRN
Qty: 90 TABLET | Refills: 0 | Status: SHIPPED | OUTPATIENT
Start: 2025-09-04 | End: 2025-10-04

## 2025-09-03 ASSESSMENT — ENCOUNTER SYMPTOMS
CONSTIPATION: 0
DIARRHEA: 0
VOMITING: 0
WHEEZING: 0
BACK PAIN: 1
COUGH: 0
ABDOMINAL PAIN: 0
NAUSEA: 0
SHORTNESS OF BREATH: 1

## (undated) DEVICE — SYRINGE MED 10ML LUERLOCK TIP W/O SFTY DISP

## (undated) DEVICE — SOLUTION IV 1000ML 0.45% SOD CHL PH 5 INJ USP VIAFLX PLAS

## (undated) DEVICE — 3M™ BAIR HUGGER® MULTI ACCESS BLANKET, PEDIATRIC, FULL BODY, 10 PER CASE 31000: Brand: BAIR HUGGER™

## (undated) DEVICE — CATHETER ETER IV 22GA L1IN POLYUR STR RADPQ INTROCAN SFTY

## (undated) DEVICE — SET ADMIN 25ML L117IN PMP MOD CK VLV RLER CLMP 2 SMRTSITE

## (undated) DEVICE — FOGARTY SPRING CLIPS 6MM: Brand: FOGARTY SOFTJAW

## (undated) DEVICE — ENDO KIT: Brand: MEDLINE INDUSTRIES, INC.

## (undated) DEVICE — ROYAL SILK SURGICAL GOWN, XXL: Brand: CONVERTORS

## (undated) DEVICE — GLOVE ORANGE PI 7   MSG9070

## (undated) DEVICE — DISSECTOR LAP DIA5MM BLNT TIP ENDOPATH

## (undated) DEVICE — GAUZE,SPONGE,4"X4",12PLY,STERILE,LF,2'S: Brand: MEDLINE

## (undated) DEVICE — SET LNR RED GRN W/ BASE CLEANASCOPE

## (undated) DEVICE — IV START KIT: Brand: MEDLINE INDUSTRIES, INC.

## (undated) DEVICE — SUTURE PROL SZ 5-0 L36IN NONABSORBABLE BLU L17MM RB-1 1/2 8556H

## (undated) DEVICE — GOWN,SIRUS,NON REINFRCD,LARGE,SET IN SL: Brand: MEDLINE

## (undated) DEVICE — BLADE OPHTH GRN ROUNDED TIP 1 SIDE SHRP GRINDLESS MINI-BLDE

## (undated) DEVICE — YANKAUER,BULB TIP,W/O VENT,RIGID,STERILE: Brand: MEDLINE

## (undated) DEVICE — GLOVE SURG SZ 65 THK91MIL LTX FREE SYN POLYISOPRENE

## (undated) DEVICE — APPLIER CLP L9.38IN M LIG TI DISP STR RNG HNDL LIGACLP

## (undated) DEVICE — CATHETER,URETHRAL,REDRUBBER,STERILE,20FR: Brand: MEDLINE

## (undated) DEVICE — CONNECTOR TBNG AUX H2O JET DISP FOR OLY 160/180 SER

## (undated) DEVICE — CANNULA INJ L2.5IN BLNT TIP 3MM CLR BODY W/ 1 W VLV DLP

## (undated) DEVICE — SPONGE LAP W18XL18IN WHT COT 4 PLY FLD STRUNG RADPQ DISP ST

## (undated) DEVICE — Device: Brand: RETRACT-O-TAPE 18G X 30.5CM BLUNT NEEDLE

## (undated) DEVICE — TUBING IV STOPCOCK 48 CM 3 W

## (undated) DEVICE — BLADE CLIPPER GEN PURP NS

## (undated) DEVICE — TUBING, SUCTION, 1/4" X 20', STRAIGHT: Brand: MEDLINE INDUSTRIES, INC.

## (undated) DEVICE — 35 ML SYRINGE LUER-LOCK TIP: Brand: MONOJECT

## (undated) DEVICE — SUTURE NONABSORBABLE MONOFILAMENT 4-0 RB-1 36 IN BLU PROLENE 8557H

## (undated) DEVICE — CHLORAPREP 26ML ORANGE

## (undated) DEVICE — FORCEP RAD JAW W/NEEDLE 160CM

## (undated) DEVICE — FOGARTY - HYDRAGRIP SURGICAL - CLAMP INSERTS: Brand: FOGARTY SOFTJAW

## (undated) DEVICE — GLOVE ORANGE PI 8 1/2   MSG9085

## (undated) DEVICE — COVER US PRB W5XL96IN LTX W/ GEL

## (undated) DEVICE — SHEARS ENDOSCP L9CM CRV HARM FOCS +

## (undated) DEVICE — BOOT,SUTURE,STANDARD,YELLOW-IN-BLUE: Brand: MEDLINE

## (undated) DEVICE — LINER SUCT CANSTR 1500CC SEMI RIG W/ POR HYDROPHOBIC SHUT

## (undated) DEVICE — GLOVE SURG SZ 6 THK91MIL LTX FREE SYN POLYISOPRENE ANTI

## (undated) DEVICE — SUTURE PROL SZ 7-0 L24IN NONABSORBABLE BLU L9.3MM CC 3/8 8704H

## (undated) DEVICE — SYRINGE IRRIG 60ML SFT PLIABLE BLB EZ TO GRP 1 HND USE W/

## (undated) DEVICE — BLADE OPHTH D5MM 15DEG GRN W/ RND KNURLED HNDL MICRO-SHARP

## (undated) DEVICE — SUTURE MCRYL SZ 4-0 L27IN ABSRB UD L19MM PS-2 1/2 CIR PRIM Y426H

## (undated) DEVICE — COVER ARMBRD W13XL28.5IN IMPERV BLU FOR OP RM

## (undated) DEVICE — CATHETER IV 14GA L1.25IN TEF STR HUB INTROCAN SFTY

## (undated) DEVICE — APPLIER CLP L9.375IN APER 2.1MM CLS L3.8MM 20 SM TI CLP

## (undated) DEVICE — CONMED SCOPE SAVER BITE BLOCK, 20X27 MM: Brand: SCOPE SAVER

## (undated) DEVICE — HYPODERMIC SAFETY NEEDLE: Brand: MAGELLAN

## (undated) DEVICE — SUTURE PROL SZ 6-0 L18IN NONABSORBABLE BLU L9.3MM BV-1 3/8 8806H